# Patient Record
Sex: MALE | Race: BLACK OR AFRICAN AMERICAN | Employment: OTHER | URBAN - METROPOLITAN AREA
[De-identification: names, ages, dates, MRNs, and addresses within clinical notes are randomized per-mention and may not be internally consistent; named-entity substitution may affect disease eponyms.]

---

## 2023-10-02 ENCOUNTER — HOSPITAL ENCOUNTER (OUTPATIENT)
Facility: HOSPITAL | Age: 73
Setting detail: OUTPATIENT SURGERY
End: 2023-10-02
Attending: TRANSPLANT SURGERY | Admitting: TRANSPLANT SURGERY
Payer: COMMERCIAL

## 2023-10-03 ENCOUNTER — CLINICAL SUPPORT (OUTPATIENT)
Dept: PREADMISSION TESTING | Facility: HOSPITAL | Age: 73
End: 2023-10-03
Payer: COMMERCIAL

## 2023-10-03 DIAGNOSIS — N18.6 ESRD (END STAGE RENAL DISEASE) (MULTI): Primary | ICD-10-CM

## 2023-10-04 ENCOUNTER — TELEPHONE (OUTPATIENT)
Dept: PREADMISSION TESTING | Facility: HOSPITAL | Age: 73
End: 2023-10-04

## 2023-10-04 NOTE — H&P (VIEW-ONLY)
Diagnoses/Problems  Assessed    · Diabetes mellitus (250.00) (E11.9)   · CKD (chronic kidney disease) (585.9) (N18.9)   · Preoperative cardiovascular examination (V72.81) (Z01.810)   · Heart block (426.9) (I45.9)    Orders  Health Maintenance    · IO EKG Electrocardiogram- 12 Lead; Status:Complete;   Done: [id number]    Chief Complaint    Patient is here today for a new patient visit for cardiac clearance      History of Present IllnessMr Valentin is a 72 year old  male with T2DM, end stage CKD (not on dialysis), s/p left arm fistual creation, and a Mobitz 1, here for cardiac clearance for a cholecystectomy.     He denies any complaints of chest pain, shortness of breath, palpitations, dizziness, syncope or lower extremity edema.    He denies any H/O CAD, CHF, MI, CVA, TIA    + T2DM (on insulin)  +CKD Last BUN and Cr 73 and 8.75      *Active Problems   Problems    · CKD (chronic kidney disease) (585.9) (N18.9)   · Diabetes mellitus (250.00) (E11.9)   · Encounter for screening colonoscopy (V76.51) (Z12.11)   · Hepatitis B core antibody positive (795.79) (R76.8)   · Pre-transplant evaluation for kidney transplant (V72.83) (Z01.818)    Preoperative cardiovascular examination (V72.81) (Z01.810)       Heart block (426.9) (I45.9)          Surgical History  Problems    · History of Arteriovenous fistula creation procedure   · History of Colonoscopy   · History of Cyst excision   · from right side of abdomen below skin   · History of Prostate surgery    Past Medical History  Problems    · Hepatitis B core antibody positive (795.79) (R76.8)   · History of malignant neoplasm of prostate (V10.46) (Z85.46)    Current Meds    Medication Name Instruction   Carvedilol 3.125 MG Oral Tablet    Desonide 0.05 % External Cream    Doxazosin Mesylate 8 MG Oral Tablet TAKE 1 TABLET Bedtime   Gabapentin 100 MG Oral Capsule TAKE 1 CAPSULE 3 TIMES DAILY.   HumaLOG KwikPen 100 UNIT/ML Subcutaneous Solution Pen-injector sliding  scale as instructed   hydrALAZINE HCl - 100 MG Oral Tablet    Iron TABS TAKE 1 TABLET DAILY AS DIRECTED.   Levemir FlexTouch 100 UNIT/ML SOPN INJECT 60 UNIT Twice daily   Magnesium 400 MG Oral Tablet Take 1 tablet daily   Omeprazole 20 MG Oral Capsule Delayed Release TAKE 1 CAPSULE DAILY EVERY MORNING BEFORE BREAKFAST.   OneTouch Ultra In Vitro Strip    PEG-3350/Electrolytes 236 GM Oral Solution Reconstituted TAKE AS DIRECTED.   Rosuvastatin Calcium 20 MG Oral Tablet TAKE 1 TABLET DAILY.   SPS 15 GM/60ML Oral Suspension 30 ml once daily   Torsemide 20 MG Oral Tablet    Tums 500 MG Oral Tablet Chewable TAKE 2 TABLET 3 times daily with meals   Zinc 50 MG Oral Tablet      Allergies  Medication    · codeine   Recorded By: Rupa Leyva; 1/19/2021 2:35:43 PM   · Ultram TABS   Recorded By: Rupa Leyva; 1/19/2021 2:35:43 PM    Family History  Sister    · Family history of diabetes mellitus (V18.0) (Z83.3)  Brother    · Family history of diabetes mellitus (V18.0) (Z83.3)    Social History  Problems    · Never a smoker   · No illicit drug use   · Occasional alcohol use    Review of Systems    Constitutional: not feeling tired.   Eyes: no eyesight problems.   ENT: no hearing loss and no nosebleeds.   Cardiovascular: no intermittent leg claudication, no chest pain, no tightness or heavy pressure, no shortness of breath, no palpitations, no lower extremity edema, the heart rate was not slow, the heart rate was not fast and as noted in HPI.   Respiratory: no chronic cough, no shortness of breath and no shortness of breath during exertion.   Gastrointestinal: no change in bowel habits and no blood in stools.   Genitourinary: no urinary frequency and no hematuria.   Skin: no skin rashes.   Neurological: no seizures, no frequent falls, no dizziness and no fainting.   Psychiatric: no depression and not suicidal.   All other systems have been reviewed and are negative for complaint.      Vitals  Vital Signs    Recorded:  [id number] 09:47AM   Heart Rate 74   Systolic 140   Diastolic 60   Height 5 ft 11 in   Weight 191 lb    BMI Calculated 26.64 kg/m2   BSA Calculated 2.07   O2 Saturation 97     Physical Exam    Constitutional: alert and in no acute distress.   Eyes: no erythema, swelling or discharge from the eye .   Neck: neck is supple, symmetric, trachea midline, no masses  and no thyromegaly .   Pulmonary: no increased work of breathing or signs of respiratory distress  and lungs clear to auscultation.  Auscultation of the lungs revealed no expiratory wheezing, normal expiratory time and no inspiratory wheezing. no rales or crackles were heard bilaterally. no rhonchi. no friction rub. no wheezing. no diminished breath sounds. no bronchial breath sounds.   Cardiovascular: carotid pulses 2+ bilaterally with no bruit  cardiac murmur radiating up to bilateral carotids, JVP was normal, no thrills ,  the heart rate was normal normal S1, normal S2, no S3, no S4 + Murmur., pedal pulses 2+ bilaterally  and no edema .   Abdomen: abdomen non-tender, no masses  and no hepatomegaly .   Skin: skin warm and dry, normal skin turgor .   Psychiatric judgment and insight is normal  and oriented to person, place and time .      Results/Data  Electrocardiogram 12 Lead 26Jan2023 02:05PM Bre Avila     Test Name Result Flag Reference   Ventricular Rate 52     Atrial Rate 81     QRS Duration 144     Q-T Interval 516     QTC Calculation 479     P Axis 46     Diagnosis      Sinus rhythm with AV disociation and ventricular escpae rhythm   R Axis -18     T Axis -19     QRS Count 9     Q Onset 226     T Offset 484     QTC Fredericia 491     MUSEIMAGE      [email or web address]=PDF&Ext=[email or web address]   Diagnosis Class Abnormal       NM Cardiac Stress/Rest Nuclear Med Order 23Jan2023 12:34PM Mike Zamora     Test Name Result  Flag Reference   NM Cardiac Stress/Rest Nuclear Med Order (Report)     FINAL REPORT  Interpreted by: NETTIE CALLES   01/23/23 13:36  MRN: [id number]  Patient Name: TAMAR ADAMS     STUDY:  CARDIAC STRESS/REST INJECTION; MIBI SPECT REST OR STRESS ONLY;  1/23/2023 12:34 pm     INDICATION:  pre-transplant evaluation for kidney transplant Encounter for other  preprocedural examination Z01.818: Pre-transplant evaluation for  kidney transplant.     COMPARISON:  None.     ACCESSION NUMBER(S):  [id number]; [id number]     ORDERING CLINICIAN:  MIKE ZAMORA     TECHNIQUE:  DIVISION OF NUCLEAR MEDICINE  PHARMACOLOGIC STRESS MYOCARDIAL PERFUSION SCAN, ONE DAY PROTOCOL     The patient received an intravenous dose of 9.9 mCi of Tc-99m  Myoview and resting emission tomographic (SPECT) images of the  myocardium were acquired. No stress test due to patient has 2nd  degree heart block.     FINDINGS:  Rest imaging demonstrates normal profusion throughout the wall of  left ventricle.     IMPRESSION:  1. Rest imaging only, which demonstrates normal profusion throughout  the wall of left ventricle.  2. No stress test due to patient has 2nd degree heart block.     I personally reviewed the images/study and I agree with the findings  as stated. This study was interpreted at [place], [place].    Electronically signed by: MARLI  01/23/23 13:36     NM Mibi Spect Rest Or Stress Only 23Jan2023 12:34PM Mike Zamora   ORDER REVISED TO A MIBI SPECT REST OR STRESS ONLY BY RADIOLOGIST; Original Order Number: [id number]     Test Name Result Flag Reference   NM Mibi Spect Rest Or Stress Only (Report)     FINAL REPORT  Interpreted by: NETTIE CALLES   01/23/23 13:36  MRN: [id number]  Patient Name: TAMAR ADAMS     STUDY:  CARDIAC STRESS/REST INJECTION; MIBI SPECT REST OR STRESS ONLY;  1/23/2023 12:34 pm     INDICATION:  pre-transplant evaluation for kidney transplant Encounter for other  preprocedural  examination Z01.818: Pre-transplant evaluation for  kidney transplant.     COMPARISON:  None.     ACCESSION NUMBER(S):  [id number]; [id number]     ORDERING CLINICIAN:  MIKE GUTIERRES     TECHNIQUE:  DIVISION OF NUCLEAR MEDICINE  PHARMACOLOGIC STRESS MYOCARDIAL PERFUSION SCAN, ONE DAY PROTOCOL     The patient received an intravenous dose of 9.9 mCi of Tc-99m  Myoview and resting emission tomographic (SPECT) images of the  myocardium were acquired. No stress test due to patient has 2nd  degree heart block.     FINDINGS:  Rest imaging demonstrates normal profusion throughout the wall of  left ventricle.     IMPRESSION:  1. Rest imaging only, which demonstrates normal profusion throughout  the wall of left ventricle.  2. No stress test due to patient has 2nd degree heart block.     I personally reviewed the images/study and I agree with the findings  as stated. This study was interpreted at [place], [place].    Electronically signed by: MARLI  01/23/23 13:36     Syngo Nuclear Order 23Jan2023 11:29AM Emigdio Booth     Test Name Result Flag Reference   Nuclear Stress Testing Cardiology Order      Please click on the link to view the study images     Echocardiogram 23Jan2023 09:24AM Mike Gutierres     Test Name Result Flag Reference   Echocardiogram (Report)     [id number]    [facility], [address] [place] [address]  Tel [phone] and Fax [phone]    TRANSTHORACIC ECHOCARDIOGRAM REPORT      Patient Name:   TAMAR ADAMS Reading Physician:  [id number] Derik Shields MD  Study Date:    1/23/2023   Referring Physician: MIKE GUTIERRES  MRN/PID:     [id number]    PCP:  Accession/Order#: [id number]  Department Location: [facility]  YOB: 1950   Fellow:  Gender:      M       Nurse:        Derik Samuels RN, BSN  Admit Date:           Sonographer:     Anayeli [facility]  Admission Status: Outpatient   Additional  Staff:  Height:      180.34 cm   CC Report to:  Weight:      101.15 kg   Study Type:     Echocardiogram  BSA:       2.21 m2  Blood Pressure: 160 /75 mmHg    Diagnosis/ICD: Z01.818-Encounter for other preprocedural examination  Indication:  Pre Kidney Transplant  Procedure/CPT: Echo Complete w Full Doppler-[id number]    Patient History:  Valve Disorders:  Mitral Regurgitation, Tricuspid Regurgitation and Pulmonic  Insufficiency.  Diabetes:     Yes  Pertinent History: HTN and Hyperlipidemia. CKD, Pre kidney tranplant, MAC.    Study Detail: The following Echo studies were performed: 2D, M-Mode, Doppler and  color flow. Technically challenging study due to body habitus,  prominent lung artifact and poor acoustic windows. Definity used  as a contrast agent for endocardial border definition. Total  contrast used for this procedure was 3.0 mL via IV push. Patient's  heart rhythm is Heart Block.      PHYSICIAN INTERPRETATION:  Left Ventricle: The left ventricular systolic function is normal, with an estimated ejection fraction of 60%. There are no regional wall motion abnormalities. The left ventricular cavity size is normal. The left ventricular septal wall thickness is moderately increased. There is moderately increased left ventricular posterior wall thickness. There is mild concentric left ventricular hypertrophy. Spectral Doppler shows a pseudonormal pattern of left ventricular diastolic filling.  Left Atrium: The left atrium is mild to moderately dilated.  Right Ventricle: The right ventricle is mildly enlarged. There is normal right ventricular global systolic function.  Right Atrium: The right atrium is mildly dilated.  Aortic Valve: The aortic valve is trileaflet. There is mild aortic valve cusp calcification. There is evidence of mildly elevated transaortic gradients consistent with sclerosis of the aortic valve.  The aortic valve dimensionless index is 0.63. There is trivial aortic valve regurgitation. The peak  instantaneous gradient of the aortic valve is 14.2 mmHg. The mean gradient of the aortic valve is 8.4 mmHg.  Mitral Valve: The mitral valve is mildly thickened. There is mild mitral annular calcification. There is mild mitral valve regurgitation.  Tricuspid Valve: The tricuspid valve is structurally normal. There is mild tricuspid regurgitation. The Doppler estimated RVSP is moderately elevated at 64.0 mmHg.  Pulmonic Valve: The pulmonic valve is structurally normal. There is physiologic pulmonic valve regurgitation.  Pericardium: There is a trivial to small pericardial effusion.  Aorta: The aortic root is normal. The Asc Ao is 3.40 cm.  Systemic Veins: The inferior vena cava appears mildly dilated. There is IVC inspiratory collapse greater than 50%.  In comparison to the previous echocardiogram(s): Compared with study from 2/9/2022,.      CONCLUSIONS:  1. Left ventricular systolic function is normal with a 60% estimated ejection fraction.  2. Moderately increased left ventricular septal thickness.  3. Spectral Doppler shows a pseudonormal pattern of left ventricular diastolic filling.  4. The left ventricular posterior wall thickness is moderately increased.  5. The left atrium is mild to moderately dilated.  6. Mild mitral valve regurgitation.  7. Mild tricuspid regurgitation visualized.  8. Moderately elevated right ventricular systolic pressure.  9. Aortic valve sclerosis.    QUANTITATIVE DATA SUMMARY:  2D MEASUREMENTS:  Normal Ranges:  LAs:      4.48 cm  (2.7-4.0cm)  RVIDd:     3.09 cm  (0.9-3.6cm)  IVSd:     1.59 cm  (0.6-1.1cm)  LVPWd:     1.46 cm  (0.6-1.1cm)  LVIDd:     4.37 cm  (3.9-5.9cm)  LVIDs:     3.16 cm  LV Mass Index: 122.7 g/m2  LV % FS    27.6 %    LA VOLUME:  Normal Ranges:  LA Vol A4C:    79.9 ml  (22+/-6mL/m2)  LA Vol A2C:    97.9 ml  LA Vol BP:     89.1 ml  LA Vol Index A4C: 36.2 ml/m2  LA Vol Index A2C: 44.3 ml/m2  LA Vol Index BP:  40.3 ml/m2  LA Area A4C:    26.2 cm2  LA Area A2C:    28.8  cm2  LA Major Axis A4C: 7.3 cm  LA Major Axis A2C: 7.2 cm  LA Volume Index:  40.3 ml/m2  LA Vol A4C:    76.2 ml  LA Vol A2C:    91.6 ml    RA VOLUME BY A/L METHOD:  Normal Ranges:  RA Vol A4C:    65.4 ml  (8.3-19.5ml)  RA Vol Index A4C: 29.6 ml/m2  RA Area A4C:    23.2 cm2  RA Major Axis A4C: 7.0 cm    M-MODE MEASUREMENTS:  Normal Ranges:  Ao Root: 3.20 cm (2.0-3.7cm)    AORTA MEASUREMENTS:  Normal Ranges:  Asc Ao, d: 3.40 cm (2.1-3.4cm)    LV SYSTOLIC FUNCTION BY 2D PLANIMETRY (MOD):  Normal Ranges:  EF-A4C View: 56.1 % (>=55%)  EF-A2C View: 64.7 %  EF-Biplane: 59.6 %    LV DIASTOLIC FUNCTION:  Normal Ranges:  MV Peak E:  1.23 m/s  (0.7-1.2 m/s)  MV Peak A:  0.78 m/s  (0.42-0.7 m/s)  E/A Ratio:  1.58    (1.0-2.2)  MV e'     0.06 m/s  (>8.0)  MV lateral e' 0.06 m/s  MV medial e' 0.06 m/s  MV A Dur:   138.41 msec  E/e' Ratio:  20.55    (<8.0)    MITRAL VALVE:  Normal Ranges:  MV DT: 315 msec (150-240msec)    MITRAL INSUFFICIENCY:  Normal Ranges:  MR VTI: 200.23 cm  MR Vmax: 570.36 cm/s    AORTIC VALVE:  Normal Ranges:  AoV Vmax:        1.88 m/s (<=1.7m/s)  AoV Peak P.2 mmHg (<20mmHg)  AoV Mean P.4 mmHg (1.7-11.5mmHg)  LVOT Max Nikhil:      1.07 m/s (<=1.1m/s)  AoV VTI:         46.42 cm (18-25cm)  LVOT VTI:        29.34 cm  LVOT Diameter:      2.07 cm  (1.8-2.4cm)  AoV Area, VTI:      2.12 cm2 (2.5-5.5cm2)  AoV Area,Vmax:      1.91 cm2 (2.5-4.5cm2)  AoV Dimensionless Index: 0.63    RIGHT VENTRICLE:  RV 1  4.8 cm  RV 2  3.1 cm  RV 3  6.0 cm  TAPSE: 22.0 mm  RV s' 0.13 m/s    TRICUSPID VALVE/RVSP:  Normal Ranges:  Peak TR Velocity: 3.74 m/s  Est. RA Pressure: 8 mmHg  RV Syst Pressure: 64.0 mmHg (< 30mmHg)  IVC Diam:     2.40 cm    PULMONIC VALVE:  Normal Ranges:  PV Accel Time: 107 msec (>120ms)  PV Max Nikhil:  0.8 m/s  (0.6-0.9m/s)  PV Max P.9 mmHg  PIEDV:     1.30 m/s  PADP:     14.8 mmHg    AORTA:  Asc Ao Diam 3.35 cm      [id number] Derik Shields MD  Electronically signed on 2023 at 2:15:03  PM        *** Final ***     Impressions    Mr Hanson is a 72 year old  male with T2DM, end stage CKD (not on dialysis), s/p left arm fistual creation, and a Mobitz 1, here for cardiac clearance for a cholecystectomy.     He denies any complaints of chest pain, shortness of breath, palpitations, dizziness, syncope or lower extremity edema.    He denies any H/O CAD, CHF, MI, CVA, TIA    + T2DM (on insulin)  +CKD Last BUN and Cr 73 and 8.75    Last BUN and Cr was 73 and 8.75    CT CALCIUM SCORE IN 2021 WAS 0    NM STRESS TEST Negative for ischemia    ECHO done January 2023 showed a normal LV systolic function with an EF of 60%  Grade II diastolic dysfunction  Left ventricular posterior wall thickness  LA is mild to mod dilated  mild tricuspid regurg  moderately elevated RVSP  aortic sclerosis    His BP today is 140/60. HR is 74 with a +murmur    EKG today shows sinus michaela with PVCs and a Mobitz 1 (previously seen on EKG's)    Patient is cleared for surgery from a CARDIAC standpoint with a Class IV Risk/ 15.0 % 30-day risk of death, MI, or cardiac arrest, however he does have end stage renal failure and is an insulin dependent diabetic which increases his risk of post operative events.          Signatures   Electronically signed by : VENU Platt; Sep 26 2023 10:15AM EST (Author)

## 2023-10-06 ENCOUNTER — LAB (OUTPATIENT)
Dept: LAB | Facility: LAB | Age: 73
End: 2023-10-06
Payer: COMMERCIAL

## 2023-10-06 DIAGNOSIS — N18.6 ESRD (END STAGE RENAL DISEASE) (MULTI): ICD-10-CM

## 2023-10-06 LAB
ABO GROUP (TYPE) IN BLOOD: NORMAL
ANION GAP SERPL CALC-SCNC: 19 MMOL/L (ref 10–20)
ANTIBODY SCREEN: NORMAL
APTT PPP: 33 SECONDS (ref 27–38)
BASOPHILS # BLD AUTO: 0.01 X10*3/UL (ref 0–0.1)
BASOPHILS NFR BLD AUTO: 0.4 %
BUN SERPL-MCNC: 88 MG/DL (ref 6–23)
CALCIUM SERPL-MCNC: 9 MG/DL (ref 8.6–10.6)
CHLORIDE SERPL-SCNC: 97 MMOL/L (ref 98–107)
CO2 SERPL-SCNC: 26 MMOL/L (ref 21–32)
CREAT SERPL-MCNC: 10.69 MG/DL (ref 0.5–1.3)
EOSINOPHIL # BLD AUTO: 0.06 X10*3/UL (ref 0–0.4)
EOSINOPHIL NFR BLD AUTO: 2.2 %
ERYTHROCYTE [DISTWIDTH] IN BLOOD BY AUTOMATED COUNT: 15.8 % (ref 11.5–14.5)
GFR SERPL CREATININE-BSD FRML MDRD: 5 ML/MIN/1.73M*2
GLUCOSE SERPL-MCNC: 147 MG/DL (ref 74–99)
HCT VFR BLD AUTO: 27 % (ref 41–52)
HGB BLD-MCNC: 8.5 G/DL (ref 13.5–17.5)
IMM GRANULOCYTES # BLD AUTO: 0.02 X10*3/UL (ref 0–0.5)
IMM GRANULOCYTES NFR BLD AUTO: 0.7 % (ref 0–0.9)
INR PPP: 1.1 (ref 0.9–1.1)
LYMPHOCYTES # BLD AUTO: 0.47 X10*3/UL (ref 0.8–3)
LYMPHOCYTES NFR BLD AUTO: 17 %
MCH RBC QN AUTO: 29.4 PG (ref 26–34)
MCHC RBC AUTO-ENTMCNC: 31.5 G/DL (ref 32–36)
MCV RBC AUTO: 93 FL (ref 80–100)
MONOCYTES # BLD AUTO: 0.32 X10*3/UL (ref 0.05–0.8)
MONOCYTES NFR BLD AUTO: 11.6 %
NEUTROPHILS # BLD AUTO: 1.88 X10*3/UL (ref 1.6–5.5)
NEUTROPHILS NFR BLD AUTO: 68.1 %
NRBC BLD-RTO: 0 /100 WBCS (ref 0–0)
PLATELET # BLD AUTO: 45 X10*3/UL (ref 150–450)
PMV BLD AUTO: 12.3 FL (ref 7.5–11.5)
POTASSIUM SERPL-SCNC: 4 MMOL/L (ref 3.5–5.3)
PROTHROMBIN TIME: 12.4 SECONDS (ref 9.8–12.8)
RBC # BLD AUTO: 2.89 X10*6/UL (ref 4.5–5.9)
RH FACTOR (ANTIGEN D): NORMAL
SODIUM SERPL-SCNC: 138 MMOL/L (ref 136–145)
WBC # BLD AUTO: 2.8 X10*3/UL (ref 4.4–11.3)

## 2023-10-06 PROCEDURE — 36415 COLL VENOUS BLD VENIPUNCTURE: CPT

## 2023-10-06 PROCEDURE — 85730 THROMBOPLASTIN TIME PARTIAL: CPT

## 2023-10-06 PROCEDURE — 85060 BLOOD SMEAR INTERPRETATION: CPT | Performed by: ANESTHESIOLOGY

## 2023-10-06 PROCEDURE — 86901 BLOOD TYPING SEROLOGIC RH(D): CPT

## 2023-10-06 PROCEDURE — 86850 RBC ANTIBODY SCREEN: CPT

## 2023-10-06 PROCEDURE — 85025 COMPLETE CBC W/AUTO DIFF WBC: CPT

## 2023-10-06 PROCEDURE — 86900 BLOOD TYPING SEROLOGIC ABO: CPT

## 2023-10-06 PROCEDURE — 80048 BASIC METABOLIC PNL TOTAL CA: CPT

## 2023-10-06 PROCEDURE — 85610 PROTHROMBIN TIME: CPT

## 2023-10-09 LAB — PATH REVIEW-CBC DIFFERENTIAL: NORMAL

## 2023-10-12 ENCOUNTER — PREP FOR PROCEDURE (OUTPATIENT)
Dept: TRANSPLANT | Facility: HOSPITAL | Age: 73
End: 2023-10-12

## 2023-10-12 DIAGNOSIS — K80.20 CALCULUS OF GALLBLADDER WITHOUT CHOLECYSTITIS WITHOUT OBSTRUCTION: Primary | ICD-10-CM

## 2023-10-22 ENCOUNTER — ANESTHESIA EVENT (OUTPATIENT)
Dept: OPERATING ROOM | Facility: HOSPITAL | Age: 73
End: 2023-10-22
Payer: COMMERCIAL

## 2023-10-23 ENCOUNTER — ANESTHESIA (OUTPATIENT)
Dept: OPERATING ROOM | Facility: HOSPITAL | Age: 73
End: 2023-10-23
Payer: COMMERCIAL

## 2023-10-23 ENCOUNTER — HOSPITAL ENCOUNTER (OUTPATIENT)
Facility: HOSPITAL | Age: 73
Discharge: HOME | End: 2023-10-24
Attending: TRANSPLANT SURGERY | Admitting: TRANSPLANT SURGERY
Payer: COMMERCIAL

## 2023-10-23 DIAGNOSIS — K80.20 CALCULUS OF GALLBLADDER WITHOUT CHOLECYSTITIS WITHOUT OBSTRUCTION: ICD-10-CM

## 2023-10-23 DIAGNOSIS — K80.50 BILIARY COLIC: Primary | ICD-10-CM

## 2023-10-23 LAB
ALBUMIN SERPL BCP-MCNC: 3.5 G/DL (ref 3.4–5)
ANION GAP SERPL CALC-SCNC: 18 MMOL/L (ref 10–20)
BUN SERPL-MCNC: 86 MG/DL (ref 6–23)
CALCIUM SERPL-MCNC: 8.5 MG/DL (ref 8.6–10.6)
CHLORIDE SERPL-SCNC: 103 MMOL/L (ref 98–107)
CO2 SERPL-SCNC: 24 MMOL/L (ref 21–32)
CREAT SERPL-MCNC: 10.21 MG/DL (ref 0.5–1.3)
ERYTHROCYTE [DISTWIDTH] IN BLOOD BY AUTOMATED COUNT: 15 % (ref 11.5–14.5)
GFR SERPL CREATININE-BSD FRML MDRD: 5 ML/MIN/1.73M*2
GLUCOSE BLD MANUAL STRIP-MCNC: 102 MG/DL (ref 74–99)
GLUCOSE BLD MANUAL STRIP-MCNC: 115 MG/DL (ref 74–99)
GLUCOSE BLD MANUAL STRIP-MCNC: 134 MG/DL (ref 74–99)
GLUCOSE BLD MANUAL STRIP-MCNC: 93 MG/DL (ref 74–99)
GLUCOSE SERPL-MCNC: 105 MG/DL (ref 74–99)
HCT VFR BLD AUTO: 21.4 % (ref 41–52)
HGB BLD-MCNC: 7 G/DL (ref 13.5–17.5)
MAGNESIUM SERPL-MCNC: 1.74 MG/DL (ref 1.6–2.4)
MCH RBC QN AUTO: 29.2 PG (ref 26–34)
MCHC RBC AUTO-ENTMCNC: 32.7 G/DL (ref 32–36)
MCV RBC AUTO: 89 FL (ref 80–100)
NRBC BLD-RTO: 0 /100 WBCS (ref 0–0)
PHOSPHATE SERPL-MCNC: 4.8 MG/DL (ref 2.5–4.9)
PLATELET # BLD AUTO: 67 X10*3/UL (ref 150–450)
PMV BLD AUTO: 11.9 FL (ref 7.5–11.5)
POTASSIUM SERPL-SCNC: 4.1 MMOL/L (ref 3.5–5.3)
RBC # BLD AUTO: 2.4 X10*6/UL (ref 4.5–5.9)
SODIUM SERPL-SCNC: 141 MMOL/L (ref 136–145)
WBC # BLD AUTO: 4.2 X10*3/UL (ref 4.4–11.3)

## 2023-10-23 PROCEDURE — 3600000009 HC OR TIME - EACH INCREMENTAL 1 MINUTE - PROCEDURE LEVEL FOUR: Performed by: TRANSPLANT SURGERY

## 2023-10-23 PROCEDURE — 7100000002 HC RECOVERY ROOM TIME - EACH INCREMENTAL 1 MINUTE: Performed by: TRANSPLANT SURGERY

## 2023-10-23 PROCEDURE — 3600000004 HC OR TIME - INITIAL BASE CHARGE - PROCEDURE LEVEL FOUR: Performed by: TRANSPLANT SURGERY

## 2023-10-23 PROCEDURE — A4217 STERILE WATER/SALINE, 500 ML: HCPCS | Mod: MUE | Performed by: TRANSPLANT SURGERY

## 2023-10-23 PROCEDURE — 2500000004 HC RX 250 GENERAL PHARMACY W/ HCPCS (ALT 636 FOR OP/ED): Performed by: STUDENT IN AN ORGANIZED HEALTH CARE EDUCATION/TRAINING PROGRAM

## 2023-10-23 PROCEDURE — 88304 TISSUE EXAM BY PATHOLOGIST: CPT | Mod: TC,SUR | Performed by: TRANSPLANT SURGERY

## 2023-10-23 PROCEDURE — 3700000001 HC GENERAL ANESTHESIA TIME - INITIAL BASE CHARGE: Performed by: TRANSPLANT SURGERY

## 2023-10-23 PROCEDURE — 2780000003 HC OR 278 NO HCPCS: Performed by: TRANSPLANT SURGERY

## 2023-10-23 PROCEDURE — 3700000002 HC GENERAL ANESTHESIA TIME - EACH INCREMENTAL 1 MINUTE: Performed by: TRANSPLANT SURGERY

## 2023-10-23 PROCEDURE — 36415 COLL VENOUS BLD VENIPUNCTURE: CPT | Performed by: STUDENT IN AN ORGANIZED HEALTH CARE EDUCATION/TRAINING PROGRAM

## 2023-10-23 PROCEDURE — 2500000004 HC RX 250 GENERAL PHARMACY W/ HCPCS (ALT 636 FOR OP/ED): Mod: MUE | Performed by: STUDENT IN AN ORGANIZED HEALTH CARE EDUCATION/TRAINING PROGRAM

## 2023-10-23 PROCEDURE — A47562 PR LAP,CHOLECYSTECTOMY: Performed by: STUDENT IN AN ORGANIZED HEALTH CARE EDUCATION/TRAINING PROGRAM

## 2023-10-23 PROCEDURE — 80069 RENAL FUNCTION PANEL: CPT | Performed by: STUDENT IN AN ORGANIZED HEALTH CARE EDUCATION/TRAINING PROGRAM

## 2023-10-23 PROCEDURE — 88304 TISSUE EXAM BY PATHOLOGIST: CPT | Performed by: STUDENT IN AN ORGANIZED HEALTH CARE EDUCATION/TRAINING PROGRAM

## 2023-10-23 PROCEDURE — 82947 ASSAY GLUCOSE BLOOD QUANT: CPT | Mod: 59

## 2023-10-23 PROCEDURE — 2500000005 HC RX 250 GENERAL PHARMACY W/O HCPCS: Performed by: TRANSPLANT SURGERY

## 2023-10-23 PROCEDURE — 85027 COMPLETE CBC AUTOMATED: CPT | Performed by: STUDENT IN AN ORGANIZED HEALTH CARE EDUCATION/TRAINING PROGRAM

## 2023-10-23 PROCEDURE — 2720000007 HC OR 272 NO HCPCS: Performed by: TRANSPLANT SURGERY

## 2023-10-23 PROCEDURE — 96372 THER/PROPH/DIAG INJ SC/IM: CPT | Performed by: STUDENT IN AN ORGANIZED HEALTH CARE EDUCATION/TRAINING PROGRAM

## 2023-10-23 PROCEDURE — 7100000001 HC RECOVERY ROOM TIME - INITIAL BASE CHARGE: Performed by: TRANSPLANT SURGERY

## 2023-10-23 PROCEDURE — 99100 ANES PT EXTEME AGE<1 YR&>70: CPT | Performed by: STUDENT IN AN ORGANIZED HEALTH CARE EDUCATION/TRAINING PROGRAM

## 2023-10-23 PROCEDURE — 83735 ASSAY OF MAGNESIUM: CPT | Performed by: STUDENT IN AN ORGANIZED HEALTH CARE EDUCATION/TRAINING PROGRAM

## 2023-10-23 PROCEDURE — 47562 LAPAROSCOPIC CHOLECYSTECTOMY: CPT | Performed by: TRANSPLANT SURGERY

## 2023-10-23 PROCEDURE — 2500000004 HC RX 250 GENERAL PHARMACY W/ HCPCS (ALT 636 FOR OP/ED): Performed by: TRANSPLANT SURGERY

## 2023-10-23 PROCEDURE — G0378 HOSPITAL OBSERVATION PER HR: HCPCS

## 2023-10-23 PROCEDURE — 2500000005 HC RX 250 GENERAL PHARMACY W/O HCPCS: Performed by: STUDENT IN AN ORGANIZED HEALTH CARE EDUCATION/TRAINING PROGRAM

## 2023-10-23 RX ORDER — FLASH GLUCOSE SENSOR
KIT MISCELLANEOUS
COMMUNITY
Start: 2023-08-23

## 2023-10-23 RX ORDER — ROSUVASTATIN CALCIUM 20 MG/1
1 TABLET, COATED ORAL DAILY
COMMUNITY
Start: 2020-12-07 | End: 2024-04-24 | Stop reason: HOSPADM

## 2023-10-23 RX ORDER — ACETAMINOPHEN 325 MG/1
2 TABLET ORAL EVERY 6 HOURS PRN
COMMUNITY
Start: 2023-03-07

## 2023-10-23 RX ORDER — SODIUM CHLORIDE, SODIUM LACTATE, POTASSIUM CHLORIDE, CALCIUM CHLORIDE 600; 310; 30; 20 MG/100ML; MG/100ML; MG/100ML; MG/100ML
INJECTION, SOLUTION INTRAVENOUS CONTINUOUS PRN
Status: DISCONTINUED | OUTPATIENT
Start: 2023-10-23 | End: 2023-10-23

## 2023-10-23 RX ORDER — CEFAZOLIN SODIUM 2 G/100ML
INJECTION, SOLUTION INTRAVENOUS AS NEEDED
Status: DISCONTINUED | OUTPATIENT
Start: 2023-10-23 | End: 2023-10-23

## 2023-10-23 RX ORDER — BUPIVACAINE HYDROCHLORIDE 2.5 MG/ML
INJECTION, SOLUTION EPIDURAL; INFILTRATION; INTRACAUDAL AS NEEDED
Status: DISCONTINUED | OUTPATIENT
Start: 2023-10-23 | End: 2023-10-23 | Stop reason: HOSPADM

## 2023-10-23 RX ORDER — FERROUS SULFATE 7.5 MG/0.5
SYRINGE (EA) ORAL
Status: ON HOLD | COMMUNITY
End: 2024-04-18 | Stop reason: ALTCHOICE

## 2023-10-23 RX ORDER — POLYETHYLENE GLYCOL 3350 17 G/17G
17 POWDER, FOR SOLUTION ORAL DAILY
Status: DISCONTINUED | OUTPATIENT
Start: 2023-10-23 | End: 2023-10-24 | Stop reason: HOSPADM

## 2023-10-23 RX ORDER — FLASH GLUCOSE SENSOR
KIT MISCELLANEOUS
COMMUNITY
Start: 2023-05-26 | End: 2024-05-25

## 2023-10-23 RX ORDER — AMOXICILLIN AND CLAVULANATE POTASSIUM 500; 125 MG/1; MG/1
1 TABLET, FILM COATED ORAL
COMMUNITY
Start: 2023-08-31 | End: 2024-04-03 | Stop reason: ALTCHOICE

## 2023-10-23 RX ORDER — SODIUM POLYSTYRENE SULFONATE 15 G/60ML
SUSPENSION ORAL; RECTAL
Status: ON HOLD | COMMUNITY
Start: 2021-02-10 | End: 2024-04-18 | Stop reason: ALTCHOICE

## 2023-10-23 RX ORDER — ACETAMINOPHEN 160 MG/5ML
650 SOLUTION ORAL EVERY 6 HOURS
Status: DISCONTINUED | OUTPATIENT
Start: 2023-10-23 | End: 2023-10-24 | Stop reason: HOSPADM

## 2023-10-23 RX ORDER — CALCIUM CARBONATE 300MG(750)
2 TABLET,CHEWABLE ORAL 2 TIMES DAILY
COMMUNITY
End: 2024-05-08 | Stop reason: SDUPTHER

## 2023-10-23 RX ORDER — HEPARIN SODIUM 5000 [USP'U]/ML
5000 INJECTION, SOLUTION INTRAVENOUS; SUBCUTANEOUS ONCE
Status: DISCONTINUED | OUTPATIENT
Start: 2023-10-23 | End: 2023-10-24 | Stop reason: HOSPADM

## 2023-10-23 RX ORDER — CARBOXYMETHYLCELLULOSE SODIUM 5 MG/ML
SOLUTION/ DROPS OPHTHALMIC
COMMUNITY
Start: 2023-04-10

## 2023-10-23 RX ORDER — PEN NEEDLE, DIABETIC 31 GX5/16"
NEEDLE, DISPOSABLE MISCELLANEOUS
COMMUNITY
Start: 2023-04-27

## 2023-10-23 RX ORDER — BLOOD-GLUCOSE METER,MOBILE DEV
1 EACH MISCELLANEOUS
COMMUNITY
Start: 2022-11-03 | End: 2024-04-24 | Stop reason: HOSPADM

## 2023-10-23 RX ORDER — SODIUM CHLORIDE, SODIUM LACTATE, POTASSIUM CHLORIDE, CALCIUM CHLORIDE 600; 310; 30; 20 MG/100ML; MG/100ML; MG/100ML; MG/100ML
100 INJECTION, SOLUTION INTRAVENOUS CONTINUOUS
Status: DISCONTINUED | OUTPATIENT
Start: 2023-10-23 | End: 2023-10-23 | Stop reason: HOSPADM

## 2023-10-23 RX ORDER — LIDOCAINE 50 MG/G
1 PATCH TOPICAL DAILY PRN
COMMUNITY
Start: 2022-11-03 | End: 2024-04-24 | Stop reason: HOSPADM

## 2023-10-23 RX ORDER — ALBUTEROL SULFATE 0.83 MG/ML
2.5 SOLUTION RESPIRATORY (INHALATION) ONCE AS NEEDED
Status: DISCONTINUED | OUTPATIENT
Start: 2023-10-23 | End: 2023-10-23 | Stop reason: HOSPADM

## 2023-10-23 RX ORDER — NALOXONE HYDROCHLORIDE 0.4 MG/ML
0.2 INJECTION, SOLUTION INTRAMUSCULAR; INTRAVENOUS; SUBCUTANEOUS EVERY 5 MIN PRN
Status: DISCONTINUED | OUTPATIENT
Start: 2023-10-23 | End: 2023-10-24 | Stop reason: HOSPADM

## 2023-10-23 RX ORDER — GUANFACINE 2 MG/1
2 TABLET ORAL NIGHTLY
COMMUNITY
Start: 2023-04-27 | End: 2024-04-24 | Stop reason: HOSPADM

## 2023-10-23 RX ORDER — OXYCODONE HYDROCHLORIDE 5 MG/1
5 TABLET ORAL EVERY 6 HOURS PRN
Status: DISCONTINUED | OUTPATIENT
Start: 2023-10-23 | End: 2023-10-24 | Stop reason: HOSPADM

## 2023-10-23 RX ORDER — INSULIN LISPRO 100 [IU]/ML
INJECTION, SOLUTION INTRAVENOUS; SUBCUTANEOUS
Status: ON HOLD | COMMUNITY
Start: 2020-12-07 | End: 2024-04-18 | Stop reason: SDUPTHER

## 2023-10-23 RX ORDER — HEPARIN SODIUM 5000 [USP'U]/ML
5000 INJECTION, SOLUTION INTRAVENOUS; SUBCUTANEOUS EVERY 8 HOURS
Status: DISCONTINUED | OUTPATIENT
Start: 2023-10-23 | End: 2023-10-24 | Stop reason: HOSPADM

## 2023-10-23 RX ORDER — POLYMYXIN B 500000 [USP'U]/1
INJECTION, POWDER, LYOPHILIZED, FOR SOLUTION INTRAMUSCULAR; INTRATHECAL; INTRAVENOUS; OPHTHALMIC AS NEEDED
Status: DISCONTINUED | OUTPATIENT
Start: 2023-10-23 | End: 2023-10-23 | Stop reason: HOSPADM

## 2023-10-23 RX ORDER — PROPOFOL 10 MG/ML
INJECTION, EMULSION INTRAVENOUS AS NEEDED
Status: DISCONTINUED | OUTPATIENT
Start: 2023-10-23 | End: 2023-10-23

## 2023-10-23 RX ORDER — ONDANSETRON HYDROCHLORIDE 2 MG/ML
4 INJECTION, SOLUTION INTRAVENOUS ONCE AS NEEDED
Status: DISCONTINUED | OUTPATIENT
Start: 2023-10-23 | End: 2023-10-23 | Stop reason: HOSPADM

## 2023-10-23 RX ORDER — ROSUVASTATIN CALCIUM 20 MG/1
20 TABLET, COATED ORAL DAILY
Status: DISCONTINUED | OUTPATIENT
Start: 2023-10-23 | End: 2023-10-24 | Stop reason: HOSPADM

## 2023-10-23 RX ORDER — HYDRALAZINE HYDROCHLORIDE 100 MG/1
100 TABLET, FILM COATED ORAL 3 TIMES DAILY
COMMUNITY
Start: 2023-06-23 | End: 2024-04-24 | Stop reason: HOSPADM

## 2023-10-23 RX ORDER — GUANFACINE 1 MG/1
TABLET ORAL
Status: ON HOLD | COMMUNITY
Start: 2023-02-23 | End: 2024-04-18 | Stop reason: DRUGHIGH

## 2023-10-23 RX ORDER — ACETAMINOPHEN 325 MG/1
650 TABLET ORAL EVERY 6 HOURS
Status: DISCONTINUED | OUTPATIENT
Start: 2023-10-23 | End: 2023-10-24 | Stop reason: HOSPADM

## 2023-10-23 RX ORDER — CALCIUM CARBONATE 200(500)MG
500 TABLET,CHEWABLE ORAL 3 TIMES DAILY
COMMUNITY
Start: 2023-04-27

## 2023-10-23 RX ORDER — SODIUM CHLORIDE 9 MG/ML
INJECTION, SOLUTION INTRAVENOUS CONTINUOUS PRN
Status: DISCONTINUED | OUTPATIENT
Start: 2023-10-23 | End: 2023-10-23

## 2023-10-23 RX ORDER — ROCURONIUM BROMIDE 10 MG/ML
INJECTION, SOLUTION INTRAVENOUS AS NEEDED
Status: DISCONTINUED | OUTPATIENT
Start: 2023-10-23 | End: 2023-10-23

## 2023-10-23 RX ORDER — OXYCODONE HYDROCHLORIDE 5 MG/1
TABLET ORAL
COMMUNITY
Start: 2023-03-07 | End: 2024-04-03 | Stop reason: ALTCHOICE

## 2023-10-23 RX ORDER — LIDOCAINE HYDROCHLORIDE 20 MG/ML
INJECTION, SOLUTION INFILTRATION; PERINEURAL AS NEEDED
Status: DISCONTINUED | OUTPATIENT
Start: 2023-10-23 | End: 2023-10-23

## 2023-10-23 RX ORDER — LABETALOL HYDROCHLORIDE 5 MG/ML
5 INJECTION, SOLUTION INTRAVENOUS ONCE AS NEEDED
Status: DISCONTINUED | OUTPATIENT
Start: 2023-10-23 | End: 2023-10-23 | Stop reason: HOSPADM

## 2023-10-23 RX ORDER — OMEPRAZOLE 20 MG/1
20 CAPSULE, DELAYED RELEASE ORAL
COMMUNITY
Start: 2020-05-22 | End: 2024-08-23

## 2023-10-23 RX ORDER — ACETAMINOPHEN 650 MG/1
650 SUPPOSITORY RECTAL EVERY 6 HOURS
Status: DISCONTINUED | OUTPATIENT
Start: 2023-10-23 | End: 2023-10-24 | Stop reason: HOSPADM

## 2023-10-23 RX ORDER — TACROLIMUS 1 MG/G
OINTMENT TOPICAL 2 TIMES DAILY
COMMUNITY
Start: 2021-11-24 | End: 2024-04-03 | Stop reason: ALTCHOICE

## 2023-10-23 RX ORDER — GLYCOPYRROLATE 0.2 MG/ML
INJECTION INTRAMUSCULAR; INTRAVENOUS AS NEEDED
Status: DISCONTINUED | OUTPATIENT
Start: 2023-10-23 | End: 2023-10-23

## 2023-10-23 RX ORDER — OXYCODONE HYDROCHLORIDE 5 MG/1
5 TABLET ORAL EVERY 4 HOURS PRN
Status: DISCONTINUED | OUTPATIENT
Start: 2023-10-23 | End: 2023-10-23 | Stop reason: HOSPADM

## 2023-10-23 RX ORDER — DEXAMETHASONE SODIUM PHOSPHATE 4 MG/ML
INJECTION, SOLUTION INTRA-ARTICULAR; INTRALESIONAL; INTRAMUSCULAR; INTRAVENOUS; SOFT TISSUE AS NEEDED
Status: DISCONTINUED | OUTPATIENT
Start: 2023-10-23 | End: 2023-10-23

## 2023-10-23 RX ORDER — HYDROMORPHONE HYDROCHLORIDE 1 MG/ML
INJECTION, SOLUTION INTRAMUSCULAR; INTRAVENOUS; SUBCUTANEOUS AS NEEDED
Status: DISCONTINUED | OUTPATIENT
Start: 2023-10-23 | End: 2023-10-23

## 2023-10-23 RX ORDER — AMLODIPINE BESYLATE 5 MG/1
1 TABLET ORAL DAILY
Status: ON HOLD | COMMUNITY
Start: 2021-01-28 | End: 2024-04-18 | Stop reason: ALTCHOICE

## 2023-10-23 RX ORDER — ROSUVASTATIN CALCIUM 20 MG/1
20 TABLET, COATED ORAL
COMMUNITY
Start: 2022-11-03 | End: 2024-04-24 | Stop reason: HOSPADM

## 2023-10-23 RX ORDER — POLYETHYLENE GLYCOL 3350, SODIUM SULFATE ANHYDROUS, SODIUM BICARBONATE, SODIUM CHLORIDE, POTASSIUM CHLORIDE 236; 22.74; 6.74; 5.86; 2.97 G/4L; G/4L; G/4L; G/4L; G/4L
POWDER, FOR SOLUTION ORAL
Status: ON HOLD | COMMUNITY
Start: 2023-02-23 | End: 2024-04-18 | Stop reason: ALTCHOICE

## 2023-10-23 RX ORDER — TORSEMIDE 10 MG/1
1 TABLET ORAL DAILY
Status: ON HOLD | COMMUNITY
Start: 2021-04-07 | End: 2024-04-18 | Stop reason: DRUGHIGH

## 2023-10-23 RX ORDER — ONDANSETRON HYDROCHLORIDE 2 MG/ML
4 INJECTION, SOLUTION INTRAVENOUS EVERY 8 HOURS PRN
Status: DISCONTINUED | OUTPATIENT
Start: 2023-10-23 | End: 2023-10-24 | Stop reason: HOSPADM

## 2023-10-23 RX ORDER — PEN NEEDLE, DIABETIC 30 GX3/16"
NEEDLE, DISPOSABLE MISCELLANEOUS
COMMUNITY
Start: 2023-04-27 | End: 2024-04-26

## 2023-10-23 RX ORDER — GABAPENTIN 100 MG/1
200 CAPSULE ORAL NIGHTLY
COMMUNITY
Start: 2023-07-20 | End: 2024-04-24 | Stop reason: HOSPADM

## 2023-10-23 RX ORDER — ONDANSETRON 4 MG/1
4 TABLET, ORALLY DISINTEGRATING ORAL EVERY 8 HOURS PRN
Status: DISCONTINUED | OUTPATIENT
Start: 2023-10-23 | End: 2023-10-24 | Stop reason: HOSPADM

## 2023-10-23 RX ORDER — DOXAZOSIN 8 MG/1
1 TABLET ORAL NIGHTLY
COMMUNITY
Start: 2021-03-19 | End: 2023-11-30

## 2023-10-23 RX ORDER — GLUC/MSM/COLGN2/HYAL/ANTIARTH3 375-375-20
1 TABLET ORAL DAILY
COMMUNITY

## 2023-10-23 RX ORDER — ZINC SULFATE 50(220)MG
220 CAPSULE ORAL
COMMUNITY
Start: 2023-08-21 | End: 2024-04-24 | Stop reason: HOSPADM

## 2023-10-23 RX ORDER — DESONIDE 0.5 MG/G
CREAM TOPICAL
COMMUNITY
End: 2024-04-24 | Stop reason: HOSPADM

## 2023-10-23 RX ORDER — BLOOD SUGAR DIAGNOSTIC
STRIP MISCELLANEOUS
COMMUNITY
Start: 2020-03-06 | End: 2024-04-24 | Stop reason: HOSPADM

## 2023-10-23 RX ORDER — TORSEMIDE 100 MG/1
TABLET ORAL
COMMUNITY
Start: 2023-07-20 | End: 2024-04-24 | Stop reason: HOSPADM

## 2023-10-23 RX ORDER — CALCITRIOL 0.25 UG/1
0.25 CAPSULE ORAL
COMMUNITY
Start: 2023-04-27

## 2023-10-23 RX ORDER — CARVEDILOL 3.12 MG/1
3.12 TABLET ORAL 2 TIMES DAILY
COMMUNITY
Start: 2023-01-26 | End: 2024-04-24 | Stop reason: HOSPADM

## 2023-10-23 RX ORDER — ACETAMINOPHEN 325 MG/1
650 TABLET ORAL EVERY 4 HOURS PRN
Status: DISCONTINUED | OUTPATIENT
Start: 2023-10-23 | End: 2023-10-23 | Stop reason: HOSPADM

## 2023-10-23 RX ORDER — LIDOCAINE HYDROCHLORIDE 10 MG/ML
0.1 INJECTION, SOLUTION EPIDURAL; INFILTRATION; INTRACAUDAL; PERINEURAL ONCE
Status: DISCONTINUED | OUTPATIENT
Start: 2023-10-23 | End: 2023-10-23 | Stop reason: HOSPADM

## 2023-10-23 RX ORDER — ERGOCALCIFEROL 1.25 MG/1
1 CAPSULE ORAL
COMMUNITY
Start: 2020-03-06 | End: 2024-04-03 | Stop reason: WASHOUT

## 2023-10-23 RX ORDER — GABAPENTIN 100 MG/1
1 CAPSULE ORAL 3 TIMES DAILY
Status: ON HOLD | COMMUNITY
Start: 2020-04-15 | End: 2024-04-18 | Stop reason: DRUGHIGH

## 2023-10-23 RX ORDER — SODIUM CHLORIDE 0.9 G/100ML
IRRIGANT IRRIGATION AS NEEDED
Status: DISCONTINUED | OUTPATIENT
Start: 2023-10-23 | End: 2023-10-23 | Stop reason: HOSPADM

## 2023-10-23 RX ORDER — PANTOPRAZOLE SODIUM 40 MG/1
40 TABLET, DELAYED RELEASE ORAL
Status: DISCONTINUED | OUTPATIENT
Start: 2023-10-24 | End: 2023-10-24 | Stop reason: HOSPADM

## 2023-10-23 RX ORDER — ESMOLOL HYDROCHLORIDE 10 MG/ML
INJECTION INTRAVENOUS AS NEEDED
Status: DISCONTINUED | OUTPATIENT
Start: 2023-10-23 | End: 2023-10-23

## 2023-10-23 RX ORDER — FENTANYL CITRATE 50 UG/ML
INJECTION, SOLUTION INTRAMUSCULAR; INTRAVENOUS AS NEEDED
Status: DISCONTINUED | OUTPATIENT
Start: 2023-10-23 | End: 2023-10-23

## 2023-10-23 RX ORDER — CIPROFLOXACIN 500 MG/1
TABLET ORAL
COMMUNITY
Start: 2023-08-30 | End: 2024-04-03 | Stop reason: ALTCHOICE

## 2023-10-23 RX ORDER — GLIPIZIDE 5 MG/1
TABLET ORAL
COMMUNITY
Start: 1999-11-17 | End: 2024-04-03 | Stop reason: ALTCHOICE

## 2023-10-23 RX ORDER — ONDANSETRON HYDROCHLORIDE 2 MG/ML
INJECTION, SOLUTION INTRAVENOUS AS NEEDED
Status: DISCONTINUED | OUTPATIENT
Start: 2023-10-23 | End: 2023-10-23

## 2023-10-23 RX ADMIN — ACETAMINOPHEN 650 MG: 325 TABLET ORAL at 23:24

## 2023-10-23 RX ADMIN — POLYETHYLENE GLYCOL 3350 17 G: 17 POWDER, FOR SOLUTION ORAL at 16:50

## 2023-10-23 RX ADMIN — PROPOFOL 20 MG: 10 INJECTION, EMULSION INTRAVENOUS at 10:00

## 2023-10-23 RX ADMIN — SODIUM CHLORIDE: 9 INJECTION, SOLUTION INTRAVENOUS at 07:15

## 2023-10-23 RX ADMIN — ROCURONIUM BROMIDE 20 MG: 50 INJECTION, SOLUTION INTRAVENOUS at 08:31

## 2023-10-23 RX ADMIN — GLYCOPYRROLATE 0.2 MG: 0.2 INJECTION, SOLUTION INTRAMUSCULAR; INTRAVENOUS at 08:27

## 2023-10-23 RX ADMIN — GLYCOPYRROLATE 0.2 MG: 0.2 INJECTION, SOLUTION INTRAMUSCULAR; INTRAVENOUS at 08:25

## 2023-10-23 RX ADMIN — CEFOXITIN SODIUM 1 G: 1 POWDER, FOR SOLUTION INTRAVENOUS at 21:06

## 2023-10-23 RX ADMIN — CEFAZOLIN SODIUM 2 G: 2 INJECTION, SOLUTION INTRAVENOUS at 08:10

## 2023-10-23 RX ADMIN — SUGAMMADEX 200 MG: 100 INJECTION, SOLUTION INTRAVENOUS at 10:06

## 2023-10-23 RX ADMIN — HYDROMORPHONE HYDROCHLORIDE 0.2 MG: 2 INJECTION, SOLUTION INTRAMUSCULAR; INTRAVENOUS; SUBCUTANEOUS at 11:04

## 2023-10-23 RX ADMIN — ONDANSETRON 4 MG: 2 INJECTION INTRAMUSCULAR; INTRAVENOUS at 09:50

## 2023-10-23 RX ADMIN — DEXAMETHASONE SODIUM PHOSPHATE 4 MG: 4 INJECTION, SOLUTION INTRA-ARTICULAR; INTRALESIONAL; INTRAMUSCULAR; INTRAVENOUS; SOFT TISSUE at 08:10

## 2023-10-23 RX ADMIN — FENTANYL CITRATE 25 MCG: 50 INJECTION, SOLUTION INTRAMUSCULAR; INTRAVENOUS at 10:05

## 2023-10-23 RX ADMIN — ROCURONIUM BROMIDE 10 MG: 50 INJECTION, SOLUTION INTRAVENOUS at 09:16

## 2023-10-23 RX ADMIN — ESMOLOL HYDROCHLORIDE 30 MG: 100 INJECTION, SOLUTION INTRAVENOUS at 07:55

## 2023-10-23 RX ADMIN — PROPOFOL 100 MG: 10 INJECTION, EMULSION INTRAVENOUS at 07:55

## 2023-10-23 RX ADMIN — LIDOCAINE HYDROCHLORIDE 40 MG: 20 INJECTION, SOLUTION INFILTRATION; PERINEURAL at 07:54

## 2023-10-23 RX ADMIN — SODIUM CHLORIDE, POTASSIUM CHLORIDE, SODIUM LACTATE AND CALCIUM CHLORIDE: 600; 310; 30; 20 INJECTION, SOLUTION INTRAVENOUS at 08:00

## 2023-10-23 RX ADMIN — HYDROMORPHONE HYDROCHLORIDE 0.2 MG: 1 INJECTION, SOLUTION INTRAMUSCULAR; INTRAVENOUS; SUBCUTANEOUS at 10:00

## 2023-10-23 RX ADMIN — EPHEDRINE SULFATE 10 MG: 50 INJECTION, SOLUTION INTRAVENOUS at 09:01

## 2023-10-23 RX ADMIN — SODIUM CHLORIDE, POTASSIUM CHLORIDE, SODIUM LACTATE AND CALCIUM CHLORIDE 100 ML/HR: 600; 310; 30; 20 INJECTION, SOLUTION INTRAVENOUS at 11:05

## 2023-10-23 RX ADMIN — EPHEDRINE SULFATE 10 MG: 50 INJECTION, SOLUTION INTRAVENOUS at 09:54

## 2023-10-23 RX ADMIN — HEPARIN SODIUM 5000 UNITS: 5000 INJECTION, SOLUTION INTRAVENOUS; SUBCUTANEOUS at 16:50

## 2023-10-23 RX ADMIN — HYDROMORPHONE HYDROCHLORIDE 0.2 MG: 1 INJECTION, SOLUTION INTRAMUSCULAR; INTRAVENOUS; SUBCUTANEOUS at 09:48

## 2023-10-23 RX ADMIN — ACETAMINOPHEN 650 MG: 325 TABLET ORAL at 16:49

## 2023-10-23 RX ADMIN — ROCURONIUM BROMIDE 50 MG: 50 INJECTION, SOLUTION INTRAVENOUS at 07:56

## 2023-10-23 RX ADMIN — FENTANYL CITRATE 50 MCG: 50 INJECTION, SOLUTION INTRAMUSCULAR; INTRAVENOUS at 07:54

## 2023-10-23 RX ADMIN — ROSUVASTATIN CALCIUM 20 MG: 20 TABLET, FILM COATED ORAL at 21:07

## 2023-10-23 RX ADMIN — SUGAMMADEX 200 MG: 100 INJECTION, SOLUTION INTRAVENOUS at 09:51

## 2023-10-23 RX ADMIN — PROPOFOL 30 MG: 10 INJECTION, EMULSION INTRAVENOUS at 09:54

## 2023-10-23 SDOH — SOCIAL STABILITY: SOCIAL INSECURITY: ABUSE: ADULT

## 2023-10-23 SDOH — SOCIAL STABILITY: SOCIAL INSECURITY: DO YOU FEEL ANYONE HAS EXPLOITED OR TAKEN ADVANTAGE OF YOU FINANCIALLY OR OF YOUR PERSONAL PROPERTY?: NO

## 2023-10-23 SDOH — SOCIAL STABILITY: SOCIAL INSECURITY: ARE YOU OR HAVE YOU BEEN THREATENED OR ABUSED PHYSICALLY, EMOTIONALLY, OR SEXUALLY BY ANYONE?: NO

## 2023-10-23 SDOH — SOCIAL STABILITY: SOCIAL INSECURITY: DO YOU FEEL UNSAFE GOING BACK TO THE PLACE WHERE YOU ARE LIVING?: NO

## 2023-10-23 SDOH — SOCIAL STABILITY: SOCIAL INSECURITY: ARE THERE ANY APPARENT SIGNS OF INJURIES/BEHAVIORS THAT COULD BE RELATED TO ABUSE/NEGLECT?: NO

## 2023-10-23 SDOH — SOCIAL STABILITY: SOCIAL INSECURITY: HAVE YOU HAD THOUGHTS OF HARMING ANYONE ELSE?: NO

## 2023-10-23 SDOH — HEALTH STABILITY: MENTAL HEALTH: CURRENT SMOKER: 0

## 2023-10-23 SDOH — SOCIAL STABILITY: SOCIAL INSECURITY: HAS ANYONE EVER THREATENED TO HURT YOUR FAMILY OR YOUR PETS?: NO

## 2023-10-23 SDOH — SOCIAL STABILITY: SOCIAL INSECURITY: WERE YOU ABLE TO COMPLETE ALL THE BEHAVIORAL HEALTH SCREENINGS?: YES

## 2023-10-23 SDOH — SOCIAL STABILITY: SOCIAL INSECURITY: DOES ANYONE TRY TO KEEP YOU FROM HAVING/CONTACTING OTHER FRIENDS OR DOING THINGS OUTSIDE YOUR HOME?: NO

## 2023-10-23 ASSESSMENT — PAIN SCALES - GENERAL
PAINLEVEL_OUTOF10: 0 - NO PAIN
PAIN_LEVEL: 2
PAINLEVEL_OUTOF10: 4
PAINLEVEL_OUTOF10: 0 - NO PAIN
PAINLEVEL_OUTOF10: 3
PAINLEVEL_OUTOF10: 0 - NO PAIN
PAINLEVEL_OUTOF10: 3
PAINLEVEL_OUTOF10: 0 - NO PAIN
PAINLEVEL_OUTOF10: 3
PAINLEVEL_OUTOF10: 2

## 2023-10-23 ASSESSMENT — COGNITIVE AND FUNCTIONAL STATUS - GENERAL
DRESSING REGULAR UPPER BODY CLOTHING: A LITTLE
DAILY ACTIVITIY SCORE: 24
TOILETING: A LITTLE
DRESSING REGULAR LOWER BODY CLOTHING: A LITTLE
MOBILITY SCORE: 22
PATIENT BASELINE BEDBOUND: NO
PERSONAL GROOMING: A LITTLE
DAILY ACTIVITIY SCORE: 19
MOBILITY SCORE: 24
MOVING TO AND FROM BED TO CHAIR: A LITTLE
DAILY ACTIVITIY SCORE: 24
TURNING FROM BACK TO SIDE WHILE IN FLAT BAD: A LITTLE
HELP NEEDED FOR BATHING: A LITTLE

## 2023-10-23 ASSESSMENT — ACTIVITIES OF DAILY LIVING (ADL)
HEARING - LEFT EAR: FUNCTIONAL
TOILETING: INDEPENDENT
WALKS IN HOME: INDEPENDENT
JUDGMENT_ADEQUATE_SAFELY_COMPLETE_DAILY_ACTIVITIES: YES
LACK_OF_TRANSPORTATION: PATIENT DECLINED
ADEQUATE_TO_COMPLETE_ADL: YES
HEARING - RIGHT EAR: FUNCTIONAL
GROOMING: INDEPENDENT
FEEDING YOURSELF: INDEPENDENT
DRESSING YOURSELF: INDEPENDENT
PATIENT'S MEMORY ADEQUATE TO SAFELY COMPLETE DAILY ACTIVITIES?: YES
BATHING: INDEPENDENT

## 2023-10-23 ASSESSMENT — LIFESTYLE VARIABLES
SKIP TO QUESTIONS 9-10: 1
HOW OFTEN DO YOU HAVE A DRINK CONTAINING ALCOHOL: NEVER
AUDIT-C TOTAL SCORE: 0
AUDIT-C TOTAL SCORE: 0
HOW MANY STANDARD DRINKS CONTAINING ALCOHOL DO YOU HAVE ON A TYPICAL DAY: PATIENT DOES NOT DRINK
HOW OFTEN DO YOU HAVE 6 OR MORE DRINKS ON ONE OCCASION: NEVER

## 2023-10-23 ASSESSMENT — COLUMBIA-SUICIDE SEVERITY RATING SCALE - C-SSRS
2. HAVE YOU ACTUALLY HAD ANY THOUGHTS OF KILLING YOURSELF?: NO
1. IN THE PAST MONTH, HAVE YOU WISHED YOU WERE DEAD OR WISHED YOU COULD GO TO SLEEP AND NOT WAKE UP?: NO
6. HAVE YOU EVER DONE ANYTHING, STARTED TO DO ANYTHING, OR PREPARED TO DO ANYTHING TO END YOUR LIFE?: NO

## 2023-10-23 ASSESSMENT — PAIN - FUNCTIONAL ASSESSMENT
PAIN_FUNCTIONAL_ASSESSMENT: 0-10

## 2023-10-23 ASSESSMENT — PATIENT HEALTH QUESTIONNAIRE - PHQ9
2. FEELING DOWN, DEPRESSED OR HOPELESS: NOT AT ALL
1. LITTLE INTEREST OR PLEASURE IN DOING THINGS: NOT AT ALL
SUM OF ALL RESPONSES TO PHQ9 QUESTIONS 1 & 2: 0

## 2023-10-23 NOTE — ANESTHESIA PREPROCEDURE EVALUATION
Patient: Temo Hanson    Procedure Information       Date/Time: 10/23/23 0715    Procedure: Lap Cholecystectomy (Abdomen)    Location: [facility] OR 15 / [facility] OR    Surgeons: Mike Zamora MD            Relevant Problems   GI/Hepatic   (+) Calculus of gallbladder without cholecystitis without obstruction       Clinical information reviewed:   Tobacco  Allergies  Meds  Problems  Med Hx  Surg Hx   Fam Hx  Soc   Hx        NPO Detail:  NPO/Void Status  Carbonhydrate Drink Given Prior to Surgery? : N  Date of Last Liquid: 10/23/23  Time of Last Liquid: 0400  Date of Last Solid: 10/22/23  Time of Last Solid: 2000  Last Intake Type: Clear fluids         Physical Exam    Airway  Mallampati: III  TM distance: >3 FB  Neck ROM: full     Cardiovascular   Rhythm: regular     Dental    Pulmonary   Breath sounds clear to auscultation     Abdominal   (+) obese  Abdomen: soft  Bowel sounds: normal       Anesthesia Plan    ASA 3     general     The patient is not a current smoker.    intravenous induction   Postoperative administration of opioids is intended.  Trial extubation is planned.  Anesthetic plan and risks discussed with patient.  Use of blood products discussed with patient who consented to blood products.    Plan discussed with attending.

## 2023-10-23 NOTE — BRIEF OP NOTE
Date: 10/23/2023  OR Location: [facility] OR    Name: Temo Hanson, : 1950, Age: 72 y.o., MRN: [id number], Sex: male    Diagnosis  Pre-op Diagnosis     * Calculus of gallbladder without cholecystitis without obstruction [K80.20] Post-op Diagnosis     * Calculus of gallbladder without cholecystitis without obstruction [K80.20]     Procedures  Lap Cholecystectomy  53145 - OR LAPAROSCOPY SURG CHOLECYSTECTOMY      Surgeons      * Mike Zamora - Primary    Resident/Fellow/Other Assistant:  Surgeon(s) and Role:     * Paul Ballesteros MD - Resident - Assisting    Procedure Summary  Anesthesia: General  ASA: III  Anesthesia Staff: Anesthesiologist: Josseline Marquez MD  Anesthesia Resident: Ursula Watts MD  Anesthesia Break User: KAMERON Gore  Estimated Blood Loss: 15mL  Intra-op Medications:   Medication Name Total Dose   sodium chloride 0.9 % irrigation solution 3,000 mL   bupivacaine PF (Marcaine) 0.25 % (2.5 mg/mL) injection 30 mL   polymyxin B injection 1,500,000 Units              Anesthesia Record               Intraprocedure I/O Totals          Intake    Esmolol Drip 3.00 mL    The total shown is the total volume documented since Anesthesia Start was filed.    lactated Ringer's 400.00 mL    sodium chloride 0.9% 500.00 mL    ceFAZolin (Ancef) IVPB 2 g/100 mL D5 (premix) 20.00 mL    Total Intake 923 mL       Output    Urine 125 mL    Est. Blood Loss 20 mL    NG/OG Tube Output 100 mL    Total Output 245 mL       Net    Net Volume 678 mL          Specimen:   ID Type Source Tests Collected by Time   1 : GALLBLADDER Tissue GALLBLADDER CHOLECYSTECTOMY SURGICAL PATHOLOGY EXAM Mike Zamora MD 10/23/2023 0840        Staff:   Circulator: Mani Hayden RN  Scrub Person: Kb Lazaro          Findings: Gallbladder with thick rind    Complications:  None; patient tolerated the procedure well.     Disposition: PACU - hemodynamically stable.  Condition: stable  Specimens Collected:   ID Type Source Tests  Collected by Time   1 : GALLBLADDER Tissue GALLBLADDER CHOLECYSTECTOMY SURGICAL PATHOLOGY EXAM Mike Zamora MD 10/23/2023 0840     Attending Attestation: I was present and scrubbed for the entire procedure.    Paul Ballesteros MD  General Surgery Resident    Mike Zamora  Phone Number: [phone]

## 2023-10-23 NOTE — ANESTHESIA PROCEDURE NOTES
Peripheral IV  Date/Time: 10/23/2023 8:00 AM  Inserted by: Ursula Watts MD    Placement  Needle size: 20 G  Laterality: right  Location: hand  Local anesthetic: none  Site prep: chlorhexidine and alcohol  Technique: anatomical landmarks  Attempts: 2

## 2023-10-23 NOTE — ANESTHESIA PROCEDURE NOTES
Peripheral IV  Date/Time: 10/23/2023 7:10 AM  Inserted by: Ursula Watts MD    Placement  Needle size: 22 G  Laterality: right  Location: wrist  Local anesthetic: none  Site prep: chlorhexidine  Technique: anatomical landmarks  Attempts: 1

## 2023-10-23 NOTE — ANESTHESIA PROCEDURE NOTES
Airway  Date/Time: 10/23/2023 7:58 AM  Urgency: elective    Airway not difficult    Staffing  Performed: attending and resident   Authorized by: Josseline Marquez MD    Performed by: Ursula Watts MD  Patient location during procedure: OR    Indications and Patient Condition  Indications for airway management: anesthesia  Spontaneous Ventilation: absent  Sedation level: deep  Preoxygenated: yes  Mask difficulty assessment: 2 - vent by mask + OA or adjuvant +/- NMBA  Planned trial extubation    Final Airway Details  Final airway type: endotracheal airway      Successful airway: ETT  Cuffed: yes   Facilitating devices/methods: Bougie and intubating stylet  Endotracheal tube insertion site: oral  Blade: Blossom  Blade size: #4  ETT size (mm): 7.5  Placement verified by: chest auscultation, capnometry and palpation of cuff   Cuff volume (mL): 7  Measured from: lips  ETT to lips (cm): 23  Number of attempts at approach: 2  Ventilation between attempts: BVM  Number of other approaches attempted: 0

## 2023-10-23 NOTE — OP NOTE
Lap Cholecystectomy Operative Note     Date: 10/23/2023  OR Location: [facility] OR    Name: Temo Hanson, : 1950, Age: 72 y.o., MRN: [id number], Sex: male    Diagnosis  Pre-op Diagnosis     * Calculus of gallbladder without cholecystitis without obstruction [K80.20] Post-op Diagnosis     * Calculus of gallbladder without cholecystitis without obstruction [K80.20]     Procedures  Lap Cholecystectomy  [address]756 - [place] LAPAROSCOPY SURG CHOLECYSTECTOMY      Surgeons      * Mike Zamora - Primary    Resident/Fellow/Other Assistant:  Surgeon(s) and Role:     * Paul Ballesteros MD - Resident - Assisting    Procedure Summary  Anesthesia: General  ASA: III  Anesthesia Staff: Anesthesiologist: Josseline Marquez MD  Anesthesia Resident: Ursula Watts MD  Anesthesia Break User: KAMERON Gore  Estimated Blood Loss: 30mL  Intra-op Medications:   Medication Name Total Dose   sodium chloride 0.9 % irrigation solution 3,000 mL   bupivacaine PF (Marcaine) 0.25 % (2.5 mg/mL) injection 30 mL   polymyxin B injection 1,500,000 Units              Anesthesia Record               Intraprocedure I/O Totals          Intake    Esmolol Drip 3.00 mL    The total shown is the total volume documented since Anesthesia Start was filed.    lactated Ringer's 400.00 mL    sodium chloride 0.9% 500.00 mL    ceFAZolin (Ancef) IVPB 2 g/100 mL D5 (premix) 20.00 mL    Total Intake 923 mL       Output    Urine 125 mL    Est. Blood Loss 20 mL    NG/OG Tube Output 100 mL    Total Output 245 mL       Net    Net Volume 678 mL          Specimen:   ID Type Source Tests Collected by Time   1 : GALLBLADDER Tissue GALLBLADDER CHOLECYSTECTOMY SURGICAL PATHOLOGY EXAM Mike Zamora MD 10/23/2023 0840        Staff:   Circulator: Mani Hayden RN  Scrub Person: Kb Lazaro         Drains and/or Catheters:   [REMOVED] Urethral Catheter Non-latex 16 Fr. (Removed)       Tourniquet Times:         Implants:     Findings:   Chronically inflamed gallbladder with  sludge and stones    Indications: Temo Hanson is an 72 y.o. male who is having surgery for symptomatic gallstones diseases. He has CKD nearing dialysis. His electrolytes were stable and deemed reasonable candidate to proceed with cholecystectomy.    The patient was seen in the preoperative area. The risks, benefits, complications, treatment options, non-operative alternatives, expected recovery and outcomes were discussed with the patient. The possibilities of reaction to medication, pulmonary aspiration, injury to surrounding structures, bleeding, recurrent infection, the need for additional procedures, failure to diagnose a condition, and creating a complication requiring transfusion or operation were discussed with the patient. The patient concurred with the proposed plan, giving informed consent.  The site of surgery was properly noted/marked if necessary per policy. The patient has been actively warmed in preoperative area. Preoperative antibiotics have been ordered and given within 1 hours of incision. Venous thrombosis prophylaxis have been ordered including bilateral sequential compression devices    Procedure Details:     The patient was brought to the operating room and placed in the supine position. After adequate general endotracheal anesthesia, the abdomen was prepped and draped in a sterile fashion. The patient was given antibiotics intravenously.   A paraumbilical incision was made and subcutaneous tissue dissected to fascia. The fascia was grasped using Kochers and the peritoneal cavity sharply entered. Chad 10mm trocar was inserted. The abdomen was insufflated to a pressure of 15 mm. The laparoscope was placed, and the abdomen was examined. There was no injury from the initial entry. Three 5mm accessory ports were placed under direct visualization; one was placed in the subxiphoid region, one in the midclavicular line, and one in the midaxillary line. The patient was then placed in the reverse  Trendelenburg position and rolled to the left.  The assistant then grasped the fundus of the gallbladder pushing above the dome of the liver. The gallbladder appeared inflamed. Omental adhesions were taken down using LigaSure. The infundibulum of the gallbladder was grasped and retracted laterally. The cystic duct was identified along with the cystic artery. The anatomy was well visualized. Using a combination of hook cautery and dissector, the structures were delineated. Two clips were placed proximally on the cystic artery and one distally and the artery was divided. Similarly, the cystic duct was divided between clips. The gallbladder was slowly and carefully taken off the liver bed using hook cautery. I should mention here that the surgical plane was difficult to delineate due to the inflammation. At one point, the gallbladder was entered with leaking of sludge and stones which were removed with suction . Once it was free, the liver bed was checked. There was no bleeding or bile leak noted.   The gallbladder was then placed in the endo-catch pouch and brought out the umbilicus under direct visualization. The area was checked one final time. The right upper quadrant was irrigated and suctioned dry. Each of the ports was removed under direct visualization. The abdomen was desufflated. The fascia of the umbilicus was closed with #0 Vicryl suture. The skin incisions were closed with #4-0 Vicryl subcuticular sutures. It should be noted that each of the incisions and subcutaneous tissues were locally anesthetized with 0.25% Marcaine. Dressings were placed. All counts were correct and I was present in the entire case.          Complications:  None; patient tolerated the procedure well.    Disposition: PACU - hemodynamically stable.  Condition: stable       Attending Attestation: I was present and scrubbed for the entire procedure.    Mike Zamora  Phone Number: [phone]

## 2023-10-23 NOTE — ANESTHESIA POSTPROCEDURE EVALUATION
Patient: Temo Hanson    Procedure Summary       Date: 10/23/23 Room / Location: [facility] OR 15 / [facility]    Anesthesia Start: 0735 Anesthesia Stop: 1025    Procedure: Lap Cholecystectomy (Abdomen) Diagnosis:       Calculus of gallbladder without cholecystitis without obstruction      (Calculus of gallbladder without cholecystitis without obstruction [K80.20])    Surgeons: Mike Zamora MD Responsible Provider: Josseline Marquez MD    Anesthesia Type: general ASA Status: 3            Anesthesia Type: general    Vitals Value Taken Time   /66 10/23/23 1025   Temp 36.3 10/23/23 1025   Pulse 68 10/23/23 1025   Resp 14 10/23/23 1025   SpO2 100 10/23/23 1025       Anesthesia Post Evaluation    Patient location during evaluation: PACU  Patient participation: complete - patient participated  Level of consciousness: awake and alert  Pain score: 2  Pain management: adequate  Airway patency: patent  Cardiovascular status: acceptable and hemodynamically stable  Respiratory status: acceptable and room air  Hydration status: acceptable        There were no known notable events for this encounter.

## 2023-10-23 NOTE — INTERVAL H&P NOTE
[facility]  [address]  [place] [address]  Phone: [phone]  Fax: [phone]  August 29, 2017     AUTHORIZATION TO RELEASE PROTECTED HEALTH INFORMATION    Patient Name:  Leisa Leong  YOB: 1983    Jeanne MRN:[id number]             This will authorize [facility]  to request information from :     Obstetrics,Gynecology & Infertility F [phone]   P [phone]    The following information is to be released for health maintenance purposes with my primary care clinic:                 Pap Smear Report(most recent only)       When:     I understand that I may revoke this authorization by written request at any time to the address listed at the top of this form.  I understand that the revocation will not apply to information that has already been released in response to this authorization.  This form expires one year after I sign it or sooner (specifiy here: _______________________)      ___________________________________          _____________  Signature of Patient/Authorized Person                     Date        ____________________________________________  (Reason if patient is unable to sign)             H&P reviewed. The patient was examined and there are no changes to the H&P.

## 2023-10-24 VITALS
BODY MASS INDEX: 24.19 KG/M2 | WEIGHT: 182.54 LBS | DIASTOLIC BLOOD PRESSURE: 66 MMHG | SYSTOLIC BLOOD PRESSURE: 162 MMHG | RESPIRATION RATE: 18 BRPM | TEMPERATURE: 98.1 F | HEART RATE: 76 BPM | OXYGEN SATURATION: 100 % | HEIGHT: 73 IN

## 2023-10-24 PROBLEM — K80.20 CALCULUS OF GALLBLADDER WITHOUT CHOLECYSTITIS WITHOUT OBSTRUCTION: Status: RESOLVED | Noted: 2023-10-12 | Resolved: 2023-10-24

## 2023-10-24 PROBLEM — Z90.49 S/P LAPAROSCOPIC CHOLECYSTECTOMY: Status: ACTIVE | Noted: 2023-10-24

## 2023-10-24 PROBLEM — K80.50 BILIARY COLIC: Status: RESOLVED | Noted: 2023-10-23 | Resolved: 2023-10-24

## 2023-10-24 LAB
ALBUMIN SERPL BCP-MCNC: 3.5 G/DL (ref 3.4–5)
ANION GAP SERPL CALC-SCNC: 18 MMOL/L (ref 10–20)
BUN SERPL-MCNC: 88 MG/DL (ref 6–23)
CALCIUM SERPL-MCNC: 8.7 MG/DL (ref 8.6–10.6)
CHLORIDE SERPL-SCNC: 100 MMOL/L (ref 98–107)
CO2 SERPL-SCNC: 23 MMOL/L (ref 21–32)
CREAT SERPL-MCNC: 10.76 MG/DL (ref 0.5–1.3)
ERYTHROCYTE [DISTWIDTH] IN BLOOD BY AUTOMATED COUNT: 15.5 % (ref 11.5–14.5)
GFR SERPL CREATININE-BSD FRML MDRD: 5 ML/MIN/1.73M*2
GLUCOSE BLD MANUAL STRIP-MCNC: 123 MG/DL (ref 74–99)
GLUCOSE BLD MANUAL STRIP-MCNC: 123 MG/DL (ref 74–99)
GLUCOSE BLD MANUAL STRIP-MCNC: 182 MG/DL (ref 74–99)
GLUCOSE SERPL-MCNC: 176 MG/DL (ref 74–99)
HCT VFR BLD AUTO: 23.9 % (ref 41–52)
HGB BLD-MCNC: 7.3 G/DL (ref 13.5–17.5)
MCH RBC QN AUTO: 29.8 PG (ref 26–34)
MCHC RBC AUTO-ENTMCNC: 30.5 G/DL (ref 32–36)
MCV RBC AUTO: 98 FL (ref 80–100)
NRBC BLD-RTO: 0 /100 WBCS (ref 0–0)
PHOSPHATE SERPL-MCNC: 5 MG/DL (ref 2.5–4.9)
PLATELET # BLD AUTO: 70 X10*3/UL (ref 150–450)
PMV BLD AUTO: 11.8 FL (ref 7.5–11.5)
POTASSIUM SERPL-SCNC: 4.3 MMOL/L (ref 3.5–5.3)
RBC # BLD AUTO: 2.45 X10*6/UL (ref 4.5–5.9)
SODIUM SERPL-SCNC: 137 MMOL/L (ref 136–145)
WBC # BLD AUTO: 5.5 X10*3/UL (ref 4.4–11.3)

## 2023-10-24 PROCEDURE — 2500000002 HC RX 250 W HCPCS SELF ADMINISTERED DRUGS (ALT 637 FOR MEDICARE OP, ALT 636 FOR OP/ED): Performed by: STUDENT IN AN ORGANIZED HEALTH CARE EDUCATION/TRAINING PROGRAM

## 2023-10-24 PROCEDURE — 85027 COMPLETE CBC AUTOMATED: CPT | Performed by: TRANSPLANT SURGERY

## 2023-10-24 PROCEDURE — 96372 THER/PROPH/DIAG INJ SC/IM: CPT | Performed by: STUDENT IN AN ORGANIZED HEALTH CARE EDUCATION/TRAINING PROGRAM

## 2023-10-24 PROCEDURE — 2500000004 HC RX 250 GENERAL PHARMACY W/ HCPCS (ALT 636 FOR OP/ED): Performed by: STUDENT IN AN ORGANIZED HEALTH CARE EDUCATION/TRAINING PROGRAM

## 2023-10-24 PROCEDURE — 99024 POSTOP FOLLOW-UP VISIT: CPT | Performed by: STUDENT IN AN ORGANIZED HEALTH CARE EDUCATION/TRAINING PROGRAM

## 2023-10-24 PROCEDURE — 7100000011 HC EXTENDED STAY RECOVERY HOURLY - NURSING UNIT

## 2023-10-24 PROCEDURE — 36415 COLL VENOUS BLD VENIPUNCTURE: CPT | Performed by: TRANSPLANT SURGERY

## 2023-10-24 PROCEDURE — 80069 RENAL FUNCTION PANEL: CPT | Performed by: TRANSPLANT SURGERY

## 2023-10-24 PROCEDURE — G0378 HOSPITAL OBSERVATION PER HR: HCPCS

## 2023-10-24 PROCEDURE — 82947 ASSAY GLUCOSE BLOOD QUANT: CPT | Mod: 59

## 2023-10-24 RX ORDER — DEXTROSE 50 % IN WATER (D50W) INTRAVENOUS SYRINGE
25
Status: DISCONTINUED | OUTPATIENT
Start: 2023-10-24 | End: 2023-10-24 | Stop reason: HOSPADM

## 2023-10-24 RX ORDER — DEXTROSE MONOHYDRATE 100 MG/ML
0.3 INJECTION, SOLUTION INTRAVENOUS ONCE AS NEEDED
Status: DISCONTINUED | OUTPATIENT
Start: 2023-10-24 | End: 2023-10-24 | Stop reason: HOSPADM

## 2023-10-24 RX ORDER — INSULIN LISPRO 100 [IU]/ML
0-5 INJECTION, SOLUTION INTRAVENOUS; SUBCUTANEOUS
Status: DISCONTINUED | OUTPATIENT
Start: 2023-10-24 | End: 2023-10-24 | Stop reason: HOSPADM

## 2023-10-24 RX ORDER — OXYCODONE HYDROCHLORIDE 5 MG/1
5 TABLET ORAL EVERY 6 HOURS PRN
Qty: 10 TABLET | Refills: 0 | Status: SHIPPED | OUTPATIENT
Start: 2023-10-24 | End: 2024-04-17 | Stop reason: WASHOUT

## 2023-10-24 RX ADMIN — ACETAMINOPHEN 650 MG: 325 TABLET ORAL at 09:49

## 2023-10-24 RX ADMIN — POLYETHYLENE GLYCOL 3350 17 G: 17 POWDER, FOR SOLUTION ORAL at 09:05

## 2023-10-24 RX ADMIN — ACETAMINOPHEN 650 MG: 325 TABLET ORAL at 06:05

## 2023-10-24 RX ADMIN — CEFOXITIN SODIUM 1 G: 1 POWDER, FOR SOLUTION INTRAVENOUS at 06:04

## 2023-10-24 RX ADMIN — ROSUVASTATIN CALCIUM 20 MG: 20 TABLET, FILM COATED ORAL at 09:07

## 2023-10-24 RX ADMIN — ACETAMINOPHEN 650 MG: 325 TABLET ORAL at 17:12

## 2023-10-24 RX ADMIN — PANTOPRAZOLE SODIUM 40 MG: 40 TABLET, DELAYED RELEASE ORAL at 06:05

## 2023-10-24 RX ADMIN — INSULIN LISPRO 1 UNITS: 100 INJECTION, SOLUTION INTRAVENOUS; SUBCUTANEOUS at 12:17

## 2023-10-24 RX ADMIN — HEPARIN SODIUM 5000 UNITS: 5000 INJECTION, SOLUTION INTRAVENOUS; SUBCUTANEOUS at 00:30

## 2023-10-24 RX ADMIN — CEFOXITIN SODIUM 1 G: 1 POWDER, FOR SOLUTION INTRAVENOUS at 17:12

## 2023-10-24 RX ADMIN — HEPARIN SODIUM 5000 UNITS: 5000 INJECTION, SOLUTION INTRAVENOUS; SUBCUTANEOUS at 09:05

## 2023-10-24 ASSESSMENT — COGNITIVE AND FUNCTIONAL STATUS - GENERAL
DAILY ACTIVITIY SCORE: 24
DAILY ACTIVITIY SCORE: 24
MOBILITY SCORE: 24
MOBILITY SCORE: 24
DAILY ACTIVITIY SCORE: 24
DAILY ACTIVITIY SCORE: 24
MOBILITY SCORE: 24
MOBILITY SCORE: 24

## 2023-10-24 NOTE — CARE PLAN
The patient's goals for the shift include pain management    The clinical goals for the shift include pain control

## 2023-10-24 NOTE — HOSPITAL COURSE
72 year old  male with T2DM, end stage CKD (not on dialysis), s/p left arm fistual creation, and a Mobitz 1 and history of biliary colic underwent a laparoscopic cholecystectomy on 10/23. He was admitted overnight for observation. Patient tolerated the procedure well. On post-operative day 1 he was tolerating a diet, his pain was controlled, and he was able to ambulate without assistance. He was discharged with pain medications and follow up in 2 weeks with Dr. Zamora.

## 2023-10-24 NOTE — DISCHARGE SUMMARY
Discharge Diagnosis  Calculus of gallbladder without cholecystitis without obstruction    Issues Requiring Follow-Up  Post-operative evaluation following cholecystectomy.     Test Results Pending At Discharge  Pending Labs       Order Current Status    Surgical Pathology Exam In process            Hospital Course  72 year old  male with T2DM, end stage CKD (not on dialysis), s/p left arm fistual creation, and a Mobitz 1 and history of biliary colic underwent a laparoscopic cholecystectomy on 10/23. He was admitted overnight for observation. Patient tolerated the procedure well. On post-operative day 1 he was tolerating a diet, his pain was controlled, and he was able to ambulate without assistance. He was discharged with pain medications and follow up in 2 weeks with Dr. Zamora.     Pertinent Physical Exam At Time of Discharge  Physical Exam  Constitutional:       General: He is not in acute distress.     Appearance: Normal appearance. He is normal weight. He is not toxic-appearing.   HENT:      Head: Normocephalic and atraumatic.      Mouth/Throat:      Mouth: Mucous membranes are moist.      Pharynx: Oropharynx is clear.   Eyes:      Extraocular Movements: Extraocular movements intact.      Pupils: Pupils are equal, round, and reactive to light.   Cardiovascular:      Rate and Rhythm: Normal rate and regular rhythm.   Pulmonary:      Effort: Pulmonary effort is normal. No respiratory distress.   Abdominal:      General: Abdomen is flat. There is no distension.      Palpations: Abdomen is soft.      Tenderness: There is no guarding or rebound.   Musculoskeletal:      Right lower leg: No edema.      Left lower leg: No edema.   Skin:     General: Skin is warm and dry.   Neurological:      General: No focal deficit present.      Mental Status: He is alert and oriented to person, place, and time.   Psychiatric:         Mood and Affect: Mood normal.         Home Medications     Medication List      CHANGE  how you take these medications     * oxyCODONE 5 mg immediate release tablet; Commonly known as:   Roxicodone; What changed: Another medication with the same name was added.   Make sure you understand how and when to take each.   * oxyCODONE 5 mg immediate release tablet; Commonly known as:   Roxicodone; Take 1 tablet (5 mg) by mouth every 6 hours if needed for   severe pain (7 - 10) or moderate pain (4 - 6).; What changed: You were   already taking a medication with the same name, and this prescription was   added. Make sure you understand how and when to take each.  * This list has 2 medication(s) that are the same as other medications   prescribed for you. Read the directions carefully, and ask your doctor or   other care provider to review them with you.     CONTINUE taking these medications     acetaminophen 325 mg tablet; Commonly known as: Tylenol   amLODIPine 5 mg tablet; Commonly known as: Norvasc   amoxicillin-pot clavulanate 500-125 mg tablet; Commonly known as:   Augmentin   calcitriol 0.25 mcg capsule; Commonly known as: Rocaltrol   calcium carbonate 200 mg calcium chewable tablet; Commonly known as:   Tums   carboxymethylcellulose 0.5 % ophthalmic solution; Commonly known as:   Refresh Plus   carvedilol 3.125 mg tablet; Commonly known as: Coreg   ciprofloxacin 500 mg tablet; Commonly known as: Cipro   desonide 0.05 % cream; Commonly known as: DesOwen   doxazosin 8 mg tablet; Commonly known as: Cardura   ergocalciferol 1.25 MG (13792 UT) capsule; Commonly known as: Vitamin   D-2   ferrous gluconate 236 mg (27 mg iron) tablet   ferrous sulfate 7.5 mg iron/0.5 mL syringe   * FreeStyle Mick 2 Sensor kit; Generic drug: FreeStyle Mick sensor   system   * FreeStyle Mick 2 Sensor kit; Generic drug: FreeStyle Mick sensor   system   * gabapentin 100 mg capsule; Commonly known as: Neurontin   * gabapentin 100 mg capsule; Commonly known as: Neurontin   GlucotroL 5 mg tablet; Generic drug: glipiZIDE   *  "guanFACINE 1 mg tablet; Commonly known as: Tenex   * guanFACINE 2 mg tablet; Commonly known as: Tenex   hydrALAZINE 100 mg tablet; Commonly known as: Apresoline   insulin detemir 100 unit/mL (3 mL) pen; Commonly known as: Levemir   Flextouch   * INSULIN LISPRO SUBQ   * HumaLOG KwikPen Insulin 100 unit/mL injection; Generic drug: insulin   lispro   lidocaine 5 % patch; Commonly known as: Lidoderm   magnesium oxide 400 mg tablet; Commonly known as: Mag-Ox   omeprazole 20 mg DR capsule; Commonly known as: PriLOSEC   * OneTouch Ultra Test strip; Generic drug: blood sugar diagnostic   * Ritchie Blood Glucose Test Strip strip; Generic drug: blood sugar   diagnostic   * pen needle, diabetic 32 gauge x 5/32\" needle   * BD Ultra-Fine Joan Pen Needle 32 gauge x 5/32\" needle; Generic drug:   pen needle, diabetic   polyethylene glycol-electrolytes 420 gram solution; Commonly known as:   Nulytely   * rosuvastatin 20 mg tablet; Commonly known as: Crestor   * rosuvastatin 20 mg tablet; Commonly known as: Crestor   * sodium zirconium cyclosilicate 5 gram packet; Commonly known as:   Lokelma   * sodium zirconium cyclosilicate 10 gram packet; Commonly known as:   Lokelma   SPS (with sorbitol) 15 gram/60 mL suspension; Generic drug: sodium   polystyrene   tacrolimus 0.1 % ointment; Commonly known as: Protopic   * torsemide 10 mg tablet; Commonly known as: Demadex   * torsemide 100 mg tablet; Commonly known as: Demadex   zinc sulfate 220 (50 Zn) MG capsule; Commonly known as: Zincate  * This list has 18 medication(s) that are the same as other medications   prescribed for you. Read the directions carefully, and ask your doctor or   other care provider to review them with you.       Outpatient Follow-Up  Future Appointments   Date Time Provider Department Center   11/1/2023  1:00 PM [id number]  [id number]   11/3/2023  7:30 AM Jim Moran MD [id number]       Paul Ballesteros MD  "

## 2023-10-31 LAB
LABORATORY COMMENT REPORT: NORMAL
PATH REPORT.FINAL DX SPEC: NORMAL
PATH REPORT.GROSS SPEC: NORMAL
PATH REPORT.RELEVANT HX SPEC: NORMAL
PATH REPORT.TOTAL CANCER: NORMAL

## 2023-11-01 ENCOUNTER — OFFICE VISIT (OUTPATIENT)
Dept: SURGERY | Facility: CLINIC | Age: 73
End: 2023-11-01
Payer: COMMERCIAL

## 2023-11-01 VITALS
WEIGHT: 184 LBS | HEART RATE: 58 BPM | HEIGHT: 73 IN | DIASTOLIC BLOOD PRESSURE: 60 MMHG | BODY MASS INDEX: 24.39 KG/M2 | SYSTOLIC BLOOD PRESSURE: 148 MMHG

## 2023-11-01 DIAGNOSIS — N18.4 STAGE 4 CHRONIC KIDNEY DISEASE (MULTI): Primary | ICD-10-CM

## 2023-11-01 PROBLEM — L84 CALLUS OF FOOT: Status: ACTIVE | Noted: 2019-04-30

## 2023-11-01 PROBLEM — D50.8 IRON DEFICIENCY ANEMIA SECONDARY TO INADEQUATE DIETARY IRON INTAKE: Status: ACTIVE | Noted: 2021-10-28

## 2023-11-01 PROBLEM — M79.671 FOOT PAIN, RIGHT: Status: ACTIVE | Noted: 2019-04-30

## 2023-11-01 PROBLEM — R05.8 PRODUCTIVE COUGH: Status: ACTIVE | Noted: 2019-05-27

## 2023-11-01 PROBLEM — E11.9 DIABETES MELLITUS (MULTI): Status: ACTIVE | Noted: 2023-11-01

## 2023-11-01 PROBLEM — N39.0 ACUTE LOWER UTI: Status: ACTIVE | Noted: 2022-05-18

## 2023-11-01 PROBLEM — R19.7 DIARRHEA: Status: ACTIVE | Noted: 2019-05-30

## 2023-11-01 PROBLEM — J18.9 PNEUMONIA OF RIGHT LOWER LOBE DUE TO INFECTIOUS ORGANISM: Status: ACTIVE | Noted: 2019-05-26

## 2023-11-01 PROBLEM — B35.1 ONYCHOMYCOSIS: Status: ACTIVE | Noted: 2019-04-30

## 2023-11-01 PROBLEM — L60.2 LONG TOENAIL: Status: ACTIVE | Noted: 2019-04-30

## 2023-11-01 PROBLEM — R76.8 HEPATITIS B CORE ANTIBODY POSITIVE: Status: ACTIVE | Noted: 2023-11-01

## 2023-11-01 PROBLEM — I42.2 OTHER HYPERTROPHIC CARDIOMYOPATHY (MULTI): Status: ACTIVE | Noted: 2019-06-04

## 2023-11-01 PROBLEM — N18.5 STAGE 5 CHRONIC KIDNEY DISEASE (MULTI): Status: ACTIVE | Noted: 2021-08-09

## 2023-11-01 PROBLEM — E87.5 HYPERKALEMIA: Status: ACTIVE | Noted: 2019-05-26

## 2023-11-01 PROBLEM — D35.01 PHEOCHROMOCYTOMA OF RIGHT ADRENAL GLAND: Status: ACTIVE | Noted: 2018-06-27

## 2023-11-01 PROBLEM — I44.30 AV BLOCK: Status: ACTIVE | Noted: 2023-11-01

## 2023-11-01 PROCEDURE — 1159F MED LIST DOCD IN RCRD: CPT | Performed by: TRANSPLANT SURGERY

## 2023-11-01 PROCEDURE — 3078F DIAST BP <80 MM HG: CPT | Performed by: TRANSPLANT SURGERY

## 2023-11-01 PROCEDURE — 3066F NEPHROPATHY DOC TX: CPT | Performed by: TRANSPLANT SURGERY

## 2023-11-01 PROCEDURE — 1036F TOBACCO NON-USER: CPT | Performed by: TRANSPLANT SURGERY

## 2023-11-01 PROCEDURE — 99024 POSTOP FOLLOW-UP VISIT: CPT | Performed by: TRANSPLANT SURGERY

## 2023-11-01 PROCEDURE — 1126F AMNT PAIN NOTED NONE PRSNT: CPT | Performed by: TRANSPLANT SURGERY

## 2023-11-01 PROCEDURE — 3044F HG A1C LEVEL LT 7.0%: CPT | Performed by: TRANSPLANT SURGERY

## 2023-11-01 PROCEDURE — 3077F SYST BP >= 140 MM HG: CPT | Performed by: TRANSPLANT SURGERY

## 2023-11-01 NOTE — PROGRESS NOTES
Subjective   Patient ID: Temo Hanson is a 72 y.o. male who is s/p laparoscopic cholecystectomy 10/23/23.   Had overnight stay in hospital, kidney function stable    No complaints    Objective   Physical Exam    Incision sites well healed    Assessment/Plan   CKD nearing dialysis, function stable  No uremic symptoms  Doing well after lap rudi  Follow up transplant coordinator  Hopefully transplant soon

## 2023-11-03 ENCOUNTER — APPOINTMENT (OUTPATIENT)
Dept: GASTROENTEROLOGY | Facility: HOSPITAL | Age: 73
End: 2023-11-03
Payer: COMMERCIAL

## 2023-11-13 ENCOUNTER — TELEPHONE (OUTPATIENT)
Dept: TRANSPLANT | Facility: HOSPITAL | Age: 73
End: 2023-11-13

## 2023-11-13 NOTE — TELEPHONE ENCOUNTER
Patient and patient daughter called to follow up on status. Patient informed he is status 7. He stated he is doing well after his cholecystectomy. Will have primary coordinator reach out to see what is needed for activation. Patient verbalized understanding.

## 2024-02-16 ENCOUNTER — TELEPHONE (OUTPATIENT)
Dept: GASTROENTEROLOGY | Facility: HOSPITAL | Age: 74
End: 2024-02-16

## 2024-02-16 DIAGNOSIS — Z12.11 COLON CANCER SCREENING: Primary | ICD-10-CM

## 2024-02-19 RX ORDER — POLYETHYLENE GLYCOL-3350 AND ELECTROLYTES WITH FLAVOR PACK 240; 5.84; 2.98; 6.72; 22.72 G/278.26G; G/278.26G; G/278.26G; G/278.26G; G/278.26G
4000 POWDER, FOR SOLUTION ORAL ONCE
Qty: 4000 ML | Refills: 0 | Status: SHIPPED | OUTPATIENT
Start: 2024-02-19 | End: 2024-02-19

## 2024-02-23 ENCOUNTER — ANESTHESIA (OUTPATIENT)
Dept: GASTROENTEROLOGY | Facility: HOSPITAL | Age: 74
End: 2024-02-23
Payer: COMMERCIAL

## 2024-02-23 ENCOUNTER — ANESTHESIA EVENT (OUTPATIENT)
Dept: GASTROENTEROLOGY | Facility: HOSPITAL | Age: 74
End: 2024-02-23
Payer: COMMERCIAL

## 2024-02-23 ENCOUNTER — HOSPITAL ENCOUNTER (OUTPATIENT)
Dept: GASTROENTEROLOGY | Facility: HOSPITAL | Age: 74
Setting detail: OUTPATIENT SURGERY
Discharge: HOME | End: 2024-02-23
Payer: COMMERCIAL

## 2024-02-23 VITALS
TEMPERATURE: 97.5 F | HEIGHT: 73 IN | OXYGEN SATURATION: 92 % | BODY MASS INDEX: 25.31 KG/M2 | WEIGHT: 191 LBS | RESPIRATION RATE: 18 BRPM | DIASTOLIC BLOOD PRESSURE: 65 MMHG | HEART RATE: 59 BPM | SYSTOLIC BLOOD PRESSURE: 169 MMHG

## 2024-02-23 DIAGNOSIS — Z12.11 ENCOUNTER FOR SCREENING FOR MALIGNANT NEOPLASM OF COLON: ICD-10-CM

## 2024-02-23 DIAGNOSIS — Z01.818 ENCOUNTER FOR OTHER PREPROCEDURAL EXAMINATION: ICD-10-CM

## 2024-02-23 LAB
GLUCOSE BLD MANUAL STRIP-MCNC: 197 MG/DL (ref 74–99)
GLUCOSE BLD MANUAL STRIP-MCNC: 78 MG/DL (ref 74–99)

## 2024-02-23 PROCEDURE — 43239 EGD BIOPSY SINGLE/MULTIPLE: CPT | Performed by: INTERNAL MEDICINE

## 2024-02-23 PROCEDURE — A45378 PR COLONOSCOPY,DIAGNOSTIC

## 2024-02-23 PROCEDURE — 7100000010 HC PHASE TWO TIME - EACH INCREMENTAL 1 MINUTE: Performed by: INTERNAL MEDICINE

## 2024-02-23 PROCEDURE — 82947 ASSAY GLUCOSE BLOOD QUANT: CPT

## 2024-02-23 PROCEDURE — 3700000001 HC GENERAL ANESTHESIA TIME - INITIAL BASE CHARGE: Performed by: INTERNAL MEDICINE

## 2024-02-23 PROCEDURE — 2500000004 HC RX 250 GENERAL PHARMACY W/ HCPCS (ALT 636 FOR OP/ED)

## 2024-02-23 PROCEDURE — 99100 ANES PT EXTEME AGE<1 YR&>70: CPT | Performed by: PAIN MEDICINE

## 2024-02-23 PROCEDURE — G0121 COLON CA SCRN NOT HI RSK IND: HCPCS | Performed by: INTERNAL MEDICINE

## 2024-02-23 PROCEDURE — 7100000009 HC PHASE TWO TIME - INITIAL BASE CHARGE: Performed by: INTERNAL MEDICINE

## 2024-02-23 PROCEDURE — 88305 TISSUE EXAM BY PATHOLOGIST: CPT | Performed by: PATHOLOGY

## 2024-02-23 PROCEDURE — 3700000002 HC GENERAL ANESTHESIA TIME - EACH INCREMENTAL 1 MINUTE: Performed by: INTERNAL MEDICINE

## 2024-02-23 PROCEDURE — A45378 PR COLONOSCOPY,DIAGNOSTIC: Performed by: PAIN MEDICINE

## 2024-02-23 PROCEDURE — 0753T DGTZ GLS MCRSCP SLD LEVEL IV: CPT | Mod: TC,SUR,WESLAB | Performed by: INTERNAL MEDICINE

## 2024-02-23 PROCEDURE — 45378 DIAGNOSTIC COLONOSCOPY: CPT | Performed by: INTERNAL MEDICINE

## 2024-02-23 PROCEDURE — 2500000005 HC RX 250 GENERAL PHARMACY W/O HCPCS

## 2024-02-23 RX ORDER — SODIUM CHLORIDE, SODIUM LACTATE, POTASSIUM CHLORIDE, CALCIUM CHLORIDE 600; 310; 30; 20 MG/100ML; MG/100ML; MG/100ML; MG/100ML
INJECTION, SOLUTION INTRAVENOUS CONTINUOUS PRN
Status: DISCONTINUED | OUTPATIENT
Start: 2024-02-23 | End: 2024-02-23

## 2024-02-23 RX ORDER — ONDANSETRON HYDROCHLORIDE 2 MG/ML
4 INJECTION, SOLUTION INTRAVENOUS ONCE AS NEEDED
Status: DISCONTINUED | OUTPATIENT
Start: 2024-02-23 | End: 2024-02-24 | Stop reason: HOSPADM

## 2024-02-23 RX ORDER — PROPOFOL 10 MG/ML
INJECTION, EMULSION INTRAVENOUS AS NEEDED
Status: DISCONTINUED | OUTPATIENT
Start: 2024-02-23 | End: 2024-02-23

## 2024-02-23 RX ORDER — PROPOFOL 10 MG/ML
INJECTION, EMULSION INTRAVENOUS CONTINUOUS PRN
Status: DISCONTINUED | OUTPATIENT
Start: 2024-02-23 | End: 2024-02-23

## 2024-02-23 RX ORDER — ACETAMINOPHEN 325 MG/1
650 TABLET ORAL EVERY 8 HOURS PRN
Status: DISCONTINUED | OUTPATIENT
Start: 2024-02-23 | End: 2024-02-24 | Stop reason: HOSPADM

## 2024-02-23 RX ORDER — LABETALOL HYDROCHLORIDE 5 MG/ML
5 INJECTION, SOLUTION INTRAVENOUS EVERY 10 MIN PRN
Status: DISCONTINUED | OUTPATIENT
Start: 2024-02-23 | End: 2024-02-24 | Stop reason: HOSPADM

## 2024-02-23 RX ORDER — SODIUM CHLORIDE, SODIUM LACTATE, POTASSIUM CHLORIDE, CALCIUM CHLORIDE 600; 310; 30; 20 MG/100ML; MG/100ML; MG/100ML; MG/100ML
100 INJECTION, SOLUTION INTRAVENOUS CONTINUOUS
Status: DISCONTINUED | OUTPATIENT
Start: 2024-02-23 | End: 2024-02-24 | Stop reason: HOSPADM

## 2024-02-23 RX ORDER — LIDOCAINE HYDROCHLORIDE 20 MG/ML
INJECTION, SOLUTION INFILTRATION; PERINEURAL AS NEEDED
Status: DISCONTINUED | OUTPATIENT
Start: 2024-02-23 | End: 2024-02-23

## 2024-02-23 RX ADMIN — PROPOFOL 100 MCG/KG/MIN: 10 INJECTION, EMULSION INTRAVENOUS at 08:48

## 2024-02-23 RX ADMIN — SODIUM CHLORIDE, POTASSIUM CHLORIDE, SODIUM LACTATE AND CALCIUM CHLORIDE: 600; 310; 30; 20 INJECTION, SOLUTION INTRAVENOUS at 08:43

## 2024-02-23 RX ADMIN — LIDOCAINE HYDROCHLORIDE 50 MG: 20 INJECTION, SOLUTION INFILTRATION; PERINEURAL at 08:48

## 2024-02-23 RX ADMIN — PROPOFOL 40 MG: 10 INJECTION, EMULSION INTRAVENOUS at 08:48

## 2024-02-23 ASSESSMENT — COLUMBIA-SUICIDE SEVERITY RATING SCALE - C-SSRS
1. IN THE PAST MONTH, HAVE YOU WISHED YOU WERE DEAD OR WISHED YOU COULD GO TO SLEEP AND NOT WAKE UP?: NO
6. HAVE YOU EVER DONE ANYTHING, STARTED TO DO ANYTHING, OR PREPARED TO DO ANYTHING TO END YOUR LIFE?: NO
2. HAVE YOU ACTUALLY HAD ANY THOUGHTS OF KILLING YOURSELF?: NO

## 2024-02-23 ASSESSMENT — PAIN - FUNCTIONAL ASSESSMENT
PAIN_FUNCTIONAL_ASSESSMENT: 0-10

## 2024-02-23 ASSESSMENT — PAIN SCALES - GENERAL
PAINLEVEL_OUTOF10: 0 - NO PAIN
PAIN_LEVEL: 0
PAINLEVEL_OUTOF10: 0 - NO PAIN

## 2024-02-23 NOTE — ANESTHESIA PREPROCEDURE EVALUATION
Patient: Temo Hanson    Procedure Information       Date/Time: 02/23/24 0830    Scheduled providers: Juana Correa MD; Kaity Cooper RN; Valentino Che MD    Procedures:       EGD      COLONOSCOPY    Location: [facility]            Relevant Problems   Anesthesia (within normal limits)      Cardiovascular   (+) AV block   (+) Essential hypertension   (+) Pure hypercholesterolemia   (+) SVT (supraventricular tachycardia)      Endocrine   (+) Type 2 diabetes mellitus with diabetic neuropathy (CMS/HCC)      GI   (+) GIB (gastrointestinal bleeding)   (+) Stricture esophagus      /Renal   (+) Acute lower UTI   (+) Stage 5 chronic kidney disease (CMS/HCC)      Pulmonary   (+) Pneumonia of right lower lobe due to infectious organism      GI/Hepatic (within normal limits)      Hematology   (+) Anemia   (+) Iron deficiency anemia secondary to inadequate dietary iron intake      Infectious Disease   (+) Acute lower UTI   (+) Onychomycosis   (+) Pneumonia of right lower lobe due to infectious organism       Clinical information reviewed:   Tobacco  Allergies  Meds   Med Hx  Surg Hx   Fam Hx  Soc Hx        NPO Detail:  NPO/Void Status  Date of Last Liquid: 02/22/24  Time of Last Liquid: 2300  Date of Last Solid: 02/22/24  Time of Last Solid: 0900         Physical Exam    Airway  Mallampati: IV  TM distance: >3 FB     Cardiovascular   Rhythm: regular  Rate: normal  Comments: Slight AV fistula sounds   Dental        Pulmonary   Breath sounds clear to auscultation     Abdominal            Anesthesia Plan    History of general anesthesia?: yes  History of complications of general anesthesia?: no    ASA 3     Anesthetic plan and risks discussed with patient.    Plan discussed with CRNA.

## 2024-02-23 NOTE — H&P
"History Of Present Illness  Temo Hanson is a 73 y.o. male presenting with pre transplant eval.     Past Medical History  Past Medical History:   Diagnosis Date    DM (diabetes mellitus) (CMS/HCC)     HTN (hypertension)     Other specified abnormal immunological findings in serum 10/11/2021    Hepatitis B core antibody positive    Personal history of malignant neoplasm of prostate     History of malignant neoplasm of prostate     Surgical History  Past Surgical History:   Procedure Laterality Date    CHOLECYSTECTOMY  10/23/2023    Lap Cholecystectomy    OTHER SURGICAL HISTORY  09/29/2021    Cyst excision    OTHER SURGICAL HISTORY  09/29/2021    Arteriovenous fistula creation procedure    OTHER SURGICAL HISTORY  09/29/2021    Prostate surgery    OTHER SURGICAL HISTORY  09/29/2021    Colonoscopy     Social History  He reports that he has never smoked. He has never used smokeless tobacco. He reports that he does not drink alcohol and does not use drugs.    Family History  Family History   Problem Relation Name Age of Onset    Diabetes Sister      Diabetes Brother          Allergies  Allergies   Allergen Reactions    Codeine Itching    Terazosin Unknown     Did not feel well on this medication    Tramadol Itching     Review of Systems     Physical Exam     Last Recorded Vitals  Blood pressure (!) 197/61, pulse 60, temperature 36.2 °C (97.2 °F), temperature source Temporal, resp. rate 17, height 1.854 m (6' 1\"), weight 86.6 kg (191 lb), SpO2 96 %.    Assessment/Plan   Egd and colonoscopy     Juana Correa MD  "

## 2024-02-23 NOTE — ANESTHESIA POSTPROCEDURE EVALUATION
Patient: Temo Hanson    Procedure Summary       Date: 02/23/24 Room / Location: [facility]    Anesthesia Start: 0843 Anesthesia Stop: 0920    Procedures:       EGD      COLONOSCOPY Diagnosis:       Encounter for other preprocedural examination      Encounter for screening for malignant neoplasm of colon    Scheduled Providers: Juana Correa MD; Kaity Cooper RN; Valentino Che MD Responsible Provider: Valentino Che MD    Anesthesia Type: MAC ASA Status: 3            Anesthesia Type: MAC    Vitals Value Taken Time   /23 02/23/24 0920   Temp  02/23/24 0920   Pulse 54 02/23/24 0920   Resp 12 02/23/24 0920   SpO2 96 02/23/24 0920       Anesthesia Post Evaluation    Patient location during evaluation: PACU  Patient participation: complete - patient participated  Level of consciousness: awake  Pain score: 0  Pain management: adequate  Airway patency: patent  Cardiovascular status: acceptable  Respiratory status: acceptable  Hydration status: acceptable  Postoperative Nausea and Vomiting: none        No notable events documented.

## 2024-02-24 ASSESSMENT — PAIN SCALES - GENERAL: PAINLEVEL_OUTOF10: 0 - NO PAIN

## 2024-02-28 LAB
LABORATORY COMMENT REPORT: NORMAL
PATH REPORT.FINAL DX SPEC: NORMAL
PATH REPORT.GROSS SPEC: NORMAL
PATH REPORT.TOTAL CANCER: NORMAL

## 2024-02-29 ENCOUNTER — TELEPHONE (OUTPATIENT)
Dept: TRANSPLANT | Facility: HOSPITAL | Age: 74
End: 2024-02-29

## 2024-02-29 DIAGNOSIS — E11.29 TYPE 2 DIABETES MELLITUS WITH OTHER DIABETIC KIDNEY COMPLICATION (MULTI): ICD-10-CM

## 2024-02-29 DIAGNOSIS — Z01.818 PRE-TRANSPLANT EVALUATION FOR KIDNEY TRANSPLANT: ICD-10-CM

## 2024-02-29 DIAGNOSIS — E11.8 TYPE 2 DIABETES MELLITUS WITH UNSPECIFIED COMPLICATIONS (MULTI): ICD-10-CM

## 2024-02-29 DIAGNOSIS — Z12.5 ENCOUNTER FOR SCREENING FOR MALIGNANT NEOPLASM OF PROSTATE: ICD-10-CM

## 2024-02-29 DIAGNOSIS — Z13.6 ENCOUNTER FOR SCREENING FOR CARDIOVASCULAR DISORDERS: ICD-10-CM

## 2024-02-29 DIAGNOSIS — Z51.81 ENCOUNTER FOR THERAPEUTIC DRUG LEVEL MONITORING: ICD-10-CM

## 2024-02-29 NOTE — TELEPHONE ENCOUNTER
Recommend pelvic floor dysfunction can be the cause of your pain.  Physical therapy or surgery might help for this.  Contact my office if you are interested in this.  At [facility], we strive to deliver an exceptional experience to you, every time we see you.  If you receive a survey in the mail, please send us back your thoughts. We really do value your feedback.    Based on your medical history, these are the current health maintenance/preventive care services that you are due for (some may have been done at this visit.)  Health Maintenance Due   Topic Date Due     HIV SCREEN (SYSTEM ASSIGNED)  07/12/1979     MAMMO SCREEN Q2 YR (SYSTEM ASSIGNED)  08/19/2016     DEPRESSION ACTION PLAN Q1 YR  01/25/2018   Call [phone] to schedule your mammogram.    Suggested websites for health information:  Www.[employer]ola : Up to date and easily searchable information on multiple topics.  Www.Music[employer].gov : medication info, interactive tutorials, watch real surgeries online  Www.familydoctor.org : good info from the Academy of Family Physicians  Www.cdc.gov : public health info, travel advisories, epidemics (H1N1)  Www.aap.org : children's health info, normal development, vaccinations  Www.health.state.[place].us : [place] dept of health, public health issues in [place], N1N1    Your care team:                            Family Medicine Internal Medicine   MD Skinny Evans MD Shantel Branch-Fleming, MD Katya Georgiev PA-C Megan Hill, GREGORY Lo MD Pediatrics   MARSHA Combs, MD Cathy Nicholas APRN CNP   MD Simran Bowie MD Deborah Mielke, MD Kim Thein, APRN CNP      Clinic hours: Monday - Thursday 7 am-7 pm; Fridays 7 am-5 pm.   Urgent care: Monday - Friday 11 am-9 pm; Saturday and Sunday 9 am-5 pm.  Pharmacy : Monday -Thursday 8 am-8 pm; Friday 8 am-6 pm; Saturday and Sunday 9 am-5 pm.     Clinic: [phone]   Spoke to daughter today.    She asked about his inactive waitlist status.  I reviewed the chart and put in orders for updates and tasked it out to SHADI Kang.    Daughter's phone [phone].        Pharmacy: [phone]

## 2024-03-01 ENCOUNTER — TELEPHONE (OUTPATIENT)
Dept: TRANSPLANT | Facility: HOSPITAL | Age: 74
End: 2024-03-01

## 2024-03-08 NOTE — NURSING NOTE
The following communication was sent to PALMIRA Izquierdo on 3/8/24:    Hello. The attached patient is ordered an MRI Regadenoson stress test for pre-kidney transplant work up. The patient has a history of a type II AV Block, which is contraindicated for this type of medication. It looks like a stress nuc was ordered last year with the same medication and they were not able to complete the stress part of the exam. We can bring patient in for a regular MRI, but if you need a stress test for clearance, something else will need to be ordered.

## 2024-03-12 ENCOUNTER — TELEPHONE (OUTPATIENT)
Dept: TRANSPLANT | Facility: HOSPITAL | Age: 74
End: 2024-03-12

## 2024-03-12 ENCOUNTER — DOCUMENTATION (OUTPATIENT)
Dept: TRANSPLANT | Facility: HOSPITAL | Age: 74
End: 2024-03-12

## 2024-03-25 ENCOUNTER — SOCIAL WORK (OUTPATIENT)
Dept: TRANSPLANT | Facility: HOSPITAL | Age: 74
End: 2024-03-25
Payer: COMMERCIAL

## 2024-03-25 ENCOUNTER — APPOINTMENT (OUTPATIENT)
Dept: TRANSPLANT | Facility: HOSPITAL | Age: 74
End: 2024-03-25
Payer: COMMERCIAL

## 2024-03-25 ASSESSMENT — ANXIETY QUESTIONNAIRES
1. FEELING NERVOUS, ANXIOUS, OR ON EDGE: NOT AT ALL
2. NOT BEING ABLE TO STOP OR CONTROL WORRYING: NOT AT ALL
5. BEING SO RESTLESS THAT IT IS HARD TO SIT STILL: NOT AT ALL
3. WORRYING TOO MUCH ABOUT DIFFERENT THINGS: NOT AT ALL
4. TROUBLE RELAXING: NOT AT ALL
6. BECOMING EASILY ANNOYED OR IRRITABLE: NOT AT ALL
7. FEELING AFRAID AS IF SOMETHING AWFUL MIGHT HAPPEN: NOT AT ALL
GAD7 TOTAL SCORE: 0

## 2024-03-25 ASSESSMENT — PATIENT HEALTH QUESTIONNAIRE - PHQ9
1. LITTLE INTEREST OR PLEASURE IN DOING THINGS: SEVERAL DAYS
9. THOUGHTS THAT YOU WOULD BE BETTER OFF DEAD, OR OF HURTING YOURSELF: NOT AT ALL
8. MOVING OR SPEAKING SO SLOWLY THAT OTHER PEOPLE COULD HAVE NOTICED. OR THE OPPOSITE, BEING SO FIGETY OR RESTLESS THAT YOU HAVE BEEN MOVING AROUND A LOT MORE THAN USUAL: NOT AT ALL
3. TROUBLE FALLING OR STAYING ASLEEP OR SLEEPING TOO MUCH: NOT AT ALL
2. FEELING DOWN, DEPRESSED OR HOPELESS: NOT AT ALL
10. IF YOU CHECKED OFF ANY PROBLEMS, HOW DIFFICULT HAVE THESE PROBLEMS MADE IT FOR YOU TO DO YOUR WORK, TAKE CARE OF THINGS AT HOME, OR GET ALONG WITH OTHER PEOPLE: NOT DIFFICULT AT ALL
4. FEELING TIRED OR HAVING LITTLE ENERGY: SEVERAL DAYS
SUM OF ALL RESPONSES TO PHQ QUESTIONS 1-9: 2
5. POOR APPETITE OR OVEREATING: NOT AT ALL
7. TROUBLE CONCENTRATING ON THINGS, SUCH AS READING THE NEWSPAPER OR WATCHING TELEVISION: NOT AT ALL
6. FEELING BAD ABOUT YOURSELF - OR THAT YOU ARE A FAILURE OR HAVE LET YOURSELF OR YOUR FAMILY DOWN: NOT AT ALL
SUM OF ALL RESPONSES TO PHQ9 QUESTIONS 1 & 2: 1

## 2024-03-25 NOTE — PROGRESS NOTES
"SW met with Pt and his daughter, Lupe, for psychosocial update. Pt was pleasant and engaged. Pt has [employer] for insurance. Pt denied any recent hospitalizations or emergency department visits. Pt reported good compliance with medications and appointments. Pt is pre-dialysis at this time. Pt's primary support remains his wife, Nae, and his secondary supports remain his daughters, Lupe and Pao. Pt described his mood as \"okay.\" Pt denied any MH concerns and declined MH resources at this time. Pt scored a 2 on the PHQ-9, indicating minimal clinical depression. Pt scored a 0 on the ONEAL-7, indicating no clinical anxiety. Pt denied any recent tobacco, alcohol, or illicit drug use. Pt and his daughter expressed frustration about rescheduled appointments by transplant team. SW reached out to Pt's coordinator to discuss this with Pt and plan for upcoming appointments. Pt was thankful. SW discussed the inpatient transplant stay. SW discussed the potential need for dialysis after transplant. SW also discussed the frequency of post op appointments - once a week surgeon/nephrologist visits and twice a week labs. Pt expressed understanding. Pt is low psychosocial risk.     Plan: SW will follow up with Pt annually.    "

## 2024-03-29 ENCOUNTER — HOSPITAL ENCOUNTER (OUTPATIENT)
Dept: CARDIOLOGY | Facility: CLINIC | Age: 74
Discharge: HOME | End: 2024-03-29
Payer: COMMERCIAL

## 2024-03-29 DIAGNOSIS — Z01.818 PRE-TRANSPLANT EVALUATION FOR KIDNEY TRANSPLANT: ICD-10-CM

## 2024-03-29 DIAGNOSIS — I42.2 OTHER HYPERTROPHIC CARDIOMYOPATHY (MULTI): Primary | ICD-10-CM

## 2024-03-29 LAB
AORTIC VALVE PEAK VELOCITY: 1.86 M/S
AV PEAK GRADIENT: 13.8 MMHG
AVA (PEAK VEL): 1.84 CM2
EJECTION FRACTION APICAL 4 CHAMBER: 63.9
EJECTION FRACTION: 60 %
LEFT ATRIUM VOLUME AREA LENGTH INDEX BSA: 36 ML/M2
LEFT VENTRICLE INTERNAL DIMENSION DIASTOLE: 4.84 CM (ref 3.5–6)
LEFT VENTRICULAR OUTFLOW TRACT DIAMETER: 1.97 CM
MITRAL VALVE E/A RATIO: 2.39
MITRAL VALVE E/E' RATIO: 30.39
RIGHT VENTRICLE FREE WALL PEAK S': 9 CM/S
RIGHT VENTRICLE PEAK SYSTOLIC PRESSURE: 86.9 MMHG
TRICUSPID ANNULAR PLANE SYSTOLIC EXCURSION: 2 CM

## 2024-03-29 PROCEDURE — 93306 TTE W/DOPPLER COMPLETE: CPT

## 2024-03-29 PROCEDURE — 93306 TTE W/DOPPLER COMPLETE: CPT | Performed by: INTERNAL MEDICINE

## 2024-04-03 ENCOUNTER — DOCUMENTATION (OUTPATIENT)
Dept: TRANSPLANT | Facility: HOSPITAL | Age: 74
End: 2024-04-03

## 2024-04-03 ENCOUNTER — OFFICE VISIT (OUTPATIENT)
Dept: TRANSPLANT | Facility: HOSPITAL | Age: 74
End: 2024-04-03
Payer: COMMERCIAL

## 2024-04-03 ENCOUNTER — HOSPITAL ENCOUNTER (OUTPATIENT)
Dept: RADIOLOGY | Facility: HOSPITAL | Age: 74
Discharge: HOME | End: 2024-04-03
Payer: COMMERCIAL

## 2024-04-03 VITALS
TEMPERATURE: 97.6 F | BODY MASS INDEX: 25.45 KG/M2 | HEART RATE: 59 BPM | DIASTOLIC BLOOD PRESSURE: 53 MMHG | HEIGHT: 73 IN | WEIGHT: 192 LBS | SYSTOLIC BLOOD PRESSURE: 136 MMHG | OXYGEN SATURATION: 95 %

## 2024-04-03 DIAGNOSIS — Z01.818 PRE-TRANSPLANT EVALUATION FOR KIDNEY TRANSPLANT: ICD-10-CM

## 2024-04-03 DIAGNOSIS — N18.6 ESRD (END STAGE RENAL DISEASE) (MULTI): Primary | ICD-10-CM

## 2024-04-03 PROCEDURE — 1159F MED LIST DOCD IN RCRD: CPT | Performed by: INTERNAL MEDICINE

## 2024-04-03 PROCEDURE — 99215 OFFICE O/P EST HI 40 MIN: CPT | Performed by: INTERNAL MEDICINE

## 2024-04-03 PROCEDURE — 3078F DIAST BP <80 MM HG: CPT | Performed by: INTERNAL MEDICINE

## 2024-04-03 PROCEDURE — 3075F SYST BP GE 130 - 139MM HG: CPT | Performed by: INTERNAL MEDICINE

## 2024-04-03 NOTE — PROGRESS NOTES
Annual Clinic Note:  Patient attended appts on 4/3/24 with Dr. Pope.  Medications and allergies reviewed with patient.  Patient presented to appointment with his daughter.  Patient ambulated with a cane, uses a WC for distance.  Patient currently status 7, in process for reactivation.   History:  Patient has a history of DM.  Patient will need to complete a chest xray and blood work.  He has a cardiology appointment on 4/17/2024.     Listing Consent updated. Updated in [facility]    LISTING EDUCATION    Patient educated regarding the following prior to placement on the transplant waiting list:   The patient?s medical condition, prognosis, and treatment plan.   The expectations and patient responsibilities while on the waiting list, including:   Keeping the transplant center informed of any changes in contact information or insurance coverage   Notifying the transplant center of any changes in medical status   Required testing and/or re-evaluation appointments while awaiting transplant   An overview of the surgical procedure, including potential risks and alternatives.   Information regarding what to expect during the inpatient admission and recovery period.   A discussion regarding organ offers and types of potential donors, including potential risks that may be associated with specific types of donors that could affect the success of the transplant or the health of the patient.  The right to refuse transplantation.     Patient was given the opportunity to have questions answered. Patient was provided a copy of the informed consent for transplant listing.    Education provided by:  Transplant Coordinator: Rossi Morejon RN   Transplant Physician: Dr. Pope    Signed listing informed consent received? YES/NO  Patient agrees to be listed for the following:  KDPI > 85% [X]  Donors After Circulatory Death (DCD) [X]  Donors with a Positive Core Antibody for Hepatitis B [X]  Donors with Hepatitis C Virus to recipients with  hepatitis C []  Donors with Hepatitis C Virus to Negative hepatitis C recipients [X]    Patient will be discussed at an upcoming selection committee to determine eligibility to be placed on the [facility] waiting list.

## 2024-04-17 ENCOUNTER — APPOINTMENT (OUTPATIENT)
Dept: RADIOLOGY | Facility: HOSPITAL | Age: 74
DRG: 314 | End: 2024-04-17
Payer: COMMERCIAL

## 2024-04-17 ENCOUNTER — HOSPITAL ENCOUNTER (INPATIENT)
Facility: HOSPITAL | Age: 74
LOS: 7 days | Discharge: HOME | DRG: 314 | End: 2024-04-24
Attending: EMERGENCY MEDICINE | Admitting: INTERNAL MEDICINE
Payer: COMMERCIAL

## 2024-04-17 ENCOUNTER — CONSULT (OUTPATIENT)
Dept: CARDIOLOGY | Facility: CLINIC | Age: 74
DRG: 314 | End: 2024-04-17
Payer: COMMERCIAL

## 2024-04-17 ENCOUNTER — CLINICAL SUPPORT (OUTPATIENT)
Dept: EMERGENCY MEDICINE | Facility: HOSPITAL | Age: 74
DRG: 314 | End: 2024-04-17
Payer: COMMERCIAL

## 2024-04-17 VITALS
SYSTOLIC BLOOD PRESSURE: 150 MMHG | BODY MASS INDEX: 25.71 KG/M2 | HEIGHT: 73 IN | OXYGEN SATURATION: 90 % | DIASTOLIC BLOOD PRESSURE: 74 MMHG | HEART RATE: 66 BPM | WEIGHT: 194 LBS

## 2024-04-17 DIAGNOSIS — I44.2 COMPLETE HEART BLOCK (MULTI): ICD-10-CM

## 2024-04-17 DIAGNOSIS — N18.5 CKD (CHRONIC KIDNEY DISEASE) STAGE 5, GFR LESS THAN 15 ML/MIN (MULTI): ICD-10-CM

## 2024-04-17 DIAGNOSIS — E11.40 TYPE 2 DIABETES MELLITUS WITH DIABETIC NEUROPATHY, UNSPECIFIED WHETHER LONG TERM INSULIN USE (MULTI): ICD-10-CM

## 2024-04-17 DIAGNOSIS — Z01.810 PREOP CARDIOVASCULAR EXAM: Primary | ICD-10-CM

## 2024-04-17 DIAGNOSIS — D50.9 MICROCYTIC ANEMIA: ICD-10-CM

## 2024-04-17 DIAGNOSIS — I10 ESSENTIAL HYPERTENSION: ICD-10-CM

## 2024-04-17 DIAGNOSIS — Z01.818 PRE-TRANSPLANT EVALUATION FOR KIDNEY TRANSPLANT: ICD-10-CM

## 2024-04-17 DIAGNOSIS — I31.39: ICD-10-CM

## 2024-04-17 DIAGNOSIS — I31.39 PERICARDIAL EFFUSION (HHS-HCC): ICD-10-CM

## 2024-04-17 DIAGNOSIS — I31.31 MALIGNANT PERICARDIAL EFFUSION IN DISEASES CLASSIFIED ELSEWHERE (MULTI): ICD-10-CM

## 2024-04-17 DIAGNOSIS — N19 UREMIA: ICD-10-CM

## 2024-04-17 DIAGNOSIS — Z01.810 PRE-OPERATIVE CARDIOVASCULAR EXAMINATION: ICD-10-CM

## 2024-04-17 DIAGNOSIS — I44.2 THIRD DEGREE HEART BLOCK (MULTI): Primary | ICD-10-CM

## 2024-04-17 DIAGNOSIS — I31.9 DISEASE OF PERICARDIUM, UNSPECIFIED (HHS-HCC): ICD-10-CM

## 2024-04-17 DIAGNOSIS — N18.6 ESRD (END STAGE RENAL DISEASE) (MULTI): ICD-10-CM

## 2024-04-17 DIAGNOSIS — I31.4: ICD-10-CM

## 2024-04-17 LAB
ALBUMIN SERPL BCP-MCNC: 3.4 G/DL (ref 3.4–5)
ALP SERPL-CCNC: 157 U/L (ref 33–136)
ALT SERPL W P-5'-P-CCNC: 13 U/L (ref 10–52)
ANION GAP BLDV CALCULATED.4IONS-SCNC: 21 MMOL/L (ref 10–25)
ANION GAP SERPL CALC-SCNC: 24 MMOL/L
AST SERPL W P-5'-P-CCNC: 17 U/L (ref 9–39)
ATRIAL RATE: 94 BPM
BASE EXCESS BLDV CALC-SCNC: -1.9 MMOL/L (ref -2–3)
BASOPHILS # BLD AUTO: 0.02 X10*3/UL (ref 0–0.1)
BASOPHILS NFR BLD AUTO: 0.3 %
BILIRUB SERPL-MCNC: 0.5 MG/DL (ref 0–1.2)
BNP SERPL-MCNC: 310 PG/ML (ref 0–99)
BODY TEMPERATURE: 37 DEGREES CELSIUS
BUN SERPL-MCNC: 124 MG/DL (ref 6–23)
CA-I BLDV-SCNC: 1.15 MMOL/L (ref 1.1–1.33)
CALCIUM SERPL-MCNC: 9.3 MG/DL (ref 8.6–10.6)
CARDIAC TROPONIN I PNL SERPL HS: 21 NG/L (ref 0–53)
CARDIAC TROPONIN I PNL SERPL HS: 22 NG/L (ref 0–53)
CHLORIDE BLDV-SCNC: 91 MMOL/L (ref 98–107)
CHLORIDE SERPL-SCNC: 89 MMOL/L (ref 98–107)
CO2 SERPL-SCNC: 23 MMOL/L (ref 21–32)
CREAT SERPL-MCNC: 14.04 MG/DL (ref 0.5–1.3)
EGFRCR SERPLBLD CKD-EPI 2021: 3 ML/MIN/1.73M*2
EOSINOPHIL # BLD AUTO: 0.02 X10*3/UL (ref 0–0.4)
EOSINOPHIL NFR BLD AUTO: 0.3 %
ERYTHROCYTE [DISTWIDTH] IN BLOOD BY AUTOMATED COUNT: 16 % (ref 11.5–14.5)
GLUCOSE BLDV-MCNC: 114 MG/DL (ref 74–99)
GLUCOSE SERPL-MCNC: 99 MG/DL (ref 74–99)
HCO3 BLDV-SCNC: 23.7 MMOL/L (ref 22–26)
HCT VFR BLD AUTO: 25.9 % (ref 41–52)
HCT VFR BLD EST: 27 % (ref 41–52)
HGB BLD-MCNC: 8.8 G/DL (ref 13.5–17.5)
HGB BLDV-MCNC: 9.1 G/DL (ref 13.5–17.5)
IMM GRANULOCYTES # BLD AUTO: 0.03 X10*3/UL (ref 0–0.5)
IMM GRANULOCYTES NFR BLD AUTO: 0.5 % (ref 0–0.9)
INHALED O2 CONCENTRATION: 21 %
INR PPP: 1.3 (ref 0.9–1.1)
LACTATE BLDV-SCNC: 1.2 MMOL/L (ref 0.4–2)
LYMPHOCYTES # BLD AUTO: 0.54 X10*3/UL (ref 0.8–3)
LYMPHOCYTES NFR BLD AUTO: 9.3 %
MCH RBC QN AUTO: 29.4 PG (ref 26–34)
MCHC RBC AUTO-ENTMCNC: 34 G/DL (ref 32–36)
MCV RBC AUTO: 87 FL (ref 80–100)
MONOCYTES # BLD AUTO: 0.79 X10*3/UL (ref 0.05–0.8)
MONOCYTES NFR BLD AUTO: 13.6 %
NEUTROPHILS # BLD AUTO: 4.39 X10*3/UL (ref 1.6–5.5)
NEUTROPHILS NFR BLD AUTO: 76 %
NRBC BLD-RTO: 0 /100 WBCS (ref 0–0)
OXYHGB MFR BLDV: 49.6 % (ref 45–75)
P AXIS: 43 DEGREES
PCO2 BLDV: 43 MM HG (ref 41–51)
PH BLDV: 7.35 PH (ref 7.33–7.43)
PLATELET # BLD AUTO: 141 X10*3/UL (ref 150–450)
PO2 BLDV: 35 MM HG (ref 35–45)
POTASSIUM BLDV-SCNC: 5.3 MMOL/L (ref 3.5–5.3)
POTASSIUM SERPL-SCNC: 4.8 MMOL/L (ref 3.5–5.3)
PROT SERPL-MCNC: 7.6 G/DL (ref 6.4–8.2)
PROTHROMBIN TIME: 14.9 SECONDS (ref 9.8–12.8)
Q ONSET: 189 MS
QRS COUNT: 11 BEATS
QRS DURATION: 142 MS
QT INTERVAL: 470 MS
QTC CALCULATION(BAZETT): 484 MS
QTC FREDERICIA: 480 MS
R AXIS: -17 DEGREES
RBC # BLD AUTO: 2.99 X10*6/UL (ref 4.5–5.9)
SAO2 % BLDV: 50 % (ref 45–75)
SODIUM BLDV-SCNC: 130 MMOL/L (ref 136–145)
SODIUM SERPL-SCNC: 131 MMOL/L (ref 136–145)
T AXIS: 4 DEGREES
T OFFSET: 424 MS
VENTRICULAR RATE: 64 BPM
WBC # BLD AUTO: 5.8 X10*3/UL (ref 4.4–11.3)

## 2024-04-17 PROCEDURE — 85610 PROTHROMBIN TIME: CPT | Performed by: EMERGENCY MEDICINE

## 2024-04-17 PROCEDURE — 99285 EMERGENCY DEPT VISIT HI MDM: CPT | Mod: 25

## 2024-04-17 PROCEDURE — 99214 OFFICE O/P EST MOD 30 MIN: CPT | Mod: 25 | Performed by: INTERNAL MEDICINE

## 2024-04-17 PROCEDURE — 99291 CRITICAL CARE FIRST HOUR: CPT | Mod: 25 | Performed by: EMERGENCY MEDICINE

## 2024-04-17 PROCEDURE — 36000 PLACE NEEDLE IN VEIN: CPT | Performed by: EMERGENCY MEDICINE

## 2024-04-17 PROCEDURE — 2020000001 HC ICU ROOM DAILY

## 2024-04-17 PROCEDURE — 93005 ELECTROCARDIOGRAM TRACING: CPT

## 2024-04-17 PROCEDURE — 71045 X-RAY EXAM CHEST 1 VIEW: CPT

## 2024-04-17 PROCEDURE — 99214 OFFICE O/P EST MOD 30 MIN: CPT | Performed by: INTERNAL MEDICINE

## 2024-04-17 PROCEDURE — 84132 ASSAY OF SERUM POTASSIUM: CPT | Performed by: EMERGENCY MEDICINE

## 2024-04-17 PROCEDURE — 93308 TTE F-UP OR LMTD: CPT

## 2024-04-17 PROCEDURE — 36415 COLL VENOUS BLD VENIPUNCTURE: CPT | Performed by: EMERGENCY MEDICINE

## 2024-04-17 PROCEDURE — 84132 ASSAY OF SERUM POTASSIUM: CPT

## 2024-04-17 PROCEDURE — 85025 COMPLETE CBC W/AUTO DIFF WBC: CPT | Performed by: EMERGENCY MEDICINE

## 2024-04-17 PROCEDURE — 84484 ASSAY OF TROPONIN QUANT: CPT | Performed by: EMERGENCY MEDICINE

## 2024-04-17 PROCEDURE — 36415 COLL VENOUS BLD VENIPUNCTURE: CPT

## 2024-04-17 PROCEDURE — 71045 X-RAY EXAM CHEST 1 VIEW: CPT | Mod: FOREIGN READ | Performed by: RADIOLOGY

## 2024-04-17 PROCEDURE — 96365 THER/PROPH/DIAG IV INF INIT: CPT | Mod: 59

## 2024-04-17 PROCEDURE — 2500000004 HC RX 250 GENERAL PHARMACY W/ HCPCS (ALT 636 FOR OP/ED): Mod: JZ,JG

## 2024-04-17 PROCEDURE — 83880 ASSAY OF NATRIURETIC PEPTIDE: CPT

## 2024-04-17 PROCEDURE — 99285 EMERGENCY DEPT VISIT HI MDM: CPT | Performed by: EMERGENCY MEDICINE

## 2024-04-17 PROCEDURE — 93005 ELECTROCARDIOGRAM TRACING: CPT | Performed by: INTERNAL MEDICINE

## 2024-04-17 PROCEDURE — 84075 ASSAY ALKALINE PHOSPHATASE: CPT | Performed by: EMERGENCY MEDICINE

## 2024-04-17 PROCEDURE — 93010 ELECTROCARDIOGRAM REPORT: CPT | Performed by: EMERGENCY MEDICINE

## 2024-04-17 PROCEDURE — 36410 VNPNXR 3YR/> PHY/QHP DX/THER: CPT

## 2024-04-17 PROCEDURE — 93308 TTE F-UP OR LMTD: CPT | Performed by: EMERGENCY MEDICINE

## 2024-04-17 RX ORDER — CALCIUM GLUCONATE 20 MG/ML
2 INJECTION, SOLUTION INTRAVENOUS ONCE
Status: COMPLETED | OUTPATIENT
Start: 2024-04-17 | End: 2024-04-17

## 2024-04-17 RX ADMIN — CALCIUM GLUCONATE 2 G: 20 INJECTION, SOLUTION INTRAVENOUS at 18:51

## 2024-04-17 ASSESSMENT — COLUMBIA-SUICIDE SEVERITY RATING SCALE - C-SSRS
6. HAVE YOU EVER DONE ANYTHING, STARTED TO DO ANYTHING, OR PREPARED TO DO ANYTHING TO END YOUR LIFE?: NO
2. HAVE YOU ACTUALLY HAD ANY THOUGHTS OF KILLING YOURSELF?: NO
1. IN THE PAST MONTH, HAVE YOU WISHED YOU WERE DEAD OR WISHED YOU COULD GO TO SLEEP AND NOT WAKE UP?: NO

## 2024-04-17 ASSESSMENT — ENCOUNTER SYMPTOMS
DEPRESSION: 0
LOSS OF SENSATION IN FEET: 0
OCCASIONAL FEELINGS OF UNSTEADINESS: 0

## 2024-04-17 ASSESSMENT — PAIN SCALES - GENERAL
PAINLEVEL_OUTOF10: 0 - NO PAIN
PAINLEVEL: 0-NO PAIN

## 2024-04-17 NOTE — ED PROVIDER NOTES
CC: Abnormal ECG     History provided by: Patient  Limitations to History: None    HPI:  Temo Hanson is a 73 y.o. male with history of stage V CKD not on dialysis, diabetes, hypertension, prostate cancer presenting for concern of complete heart block as sent in by his cardiologist.  Patient states he was seen his cardiologist for preop clearance for kidney transplant when they were concerned about complete heart block and sent him to the ED for admission and pacer evaluation.  Patient states last year he was also recommended pacer but was lost to follow-up.  Patient additionally states he has previously been recommended dialysis but is not on dialysis.  Patient endorses feeling fatigued and weak for the last few days, no other sick symptoms such as fevers chills nausea vomiting diarrhea.    External Records Reviewed: Echo completed 3/29/2024 showing LV EF of 60 to 65%, elevated LA and LV diastolic pressure, RV volume overload, severely elevated RV systolic pressure, trivial to small pericardial effusion.  ???????????????????????????????????????????????????????????????  Triage Vitals:  T 36.5 °C (97.7 °F)  HR 73  /67  RR 16  O2 98 % None (Room air)    Vital signs reviewed in nursing triage note, EMR flow sheets, and at patient's bedside.   General: Awake, alert, lethargic appearing  Eyes: Gaze conjugate.  No scleral icterus or injection  HENT: Normo-cephalic, atraumatic. No stridor. No rhinorrhea or epistaxis.  CV: Regular rhythm.  Murmurs appreciated. Radial pulses 2+ bilaterally  Respiratory: Breathing non-labored, speaking in full sentences.  Slightly diminished lung sounds throughout  GI: Soft, somewhat distended, non-tender. No rebound or guarding.  MSK/Extremities: No gross bony deformities. Moving all extremities.  Symmetric 1+ pitting edema. LUE fistula  Skin: Warm. Appropriate color  Neuro: Alert. Oriented. Face symmetric. Speech is fluent.  Gross strength and sensation intact in b/l UE and  LEs  Psych: Appropriate mood and affect   ???????????????????????????????????????????????????????????????  ED Course/Treatment/Medical Decision Making  MDM:  Temo Hanson is a 73 y.o. male presenting for concern of heart block.  He is hemodynamically stable on arrival, heart rate of 73, blood pressure 154/67 afebrile and saturating well on room air.  He is tired appearing however cooperative with exam.  There is a noticeable murmur, abdominal distention.  Point-of-care ultrasound completed by providers at bedside showed a significantly larger pericardial effusion when compared to the echo previously however patient does not currently have signs concerning of tamponade.  Discussed his care with the CICU fellow who is in agreement with admission to the  following labs.    Differential diagnoses considered include but are not limited to: Heart block, pericardial effusion, pericardial tamponade    Independent Interpretation of Studies:  I independently interpreted: See ED Course Below  -EKG, CXR    ED Course:  ED Course as of 04/19/24 1005   Wed Apr 17, 2024 1819 Patient placed on pads.  Lab work 1 week ago showed hyperkalemia to 5.7, patient given a dose of calcium gluconate for cardiac stabilization empirically while waiting for labs. [AW]   1844 BLOOD GAS VENOUS FULL PANEL(!)  Overall reassuring VBG without hyperkalemia [AW]   1914 EKG shows dissociation of atrial ventricular rhythm, ventricular rate of 64 bpm, there is a right bundle branch block noted.  No ST elevations or depressions concerning for ischemia.  When compared to prior in September 2023 there are no significant changes. [AW]   1914 XR chest 1 view  Chest x-ray shows cardiomegaly with a large right-sided effusion, no pneumothorax.  No clear evidence of a focal consolidation however right lung is difficult to evaluate due to large effusion [AW]   2038 CBC with Differential(!)  Baseline normocytic anemia at 8.8.  No significant leukocytosis,  platelets 141 [AW]   2055 Troponin I, High Sensitivity: 21 [AW]   2120 BNP(!): 310 [AW]      ED Course User Index  [AW] Melissa Salguero DO         Diagnoses as of 04/19/24 1005   Third degree heart block (Multi)   Pericardial effusion (HHS-HCC)   Microcytic anemia   CKD (chronic kidney disease) stage 5, GFR less than 15 ml/min (Multi)   Uremia       The patient was monitored for any change in vital signs or symptoms throughout the ED course.  Patient ultimately admitted to the cardiac ICU for further management of his heart block and effusion.  Hemodynamically stable for admission    Social Determinants Limiting Care:  None identified    Impression:  Complete heart block  Pericardial effusion    Disposition:  Admit-CICU    Assessment and plan discussed with Dr. Abelardo Salguero DO   Emergency Medicine, PGY-1     Disclaimer: This note was dictated by speech recognition. Minor errors in transcription may be present.     Procedures ? SmartDialedINs last updated 4/19/2024 10:05 AM           There was difficulty obtaining IV access on this patient, and multiple attempts by nursing staff and/or medics have failed. Patient required IV access by ED provider under the assistance of ultrasound.      Site was prepped and sterilized before IV insertion.     Ultrasound images were not saved in the patient's chart demonstrating cannulization.     IV Size:20  IV Side: Right  IV Site: Antecubital Fossa    DO Melissa Jones DO  Resident  04/17/24 2203       Melissa Salguero DO  Resident  04/19/24 1005

## 2024-04-17 NOTE — PATIENT INSTRUCTIONS
Your ECG shows that your heart's electrical signals on the top and bottom of your heart are not communicating (complete heart block).    You need to go to the emergency room for admission to the hospital and evaluation for a pacemaker. This is a dangerous condition we need to treat before we can consider you for kidney transplant listing.    Follow up with Dr. Miguel for ongoing heart care after hospitalization.

## 2024-04-17 NOTE — ED TRIAGE NOTES
Says he was at outpt cardiology appt, c/o fatigue and SOB, EKG reportedly show complete heart block

## 2024-04-17 NOTE — ED PROCEDURE NOTE
Procedure    Performed by: Ritchie Cummins MD  Authorized by: Melissa Salguero DO  Cardiac Indications: Abnormal EKG.              Respiratory Indications: shortness of breath  Procedure: Cardiac Ultrasound    Findings:   Views: parasternal long, parasternal short, apical four and subxiphoid  Effusion: The pericardial space was visualized and was positive for a PERICARDIAL EFFUSION.  Activity: Ventricular contractions were visualized.  LV: LV systolic function was HYPERDYNAMIC.   RV: RV size was DILATED.    Impression:  Cardiac: The focused cardiac ultrasound exam had ABNORMAL findings as specified.    Comments: Patient has marked right atrial dilation as well as right ventricular wall motion abnormality.  It also appears to be a mild pericardial effusion surrounding the entire heart.  EKG showed possible third-degree block.               Ritchie Cummins MD  Resident  04/17/24 8014

## 2024-04-17 NOTE — PROGRESS NOTES
"Subjective   Temo Hanson is a 73 y.o. male who presents to the [facility] for preoperative evaluation for renal transplant surgery.     His ECG today shows 3rd degree heart block with A-V dissociation. Atrial rate is 100 bpm, ventricular rate is 68 bpm. Blood pressure today 150/74 mm Hg. He has had severe fatigue for the last few weeks. On review of 9/23/2023 ECG, AV dissociation also appears to have been present. Prior 2023 SPECT nuclear not done for type 2 AV block.    Today Mr. Hanson has no active cardiac symptoms of chest pain, PND, orthopnea, CHARI, palpitations, syncope, or claudication. Exertional dyspnea with < 50 feet walking.    Past Medical History:  1. ESRD on HD  2. Hypertension  3. Dyslipidemia  4. Type 2 diabetes mellitus  5. Severe tricuspid regurgitation    Social History:  Never a smoker    Family History:  No premature CAD in 1st degree relatives ( 55 years of age for male relatives,  65 years of age for female relatives)    Review of Systems    A 14 point review of systems was asked. All questions were negative except for pertinent positives listed in the HPI.     Current Outpatient Medications on File Prior to Visit   Medication Sig Dispense Refill    acetaminophen (Tylenol) 325 mg tablet Take 2 tablets (650 mg) by mouth every 6 hours if needed.      amLODIPine (Norvasc) 5 mg tablet Take 1 tablet (5 mg) by mouth once daily.      BD Ultra-Fine Joan Pen Needle 32 gauge x 5/32\" needle       blood sugar diagnostic (Ritchie Blood Glucose Test Strip) strip 1 strip.      calcitriol (Rocaltrol) 0.25 mcg capsule Take 1 capsule (0.25 mcg) by mouth once daily.      calcium carbonate (Tums) 200 mg calcium chewable tablet Chew 1 tablet (500 mg).      carboxymethylcellulose (Refresh Plus) 0.5 % ophthalmic solution Instill 1 drop into both eyes 2-4x/day as needed for dryness.      desonide (DesOwen) 0.05 % cream Desonide 0.05 % External Cream   Refills: 0       Active      ferrous " "gluconate 236 mg (27 mg iron) tablet Take 1 tablet (27 mg) by mouth once daily.      ferrous sulfate 7.5 mg iron/0.5 mL syringe Take by mouth.      FreeStyle Mick 2 Sensor kit       FreeStyle Mcik sensor system (FreeStyle Mick 2 Sensor) kit 1 DEVICE EVERY 14 DAYS.      gabapentin (Neurontin) 100 mg capsule Take 1 capsule (100 mg) by mouth 3 times a day.      guanFACINE (Tenex) 1 mg tablet       guanFACINE (Tenex) 2 mg tablet Take 0.5 tablets (1 mg) by mouth.      hydrALAZINE (Apresoline) 100 mg tablet Take 1 tablet (100 mg) by mouth 3 times a day.      insulin detemir (Levemir Flextouch) 100 unit/mL (3 mL) pen Inject 30 Units under the skin twice a day. 18 units this am      insulin lispro (HumaLOG KwikPen Insulin) 100 unit/mL injection Inject under the skin.      INSULIN LISPRO SUBQ BS:150-200: 4 units 201-250: 6 units 251-300: 8 units 301-350: 12 units 351-400: 14 units Blood sugar greater than 400: 16u      lidocaine (Lidoderm) 5 % patch Place 1 patch on the skin.      magnesium oxide (Mag-Ox) 400 mg tablet Take 1 tablet (400 mg) by mouth once daily.      omeprazole (PriLOSEC) 20 mg DR capsule Take 1 capsule (20 mg) by mouth once daily.      OneTouch Ultra Test strip OneTouch Ultra In Vitro Strip   Quantity: 300  Refills: 0        Start : 6-Mar-2020   Active      pen needle, diabetic 32 gauge x 5/32\" needle 1 EACH 6 TIMES DAILY.      polyethylene glycol 236-22.74-6.74 -5.86 gram solution TAKE AS DIRECTED.      rosuvastatin (Crestor) 20 mg tablet Take 1 tablet (20 mg) by mouth once daily.      sodium polystyrene (SPS, with sorbitol,) 15 gram/60 mL suspension Take by mouth.      sodium zirconium cyclosilicate (Lokelma) 10 gram packet Take 10 g by mouth once daily.      sodium zirconium cyclosilicate (Lokelma) 5 gram packet Take 10 g by mouth once daily.      torsemide (Demadex) 10 mg tablet Take 1 tablet (10 mg) by mouth once daily.      torsemide (Demadex) 100 mg tablet Take 1 tablet (100 mg) by mouth once " "daily.      zinc sulfate (Zincate) 220 (50 Zn) MG capsule Take 1 capsule (220 mg) by mouth once daily.      carvedilol (Coreg) 3.125 mg tablet Take 1 tablet (3.125 mg) by mouth twice a day.      doxazosin (Cardura) 8 mg tablet Take 1 tablet (8 mg) by mouth once daily at bedtime.      gabapentin (Neurontin) 100 mg capsule Take 2 capsules (200 mg) by mouth.      oxyCODONE (Roxicodone) 5 mg immediate release tablet Take 1 tablet (5 mg) by mouth every 6 hours if needed for severe pain (7 - 10) or moderate pain (4 - 6). (Patient not taking: Reported on 4/17/2024) 10 tablet 0    rosuvastatin (Crestor) 20 mg tablet Take 1 tablet (20 mg) by mouth once daily.       Current Facility-Administered Medications on File Prior to Visit   Medication Dose Route Frequency Provider Last Rate Last Admin    perflutren lipid microspheres (Definity) injection 1-10 mL of dilution  1-10 mL of dilution intravenous Once in imaging Jesus Valencia MD          Objective   Physical Exam  BP Readings from Last 3 Encounters:   04/17/24 150/74   04/03/24 136/53   02/23/24 169/65      Wt Readings from Last 3 Encounters:   04/17/24 88 kg (194 lb)   04/03/24 87.1 kg (192 lb)   02/23/24 86.6 kg (191 lb)      BMI: Estimated body mass index is 25.6 kg/m² as calculated from the following:    Height as of this encounter: 1.854 m (6' 1\").    Weight as of this encounter: 88 kg (194 lb).  BSA: Estimated body surface area is 2.13 meters squared as calculated from the following:    Height as of this encounter: 1.854 m (6' 1\").    Weight as of this encounter: 88 kg (194 lb).    General: no acute distress  HEENT: EOMI, no scleral icterus.  Lungs: Clear to auscultation bilaterally without wheezing, rales, or rhonchi.  Cardiovascular: Regular rhythm and rate. Normal S1 and S2. No murmurs, rubs, or gallops are appreciated. JVP normal.  Abdomen: Soft, nontender, nondistended. Bowel sounds present.  Extremities: Warm and well perfused with equal 2+ pulses bilaterally.  " No edema present.  Neurologic: Alert and oriented x3.    I have personally reviewed the following images and laboratory findings:  Last echocardiogram:  2024 echo: LV EF 60-65%, no LVH (LVMI 70 gm/m2), pseudonormal diastology (E/e' ), mild LAE (ROXANNA ml/m2), moderate RV enlargement, mildly reduced RV systolic function, moderate THERESA ( cm2), no AI, mild-moderate MAC, mild MR, severe tricuspid regurgitation, RVSP 82 mm Hg (RAP 15 mm Hg), reversed hepatic vein flow due to TR.    Last cath / stress test:  2023 SPECT nuclear stress test: No myocardial ischemia or scar pattern. LV EF     2/3/2021 CT calcium score: Zero    Most recent EC2024 ECG: Sinus rhythm, 68 bpm, RBBB. Personally reviewed in office.     Assessment/Plan   1. Complete heart block:  Directed patient to go to the ER at [facility] for pacemaker evaluation and inpatient hospitalization.    2. Preoperative work up:  He has elevated RVSP by echocardiogram. He will need RHC at some point to evaluate hemodynamics to assess but will defer this work up until after addressing 3rd degree heart block identified today.         SIGNATURE: Aly Miguel MD PATIENT NAME: Temo Hanson   DATE/TIME: 2024 2:53 PM MRN: [id number]

## 2024-04-18 ENCOUNTER — APPOINTMENT (OUTPATIENT)
Dept: CARDIOLOGY | Facility: HOSPITAL | Age: 74
DRG: 314 | End: 2024-04-18
Payer: COMMERCIAL

## 2024-04-18 PROBLEM — I31.39 PERICARDIAL EFFUSION (HHS-HCC): Status: ACTIVE | Noted: 2024-04-17

## 2024-04-18 LAB
ABO GROUP (TYPE) IN BLOOD: NORMAL
ALBUMIN SERPL BCP-MCNC: 3.4 G/DL (ref 3.4–5)
ALP SERPL-CCNC: 153 U/L (ref 33–136)
ALT SERPL W P-5'-P-CCNC: 13 U/L (ref 10–52)
ANION GAP BLDA CALCULATED.4IONS-SCNC: 18 MMO/L (ref 10–25)
ANION GAP SERPL CALC-SCNC: 25 MMOL/L (ref 10–20)
ANTIBODY SCREEN: NORMAL
APTT PPP: 31 SECONDS (ref 27–38)
AST SERPL W P-5'-P-CCNC: 14 U/L (ref 9–39)
BASE EXCESS BLDA CALC-SCNC: -3.2 MMOL/L (ref -2–3)
BASOPHILS # BLD AUTO: 0.02 X10*3/UL (ref 0–0.1)
BASOPHILS NFR BLD AUTO: 0.3 %
BASOPHILS NFR FLD MANUAL: 0 %
BILIRUB SERPL-MCNC: 0.6 MG/DL (ref 0–1.2)
BLASTS NFR FLD MANUAL: 0 %
BODY TEMPERATURE: 37 DEGREES CELSIUS
BUN SERPL-MCNC: 125 MG/DL (ref 6–23)
CA-I BLDA-SCNC: 1.24 MMOL/L (ref 1.1–1.33)
CALCIUM SERPL-MCNC: 9.4 MG/DL (ref 8.6–10.6)
CHLORIDE BLDA-SCNC: 94 MMOL/L (ref 98–107)
CHLORIDE SERPL-SCNC: 90 MMOL/L (ref 98–107)
CLARITY FLD: ABNORMAL
CO2 SERPL-SCNC: 22 MMOL/L (ref 21–32)
COLOR FLD: ABNORMAL
CREAT SERPL-MCNC: 14.19 MG/DL (ref 0.5–1.3)
EGFRCR SERPLBLD CKD-EPI 2021: 3 ML/MIN/1.73M*2
EOSINOPHIL # BLD AUTO: 0.01 X10*3/UL (ref 0–0.4)
EOSINOPHIL NFR BLD AUTO: 0.2 %
EOSINOPHIL NFR FLD MANUAL: 1 %
ERYTHROCYTE [DISTWIDTH] IN BLOOD BY AUTOMATED COUNT: 16.4 % (ref 11.5–14.5)
GLUCOSE BLD MANUAL STRIP-MCNC: 113 MG/DL (ref 74–99)
GLUCOSE BLD MANUAL STRIP-MCNC: 118 MG/DL (ref 74–99)
GLUCOSE BLD MANUAL STRIP-MCNC: 78 MG/DL (ref 74–99)
GLUCOSE BLD MANUAL STRIP-MCNC: 85 MG/DL (ref 74–99)
GLUCOSE BLD MANUAL STRIP-MCNC: 96 MG/DL (ref 74–99)
GLUCOSE BLDA-MCNC: 137 MG/DL (ref 74–99)
GLUCOSE SERPL-MCNC: 121 MG/DL (ref 74–99)
HCO3 BLDA-SCNC: 22 MMOL/L (ref 22–26)
HCT VFR BLD AUTO: 27.5 % (ref 41–52)
HCT VFR BLD EST: 28 % (ref 41–52)
HGB BLD-MCNC: 8.6 G/DL (ref 13.5–17.5)
HGB BLDA-MCNC: 9.3 G/DL (ref 13.5–17.5)
IMM GRANULOCYTES # BLD AUTO: 0.02 X10*3/UL (ref 0–0.5)
IMM GRANULOCYTES NFR BLD AUTO: 0.3 % (ref 0–0.9)
IMMATURE GRANULOCYTES IN FLUID: 0 %
INHALED O2 CONCENTRATION: 21 %
INR PPP: 1.3 (ref 0.9–1.1)
LACTATE BLDA-SCNC: 0.9 MMOL/L (ref 0.4–2)
LYMPHOCYTES # BLD AUTO: 0.48 X10*3/UL (ref 0.8–3)
LYMPHOCYTES NFR BLD AUTO: 7.9 %
LYMPHOCYTES NFR FLD MANUAL: 4 %
MAGNESIUM SERPL-MCNC: 2.3 MG/DL (ref 1.6–2.4)
MCH RBC QN AUTO: 29.3 PG (ref 26–34)
MCHC RBC AUTO-ENTMCNC: 31.3 G/DL (ref 32–36)
MCV RBC AUTO: 94 FL (ref 80–100)
MONOCYTES # BLD AUTO: 0.7 X10*3/UL (ref 0.05–0.8)
MONOCYTES NFR BLD AUTO: 11.6 %
MONOS+MACROS NFR FLD MANUAL: 33 %
NEUTROPHILS # BLD AUTO: 4.81 X10*3/UL (ref 1.6–5.5)
NEUTROPHILS NFR BLD AUTO: 79.7 %
NEUTROPHILS NFR FLD MANUAL: 62 %
NRBC BLD-RTO: 0 /100 WBCS (ref 0–0)
OTHER CELLS NFR FLD MANUAL: 0 %
OXYHGB MFR BLDA: 91.6 % (ref 94–98)
PCO2 BLDA: 39 MM HG (ref 38–42)
PH BLDA: 7.36 PH (ref 7.38–7.42)
PLASMA CELLS NFR FLD MANUAL: 0 %
PLATELET # BLD AUTO: 128 X10*3/UL (ref 150–450)
PO2 BLDA: 72 MM HG (ref 85–95)
POTASSIUM BLDA-SCNC: 5.3 MMOL/L (ref 3.5–5.3)
POTASSIUM SERPL-SCNC: 5.1 MMOL/L (ref 3.5–5.3)
PROT SERPL-MCNC: 7.4 G/DL (ref 6.4–8.2)
PROTHROMBIN TIME: 15 SECONDS (ref 9.8–12.8)
RBC # BLD AUTO: 2.94 X10*6/UL (ref 4.5–5.9)
RBC # FLD AUTO: ABNORMAL /UL
RH FACTOR (ANTIGEN D): NORMAL
SAO2 % BLDA: 94 % (ref 94–100)
SODIUM BLDA-SCNC: 129 MMOL/L (ref 136–145)
SODIUM SERPL-SCNC: 132 MMOL/L (ref 136–145)
TOTAL CELLS COUNTED FLD: 100
WBC # BLD AUTO: 6 X10*3/UL (ref 4.4–11.3)
WBC # FLD AUTO: 9169 /UL

## 2024-04-18 PROCEDURE — 93308 TTE F-UP OR LMTD: CPT | Performed by: STUDENT IN AN ORGANIZED HEALTH CARE EDUCATION/TRAINING PROGRAM

## 2024-04-18 PROCEDURE — 84132 ASSAY OF SERUM POTASSIUM: CPT | Performed by: STUDENT IN AN ORGANIZED HEALTH CARE EDUCATION/TRAINING PROGRAM

## 2024-04-18 PROCEDURE — 87205 SMEAR GRAM STAIN: CPT

## 2024-04-18 PROCEDURE — 2500000004 HC RX 250 GENERAL PHARMACY W/ HCPCS (ALT 636 FOR OP/ED): Performed by: STUDENT IN AN ORGANIZED HEALTH CARE EDUCATION/TRAINING PROGRAM

## 2024-04-18 PROCEDURE — 93308 TTE F-UP OR LMTD: CPT | Mod: MUE

## 2024-04-18 PROCEDURE — 99153 MOD SED SAME PHYS/QHP EA: CPT | Performed by: INTERNAL MEDICINE

## 2024-04-18 PROCEDURE — 33016 PERICARDIOCENTESIS W/IMAGING: CPT | Performed by: INTERNAL MEDICINE

## 2024-04-18 PROCEDURE — 93325 DOPPLER ECHO COLOR FLOW MAPG: CPT

## 2024-04-18 PROCEDURE — 86850 RBC ANTIBODY SCREEN: CPT | Performed by: STUDENT IN AN ORGANIZED HEALTH CARE EDUCATION/TRAINING PROGRAM

## 2024-04-18 PROCEDURE — 83735 ASSAY OF MAGNESIUM: CPT | Performed by: STUDENT IN AN ORGANIZED HEALTH CARE EDUCATION/TRAINING PROGRAM

## 2024-04-18 PROCEDURE — 2500000004 HC RX 250 GENERAL PHARMACY W/ HCPCS (ALT 636 FOR OP/ED): Performed by: INTERNAL MEDICINE

## 2024-04-18 PROCEDURE — 89051 BODY FLUID CELL COUNT: CPT

## 2024-04-18 PROCEDURE — 99222 1ST HOSP IP/OBS MODERATE 55: CPT | Performed by: STUDENT IN AN ORGANIZED HEALTH CARE EDUCATION/TRAINING PROGRAM

## 2024-04-18 PROCEDURE — 84133 ASSAY OF URINE POTASSIUM: CPT

## 2024-04-18 PROCEDURE — 85610 PROTHROMBIN TIME: CPT | Performed by: STUDENT IN AN ORGANIZED HEALTH CARE EDUCATION/TRAINING PROGRAM

## 2024-04-18 PROCEDURE — 2020000001 HC ICU ROOM DAILY

## 2024-04-18 PROCEDURE — 2500000006 HC RX 250 W HCPCS SELF ADMINISTERED DRUGS (ALT 637 FOR ALL PAYERS): Performed by: STUDENT IN AN ORGANIZED HEALTH CARE EDUCATION/TRAINING PROGRAM

## 2024-04-18 PROCEDURE — 36415 COLL VENOUS BLD VENIPUNCTURE: CPT | Performed by: STUDENT IN AN ORGANIZED HEALTH CARE EDUCATION/TRAINING PROGRAM

## 2024-04-18 PROCEDURE — 82947 ASSAY GLUCOSE BLOOD QUANT: CPT

## 2024-04-18 PROCEDURE — 99291 CRITICAL CARE FIRST HOUR: CPT | Performed by: STUDENT IN AN ORGANIZED HEALTH CARE EDUCATION/TRAINING PROGRAM

## 2024-04-18 PROCEDURE — 93308 TTE F-UP OR LMTD: CPT | Performed by: SPECIALIST

## 2024-04-18 PROCEDURE — 87015 SPECIMEN INFECT AGNT CONCNTJ: CPT

## 2024-04-18 PROCEDURE — 84075 ASSAY ALKALINE PHOSPHATASE: CPT | Performed by: STUDENT IN AN ORGANIZED HEALTH CARE EDUCATION/TRAINING PROGRAM

## 2024-04-18 PROCEDURE — 99152 MOD SED SAME PHYS/QHP 5/>YRS: CPT | Performed by: INTERNAL MEDICINE

## 2024-04-18 PROCEDURE — 85025 COMPLETE CBC W/AUTO DIFF WBC: CPT | Performed by: STUDENT IN AN ORGANIZED HEALTH CARE EDUCATION/TRAINING PROGRAM

## 2024-04-18 PROCEDURE — 87116 MYCOBACTERIA CULTURE: CPT

## 2024-04-18 PROCEDURE — 2500000005 HC RX 250 GENERAL PHARMACY W/O HCPCS: Performed by: INTERNAL MEDICINE

## 2024-04-18 PROCEDURE — C1894 INTRO/SHEATH, NON-LASER: HCPCS | Performed by: INTERNAL MEDICINE

## 2024-04-18 PROCEDURE — 93321 DOPPLER ECHO F-UP/LMTD STD: CPT | Performed by: SPECIALIST

## 2024-04-18 PROCEDURE — 2500000006 HC RX 250 W HCPCS SELF ADMINISTERED DRUGS (ALT 637 FOR ALL PAYERS): Mod: MUE | Performed by: STUDENT IN AN ORGANIZED HEALTH CARE EDUCATION/TRAINING PROGRAM

## 2024-04-18 PROCEDURE — C1769 GUIDE WIRE: HCPCS | Performed by: INTERNAL MEDICINE

## 2024-04-18 PROCEDURE — 86901 BLOOD TYPING SEROLOGIC RH(D): CPT | Performed by: STUDENT IN AN ORGANIZED HEALTH CARE EDUCATION/TRAINING PROGRAM

## 2024-04-18 PROCEDURE — 2500000001 HC RX 250 WO HCPCS SELF ADMINISTERED DRUGS (ALT 637 FOR MEDICARE OP): Performed by: STUDENT IN AN ORGANIZED HEALTH CARE EDUCATION/TRAINING PROGRAM

## 2024-04-18 PROCEDURE — 93321 DOPPLER ECHO F-UP/LMTD STD: CPT

## 2024-04-18 PROCEDURE — 2500000001 HC RX 250 WO HCPCS SELF ADMINISTERED DRUGS (ALT 637 FOR MEDICARE OP)

## 2024-04-18 PROCEDURE — 93325 DOPPLER ECHO COLOR FLOW MAPG: CPT | Performed by: SPECIALIST

## 2024-04-18 PROCEDURE — 87075 CULTR BACTERIA EXCEPT BLOOD: CPT

## 2024-04-18 PROCEDURE — 0W9D3ZZ DRAINAGE OF PERICARDIAL CAVITY, PERCUTANEOUS APPROACH: ICD-10-PCS | Performed by: INTERNAL MEDICINE

## 2024-04-18 PROCEDURE — 2720000007 HC OR 272 NO HCPCS: Performed by: INTERNAL MEDICINE

## 2024-04-18 PROCEDURE — 85730 THROMBOPLASTIN TIME PARTIAL: CPT | Performed by: STUDENT IN AN ORGANIZED HEALTH CARE EDUCATION/TRAINING PROGRAM

## 2024-04-18 RX ORDER — LANOLIN ALCOHOL/MO/W.PET/CERES
400 CREAM (GRAM) TOPICAL DAILY
Status: DISCONTINUED | OUTPATIENT
Start: 2024-04-18 | End: 2024-04-24 | Stop reason: HOSPADM

## 2024-04-18 RX ORDER — LIDOCAINE HYDROCHLORIDE 10 MG/ML
INJECTION, SOLUTION EPIDURAL; INFILTRATION; INTRACAUDAL; PERINEURAL AS NEEDED
Status: DISCONTINUED | OUTPATIENT
Start: 2024-04-18 | End: 2024-04-18 | Stop reason: HOSPADM

## 2024-04-18 RX ORDER — ACETAMINOPHEN 325 MG/1
650 TABLET ORAL EVERY 6 HOURS PRN
Status: DISCONTINUED | OUTPATIENT
Start: 2024-04-18 | End: 2024-04-19

## 2024-04-18 RX ORDER — AMLODIPINE BESYLATE 5 MG/1
5 TABLET ORAL DAILY
Status: DISCONTINUED | OUTPATIENT
Start: 2024-04-18 | End: 2024-04-19

## 2024-04-18 RX ORDER — HEPARIN SODIUM 5000 [USP'U]/ML
5000 INJECTION, SOLUTION INTRAVENOUS; SUBCUTANEOUS EVERY 8 HOURS SCHEDULED
Status: DISCONTINUED | OUTPATIENT
Start: 2024-04-18 | End: 2024-04-24 | Stop reason: HOSPADM

## 2024-04-18 RX ORDER — FENTANYL CITRATE 50 UG/ML
INJECTION, SOLUTION INTRAMUSCULAR; INTRAVENOUS AS NEEDED
Status: DISCONTINUED | OUTPATIENT
Start: 2024-04-18 | End: 2024-04-18 | Stop reason: HOSPADM

## 2024-04-18 RX ORDER — DEXTROSE 50 % IN WATER (D50W) INTRAVENOUS SYRINGE
25
Status: DISCONTINUED | OUTPATIENT
Start: 2024-04-18 | End: 2024-04-24 | Stop reason: HOSPADM

## 2024-04-18 RX ORDER — AMLODIPINE BESYLATE 5 MG/1
5 TABLET ORAL DAILY
Status: DISCONTINUED | OUTPATIENT
Start: 2024-04-18 | End: 2024-04-18

## 2024-04-18 RX ORDER — GUANFACINE 1 MG/1
1 TABLET ORAL NIGHTLY
Status: DISCONTINUED | OUTPATIENT
Start: 2024-04-18 | End: 2024-04-18

## 2024-04-18 RX ORDER — GABAPENTIN 100 MG/1
100 CAPSULE ORAL 3 TIMES DAILY
Status: DISCONTINUED | OUTPATIENT
Start: 2024-04-18 | End: 2024-04-18

## 2024-04-18 RX ORDER — ROSUVASTATIN CALCIUM 40 MG/1
20 TABLET, COATED ORAL NIGHTLY
Status: DISCONTINUED | OUTPATIENT
Start: 2024-04-18 | End: 2024-04-24 | Stop reason: HOSPADM

## 2024-04-18 RX ORDER — PANTOPRAZOLE SODIUM 40 MG/1
40 TABLET, DELAYED RELEASE ORAL
Status: DISCONTINUED | OUTPATIENT
Start: 2024-04-18 | End: 2024-04-24 | Stop reason: HOSPADM

## 2024-04-18 RX ORDER — DEXTROSE 50 % IN WATER (D50W) INTRAVENOUS SYRINGE
12.5
Status: DISCONTINUED | OUTPATIENT
Start: 2024-04-18 | End: 2024-04-24 | Stop reason: HOSPADM

## 2024-04-18 RX ORDER — MIDAZOLAM HYDROCHLORIDE 1 MG/ML
INJECTION, SOLUTION INTRAMUSCULAR; INTRAVENOUS AS NEEDED
Status: DISCONTINUED | OUTPATIENT
Start: 2024-04-18 | End: 2024-04-18 | Stop reason: HOSPADM

## 2024-04-18 RX ORDER — INSULIN LISPRO 100 [IU]/ML
0-5 INJECTION, SOLUTION INTRAVENOUS; SUBCUTANEOUS
Status: DISCONTINUED | OUTPATIENT
Start: 2024-04-18 | End: 2024-04-24 | Stop reason: HOSPADM

## 2024-04-18 RX ORDER — GABAPENTIN 300 MG/1
300 CAPSULE ORAL DAILY
Status: DISCONTINUED | OUTPATIENT
Start: 2024-04-18 | End: 2024-04-24 | Stop reason: HOSPADM

## 2024-04-18 RX ADMIN — HEPARIN SODIUM 5000 UNITS: 5000 INJECTION INTRAVENOUS; SUBCUTANEOUS at 06:43

## 2024-04-18 RX ADMIN — AMLODIPINE BESYLATE 5 MG: 5 TABLET ORAL at 11:54

## 2024-04-18 RX ADMIN — PANTOPRAZOLE SODIUM 40 MG: 40 TABLET, DELAYED RELEASE ORAL at 06:43

## 2024-04-18 RX ADMIN — ROSUVASTATIN CALCIUM 20 MG: 20 TABLET, FILM COATED ORAL at 20:00

## 2024-04-18 RX ADMIN — HEPARIN SODIUM 5000 UNITS: 5000 INJECTION INTRAVENOUS; SUBCUTANEOUS at 15:10

## 2024-04-18 RX ADMIN — HEPARIN SODIUM 5000 UNITS: 5000 INJECTION INTRAVENOUS; SUBCUTANEOUS at 21:54

## 2024-04-18 RX ADMIN — Medication 400 MG: at 08:36

## 2024-04-18 RX ADMIN — GABAPENTIN 300 MG: 300 CAPSULE ORAL at 11:54

## 2024-04-18 SDOH — SOCIAL STABILITY: SOCIAL INSECURITY: ARE YOU OR HAVE YOU BEEN THREATENED OR ABUSED PHYSICALLY, EMOTIONALLY, OR SEXUALLY BY ANYONE?: NO

## 2024-04-18 SDOH — SOCIAL STABILITY: SOCIAL INSECURITY: ARE THERE ANY APPARENT SIGNS OF INJURIES/BEHAVIORS THAT COULD BE RELATED TO ABUSE/NEGLECT?: NO

## 2024-04-18 SDOH — SOCIAL STABILITY: SOCIAL INSECURITY: DO YOU FEEL ANYONE HAS EXPLOITED OR TAKEN ADVANTAGE OF YOU FINANCIALLY OR OF YOUR PERSONAL PROPERTY?: NO

## 2024-04-18 SDOH — SOCIAL STABILITY: SOCIAL INSECURITY: WERE YOU ABLE TO COMPLETE ALL THE BEHAVIORAL HEALTH SCREENINGS?: YES

## 2024-04-18 SDOH — SOCIAL STABILITY: SOCIAL INSECURITY: DOES ANYONE TRY TO KEEP YOU FROM HAVING/CONTACTING OTHER FRIENDS OR DOING THINGS OUTSIDE YOUR HOME?: NO

## 2024-04-18 SDOH — SOCIAL STABILITY: SOCIAL INSECURITY: HAS ANYONE EVER THREATENED TO HURT YOUR FAMILY OR YOUR PETS?: NO

## 2024-04-18 SDOH — SOCIAL STABILITY: SOCIAL INSECURITY: HAVE YOU HAD ANY THOUGHTS OF HARMING ANYONE ELSE?: NO

## 2024-04-18 SDOH — SOCIAL STABILITY: SOCIAL INSECURITY: HAVE YOU HAD THOUGHTS OF HARMING ANYONE ELSE?: NO

## 2024-04-18 SDOH — SOCIAL STABILITY: SOCIAL INSECURITY: ABUSE: ADULT

## 2024-04-18 SDOH — SOCIAL STABILITY: SOCIAL INSECURITY: DO YOU FEEL UNSAFE GOING BACK TO THE PLACE WHERE YOU ARE LIVING?: NO

## 2024-04-18 ASSESSMENT — ACTIVITIES OF DAILY LIVING (ADL)
TOILETING: INDEPENDENT
JUDGMENT_ADEQUATE_SAFELY_COMPLETE_DAILY_ACTIVITIES: YES
ADEQUATE_TO_COMPLETE_ADL: YES
HEARING - LEFT EAR: FUNCTIONAL
BATHING: INDEPENDENT
DRESSING YOURSELF: NEEDS ASSISTANCE
FEEDING YOURSELF: INDEPENDENT
HEARING - RIGHT EAR: FUNCTIONAL
ASSISTIVE_DEVICE: EYEGLASSES
WALKS IN HOME: INDEPENDENT
LACK_OF_TRANSPORTATION: PATIENT DECLINED
PATIENT'S MEMORY ADEQUATE TO SAFELY COMPLETE DAILY ACTIVITIES?: YES
GROOMING: NEEDS ASSISTANCE

## 2024-04-18 ASSESSMENT — COGNITIVE AND FUNCTIONAL STATUS - GENERAL
TURNING FROM BACK TO SIDE WHILE IN FLAT BAD: A LITTLE
DAILY ACTIVITIY SCORE: 24
PATIENT BASELINE BEDBOUND: NO
STANDING UP FROM CHAIR USING ARMS: A LOT
WALKING IN HOSPITAL ROOM: A LOT
CLIMB 3 TO 5 STEPS WITH RAILING: A LOT
MOVING TO AND FROM BED TO CHAIR: A LITTLE
MOBILITY SCORE: 15
MOVING FROM LYING ON BACK TO SITTING ON SIDE OF FLAT BED WITH BEDRAILS: A LITTLE

## 2024-04-18 ASSESSMENT — LIFESTYLE VARIABLES
SKIP TO QUESTIONS 9-10: 1
HOW MANY STANDARD DRINKS CONTAINING ALCOHOL DO YOU HAVE ON A TYPICAL DAY: PATIENT DOES NOT DRINK
AUDIT-C TOTAL SCORE: 0
AUDIT-C TOTAL SCORE: 0
HOW OFTEN DO YOU HAVE A DRINK CONTAINING ALCOHOL: NEVER
HOW OFTEN DO YOU HAVE 6 OR MORE DRINKS ON ONE OCCASION: NEVER

## 2024-04-18 ASSESSMENT — PAIN - FUNCTIONAL ASSESSMENT
PAIN_FUNCTIONAL_ASSESSMENT: 0-10

## 2024-04-18 ASSESSMENT — PATIENT HEALTH QUESTIONNAIRE - PHQ9
SUM OF ALL RESPONSES TO PHQ9 QUESTIONS 1 & 2: 0
2. FEELING DOWN, DEPRESSED OR HOPELESS: NOT AT ALL
1. LITTLE INTEREST OR PLEASURE IN DOING THINGS: NOT AT ALL

## 2024-04-18 ASSESSMENT — PAIN SCALES - GENERAL
PAINLEVEL_OUTOF10: 0 - NO PAIN

## 2024-04-18 NOTE — INTERVAL H&P NOTE
H&P reviewed. The patient was examined and there are no changes to the H&P. Reports shortness of breath at rest.

## 2024-04-18 NOTE — PROGRESS NOTES
"1/1 CICU    OVERVIEW  4/18 @ Cardiologist office found to have 3rd degree heart block and sent to ED.     SIGNIFICANT EVENTS  4/18 TTE - showed larger pericardial effusion than previous. C: Nephro for CKD stage 5. C: EP, \"Pt has an indication for PPM, however, pt is in tamponade and planned for pericardiocentesis today. Will follow along, will likely place DC PPM once more stable and close to discharge.\"    PAYOR   [employer] [facility]     PT/OT   ( ) pending for when medically clear     CKD  Stage 5, not yet on dialysis  has LUE AVG that has not been used yet but has been cleared for use.   Nephro: Dr. Pemberton @ [facility] (last seen 4/11/2024)    COMPLETED  (X) 04/18 - new to author - EMR reviewd     Tawanna Piedra (LSW, MSW)       "

## 2024-04-18 NOTE — PROGRESS NOTES
Internal Medicine Daily Progress Note  Temo Hanson is a 74y/o male on day 1 hospital admission presenting with third degree heart block (Multi)    Subjective   Endorses  some SOB and generalized weakness, otherwise he denies any chest pain, fever, chills, n/v.     Objective     Vitals:  Visit Vitals  /73   Pulse 67   Temp 36 °C (96.8 °F)   Resp (!) 30       Intake/Output Summary (Last 24 hours) at 4/18/2024 0958  Last data filed at 4/18/2024 0645  Gross per 24 hour   Intake --   Output 250 ml   Net -250 ml       Physical exam:  Constitutional: Well-developed male in no acute distress.  HEENT: Normocephalic, atraumatic. PERRL. EOMI. No cervical lymphadenopathy.  Respiratory: Increased work of breathing  Cardiovascular: murmurs heard, gallops, or rubs. No JVD. Radial pulses 2+.  Abdominal: Soft, mildly distended, nontender to palpation. Bowel sounds present. No hepatosplenomegaly or masses. No CVA tenderness.  Neuro: CN II-XII intact. UE and LE strength 5/5 bilaterally and sensation intact. Normal FTN testing.  MSK: No LE edema bilaterally.  Skin: Warm, dry. No rashes or wounds.  Psych: Appropriate mood and affect.    Medications:  [Held by provider] amLODIPine, 5 mg, oral, Daily  gabapentin, 300 mg, oral, Daily  guanFACINE, 1 mg, oral, Nightly  heparin (porcine), 5,000 Units, subcutaneous, q8h KASSY  insulin detemir, 15 Units, subcutaneous, q12h  insulin lispro, 0-5 Units, subcutaneous, TID with meals  magnesium oxide, 400 mg, oral, Daily  pantoprazole, 40 mg, oral, Daily before breakfast  rosuvastatin, 20 mg, oral, Nightly         PRN medications: acetaminophen, dextrose, dextrose, glucagon, glucagon    Labs:  Results for orders placed or performed during the hospital encounter of 04/17/24 (from the past 24 hour(s))   ECG 12 lead   Result Value Ref Range    Ventricular Rate 70 BPM    Atrial Rate 93 BPM    QRS Duration 150 ms    QT Interval 482 ms    QTC Calculation(Bazett) 520 ms    R Axis 0 degrees    T  Axis 35 degrees    QRS Count 11 beats    Q Onset 185 ms    T Offset 426 ms    QTC Fredericia 507 ms   CBC with Differential   Result Value Ref Range    WBC 5.8 4.4 - 11.3 x10*3/uL    nRBC 0.0 0.0 - 0.0 /100 WBCs    RBC 2.99 (L) 4.50 - 5.90 x10*6/uL    Hemoglobin 8.8 (L) 13.5 - 17.5 g/dL    Hematocrit 25.9 (L) 41.0 - 52.0 %    MCV 87 80 - 100 fL    MCH 29.4 26.0 - 34.0 pg    MCHC 34.0 32.0 - 36.0 g/dL    RDW 16.0 (H) 11.5 - 14.5 %    Platelets 141 (L) 150 - 450 x10*3/uL    Neutrophils % 76.0 40.0 - 80.0 %    Immature Granulocytes %, Automated 0.5 0.0 - 0.9 %    Lymphocytes % 9.3 13.0 - 44.0 %    Monocytes % 13.6 2.0 - 10.0 %    Eosinophils % 0.3 0.0 - 6.0 %    Basophils % 0.3 0.0 - 2.0 %    Neutrophils Absolute 4.39 1.60 - 5.50 x10*3/uL    Immature Granulocytes Absolute, Automated 0.03 0.00 - 0.50 x10*3/uL    Lymphocytes Absolute 0.54 (L) 0.80 - 3.00 x10*3/uL    Monocytes Absolute 0.79 0.05 - 0.80 x10*3/uL    Eosinophils Absolute 0.02 0.00 - 0.40 x10*3/uL    Basophils Absolute 0.02 0.00 - 0.10 x10*3/uL   Comprehensive Metabolic Panel   Result Value Ref Range    Glucose 99 74 - 99 mg/dL    Sodium 131 (L) 136 - 145 mmol/L    Potassium 4.8 3.5 - 5.3 mmol/L    Chloride 89 (L) 98 - 107 mmol/L    Bicarbonate 23 21 - 32 mmol/L    Anion Gap 24 mmol/L    Urea Nitrogen 124 (HH) 6 - 23 mg/dL    Creatinine 14.04 (H) 0.50 - 1.30 mg/dL    eGFR 3 (L) >60 mL/min/1.73m*2    Calcium 9.3 8.6 - 10.6 mg/dL    Albumin 3.4 3.4 - 5.0 g/dL    Alkaline Phosphatase 157 (H) 33 - 136 U/L    Total Protein 7.6 6.4 - 8.2 g/dL    AST 17 9 - 39 U/L    Bilirubin, Total 0.5 0.0 - 1.2 mg/dL    ALT 13 10 - 52 U/L   Protime-INR   Result Value Ref Range    Protime 14.9 (H) 9.8 - 12.8 seconds    INR 1.3 (H) 0.9 - 1.1   Troponin I, High Sensitivity, Initial   Result Value Ref Range    Troponin I, High Sensitivity 21 0 - 53 ng/L   B-type Natriuretic Peptide   Result Value Ref Range     (H) 0 - 99 pg/mL   BLOOD GAS VENOUS FULL PANEL   Result Value Ref  Range    POCT pH, Venous 7.35 7.33 - 7.43 pH    POCT pCO2, Venous 43 41 - 51 mm Hg    POCT pO2, Venous 35 35 - 45 mm Hg    POCT SO2, Venous 50 45 - 75 %    POCT Oxy Hemoglobin, Venous 49.6 45.0 - 75.0 %    POCT Hematocrit Calculated, Venous 27.0 (L) 41.0 - 52.0 %    POCT Sodium, Venous 130 (L) 136 - 145 mmol/L    POCT Potassium, Venous 5.3 3.5 - 5.3 mmol/L    POCT Chloride, Venous 91 (L) 98 - 107 mmol/L    POCT Ionized Calicum, Venous 1.15 1.10 - 1.33 mmol/L    POCT Glucose, Venous 114 (H) 74 - 99 mg/dL    POCT Lactate, Venous 1.2 0.4 - 2.0 mmol/L    POCT Base Excess, Venous -1.9 -2.0 - 3.0 mmol/L    POCT HCO3 Calculated, Venous 23.7 22.0 - 26.0 mmol/L    POCT Hemoglobin, Venous 9.1 (L) 13.5 - 17.5 g/dL    POCT Anion Gap, Venous 21.0 10.0 - 25.0 mmol/L    Patient Temperature 37.0 degrees Celsius    FiO2 21 %   Troponin, High Sensitivity, 1 Hour   Result Value Ref Range    Troponin I, High Sensitivity 22 0 - 53 ng/L   Blood Gas Arterial Full Panel   Result Value Ref Range    POCT pH, Arterial 7.36 (L) 7.38 - 7.42 pH    POCT pCO2, Arterial 39 38 - 42 mm Hg    POCT pO2, Arterial 72 (L) 85 - 95 mm Hg    POCT SO2, Arterial 94 94 - 100 %    POCT Oxy Hemoglobin, Arterial 91.6 (L) 94.0 - 98.0 %    POCT Hematocrit Calculated, Arterial 28.0 (L) 41.0 - 52.0 %    POCT Sodium, Arterial 129 (L) 136 - 145 mmol/L    POCT Potassium, Arterial 5.3 3.5 - 5.3 mmol/L    POCT Chloride, Arterial 94 (L) 98 - 107 mmol/L    POCT Ionized Calcium, Arterial 1.24 1.10 - 1.33 mmol/L    POCT Glucose, Arterial 137 (H) 74 - 99 mg/dL    POCT Lactate, Arterial 0.9 0.4 - 2.0 mmol/L    POCT Base Excess, Arterial -3.2 (L) -2.0 - 3.0 mmol/L    POCT HCO3 Calculated, Arterial 22.0 22.0 - 26.0 mmol/L    POCT Hemoglobin, Arterial 9.3 (L) 13.5 - 17.5 g/dL    POCT Anion Gap, Arterial 18 10 - 25 mmo/L    Patient Temperature 37.0 degrees Celsius    FiO2 21 %   POCT GLUCOSE   Result Value Ref Range    POCT Glucose 113 (H) 74 - 99 mg/dL   Type and screen   Result  Value Ref Range    ABO TYPE O     Rh TYPE POS     ANTIBODY SCREEN NEG    CBC and Auto Differential   Result Value Ref Range    WBC 6.0 4.4 - 11.3 x10*3/uL    nRBC 0.0 0.0 - 0.0 /100 WBCs    RBC 2.94 (L) 4.50 - 5.90 x10*6/uL    Hemoglobin 8.6 (L) 13.5 - 17.5 g/dL    Hematocrit 27.5 (L) 41.0 - 52.0 %    MCV 94 80 - 100 fL    MCH 29.3 26.0 - 34.0 pg    MCHC 31.3 (L) 32.0 - 36.0 g/dL    RDW 16.4 (H) 11.5 - 14.5 %    Platelets 128 (L) 150 - 450 x10*3/uL    Neutrophils % 79.7 40.0 - 80.0 %    Immature Granulocytes %, Automated 0.3 0.0 - 0.9 %    Lymphocytes % 7.9 13.0 - 44.0 %    Monocytes % 11.6 2.0 - 10.0 %    Eosinophils % 0.2 0.0 - 6.0 %    Basophils % 0.3 0.0 - 2.0 %    Neutrophils Absolute 4.81 1.60 - 5.50 x10*3/uL    Immature Granulocytes Absolute, Automated 0.02 0.00 - 0.50 x10*3/uL    Lymphocytes Absolute 0.48 (L) 0.80 - 3.00 x10*3/uL    Monocytes Absolute 0.70 0.05 - 0.80 x10*3/uL    Eosinophils Absolute 0.01 0.00 - 0.40 x10*3/uL    Basophils Absolute 0.02 0.00 - 0.10 x10*3/uL   Comprehensive metabolic panel   Result Value Ref Range    Glucose 121 (H) 74 - 99 mg/dL    Sodium 132 (L) 136 - 145 mmol/L    Potassium 5.1 3.5 - 5.3 mmol/L    Chloride 90 (L) 98 - 107 mmol/L    Bicarbonate 22 21 - 32 mmol/L    Anion Gap 25 (H) 10 - 20 mmol/L    Urea Nitrogen 125 (HH) 6 - 23 mg/dL    Creatinine 14.19 (H) 0.50 - 1.30 mg/dL    eGFR 3 (L) >60 mL/min/1.73m*2    Calcium 9.4 8.6 - 10.6 mg/dL    Albumin 3.4 3.4 - 5.0 g/dL    Alkaline Phosphatase 153 (H) 33 - 136 U/L    Total Protein 7.4 6.4 - 8.2 g/dL    AST 14 9 - 39 U/L    Bilirubin, Total 0.6 0.0 - 1.2 mg/dL    ALT 13 10 - 52 U/L   Coagulation Screen   Result Value Ref Range    Protime 15.0 (H) 9.8 - 12.8 seconds    INR 1.3 (H) 0.9 - 1.1    aPTT 31 27 - 38 seconds   Magnesium   Result Value Ref Range    Magnesium 2.30 1.60 - 2.40 mg/dL   POCT GLUCOSE   Result Value Ref Range    POCT Glucose 118 (H) 74 - 99 mg/dL       Imaging:     TTE 4/18    1. Limited echocardiogram.   2.  Left ventricular systolic function is normal with a 60-65% estimated ejection fraction.   3. Moderately enlarged right ventricle.   4. There is mildly reduced right ventricular systolic function.   5. There is a moderate pericardial effusion.   6. Mild diastolic collapse of the right atrium and early right ventricular diastolic collapse. Echocardiographic findings are consistent with cardiac tamponade.   7. Compared with the prior study dated 3/29/2024, pericardial effusion size has increased. Signs of increased pericardial pressure are now present. Primary team aware of findings.    TTE 2/24-  CONCLUSIONS:   1. Left ventricular systolic function is normal with a 60-65% estimated ejection fraction.   2. Spectral Doppler shows a pseudonormal pattern of left ventricular diastolic filling.   3. There are elevated left atrial and left ventricular end diastolic pressures.   4. Moderately enlarged right ventricle.   5. Right ventricular volume overload.   6. There is mildly reduced right ventricular systolic function.   7. The right atrium is moderately dilated.   8. Severe tricuspid regurgitation visualized.   9. Mild mitral valve regurgitation.  10. Severely elevated right ventricular systolic pressure.  11. There is reversed systolic hepatic vein flow.  12. There is a trivial to small pericardial effusion.  13. There is no evidence of cardiac tamponade.  14. Compared with study from 1/23/2023, major difference is in the reporting of the TR. It was reported as mild but in the currente echo it is severe with dilated RV and RA and elevated pulmonary pressures. Dilated RV and elevated pulmonary pressures are known.     ECG 9/23-  Normal sinus rhythm with high grade AV block  Right bundle branch block  Abnormal ECG  When compared with ECG of 26-JAN-2023 14:05,  Current undetermined rhythm precludes rhythm comparison, needs review  Inverted T waves have replaced nonspecific T wave abnormality in Anterior leads  Confirmed by  Fredi Calvo ([id number]) on 9/26/2023 9:21:02 PM     Nuclear Stress test 1/23-     IMPRESSION:  1. Rest imaging only, which demonstrates normal profusion throughout  the wall of left ventricle.  2. No stress test due to patient has 2nd degree heart block.    Assessment and Plan:  Temo Hanson is a 73 y.o. male 73 y.o. male with pmhx of ESRD s/p fistula not on dialysis, HTN, DLD, T2DM, severe TR, and CHB transferred to the CICU for CHB and concern for mod to large pericardial effusion.  Concern for tamponade physiology with no acute events overnight.     Updates 04/18/24:  - Pericardial fluid tap today  - EP consult for possible pace maker  - consult transplant nephro. Patient been worked up for transplant  - Discontinue Guafenesin (?av shaniqua blocking agent)  - Restart home amlodipine      Neuro  -no active issues     Cards     #Pericardial Effusion  ::small effusion noted on TTE in February now appears increased in size on bedside POCUS assessment  ::vitals stable  -gentle boluses as needed  -will consider tap in the am  -holding other antihypertensives but restart home amlodipine      #High Grade AV block  ::noted on ECG in September but patient lost to follow up regarding ppm  -EP consult for ppm eval  -tele while admitted  -avoid av shaniqua blocking agents  -holding coreg     Pulm     #Bilateral Pleural Effusions R >L  ::satting well but notable respiratory distress  -holding on diuresis given mod to large pericardial effusion  -consider diuresis vs. Thora once plan made for pericardial effusion and chb     Renal     #ESRD  ::cleared for use of fistula but has not started dialysis  ::patient follows with transplant nephro   -consult transplant nephro   -discuss volume removal once pericardial effusion addressed  -cont. Iron tablets  -holding torsemide  -cont. Lokelma as needed  -daily RFPs     Endo     #T2DM  ::home levemir 30u BID, lispro sliding scale  -cont SSI,   -half dose levemir 15u while admitted and increase  back to home dose as tolerated     F: as needed  E: replete prn   N: renal diet (npo pending poss proc. For now)  A: PIVs  DVT PPX: subcutaneous heparin  NOK: Daughter Erica [place]: [phone]     CODE STATUS: FULL CODE (confirmed on admission)

## 2024-04-18 NOTE — H&P
History Of Present Illness  Temo Hanson is a 73 y.o. male with pmhx of ESRD s/p fistula not on dialysis, HTN, DLD, T2DM, severe TR, and CHB transferred to the CICU for CHB and concern for mod to large pericardial effusion.    Patient presented to his cardiology appointment for preoperative eval for renal transplant surgery.  In the office his ECG was concerning for 3rd degree heart block with A-V dissociation at which point he was told to present to the ED for further eval.  On interview patient reports significant shortness of breath and fatigue for the past few weeks.  He noted to be having conversational dyspnea that improves with sitting up.  He also endorses occasional lightheadedness and dizziness.  Denies any episodes of syncope or presyncope.  He notes some leg swelling.  He denies chest pain, palpitations, or PND.      Of note patient was found to have CHB in September of 2023 at which point he was being worked up for a pacemaker.  He states that he thought someone was going to call him to arrange for this and he never followed up about it.      ED Course   VS - T 36.5 °C (97.7 °F)  HR 73  /67  RR 16  O2 98 % None (Room air)     Cardiac Hx    TTE 2/24-  CONCLUSIONS:   1. Left ventricular systolic function is normal with a 60-65% estimated ejection fraction.   2. Spectral Doppler shows a pseudonormal pattern of left ventricular diastolic filling.   3. There are elevated left atrial and left ventricular end diastolic pressures.   4. Moderately enlarged right ventricle.   5. Right ventricular volume overload.   6. There is mildly reduced right ventricular systolic function.   7. The right atrium is moderately dilated.   8. Severe tricuspid regurgitation visualized.   9. Mild mitral valve regurgitation.  10. Severely elevated right ventricular systolic pressure.  11. There is reversed systolic hepatic vein flow.  12. There is a trivial to small pericardial effusion.  13. There is no evidence of  cardiac tamponade.  14. Compared with study from 1/23/2023, major difference is in the reporting of the TR. It was reported as mild but in the currente echo it is severe with dilated RV and RA and elevated pulmonary pressures. Dilated RV and elevated pulmonary pressures are known.    ECG 9/23-  Normal sinus rhythm with high grade AV block  Right bundle branch block  Abnormal ECG  When compared with ECG of 26-JAN-2023 14:05,  Current undetermined rhythm precludes rhythm comparison, needs review  Inverted T waves have replaced nonspecific T wave abnormality in Anterior leads  Confirmed by Fredi Calvo ([id number]) on 9/26/2023 9:21:02 PM    Nuclear Stress test 1/23-     IMPRESSION:  1. Rest imaging only, which demonstrates normal profusion throughout  the wall of left ventricle.  2. No stress test due to patient has 2nd degree heart block.       Past Medical History  Past Medical History:   Diagnosis Date    DM (diabetes mellitus) (Multi)     HTN (hypertension)     Other specified abnormal immunological findings in serum 10/11/2021    Hepatitis B core antibody positive    Personal history of malignant neoplasm of prostate     History of malignant neoplasm of prostate       Surgical History  Past Surgical History:   Procedure Laterality Date    CHOLECYSTECTOMY  10/23/2023    Lap Cholecystectomy    OTHER SURGICAL HISTORY  09/29/2021    Cyst excision    OTHER SURGICAL HISTORY  09/29/2021    Arteriovenous fistula creation procedure    OTHER SURGICAL HISTORY  09/29/2021    Prostate surgery    OTHER SURGICAL HISTORY  09/29/2021    Colonoscopy        Social History  He reports that he has never smoked. He has never used smokeless tobacco. He reports that he does not drink alcohol and does not use drugs.    Family History  Family History   Problem Relation Name Age of Onset    Diabetes Sister      Diabetes Brother          Allergies  Terazosin, Codeine, and Tramadol    Review of Systems - per HPI     Physical Exam  Constitutional:   "     Comments: Resting in bed with conversational dyspnea and increased work of breathing   HENT:      Head: Normocephalic.      Mouth/Throat:      Mouth: Mucous membranes are moist.   Eyes:      Extraocular Movements: Extraocular movements intact.   Cardiovascular:      Rate and Rhythm: Normal rate and regular rhythm.      Heart sounds: Murmur heard.   Pulmonary:      Comments: Increased work of breathing mild resp. Distress, improves with sitting up.  Some accessory muscle use  Abdominal:      General: There is distension.      Palpations: Abdomen is soft.      Tenderness: There is no abdominal tenderness.   Musculoskeletal:         General: Normal range of motion.      Cervical back: Normal range of motion.      Right lower leg: Edema present.      Left lower leg: Edema present.      Comments: LUE fistula   Skin:     General: Skin is warm.   Neurological:      General: No focal deficit present.      Mental Status: He is alert.          Last Recorded Vitals  Blood pressure (!) 186/100, pulse 71, temperature 36.1 °C (97 °F), resp. rate (!) 31, height 1.93 m (6' 4\"), weight 79.2 kg (174 lb 9.7 oz), SpO2 96%.    Relevant Results  Results for orders placed or performed during the hospital encounter of 04/17/24 (from the past 24 hour(s))   CBC with Differential   Result Value Ref Range    WBC 5.8 4.4 - 11.3 x10*3/uL    nRBC 0.0 0.0 - 0.0 /100 WBCs    RBC 2.99 (L) 4.50 - 5.90 x10*6/uL    Hemoglobin 8.8 (L) 13.5 - 17.5 g/dL    Hematocrit 25.9 (L) 41.0 - 52.0 %    MCV 87 80 - 100 fL    MCH 29.4 26.0 - 34.0 pg    MCHC 34.0 32.0 - 36.0 g/dL    RDW 16.0 (H) 11.5 - 14.5 %    Platelets 141 (L) 150 - 450 x10*3/uL    Neutrophils % 76.0 40.0 - 80.0 %    Immature Granulocytes %, Automated 0.5 0.0 - 0.9 %    Lymphocytes % 9.3 13.0 - 44.0 %    Monocytes % 13.6 2.0 - 10.0 %    Eosinophils % 0.3 0.0 - 6.0 %    Basophils % 0.3 0.0 - 2.0 %    Neutrophils Absolute 4.39 1.60 - 5.50 x10*3/uL    Immature Granulocytes Absolute, Automated " 0.03 0.00 - 0.50 x10*3/uL    Lymphocytes Absolute 0.54 (L) 0.80 - 3.00 x10*3/uL    Monocytes Absolute 0.79 0.05 - 0.80 x10*3/uL    Eosinophils Absolute 0.02 0.00 - 0.40 x10*3/uL    Basophils Absolute 0.02 0.00 - 0.10 x10*3/uL   Comprehensive Metabolic Panel   Result Value Ref Range    Glucose 99 74 - 99 mg/dL    Sodium 131 (L) 136 - 145 mmol/L    Potassium 4.8 3.5 - 5.3 mmol/L    Chloride 89 (L) 98 - 107 mmol/L    Bicarbonate 23 21 - 32 mmol/L    Anion Gap 24 mmol/L    Urea Nitrogen 124 (HH) 6 - 23 mg/dL    Creatinine 14.04 (H) 0.50 - 1.30 mg/dL    eGFR 3 (L) >60 mL/min/1.73m*2    Calcium 9.3 8.6 - 10.6 mg/dL    Albumin 3.4 3.4 - 5.0 g/dL    Alkaline Phosphatase 157 (H) 33 - 136 U/L    Total Protein 7.6 6.4 - 8.2 g/dL    AST 17 9 - 39 U/L    Bilirubin, Total 0.5 0.0 - 1.2 mg/dL    ALT 13 10 - 52 U/L   Protime-INR   Result Value Ref Range    Protime 14.9 (H) 9.8 - 12.8 seconds    INR 1.3 (H) 0.9 - 1.1   Troponin I, High Sensitivity, Initial   Result Value Ref Range    Troponin I, High Sensitivity 21 0 - 53 ng/L   B-type Natriuretic Peptide   Result Value Ref Range     (H) 0 - 99 pg/mL   BLOOD GAS VENOUS FULL PANEL   Result Value Ref Range    POCT pH, Venous 7.35 7.33 - 7.43 pH    POCT pCO2, Venous 43 41 - 51 mm Hg    POCT pO2, Venous 35 35 - 45 mm Hg    POCT SO2, Venous 50 45 - 75 %    POCT Oxy Hemoglobin, Venous 49.6 45.0 - 75.0 %    POCT Hematocrit Calculated, Venous 27.0 (L) 41.0 - 52.0 %    POCT Sodium, Venous 130 (L) 136 - 145 mmol/L    POCT Potassium, Venous 5.3 3.5 - 5.3 mmol/L    POCT Chloride, Venous 91 (L) 98 - 107 mmol/L    POCT Ionized Calicum, Venous 1.15 1.10 - 1.33 mmol/L    POCT Glucose, Venous 114 (H) 74 - 99 mg/dL    POCT Lactate, Venous 1.2 0.4 - 2.0 mmol/L    POCT Base Excess, Venous -1.9 -2.0 - 3.0 mmol/L    POCT HCO3 Calculated, Venous 23.7 22.0 - 26.0 mmol/L    POCT Hemoglobin, Venous 9.1 (L) 13.5 - 17.5 g/dL    POCT Anion Gap, Venous 21.0 10.0 - 25.0 mmol/L    Patient Temperature 37.0  degrees Celsius    FiO2 21 %   Troponin, High Sensitivity, 1 Hour   Result Value Ref Range    Troponin I, High Sensitivity 22 0 - 53 ng/L   Blood Gas Arterial Full Panel   Result Value Ref Range    POCT pH, Arterial 7.36 (L) 7.38 - 7.42 pH    POCT pCO2, Arterial 39 38 - 42 mm Hg    POCT pO2, Arterial 72 (L) 85 - 95 mm Hg    POCT SO2, Arterial 94 94 - 100 %    POCT Oxy Hemoglobin, Arterial 91.6 (L) 94.0 - 98.0 %    POCT Hematocrit Calculated, Arterial 28.0 (L) 41.0 - 52.0 %    POCT Sodium, Arterial 129 (L) 136 - 145 mmol/L    POCT Potassium, Arterial 5.3 3.5 - 5.3 mmol/L    POCT Chloride, Arterial 94 (L) 98 - 107 mmol/L    POCT Ionized Calcium, Arterial 1.24 1.10 - 1.33 mmol/L    POCT Glucose, Arterial 137 (H) 74 - 99 mg/dL    POCT Lactate, Arterial 0.9 0.4 - 2.0 mmol/L    POCT Base Excess, Arterial -3.2 (L) -2.0 - 3.0 mmol/L    POCT HCO3 Calculated, Arterial 22.0 22.0 - 26.0 mmol/L    POCT Hemoglobin, Arterial 9.3 (L) 13.5 - 17.5 g/dL    POCT Anion Gap, Arterial 18 10 - 25 mmo/L    Patient Temperature 37.0 degrees Celsius    FiO2 21 %   POCT GLUCOSE   Result Value Ref Range    POCT Glucose 113 (H) 74 - 99 mg/dL           Assessment/Plan   Principal Problem:    Third degree heart block (Multi)      73 y.o. male with pmhx of ESRD s/p fistula not on dialysis, HTN, DLD, T2DM, severe TR, and CHB transferred to the CICU for CHB and concern for mod to large pericardial effusion.  Some concern for tamponade physiology but vitally stable.    Neuro  -no active issues    Cards    #Pericardial Effusion  ::small effusion noted on TTE in February now appears increased in size on bedside POCUS assessment  ::vitals stable  -gentle boluses as needed  -will consider tap in the am  -holding antihypertensives for now     #High Grade AV block  ::noted on ECG in September but patient lost to follow up regarding ppm  -EP consult for ppm eval  -tele while admitted  -avoid av shaniqua blocking agents  -holding coreg    Pulm    #Bilateral  Pleural Effusions R >L  ::satting well but notable respiratory distress  -holding on diuresis given mod to large pericardial effusion  -consider diuresis vs. Thora once plan made for pericardial effusion and chb    Renal    #ESRD  ::cleared for use of fistula but has not started dialysis  ::patient follows with transplant nephro   -consult transplant nephro in am  -discuss volume removal once pericardial effusion addressed  -cont. Iron tablets  -holding torsemide  -cont. Lokelma as needed  -daily RFPs    Endo    #T2DM  ::home levemir 30u BID, lispro sliding scale  -cont SSI,   -half dose levemir 15u while admitted and increase back to home dose as tolerated    F: as needed  E: replete prn   N: renal diet (npo pending poss proc. For now)  A: PIVs  DVT PPX: subcutaneous heparin  NOK: Daughter Erica [place]: [phone]    CODE STATUS: FULL CODE (confirmed on admission)              Rossi Martinez MD

## 2024-04-18 NOTE — PROGRESS NOTES
Physical Therapy                 Therapy Communication Note    Patient Name: Temo Hanson  MRN: [id number]  Today's Date: 4/18/2024     Discipline: Physical Therapy    Missed Visit Reason: Missed Visit Reason: Patient placed on medical hold (Per ID rounds, Pt a hold pending PPM placement workup. Will attempt PT evaluation as medicall appropriate.)    Missed Time: Attempt    Comment:

## 2024-04-18 NOTE — CONSULTS
Reason For Consult  CKD stage 5 not yet on dialysis with worsening renal function    History Of Present Illness  Temo Hanson is a 73 y.o. male with CKD stage 5 with LUE AVG not yet on dialysis, severe triscuspid regurgitation, DM type 2, HTN, and complete heart block presented on 4/18/2024 for shortness of breath. He initially was at his Cardiologist's office and found to have 3rd degree heart block and referred to ED. TTE in CICU showed larger pericardial effusion than previous. Nephrology consulted as pt has worsening renal function with CKD5 but not yet on dialysis.    Pt seen resting in bed. He is supposed to get pericardial drain today for pericardial effusion. He is in no acute distress. Some SOB but he feels better than when he first arrived. Pt follows with Nephrologist Dr. Pemberton at [facility]; last seen in office on 4/11/2024. Pt has LUE AVG that has not been used yet but has been cleared for use. Per Dr. Pemberton, pt was strongly suggested to begin dialysis. He has been active on the Kidney Transplant List for the past 3 years.    Labs show Cr 14 (baseline 11), GFR 3 (5), , K 5.1, HCO3 22. UO 250mL so far today. On room air. BP high 170s/50s.       Past Medical History  He has a past medical history of DM (diabetes mellitus) (Multi), HTN (hypertension), Other specified abnormal immunological findings in serum (10/11/2021), and Personal history of malignant neoplasm of prostate.    Surgical History  He has a past surgical history that includes Other surgical history (09/29/2021); Other surgical history (09/29/2021); Other surgical history (09/29/2021); Other surgical history (09/29/2021); and Cholecystectomy (10/23/2023).     Social History  He reports that he has never smoked. He has never used smokeless tobacco. He reports that he does not drink alcohol and does not use drugs.    Family History  Family History   Problem Relation Name Age of Onset    Diabetes Sister      Diabetes Brother         "  Allergies  Terazosin, Codeine, and Tramadol    Review of Systems  + shortness of breath; rest as per HPI     Physical Exam  General appearance: AAOx3. No distress  Eyes: non-icteric  Skin: no apparent rash  Heart: rhythm regular. Pericardial friction rub  Lungs: bibasilar crackles, on room air  Abdomen: soft, nt/nd  Extremities: no edema bilat  : no Bravo  Neuro: No FND  ACCESS: RUE AVG - palpable thrill, audible bruit            I&O 24HR    Intake/Output Summary (Last 24 hours) at 4/18/2024 1642  Last data filed at 4/18/2024 1630  Gross per 24 hour   Intake --   Output 255 ml   Net -255 ml       Vitals 24HR  Heart Rate:  [59-93]   Temp:  [36 °C (96.8 °F)-36.3 °C (97.3 °F)]   Resp:  [9-31]   BP: (120-186)/()   Height:  [185.4 cm (6' 1\")-193 cm (6' 4\")]   Weight:  [79.2 kg (174 lb 9.7 oz)-88 kg (194 lb 0.1 oz)]   SpO2:  [91 %-97 %]       Relevant Results    Current Facility-Administered Medications:     acetaminophen (Tylenol) tablet 650 mg, 650 mg, oral, q6h PRN, Rossi Martinez MD    amLODIPine (Norvasc) tablet 5 mg, 5 mg, oral, Daily, Bita Gaytan MD, 5 mg at 04/18/24 1154    dextrose 50 % injection 12.5 g, 12.5 g, intravenous, q15 min PRN, Rossi Martienz MD    dextrose 50 % injection 25 g, 25 g, intravenous, q15 min PRN, Rossi Martinez MD    fentaNYL PF (Sublimaze) injection, , , PRN, Jose Brunson MD, 25 mcg at 04/18/24 1603    gabapentin (Neurontin) capsule 300 mg, 300 mg, oral, Daily, Rossi Martinez MD, 300 mg at 04/18/24 1154    glucagon (Glucagen) injection 1 mg, 1 mg, intramuscular, q15 min PRN, Rossi Martinez MD    glucagon (Glucagen) injection 1 mg, 1 mg, intramuscular, q15 min PRN, Rossi Martinez MD    heparin (porcine) injection 5,000 Units, 5,000 Units, subcutaneous, q8h KASSY, Rossi Martinez MD, 5,000 Units at 04/18/24 1510    insulin detemir (Levemir) injection 15 Units, 15 Units, subcutaneous, q12h, Rossi Martinez MD    insulin lispro (HumaLOG) injection 0-5 " Units, 0-5 Units, subcutaneous, TID with meals, Rossi Martinez MD    lidocaine PF (Xylocaine) 10 mg/mL (1 %) injection, , , PRN, Jose Brunson MD, 10 mL at 04/18/24 1549    magnesium oxide (Mag-Ox) tablet 400 mg, 400 mg, oral, Daily, Rossi Martinez MD, 400 mg at 04/18/24 0836    midazolam (Versed) injection, , , PRN, Jose Brunson MD, 0.5 mg at 04/18/24 1611    pantoprazole (ProtoNix) EC tablet 40 mg, 40 mg, oral, Daily before breakfast, Rossi Martinez MD, 40 mg at 04/18/24 0643    rosuvastatin (Crestor) tablet 20 mg, 20 mg, oral, Nightly, Rossi Martinez MD    Results for orders placed or performed during the hospital encounter of 04/17/24 (from the past 24 hour(s))   CBC with Differential   Result Value Ref Range    WBC 5.8 4.4 - 11.3 x10*3/uL    nRBC 0.0 0.0 - 0.0 /100 WBCs    RBC 2.99 (L) 4.50 - 5.90 x10*6/uL    Hemoglobin 8.8 (L) 13.5 - 17.5 g/dL    Hematocrit 25.9 (L) 41.0 - 52.0 %    MCV 87 80 - 100 fL    MCH 29.4 26.0 - 34.0 pg    MCHC 34.0 32.0 - 36.0 g/dL    RDW 16.0 (H) 11.5 - 14.5 %    Platelets 141 (L) 150 - 450 x10*3/uL    Neutrophils % 76.0 40.0 - 80.0 %    Immature Granulocytes %, Automated 0.5 0.0 - 0.9 %    Lymphocytes % 9.3 13.0 - 44.0 %    Monocytes % 13.6 2.0 - 10.0 %    Eosinophils % 0.3 0.0 - 6.0 %    Basophils % 0.3 0.0 - 2.0 %    Neutrophils Absolute 4.39 1.60 - 5.50 x10*3/uL    Immature Granulocytes Absolute, Automated 0.03 0.00 - 0.50 x10*3/uL    Lymphocytes Absolute 0.54 (L) 0.80 - 3.00 x10*3/uL    Monocytes Absolute 0.79 0.05 - 0.80 x10*3/uL    Eosinophils Absolute 0.02 0.00 - 0.40 x10*3/uL    Basophils Absolute 0.02 0.00 - 0.10 x10*3/uL   Comprehensive Metabolic Panel   Result Value Ref Range    Glucose 99 74 - 99 mg/dL    Sodium 131 (L) 136 - 145 mmol/L    Potassium 4.8 3.5 - 5.3 mmol/L    Chloride 89 (L) 98 - 107 mmol/L    Bicarbonate 23 21 - 32 mmol/L    Anion Gap 24 mmol/L    Urea Nitrogen 124 (HH) 6 - 23 mg/dL    Creatinine 14.04 (H) 0.50 - 1.30 mg/dL    eGFR  3 (L) >60 mL/min/1.73m*2    Calcium 9.3 8.6 - 10.6 mg/dL    Albumin 3.4 3.4 - 5.0 g/dL    Alkaline Phosphatase 157 (H) 33 - 136 U/L    Total Protein 7.6 6.4 - 8.2 g/dL    AST 17 9 - 39 U/L    Bilirubin, Total 0.5 0.0 - 1.2 mg/dL    ALT 13 10 - 52 U/L   Protime-INR   Result Value Ref Range    Protime 14.9 (H) 9.8 - 12.8 seconds    INR 1.3 (H) 0.9 - 1.1   Troponin I, High Sensitivity, Initial   Result Value Ref Range    Troponin I, High Sensitivity 21 0 - 53 ng/L   B-type Natriuretic Peptide   Result Value Ref Range     (H) 0 - 99 pg/mL   BLOOD GAS VENOUS FULL PANEL   Result Value Ref Range    POCT pH, Venous 7.35 7.33 - 7.43 pH    POCT pCO2, Venous 43 41 - 51 mm Hg    POCT pO2, Venous 35 35 - 45 mm Hg    POCT SO2, Venous 50 45 - 75 %    POCT Oxy Hemoglobin, Venous 49.6 45.0 - 75.0 %    POCT Hematocrit Calculated, Venous 27.0 (L) 41.0 - 52.0 %    POCT Sodium, Venous 130 (L) 136 - 145 mmol/L    POCT Potassium, Venous 5.3 3.5 - 5.3 mmol/L    POCT Chloride, Venous 91 (L) 98 - 107 mmol/L    POCT Ionized Calicum, Venous 1.15 1.10 - 1.33 mmol/L    POCT Glucose, Venous 114 (H) 74 - 99 mg/dL    POCT Lactate, Venous 1.2 0.4 - 2.0 mmol/L    POCT Base Excess, Venous -1.9 -2.0 - 3.0 mmol/L    POCT HCO3 Calculated, Venous 23.7 22.0 - 26.0 mmol/L    POCT Hemoglobin, Venous 9.1 (L) 13.5 - 17.5 g/dL    POCT Anion Gap, Venous 21.0 10.0 - 25.0 mmol/L    Patient Temperature 37.0 degrees Celsius    FiO2 21 %   Troponin, High Sensitivity, 1 Hour   Result Value Ref Range    Troponin I, High Sensitivity 22 0 - 53 ng/L   Blood Gas Arterial Full Panel   Result Value Ref Range    POCT pH, Arterial 7.36 (L) 7.38 - 7.42 pH    POCT pCO2, Arterial 39 38 - 42 mm Hg    POCT pO2, Arterial 72 (L) 85 - 95 mm Hg    POCT SO2, Arterial 94 94 - 100 %    POCT Oxy Hemoglobin, Arterial 91.6 (L) 94.0 - 98.0 %    POCT Hematocrit Calculated, Arterial 28.0 (L) 41.0 - 52.0 %    POCT Sodium, Arterial 129 (L) 136 - 145 mmol/L    POCT Potassium, Arterial 5.3  3.5 - 5.3 mmol/L    POCT Chloride, Arterial 94 (L) 98 - 107 mmol/L    POCT Ionized Calcium, Arterial 1.24 1.10 - 1.33 mmol/L    POCT Glucose, Arterial 137 (H) 74 - 99 mg/dL    POCT Lactate, Arterial 0.9 0.4 - 2.0 mmol/L    POCT Base Excess, Arterial -3.2 (L) -2.0 - 3.0 mmol/L    POCT HCO3 Calculated, Arterial 22.0 22.0 - 26.0 mmol/L    POCT Hemoglobin, Arterial 9.3 (L) 13.5 - 17.5 g/dL    POCT Anion Gap, Arterial 18 10 - 25 mmo/L    Patient Temperature 37.0 degrees Celsius    FiO2 21 %   POCT GLUCOSE   Result Value Ref Range    POCT Glucose 113 (H) 74 - 99 mg/dL   Type and screen   Result Value Ref Range    ABO TYPE O     Rh TYPE POS     ANTIBODY SCREEN NEG    CBC and Auto Differential   Result Value Ref Range    WBC 6.0 4.4 - 11.3 x10*3/uL    nRBC 0.0 0.0 - 0.0 /100 WBCs    RBC 2.94 (L) 4.50 - 5.90 x10*6/uL    Hemoglobin 8.6 (L) 13.5 - 17.5 g/dL    Hematocrit 27.5 (L) 41.0 - 52.0 %    MCV 94 80 - 100 fL    MCH 29.3 26.0 - 34.0 pg    MCHC 31.3 (L) 32.0 - 36.0 g/dL    RDW 16.4 (H) 11.5 - 14.5 %    Platelets 128 (L) 150 - 450 x10*3/uL    Neutrophils % 79.7 40.0 - 80.0 %    Immature Granulocytes %, Automated 0.3 0.0 - 0.9 %    Lymphocytes % 7.9 13.0 - 44.0 %    Monocytes % 11.6 2.0 - 10.0 %    Eosinophils % 0.2 0.0 - 6.0 %    Basophils % 0.3 0.0 - 2.0 %    Neutrophils Absolute 4.81 1.60 - 5.50 x10*3/uL    Immature Granulocytes Absolute, Automated 0.02 0.00 - 0.50 x10*3/uL    Lymphocytes Absolute 0.48 (L) 0.80 - 3.00 x10*3/uL    Monocytes Absolute 0.70 0.05 - 0.80 x10*3/uL    Eosinophils Absolute 0.01 0.00 - 0.40 x10*3/uL    Basophils Absolute 0.02 0.00 - 0.10 x10*3/uL   Comprehensive metabolic panel   Result Value Ref Range    Glucose 121 (H) 74 - 99 mg/dL    Sodium 132 (L) 136 - 145 mmol/L    Potassium 5.1 3.5 - 5.3 mmol/L    Chloride 90 (L) 98 - 107 mmol/L    Bicarbonate 22 21 - 32 mmol/L    Anion Gap 25 (H) 10 - 20 mmol/L    Urea Nitrogen 125 (HH) 6 - 23 mg/dL    Creatinine 14.19 (H) 0.50 - 1.30 mg/dL    eGFR 3 (L)  >60 mL/min/1.73m*2    Calcium 9.4 8.6 - 10.6 mg/dL    Albumin 3.4 3.4 - 5.0 g/dL    Alkaline Phosphatase 153 (H) 33 - 136 U/L    Total Protein 7.4 6.4 - 8.2 g/dL    AST 14 9 - 39 U/L    Bilirubin, Total 0.6 0.0 - 1.2 mg/dL    ALT 13 10 - 52 U/L   Coagulation Screen   Result Value Ref Range    Protime 15.0 (H) 9.8 - 12.8 seconds    INR 1.3 (H) 0.9 - 1.1    aPTT 31 27 - 38 seconds   Magnesium   Result Value Ref Range    Magnesium 2.30 1.60 - 2.40 mg/dL   POCT GLUCOSE   Result Value Ref Range    POCT Glucose 118 (H) 74 - 99 mg/dL   POCT GLUCOSE   Result Value Ref Range    POCT Glucose 96 74 - 99 mg/dL   Transthoracic Echo (TTE) Limited   Result Value Ref Range    BSA 2.06 m2       Assessment  Temo Hanson is a 73 y.o. male with CKD stage 5 with LUE AVG not yet on dialysis, severe triscuspid regurgitation, DM type 2, HTN, and complete heart block presented on 4/18/2024 for shortness of breath. He initially was at his Cardiologist's office and found to have 3rd degree heart block and referred to ED. TTE in CICU showed larger pericardial effusion than previous. Nephrology consulted as pt has worsening renal function with CKD5 but not yet on dialysis.    CKD stage 5 not on dialysis  Volume Overload in setting of pericardial effusion, severe TR  - Labs show Cr 14 (baseline 11), GFR 3 (5), , K 5.1, HCO3 22  - UO 250mL so far today  - On room air. BP high 170s/50s.  - follows with Nephrologist Dr. Pemberton at [facility]; last seen in office on 4/11/2024. Pt has LUE AVG that has not been used yet but has been cleared for use. Per Dr. Pemberton, pt was strongly suggested to begin dialysis. He has been active on the Kidney Transplant List for the past 3 years.    Pericardial Effusion  - TTE showed EF 60-65%; larger pericardial effusion than prior  - Cardiology planning to place pericardial drain today    Plan  - no acute indication for dialysis initiation today; expect some symptomatic relief from placement of pericardial drain  today  - suggest medical diuresis to aid in volume removal  - can begin lokelma if K continues to climb higher  - I d/w with patient that there is possibility that we may need to start dialysis during this admission; I did not go into great detail as pt was about to go for pericardial drain placement; will re-evaluate and discuss further tomorrow    To be d/w Dr. Erin Navarro MD  Nephrology Fellow PGY 5  Contact via secure chat  Nephrology Pager # [phone] ([phone])

## 2024-04-18 NOTE — DISCHARGE INSTRUCTIONS
Mr. Hanson,     You were initially admitted after you went to a cardiology appointment on 4/17 for a pre-operative evaluation for renal transplant surgery. You were found to have a heart block, along with a fluid collection around your heart. We placed a drain to help drain this fluid collection. Regarding the heart block, we spoke to our electrophysiologists and they have determined that you will need a pacemaker. We will schedule you up with follow up with  EP and send you home with an event monitor. They will schedule you for the pacemaker. We additionally consulted our renal doctors while you were in the hospital and the decision was made to start dialysis. We will set you up with a dialysis chair and you will need to follow up with your kidney doctors as an outpatient.     We made several changes to your medications during your hospitalization as well:    START:  - Start taking nephrocaps 1mg capsule  - Start taking isordil 10mg three times a day and vasartan 160mg daily for blood pressure    STOP:  - We will stop your torsemide 100mg in the AM and 50mg in the evening  - Please do not take your Coreg (Carvedilol).    Instructions:  - Please follow up with cardiology, electrophysiology, nephrology, and your PCP.     Thank you for letting us take care of you.           CARDIAC CATHETERIZATION DISCHARGE INSTRUCTIONS (procedure done on 4/18/24)     FOR SUDDEN AND SEVERE CHEST PAIN, SHORTNESS OF BREATH, EXCESSIVE BLEEDING, SIGNS OF STROKE, OR CHANGES IN MENTAL STATUS YOU SHOULD CALL 911 IMMEDIATELY.     If your provider has prescribed aspirin and/or clopidogrel (Plavix), or prasugrel (Effient), or ticagrelor (Brilinta), DO NOT STOP THESE MEDICATIONS for any reason without talking to your cardiologist first. If any of these were prescribed, you must take them every day without missing a single dose. If you are getting low on these medications, contact your provider immediately for a refill.     FOR NEXT 24 HOURS  -  Upon discharge, you should return home and rest for the remainder of the day and evening. You do not have to stay on bed rest but should not be very active.  It is recommended a responsible adult be with you for the first 24 hours after the procedure.    - No driving for 24 hours after procedure. Please arrange for someone to drive you home from the hospital today.     - Do not drive, operate machinery, or use power tools for 24 hours after your procedure.     - Do not make any legal decisions for 24 hours after your procedure.     - Do not drink alcoholic beverages for 24 hours after your procedure.    WOUND CARE   *FOR FEMORAL (LEG) ACCESS*  ·      Avoid heavy lifting (over 10 pounds) for 7 days, squatting or excessive bending for 2 days, and strenuous exercise for 7 days.  ·      No submerged bathing, swimming, or hot tubs for the next 7 days, or until fully healed.  ·      Avoid sexual activity for 3-4 days until any groin discomfort has ceased.     *FOR RADIAL (WRIST) ACCESS*  ·      No lifting more than 5 pounds or excessive use of the wrist for 24 hours - for example, treat your wrist as if it is sprained.  ·      Do not engage in vigorous activities (tennis, golf, bowling, weights) for at least 48 hours after the procedure.  ·      Do not submerge the wrist for 7 days after the procedure.  ·      You should expect mild tingling in your hand and tenderness at the puncture site for up to 3 days.    - The transparent dressing should be removed from the site 24 hours after the procedure.  Wash the site gently with soap and water. Rinse well and pat dry. Keep the area clean and dry. You may apply a Band-Aid to the site. Avoid lotions, ointments, or powders until fully healed.     - You may shower the day after your procedure.      - It is normal to notice a small bruise around the puncture site and/or a small grape sized or smaller lump. Any large bruising or large lump warrants a call to the office.     - If  bleeding should occur, lay down and apply pressure to the affected area for 10 minutes.  If the bleeding stops notify your physician.  If there is a large amount of bleeding or spurting of blood CALL 911 immediately.  DO NOT drive yourself to the hospital.    - You may experience some tenderness, bruising or minimal inflammation.  If you have any concerns, you may contact the Cath Lab or if any of these symptoms become excessive, contact your cardiologist or go to the emergency room.     OTHER INSTRUCTIONS  - You may take acetaminophen (Tylenol) as directed for discomfort.  If pain is not relieved with acetaminophen (Tylenol), contact your doctor.    - If you notice or experience any of the following, you should notify your doctor or seek medical attention  Chest pain or discomfort  Change in mental status or weakness in extremities.  Dizziness, light headedness, or feeling faint.  Change in the site where the procedure was performed, such as bleeding or an increased area of bruising or swelling.  Tingling, numbness, pain, or coolness in the leg/arm beyond the site where the procedure was performed.  Signs of infection (i.e. shaking chills, temperature > 100 degrees Fahrenheit, warmth, redness) in the leg/arm area where the procedure was performed.  Changes in urination   Bloody or black stools  Vomiting blood  Severe nose bleeds  Any excessive bleeding    - If you DO NOT have an appointment with your cardiologist within 2-4 weeks following your procedure, please contact their office.

## 2024-04-18 NOTE — CONSULTS
Inpatient consult to Electrophysiology  Consult performed by: Zulema Montenegro MD  Consult ordered by: Kevin Reed MD        History Of Present Illness:    Temo Hanson is a 73 y.o. male with PMH of DM, ESRD s/p fistula creation (not on dialysis) who p/w CHB and moderate effusion with c/f tamponade. EP was consulted for PPM placement.     Pt reports being told he had a heart block for more than a year. He currently reports some SOB but denies any dizziness, or syncope. He was seen by Dr. Miguel in clinic on on 4/17 and EKG showed CHB and pt was sent to the ED.     Social hx   Smoking: Denies  Alcohol: Denies  Illicit drugs: Denies    Family hx:   Brother has heart block but not pacemaker yet. Pt states that his brother is being evaluated for PPM.      Last Recorded Vitals:  Vitals:    04/18/24 1300 04/18/24 1400 04/18/24 1500 04/18/24 1531   BP: 176/75 174/69 (!) 183/92    BP Location:       Patient Position:       Pulse: 69 75 93    Resp: 18 22 (!) 28    Temp:       TempSrc:       SpO2: 94% 91% 92% 94%   Weight:       Height:           Last Labs:  CBC - 4/18/2024:  1:46 AM  6.0 8.6 128    27.5      CMP - 4/18/2024:  1:46 AM  9.4 7.4 14 --- 0.6   5.0 3.4 13 153      PTT - 4/18/2024:  1:46 AM  1.3   15.0 31     Troponin I, High Sensitivity   Date/Time Value Ref Range Status   04/17/2024 08:51 PM 22 0 - 53 ng/L Final   04/17/2024 06:24 PM 21 0 - 53 ng/L Final     BNP   Date/Time Value Ref Range Status   04/17/2024 06:24  (H) 0 - 99 pg/mL Final     Hemoglobin A1C   Date/Time Value Ref Range Status   02/21/2023 01:51 PM 7.3 (H) 4.0 - 5.6 % Final   01/26/2023 03:54 PM 6.5 (A) % Final     Comment:          Diagnosis of Diabetes-Adults   Non-Diabetic: < or = 5.6%   Increased risk for developing diabetes: 5.7-6.4%   Diagnostic of diabetes: > or = 6.5%  .       Monitoring of Diabetes                Age (y)     Therapeutic Goal (%)   Adults:          >18           <7.0   Pediatrics:    13-18           <7.5                    7-12           <8.0                   0- 6            7.5-8.5   American Diabetes Association. Diabetes Care 33(S1), Jan 2010.     12/01/2022 11:11 AM 6.4 (H) 4.0 - 5.6 % Final      Last I/O:  I/O last 3 completed shifts:  In: - (0 mL/kg)   Out: 250 (3.2 mL/kg) [Urine:250 (0.1 mL/kg/hr)]  Weight: 79.2 kg     Past Cardiology Tests (Last 3 Years):  EKG:  ECG 12 lead 04/17/2024 (Preliminary)      ECG 12 lead (Clinic Performed) 04/17/2024    Echo:  Transthoracic Echo (TTE) Limited 04/18/2024  CONCLUSIONS:   1. Limited echocardiogram.   2. Left ventricular systolic function is normal with a 60-65% estimated ejection fraction.   3. Moderately enlarged right ventricle.   4. There is mildly reduced right ventricular systolic function.   5. There is a moderate pericardial effusion.   6. Mild diastolic collapse of the right atrium and early right ventricular diastolic collapse. Echocardiographic findings are consistent with cardiac tamponade.   7. Compared with the prior study dated 3/29/2024, pericardial effusion size has increased. Signs of increased pericardial pressure are now present. Primary team aware of findings.      Transthoracic Echo (TTE) Complete 03/29/2024    Ejection Fractions:  EF   Date/Time Value Ref Range Status   03/29/2024 03:06 PM 60 %      Cath:  Cardiac Catheterization Procedure 04/18/2024    Stress Test:  Nuclear Stress Test 01/23/2023      Nuclear Stress Test 03/30/2022        Past Medical History:  He has a past medical history of DM (diabetes mellitus) (Multi), HTN (hypertension), Other specified abnormal immunological findings in serum (10/11/2021), and Personal history of malignant neoplasm of prostate.    Past Surgical History:  He has a past surgical history that includes Other surgical history (09/29/2021); Other surgical history (09/29/2021); Other surgical history (09/29/2021); Other surgical history (09/29/2021); and Cholecystectomy (10/23/2023).      Social History:  He reports that he  "has never smoked. He has never used smokeless tobacco. He reports that he does not drink alcohol and does not use drugs.    Family History:  Family History   Problem Relation Name Age of Onset    Diabetes Sister      Diabetes Brother          Allergies:  Terazosin, Codeine, and Tramadol    Inpatient Medications:  Scheduled medications   Medication Dose Route Frequency    amLODIPine  5 mg oral Daily    gabapentin  300 mg oral Daily    heparin (porcine)  5,000 Units subcutaneous q8h KASSY    insulin detemir  15 Units subcutaneous q12h    insulin lispro  0-5 Units subcutaneous TID with meals    magnesium oxide  400 mg oral Daily    pantoprazole  40 mg oral Daily before breakfast    rosuvastatin  20 mg oral Nightly     PRN medications   Medication    acetaminophen    dextrose    dextrose    glucagon    glucagon     Continuous Medications   Medication Dose Last Rate     Outpatient Medications:  Current Outpatient Medications   Medication Instructions    acetaminophen (Tylenol) 325 mg tablet 2 tablets, oral, Every 6 hours PRN    BD Ultra-Fine Joan Pen Needle 32 gauge x 5/32\" needle     blood sugar diagnostic (Ritchie Blood Glucose Test Strip) strip 1 strip    calcitriol (ROCALTROL) 0.25 mcg, oral, Daily RT    calcium carbonate (TUMS) 500 mg, oral, 3 times daily, With meals    carboxymethylcellulose (Refresh Plus) 0.5 % ophthalmic solution Instill 1 drop into both eyes 2-4x/day as needed for dryness.    carvedilol (COREG) 3.125 mg, oral, 2 times daily    desonide (DesOwen) 0.05 % cream Desonide 0.05 % External Cream   Refills: 0       Active    doxazosin (Cardura) 8 mg tablet 1 tablet, oral, Nightly    ferrous gluconate 236 mg (27 mg iron) tablet 1 tablet, oral, Daily    FreeStyle Mick 2 Sensor kit     FreeStyle Mick sensor system (FreeStyle Mick 2 Sensor) kit 1 DEVICE EVERY 14 DAYS.    gabapentin (NEURONTIN) 200 mg, oral, Nightly    guanFACINE (TENEX) 2 mg, oral, Nightly    hydrALAZINE (APRESOLINE) 100 mg, oral, 3 times " "daily    insulin detemir (LEVEMIR FLEXTOUCH) 30 Units, subcutaneous, 2 times daily    INSULIN LISPRO SUBQ BS:150-200: 4 units 201-250: 6 units 251-300: 8 units 301-350: 12 units 351-400: 14 units Blood sugar greater than 400: 16u    lidocaine (Lidoderm) 5 % patch 1 patch, transdermal, Daily PRN    magnesium oxide (Mag-Ox) 400 mg tablet 2 tablets, oral, 2 times daily    omeprazole (PRILOSEC) 20 mg, oral, Daily RT    OneTouch Ultra Test strip OneTouch Ultra In Vitro Strip   Quantity: 300  Refills: 0        Start : 6-Mar-2020   Active    pen needle, diabetic 32 gauge x 5/32\" needle 1 EACH 6 TIMES DAILY.    rosuvastatin (Crestor) 20 mg tablet 1 tablet, oral, Daily    rosuvastatin (CRESTOR) 20 mg, oral, Daily RT    sodium zirconium cyclosilicate (LOKELMA) 15 g, oral, Daily RT    torsemide (DEMADEX) 100 mg, oral, Daily RT    zinc sulfate (ZINCATE) 220 mg, oral, Daily RT       Physical Exam:  Gen: awake and alert, AAOx3  HEENT: normocephalic.  CV: RRR, CHB on bedside monitor, ventricular rate in 60s. There is elevated JVD.   Pulm: clear to auscultation bilaterally. No coarse lung sounds  Abd: soft, non-distended. Normal active bowel sounds  Ext: warm and well-perfused. +1 bilateral CHARI  Psych: appropriate affect  Neuro: grossly intact sensation and motor functions.          Assessment/Plan   Temo Hanson is a 73 y.o. male with PMH of DM, ESRD s/p fistula creation (not on dialysis) who p/w CHB and moderate effusion with c/f tamponade. EP was consulted for PPM placement.     #AVB, third degree (CHB): appears to be present on previous EKG (at least since Jan 2023) and pt reports outpatient plans for PPM. He was sent to the ED from clinic. Currently, in a regular escape rhythm with ventricular rates in 60s (similar to previous EKGs prior to admission). EF 60-65% on 4/18/24.   #Pericardial effusion: with c/f tamponade. Plan for pericardiocentesis today.     Recommendations:   - Pt has an indication for PPM, however, pt is in " tamponade and planned for pericardiocentesis today.   - Will follow along, will likely place DC PPM once more stable and close to discharge.   - Recommend to keep in ICU for closer monitoring.   - Keep K>4 and Mg >2  - Check TSH.     Thank you for this consultation.   EP consult pager: [phone] (weekday 7AM-6PM, weekend 7AM-2PM, other times: [phone])       Peripheral IV 04/17/24 20 G Right Antecubital (Active)   Site Assessment Clean;Dry;Intact 04/18/24 0840   Dressing Status Clean;Dry;Occlusive 04/18/24 0840   Number of days: 1       Peripheral IV 04/18/24 20 G Right;Ventral Forearm (Active)   Site Assessment Clean;Dry;Intact 04/18/24 0840   Dressing Status Clean;Dry;Occlusive 04/18/24 0840   Number of days: 0       Code Status:  Full Code    I spent 30 minutes in the professional and overall care of this patient.        Zulema Montenegro MD

## 2024-04-18 NOTE — PROGRESS NOTES
Pharmacy Medication History Review    Temo Hanson is a 73 y.o. male admitted for Third degree heart block (Multi). Pharmacy reviewed the patient's awran-bq-qymeoxudd medications and allergies for accuracy.    The list below reflects the updated PTA list. Comments regarding how patient may be taking medications differently can be found in the Admit Orders Activity  Prior to Admission Medications   Prescriptions Informant   INSULIN LISPRO SUBQ Self   Sig: BS:150-200: 4 units 201-250: 6 units 251-300: 8 units 301-350: 12 units 351-400: 14 units Blood sugar greater than 400: 16u   acetaminophen (Tylenol) 325 mg tablet Self   Sig: Take 2 tablets (650 mg) by mouth every 6 hours if needed.   calcitriol (Rocaltrol) 0.25 mcg capsule Self   Sig: Take 1 capsule (0.25 mcg) by mouth once daily.   calcium carbonate (Tums) 200 mg calcium chewable tablet Self   Sig: Chew 1 tablet (500 mg) 3 times a day. With meals   carboxymethylcellulose (Refresh Plus) 0.5 % ophthalmic solution Self   Sig: Instill 1 drop into both eyes 2-4x/day as needed for dryness.   carvedilol (Coreg) 3.125 mg tablet    Sig: Take 1 tablet (3.125 mg) by mouth twice a day.   desonide (DesOwen) 0.05 % cream Self   Sig: Desonide 0.05 % External Cream   Refills: 0       Active   doxazosin (Cardura) 8 mg tablet    Sig: Take 1 tablet (8 mg) by mouth once daily at bedtime.   ferrous gluconate 236 mg (27 mg iron) tablet Self   Sig: Take 1 tablet (27 mg) by mouth once daily.   gabapentin (Neurontin) 100 mg capsule    Sig: Take 2 capsules (200 mg) by mouth once daily at bedtime.   guanFACINE (Tenex) 2 mg tablet Self   Sig: Take 1 tablet (2 mg) by mouth once daily at bedtime.   hydrALAZINE (Apresoline) 100 mg tablet Self   Sig: Take 1 tablet (100 mg) by mouth 3 times a day.   insulin detemir (Levemir Flextouch) 100 unit/mL (3 mL) pen Self   Sig: Inject 30 Units under the skin twice a day.   lidocaine (Lidoderm) 5 % patch Self   Sig: Place 1 patch on the skin once daily  as needed (knee pain).   magnesium oxide (Mag-Ox) 400 mg tablet Self   Sig: Take 2 tablets (800 mg) by mouth 2 times a day.   omeprazole (PriLOSEC) 20 mg DR capsule Self   Sig: Take 1 capsule (20 mg) by mouth once daily.   rosuvastatin (Crestor) 20 mg tablet Self   Sig: Take 1 tablet (20 mg) by mouth once daily.   sodium zirconium cyclosilicate (Lokelma) 5 gram packet Self   Sig: Take 15 g by mouth once daily.   torsemide (Demadex) 100 mg tablet Self   Sig: Take 1 tablet (100 mg) in the morning and half a tablet (50 mg) in the evening   zinc sulfate (Zincate) 220 (50 Zn) MG capsule Self   Sig: Take 1 capsule (220 mg) by mouth once daily.      Facility-Administered Medications Last Administration Doses Remaining   perflutren lipid microspheres (Definity) injection 1-10 mL of dilution None recorded 1           The list below reflects the updated allergy list. Please review each documented allergy for additional clarification and justification.  Allergies  Reviewed by Lainey Ibarra PharmD on 4/18/2024        Severity Reactions Comments    Terazosin Not Specified Unknown Did not feel well on this medication    Codeine Low Itching itching    Tramadol Low Itching             Patient accepts M2B at discharge. Pharmacy has been updated to [facility] pharmacy.    Sources used: Pharmacy dispense history, OARRs, patient interview (great historian-knew medication name, frequency and some doses), and [facility] note    Below are additional concerns with the patient's PTA list.  -pt states that he gets iron injections when needed and also gets monthly injections of aranesp (300 mcg)  -pt does not take a meal time insuline and will sometimes long acting (levemir) using 10-15 units each time    Lainey Ibarra PharmD, [facility]  Transitions of Care Pharmacist  [facility]  Please reach out via Secure Chat for questions, or if no response call Enefgy or MoneyExpert

## 2024-04-19 ENCOUNTER — APPOINTMENT (OUTPATIENT)
Dept: DIALYSIS | Facility: HOSPITAL | Age: 74
End: 2024-04-19
Payer: COMMERCIAL

## 2024-04-19 LAB
ALBUMIN SERPL BCP-MCNC: 2.7 G/DL (ref 3.4–5)
ALP SERPL-CCNC: 130 U/L (ref 33–136)
ALT SERPL W P-5'-P-CCNC: 10 U/L (ref 10–52)
ANION GAP SERPL CALC-SCNC: 24 MMOL/L (ref 10–20)
AST SERPL W P-5'-P-CCNC: 11 U/L (ref 9–39)
ATRIAL RATE: 93 BPM
BASOPHILS # BLD AUTO: 0.02 X10*3/UL (ref 0–0.1)
BASOPHILS NFR BLD AUTO: 0.3 %
BILIRUB SERPL-MCNC: 0.4 MG/DL (ref 0–1.2)
BUN SERPL-MCNC: 134 MG/DL (ref 6–23)
CALCIUM SERPL-MCNC: 8.4 MG/DL (ref 8.6–10.6)
CHLORIDE SERPL-SCNC: 94 MMOL/L (ref 98–107)
CO2 SERPL-SCNC: 21 MMOL/L (ref 21–32)
CREAT SERPL-MCNC: 14.88 MG/DL (ref 0.5–1.3)
EGFRCR SERPLBLD CKD-EPI 2021: 3 ML/MIN/1.73M*2
EOSINOPHIL # BLD AUTO: 0.02 X10*3/UL (ref 0–0.4)
EOSINOPHIL NFR BLD AUTO: 0.3 %
ERYTHROCYTE [DISTWIDTH] IN BLOOD BY AUTOMATED COUNT: 15.6 % (ref 11.5–14.5)
GLUCOSE BLD MANUAL STRIP-MCNC: 123 MG/DL (ref 74–99)
GLUCOSE BLD MANUAL STRIP-MCNC: 124 MG/DL (ref 74–99)
GLUCOSE BLD MANUAL STRIP-MCNC: 145 MG/DL (ref 74–99)
GLUCOSE BLD MANUAL STRIP-MCNC: 93 MG/DL (ref 74–99)
GLUCOSE BLD MANUAL STRIP-MCNC: 96 MG/DL (ref 74–99)
GLUCOSE SERPL-MCNC: 98 MG/DL (ref 74–99)
HCT VFR BLD AUTO: 21.4 % (ref 41–52)
HGB BLD-MCNC: 7.5 G/DL (ref 13.5–17.5)
IMM GRANULOCYTES # BLD AUTO: 0.02 X10*3/UL (ref 0–0.5)
IMM GRANULOCYTES NFR BLD AUTO: 0.3 % (ref 0–0.9)
LYMPHOCYTES # BLD AUTO: 0.4 X10*3/UL (ref 0.8–3)
LYMPHOCYTES NFR BLD AUTO: 6.4 %
MAGNESIUM SERPL-MCNC: 2.18 MG/DL (ref 1.6–2.4)
MCH RBC QN AUTO: 29.3 PG (ref 26–34)
MCHC RBC AUTO-ENTMCNC: 35 G/DL (ref 32–36)
MCV RBC AUTO: 84 FL (ref 80–100)
MONOCYTES # BLD AUTO: 0.77 X10*3/UL (ref 0.05–0.8)
MONOCYTES NFR BLD AUTO: 12.3 %
NEUTROPHILS # BLD AUTO: 5.01 X10*3/UL (ref 1.6–5.5)
NEUTROPHILS NFR BLD AUTO: 80.4 %
NRBC BLD-RTO: 0 /100 WBCS (ref 0–0)
PLATELET # BLD AUTO: 153 X10*3/UL (ref 150–450)
POTASSIUM FLD-SCNC: 5.1 MMOL/L
POTASSIUM SERPL-SCNC: 5.2 MMOL/L (ref 3.5–5.3)
PROT SERPL-MCNC: 6 G/DL (ref 6.4–8.2)
Q ONSET: 185 MS
QRS COUNT: 11 BEATS
QRS DURATION: 150 MS
QT INTERVAL: 482 MS
QTC CALCULATION(BAZETT): 520 MS
QTC FREDERICIA: 507 MS
R AXIS: 0 DEGREES
RBC # BLD AUTO: 2.56 X10*6/UL (ref 4.5–5.9)
SODIUM SERPL-SCNC: 134 MMOL/L (ref 136–145)
T AXIS: 35 DEGREES
T OFFSET: 426 MS
TSH SERPL-ACNC: 1.24 MIU/L (ref 0.44–3.98)
VENTRICULAR RATE: 70 BPM
WBC # BLD AUTO: 6.2 X10*3/UL (ref 4.4–11.3)

## 2024-04-19 PROCEDURE — 5A1D70Z PERFORMANCE OF URINARY FILTRATION, INTERMITTENT, LESS THAN 6 HOURS PER DAY: ICD-10-PCS | Performed by: INTERNAL MEDICINE

## 2024-04-19 PROCEDURE — 97535 SELF CARE MNGMENT TRAINING: CPT | Mod: GO

## 2024-04-19 PROCEDURE — 97165 OT EVAL LOW COMPLEX 30 MIN: CPT | Mod: GO

## 2024-04-19 PROCEDURE — 1200000002 HC GENERAL ROOM WITH TELEMETRY DAILY

## 2024-04-19 PROCEDURE — 85025 COMPLETE CBC W/AUTO DIFF WBC: CPT

## 2024-04-19 PROCEDURE — 80053 COMPREHEN METABOLIC PANEL: CPT

## 2024-04-19 PROCEDURE — 2500000006 HC RX 250 W HCPCS SELF ADMINISTERED DRUGS (ALT 637 FOR ALL PAYERS): Performed by: STUDENT IN AN ORGANIZED HEALTH CARE EDUCATION/TRAINING PROGRAM

## 2024-04-19 PROCEDURE — 2500000001 HC RX 250 WO HCPCS SELF ADMINISTERED DRUGS (ALT 637 FOR MEDICARE OP): Performed by: STUDENT IN AN ORGANIZED HEALTH CARE EDUCATION/TRAINING PROGRAM

## 2024-04-19 PROCEDURE — 2500000006 HC RX 250 W HCPCS SELF ADMINISTERED DRUGS (ALT 637 FOR ALL PAYERS): Mod: MUE | Performed by: STUDENT IN AN ORGANIZED HEALTH CARE EDUCATION/TRAINING PROGRAM

## 2024-04-19 PROCEDURE — 83735 ASSAY OF MAGNESIUM: CPT

## 2024-04-19 PROCEDURE — 82947 ASSAY GLUCOSE BLOOD QUANT: CPT | Mod: 91

## 2024-04-19 PROCEDURE — 8010000001 HC DIALYSIS - HEMODIALYSIS PER DAY

## 2024-04-19 PROCEDURE — 90935 HEMODIALYSIS ONE EVALUATION: CPT | Performed by: INTERNAL MEDICINE

## 2024-04-19 PROCEDURE — 97162 PT EVAL MOD COMPLEX 30 MIN: CPT | Mod: GP

## 2024-04-19 PROCEDURE — 2500000004 HC RX 250 GENERAL PHARMACY W/ HCPCS (ALT 636 FOR OP/ED): Performed by: STUDENT IN AN ORGANIZED HEALTH CARE EDUCATION/TRAINING PROGRAM

## 2024-04-19 PROCEDURE — 2500000001 HC RX 250 WO HCPCS SELF ADMINISTERED DRUGS (ALT 637 FOR MEDICARE OP)

## 2024-04-19 PROCEDURE — 97530 THERAPEUTIC ACTIVITIES: CPT | Mod: GP

## 2024-04-19 PROCEDURE — 84443 ASSAY THYROID STIM HORMONE: CPT

## 2024-04-19 PROCEDURE — 99222 1ST HOSP IP/OBS MODERATE 55: CPT | Performed by: INTERNAL MEDICINE

## 2024-04-19 PROCEDURE — 99233 SBSQ HOSP IP/OBS HIGH 50: CPT

## 2024-04-19 PROCEDURE — 36415 COLL VENOUS BLD VENIPUNCTURE: CPT

## 2024-04-19 PROCEDURE — 2500000002 HC RX 250 W HCPCS SELF ADMINISTERED DRUGS (ALT 637 FOR MEDICARE OP, ALT 636 FOR OP/ED): Performed by: STUDENT IN AN ORGANIZED HEALTH CARE EDUCATION/TRAINING PROGRAM

## 2024-04-19 RX ORDER — AMLODIPINE BESYLATE 5 MG/1
5 TABLET ORAL DAILY
Status: COMPLETED | OUTPATIENT
Start: 2024-04-19 | End: 2024-04-19

## 2024-04-19 RX ORDER — ACETAMINOPHEN 325 MG/1
975 TABLET ORAL EVERY 6 HOURS PRN
Status: DISCONTINUED | OUTPATIENT
Start: 2024-04-19 | End: 2024-04-24 | Stop reason: HOSPADM

## 2024-04-19 RX ORDER — HYDRALAZINE HYDROCHLORIDE 50 MG/1
50 TABLET, FILM COATED ORAL 3 TIMES DAILY
Status: DISCONTINUED | OUTPATIENT
Start: 2024-04-19 | End: 2024-04-20

## 2024-04-19 RX ORDER — AMLODIPINE BESYLATE 10 MG/1
10 TABLET ORAL DAILY
Status: DISCONTINUED | OUTPATIENT
Start: 2024-04-20 | End: 2024-04-24 | Stop reason: HOSPADM

## 2024-04-19 RX ORDER — OXYCODONE HYDROCHLORIDE 5 MG/1
2.5 TABLET ORAL ONCE
Status: COMPLETED | OUTPATIENT
Start: 2024-04-19 | End: 2024-04-19

## 2024-04-19 RX ORDER — OXYCODONE HYDROCHLORIDE 5 MG/1
5 TABLET ORAL ONCE
Status: COMPLETED | OUTPATIENT
Start: 2024-04-19 | End: 2024-04-19

## 2024-04-19 RX ADMIN — ROSUVASTATIN CALCIUM 20 MG: 20 TABLET, FILM COATED ORAL at 20:55

## 2024-04-19 RX ADMIN — HEPARIN SODIUM 5000 UNITS: 5000 INJECTION INTRAVENOUS; SUBCUTANEOUS at 20:46

## 2024-04-19 RX ADMIN — HYDRALAZINE HYDROCHLORIDE 50 MG: 50 TABLET ORAL at 14:11

## 2024-04-19 RX ADMIN — PANTOPRAZOLE SODIUM 40 MG: 40 TABLET, DELAYED RELEASE ORAL at 08:28

## 2024-04-19 RX ADMIN — HEPARIN SODIUM 5000 UNITS: 5000 INJECTION INTRAVENOUS; SUBCUTANEOUS at 08:28

## 2024-04-19 RX ADMIN — ACETAMINOPHEN 975 MG: 325 TABLET ORAL at 08:41

## 2024-04-19 RX ADMIN — INSULIN DETEMIR 5 UNITS: 100 INJECTION, SOLUTION SUBCUTANEOUS at 08:41

## 2024-04-19 RX ADMIN — AMLODIPINE BESYLATE 5 MG: 5 TABLET ORAL at 08:28

## 2024-04-19 RX ADMIN — GABAPENTIN 300 MG: 300 CAPSULE ORAL at 13:22

## 2024-04-19 RX ADMIN — AMLODIPINE BESYLATE 5 MG: 5 TABLET ORAL at 10:15

## 2024-04-19 RX ADMIN — HYDRALAZINE HYDROCHLORIDE 50 MG: 50 TABLET ORAL at 10:21

## 2024-04-19 RX ADMIN — OXYCODONE HYDROCHLORIDE 2.5 MG: 5 TABLET ORAL at 06:16

## 2024-04-19 RX ADMIN — HEPARIN SODIUM 5000 UNITS: 5000 INJECTION INTRAVENOUS; SUBCUTANEOUS at 13:22

## 2024-04-19 RX ADMIN — OXYCODONE HYDROCHLORIDE 5 MG: 5 TABLET ORAL at 22:35

## 2024-04-19 RX ADMIN — Medication 400 MG: at 08:28

## 2024-04-19 RX ADMIN — HYDRALAZINE HYDROCHLORIDE 50 MG: 50 TABLET ORAL at 20:55

## 2024-04-19 RX ADMIN — ACETAMINOPHEN 975 MG: 325 TABLET ORAL at 20:55

## 2024-04-19 ASSESSMENT — COGNITIVE AND FUNCTIONAL STATUS - GENERAL
DAILY ACTIVITIY SCORE: 21
WALKING IN HOSPITAL ROOM: A LITTLE
STANDING UP FROM CHAIR USING ARMS: A LITTLE
MOBILITY SCORE: 18
MOVING FROM LYING ON BACK TO SITTING ON SIDE OF FLAT BED WITH BEDRAILS: A LITTLE
TURNING FROM BACK TO SIDE WHILE IN FLAT BAD: A LITTLE
DRESSING REGULAR LOWER BODY CLOTHING: A LITTLE
CLIMB 3 TO 5 STEPS WITH RAILING: A LITTLE
DAILY ACTIVITIY SCORE: 21
STANDING UP FROM CHAIR USING ARMS: A LITTLE
MOVING TO AND FROM BED TO CHAIR: A LITTLE
MOVING TO AND FROM BED TO CHAIR: A LITTLE
DRESSING REGULAR LOWER BODY CLOTHING: A LITTLE
HELP NEEDED FOR BATHING: A LITTLE
CLIMB 3 TO 5 STEPS WITH RAILING: A LITTLE
HELP NEEDED FOR BATHING: A LITTLE
TOILETING: A LITTLE
TOILETING: A LITTLE
TURNING FROM BACK TO SIDE WHILE IN FLAT BAD: A LITTLE
MOVING FROM LYING ON BACK TO SITTING ON SIDE OF FLAT BED WITH BEDRAILS: A LITTLE
MOBILITY SCORE: 18
WALKING IN HOSPITAL ROOM: A LITTLE

## 2024-04-19 ASSESSMENT — PAIN SCALES - GENERAL
PAINLEVEL_OUTOF10: 9
PAINLEVEL_OUTOF10: 0 - NO PAIN
PAINLEVEL_OUTOF10: 0 - NO PAIN
PAINLEVEL_OUTOF10: 7
PAINLEVEL_OUTOF10: 0 - NO PAIN
PAINLEVEL_OUTOF10: 8
PAINLEVEL_OUTOF10: 0 - NO PAIN
PAINLEVEL_OUTOF10: 0 - NO PAIN
PAINLEVEL_OUTOF10: 6
PAINLEVEL_OUTOF10: 6

## 2024-04-19 ASSESSMENT — PAIN - FUNCTIONAL ASSESSMENT
PAIN_FUNCTIONAL_ASSESSMENT: NO/DENIES PAIN
PAIN_FUNCTIONAL_ASSESSMENT: NO/DENIES PAIN
PAIN_FUNCTIONAL_ASSESSMENT: 0-10

## 2024-04-19 ASSESSMENT — ACTIVITIES OF DAILY LIVING (ADL)
BATHING_ASSISTANCE: STAND BY
HOME_MANAGEMENT_TIME_ENTRY: 20
ADL_ASSISTANCE: INDEPENDENT
ADL_ASSISTANCE: INDEPENDENT

## 2024-04-19 ASSESSMENT — PAIN DESCRIPTION - LOCATION
LOCATION: ABDOMEN
LOCATION: ABDOMEN
LOCATION: CHEST

## 2024-04-19 NOTE — CARE PLAN
Problem: Skin  Goal: Decreased wound size/increased tissue granulation at next dressing change  Outcome: Progressing  Flowsheets (Taken 4/19/2024 1143)  Decreased wound size/increased tissue granulation at next dressing change:   Promote sleep for wound healing   Utilize specialty bed per algorithm   Protective dressings over bony prominences  Goal: Participates in plan/prevention/treatment measures  Outcome: Progressing  Flowsheets (Taken 4/19/2024 1143)  Participates in plan/prevention/treatment measures:   Increase activity/out of bed for meals   Elevate heels   Discuss with provider PT/OT consult  Goal: Prevent/manage excess moisture  Outcome: Progressing  Flowsheets (Taken 4/19/2024 1143)  Prevent/manage excess moisture:   Cleanse incontinence/protect with barrier cream   Moisturize dry skin   Monitor for/manage infection if present   Follow provider orders for dressing changes  Goal: Prevent/minimize sheer/friction injuries  Outcome: Progressing  Flowsheets (Taken 4/19/2024 1143)  Prevent/minimize sheer/friction injuries:   Complete micro-shifts as needed if patient unable. Adjust patient position to relieve pressure points, not a full turn   HOB 30 degrees or less   Increase activity/out of bed for meals   Turn/reposition every 2 hours/use positioning/transfer devices   Use pull sheet  Goal: Promote/optimize nutrition  Outcome: Progressing  Flowsheets (Taken 4/19/2024 1143)  Promote/optimize nutrition:   Assist with feeding   Offer water/supplements/favorite foods   Monitor/record intake including meals   Consume > 50% meals/supplements  Goal: Promote skin healing  Outcome: Progressing  Flowsheets (Taken 4/19/2024 1143)  Promote skin healing:   Assess skin/pad under line(s)/device(s)   Protective dressings over bony prominences   Rotate device position/do not position patient on device   Turn/reposition every 2 hours/use positioning/transfer devices     Problem: Pain - Adult  Goal: Verbalizes/displays  adequate comfort level or baseline comfort level  Outcome: Progressing  Flowsheets (Taken 4/19/2024 1143)  Verbalizes/displays adequate comfort level or baseline comfort level:   Encourage patient to monitor pain and request assistance   Assess pain using appropriate pain scale   Implement non-pharmacological measures as appropriate and evaluate response   Administer analgesics based on type and severity of pain and evaluate response     Problem: Safety - Adult  Goal: Free from fall injury  Outcome: Progressing  Flowsheets (Taken 4/19/2024 1143)  Free from fall injury:   Instruct family/caregiver on patient safety   Based on caregiver fall risk screen, instruct family/caregiver to ask for assistance with transferring infant if caregiver noted to have fall risk factors     Problem: Chronic Conditions and Co-morbidities  Goal: Patient's chronic conditions and co-morbidity symptoms are monitored and maintained or improved  Outcome: Progressing  Flowsheets (Taken 4/19/2024 1143)  Care Plan - Patient's Chronic Conditions and Co-Morbidity Symptoms are Monitored and Maintained or Improved:   Update acute care plan with appropriate goals if chronic or comorbid symptoms are exacerbated and prevent overall improvement and discharge   Collaborate with multidisciplinary team to address chronic and comorbid conditions and prevent exacerbation or deterioration   Monitor and assess patient's chronic conditions and comorbid symptoms for stability, deterioration, or improvement    The patient's goals for the shift include  remain free of pain.     The clinical goals for the shift include Patient will remain safe and free of injury during this shift.

## 2024-04-19 NOTE — CARE PLAN
S/p Pericardial drainage 4/18 with 0.7L fluid removed. Still has pericardial drain. Pericardial fluid cytology pending.  Pt to be transferred from CICU to floor.    Had discussion with patient about possible need to start dialysis. He endorses poor appetite for some time, weight loss, and nausea. His Nephrologist Dr. Pemberton has been trying to encourage pt to start dialysis. Pt agreeable to start dialysis today; obtained consent (in Epic media). Will begin slow start HD protocol with HD on 4/19, 4/20, 4/22. He already has LUE AVG that has been cleared for use. D/w pt that he will now be ESRD on HD schedule to continue after discharge. Can continue outpt kidney transplant evaluation process.    D/w Dr. Erin Navarro MD  Nephrology Fellow PGY 5  Contact via secure chat  Nephrology Pager # [phone] ([phone])

## 2024-04-19 NOTE — PROGRESS NOTES
Occupational Therapy    Evaluation and Treatment    Patient Name: Temo Hanson  MRN: [id number]  Today's Date: 4/19/2024  Room: 18/18  Time Calculation  Start Time: 1118  Stop Time: 1153  Time Calculation (min): 35 min    Assessment  IP OT Assessment  OT Assessment: Overall decreased strength and endurance limiting full Indep with occupational performance  Prognosis: Good  Barriers to Discharge: None  Evaluation/Treatment Tolerance: Patient tolerated treatment well  Medical Staff Made Aware: Yes  End of Session Communication: Bedside nurse  End of Session Patient Position: Bed, 3 rail up, Alarm off, not on at start of session  Plan:  Treatment Interventions: ADL retraining, Functional transfer training, Endurance training, Neuromuscular reeducation  OT Frequency: 2 times per week  OT Discharge Recommendations: Low intensity level of continued care  Equipment Recommended upon Discharge:  (None)  OT Recommended Transfer Status: Minimal assist  OT - OK to Discharge: Yes    Subjective   Current Problem:  1. Third degree heart block (Multi)  Transthoracic Echo (TTE) Limited    Transthoracic Echo (TTE) Limited      2. Pericardial effusion (HHS-HCC)  Transthoracic Echo (TTE) Limited    Transthoracic Echo (TTE) Limited    Case Request Cath Lab: Pericardiocentesis    Case Request Cath Lab: Pericardiocentesis    Cardiac Catheterization Procedure    Cardiac Catheterization Procedure      3. Microcytic anemia        4. CKD (chronic kidney disease) stage 5, GFR less than 15 ml/min (Multi)        5. Uremia        6. Pericardial effusion with cardiac tamponade (HHS-HCC)  Transthoracic Echo (TTE) Limited    Transthoracic Echo (TTE) Limited      7. Pre-transplant evaluation for kidney transplant  MR cardiac w and wo IV contrast w regadenoson stress for MORPH FUNCT and valve DZ    MR cardiac w and wo IV contrast w regadenoson stress for MORPH FUNCT and valve DZ      8. 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 Now s/p pericardiocentesis.  Past Medical History Relevant to Rehab: DM, ESRD s/p fistula creation (not on dialysis)  Co-Treatment: PT  Co-Treatment Reason: Pt maximize pt safety with intent to mobilize  Prior to Session Communication: Bedside nurse  Patient Position Received: Bed, 3 rail up, Alarm off, not on at start of session  General Comment: Pt is pleasant and coopertive. May lack some insight to condition and needs cues for rest breaks. (Pericardial drain present)   Precautions:  Medical Precautions: Cardiac precautions, Fall precautions  Vital Signs:  Heart Rate: 74 (80)  Resp: 21 (26)  SpO2: 90 % (92)  BP: (!) 159/36 (184/52)  MAP (mmHg): 72 (97)  BP Location: Right leg (Forearm)  BP Method: Automatic  Pain:  Pain Assessment  Pain Score:  (Admits to discomfort from drain site. RN aware.)  Lines/Tubes/Drains:  Hemodialysis Arteriovenous Graft Left Upper arm (Active)   Number of days:        Closed/Suction Drain Medial Pericardial (Active)   Number of days: 0         Objective   Cognition:  Overall Cognitive Status: Within Functional Limits  Orientation Level: Oriented X4  Following Commands: Follows one step commands with increased time  Insight: Mild (Needs cues and reminders for pacing with task completion and to rest before becoming too tired.)     Confusion Assessment Method (CAM)  Acute Onset and Fluctuating Course (1A): No  [place] Agitation Sedation Scale  [place] Agitation Sedation Scale (RASS): Alert and calm  Home Living:  Type of Home: House  Lives With: Spouse  Home Adaptive Equipment: Cane  Home Layout: Bed/bath upstairs  Bathroom Shower/Tub: Tub/shower unit  Bathroom Equipment: Grab bars in shower, Built-in shower seat   Prior Function:  Level of [place]: Independent with ADLs and functional transfers, Independent with homemaking with  ambulation  ADL Assistance: Independent  Homemaking Assistance: Independent  Ambulatory Assistance:  (Occasional SC use)  Vocational: Retired  Leisure: Gardening  IADL History:     ADL:  Grooming Assistance: Stand by  Grooming Deficit: Setup, Supervision/safety  Bathing Assistance: Stand by  UE Dressing Assistance: Stand by  LE Dressing Assistance: Minimal  Toileting Assistance with Device: Minimal  Activity Tolerance:  Endurance: Decreased tolerance for upright activites  Early Mobility/Exercise Safety Screen: Proceed with mobilization - No exclusion criteria met  Balance:  Static Sitting Balance  Static Sitting-Level of Assistance: Close supervision  Static Standing Balance  Static Standing-Level of Assistance: Contact guard  Bed Mobility/Transfers: Bed Mobility/Transfers: Bed Mobility  Bed Mobility: Yes  Bed Mobility 1  Bed Mobility 1: Supine to sitting  Level of Assistance 1: Close supervision  Functional Mobility  Functional Mobility Performed: Yes  Functional Mobility 1  Device 1: No device  Assistance 1: Minimum assistance, Hand held assistance  Comments 1: Pt ambulated through room and hallway approx 25'. Mild unsteadiness at times.   and Transfers  Transfer: Yes  Transfer 1  Transfer From 1: Sit to  Transfer to 1: Stand, Bed  Technique 1: Sit to stand, Stand to sit  Transfer Level of Assistance 1: Minimum assistance  Transfers 2  Transfer From 2: Sit to  Transfer to 2: Stand, Toilet  Technique 2: Sit to stand, Stand to sit  Transfer Level of Assistance 2: Minimum assistance  IADL's:      Vision:     and Vision - Complex Assessment  Ocular Range of Motion: Within Functional Limits  Sensation:  Light Touch: No apparent deficits  Strength:  Strength Comments: BUE strength WFL  Perception:  Inattention/Neglect: Appears intact  Coordination:      Hand Function:  Hand Function  Gross Grasp: Functional  Coordination: Functional        Outcome Measures: [facility] Daily Activity  Putting on and taking off regular lower  body clothing: A little  Bathing (including washing, rinsing, drying): A little  Putting on and taking off regular upper body clothing: None  Toileting, which includes using toilet, bedpan or urinal: A little  Taking care of personal grooming such as brushing teeth: None  Eating Meals: None  Daily Activity - Total Score: 21    Confusion Assessment Method-ICU (CAM-ICU)  Feature 3: Altered Level of Consciousness: Negative   ICU Mobility Screen  Early Mobility/Exercise Safety Screen: Proceed with mobilization - No exclusion criteria met,   [place] Agitation Sedation Scale  [place] Agitation Sedation Scale (RASS): Alert and calm      Education Documentation  Body Mechanics, taught by Shira Ferris OT at 4/19/2024  3:24 PM.  Learner: Patient  Readiness: Acceptance  Method: Explanation  Response: Verbalizes Understanding    ADL Training, taught by Shira Ferris OT at 4/19/2024  3:24 PM.  Learner: Patient  Readiness: Acceptance  Method: Explanation  Response: Verbalizes Understanding    Education Comments  No comments found.        Goals:   Encounter Problems       Encounter Problems (Active)       ADLs       Patient will complete lower body dressing with MOD I for donning and doffing all LE clothes in order to increase Indep with task participation.        Start:  04/19/24    Expected End:  05/03/24            Patient will complete toileting, including clothing management and hygiene, with MOD I in order to maximize functional Indep with task completion.        Start:  04/19/24    Expected End:  05/03/24               COGNITION/SAFETY       Pt will identify and incorporate 3 EC/WS strategies into daily routines for increased safety and Indep.        Start:  04/19/24    Expected End:  05/02/24               EXERCISE/STRENGTHENING       Pt will increase endurance to tolerate 15-20min of activity with no more than 1 rest break in order to increase ability to engage in ADL completion.        Start:  04/19/24     Expected End:  05/03/24               MOBILITY       Pt will demo increased functional mobility to tolerate tasks necessary to complete ADL routine with MOD I.       Start:  04/19/24    Expected End:  05/03/24                   Treatment Completed on Evaluation  Cognitive Skill Development:       Activities of Daily Living:    Grooming  Grooming Comments: CGA and set-up for oral hygiene while standing at sink           LE Dressing  LE Dressing: Yes  LE Dressing Comments: MIN A to don socks and pants  Toileting  Toileting Comments: MIN A for clothing management and april care after toileting           04/19/24 at 3:26 PM   Shira Ferris, OT   Rehab Office: [phone]

## 2024-04-19 NOTE — POST-PROCEDURE NOTE
Physician Transition of Care Summary  Invasive Cardiovascular Lab    Procedure Date: 4/19/2024  Attending:    * Jose Brunson - Primary  Resident/Fellow/Other Assistant: Surgeons and Role:     * Сергей Montilla MD - Fellow     * Kamini Montilla MD - Fellow    Indications:   Pre-op Diagnosis     * Pericardial effusion (HHS-HCC) [I31.39]    Post-procedure diagnosis:   Post-op Diagnosis     * Pericardial effusion (HHS-HCC) [I31.39]    Procedure(s):   Pericardiocentesis  38109 - MN PERICARDIOCENTESIS W/IMG GUIDANCE WHEN PERFORMED        Procedure Findings:   Pericardiocentesis with removal of 700 cs of dark red fluid through sub xiphoid aproach  Drain left in situ      Complications:   None    Stents/Implants:   Implants       No implant documentation for this case.            Anticoagulation/Antiplatelet Plan:   NA    Estimated Blood Loss:   5 mL    Anesthesia: Moderate Sedation Anesthesia Staff: No anesthesia staff entered.    Any Specimen(s) Removed:   Order Name Source Comment Collection Info Order Time   STERILE FLUID CULTURE/SMEAR Pericardial   4/18/2024  4:48 PM     Release result to MyChart   Immediate        ALBUMIN, FLUID Pericardial Cavity   4/18/2024  4:48 PM     Release result to MyChart   Immediate        LACTATE DEHYDROGENASE, FLUID Pericardial Cavity   4/18/2024  4:48 PM     Release result to MyChart   Immediate        PROTEIN, TOTAL FLUID Pericardial Cavity   4/18/2024  4:48 PM     Release result to MyChart   Immediate        BILIRUBIN, TOTAL FLUID Pericardial Cavity   4/18/2024  4:48 PM     Release result to MyChart   Immediate        CYTOLOGY CONSULTATION (NON-GYNECOLOGIC) PERICARDIAL FLUID   4/18/2024  4:48 PM     How many specimens will you need? (If more than 10 start a new case)   1          Source of Specimen A:   PERICARDIAL FLUID          Fixative Specimen A:   Other          What fixative is being used?:   PEROCARDIAL FLUID          Clinical History/Special Request:   PERICARDIAL EFFUSION           Release result to [facility] Advisor   Immediate            Disposition:   ICU      Electronically signed by: Сергей Montilla MD, 4/19/2024 11:15 AM

## 2024-04-19 NOTE — PROGRESS NOTES
"Temo Hanson is a 73 y.o. male on day 2 of admission presenting with Third degree heart block (Multi).    Subjective   Sitting up in chair, no acute distress. States that he is currently doing well. Has some discomfort where the drain was placed, but otherwise denies any CP. SOB has significantly improved from initial presentation. Denies any abdominal pain, N/V/D.     Objective   Physical Exam  Constitutional: Well-developed male in no acute distress, sitting up in chair  HEENT: Normocephalic, atraumatic. PERRL. EOMI  Respiratory: CTAB, mildly decreased air movement.  Cardiovascular: RRR, systolic murmur present. Pericardial draining serosanguinous fluid  Abdominal: Soft, mildly distended, nontender to palpation. No hepatosplenomegaly or masses.  Neuro: Moving all extremities, AOx3  MSK: No LE edema bilaterally. RUE AVF present, no thrill or bruit  Skin: Warm, dry. No rashes or wounds.  Psych: Appropriate mood and affect.    Last Recorded Vitals  Blood pressure (!) 159/36, pulse 76, temperature 36.1 °C (97 °F), temperature source Temporal, resp. rate 22, height 1.93 m (6' 4\"), weight 81 kg (178 lb 9.2 oz), SpO2 93%.  Intake/Output last 3 Shifts:  I/O last 3 completed shifts:  In: - (0 mL/kg)   Out: 605 (7.5 mL/kg) [Urine:400 (0.1 mL/kg/hr); Drains:200; Blood:5]  Weight: 81 kg     Relevant Results  Scheduled medications  [START ON 4/20/2024] amLODIPine, 10 mg, oral, Daily  amLODIPine, 5 mg, oral, Daily  gabapentin, 300 mg, oral, Daily  heparin (porcine), 5,000 Units, subcutaneous, q8h KASSY  hydrALAZINE, 50 mg, oral, TID  insulin detemir, 5 Units, subcutaneous, q12h  insulin lispro, 0-5 Units, subcutaneous, TID with meals  magnesium oxide, 400 mg, oral, Daily  pantoprazole, 40 mg, oral, Daily before breakfast  rosuvastatin, 20 mg, oral, Nightly      Continuous medications     PRN medications  PRN medications: acetaminophen, dextrose, dextrose, glucagon, glucagon    Results for orders placed or performed during the " hospital encounter of 04/17/24 (from the past 24 hour(s))   Sterile Fluid Culture/Smear    Specimen: Pericardial; Fluid   Result Value Ref Range    Sterile Fluid Culture/Smear No growth to date     Gram Stain (3+) Moderate Polymorphonuclear leukocytes     Gram Stain No organisms seen    AFB Culture/Smear    Specimen: Pericardial; Fluid   Result Value Ref Range    AFB Culture       Culture in progress and will be examined weekly. A result will be issued either when positive or after 8 weeks incubation.    AFB Stain No acid fast bacilli seen    Body Fluid Cell Count   Result Value Ref Range    Color, Fluid Red (A) Colorless, Straw, Yellow    Clarity, Fluid Turbid (A) Clear    WBC, Fluid 9,169 See Comment /uL    RBC, Fluid 3,980,000 0  /uL /uL   Body Fluid Differential   Result Value Ref Range    Neutrophils %, Manual, Fluid 62 <25 % %    Lymphocytes %, Manual, Fluid 4 <75 % %    Mono/Macrophages %, Manual, Fluid 33 <70 % %    Eosinophils %, Manual, Fluid 1 0 % %    Basophils %, Manual, Fluid 0 0 % %    Immature Granulocytes %, Manual, Fluid 0 0 % %    Blasts %, Manual, Fluid 0 0 % %    Unclassified Cells %, Manual, Fluid 0 0 % %    Plasma Cells %, Manual, Fluid 0 0 % %    Total Cells Counted, Fluid 100    Potassium, Fluid   Result Value Ref Range    Potassium, Fluid 5.1 Not established mmol/L   POCT GLUCOSE   Result Value Ref Range    POCT Glucose 85 74 - 99 mg/dL   POCT GLUCOSE   Result Value Ref Range    POCT Glucose 78 74 - 99 mg/dL   CBC and Auto Differential   Result Value Ref Range    WBC 6.2 4.4 - 11.3 x10*3/uL    nRBC 0.0 0.0 - 0.0 /100 WBCs    RBC 2.56 (L) 4.50 - 5.90 x10*6/uL    Hemoglobin 7.5 (L) 13.5 - 17.5 g/dL    Hematocrit 21.4 (L) 41.0 - 52.0 %    MCV 84 80 - 100 fL    MCH 29.3 26.0 - 34.0 pg    MCHC 35.0 32.0 - 36.0 g/dL    RDW 15.6 (H) 11.5 - 14.5 %    Platelets 153 150 - 450 x10*3/uL    Neutrophils % 80.4 40.0 - 80.0 %    Immature Granulocytes %, Automated 0.3 0.0 - 0.9 %    Lymphocytes % 6.4 13.0 -  44.0 %    Monocytes % 12.3 2.0 - 10.0 %    Eosinophils % 0.3 0.0 - 6.0 %    Basophils % 0.3 0.0 - 2.0 %    Neutrophils Absolute 5.01 1.60 - 5.50 x10*3/uL    Immature Granulocytes Absolute, Automated 0.02 0.00 - 0.50 x10*3/uL    Lymphocytes Absolute 0.40 (L) 0.80 - 3.00 x10*3/uL    Monocytes Absolute 0.77 0.05 - 0.80 x10*3/uL    Eosinophils Absolute 0.02 0.00 - 0.40 x10*3/uL    Basophils Absolute 0.02 0.00 - 0.10 x10*3/uL   Comprehensive Metabolic Panel   Result Value Ref Range    Glucose 98 74 - 99 mg/dL    Sodium 134 (L) 136 - 145 mmol/L    Potassium 5.2 3.5 - 5.3 mmol/L    Chloride 94 (L) 98 - 107 mmol/L    Bicarbonate 21 21 - 32 mmol/L    Anion Gap 24 (H) 10 - 20 mmol/L    Urea Nitrogen 134 (HH) 6 - 23 mg/dL    Creatinine 14.88 (H) 0.50 - 1.30 mg/dL    eGFR 3 (L) >60 mL/min/1.73m*2    Calcium 8.4 (L) 8.6 - 10.6 mg/dL    Albumin 2.7 (L) 3.4 - 5.0 g/dL    Alkaline Phosphatase 130 33 - 136 U/L    Total Protein 6.0 (L) 6.4 - 8.2 g/dL    AST 11 9 - 39 U/L    Bilirubin, Total 0.4 0.0 - 1.2 mg/dL    ALT 10 10 - 52 U/L   Magnesium   Result Value Ref Range    Magnesium 2.18 1.60 - 2.40 mg/dL   POCT GLUCOSE   Result Value Ref Range    POCT Glucose 96 74 - 99 mg/dL   POCT GLUCOSE   Result Value Ref Range    POCT Glucose 93 74 - 99 mg/dL   TSH   Result Value Ref Range    Thyroid Stimulating Hormone 1.24 0.44 - 3.98 mIU/L   POCT GLUCOSE   Result Value Ref Range    POCT Glucose 124 (H) 74 - 99 mg/dL     Imaging:     TTE 4/18  1. Limited echocardiogram.   2. Left ventricular systolic function is normal with a 60-65% estimated ejection fraction.   3. Moderately enlarged right ventricle.   4. There is mildly reduced right ventricular systolic function.   5. There is a moderate pericardial effusion.   6. Mild diastolic collapse of the right atrium and early right ventricular diastolic collapse. Echocardiographic findings are consistent with cardiac tamponade.   7. Compared with the prior study dated 3/29/2024, pericardial effusion  size has increased. Signs of increased pericardial pressure are now present. Primary team aware of findings.     TTE 2/24-  CONCLUSIONS:   1. Left ventricular systolic function is normal with a 60-65% estimated ejection fraction.   2. Spectral Doppler shows a pseudonormal pattern of left ventricular diastolic filling.   3. There are elevated left atrial and left ventricular end diastolic pressures.   4. Moderately enlarged right ventricle.   5. Right ventricular volume overload.   6. There is mildly reduced right ventricular systolic function.   7. The right atrium is moderately dilated.   8. Severe tricuspid regurgitation visualized.   9. Mild mitral valve regurgitation.  10. Severely elevated right ventricular systolic pressure.  11. There is reversed systolic hepatic vein flow.  12. There is a trivial to small pericardial effusion.  13. There is no evidence of cardiac tamponade.  14. Compared with study from 1/23/2023, major difference is in the reporting of the TR. It was reported as mild but in the current echo it is severe with dilated RV and RA and elevated pulmonary pressures. Dilated RV and elevated pulmonary pressures are known.     ECG 9/23-  Normal sinus rhythm with high grade AV block  Right bundle branch block  Abnormal ECG  When compared with ECG of 26-JAN-2023 14:05,  Current undetermined rhythm precludes rhythm comparison, needs review  Inverted T waves have replaced nonspecific T wave abnormality in Anterior leads  Confirmed by Fredi Calvo ([id number]) on 9/26/2023 9:21:02 PM     Nuclear Stress test 1/23-     IMPRESSION:  1. Rest imaging only, which demonstrates normal profusion throughout  the wall of left ventricle.  2. No stress test due to patient has 2nd degree heart block.    Assessment/Plan   Principal Problem:    Third degree heart block (Multi)  Active Problems:    Pericardial effusion ([facility])  Temo Hanson is a 73 y.o. male with pmhx of CKD stage 5 s/p fistula placement, not on dialysis,  HTN, DLD, T2DM, severe TR, and CHB transferred to the CICU for CHB and concern for mod to large pericardial effusion, s/p pericardial drain 4/18 due to concern for tamponade physiology. Transferred to floors on 4/19.     Updates 04/19/24:  - Follow up on Pericardial fluid studies  - Follow up on EP recs regarding placement of PPM  - Follow up on nephro recs. Patient been worked up for transplant and has not started dialysis   - Restart additional home BP medications     #Pericardial Effusion  ::small effusion noted on TTE in February now appears increased in size on bedside POCUS assessment  ::vitals stable  -gentle boluses as needed  -follow up on pericardial fluid studies  -restart other home antihypertensives today.      #High Grade AV block  ::noted on ECG in September but patient lost to follow up regarding ppm  -EP consult for ppm eval  -tele while admitted  -avoid av shaniqua blocking agents  -holding coreg     #Bilateral Pleural Effusions R >L  ::satting well but notable respiratory distress  -holding on diuresis given mod to large pericardial effusion  -consider diuresis vs. Thora once plan made for pericardial effusion and CHB     #CKD Stage 5 not on dialysis   ::cleared for use of fistula but has not started dialysis  ::patient follows with transplant nephro   -consulted transplant nephro, appreciate recs  -discuss volume removal once pericardial effusion addressed  -cont. Iron tablets  -holding torsemide  -Cont. Lokelma as needed  -daily RFPs     #T2DM  ::home levemir 30u BID, lispro sliding scale  -cont SSI,   -half dose levemir 15u while admitted and increase back to home dose as tolerated     F: as needed  E: replete prn   N: renal diet (npo pending poss proc. For now)  A: PIVs  DVT PPX: subcutaneous heparin  NOK: Daughter Erica [place]: [phone]     CODE STATUS: FULL CODE (confirmed on admission)    Zainab Sanders MD

## 2024-04-19 NOTE — PROGRESS NOTES
Internal Medicine Daily Progress Note  Temo Hanson is a 72y/o male on day 1 hospital admission presenting with third degree heart block (Multi)    Subjective   Pericardial drain done 4/18. Endorses feeling better this morning. He denies any chest pain, fever, chills, n/v.     Objective     Vitals:  Visit Vitals  /51   Pulse 75   Temp 36.4 °C (97.5 °F) (Temporal)   Resp 23       Intake/Output Summary (Last 24 hours) at 4/19/2024 0811  Last data filed at 4/19/2024 0600  Gross per 24 hour   Intake --   Output 355 ml   Net -355 ml       Physical exam:  Constitutional: Well-developed male in no acute distress.  HEENT: Normocephalic, atraumatic. PERRL. EOMI. No cervical lymphadenopathy.  Respiratory: Increased work of breathing  Cardiovascular: murmurs heard, gallops, or rubs. No JVD. Radial pulses 2+. Distant heart sounds  Abdominal: Soft, mildly distended, nontender to palpation. Bowel sounds present. No hepatosplenomegaly or masses. No CVA tenderness.  Neuro: CN II-XII intact. UE and LE strength 5/5 bilaterally and sensation intact.  MSK: No LE edema bilaterally.  Skin: Warm, dry. No rashes or wounds.  Psych: Appropriate mood and affect.    Medications:  amLODIPine, 5 mg, oral, Daily  gabapentin, 300 mg, oral, Daily  heparin (porcine), 5,000 Units, subcutaneous, q8h KASSY  insulin detemir, 5 Units, subcutaneous, q12h  insulin lispro, 0-5 Units, subcutaneous, TID with meals  magnesium oxide, 400 mg, oral, Daily  pantoprazole, 40 mg, oral, Daily before breakfast  rosuvastatin, 20 mg, oral, Nightly         PRN medications: acetaminophen, dextrose, dextrose, glucagon, glucagon    Labs:  Results for orders placed or performed during the hospital encounter of 04/17/24 (from the past 24 hour(s))   POCT GLUCOSE   Result Value Ref Range    POCT Glucose 118 (H) 74 - 99 mg/dL   POCT GLUCOSE   Result Value Ref Range    POCT Glucose 96 74 - 99 mg/dL   Sterile Fluid Culture/Smear    Specimen: Pericardial; Fluid   Result  Value Ref Range    Sterile Fluid Culture/Smear No growth to date     Gram Stain (3+) Moderate Polymorphonuclear leukocytes     Gram Stain No organisms seen    Body Fluid Cell Count   Result Value Ref Range    Color, Fluid Red (A) Colorless, Straw, Yellow    Clarity, Fluid Turbid (A) Clear    WBC, Fluid 9,169 See Comment /uL    RBC, Fluid 3,980,000 0  /uL /uL   Body Fluid Differential   Result Value Ref Range    Neutrophils %, Manual, Fluid 62 <25 % %    Lymphocytes %, Manual, Fluid 4 <75 % %    Mono/Macrophages %, Manual, Fluid 33 <70 % %    Eosinophils %, Manual, Fluid 1 0 % %    Basophils %, Manual, Fluid 0 0 % %    Immature Granulocytes %, Manual, Fluid 0 0 % %    Blasts %, Manual, Fluid 0 0 % %    Unclassified Cells %, Manual, Fluid 0 0 % %    Plasma Cells %, Manual, Fluid 0 0 % %    Total Cells Counted, Fluid 100    POCT GLUCOSE   Result Value Ref Range    POCT Glucose 85 74 - 99 mg/dL   POCT GLUCOSE   Result Value Ref Range    POCT Glucose 78 74 - 99 mg/dL   CBC and Auto Differential   Result Value Ref Range    WBC 6.2 4.4 - 11.3 x10*3/uL    nRBC 0.0 0.0 - 0.0 /100 WBCs    RBC 2.56 (L) 4.50 - 5.90 x10*6/uL    Hemoglobin 7.5 (L) 13.5 - 17.5 g/dL    Hematocrit 21.4 (L) 41.0 - 52.0 %    MCV 84 80 - 100 fL    MCH 29.3 26.0 - 34.0 pg    MCHC 35.0 32.0 - 36.0 g/dL    RDW 15.6 (H) 11.5 - 14.5 %    Platelets 153 150 - 450 x10*3/uL    Neutrophils % 80.4 40.0 - 80.0 %    Immature Granulocytes %, Automated 0.3 0.0 - 0.9 %    Lymphocytes % 6.4 13.0 - 44.0 %    Monocytes % 12.3 2.0 - 10.0 %    Eosinophils % 0.3 0.0 - 6.0 %    Basophils % 0.3 0.0 - 2.0 %    Neutrophils Absolute 5.01 1.60 - 5.50 x10*3/uL    Immature Granulocytes Absolute, Automated 0.02 0.00 - 0.50 x10*3/uL    Lymphocytes Absolute 0.40 (L) 0.80 - 3.00 x10*3/uL    Monocytes Absolute 0.77 0.05 - 0.80 x10*3/uL    Eosinophils Absolute 0.02 0.00 - 0.40 x10*3/uL    Basophils Absolute 0.02 0.00 - 0.10 x10*3/uL   Comprehensive Metabolic Panel   Result Value Ref  Range    Glucose 98 74 - 99 mg/dL    Sodium 134 (L) 136 - 145 mmol/L    Potassium 5.2 3.5 - 5.3 mmol/L    Chloride 94 (L) 98 - 107 mmol/L    Bicarbonate 21 21 - 32 mmol/L    Anion Gap 24 (H) 10 - 20 mmol/L    Urea Nitrogen 134 (HH) 6 - 23 mg/dL    Creatinine 14.88 (H) 0.50 - 1.30 mg/dL    eGFR 3 (L) >60 mL/min/1.73m*2    Calcium 8.4 (L) 8.6 - 10.6 mg/dL    Albumin 2.7 (L) 3.4 - 5.0 g/dL    Alkaline Phosphatase 130 33 - 136 U/L    Total Protein 6.0 (L) 6.4 - 8.2 g/dL    AST 11 9 - 39 U/L    Bilirubin, Total 0.4 0.0 - 1.2 mg/dL    ALT 10 10 - 52 U/L   Magnesium   Result Value Ref Range    Magnesium 2.18 1.60 - 2.40 mg/dL   POCT GLUCOSE   Result Value Ref Range    POCT Glucose 96 74 - 99 mg/dL   POCT GLUCOSE   Result Value Ref Range    POCT Glucose 93 74 - 99 mg/dL       Imaging:     TTE 4/18    1. Limited echocardiogram.   2. Left ventricular systolic function is normal with a 60-65% estimated ejection fraction.   3. Moderately enlarged right ventricle.   4. There is mildly reduced right ventricular systolic function.   5. There is a moderate pericardial effusion.   6. Mild diastolic collapse of the right atrium and early right ventricular diastolic collapse. Echocardiographic findings are consistent with cardiac tamponade.   7. Compared with the prior study dated 3/29/2024, pericardial effusion size has increased. Signs of increased pericardial pressure are now present. Primary team aware of findings.    TTE 2/24-  CONCLUSIONS:   1. Left ventricular systolic function is normal with a 60-65% estimated ejection fraction.   2. Spectral Doppler shows a pseudonormal pattern of left ventricular diastolic filling.   3. There are elevated left atrial and left ventricular end diastolic pressures.   4. Moderately enlarged right ventricle.   5. Right ventricular volume overload.   6. There is mildly reduced right ventricular systolic function.   7. The right atrium is moderately dilated.   8. Severe tricuspid regurgitation  visualized.   9. Mild mitral valve regurgitation.  10. Severely elevated right ventricular systolic pressure.  11. There is reversed systolic hepatic vein flow.  12. There is a trivial to small pericardial effusion.  13. There is no evidence of cardiac tamponade.  14. Compared with study from 1/23/2023, major difference is in the reporting of the TR. It was reported as mild but in the currente echo it is severe with dilated RV and RA and elevated pulmonary pressures. Dilated RV and elevated pulmonary pressures are known.     ECG 9/23-  Normal sinus rhythm with high grade AV block  Right bundle branch block  Abnormal ECG  When compared with ECG of 26-JAN-2023 14:05,  Current undetermined rhythm precludes rhythm comparison, needs review  Inverted T waves have replaced nonspecific T wave abnormality in Anterior leads  Confirmed by Fredi Calvo ([id number]) on 9/26/2023 9:21:02 PM     Nuclear Stress test 1/23-     IMPRESSION:  1. Rest imaging only, which demonstrates normal profusion throughout  the wall of left ventricle.  2. No stress test due to patient has 2nd degree heart block.    Assessment and Plan:  Temo Hanson is a 73 y.o. male 73 y.o. male with pmhx of ESRD s/p fistula not on dialysis, HTN, DLD, T2DM, severe TR, and CHB transferred to the CICU for CHB and concern for mod to large pericardial effusion, s/p pericardial drain 4/18 due to concern for tamponade physiology with no acute events overnight.     Updates 04/19/24:  - follow up on Pericardial fluid studies  - Follow up on EP rec  - Possible transfer to the floor   - Follow up on nephro recs. Patient been worked up for transplant  - Restart additional home BP medications     Neuro  -no active issues     Cards     #Pericardial Effusion  ::small effusion noted on TTE in February now appears increased in size on bedside POCUS assessment  ::vitals stable  -gentle boluses as needed  -follow up on pericardial fluid studies  -restart other home antihypertensives  today.      #High Grade AV block  ::noted on ECG in September but patient lost to follow up regarding ppm  -EP consult for ppm eval  -tele while admitted  -avoid av shaniqua blocking agents  -holding coreg     Pulm     #Bilateral Pleural Effusions R >L  ::satting well but notable respiratory distress  -holding on diuresis given mod to large pericardial effusion  -consider diuresis vs. Thora once plan made for pericardial effusion and chb     Renal     #ESRD  ::cleared for use of fistula but has not started dialysis  ::patient follows with transplant nephro   -consulted transplant nephro   -discuss volume removal once pericardial effusion addressed  -cont. Iron tablets  -holding torsemide  -cont. Lokelma as needed  -daily RFPs     Endo     #T2DM  ::home levemir 30u BID, lispro sliding scale  -cont SSI,   -half dose levemir 15u while admitted and increase back to home dose as tolerated     F: as needed  E: replete prn   N: renal diet (npo pending poss proc. For now)  A: PIVs  DVT PPX: subcutaneous heparin  NOK: Daughter Erica Carolina: [phone]     CODE STATUS: FULL CODE (confirmed on admission)

## 2024-04-19 NOTE — PROGRESS NOTES
Physical Therapy    Physical Therapy Evaluation & Treatment    Patient Name: Temo Hanson  MRN: [id number]  Today's Date: 4/19/2024   Time Calculation  Start Time: 1117  Stop Time: 1154  Time Calculation (min): 37 min    Assessment/Plan   PT Assessment  PT Assessment Results: Decreased strength, Decreased endurance, Impaired balance, Decreased mobility  Rehab Prognosis: Excellent  End of Session Communication: Bedside nurse  End of Session Patient Position: Up in chair, Alarm off, not on at start of session   IP OR SWING BED PT PLAN  Inpatient or Swing Bed: Inpatient  PT Plan  Treatment/Interventions: Bed mobility, Transfer training, Gait training, Stair training, Balance training, Strengthening, Endurance training, Therapeutic exercise, Therapeutic activity  PT Plan: Skilled PT  PT Frequency: 3 times per week  PT Discharge Recommendations: Low intensity level of continued care  PT Recommended Transfer Status: Assist x1  PT - OK to Discharge: Yes    Subjective     General Visit Information:  General  Reason for Referral: CHB; c/f moderate to large pericardial effusion; cardiac tamponade s/p pericardiocentesis; awaiting potential PPM placement  Past Medical History Relevant to Rehab: ESRD s/p fistula not on HD, HTN, DLD, DM2, severe TR  Co-Treatment: OT  Co-Treatment Reason: to maximize mobility safely  Prior to Session Communication: Bedside nurse  Patient Position Received: Bed, 3 rail up, Alarm off, not on at start of session  General Comment: Pt pleasant and cooperative, soft spoken. Telemetry, Flat drain, external catheter.    Home Living:  Home Living  Type of Home: House  Lives With:  (wife, who is able to help as needed)  Home Adaptive Equipment: Cane  Home Layout: Stairs to alternate level with rails, Bed/bath upstairs  Alternate Level Stairs-Rails:  (one handrail)  Alternate Level Stairs-Number of Steps: 10  Home Access: Stairs to enter with rails  Entrance Stairs-Rails:  (one handrail)  Entrance  Stairs-Number of Steps: 3  Bathroom Shower/Tub: Tub/shower unit  Bathroom Equipment: Grab bars in shower    Prior Level of Function:  Prior Function Per Pt/Caregiver Report  Level of [place]: Independent with ADLs and functional transfers  ADL Assistance: Independent  Homemaking Assistance: Independent  Ambulatory Assistance:  (Pt reports intermittent use of cane as needed for balance; Denies falls. Community ambulator normally.)  Vocational: Retired  Leisure: (+) drives, likes to garden    Precautions:  Precautions  Medical Precautions: Cardiac precautions, Fall precautions    Vital Signs:  Vital Signs  Heart Rate:  (PRE: 70; DURING: peak around 100; POST: 73)  Resp:  (PRE: 20, POST: 22)  SpO2:  (PRE: 95%; SpO2 dropping to 86% with amb, recovered >90%  with seated rest and cues for pursed lip breathing; POST: 91%)  BP:  (PRE: 159/36; DURIN/68; POST: 184/52)    Objective     Pain:  Pain Assessment  Pain Assessment: 0-10  Pain Score:  (Pt denied pain at rest; Pain around pericardial drain with deep breathing/coughing/moving- unrated. RN aware.)    Cognition:  Cognition  Overall Cognitive Status: Within Functional Limits  Arousal/Alertness: Appropriate responses to stimuli  Orientation Level: Oriented X4  Following Commands: Follows one step commands without difficulty    General Assessments:   Activity Tolerance  Early Mobility/Exercise Safety Screen: Proceed with mobilization - No exclusion criteria met    Sensation  Light Touch: No apparent deficits    Strength  Strength Comments: B LE strength >/= 4/5 throughout  Strength  Strength Comments: B LE strength >/= 4/5 throughout    Static Sitting Balance  Static Sitting-Balance Support: No upper extremity supported, Feet supported  Static Sitting-Level of Assistance: Close supervision  Dynamic Sitting Balance  Dynamic Sitting-Balance Support: No upper extremity supported, Feet supported  Dynamic Sitting-Comments: Supervision    Static Standing  Balance  Static Standing-Balance Support: No upper extremity supported  Static Standing-Level of Assistance: Close supervision  Dynamic Standing Balance  Dynamic Standing-Balance Support: No upper extremity supported  Dynamic Standing-Comments: Supervision    Functional Assessments:  Bed Mobility  Bed Mobility: Yes  Bed Mobility 1  Bed Mobility 1: Supine to sitting  Level of Assistance 1: Minimum assistance (x1 person; cues for sequencing)  Bed Mobility Comments 1: HOB elevated    Transfers  Transfer: Yes  Transfer 1  Transfer From 1: Sit to  Transfer to 1: Stand  Technique 1: Sit to stand  Transfer Level of Assistance 1: Minimum assistance (x1 person; cues for sequencing)  Trials/Comments 1: increased time/effort to rise, especially from low surface height  Transfers 2  Transfer From 2: Stand to  Transfer to 2: Sit  Technique 2: Stand to sit  Transfer Level of Assistance 2: Minimum assistance (x1 person; cues for sequencing; decreased eccentric control)    Ambulation/Gait Training  Ambulation/Gait Training Performed: Yes  Ambulation/Gait Training 1  Surface 1: Level tile  Device 1: No device  Assistance 1: Contact guard  Quality of Gait 1:  (slight pathway deviation but no acute LOB)  Comments/Distance (ft) 1: 175 ft; seated rest break with cues for pursed lip breathing to recover oxygen levels    Extremity/Trunk Assessments:  RLE   RLE : Within Functional Limits  LLE   LLE : Within Functional Limits    Treatments:  Therapeutic Activity  Therapeutic Activity Performed: Yes  Therapeutic Activity 1: Pt completed sit<->stand without AD. Performed x 2 sit<->stand trials from regulard surface height with UE assist with no AD with CGA. Completed x 2 trials from low surface height with no AD and UE assist with minAx1 with increased effort and time to rise.  Therapeutic Activity 2: Pt ambulated with no AD with CGA: 5 ft x 1; standing break; 5 ft x 1; seated break; 10 ft x 1. Slight pathway deviation and occasional trunk  sway.    Outcome Measures:  [facility] Basic Mobility  Turning from your back to your side while in a flat bed without using bedrails: A little  Moving from lying on your back to sitting on the side of a flat bed without using bedrails: A little  Moving to and from bed to chair (including a wheelchair): A little  Standing up from a chair using your arms (e.g. wheelchair or bedside chair): A little  To walk in hospital room: A little  Climbing 3-5 steps with railing: A little  Basic Mobility - Total Score: 18    Confusion Assessment Method-ICU (CAM-ICU)  Feature 1: Acute Onset or Fluctuating Course: Negative  Overall CAM-ICU: Negative    FSS-ICU  Ambulation: Walks >/ or equal to 150 feet with minimal assistance x1  Rolling: Supervision or set-up only  Sitting: Supervision or set-up only  Transfer Sit-to-Stand: Minimal assistance (performs 75% or more of task)  Transfer Supine-to-Sit: Minimal assistance (performs 75% or more of task)  Total Score: 22    ICU Mobility Screen  Early Mobility/Exercise Safety Screen: Proceed with mobilization - No exclusion criteria met  E = Exercise and Early Mobility  Early Mobility/Exercise Safety Screen: Proceed with mobilization - No exclusion criteria met  Current Activity: Ambulating in [place]    Encounter Problems       Encounter Problems (Active)       Balance       Pt will score >24 on [place] for low risk of falls (Progressing)       Start:  04/19/24    Expected End:  05/03/24               Mobility       Patient will ambulate >500 ft with LRAD and supervision with no pathway deviation (Progressing)       Start:  04/19/24    Expected End:  05/03/24            Patient will ascend and descend 10 stairs with handrail and supervision (Progressing)       Start:  04/19/24    Expected End:  05/03/24               PT Transfers       Patient will perform bed mobility indep (Progressing)       Start:  04/19/24    Expected End:  05/03/24            Patient will transfer sit to and from stand  indep (Progressing)       Start:  04/19/24    Expected End:  05/03/24               Pain - Adult            Education Documentation  Mobility Training, taught by Juany Quintanilla PT at 4/19/2024  5:07 PM.  Learner: Patient  Readiness: Acceptance  Method: Explanation  Response: Verbalizes Understanding    Education Comments  No comments found.    Signed by Juany Quintanilla DPT

## 2024-04-19 NOTE — NURSING NOTE
Report from Sending RN:    Report From: PALMIRA Guardado  Recent Surgery of Procedure: Yes, 4/18 pericardiocentesis  Baseline Level of Consciousness (LOC): A/O X 4  Oxygen Use: No  Type: N/A  Diabetic: No  Last BP Med Given Day of Dialysis: yes, see EMR  Last Pain Med Given: none  Lab Tests to be Obtained with Dialysis: No  Blood Transfusion to be Given During Dialysis: No  Available IV Access: Yes  Medications to be Administered During Dialysis: No  Continuous IV Infusion Running: No  Restraints on Currently or in the Last 24 Hours: No  Hand-Off Communication: full code, no isolation precautions, travel by cart  Dialysis Catheter Dressing: pt has a PAT graft  Last Dressing Change: pt has a PAT graft

## 2024-04-20 ENCOUNTER — APPOINTMENT (OUTPATIENT)
Dept: DIALYSIS | Facility: HOSPITAL | Age: 74
End: 2024-04-20
Payer: COMMERCIAL

## 2024-04-20 ENCOUNTER — APPOINTMENT (OUTPATIENT)
Dept: CARDIOLOGY | Facility: HOSPITAL | Age: 74
DRG: 314 | End: 2024-04-20
Payer: COMMERCIAL

## 2024-04-20 LAB
ALBUMIN SERPL BCP-MCNC: 3 G/DL (ref 3.4–5)
ALP SERPL-CCNC: 147 U/L (ref 33–136)
ALT SERPL W P-5'-P-CCNC: 7 U/L (ref 10–52)
ANION GAP SERPL CALC-SCNC: 20 MMOL/L (ref 10–20)
AST SERPL W P-5'-P-CCNC: 10 U/L (ref 9–39)
BASOPHILS # BLD AUTO: 0.01 X10*3/UL (ref 0–0.1)
BASOPHILS NFR BLD AUTO: 0.2 %
BILIRUB SERPL-MCNC: 0.5 MG/DL (ref 0–1.2)
BUN SERPL-MCNC: 105 MG/DL (ref 6–23)
CALCIUM SERPL-MCNC: 8.9 MG/DL (ref 8.6–10.6)
CHLORIDE SERPL-SCNC: 95 MMOL/L (ref 98–107)
CO2 SERPL-SCNC: 23 MMOL/L (ref 21–32)
CREAT SERPL-MCNC: 13.02 MG/DL (ref 0.5–1.3)
EGFRCR SERPLBLD CKD-EPI 2021: 4 ML/MIN/1.73M*2
EOSINOPHIL # BLD AUTO: 0.08 X10*3/UL (ref 0–0.4)
EOSINOPHIL NFR BLD AUTO: 1.5 %
ERYTHROCYTE [DISTWIDTH] IN BLOOD BY AUTOMATED COUNT: 16.3 % (ref 11.5–14.5)
EST. AVERAGE GLUCOSE BLD GHB EST-MCNC: 117 MG/DL
GLUCOSE BLD MANUAL STRIP-MCNC: 140 MG/DL (ref 74–99)
GLUCOSE BLD MANUAL STRIP-MCNC: 140 MG/DL (ref 74–99)
GLUCOSE BLD MANUAL STRIP-MCNC: 141 MG/DL (ref 74–99)
GLUCOSE SERPL-MCNC: 131 MG/DL (ref 74–99)
HBA1C MFR BLD: 5.7 %
HCT VFR BLD AUTO: 27.1 % (ref 41–52)
HGB BLD-MCNC: 8.6 G/DL (ref 13.5–17.5)
IMM GRANULOCYTES # BLD AUTO: 0.04 X10*3/UL (ref 0–0.5)
IMM GRANULOCYTES NFR BLD AUTO: 0.7 % (ref 0–0.9)
LYMPHOCYTES # BLD AUTO: 0.45 X10*3/UL (ref 0.8–3)
LYMPHOCYTES NFR BLD AUTO: 8.4 %
MAGNESIUM SERPL-MCNC: 2.31 MG/DL (ref 1.6–2.4)
MCH RBC QN AUTO: 29.2 PG (ref 26–34)
MCHC RBC AUTO-ENTMCNC: 31.7 G/DL (ref 32–36)
MCV RBC AUTO: 92 FL (ref 80–100)
MONOCYTES # BLD AUTO: 0.8 X10*3/UL (ref 0.05–0.8)
MONOCYTES NFR BLD AUTO: 15 %
NEUTROPHILS # BLD AUTO: 3.96 X10*3/UL (ref 1.6–5.5)
NEUTROPHILS NFR BLD AUTO: 74.2 %
NRBC BLD-RTO: 0.7 /100 WBCS (ref 0–0)
PHOSPHATE SERPL-MCNC: 4.9 MG/DL (ref 2.5–4.9)
PLATELET # BLD AUTO: 136 X10*3/UL (ref 150–450)
POTASSIUM SERPL-SCNC: 4.6 MMOL/L (ref 3.5–5.3)
PROT SERPL-MCNC: 7.1 G/DL (ref 6.4–8.2)
RBC # BLD AUTO: 2.95 X10*6/UL (ref 4.5–5.9)
SODIUM SERPL-SCNC: 133 MMOL/L (ref 136–145)
WBC # BLD AUTO: 5.3 X10*3/UL (ref 4.4–11.3)

## 2024-04-20 PROCEDURE — 2500000001 HC RX 250 WO HCPCS SELF ADMINISTERED DRUGS (ALT 637 FOR MEDICARE OP): Performed by: STUDENT IN AN ORGANIZED HEALTH CARE EDUCATION/TRAINING PROGRAM

## 2024-04-20 PROCEDURE — 93005 ELECTROCARDIOGRAM TRACING: CPT

## 2024-04-20 PROCEDURE — 36415 COLL VENOUS BLD VENIPUNCTURE: CPT | Performed by: STUDENT IN AN ORGANIZED HEALTH CARE EDUCATION/TRAINING PROGRAM

## 2024-04-20 PROCEDURE — 8010000001 HC DIALYSIS - HEMODIALYSIS PER DAY

## 2024-04-20 PROCEDURE — 85025 COMPLETE CBC W/AUTO DIFF WBC: CPT | Performed by: STUDENT IN AN ORGANIZED HEALTH CARE EDUCATION/TRAINING PROGRAM

## 2024-04-20 PROCEDURE — 83036 HEMOGLOBIN GLYCOSYLATED A1C: CPT | Performed by: STUDENT IN AN ORGANIZED HEALTH CARE EDUCATION/TRAINING PROGRAM

## 2024-04-20 PROCEDURE — 2500000004 HC RX 250 GENERAL PHARMACY W/ HCPCS (ALT 636 FOR OP/ED): Performed by: STUDENT IN AN ORGANIZED HEALTH CARE EDUCATION/TRAINING PROGRAM

## 2024-04-20 PROCEDURE — 82947 ASSAY GLUCOSE BLOOD QUANT: CPT

## 2024-04-20 PROCEDURE — 1200000002 HC GENERAL ROOM WITH TELEMETRY DAILY

## 2024-04-20 PROCEDURE — 83735 ASSAY OF MAGNESIUM: CPT | Performed by: STUDENT IN AN ORGANIZED HEALTH CARE EDUCATION/TRAINING PROGRAM

## 2024-04-20 PROCEDURE — 90935 HEMODIALYSIS ONE EVALUATION: CPT | Performed by: INTERNAL MEDICINE

## 2024-04-20 PROCEDURE — 2500000006 HC RX 250 W HCPCS SELF ADMINISTERED DRUGS (ALT 637 FOR ALL PAYERS): Mod: MUE | Performed by: STUDENT IN AN ORGANIZED HEALTH CARE EDUCATION/TRAINING PROGRAM

## 2024-04-20 PROCEDURE — 2500000006 HC RX 250 W HCPCS SELF ADMINISTERED DRUGS (ALT 637 FOR ALL PAYERS): Performed by: STUDENT IN AN ORGANIZED HEALTH CARE EDUCATION/TRAINING PROGRAM

## 2024-04-20 PROCEDURE — 99233 SBSQ HOSP IP/OBS HIGH 50: CPT | Performed by: INTERNAL MEDICINE

## 2024-04-20 PROCEDURE — 80053 COMPREHEN METABOLIC PANEL: CPT | Performed by: STUDENT IN AN ORGANIZED HEALTH CARE EDUCATION/TRAINING PROGRAM

## 2024-04-20 PROCEDURE — 84100 ASSAY OF PHOSPHORUS: CPT

## 2024-04-20 RX ORDER — HYDRALAZINE HYDROCHLORIDE 100 MG/1
100 TABLET, FILM COATED ORAL 3 TIMES DAILY
Status: DISCONTINUED | OUTPATIENT
Start: 2024-04-20 | End: 2024-04-24 | Stop reason: HOSPADM

## 2024-04-20 RX ADMIN — Medication 400 MG: at 10:22

## 2024-04-20 RX ADMIN — GABAPENTIN 300 MG: 300 CAPSULE ORAL at 10:22

## 2024-04-20 RX ADMIN — PANTOPRAZOLE SODIUM 40 MG: 40 TABLET, DELAYED RELEASE ORAL at 06:11

## 2024-04-20 RX ADMIN — HEPARIN SODIUM 5000 UNITS: 5000 INJECTION INTRAVENOUS; SUBCUTANEOUS at 16:27

## 2024-04-20 RX ADMIN — HYDRALAZINE HYDROCHLORIDE 100 MG: 100 TABLET, FILM COATED ORAL at 20:55

## 2024-04-20 RX ADMIN — HEPARIN SODIUM 5000 UNITS: 5000 INJECTION INTRAVENOUS; SUBCUTANEOUS at 06:11

## 2024-04-20 RX ADMIN — ROSUVASTATIN CALCIUM 20 MG: 20 TABLET, FILM COATED ORAL at 20:55

## 2024-04-20 RX ADMIN — HEPARIN SODIUM 5000 UNITS: 5000 INJECTION INTRAVENOUS; SUBCUTANEOUS at 20:55

## 2024-04-20 RX ADMIN — HYDRALAZINE HYDROCHLORIDE 100 MG: 100 TABLET, FILM COATED ORAL at 16:27

## 2024-04-20 RX ADMIN — INSULIN DETEMIR 5 UNITS: 100 INJECTION, SOLUTION SUBCUTANEOUS at 10:22

## 2024-04-20 RX ADMIN — HYDRALAZINE HYDROCHLORIDE 100 MG: 100 TABLET, FILM COATED ORAL at 10:22

## 2024-04-20 RX ADMIN — AMLODIPINE BESYLATE 10 MG: 10 TABLET ORAL at 10:22

## 2024-04-20 ASSESSMENT — COGNITIVE AND FUNCTIONAL STATUS - GENERAL
MOBILITY SCORE: 20
MOBILITY SCORE: 20
STANDING UP FROM CHAIR USING ARMS: A LITTLE
WALKING IN HOSPITAL ROOM: A LITTLE
HELP NEEDED FOR BATHING: A LITTLE
TOILETING: A LITTLE
HELP NEEDED FOR BATHING: A LITTLE
TOILETING: A LITTLE
DRESSING REGULAR LOWER BODY CLOTHING: A LITTLE
CLIMB 3 TO 5 STEPS WITH RAILING: A LITTLE
DRESSING REGULAR UPPER BODY CLOTHING: A LITTLE
STANDING UP FROM CHAIR USING ARMS: A LITTLE
DAILY ACTIVITIY SCORE: 20
DRESSING REGULAR LOWER BODY CLOTHING: A LITTLE
MOVING TO AND FROM BED TO CHAIR: A LITTLE
MOVING TO AND FROM BED TO CHAIR: A LITTLE
DAILY ACTIVITIY SCORE: 20
WALKING IN HOSPITAL ROOM: A LITTLE
DRESSING REGULAR UPPER BODY CLOTHING: A LITTLE
CLIMB 3 TO 5 STEPS WITH RAILING: A LITTLE

## 2024-04-20 ASSESSMENT — PAIN - FUNCTIONAL ASSESSMENT
PAIN_FUNCTIONAL_ASSESSMENT: NO/DENIES PAIN
PAIN_FUNCTIONAL_ASSESSMENT: 0-10

## 2024-04-20 ASSESSMENT — PAIN SCALES - GENERAL
PAINLEVEL_OUTOF10: 2
PAINLEVEL_OUTOF10: 0 - NO PAIN
PAINLEVEL_OUTOF10: 0 - NO PAIN

## 2024-04-20 ASSESSMENT — PAIN SCALES - WONG BAKER: WONGBAKER_NUMERICALRESPONSE: NO HURT

## 2024-04-20 ASSESSMENT — PAIN DESCRIPTION - DESCRIPTORS: DESCRIPTORS: ACHING

## 2024-04-20 NOTE — CARE PLAN
The patient's goals for the shift include  being able to have decreased pain with eating and going to dialysis.     The clinical goals for the shift include Eat smaller meals to prevent overfilling and epigastric pain.    Over the shift, the patient made progress in reaching these goals. Pt was able to order smaller meals and diet order was also changed by rounding team to full liquid. Pt did not c/o any epigastric pain during shift and was also able to complete dialysis (no fluid off).

## 2024-04-20 NOTE — NURSING NOTE
Report to Receiving RN:    Report To: PALMIRA Manzanares  Time Report Called: 19:33  Hand-Off Communication: No acute change from RN to RN report.  Patient tolerated treatment well with no fluid removed.  Last /65, HR 77.  No complaint of pain or discomfort.   Complications During Treatment: No  Ultrafiltration Treatment: No  Medications Administered During Dialysis: No  Blood Products Administered During Dialysis: No  Labs Sent During Dialysis: No  Heparin Drip Rate Changes: No  Dialysis Catheter Dressing: N/A  Last Dressing Change: N/A    Electronic Signatures:   (Signed 4/19/24)   Authored: Mir Du RN       Last Updated: 8:38 PM by MIR DU

## 2024-04-20 NOTE — NURSING NOTE
.Report from Sending RN:    Report From: PALMIRA Guardado  Recent Surgery of Procedure: Yes, pericardial drain 4/17/23  Baseline Level of Consciousness (LOC): A&O X3  Oxygen Use: No  Type: room air  Diabetic: Yes  Last BP Med Given Day of Dialysis: amlodipine  Last Pain Med Given: none  Lab Tests to be Obtained with Dialysis: No  Blood Transfusion to be Given During Dialysis: No  Available IV Access: Yes  Medications to be Administered During Dialysis: No  Continuous IV Infusion Running: No  Restraints on Currently or in the Last 24 Hours: No  Hand-Off Communication: Pt had no acute event overnight, vital signs stable. Not on precaution, morning medication given. Pt will be on continuous telemetry in HD unit.  Dialysis Catheter Dressing: AVG  Last Dressing Change: None

## 2024-04-20 NOTE — NURSING NOTE
.Report to Receiving RN:    Report To: JASS Guardado  Time Report Called: [id number]  Hand-Off Communication: Pt tolerated HD well with no issue. No fluid removal /66 HR 74  Complications During Treatment: Yes, Leg cramping  Ultrafiltration Treatment: No  Medications Administered During Dialysis: No  Blood Products Administered During Dialysis: No  Labs Sent During Dialysis: No  Heparin Drip Rate Changes: No  Dialysis Catheter Dressing: AVF  Last Dressing Change: None

## 2024-04-20 NOTE — PROGRESS NOTES
Temo Hanson is a 73 y.o. male on day 3 of admission presenting with Third degree heart block (Multi).      Subjective   Seen on HD. Tolerated first HD well yesterday, denies headache/nausea. C/o LE cramping on HD       Objective   Vitals 24HR  Heart Rate:  [66-85]   Temp:  [35.5 °C (95.9 °F)-36.8 °C (98.2 °F)]   Resp:  [18-20]   BP: (134-160)/(61-94)   Weight:  [81.7 kg (180 lb 1.9 oz)]   SpO2:  [91 %-96 %]     Intake/Output last 3 Shifts:    Intake/Output Summary (Last 24 hours) at 4/20/2024 1448  Last data filed at 4/20/2024 1439  Gross per 24 hour   Intake 3080 ml   Output 1663 ml   Net 1417 ml       Physical Exam  No distress  HEENT:  moist, no pallor  Trace edema aliza LE  Breath sounds aliza equal, clear  S1 S2 regular, normal, no rub or murmur  Abdomen soft  AAO x3, non focal    Results from last 72 hours   Lab Units 04/20/24  0801 04/19/24  0441 04/18/24  0146   SODIUM mmol/L 133* 134* 132*   POTASSIUM mmol/L 4.6 5.2 5.1   CHLORIDE mmol/L 95* 94* 90*   CO2 mmol/L 23 21 22   BUN mg/dL 105* 134* 125*   PHOSPHORUS mg/dL 4.9  --   --    HEMOGLOBIN g/dL 8.6* 7.5* 8.6*      Scheduled medications  amLODIPine, 10 mg, oral, Daily  gabapentin, 300 mg, oral, Daily  heparin (porcine), 5,000 Units, subcutaneous, q8h KASSY  hydrALAZINE, 100 mg, oral, TID  insulin detemir, 5 Units, subcutaneous, q12h  insulin lispro, 0-5 Units, subcutaneous, TID with meals  magnesium oxide, 400 mg, oral, Daily  pantoprazole, 40 mg, oral, Daily before breakfast  rosuvastatin, 20 mg, oral, Nightly        Assessment/Plan   73  Year old with h/o CKD 5 admitted with third degree HB but noted to have pericardial effusion and initiated on HD    Nephrology following for ESRD    #ESRD: Tolerating 2nd session of HD per submitted orders, 2 K, 2.5 Ca, UF goal 0. Will received 3rd session on Mon. Please consult JEANNIE for outpatient dialysis chair, he follows with Dr Pemberton at [facility]    # Anemia: HgB   Hemoglobin   Date Value Ref Range Status   04/20/2024 8.6  (L) 13.5 - 17.5 g/dL Final   Check iron studies, if iron replete, start JONE    # MBD: Ca   Calcium   Date Value Ref Range Status   04/20/2024 8.9 8.6 - 10.6 mg/dL Final     Phosphorus   Date Value Ref Range Status   04/20/2024 4.9 2.5 - 4.9 mg/dL Final     Comment:     The performance characteristics of phosphorus testing in heparinized plasma have been validated by the individual  laboratory site where testing is performed. Testing on heparinized plasma is not approved by the FDA; however, such approval is not necessary.   no need for phosphate binders for now. Start  daily renal MVI.      #Hypertension: Bp control acceptable, continue current anti-hypertensive medications    #Volume status: Clinically euvolemic    Will continue to follow

## 2024-04-20 NOTE — PROGRESS NOTES
"Temo Hanson is a 73 y.o. male on day 3 of admission presenting with Third degree heart block (Multi).    Subjective   No acute events overnight. Patient reports feeling ok. Reports chest pain when swallowing solid food. Wants to trial full liquid diet today.     Reports first session of HD went ok yesterday, mild leg cramping. But graft in LUE still with palpable thrill.     Denies any new chest pain, shortness of breath, abdominal pain, N/V/D.     Objective   Vitals:    04/20/24 0755   BP: (!) 160/94   Pulse: 82   Resp: 20   Temp: 35.8 °C (96.5 °F)   SpO2: 91%       Physical Exam  Constitutional: Well-developed male in no acute distress, sitting up in chair  HEENT: Normocephalic, atraumatic. PERRL. EOMI  Respiratory: CTAB, mildly decreased air movement.  Cardiovascular: RRR, systolic murmur present. Pericardial draining serosanguinous fluid  Abdominal: Soft, mildly distended, nontender to palpation. No hepatosplenomegaly or masses.  Neuro: Moving all extremities, AOx3  MSK: No LE edema bilaterally. LUE AVF present, no thrill or bruit  Skin: Warm, dry. No rashes or wounds.  Psych: Appropriate mood and affect.    Last Recorded Vitals  Blood pressure (!) 160/94, pulse 82, temperature 35.8 °C (96.5 °F), temperature source Temporal, resp. rate 20, height 1.93 m (6' 4\"), weight 81.7 kg (180 lb 1.9 oz), SpO2 91%.  Intake/Output last 3 Shifts:  I/O last 3 completed shifts:  In: 2400 (29.4 mL/kg) [P.O.:600; I.V.:1000 (12.2 mL/kg); Other:800]  Out: 1225 (15 mL/kg) [Urine:300 (0.1 mL/kg/hr); Drains:125; Other:800]  Weight: 81.7 kg     Relevant Results  Scheduled medications  amLODIPine, 10 mg, oral, Daily  gabapentin, 300 mg, oral, Daily  heparin (porcine), 5,000 Units, subcutaneous, q8h KASSY  hydrALAZINE, 100 mg, oral, TID  insulin detemir, 5 Units, subcutaneous, q12h  insulin lispro, 0-5 Units, subcutaneous, TID with meals  magnesium oxide, 400 mg, oral, Daily  pantoprazole, 40 mg, oral, Daily before " breakfast  rosuvastatin, 20 mg, oral, Nightly      Continuous medications     PRN medications  PRN medications: acetaminophen, dextrose, dextrose, glucagon, glucagon    Results for orders placed or performed during the hospital encounter of 04/17/24 (from the past 24 hour(s))   POCT GLUCOSE   Result Value Ref Range    POCT Glucose 145 (H) 74 - 99 mg/dL   POCT GLUCOSE   Result Value Ref Range    POCT Glucose 123 (H) 74 - 99 mg/dL   POCT GLUCOSE   Result Value Ref Range    POCT Glucose 140 (H) 74 - 99 mg/dL   CBC and Auto Differential   Result Value Ref Range    WBC 5.3 4.4 - 11.3 x10*3/uL    nRBC 0.7 (H) 0.0 - 0.0 /100 WBCs    RBC 2.95 (L) 4.50 - 5.90 x10*6/uL    Hemoglobin 8.6 (L) 13.5 - 17.5 g/dL    Hematocrit 27.1 (L) 41.0 - 52.0 %    MCV 92 80 - 100 fL    MCH 29.2 26.0 - 34.0 pg    MCHC 31.7 (L) 32.0 - 36.0 g/dL    RDW 16.3 (H) 11.5 - 14.5 %    Platelets 136 (L) 150 - 450 x10*3/uL    Neutrophils % 74.2 40.0 - 80.0 %    Immature Granulocytes %, Automated 0.7 0.0 - 0.9 %    Lymphocytes % 8.4 13.0 - 44.0 %    Monocytes % 15.0 2.0 - 10.0 %    Eosinophils % 1.5 0.0 - 6.0 %    Basophils % 0.2 0.0 - 2.0 %    Neutrophils Absolute 3.96 1.60 - 5.50 x10*3/uL    Immature Granulocytes Absolute, Automated 0.04 0.00 - 0.50 x10*3/uL    Lymphocytes Absolute 0.45 (L) 0.80 - 3.00 x10*3/uL    Monocytes Absolute 0.80 0.05 - 0.80 x10*3/uL    Eosinophils Absolute 0.08 0.00 - 0.40 x10*3/uL    Basophils Absolute 0.01 0.00 - 0.10 x10*3/uL   Comprehensive Metabolic Panel   Result Value Ref Range    Glucose 131 (H) 74 - 99 mg/dL    Sodium 133 (L) 136 - 145 mmol/L    Potassium 4.6 3.5 - 5.3 mmol/L    Chloride 95 (L) 98 - 107 mmol/L    Bicarbonate 23 21 - 32 mmol/L    Anion Gap 20 10 - 20 mmol/L    Urea Nitrogen 105 (HH) 6 - 23 mg/dL    Creatinine 13.02 (H) 0.50 - 1.30 mg/dL    eGFR 4 (L) >60 mL/min/1.73m*2    Calcium 8.9 8.6 - 10.6 mg/dL    Albumin 3.0 (L) 3.4 - 5.0 g/dL    Alkaline Phosphatase 147 (H) 33 - 136 U/L    Total Protein 7.1 6.4 -  8.2 g/dL    AST 10 9 - 39 U/L    Bilirubin, Total 0.5 0.0 - 1.2 mg/dL    ALT 7 (L) 10 - 52 U/L   Magnesium   Result Value Ref Range    Magnesium 2.31 1.60 - 2.40 mg/dL   Phosphorus   Result Value Ref Range    Phosphorus 4.9 2.5 - 4.9 mg/dL   Hemoglobin A1c   Result Value Ref Range    Hemoglobin A1C 5.7 (H) see below %    Estimated Average Glucose 117 Not Established mg/dL     Imaging:     TTE 4/18  1. Limited echocardiogram.   2. Left ventricular systolic function is normal with a 60-65% estimated ejection fraction.   3. Moderately enlarged right ventricle.   4. There is mildly reduced right ventricular systolic function.   5. There is a moderate pericardial effusion.   6. Mild diastolic collapse of the right atrium and early right ventricular diastolic collapse. Echocardiographic findings are consistent with cardiac tamponade.   7. Compared with the prior study dated 3/29/2024, pericardial effusion size has increased. Signs of increased pericardial pressure are now present. Primary team aware of findings.     TTE 2/24-  CONCLUSIONS:   1. Left ventricular systolic function is normal with a 60-65% estimated ejection fraction.   2. Spectral Doppler shows a pseudonormal pattern of left ventricular diastolic filling.   3. There are elevated left atrial and left ventricular end diastolic pressures.   4. Moderately enlarged right ventricle.   5. Right ventricular volume overload.   6. There is mildly reduced right ventricular systolic function.   7. The right atrium is moderately dilated.   8. Severe tricuspid regurgitation visualized.   9. Mild mitral valve regurgitation.  10. Severely elevated right ventricular systolic pressure.  11. There is reversed systolic hepatic vein flow.  12. There is a trivial to small pericardial effusion.  13. There is no evidence of cardiac tamponade.  14. Compared with study from 1/23/2023, major difference is in the reporting of the TR. It was reported as mild but in the currente echo it  is severe with dilated RV and RA and elevated pulmonary pressures. Dilated RV and elevated pulmonary pressures are known.     ECG 9/23-  Normal sinus rhythm with high grade AV block  Right bundle branch block  Abnormal ECG  When compared with ECG of 26-JAN-2023 14:05,  Current undetermined rhythm precludes rhythm comparison, needs review  Inverted T waves have replaced nonspecific T wave abnormality in Anterior leads  Confirmed by Fredi Calvo ([id number]) on 9/26/2023 9:21:02 PM     Nuclear Stress test 1/23-     IMPRESSION:  1. Rest imaging only, which demonstrates normal profusion throughout  the wall of left ventricle.  2. No stress test due to patient has 2nd degree heart block.    Assessment/Plan   Principal Problem:    Third degree heart block (Multi)  Active Problems:    Pericardial effusion (HHS-HCC)  Temo Hanson is a 73 y.o. male with pmhx of CKD stage 5 s/p fistula placement, not on dialysis, HTN, DLD, T2DM, severe TR, and CHB transferred to the CICU for CHB and concern for mod to large pericardial effusion, s/p pericardial drain 4/18 due to concern for tamponade physiology. Transferred to floors on 4/19.     Updates 04/20/24:  - drain with 50cc output past 24 hours; will likely remove drain tomorrow pending continued downtrending drain output   -will obtain limited TTE tomorrow  - Follow up on Pericardial fluid studies: 68% neutrophils, culture NGTD 3+ PMNs  - Follow up on EP recs regarding placement of PPM near discharge   - appreciate nephrology recs - 2nd session of HD today  - Full liquid diet today   - increased hydralazine to home dose 100mg TID today     #Pericardial Effusion  ::small effusion noted on TTE in February now appears increased in size on bedside POCUS assessment  ::vitals stable  -gentle boluses as needed  -follow up on pericardial fluid studies       #High Grade AV block  ::noted on ECG in September but patient lost to follow up regarding ppm  -EP consult for ppm eval  -tele while  admitted  -avoid av shaniqua blocking agents  -holding coreg     #Bilateral Pleural Effusions R >L  ::satting well but notable respiratory distress  -holding on diuresis given mod to large pericardial effusion  -consider diuresis vs. Thora once plan made for pericardial effusion and CHB     #CKD Stage 5 started HD on 4/19  ::cleared for use of fistula   ::patient follows with transplant nephro   -consulted transplant nephro, appreciate recs  -discuss volume removal once pericardial effusion addressed  -cont. Iron tablets  -holding torsemide  -Cont. Lokelma as needed  -daily RFPs     #T2DM  ::home levemir 30u BID, lispro sliding scale  -cont SSI,   -half dose levemir 15u while admitted and increase back to home dose as tolerated     F: as needed  E: replete prn   N: renal diet  A: PIVs  DVT PPX: subcutaneous heparin  NOK: Daughter Erica Carolina: [phone]     CODE STATUS: FULL CODE (confirmed on admission)    Kenyon DOMINGO Do, MD

## 2024-04-21 LAB
ALBUMIN SERPL BCP-MCNC: 3.3 G/DL (ref 3.4–5)
ALP SERPL-CCNC: 159 U/L (ref 33–136)
ALT SERPL W P-5'-P-CCNC: 5 U/L (ref 10–52)
ANION GAP SERPL CALC-SCNC: 19 MMOL/L (ref 10–20)
AST SERPL W P-5'-P-CCNC: 11 U/L (ref 9–39)
BACTERIA FLD CULT: NORMAL
BASOPHILS # BLD AUTO: 0.02 X10*3/UL (ref 0–0.1)
BASOPHILS NFR BLD AUTO: 0.4 %
BILIRUB SERPL-MCNC: 0.5 MG/DL (ref 0–1.2)
BUN SERPL-MCNC: 72 MG/DL (ref 6–23)
CALCIUM SERPL-MCNC: 9.2 MG/DL (ref 8.6–10.6)
CHLORIDE SERPL-SCNC: 96 MMOL/L (ref 98–107)
CO2 SERPL-SCNC: 25 MMOL/L (ref 21–32)
CREAT SERPL-MCNC: 9.27 MG/DL (ref 0.5–1.3)
EGFRCR SERPLBLD CKD-EPI 2021: 5 ML/MIN/1.73M*2
EOSINOPHIL # BLD AUTO: 0.09 X10*3/UL (ref 0–0.4)
EOSINOPHIL NFR BLD AUTO: 2 %
ERYTHROCYTE [DISTWIDTH] IN BLOOD BY AUTOMATED COUNT: 16.8 % (ref 11.5–14.5)
GLUCOSE BLD MANUAL STRIP-MCNC: 135 MG/DL (ref 74–99)
GLUCOSE BLD MANUAL STRIP-MCNC: 137 MG/DL (ref 74–99)
GLUCOSE BLD MANUAL STRIP-MCNC: 143 MG/DL (ref 74–99)
GLUCOSE BLD MANUAL STRIP-MCNC: 194 MG/DL (ref 74–99)
GLUCOSE SERPL-MCNC: 124 MG/DL (ref 74–99)
GRAM STN SPEC: NORMAL
GRAM STN SPEC: NORMAL
HCT VFR BLD AUTO: 29.7 % (ref 41–52)
HGB BLD-MCNC: 8.7 G/DL (ref 13.5–17.5)
IMM GRANULOCYTES # BLD AUTO: 0.03 X10*3/UL (ref 0–0.5)
IMM GRANULOCYTES NFR BLD AUTO: 0.7 % (ref 0–0.9)
LYMPHOCYTES # BLD AUTO: 0.47 X10*3/UL (ref 0.8–3)
LYMPHOCYTES NFR BLD AUTO: 10.2 %
MAGNESIUM SERPL-MCNC: 2.29 MG/DL (ref 1.6–2.4)
MCH RBC QN AUTO: 29.6 PG (ref 26–34)
MCHC RBC AUTO-ENTMCNC: 29.3 G/DL (ref 32–36)
MCV RBC AUTO: 101 FL (ref 80–100)
MONOCYTES # BLD AUTO: 0.6 X10*3/UL (ref 0.05–0.8)
MONOCYTES NFR BLD AUTO: 13 %
NEUTROPHILS # BLD AUTO: 3.4 X10*3/UL (ref 1.6–5.5)
NEUTROPHILS NFR BLD AUTO: 73.7 %
NRBC BLD-RTO: 0 /100 WBCS (ref 0–0)
PHOSPHATE SERPL-MCNC: 3.1 MG/DL (ref 2.5–4.9)
PLATELET # BLD AUTO: 154 X10*3/UL (ref 150–450)
POTASSIUM SERPL-SCNC: 4.2 MMOL/L (ref 3.5–5.3)
PROT SERPL-MCNC: 7.8 G/DL (ref 6.4–8.2)
RBC # BLD AUTO: 2.94 X10*6/UL (ref 4.5–5.9)
SODIUM SERPL-SCNC: 136 MMOL/L (ref 136–145)
WBC # BLD AUTO: 4.6 X10*3/UL (ref 4.4–11.3)

## 2024-04-21 PROCEDURE — 2500000004 HC RX 250 GENERAL PHARMACY W/ HCPCS (ALT 636 FOR OP/ED): Performed by: STUDENT IN AN ORGANIZED HEALTH CARE EDUCATION/TRAINING PROGRAM

## 2024-04-21 PROCEDURE — 2500000006 HC RX 250 W HCPCS SELF ADMINISTERED DRUGS (ALT 637 FOR ALL PAYERS): Performed by: STUDENT IN AN ORGANIZED HEALTH CARE EDUCATION/TRAINING PROGRAM

## 2024-04-21 PROCEDURE — 2500000002 HC RX 250 W HCPCS SELF ADMINISTERED DRUGS (ALT 637 FOR MEDICARE OP, ALT 636 FOR OP/ED): Performed by: STUDENT IN AN ORGANIZED HEALTH CARE EDUCATION/TRAINING PROGRAM

## 2024-04-21 PROCEDURE — 84100 ASSAY OF PHOSPHORUS: CPT

## 2024-04-21 PROCEDURE — 2500000001 HC RX 250 WO HCPCS SELF ADMINISTERED DRUGS (ALT 637 FOR MEDICARE OP): Performed by: STUDENT IN AN ORGANIZED HEALTH CARE EDUCATION/TRAINING PROGRAM

## 2024-04-21 PROCEDURE — 99232 SBSQ HOSP IP/OBS MODERATE 35: CPT | Performed by: INTERNAL MEDICINE

## 2024-04-21 PROCEDURE — 82947 ASSAY GLUCOSE BLOOD QUANT: CPT

## 2024-04-21 PROCEDURE — 83735 ASSAY OF MAGNESIUM: CPT | Performed by: STUDENT IN AN ORGANIZED HEALTH CARE EDUCATION/TRAINING PROGRAM

## 2024-04-21 PROCEDURE — 2500000006 HC RX 250 W HCPCS SELF ADMINISTERED DRUGS (ALT 637 FOR ALL PAYERS): Mod: MUE | Performed by: STUDENT IN AN ORGANIZED HEALTH CARE EDUCATION/TRAINING PROGRAM

## 2024-04-21 PROCEDURE — 80069 RENAL FUNCTION PANEL: CPT | Mod: CCI | Performed by: INTERNAL MEDICINE

## 2024-04-21 PROCEDURE — 83540 ASSAY OF IRON: CPT

## 2024-04-21 PROCEDURE — 36415 COLL VENOUS BLD VENIPUNCTURE: CPT | Performed by: STUDENT IN AN ORGANIZED HEALTH CARE EDUCATION/TRAINING PROGRAM

## 2024-04-21 PROCEDURE — 85025 COMPLETE CBC W/AUTO DIFF WBC: CPT | Performed by: STUDENT IN AN ORGANIZED HEALTH CARE EDUCATION/TRAINING PROGRAM

## 2024-04-21 PROCEDURE — 80053 COMPREHEN METABOLIC PANEL: CPT | Performed by: STUDENT IN AN ORGANIZED HEALTH CARE EDUCATION/TRAINING PROGRAM

## 2024-04-21 PROCEDURE — 1200000002 HC GENERAL ROOM WITH TELEMETRY DAILY

## 2024-04-21 PROCEDURE — 82728 ASSAY OF FERRITIN: CPT

## 2024-04-21 RX ORDER — ISOSORBIDE DINITRATE 10 MG/1
10 TABLET ORAL
Status: DISCONTINUED | OUTPATIENT
Start: 2024-04-21 | End: 2024-04-24 | Stop reason: HOSPADM

## 2024-04-21 RX ADMIN — INSULIN LISPRO 1 UNITS: 100 INJECTION, SOLUTION INTRAVENOUS; SUBCUTANEOUS at 12:45

## 2024-04-21 RX ADMIN — ROSUVASTATIN CALCIUM 20 MG: 20 TABLET, FILM COATED ORAL at 20:28

## 2024-04-21 RX ADMIN — ISOSORBIDE DINITRATE 10 MG: 10 TABLET ORAL at 18:49

## 2024-04-21 RX ADMIN — ISOSORBIDE DINITRATE 10 MG: 10 TABLET ORAL at 14:32

## 2024-04-21 RX ADMIN — HEPARIN SODIUM 5000 UNITS: 5000 INJECTION INTRAVENOUS; SUBCUTANEOUS at 14:32

## 2024-04-21 RX ADMIN — HEPARIN SODIUM 5000 UNITS: 5000 INJECTION INTRAVENOUS; SUBCUTANEOUS at 06:13

## 2024-04-21 RX ADMIN — PANTOPRAZOLE SODIUM 40 MG: 40 TABLET, DELAYED RELEASE ORAL at 06:13

## 2024-04-21 RX ADMIN — GABAPENTIN 300 MG: 300 CAPSULE ORAL at 09:13

## 2024-04-21 RX ADMIN — Medication 400 MG: at 09:13

## 2024-04-21 RX ADMIN — INSULIN DETEMIR 5 UNITS: 100 INJECTION, SOLUTION SUBCUTANEOUS at 09:13

## 2024-04-21 RX ADMIN — AMLODIPINE BESYLATE 10 MG: 10 TABLET ORAL at 09:13

## 2024-04-21 RX ADMIN — HYDRALAZINE HYDROCHLORIDE 100 MG: 100 TABLET, FILM COATED ORAL at 14:32

## 2024-04-21 RX ADMIN — HYDRALAZINE HYDROCHLORIDE 100 MG: 100 TABLET, FILM COATED ORAL at 20:28

## 2024-04-21 RX ADMIN — HYDRALAZINE HYDROCHLORIDE 100 MG: 100 TABLET, FILM COATED ORAL at 09:13

## 2024-04-21 RX ADMIN — HEPARIN SODIUM 5000 UNITS: 5000 INJECTION INTRAVENOUS; SUBCUTANEOUS at 20:28

## 2024-04-21 ASSESSMENT — COGNITIVE AND FUNCTIONAL STATUS - GENERAL
DRESSING REGULAR LOWER BODY CLOTHING: A LITTLE
HELP NEEDED FOR BATHING: A LITTLE
WALKING IN HOSPITAL ROOM: A LITTLE
CLIMB 3 TO 5 STEPS WITH RAILING: A LITTLE
MOVING TO AND FROM BED TO CHAIR: A LITTLE
CLIMB 3 TO 5 STEPS WITH RAILING: A LITTLE
MOBILITY SCORE: 20
DRESSING REGULAR LOWER BODY CLOTHING: A LITTLE
DRESSING REGULAR UPPER BODY CLOTHING: A LITTLE
STANDING UP FROM CHAIR USING ARMS: A LITTLE
DAILY ACTIVITIY SCORE: 21
DAILY ACTIVITIY SCORE: 21
MOVING TO AND FROM BED TO CHAIR: A LITTLE
DRESSING REGULAR UPPER BODY CLOTHING: A LITTLE
STANDING UP FROM CHAIR USING ARMS: A LITTLE
MOBILITY SCORE: 20
HELP NEEDED FOR BATHING: A LITTLE
WALKING IN HOSPITAL ROOM: A LITTLE

## 2024-04-21 ASSESSMENT — PAIN - FUNCTIONAL ASSESSMENT
PAIN_FUNCTIONAL_ASSESSMENT: 0-10
PAIN_FUNCTIONAL_ASSESSMENT: 0-10

## 2024-04-21 ASSESSMENT — PAIN SCALES - GENERAL
PAINLEVEL_OUTOF10: 0 - NO PAIN

## 2024-04-21 ASSESSMENT — PAIN SCALES - WONG BAKER: WONGBAKER_NUMERICALRESPONSE: NO HURT

## 2024-04-21 NOTE — PROGRESS NOTES
"Temo Hanson is a 73 y.o. male on day 4 of admission presenting with Third degree heart block (Multi).    Subjective   NAEO. States that he had some leg cramping associated with HD yesterday. Otherwise, tolerated it well. Has not had any drainage from her pericardial drain in 24 hours. No dysphagia or epigastric pain with the full liquid diet.     Denies any new chest pain, shortness of breath, abdominal pain, N/V/D.     Objective   Vitals:    04/21/24 0727   BP: 165/69   Pulse: 77   Resp: 18   Temp: 37 °C (98.6 °F)   SpO2: 94%       Physical Exam  Constitutional: Well-developed male in no acute distress, sitting up in chair  HEENT: Normocephalic, atraumatic. PERRL. EOMI  Respiratory: CTAB, mildly decreased air movement.  Cardiovascular: RRR, systolic murmur present. Pericardial draining serosanguinous fluid.  Abdominal: Soft, mildly distended, nontender to palpation.   Neuro: Moving all extremities, AOx3  MSK: No LE edema bilaterally. LUE AVF with palpable thrill.   Skin: Warm, dry. No rashes or wounds.  Psych: Appropriate mood and affect.    Last Recorded Vitals  Blood pressure 165/69, pulse 77, temperature 37 °C (98.6 °F), temperature source Temporal, resp. rate 18, height 1.93 m (6' 4\"), weight 81.3 kg (179 lb 3.7 oz), SpO2 94%.  Intake/Output last 3 Shifts:  I/O last 3 completed shifts:  In: 2840 (34.9 mL/kg) [P.O.:840; I.V.:800 (9.8 mL/kg); Other:1200]  Out: 1663 (20.5 mL/kg) [Urine:425 (0.1 mL/kg/hr); Drains:50; Other:1188]  Weight: 81.3 kg     Relevant Results  Scheduled medications  amLODIPine, 10 mg, oral, Daily  gabapentin, 300 mg, oral, Daily  heparin (porcine), 5,000 Units, subcutaneous, q8h KASSY  hydrALAZINE, 100 mg, oral, TID  insulin detemir, 5 Units, subcutaneous, q12h  insulin lispro, 0-5 Units, subcutaneous, TID with meals  magnesium oxide, 400 mg, oral, Daily  pantoprazole, 40 mg, oral, Daily before breakfast  rosuvastatin, 20 mg, oral, Nightly      Continuous medications     PRN " medications  PRN medications: acetaminophen, dextrose, dextrose, glucagon, glucagon    Results for orders placed or performed during the hospital encounter of 04/17/24 (from the past 24 hour(s))   Hemoglobin A1c   Result Value Ref Range    Hemoglobin A1C 5.7 (H) see below %    Estimated Average Glucose 117 Not Established mg/dL   POCT GLUCOSE   Result Value Ref Range    POCT Glucose 141 (H) 74 - 99 mg/dL   POCT GLUCOSE   Result Value Ref Range    POCT Glucose 140 (H) 74 - 99 mg/dL   POCT GLUCOSE   Result Value Ref Range    POCT Glucose 135 (H) 74 - 99 mg/dL   CBC and Auto Differential   Result Value Ref Range    WBC 4.6 4.4 - 11.3 x10*3/uL    nRBC 0.0 0.0 - 0.0 /100 WBCs    RBC 2.94 (L) 4.50 - 5.90 x10*6/uL    Hemoglobin 8.7 (L) 13.5 - 17.5 g/dL    Hematocrit 29.7 (L) 41.0 - 52.0 %     (H) 80 - 100 fL    MCH 29.6 26.0 - 34.0 pg    MCHC 29.3 (L) 32.0 - 36.0 g/dL    RDW 16.8 (H) 11.5 - 14.5 %    Platelets 154 150 - 450 x10*3/uL    Neutrophils % 73.7 40.0 - 80.0 %    Immature Granulocytes %, Automated 0.7 0.0 - 0.9 %    Lymphocytes % 10.2 13.0 - 44.0 %    Monocytes % 13.0 2.0 - 10.0 %    Eosinophils % 2.0 0.0 - 6.0 %    Basophils % 0.4 0.0 - 2.0 %    Neutrophils Absolute 3.40 1.60 - 5.50 x10*3/uL    Immature Granulocytes Absolute, Automated 0.03 0.00 - 0.50 x10*3/uL    Lymphocytes Absolute 0.47 (L) 0.80 - 3.00 x10*3/uL    Monocytes Absolute 0.60 0.05 - 0.80 x10*3/uL    Eosinophils Absolute 0.09 0.00 - 0.40 x10*3/uL    Basophils Absolute 0.02 0.00 - 0.10 x10*3/uL     Imaging:  TTE 4/18  1. Limited echocardiogram.   2. Left ventricular systolic function is normal with a 60-65% estimated ejection fraction.   3. Moderately enlarged right ventricle.   4. There is mildly reduced right ventricular systolic function.   5. There is a moderate pericardial effusion.   6. Mild diastolic collapse of the right atrium and early right ventricular diastolic collapse. Echocardiographic findings are consistent with cardiac  tamponade.   7. Compared with the prior study dated 3/29/2024, pericardial effusion size has increased. Signs of increased pericardial pressure are now present. Primary team aware of findings.     Principal Problem:    Third degree heart block (Multi)  Active Problems:    Pericardial effusion (HHS-HCC)  Temo Hanson is a 73 y.o. male with pmhx of CKD stage 5 s/p fistula placement, not on dialysis, HTN, DLD, T2DM, severe TR, and CHB transferred to the CICU for CHB and concern for mod to large pericardial effusion, s/p pericardial drain 4/18 due to concern for tamponade physiology. Transferred to floors on 4/19.     Updates 04/21/24:  - No output in pericardial drain over the past 24 hours, ordered TTE prior to pulling drain by interventional cardiology (will reach out post-TTE)  - Follow up on EP recs regarding placement of PPM - likely outpatient  - Start on isordil 10mg TID     #Pericardial Effusion s/p pericardial drain placed on 4/18  ::small effusion noted on TTE in February now appears increased in size on bedside POCUS assessment  - gentle boluses as needed  - Pericardial fluid studies: 68% neutrophils, culture NGTD 3+ PMNs  - No drainage over the past 24 hrs, likely can remove today     #High Grade AV block  ::noted on ECG in September but patient lost to follow up regarding ppm  -EP consulted for ppm eval - will likely place prior to DC  -tele while admitted  -avoid av shaniqua blocking agents - hold home coreg 3.125mg  -PPM likely outpatient      #CKD Stage 5 started HD on 4/19  ::LUE fistula cleared for use, follows with transplant nephro outpatient  -consulted transplant nephro, appreciate recs  -Initiated dialysis on 4/19 - plan for HD on 4/19, 4/20, 4/22  -cont. Iron tablets  -holding torsemide  -Cont. Lokelma as needed  -daily RFPs    #HTN   :: Home medications: coreg 3.125mg BID, doxazosin 8mg daily, hydralazine 100mg TID, torsemide 100 qAM, 50mg qPM,   - Currently on hydralazine 100mg TID, amlodipine  10mg   - Will start on Isordil 10mg TID today    #Bilateral Pleural Effusions R >L  ::satting well, breathing comfortably on RA  - Will continue to monitor      #T2DM  ::home levemir 30u BID, lispro sliding scale  -cont SSI,   -half dose levemir 15u while admitted and increase back to home dose as tolerated     F: as needed  E: replete prn   N: renal diet  A: PIVs  DVT PPX: subcutaneous heparin  NOK: Daughter Erica Carolina: [phone]     CODE STATUS: FULL CODE (confirmed on admission)    Zainab Sanders MD

## 2024-04-21 NOTE — CARE PLAN
The patient's goals for the shift include      The clinical goals for the shift include pt will be free frm pain throughout shift

## 2024-04-21 NOTE — CARE PLAN
The patient's goals for the shift include  eat without being in pain.     The clinical goals for the shift include pt will sit up eob or get to chair for all meals.    Over the shift, the patient made progress in reaching these goals. Pt was able to eat small meals to avoid epigastric pain and sat edge of bed or in chair for all meals.

## 2024-04-22 ENCOUNTER — APPOINTMENT (OUTPATIENT)
Dept: DIALYSIS | Facility: HOSPITAL | Age: 74
End: 2024-04-22
Payer: COMMERCIAL

## 2024-04-22 LAB
ALBUMIN SERPL BCP-MCNC: 3.2 G/DL (ref 3.4–5)
ALBUMIN SERPL BCP-MCNC: 3.3 G/DL (ref 3.4–5)
ALP SERPL-CCNC: 158 U/L (ref 33–136)
ALT SERPL W P-5'-P-CCNC: 11 U/L (ref 10–52)
ANION GAP SERPL CALC-SCNC: 18 MMOL/L (ref 10–20)
ANION GAP SERPL CALC-SCNC: 19 MMOL/L (ref 10–20)
AST SERPL W P-5'-P-CCNC: 14 U/L (ref 9–39)
BASOPHILS # BLD AUTO: 0.03 X10*3/UL (ref 0–0.1)
BASOPHILS NFR BLD AUTO: 0.5 %
BILIRUB SERPL-MCNC: 0.5 MG/DL (ref 0–1.2)
BUN SERPL-MCNC: 72 MG/DL (ref 6–23)
BUN SERPL-MCNC: 73 MG/DL (ref 6–23)
CALCIUM SERPL-MCNC: 9.1 MG/DL (ref 8.6–10.6)
CALCIUM SERPL-MCNC: 9.2 MG/DL (ref 8.6–10.6)
CHLORIDE SERPL-SCNC: 95 MMOL/L (ref 98–107)
CHLORIDE SERPL-SCNC: 96 MMOL/L (ref 98–107)
CO2 SERPL-SCNC: 25 MMOL/L (ref 21–32)
CO2 SERPL-SCNC: 27 MMOL/L (ref 21–32)
CREAT SERPL-MCNC: 9.27 MG/DL (ref 0.5–1.3)
CREAT SERPL-MCNC: 9.79 MG/DL (ref 0.5–1.3)
EGFRCR SERPLBLD CKD-EPI 2021: 5 ML/MIN/1.73M*2
EGFRCR SERPLBLD CKD-EPI 2021: 5 ML/MIN/1.73M*2
EOSINOPHIL # BLD AUTO: 0.16 X10*3/UL (ref 0–0.4)
EOSINOPHIL NFR BLD AUTO: 2.7 %
ERYTHROCYTE [DISTWIDTH] IN BLOOD BY AUTOMATED COUNT: 16.3 % (ref 11.5–14.5)
FERRITIN SERPL-MCNC: 1911 NG/ML (ref 20–300)
GLUCOSE BLD MANUAL STRIP-MCNC: 119 MG/DL (ref 74–99)
GLUCOSE BLD MANUAL STRIP-MCNC: 131 MG/DL (ref 74–99)
GLUCOSE BLD MANUAL STRIP-MCNC: 161 MG/DL (ref 74–99)
GLUCOSE BLD MANUAL STRIP-MCNC: 168 MG/DL (ref 74–99)
GLUCOSE SERPL-MCNC: 118 MG/DL (ref 74–99)
GLUCOSE SERPL-MCNC: 124 MG/DL (ref 74–99)
HBV CORE AB SER QL: REACTIVE
HBV SURFACE AB SER-ACNC: >1000 MIU/ML
HBV SURFACE AG SERPL QL IA: NONREACTIVE
HCT VFR BLD AUTO: 26.4 % (ref 41–52)
HGB BLD-MCNC: 9 G/DL (ref 13.5–17.5)
IMM GRANULOCYTES # BLD AUTO: 0.02 X10*3/UL (ref 0–0.5)
IMM GRANULOCYTES NFR BLD AUTO: 0.3 % (ref 0–0.9)
IRON SATN MFR SERPL: 30 % (ref 25–45)
IRON SERPL-MCNC: 43 UG/DL (ref 35–150)
LYMPHOCYTES # BLD AUTO: 0.86 X10*3/UL (ref 0.8–3)
LYMPHOCYTES NFR BLD AUTO: 14.6 %
MAGNESIUM SERPL-MCNC: 2.16 MG/DL (ref 1.6–2.4)
MCH RBC QN AUTO: 31.4 PG (ref 26–34)
MCHC RBC AUTO-ENTMCNC: 34.1 G/DL (ref 32–36)
MCV RBC AUTO: 92 FL (ref 80–100)
MONOCYTES # BLD AUTO: 0.63 X10*3/UL (ref 0.05–0.8)
MONOCYTES NFR BLD AUTO: 10.7 %
NEUTROPHILS # BLD AUTO: 4.21 X10*3/UL (ref 1.6–5.5)
NEUTROPHILS NFR BLD AUTO: 71.2 %
NRBC BLD-RTO: 0 /100 WBCS (ref 0–0)
PHOSPHATE SERPL-MCNC: 2.6 MG/DL (ref 2.5–4.9)
PHOSPHATE SERPL-MCNC: 3.1 MG/DL (ref 2.5–4.9)
PLATELET # BLD AUTO: 191 X10*3/UL (ref 150–450)
POTASSIUM SERPL-SCNC: 4.2 MMOL/L (ref 3.5–5.3)
POTASSIUM SERPL-SCNC: 4.4 MMOL/L (ref 3.5–5.3)
PROT SERPL-MCNC: 7.6 G/DL (ref 6.4–8.2)
RBC # BLD AUTO: 2.87 X10*6/UL (ref 4.5–5.9)
SODIUM SERPL-SCNC: 136 MMOL/L (ref 136–145)
SODIUM SERPL-SCNC: 136 MMOL/L (ref 136–145)
TIBC SERPL-MCNC: 141 UG/DL (ref 240–445)
UIBC SERPL-MCNC: 98 UG/DL (ref 110–370)
WBC # BLD AUTO: 5.9 X10*3/UL (ref 4.4–11.3)

## 2024-04-22 PROCEDURE — 99232 SBSQ HOSP IP/OBS MODERATE 35: CPT | Performed by: STUDENT IN AN ORGANIZED HEALTH CARE EDUCATION/TRAINING PROGRAM

## 2024-04-22 PROCEDURE — 1200000002 HC GENERAL ROOM WITH TELEMETRY DAILY

## 2024-04-22 PROCEDURE — 36415 COLL VENOUS BLD VENIPUNCTURE: CPT | Performed by: INTERNAL MEDICINE

## 2024-04-22 PROCEDURE — 36415 COLL VENOUS BLD VENIPUNCTURE: CPT | Performed by: STUDENT IN AN ORGANIZED HEALTH CARE EDUCATION/TRAINING PROGRAM

## 2024-04-22 PROCEDURE — 85025 COMPLETE CBC W/AUTO DIFF WBC: CPT | Performed by: STUDENT IN AN ORGANIZED HEALTH CARE EDUCATION/TRAINING PROGRAM

## 2024-04-22 PROCEDURE — 80053 COMPREHEN METABOLIC PANEL: CPT | Performed by: STUDENT IN AN ORGANIZED HEALTH CARE EDUCATION/TRAINING PROGRAM

## 2024-04-22 PROCEDURE — 87340 HEPATITIS B SURFACE AG IA: CPT | Performed by: INTERNAL MEDICINE

## 2024-04-22 PROCEDURE — 83735 ASSAY OF MAGNESIUM: CPT | Performed by: STUDENT IN AN ORGANIZED HEALTH CARE EDUCATION/TRAINING PROGRAM

## 2024-04-22 PROCEDURE — 2500000001 HC RX 250 WO HCPCS SELF ADMINISTERED DRUGS (ALT 637 FOR MEDICARE OP)

## 2024-04-22 PROCEDURE — 86704 HEP B CORE ANTIBODY TOTAL: CPT | Performed by: INTERNAL MEDICINE

## 2024-04-22 PROCEDURE — 2500000002 HC RX 250 W HCPCS SELF ADMINISTERED DRUGS (ALT 637 FOR MEDICARE OP, ALT 636 FOR OP/ED): Performed by: STUDENT IN AN ORGANIZED HEALTH CARE EDUCATION/TRAINING PROGRAM

## 2024-04-22 PROCEDURE — 2500000004 HC RX 250 GENERAL PHARMACY W/ HCPCS (ALT 636 FOR OP/ED): Performed by: STUDENT IN AN ORGANIZED HEALTH CARE EDUCATION/TRAINING PROGRAM

## 2024-04-22 PROCEDURE — 84100 ASSAY OF PHOSPHORUS: CPT

## 2024-04-22 PROCEDURE — 2500000006 HC RX 250 W HCPCS SELF ADMINISTERED DRUGS (ALT 637 FOR ALL PAYERS): Mod: MUE | Performed by: STUDENT IN AN ORGANIZED HEALTH CARE EDUCATION/TRAINING PROGRAM

## 2024-04-22 PROCEDURE — 2500000006 HC RX 250 W HCPCS SELF ADMINISTERED DRUGS (ALT 637 FOR ALL PAYERS)

## 2024-04-22 PROCEDURE — RXMED WILLOW AMBULATORY MEDICATION CHARGE

## 2024-04-22 PROCEDURE — 2500000006 HC RX 250 W HCPCS SELF ADMINISTERED DRUGS (ALT 637 FOR ALL PAYERS): Performed by: STUDENT IN AN ORGANIZED HEALTH CARE EDUCATION/TRAINING PROGRAM

## 2024-04-22 PROCEDURE — 86706 HEP B SURFACE ANTIBODY: CPT | Performed by: INTERNAL MEDICINE

## 2024-04-22 PROCEDURE — 90935 HEMODIALYSIS ONE EVALUATION: CPT | Performed by: INTERNAL MEDICINE

## 2024-04-22 PROCEDURE — 2500000001 HC RX 250 WO HCPCS SELF ADMINISTERED DRUGS (ALT 637 FOR MEDICARE OP): Performed by: STUDENT IN AN ORGANIZED HEALTH CARE EDUCATION/TRAINING PROGRAM

## 2024-04-22 PROCEDURE — 8010000001 HC DIALYSIS - HEMODIALYSIS PER DAY

## 2024-04-22 PROCEDURE — 82947 ASSAY GLUCOSE BLOOD QUANT: CPT

## 2024-04-22 PROCEDURE — 2500000006 HC RX 250 W HCPCS SELF ADMINISTERED DRUGS (ALT 637 FOR ALL PAYERS): Mod: MUE

## 2024-04-22 RX ORDER — ISOSORBIDE DINITRATE 10 MG/1
10 TABLET ORAL
Qty: 90 TABLET | Refills: 0 | Status: SHIPPED | OUTPATIENT
Start: 2024-04-22 | End: 2024-05-24

## 2024-04-22 RX ORDER — AMLODIPINE BESYLATE 10 MG/1
10 TABLET ORAL DAILY
Qty: 30 TABLET | Refills: 0 | Status: SHIPPED | OUTPATIENT
Start: 2024-04-23 | End: 2024-05-08 | Stop reason: SDUPTHER

## 2024-04-22 RX ORDER — GABAPENTIN 300 MG/1
300 CAPSULE ORAL DAILY
Qty: 30 CAPSULE | Refills: 0 | Status: SHIPPED | OUTPATIENT
Start: 2024-04-23 | End: 2024-05-24

## 2024-04-22 RX ORDER — HYDRALAZINE HYDROCHLORIDE 100 MG/1
100 TABLET, FILM COATED ORAL 3 TIMES DAILY
Qty: 90 TABLET | Refills: 0 | Status: SHIPPED | OUTPATIENT
Start: 2024-04-22 | End: 2024-05-22

## 2024-04-22 RX ORDER — VALSARTAN 160 MG/1
160 TABLET ORAL DAILY
Status: DISCONTINUED | OUTPATIENT
Start: 2024-04-22 | End: 2024-04-24 | Stop reason: HOSPADM

## 2024-04-22 RX ADMIN — INSULIN LISPRO 1 UNITS: 100 INJECTION, SOLUTION INTRAVENOUS; SUBCUTANEOUS at 11:19

## 2024-04-22 RX ADMIN — HEPARIN SODIUM 5000 UNITS: 5000 INJECTION INTRAVENOUS; SUBCUTANEOUS at 21:02

## 2024-04-22 RX ADMIN — ISOSORBIDE DINITRATE 10 MG: 10 TABLET ORAL at 08:10

## 2024-04-22 RX ADMIN — ROSUVASTATIN CALCIUM 20 MG: 20 TABLET, FILM COATED ORAL at 21:03

## 2024-04-22 RX ADMIN — HYDRALAZINE HYDROCHLORIDE 100 MG: 100 TABLET, FILM COATED ORAL at 08:10

## 2024-04-22 RX ADMIN — VALSARTAN 160 MG: 160 TABLET, FILM COATED ORAL at 17:40

## 2024-04-22 RX ADMIN — INSULIN DETEMIR 5 UNITS: 100 INJECTION, SOLUTION SUBCUTANEOUS at 08:56

## 2024-04-22 RX ADMIN — GABAPENTIN 300 MG: 300 CAPSULE ORAL at 08:10

## 2024-04-22 RX ADMIN — HYDRALAZINE HYDROCHLORIDE 100 MG: 100 TABLET, FILM COATED ORAL at 17:40

## 2024-04-22 RX ADMIN — HEPARIN SODIUM 5000 UNITS: 5000 INJECTION INTRAVENOUS; SUBCUTANEOUS at 17:36

## 2024-04-22 RX ADMIN — HYDRALAZINE HYDROCHLORIDE 100 MG: 100 TABLET, FILM COATED ORAL at 21:03

## 2024-04-22 RX ADMIN — AMLODIPINE BESYLATE 10 MG: 10 TABLET ORAL at 08:10

## 2024-04-22 RX ADMIN — PANTOPRAZOLE SODIUM 40 MG: 40 TABLET, DELAYED RELEASE ORAL at 06:18

## 2024-04-22 RX ADMIN — INSULIN DETEMIR 5 UNITS: 100 INJECTION, SOLUTION SUBCUTANEOUS at 21:02

## 2024-04-22 RX ADMIN — HEPARIN SODIUM 5000 UNITS: 5000 INJECTION INTRAVENOUS; SUBCUTANEOUS at 06:18

## 2024-04-22 RX ADMIN — Medication 400 MG: at 08:10

## 2024-04-22 RX ADMIN — RENO CAPS 1 CAPSULE: 100; 1.5; 1.7; 20; 10; 1; 150; 5; 6 CAPSULE ORAL at 17:36

## 2024-04-22 ASSESSMENT — PAIN SCALES - GENERAL
PAINLEVEL_OUTOF10: 0 - NO PAIN

## 2024-04-22 ASSESSMENT — PAIN - FUNCTIONAL ASSESSMENT
PAIN_FUNCTIONAL_ASSESSMENT: 0-10
PAIN_FUNCTIONAL_ASSESSMENT: NO/DENIES PAIN

## 2024-04-22 ASSESSMENT — COGNITIVE AND FUNCTIONAL STATUS - GENERAL
WALKING IN HOSPITAL ROOM: A LITTLE
CLIMB 3 TO 5 STEPS WITH RAILING: A LITTLE
MOVING TO AND FROM BED TO CHAIR: A LITTLE
MOBILITY SCORE: 20
STANDING UP FROM CHAIR USING ARMS: A LITTLE

## 2024-04-22 NOTE — CONSULTS
"Nutrition Initial Assessment:   Nutrition Assessment    Reason for Assessment: Admission nursing screening (MST score of 3)    Patient is a 73 y.o. male presenting with Third degree heart block PMH of ESRD s/p fistula (started on 4/19 on HD), HTN, DLD, T2DM, severe TR, and CHB transferred to the CICU for CHB and concern for mod to large pericardial effusion.       Nutrition History:  Energy Intake: Good > 75 %  Food and Nutrient History: Pt reports a variable appetite and to be tolerating diet well. Denied any N/V/D/C at this time, did not report any pain with eating. At home Pt uses Glucerna shakes once a day and agreeable to receive that during hospital stay. Pt reports diet order to be \"3 different diets\" and Pt does not want to be on a liquid diet anymore, requesting regular diet. Pt does report some weight loss over the last couple of months. No other nutrition related questions or concerns at this time  Food Allergies/Intolerances:  None  GI Symptoms: None  Oral Problems: None       Anthropometrics:  Height: 193 cm (6' 3.98\")   Weight: 81.2 kg (179 lb 0.2 oz)   BMI (Calculated): 21.8  IBW/kg (Dietitian Calculated): 91.8 kg  Percent of IBW: 88 %       Weight History:   Wt Readings from Last 20 Encounters:   04/22/24 81.2 kg (179 lb 0.2 oz)   04/17/24 88 kg (194 lb)   04/03/24 87.1 kg (192 lb)   02/23/24 86.6 kg (191 lb)   11/01/23 83.5 kg (184 lb)   10/24/23 82.8 kg (182 lb 8.7 oz)   09/26/23 86.6 kg (191 lb)   09/06/23 85.3 kg (188 lb)   04/06/23 101 kg (222 lb 9 oz)   02/23/23 95.7 kg (211 lb)   01/26/23 92.8 kg (204 lb 8 oz)   02/09/22 101 kg (223 lb 4.8 oz)   09/29/21 105 kg (231 lb 14.4 oz)   02/24/21 106 kg (234 lb)   01/19/21 102 kg (225 lb 3.2 oz)      Weight Change %:  Weight History / % Weight Change: Noting a 13# weight loss x 1 month 6.8% significant  Significant Weight Loss: Yes  Interpretation of Weight Loss: >5% in 1 month    Nutrition Focused Physical Exam Findings:    Subcutaneous Fat Loss: "   Orbital Fat Pads: Mild-Moderate (slight dark circles and slight hollowing)  Buccal Fat Pads: Mild-Moderate (flat cheeks, minimal bounce)  Triceps: Well nourished (ample fat tissue)  Muscle Wasting:  Temporalis: Mild-Moderate (slight depression)  Pectoralis (Clavicular Region): Mild-Moderate (some protrusion of clavicle)  Deltoid/Trapezius: Well nourished (rounded appearance at arm, shoulder, neck)  Interosseous: Mild-Moderate (slightly depressed area between thumb and forefinger)  Edema:  Edema: none  Physical Findings:  Skin: Negative    Nutrition Significant Labs:  BMP Trend:   Results from last 7 days   Lab Units 04/22/24  0655 04/21/24  0739 04/20/24  0801 04/19/24  0441   GLUCOSE mg/dL 118* 124* 131* 98   CALCIUM mg/dL 9.1 9.2 8.9 8.4*   SODIUM mmol/L 136 136 133* 134*   POTASSIUM mmol/L 4.4 4.2 4.6 5.2   CO2 mmol/L 27 25 23 21   CHLORIDE mmol/L 95* 96* 95* 94*   BUN mg/dL 73* 72* 105* 134*   CREATININE mg/dL 9.79* 9.27* 13.02* 14.88*    , A1C:  Lab Results   Component Value Date    HGBA1C 5.7 (H) 04/20/2024   , BG POCT trend:   Results from last 7 days   Lab Units 04/22/24  0726 04/21/24  2215 04/21/24  1740 04/21/24  1151 04/21/24  0610   POCT GLUCOSE mg/dL 131* 143* 137* 194* 135*    , Renal Lab Trend:   Results from last 7 days   Lab Units 04/22/24  0655 04/21/24  0739 04/20/24  0801 04/19/24  0441 04/19/24  0441   POTASSIUM mmol/L 4.4 4.2 4.6  --  5.2   PHOSPHORUS mg/dL 2.6 3.1 4.9   < >  --    SODIUM mmol/L 136 136 133*  --  134*   MAGNESIUM mg/dL 2.16 2.29 2.31  --  2.18   EGFR mL/min/1.73m*2 5* 5* 4*  --  3*   BUN mg/dL 73* 72* 105*  --  134*   CREATININE mg/dL 9.79* 9.27* 13.02*  --  14.88*    < > = values in this interval not displayed.        Nutrition Specific Medications:  Reviewed    I/O:   Last BM Date: 04/19/24;      Dietary Orders (From admission, onward)       Start     Ordered    04/20/24 1217  Adult diet 2-3 grams sodium, Full Liquid  Diet effective now        Question Answer Comment    Diet type 2-3 grams sodium    Diet type Full Liquid        04/20/24 1216                     Estimated Needs:   Total Energy Estimated Needs (kCal): 2400 kCal (6487-2181)  Method for Estimating Needs: 30 kcals/kg of ABW  Total Protein Estimated Needs (g): 100 g (100+)  Method for Estimating Needs: 1.2-1.5 gm/kg of ABW  Total Fluid Estimated Needs (mL): 2400 mL  Method for Estimating Needs: 1mL/kcal or per team        Nutrition Diagnosis   Malnutrition Diagnosis  Patient has Malnutrition Diagnosis: Yes  Diagnosis Status: New  Malnutrition Diagnosis: Moderate malnutrition related to chronic disease or condition  As Evidenced by: Mild to moderate muscle mass and adipose tissue loss, 6.8% significant weight loss x 1 month    Nutrition Diagnosis  Patient has Nutrition Diagnosis: Yes  Diagnosis Status (1): New  Nutrition Diagnosis 1: Increased nutrient needs  Related to (1): increased metabolic demands  As Evidenced by (1): ESRD on HD       Nutrition Interventions/Recommendations         Nutrition Prescription:  Individualized Nutrition Prescription Provided for : 2400 kcal, 100+ gm PRO        Nutrition Interventions:   Interventions: Meals and snacks, Medical food supplement   RDN will  adjust diet order from adult 2-3 gm Sodium, full liquid diet to Adult diet 2-3 gm Sodium to better meet nutrition needs   Medical Food Supplement: Commercial beverage   Recommend Glucerna shake to aid in Po intake- provides 220 kcal and 10 gm PRO per serving     Continue to document meal intake percent     Nutrition Education:   - discussed importance of adequate Protein intake        Nutrition Monitoring and Evaluation   Food/Nutrient Related History Monitoring  Monitoring and Evaluation Plan: Energy intake, Amount of food  Energy Intake: Estimated energy intake  Criteria: >75% of energy needs met via PO+ supplements  Amount of Food: Estimated amout of food  Criteria: >50% of meals and supplements    Body Composition/Growth/Weight  History  Monitoring and Evaluation Plan: Weight  Criteria: no sig weight changes    Biochemical Data, Medical Tests and Procedures  Monitoring and Evaluation Plan: Glucose/endocrine profile  Glucose/Endocrine Profile: Glucose, casual, Glucose, fasting  Criteria: <180            Time Spent/Follow-up Reminder:   Time Spent (min): 45 minutes  Last Date of Nutrition Visit: 04/22/24  Nutrition Follow-Up Needed?: Dietitian to reassess per policy

## 2024-04-22 NOTE — PROGRESS NOTES
Renal Staff HD Note    Patient seen, examined on dialysis.  He gets dialysis through left upper arm graft.  Tolerating treatment without event.  His blood pressure is high.    -Valsartan 160 mg once a day  -Continue treatment per submitted orders

## 2024-04-22 NOTE — NURSING NOTE
Report from Sending RN:    Report From: Callie ( PALMIRA)  Recent Surgery of Procedure: Yes, april cardio drain 04/202/24  Baseline Level of Consciousness (LOC): a/o x 4  Oxygen Use: No  Type: none  Diabetic: Yes  Last BP Med Given Day of Dialysis: yes  Last Pain Med Given: yes  Lab Tests to be Obtained with Dialysis: No  Blood Transfusion to be Given During Dialysis: No  Available IV Access: Yes  Medications to be Administered During Dialysis: No  Continuous IV Infusion Running: No  Restraints on Currently or in the Last 24 Hours: No  Hand-Off Communication: No acute overnight or morning events; vss; Pt did take morning medications; pt will not need labs; pt  may go off unit without telemetry; Pt is a full code; Rima Melissa RN.  Dialysis Catheter Dressing: Fistula  Last Dressing Change: Fistula

## 2024-04-22 NOTE — NURSING NOTE
.Report to Receiving RN:    Report To: PALMIRA Ledesma  Time Report Called: [id number]  Hand-Off Communication: Pt tolerated HD well with no issue. No fluid removal /69 HR 77  Complications During Treatment: No  Ultrafiltration Treatment: No  Medications Administered During Dialysis: No  Blood Products Administered During Dialysis: No  Labs Sent During Dialysis: No  Heparin Drip Rate Changes: No  Dialysis Catheter Dressing: AVF  Last Dressing Change: N/A

## 2024-04-22 NOTE — CARE PLAN
The patient's goals for the shift include      The clinical goals for the shift include pt will be safe throghout shift

## 2024-04-22 NOTE — PROGRESS NOTES
"Temo Hanson is a 73 y.o. male on day 5 of admission presenting with Third degree heart block (Multi).    Subjective   NAEO. No new chest pain, shortness of breath, abdominal pain, N/V/D. No drainage from pericardial drain.     Objective   Vitals:    04/22/24 0715   BP: 173/73   Pulse: 69   Resp: 17   Temp: 36 °C (96.8 °F)   SpO2: 92%       Physical Exam  Constitutional: Well-developed male in no acute distress, sitting up in chair  HEENT: Normocephalic, atraumatic. PERRL. EOMI  Respiratory: CTAB, mildly decreased air movement.  Cardiovascular: RRR, systolic murmur present. Pericardial draining serosanguinous fluid.  Abdominal: Soft, mildly distended, nontender to palpation.   Neuro: Moving all extremities, AOx3  MSK: No LE edema bilaterally. LUE AVF with palpable thrill.   Skin: Warm, dry. No rashes or wounds.  Psych: Appropriate mood and affect.    Last Recorded Vitals  Blood pressure 173/73, pulse 69, temperature 36 °C (96.8 °F), temperature source Temporal, resp. rate 17, height 1.93 m (6' 4\"), weight 81.2 kg (179 lb 0.2 oz), SpO2 92%.  Intake/Output last 3 Shifts:  I/O last 3 completed shifts:  In: 240 (3 mL/kg) [P.O.:240]  Out: 0 (0 mL/kg)   Weight: 81.2 kg     Relevant Results  Scheduled medications  amLODIPine, 10 mg, oral, Daily  gabapentin, 300 mg, oral, Daily  heparin (porcine), 5,000 Units, subcutaneous, q8h KASSY  hydrALAZINE, 100 mg, oral, TID  insulin detemir, 5 Units, subcutaneous, q12h  insulin lispro, 0-5 Units, subcutaneous, TID with meals  isosorbide dinitrate, 10 mg, oral, TID  magnesium oxide, 400 mg, oral, Daily  pantoprazole, 40 mg, oral, Daily before breakfast  rosuvastatin, 20 mg, oral, Nightly      Continuous medications     PRN medications  PRN medications: acetaminophen, dextrose, dextrose, glucagon, glucagon    Results for orders placed or performed during the hospital encounter of 04/17/24 (from the past 24 hour(s))   POCT GLUCOSE   Result Value Ref Range    POCT Glucose 194 (H) 74 - " 99 mg/dL   POCT GLUCOSE   Result Value Ref Range    POCT Glucose 137 (H) 74 - 99 mg/dL   POCT GLUCOSE   Result Value Ref Range    POCT Glucose 143 (H) 74 - 99 mg/dL   CBC and Auto Differential   Result Value Ref Range    WBC 5.9 4.4 - 11.3 x10*3/uL    nRBC 0.0 0.0 - 0.0 /100 WBCs    RBC 2.87 (L) 4.50 - 5.90 x10*6/uL    Hemoglobin 9.0 (L) 13.5 - 17.5 g/dL    Hematocrit 26.4 (L) 41.0 - 52.0 %    MCV 92 80 - 100 fL    MCH 31.4 26.0 - 34.0 pg    MCHC 34.1 32.0 - 36.0 g/dL    RDW 16.3 (H) 11.5 - 14.5 %    Platelets 191 150 - 450 x10*3/uL    Neutrophils % 71.2 40.0 - 80.0 %    Immature Granulocytes %, Automated 0.3 0.0 - 0.9 %    Lymphocytes % 14.6 13.0 - 44.0 %    Monocytes % 10.7 2.0 - 10.0 %    Eosinophils % 2.7 0.0 - 6.0 %    Basophils % 0.5 0.0 - 2.0 %    Neutrophils Absolute 4.21 1.60 - 5.50 x10*3/uL    Immature Granulocytes Absolute, Automated 0.02 0.00 - 0.50 x10*3/uL    Lymphocytes Absolute 0.86 0.80 - 3.00 x10*3/uL    Monocytes Absolute 0.63 0.05 - 0.80 x10*3/uL    Eosinophils Absolute 0.16 0.00 - 0.40 x10*3/uL    Basophils Absolute 0.03 0.00 - 0.10 x10*3/uL   Comprehensive Metabolic Panel   Result Value Ref Range    Glucose 118 (H) 74 - 99 mg/dL    Sodium 136 136 - 145 mmol/L    Potassium 4.4 3.5 - 5.3 mmol/L    Chloride 95 (L) 98 - 107 mmol/L    Bicarbonate 27 21 - 32 mmol/L    Anion Gap 18 10 - 20 mmol/L    Urea Nitrogen 73 (H) 6 - 23 mg/dL    Creatinine 9.79 (H) 0.50 - 1.30 mg/dL    eGFR 5 (L) >60 mL/min/1.73m*2    Calcium 9.1 8.6 - 10.6 mg/dL    Albumin 3.2 (L) 3.4 - 5.0 g/dL    Alkaline Phosphatase 158 (H) 33 - 136 U/L    Total Protein 7.6 6.4 - 8.2 g/dL    AST 14 9 - 39 U/L    Bilirubin, Total 0.5 0.0 - 1.2 mg/dL    ALT 11 10 - 52 U/L   Magnesium   Result Value Ref Range    Magnesium 2.16 1.60 - 2.40 mg/dL   Phosphorus   Result Value Ref Range    Phosphorus 2.6 2.5 - 4.9 mg/dL   POCT GLUCOSE   Result Value Ref Range    POCT Glucose 131 (H) 74 - 99 mg/dL     Imaging:  TTE 4/18  1. Limited echocardiogram.    2. Left ventricular systolic function is normal with a 60-65% estimated ejection fraction.   3. Moderately enlarged right ventricle.   4. There is mildly reduced right ventricular systolic function.   5. There is a moderate pericardial effusion.   6. Mild diastolic collapse of the right atrium and early right ventricular diastolic collapse. Echocardiographic findings are consistent with cardiac tamponade.   7. Compared with the prior study dated 3/29/2024, pericardial effusion size has increased. Signs of increased pericardial pressure are now present. Primary team aware of findings.     Principal Problem:    Third degree heart block (Multi)  Active Problems:    Pericardial effusion (HHS-HCC)  Temo Hanson is a 73 y.o. male with pmhx of CKD stage 5 s/p fistula placement, not on dialysis, HTN, DLD, T2DM, severe TR, and CHB transferred to the CICU for CHB and concern for mod to large pericardial effusion, s/p pericardial drain 4/18 due to concern for tamponade physiology. Transferred to floors on 4/19.     Updates 04/22/24:  - No output in pericardial drain over the past 24 hours, ordered TTE prior to pulling drain by interventional cardiology (will reach out post-TTE)  - Follow up EP recs regarding PPM placement prior to dc vs as an outpatient.   - Follow up nephrology recs regarding final HD schedule, likely MWF     #Pericardial Effusion s/p pericardial drain placed on 4/18  ::small effusion noted on TTE in February now appears increased in size on bedside POCUS assessment  - gentle boluses as needed  - Pericardial fluid studies: 68% neutrophils, culture NGTD 3+ PMNs  - No drainage over the past 24 hrs, likely can remove today pending echo     #High Grade AV block  ::noted on ECG in September but patient lost to follow up regarding ppm  -EP consulted for ppm eval - will likely place prior to DC  -tele while admitted  -avoid av shaniqua blocking agents - hold home coreg 3.125mg  -PPM likely outpatient      #CKD Stage  5 started HD on 4/19  ::LUE fistula cleared for use, follows with transplant nephro outpatient  -consulted transplant nephro, appreciate recs  -Initiated dialysis on 4/19 - plan for HD on 4/19, 4/20, 4/22  -cont. Iron tablets  -holding torsemide  -Cont. Lokelma as needed  -daily RFPs    #HTN   :: Home medications: coreg 3.125mg BID, doxazosin 8mg daily, hydralazine 100mg TID, torsemide 100 qAM, 50mg qPM,   - Currently on hydralazine 100mg TID, amlodipine 10mg   - C/w Isordil 10mg TID today    #Bilateral Pleural Effusions R >L  ::satting well, breathing comfortably on RA  - Will continue to monitor      #T2DM  ::home levemir 30u BID, lispro sliding scale  -cont SSI,   -half dose levemir 15u while admitted and increase back to home dose as tolerated     F: as needed  E: replete prn   N: renal diet  A: PIVs  DVT PPX: subcutaneous heparin  NOK: Daughter Erica Carolina: [phone]     CODE STATUS: FULL CODE (confirmed on admission)    Zainab Sanders MD      Cardiology Staff Addendum:    I saw and evaluated the patient on 4/22/2024.  I personally obtained the key and critical portions of the history and physical exam or was physically present for key and critical portions performed by the resident. I reviewed the resident’s documentation and discussed the patient with the resident.  I agree with the resident’s medical decision making as documented/amended in the note.    Jaret Baeza MD

## 2024-04-22 NOTE — PROGRESS NOTES
Referral sent to [facility] for outpatient HD chair as he follows with Dr. Pemberton out of [facility]. HepB panel ordered at this time to complete referral. [place] [facility] [place]    KRISHNA LakeW

## 2024-04-22 NOTE — HOSPITAL COURSE
Temo Hanson is a 73 y.o. male with pmhx of CKD stage 5 s/p fistula placement, not on dialysis, HTN, DLD, T2DM, severe TR, and CHB transferred to the CICU for CHB and concern for mod to large pericardial effusion, s/p pericardial drain 4/18 due to concern for tamponade physiology.     Patient had earlier presented for his cardiology appointment 4/17, for preoperative eval for renal transplant surgery and was found to have 3rd degree heart block with A-V dissociation at which point he was told to present to the ED for further eval. This was preceeded with SOB on exertion and occasional lightheadedness. Of note, he was first found to have CHB in September of 2023 at which point he was being worked up for a pacemaker but was lost to follow up. In the CICU, ECHO was done, concerning for moderate-large pericardial effusion and a pericardial drain was done 4/18 with fluid studies pending. Stopped home coreg. He was additionally initiated on HD during this hospital stay, requiring MWF sessions. Fluid studies showed 3+ PMNs, no growth. Drain was removed on 4/24. Was started on on valsartan 160mg daily and isordil 10mg TID. Continued on his home hydralazine 100mg TID. Will need EP follow up with pacemaker placement. Sent home with sebastian.

## 2024-04-22 NOTE — SIGNIFICANT EVENT
No further output recorded from pericardial drain. Pending limited TTE, will plan to remove pericardial drain at bedside tomorrow.

## 2024-04-23 ENCOUNTER — HOME HEALTH ADMISSION (OUTPATIENT)
Dept: HOME HEALTH SERVICES | Facility: HOME HEALTH | Age: 74
End: 2024-04-23
Payer: COMMERCIAL

## 2024-04-23 ENCOUNTER — APPOINTMENT (OUTPATIENT)
Dept: CARDIOLOGY | Facility: HOSPITAL | Age: 74
DRG: 314 | End: 2024-04-23
Payer: COMMERCIAL

## 2024-04-23 LAB
ALBUMIN SERPL BCP-MCNC: 3.1 G/DL (ref 3.4–5)
ALP SERPL-CCNC: 163 U/L (ref 33–136)
ALT SERPL W P-5'-P-CCNC: 9 U/L (ref 10–52)
ANION GAP SERPL CALC-SCNC: 16 MMOL/L (ref 10–20)
AST SERPL W P-5'-P-CCNC: 16 U/L (ref 9–39)
BASOPHILS # BLD AUTO: 0.02 X10*3/UL (ref 0–0.1)
BASOPHILS NFR BLD AUTO: 0.4 %
BILIRUB SERPL-MCNC: 0.5 MG/DL (ref 0–1.2)
BUN SERPL-MCNC: 37 MG/DL (ref 6–23)
CALCIUM SERPL-MCNC: 9.1 MG/DL (ref 8.6–10.6)
CHLORIDE SERPL-SCNC: 97 MMOL/L (ref 98–107)
CO2 SERPL-SCNC: 30 MMOL/L (ref 21–32)
CREAT SERPL-MCNC: 5.89 MG/DL (ref 0.5–1.3)
EGFRCR SERPLBLD CKD-EPI 2021: 9 ML/MIN/1.73M*2
EOSINOPHIL # BLD AUTO: 0.12 X10*3/UL (ref 0–0.4)
EOSINOPHIL NFR BLD AUTO: 2.3 %
ERYTHROCYTE [DISTWIDTH] IN BLOOD BY AUTOMATED COUNT: 16.4 % (ref 11.5–14.5)
GLUCOSE BLD MANUAL STRIP-MCNC: 124 MG/DL (ref 74–99)
GLUCOSE BLD MANUAL STRIP-MCNC: 142 MG/DL (ref 74–99)
GLUCOSE BLD MANUAL STRIP-MCNC: 74 MG/DL (ref 74–99)
GLUCOSE BLD MANUAL STRIP-MCNC: 89 MG/DL (ref 74–99)
GLUCOSE SERPL-MCNC: 72 MG/DL (ref 74–99)
HCT VFR BLD AUTO: 27.6 % (ref 41–52)
HGB BLD-MCNC: 8.6 G/DL (ref 13.5–17.5)
IMM GRANULOCYTES # BLD AUTO: 0.03 X10*3/UL (ref 0–0.5)
IMM GRANULOCYTES NFR BLD AUTO: 0.6 % (ref 0–0.9)
LYMPHOCYTES # BLD AUTO: 0.63 X10*3/UL (ref 0.8–3)
LYMPHOCYTES NFR BLD AUTO: 12.2 %
MAGNESIUM SERPL-MCNC: 2.06 MG/DL (ref 1.6–2.4)
MCH RBC QN AUTO: 29.8 PG (ref 26–34)
MCHC RBC AUTO-ENTMCNC: 31.2 G/DL (ref 32–36)
MCV RBC AUTO: 96 FL (ref 80–100)
MONOCYTES # BLD AUTO: 0.72 X10*3/UL (ref 0.05–0.8)
MONOCYTES NFR BLD AUTO: 14 %
NEUTROPHILS # BLD AUTO: 3.63 X10*3/UL (ref 1.6–5.5)
NEUTROPHILS NFR BLD AUTO: 70.5 %
NRBC BLD-RTO: 0 /100 WBCS (ref 0–0)
PHOSPHATE SERPL-MCNC: 1.8 MG/DL (ref 2.5–4.9)
PLATELET # BLD AUTO: 163 X10*3/UL (ref 150–450)
POTASSIUM SERPL-SCNC: 4.6 MMOL/L (ref 3.5–5.3)
PROT SERPL-MCNC: 7.5 G/DL (ref 6.4–8.2)
RBC # BLD AUTO: 2.89 X10*6/UL (ref 4.5–5.9)
SODIUM SERPL-SCNC: 138 MMOL/L (ref 136–145)
WBC # BLD AUTO: 5.2 X10*3/UL (ref 4.4–11.3)

## 2024-04-23 PROCEDURE — 99232 SBSQ HOSP IP/OBS MODERATE 35: CPT | Performed by: STUDENT IN AN ORGANIZED HEALTH CARE EDUCATION/TRAINING PROGRAM

## 2024-04-23 PROCEDURE — 80053 COMPREHEN METABOLIC PANEL: CPT | Performed by: STUDENT IN AN ORGANIZED HEALTH CARE EDUCATION/TRAINING PROGRAM

## 2024-04-23 PROCEDURE — 2500000001 HC RX 250 WO HCPCS SELF ADMINISTERED DRUGS (ALT 637 FOR MEDICARE OP): Performed by: STUDENT IN AN ORGANIZED HEALTH CARE EDUCATION/TRAINING PROGRAM

## 2024-04-23 PROCEDURE — 82947 ASSAY GLUCOSE BLOOD QUANT: CPT

## 2024-04-23 PROCEDURE — 85025 COMPLETE CBC W/AUTO DIFF WBC: CPT | Performed by: STUDENT IN AN ORGANIZED HEALTH CARE EDUCATION/TRAINING PROGRAM

## 2024-04-23 PROCEDURE — 36415 COLL VENOUS BLD VENIPUNCTURE: CPT | Performed by: STUDENT IN AN ORGANIZED HEALTH CARE EDUCATION/TRAINING PROGRAM

## 2024-04-23 PROCEDURE — 2500000006 HC RX 250 W HCPCS SELF ADMINISTERED DRUGS (ALT 637 FOR ALL PAYERS)

## 2024-04-23 PROCEDURE — 2500000006 HC RX 250 W HCPCS SELF ADMINISTERED DRUGS (ALT 637 FOR ALL PAYERS): Mod: MUE | Performed by: STUDENT IN AN ORGANIZED HEALTH CARE EDUCATION/TRAINING PROGRAM

## 2024-04-23 PROCEDURE — 93321 DOPPLER ECHO F-UP/LMTD STD: CPT | Performed by: INTERNAL MEDICINE

## 2024-04-23 PROCEDURE — 99231 SBSQ HOSP IP/OBS SF/LOW 25: CPT | Performed by: NURSE PRACTITIONER

## 2024-04-23 PROCEDURE — 1200000002 HC GENERAL ROOM WITH TELEMETRY DAILY

## 2024-04-23 PROCEDURE — 93308 TTE F-UP OR LMTD: CPT

## 2024-04-23 PROCEDURE — 2500000004 HC RX 250 GENERAL PHARMACY W/ HCPCS (ALT 636 FOR OP/ED)

## 2024-04-23 PROCEDURE — 97116 GAIT TRAINING THERAPY: CPT | Mod: GP,CQ

## 2024-04-23 PROCEDURE — 84100 ASSAY OF PHOSPHORUS: CPT

## 2024-04-23 PROCEDURE — 93321 DOPPLER ECHO F-UP/LMTD STD: CPT

## 2024-04-23 PROCEDURE — 2500000006 HC RX 250 W HCPCS SELF ADMINISTERED DRUGS (ALT 637 FOR ALL PAYERS): Performed by: STUDENT IN AN ORGANIZED HEALTH CARE EDUCATION/TRAINING PROGRAM

## 2024-04-23 PROCEDURE — 93308 TTE F-UP OR LMTD: CPT | Performed by: INTERNAL MEDICINE

## 2024-04-23 PROCEDURE — 2500000004 HC RX 250 GENERAL PHARMACY W/ HCPCS (ALT 636 FOR OP/ED): Performed by: STUDENT IN AN ORGANIZED HEALTH CARE EDUCATION/TRAINING PROGRAM

## 2024-04-23 PROCEDURE — 83735 ASSAY OF MAGNESIUM: CPT | Performed by: STUDENT IN AN ORGANIZED HEALTH CARE EDUCATION/TRAINING PROGRAM

## 2024-04-23 PROCEDURE — 2500000006 HC RX 250 W HCPCS SELF ADMINISTERED DRUGS (ALT 637 FOR ALL PAYERS): Mod: MUE

## 2024-04-23 PROCEDURE — 2500000001 HC RX 250 WO HCPCS SELF ADMINISTERED DRUGS (ALT 637 FOR MEDICARE OP)

## 2024-04-23 RX ORDER — LORAZEPAM 2 MG/ML
0.5 INJECTION INTRAMUSCULAR EVERY 5 MIN PRN
Status: DISCONTINUED | OUTPATIENT
Start: 2024-04-23 | End: 2024-04-23

## 2024-04-23 RX ORDER — AMOXICILLIN 250 MG
1 CAPSULE ORAL ONCE
Status: COMPLETED | OUTPATIENT
Start: 2024-04-23 | End: 2024-04-23

## 2024-04-23 RX ORDER — METOPROLOL TARTRATE 50 MG/1
100 TABLET ORAL ONCE AS NEEDED
Status: DISCONTINUED | OUTPATIENT
Start: 2024-04-23 | End: 2024-04-23

## 2024-04-23 RX ORDER — METOPROLOL TARTRATE 1 MG/ML
5 INJECTION, SOLUTION INTRAVENOUS ONCE AS NEEDED
Status: DISCONTINUED | OUTPATIENT
Start: 2024-04-23 | End: 2024-04-23

## 2024-04-23 RX ORDER — METOPROLOL TARTRATE 1 MG/ML
5 INJECTION, SOLUTION INTRAVENOUS ONCE
Status: DISCONTINUED | OUTPATIENT
Start: 2024-04-23 | End: 2024-04-24 | Stop reason: HOSPADM

## 2024-04-23 RX ORDER — METOPROLOL TARTRATE 50 MG/1
100 TABLET ORAL ONCE
Status: DISCONTINUED | OUTPATIENT
Start: 2024-04-23 | End: 2024-04-24 | Stop reason: HOSPADM

## 2024-04-23 RX ORDER — NITROGLYCERIN 0.4 MG/1
0.8 TABLET SUBLINGUAL ONCE
Status: DISCONTINUED | OUTPATIENT
Start: 2024-04-23 | End: 2024-04-24 | Stop reason: HOSPADM

## 2024-04-23 RX ORDER — POLYETHYLENE GLYCOL 3350 17 G/17G
17 POWDER, FOR SOLUTION ORAL DAILY
Status: DISCONTINUED | OUTPATIENT
Start: 2024-04-23 | End: 2024-04-24 | Stop reason: HOSPADM

## 2024-04-23 RX ADMIN — RENO CAPS 1 CAPSULE: 100; 1.5; 1.7; 20; 10; 1; 150; 5; 6 CAPSULE ORAL at 08:34

## 2024-04-23 RX ADMIN — HYDRALAZINE HYDROCHLORIDE 100 MG: 100 TABLET, FILM COATED ORAL at 20:54

## 2024-04-23 RX ADMIN — AMLODIPINE BESYLATE 10 MG: 10 TABLET ORAL at 08:34

## 2024-04-23 RX ADMIN — SENNOSIDES AND DOCUSATE SODIUM 1 TABLET: 50; 8.6 TABLET ORAL at 11:58

## 2024-04-23 RX ADMIN — HYDRALAZINE HYDROCHLORIDE 100 MG: 100 TABLET, FILM COATED ORAL at 14:07

## 2024-04-23 RX ADMIN — Medication 400 MG: at 08:34

## 2024-04-23 RX ADMIN — PANTOPRAZOLE SODIUM 40 MG: 40 TABLET, DELAYED RELEASE ORAL at 06:25

## 2024-04-23 RX ADMIN — INSULIN DETEMIR 5 UNITS: 100 INJECTION, SOLUTION SUBCUTANEOUS at 08:33

## 2024-04-23 RX ADMIN — INSULIN DETEMIR 5 UNITS: 100 INJECTION, SOLUTION SUBCUTANEOUS at 20:54

## 2024-04-23 RX ADMIN — ISOSORBIDE DINITRATE 10 MG: 10 TABLET ORAL at 08:34

## 2024-04-23 RX ADMIN — HYDRALAZINE HYDROCHLORIDE 100 MG: 100 TABLET, FILM COATED ORAL at 08:34

## 2024-04-23 RX ADMIN — POLYETHYLENE GLYCOL 3350 17 G: 17 POWDER, FOR SOLUTION ORAL at 15:15

## 2024-04-23 RX ADMIN — ISOSORBIDE DINITRATE 10 MG: 10 TABLET ORAL at 21:00

## 2024-04-23 RX ADMIN — VALSARTAN 160 MG: 160 TABLET, FILM COATED ORAL at 08:34

## 2024-04-23 RX ADMIN — HEPARIN SODIUM 5000 UNITS: 5000 INJECTION INTRAVENOUS; SUBCUTANEOUS at 21:02

## 2024-04-23 RX ADMIN — HEPARIN SODIUM 5000 UNITS: 5000 INJECTION INTRAVENOUS; SUBCUTANEOUS at 06:25

## 2024-04-23 RX ADMIN — GABAPENTIN 300 MG: 300 CAPSULE ORAL at 08:34

## 2024-04-23 RX ADMIN — ISOSORBIDE DINITRATE 10 MG: 10 TABLET ORAL at 14:07

## 2024-04-23 RX ADMIN — ROSUVASTATIN CALCIUM 20 MG: 20 TABLET, FILM COATED ORAL at 20:54

## 2024-04-23 ASSESSMENT — COGNITIVE AND FUNCTIONAL STATUS - GENERAL
MOBILITY SCORE: 17
STANDING UP FROM CHAIR USING ARMS: A LITTLE
MOVING TO AND FROM BED TO CHAIR: A LITTLE
STANDING UP FROM CHAIR USING ARMS: A LITTLE
DAILY ACTIVITIY SCORE: 21
MOVING FROM LYING ON BACK TO SITTING ON SIDE OF FLAT BED WITH BEDRAILS: A LITTLE
CLIMB 3 TO 5 STEPS WITH RAILING: A LOT
WALKING IN HOSPITAL ROOM: A LITTLE
WALKING IN HOSPITAL ROOM: A LITTLE
STANDING UP FROM CHAIR USING ARMS: A LITTLE
DAILY ACTIVITIY SCORE: 21
STANDING UP FROM CHAIR USING ARMS: A LITTLE
MOBILITY SCORE: 20
MOBILITY SCORE: 20
MOVING TO AND FROM BED TO CHAIR: A LITTLE
DRESSING REGULAR UPPER BODY CLOTHING: A LITTLE
DRESSING REGULAR LOWER BODY CLOTHING: A LITTLE
MOVING FROM LYING ON BACK TO SITTING ON SIDE OF FLAT BED WITH BEDRAILS: A LITTLE
CLIMB 3 TO 5 STEPS WITH RAILING: A LOT
WALKING IN HOSPITAL ROOM: A LITTLE
DRESSING REGULAR LOWER BODY CLOTHING: A LITTLE
TURNING FROM BACK TO SIDE WHILE IN FLAT BAD: A LITTLE
CLIMB 3 TO 5 STEPS WITH RAILING: A LITTLE
CLIMB 3 TO 5 STEPS WITH RAILING: A LITTLE
HELP NEEDED FOR BATHING: A LITTLE
TURNING FROM BACK TO SIDE WHILE IN FLAT BAD: A LITTLE
DAILY ACTIVITIY SCORE: 21
MOVING TO AND FROM BED TO CHAIR: A LITTLE
MOVING TO AND FROM BED TO CHAIR: A LITTLE
HELP NEEDED FOR BATHING: A LITTLE
DRESSING REGULAR LOWER BODY CLOTHING: A LITTLE
DRESSING REGULAR UPPER BODY CLOTHING: A LITTLE
WALKING IN HOSPITAL ROOM: A LITTLE
DRESSING REGULAR UPPER BODY CLOTHING: A LITTLE
HELP NEEDED FOR BATHING: A LITTLE
MOBILITY SCORE: 17

## 2024-04-23 ASSESSMENT — PAIN SCALES - GENERAL
PAINLEVEL_OUTOF10: 0 - NO PAIN

## 2024-04-23 ASSESSMENT — PAIN - FUNCTIONAL ASSESSMENT
PAIN_FUNCTIONAL_ASSESSMENT: 0-10

## 2024-04-23 ASSESSMENT — PAIN SCALES - WONG BAKER: WONGBAKER_NUMERICALRESPONSE: NO HURT

## 2024-04-23 NOTE — PROGRESS NOTES
Patient had requested to speak with a  regarding some social concerns. LSW met with patient and learned that he wanted information pertaining to his dialysis; wanting to know if he had a chair at [facility] and the times of his sessions. LSW explained to the patient that chair time has been secured. LSW provided him the location and patient is familiar with the facility. Patient reported no additional needs.

## 2024-04-23 NOTE — CARE PLAN
Problem: Skin  Goal: Prevent/minimize sheer/friction injuries  Outcome: Progressing  Flowsheets (Taken 4/23/2024 1705)  Prevent/minimize sheer/friction injuries: Increase activity/out of bed for meals     Problem: Pain - Adult  Goal: Verbalizes/displays adequate comfort level or baseline comfort level  Outcome: Progressing     Problem: Safety - Adult  Goal: Free from fall injury  Outcome: Progressing     Patient remained stable. Had pericardial drain pulled. Patient requiring 2L NC this shift. Plan for HD tomorrow.

## 2024-04-23 NOTE — PROGRESS NOTES
Physical Therapy    Physical Therapy Treatment    Patient Name: Temo Hanson  MRN: [id number]  Today's Date: 4/23/2024  Time Calculation  Start Time: 1629  Stop Time: 1646  Time Calculation (min): 17 min       Assessment/Plan   PT Assessment  End of Session Communication: Bedside nurse  Assessment Comment: Patient demonstrated ability to increase ambulation distance using no AD. Patient needed HHA during first trial due to slight unsteadiness during gait. Patient was not up to doing stairs today. Low intensity therapy remains appropriate upon d/c.  End of Session Patient Position: Alarm off, caregiver present (seated EOB)     PT Plan  Treatment/Interventions: Bed mobility, Transfer training, Gait training, Stair training, Balance training, Strengthening, Endurance training, Therapeutic exercise, Therapeutic activity  PT Plan: Skilled PT  PT Frequency: 3 times per week  PT Discharge Recommendations: Low intensity level of continued care  PT Recommended Transfer Status: Assist x1  PT - OK to Discharge: Yes      General Visit Information:   PT  Visit  PT Received On: 04/23/24  General  Patient Position Received: Bed, 2 rail up, Alarm off, not on at start of session  General Comment: Patient pleasant and agreeable to therapy.    Subjective   Precautions:     Vital Signs:       Objective   Pain:  Pain Assessment  Pain Assessment: 0-10  Pain Score: 0 - No pain  Cognition:  Cognition  Overall Cognitive Status: Within Functional Limits  Orientation Level: Oriented X4       Treatments:  Therapeutic Exercise  Therapeutic Exercise Performed: Yes  Therapeutic Exercise Activity 1: Instructed patient in seated therapeutic exercises to warmup for gait. APx20, LAQs x10.         Bed Mobility  Bed Mobility: Yes  Bed Mobility 1  Bed Mobility 1: Supine to sitting  Level of Assistance 1: Distant supervision  Bed Mobility Comments 1: HOB elevated    Ambulation/Gait Training  Ambulation/Gait Training Performed: Yes  Ambulation/Gait  Training 1  Surface 1: Level tile  Device 1: No device  Assistance 1: Contact guard  Comments/Distance (ft) 1: 5' to the rest room  Ambulation/Gait Training 2  Surface 2: Level tile  Device 2: No device  Assistance 2: Close supervision (HHA)  Comments/Distance (ft) 2: 2x125' with a seated rest break in between trials. HHA during first trial.  Transfers  Transfer: Yes  Transfer 1  Transfer From 1: Sit to, Stand to  Transfer to 1: Sit, Stand  Technique 1: Sit to stand, Stand to sit  Transfer Level of Assistance 1: Close supervision  Trials/Comments 1: x3    Outcome Measures:  [facility] Basic Mobility  Turning from your back to your side while in a flat bed without using bedrails: A little  Moving from lying on your back to sitting on the side of a flat bed without using bedrails: A little  Moving to and from bed to chair (including a wheelchair): A little  Standing up from a chair using your arms (e.g. wheelchair or bedside chair): A little  To walk in hospital room: A little  Climbing 3-5 steps with railing: A lot  Basic Mobility - Total Score: 17    Education Documentation  Mobility Training, taught by DALTON Raymond at 4/23/2024  5:05 PM.  Learner: Patient  Readiness: Acceptance  Method: Explanation  Response: Verbalizes Understanding    Education Comments  No comments found.        OP EDUCATION:       Encounter Problems       Encounter Problems (Active)       Balance       Pt will score >24 on Tinetti for low risk of falls (Progressing)       Start:  04/19/24    Expected End:  05/03/24               Mobility       Patient will ambulate >500 ft with LRAD and supervision with no pathway deviation (Progressing)       Start:  04/19/24    Expected End:  05/03/24            Patient will ascend and descend 10 stairs with handrail and supervision (Progressing)       Start:  04/19/24    Expected End:  05/03/24               PT Transfers       Patient will perform bed mobility indep (Progressing)       Start:  04/19/24     Expected End:  05/03/24            Patient will transfer sit to and from stand indep (Progressing)       Start:  04/19/24    Expected End:  05/03/24               Pain - Adult

## 2024-04-23 NOTE — PROGRESS NOTES
Subjective Data:  No acute events overnight, patient feeling well.     Overnight Events:    None      Objective Data:  Last Recorded Vitals:  Vitals:    04/23/24 0500 04/23/24 0714 04/23/24 0818 04/23/24 1122   BP:  178/77  174/70   BP Location:       Patient Position:       Pulse:  70  78   Resp:  18  17   Temp:  36.7 °C (98 °F)  37.7 °C (99.9 °F)   TempSrc:       SpO2:  (!) 88% 93% 94%   Weight: 83.3 kg (183 lb 10.3 oz)      Height:           Last Labs:  CBC - 4/23/2024:  7:43 AM  5.2 8.6 163    27.6      CMP - 4/23/2024:  7:43 AM  9.1 7.5 16 --- 0.5   1.8 3.1 9 163      PTT - 4/18/2024:  1:46 AM  1.3   15.0 31     TROPHS   Date/Time Value Ref Range Status   04/17/2024 08:51 PM 22 0 - 53 ng/L Final   04/17/2024 06:24 PM 21 0 - 53 ng/L Final     BNP   Date/Time Value Ref Range Status   04/17/2024 06:24  0 - 99 pg/mL Final     HGBA1C   Date/Time Value Ref Range Status   04/20/2024 08:42 AM 5.7 see below % Final   02/21/2023 01:51 PM 7.3 4.0 - 5.6 % Final   01/26/2023 03:54 PM 6.5 % Final     Comment:          Diagnosis of Diabetes-Adults   Non-Diabetic: < or = 5.6%   Increased risk for developing diabetes: 5.7-6.4%   Diagnostic of diabetes: > or = 6.5%  .       Monitoring of Diabetes                Age (y)     Therapeutic Goal (%)   Adults:          >18           <7.0   Pediatrics:    13-18           <7.5                   7-12           <8.0                   0- 6            7.5-8.5   American Diabetes Association. Diabetes Care 33(S1), Jan 2010.     12/01/2022 11:11 AM 6.4 4.0 - 5.6 % Final      Last I/O:  I/O last 3 completed shifts:  In: 1000 (12 mL/kg) [I.V.:600 (7.2 mL/kg); Other:400]  Out: 450 (5.4 mL/kg) [Urine:50 (0 mL/kg/hr); Other:400]  Weight: 83.3 kg     Past Cardiology Tests (Last 3 Years):  EKG:  ECG 12 Lead 04/20/2024 (Preliminary)      ECG 12 lead 04/17/2024      ECG 12 lead (Clinic Performed) 04/17/2024    Echo:  Transthoracic Echo (TTE) Limited 04/18/2024      Transthoracic Echo (TTE)  Limited 04/18/2024      Transthoracic Echo (TTE) Complete 03/29/2024    Ejection Fractions:  EF   Date/Time Value Ref Range Status   03/29/2024 03:06 PM 60 %      Cath:  Cardiac Catheterization Procedure 04/18/2024    Stress Test:  Nuclear Stress Test 01/23/2023      Nuclear Stress Test 03/30/2022    Cardiac Imaging:  No results found for this or any previous visit from the past 1095 days.      Inpatient Medications:  Scheduled medications   Medication Dose Route Frequency    amLODIPine  10 mg oral Daily    B complex-vitamin C-folic acid  1 capsule oral Daily    epoetin nohemi or biosimilar  8,000 Units intravenous Every Mon/Wed/Fri    gabapentin  300 mg oral Daily    heparin (porcine)  5,000 Units subcutaneous q8h KASSY    hydrALAZINE  100 mg oral TID    insulin detemir  5 Units subcutaneous q12h    insulin lispro  0-5 Units subcutaneous TID with meals    isosorbide dinitrate  10 mg oral TID    magnesium oxide  400 mg oral Daily    metoprolol  5 mg intravenous Once    metoprolol tartrate  100 mg oral Once    nitroglycerin  0.8 mg sublingual Once    pantoprazole  40 mg oral Daily before breakfast    rosuvastatin  20 mg oral Nightly    valsartan  160 mg oral Daily     PRN medications   Medication    acetaminophen    dextrose    dextrose    glucagon    glucagon     Continuous Medications   Medication Dose Last Rate       Physical Exam:  General: NAD, lying in bed  Skin: warm and dry   Head/ neck: no JVD seen at 90 degrees  Cardiac: RRR, S1, S2   Pulm: CTAB, room air, O2   GI: soft, distended but non-tender  Extremities: + LE edema, LUE AVF present  Neuro: no focal neuro deficits   Psych: appropriate mood and behavior        Assessment/Plan     Temo Hanson is a 73 y.o. male with pmhx of CKD stage 5 s/p fistula placement, not on dialysis, HTN, DLD, T2DM, severe TR, and CHB transferred to the CICU for CHB and concern for mod to large pericardial effusion, s/p pericardial drain 4/18 due to concern for tamponade physiology.  Transferred to floors on 4/19.     Pericardial Effusion  - Initial TTE on 4/18 showed large pericardial effusion  - s/p successful ultrasound- and fluoroscopy-guided pericardiacentesis of 700 ml serous fluid removal on 4/18  - pericardial fluid sent for analysis  - Pericardial drain was sutured in place and attached to gravity  - 4/23 limited TTE reviewed by interventional fellow, ok for drain removal  - 10 cc drained in last 24 hours  - drain removed at bedside this afternoon; small amount of fluid drained from site after drain removed, pressure held and dressing applied  - patient tolerated drain removal     Interventional Cardiology will sign off.    Angela Mackey CNP  Interventional Cardiology     General Cardiology Consult Pager: [phone] (weekday 7AM-6PM and weekend 7AM-2PM)and other: [phone]  EP Consult Pager: [phone] (weekday 7AM-6PM and weekend 7AM-2PM) and other: [phone]  EP Device Nurse Pager: [phone] (weekday 7AM-4PM)  Advanced Heart Failure Consult Pager: [phone] anytime  CICU Fellow Pager: [phone] anytime  Endovascular /Limb Salvage Team Pager: [phone] for day coverage (8am-5pm)  Interventional Cardiology Fellow Pager: [phone] (7AM-5PM) Night coverage: [facility] Cross Cover [id number]  Structural Heart Team Pager: [phone] anytime  Night coverage: [facility] [id number]        Code Status:  Full Code    I spent 30 minutes in the professional and overall care of this patient.        KARO Sanchez-CNP

## 2024-04-23 NOTE — ED PROCEDURE NOTE
Procedure  Critical Care    Performed by: Brody Zhou MD  Authorized by: Brody Zhou MD    Critical care provider statement:     Critical care time (minutes):  35    Critical care time was exclusive of:  Separately billable procedures and treating other patients    Critical care was necessary to treat or prevent imminent or life-threatening deterioration of the following conditions:  Cardiac failure    Critical care was time spent personally by me on the following activities:  Ordering and review of laboratory studies, ordering and review of radiographic studies, discussions with consultants, re-evaluation of patient's condition, examination of patient and evaluation of patient's response to treatment    Care discussed with: admitting provider                 Brody Zhou MD  04/23/24 0760

## 2024-04-23 NOTE — PROGRESS NOTES
"Temo Hanson is a 73 y.o. male on day 6 of admission presenting with Third degree heart block (Multi).    Subjective   NAEO. This morning, desatted to 88% and was placed on 2L NC. Otherwise, no subjective SOB, denies any CP, abdominal pain. Has not had a BM in 2 days    Objective   Vitals:    04/23/24 1122   BP: 174/70   Pulse: 78   Resp: 17   Temp: 37.7 °C (99.9 °F)   SpO2: 94%       Physical Exam  Constitutional: Well-developed male in no acute distress, sitting up in chair  HEENT: Normocephalic, atraumatic. PERRL. EOMI  Respiratory: CTAB, mildly decreased air movement.  Cardiovascular: RRR, systolic murmur present. Pericardial draining serosanguinous fluid.  Abdominal: Soft, mildly distended, nontender to palpation.   Neuro: Moving all extremities, AOx3  MSK: 1-2+ BLE edema. LUE AVF with palpable thrill.   Skin: Warm, dry. No rashes or wounds.  Psych: Appropriate mood and affect.    Last Recorded Vitals  Blood pressure 174/70, pulse 78, temperature 37.7 °C (99.9 °F), resp. rate 17, height 1.93 m (6' 3.98\"), weight 83.3 kg (183 lb 10.3 oz), SpO2 94%.  Intake/Output last 3 Shifts:  I/O last 3 completed shifts:  In: 1000 (12 mL/kg) [I.V.:600 (7.2 mL/kg); Other:400]  Out: 450 (5.4 mL/kg) [Urine:50 (0 mL/kg/hr); Other:400]  Weight: 83.3 kg     Relevant Results  Scheduled medications  amLODIPine, 10 mg, oral, Daily  B complex-vitamin C-folic acid, 1 capsule, oral, Daily  epoetin nohemi or biosimilar, 8,000 Units, intravenous, Every Mon/Wed/Fri  gabapentin, 300 mg, oral, Daily  heparin (porcine), 5,000 Units, subcutaneous, q8h KASSY  hydrALAZINE, 100 mg, oral, TID  insulin detemir, 5 Units, subcutaneous, q12h  insulin lispro, 0-5 Units, subcutaneous, TID with meals  isosorbide dinitrate, 10 mg, oral, TID  magnesium oxide, 400 mg, oral, Daily  metoprolol, 5 mg, intravenous, Once  metoprolol tartrate, 100 mg, oral, Once  nitroglycerin, 0.8 mg, sublingual, Once  pantoprazole, 40 mg, oral, Daily before " breakfast  rosuvastatin, 20 mg, oral, Nightly  valsartan, 160 mg, oral, Daily      Continuous medications     PRN medications  PRN medications: acetaminophen, dextrose, dextrose, glucagon, glucagon    Results for orders placed or performed during the hospital encounter of 04/17/24 (from the past 24 hour(s))   POCT GLUCOSE   Result Value Ref Range    POCT Glucose 119 (H) 74 - 99 mg/dL   POCT GLUCOSE   Result Value Ref Range    POCT Glucose 161 (H) 74 - 99 mg/dL   POCT GLUCOSE   Result Value Ref Range    POCT Glucose 74 74 - 99 mg/dL   CBC and Auto Differential   Result Value Ref Range    WBC 5.2 4.4 - 11.3 x10*3/uL    nRBC 0.0 0.0 - 0.0 /100 WBCs    RBC 2.89 (L) 4.50 - 5.90 x10*6/uL    Hemoglobin 8.6 (L) 13.5 - 17.5 g/dL    Hematocrit 27.6 (L) 41.0 - 52.0 %    MCV 96 80 - 100 fL    MCH 29.8 26.0 - 34.0 pg    MCHC 31.2 (L) 32.0 - 36.0 g/dL    RDW 16.4 (H) 11.5 - 14.5 %    Platelets 163 150 - 450 x10*3/uL    Neutrophils % 70.5 40.0 - 80.0 %    Immature Granulocytes %, Automated 0.6 0.0 - 0.9 %    Lymphocytes % 12.2 13.0 - 44.0 %    Monocytes % 14.0 2.0 - 10.0 %    Eosinophils % 2.3 0.0 - 6.0 %    Basophils % 0.4 0.0 - 2.0 %    Neutrophils Absolute 3.63 1.60 - 5.50 x10*3/uL    Immature Granulocytes Absolute, Automated 0.03 0.00 - 0.50 x10*3/uL    Lymphocytes Absolute 0.63 (L) 0.80 - 3.00 x10*3/uL    Monocytes Absolute 0.72 0.05 - 0.80 x10*3/uL    Eosinophils Absolute 0.12 0.00 - 0.40 x10*3/uL    Basophils Absolute 0.02 0.00 - 0.10 x10*3/uL   Comprehensive Metabolic Panel   Result Value Ref Range    Glucose 72 (L) 74 - 99 mg/dL    Sodium 138 136 - 145 mmol/L    Potassium 4.6 3.5 - 5.3 mmol/L    Chloride 97 (L) 98 - 107 mmol/L    Bicarbonate 30 21 - 32 mmol/L    Anion Gap 16 10 - 20 mmol/L    Urea Nitrogen 37 (H) 6 - 23 mg/dL    Creatinine 5.89 (H) 0.50 - 1.30 mg/dL    eGFR 9 (L) >60 mL/min/1.73m*2    Calcium 9.1 8.6 - 10.6 mg/dL    Albumin 3.1 (L) 3.4 - 5.0 g/dL    Alkaline Phosphatase 163 (H) 33 - 136 U/L    Total  Protein 7.5 6.4 - 8.2 g/dL    AST 16 9 - 39 U/L    Bilirubin, Total 0.5 0.0 - 1.2 mg/dL    ALT 9 (L) 10 - 52 U/L   Magnesium   Result Value Ref Range    Magnesium 2.06 1.60 - 2.40 mg/dL   Phosphorus   Result Value Ref Range    Phosphorus 1.8 (L) 2.5 - 4.9 mg/dL   Transthoracic Echo (TTE) Limited   Result Value Ref Range    BSA 2.11 m2   POCT GLUCOSE   Result Value Ref Range    POCT Glucose 89 74 - 99 mg/dL     Imaging:  TTE 4/18  1. Limited echocardiogram.   2. Left ventricular systolic function is normal with a 60-65% estimated ejection fraction.   3. Moderately enlarged right ventricle.   4. There is mildly reduced right ventricular systolic function.   5. There is a moderate pericardial effusion.   6. Mild diastolic collapse of the right atrium and early right ventricular diastolic collapse. Echocardiographic findings are consistent with cardiac tamponade.   7. Compared with the prior study dated 3/29/2024, pericardial effusion size has increased. Signs of increased pericardial pressure are now present. Primary team aware of findings.     Principal Problem:    Third degree heart block (Multi)  Active Problems:    Pericardial effusion (HHS-HCC)  Temo Hanson is a 73 y.o. male with pmhx of CKD stage 5 s/p fistula placement, not on dialysis, HTN, DLD, T2DM, severe TR, and CHB transferred to the CICU for CHB and concern for mod to large pericardial effusion, s/p pericardial drain 4/18 due to concern for tamponade physiology. Transferred to floors on 4/19.     Updates 04/23/24:  - s/p echo today - will pull drain today  - Currently on 2L NC, will need HD tomorrow to remove fluid  - BP: started on valsartan 160mg yesterday per nephrology  - Will need a holter monitor tomorrow on discharge; plan is for outpatient PPM per EP  - Coronary CTA planned as outpatient after PPM placement for kidney transplant work-up    #Pericardial Effusion s/p pericardial drain placed on 4/18  ::small effusion noted on TTE in February now  appears increased in size on bedside POCUS assessment  - gentle boluses as needed  - Pericardial fluid studies: 68% neutrophils, culture NGTD 3+ PMNs  - No drainage over the past 24 hrs, likely can remove today pending echo     #High Grade AV block  ::noted on ECG in September but patient lost to follow up regarding ppm  -EP consulted for ppm eval - will likely place prior to DC  -tele while admitted  -avoid av shaniqua blocking agents - hold home coreg 3.125mg  -PPM to be placed as an outpatient, will set up EP appointment     #CKD Stage 5 started HD on 4/19  ::LUE fistula cleared for use, follows with transplant nephro outpatient  -consulted transplant nephro, appreciate recs  -Initiated dialysis on 4/19 - plan for HD on 4/19, 4/20, 4/22  -cont. Iron tablets  -holding torsemide  -Cont. Lokelma as needed  -daily RFPs    #HTN   :: Home medications: coreg 3.125mg BID, doxazosin 8mg daily, hydralazine 100mg TID, torsemide 100 qAM, 50mg qPM,   - Currently on hydralazine 100mg TID, amlodipine 10mg   - C/w Isordil 10mg TID today, valsartan 160mg daily    #Bilateral Pleural Effusions R >L  ::satting well, breathing comfortably on RA  - Will continue to monitor      #T2DM  ::home levemir 30u BID, lispro sliding scale  -cont SSI,   -half dose levemir 15u while admitted and increase back to home dose as tolerated     F: as needed  E: replete prn   N: renal diet  A: PIVs  DVT PPX: subcutaneous heparin  NOK: Daughter Erica Carolina: [phone]     CODE STATUS: FULL CODE (confirmed on admission)    Zainab Sanders MD      Cardiology Staff Addendum:    I saw and evaluated the patient on 4/23/2024.  I personally obtained the key and critical portions of the history and physical exam or was physically present for key and critical portions performed by the resident. I reviewed the resident’s documentation and discussed the patient with the resident.      Per EP, plan is for outpatient permanent pacemaker placement.  After PPM plan  is for coronary CTA for pre-renal transplant evaluation.  Anticipate discharge tomorrow after dialysis if patient remains hemodynamically stable.      I agree with the resident’s medical decision making as documented/amended in the note.    Jaret Baeza MD

## 2024-04-23 NOTE — CARE PLAN
The patient's goals for the shift include      The clinical goals for the shift include pt will be safe throughout shift

## 2024-04-24 ENCOUNTER — ANCILLARY PROCEDURE (OUTPATIENT)
Dept: CARDIOLOGY | Facility: CLINIC | Age: 74
DRG: 314 | End: 2024-04-24
Payer: COMMERCIAL

## 2024-04-24 ENCOUNTER — HOSPITAL ENCOUNTER (INPATIENT)
Dept: CARDIOLOGY | Facility: HOSPITAL | Age: 74
Discharge: HOME | DRG: 314 | End: 2024-04-24
Payer: COMMERCIAL

## 2024-04-24 ENCOUNTER — APPOINTMENT (OUTPATIENT)
Dept: DIALYSIS | Facility: HOSPITAL | Age: 74
End: 2024-04-24
Payer: COMMERCIAL

## 2024-04-24 ENCOUNTER — PHARMACY VISIT (OUTPATIENT)
Dept: PHARMACY | Facility: CLINIC | Age: 74
End: 2024-04-24
Payer: MEDICARE

## 2024-04-24 VITALS
HEIGHT: 76 IN | OXYGEN SATURATION: 100 % | TEMPERATURE: 97.2 F | WEIGHT: 195.55 LBS | HEART RATE: 71 BPM | DIASTOLIC BLOOD PRESSURE: 76 MMHG | BODY MASS INDEX: 23.81 KG/M2 | RESPIRATION RATE: 16 BRPM | SYSTOLIC BLOOD PRESSURE: 177 MMHG

## 2024-04-24 DIAGNOSIS — I44.2 THIRD DEGREE HEART BLOCK (MULTI): ICD-10-CM

## 2024-04-24 PROBLEM — N18.6 ESRD (END STAGE RENAL DISEASE) (MULTI): Status: ACTIVE | Noted: 2024-04-24

## 2024-04-24 PROBLEM — Z99.2 ESRD (END STAGE RENAL DISEASE) ON DIALYSIS (MULTI): Status: ACTIVE | Noted: 2024-04-24

## 2024-04-24 LAB
ALBUMIN SERPL BCP-MCNC: 2.8 G/DL (ref 3.4–5)
ANION GAP SERPL CALC-SCNC: 14 MMOL/L (ref 10–20)
BASOPHILS # BLD AUTO: 0.01 X10*3/UL (ref 0–0.1)
BASOPHILS NFR BLD AUTO: 0.2 %
BUN SERPL-MCNC: 41 MG/DL (ref 6–23)
CALCIUM SERPL-MCNC: 8.4 MG/DL (ref 8.6–10.6)
CHLORIDE SERPL-SCNC: 96 MMOL/L (ref 98–107)
CO2 SERPL-SCNC: 30 MMOL/L (ref 21–32)
CREAT SERPL-MCNC: 7.2 MG/DL (ref 0.5–1.3)
EGFRCR SERPLBLD CKD-EPI 2021: 7 ML/MIN/1.73M*2
EOSINOPHIL # BLD AUTO: 0.06 X10*3/UL (ref 0–0.4)
EOSINOPHIL NFR BLD AUTO: 1.2 %
ERYTHROCYTE [DISTWIDTH] IN BLOOD BY AUTOMATED COUNT: 16.3 % (ref 11.5–14.5)
GLUCOSE BLD MANUAL STRIP-MCNC: 106 MG/DL (ref 74–99)
GLUCOSE BLD MANUAL STRIP-MCNC: 107 MG/DL (ref 74–99)
GLUCOSE BLD MANUAL STRIP-MCNC: 128 MG/DL (ref 74–99)
GLUCOSE BLD MANUAL STRIP-MCNC: 57 MG/DL (ref 74–99)
GLUCOSE BLD MANUAL STRIP-MCNC: 62 MG/DL (ref 74–99)
GLUCOSE SERPL-MCNC: 91 MG/DL (ref 74–99)
HCT VFR BLD AUTO: 24.6 % (ref 41–52)
HGB BLD-MCNC: 7.5 G/DL (ref 13.5–17.5)
IMM GRANULOCYTES # BLD AUTO: 0.02 X10*3/UL (ref 0–0.5)
IMM GRANULOCYTES NFR BLD AUTO: 0.4 % (ref 0–0.9)
LYMPHOCYTES # BLD AUTO: 0.58 X10*3/UL (ref 0.8–3)
LYMPHOCYTES NFR BLD AUTO: 11.4 %
MAGNESIUM SERPL-MCNC: 2 MG/DL (ref 1.6–2.4)
MCH RBC QN AUTO: 29.3 PG (ref 26–34)
MCHC RBC AUTO-ENTMCNC: 30.5 G/DL (ref 32–36)
MCV RBC AUTO: 96 FL (ref 80–100)
MONOCYTES # BLD AUTO: 0.7 X10*3/UL (ref 0.05–0.8)
MONOCYTES NFR BLD AUTO: 13.8 %
NEUTROPHILS # BLD AUTO: 3.71 X10*3/UL (ref 1.6–5.5)
NEUTROPHILS NFR BLD AUTO: 73 %
NRBC BLD-RTO: 0 /100 WBCS (ref 0–0)
PHOSPHATE SERPL-MCNC: 2 MG/DL (ref 2.5–4.9)
PLATELET # BLD AUTO: 143 X10*3/UL (ref 150–450)
POTASSIUM SERPL-SCNC: 5.2 MMOL/L (ref 3.5–5.3)
RBC # BLD AUTO: 2.56 X10*6/UL (ref 4.5–5.9)
SODIUM SERPL-SCNC: 135 MMOL/L (ref 136–145)
WBC # BLD AUTO: 5.1 X10*3/UL (ref 4.4–11.3)

## 2024-04-24 PROCEDURE — 85025 COMPLETE CBC W/AUTO DIFF WBC: CPT

## 2024-04-24 PROCEDURE — 97530 THERAPEUTIC ACTIVITIES: CPT | Mod: GP,CQ

## 2024-04-24 PROCEDURE — 93272 ECG/REVIEW INTERPRET ONLY: CPT | Performed by: INTERNAL MEDICINE

## 2024-04-24 PROCEDURE — 82947 ASSAY GLUCOSE BLOOD QUANT: CPT | Mod: 91

## 2024-04-24 PROCEDURE — 83735 ASSAY OF MAGNESIUM: CPT

## 2024-04-24 PROCEDURE — 97116 GAIT TRAINING THERAPY: CPT | Mod: GP,CQ

## 2024-04-24 PROCEDURE — 80069 RENAL FUNCTION PANEL: CPT

## 2024-04-24 PROCEDURE — 99232 SBSQ HOSP IP/OBS MODERATE 35: CPT | Performed by: STUDENT IN AN ORGANIZED HEALTH CARE EDUCATION/TRAINING PROGRAM

## 2024-04-24 PROCEDURE — 2500000001 HC RX 250 WO HCPCS SELF ADMINISTERED DRUGS (ALT 637 FOR MEDICARE OP): Performed by: STUDENT IN AN ORGANIZED HEALTH CARE EDUCATION/TRAINING PROGRAM

## 2024-04-24 PROCEDURE — 93005 ELECTROCARDIOGRAM TRACING: CPT

## 2024-04-24 PROCEDURE — 2500000006 HC RX 250 W HCPCS SELF ADMINISTERED DRUGS (ALT 637 FOR ALL PAYERS): Mod: MUE

## 2024-04-24 PROCEDURE — 2500000006 HC RX 250 W HCPCS SELF ADMINISTERED DRUGS (ALT 637 FOR ALL PAYERS)

## 2024-04-24 PROCEDURE — RXMED WILLOW AMBULATORY MEDICATION CHARGE

## 2024-04-24 PROCEDURE — 2500000001 HC RX 250 WO HCPCS SELF ADMINISTERED DRUGS (ALT 637 FOR MEDICARE OP)

## 2024-04-24 PROCEDURE — 93270 REMOTE 30 DAY ECG REV/REPORT: CPT

## 2024-04-24 PROCEDURE — 8010000001 HC DIALYSIS - HEMODIALYSIS PER DAY

## 2024-04-24 PROCEDURE — 2500000004 HC RX 250 GENERAL PHARMACY W/ HCPCS (ALT 636 FOR OP/ED): Performed by: STUDENT IN AN ORGANIZED HEALTH CARE EDUCATION/TRAINING PROGRAM

## 2024-04-24 PROCEDURE — 36415 COLL VENOUS BLD VENIPUNCTURE: CPT

## 2024-04-24 RX ORDER — VALSARTAN 160 MG/1
160 TABLET ORAL DAILY
Qty: 30 TABLET | Refills: 2 | Status: SHIPPED | OUTPATIENT
Start: 2024-04-24 | End: 2024-05-08 | Stop reason: SDUPTHER

## 2024-04-24 RX ORDER — ROSUVASTATIN CALCIUM 20 MG/1
20 TABLET, COATED ORAL NIGHTLY
Qty: 30 TABLET | Refills: 2 | Status: SHIPPED | OUTPATIENT
Start: 2024-04-24 | End: 2024-05-08 | Stop reason: SDUPTHER

## 2024-04-24 RX ADMIN — PANTOPRAZOLE SODIUM 40 MG: 40 TABLET, DELAYED RELEASE ORAL at 06:10

## 2024-04-24 RX ADMIN — ISOSORBIDE DINITRATE 10 MG: 10 TABLET ORAL at 08:36

## 2024-04-24 RX ADMIN — GABAPENTIN 300 MG: 300 CAPSULE ORAL at 08:36

## 2024-04-24 RX ADMIN — VALSARTAN 160 MG: 160 TABLET, FILM COATED ORAL at 08:36

## 2024-04-24 RX ADMIN — ISOSORBIDE DINITRATE 10 MG: 10 TABLET ORAL at 18:52

## 2024-04-24 RX ADMIN — HEPARIN SODIUM 5000 UNITS: 5000 INJECTION INTRAVENOUS; SUBCUTANEOUS at 06:10

## 2024-04-24 RX ADMIN — RENO CAPS 1 CAPSULE: 100; 1.5; 1.7; 20; 10; 1; 150; 5; 6 CAPSULE ORAL at 08:36

## 2024-04-24 RX ADMIN — HYDRALAZINE HYDROCHLORIDE 100 MG: 100 TABLET, FILM COATED ORAL at 08:36

## 2024-04-24 RX ADMIN — INSULIN DETEMIR 5 UNITS: 100 INJECTION, SOLUTION SUBCUTANEOUS at 08:36

## 2024-04-24 RX ADMIN — AMLODIPINE BESYLATE 10 MG: 10 TABLET ORAL at 08:36

## 2024-04-24 RX ADMIN — Medication 400 MG: at 08:36

## 2024-04-24 ASSESSMENT — COGNITIVE AND FUNCTIONAL STATUS - GENERAL
WALKING IN HOSPITAL ROOM: A LITTLE
CLIMB 3 TO 5 STEPS WITH RAILING: A LOT
MOVING FROM LYING ON BACK TO SITTING ON SIDE OF FLAT BED WITH BEDRAILS: A LITTLE
WALKING IN HOSPITAL ROOM: A LITTLE
MOBILITY SCORE: 17
STANDING UP FROM CHAIR USING ARMS: A LITTLE
MOVING TO AND FROM BED TO CHAIR: A LITTLE
MOVING TO AND FROM BED TO CHAIR: A LITTLE
TURNING FROM BACK TO SIDE WHILE IN FLAT BAD: A LITTLE
STANDING UP FROM CHAIR USING ARMS: A LITTLE
DAILY ACTIVITIY SCORE: 24

## 2024-04-24 ASSESSMENT — PAIN - FUNCTIONAL ASSESSMENT
PAIN_FUNCTIONAL_ASSESSMENT: 0-10
PAIN_FUNCTIONAL_ASSESSMENT: NO/DENIES PAIN

## 2024-04-24 ASSESSMENT — PAIN SCALES - GENERAL
PAINLEVEL_OUTOF10: 0 - NO PAIN

## 2024-04-24 ASSESSMENT — ACTIVITIES OF DAILY LIVING (ADL): LACK_OF_TRANSPORTATION: NO

## 2024-04-24 NOTE — PROGRESS NOTES
"Temo Hanson is a 73 y.o. male on day 7 of admission presenting with Third degree heart block (Multi).    Subjective   NAEO. Currently on 2L NC, denies any SOB, abdominal pain, CP. Going for dialysis today.      Objective   Vitals:    04/24/24 0716   BP: 161/64   Pulse: 66   Resp: 16   Temp: 35.6 °C (96.1 °F)   SpO2: 93%       Physical Exam  Constitutional: Well-developed male in no acute distress, sitting up in chair  HEENT: Normocephalic, atraumatic. PERRL. EOMI  Respiratory: CTAB, mildly decreased air movement.  Cardiovascular: RRR, systolic murmur present. Pericardial draining serosanguinous fluid.  Abdominal: Soft, mildly distended, nontender to palpation.   Neuro: Moving all extremities, AOx3  MSK: 1-2+ BLE edema. LUE AVF with palpable thrill.   Skin: Warm, dry. No rashes or wounds.  Psych: Appropriate mood and affect.    Last Recorded Vitals  Blood pressure 161/64, pulse 66, temperature 35.6 °C (96.1 °F), temperature source Temporal, resp. rate 16, height 1.93 m (6' 3.98\"), weight 88.7 kg (195 lb 8.8 oz), SpO2 93%.  Intake/Output last 3 Shifts:  I/O last 3 completed shifts:  In: 720 (8.1 mL/kg) [P.O.:720]  Out: 110 (1.2 mL/kg) [Urine:100 (0 mL/kg/hr); Drains:10]  Weight: 88.7 kg     Relevant Results  Scheduled medications  amLODIPine, 10 mg, oral, Daily  B complex-vitamin C-folic acid, 1 capsule, oral, Daily  epoetin nohemi or biosimilar, 8,000 Units, intravenous, Every Mon/Wed/Fri  gabapentin, 300 mg, oral, Daily  heparin (porcine), 5,000 Units, subcutaneous, q8h KASSY  hydrALAZINE, 100 mg, oral, TID  insulin detemir, 5 Units, subcutaneous, q12h  insulin lispro, 0-5 Units, subcutaneous, TID with meals  isosorbide dinitrate, 10 mg, oral, TID  magnesium oxide, 400 mg, oral, Daily  metoprolol, 5 mg, intravenous, Once  metoprolol tartrate, 100 mg, oral, Once  nitroglycerin, 0.8 mg, sublingual, Once  pantoprazole, 40 mg, oral, Daily before breakfast  polyethylene glycol, 17 g, oral, Daily  rosuvastatin, 20 mg, " oral, Nightly  valsartan, 160 mg, oral, Daily      Continuous medications     PRN medications  PRN medications: acetaminophen, dextrose, dextrose, glucagon, glucagon    Results for orders placed or performed during the hospital encounter of 04/17/24 (from the past 24 hour(s))   POCT GLUCOSE   Result Value Ref Range    POCT Glucose 89 74 - 99 mg/dL   POCT GLUCOSE   Result Value Ref Range    POCT Glucose 124 (H) 74 - 99 mg/dL   POCT GLUCOSE   Result Value Ref Range    POCT Glucose 142 (H) 74 - 99 mg/dL   POCT GLUCOSE   Result Value Ref Range    POCT Glucose 106 (H) 74 - 99 mg/dL     Imaging:  TTE 4/18  1. Limited echocardiogram.   2. Left ventricular systolic function is normal with a 60-65% estimated ejection fraction.   3. Moderately enlarged right ventricle.   4. There is mildly reduced right ventricular systolic function.   5. There is a moderate pericardial effusion.   6. Mild diastolic collapse of the right atrium and early right ventricular diastolic collapse. Echocardiographic findings are consistent with cardiac tamponade.   7. Compared with the prior study dated 3/29/2024, pericardial effusion size has increased. Signs of increased pericardial pressure are now present. Primary team aware of findings.     Principal Problem:    Third degree heart block (Multi)  Active Problems:    Pericardial effusion (HHS-HCC)  Temo Hanson is a 73 y.o. male with pmhx of CKD stage 5 s/p fistula placement, not on dialysis, HTN, DLD, T2DM, severe TR, and CHB transferred to the CICU for CHB and concern for mod to large pericardial effusion, s/p pericardial drain 4/18 due to concern for tamponade physiology. Transferred to floors on 4/19.     Updates 04/24/24:  - Currently on 2L NC, will attempt to wean after HD today  - Will need a holter monitor on discharge; plan is for outpatient PPM per EP  - Coronary CTA planned as outpatient after PPM placement for kidney transplant work-up  - Plan for discharge today    #Pericardial  Effusion s/p pericardial drain placed on 4/18  ::small effusion noted on TTE in February now appears increased in size on bedside POCUS assessment  - gentle boluses as needed  - Pericardial fluid studies: 68% neutrophils, culture NGTD 3+ PMNs  - Echo - mild pericardial effusion. Pulled drain 4/23    #High Grade AV block  ::noted on ECG in September but patient lost to follow up regarding ppm  -EP consulted for ppm eval - will likely place prior to DC  -tele while admitted  -avoid av shaniqua blocking agents - hold home coreg 3.125mg  -PPM to be placed as an outpatient, will set up EP appointment     #CKD Stage 5 started HD on 4/19  ::LUE fistula cleared for use, follows with transplant nephro outpatient  -consulted transplant nephro, appreciate recs  -Initiated dialysis on 4/19 - plan for HD on 4/19, 4/20, 4/22  -cont. Iron tablets  -holding torsemide  -Cont. Lokelma as needed  -daily RFPs    #HTN   :: Home medications: coreg 3.125mg BID, doxazosin 8mg daily, hydralazine 100mg TID, torsemide 100 qAM, 50mg qPM,   - Currently on hydralazine 100mg TID, amlodipine 10mg   - C/w Isordil 10mg TID today, valsartan 160mg daily    #Bilateral Pleural Effusions R >L  ::satting well, breathing comfortably on RA  - Will continue to monitor      #T2DM  ::home levemir 30u BID, lispro sliding scale  -cont SSI,   -half dose levemir 15u while admitted and increase back to home dose as tolerated     F: as needed  E: replete prn   N: renal diet  A: PIVs  DVT PPX: subcutaneous heparin  NOK: Daughter Erica Carolina: [phone]     CODE STATUS: FULL CODE (confirmed on admission)    Zainab Sanders MD      Cardiology Staff Addendum:    I saw and evaluated the patient on 4/24/2024.  I personally obtained the key and critical portions of the history and physical exam or was physically present for key and critical portions performed by the resident. I reviewed the resident’s documentation and discussed the patient with the resident.  I agree  with the resident’s medical decision making as documented/amended in the note.    Jaret Baeza MD

## 2024-04-24 NOTE — CARE PLAN
Problem: Safety - Adult  Goal: Free from fall injury  Outcome: Met   The patient's goals for the shift include dialysis, discharge.     The clinical goals for the shift include monitor heart rhythm, remain in NSR, no SOB.    Over the shift, the patient did not make progress toward the following goals. Barriers to progression include none. Recommendations to address these barriers include none.

## 2024-04-24 NOTE — NURSING NOTE
Report to Receiving RN:    Report To: Liliana CHAVIS   Time Report Called: [id number]  Hand-Off Communication: HD completed and tolerated well, no issues during treatment. Removed 2.5L. Post /60 HR 74.   Complications During Treatment: No  Ultrafiltration Treatment: Yes  Medications Administered During Dialysis: No  Blood Products Administered During Dialysis: No  Labs Sent During Dialysis: No  Heparin Drip Rate Changes: N/A  Dialysis Catheter Dressing: N/A  Last Dressing Change: N/A

## 2024-04-24 NOTE — PROGRESS NOTES
Physical Therapy    Physical Therapy Treatment    Patient Name: Temo Hanson  MRN: [id number]  Today's Date: 4/24/2024  Time Calculation  Start Time: 1158  Stop Time: 1226  Time Calculation (min): 28 min       Assessment/Plan   PT Assessment  End of Session Communication: Bedside nurse  Assessment Comment: Patient demonstrated ability to increase ambulation distance using a single point cane. Patient was able to ambulate 300' during the 6MWT with SpO2 levels remaining stable. RN was made aware of how the patient performed. Low intensity therapy remains appropriate upon d/c.  End of Session Patient Position: Alarm off, not on at start of session (seated EOB.)     PT Plan  Treatment/Interventions: Bed mobility, Transfer training, Gait training, Stair training, Balance training, Strengthening, Endurance training, Therapeutic exercise, Therapeutic activity  PT Plan: Skilled PT  PT Frequency: 3 times per week  PT Discharge Recommendations: Low intensity level of continued care  PT Recommended Transfer Status: Assist x1  PT - OK to Discharge: Yes      General Visit Information:   PT  Visit  PT Received On: 04/24/24  General  Prior to Session Communication: Bedside nurse  Patient Position Received: Bed, 2 rail up, Alarm off, not on at start of session  General Comment: Patient pleasant and agreeable to therapy. Bedside nurse asked for therapy to complete a 6MWT and to monitor oxygen levels.    Subjective   Precautions:     Vital Signs:  Vital Signs  SpO2: 90 % (90 on no oxygen, stayed stable during 6MWT. Dropped once sitting EOB, RN was notified)    Objective   Pain:  Pain Assessment  Pain Assessment: 0-10  Pain Score: 0 - No pain  Cognition:  Cognition  Overall Cognitive Status: Within Functional Limits  Orientation Level: Oriented X4  Postural Control:  Static Sitting Balance  Static Sitting-Balance Support: No upper extremity supported, Feet supported  Static Sitting-Level of Assistance: Close supervision  Static  Sitting-Comment/Number of Minutes: Demonstrated quality sitting balance during seated rest breaks with no UE support    Treatments:  Therapeutic Exercise  Therapeutic Exercise Performed: Yes  Therapeutic Exercise Activity 1: Instructed patient in seated therapeutic exercises to warmup for gait. APx20, LAQs x10.    Therapeutic Activity  Therapeutic Activity Performed: Yes  Therapeutic Activity 1: Increased seated rest break time post 6MWT, skilled monitoring of vital signs    Bed Mobility  Bed Mobility: Yes  Bed Mobility 1  Bed Mobility 1: Supine to sitting  Level of Assistance 1: Distant supervision  Bed Mobility Comments 1: HOB elevated    Ambulation/Gait Training  Ambulation/Gait Training Performed: Yes  Ambulation/Gait Training 1  Surface 1: Level tile  Device 1: Single point cane  Assistance 1: Close supervision  Quality of Gait 1:  (decreased krystle)  Comments/Distance (ft) 1: 1x100' to starting point for 6MWT. Seated rest break needed before beginning test.  Ambulation/Gait Training 2  Surface 2: Level tile  Device 2: Single point cane  Assistance 2: Close supervision  Quality of Gait 2:  (decreased krystle)  Comments/Distance (ft) 2: Patient began 6MWT at 90% SpO2, ambulated 300' total during the 6 minutes on no oxygen, requiring multiple seated rest breaks. SpO2 level remained stable during the full 6 minutes.  Ambulation/Gait Training 3  Surface 3: Level tile  Device 3: Cane single point cane  Assistance 3: Close supervision  Quality of Gait 3:  (decreased krystle)  Comments/Distance (ft) 3: 1x150' back to his room using a single point cane.  Transfers  Transfer: Yes  Transfer 1  Transfer From 1: Sit to, Stand to  Transfer to 1: Sit, Stand  Technique 1: Sit to stand, Stand to sit  Transfer Device 1: Cane  Transfer Level of Assistance 1: Close supervision  Trials/Comments 1: x5    Outcome Measures:  [facility] Basic Mobility  Turning from your back to your side while in a flat bed without using bedrails: A  little  Moving from lying on your back to sitting on the side of a flat bed without using bedrails: A little  Moving to and from bed to chair (including a wheelchair): A little  Standing up from a chair using your arms (e.g. wheelchair or bedside chair): A little  To walk in hospital room: A little  Climbing 3-5 steps with railing: A lot  Basic Mobility - Total Score: 17    Education Documentation  Mobility Training, taught by DALTON Raymond at 4/24/2024 12:49 PM.  Learner: Patient  Readiness: Acceptance  Method: Explanation  Response: Verbalizes Understanding    Education Comments  No comments found.        OP EDUCATION:       Encounter Problems       Encounter Problems (Active)       Balance       Pt will score >24 on Tinetti for low risk of falls (Progressing)       Start:  04/19/24    Expected End:  05/03/24               Mobility       Patient will ambulate >500 ft with LRAD and supervision with no pathway deviation (Progressing)       Start:  04/19/24    Expected End:  05/03/24            Patient will ascend and descend 10 stairs with handrail and supervision (Progressing)       Start:  04/19/24    Expected End:  05/03/24               PT Transfers       Patient will perform bed mobility indep (Progressing)       Start:  04/19/24    Expected End:  05/03/24            Patient will transfer sit to and from stand indep (Progressing)       Start:  04/19/24    Expected End:  05/03/24               Pain - Adult

## 2024-04-24 NOTE — NURSING NOTE
Report from Sending RN:    Report From: Lori ( [place])  Recent Surgery of Procedure: No  Baseline Level of Consciousness (LOC): a/o x 4  Oxygen Use: Yes, 2 liters  Type: nc  Diabetic: Yes, 128  Last BP Med Given Day of Dialysis: yes  Last Pain Med Given: none  Lab Tests to be Obtained with Dialysis: No  Blood Transfusion to be Given During Dialysis: No  Available IV Access: Yes  Medications to be Administered During Dialysis: No  Continuous IV Infusion Running: No  Restraints on Currently or in the Last 24 Hours: No  Hand-Off Communication: No acute overnight or morning events; vss; Pt did take morning medications; Pt will not need labs; Pt may travel without telemetry; pt is a full code; Rima Melissa RN.  Dialysis Catheter Dressing: fistula  Last Dressing Change: fistula

## 2024-04-24 NOTE — PROGRESS NOTES
Patient scheduled to discharge today   with resumption of care with [facility] soc  24-48 post discharge

## 2024-04-24 NOTE — NURSING NOTE
Pt discharged in stable condition via wheelchair with transport and daughter  Pt has all belongings.  PIVs discontinued.  Tele box returned.  Discharge instructions, follow up appointments, medications reviewed.  Meds to beds previously delivered. Pt nor daughter have further questions.

## 2024-04-24 NOTE — PROGRESS NOTES
04/24/24 1241   Discharge Planning   Living Arrangements Spouse/significant other   Support Systems Spouse/significant other   Assistance Needed none   Type of Residence Private residence   Number of Stairs to Enter Residence 0   Number of Stairs Within Residence 0   Do you have animals or pets at home? No   Who is requesting discharge planning? Provider   Home or Post Acute Services None   Patient expects to be discharged to: Home with 02   Does the patient need discharge transport arranged? No   Financial Resource Strain   How hard is it for you to pay for the very basics like food, housing, medical care, and heating? Not hard   Housing Stability   In the last 12 months, was there a time when you were not able to pay the mortgage or rent on time? N   In the last 12 months, how many places have you lived? 1   In the last 12 months, was there a time when you did not have a steady place to sleep or slept in a shelter (including now)? N   Transportation Needs   In the past 12 months, has lack of transportation kept you from medical appointments or from getting medications? no   In the past 12 months, has lack of transportation kept you from meetings, work, or from getting things needed for daily living? No   Patient Choice   Patient / Family choosing to utilize agency / facility established prior to hospitalization No

## 2024-04-25 DIAGNOSIS — I44.2 COMPLETE HEART BLOCK (MULTI): Primary | ICD-10-CM

## 2024-04-25 NOTE — DISCHARGE SUMMARY
Discharge Diagnosis  Third degree heart block (Multi)    Issues Requiring Follow-Up  [ ] Will need EP follow up for PPM placement   [ ] Follow up sebastian   [ ] Elevated alk phos, no abdominal pain. CT from 2/24 showed gallbladder sludge/worsening cholelithiasis     Test Results Pending At Discharge  Pending Labs       Order Current Status    AFB Culture/Smear Preliminary result            Hospital Course  Temo Hanson is a 73 y.o. male with pmhx of CKD stage 5 s/p fistula placement, not on dialysis, HTN, DLD, T2DM, severe TR, and CHB transferred to the CICU for CHB and concern for mod to large pericardial effusion, s/p pericardial drain 4/18 due to concern for tamponade physiology.     Patient had earlier presented for his cardiology appointment 4/17, for preoperative eval for renal transplant surgery and was found to have 3rd degree heart block with A-V dissociation at which point he was told to present to the ED for further eval. This was preceeded with SOB on exertion and occasional lightheadedness. Of note, he was first found to have CHB in September of 2023 at which point he was being worked up for a pacemaker but was lost to follow up. In the CICU, ECHO was done, concerning for moderate-large pericardial effusion and a pericardial drain was done 4/18 with fluid studies pending. Stopped home coreg. He was additionally initiated on HD during this hospital stay, requiring MWF sessions. Fluid studies showed 3+ PMNs, no growth. Drain was removed on 4/24. Was started on on valsartan 160mg daily and isordil 10mg TID. Continued on his home hydralazine 100mg TID. Will need EP follow up with pacemaker placement. Sent home with sebastian.       Pertinent Physical Exam At Time of Discharge  Physical Exam  Constitutional: Well-developed male in no acute distress, sitting up in chair  HEENT: Normocephalic, atraumatic. PERRL. EOMI  Respiratory: CTAB, mildly decreased air movement, on 2L NC -> weaned to RA after  dialysis  Cardiovascular: RRR, systolic murmur present. Pericardial draining serosanguinous fluid.  Abdominal: Soft, mildly distended, nontender to palpation.   Neuro: Moving all extremities, AOx3  Skin: Warm, dry. No rashes or wounds.  Psych: Appropriate mood and affect.    Home Medications     Medication List      START taking these medications     isosorbide dinitrate 10 mg tablet; Commonly known as: Isordil; Take 1   tablet (10 mg) by mouth 3 times a day.   Renal Caps 1 mg capsule; Generic drug: B complex-vitamin C-folic acid;   Take 1 capsule by mouth once daily.   valsartan 160 mg tablet; Commonly known as: Diovan; Take 1 tablet (160   mg) by mouth once daily.     CHANGE how you take these medications     amLODIPine 10 mg tablet; Commonly known as: Norvasc; Take 1 tablet (10   mg) by mouth once daily.; What changed: medication strength, how much to   take   gabapentin 300 mg capsule; Commonly known as: Neurontin; Take 1 capsule   (300 mg) by mouth once daily.; What changed: medication strength, how much   to take, when to take this, Another medication with the same name was   removed. Continue taking this medication, and follow the directions you   see here.   hydrALAZINE 100 mg tablet; Commonly known as: Apresoline; Take 1 tablet   (100 mg) by mouth 3 times a day.; What changed: when to take this   rosuvastatin 20 mg tablet; Commonly known as: Crestor; Take 1 tablet (20   mg) by mouth once daily at bedtime.; What changed: when to take this,   Another medication with the same name was removed. Continue taking this   medication, and follow the directions you see here.     CONTINUE taking these medications     acetaminophen 325 mg tablet; Commonly known as: Tylenol   calcitriol 0.25 mcg capsule; Commonly known as: Rocaltrol   calcium carbonate 200 mg calcium chewable tablet; Commonly known as:   Tums   carboxymethylcellulose 0.5 % ophthalmic solution; Commonly known as:   Refresh Plus   doxazosin 8 mg tablet;  "Commonly known as: Cardura   ferrous gluconate 236 mg (27 mg iron) tablet   * FreeStyle Mick 2 Sensor kit; Generic drug: flash glucose sensor kit   * FreeStyle Mick 2 Sensor kit; Generic drug: flash glucose sensor kit   magnesium oxide 400 mg tablet; Commonly known as: Mag-Ox   omeprazole 20 mg DR capsule; Commonly known as: PriLOSEC   * pen needle, diabetic 32 gauge x 5/32\" needle   * BD Ultra-Fine Joan Pen Needle 32 gauge x 5/32\" needle; Generic drug:   pen needle, diabetic  * This list has 4 medication(s) that are the same as other medications   prescribed for you. Read the directions carefully, and ask your doctor or   other care provider to review them with you.     STOP taking these medications     carvedilol 3.125 mg tablet; Commonly known as: Coreg   Ritchie Blood Glucose Test Strip strip; Generic drug: blood sugar   diagnostic   desonide 0.05 % cream; Commonly known as: DesOwen   guanFACINE 2 mg tablet; Commonly known as: Tenex   insulin detemir 100 unit/mL (3 mL) pen; Commonly known as: Levemir   Flextouch   INSULIN LISPRO SUBQ   lidocaine 5 % patch; Commonly known as: Lidoderm   OneTouch Ultra Test strip; Generic drug: blood sugar diagnostic   sodium zirconium cyclosilicate 5 gram packet; Commonly known as: Lokelma   torsemide 100 mg tablet; Commonly known as: Demadex   zinc sulfate 220 (50 Zn) MG capsule; Commonly known as: Zincate       Outpatient Follow-Up  No future appointments.    Zainab Sanders MD      Cardiology Staff Addendum:  I personally obtained the key and critical portions of the history and physical exam or was physically present for key and critical portions performed by the resident. I reviewed the resident’s documentation and discussed the patient with the resident.  I agree with the resident’s medical decision making as documented/amended in the note.    Jaret Baeza MD    "

## 2024-05-01 ENCOUNTER — HOME CARE VISIT (OUTPATIENT)
Dept: HOME HEALTH SERVICES | Facility: HOME HEALTH | Age: 74
End: 2024-05-01
Payer: COMMERCIAL

## 2024-05-01 VITALS
HEART RATE: 73 BPM | SYSTOLIC BLOOD PRESSURE: 140 MMHG | TEMPERATURE: 99.2 F | OXYGEN SATURATION: 98 % | DIASTOLIC BLOOD PRESSURE: 52 MMHG | RESPIRATION RATE: 12 BRPM

## 2024-05-01 LAB
ATRIAL RATE: 107 BPM
Q ONSET: 222 MS
QRS COUNT: 13 BEATS
QRS DURATION: 160 MS
QT INTERVAL: 430 MS
QTC CALCULATION(BAZETT): 499 MS
QTC FREDERICIA: 475 MS
R AXIS: -12 DEGREES
T AXIS: 8 DEGREES
T OFFSET: 437 MS
VENTRICULAR RATE: 81 BPM

## 2024-05-01 PROCEDURE — G0299 HHS/HOSPICE OF RN EA 15 MIN: HCPCS

## 2024-05-01 PROCEDURE — 0023 HH SOC

## 2024-05-01 ASSESSMENT — ENCOUNTER SYMPTOMS
DENIES PAIN: 1
APPETITE LEVEL: FAIR

## 2024-05-01 ASSESSMENT — ACTIVITIES OF DAILY LIVING (ADL)
OASIS_M1830: 03
ENTERING_EXITING_HOME: NEEDS ASSISTANCE

## 2024-05-02 ENCOUNTER — HOME CARE VISIT (OUTPATIENT)
Dept: HOME HEALTH SERVICES | Facility: HOME HEALTH | Age: 74
End: 2024-05-02
Payer: COMMERCIAL

## 2024-05-02 VITALS — HEART RATE: 60 BPM | OXYGEN SATURATION: 91 %

## 2024-05-02 DIAGNOSIS — R53.81 PHYSICAL DECONDITIONING: Primary | ICD-10-CM

## 2024-05-02 DIAGNOSIS — R06.09 EXERTIONAL DYSPNEA: ICD-10-CM

## 2024-05-02 PROCEDURE — G0151 HHCP-SERV OF PT,EA 15 MIN: HCPCS

## 2024-05-02 ASSESSMENT — ENCOUNTER SYMPTOMS
PERSON REPORTING PAIN: PATIENT
DENIES PAIN: 1

## 2024-05-04 ENCOUNTER — APPOINTMENT (OUTPATIENT)
Dept: HOME HEALTH SERVICES | Facility: HOME HEALTH | Age: 74
End: 2024-05-04
Payer: COMMERCIAL

## 2024-05-07 PROCEDURE — G0180 MD CERTIFICATION HHA PATIENT: HCPCS | Performed by: STUDENT IN AN ORGANIZED HEALTH CARE EDUCATION/TRAINING PROGRAM

## 2024-05-08 ENCOUNTER — OFFICE VISIT (OUTPATIENT)
Dept: CARDIOLOGY | Facility: CLINIC | Age: 74
End: 2024-05-08
Payer: COMMERCIAL

## 2024-05-08 VITALS
SYSTOLIC BLOOD PRESSURE: 128 MMHG | HEART RATE: 86 BPM | BODY MASS INDEX: 21.66 KG/M2 | OXYGEN SATURATION: 89 % | DIASTOLIC BLOOD PRESSURE: 50 MMHG | WEIGHT: 163.4 LBS | HEIGHT: 73 IN

## 2024-05-08 DIAGNOSIS — I10 ESSENTIAL HYPERTENSION: ICD-10-CM

## 2024-05-08 DIAGNOSIS — I44.2 COMPLETE HEART BLOCK (MULTI): ICD-10-CM

## 2024-05-08 DIAGNOSIS — I47.10 SVT (SUPRAVENTRICULAR TACHYCARDIA) (CMS-HCC): Chronic | ICD-10-CM

## 2024-05-08 PROBLEM — I31.31: Status: ACTIVE | Noted: 2024-04-17

## 2024-05-08 PROBLEM — I31.4 CARDIAC TAMPONADE (HHS-HCC): Status: ACTIVE | Noted: 2024-04-17

## 2024-05-08 PROBLEM — N19 UREMIA: Status: ACTIVE | Noted: 2024-04-17

## 2024-05-08 LAB
ATRIAL RATE: 98 BPM
P AXIS: 44 DEGREES
Q ONSET: 227 MS
QRS COUNT: 11 BEATS
QRS DURATION: 154 MS
QT INTERVAL: 460 MS
QTC CALCULATION(BAZETT): 489 MS
QTC FREDERICIA: 479 MS
R AXIS: -21 DEGREES
T AXIS: 10 DEGREES
T OFFSET: 457 MS
VENTRICULAR RATE: 68 BPM

## 2024-05-08 PROCEDURE — 99214 OFFICE O/P EST MOD 30 MIN: CPT | Performed by: INTERNAL MEDICINE

## 2024-05-08 PROCEDURE — 4010F ACE/ARB THERAPY RXD/TAKEN: CPT | Performed by: INTERNAL MEDICINE

## 2024-05-08 PROCEDURE — 3044F HG A1C LEVEL LT 7.0%: CPT | Performed by: INTERNAL MEDICINE

## 2024-05-08 PROCEDURE — 3078F DIAST BP <80 MM HG: CPT | Performed by: INTERNAL MEDICINE

## 2024-05-08 PROCEDURE — 3074F SYST BP LT 130 MM HG: CPT | Performed by: INTERNAL MEDICINE

## 2024-05-08 PROCEDURE — 1159F MED LIST DOCD IN RCRD: CPT | Performed by: INTERNAL MEDICINE

## 2024-05-08 PROCEDURE — 1111F DSCHRG MED/CURRENT MED MERGE: CPT | Performed by: INTERNAL MEDICINE

## 2024-05-08 PROCEDURE — 93000 ELECTROCARDIOGRAM COMPLETE: CPT | Performed by: INTERNAL MEDICINE

## 2024-05-08 RX ORDER — ROSUVASTATIN CALCIUM 20 MG/1
20 TABLET, COATED ORAL NIGHTLY
Qty: 30 TABLET | Refills: 1 | Status: SHIPPED | OUTPATIENT
Start: 2024-05-08 | End: 2024-07-07

## 2024-05-08 RX ORDER — VALSARTAN 160 MG/1
160 TABLET ORAL DAILY
Qty: 30 TABLET | Refills: 1 | Status: SHIPPED | OUTPATIENT
Start: 2024-05-08 | End: 2024-07-07

## 2024-05-08 RX ORDER — LANOLIN ALCOHOL/MO/W.PET/CERES
2 CREAM (GRAM) TOPICAL 2 TIMES DAILY
Qty: 120 TABLET | Refills: 1 | Status: SHIPPED | OUTPATIENT
Start: 2024-05-08 | End: 2024-07-07

## 2024-05-08 RX ORDER — AMLODIPINE BESYLATE 10 MG/1
10 TABLET ORAL DAILY
Qty: 30 TABLET | Refills: 1 | Status: SHIPPED | OUTPATIENT
Start: 2024-05-08 | End: 2024-07-07

## 2024-05-08 NOTE — LETTER
May 8, 2024     Aly Miguel MD  [id number]  [facility] [id number]    Patient: Temo Hanson   YOB: 1950   Date of Visit: 5/8/2024       Dear Dr. Aly Miguel MD:    Thank you for referring Temo Hanson to me for evaluation. Below are my notes for this consultation.  If you have questions, please do not hesitate to call me. I look forward to following your patient along with you.       Sincerely,     Jose Brunson MD      CC: No Recipients  ______________________________________________________________________________________     Cardiology New Patient History and Physical    Reason for referral: pericardial effusion    HPI: Temo Hanson is a 73 y.o.  male who presents today for evaulation.     Patient is a 73-year-old male with a history of CKD now on dialysis, hypertension, dyslipidemia, diabetes, complete heart block who initially presented for renal transplant evaluation.  He was found to be in third-degree heart block and admitted to [facility].  He was found to have moderate to large pericardial effusion with possible tamponade physiology.  He went pericardiocentesis.  Patient presents today for evaluation.  He denies any chest discomfort, shortness of breath, palpitations, syncope, orthopnea, PND, lower extremity edema.  He admits to occasional lightheadedness and occasional fatigue after dialysis.    Patient was previously seen by Dr. Miguel for cardiac clearance for renal transplant evaluation.    4/23/2024 echo: Ejection fraction 60 to 65%, moderately enlarged right ventricle, mildly reduced RV systolic function, trace pericardial effusion.  Twelve-lead ECG demonstrates sinus tachycardia with second-degree AV block, right bundle branch block.    Past Medical History:   He has a past medical history of DM (diabetes mellitus) (Multi), HTN (hypertension), Other specified abnormal immunological findings in serum (10/11/2021), and Personal history of  "malignant neoplasm of prostate.    Surgical History:   He has a past surgical history that includes Other surgical history (09/29/2021); Other surgical history (09/29/2021); Other surgical history (09/29/2021); Other surgical history (09/29/2021); Cholecystectomy (10/23/2023); and Cardiac catheterization (N/A, 4/18/2024).    Family History:   Family History   Problem Relation Name Age of Onset   • Diabetes Sister     • Diabetes Brother           Allergies:  Terazosin, Codeine, and Tramadol     Social History:   No cigarettes    Prior Cardiovascular Testing (personally reviewed):     Review of Systems:  Review of Systems   All other systems reviewed and are negative.      Objective    Outpatient Medications:    Current Outpatient Medications:   •  acetaminophen (Tylenol) 325 mg tablet, Take 2 tablets (650 mg) by mouth every 6 hours if needed., Disp: , Rfl:   •  amLODIPine (Norvasc) 10 mg tablet, Take 1 tablet (10 mg) by mouth once daily., Disp: 30 tablet, Rfl: 0  •  B complex-vitamin C-folic acid (Nephrocaps) 1 mg capsule, Take 1 capsule by mouth once daily., Disp: 30 capsule, Rfl: 0  •  BD Ultra-Fine Joan Pen Needle 32 gauge x 5/32\" needle, , Disp: , Rfl:   •  calcitriol (Rocaltrol) 0.25 mcg capsule, Take 1 capsule (0.25 mcg) by mouth once daily., Disp: , Rfl:   •  calcium carbonate (Tums) 200 mg calcium chewable tablet, Chew 1 tablet (500 mg) 3 times a day. With meals, Disp: , Rfl:   •  carboxymethylcellulose (Refresh Plus) 0.5 % ophthalmic solution, Instill 1 drop into both eyes 2-4x/day as needed for dryness., Disp: , Rfl:   •  ferrous gluconate 236 mg (27 mg iron) tablet, Take 1 tablet (27 mg) by mouth once daily., Disp: , Rfl:   •  FreeStyle Mick 2 Sensor kit, , Disp: , Rfl:   •  FreeStyle Mick sensor system (FreeStyle Mick 2 Sensor) kit, 1 DEVICE EVERY 14 DAYS., Disp: , Rfl:   •  gabapentin (Neurontin) 300 mg capsule, Take 1 capsule (300 mg) by mouth once daily., Disp: 30 capsule, Rfl: 0  •  hydrALAZINE " "(Apresoline) 100 mg tablet, Take 1 tablet (100 mg) by mouth 3 times a day., Disp: 90 tablet, Rfl: 0  •  isosorbide dinitrate (Isordil) 10 mg tablet, Take 1 tablet (10 mg) by mouth 3 times a day., Disp: 90 tablet, Rfl: 0  •  magnesium oxide (Mag-Ox) 400 mg tablet, Take 2 tablets (800 mg) by mouth 2 times a day., Disp: , Rfl:   •  omeprazole (PriLOSEC) 20 mg DR capsule, Take 1 capsule (20 mg) by mouth once daily., Disp: , Rfl:   •  rosuvastatin (Crestor) 20 mg tablet, Take 1 tablet (20 mg) by mouth once daily at bedtime., Disp: 30 tablet, Rfl: 2  •  valsartan (Diovan) 160 mg tablet, Take 1 tablet (160 mg) by mouth once daily., Disp: 30 tablet, Rfl: 2  •  doxazosin (Cardura) 8 mg tablet, Take 1 tablet (8 mg) by mouth once daily at bedtime., Disp: , Rfl:      Last Recorded Vitals  /50 (BP Location: Right arm, Patient Position: Sitting, BP Cuff Size: Adult)   Pulse 86   Ht 1.854 m (6' 1\")   Wt 74.1 kg (163 lb 6.4 oz)   SpO2 (!) 89%   BMI 21.56 kg/m²     Physical Exam:  Physical Exam  Vitals reviewed.   Constitutional:       Appearance: Normal appearance.   HENT:      Head: Normocephalic and atraumatic.      Mouth/Throat:      Mouth: Mucous membranes are moist.      Pharynx: Oropharynx is clear.   Eyes:      Extraocular Movements: Extraocular movements intact.      Conjunctiva/sclera: Conjunctivae normal.   Cardiovascular:      Rate and Rhythm: Normal rate and regular rhythm.      Pulses: Normal pulses.      Heart sounds: Normal heart sounds.   Pulmonary:      Effort: Pulmonary effort is normal.      Breath sounds: Normal breath sounds.   Abdominal:      General: Bowel sounds are normal.      Palpations: Abdomen is soft.   Musculoskeletal:      Cervical back: Neck supple.   Skin:     General: Skin is warm and dry.   Neurological:      General: No focal deficit present.      Mental Status: He is alert.   Psychiatric:         Mood and Affect: Mood normal.         Behavior: Behavior normal.         Lab " "Review:    Lab Results   Component Value Date    GLUCOSE 91 04/24/2024    CALCIUM 8.4 (L) 04/24/2024     (L) 04/24/2024    K 5.2 04/24/2024    CO2 30 04/24/2024    CL 96 (L) 04/24/2024    BUN 41 (H) 04/24/2024    CREATININE 7.20 (H) 04/24/2024       Lab Results   Component Value Date    WBC 5.1 04/24/2024    HGB 7.5 (L) 04/24/2024    HCT 24.6 (L) 04/24/2024    MCV 96 04/24/2024     (L) 04/24/2024       No results found for: \"CHOL\"  No results found for: \"HDL\"  No results found for: \"LDLCALC\"  No results found for: \"TRIG\"  No components found for: \"CHOLHDL\"    Lab Results   Component Value Date     (H) 04/17/2024       Lab Results   Component Value Date    TSH 1.24 04/19/2024       Assessment:   Uncertain why the patient is here to see me today.  He seems to be stable from a cardiovascular standpoint.  He completed his Holter monitor.    I did discuss with him that he will need to follow-up with Dr. Miguel for further cardiac evaluation for renal transplantation.    Will go ahead and refill his medications until he can get to see Dr. Miguel.    Jose Brunson MD    "

## 2024-05-08 NOTE — PROGRESS NOTES
Cardiology New Patient History and Physical    Reason for referral: pericardial effusion    HPI: Temo Hanson is a 73 y.o.  male who presents today for evaulation.     Patient is a 73-year-old male with a history of CKD now on dialysis, hypertension, dyslipidemia, diabetes, complete heart block who initially presented for renal transplant evaluation.  He was found to be in third-degree heart block and admitted to [facility].  He was found to have moderate to large pericardial effusion with possible tamponade physiology.  He went pericardiocentesis.  Patient presents today for evaluation.  He denies any chest discomfort, shortness of breath, palpitations, syncope, orthopnea, PND, lower extremity edema.  He admits to occasional lightheadedness and occasional fatigue after dialysis.    Patient was previously seen by Dr. Miguel for cardiac clearance for renal transplant evaluation.    4/23/2024 echo: Ejection fraction 60 to 65%, moderately enlarged right ventricle, mildly reduced RV systolic function, trace pericardial effusion.  Twelve-lead ECG demonstrates sinus tachycardia with second-degree AV block, right bundle branch block.    Past Medical History:   He has a past medical history of DM (diabetes mellitus) (Multi), HTN (hypertension), Other specified abnormal immunological findings in serum (10/11/2021), and Personal history of malignant neoplasm of prostate.    Surgical History:   He has a past surgical history that includes Other surgical history (09/29/2021); Other surgical history (09/29/2021); Other surgical history (09/29/2021); Other surgical history (09/29/2021); Cholecystectomy (10/23/2023); and Cardiac catheterization (N/A, 4/18/2024).    Family History:   Family History   Problem Relation Name Age of Onset    Diabetes Sister      Diabetes Brother           Allergies:  Terazosin, Codeine, and Tramadol     Social History:   No cigarettes    Prior Cardiovascular Testing (personally  "reviewed):     Review of Systems:  Review of Systems   All other systems reviewed and are negative.      Objective     Outpatient Medications:    Current Outpatient Medications:     acetaminophen (Tylenol) 325 mg tablet, Take 2 tablets (650 mg) by mouth every 6 hours if needed., Disp: , Rfl:     amLODIPine (Norvasc) 10 mg tablet, Take 1 tablet (10 mg) by mouth once daily., Disp: 30 tablet, Rfl: 0    B complex-vitamin C-folic acid (Nephrocaps) 1 mg capsule, Take 1 capsule by mouth once daily., Disp: 30 capsule, Rfl: 0    BD Ultra-Fine Joan Pen Needle 32 gauge x 5/32\" needle, , Disp: , Rfl:     calcitriol (Rocaltrol) 0.25 mcg capsule, Take 1 capsule (0.25 mcg) by mouth once daily., Disp: , Rfl:     calcium carbonate (Tums) 200 mg calcium chewable tablet, Chew 1 tablet (500 mg) 3 times a day. With meals, Disp: , Rfl:     carboxymethylcellulose (Refresh Plus) 0.5 % ophthalmic solution, Instill 1 drop into both eyes 2-4x/day as needed for dryness., Disp: , Rfl:     ferrous gluconate 236 mg (27 mg iron) tablet, Take 1 tablet (27 mg) by mouth once daily., Disp: , Rfl:     FreeStyle Mick 2 Sensor kit, , Disp: , Rfl:     FreeStyle Mick sensor system (FreeStyle Mick 2 Sensor) kit, 1 DEVICE EVERY 14 DAYS., Disp: , Rfl:     gabapentin (Neurontin) 300 mg capsule, Take 1 capsule (300 mg) by mouth once daily., Disp: 30 capsule, Rfl: 0    hydrALAZINE (Apresoline) 100 mg tablet, Take 1 tablet (100 mg) by mouth 3 times a day., Disp: 90 tablet, Rfl: 0    isosorbide dinitrate (Isordil) 10 mg tablet, Take 1 tablet (10 mg) by mouth 3 times a day., Disp: 90 tablet, Rfl: 0    magnesium oxide (Mag-Ox) 400 mg tablet, Take 2 tablets (800 mg) by mouth 2 times a day., Disp: , Rfl:     omeprazole (PriLOSEC) 20 mg DR capsule, Take 1 capsule (20 mg) by mouth once daily., Disp: , Rfl:     rosuvastatin (Crestor) 20 mg tablet, Take 1 tablet (20 mg) by mouth once daily at bedtime., Disp: 30 tablet, Rfl: 2    valsartan (Diovan) 160 mg tablet, Take 1 " "tablet (160 mg) by mouth once daily., Disp: 30 tablet, Rfl: 2    doxazosin (Cardura) 8 mg tablet, Take 1 tablet (8 mg) by mouth once daily at bedtime., Disp: , Rfl:      Last Recorded Vitals  /50 (BP Location: Right arm, Patient Position: Sitting, BP Cuff Size: Adult)   Pulse 86   Ht 1.854 m (6' 1\")   Wt 74.1 kg (163 lb 6.4 oz)   SpO2 (!) 89%   BMI 21.56 kg/m²     Physical Exam:  Physical Exam  Vitals reviewed.   Constitutional:       Appearance: Normal appearance.   HENT:      Head: Normocephalic and atraumatic.      Mouth/Throat:      Mouth: Mucous membranes are moist.      Pharynx: Oropharynx is clear.   Eyes:      Extraocular Movements: Extraocular movements intact.      Conjunctiva/sclera: Conjunctivae normal.   Cardiovascular:      Rate and Rhythm: Normal rate and regular rhythm.      Pulses: Normal pulses.      Heart sounds: Normal heart sounds.   Pulmonary:      Effort: Pulmonary effort is normal.      Breath sounds: Normal breath sounds.   Abdominal:      General: Bowel sounds are normal.      Palpations: Abdomen is soft.   Musculoskeletal:      Cervical back: Neck supple.   Skin:     General: Skin is warm and dry.   Neurological:      General: No focal deficit present.      Mental Status: He is alert.   Psychiatric:         Mood and Affect: Mood normal.         Behavior: Behavior normal.         Lab Review:    Lab Results   Component Value Date    GLUCOSE 91 04/24/2024    CALCIUM 8.4 (L) 04/24/2024     (L) 04/24/2024    K 5.2 04/24/2024    CO2 30 04/24/2024    CL 96 (L) 04/24/2024    BUN 41 (H) 04/24/2024    CREATININE 7.20 (H) 04/24/2024       Lab Results   Component Value Date    WBC 5.1 04/24/2024    HGB 7.5 (L) 04/24/2024    HCT 24.6 (L) 04/24/2024    MCV 96 04/24/2024     (L) 04/24/2024       No results found for: \"CHOL\"  No results found for: \"HDL\"  No results found for: \"LDLCALC\"  No results found for: \"TRIG\"  No components found for: \"CHOLHDL\"    Lab Results   Component " Value Date     (H) 04/17/2024       Lab Results   Component Value Date    TSH 1.24 04/19/2024       Assessment:   Uncertain why the patient is here to see me today.  He seems to be stable from a cardiovascular standpoint.  He completed his Holter monitor.    I did discuss with him that he will need to follow-up with Dr. Miguel for further cardiac evaluation for renal transplantation.    Will go ahead and refill his medications until he can get to see Dr. Miguel.    Jose Brunson MD

## 2024-05-09 ENCOUNTER — HOME CARE VISIT (OUTPATIENT)
Dept: HOME HEALTH SERVICES | Facility: HOME HEALTH | Age: 74
End: 2024-05-09
Payer: COMMERCIAL

## 2024-05-10 ENCOUNTER — HOME CARE VISIT (OUTPATIENT)
Dept: HOME HEALTH SERVICES | Facility: HOME HEALTH | Age: 74
End: 2024-05-10
Payer: COMMERCIAL

## 2024-05-10 VITALS
DIASTOLIC BLOOD PRESSURE: 54 MMHG | HEART RATE: 77 BPM | SYSTOLIC BLOOD PRESSURE: 156 MMHG | OXYGEN SATURATION: 95 % | RESPIRATION RATE: 18 BRPM | TEMPERATURE: 97.3 F

## 2024-05-10 PROCEDURE — G0299 HHS/HOSPICE OF RN EA 15 MIN: HCPCS

## 2024-05-10 SDOH — ECONOMIC STABILITY: GENERAL

## 2024-05-10 ASSESSMENT — ENCOUNTER SYMPTOMS
MUSCLE WEAKNESS: 1
APPETITE LEVEL: FAIR
DENIES PAIN: 1
CHANGE IN APPETITE: DECREASED
BOWEL PATTERN NORMAL: 1
LAST BOWEL MOVEMENT: 66970

## 2024-05-10 ASSESSMENT — ACTIVITIES OF DAILY LIVING (ADL): MONEY MANAGEMENT (EXPENSES/BILLS): INDEPENDENT

## 2024-05-13 LAB — BODY SURFACE AREA: 2.18 M2

## 2024-05-14 ENCOUNTER — TELEPHONE (OUTPATIENT)
Dept: CARDIOLOGY | Facility: CLINIC | Age: 74
End: 2024-05-14
Payer: COMMERCIAL

## 2024-05-14 ENCOUNTER — HOME CARE VISIT (OUTPATIENT)
Dept: HOME HEALTH SERVICES | Facility: HOME HEALTH | Age: 74
End: 2024-05-14
Payer: COMMERCIAL

## 2024-05-14 VITALS
OXYGEN SATURATION: 99 % | HEART RATE: 72 BPM | SYSTOLIC BLOOD PRESSURE: 106 MMHG | RESPIRATION RATE: 12 BRPM | DIASTOLIC BLOOD PRESSURE: 50 MMHG | TEMPERATURE: 99.1 F

## 2024-05-14 PROCEDURE — G0299 HHS/HOSPICE OF RN EA 15 MIN: HCPCS

## 2024-05-14 ASSESSMENT — ENCOUNTER SYMPTOMS
DENIES PAIN: 1
APPETITE LEVEL: GOOD

## 2024-05-14 NOTE — TELEPHONE ENCOUNTER
Patient daughter called regarding medication that wasn't sent to [facility]   [phone]       Gabapentin 300 mg    Patient is out of medication said not received yet .

## 2024-05-16 ENCOUNTER — HOME CARE VISIT (OUTPATIENT)
Dept: HOME HEALTH SERVICES | Facility: HOME HEALTH | Age: 74
End: 2024-05-16
Payer: COMMERCIAL

## 2024-05-23 ENCOUNTER — HOME CARE VISIT (OUTPATIENT)
Dept: HOME HEALTH SERVICES | Facility: HOME HEALTH | Age: 74
End: 2024-05-23
Payer: COMMERCIAL

## 2024-05-24 ENCOUNTER — HOME CARE VISIT (OUTPATIENT)
Dept: HOME HEALTH SERVICES | Facility: HOME HEALTH | Age: 74
End: 2024-05-24
Payer: COMMERCIAL

## 2024-05-30 ENCOUNTER — HOME CARE VISIT (OUTPATIENT)
Dept: HOME HEALTH SERVICES | Facility: HOME HEALTH | Age: 74
End: 2024-05-30
Payer: COMMERCIAL

## 2024-06-06 LAB
ACID FAST STN SPEC: NORMAL
MYCOBACTERIUM SPEC CULT: NORMAL

## 2024-06-12 ENCOUNTER — OFFICE VISIT (OUTPATIENT)
Dept: CARDIOLOGY | Facility: CLINIC | Age: 74
End: 2024-06-12
Payer: COMMERCIAL

## 2024-06-12 ENCOUNTER — HOME CARE VISIT (OUTPATIENT)
Dept: HOME HEALTH SERVICES | Facility: HOME HEALTH | Age: 74
End: 2024-06-12
Payer: COMMERCIAL

## 2024-06-12 VITALS
BODY MASS INDEX: 21.6 KG/M2 | HEIGHT: 73 IN | HEART RATE: 72 BPM | SYSTOLIC BLOOD PRESSURE: 155 MMHG | DIASTOLIC BLOOD PRESSURE: 67 MMHG | OXYGEN SATURATION: 92 % | WEIGHT: 163 LBS

## 2024-06-12 DIAGNOSIS — I10 ESSENTIAL HYPERTENSION: Primary | ICD-10-CM

## 2024-06-12 DIAGNOSIS — I44.1 MOBITZ TYPE 2 SECOND DEGREE AV BLOCK: ICD-10-CM

## 2024-06-12 DIAGNOSIS — I31.39 PERICARDIAL EFFUSION (HHS-HCC): ICD-10-CM

## 2024-06-12 LAB
ACID FAST STN SPEC: NORMAL
MYCOBACTERIUM SPEC CULT: NORMAL

## 2024-06-12 PROCEDURE — 99215 OFFICE O/P EST HI 40 MIN: CPT | Mod: 25 | Performed by: INTERNAL MEDICINE

## 2024-06-12 PROCEDURE — 1159F MED LIST DOCD IN RCRD: CPT | Performed by: INTERNAL MEDICINE

## 2024-06-12 PROCEDURE — 4010F ACE/ARB THERAPY RXD/TAKEN: CPT | Performed by: INTERNAL MEDICINE

## 2024-06-12 PROCEDURE — 93005 ELECTROCARDIOGRAM TRACING: CPT | Performed by: INTERNAL MEDICINE

## 2024-06-12 PROCEDURE — 3044F HG A1C LEVEL LT 7.0%: CPT | Performed by: INTERNAL MEDICINE

## 2024-06-12 PROCEDURE — 3078F DIAST BP <80 MM HG: CPT | Performed by: INTERNAL MEDICINE

## 2024-06-12 PROCEDURE — 93010 ELECTROCARDIOGRAM REPORT: CPT | Performed by: INTERNAL MEDICINE

## 2024-06-12 PROCEDURE — 99215 OFFICE O/P EST HI 40 MIN: CPT | Performed by: INTERNAL MEDICINE

## 2024-06-12 PROCEDURE — 3077F SYST BP >= 140 MM HG: CPT | Performed by: INTERNAL MEDICINE

## 2024-06-12 PROCEDURE — 1036F TOBACCO NON-USER: CPT | Performed by: INTERNAL MEDICINE

## 2024-06-12 ASSESSMENT — ACTIVITIES OF DAILY LIVING (ADL)
OASIS_M1830: 02
HOME_HEALTH_OASIS: 00

## 2024-06-12 NOTE — PROGRESS NOTES
"Subjective   Temo Hanson is a 73 y.o. male who presents to the [facility] for preoperative evaluation for renal transplant surgery.     His ECG today shows 2rd degree type 2 heart block which was also seen on 4/24 - 5/7/2024 HR monitor. PPM consideration was deferred during April 2024 hospitalization for cardiac tamponade requiring pericardiocentesis.      He has had severe fatigue and dyspnea on exertion that did not resolve with pericardiocentesis. Today Mr. Hanson has no active cardiac symptoms of chest pain, PND, orthopnea, CHARI, palpitations, syncope, or claudication. Exertional dyspnea with < 50 feet walking.    On review of 9/23/2023 ECG, AV dissociation also appears to have been present. Prior 2023 SPECT nuclear not done for type 2 AV block.    Past Medical History:  1. ESRD on HD  2. Hypertension  3. Dyslipidemia  4. Type 2 diabetes mellitus  5. Severe tricuspid regurgitation    Social History:  Never a smoker    Family History:  No premature CAD in 1st degree relatives ( 55 years of age for male relatives,  65 years of age for female relatives)    Review of Systems    A 14 point review of systems was asked. All questions were negative except for pertinent positives listed in the HPI.     Current Outpatient Medications on File Prior to Visit   Medication Sig Dispense Refill    acetaminophen (Tylenol) 325 mg tablet Take 2 tablets (650 mg) by mouth every 6 hours if needed.      amLODIPine (Norvasc) 10 mg tablet Take 1 tablet (10 mg) by mouth once daily. 30 tablet 1    BD Ultra-Fine Joan Pen Needle 32 gauge x 5/32\" needle       calcitriol (Rocaltrol) 0.25 mcg capsule Take 1 capsule (0.25 mcg) by mouth once daily.      calcium carbonate (Tums) 200 mg calcium chewable tablet Chew 1 tablet (500 mg) 3 times a day. With meals      carboxymethylcellulose (Refresh Plus) 0.5 % ophthalmic solution Instill 1 drop into both eyes 2-4x/day as needed for dryness.      ferrous gluconate 236 mg (27 " "mg iron) tablet Take 1 tablet (27 mg) by mouth once daily.      FreeStyle Mick 2 Sensor kit       magnesium oxide (Mag-Ox) 400 mg (241.3 mg magnesium) tablet Take 2 tablets (800 mg) by mouth 2 times a day. 120 tablet 1    omeprazole (PriLOSEC) 20 mg DR capsule Take 1 capsule (20 mg) by mouth once daily.      rosuvastatin (Crestor) 20 mg tablet Take 1 tablet (20 mg) by mouth once daily at bedtime. 30 tablet 1    valsartan (Diovan) 160 mg tablet Take 1 tablet (160 mg) by mouth once daily. 30 tablet 1    doxazosin (Cardura) 8 mg tablet Take 1 tablet (8 mg) by mouth once daily at bedtime.      gabapentin (Neurontin) 300 mg capsule Take 1 capsule (300 mg) by mouth once daily. 30 capsule 0    hydrALAZINE (Apresoline) 100 mg tablet Take 1 tablet (100 mg) by mouth 3 times a day. 90 tablet 0    isosorbide dinitrate (Isordil) 10 mg tablet Take 1 tablet (10 mg) by mouth 3 times a day. 90 tablet 0     No current facility-administered medications on file prior to visit.      Objective   Physical Exam  BP Readings from Last 3 Encounters:   06/12/24 155/67   05/14/24 106/50   05/10/24 156/54      Wt Readings from Last 3 Encounters:   06/12/24 73.9 kg (163 lb)   05/08/24 74.1 kg (163 lb 6.4 oz)   04/24/24 88.7 kg (195 lb 8.8 oz)      BMI: Estimated body mass index is 21.51 kg/m² as calculated from the following:    Height as of this encounter: 1.854 m (6' 1\").    Weight as of this encounter: 73.9 kg (163 lb).  BSA: Estimated body surface area is 1.95 meters squared as calculated from the following:    Height as of this encounter: 1.854 m (6' 1\").    Weight as of this encounter: 73.9 kg (163 lb).    General: no acute distress  HEENT: EOMI, no scleral icterus.  Lungs: Clear to auscultation bilaterally without wheezing, rales, or rhonchi.  Cardiovascular: Regular rhythm and rate. Normal S1 and S2. No murmurs, rubs, or gallops are appreciated. JVP normal.  Abdomen: Soft, nontender, nondistended. Bowel sounds present.  Extremities: " Warm and well perfused with equal 2+ pulses bilaterally.  No edema present.  Neurologic: Alert and oriented x3.    I have personally reviewed the following images and laboratory findings:  Last echocardiogram:  2024 echo (limited); LV EF 60-65%, normal chamber sizes, trivial pericardial effusion (decreased from large effusion on 2024)    2024 echo: LV EF 60-65%, no LVH (LVMI 70 gm/m2), pseudonormal diastology (E/e' ), mild LAE (ROXANNA ml/m2), moderate RV enlargement, mildly reduced RV systolic function, moderate THERESA ( cm2), no AI, mild-moderate MAC, mild MR, severe tricuspid regurgitation, RVSP 82 mm Hg (RAP 15 mm Hg), reversed hepatic vein flow due to TR.    Last cath / stress test:  2023 SPECT nuclear stress test: No myocardial ischemia or scar pattern. LV EF     2/3/2021 CT calcium score: Zero    Most recent EC ECG: Sinus rhythm with AVB type 2, 76 bpm, RBBB. Personally reviewed in office.    Holter  - 2024 HR monitor with type 2 AVB through out monitor period.    2024 ECG: Sinus rhythm, 2nd degree AVB, 68 bpm, RBBB. Personally reviewed in office.     Assessment/Plan   1. High grade AVB:   Pacemaker deferred during April admission for clinical tamponade from large pericardial effusion requiring pericardiocentesis.   2nd AVB type 2 on HR monitor with RBBB. Will need PPM for preoperative stabilization for renal transplant. Will refer to EP clinic for reassessment.     2. Pericardial effusion:  2024 pericardiocentesis. Trace effusion on 2024 limited echo. Repeat limited echo now.    2. Preoperative work up:  He has elevated RVSP by echocardiogram. He will need RHC at some point to evaluate hemodynamics to assess but will defer this work up until after addressing 3rd degree heart block identified today.    Follow up after EP assessment.         SIGNATURE: Aly Miguel MD PATIENT NAME: Temo Hanson   DATE/TIME: 2024 9:47 AM MRN: [id number]

## 2024-06-12 NOTE — PATIENT INSTRUCTIONS
You were seen at the [facility] for a check up of your heart.     Your ECG shows that your heart's electrical signals on the top and bottom of your heart are not communicating on some beats, type AV block. I think you need a pacemaker to be stable for kidney transplant listing.     I am referring you to Dr. Sheldon De La Torre, one of our electrophysiologists (pacemaker doctor).    I am repeating an echocardiogram now to see if you have more fluid around your heart after we drained fluid in April 2024.    Follow up with Dr. Miguel for ongoing heart care after pacemaker doctor evaluation.

## 2024-06-14 LAB
ATRIAL RATE: 110 BPM
P AXIS: 42 DEGREES
Q ONSET: 190 MS
QRS COUNT: 12 BEATS
QRS DURATION: 146 MS
QT INTERVAL: 418 MS
QTC CALCULATION(BAZETT): 470 MS
QTC FREDERICIA: 452 MS
R AXIS: -15 DEGREES
T AXIS: 117 DEGREES
T OFFSET: 399 MS
VENTRICULAR RATE: 76 BPM

## 2024-06-18 ENCOUNTER — APPOINTMENT (OUTPATIENT)
Dept: CARDIOLOGY | Facility: CLINIC | Age: 74
End: 2024-06-18
Payer: COMMERCIAL

## 2024-06-18 VITALS
DIASTOLIC BLOOD PRESSURE: 70 MMHG | BODY MASS INDEX: 22.93 KG/M2 | HEIGHT: 73 IN | OXYGEN SATURATION: 92 % | HEART RATE: 78 BPM | SYSTOLIC BLOOD PRESSURE: 128 MMHG | WEIGHT: 173 LBS

## 2024-06-18 DIAGNOSIS — I44.1 MOBITZ TYPE 2 SECOND DEGREE AV BLOCK: ICD-10-CM

## 2024-06-18 PROCEDURE — 3044F HG A1C LEVEL LT 7.0%: CPT | Performed by: STUDENT IN AN ORGANIZED HEALTH CARE EDUCATION/TRAINING PROGRAM

## 2024-06-18 PROCEDURE — 1159F MED LIST DOCD IN RCRD: CPT | Performed by: STUDENT IN AN ORGANIZED HEALTH CARE EDUCATION/TRAINING PROGRAM

## 2024-06-18 PROCEDURE — 4010F ACE/ARB THERAPY RXD/TAKEN: CPT | Performed by: STUDENT IN AN ORGANIZED HEALTH CARE EDUCATION/TRAINING PROGRAM

## 2024-06-18 PROCEDURE — 3078F DIAST BP <80 MM HG: CPT | Performed by: STUDENT IN AN ORGANIZED HEALTH CARE EDUCATION/TRAINING PROGRAM

## 2024-06-18 PROCEDURE — 99215 OFFICE O/P EST HI 40 MIN: CPT | Performed by: STUDENT IN AN ORGANIZED HEALTH CARE EDUCATION/TRAINING PROGRAM

## 2024-06-18 PROCEDURE — 1036F TOBACCO NON-USER: CPT | Performed by: STUDENT IN AN ORGANIZED HEALTH CARE EDUCATION/TRAINING PROGRAM

## 2024-06-18 PROCEDURE — 3074F SYST BP LT 130 MM HG: CPT | Performed by: STUDENT IN AN ORGANIZED HEALTH CARE EDUCATION/TRAINING PROGRAM

## 2024-06-18 PROCEDURE — 93000 ELECTROCARDIOGRAM COMPLETE: CPT | Performed by: STUDENT IN AN ORGANIZED HEALTH CARE EDUCATION/TRAINING PROGRAM

## 2024-06-18 NOTE — PROGRESS NOTES
"    Cardiac Electrophysiology Office Visit     Referred by Dr. Miguel, Aly TOURE MD for   Chief Complaint   Patient presents with    New Patient Visit     Pacemaker eval     HPI:  Temo Hanson is a 73 y.o. year old male patient with h/o ESRD s/p Left Fistula on HD (M, W, F), HTN, HLD, DM, h/o Moderate pericardial effusion s/p Tap, CHB presenting today to establish care    Objective  Current Outpatient Medications   Medication Instructions    acetaminophen (Tylenol) 325 mg tablet 2 tablets, oral, Every 6 hours PRN    amLODIPine (NORVASC) 10 mg, oral, Daily    BD Ultra-Fine Joan Pen Needle 32 gauge x 5/32\" needle     calcitriol (ROCALTROL) 0.25 mcg, oral, Daily RT    calcium carbonate (TUMS) 500 mg, oral, 3 times daily, With meals    carboxymethylcellulose (Refresh Plus) 0.5 % ophthalmic solution Instill 1 drop into both eyes 2-4x/day as needed for dryness.    doxazosin (Cardura) 8 mg tablet 1 tablet, oral, Nightly    ferrous gluconate 236 mg (27 mg iron) tablet 1 tablet, oral, Daily    FreeStyle Mick 2 Sensor kit     gabapentin (NEURONTIN) 300 mg, oral, Daily    hydrALAZINE (APRESOLINE) 100 mg, oral, 3 times daily    isosorbide dinitrate (ISORDIL) 10 mg, oral, 3 times daily (0900,1400,1900)    magnesium oxide (MAG-OX) 800 mg, oral, 2 times daily    omeprazole (PRILOSEC) 20 mg, oral, Daily RT    rosuvastatin (CRESTOR) 20 mg, oral, Nightly    valsartan (DIOVAN) 160 mg, oral, Daily         Visit Vitals  /70 (BP Location: Left arm, Patient Position: Sitting, BP Cuff Size: Adult)   Pulse 78   Ht 1.854 m (6' 1\")   Wt 78.5 kg (173 lb)   SpO2 92%   BMI 22.82 kg/m²   Smoking Status Never   BSA 2.01 m²      Physical Exam  Vitals reviewed.   Constitutional:       Appearance: Normal appearance.   HENT:      Head: Normocephalic.   Cardiovascular:      Rate and Rhythm: Normal rate and regular rhythm.   Pulmonary:      Effort: Pulmonary effort is normal. No respiratory distress.      Breath sounds: No wheezing.   Skin:    "  General: Skin is warm and dry.      Capillary Refill: Capillary refill takes less than 2 seconds.   Neurological:      Mental Status: He is alert.   Psychiatric:         Mood and Affect: Mood normal.           My Interpretation of Reviewed Study(s):  Echo (April 2024): Normal left ventricular systolic function with an EF of 60 to 65% no regional wall motion abnormalities.  Normal biatrial size.  Large right ventricle.  Trivial pericardial effusion.  14-day monitor (May 2024): Diagnostic time 24 hours.  Sinus rhythm with a average heart rate of 72 bpm (range  episodes of 2-1 AV block and complete heart block noted even during waking hours.    Assessment/Plan   #CHB with RBBB Escape  Patient continues to have complete heart block and has some symptoms of fatigue.  I do suspect his fatigue is likely multifactorial given his new dialysis, anemia of chronic disease and heart block.  We discussed the utility of pacemaker.  In terms of risk patient did have a recent pericardial effusion likely related to end-stage renal disease (this was prehemodialysis) and therefore the repeat echo pending at the end of this week will help ascertain if there is still significant effusion or not.  The other major concern about implanted device is that patient is high risk for infection at this time is currently undergoing hemodialysis and therefore transvenous leads would be avoided there is an eventual plan for potential renal transplant and therefore patient will be on long-term immunosuppression which would also increase his risk of device infection.  As such a leadless pacemaker would be ideal.  If echo shows no significant pericardial effusion and LV function is still intact then we will likely plan for a AV Micra.  I discussed the risks benefits of both types of devices with the patient and his daughter who who both stated understanding and are agreeable.  Noreen ([phone]) - preferred contact #  Follow up Echo on  6/21/24  Plan for Leadless PPM if LVEF normal and No sign effusion noted, other wise may need to plan for right sided TV PPM (?CRT if LVEF <50%)    # Pre-procedure Planning  Procedure Type: Leadless pacemaker - Medtronic  Planned Date:  TBD  Anesthesia Needed: Moderate Sedation  Anticoagulation: None  Antiplatelet agents: None  SGLT2 Inhibitors: No  Other medication changes needed for procedure: Hold Morning of procedure  Contrast allergy? No  Imaging needed?  Pending echo from 6/21/24  Blood work to be ordered CBC and BMP within 30 days of procedure: Ordered within 30 days of procedure (encourage within 1-2 weeks)    Return to Clinic: Patient should return to the EP Clinic in 2 months    Sheldon De La Torre MD [facility]  Cardiac Electrophysiology  [email or web address]    **Disclaimer: This note was dictated by speech recognition, and every effort has been made to prevent any error in transcription, however minor errors may be present**

## 2024-06-19 ENCOUNTER — APPOINTMENT (OUTPATIENT)
Dept: CARDIOLOGY | Facility: CLINIC | Age: 74
End: 2024-06-19
Payer: COMMERCIAL

## 2024-06-21 ENCOUNTER — HOSPITAL ENCOUNTER (OUTPATIENT)
Dept: CARDIOLOGY | Facility: CLINIC | Age: 74
Discharge: HOME | End: 2024-06-21
Payer: COMMERCIAL

## 2024-06-21 DIAGNOSIS — I31.39 PERICARDIAL EFFUSION (HHS-HCC): ICD-10-CM

## 2024-06-21 LAB
EJECTION FRACTION APICAL 4 CHAMBER: 67.4
EJECTION FRACTION: 68 %
LEFT ATRIUM VOLUME AREA LENGTH INDEX BSA: 46 ML/M2
LEFT VENTRICLE INTERNAL DIMENSION DIASTOLE: 5.31 CM (ref 3.5–6)
RIGHT VENTRICLE FREE WALL PEAK S': 14 CM/S
RIGHT VENTRICLE PEAK SYSTOLIC PRESSURE: 69.8 MMHG
TRICUSPID ANNULAR PLANE SYSTOLIC EXCURSION: 2.1 CM

## 2024-06-21 PROCEDURE — 93325 DOPPLER ECHO COLOR FLOW MAPG: CPT | Performed by: INTERNAL MEDICINE

## 2024-06-21 PROCEDURE — 93321 DOPPLER ECHO F-UP/LMTD STD: CPT | Performed by: INTERNAL MEDICINE

## 2024-06-21 PROCEDURE — 93308 TTE F-UP OR LMTD: CPT | Performed by: INTERNAL MEDICINE

## 2024-06-21 PROCEDURE — 93325 DOPPLER ECHO COLOR FLOW MAPG: CPT

## 2024-06-27 ENCOUNTER — TELEPHONE (OUTPATIENT)
Dept: CARDIOLOGY | Facility: CLINIC | Age: 74
End: 2024-06-27
Payer: COMMERCIAL

## 2024-06-27 NOTE — TELEPHONE ENCOUNTER
Patient's daughter is calling regarding Echo results and if the plan is to go ahead a proceed with procedure?  She said depending Echo results, Dr. De La Torre would advise next steps.

## 2024-07-01 DIAGNOSIS — I44.2 COMPLETE HEART BLOCK (MULTI): Primary | ICD-10-CM

## 2024-07-01 NOTE — TELEPHONE ENCOUNTER
Cathi Isidro talked with Dr. De La Torre and is reaching out to patient and daughter today to schedule Leadless Pacemaker.

## 2024-07-01 NOTE — PROGRESS NOTES
Patients Echo shows normal LV function, will plan for Leadless PPM    Procedure Type: Leadless pacemaker - Medtronic   Planned Date:  7/17/24   Anesthesia Needed: Moderate Sedation   Anticoagulation: None   Antiplatelet agents: None   SGLT2 Inhibitors: No   Other medication changes needed for procedure: Hold Morning of procedure   Contrast allergy? No   Imaging needed?  No   Blood work to be ordered CBC and BMP within 30 days of procedure: Ordered within 30 days of procedure (encourage within 1-2 weeks)

## 2024-07-12 ENCOUNTER — APPOINTMENT (OUTPATIENT)
Dept: CARDIOLOGY | Facility: CLINIC | Age: 74
End: 2024-07-12
Payer: COMMERCIAL

## 2024-07-16 ENCOUNTER — LAB (OUTPATIENT)
Dept: LAB | Facility: LAB | Age: 74
End: 2024-07-16
Payer: COMMERCIAL

## 2024-07-16 DIAGNOSIS — I44.2 COMPLETE HEART BLOCK (MULTI): ICD-10-CM

## 2024-07-16 LAB
ANION GAP SERPL CALC-SCNC: 13 MMOL/L (ref 10–20)
BUN SERPL-MCNC: 14 MG/DL (ref 6–23)
CALCIUM SERPL-MCNC: 9.2 MG/DL (ref 8.6–10.6)
CHLORIDE SERPL-SCNC: 91 MMOL/L (ref 98–107)
CO2 SERPL-SCNC: 34 MMOL/L (ref 21–32)
CREAT SERPL-MCNC: 3.21 MG/DL (ref 0.5–1.3)
EGFRCR SERPLBLD CKD-EPI 2021: 20 ML/MIN/1.73M*2
ERYTHROCYTE [DISTWIDTH] IN BLOOD BY AUTOMATED COUNT: 17.7 % (ref 11.5–14.5)
GLUCOSE SERPL-MCNC: 123 MG/DL (ref 74–99)
HCT VFR BLD AUTO: 35.9 % (ref 41–52)
HGB BLD-MCNC: 10.8 G/DL (ref 13.5–17.5)
MCH RBC QN AUTO: 25.4 PG (ref 26–34)
MCHC RBC AUTO-ENTMCNC: 30.1 G/DL (ref 32–36)
MCV RBC AUTO: 84 FL (ref 80–100)
NRBC BLD-RTO: 0 /100 WBCS (ref 0–0)
PLATELET # BLD AUTO: 161 X10*3/UL (ref 150–450)
POTASSIUM SERPL-SCNC: 4.6 MMOL/L (ref 3.5–5.3)
RBC # BLD AUTO: 4.26 X10*6/UL (ref 4.5–5.9)
SODIUM SERPL-SCNC: 133 MMOL/L (ref 136–145)
WBC # BLD AUTO: 3.6 X10*3/UL (ref 4.4–11.3)

## 2024-07-16 PROCEDURE — 80048 BASIC METABOLIC PNL TOTAL CA: CPT

## 2024-07-16 PROCEDURE — 85027 COMPLETE CBC AUTOMATED: CPT

## 2024-07-16 PROCEDURE — 36415 COLL VENOUS BLD VENIPUNCTURE: CPT

## 2024-07-17 ENCOUNTER — APPOINTMENT (OUTPATIENT)
Dept: CARDIOLOGY | Facility: HOSPITAL | Age: 74
End: 2024-07-17
Payer: COMMERCIAL

## 2024-07-17 ENCOUNTER — HOSPITAL ENCOUNTER (OUTPATIENT)
Facility: HOSPITAL | Age: 74
Discharge: HOME | End: 2024-07-18
Attending: STUDENT IN AN ORGANIZED HEALTH CARE EDUCATION/TRAINING PROGRAM | Admitting: STUDENT IN AN ORGANIZED HEALTH CARE EDUCATION/TRAINING PROGRAM
Payer: COMMERCIAL

## 2024-07-17 DIAGNOSIS — I44.2 COMPLETE HEART BLOCK (MULTI): Primary | ICD-10-CM

## 2024-07-17 LAB
ANION GAP SERPL CALC-SCNC: 11 MMOL/L (ref 10–20)
APTT PPP: 38 SECONDS (ref 27–38)
BUN SERPL-MCNC: 21 MG/DL (ref 6–23)
CALCIUM SERPL-MCNC: 9.5 MG/DL (ref 8.6–10.3)
CHLORIDE SERPL-SCNC: 95 MMOL/L (ref 98–107)
CO2 SERPL-SCNC: 31 MMOL/L (ref 21–32)
CREAT SERPL-MCNC: 4.56 MG/DL (ref 0.5–1.3)
EGFRCR SERPLBLD CKD-EPI 2021: 13 ML/MIN/1.73M*2
ERYTHROCYTE [DISTWIDTH] IN BLOOD BY AUTOMATED COUNT: 17.9 % (ref 11.5–14.5)
ERYTHROCYTE [DISTWIDTH] IN BLOOD BY AUTOMATED COUNT: 18.1 % (ref 11.5–14.5)
GLUCOSE BLD MANUAL STRIP-MCNC: 103 MG/DL (ref 74–99)
GLUCOSE BLD MANUAL STRIP-MCNC: 106 MG/DL (ref 74–99)
GLUCOSE BLD MANUAL STRIP-MCNC: 110 MG/DL (ref 74–99)
GLUCOSE BLD MANUAL STRIP-MCNC: 114 MG/DL (ref 74–99)
GLUCOSE BLD MANUAL STRIP-MCNC: 170 MG/DL (ref 74–99)
GLUCOSE SERPL-MCNC: 111 MG/DL (ref 74–99)
HCT VFR BLD AUTO: 36.6 % (ref 41–52)
HCT VFR BLD AUTO: 40.1 % (ref 41–52)
HGB BLD-MCNC: 11.2 G/DL (ref 13.5–17.5)
HGB BLD-MCNC: 12 G/DL (ref 13.5–17.5)
HOLD SPECIMEN: NORMAL
INR PPP: 1.1 (ref 0.9–1.1)
MCH RBC QN AUTO: 26.2 PG (ref 26–34)
MCH RBC QN AUTO: 26.3 PG (ref 26–34)
MCHC RBC AUTO-ENTMCNC: 29.9 G/DL (ref 32–36)
MCHC RBC AUTO-ENTMCNC: 30.6 G/DL (ref 32–36)
MCV RBC AUTO: 86 FL (ref 80–100)
MCV RBC AUTO: 88 FL (ref 80–100)
NRBC BLD-RTO: 0 /100 WBCS (ref 0–0)
NRBC BLD-RTO: 0 /100 WBCS (ref 0–0)
PLATELET # BLD AUTO: 130 X10*3/UL (ref 150–450)
PLATELET # BLD AUTO: 139 X10*3/UL (ref 150–450)
POTASSIUM SERPL-SCNC: 4.7 MMOL/L (ref 3.5–5.3)
PROTHROMBIN TIME: 12.2 SECONDS (ref 9.8–12.8)
RBC # BLD AUTO: 4.28 X10*6/UL (ref 4.5–5.9)
RBC # BLD AUTO: 4.56 X10*6/UL (ref 4.5–5.9)
SODIUM SERPL-SCNC: 132 MMOL/L (ref 136–145)
WBC # BLD AUTO: 3.8 X10*3/UL (ref 4.4–11.3)
WBC # BLD AUTO: 4.3 X10*3/UL (ref 4.4–11.3)

## 2024-07-17 PROCEDURE — 2500000001 HC RX 250 WO HCPCS SELF ADMINISTERED DRUGS (ALT 637 FOR MEDICARE OP): Performed by: NURSE PRACTITIONER

## 2024-07-17 PROCEDURE — 2780000003 HC OR 278 NO HCPCS: Performed by: STUDENT IN AN ORGANIZED HEALTH CARE EDUCATION/TRAINING PROGRAM

## 2024-07-17 PROCEDURE — C1786 PMKR, SINGLE, RATE-RESP: HCPCS | Performed by: STUDENT IN AN ORGANIZED HEALTH CARE EDUCATION/TRAINING PROGRAM

## 2024-07-17 PROCEDURE — 99152 MOD SED SAME PHYS/QHP 5/>YRS: CPT | Performed by: STUDENT IN AN ORGANIZED HEALTH CARE EDUCATION/TRAINING PROGRAM

## 2024-07-17 PROCEDURE — 33274 TCAT INSJ/RPL PERM LDLS PM: CPT | Performed by: STUDENT IN AN ORGANIZED HEALTH CARE EDUCATION/TRAINING PROGRAM

## 2024-07-17 PROCEDURE — 85610 PROTHROMBIN TIME: CPT | Performed by: NURSE PRACTITIONER

## 2024-07-17 PROCEDURE — 82947 ASSAY GLUCOSE BLOOD QUANT: CPT

## 2024-07-17 PROCEDURE — 2500000004 HC RX 250 GENERAL PHARMACY W/ HCPCS (ALT 636 FOR OP/ED): Mod: JZ | Performed by: NURSE PRACTITIONER

## 2024-07-17 PROCEDURE — 2500000005 HC RX 250 GENERAL PHARMACY W/O HCPCS: Performed by: STUDENT IN AN ORGANIZED HEALTH CARE EDUCATION/TRAINING PROGRAM

## 2024-07-17 PROCEDURE — C1894 INTRO/SHEATH, NON-LASER: HCPCS | Performed by: STUDENT IN AN ORGANIZED HEALTH CARE EDUCATION/TRAINING PROGRAM

## 2024-07-17 PROCEDURE — C1730 CATH, EP, 19 OR FEW ELECT: HCPCS | Performed by: STUDENT IN AN ORGANIZED HEALTH CARE EDUCATION/TRAINING PROGRAM

## 2024-07-17 PROCEDURE — 7100000011 HC EXTENDED STAY RECOVERY HOURLY - NURSING UNIT

## 2024-07-17 PROCEDURE — 93005 ELECTROCARDIOGRAM TRACING: CPT

## 2024-07-17 PROCEDURE — C1760 CLOSURE DEV, VASC: HCPCS | Performed by: STUDENT IN AN ORGANIZED HEALTH CARE EDUCATION/TRAINING PROGRAM

## 2024-07-17 PROCEDURE — 2500000004 HC RX 250 GENERAL PHARMACY W/ HCPCS (ALT 636 FOR OP/ED): Performed by: STUDENT IN AN ORGANIZED HEALTH CARE EDUCATION/TRAINING PROGRAM

## 2024-07-17 PROCEDURE — 80048 BASIC METABOLIC PNL TOTAL CA: CPT | Performed by: NURSE PRACTITIONER

## 2024-07-17 PROCEDURE — G0269 OCCLUSIVE DEVICE IN VEIN ART: HCPCS | Mod: TC | Performed by: STUDENT IN AN ORGANIZED HEALTH CARE EDUCATION/TRAINING PROGRAM

## 2024-07-17 PROCEDURE — C1769 GUIDE WIRE: HCPCS | Performed by: STUDENT IN AN ORGANIZED HEALTH CARE EDUCATION/TRAINING PROGRAM

## 2024-07-17 PROCEDURE — 2720000007 HC OR 272 NO HCPCS: Performed by: STUDENT IN AN ORGANIZED HEALTH CARE EDUCATION/TRAINING PROGRAM

## 2024-07-17 PROCEDURE — 7100000009 HC PHASE TWO TIME - INITIAL BASE CHARGE: Performed by: STUDENT IN AN ORGANIZED HEALTH CARE EDUCATION/TRAINING PROGRAM

## 2024-07-17 PROCEDURE — 99153 MOD SED SAME PHYS/QHP EA: CPT | Performed by: STUDENT IN AN ORGANIZED HEALTH CARE EDUCATION/TRAINING PROGRAM

## 2024-07-17 PROCEDURE — 36415 COLL VENOUS BLD VENIPUNCTURE: CPT | Performed by: NURSE PRACTITIONER

## 2024-07-17 PROCEDURE — 76937 US GUIDE VASCULAR ACCESS: CPT | Performed by: STUDENT IN AN ORGANIZED HEALTH CARE EDUCATION/TRAINING PROGRAM

## 2024-07-17 PROCEDURE — 33210 INSERT ELECTRD/PM CATH SNGL: CPT | Performed by: STUDENT IN AN ORGANIZED HEALTH CARE EDUCATION/TRAINING PROGRAM

## 2024-07-17 PROCEDURE — 2750000001 HC OR 275 NO HCPCS: Performed by: STUDENT IN AN ORGANIZED HEALTH CARE EDUCATION/TRAINING PROGRAM

## 2024-07-17 PROCEDURE — 93010 ELECTROCARDIOGRAM REPORT: CPT | Performed by: STUDENT IN AN ORGANIZED HEALTH CARE EDUCATION/TRAINING PROGRAM

## 2024-07-17 PROCEDURE — 7100000010 HC PHASE TWO TIME - EACH INCREMENTAL 1 MINUTE: Performed by: STUDENT IN AN ORGANIZED HEALTH CARE EDUCATION/TRAINING PROGRAM

## 2024-07-17 PROCEDURE — 85027 COMPLETE CBC AUTOMATED: CPT | Performed by: NURSE PRACTITIONER

## 2024-07-17 DEVICE — TPS MC2AVR1 MICRA AV2 US
Type: IMPLANTABLE DEVICE | Site: HEART | Status: FUNCTIONAL
Brand: MICRA™ AV2

## 2024-07-17 RX ORDER — LIDOCAINE HYDROCHLORIDE 20 MG/ML
INJECTION, SOLUTION INFILTRATION; PERINEURAL AS NEEDED
Status: DISCONTINUED | OUTPATIENT
Start: 2024-07-17 | End: 2024-07-17 | Stop reason: HOSPADM

## 2024-07-17 RX ORDER — CALCIUM CARBONATE 300MG(750)
400 TABLET,CHEWABLE ORAL DAILY
COMMUNITY

## 2024-07-17 RX ORDER — PANTOPRAZOLE SODIUM 40 MG/1
40 TABLET, DELAYED RELEASE ORAL
Status: DISCONTINUED | OUTPATIENT
Start: 2024-07-18 | End: 2024-07-18 | Stop reason: HOSPADM

## 2024-07-17 RX ORDER — ISOSORBIDE DINITRATE 10 MG/1
10 TABLET ORAL
Status: DISCONTINUED | OUTPATIENT
Start: 2024-07-18 | End: 2024-07-18 | Stop reason: HOSPADM

## 2024-07-17 RX ORDER — GABAPENTIN 100 MG/1
200 CAPSULE ORAL NIGHTLY
Status: DISCONTINUED | OUTPATIENT
Start: 2024-07-17 | End: 2024-07-18 | Stop reason: HOSPADM

## 2024-07-17 RX ORDER — FENTANYL CITRATE 50 UG/ML
INJECTION, SOLUTION INTRAMUSCULAR; INTRAVENOUS AS NEEDED
Status: DISCONTINUED | OUTPATIENT
Start: 2024-07-17 | End: 2024-07-17 | Stop reason: HOSPADM

## 2024-07-17 RX ORDER — MIDAZOLAM HYDROCHLORIDE 1 MG/ML
INJECTION, SOLUTION INTRAMUSCULAR; INTRAVENOUS AS NEEDED
Status: DISCONTINUED | OUTPATIENT
Start: 2024-07-17 | End: 2024-07-17 | Stop reason: HOSPADM

## 2024-07-17 RX ORDER — SODIUM CHLORIDE 9 MG/ML
50 INJECTION, SOLUTION INTRAVENOUS CONTINUOUS
Status: DISCONTINUED | OUTPATIENT
Start: 2024-07-17 | End: 2024-07-18 | Stop reason: HOSPADM

## 2024-07-17 RX ORDER — HYDRALAZINE HYDROCHLORIDE 20 MG/ML
10 INJECTION INTRAMUSCULAR; INTRAVENOUS ONCE
Status: DISCONTINUED | OUTPATIENT
Start: 2024-07-17 | End: 2024-07-17

## 2024-07-17 RX ORDER — LANOLIN ALCOHOL/MO/W.PET/CERES
400 CREAM (GRAM) TOPICAL DAILY
Status: DISCONTINUED | OUTPATIENT
Start: 2024-07-18 | End: 2024-07-18 | Stop reason: HOSPADM

## 2024-07-17 RX ORDER — HYDRALAZINE HYDROCHLORIDE 50 MG/1
100 TABLET, FILM COATED ORAL 3 TIMES DAILY
Status: DISCONTINUED | OUTPATIENT
Start: 2024-07-17 | End: 2024-07-18 | Stop reason: HOSPADM

## 2024-07-17 RX ORDER — ACETAMINOPHEN 325 MG/1
650 TABLET ORAL EVERY 6 HOURS PRN
Status: DISCONTINUED | OUTPATIENT
Start: 2024-07-17 | End: 2024-07-18 | Stop reason: HOSPADM

## 2024-07-17 RX ORDER — LIDOCAINE HYDROCHLORIDE AND EPINEPHRINE 10; 10 MG/ML; UG/ML
10 INJECTION, SOLUTION INFILTRATION; PERINEURAL ONCE
Status: COMPLETED | OUTPATIENT
Start: 2024-07-17 | End: 2024-07-17

## 2024-07-17 RX ORDER — ROSUVASTATIN CALCIUM 10 MG/1
20 TABLET, COATED ORAL DAILY
Status: DISCONTINUED | OUTPATIENT
Start: 2024-07-18 | End: 2024-07-18 | Stop reason: HOSPADM

## 2024-07-17 RX ORDER — CEFAZOLIN SODIUM 1 G/50ML
1 SOLUTION INTRAVENOUS ONCE
Status: COMPLETED | OUTPATIENT
Start: 2024-07-17 | End: 2024-07-17

## 2024-07-17 RX ORDER — HEPARIN SODIUM 1000 [USP'U]/ML
INJECTION, SOLUTION INTRAVENOUS; SUBCUTANEOUS AS NEEDED
Status: DISCONTINUED | OUTPATIENT
Start: 2024-07-17 | End: 2024-07-17 | Stop reason: HOSPADM

## 2024-07-17 RX ORDER — B-COMPLEX WITH VITAMIN C
1 TABLET ORAL
Status: DISCONTINUED | OUTPATIENT
Start: 2024-07-18 | End: 2024-07-18 | Stop reason: HOSPADM

## 2024-07-17 RX ORDER — AMLODIPINE BESYLATE 10 MG/1
10 TABLET ORAL DAILY
Status: DISCONTINUED | OUTPATIENT
Start: 2024-07-18 | End: 2024-07-18 | Stop reason: HOSPADM

## 2024-07-17 RX ORDER — VALSARTAN 80 MG/1
160 TABLET ORAL DAILY
Status: DISCONTINUED | OUTPATIENT
Start: 2024-07-18 | End: 2024-07-18 | Stop reason: HOSPADM

## 2024-07-17 SDOH — SOCIAL STABILITY: SOCIAL INSECURITY: ABUSE: ADULT

## 2024-07-17 SDOH — SOCIAL STABILITY: SOCIAL INSECURITY: HAVE YOU HAD THOUGHTS OF HARMING ANYONE ELSE?: NO

## 2024-07-17 SDOH — HEALTH STABILITY: MENTAL HEALTH: HOW OFTEN DO YOU HAVE A DRINK CONTAINING ALCOHOL?: NEVER

## 2024-07-17 SDOH — SOCIAL STABILITY: SOCIAL INSECURITY: DO YOU FEEL UNSAFE GOING BACK TO THE PLACE WHERE YOU ARE LIVING?: NO

## 2024-07-17 SDOH — ECONOMIC STABILITY: HOUSING INSECURITY: AT ANY TIME IN THE PAST 12 MONTHS, WERE YOU HOMELESS OR LIVING IN A SHELTER (INCLUDING NOW)?: NO

## 2024-07-17 SDOH — ECONOMIC STABILITY: TRANSPORTATION INSECURITY
IN THE PAST 12 MONTHS, HAS LACK OF TRANSPORTATION KEPT YOU FROM MEETINGS, WORK, OR FROM GETTING THINGS NEEDED FOR DAILY LIVING?: NO

## 2024-07-17 SDOH — SOCIAL STABILITY: SOCIAL INSECURITY: ARE YOU OR HAVE YOU BEEN THREATENED OR ABUSED PHYSICALLY, EMOTIONALLY, OR SEXUALLY BY ANYONE?: NO

## 2024-07-17 SDOH — SOCIAL STABILITY: SOCIAL INSECURITY: DOES ANYONE TRY TO KEEP YOU FROM HAVING/CONTACTING OTHER FRIENDS OR DOING THINGS OUTSIDE YOUR HOME?: NO

## 2024-07-17 SDOH — ECONOMIC STABILITY: INCOME INSECURITY: IN THE LAST 12 MONTHS, WAS THERE A TIME WHEN YOU WERE NOT ABLE TO PAY THE MORTGAGE OR RENT ON TIME?: NO

## 2024-07-17 SDOH — SOCIAL STABILITY: SOCIAL INSECURITY: HAS ANYONE EVER THREATENED TO HURT YOUR FAMILY OR YOUR PETS?: NO

## 2024-07-17 SDOH — ECONOMIC STABILITY: INCOME INSECURITY: HOW HARD IS IT FOR YOU TO PAY FOR THE VERY BASICS LIKE FOOD, HOUSING, MEDICAL CARE, AND HEATING?: NOT HARD AT ALL

## 2024-07-17 SDOH — HEALTH STABILITY: MENTAL HEALTH: HOW OFTEN DO YOU HAVE 6 OR MORE DRINKS ON ONE OCCASION?: NEVER

## 2024-07-17 SDOH — HEALTH STABILITY: MENTAL HEALTH: HOW MANY STANDARD DRINKS CONTAINING ALCOHOL DO YOU HAVE ON A TYPICAL DAY?: PATIENT DOES NOT DRINK

## 2024-07-17 SDOH — SOCIAL STABILITY: SOCIAL INSECURITY: DO YOU FEEL ANYONE HAS EXPLOITED OR TAKEN ADVANTAGE OF YOU FINANCIALLY OR OF YOUR PERSONAL PROPERTY?: NO

## 2024-07-17 SDOH — SOCIAL STABILITY: SOCIAL INSECURITY: ARE THERE ANY APPARENT SIGNS OF INJURIES/BEHAVIORS THAT COULD BE RELATED TO ABUSE/NEGLECT?: NO

## 2024-07-17 SDOH — ECONOMIC STABILITY: TRANSPORTATION INSECURITY
IN THE PAST 12 MONTHS, HAS THE LACK OF TRANSPORTATION KEPT YOU FROM MEDICAL APPOINTMENTS OR FROM GETTING MEDICATIONS?: NO

## 2024-07-17 SDOH — ECONOMIC STABILITY: HOUSING INSECURITY: IN THE PAST 12 MONTHS, HOW MANY TIMES HAVE YOU MOVED WHERE YOU WERE LIVING?: 1

## 2024-07-17 SDOH — SOCIAL STABILITY: SOCIAL INSECURITY: HAVE YOU HAD ANY THOUGHTS OF HARMING ANYONE ELSE?: NO

## 2024-07-17 ASSESSMENT — COGNITIVE AND FUNCTIONAL STATUS - GENERAL
TURNING FROM BACK TO SIDE WHILE IN FLAT BAD: A LITTLE
STANDING UP FROM CHAIR USING ARMS: A LITTLE
TURNING FROM BACK TO SIDE WHILE IN FLAT BAD: A LITTLE
CLIMB 3 TO 5 STEPS WITH RAILING: TOTAL
STANDING UP FROM CHAIR USING ARMS: A LITTLE
PATIENT BASELINE BEDBOUND: NO
MOBILITY SCORE: 18
STANDING UP FROM CHAIR USING ARMS: A LITTLE
MOBILITY SCORE: 20
DAILY ACTIVITIY SCORE: 22
HELP NEEDED FOR BATHING: A LITTLE
HELP NEEDED FOR BATHING: A LITTLE
DAILY ACTIVITIY SCORE: 22
TOILETING: A LITTLE
WALKING IN HOSPITAL ROOM: A LITTLE
CLIMB 3 TO 5 STEPS WITH RAILING: A LOT
DAILY ACTIVITIY SCORE: 23
HELP NEEDED FOR BATHING: A LITTLE
CLIMB 3 TO 5 STEPS WITH RAILING: A LOT
MOBILITY SCORE: 20
TOILETING: A LITTLE
TURNING FROM BACK TO SIDE WHILE IN FLAT BAD: A LITTLE

## 2024-07-17 ASSESSMENT — ACTIVITIES OF DAILY LIVING (ADL)
TOILETING: INDEPENDENT
PATIENT'S MEMORY ADEQUATE TO SAFELY COMPLETE DAILY ACTIVITIES?: YES
JUDGMENT_ADEQUATE_SAFELY_COMPLETE_DAILY_ACTIVITIES: YES
ASSISTIVE_DEVICE: EYEGLASSES
TOILETING: INDEPENDENT
BATHING: INDEPENDENT
BATHING: INDEPENDENT
DRESSING YOURSELF: INDEPENDENT
ADEQUATE_TO_COMPLETE_ADL: YES
HEARING - RIGHT EAR: FUNCTIONAL
ASSISTIVE_DEVICE: EYEGLASSES
JUDGMENT_ADEQUATE_SAFELY_COMPLETE_DAILY_ACTIVITIES: YES
FEEDING YOURSELF: INDEPENDENT
ADEQUATE_TO_COMPLETE_ADL: YES
DRESSING YOURSELF: INDEPENDENT
HEARING - RIGHT EAR: FUNCTIONAL
WALKS IN HOME: INDEPENDENT
GROOMING: INDEPENDENT
GROOMING: INDEPENDENT
HEARING - LEFT EAR: FUNCTIONAL
FEEDING YOURSELF: INDEPENDENT
WALKS IN HOME: INDEPENDENT
HEARING - LEFT EAR: FUNCTIONAL
PATIENT'S MEMORY ADEQUATE TO SAFELY COMPLETE DAILY ACTIVITIES?: YES

## 2024-07-17 ASSESSMENT — PATIENT HEALTH QUESTIONNAIRE - PHQ9
1. LITTLE INTEREST OR PLEASURE IN DOING THINGS: NOT AT ALL
SUM OF ALL RESPONSES TO PHQ9 QUESTIONS 1 & 2: 0
2. FEELING DOWN, DEPRESSED OR HOPELESS: NOT AT ALL

## 2024-07-17 ASSESSMENT — COLUMBIA-SUICIDE SEVERITY RATING SCALE - C-SSRS
1. IN THE PAST MONTH, HAVE YOU WISHED YOU WERE DEAD OR WISHED YOU COULD GO TO SLEEP AND NOT WAKE UP?: NO
2. HAVE YOU ACTUALLY HAD ANY THOUGHTS OF KILLING YOURSELF?: NO
6. HAVE YOU EVER DONE ANYTHING, STARTED TO DO ANYTHING, OR PREPARED TO DO ANYTHING TO END YOUR LIFE?: NO

## 2024-07-17 ASSESSMENT — PAIN SCALES - GENERAL
PAINLEVEL_OUTOF10: 0 - NO PAIN
PAINLEVEL_OUTOF10: 8
PAINLEVEL_OUTOF10: 0 - NO PAIN
PAINLEVEL_OUTOF10: 8
PAINLEVEL_OUTOF10: 0 - NO PAIN
PAINLEVEL_OUTOF10: 6

## 2024-07-17 ASSESSMENT — PAIN DESCRIPTION - DESCRIPTORS
DESCRIPTORS: POUNDING
DESCRIPTORS: POUNDING

## 2024-07-17 ASSESSMENT — LIFESTYLE VARIABLES
AUDIT-C TOTAL SCORE: 0
SKIP TO QUESTIONS 9-10: 1

## 2024-07-17 ASSESSMENT — PAIN - FUNCTIONAL ASSESSMENT
PAIN_FUNCTIONAL_ASSESSMENT: 0-10

## 2024-07-17 NOTE — Clinical Note
Dilators advanced and removed, 10 Citizen [place], 12 Citizen [place], 16 Citizen [place], 20 Citizen [place].

## 2024-07-17 NOTE — POST-PROCEDURE NOTE
Physician Transition of Care Summary  Cardiac Electrophysiology Lab/OR    Procedure Date: 7/17/2024  Attending:    Leigh De La Torre - Primary    Resident/Fellow/Other Assistant: Surgeons and Role:  * No surgeons found with a matching role *    Indications:   Pre-op Diagnosis      * Complete heart block (Multi) [I44.2]    Post-procedure diagnosis:   Post-op Diagnosis     * ESRD (end stage renal disease) (Multi) [N18.6]     * Complete heart block (Multi) [I44.2]    Procedure(s):   Leadless PPM Implant ([id number])  60174 - IN TCAT INSJ/RPL PERM LEADLESS PACEMAKER RV W/IMG      Summary:  Successful leadless pacemaker implant Medtronic (MICRA AV)  RFV closure: Perclose x2 + Figure-of-8 suture and LFV: Manual pressure     Final Implant Settings:    Recommendations:  Patient to be admitted under extended observation overnight. Tentatively patient will be discharged tomorrow following bed rest and subsequent ambulation, provided the recovery parameters are appropriate.   Bedrest for 3hours post-sheath removal  A PA-lateral chest X-ray should be performed and telemetry monitoring continued for 24 hours.   A 12 lead ECG should be performed prior to discharge from the hospital.   The patient should continue with the home medications.   Will consult Nephrology and try get pt HD today to keep him on his schedule     Patient Instructions:  No driving, alcohol or making legal decisions for 24 hours.  Remove band-aid/tegaderm in 1 day.  No heavy lifting, strenuous exercise for 1 week  Please call our office if you notice any groin discharge or swelling or fever.   For cardiology electrophysiology emergencies (such as severe heart racing, bleeding, severe groin pain/swelling, high fever, wound discharge, stroke symptoms, or recent severe chest pain) call our office ([place]: [phone])     Follow up:   The patient should be alert for bleeding, swelling, or signs of infection. The patient should call the electrophysiologist immediately  "if symptoms recur, or for any problems.   Follow up in device clinic in 4 weeks for routine device analysis and reprogramming if necessary. Remote monitoring will be instituted if possible.      See complete procedural log and parameters.    For full procedure note/study review please go to \"Chart review\" --> \"Cardiology\" tab --> Electrophysiology procedure for 7/17/2024    Complications:   None    Stents/Implants:   Implants       Other Cardiac Implant    Pacemaker System, Micra [id number], With Introducer - [id number] - [id number] - Implanted        Inventory item: PACEMAKER SYSTEM, MICRA [id number], WITH INTRODUCER Model/Cat number: [id number]    Serial number: [id number] : [employer]    Lot number: [id number] Device identifier: [id number]      As of 7/17/2024       Status: Implanted                              Anticoagulation/Antiplatelet Plan:   Please hold all heparin/Lovenox/AC until cleared by EP service/provider    Estimated Blood Loss:   * No values recorded between 5/31/2024  2:17 PM and 5/31/2024  7:50 PM *    Anesthesia: Moderate Sedation Anesthesia Staff: No anesthesia staff entered.    Any Specimen(s) Removed:   Order Name Source Comment Collection Info Order Time   COAGULATION SCREEN Blood, Venous   7/17/2024  7:13 AM     Release result to [facility]   Immediate        BASIC METABOLIC PANEL Blood, Venous   7/17/2024  7:13 AM     Release result to [facility]   Immediate            Disposition:   Admit for extended obs overnight, will contact nephrology to see if pt can get HD today (regular schedule is M,W,F)    Sheldon De La Torre MD [facility]  Cardiac Electrophysiology    **Disclaimer: This note was dictated by speech recognition, and every effort has been made to prevent any error in transcription, however minor errors may be present**    "

## 2024-07-17 NOTE — CARE PLAN
The patient's goals for the shift include      The clinical goals for the shift include monitor hr, and surgical sites    Patient needs to lay flat throughput the evening, R groin continued to bleed since on the floor after procedure.

## 2024-07-17 NOTE — DISCHARGE INSTRUCTIONS
DISCHARGE INSTRUCTIONS     FOR SUDDEN AND SEVERE CHEST PAIN, SHORTNESS OF BREATH, EXCESSIVE BLEEDING, SIGNS OF STROKE, OR CHANGES IN MENTAL STATUS YOU SHOULD CALL 911 IMMEDIATELY.     FOR NEXT 24 HOURS  - Upon discharge, you should return home and rest for the remainder of the day and evening. You do not have to stay on bed rest but should not be very active.  It is recommended a responsible adult be with you for the first 24 hours after the procedure.    - No driving for 24 hours after procedure. Please arrange for someone to drive you home from the hospital today.     - Do not drive, operate machinery, or use power tools for 24 hours after your procedure.     - Do not make any legal decisions for 24 hours after your procedure.     - Do not drink alcoholic beverages for 24 hours after your procedure.    WOUND CARE   *FOR FEMORAL (LEG) ACCESS*  ·      Avoid heavy lifting (over 10 pounds) for 7 days, squatting or excessive bending for 7 days, and strenuous exercise for 7 days.  ·      No submerged bathing, swimming, or hot tubs for the next 7 days, or until fully healed.  ·      Avoid sexual activity for 3-4 days until any groin discomfort has ceased.    - The transparent dressing should be removed from the site 24 hours after the procedure.  Wash the site gently with soap and water. Rinse well and pat dry. Keep the area clean and dry. You may apply a Band-Aid to the site. Avoid lotions, ointments, or powders until fully healed.     - You may shower the day after your procedure.      - It is normal to notice a small bruise around the puncture site and/or a small grape sized or smaller lump. Any large bruising or large lump warrants a call to the office.     - If bleeding should occur, lay down and apply pressure to the affected area for 10 minutes.  If the bleeding stops notify your physician.  If there is a large amount of bleeding or spurting of blood CALL 911 immediately.  DO NOT drive yourself to the  hospitals.    - You may experience some tenderness, bruising or minimal inflammation.  If you have any concerns, you may contact the Cath Lab or if any of these symptoms become excessive, contact your cardiologist or go to the emergency room.     OTHER INSTRUCTIONS  - You may take acetaminophen (Tylenol) as directed for discomfort.  If pain is not relieved with acetaminophen (Tylenol), contact your doctor.    - If you notice or experience any of the following, you should notify your doctor or seek medical attention  Chest pain or discomfort  Change in mental status or weakness in extremities.  Dizziness, light headedness, or feeling faint.  Change in the site where the procedure was performed, such as bleeding or an increased area of bruising or swelling.  Tingling, numbness, pain, or coolness in the leg/arm beyond the site where the procedure was performed.  Signs of infection (i.e. shaking chills, temperature > 100 degrees Fahrenheit, warmth, redness) in the leg/arm area where the procedure was performed.  Changes in urination   Any excessive bleeding    - Please follow up with cardiology and device clinic as scheduled, [phone]

## 2024-07-17 NOTE — NURSING NOTE
Report called to PALMIRA Carmichael on 9W. Patient to be transported upstairs by staff. Vital signs stable, groin sites remain stable.

## 2024-07-17 NOTE — H&P
"           History and Physical   Pre Surgical Review (< 30 days)      History & Physical Reviewed    I have reviewed the History and Physical dated:  6/18/24   History and Physical reviewed and relevant findings noted. Patient examined to review pertinent physical  findings.: No significant changes   Home Medications Reviewed: see medication list below    Allergies Reviewed: no changes noted      Home Medications  Current Outpatient Medications   Medication Instructions    acetaminophen (Tylenol) 325 mg tablet 2 tablets, oral, Every 6 hours PRN    amLODIPine (NORVASC) 10 mg, oral, Daily    B complex-vitamin C-folic acid (Nephro-Vinod Rx) 1- mg-mg-mcg tablet 1 tablet, oral, Daily with breakfast    BD Ultra-Fine Joan Pen Needle 32 gauge x 5/32\" needle     calcitriol (ROCALTROL) 0.25 mcg, oral, Daily RT    calcium carbonate (TUMS) 500 mg, oral, 3 times daily, With meals    carboxymethylcellulose (Refresh Plus) 0.5 % ophthalmic solution Instill 1 drop into both eyes 2-4x/day as needed for dryness.    doxazosin (Cardura) 8 mg tablet 1 tablet, oral, Nightly    ferrous gluconate 236 mg (27 mg iron) tablet 1 tablet, oral, Daily    FreeStyle Mick 2 Sensor kit     gabapentin (NEURONTIN) 300 mg, oral, Daily    hydrALAZINE (APRESOLINE) 100 mg, oral, 3 times daily    isosorbide dinitrate (ISORDIL) 10 mg, oral, 3 times daily (0900,1400,1900)    magnesium oxide (MAG-OX) 400 mg, Daily    omeprazole (PRILOSEC) 20 mg, oral, Daily RT    rosuvastatin (CRESTOR) 20 mg, oral, Nightly    valsartan (DIOVAN) 160 mg, oral, Daily        Allergies  Allergies   Allergen Reactions    Terazosin Unknown     Did not feel well on this medication    Codeine Itching     itching    Tramadol Itching       Physical Exam  Physical Exam  Constitutional:       General: He is not in acute distress.  Cardiovascular:      Rate and Rhythm: Rhythm irregular.      Comments: + pulses all extremities.  L arm AV fistula.  Pulmonary:      Effort: Pulmonary " effort is normal. No respiratory distress.      Comments: Clear bilaterally.  Abdominal:      General: Bowel sounds are normal.   Neurological:      General: No focal deficit present.      Mental Status: He is alert and oriented to person, place, and time.   Psychiatric:         Mood and Affect: Mood normal.         Behavior: Behavior normal.          Airway/Sedation Assessment     Assessment by anesthesia N/A     ·  Mouth Opening OK yes      Neck Flexibility OK yes      Loose Teeth No   ·  Oropharyngeal Classification Grade III   ·  ASA PS Classification ASA III - Patient with severe systemic disease       Sedation Plan Moderate     Risks, benefits, and alternatives discussed with patient.     ERAS (Enhanced Recovery After Surgery):  ·  ERAS Patient: No      Consent:   COVID-19 Consent:  ·  COVID-19 Risk Consent Surgeon has reviewed key risks related to the risk of vicenta COVID-19 and if they contract COVID-19 what the risks are.     Lucille Cavazos, KARO-CNP

## 2024-07-17 NOTE — Clinical Note
The PACEMAKER SYSTEM, MICRA [id number], WITH INTRODUCER - [id number] device was inserted. Injury current obtained.

## 2024-07-17 NOTE — CONSULTS
NEPHROLOGY CONSULTATION NOTE    DATE OF CONSULTATION:  07/17/24     Referring Physician: Sheldon De La Torre MD    REASON FOR CONSULT: ESRD on HD [facility]    HISTORY OF PRESENT ILLNESS:   Temo Hanson is a 73 y.o. male presenting with elective placement of pacemaker which she had this morning.  A nephrology consultation was requested for management of dialysis.  Patient was initiated on dialysis in April 2024 when he was admitted to [facility] with pericardial effusion which was thought to be uremic pericarditis related.  He was initiated on dialysis then.  He has had longstanding history of diabetic nephropathy and had a left upper extremity AV graft placed which is currently being used for dialysis.    He currently [place] [facility] [address] under the care of Dr. Palmer from [facility].  He dialyzes for 4 hours.  He clearly does not know his target weight but states that it is in the 150 pound range.  He used to be a 190 pound when he initiated on dialysis and in the last 3 to 4 months he has lost a lot of fluid weight.  His appetite is good.  Patient states that his outpatient dialysis facility did tell him to come Thursday and Saturday this week and then subsequently on Monday he can be back on his regular schedule.  He is scheduled to have dialysis tomorrow at his outpatient facility at 4 PM.  His laboratory workup from yesterday shows a potassium of 4.6 hemoglobin was at 10.8.  He has no leg swelling.  He still makes good amount of urine.    He was diagnosed with diabetes back when he was 46 years of age.  He states that he has had some laser surgery for the eye.  He also has hypertension.  He was anemic but his hemoglobin has improved.     PAST MEDICAL HISTORY:   1.  ESRD on HD  2.  Diabetes mellitus type 2  3.  Hypertension  4.  Prostate cancer  5.  Complete heart block  6.  Pericardial effusion s/p pericardial drain      SURGICAL HISTORY:    1.  Cholecystectomy  2.  Pericardial drain  3.  Left upper  "extremity AV graft  4.  Prostate surgery  5.  Cardiac catheterization  6.  Cyst excision  7.  Colonoscopy     SOCIAL HISTORY:   He reports that he has never smoked. He has never used smokeless tobacco. He reports that he does not drink alcohol and does not use drugs.  He lives at home with his wife.  He has 9 grownup children all live in [place].  He worked as a  for a company.  He is from [facility].    FAMILY HISTORY:  Family History   Problem Relation Name Age of Onset    Diabetes Sister      Diabetes Brother         ALLERGIES:  Terazosin, Codeine, and Tramadol    MEDICATIONS:     No current facility-administered medications on file prior to encounter.     Current Outpatient Medications on File Prior to Encounter   Medication Sig Dispense Refill    acetaminophen (Tylenol) 325 mg tablet Take 2 tablets (650 mg) by mouth every 6 hours if needed.      amLODIPine (Norvasc) 10 mg tablet Take 1 tablet (10 mg) by mouth once daily. 30 tablet 1    B complex-vitamin C-folic acid (Nephro-Vinod Rx) 1- mg-mg-mcg tablet Take 1 tablet by mouth once daily with breakfast.      BD Ultra-Fine Joan Pen Needle 32 gauge x 5/32\" needle       carboxymethylcellulose (Refresh Plus) 0.5 % ophthalmic solution Instill 1 drop into both eyes 2-4x/day as needed for dryness.      FreeStyle Mick 2 Sensor kit       gabapentin (Neurontin) 300 mg capsule Take 1 capsule (300 mg) by mouth once daily. (Patient taking differently: Take 200 mg by mouth once daily at bedtime.) 30 capsule 0    hydrALAZINE (Apresoline) 100 mg tablet Take 1 tablet (100 mg) by mouth 3 times a day. 90 tablet 0    isosorbide dinitrate (Isordil) 10 mg tablet Take 1 tablet (10 mg) by mouth 3 times a day. 90 tablet 0    magnesium oxide (Mag-Ox) 400 mg tablet 1 tablet (400 mg) once daily.      omeprazole (PriLOSEC) 20 mg DR capsule Take 1 capsule (20 mg) by mouth once daily.      rosuvastatin (Crestor) 20 mg tablet Take 1 tablet (20 mg) by mouth once daily " at bedtime. 30 tablet 1    valsartan (Diovan) 160 mg tablet Take 1 tablet (160 mg) by mouth once daily. 30 tablet 1    calcitriol (Rocaltrol) 0.25 mcg capsule Take 1 capsule (0.25 mcg) by mouth once daily.      calcium carbonate (Tums) 200 mg calcium chewable tablet Chew 1 tablet (500 mg) 3 times a day. With meals      doxazosin (Cardura) 8 mg tablet Take 1 tablet (8 mg) by mouth once daily at bedtime.      ferrous gluconate 236 mg (27 mg iron) tablet Take 1 tablet (27 mg) by mouth once daily.          Scheduled medications  [START ON 7/18/2024] amLODIPine, 10 mg, oral, Daily  [START ON 7/18/2024] B complex-vitamin C-folic acid, 1 tablet, oral, Daily with breakfast  gabapentin, 200 mg, oral, Nightly  hydrALAZINE, 100 mg, oral, TID  [START ON 7/18/2024] isosorbide dinitrate, 10 mg, oral, TID  [START ON 7/18/2024] magnesium oxide, 400 mg, oral, Daily  [START ON 7/18/2024] pantoprazole, 40 mg, oral, Daily before breakfast  perflutren lipid microspheres, 0.5-10 mL of dilution, intravenous, Once in imaging  perflutren protein A microsphere, 0.5 mL, intravenous, Once in imaging  [START ON 7/18/2024] rosuvastatin, 20 mg, oral, Daily  sulfur hexafluoride microsphr, 2 mL, intravenous, Once in imaging  [START ON 7/18/2024] valsartan, 160 mg, oral, Daily      Continuous medications  sodium chloride 0.9%, 50 mL/hr, Last Rate: Stopped (07/17/24 1102)      PRN medications  PRN medications: acetaminophen, carboxymethylcellulose     REVIEW OF SYSTEMS:    No headache, no blurry vision, no fever or chills,  no chest pain, no shortness of breath, no nausea, no vomiting, no diarrhea, no constipation , no focal weakness, no burning urination, no leg swelling.  Complains of groin pain because of the cardiac procedures  Rest of the 10 point ROS is negative     PHYSICAL EXAM:    Visit Vitals  /61   Pulse 61   Temp 36.1 °C (97 °F) (Temporal)   Resp 18        GENERAL: Awake and Alert, in no distress, Cooperative  EYES: EOMI,  no Pallor  noted  HEENT: oral mucosa moist  NECK: supple  CHEST: no tachypnea, no crackles, no wheeze  CVS: S1, S2 heard, Regular Rate and Rhythm, no murmurs, no rubs  ABDOMEN: soft, non tender, bowel sounds present, no distention  MSK: No joint effusion, no tenderness  NEURO: No focal deficit, moving all extremities, no tremor  EXT: no leg edema, left arm AV graft  PSYCH: normal affect         I&O 24HR    Intake/Output Summary (Last 24 hours) at 7/17/2024 1158  Last data filed at 7/17/2024 0933  Gross per 24 hour   Intake --   Output 20 ml   Net -20 ml       RESULTS:         Lab Results   Component Value Date    WBC 3.6 (L) 07/16/2024    HGB 10.8 (L) 07/16/2024    HCT 35.9 (L) 07/16/2024    MCV 84 07/16/2024     07/16/2024      Lab Results   Component Value Date    GLUCOSE 123 (H) 07/16/2024    CALCIUM 9.2 07/16/2024     (L) 07/16/2024    K 4.6 07/16/2024    CO2 34 (H) 07/16/2024    CL 91 (L) 07/16/2024    BUN 14 07/16/2024    CREATININE 3.21 (H) 07/16/2024         === 04/17/24 ===    XR CHEST 1 VIEW    - Impression -  Again increased pleural effusions greater on the right with fluid  along the minor fissure.  Right basilar prominent atelectasis.  Recommend CT for further evaluation.  Signed by John Payne,      ASSESSMENT/ PLAN:     This is a 73 y.o. year old male who presents with elective place maker implant for complete heart block    1.  ESRD on HD MWF: Patient is scheduled to have his outpatient dialysis tomorrow at [address] at 4 PM because of the cardiac pacemaker implantation procedure planned for today.  We will check his electrolytes tomorrow morning and if there is a need we can dialyze him here if not he can be discharged and go to his outpatient dialysis unit.  He has a left upper arm AV graft.  He still has residual renal function.  Volume status is okay.  He dialyzes for 4 hours.    2.  Anemia in ESRD: Hemoglobin currently greater than 10.    3.  Hypertension: He is on amlodipine,  Diovan, isosorbide, hydralazine and doxazosin.    Thank you very much for allowing me to participate in the care of this patient.     This note was created using dragon dictation and may contain unintended errors    BA HIRSCH MD    07/17/24

## 2024-07-18 ENCOUNTER — APPOINTMENT (OUTPATIENT)
Dept: RADIOLOGY | Facility: HOSPITAL | Age: 74
End: 2024-07-18
Payer: COMMERCIAL

## 2024-07-18 ENCOUNTER — APPOINTMENT (OUTPATIENT)
Dept: CARDIOLOGY | Facility: HOSPITAL | Age: 74
End: 2024-07-18
Payer: COMMERCIAL

## 2024-07-18 VITALS
WEIGHT: 146.7 LBS | HEART RATE: 72 BPM | HEIGHT: 73 IN | BODY MASS INDEX: 19.44 KG/M2 | SYSTOLIC BLOOD PRESSURE: 164 MMHG | RESPIRATION RATE: 16 BRPM | DIASTOLIC BLOOD PRESSURE: 75 MMHG | TEMPERATURE: 98.2 F | OXYGEN SATURATION: 97 %

## 2024-07-18 LAB
ANION GAP SERPL CALC-SCNC: 14 MMOL/L (ref 10–20)
BUN SERPL-MCNC: 31 MG/DL (ref 6–23)
CALCIUM SERPL-MCNC: 9.1 MG/DL (ref 8.6–10.3)
CHLORIDE SERPL-SCNC: 94 MMOL/L (ref 98–107)
CO2 SERPL-SCNC: 28 MMOL/L (ref 21–32)
CREAT SERPL-MCNC: 4.95 MG/DL (ref 0.5–1.3)
EGFRCR SERPLBLD CKD-EPI 2021: 12 ML/MIN/1.73M*2
ERYTHROCYTE [DISTWIDTH] IN BLOOD BY AUTOMATED COUNT: 17.7 % (ref 11.5–14.5)
GLUCOSE BLD MANUAL STRIP-MCNC: 98 MG/DL (ref 74–99)
GLUCOSE SERPL-MCNC: 104 MG/DL (ref 74–99)
HCT VFR BLD AUTO: 34.1 % (ref 41–52)
HGB BLD-MCNC: 10.4 G/DL (ref 13.5–17.5)
HOLD SPECIMEN: NORMAL
HOLD SPECIMEN: NORMAL
MCH RBC QN AUTO: 25.9 PG (ref 26–34)
MCHC RBC AUTO-ENTMCNC: 30.5 G/DL (ref 32–36)
MCV RBC AUTO: 85 FL (ref 80–100)
NRBC BLD-RTO: 0 /100 WBCS (ref 0–0)
PLATELET # BLD AUTO: 144 X10*3/UL (ref 150–450)
POTASSIUM SERPL-SCNC: 5.8 MMOL/L (ref 3.5–5.3)
RBC # BLD AUTO: 4.02 X10*6/UL (ref 4.5–5.9)
SODIUM SERPL-SCNC: 130 MMOL/L (ref 136–145)
WBC # BLD AUTO: 4.1 X10*3/UL (ref 4.4–11.3)

## 2024-07-18 PROCEDURE — 36415 COLL VENOUS BLD VENIPUNCTURE: CPT | Performed by: NURSE PRACTITIONER

## 2024-07-18 PROCEDURE — 93005 ELECTROCARDIOGRAM TRACING: CPT

## 2024-07-18 PROCEDURE — 71046 X-RAY EXAM CHEST 2 VIEWS: CPT

## 2024-07-18 PROCEDURE — 2500000004 HC RX 250 GENERAL PHARMACY W/ HCPCS (ALT 636 FOR OP/ED): Performed by: NURSE PRACTITIONER

## 2024-07-18 PROCEDURE — 7100000011 HC EXTENDED STAY RECOVERY HOURLY - NURSING UNIT

## 2024-07-18 PROCEDURE — 80048 BASIC METABOLIC PNL TOTAL CA: CPT | Performed by: INTERNAL MEDICINE

## 2024-07-18 PROCEDURE — 82947 ASSAY GLUCOSE BLOOD QUANT: CPT | Mod: 59

## 2024-07-18 PROCEDURE — 99238 HOSP IP/OBS DSCHRG MGMT 30/<: CPT | Performed by: NURSE PRACTITIONER

## 2024-07-18 PROCEDURE — 71046 X-RAY EXAM CHEST 2 VIEWS: CPT | Performed by: RADIOLOGY

## 2024-07-18 PROCEDURE — 85027 COMPLETE CBC AUTOMATED: CPT | Performed by: NURSE PRACTITIONER

## 2024-07-18 PROCEDURE — 93010 ELECTROCARDIOGRAM REPORT: CPT | Performed by: INTERNAL MEDICINE

## 2024-07-18 PROCEDURE — 2500000001 HC RX 250 WO HCPCS SELF ADMINISTERED DRUGS (ALT 637 FOR MEDICARE OP): Performed by: NURSE PRACTITIONER

## 2024-07-18 PROCEDURE — 2500000002 HC RX 250 W HCPCS SELF ADMINISTERED DRUGS (ALT 637 FOR MEDICARE OP, ALT 636 FOR OP/ED): Performed by: NURSE PRACTITIONER

## 2024-07-18 ASSESSMENT — COGNITIVE AND FUNCTIONAL STATUS - GENERAL
MOBILITY SCORE: 24
DAILY ACTIVITIY SCORE: 24

## 2024-07-18 ASSESSMENT — PAIN SCALES - WONG BAKER: WONGBAKER_NUMERICALRESPONSE: NO HURT

## 2024-07-18 ASSESSMENT — PAIN SCALES - GENERAL
PAINLEVEL_OUTOF10: 0 - NO PAIN
PAINLEVEL_OUTOF10: 3

## 2024-07-18 NOTE — PROGRESS NOTES
NEPHROLOGY PROGRESS NOTE    REASON FOR CONSULT: ESRD on HD MWF     SUBJECTIVE:  Patient feels well.  He had some groin pain.  He was on bedrest till 6 AM.  He now can ambulate.  He is likely to be discharged by 10:00 this morning.  He has called his outpatient dialysis unit and has arranged for dialysis around 1:00 today.      OBJECTIVE:    Visit Vitals  /77 (BP Location: Right arm, Patient Position: Lying)   Pulse 55   Temp 37.4 °C (99.3 °F) (Temporal)   Resp 16          Intake/Output Summary (Last 24 hours) at 7/18/2024 0811  Last data filed at 7/18/2024 0635  Gross per 24 hour   Intake 667.95 ml   Output 420 ml   Net 247.95 ml        General: Awake and Alert, In no distress, Cooperative  HEENT: Oral mucosa moist, EOMI  NECK: Supple  CHEST: No crackles, no wheeze, no tachypnea  CVS: S1,S2 heard, no rubs, RRR  ABD: Soft, Non Tender, BS present  EXT: no edema, left arm AV graft    MEDICATION:    Scheduled medications  amLODIPine, 10 mg, oral, Daily  B complex-vitamin C, 1 tablet, oral, Daily with breakfast  gabapentin, 200 mg, oral, Nightly  hydrALAZINE, 100 mg, oral, TID  isosorbide dinitrate, 10 mg, oral, TID  magnesium oxide, 400 mg, oral, Daily  pantoprazole, 40 mg, oral, Daily before breakfast  perflutren lipid microspheres, 0.5-10 mL of dilution, intravenous, Once in imaging  perflutren protein A microsphere, 0.5 mL, intravenous, Once in imaging  rosuvastatin, 20 mg, oral, Daily  sulfur hexafluoride microsphr, 2 mL, intravenous, Once in imaging  valsartan, 160 mg, oral, Daily      Continuous medications  sodium chloride 0.9%, 50 mL/hr, Last Rate: Stopped (07/17/24 1102)      PRN medications  PRN medications: acetaminophen, lubricating eye drops     RESULTS:    Lab Results   Component Value Date    WBC 4.1 (L) 07/18/2024    HGB 10.4 (L) 07/18/2024    HCT 34.1 (L) 07/18/2024    MCV 85 07/18/2024     (L) 07/18/2024        Lab Results   Component Value Date    CREATININE 4.56 (H) 07/17/2024    BUN 21  07/17/2024     (L) 07/17/2024    K 4.7 07/17/2024    CL 95 (L) 07/17/2024    CO2 31 07/17/2024        Radiology Imaging reviewed      ASSESSMENT/PLAN:     1.  ESRD on HD MWF: Currently off schedule.  Has arranged for outpatient dialysis today afternoon at [facility] [address]  He has a left arm AV graft.  Volume status is okay.  Not requiring oxygen.  Electrolytes from yesterday are stable.  He can be discharged and have outpatient dialysis.    2.  Anemia in ESRD: Status post pacemaker procedure.  Hemoglobin greater than 10.    3.  Hypertension: Blood pressure has been running high.  He is due for dialysis today.  He is on amlodipine Diovan isosorbide hydralazine and doxazosin.  All of which can be continued.        Thank You very much for allowing me to participate in the care of this Patient    This document was created using dragon dictation and may contain unintended error    Soren Larios MD   07/18/24

## 2024-07-18 NOTE — DISCHARGE SUMMARY
Discharge Diagnosis  Complete heart block (Multi)    Issues Requiring Follow-Up  Patient to follow with device clinic.    Test Results Pending At Discharge  Pending Labs       No current pending labs.          Hospital Course   This is a 73 year old male with a PMH significant for ESRD (HD M,W,F), HTN, DLD, DM and CHB.  The patient completed a 14-day event monitor May 2024 that revealed sinus rhythm with a average heart rate of 72 bpm with episodes of 2-1 AV block and complete heart block evening during waking hours.  The patient reports increased fatigue.  He presented to [facility] [place] yesterday, 7/17/24 for leadless pacemaker insertion with Dr De La Torre.    PMH:  ESRD, HTN, DLD, DM, CHB, pericardial effusion, pheochromocytoma of right adrenal gland, GIB, anemia, prostate C,    PSH:  cholecystectomy, AV fistula, prostate surgery  Family history:  Sister-DM.  Brother-DM.  Social history:  Denies smoking. Alcohol use and illicit drug use    7/18/24:  s/p leadless pacemaker insertion with Dr. De La Torre 7/17/24 (see procedure note for details).  Upon exam this AM patient is sitting in bed in no apparent distress.  VSS.  Bilateral groin sites covered with DRSG D+I, no bleeding noted, no hematoma, no ecchymosis, no erythema.  Device interrogation completed this AM okay per Dr. De La Torre.  CXR completed this AM shows no acute findings per radiology impression.  Patient will be discharged home today with outpatient dialysis scheduled this afternoon.    CHB s/p leadless pacemaker insertion  - discharge home today  - patient to follow up with device clinic  - outpatient dialysis scheduled this afternoon  - continue home medications     Above patient and plan discussed with Dr. De La Torre.    Pertinent Physical Exam At Time of Discharge  Physical Exam  Constitutional:       General: He is not in acute distress.  Cardiovascular:      Rate and Rhythm: Normal rate and regular rhythm.      Comments: + pulses all extremities.  Left arm AV fistula.  Pulmonary:  "     Effort: Pulmonary effort is normal. No respiratory distress.      Comments: Clear bilaterally.  Abdominal:      General: Bowel sounds are normal.   Skin:     Comments: Bilateral groin sites covered with DRSG D+I, no hematoma, no ecchymosis, no erythema.   Neurological:      General: No focal deficit present.      Mental Status: He is alert and oriented to person, place, and time.   Psychiatric:         Mood and Affect: Mood normal.         Behavior: Behavior normal.       Home Medications     Medication List      CHANGE how you take these medications     gabapentin 300 mg capsule; Commonly known as: Neurontin; Take 1 capsule   (300 mg) by mouth once daily.; What changed: how much to take, when to   take this     CONTINUE taking these medications     acetaminophen 325 mg tablet; Commonly known as: Tylenol   amLODIPine 10 mg tablet; Commonly known as: Norvasc; Take 1 tablet (10   mg) by mouth once daily.   B complex-vitamin C-folic acid 1- mg-mg-mcg tablet; Commonly known   as: Nephro-Vinod Rx   BD Ultra-Fine Joan Pen Needle 32 gauge x 5/32\" needle; Generic drug: pen   needle, diabetic   carboxymethylcellulose 0.5 % ophthalmic solution; Commonly known as:   Refresh Plus   doxazosin 8 mg tablet; Commonly known as: Cardura   FreeStyle Mick 2 Sensor kit; Generic drug: flash glucose sensor kit   hydrALAZINE 100 mg tablet; Commonly known as: Apresoline; Take 1 tablet   (100 mg) by mouth 3 times a day.   isosorbide dinitrate 10 mg tablet; Commonly known as: Isordil; Take 1   tablet (10 mg) by mouth 3 times a day.   magnesium oxide 400 mg tablet; Commonly known as: Mag-Ox   omeprazole 20 mg DR capsule; Commonly known as: PriLOSEC   rosuvastatin 20 mg tablet; Commonly known as: Crestor; Take 1 tablet (20   mg) by mouth once daily at bedtime.   valsartan 160 mg tablet; Commonly known as: Diovan; Take 1 tablet (160   mg) by mouth once daily.     STOP taking these medications     calcitriol 0.25 mcg capsule; Commonly " known as: Rocaltrol   calcium carbonate 200 mg calcium chewable tablet; Commonly known as:   Tums   ferrous gluconate 236 mg (27 mg iron) tablet       Outpatient Follow-Up  Future Appointments   Date Time Provider Department Center   8/14/2024  9:20 AM Aly Miguel MD [id number] [place]       KARO Ledesma-CNP

## 2024-07-18 NOTE — CARE PLAN
The patient's goals for the shift include      The clinical goals for the shift include no further bleeding    Over the shift, the patient did not make progress toward the following goals. Barriers to progression include ***. Recommendations to address these barriers include ***.

## 2024-07-19 LAB
ATRIAL RATE: 101 BPM
ATRIAL RATE: 56 BPM
ATRIAL RATE: 90 BPM
P AXIS: 53 DEGREES
P AXIS: 67 DEGREES
PR INTERVAL: 176 MS
Q ONSET: 191 MS
Q ONSET: 198 MS
Q ONSET: 216 MS
QRS COUNT: 10 BEATS
QRS DURATION: 134 MS
QRS DURATION: 138 MS
QRS DURATION: 148 MS
QT INTERVAL: 456 MS
QT INTERVAL: 458 MS
QT INTERVAL: 468 MS
QTC CALCULATION(BAZETT): 451 MS
QTC CALCULATION(BAZETT): 456 MS
QTC CALCULATION(BAZETT): 461 MS
QTC FREDERICIA: 456 MS
QTC FREDERICIA: 457 MS
QTC FREDERICIA: 460 MS
R AXIS: -36 DEGREES
R AXIS: -40 DEGREES
R AXIS: -40 DEGREES
T AXIS: 0 DEGREES
T AXIS: 19 DEGREES
T AXIS: 5 DEGREES
T OFFSET: 419 MS
T OFFSET: 427 MS
T OFFSET: 450 MS
VENTRICULAR RATE: 56 BPM
VENTRICULAR RATE: 60 BPM
VENTRICULAR RATE: 61 BPM

## 2024-07-22 ENCOUNTER — TELEPHONE (OUTPATIENT)
Dept: CARDIOLOGY | Facility: CLINIC | Age: 74
End: 2024-07-22
Payer: COMMERCIAL

## 2024-07-22 ENCOUNTER — DOCUMENTATION (OUTPATIENT)
Dept: TRANSPLANT | Facility: HOSPITAL | Age: 74
End: 2024-07-22
Payer: COMMERCIAL

## 2024-07-22 NOTE — PROGRESS NOTES
Patient had a pacemaker placed on 7/17/24.  Patient has a follow up  appointment with Dr. Miguel on 8/14/24

## 2024-07-22 NOTE — TELEPHONE ENCOUNTER
Called and left a VM for pt. I told him that I forwarded the message to our device clinic RN, she returns on Thursday.

## 2024-08-12 ENCOUNTER — HOSPITAL ENCOUNTER (OUTPATIENT)
Dept: CARDIOLOGY | Facility: CLINIC | Age: 74
Discharge: HOME | End: 2024-08-12
Payer: COMMERCIAL

## 2024-08-12 DIAGNOSIS — I44.1 MOBITZ TYPE 2 SECOND DEGREE AV BLOCK: ICD-10-CM

## 2024-08-12 DIAGNOSIS — Z95.0 CARDIAC PACEMAKER IN SITU: ICD-10-CM

## 2024-08-14 ENCOUNTER — OFFICE VISIT (OUTPATIENT)
Dept: CARDIOLOGY | Facility: CLINIC | Age: 74
End: 2024-08-14
Payer: COMMERCIAL

## 2024-08-14 VITALS
HEART RATE: 61 BPM | WEIGHT: 146 LBS | BODY MASS INDEX: 19.35 KG/M2 | HEIGHT: 73 IN | OXYGEN SATURATION: 97 % | SYSTOLIC BLOOD PRESSURE: 148 MMHG | DIASTOLIC BLOOD PRESSURE: 69 MMHG

## 2024-08-14 DIAGNOSIS — I10 ESSENTIAL HYPERTENSION: ICD-10-CM

## 2024-08-14 DIAGNOSIS — Z01.810 PREOP CARDIOVASCULAR EXAM: ICD-10-CM

## 2024-08-14 DIAGNOSIS — I44.2 COMPLETE HEART BLOCK (MULTI): ICD-10-CM

## 2024-08-14 DIAGNOSIS — Z95.0 CARDIAC PACEMAKER IN SITU: Primary | ICD-10-CM

## 2024-08-14 PROBLEM — I31.31: Status: RESOLVED | Noted: 2024-04-17 | Resolved: 2024-08-14

## 2024-08-14 PROBLEM — I42.2 HYPERTROPHIC CARDIOMYOPATHY (MULTI): Status: RESOLVED | Noted: 2019-06-04 | Resolved: 2024-08-14

## 2024-08-14 PROCEDURE — 3078F DIAST BP <80 MM HG: CPT | Performed by: INTERNAL MEDICINE

## 2024-08-14 PROCEDURE — 3008F BODY MASS INDEX DOCD: CPT | Performed by: INTERNAL MEDICINE

## 2024-08-14 PROCEDURE — 1126F AMNT PAIN NOTED NONE PRSNT: CPT | Performed by: INTERNAL MEDICINE

## 2024-08-14 PROCEDURE — 3044F HG A1C LEVEL LT 7.0%: CPT | Performed by: INTERNAL MEDICINE

## 2024-08-14 PROCEDURE — 1159F MED LIST DOCD IN RCRD: CPT | Performed by: INTERNAL MEDICINE

## 2024-08-14 PROCEDURE — 1036F TOBACCO NON-USER: CPT | Performed by: INTERNAL MEDICINE

## 2024-08-14 PROCEDURE — 99214 OFFICE O/P EST MOD 30 MIN: CPT | Performed by: INTERNAL MEDICINE

## 2024-08-14 PROCEDURE — 1160F RVW MEDS BY RX/DR IN RCRD: CPT | Performed by: INTERNAL MEDICINE

## 2024-08-14 PROCEDURE — 3077F SYST BP >= 140 MM HG: CPT | Performed by: INTERNAL MEDICINE

## 2024-08-14 ASSESSMENT — PAIN SCALES - GENERAL: PAINLEVEL: 0-NO PAIN

## 2024-08-14 NOTE — PATIENT INSTRUCTIONS
You were seen at the [facility] [place] for a check up of your heart.     Your pacemaker is working appropriately on recent device check after July 17th placement.     Your repeat June 2024 echocardiogram showed no return of your pericardial effusion (fluid around your heart) that needed to be drained in April 2024.     You are now a moderate risk patient for kidney transplant surgery but currently medically optimized with your new pacemaker in place. I recommend no further heart testing at this time.    Follow up with Dr. Miguel in 6 months.

## 2024-08-14 NOTE — PROGRESS NOTES
"Subjective   Temo Hanson is a 73 y.o. male who presents to the [facility] for follow up evaluation after PPM. Last seen in June 2024.     Had PPM placed 7/17/2024 for high grade AV block. Exertional dyspnea now markedly improved. He can walk 2 block and climb 1-2 flights of stairs now compared to prior to PPM baseline walking < 50 feet.    Prior to July PPM placement he had severe fatigue and dyspnea on exertion that did not resolve with April 2024 pericardiocentesis. Today Mr. Hanson has no active cardiac symptoms of chest pain, PND, orthopnea, CHARI, palpitations, syncope, or claudication.     Past Medical History:  1. ESRD on HD  2. Hypertension  3. Dyslipidemia  4. Type 2 diabetes mellitus  5. Severe tricuspid regurgitation    Social History:  Never a smoker    Family History:  No premature CAD in 1st degree relatives ( 55 years of age for male relatives,  65 years of age for female relatives)    Review of Systems    A 14 point review of systems was asked. All questions were negative except for pertinent positives listed in the HPI.     Current Outpatient Medications on File Prior to Visit   Medication Sig Dispense Refill    acetaminophen (Tylenol) 325 mg tablet Take 2 tablets (650 mg) by mouth every 6 hours if needed.      B complex-vitamin C-folic acid (Nephro-Vinod Rx) 1- mg-mg-mcg tablet Take 1 tablet by mouth once daily with breakfast.      BD Ultra-Fine Joan Pen Needle 32 gauge x 5/32\" needle       carboxymethylcellulose (Refresh Plus) 0.5 % ophthalmic solution Instill 1 drop into both eyes 2-4x/day as needed for dryness.      FreeStyle Mick 2 Sensor kit       magnesium oxide (Mag-Ox) 400 mg tablet 1 tablet (400 mg) once daily.      omeprazole (PriLOSEC) 20 mg DR capsule Take 1 capsule (20 mg) by mouth once daily.      amLODIPine (Norvasc) 10 mg tablet Take 1 tablet (10 mg) by mouth once daily. 30 tablet 1    doxazosin (Cardura) 8 mg tablet Take 1 tablet (8 mg) by mouth " "once daily at bedtime.      gabapentin (Neurontin) 300 mg capsule Take 1 capsule (300 mg) by mouth once daily. (Patient taking differently: Take 200 mg by mouth once daily at bedtime.) 30 capsule 0    hydrALAZINE (Apresoline) 100 mg tablet Take 1 tablet (100 mg) by mouth 3 times a day. 90 tablet 0    isosorbide dinitrate (Isordil) 10 mg tablet Take 1 tablet (10 mg) by mouth 3 times a day. 90 tablet 0    rosuvastatin (Crestor) 20 mg tablet Take 1 tablet (20 mg) by mouth once daily at bedtime. 30 tablet 1    valsartan (Diovan) 160 mg tablet Take 1 tablet (160 mg) by mouth once daily. 30 tablet 1     No current facility-administered medications on file prior to visit.      Objective   Physical Exam  BP Readings from Last 3 Encounters:   08/14/24 148/69   07/18/24 164/75   06/18/24 128/70      Wt Readings from Last 3 Encounters:   08/14/24 66.2 kg (146 lb)   07/18/24 66.5 kg (146 lb 11.2 oz)   06/18/24 78.5 kg (173 lb)      BMI: Estimated body mass index is 19.26 kg/m² as calculated from the following:    Height as of this encounter: 1.854 m (6' 1\").    Weight as of this encounter: 66.2 kg (146 lb).  BSA: Estimated body surface area is 1.85 meters squared as calculated from the following:    Height as of this encounter: 1.854 m (6' 1\").    Weight as of this encounter: 66.2 kg (146 lb).    General: no acute distress  HEENT: EOMI, no scleral icterus.  Lungs: Clear to auscultation bilaterally without wheezing, rales, or rhonchi.  Cardiovascular: Regular rhythm and rate. Normal S1 and S2. No murmurs, rubs, or gallops are appreciated. JVP normal.  Abdomen: Soft, nontender, nondistended. Bowel sounds present.  Extremities: Warm and well perfused with equal 2+ pulses bilaterally.  No edema present.  Neurologic: Alert and oriented x3.    I have personally reviewed the following images and laboratory findings:  Last echocardiogram:  6/21/2024 echo (limited): LV EF 65-70%, no LVH (LVMI 83 gm/m2), moderate LAE (ROXANNA 46 ml/m2), " normal RV/RA, mild MAC, RVSP 70 mm Hg, trace pericardial effusion.     2024 echo (limited); LV EF 60-65%, normal chamber sizes, trivial pericardial effusion (decreased from large effusion on 2024)    2024 echo: LV EF 60-65%, no LVH (LVMI 70 gm/m2), pseudonormal diastology (E/e' ), mild LAE (ROXANNA ml/m2), moderate RV enlargement, mildly reduced RV systolic function, moderate THERESA ( cm2), no AI, mild-moderate MAC, mild MR, severe tricuspid regurgitation, RVSP 82 mm Hg (RAP 15 mm Hg), reversed hepatic vein flow due to TR.    Last cath / stress test:  2023 SPECT nuclear stress test: No myocardial ischemia or scar pattern. LV EF     2/3/2021 CT calcium score: Zero    Most recent EC2024: V-paced, 56 bpm. Personally reviewed in office.    2024 ECG: Sinus rhythm with AVB type 2, 76 bpm, RBBB. Personally reviewed in office.    Holter  - 2024 HR monitor with type 2 AVB through out monitor period.    2024 ECG: Sinus rhythm, 2nd degree AVB, 68 bpm, RBBB. Personally reviewed in office.    On review of 2023 ECG, AV dissociation also appears to have been present. Prior  SPECT nuclear not done for type 2 AV block.     Assessment/Plan   1. High grade AVB:   Pacemaker placed 2024 by Dr. De La Torre. Working appropriately on post procedure device check this week.      2. Pericardial effusion:  2024 pericardiocentesis. Trace effusion on 2024 and 2024 limited echocardiograms. Not an active issue.    3. Hypertension:  Blood pressure at goal on current medications. Volume status at goal on current dialysis settings.    4. Preoperative work up:  Stable cardiac status s/p PPM 2024. CT calcium score zero in . He is a moderate risk patient for renal transplant surgery. With normal pacemaker function, he is now optimized for renal transplant listing from a heart standpoint.    Follow up with Dr. Miguel in 6 months          SIGNATURE: Aly Miguel MD PATIENT NAME:  Temo Hanson   DATE/TIME: August 14, 2024 10:02 AM MRN: [id number]

## 2024-09-17 ENCOUNTER — HOSPITAL ENCOUNTER (OUTPATIENT)
Dept: CARDIOLOGY | Facility: CLINIC | Age: 74
Discharge: HOME | End: 2024-09-17
Payer: COMMERCIAL

## 2024-09-17 DIAGNOSIS — I44.2 ATRIOVENTRICULAR BLOCK, COMPLETE (MULTI): ICD-10-CM

## 2024-09-17 DIAGNOSIS — Z95.0 PRESENCE OF CARDIAC PACEMAKER: ICD-10-CM

## 2024-09-17 PROCEDURE — 93279 PRGRMG DEV EVAL PM/LDLS PM: CPT

## 2024-09-24 ENCOUNTER — TELEPHONE (OUTPATIENT)
Dept: TRANSPLANT | Facility: HOSPITAL | Age: 74
End: 2024-09-24
Payer: COMMERCIAL

## 2024-10-01 ENCOUNTER — DOCUMENTATION (OUTPATIENT)
Dept: TRANSPLANT | Facility: HOSPITAL | Age: 74
End: 2024-10-01
Payer: COMMERCIAL

## 2024-10-01 NOTE — PROGRESS NOTES
Colonoscopy  Order# [id number]  Reading physician: Juana Correa MD Ordering provider: Jim Moran MD Study date: 2/23/24     Result Information    Status: Final result (Exam End: 2/23/2024 09:14) Provider Status: Open     Result Text    Result Text   Impression  Diverticulosis in the sigmoid colon        Findings  Few small diverticula in the sigmoid colon        Recommendation    Follow up with PCP     Repeat screening colonoscopy in 10 years

## 2024-10-01 NOTE — PROGRESS NOTES
I spoke to the patient's daughter and gave her an update on her father's status.  I explained that I planned to review his chart today to see if he can be re-activated.  I also left a VM for Mr. Hanson letting him know that I plan to discuss his case at committee on Thursday.

## 2024-10-02 ENCOUNTER — APPOINTMENT (OUTPATIENT)
Dept: CARDIOLOGY | Facility: CLINIC | Age: 74
End: 2024-10-02
Payer: COMMERCIAL

## 2024-10-03 ENCOUNTER — DOCUMENTATION (OUTPATIENT)
Dept: TRANSPLANT | Facility: HOSPITAL | Age: 74
End: 2024-10-03
Payer: COMMERCIAL

## 2024-10-03 DIAGNOSIS — Z01.818 PRE-TRANSPLANT EVALUATION FOR KIDNEY TRANSPLANT: Primary | ICD-10-CM

## 2024-10-03 NOTE — PROGRESS NOTES
I spoke to the patient today after reviewing case with Dr. Zamora .  I informed the patient that we would like for him to have an updated ECHO and a visit with Dr. Zamora.  Patient is agreeable to the plan. Patient will be schedule on a Tues.

## 2024-10-28 ENCOUNTER — DOCUMENTATION (OUTPATIENT)
Dept: TRANSPLANT | Facility: HOSPITAL | Age: 74
End: 2024-10-28
Payer: COMMERCIAL

## 2024-10-29 ENCOUNTER — DOCUMENTATION (OUTPATIENT)
Dept: TRANSPLANT | Facility: HOSPITAL | Age: 74
End: 2024-10-29

## 2024-10-29 ENCOUNTER — LAB (OUTPATIENT)
Dept: LAB | Facility: LAB | Age: 74
End: 2024-10-29
Payer: COMMERCIAL

## 2024-10-29 ENCOUNTER — HOSPITAL ENCOUNTER (OUTPATIENT)
Dept: CARDIOLOGY | Facility: HOSPITAL | Age: 74
Discharge: HOME | End: 2024-10-29
Payer: COMMERCIAL

## 2024-10-29 ENCOUNTER — APPOINTMENT (OUTPATIENT)
Dept: CARDIOLOGY | Facility: HOSPITAL | Age: 74
End: 2024-10-29
Payer: COMMERCIAL

## 2024-10-29 ENCOUNTER — OFFICE VISIT (OUTPATIENT)
Dept: TRANSPLANT | Facility: HOSPITAL | Age: 74
End: 2024-10-29
Payer: COMMERCIAL

## 2024-10-29 VITALS
WEIGHT: 163.8 LBS | OXYGEN SATURATION: 97 % | SYSTOLIC BLOOD PRESSURE: 154 MMHG | TEMPERATURE: 97.9 F | DIASTOLIC BLOOD PRESSURE: 67 MMHG | BODY MASS INDEX: 21.61 KG/M2 | HEART RATE: 61 BPM

## 2024-10-29 DIAGNOSIS — N18.6 ESRD (END STAGE RENAL DISEASE) (MULTI): Primary | ICD-10-CM

## 2024-10-29 DIAGNOSIS — Z01.818 PRE-TRANSPLANT EVALUATION FOR KIDNEY TRANSPLANT: ICD-10-CM

## 2024-10-29 DIAGNOSIS — E11.8 TYPE 2 DIABETES MELLITUS WITH UNSPECIFIED COMPLICATIONS: ICD-10-CM

## 2024-10-29 DIAGNOSIS — Z13.6 ENCOUNTER FOR SCREENING FOR CARDIOVASCULAR DISORDERS: ICD-10-CM

## 2024-10-29 DIAGNOSIS — Z12.5 ENCOUNTER FOR SCREENING FOR MALIGNANT NEOPLASM OF PROSTATE: ICD-10-CM

## 2024-10-29 DIAGNOSIS — Z51.81 ENCOUNTER FOR THERAPEUTIC DRUG LEVEL MONITORING: ICD-10-CM

## 2024-10-29 DIAGNOSIS — E11.29 TYPE 2 DIABETES MELLITUS WITH OTHER DIABETIC KIDNEY COMPLICATION: ICD-10-CM

## 2024-10-29 LAB
ALBUMIN SERPL BCP-MCNC: 4.4 G/DL (ref 3.4–5)
ALP SERPL-CCNC: 193 U/L (ref 33–136)
ALT SERPL W P-5'-P-CCNC: 23 U/L (ref 10–52)
AMYLASE SERPL-CCNC: 148 U/L (ref 29–103)
AORTIC VALVE PEAK VELOCITY: 1.74 M/S
APPEARANCE UR: CLEAR
AST SERPL W P-5'-P-CCNC: 23 U/L (ref 9–39)
AV PEAK GRADIENT: 12.1 MMHG
AVA (PEAK VEL): 2.11 CM2
BILIRUB DIRECT SERPL-MCNC: 0.1 MG/DL (ref 0–0.3)
BILIRUB SERPL-MCNC: 0.5 MG/DL (ref 0–1.2)
BILIRUB UR STRIP.AUTO-MCNC: NEGATIVE MG/DL
BUN SERPL-MCNC: 70 MG/DL (ref 6–23)
C PEPTIDE SERPL-MCNC: 4.8 NG/ML (ref 0.7–3.9)
CHOLEST SERPL-MCNC: 183 MG/DL (ref 0–199)
CHOLESTEROL/HDL RATIO: 3.4
CMV IGG AVIDITY SERPL IA-RTO: REACTIVE %
COLOR UR: ABNORMAL
CREAT SERPL-MCNC: 7.4 MG/DL (ref 0.5–1.3)
EBV EA IGG SER QL: NEGATIVE
EBV NA AB SER QL: POSITIVE
EBV VCA IGG SER IA-ACNC: POSITIVE
EBV VCA IGM SER IA-ACNC: NEGATIVE
EGFRCR SERPLBLD CKD-EPI 2021: 7 ML/MIN/1.73M*2
EJECTION FRACTION APICAL 4 CHAMBER: 67.3
EJECTION FRACTION: 64 %
ERYTHROCYTE [DISTWIDTH] IN BLOOD BY AUTOMATED COUNT: 18.2 % (ref 11.5–14.5)
EST. AVERAGE GLUCOSE BLD GHB EST-MCNC: 128 MG/DL
GLUCOSE UR STRIP.AUTO-MCNC: NORMAL MG/DL
HBA1C MFR BLD: 6.1 %
HBV CORE AB SER QL: REACTIVE
HBV SURFACE AB SER-ACNC: >1000 MIU/ML
HBV SURFACE AG SERPL QL IA: NONREACTIVE
HCT VFR BLD AUTO: 32.2 % (ref 41–52)
HCV AB SER QL: NONREACTIVE
HDLC SERPL-MCNC: 53.1 MG/DL
HGB BLD-MCNC: 10.4 G/DL (ref 13.5–17.5)
HIV 1+2 AB+HIV1 P24 AG SERPL QL IA: NONREACTIVE
HOLD SPECIMEN: NORMAL
INR PPP: 0.9 (ref 0.9–1.1)
KETONES UR STRIP.AUTO-MCNC: NEGATIVE MG/DL
LEFT ATRIUM VOLUME AREA LENGTH INDEX BSA: 40.8 ML/M2
LEFT VENTRICLE INTERNAL DIMENSION DIASTOLE: 4.18 CM (ref 3.5–6)
LEFT VENTRICULAR OUTFLOW TRACT DIAMETER: 2.02 CM
LEUKOCYTE ESTERASE UR QL STRIP.AUTO: NEGATIVE
MCH RBC QN AUTO: 29.8 PG (ref 26–34)
MCHC RBC AUTO-ENTMCNC: 32.3 G/DL (ref 32–36)
MCV RBC AUTO: 92 FL (ref 80–100)
MITRAL VALVE E/A RATIO: 0.58
NITRITE UR QL STRIP.AUTO: NEGATIVE
NON-HDL CHOLESTEROL: 130 MG/DL (ref 0–149)
NRBC BLD-RTO: 0 /100 WBCS (ref 0–0)
PH UR STRIP.AUTO: 8 [PH]
PHOSPHATE SERPL-MCNC: 4.4 MG/DL (ref 2.5–4.9)
PLATELET # BLD AUTO: 128 X10*3/UL (ref 150–450)
PROT SERPL-MCNC: 8.4 G/DL (ref 6.4–8.2)
PROT UR STRIP.AUTO-MCNC: ABNORMAL MG/DL
PROTHROMBIN TIME: 10.5 SECONDS (ref 9.8–12.8)
RBC # BLD AUTO: 3.49 X10*6/UL (ref 4.5–5.9)
RBC # UR STRIP.AUTO: NEGATIVE /UL
RBC #/AREA URNS AUTO: NORMAL /HPF
RIGHT VENTRICLE FREE WALL PEAK S': 11 CM/S
RIGHT VENTRICLE PEAK SYSTOLIC PRESSURE: 41.7 MMHG
SP GR UR STRIP.AUTO: 1.01
TREPONEMA PALLIDUM IGG+IGM AB [PRESENCE] IN SERUM OR PLASMA BY IMMUNOASSAY: NONREACTIVE
TRICUSPID ANNULAR PLANE SYSTOLIC EXCURSION: 2.1 CM
UROBILINOGEN UR STRIP.AUTO-MCNC: NORMAL MG/DL
VARICELLA ZOSTER IGG INDEX: 7.4 IA
VZV IGG SER QL IA: POSITIVE
WBC # BLD AUTO: 4 X10*3/UL (ref 4.4–11.3)
WBC #/AREA URNS AUTO: NORMAL /HPF

## 2024-10-29 PROCEDURE — 99214 OFFICE O/P EST MOD 30 MIN: CPT | Performed by: TRANSPLANT SURGERY

## 2024-10-29 PROCEDURE — 86644 CMV ANTIBODY: CPT

## 2024-10-29 PROCEDURE — 81001 URINALYSIS AUTO W/SCOPE: CPT

## 2024-10-29 PROCEDURE — 82565 ASSAY OF CREATININE: CPT

## 2024-10-29 PROCEDURE — 86704 HEP B CORE ANTIBODY TOTAL: CPT

## 2024-10-29 PROCEDURE — 86832 HLA CLASS I HIGH DEFIN QUAL: CPT

## 2024-10-29 PROCEDURE — 82150 ASSAY OF AMYLASE: CPT

## 2024-10-29 PROCEDURE — 86481 TB AG RESPONSE T-CELL SUSP: CPT

## 2024-10-29 PROCEDURE — 1126F AMNT PAIN NOTED NONE PRSNT: CPT | Performed by: TRANSPLANT SURGERY

## 2024-10-29 PROCEDURE — 87340 HEPATITIS B SURFACE AG IA: CPT

## 2024-10-29 PROCEDURE — 86663 EPSTEIN-BARR ANTIBODY: CPT

## 2024-10-29 PROCEDURE — 3044F HG A1C LEVEL LT 7.0%: CPT | Performed by: TRANSPLANT SURGERY

## 2024-10-29 PROCEDURE — 86825 HLA X-MATH NON-CYTOTOXIC: CPT | Mod: OUT | Performed by: SURGERY

## 2024-10-29 PROCEDURE — 86706 HEP B SURFACE ANTIBODY: CPT

## 2024-10-29 PROCEDURE — 86833 HLA CLASS II HIGH DEFIN QUAL: CPT

## 2024-10-29 PROCEDURE — 80076 HEPATIC FUNCTION PANEL: CPT

## 2024-10-29 PROCEDURE — 86665 EPSTEIN-BARR CAPSID VCA: CPT

## 2024-10-29 PROCEDURE — 87389 HIV-1 AG W/HIV-1&-2 AB AG IA: CPT

## 2024-10-29 PROCEDURE — 3078F DIAST BP <80 MM HG: CPT | Performed by: TRANSPLANT SURGERY

## 2024-10-29 PROCEDURE — 86780 TREPONEMA PALLIDUM: CPT

## 2024-10-29 PROCEDURE — 83036 HEMOGLOBIN GLYCOSYLATED A1C: CPT

## 2024-10-29 PROCEDURE — 36415 COLL VENOUS BLD VENIPUNCTURE: CPT

## 2024-10-29 PROCEDURE — 85610 PROTHROMBIN TIME: CPT

## 2024-10-29 PROCEDURE — 86664 EPSTEIN-BARR NUCLEAR ANTIGEN: CPT

## 2024-10-29 PROCEDURE — 86803 HEPATITIS C AB TEST: CPT

## 2024-10-29 PROCEDURE — 93306 TTE W/DOPPLER COMPLETE: CPT | Performed by: INTERNAL MEDICINE

## 2024-10-29 PROCEDURE — 84154 ASSAY OF PSA FREE: CPT

## 2024-10-29 PROCEDURE — 82465 ASSAY BLD/SERUM CHOLESTEROL: CPT

## 2024-10-29 PROCEDURE — 84681 ASSAY OF C-PEPTIDE: CPT

## 2024-10-29 PROCEDURE — 99214 OFFICE O/P EST MOD 30 MIN: CPT | Mod: 25 | Performed by: TRANSPLANT SURGERY

## 2024-10-29 PROCEDURE — 86787 VARICELLA-ZOSTER ANTIBODY: CPT

## 2024-10-29 PROCEDURE — 84520 ASSAY OF UREA NITROGEN: CPT

## 2024-10-29 PROCEDURE — 3077F SYST BP >= 140 MM HG: CPT | Performed by: TRANSPLANT SURGERY

## 2024-10-29 PROCEDURE — 85027 COMPLETE CBC AUTOMATED: CPT

## 2024-10-29 PROCEDURE — 80307 DRUG TEST PRSMV CHEM ANLYZR: CPT

## 2024-10-29 PROCEDURE — 80323 ALKALOIDS NOS: CPT

## 2024-10-29 PROCEDURE — 93306 TTE W/DOPPLER COMPLETE: CPT

## 2024-10-29 PROCEDURE — 83718 ASSAY OF LIPOPROTEIN: CPT

## 2024-10-29 PROCEDURE — G0103 PSA SCREENING: HCPCS

## 2024-10-29 PROCEDURE — 84100 ASSAY OF PHOSPHORUS: CPT

## 2024-10-29 ASSESSMENT — ENCOUNTER SYMPTOMS
ADENOPATHY: 0
FREQUENCY: 0
CHILLS: 0
SHORTNESS OF BREATH: 0
ABDOMINAL DISTENTION: 0
ARTHRALGIAS: 0
DYSURIA: 0
DIZZINESS: 0
COLOR CHANGE: 0
EYES NEGATIVE: 1
CONSTIPATION: 0
DIARRHEA: 0
LIGHT-HEADEDNESS: 0
ABDOMINAL PAIN: 0
HALLUCINATIONS: 0
HEMATURIA: 0
COUGH: 0
WEAKNESS: 0
CONFUSION: 0
FEVER: 0
AGITATION: 0

## 2024-10-29 ASSESSMENT — PAIN SCALES - GENERAL: PAINLEVEL_OUTOF10: 0-NO PAIN

## 2024-10-30 ENCOUNTER — LAB REQUISITION (OUTPATIENT)
Dept: LAB | Facility: CLINIC | Age: 74
End: 2024-10-30
Payer: COMMERCIAL

## 2024-10-30 ENCOUNTER — TELEPHONE (OUTPATIENT)
Dept: TRANSPLANT | Facility: HOSPITAL | Age: 74
End: 2024-10-30
Payer: COMMERCIAL

## 2024-10-30 DIAGNOSIS — N18.6 END STAGE RENAL DISEASE (MULTI): ICD-10-CM

## 2024-10-31 LAB
AMPHETAMINES SERPL QL SCN: NEGATIVE NG/ML
ANNOTATION COMMENT IMP: NORMAL
BARBITURATES SERPL QL SCN: NEGATIVE NG/ML
BENZODIAZ SERPL QL SCN: NEGATIVE NG/ML
BUPRENORPHINE SERPL-MCNC: NEGATIVE NG/ML
CANNABINOIDS SERPL QL SCN: NEGATIVE NG/ML
COCAINE SERPL QL SCN: NEGATIVE NG/ML
FLOW AUTOCROSSMATCH: NORMAL
FLOW AUTOCROSSMATCH: NORMAL
HLA RESULTS: NORMAL
HLA RESULTS: NORMAL
HLA-A+B+C AB NFR SER: NORMAL %
HLA-DP+DQ+DR AB NFR SER: NORMAL %
METHADONE SERPL QL SCN: NEGATIVE NG/ML
METHAMPHET SERPL QL: NEGATIVE NG/ML
NIL(NEG) CONTROL SPOT COUNT: NORMAL
OPIATES SERPL QL SCN: NEGATIVE NG/ML
OXYCODONE SERPL QL: NEGATIVE NG/ML
PANEL A SPOT COUNT: 0
PANEL B SPOT COUNT: 0
PCP SERPL QL SCN: NEGATIVE NG/ML
POS CONTROL SPOT COUNT: NORMAL
T-SPOT. TB INTERPRETATION: NEGATIVE

## 2024-11-01 ENCOUNTER — COMMITTEE REVIEW (OUTPATIENT)
Dept: TRANSPLANT | Facility: HOSPITAL | Age: 74
End: 2024-11-01
Payer: COMMERCIAL

## 2024-11-01 LAB
PSA FREE MFR SERPL: NORMAL %
PSA FREE SERPL-MCNC: <0.1 NG/ML
PSA SERPL IA-MCNC: <0.1 NG/ML (ref 0–4)

## 2024-11-02 LAB
COTININE SERPL-MCNC: <5 NG/ML
NICOTINE SERPL-MCNC: <5 NG/ML

## 2024-11-04 ENCOUNTER — DOCUMENTATION (OUTPATIENT)
Dept: TRANSPLANT | Facility: HOSPITAL | Age: 74
End: 2024-11-04
Payer: COMMERCIAL

## 2024-11-04 NOTE — PROGRESS NOTES
Pt activated to status 1 in [facility] and Epic.    Notified patient's backup contact, his daughter, to let her know.  She will tell him.  I was unable to reach him on my first attempt.

## 2024-11-04 NOTE — PROGRESS NOTES
Per fax from [facility]; approved kidney transplant listing 11/01/2024 to 11/01/2025. AUTH #[id number].

## 2024-11-05 PROCEDURE — 86833 HLA CLASS II HIGH DEFIN QUAL: CPT | Mod: OUT | Performed by: SURGERY

## 2024-11-06 LAB
HLA RESULTS: NORMAL
HLA-A+B+C AB NFR SER: NORMAL %
HLA-DP+DQ+DR AB NFR SER: NORMAL %

## 2024-11-11 ENCOUNTER — TELEPHONE (OUTPATIENT)
Dept: CARDIOLOGY | Facility: HOSPITAL | Age: 74
End: 2024-11-11
Payer: COMMERCIAL

## 2024-11-27 ENCOUNTER — LAB REQUISITION (OUTPATIENT)
Dept: LAB | Facility: CLINIC | Age: 74
End: 2024-11-27
Payer: COMMERCIAL

## 2024-11-27 DIAGNOSIS — N18.6 END STAGE RENAL DISEASE (MULTI): ICD-10-CM

## 2024-12-02 PROCEDURE — 86832 HLA CLASS I HIGH DEFIN QUAL: CPT | Mod: OUT | Performed by: SURGERY

## 2024-12-04 ENCOUNTER — IMMUNIZATION (OUTPATIENT)
Dept: TRANSPLANT | Facility: HOSPITAL | Age: 74
End: 2024-12-04
Payer: COMMERCIAL

## 2024-12-04 NOTE — PROGRESS NOTES
Lab Results   Component Value Date    HEPBSAB >1,000.0 (H) 10/29/2024       Immunization History   Administered Date(s) Administered Comments    Hepatitis B vaccine, adult *Check Product/Dose* 02/23/2015 05/23/2017 06/05/2018 11/03/2022

## 2024-12-11 ENCOUNTER — LAB REQUISITION (OUTPATIENT)
Dept: LAB | Facility: CLINIC | Age: 74
End: 2024-12-11
Payer: COMMERCIAL

## 2024-12-11 DIAGNOSIS — N18.6 END STAGE RENAL DISEASE (MULTI): ICD-10-CM

## 2024-12-13 LAB
HLA RESULTS: NORMAL
HLA-A+B+C AB NFR SER: NORMAL %
HLA-DP+DQ+DR AB NFR SER: NORMAL %

## 2024-12-17 PROCEDURE — 80505 PATH CLIN CONSLTJ HIGH 41-60: CPT | Performed by: PATHOLOGY

## 2024-12-19 PROCEDURE — 81379 HLA I TYPING COMPLETE HR: CPT | Mod: OUT | Performed by: SURGERY

## 2024-12-19 PROCEDURE — 81382 HLA II TYPING 1 LOC HR: CPT | Mod: OUT | Performed by: SURGERY

## 2024-12-20 ENCOUNTER — APPOINTMENT (OUTPATIENT)
Dept: RADIOLOGY | Facility: HOSPITAL | Age: 74
DRG: 652 | End: 2024-12-20
Payer: COMMERCIAL

## 2024-12-20 ENCOUNTER — ANESTHESIA EVENT (OUTPATIENT)
Dept: OPERATING ROOM | Facility: HOSPITAL | Age: 74
End: 2024-12-20
Payer: COMMERCIAL

## 2024-12-20 ENCOUNTER — ANESTHESIA (OUTPATIENT)
Dept: OPERATING ROOM | Facility: HOSPITAL | Age: 74
End: 2024-12-20
Payer: COMMERCIAL

## 2024-12-20 ENCOUNTER — PREP FOR PROCEDURE (OUTPATIENT)
Dept: TRANSPLANT | Facility: HOSPITAL | Age: 74
End: 2024-12-20
Payer: COMMERCIAL

## 2024-12-20 ENCOUNTER — HOSPITAL ENCOUNTER (OUTPATIENT)
Facility: HOSPITAL | Age: 74
Setting detail: SURGERY ADMIT
End: 2024-12-20
Attending: TRANSPLANT SURGERY | Admitting: TRANSPLANT SURGERY
Payer: COMMERCIAL

## 2024-12-20 ENCOUNTER — HOSPITAL ENCOUNTER (INPATIENT)
Facility: HOSPITAL | Age: 74
End: 2024-12-20
Attending: TRANSPLANT SURGERY | Admitting: TRANSPLANT SURGERY
Payer: COMMERCIAL

## 2024-12-20 DIAGNOSIS — Z13.6 ENCOUNTER FOR SCREENING FOR CARDIOVASCULAR DISORDERS: ICD-10-CM

## 2024-12-20 DIAGNOSIS — E11.40 TYPE 2 DIABETES MELLITUS WITH DIABETIC NEUROPATHY, UNSPECIFIED WHETHER LONG TERM INSULIN USE (MULTI): ICD-10-CM

## 2024-12-20 DIAGNOSIS — N18.6 ESRD (END STAGE RENAL DISEASE) (MULTI): Primary | ICD-10-CM

## 2024-12-20 DIAGNOSIS — E08.00 DIABETES MELLITUS DUE TO UNDERLYING CONDITION WITH HYPEROSMOLARITY WITHOUT COMA, UNSPECIFIED WHETHER LONG TERM INSULIN USE: ICD-10-CM

## 2024-12-20 DIAGNOSIS — Z79.4 TYPE 2 DIABETES MELLITUS WITH HYPEROSMOLARITY WITHOUT COMA, WITH LONG-TERM CURRENT USE OF INSULIN (MULTI): ICD-10-CM

## 2024-12-20 DIAGNOSIS — R94.31 ABNORMAL ELECTROCARDIOGRAM (ECG) (EKG): ICD-10-CM

## 2024-12-20 DIAGNOSIS — E11.10 TYPE 2 DIABETES MELLITUS WITH KETOACIDOSIS WITHOUT COMA, WITH LONG-TERM CURRENT USE OF INSULIN: ICD-10-CM

## 2024-12-20 DIAGNOSIS — Z01.818 PRE-TRANSPLANT EVALUATION FOR KIDNEY TRANSPLANT: ICD-10-CM

## 2024-12-20 DIAGNOSIS — N18.2 TYPE 2 DIABETES MELLITUS WITH STAGE 2 CHRONIC KIDNEY DISEASE, WITH LONG-TERM CURRENT USE OF INSULIN (MULTI): ICD-10-CM

## 2024-12-20 DIAGNOSIS — E11.22 TYPE 2 DIABETES MELLITUS WITH STAGE 2 CHRONIC KIDNEY DISEASE, WITH LONG-TERM CURRENT USE OF INSULIN (MULTI): ICD-10-CM

## 2024-12-20 DIAGNOSIS — Z79.4 TYPE 2 DIABETES MELLITUS WITH KETOACIDOSIS WITHOUT COMA, WITH LONG-TERM CURRENT USE OF INSULIN: ICD-10-CM

## 2024-12-20 DIAGNOSIS — E11.00 TYPE 2 DIABETES MELLITUS WITH HYPEROSMOLARITY WITHOUT COMA, WITH LONG-TERM CURRENT USE OF INSULIN (MULTI): ICD-10-CM

## 2024-12-20 DIAGNOSIS — Z94.0 KIDNEY REPLACED BY TRANSPLANT (HHS-HCC): ICD-10-CM

## 2024-12-20 DIAGNOSIS — Z79.4 TYPE 2 DIABETES MELLITUS WITH STAGE 2 CHRONIC KIDNEY DISEASE, WITH LONG-TERM CURRENT USE OF INSULIN (MULTI): ICD-10-CM

## 2024-12-20 DIAGNOSIS — Z95.0 CARDIAC PACEMAKER IN SITU: ICD-10-CM

## 2024-12-20 LAB
ABO GROUP (TYPE) IN BLOOD: NORMAL
ALBUMIN SERPL BCP-MCNC: 4.4 G/DL (ref 3.4–5)
ALP SERPL-CCNC: 189 U/L (ref 33–136)
ALT SERPL W P-5'-P-CCNC: 18 U/L (ref 10–52)
ANION GAP BLDV CALCULATED.4IONS-SCNC: 8 MMOL/L (ref 10–25)
ANION GAP SERPL CALC-SCNC: 16 MMOL/L (ref 10–20)
ANTIBODY SCREEN: NORMAL
APTT PPP: 30 SECONDS (ref 27–38)
AST SERPL W P-5'-P-CCNC: 21 U/L (ref 9–39)
BASE EXCESS BLDV CALC-SCNC: 10.7 MMOL/L (ref -2–3)
BILIRUB DIRECT SERPL-MCNC: 0.1 MG/DL (ref 0–0.3)
BILIRUB SERPL-MCNC: 0.5 MG/DL (ref 0–1.2)
BODY TEMPERATURE: 37 DEGREES CELSIUS
BUN SERPL-MCNC: 15 MG/DL (ref 6–23)
CA-I BLDV-SCNC: 1.19 MMOL/L (ref 1.1–1.33)
CALCIUM SERPL-MCNC: 9.8 MG/DL (ref 8.6–10.6)
CHLORIDE BLDV-SCNC: 97 MMOL/L (ref 98–107)
CHLORIDE SERPL-SCNC: 94 MMOL/L (ref 98–107)
CMV IGG AVIDITY SERPL IA-RTO: REACTIVE %
CO2 SERPL-SCNC: 33 MMOL/L (ref 21–32)
CREAT SERPL-MCNC: 3.13 MG/DL (ref 0.5–1.3)
EBV NA AB SER QL: POSITIVE
EGFRCR SERPLBLD CKD-EPI 2021: 20 ML/MIN/1.73M*2
ERYTHROCYTE [DISTWIDTH] IN BLOOD BY AUTOMATED COUNT: 14.7 % (ref 11.5–14.5)
GLUCOSE BLDV-MCNC: 192 MG/DL (ref 74–99)
GLUCOSE SERPL-MCNC: 187 MG/DL (ref 74–99)
HBV CORE AB SER QL: REACTIVE
HBV SURFACE AB SER-ACNC: >1000 MIU/ML
HBV SURFACE AG SERPL QL IA: NONREACTIVE
HCO3 BLDV-SCNC: 37 MMOL/L (ref 22–26)
HCT VFR BLD AUTO: 35.4 % (ref 41–52)
HCT VFR BLD EST: 33 % (ref 41–52)
HCV AB SER QL: NONREACTIVE
HGB BLD-MCNC: 11.2 G/DL (ref 13.5–17.5)
HGB BLDV-MCNC: 11.1 G/DL (ref 13.5–17.5)
HIV 1+2 AB+HIV1 P24 AG SERPL QL IA: NONREACTIVE
HOLD SPECIMEN: NORMAL
INHALED O2 CONCENTRATION: 21 %
INR PPP: 1 (ref 0.9–1.1)
LACTATE BLDV-SCNC: 1.5 MMOL/L (ref 0.4–2)
MCH RBC QN AUTO: 31.5 PG (ref 26–34)
MCHC RBC AUTO-ENTMCNC: 31.6 G/DL (ref 32–36)
MCV RBC AUTO: 99 FL (ref 80–100)
NRBC BLD-RTO: 0 /100 WBCS (ref 0–0)
OXYHGB MFR BLDV: 56.8 % (ref 45–75)
PCO2 BLDV: 57 MM HG (ref 41–51)
PH BLDV: 7.42 PH (ref 7.33–7.43)
PHOSPHATE SERPL-MCNC: 2.6 MG/DL (ref 2.5–4.9)
PLATELET # BLD AUTO: 130 X10*3/UL (ref 150–450)
PO2 BLDV: 35 MM HG (ref 35–45)
POTASSIUM BLDV-SCNC: 4.8 MMOL/L (ref 3.5–5.3)
POTASSIUM SERPL-SCNC: 4.6 MMOL/L (ref 3.5–5.3)
PROT SERPL-MCNC: 8.8 G/DL (ref 6.4–8.2)
PROTHROMBIN TIME: 11.3 SECONDS (ref 9.8–12.8)
RBC # BLD AUTO: 3.56 X10*6/UL (ref 4.5–5.9)
RH FACTOR (ANTIGEN D): NORMAL
SAO2 % BLDV: 58 % (ref 45–75)
SODIUM BLDV-SCNC: 137 MMOL/L (ref 136–145)
SODIUM SERPL-SCNC: 138 MMOL/L (ref 136–145)
WBC # BLD AUTO: 3.6 X10*3/UL (ref 4.4–11.3)

## 2024-12-20 PROCEDURE — 87389 HIV-1 AG W/HIV-1&-2 AB AG IA: CPT

## 2024-12-20 PROCEDURE — 86803 HEPATITIS C AB TEST: CPT

## 2024-12-20 PROCEDURE — 80053 COMPREHEN METABOLIC PANEL: CPT

## 2024-12-20 PROCEDURE — 87522 HEPATITIS C REVRS TRNSCRPJ: CPT

## 2024-12-20 PROCEDURE — 82248 BILIRUBIN DIRECT: CPT

## 2024-12-20 PROCEDURE — 86644 CMV ANTIBODY: CPT

## 2024-12-20 PROCEDURE — 1100000001 HC PRIVATE ROOM DAILY

## 2024-12-20 PROCEDURE — 87340 HEPATITIS B SURFACE AG IA: CPT

## 2024-12-20 PROCEDURE — 86826 HLA X-MATCH NONCYTOTOXC ADDL: CPT | Mod: OUT | Performed by: TRANSPLANT SURGERY

## 2024-12-20 PROCEDURE — 36415 COLL VENOUS BLD VENIPUNCTURE: CPT

## 2024-12-20 PROCEDURE — 99222 1ST HOSP IP/OBS MODERATE 55: CPT | Performed by: TRANSPLANT SURGERY

## 2024-12-20 PROCEDURE — 2500000001 HC RX 250 WO HCPCS SELF ADMINISTERED DRUGS (ALT 637 FOR MEDICARE OP)

## 2024-12-20 PROCEDURE — 86825 HLA X-MATH NON-CYTOTOXIC: CPT | Mod: OUT | Performed by: TRANSPLANT SURGERY

## 2024-12-20 PROCEDURE — 86706 HEP B SURFACE ANTIBODY: CPT

## 2024-12-20 PROCEDURE — 86901 BLOOD TYPING SEROLOGIC RH(D): CPT

## 2024-12-20 PROCEDURE — 71045 X-RAY EXAM CHEST 1 VIEW: CPT

## 2024-12-20 PROCEDURE — 84132 ASSAY OF SERUM POTASSIUM: CPT

## 2024-12-20 PROCEDURE — 86704 HEP B CORE ANTIBODY TOTAL: CPT

## 2024-12-20 PROCEDURE — 85610 PROTHROMBIN TIME: CPT

## 2024-12-20 PROCEDURE — 80505 PATH CLIN CONSLTJ HIGH 41-60: CPT | Performed by: PATHOLOGY

## 2024-12-20 PROCEDURE — 85027 COMPLETE CBC AUTOMATED: CPT

## 2024-12-20 PROCEDURE — 71045 X-RAY EXAM CHEST 1 VIEW: CPT | Performed by: RADIOLOGY

## 2024-12-20 PROCEDURE — 86664 EPSTEIN-BARR NUCLEAR ANTIGEN: CPT

## 2024-12-20 PROCEDURE — 84100 ASSAY OF PHOSPHORUS: CPT

## 2024-12-20 PROCEDURE — 82435 ASSAY OF BLOOD CHLORIDE: CPT

## 2024-12-20 RX ORDER — CEFAZOLIN SODIUM 2 G/100ML
2 INJECTION, SOLUTION INTRAVENOUS ONCE
Status: CANCELLED | OUTPATIENT
Start: 2024-12-20 | End: 2024-12-20

## 2024-12-20 RX ORDER — GABAPENTIN 300 MG/1
300 CAPSULE ORAL ONCE
Status: CANCELLED | OUTPATIENT
Start: 2024-12-20 | End: 2024-12-20

## 2024-12-20 RX ORDER — ACETAMINOPHEN 325 MG/1
975 TABLET ORAL ONCE
Status: COMPLETED | OUTPATIENT
Start: 2024-12-20 | End: 2024-12-20

## 2024-12-20 RX ORDER — GABAPENTIN 300 MG/1
300 CAPSULE ORAL ONCE
Status: COMPLETED | OUTPATIENT
Start: 2024-12-20 | End: 2024-12-20

## 2024-12-20 RX ORDER — ACETAMINOPHEN 325 MG/1
975 TABLET ORAL ONCE
Status: CANCELLED | OUTPATIENT
Start: 2024-12-20 | End: 2024-12-20

## 2024-12-20 RX ADMIN — GABAPENTIN 300 MG: 300 CAPSULE ORAL at 21:59

## 2024-12-20 RX ADMIN — ACETAMINOPHEN 975 MG: 325 TABLET ORAL at 21:59

## 2024-12-20 SDOH — ECONOMIC STABILITY: FOOD INSECURITY: WITHIN THE PAST 12 MONTHS, YOU WORRIED THAT YOUR FOOD WOULD RUN OUT BEFORE YOU GOT THE MONEY TO BUY MORE.: NEVER TRUE

## 2024-12-20 SDOH — SOCIAL STABILITY: SOCIAL INSECURITY: ARE THERE ANY APPARENT SIGNS OF INJURIES/BEHAVIORS THAT COULD BE RELATED TO ABUSE/NEGLECT?: NO

## 2024-12-20 SDOH — SOCIAL STABILITY: SOCIAL INSECURITY: DOES ANYONE TRY TO KEEP YOU FROM HAVING/CONTACTING OTHER FRIENDS OR DOING THINGS OUTSIDE YOUR HOME?: NO

## 2024-12-20 SDOH — SOCIAL STABILITY: SOCIAL INSECURITY: DO YOU FEEL ANYONE HAS EXPLOITED OR TAKEN ADVANTAGE OF YOU FINANCIALLY OR OF YOUR PERSONAL PROPERTY?: NO

## 2024-12-20 SDOH — ECONOMIC STABILITY: HOUSING INSECURITY: IN THE PAST 12 MONTHS, HOW MANY TIMES HAVE YOU MOVED WHERE YOU WERE LIVING?: 0

## 2024-12-20 SDOH — SOCIAL STABILITY: SOCIAL INSECURITY: HAVE YOU HAD ANY THOUGHTS OF HARMING ANYONE ELSE?: NO

## 2024-12-20 SDOH — ECONOMIC STABILITY: INCOME INSECURITY: IN THE PAST 12 MONTHS HAS THE ELECTRIC, GAS, OIL, OR WATER COMPANY THREATENED TO SHUT OFF SERVICES IN YOUR HOME?: NO

## 2024-12-20 SDOH — SOCIAL STABILITY: SOCIAL INSECURITY: WERE YOU ABLE TO COMPLETE ALL THE BEHAVIORAL HEALTH SCREENINGS?: YES

## 2024-12-20 SDOH — ECONOMIC STABILITY: FOOD INSECURITY: HOW HARD IS IT FOR YOU TO PAY FOR THE VERY BASICS LIKE FOOD, HOUSING, MEDICAL CARE, AND HEATING?: NOT VERY HARD

## 2024-12-20 SDOH — SOCIAL STABILITY: SOCIAL INSECURITY
WITHIN THE LAST YEAR, HAVE YOU BEEN RAPED OR FORCED TO HAVE ANY KIND OF SEXUAL ACTIVITY BY YOUR PARTNER OR EX-PARTNER?: NO

## 2024-12-20 SDOH — ECONOMIC STABILITY: HOUSING INSECURITY: AT ANY TIME IN THE PAST 12 MONTHS, WERE YOU HOMELESS OR LIVING IN A SHELTER (INCLUDING NOW)?: NO

## 2024-12-20 SDOH — SOCIAL STABILITY: SOCIAL INSECURITY: WITHIN THE LAST YEAR, HAVE YOU BEEN HUMILIATED OR EMOTIONALLY ABUSED IN OTHER WAYS BY YOUR PARTNER OR EX-PARTNER?: NO

## 2024-12-20 SDOH — SOCIAL STABILITY: SOCIAL INSECURITY: ABUSE: ADULT

## 2024-12-20 SDOH — ECONOMIC STABILITY: HOUSING INSECURITY: IN THE LAST 12 MONTHS, WAS THERE A TIME WHEN YOU WERE NOT ABLE TO PAY THE MORTGAGE OR RENT ON TIME?: NO

## 2024-12-20 SDOH — SOCIAL STABILITY: SOCIAL INSECURITY: HAS ANYONE EVER THREATENED TO HURT YOUR FAMILY OR YOUR PETS?: NO

## 2024-12-20 SDOH — ECONOMIC STABILITY: FOOD INSECURITY: WITHIN THE PAST 12 MONTHS, THE FOOD YOU BOUGHT JUST DIDN'T LAST AND YOU DIDN'T HAVE MONEY TO GET MORE.: NEVER TRUE

## 2024-12-20 SDOH — SOCIAL STABILITY: SOCIAL INSECURITY: WITHIN THE LAST YEAR, HAVE YOU BEEN AFRAID OF YOUR PARTNER OR EX-PARTNER?: NO

## 2024-12-20 SDOH — SOCIAL STABILITY: SOCIAL INSECURITY: ARE YOU OR HAVE YOU BEEN THREATENED OR ABUSED PHYSICALLY, EMOTIONALLY, OR SEXUALLY BY ANYONE?: NO

## 2024-12-20 SDOH — SOCIAL STABILITY: SOCIAL INSECURITY: HAVE YOU HAD THOUGHTS OF HARMING ANYONE ELSE?: NO

## 2024-12-20 SDOH — SOCIAL STABILITY: SOCIAL INSECURITY
WITHIN THE LAST YEAR, HAVE YOU BEEN KICKED, HIT, SLAPPED, OR OTHERWISE PHYSICALLY HURT BY YOUR PARTNER OR EX-PARTNER?: NO

## 2024-12-20 SDOH — ECONOMIC STABILITY: TRANSPORTATION INSECURITY: IN THE PAST 12 MONTHS, HAS LACK OF TRANSPORTATION KEPT YOU FROM MEDICAL APPOINTMENTS OR FROM GETTING MEDICATIONS?: NO

## 2024-12-20 SDOH — SOCIAL STABILITY: SOCIAL INSECURITY: DO YOU FEEL UNSAFE GOING BACK TO THE PLACE WHERE YOU ARE LIVING?: NO

## 2024-12-20 ASSESSMENT — COGNITIVE AND FUNCTIONAL STATUS - GENERAL
PATIENT BASELINE BEDBOUND: NO
DAILY ACTIVITIY SCORE: 24
MOBILITY SCORE: 24
DAILY ACTIVITIY SCORE: 24
MOBILITY SCORE: 24

## 2024-12-20 ASSESSMENT — ACTIVITIES OF DAILY LIVING (ADL)
HEARING - LEFT EAR: FUNCTIONAL
ADEQUATE_TO_COMPLETE_ADL: YES
WALKS IN HOME: INDEPENDENT
BATHING: INDEPENDENT
JUDGMENT_ADEQUATE_SAFELY_COMPLETE_DAILY_ACTIVITIES: YES
PATIENT'S MEMORY ADEQUATE TO SAFELY COMPLETE DAILY ACTIVITIES?: YES
LACK_OF_TRANSPORTATION: NO
HEARING - RIGHT EAR: FUNCTIONAL
TOILETING: INDEPENDENT
GROOMING: INDEPENDENT
FEEDING YOURSELF: INDEPENDENT
DRESSING YOURSELF: INDEPENDENT
LACK_OF_TRANSPORTATION: NO
ASSISTIVE_DEVICE: WALKER;CANE

## 2024-12-20 ASSESSMENT — LIFESTYLE VARIABLES
HOW OFTEN DO YOU HAVE 6 OR MORE DRINKS ON ONE OCCASION: NEVER
AUDIT-C TOTAL SCORE: 0
SKIP TO QUESTIONS 9-10: 1
HOW MANY STANDARD DRINKS CONTAINING ALCOHOL DO YOU HAVE ON A TYPICAL DAY: PATIENT DOES NOT DRINK
AUDIT-C TOTAL SCORE: 0
HOW OFTEN DO YOU HAVE A DRINK CONTAINING ALCOHOL: NEVER

## 2024-12-20 ASSESSMENT — PATIENT HEALTH QUESTIONNAIRE - PHQ9
SUM OF ALL RESPONSES TO PHQ9 QUESTIONS 1 & 2: 0
1. LITTLE INTEREST OR PLEASURE IN DOING THINGS: NOT AT ALL
2. FEELING DOWN, DEPRESSED OR HOPELESS: NOT AT ALL

## 2024-12-20 ASSESSMENT — PAIN SCALES - GENERAL: PAINLEVEL_OUTOF10: 0 - NO PAIN

## 2024-12-21 ENCOUNTER — APPOINTMENT (OUTPATIENT)
Dept: RADIOLOGY | Facility: HOSPITAL | Age: 74
DRG: 652 | End: 2024-12-21
Payer: COMMERCIAL

## 2024-12-21 LAB
ALBUMIN SERPL BCP-MCNC: 3.7 G/DL (ref 3.4–5)
ANION GAP BLDV CALCULATED.4IONS-SCNC: 11 MMOL/L (ref 10–25)
ANION GAP SERPL CALC-SCNC: 15 MMOL/L (ref 10–20)
BASE EXCESS BLDV CALC-SCNC: 2.3 MMOL/L (ref -2–3)
BASOPHILS # BLD AUTO: 0 X10*3/UL (ref 0–0.1)
BASOPHILS NFR BLD AUTO: 0 %
BODY TEMPERATURE: 37 DEGREES CELSIUS
BUN SERPL-MCNC: 17 MG/DL (ref 6–23)
CA-I BLDV-SCNC: 1.16 MMOL/L (ref 1.1–1.33)
CALCIUM SERPL-MCNC: 8.4 MG/DL (ref 8.6–10.6)
CHLORIDE BLDV-SCNC: 98 MMOL/L (ref 98–107)
CHLORIDE SERPL-SCNC: 97 MMOL/L (ref 98–107)
CO2 SERPL-SCNC: 28 MMOL/L (ref 21–32)
CREAT SERPL-MCNC: 3.33 MG/DL (ref 0.5–1.3)
EGFRCR SERPLBLD CKD-EPI 2021: 19 ML/MIN/1.73M*2
EOSINOPHIL # BLD AUTO: 0.01 X10*3/UL (ref 0–0.4)
EOSINOPHIL NFR BLD AUTO: 0.3 %
ERYTHROCYTE [DISTWIDTH] IN BLOOD BY AUTOMATED COUNT: 14.7 % (ref 11.5–14.5)
ERYTHROCYTE [DISTWIDTH] IN BLOOD BY AUTOMATED COUNT: 14.8 % (ref 11.5–14.5)
GLUCOSE BLD MANUAL STRIP-MCNC: 174 MG/DL (ref 74–99)
GLUCOSE BLD MANUAL STRIP-MCNC: 191 MG/DL (ref 74–99)
GLUCOSE BLD MANUAL STRIP-MCNC: 211 MG/DL (ref 74–99)
GLUCOSE BLD MANUAL STRIP-MCNC: 227 MG/DL (ref 74–99)
GLUCOSE BLDV-MCNC: 226 MG/DL (ref 74–99)
GLUCOSE SERPL-MCNC: 198 MG/DL (ref 74–99)
HCO3 BLDV-SCNC: 29.5 MMOL/L (ref 22–26)
HCT VFR BLD AUTO: 24.3 % (ref 41–52)
HCT VFR BLD AUTO: 28 % (ref 41–52)
HCT VFR BLD EST: 28 % (ref 41–52)
HGB BLD-MCNC: 7.6 G/DL (ref 13.5–17.5)
HGB BLD-MCNC: 8.7 G/DL (ref 13.5–17.5)
HGB BLDV-MCNC: 9.3 G/DL (ref 13.5–17.5)
IMM GRANULOCYTES # BLD AUTO: 0.01 X10*3/UL (ref 0–0.5)
IMM GRANULOCYTES NFR BLD AUTO: 0.3 % (ref 0–0.9)
INHALED O2 CONCENTRATION: 29 %
LACTATE BLDV-SCNC: 1.4 MMOL/L (ref 0.4–2)
LYMPHOCYTES # BLD AUTO: 0.06 X10*3/UL (ref 0.8–3)
LYMPHOCYTES NFR BLD AUTO: 1.5 %
MCH RBC QN AUTO: 31.1 PG (ref 26–34)
MCH RBC QN AUTO: 32.1 PG (ref 26–34)
MCHC RBC AUTO-ENTMCNC: 31.1 G/DL (ref 32–36)
MCHC RBC AUTO-ENTMCNC: 31.3 G/DL (ref 32–36)
MCV RBC AUTO: 100 FL (ref 80–100)
MCV RBC AUTO: 103 FL (ref 80–100)
MONOCYTES # BLD AUTO: 0.04 X10*3/UL (ref 0.05–0.8)
MONOCYTES NFR BLD AUTO: 1 %
NEUTROPHILS # BLD AUTO: 3.77 X10*3/UL (ref 1.6–5.5)
NEUTROPHILS NFR BLD AUTO: 96.9 %
NRBC BLD-RTO: 0 /100 WBCS (ref 0–0)
NRBC BLD-RTO: 0 /100 WBCS (ref 0–0)
OXYHGB MFR BLDV: 66 % (ref 45–75)
PCO2 BLDV: 60 MM HG (ref 41–51)
PH BLDV: 7.3 PH (ref 7.33–7.43)
PHOSPHATE SERPL-MCNC: 2.3 MG/DL (ref 2.5–4.9)
PLATELET # BLD AUTO: 120 X10*3/UL (ref 150–450)
PLATELET # BLD AUTO: 76 X10*3/UL (ref 150–450)
PO2 BLDV: 42 MM HG (ref 35–45)
POTASSIUM BLDV-SCNC: 5.2 MMOL/L (ref 3.5–5.3)
POTASSIUM SERPL-SCNC: 4.2 MMOL/L (ref 3.5–5.3)
RBC # BLD AUTO: 2.37 X10*6/UL (ref 4.5–5.9)
RBC # BLD AUTO: 2.8 X10*6/UL (ref 4.5–5.9)
SAO2 % BLDV: 67 % (ref 45–75)
SODIUM BLDV-SCNC: 133 MMOL/L (ref 136–145)
SODIUM SERPL-SCNC: 136 MMOL/L (ref 136–145)
WBC # BLD AUTO: 10 X10*3/UL (ref 4.4–11.3)
WBC # BLD AUTO: 3.9 X10*3/UL (ref 4.4–11.3)

## 2024-12-21 PROCEDURE — 2500000004 HC RX 250 GENERAL PHARMACY W/ HCPCS (ALT 636 FOR OP/ED)

## 2024-12-21 PROCEDURE — C2617 STENT, NON-COR, TEM W/O DEL: HCPCS | Performed by: TRANSPLANT SURGERY

## 2024-12-21 PROCEDURE — 51702 INSERT TEMP BLADDER CATH: CPT

## 2024-12-21 PROCEDURE — 7100000001 HC RECOVERY ROOM TIME - INITIAL BASE CHARGE: Performed by: TRANSPLANT SURGERY

## 2024-12-21 PROCEDURE — 7100000002 HC RECOVERY ROOM TIME - EACH INCREMENTAL 1 MINUTE: Performed by: TRANSPLANT SURGERY

## 2024-12-21 PROCEDURE — 50605 INSERT URETERAL SUPPORT: CPT | Performed by: TRANSPLANT SURGERY

## 2024-12-21 PROCEDURE — 82947 ASSAY GLUCOSE BLOOD QUANT: CPT

## 2024-12-21 PROCEDURE — 85025 COMPLETE CBC W/AUTO DIFF WBC: CPT | Performed by: STUDENT IN AN ORGANIZED HEALTH CARE EDUCATION/TRAINING PROGRAM

## 2024-12-21 PROCEDURE — 3700000001 HC GENERAL ANESTHESIA TIME - INITIAL BASE CHARGE: Performed by: TRANSPLANT SURGERY

## 2024-12-21 PROCEDURE — 0T768ZZ DILATION OF RIGHT URETER, VIA NATURAL OR ARTIFICIAL OPENING ENDOSCOPIC: ICD-10-PCS | Performed by: TRANSPLANT SURGERY

## 2024-12-21 PROCEDURE — 99221 1ST HOSP IP/OBS SF/LOW 40: CPT | Performed by: HOSPITALIST

## 2024-12-21 PROCEDURE — 36415 COLL VENOUS BLD VENIPUNCTURE: CPT | Performed by: STUDENT IN AN ORGANIZED HEALTH CARE EDUCATION/TRAINING PROGRAM

## 2024-12-21 PROCEDURE — 1200000002 HC GENERAL ROOM WITH TELEMETRY DAILY

## 2024-12-21 PROCEDURE — 2500000004 HC RX 250 GENERAL PHARMACY W/ HCPCS (ALT 636 FOR OP/ED): Performed by: STUDENT IN AN ORGANIZED HEALTH CARE EDUCATION/TRAINING PROGRAM

## 2024-12-21 PROCEDURE — 84132 ASSAY OF SERUM POTASSIUM: CPT | Performed by: STUDENT IN AN ORGANIZED HEALTH CARE EDUCATION/TRAINING PROGRAM

## 2024-12-21 PROCEDURE — 84100 ASSAY OF PHOSPHORUS: CPT | Performed by: STUDENT IN AN ORGANIZED HEALTH CARE EDUCATION/TRAINING PROGRAM

## 2024-12-21 PROCEDURE — 2500000004 HC RX 250 GENERAL PHARMACY W/ HCPCS (ALT 636 FOR OP/ED): Performed by: TRANSPLANT SURGERY

## 2024-12-21 PROCEDURE — 94667 MNPJ CHEST WALL 1ST: CPT

## 2024-12-21 PROCEDURE — 2500000005 HC RX 250 GENERAL PHARMACY W/O HCPCS: Performed by: TRANSPLANT SURGERY

## 2024-12-21 PROCEDURE — 2720000007 HC OR 272 NO HCPCS: Performed by: TRANSPLANT SURGERY

## 2024-12-21 PROCEDURE — 82435 ASSAY OF BLOOD CHLORIDE: CPT | Performed by: STUDENT IN AN ORGANIZED HEALTH CARE EDUCATION/TRAINING PROGRAM

## 2024-12-21 PROCEDURE — 8120000002 HC CADAVER DONOR - KIDNEY: Performed by: TRANSPLANT SURGERY

## 2024-12-21 PROCEDURE — 0TY00Z0 TRANSPLANTATION OF RIGHT KIDNEY, ALLOGENEIC, OPEN APPROACH: ICD-10-PCS | Performed by: TRANSPLANT SURGERY

## 2024-12-21 PROCEDURE — 76776 US EXAM K TRANSPL W/DOPPLER: CPT | Performed by: RADIOLOGY

## 2024-12-21 PROCEDURE — 50360 RNL ALTRNSPLJ W/O RCP NFRCT: CPT | Performed by: TRANSPLANT SURGERY

## 2024-12-21 PROCEDURE — 76776 US EXAM K TRANSPL W/DOPPLER: CPT

## 2024-12-21 PROCEDURE — 3600000004 HC OR TIME - INITIAL BASE CHARGE - PROCEDURE LEVEL FOUR: Performed by: TRANSPLANT SURGERY

## 2024-12-21 PROCEDURE — 3600000009 HC OR TIME - EACH INCREMENTAL 1 MINUTE - PROCEDURE LEVEL FOUR: Performed by: TRANSPLANT SURGERY

## 2024-12-21 PROCEDURE — 2780000003 HC OR 278 NO HCPCS: Performed by: TRANSPLANT SURGERY

## 2024-12-21 PROCEDURE — P9045 ALBUMIN (HUMAN), 5%, 250 ML: HCPCS | Mod: JZ | Performed by: STUDENT IN AN ORGANIZED HEALTH CARE EDUCATION/TRAINING PROGRAM

## 2024-12-21 PROCEDURE — 2500000005 HC RX 250 GENERAL PHARMACY W/O HCPCS: Performed by: STUDENT IN AN ORGANIZED HEALTH CARE EDUCATION/TRAINING PROGRAM

## 2024-12-21 PROCEDURE — C1757 CATH, THROMBECTOMY/EMBOLECT: HCPCS | Performed by: TRANSPLANT SURGERY

## 2024-12-21 PROCEDURE — 2500000004 HC RX 250 GENERAL PHARMACY W/ HCPCS (ALT 636 FOR OP/ED): Mod: JZ | Performed by: STUDENT IN AN ORGANIZED HEALTH CARE EDUCATION/TRAINING PROGRAM

## 2024-12-21 PROCEDURE — 2500000001 HC RX 250 WO HCPCS SELF ADMINISTERED DRUGS (ALT 637 FOR MEDICARE OP): Performed by: STUDENT IN AN ORGANIZED HEALTH CARE EDUCATION/TRAINING PROGRAM

## 2024-12-21 PROCEDURE — 3700000002 HC GENERAL ANESTHESIA TIME - EACH INCREMENTAL 1 MINUTE: Performed by: TRANSPLANT SURGERY

## 2024-12-21 PROCEDURE — 2500000002 HC RX 250 W HCPCS SELF ADMINISTERED DRUGS (ALT 637 FOR MEDICARE OP, ALT 636 FOR OP/ED)

## 2024-12-21 PROCEDURE — 85027 COMPLETE CBC AUTOMATED: CPT

## 2024-12-21 DEVICE — URETERAL STENT
Type: IMPLANTABLE DEVICE | Site: URETER | Status: FUNCTIONAL
Brand: POLARIS™ ULTRA

## 2024-12-21 RX ORDER — SODIUM CHLORIDE 9 MG/ML
999 INJECTION, SOLUTION INTRAVENOUS CONTINUOUS
Status: DISCONTINUED | OUTPATIENT
Start: 2024-12-21 | End: 2024-12-22

## 2024-12-21 RX ORDER — ACETAMINOPHEN 325 MG/1
650 TABLET ORAL EVERY 6 HOURS SCHEDULED
Status: DISPENSED | OUTPATIENT
Start: 2024-12-21 | End: 2024-12-23

## 2024-12-21 RX ORDER — CEFAZOLIN SODIUM 2 G/100ML
2 INJECTION, SOLUTION INTRAVENOUS EVERY 8 HOURS
Status: DISCONTINUED | OUTPATIENT
Start: 2024-12-21 | End: 2024-12-22

## 2024-12-21 RX ORDER — INSULIN LISPRO 100 [IU]/ML
0-10 INJECTION, SOLUTION INTRAVENOUS; SUBCUTANEOUS
Status: DISCONTINUED | OUTPATIENT
Start: 2024-12-21 | End: 2024-12-25

## 2024-12-21 RX ORDER — ACETAMINOPHEN 325 MG/1
650 TABLET ORAL EVERY 4 HOURS PRN
Status: DISCONTINUED | OUTPATIENT
Start: 2024-12-21 | End: 2024-12-21 | Stop reason: HOSPADM

## 2024-12-21 RX ORDER — MYCOPHENOLATE MOFETIL 250 MG/1
1000 CAPSULE ORAL EVERY 12 HOURS
Status: DISCONTINUED | OUTPATIENT
Start: 2024-12-21 | End: 2024-12-21

## 2024-12-21 RX ORDER — SODIUM CHLORIDE, SODIUM LACTATE, POTASSIUM CHLORIDE, CALCIUM CHLORIDE 600; 310; 30; 20 MG/100ML; MG/100ML; MG/100ML; MG/100ML
INJECTION, SOLUTION INTRAVENOUS CONTINUOUS PRN
Status: DISCONTINUED | OUTPATIENT
Start: 2024-12-21 | End: 2024-12-21

## 2024-12-21 RX ORDER — HYDROMORPHONE HYDROCHLORIDE 0.2 MG/ML
0.2 INJECTION INTRAMUSCULAR; INTRAVENOUS; SUBCUTANEOUS EVERY 5 MIN PRN
Status: DISCONTINUED | OUTPATIENT
Start: 2024-12-21 | End: 2024-12-21 | Stop reason: HOSPADM

## 2024-12-21 RX ORDER — SODIUM CHLORIDE, SODIUM LACTATE, POTASSIUM CHLORIDE, CALCIUM CHLORIDE 600; 310; 30; 20 MG/100ML; MG/100ML; MG/100ML; MG/100ML
100 INJECTION, SOLUTION INTRAVENOUS CONTINUOUS
Status: DISCONTINUED | OUTPATIENT
Start: 2024-12-21 | End: 2024-12-21 | Stop reason: HOSPADM

## 2024-12-21 RX ORDER — TACROLIMUS 1 MG/1
2 CAPSULE ORAL
Status: DISCONTINUED | OUTPATIENT
Start: 2024-12-21 | End: 2024-12-26

## 2024-12-21 RX ORDER — CEFAZOLIN 1 G/1
INJECTION, POWDER, FOR SOLUTION INTRAVENOUS AS NEEDED
Status: DISCONTINUED | OUTPATIENT
Start: 2024-12-21 | End: 2024-12-21

## 2024-12-21 RX ORDER — PREDNISONE 20 MG/1
20 TABLET ORAL DAILY
Status: DISCONTINUED | OUTPATIENT
Start: 2024-12-25 | End: 2024-12-26 | Stop reason: HOSPADM

## 2024-12-21 RX ORDER — HYDROMORPHONE HYDROCHLORIDE 0.2 MG/ML
0.2 INJECTION INTRAMUSCULAR; INTRAVENOUS; SUBCUTANEOUS
Status: DISCONTINUED | OUTPATIENT
Start: 2024-12-21 | End: 2024-12-24

## 2024-12-21 RX ORDER — ALBUTEROL SULFATE 0.83 MG/ML
2.5 SOLUTION RESPIRATORY (INHALATION) ONCE AS NEEDED
Status: DISCONTINUED | OUTPATIENT
Start: 2024-12-21 | End: 2024-12-21 | Stop reason: HOSPADM

## 2024-12-21 RX ORDER — PROPOFOL 10 MG/ML
INJECTION, EMULSION INTRAVENOUS AS NEEDED
Status: DISCONTINUED | OUTPATIENT
Start: 2024-12-21 | End: 2024-12-21

## 2024-12-21 RX ORDER — OXYCODONE HYDROCHLORIDE 5 MG/1
5 TABLET ORAL EVERY 4 HOURS PRN
Status: DISCONTINUED | OUTPATIENT
Start: 2024-12-21 | End: 2024-12-21 | Stop reason: HOSPADM

## 2024-12-21 RX ORDER — CLOTRIMAZOLE 10 MG/1
10 LOZENGE ORAL
Status: DISCONTINUED | OUTPATIENT
Start: 2024-12-21 | End: 2024-12-26 | Stop reason: HOSPADM

## 2024-12-21 RX ORDER — HYDROMORPHONE HYDROCHLORIDE 1 MG/ML
INJECTION, SOLUTION INTRAMUSCULAR; INTRAVENOUS; SUBCUTANEOUS AS NEEDED
Status: DISCONTINUED | OUTPATIENT
Start: 2024-12-21 | End: 2024-12-21

## 2024-12-21 RX ORDER — ALBUMIN HUMAN 50 G/1000ML
SOLUTION INTRAVENOUS AS NEEDED
Status: DISCONTINUED | OUTPATIENT
Start: 2024-12-21 | End: 2024-12-21

## 2024-12-21 RX ORDER — TACROLIMUS 1 MG/1
2 CAPSULE ORAL EVERY 12 HOURS
Status: DISCONTINUED | OUTPATIENT
Start: 2024-12-21 | End: 2024-12-21

## 2024-12-21 RX ORDER — LIDOCAINE HYDROCHLORIDE 10 MG/ML
0.1 INJECTION, SOLUTION INFILTRATION; PERINEURAL ONCE
Status: DISCONTINUED | OUTPATIENT
Start: 2024-12-21 | End: 2024-12-21 | Stop reason: HOSPADM

## 2024-12-21 RX ORDER — PHENYLEPHRINE HCL IN 0.9% NACL 0.4MG/10ML
SYRINGE (ML) INTRAVENOUS AS NEEDED
Status: DISCONTINUED | OUTPATIENT
Start: 2024-12-21 | End: 2024-12-21

## 2024-12-21 RX ORDER — OXYCODONE HYDROCHLORIDE 5 MG/1
5 TABLET ORAL EVERY 4 HOURS PRN
Status: DISCONTINUED | OUTPATIENT
Start: 2024-12-21 | End: 2024-12-26 | Stop reason: HOSPADM

## 2024-12-21 RX ORDER — MANNITOL 20 G/100ML
INJECTION, SOLUTION INTRAVENOUS AS NEEDED
Status: DISCONTINUED | OUTPATIENT
Start: 2024-12-21 | End: 2024-12-21

## 2024-12-21 RX ORDER — NALOXONE HYDROCHLORIDE 0.4 MG/ML
0.2 INJECTION, SOLUTION INTRAMUSCULAR; INTRAVENOUS; SUBCUTANEOUS EVERY 5 MIN PRN
Status: DISCONTINUED | OUTPATIENT
Start: 2024-12-21 | End: 2024-12-26 | Stop reason: HOSPADM

## 2024-12-21 RX ORDER — OXYCODONE HYDROCHLORIDE 5 MG/1
10 TABLET ORAL EVERY 4 HOURS PRN
Status: DISCONTINUED | OUTPATIENT
Start: 2024-12-21 | End: 2024-12-26 | Stop reason: HOSPADM

## 2024-12-21 RX ORDER — ROSUVASTATIN CALCIUM 10 MG/1
10 TABLET, COATED ORAL NIGHTLY
Status: DISCONTINUED | OUTPATIENT
Start: 2024-12-21 | End: 2024-12-26 | Stop reason: HOSPADM

## 2024-12-21 RX ORDER — FUROSEMIDE 10 MG/ML
INJECTION INTRAMUSCULAR; INTRAVENOUS AS NEEDED
Status: DISCONTINUED | OUTPATIENT
Start: 2024-12-21 | End: 2024-12-21

## 2024-12-21 RX ORDER — ACETAMINOPHEN 325 MG/1
650 TABLET ORAL EVERY 6 HOURS PRN
Status: DISCONTINUED | OUTPATIENT
Start: 2024-12-23 | End: 2024-12-26 | Stop reason: HOSPADM

## 2024-12-21 RX ORDER — MYCOPHENOLATE MOFETIL 250 MG/1
1000 CAPSULE ORAL
Status: DISCONTINUED | OUTPATIENT
Start: 2024-12-21 | End: 2024-12-26 | Stop reason: HOSPADM

## 2024-12-21 RX ORDER — SODIUM CHLORIDE 0.9 G/100ML
IRRIGANT IRRIGATION AS NEEDED
Status: DISCONTINUED | OUTPATIENT
Start: 2024-12-21 | End: 2024-12-21 | Stop reason: HOSPADM

## 2024-12-21 RX ORDER — ONDANSETRON HYDROCHLORIDE 2 MG/ML
INJECTION, SOLUTION INTRAVENOUS AS NEEDED
Status: DISCONTINUED | OUTPATIENT
Start: 2024-12-21 | End: 2024-12-21

## 2024-12-21 RX ORDER — ONDANSETRON HYDROCHLORIDE 2 MG/ML
4 INJECTION, SOLUTION INTRAVENOUS EVERY 8 HOURS PRN
Status: DISCONTINUED | OUTPATIENT
Start: 2024-12-21 | End: 2024-12-26 | Stop reason: HOSPADM

## 2024-12-21 RX ORDER — GABAPENTIN 100 MG/1
200 CAPSULE ORAL NIGHTLY
Status: DISCONTINUED | OUTPATIENT
Start: 2024-12-21 | End: 2024-12-26

## 2024-12-21 RX ORDER — POLYMYXIN B 500000 [USP'U]/1
INJECTION, POWDER, LYOPHILIZED, FOR SOLUTION INTRAMUSCULAR; INTRATHECAL; INTRAVENOUS; OPHTHALMIC AS NEEDED
Status: DISCONTINUED | OUTPATIENT
Start: 2024-12-21 | End: 2024-12-21 | Stop reason: HOSPADM

## 2024-12-21 RX ORDER — DIPHENHYDRAMINE HYDROCHLORIDE 50 MG/ML
INJECTION INTRAMUSCULAR; INTRAVENOUS AS NEEDED
Status: DISCONTINUED | OUTPATIENT
Start: 2024-12-21 | End: 2024-12-21

## 2024-12-21 RX ORDER — LIDOCAINE HYDROCHLORIDE 20 MG/ML
INJECTION, SOLUTION INFILTRATION; PERINEURAL AS NEEDED
Status: DISCONTINUED | OUTPATIENT
Start: 2024-12-21 | End: 2024-12-21

## 2024-12-21 RX ORDER — ONDANSETRON HYDROCHLORIDE 2 MG/ML
4 INJECTION, SOLUTION INTRAVENOUS ONCE AS NEEDED
Status: DISCONTINUED | OUTPATIENT
Start: 2024-12-21 | End: 2024-12-21 | Stop reason: HOSPADM

## 2024-12-21 RX ORDER — FUROSEMIDE 10 MG/ML
80 INJECTION INTRAMUSCULAR; INTRAVENOUS EVERY 8 HOURS SCHEDULED
Status: DISCONTINUED | OUTPATIENT
Start: 2024-12-21 | End: 2024-12-26

## 2024-12-21 RX ORDER — PANTOPRAZOLE SODIUM 40 MG/1
40 TABLET, DELAYED RELEASE ORAL
Status: DISCONTINUED | OUTPATIENT
Start: 2024-12-21 | End: 2024-12-26 | Stop reason: HOSPADM

## 2024-12-21 RX ORDER — METHYLENE BLUE 10 MG/ML
INJECTION INTRAVENOUS AS NEEDED
Status: DISCONTINUED | OUTPATIENT
Start: 2024-12-21 | End: 2024-12-21 | Stop reason: HOSPADM

## 2024-12-21 RX ORDER — FENTANYL CITRATE 50 UG/ML
INJECTION, SOLUTION INTRAMUSCULAR; INTRAVENOUS AS NEEDED
Status: DISCONTINUED | OUTPATIENT
Start: 2024-12-21 | End: 2024-12-21

## 2024-12-21 RX ORDER — ROCURONIUM BROMIDE 10 MG/ML
INJECTION, SOLUTION INTRAVENOUS AS NEEDED
Status: DISCONTINUED | OUTPATIENT
Start: 2024-12-21 | End: 2024-12-21

## 2024-12-21 RX ORDER — ROSUVASTATIN CALCIUM 20 MG/1
20 TABLET, COATED ORAL NIGHTLY
Status: DISCONTINUED | OUTPATIENT
Start: 2024-12-21 | End: 2024-12-21

## 2024-12-21 RX ORDER — SODIUM CHLORIDE 450 MG/100ML
60 INJECTION, SOLUTION INTRAVENOUS CONTINUOUS
Status: DISCONTINUED | OUTPATIENT
Start: 2024-12-21 | End: 2024-12-22

## 2024-12-21 RX ORDER — LABETALOL HYDROCHLORIDE 5 MG/ML
5 INJECTION, SOLUTION INTRAVENOUS ONCE AS NEEDED
Status: DISCONTINUED | OUTPATIENT
Start: 2024-12-21 | End: 2024-12-21 | Stop reason: HOSPADM

## 2024-12-21 RX ADMIN — HYDROMORPHONE HYDROCHLORIDE 0.5 MG: 1 INJECTION, SOLUTION INTRAMUSCULAR; INTRAVENOUS; SUBCUTANEOUS at 04:15

## 2024-12-21 RX ADMIN — TACROLIMUS 2 MG: 1 CAPSULE ORAL at 18:02

## 2024-12-21 RX ADMIN — CEFAZOLIN SODIUM 2 G: 2 INJECTION, SOLUTION INTRAVENOUS at 20:54

## 2024-12-21 RX ADMIN — MYCOPHENOLATE MOFETIL 1000 MG: 250 CAPSULE ORAL at 18:02

## 2024-12-21 RX ADMIN — Medication 3 L/MIN: at 04:15

## 2024-12-21 RX ADMIN — PANTOPRAZOLE SODIUM 40 MG: 40 TABLET, DELAYED RELEASE ORAL at 16:06

## 2024-12-21 RX ADMIN — OXYCODONE HYDROCHLORIDE 10 MG: 5 TABLET ORAL at 14:04

## 2024-12-21 RX ADMIN — ACETAMINOPHEN 650 MG: 325 TABLET ORAL at 18:02

## 2024-12-21 RX ADMIN — SODIUM CHLORIDE 19 ML/HR: 0.9 INJECTION, SOLUTION INTRAVENOUS at 05:10

## 2024-12-21 RX ADMIN — HYDROMORPHONE HYDROCHLORIDE 0.5 MG: 1 INJECTION, SOLUTION INTRAMUSCULAR; INTRAVENOUS; SUBCUTANEOUS at 04:37

## 2024-12-21 RX ADMIN — SODIUM ZIRCONIUM CYCLOSILICATE 10 G: 10 POWDER, FOR SUSPENSION ORAL at 17:09

## 2024-12-21 RX ADMIN — SODIUM ZIRCONIUM CYCLOSILICATE 10 G: 10 POWDER, FOR SUSPENSION ORAL at 10:42

## 2024-12-21 RX ADMIN — INSULIN LISPRO 2 UNITS: 100 INJECTION, SOLUTION INTRAVENOUS; SUBCUTANEOUS at 17:09

## 2024-12-21 RX ADMIN — Medication 2 L/MIN: at 06:50

## 2024-12-21 RX ADMIN — ROSUVASTATIN CALCIUM 10 MG: 10 TABLET, FILM COATED ORAL at 20:45

## 2024-12-21 RX ADMIN — FUROSEMIDE 80 MG: 10 INJECTION, SOLUTION INTRAVENOUS at 18:02

## 2024-12-21 RX ADMIN — SODIUM CHLORIDE 19 ML/HR: 0.9 INJECTION, SOLUTION INTRAVENOUS at 06:10

## 2024-12-21 RX ADMIN — CLOTRIMAZOLE 10 MG: 10 LOZENGE ORAL at 10:42

## 2024-12-21 RX ADMIN — SODIUM CHLORIDE 60 ML/HR: 4.5 INJECTION, SOLUTION INTRAVENOUS at 04:10

## 2024-12-21 RX ADMIN — HYDROMORPHONE HYDROCHLORIDE 0.5 MG: 1 INJECTION, SOLUTION INTRAMUSCULAR; INTRAVENOUS; SUBCUTANEOUS at 05:46

## 2024-12-21 RX ADMIN — GABAPENTIN 200 MG: 100 CAPSULE ORAL at 20:45

## 2024-12-21 RX ADMIN — CEFAZOLIN SODIUM 2 G: 2 INJECTION, SOLUTION INTRAVENOUS at 12:17

## 2024-12-21 RX ADMIN — SODIUM CHLORIDE 60 ML/HR: 4.5 INJECTION, SOLUTION INTRAVENOUS at 19:00

## 2024-12-21 RX ADMIN — CLOTRIMAZOLE 10 MG: 10 LOZENGE ORAL at 13:02

## 2024-12-21 RX ADMIN — FUROSEMIDE 80 MG: 10 INJECTION, SOLUTION INTRAVENOUS at 10:02

## 2024-12-21 RX ADMIN — MYCOPHENOLATE MOFETIL 1000 MG: 250 CAPSULE ORAL at 10:41

## 2024-12-21 RX ADMIN — ACETAMINOPHEN 650 MG: 325 TABLET ORAL at 12:04

## 2024-12-21 RX ADMIN — OXYCODONE HYDROCHLORIDE 10 MG: 5 TABLET ORAL at 20:45

## 2024-12-21 RX ADMIN — TACROLIMUS 2 MG: 1 CAPSULE ORAL at 10:42

## 2024-12-21 RX ADMIN — SODIUM CHLORIDE 999 ML/HR: 0.9 INJECTION, SOLUTION INTRAVENOUS at 04:10

## 2024-12-21 RX ADMIN — CLOTRIMAZOLE 10 MG: 10 LOZENGE ORAL at 18:02

## 2024-12-21 RX ADMIN — OXYCODONE HYDROCHLORIDE 5 MG: 5 TABLET ORAL at 06:41

## 2024-12-21 ASSESSMENT — PAIN SCALES - GENERAL
PAINLEVEL_OUTOF10: 8
PAINLEVEL_OUTOF10: 7
PAINLEVEL_OUTOF10: 8
PAINLEVEL_OUTOF10: 3
PAINLEVEL_OUTOF10: 3
PAINLEVEL_OUTOF10: 10 - WORST POSSIBLE PAIN
PAINLEVEL_OUTOF10: 8
PAINLEVEL_OUTOF10: 3
PAINLEVEL_OUTOF10: 10 - WORST POSSIBLE PAIN
PAINLEVEL_OUTOF10: 4
PAINLEVEL_OUTOF10: 4
PAIN_LEVEL: 0
PAINLEVEL_OUTOF10: 8
PAINLEVEL_OUTOF10: 6

## 2024-12-21 ASSESSMENT — PAIN - FUNCTIONAL ASSESSMENT
PAIN_FUNCTIONAL_ASSESSMENT: 0-10
PAIN_FUNCTIONAL_ASSESSMENT: UNABLE TO SELF-REPORT
PAIN_FUNCTIONAL_ASSESSMENT: 0-10
PAIN_FUNCTIONAL_ASSESSMENT: UNABLE TO SELF-REPORT
PAIN_FUNCTIONAL_ASSESSMENT: 0-10

## 2024-12-21 ASSESSMENT — COGNITIVE AND FUNCTIONAL STATUS - GENERAL
STANDING UP FROM CHAIR USING ARMS: A LITTLE
CLIMB 3 TO 5 STEPS WITH RAILING: A LITTLE
MOBILITY SCORE: 21
WALKING IN HOSPITAL ROOM: A LITTLE
DAILY ACTIVITIY SCORE: 24

## 2024-12-21 ASSESSMENT — PAIN DESCRIPTION - LOCATION: LOCATION: ABDOMEN

## 2024-12-21 NOTE — ANESTHESIA PROCEDURE NOTES
Airway  Date/Time: 12/21/2024 12:34 AM  Urgency: elective    Airway not difficult    Staffing  Performed: resident   Authorized by: Mp Silverio MD    Performed by: Che Alva MD  Patient location during procedure: OR    Indications and Patient Condition  Indications for airway management: anesthesia  Spontaneous Ventilation: absent  Sedation level: deep  Preoxygenated: yes  Patient position: sniffing  MILS not maintained throughout  Mask difficulty assessment: 1 - vent by mask  Planned trial extubation    Final Airway Details  Final airway type: endotracheal airway      Successful airway: ETT  Cuffed: yes   Successful intubation technique: video laryngoscopy  Facilitating devices/methods: intubating stylet  Endotracheal tube insertion site: oral  Blade size: #4  ETT size (mm): 7.5  Cormack-Lehane Classification: grade I - full view of glottis  Placement verified by: chest auscultation and capnometry   Inital cuff pressure (cm H2O): 10  Measured from: lips  ETT to lips (cm): 23  Number of attempts at approach: 1  Number of other approaches attempted: 1    Other Attempts  Unsuccessful attempted airways: endotracheal tube  Unsuccessful attempted endotracheal techniques: direct laryngoscopy (Grade 3, anterior airway)

## 2024-12-21 NOTE — ANESTHESIA POSTPROCEDURE EVALUATION
Patient: Temo Hanson    Procedure Summary       Date: 12/21/24 Room / Location: [facility] OR 12 / [facility] OR    Anesthesia Start: 0018 Anesthesia Stop: 0410    Procedure: Transplant Kidney Diagnosis:       ESRD (end stage renal disease) (Multi)      (ESRD (end stage renal disease) (Multi) [N18.6])    Surgeons: Mike Zamora MD Responsible Provider: Mp Silverio MD    Anesthesia Type: general ASA Status: 3            Anesthesia Type: general    Vitals Value Taken Time   /65 12/21/24 0410   Temp 36 12/21/24 0410   Pulse 75 12/21/24 0410   Resp 16 12/21/24 0410   SpO2 100 12/21/24 0410       Anesthesia Post Evaluation    Patient location during evaluation: PACU  Patient participation: complete - patient participated  Level of consciousness: awake and alert  Pain score: 0  Pain management: adequate  Airway patency: patent  Cardiovascular status: stable  Respiratory status: spontaneous ventilation and face mask  Hydration status: acceptable  Postoperative Nausea and Vomiting: none        No notable events documented.

## 2024-12-21 NOTE — ANESTHESIA PREPROCEDURE EVALUATION
Patient: Temo Hanson    Procedure Information       Date/Time: 12/20/24 2100    Procedure: Transplant Kidney - Add on planning tonight at 9:30 pm    Location: [facility] OR  / [facility] OR    Surgeons: Mike Zamora MD          74M w/ PMH of ESRD d/t diabetes on iHD via LUE AVF, HTN, HLD, 2nd deg heart block with Micra pacemaker, pericardial effusion s/p drainage (resolved), GERD undergoing procedure listed above    Relevant Problems   Cardiac   (+) Cardiac pacemaker in situ   (+) Complete heart block   (+) Essential hypertension   (+) Pure hypercholesterolemia   (+) SVT (supraventricular tachycardia) (CMS-HCC)   (+) Third degree heart block      Pulmonary   (+) Pneumonia of right lower lobe due to infectious organism      GI   (+) Dysphagia   (+) GIB (gastrointestinal bleeding)   (+) Stricture esophagus      /Renal   (+) Acute lower UTI   (+) ESRD (end stage renal disease) (Multi)   (+) 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 (Multi)   (+) Malignant neoplasm of prostate (Multi)      Endocrine   (+) Type 2 diabetes mellitus with diabetic neuropathy (Multi)      Hematology   (+) Iron deficiency anemia secondary to inadequate dietary iron intake   (+) Microcytic anemia      ID   (+) Acute lower UTI   (+) Onychomycosis   (+) Pneumonia of right lower lobe due to infectious organism       Clinical information reviewed:   Tobacco  Allergies    Med Hx  Surg Hx   Fam Hx  Soc Hx        NPO Detail:  No data recorded     Physical Exam    Airway  Mallampati: III  TM distance: >3 FB  Neck ROM: full     Cardiovascular - normal exam     Dental    Pulmonary - normal exam     Abdominal - normal exam       Other findings: LUE fistula          Anesthesia Plan    History of general anesthesia?: yes  History of complications of general anesthesia?: no    ASA 3     general     intravenous induction   Postoperative administration of opioids is intended.  Trial extubation is planned.  Anesthetic plan and risks discussed  with patient.  Use of blood products discussed with patient who consented to blood products.    Plan discussed with attending and resident.

## 2024-12-21 NOTE — PROGRESS NOTES
"Pharmacy Medication History Review    Temo Hanson is a 74 y.o. male admitted for ESRD (end stage renal disease) (Multi). Pharmacy reviewed the patient's [id number] medications and allergies for accuracy.    Medications ADDED:  Basaglar kwk 100/ml pen  Medications CHANGED:  None  Medications REMOVED:   Doxazosin 8mg     The list below reflects the updated PTA list.   Prior to Admission Medications   Prescriptions Last Dose Informant Patient Reported? Taking?   BD Ultra-Fine Joan Pen Needle 32 gauge x \" needle  Self Yes No   FreeStyle Mick 2 Sensor kit  Self Yes No   acetaminophen (Tylenol) 325 mg tablet  Self Yes No   Sig: Take 2 tablets (650 mg) by mouth every 6 hours if needed.   amLODIPine (Norvasc) 10 mg tablet   No No   Sig: Take 1 tablet (10 mg) by mouth 2 times daily.   carboxymethylcellulose (Refresh Plus) 0.5 % ophthalmic solution  Self Yes No   Sig: Instill 1 drop into both eyes 2-4x/day as needed for dryness.   doxazosin (Cardura) 8 mg tablet Not Taking  Yes No   Sig: Take 1 tablet (8 mg) by mouth once daily at bedtime.   Patient not taking: Reported on 2024   gabapentin (Neurontin) 300 mg capsule   No No   Sig: Take 1 capsule (300 mg) by mouth once daily.   Sig: Take 300mg by mouth once daily at bedtime.   hydrALAZINE (Apresoline) 100 mg tablet   No No   Sig: Take 1 tablet (100 mg) by mouth 3 times a day.   insulin glargine,hum.rec.anlog (BASAGLAR KWIKPEN U-100 INSULIN SUBQ)  Self Yes No   Sig: Inject 10 Units under the skin once daily as needed. Take as directed per insulin instructions.  Patient had verified dose    isosorbide dinitrate (Isordil) 10 mg tablet   No No   Sig: Take 1 tablet (10 mg) by mouth 3 times a day.   magnesium oxide (Mag-Ox) 400 mg tablet  Self Yes No   Si tablet (400 mg) once daily.   omeprazole (PriLOSEC) 20 mg DR capsule  Self Yes No   Sig: Take 1 capsule (20 mg) by mouth once daily.   rosuvastatin (Crestor) 20 mg tablet   No No   Sig: Take 1 tablet (20 " "mg) by mouth once daily at bedtime.   valsartan (Diovan) 160 mg tablet   No No   Sig: Take 1 tablet (160 mg) by mouth once daily.      Facility-Administered Medications: None        The list below reflects the updated allergy list. Please review each documented allergy for additional clarification and justification.  Allergies  Reviewed by Zarina Powell on 12/21/2024        Severity Reactions Comments    Terazosin Not Specified Unknown Did not feel well on this medication    Codeine Low Itching itching    Tramadol Low Itching             Patient accepts M2B at discharge.     Sources:   -Patient interview, good historian   -Outpatient pharmacy dispense history  -OARRS     Additional Comments:  None      ZARINA POWELL  Pharmacy Technician  12/21/24     Secure Chat preferred   If no response call [phone] or [employer] Platform \"Med Rec\"    "

## 2024-12-21 NOTE — PERIOPERATIVE NURSING NOTE
UNABLE TO SCAN PATIENT WRIST BAND FOR KIDNEY INTO [employer] @ 0023. DR. GUTIERRES AWARE THAT A NOTE WOULD BE ENTERED INTO THE COMPUTER. PER DR. GUTIERRES REQUEST, BLUE WRIST BAND CAN REMAIN OFF OF THE PATIENT. WRIST BAND WILL BE PLACED WITH OTHER PAPERWORK FOR THE KIDNEY TRANSPLANT.

## 2024-12-21 NOTE — CONSULTS
Transplant Nephrology Consult     Date of admission: 12/20/2024     Temo Hanson is a 74 y.o.  with PMH   Past Medical History:   Diagnosis Date    Chronic kidney disease     DM (diabetes mellitus) (Multi)     Fistula     HTN (hypertension)     Other specified abnormal immunological findings in serum 10/11/2021    Hepatitis B core antibody positive    Personal history of malignant neoplasm of prostate     History of malignant neoplasm of prostate        History of present Illness:Temo Hanson is a 74 y.o. with a past medical history of ESRD on hemodialysis Monday Wednesday Friday last dialysis on 9 12/20/2024 was currently admitted status post DDKT on 12/21/2024.-Other history: Hypertension, hyperlipidemia, complete heart block status post leadless pacemaker insertion on 7/17/2024, pericardial effusion, pheochromocytoma of the right adrenal gland, history of prostate cancer, GI bleed      Past Medical History :  Past Medical History:   Diagnosis Date    Chronic kidney disease     DM (diabetes mellitus) (Multi)     Fistula     HTN (hypertension)     Other specified abnormal immunological findings in serum 10/11/2021    Hepatitis B core antibody positive    Personal history of malignant neoplasm of prostate     History of malignant neoplasm of prostate        Surgical History:  Past Surgical History:   Procedure Laterality Date    CARDIAC CATHETERIZATION N/A 4/18/2024    Procedure: Pericardiocentesis;  Surgeon: Jose Brunson MD;  Location: [place] [address] Cardiac Cath Lab;  Service: Cardiovascular;  Laterality: N/A;    CARDIAC ELECTROPHYSIOLOGY PROCEDURE N/A 7/17/2024    Procedure: Leadless PPM Implant ([id number]);  Surgeon: Sheldon De La Torre MD;  Location: [facility] Cardiac Cath Lab;  Service: Electrophysiology;  Laterality: N/A;  8:00, [employer]    CHOLECYSTECTOMY  10/23/2023    Lap Cholecystectomy    OTHER SURGICAL HISTORY  09/29/2021    Cyst excision    OTHER SURGICAL HISTORY  09/29/2021    Arteriovenous fistula  creation procedure    OTHER SURGICAL HISTORY  09/29/2021    Prostate surgery    OTHER SURGICAL HISTORY  09/29/2021    Colonoscopy        Family HX:  Family History   Problem Relation Name Age of Onset    Diabetes Sister      Diabetes Brother          Social Connections: Feeling Socially Integrated (6/12/2024)    OASIS : Social Isolation     Frequency of experiencing loneliness or isolation: Never            PROBLEM LIST:  Assessment & Plan  ESRD (end stage renal disease) (Multi)           ALLERGIES:  Allergies   Allergen Reactions    Terazosin Unknown     Did not feel well on this medication    Codeine Itching     itching    Tramadol Itching            CURRENT MEDICATIONS:  Scheduled medications  acetaminophen, 650 mg, oral, q6h KASSY  ceFAZolin, 2 g, intravenous, q8h  clotrimazole, 10 mg, oral, TID after meals  furosemide, 80 mg, intravenous, q8h KASSY  gabapentin, 200 mg, oral, Nightly  insulin lispro, 0-10 Units, subcutaneous, TID AC  [START ON 12/23/2024] methylPREDNISolone sodium succinate (PF), 125 mg, intravenous, Once  [START ON 12/24/2024] methylPREDNISolone sodium succinate (PF), 60 mg, intravenous, Once  [START ON 12/22/2024] methylPREDNISolone sodium succinate (PF), 250 mg, intravenous, Once  mycophenolate, 1,000 mg, oral, q12h KASSY  pantoprazole, 40 mg, oral, BID AC  [START ON 12/25/2024] predniSONE, 20 mg, oral, Daily  rosuvastatin, 10 mg, oral, Nightly  sodium zirconium cyclosilicate, 10 g, oral, q8h  tacrolimus, 2 mg, oral, q12h KASSY      Continuous medications  sodium chloride, 60 mL/hr, Last Rate: 60 mL/hr (12/21/24 0910)  sodium chloride 0.9%, 999 mL/hr, Last Rate: 35 mL/hr (12/21/24 1759)      PRN medications  PRN medications: [START ON 12/23/2024] acetaminophen, HYDROmorphone, naloxone, ondansetron, oxyCODONE, oxyCODONE, oxygen       OBJECTIVE:    VITALS: Visit Vitals  /62 (BP Location: Right arm, Patient Position: Lying)   Pulse 92   Temp 36.8 °C (98.2 °F) (Temporal)   Resp 18   Ht 1.854 m  "(6' 1\")   Wt 72.7 kg (160 lb 4.4 oz)   SpO2 96%   BMI 21.15 kg/m²   Smoking Status Never   BSA 1.93 m²        General: No distress   Mucosa moist   AI, AC, AF     HEENT: PEERLA  CVS: S1 S2 no murmurs  RESP:  Lungs clear to auscultation   ABDO: Soft, surgical site clean with a drain in place  Neuro: A + O x 3  Skin: No rash   Extremities: No edema       LABS:  Results from last 72 hours   Lab Units 12/21/24  1511 12/21/24  0417   WBC AUTO x10*3/uL 10.0 3.9*   HEMOGLOBIN g/dL 8.7* 7.6*   MCV fL 100 103*   PLATELETS AUTO x10*3/uL 120* 76*   BUN mg/dL  --  17   CREATININE mg/dL  --  3.33*   CALCIUM mg/dL  --  8.4*            Intake/Output Summary (Last 24 hours) at 12/21/2024 1800  Last data filed at 12/21/2024 1759  Gross per 24 hour   Intake 1409.4 ml   Output 1360 ml   Net 49.4 ml          ASSESSMENT AND PLAN:    Temo Hanson is a 74 y.o. with a past medical history of ESRD on hemodialysis Monday Wednesday Friday last dialysis on 9 12/20/2024 was currently admitted status post DDKT on 12/21/2024.  -Other history: Hypertension, hyperlipidemia, complete heart block status post leadless pacemaker insertion on 7/17/2024, pericardial effusion, pheochromocytoma of the right adrenal gland, history of prostate cancer, GI bleed    Kidney info: KDPI is 93%, PRA 54%, cold ischemia time 34 hours, EBV donor positive, CMV donor negative, toxo donor positive    Allograft function:  -Seems to be stable with decent urine output.  Ultrasound unremarkable.  -Will monitor kidney function no indication for RRT  -Planning Bravo removal Thursday    Immunosuppression:  -Thymo induction 3 mg/kg status post first dose today.  -Will be maintained on Tac MMF and Pred.    Anemia/leukopenia: Mild drop in the hemoglobin noticed monitor closely.  Will consider Epogen    Bone mineral disease: Calcium and phosphorus on the lower side please check vitamin D and PTH with the next labs      Thank you for consulting :  Chris Rodriguez MD    Notes " created by Kathi -Please excuse the Typos .

## 2024-12-21 NOTE — PROGRESS NOTES
"Temo Hanson is a 74 y.o. male on day 1 of admission presenting with ESRD (end stage renal disease) (Multi).    Subjective   POD 0       Objective   Vitals:    12/21/24 0900   BP: 133/59   Pulse: 81   Resp: 14   Temp: 36.5 °C (97.7 °F)   SpO2: 100%       Physical Exam  Constitutional:       Appearance: Normal appearance.   Eyes:      Pupils: Pupils are equal, round, and reactive to light.   Cardiovascular:      Rate and Rhythm: Normal rate.   Pulmonary:      Effort: Pulmonary effort is normal. No respiratory distress.   Abdominal:      General: There is no distension.      Palpations: Abdomen is soft.      Comments: Incision clean, dry, intact   Musculoskeletal:         General: Normal range of motion.      Right lower leg: No edema.      Left lower leg: No edema.   Skin:     General: Skin is warm and dry.   Neurological:      General: No focal deficit present.      Mental Status: He is alert and oriented to person, place, and time.   Psychiatric:         Mood and Affect: Mood normal.         Behavior: Behavior normal.         Last Recorded Vitals  Blood pressure 133/59, pulse 81, temperature 36.5 °C (97.7 °F), resp. rate 14, height 1.854 m (6' 1\"), weight 72.7 kg (160 lb 4.4 oz), SpO2 100%.  Intake/Output last 3 Shifts:  I/O last 3 completed shifts:  In: 660.4 (9.1 mL/kg) [I.V.:139 (1.9 mL/kg); IV Piggyback:521.4]  Out: 453 (6.2 mL/kg) [Urine:288 (0.1 mL/kg/hr); Drains:165]  Weight: 72.7 kg     Relevant Results  Lab Results   Component Value Date    WBC 3.9 (L) 12/21/2024    HGB 7.6 (L) 12/21/2024    HCT 24.3 (L) 12/21/2024     (H) 12/21/2024    PLT 76 (L) 12/21/2024     Lab Results   Component Value Date    GLUCOSE 198 (H) 12/21/2024    CALCIUM 8.4 (L) 12/21/2024     12/21/2024    K 4.2 12/21/2024    CO2 28 12/21/2024    CL 97 (L) 12/21/2024    BUN 17 12/21/2024    CREATININE 3.33 (H) 12/21/2024     furosemide, 80 mg, intravenous, q8h KASSY  gabapentin, 200 mg, oral, Nightly  rosuvastatin, 20 mg, " oral, Nightly  sodium zirconium cyclosilicate, 10 g, oral, q8h          Assessment/Plan   Assessment & Plan  ESRD (end stage renal disease) (Multi)    DDKT 12/21  KDPI 93    Kidney allograft function   Hold off on HD, labs stable    Immunosuppression   Thymo, planning 3mg/kg, 2nd dose tomorrow   Tac 2/2  MMF 1000/1000   Pred taper    DM    Consult endocrine  HTN   Medical management       I spent 35 minutes in the professional and overall care of this patient.      Mike Zamora MD

## 2024-12-21 NOTE — BRIEF OP NOTE
Date: 2024  OR Location: [facility] OR    Name: Temo Hanson, : 1950, Age: 74 y.o., MRN: [id number], Sex: male    Diagnosis  Pre-op Diagnosis      * ESRD (end stage renal disease) (Multi) [N18.6] Post-op Diagnosis     * ESRD (end stage renal disease) (Multi) [N18.6]     Procedures  Transplant Kidney  27101 - ID RENAL [id number] GRF W/O RCP NEPHRECTOMY      Surgeons      * Mike Zamora - Primary    Resident/Fellow/Other Assistant:  Surgeons and Role:     * Destiney Christensen MD - Resident - Assisting    Staff:   Circulator: Liane Adrianub Person: Estrellita    Anesthesia Staff: Anesthesiologist: Mp Silverio MD  Anesthesia Resident: Che Alva MD    Procedure Summary  Anesthesia: General  ASA: III  Estimated Blood Loss: 200mL  Intra-op Medications:   Administrations occurring from 24 2100 to 24 0200:   Medication Name Total Dose   sodium chloride 0.9 % irrigation solution 3,000 mL   methylene blue (Provayblue) 3 mL, polymyxin B 500,000 Units in sodium chloride 0.9 % 1,000 mL irrigation Cannot be calculated   acetaminophen (Tylenol) tablet 975 mg 975 mg   anti-thymocyte globulin rabbit (Thymoglobulin) 100 mg, hydrocortisone sodium succinate (PF) (Solu-CORTEF) 20 mg, heparin 1,000 Units in sodium chloride 0.9% 500 mL  mg   gabapentin (Neurontin) capsule 300 mg 300 mg   methylPREDNISolone sodium succinate (SOLU-Medrol) 500 mg in dextrose 5% 100 mL  mg   albumin human bottle 5% 250 mL   ceFAZolin (Ancef) vial 1 g 2 g   diphenhydrAMINE 50 mg/mL 50 mg   fentaNYL PF 0.05 mg/mL 50 mcg   LR infusion 212.5 mL   LR infusion 141.67 mL   lidocaine (Xylocaine) injection 2 % 100 mg   phenylephrine 40 mcg/mL syringe 10 mL 120 mcg   50 mL propofol 10mg/mL 180 mg   rocuronium (ZeMuron) 50 mg/5 mL injection 70 mg              Anesthesia Record               Intraprocedure I/O Totals          Intake    Propofol Drip 0.00 mL    The total shown is the total volume documented since Anesthesia Start was  filed.    anti-thymocyte globulin rabbit (Thymoglobulin) 100 mg, hydrocortisone sodium succinate (PF) (Solu-CORTEF) 20 mg, heparin 1,000 Units in sodium chloride 0.9% 500 mL .40 mL    Total Intake 521.4 mL       Output    Urine 230 mL    Total Output 230 mL       Net    Net Volume 291.4 mL          Specimen: No specimens collected       Findings: Well-perfused kidney producing urine at end of procedure. Donor clamp time 12/19 2248, out of ice 12/21 0149, reperfusion time 0222    Complications:  None; patient tolerated the procedure well.     Disposition: PACU - hemodynamically stable.  Condition: stable  Specimens Collected: No specimens collected

## 2024-12-21 NOTE — OP NOTE
Transplant Kidney Operative Note     Date: 2024  OR Location: [facility] OR    Name: Temo Hanson, : 1950, Age: 74 y.o., MRN: [id number], Sex: male    Diagnosis  Pre-op Diagnosis      * ESRD (end stage renal disease) (Multi) [N18.6] Post-op Diagnosis     * ESRD (end stage renal disease) (Multi) [N18.6]     Procedures  Transplant Kidney  38326 - OR RENAL ALTRNSPLJ IMPLTJ GRF W/O RCP NEPHRECTOMY    Bench preparation of kidney allograft  Kidney transplant from  donor (left kidney into right pelvis)  Insertion of ureteral stent    Surgeons      * Mike Zamora - Primary    Resident/Fellow/Other Assistant:  Surgeons and Role:     * Destiney Christensen MD - Resident - Assisting    Staff:   Circulator: Liane Barraza Person: Estrellita    Anesthesia Staff: Anesthesiologist: Mp Silverio MD  Anesthesia Resident: Che Alva MD    Procedure Summary  Anesthesia: General  ASA: III  Estimated Blood Loss: 200 mL  Intra-op Medications:   Administrations occurring from 24 2100 to 24 0200:   Medication Name Total Dose   sodium chloride 0.9 % irrigation solution 3,000 mL   methylene blue (Provayblue) 3 mL, polymyxin B 500,000 Units in sodium chloride 0.9 % 1,000 mL irrigation Cannot be calculated   acetaminophen (Tylenol) tablet 975 mg 975 mg   anti-thymocyte globulin rabbit (Thymoglobulin) 100 mg, hydrocortisone sodium succinate (PF) (Solu-CORTEF) 20 mg, heparin 1,000 Units in sodium chloride 0.9% 500 mL  mg   gabapentin (Neurontin) capsule 300 mg 300 mg   methylPREDNISolone sodium succinate (SOLU-Medrol) 500 mg in dextrose 5% 100 mL  mg   albumin human bottle 5% 250 mL   ceFAZolin (Ancef) vial 1 g 2 g   diphenhydrAMINE 50 mg/mL 50 mg   fentaNYL PF 0.05 mg/mL 50 mcg   LR infusion 212.5 mL   LR infusion 141.67 mL   lidocaine (Xylocaine) injection 2 % 100 mg   phenylephrine 40 mcg/mL syringe 10 mL 120 mcg   50 mL propofol 10mg/mL 180 mg   rocuronium (ZeMuron) 50 mg/5 mL injection 70 mg               Anesthesia Record               Intraprocedure I/O Totals          Intake    Propofol Drip 0.00 mL    The total shown is the total volume documented since Anesthesia Start was filed.    anti-thymocyte globulin rabbit (Thymoglobulin) 100 mg, hydrocortisone sodium succinate (PF) (Solu-CORTEF) 20 mg, heparin 1,000 Units in sodium chloride 0.9% 500 mL .40 mL    Total Intake 521.4 mL       Output    Urine 230 mL    Total Output 230 mL       Net    Net Volume 291.4 mL          Specimen: No specimens collected              Drains and/or Catheters:   Closed/Suction Drain 1 Lateral;Right Other (Comment) Bulb 10 Fr. (Active)   Dressing Status Clean;Dry 12/21/24 0900   Drainage Appearance Serosanguineous 12/21/24 0900   Status To bulb suction 12/21/24 0900   Sutures Removed Intact Yes 12/21/24 0512   Output (mL) 65 mL 12/21/24 0910       Urethral Catheter Non-latex 16 Fr. (Active)   Site Assessment Clean;Skin intact 12/21/24 0900   Collection Container Standard drainage bag 12/21/24 0900   Securement Method Securing device (Describe) 12/21/24 0900   Output (mL) 50 mL 12/21/24 1100   $ Urethral Catheter Charge Indwelling cath 12/21/24 0410       Tourniquet Times:         Implants:  Implants       Type Name Action Serial No.      Stent STENT, POLARIS ULTRA 5FR X 12CM, W/O WIRE - [id number] Implanted               Findings:   Atherosclerotic diseases of the donor artery, trimmed back to healthy main renal   Peritoneum opened   Final pump number pressure 25/17, flow 124, resistance 0.16    Indications: Temo Hanson is an 74 y.o. male who is having surgery for Kidney transplant    The patient was seen in the preoperative area. The risks, benefits, complications, treatment options, non-operative alternatives, expected recovery and outcomes were discussed with the patient. The possibilities of reaction to medication, pulmonary aspiration, injury to surrounding structures, bleeding, recurrent infection, the need for  additional procedures, failure to diagnose a condition, and creating a complication requiring transfusion or operation were discussed with the patient. The patient concurred with the proposed plan, giving informed consent.  The site of surgery was properly noted/marked if necessary per policy. The patient has been actively warmed in preoperative area. Preoperative antibiotics have been ordered and given within 1 hours of incision. Venous thrombosis prophylaxis have been ordered including bilateral sequential compression devices    Procedure Details:     The organ was removed from the Storage using sterile technique and brought up into a basin with ice. The organ was the LEFT kidney. UNOS donor ID: [id number]. The ureter had been tagged and protected. The renal vein was identified, and all branches and tributaries tied off. The artery had been procured with a Carrel patch. The artery was skeletonized. The periarterial tissue was carefully dissected and tied. There was significant atherosclerotic diseases at the orifice extending into the artery. I trimmed off 1cm at healthy end renal artery. Following this, the remainder of the perinephric fat were tied-off and removed. Once this was done, the organ was replaced in a basin full of slushed ice in preparation for implantation.     The patient was brought to the operating room, placed in a supine position, a huddle was performed. Sequential compression devices were placed. At this point, ABO verification was performed confirming correct organ and compatibility with patient awake. After this, general endotracheal anesthesia was induced.  The patient was given IV antibiotics and a 3 way Rbavo catheter. Appropriate lines were placed by Anesthesia service.  The abdomen was shaved, prepped, and draped in the usual sterile fashion. Methylprednisolone and Thymoglobulin_ were administered. A Iyer incision was made in the __RLQ and carried down to the subcutaneous tissue and the  abdominal wall fascia. The Retroperitoneal space was entered. The peritoneum was peeled medially. The epigastric vessels were double ligated and divided using silk tie and surgical clips. The external iliac artery and vein were exposed. The bookwalter retractor was placed and the lymphatics overlying the vessels were serially ligated and divided.     We applied atraumatic vascular clamps on the iliac vein and the donor kidney was brought to the operative field. We made an appropriate size venotomy and the donor  renal vein was anastomosed to the recipient RIGHT External Iliac vein in an end-to-side fashion with running 5-0 prolene suture. An arteriotomy was made and the donor renal artery was anastomosed to the recipient RIGHT External iliac artery in an end to side fashion with running 6-0 prolene suture. The patient was simultaneously loaded with IV mannitol, Lasix and volume. The clamps were removed and kidney allograft reperfused. Anastomosis time was 33_ mins. The total Cold ischemic time was 27 hours and 34 minutes. The kidney had an excellent reperfusion and was Pink and had Firm turgor. Hemostasis was meticulously achieved.     The bladder was identified by clamping the long and filled with irrigation fluids. The donor ureter was shortened to the appropriate length and spatulated. Ureteroneocystostomy was created using 5-0 PDS in running fashion over double J ureteral stent. Lich-Gregoir was performed to prevent reflux using 4-0 PDS. The kidney was making urine. Normal bladder.     Hemostasis was checked, the anastomoses inspected, and the kidney placed in the iliac fossa. After placement, the vessel lay was inspected and found to be acceptable. The kidney position was intra-peritoneal (the thin peritoneal tissue had opened). The field was irrigated thoroughly. The retractor was removed and the abdominal wall fascia re-approximated with running #1 Prolene suture in 2 layers. Subcutaneous tissues were  irrigated, and approximated with 3-0 Vicryl stitches.  The skin was closed with staples. All counts were correct. I was presented in the entire case.      Complications:  None; patient tolerated the procedure well.    Disposition: PACU - hemodynamically stable.  Condition: stable       Attending Attestation: I was present and scrubbed for the entire procedure.    Mike Zamora  Phone Number: [phone]

## 2024-12-21 NOTE — CARE PLAN
Problem: Pain - Adult  Goal: Verbalizes/displays adequate comfort level or baseline comfort level  Outcome: Progressing     Problem: Safety - Adult  Goal: Free from fall injury  Outcome: Progressing     Problem: Discharge Planning  Goal: Discharge to home or other facility with appropriate resources  Outcome: Progressing     Problem: Chronic Conditions and Co-morbidities  Goal: Patient's chronic conditions and co-morbidity symptoms are monitored and maintained or improved  Outcome: Progressing     Problem: Diabetes  Goal: Achieve decreasing blood glucose levels by end of shift  Outcome: Progressing  Goal: Increase stability of blood glucose readings by end of shift  Outcome: Progressing  Goal: Decrease in ketones present in urine by end of shift  Outcome: Progressing  Goal: Maintain electrolyte levels within acceptable range throughout shift  Outcome: Progressing  Goal: Maintain glucose levels >70mg/dl to <250mg/dl throughout shift  Outcome: Progressing  Goal: No changes in neurological exam by end of shift  Outcome: Progressing  Goal: Learn about and adhere to nutrition recommendations by end of shift  Outcome: Progressing  Goal: Vital signs within normal range for age by end of shift  Outcome: Progressing  Goal: Increase self care and/or family involovement by end of shift  Outcome: Progressing  Goal: Receive DSME education by end of shift  Outcome: Progressing   The patient's goals for the shift include get kidney    The clinical goals for the shift include transplant surgery

## 2024-12-21 NOTE — H&P
Transplant Surgery History and Physical    Subjective   Chief Complaint/Reason for Admission: kidney transplant    HPI:  Temo Hanson is a 74 y.o. male with PMH HTN, DMII, ESRD (HD MWF) 2/2 diabetic nephropathy presenting for possible kidney transplant. Access via right upper AV graft. Started HD 5/2024, last HD today where 1.5 L was removed. He is 72.7 kg after dialysis unsure what his dry weight is. He still makes urine about 2 oz 2-3 times a day.     Other medical history includes hypertrophic cardiomyopathy, history of SVT, prostate cancer, pheochromocytoma of R adrenal. Past surgeries include adrenalectomy, nissen fundoplication, prostatectomy.     He denies any sick contacts. He denies headache, fever, cough, congestion, SOB, CP, N/V, abdominal pain, diarrhea, constipation.     He does not take any anticoagulant or antiplatelet medications. He takes multiple antiHTN medications including this am where he had amlodipine 10, valsartan 160, hydralazine 100    PMH:  Past Medical History:   Diagnosis Date    Chronic kidney disease     DM (diabetes mellitus) (Multi)     Fistula     HTN (hypertension)     Other specified abnormal immunological findings in serum 10/11/2021    Hepatitis B core antibody positive    Personal history of malignant neoplasm of prostate     History of malignant neoplasm of prostate     PSH:  Past Surgical History:   Procedure Laterality Date    CARDIAC CATHETERIZATION N/A 4/18/2024    Procedure: Pericardiocentesis;  Surgeon: Jose Brunson MD;  Location: [place] [address] Cardiac Cath Lab;  Service: Cardiovascular;  Laterality: N/A;    CARDIAC ELECTROPHYSIOLOGY PROCEDURE N/A 7/17/2024    Procedure: Leadless PPM Implant (20000);  Surgeon: Sheldon De La Torre MD;  Location: [facility];  Service: Electrophysiology;  Laterality: N/A;  8:00, Medtronic    CHOLECYSTECTOMY  10/23/2023    Lap Cholecystectomy    OTHER SURGICAL HISTORY  09/29/2021    Cyst excision    OTHER SURGICAL HISTORY   09/29/2021    Arteriovenous fistula creation procedure    OTHER SURGICAL HISTORY  09/29/2021    Prostate surgery    OTHER SURGICAL HISTORY  09/29/2021    Colonoscopy     Soc Hx:  Social History     Socioeconomic History    Marital status:      Spouse name: Not on file    Number of children: Not on file    Years of education: Not on file    Highest education level: Not on file   Occupational History    Not on file   Tobacco Use    Smoking status: Never    Smokeless tobacco: Never   Vaping Use    Vaping status: Never Used   Substance and Sexual Activity    Alcohol use: Never    Drug use: Never    Sexual activity: Defer   Other Topics Concern    Not on file   Social History Narrative    Not on file     Social Drivers of Health     Financial Resource Strain: Low Risk  (7/17/2024)    Overall Financial Resource Strain (CARDIA)     Difficulty of Paying Living Expenses: Not hard at all   Food Insecurity: Not on file   Transportation Needs: No Transportation Needs (7/17/2024)    PRAPARE - Transportation     Lack of Transportation (Medical): No     Lack of Transportation (Non-Medical): No   Physical Activity: Not on file   Stress: Not on file   Social Connections: Feeling Socially Integrated (6/12/2024)    OASIS : Social Isolation     Frequency of experiencing loneliness or isolation: Never   Intimate Partner Violence: Not on file   Housing Stability: Low Risk  (7/17/2024)    Housing Stability Vital Sign     Unable to Pay for Housing in the Last Year: No     Number of Times Moved in the Last Year: 1     Homeless in the Last Year: No     Fam Hx:  Family History   Problem Relation Name Age of Onset    Diabetes Sister      Diabetes Brother        Allergies:  Allergies   Allergen Reactions    Terazosin Unknown     Did not feel well on this medication    Codeine Itching     itching    Tramadol Itching     Current Medications:  No current facility-administered medications on file prior to encounter.     Current  "Outpatient Medications on File Prior to Encounter   Medication Sig Dispense Refill    acetaminophen (Tylenol) 325 mg tablet Take 2 tablets (650 mg) by mouth every 6 hours if needed.      amLODIPine (Norvasc) 10 mg tablet Take 1 tablet (10 mg) by mouth once daily. 30 tablet 1    B complex-vitamin C-folic acid (Nephro-Vinod Rx) 1- mg-mg-mcg tablet Take 1 tablet by mouth once daily with breakfast.      BD Ultra-Fine Joan Pen Needle 32 gauge x 5/32\" needle       carboxymethylcellulose (Refresh Plus) 0.5 % ophthalmic solution Instill 1 drop into both eyes 2-4x/day as needed for dryness.      doxazosin (Cardura) 8 mg tablet Take 1 tablet (8 mg) by mouth once daily at bedtime.      FreeStyle Mick 2 Sensor kit       gabapentin (Neurontin) 300 mg capsule Take 1 capsule (300 mg) by mouth once daily. (Patient taking differently: Take 200 mg by mouth once daily at bedtime.) 30 capsule 0    hydrALAZINE (Apresoline) 100 mg tablet Take 1 tablet (100 mg) by mouth 3 times a day. 90 tablet 0    isosorbide dinitrate (Isordil) 10 mg tablet Take 1 tablet (10 mg) by mouth 3 times a day. 90 tablet 0    magnesium oxide (Mag-Ox) 400 mg tablet 1 tablet (400 mg) once daily.      omeprazole (PriLOSEC) 20 mg DR capsule Take 1 capsule (20 mg) by mouth once daily.      rosuvastatin (Crestor) 20 mg tablet Take 1 tablet (20 mg) by mouth once daily at bedtime. 30 tablet 1    valsartan (Diovan) 160 mg tablet Take 1 tablet (160 mg) by mouth once daily. 30 tablet 1         Objective   Vitals:  Visit Vitals  /69 (BP Location: Right arm, Patient Position: Lying)   Pulse 63   Temp 36.3 °C (97.3 °F) (Temporal)   Resp 18       Physical Exam:  GEN: No acute distress. Alert, awake and conversive.  HEENT: Sclera anicteric. Moist mucous membranes.  RESP: Breathing non-labored, equal chest rise. On RA.  CV: Regular rate, normotensive  GI: Abdomen soft, nondistended, nontender.   : Deferred.  MSK: No gross deformities. Moves all extremities " spontaneously.  NEURO: Alert and oriented x3. No focal deficits.  PSYCH: Appropriate mood and affect.  SKIN: No rashes or lesions.    Labs within past 24h:  No results found for this or any previous visit (from the past 24 hours).    Imaging within past 24h:  No results found.    No pertinent imaging to review.     ASSESSMENT  Temo Hanson is a 74 y.o. male with PMH ESRD (HD MWF) 2/2 diabetic nephropathy presenting for possible kidney transplant.    PLAN:  - Plan for OR at 9:30 pm for transplant   - NPO   - Preop labs ordered, communicated with nursing, being drawn in room at this time   - CXR, EKG  - Holding all home medications at this time  - Plan for thymo induction    Patient's exam, labs, and findings discussed with Dr. Zamora, who agrees with plan as above.    Xi Johnson MD  PGY-1 General Surgery  Transplant Surgery [id number]    [phone]

## 2024-12-21 NOTE — SIGNIFICANT EVENT
Device Interrogation pre op kidney transplant    Medtronic Micra PPM   Initial parameter setting: VDD  bpm  Switched to VOO at 80 bpm    Post op.  Parameters switched back to original setting VDD  bpm.       Aric Trinh MD  Cardiology, PGY-5

## 2024-12-22 LAB
ALBUMIN SERPL BCP-MCNC: 3.3 G/DL (ref 3.4–5)
ANION GAP SERPL CALC-SCNC: 16 MMOL/L (ref 10–20)
BASOPHILS # BLD AUTO: 0.01 X10*3/UL (ref 0–0.1)
BASOPHILS # BLD AUTO: 0.01 X10*3/UL (ref 0–0.1)
BASOPHILS NFR BLD AUTO: 0.1 %
BASOPHILS NFR BLD AUTO: 0.2 %
BUN SERPL-MCNC: 40 MG/DL (ref 6–23)
CALCIUM SERPL-MCNC: 8.5 MG/DL (ref 8.6–10.6)
CHLORIDE SERPL-SCNC: 94 MMOL/L (ref 98–107)
CO2 SERPL-SCNC: 28 MMOL/L (ref 21–32)
CREAT SERPL-MCNC: 4.47 MG/DL (ref 0.5–1.3)
EGFRCR SERPLBLD CKD-EPI 2021: 13 ML/MIN/1.73M*2
EOSINOPHIL # BLD AUTO: 0.01 X10*3/UL (ref 0–0.4)
EOSINOPHIL # BLD AUTO: 0.04 X10*3/UL (ref 0–0.4)
EOSINOPHIL NFR BLD AUTO: 0.1 %
EOSINOPHIL NFR BLD AUTO: 0.6 %
ERYTHROCYTE [DISTWIDTH] IN BLOOD BY AUTOMATED COUNT: 14.6 % (ref 11.5–14.5)
ERYTHROCYTE [DISTWIDTH] IN BLOOD BY AUTOMATED COUNT: 14.8 % (ref 11.5–14.5)
GLUCOSE BLD MANUAL STRIP-MCNC: 169 MG/DL (ref 74–99)
GLUCOSE BLD MANUAL STRIP-MCNC: 171 MG/DL (ref 74–99)
GLUCOSE BLD MANUAL STRIP-MCNC: 220 MG/DL (ref 74–99)
GLUCOSE BLD MANUAL STRIP-MCNC: 233 MG/DL (ref 74–99)
GLUCOSE BLD MANUAL STRIP-MCNC: 294 MG/DL (ref 74–99)
GLUCOSE BLD MANUAL STRIP-MCNC: 304 MG/DL (ref 74–99)
GLUCOSE SERPL-MCNC: 205 MG/DL (ref 74–99)
HCT VFR BLD AUTO: 24.9 % (ref 41–52)
HCT VFR BLD AUTO: 26 % (ref 41–52)
HGB BLD-MCNC: 8 G/DL (ref 13.5–17.5)
HGB BLD-MCNC: 8.4 G/DL (ref 13.5–17.5)
IMM GRANULOCYTES # BLD AUTO: 0.02 X10*3/UL (ref 0–0.5)
IMM GRANULOCYTES # BLD AUTO: 0.02 X10*3/UL (ref 0–0.5)
IMM GRANULOCYTES NFR BLD AUTO: 0.3 % (ref 0–0.9)
IMM GRANULOCYTES NFR BLD AUTO: 0.3 % (ref 0–0.9)
LYMPHOCYTES # BLD AUTO: 0.07 X10*3/UL (ref 0.8–3)
LYMPHOCYTES # BLD AUTO: 0.1 X10*3/UL (ref 0.8–3)
LYMPHOCYTES NFR BLD AUTO: 1 %
LYMPHOCYTES NFR BLD AUTO: 1.6 %
MAGNESIUM SERPL-MCNC: 1.84 MG/DL (ref 1.6–2.4)
MCH RBC QN AUTO: 31.6 PG (ref 26–34)
MCH RBC QN AUTO: 32.1 PG (ref 26–34)
MCHC RBC AUTO-ENTMCNC: 32.1 G/DL (ref 32–36)
MCHC RBC AUTO-ENTMCNC: 32.3 G/DL (ref 32–36)
MCV RBC AUTO: 100 FL (ref 80–100)
MCV RBC AUTO: 98 FL (ref 80–100)
MONOCYTES # BLD AUTO: 0.48 X10*3/UL (ref 0.05–0.8)
MONOCYTES # BLD AUTO: 0.49 X10*3/UL (ref 0.05–0.8)
MONOCYTES NFR BLD AUTO: 6.7 %
MONOCYTES NFR BLD AUTO: 7.7 %
NEUTROPHILS # BLD AUTO: 5.58 X10*3/UL (ref 1.6–5.5)
NEUTROPHILS # BLD AUTO: 6.7 X10*3/UL (ref 1.6–5.5)
NEUTROPHILS NFR BLD AUTO: 89.6 %
NEUTROPHILS NFR BLD AUTO: 91.8 %
NRBC BLD-RTO: 0 /100 WBCS (ref 0–0)
NRBC BLD-RTO: 0 /100 WBCS (ref 0–0)
PHOSPHATE SERPL-MCNC: 4 MG/DL (ref 2.5–4.9)
PLATELET # BLD AUTO: 109 X10*3/UL (ref 150–450)
PLATELET # BLD AUTO: 116 X10*3/UL (ref 150–450)
POTASSIUM SERPL-SCNC: 4.6 MMOL/L (ref 3.5–5.3)
RBC # BLD AUTO: 2.49 X10*6/UL (ref 4.5–5.9)
RBC # BLD AUTO: 2.66 X10*6/UL (ref 4.5–5.9)
SODIUM SERPL-SCNC: 133 MMOL/L (ref 136–145)
TACROLIMUS BLD-MCNC: 4.3 NG/ML
WBC # BLD AUTO: 6.2 X10*3/UL (ref 4.4–11.3)
WBC # BLD AUTO: 7.3 X10*3/UL (ref 4.4–11.3)

## 2024-12-22 PROCEDURE — 2500000004 HC RX 250 GENERAL PHARMACY W/ HCPCS (ALT 636 FOR OP/ED)

## 2024-12-22 PROCEDURE — 36415 COLL VENOUS BLD VENIPUNCTURE: CPT | Performed by: STUDENT IN AN ORGANIZED HEALTH CARE EDUCATION/TRAINING PROGRAM

## 2024-12-22 PROCEDURE — 94668 MNPJ CHEST WALL SBSQ: CPT

## 2024-12-22 PROCEDURE — 2500000002 HC RX 250 W HCPCS SELF ADMINISTERED DRUGS (ALT 637 FOR MEDICARE OP, ALT 636 FOR OP/ED)

## 2024-12-22 PROCEDURE — 80197 ASSAY OF TACROLIMUS: CPT

## 2024-12-22 PROCEDURE — 99222 1ST HOSP IP/OBS MODERATE 55: CPT | Performed by: INTERNAL MEDICINE

## 2024-12-22 PROCEDURE — 83735 ASSAY OF MAGNESIUM: CPT | Performed by: STUDENT IN AN ORGANIZED HEALTH CARE EDUCATION/TRAINING PROGRAM

## 2024-12-22 PROCEDURE — 2500000001 HC RX 250 WO HCPCS SELF ADMINISTERED DRUGS (ALT 637 FOR MEDICARE OP)

## 2024-12-22 PROCEDURE — 99233 SBSQ HOSP IP/OBS HIGH 50: CPT | Performed by: HOSPITALIST

## 2024-12-22 PROCEDURE — 99232 SBSQ HOSP IP/OBS MODERATE 35: CPT | Performed by: TRANSPLANT SURGERY

## 2024-12-22 PROCEDURE — 80069 RENAL FUNCTION PANEL: CPT | Performed by: STUDENT IN AN ORGANIZED HEALTH CARE EDUCATION/TRAINING PROGRAM

## 2024-12-22 PROCEDURE — 82947 ASSAY GLUCOSE BLOOD QUANT: CPT

## 2024-12-22 PROCEDURE — 85025 COMPLETE CBC W/AUTO DIFF WBC: CPT

## 2024-12-22 PROCEDURE — 2500000001 HC RX 250 WO HCPCS SELF ADMINISTERED DRUGS (ALT 637 FOR MEDICARE OP): Performed by: STUDENT IN AN ORGANIZED HEALTH CARE EDUCATION/TRAINING PROGRAM

## 2024-12-22 PROCEDURE — 2500000004 HC RX 250 GENERAL PHARMACY W/ HCPCS (ALT 636 FOR OP/ED): Performed by: STUDENT IN AN ORGANIZED HEALTH CARE EDUCATION/TRAINING PROGRAM

## 2024-12-22 PROCEDURE — 85025 COMPLETE CBC W/AUTO DIFF WBC: CPT | Performed by: STUDENT IN AN ORGANIZED HEALTH CARE EDUCATION/TRAINING PROGRAM

## 2024-12-22 PROCEDURE — 1200000002 HC GENERAL ROOM WITH TELEMETRY DAILY

## 2024-12-22 PROCEDURE — 36415 COLL VENOUS BLD VENIPUNCTURE: CPT

## 2024-12-22 RX ORDER — POLYETHYLENE GLYCOL 3350 17 G/17G
17 POWDER, FOR SOLUTION ORAL EVERY 12 HOURS SCHEDULED
Status: DISCONTINUED | OUTPATIENT
Start: 2024-12-22 | End: 2024-12-26 | Stop reason: HOSPADM

## 2024-12-22 RX ORDER — DIPHENHYDRAMINE HCL 25 MG
25 CAPSULE ORAL EVERY 6 HOURS PRN
Status: DISCONTINUED | OUTPATIENT
Start: 2024-12-22 | End: 2024-12-26 | Stop reason: HOSPADM

## 2024-12-22 RX ORDER — VALGANCICLOVIR 450 MG/1
450 TABLET, FILM COATED ORAL
Qty: 15 TABLET | Refills: 0 | Status: SHIPPED | OUTPATIENT
Start: 2024-12-24 | End: 2025-01-25

## 2024-12-22 RX ORDER — AMLODIPINE BESYLATE 10 MG/1
10 TABLET ORAL DAILY
Status: DISCONTINUED | OUTPATIENT
Start: 2024-12-22 | End: 2024-12-26 | Stop reason: HOSPADM

## 2024-12-22 RX ORDER — DEXTROSE 50 % IN WATER (D50W) INTRAVENOUS SYRINGE
25
Status: DISCONTINUED | OUTPATIENT
Start: 2024-12-22 | End: 2024-12-26 | Stop reason: HOSPADM

## 2024-12-22 RX ORDER — MAGNESIUM SULFATE HEPTAHYDRATE 40 MG/ML
2 INJECTION, SOLUTION INTRAVENOUS ONCE
Status: COMPLETED | OUTPATIENT
Start: 2024-12-22 | End: 2024-12-22

## 2024-12-22 RX ORDER — METHOCARBAMOL 500 MG/1
500 TABLET, FILM COATED ORAL EVERY 8 HOURS SCHEDULED
Status: DISCONTINUED | OUTPATIENT
Start: 2024-12-22 | End: 2024-12-25

## 2024-12-22 RX ORDER — AMOXICILLIN 250 MG
1 CAPSULE ORAL NIGHTLY
Status: DISCONTINUED | OUTPATIENT
Start: 2024-12-22 | End: 2024-12-26 | Stop reason: HOSPADM

## 2024-12-22 RX ORDER — ACETAMINOPHEN 325 MG/1
650 TABLET ORAL ONCE
Status: COMPLETED | OUTPATIENT
Start: 2024-12-22 | End: 2024-12-22

## 2024-12-22 RX ORDER — MYCOPHENOLATE MOFETIL 250 MG/1
1000 CAPSULE ORAL
Qty: 240 CAPSULE | Refills: 0 | Status: SHIPPED | OUTPATIENT
Start: 2024-12-22 | End: 2025-01-25

## 2024-12-22 RX ORDER — PANTOPRAZOLE SODIUM 40 MG/1
40 TABLET, DELAYED RELEASE ORAL
Qty: 30 TABLET | Refills: 0 | Status: SHIPPED | OUTPATIENT
Start: 2024-12-22 | End: 2025-01-25

## 2024-12-22 RX ORDER — VALGANCICLOVIR 450 MG/1
450 TABLET, FILM COATED ORAL
Status: DISCONTINUED | OUTPATIENT
Start: 2024-12-22 | End: 2024-12-26 | Stop reason: HOSPADM

## 2024-12-22 RX ORDER — TACROLIMUS 1 MG/1
2 CAPSULE ORAL
Qty: 120 CAPSULE | Refills: 0 | Status: SHIPPED | OUTPATIENT
Start: 2024-12-22 | End: 2025-01-25

## 2024-12-22 RX ORDER — PREDNISONE 5 MG/1
20 TABLET ORAL DAILY
Qty: 120 TABLET | Refills: 0 | Status: SHIPPED | OUTPATIENT
Start: 2024-12-25 | End: 2025-01-25

## 2024-12-22 RX ORDER — DEXTROSE 50 % IN WATER (D50W) INTRAVENOUS SYRINGE
12.5
Status: DISCONTINUED | OUTPATIENT
Start: 2024-12-22 | End: 2024-12-26 | Stop reason: HOSPADM

## 2024-12-22 RX ORDER — DOCUSATE SODIUM 100 MG/1
100 CAPSULE, LIQUID FILLED ORAL 2 TIMES DAILY PRN
Qty: 20 CAPSULE | Refills: 0 | Status: SHIPPED | OUTPATIENT
Start: 2024-12-22 | End: 2025-01-05

## 2024-12-22 RX ORDER — TACROLIMUS 0.5 MG/1
0.5 CAPSULE ORAL 2 TIMES DAILY
Qty: 60 CAPSULE | Refills: 0 | Status: SHIPPED | OUTPATIENT
Start: 2024-12-22 | End: 2025-01-25

## 2024-12-22 RX ORDER — POLYETHYLENE GLYCOL 3350 17 G/17G
17 POWDER, FOR SOLUTION ORAL DAILY PRN
Qty: 5 PACKET | Refills: 0 | Status: SHIPPED | OUTPATIENT
Start: 2024-12-22 | End: 2024-12-31

## 2024-12-22 RX ORDER — CLOTRIMAZOLE 10 MG/1
10 LOZENGE ORAL
Qty: 90 TROCHE | Refills: 0 | Status: SHIPPED | OUTPATIENT
Start: 2024-12-22 | End: 2025-01-25

## 2024-12-22 RX ORDER — INSULIN GLARGINE 100 [IU]/ML
5 INJECTION, SOLUTION SUBCUTANEOUS NIGHTLY
Status: DISCONTINUED | OUTPATIENT
Start: 2024-12-22 | End: 2024-12-23

## 2024-12-22 RX ADMIN — OXYCODONE HYDROCHLORIDE 10 MG: 5 TABLET ORAL at 03:29

## 2024-12-22 RX ADMIN — FUROSEMIDE 80 MG: 10 INJECTION, SOLUTION INTRAVENOUS at 03:16

## 2024-12-22 RX ADMIN — MYCOPHENOLATE MOFETIL 1000 MG: 250 CAPSULE ORAL at 17:48

## 2024-12-22 RX ADMIN — TACROLIMUS 2 MG: 1 CAPSULE ORAL at 07:07

## 2024-12-22 RX ADMIN — CLOTRIMAZOLE 10 MG: 10 LOZENGE ORAL at 09:11

## 2024-12-22 RX ADMIN — PANTOPRAZOLE SODIUM 40 MG: 40 TABLET, DELAYED RELEASE ORAL at 16:23

## 2024-12-22 RX ADMIN — SODIUM ZIRCONIUM CYCLOSILICATE 10 G: 10 POWDER, FOR SUSPENSION ORAL at 09:11

## 2024-12-22 RX ADMIN — ACETAMINOPHEN 650 MG: 325 TABLET ORAL at 17:48

## 2024-12-22 RX ADMIN — SODIUM ZIRCONIUM CYCLOSILICATE 10 G: 10 POWDER, FOR SUSPENSION ORAL at 17:50

## 2024-12-22 RX ADMIN — METHOCARBAMOL TABLETS 500 MG: 500 TABLET, COATED ORAL at 21:29

## 2024-12-22 RX ADMIN — CEFAZOLIN SODIUM 2 G: 2 INJECTION, SOLUTION INTRAVENOUS at 07:07

## 2024-12-22 RX ADMIN — VALGANCICLOVIR HYDROCHLORIDE 450 MG: 450 TABLET ORAL at 10:07

## 2024-12-22 RX ADMIN — ROSUVASTATIN CALCIUM 10 MG: 10 TABLET, FILM COATED ORAL at 21:42

## 2024-12-22 RX ADMIN — SENNOSIDES AND DOCUSATE SODIUM 1 TABLET: 50; 8.6 TABLET ORAL at 21:29

## 2024-12-22 RX ADMIN — MYCOPHENOLATE MOFETIL 1000 MG: 250 CAPSULE ORAL at 07:07

## 2024-12-22 RX ADMIN — FUROSEMIDE 80 MG: 10 INJECTION, SOLUTION INTRAVENOUS at 17:50

## 2024-12-22 RX ADMIN — FUROSEMIDE 80 MG: 10 INJECTION, SOLUTION INTRAVENOUS at 10:07

## 2024-12-22 RX ADMIN — INSULIN LISPRO 4 UNITS: 100 INJECTION, SOLUTION INTRAVENOUS; SUBCUTANEOUS at 09:25

## 2024-12-22 RX ADMIN — ONDANSETRON 4 MG: 2 INJECTION INTRAMUSCULAR; INTRAVENOUS at 14:21

## 2024-12-22 RX ADMIN — INSULIN LISPRO 4 UNITS: 100 INJECTION, SOLUTION INTRAVENOUS; SUBCUTANEOUS at 12:28

## 2024-12-22 RX ADMIN — POLYETHYLENE GLYCOL 3350 17 G: 17 POWDER, FOR SOLUTION ORAL at 09:11

## 2024-12-22 RX ADMIN — HYDROMORPHONE HYDROCHLORIDE 0.2 MG: 0.2 INJECTION, SOLUTION INTRAMUSCULAR; INTRAVENOUS; SUBCUTANEOUS at 05:07

## 2024-12-22 RX ADMIN — INSULIN LISPRO 6 UNITS: 100 INJECTION, SOLUTION INTRAVENOUS; SUBCUTANEOUS at 16:23

## 2024-12-22 RX ADMIN — METHOCARBAMOL TABLETS 500 MG: 500 TABLET, COATED ORAL at 09:11

## 2024-12-22 RX ADMIN — OXYCODONE HYDROCHLORIDE 10 MG: 5 TABLET ORAL at 09:14

## 2024-12-22 RX ADMIN — CLOTRIMAZOLE 10 MG: 10 LOZENGE ORAL at 12:28

## 2024-12-22 RX ADMIN — CLOTRIMAZOLE 10 MG: 10 LOZENGE ORAL at 17:50

## 2024-12-22 RX ADMIN — MAGNESIUM SULFATE IN WATER 2 G: 40 INJECTION, SOLUTION INTRAVENOUS at 09:10

## 2024-12-22 RX ADMIN — GABAPENTIN 200 MG: 100 CAPSULE ORAL at 21:29

## 2024-12-22 RX ADMIN — METHOCARBAMOL TABLETS 500 MG: 500 TABLET, COATED ORAL at 14:22

## 2024-12-22 RX ADMIN — HEPARIN SODIUM 100 MG: 1000 INJECTION INTRAVENOUS; SUBCUTANEOUS at 14:46

## 2024-12-22 RX ADMIN — SODIUM ZIRCONIUM CYCLOSILICATE 10 G: 10 POWDER, FOR SUSPENSION ORAL at 03:16

## 2024-12-22 RX ADMIN — TACROLIMUS 2 MG: 1 CAPSULE ORAL at 17:48

## 2024-12-22 RX ADMIN — PANTOPRAZOLE SODIUM 40 MG: 40 TABLET, DELAYED RELEASE ORAL at 07:07

## 2024-12-22 RX ADMIN — AMLODIPINE BESYLATE 10 MG: 10 TABLET ORAL at 10:07

## 2024-12-22 RX ADMIN — ACETAMINOPHEN 650 MG: 325 TABLET ORAL at 14:14

## 2024-12-22 RX ADMIN — DIPHENHYDRAMINE HYDROCHLORIDE 25 MG: 25 CAPSULE ORAL at 14:14

## 2024-12-22 RX ADMIN — DEXTROSE MONOHYDRATE 250 MG: 50 INJECTION, SOLUTION INTRAVENOUS at 09:14

## 2024-12-22 RX ADMIN — INSULIN GLARGINE 5 UNITS: 100 INJECTION, SOLUTION SUBCUTANEOUS at 21:35

## 2024-12-22 ASSESSMENT — COGNITIVE AND FUNCTIONAL STATUS - GENERAL
DAILY ACTIVITIY SCORE: 24
WALKING IN HOSPITAL ROOM: A LITTLE
STANDING UP FROM CHAIR USING ARMS: A LITTLE
WALKING IN HOSPITAL ROOM: A LITTLE
MOBILITY SCORE: 21
STANDING UP FROM CHAIR USING ARMS: A LITTLE
DAILY ACTIVITIY SCORE: 24
CLIMB 3 TO 5 STEPS WITH RAILING: A LITTLE
MOBILITY SCORE: 21
CLIMB 3 TO 5 STEPS WITH RAILING: A LITTLE

## 2024-12-22 ASSESSMENT — PAIN - FUNCTIONAL ASSESSMENT
PAIN_FUNCTIONAL_ASSESSMENT: 0-10
PAIN_FUNCTIONAL_ASSESSMENT: 0-10

## 2024-12-22 ASSESSMENT — PAIN SCALES - GENERAL
PAINLEVEL_OUTOF10: 4
PAINLEVEL_OUTOF10: 7

## 2024-12-22 ASSESSMENT — ACTIVITIES OF DAILY LIVING (ADL): LACK_OF_TRANSPORTATION: NO

## 2024-12-22 ASSESSMENT — PAIN DESCRIPTION - LOCATION: LOCATION: ABDOMEN

## 2024-12-22 NOTE — PROGRESS NOTES
Transitional Care Coordinator Progress Note:        12/22/24 5145   Discharge Planning   Living Arrangements Spouse/significant other;Children   Support Systems Spouse/significant other;Children   Assistance Needed Shower chair/ Home Health Aide   Type of Residence Private residence   Number of Stairs to Enter Residence 6   Number of Stairs Within Residence 12   Do you have animals or pets at home? No   Home or Post Acute Services In home services   Type of Home Care Services Home health aide;Home nursing visits   Expected Discharge Disposition Home H   Does the patient need discharge transport arranged? No   Patient expects to be discharged to: Home   Financial Resource Strain   How hard is it for you to pay for the very basics like food, housing, medical care, and heating? Not hard   Housing Stability   In the last 12 months, was there a time when you were not able to pay the mortgage or rent on time? N   In the past 12 months, how many times have you moved where you were living? 1   At any time in the past 12 months, were you homeless or living in a shelter (including now)? N   Transportation Needs   In the past 12 months, has lack of transportation kept you from medical appointments or from getting medications? no   In the past 12 months, has lack of transportation kept you from meetings, work, or from getting things needed for daily living? No     Assessment Note:  Met with pt and introduced myself as care coordinator and member of the Care Transitions team for discharge planning.   Pt feels safe at home with wife  Daughter and wife provides transport to drs appts.  Pt's address, phone number and contact information was verified.  Pt does not have any questions/concerns at this time however would like Home care services     Previous Home Care: None, however patient would like services   DME: [employer]   Pharmacy: [facility] however can use  [facility] during admission   Falls: None   PCP: Dr. Rafy Noe  Health PCP

## 2024-12-22 NOTE — PROGRESS NOTES
Transplant Nephrology progress note     Date of admission: 12/20/2024     Temo Hanson is a 74 y.o.  with [facility]   Past Medical History:   Diagnosis Date    Chronic kidney disease     DM (diabetes mellitus) (Multi)     Fistula     HTN (hypertension)     Other specified abnormal immunological findings in serum 10/11/2021    Hepatitis B core antibody positive    Personal history of malignant neoplasm of prostate     History of malignant neoplasm of prostate        SUBJECTIVE:          PROBLEM LIST:  Assessment & Plan  ESRD (end stage renal disease) (Multi)           ALLERGIES:  Allergies   Allergen Reactions    Terazosin Unknown     Did not feel well on this medication    Codeine Itching     itching    Tramadol Itching            CURRENT MEDICATIONS:  Scheduled medications  acetaminophen, 650 mg, oral, q6h KASSY  amLODIPine, 10 mg, oral, Daily  antithymocyte globulin (rabbit), 1.5 mg/kg, intravenous, Once  clotrimazole, 10 mg, oral, TID after meals  furosemide, 80 mg, intravenous, q8h KASSY  gabapentin, 200 mg, oral, Nightly  insulin lispro, 0-10 Units, subcutaneous, TID AC  methocarbamol, 500 mg, oral, q8h KASSY  [START ON 12/23/2024] methylPREDNISolone sodium succinate (PF), 125 mg, intravenous, Once  [START ON 12/24/2024] methylPREDNISolone sodium succinate (PF), 60 mg, intravenous, Once  mycophenolate, 1,000 mg, oral, q12h KASSY  pantoprazole, 40 mg, oral, BID AC  polyethylene glycol, 17 g, oral, q12h KASSY  [START ON 12/25/2024] predniSONE, 20 mg, oral, Daily  rosuvastatin, 10 mg, oral, Nightly  sennosides-docusate sodium, 1 tablet, oral, Nightly  sodium zirconium cyclosilicate, 10 g, oral, q8h  [START ON 12/23/2024] sodium zirconium cyclosilicate, 10 g, oral, Daily with lunch  tacrolimus, 2 mg, oral, q12h KASSY  valGANciclovir, 450 mg, oral, q48h      Continuous medications     PRN medications  PRN medications: [START ON 12/23/2024] acetaminophen, diphenhydrAMINE, HYDROmorphone, naloxone, ondansetron, oxyCODONE, oxyCODONE,  "oxygen       OBJECTIVE:    VITALS: Visit Vitals  /63 (BP Location: Right arm)   Pulse 73   Temp 36.9 °C (98.4 °F) (Temporal)   Resp 18   Ht 1.854 m (6' 1\")   Wt 72.7 kg (160 lb 4.4 oz)   SpO2 92%   BMI 21.15 kg/m²   Smoking Status Never   BSA 1.93 m²        General: No distress   Mucosa moist   AI, AC, AF     HEENT: PEERLA  CVS: S1 S2 no murmurs  RESP:  Lungs clear to auscultation   ABDO: Soft, non-tender   Neuro: A + O x 3  Skin: No rash   Extremities: No edema       LABS:  Results from last 72 hours   Lab Units 12/22/24  0610   WBC AUTO x10*3/uL 6.2   HEMOGLOBIN g/dL 8.4*   MCV fL 98   PLATELETS AUTO x10*3/uL 109*   BUN mg/dL 40*   CREATININE mg/dL 4.47*   CALCIUM mg/dL 8.5*   TACROLIMUS ng/mL 4.3            Intake/Output Summary (Last 24 hours) at 12/22/2024 1603  Last data filed at 12/22/2024 1209  Gross per 24 hour   Intake 1382 ml   Output 1377 ml   Net 5 ml          ASSESSMENT AND PLAN:    Temo Hanson is a 74 y.o.  with a past medical history of ESRD on hemodialysis Monday Wednesday Friday last dialysis on 12/20/2024 was currently admitted status post DDKT on 12/21/2024.  -Other history: Hypertension, hyperlipidemia, complete heart block status post leadless pacemaker insertion on 7/17/2024, pericardial effusion, pheochromocytoma of the right adrenal gland, history of prostate cancer, GI bleed     Kidney info: KDPI is 93%, PRA 54%, cold ischemia time 34 hours, EBV donor positive, CMV donor negative, toxo donor positive     Allograft function:  -Seems to be stable with decent urine output, mild uptrend in the creatinine noticed.  Ultrasound unremarkable.  -Will monitor kidney function no indication for RRT.     Immunosuppression:  -Thymo induction 3 mg/kg status -planning second dose today.  -Will be maintained on Tac MMF and Pred.  Tacrolimus levels are 4.2      Anemia/leukopenia: Mild drop in the hemoglobin noticed monitor closely.  Will consider Epogen.     Bone mineral disease: Calcium and " phosphorus on the lower side please check vitamin D and PTH with the next labs.    Hemodynamics: Blood pressures mildly elevated this morning adjusted her amlodipine.         Thank you for consulting .  Chris Moreno MD       Notes created by Kathi -Please excuse the Typos .

## 2024-12-22 NOTE — CONSULTS
Inpatient consult to Endocrinology  Consult performed by: Diana Sawass Najjar, MD  Consult ordered by: Chelsey Simpson, APRN-CNP  Reason for consult: hyperglycemia s/p kidney transplant on steroids          History Of Present Illness  Temo Hanson is a 74 y.o. male 74 y.o. with a PMHx diabetic kidney disease and ESRD on HD M/W/F who is currently admitted for kidney transplant , now s/p  DDKT on 12/21/2024.  Endocrine is consulted for hyperglycemia in setting of steroids use.    DM-2 Hx:  -Diagnosed with type 2 DM at age 46  -DM-2 is been managed by his nephrologist Dr. Rios at [facility], he does not follow with endocrinologist  -Last A1c: 6.1 in 10/29/24    -Home regimen: he was on metformin previously, switched to insulin due to uncontrolled DM. Currently on glargine 5-10 units as needed  Pt reports taking glargine based on his BG level. Said he checks BG in the am, then checks after breakfast, if BG>150 he takes glargine 5 units. Checks again after dinner, if BG>150 he takes another 5 units, his total daily requirement is between 5-10 units based on his BG after meals. He does not seem to understand how long acting insulin works.    Accuchecks: pt checks BG 4 times a day, in the am and before meals. He received CGM Mick 3 recently but has not used it yet  BG range: am BG range , before meals 130-140  Hypoglycemic awareness, yes. Denies hypoglycemia at this time  Complications: No known macrovascular complications. Known microvascular complications with Hx of nephropathy, and neuropathy     Current Diet: diabetic  Hospital regimen: Started on LSS#2  Steroids: s/p solumedrol 500 mg on 12/21, currently on 250 mg 12/22, plan for 125 on 12/23, 60 mg 12/24, pred 20 mg afterward starting 12/25    Pt appears well. Denies F/C, HA, CP, palp, SOB, cough, N/V, abd pain, numbness tingling.      Home Management    Results from Most Recent A1C  Hemoglobin A1C   Date/Time Value Ref Range Status   10/29/2024 12:02 PM 6.1 (H)  See comment % Final          Past Medical History  He has a past medical history of Chronic kidney disease, DM (diabetes mellitus) (Multi), Fistula, HTN (hypertension), Other specified abnormal immunological findings in serum (10/11/2021), and Personal history of malignant neoplasm of prostate.    Surgical History  He has a past surgical history that includes Other surgical history (09/29/2021); Other surgical history (09/29/2021); Other surgical history (09/29/2021); Other surgical history (09/29/2021); Cholecystectomy (10/23/2023); Cardiac catheterization (N/A, 4/18/2024); and Cardiac electrophysiology procedure (N/A, 7/17/2024).     Social History  He reports that he has never smoked. He has never used smokeless tobacco. He reports that he does not drink alcohol and does not use drugs.    Family History  Family History   Problem Relation Name Age of Onset    Diabetes Sister      Diabetes Brother          Allergies  Terazosin, Codeine, and Tramadol    Review of Systems  Reviewed 10 points and neg except stated above     Physical Exam  Constitutional: Well developed, awake/alert/oriented x3, no distress, alert and cooperative, obese  Eyes: EOMI, clear sclera  ENMT: mucous membranes moist, no apparent injury, no lesions seen  Head/Neck: Neck supple, no apparent injury  Respiratory/Thorax: Patent airways, CTAB, normal breath sounds with good chest expansion, thorax symmetric  Cardiovascular: Regular, rate and rhythm, 2+ equal pulses of the extremities, normal S 1and S 2  Gastrointestinal: Nondistended, soft, non-tender, no rebound tenderness or guarding  Musculoskeletal: ROM intact, normal strength  Extremities: normal extremities, no edema  Neurological: alert and oriented x3, CN's grossly intact, normal strength  Psychological: Appropriate mood and behavior  Skin: Warm and dry, no lesions, no rashes     ROS, PMH, FH/SH, surgical history and allergies have been reviewed.    Last Recorded Vitals  Blood pressure  "152/67, pulse 73, temperature 36.6 °C (97.9 °F), temperature source Temporal, resp. rate 18, height 1.854 m (6' 1\"), weight 72.7 kg (160 lb 4.4 oz), SpO2 94%.    Relevant Results  Results from last 7 days   Lab Units 12/22/24  0923 12/22/24  0610 12/22/24  0542 12/21/24  2351 12/21/24  1933 12/21/24  1658 12/21/24  0532 12/21/24  0417 12/20/24  1913   POCT GLUCOSE mg/dL 220*  --  169* 171* 174* 191*   < >  --   --    GLUCOSE mg/dL  --  205*  --   --   --   --   --  198* 187*    < > = values in this interval not displayed.           Assessment/Plan   Assessment & Plan  ESRD (end stage renal disease) (Multi)      Temo Hanson is a 74 y.o. male 74 y.o. with a PMHx diabetic kidney disease and ESRD on HD M/W/F who is currently admitted for kidney transplant , now s/p  DDKT on 12/21/2024.  Endocrine is consulted for hyperglycemia in setting of steroids use.    DM-2 Hx:  -Diagnosed with type 2 DM at age 46  -DM-2 is been managed by his nephrologist Dr. Rios at [facility], he does not follow with endocrinologist  -Last A1c: 6.1 in 10/29/24    -Home regimen: he was on metformin previously, switched to insulin due to uncontrolled DM. Currently on glargine 5-10 units as needed  Pt reports taking glargine based on his BG level. Said he checks BG in the am, then checks after breakfast, if BG>150 he takes glargine 5 units. Checks again after dinner, if BG>150 he takes another 5 units, his total daily requirement is between 5-10 units based on his BG after meals. He does not seem to understand how long acting insulin works.    Accuchecks: pt checks BG 4 times a day, in the am and before meals. He received CGM Mick 3 recently but has not used it yet  BG range: am BG range , before meals 130-140  Hypoglycemic awareness, yes. Denies hypoglycemia at this time  Complications: No known macrovascular complications. Known microvascular complications with Hx of nephropathy, and neuropathy     Current Diet: diabetic  Hospital regimen: " Started on LSS#2  Steroids: s/p solumedrol 500 mg on 12/21, currently on 250 mg 12/22, plan for 125 on 12/23, 60 mg 12/24, pred 20 mg afterward starting 12/25.    Patient received total of 10 units insulin for the past 24 hrs. currently on solumedrol 250 mg 12/22, plan for 125 mg tomorrow    #DM-2 with Hyperglycemia 2/2 steroids use and stress state  # S/p DDKT on 12/21/24  [ ] Start glargine 5 units at bedtime starting tonight  -C/w LSS#2 (2U of Lispro for every 50 above 150) TID w/ meals   -Accuchecks TID and at bedtime, inpatient GB target 140-180  -Hypoglycemia protocol  -Diabetic Diet- low carb 60 CHO  -Will continue to follow and titrate insulin accordingly  -Pt requires further Diabetes and glucose monitoring education. Please consult DM educator Ms. Anna Collins while in patient     Thank you for this consult  Plan conveyed to primary team  Pt seen, examined, and d/w Dr. Field Diana Sawass Najjar, MD  Endocrine fellow

## 2024-12-22 NOTE — OP NOTE
Transplant Kidney Operative Note     Date: 2024  OR Location: [facility] OR    Name: Temo Hanson, : 1950, Age: 74 y.o., MRN: [id number], Sex: male    Diagnosis  Pre-op Diagnosis      * ESRD (end stage renal disease) (Multi) [N18.6] Post-op Diagnosis     * ESRD (end stage renal disease) (Multi) [N18.6]     Procedures  Transplant Kidney  49070 - OR RENAL ALTRNSPLJ IMPLTJ GRF W/O RCP NEPHRECTOMY    Bench preparation of kidney allograft  Kidney transplant from  donor (left kidney into right pelvis)  Insertion of ureteral stent    Surgeons      * Mike Zamora - Primary    Resident/Fellow/Other Assistant:  Surgeons and Role:     * Destiney Christensen MD - Resident - Assisting    Staff:   Circulator: Liane Barraza Person: Estrellita    Anesthesia Staff: Anesthesiologist: Mp Silverio MD  Anesthesia Resident: Che Alva MD    Procedure Summary  Anesthesia: General  ASA: III  Estimated Blood Loss: 200 mL  Intra-op Medications:   Administrations occurring from 24 2100 to 24 0200:   Medication Name Total Dose   sodium chloride 0.9 % irrigation solution 3,000 mL   methylene blue (Provayblue) 3 mL, polymyxin B 500,000 Units in sodium chloride 0.9 % 1,000 mL irrigation Cannot be calculated   acetaminophen (Tylenol) tablet 975 mg 975 mg   anti-thymocyte globulin rabbit (Thymoglobulin) 100 mg, hydrocortisone sodium succinate (PF) (Solu-CORTEF) 20 mg, heparin 1,000 Units in sodium chloride 0.9% 500 mL  mg   gabapentin (Neurontin) capsule 300 mg 300 mg   methylPREDNISolone sodium succinate (SOLU-Medrol) 500 mg in dextrose 5% 100 mL  mg   albumin human bottle 5% 250 mL   ceFAZolin (Ancef) vial 1 g 2 g   diphenhydrAMINE 50 mg/mL 50 mg   fentaNYL PF 0.05 mg/mL 50 mcg   LR infusion 212.5 mL   LR infusion 141.67 mL   lidocaine (Xylocaine) injection 2 % 100 mg   phenylephrine 40 mcg/mL syringe 10 mL 120 mcg   50 mL propofol 10mg/mL 180 mg   rocuronium (ZeMuron) 50 mg/5 mL injection 70 mg               Anesthesia Record               Intraprocedure I/O Totals          Intake    Propofol Drip 0.00 mL    The total shown is the total volume documented since Anesthesia Start was filed.    anti-thymocyte globulin rabbit (Thymoglobulin) 100 mg, hydrocortisone sodium succinate (PF) (Solu-CORTEF) 20 mg, heparin 1,000 Units in sodium chloride 0.9% 500 mL .40 mL    Total Intake 521.4 mL       Output    Urine 230 mL    Total Output 230 mL       Net    Net Volume 291.4 mL          Specimen: No specimens collected              Drains and/or Catheters:   Closed/Suction Drain 1 Lateral;Right Other (Comment) Bulb 10 Fr. (Active)   Site Description Healing 12/21/24 2100   Dressing Status Dry;Clean 12/21/24 2100   Drainage Appearance Serosanguineous 12/21/24 0900   Status To bulb suction 12/21/24 0900   Sutures Removed Intact Yes 12/21/24 0512   Output (mL) 100 mL 12/22/24 0300       Urethral Catheter Non-latex 16 Fr. (Active)   Site Assessment Clean;Skin intact 12/22/24 0108   Collection Container Standard drainage bag 12/21/24 0900   Securement Method Securing device (Describe) 12/21/24 0900   Output (mL) 75 mL 12/22/24 0500   $ Urethral Catheter Charge Indwelling cath 12/21/24 0410       [REMOVED] Closed/Suction Drain Medial Pericardial (Removed)   Site Description Unable to view 04/23/24 0900   Dressing Status Dry 04/23/24 0900   Drainage Appearance Serosanguineous 04/23/24 0900   Status Clamped 04/23/24 0900   Output (mL) 10 mL 04/23/24 0800       [REMOVED] Urethral Catheter Non-latex 16 Fr. (Removed)       Tourniquet Times:         Implants:  Implants       Type Name Action Serial No.      Stent STENT, POLARIS ULTRA 5FR X 12CM, W/O WIRE - [id number] Implanted               Findings:   Atherosclerotic diseases of the donor artery, trimmed back to healthy main renal   Peritoneum opened   Final pump number pressure 25/17, flow 124, resistance 0.16    Indications: Temo Hanson is an 74 y.o. male who is having surgery  for Kidney transplant    The patient was seen in the preoperative area. The risks, benefits, complications, treatment options, non-operative alternatives, expected recovery and outcomes were discussed with the patient. The possibilities of reaction to medication, pulmonary aspiration, injury to surrounding structures, bleeding, recurrent infection, the need for additional procedures, failure to diagnose a condition, and creating a complication requiring transfusion or operation were discussed with the patient. The patient concurred with the proposed plan, giving informed consent.  The site of surgery was properly noted/marked if necessary per policy. The patient has been actively warmed in preoperative area. Preoperative antibiotics have been ordered and given within 1 hours of incision. Venous thrombosis prophylaxis have been ordered including bilateral sequential compression devices    Procedure Details:     The organ was removed from the Storage using sterile technique and brought up into a basin with ice. The organ was the LEFT kidney. UNOS donor ID: [id number]. The ureter had been tagged and protected. The renal vein was identified, and all branches and tributaries tied off. The artery had been procured with a Carrel patch. The artery was skeletonized. The periarterial tissue was carefully dissected and tied. There was significant atherosclerotic diseases at the orifice extending into the artery. I trimmed off 1cm at healthy end renal artery. Following this, the remainder of the perinephric fat were tied-off and removed. Once this was done, the organ was replaced in a basin full of slushed ice in preparation for implantation.     The patient was brought to the operating room, placed in a supine position, a huddle was performed. Sequential compression devices were placed. At this point, ABO verification was performed confirming correct organ and compatibility with patient awake. After this, general endotracheal  anesthesia was induced.  The patient was given IV antibiotics and a 3 way Long catheter. Appropriate lines were placed by Anesthesia service.  The abdomen was shaved, prepped, and draped in the usual sterile fashion. Methylprednisolone and Thymoglobulin_ were administered. A Iyer incision was made in the __RLQ and carried down to the subcutaneous tissue and the abdominal wall fascia. The Retroperitoneal space was entered. The peritoneum was peeled medially. The epigastric vessels were double ligated and divided using silk tie and surgical clips. The external iliac artery and vein were exposed. The bookwalter retractor was placed and the lymphatics overlying the vessels were serially ligated and divided.     We applied atraumatic vascular clamps on the iliac vein and the donor kidney was brought to the operative field. We made an appropriate size venotomy and the donor  renal vein was anastomosed to the recipient RIGHT External Iliac vein in an end-to-side fashion with running 5-0 prolene suture. An arteriotomy was made and the donor renal artery was anastomosed to the recipient RIGHT External iliac artery in an end to side fashion with running 6-0 prolene suture. The patient was simultaneously loaded with IV mannitol, Lasix and volume. The clamps were removed and kidney allograft reperfused. Anastomosis time was 33_ mins. The total Cold ischemic time was 27 hours and 34 minutes. The kidney had an excellent reperfusion and was Pink and had Firm turgor. Hemostasis was meticulously achieved.     The bladder was identified by clamping the long and filled with irrigation fluids. The donor ureter was shortened to the appropriate length and spatulated. Ureteroneocystostomy was created using 5-0 PDS in running fashion over double J ureteral stent. Lich-Gregoir was performed to prevent reflux using 4-0 PDS. The kidney was making urine. Normal bladder.     Hemostasis was checked, the anastomoses inspected, and the kidney  placed in the iliac fossa. After placement, the vessel lay was inspected and found to be acceptable. The kidney position was intra-peritoneal (the thin peritoneal tissue had opened). The field was irrigated thoroughly. The retractor was removed and the abdominal wall fascia re-approximated with running #1 Prolene suture in 2 layers. Subcutaneous tissues were irrigated, and approximated with 3-0 Vicryl stitches.  The skin was closed with staples. All counts were correct. I was presented in the entire case.      Complications:  None; patient tolerated the procedure well.    Disposition: PACU - hemodynamically stable.  Condition: stable       Attending Attestation: I was present and scrubbed for the entire procedure.    Mikejefferson Zamora  Phone Number: [phone]

## 2024-12-22 NOTE — PROGRESS NOTES
Physical Therapy                 Therapy Communication Note    Patient Name: Temo Hanson  MRN: [id number]  Department: [place] [address]  Room: [facility]  Today's Date: 12/22/2024     Discipline: Physical Therapy    PT Missed Visit: Yes     Missed Visit Reason: Missed Visit Reason: Parent/caregiver refused (Pt attempted at 8:15AM and 1:17PM, both times pt refusing 2/2 having too much pain)    Missed Time: Attempt

## 2024-12-22 NOTE — HOSPITAL COURSE
Pt is a 75 y/o male with a hx of ESRD secondary to diabetic nephropathy whom received a  donor kidney transplant on 24 by Dr. Zamora with a KDPI of 93% and PRA of 54%. Donor was Hepc -/-and has not met risk factors. EBV + /+. Left donor kidney transplanted to patient right pelvis. Admission weight is 72.7 kg (discharge weight is 76.4 kg ).  Pt received a total of 3 mg/kg total of thymoglobulin induction therapy in conjunction with 500mg IV solumedrol.  Pt was initiated on Tacrolimus & Cellcept immunosuppression in conjunction with IV solumedrol taper.  Pt was started on valcyte (CMV D - / R + ) and bactrim for CMV & PCP prophylaxis per protocol. He was started on clotrimazole as antifungal coverage per protocol. Operative course was uncomplicated.  Hospital course was uncomplicated. Bravo catheter was removed on POD #3 and the patient is voiding spontaneously. Pt did not require dialysis and electrolytes remain stable. Dialysis access via right UE AVF and was patent on last use. BP & glucose under good control.     Pt is tolerating a diabetic diet, maintaining adequate hydration with oral intake, ambulating independently and having BMs. Immunosuppression was managed by the transplant team. Patient is in stable condition for discharge home with renal telehealth & home PT/OT today. RX delivered to bedside prior to discharge. The patient will f/u in the transplant institute and have labs drawn in the walk-in clinic.

## 2024-12-22 NOTE — PROGRESS NOTES
"Temo Hanson is a 74 y.o. male on day 2 of admission presenting with ESRD (end stage renal disease) (Multi).    Subjective   POD 1       Objective   Vitals:    12/22/24 0753   BP: 152/67   Pulse: 73   Resp: 18   Temp: 36.6 °C (97.9 °F)   SpO2: 94%       Physical Exam  Constitutional:       Appearance: Normal appearance.   Eyes:      Pupils: Pupils are equal, round, and reactive to light.   Cardiovascular:      Rate and Rhythm: Normal rate.   Pulmonary:      Effort: Pulmonary effort is normal. No respiratory distress.   Abdominal:      General: There is no distension.      Palpations: Abdomen is soft.      Comments: Incision clean, dry, intact   Musculoskeletal:         General: Normal range of motion.      Right lower leg: No edema.      Left lower leg: No edema.   Skin:     General: Skin is warm and dry.   Neurological:      General: No focal deficit present.      Mental Status: He is alert and oriented to person, place, and time.   Psychiatric:         Mood and Affect: Mood normal.         Behavior: Behavior normal.         Last Recorded Vitals  Blood pressure 152/67, pulse 73, temperature 36.6 °C (97.9 °F), temperature source Temporal, resp. rate 18, height 1.854 m (6' 1\"), weight 72.7 kg (160 lb 4.4 oz), SpO2 94%.  Intake/Output last 3 Shifts:  I/O last 3 completed shifts:  In: 1216.4 (16.7 mL/kg) [I.V.:695 (9.6 mL/kg); IV Piggyback:521.4]  Out: 2262 (31.1 mL/kg) [Urine:1677 (0.6 mL/kg/hr); Drains:585]  Weight: 72.7 kg     Relevant Results  Lab Results   Component Value Date    WBC 6.2 12/22/2024    HGB 8.4 (L) 12/22/2024    HCT 26.0 (L) 12/22/2024    MCV 98 12/22/2024     (L) 12/22/2024     Lab Results   Component Value Date    GLUCOSE 205 (H) 12/22/2024    CALCIUM 8.5 (L) 12/22/2024     (L) 12/22/2024    K 4.6 12/22/2024    CO2 28 12/22/2024    CL 94 (L) 12/22/2024    BUN 40 (H) 12/22/2024    CREATININE 4.47 (H) 12/22/2024     acetaminophen, 650 mg, oral, q6h [facility]  acetaminophen, 650 mg, oral, " Once  amLODIPine, 10 mg, oral, Daily  antithymocyte globulin (rabbit), 1.5 mg/kg, intravenous, Once  clotrimazole, 10 mg, oral, TID after meals  furosemide, 80 mg, intravenous, q8h KASSY  gabapentin, 200 mg, oral, Nightly  insulin lispro, 0-10 Units, subcutaneous, TID AC  methocarbamol, 500 mg, oral, q8h KASSY  [START ON 12/23/2024] methylPREDNISolone sodium succinate (PF), 125 mg, intravenous, Once  [START ON 12/24/2024] methylPREDNISolone sodium succinate (PF), 60 mg, intravenous, Once  mycophenolate, 1,000 mg, oral, q12h KASSY  pantoprazole, 40 mg, oral, BID AC  polyethylene glycol, 17 g, oral, q12h KASSY  [START ON 12/25/2024] predniSONE, 20 mg, oral, Daily  rosuvastatin, 10 mg, oral, Nightly  sennosides-docusate sodium, 1 tablet, oral, Nightly  sodium zirconium cyclosilicate, 10 g, oral, q8h  [START ON 12/23/2024] sodium zirconium cyclosilicate, 10 g, oral, Daily with lunch  tacrolimus, 2 mg, oral, q12h KASSY  valGANciclovir, 450 mg, oral, q48h          Assessment/Plan   Assessment & Plan  ESRD (end stage renal disease) (Multi)    DDKT 12/21  KDPI 93    Kidney allograft function   Hold off on HD, labs stable   UOP 1400   BLAKE and kidney intra-peritoneal    Immunosuppression   Thymo, planning 3mg/kg, 2nd dose today   Tac 2/2, continue, slow increase, goal 8  MMF 1000/1000   Pred taper    DM    Consult endocrine  HTN   Medical management       I spent 35 minutes in the professional and overall care of this patient.      Miek Zamora MD

## 2024-12-23 ENCOUNTER — DOCUMENTATION (OUTPATIENT)
Dept: TRANSPLANT | Facility: HOSPITAL | Age: 74
End: 2024-12-23
Payer: COMMERCIAL

## 2024-12-23 ENCOUNTER — APPOINTMENT (OUTPATIENT)
Dept: CARDIOLOGY | Facility: HOSPITAL | Age: 74
End: 2024-12-23
Payer: COMMERCIAL

## 2024-12-23 VITALS
DIASTOLIC BLOOD PRESSURE: 65 MMHG | SYSTOLIC BLOOD PRESSURE: 162 MMHG | WEIGHT: 160.27 LBS | RESPIRATION RATE: 18 BRPM | OXYGEN SATURATION: 94 % | HEIGHT: 73 IN | TEMPERATURE: 97.5 F | HEART RATE: 78 BPM | BODY MASS INDEX: 21.24 KG/M2

## 2024-12-23 DIAGNOSIS — Z94.0 KIDNEY REPLACED BY TRANSPLANT (HHS-HCC): ICD-10-CM

## 2024-12-23 DIAGNOSIS — N18.6 ESRD (END STAGE RENAL DISEASE) (MULTI): ICD-10-CM

## 2024-12-23 PROBLEM — Z79.4 TYPE 2 DIABETES MELLITUS, WITH LONG-TERM CURRENT USE OF INSULIN: Status: ACTIVE | Noted: 2024-12-23

## 2024-12-23 PROBLEM — E11.9 TYPE 2 DIABETES MELLITUS, WITH LONG-TERM CURRENT USE OF INSULIN: Status: ACTIVE | Noted: 2024-12-23

## 2024-12-23 LAB
ALBUMIN SERPL BCP-MCNC: 3.4 G/DL (ref 3.4–5)
ANION GAP SERPL CALC-SCNC: 19 MMOL/L
BASOPHILS # BLD AUTO: 0 X10*3/UL (ref 0–0.1)
BASOPHILS NFR BLD AUTO: 0 %
BUN SERPL-MCNC: 58 MG/DL (ref 6–23)
CALCIUM SERPL-MCNC: 8.7 MG/DL (ref 8.6–10.6)
CHLORIDE SERPL-SCNC: 90 MMOL/L (ref 98–107)
CO2 SERPL-SCNC: 25 MMOL/L (ref 21–32)
CREAT SERPL-MCNC: 5.16 MG/DL (ref 0.5–1.3)
EGFRCR SERPLBLD CKD-EPI 2021: 11 ML/MIN/1.73M*2
EOSINOPHIL # BLD AUTO: 0 X10*3/UL (ref 0–0.4)
EOSINOPHIL NFR BLD AUTO: 0 %
ERYTHROCYTE [DISTWIDTH] IN BLOOD BY AUTOMATED COUNT: 14.6 % (ref 11.5–14.5)
GLUCOSE BLD MANUAL STRIP-MCNC: 179 MG/DL (ref 74–99)
GLUCOSE BLD MANUAL STRIP-MCNC: 215 MG/DL (ref 74–99)
GLUCOSE BLD MANUAL STRIP-MCNC: 293 MG/DL (ref 74–99)
GLUCOSE BLD MANUAL STRIP-MCNC: 357 MG/DL (ref 74–99)
GLUCOSE SERPL-MCNC: 353 MG/DL (ref 74–99)
HCT VFR BLD AUTO: 24.3 % (ref 41–52)
HCV RNA SERPL NAA+PROBE-ACNC: NOT DETECTED K[IU]/ML
HCV RNA SERPL NAA+PROBE-LOG IU: NORMAL {LOG_IU}/ML
HGB BLD-MCNC: 8.2 G/DL (ref 13.5–17.5)
IMM GRANULOCYTES # BLD AUTO: 0.03 X10*3/UL (ref 0–0.5)
IMM GRANULOCYTES NFR BLD AUTO: 0.4 % (ref 0–0.9)
LYMPHOCYTES # BLD AUTO: 0.09 X10*3/UL (ref 0.8–3)
LYMPHOCYTES NFR BLD AUTO: 1.2 %
MAGNESIUM SERPL-MCNC: 2.15 MG/DL (ref 1.6–2.4)
MCH RBC QN AUTO: 32.2 PG (ref 26–34)
MCHC RBC AUTO-ENTMCNC: 33.7 G/DL (ref 32–36)
MCV RBC AUTO: 95 FL (ref 80–100)
MONOCYTES # BLD AUTO: 0.52 X10*3/UL (ref 0.05–0.8)
MONOCYTES NFR BLD AUTO: 7.2 %
NEUTROPHILS # BLD AUTO: 6.62 X10*3/UL (ref 1.6–5.5)
NEUTROPHILS NFR BLD AUTO: 91.2 %
NRBC BLD-RTO: 0 /100 WBCS (ref 0–0)
PHOSPHATE SERPL-MCNC: 5.1 MG/DL (ref 2.5–4.9)
PLATELET # BLD AUTO: 135 X10*3/UL (ref 150–450)
POTASSIUM SERPL-SCNC: 4.7 MMOL/L (ref 3.5–5.3)
RBC # BLD AUTO: 2.55 X10*6/UL (ref 4.5–5.9)
SODIUM SERPL-SCNC: 129 MMOL/L (ref 136–145)
TACROLIMUS BLD-MCNC: 8.1 NG/ML
WBC # BLD AUTO: 7.3 X10*3/UL (ref 4.4–11.3)

## 2024-12-23 PROCEDURE — 2500000004 HC RX 250 GENERAL PHARMACY W/ HCPCS (ALT 636 FOR OP/ED)

## 2024-12-23 PROCEDURE — 97165 OT EVAL LOW COMPLEX 30 MIN: CPT | Mod: GO

## 2024-12-23 PROCEDURE — 97530 THERAPEUTIC ACTIVITIES: CPT | Mod: GO

## 2024-12-23 PROCEDURE — RXMED WILLOW AMBULATORY MEDICATION CHARGE

## 2024-12-23 PROCEDURE — 99232 SBSQ HOSP IP/OBS MODERATE 35: CPT | Performed by: STUDENT IN AN ORGANIZED HEALTH CARE EDUCATION/TRAINING PROGRAM

## 2024-12-23 PROCEDURE — 82947 ASSAY GLUCOSE BLOOD QUANT: CPT

## 2024-12-23 PROCEDURE — 2500000004 HC RX 250 GENERAL PHARMACY W/ HCPCS (ALT 636 FOR OP/ED): Performed by: STUDENT IN AN ORGANIZED HEALTH CARE EDUCATION/TRAINING PROGRAM

## 2024-12-23 PROCEDURE — 80197 ASSAY OF TACROLIMUS: CPT

## 2024-12-23 PROCEDURE — 83735 ASSAY OF MAGNESIUM: CPT | Performed by: STUDENT IN AN ORGANIZED HEALTH CARE EDUCATION/TRAINING PROGRAM

## 2024-12-23 PROCEDURE — 2500000001 HC RX 250 WO HCPCS SELF ADMINISTERED DRUGS (ALT 637 FOR MEDICARE OP): Performed by: STUDENT IN AN ORGANIZED HEALTH CARE EDUCATION/TRAINING PROGRAM

## 2024-12-23 PROCEDURE — 99233 SBSQ HOSP IP/OBS HIGH 50: CPT | Performed by: STUDENT IN AN ORGANIZED HEALTH CARE EDUCATION/TRAINING PROGRAM

## 2024-12-23 PROCEDURE — 2500000001 HC RX 250 WO HCPCS SELF ADMINISTERED DRUGS (ALT 637 FOR MEDICARE OP)

## 2024-12-23 PROCEDURE — 94668 MNPJ CHEST WALL SBSQ: CPT

## 2024-12-23 PROCEDURE — 2500000002 HC RX 250 W HCPCS SELF ADMINISTERED DRUGS (ALT 637 FOR MEDICARE OP, ALT 636 FOR OP/ED)

## 2024-12-23 PROCEDURE — 2500000002 HC RX 250 W HCPCS SELF ADMINISTERED DRUGS (ALT 637 FOR MEDICARE OP, ALT 636 FOR OP/ED): Performed by: STUDENT IN AN ORGANIZED HEALTH CARE EDUCATION/TRAINING PROGRAM

## 2024-12-23 PROCEDURE — 1200000002 HC GENERAL ROOM WITH TELEMETRY DAILY

## 2024-12-23 PROCEDURE — 97161 PT EVAL LOW COMPLEX 20 MIN: CPT | Mod: GP

## 2024-12-23 PROCEDURE — 99232 SBSQ HOSP IP/OBS MODERATE 35: CPT | Performed by: INTERNAL MEDICINE

## 2024-12-23 PROCEDURE — 93280 PM DEVICE PROGR EVAL DUAL: CPT | Performed by: INTERNAL MEDICINE

## 2024-12-23 PROCEDURE — 85025 COMPLETE CBC W/AUTO DIFF WBC: CPT | Performed by: STUDENT IN AN ORGANIZED HEALTH CARE EDUCATION/TRAINING PROGRAM

## 2024-12-23 PROCEDURE — 80069 RENAL FUNCTION PANEL: CPT | Performed by: STUDENT IN AN ORGANIZED HEALTH CARE EDUCATION/TRAINING PROGRAM

## 2024-12-23 PROCEDURE — 93280 PM DEVICE PROGR EVAL DUAL: CPT

## 2024-12-23 PROCEDURE — 36415 COLL VENOUS BLD VENIPUNCTURE: CPT | Performed by: STUDENT IN AN ORGANIZED HEALTH CARE EDUCATION/TRAINING PROGRAM

## 2024-12-23 RX ORDER — INSULIN GLARGINE 100 [IU]/ML
5 INJECTION, SOLUTION SUBCUTANEOUS ONCE
Status: COMPLETED | OUTPATIENT
Start: 2024-12-23 | End: 2024-12-23

## 2024-12-23 RX ORDER — HEPARIN SODIUM 5000 [USP'U]/ML
5000 INJECTION, SOLUTION INTRAVENOUS; SUBCUTANEOUS EVERY 8 HOURS
Status: DISCONTINUED | OUTPATIENT
Start: 2024-12-23 | End: 2024-12-26 | Stop reason: HOSPADM

## 2024-12-23 RX ORDER — INSULIN GLARGINE 100 [IU]/ML
10 INJECTION, SOLUTION SUBCUTANEOUS NIGHTLY
Status: DISCONTINUED | OUTPATIENT
Start: 2024-12-24 | End: 2024-12-25

## 2024-12-23 RX ORDER — INSULIN LISPRO 100 [IU]/ML
2 INJECTION, SOLUTION INTRAVENOUS; SUBCUTANEOUS
Status: DISCONTINUED | OUTPATIENT
Start: 2024-12-23 | End: 2024-12-24

## 2024-12-23 RX ADMIN — TACROLIMUS 2 MG: 1 CAPSULE ORAL at 06:42

## 2024-12-23 RX ADMIN — CLOTRIMAZOLE 10 MG: 10 LOZENGE ORAL at 12:01

## 2024-12-23 RX ADMIN — AMLODIPINE BESYLATE 10 MG: 10 TABLET ORAL at 08:55

## 2024-12-23 RX ADMIN — MYCOPHENOLATE MOFETIL 1000 MG: 250 CAPSULE ORAL at 18:13

## 2024-12-23 RX ADMIN — FUROSEMIDE 80 MG: 10 INJECTION, SOLUTION INTRAVENOUS at 02:02

## 2024-12-23 RX ADMIN — INSULIN LISPRO 2 UNITS: 100 INJECTION, SOLUTION INTRAVENOUS; SUBCUTANEOUS at 21:48

## 2024-12-23 RX ADMIN — HEPARIN SODIUM 5000 UNITS: 5000 INJECTION, SOLUTION INTRAVENOUS; SUBCUTANEOUS at 11:17

## 2024-12-23 RX ADMIN — FUROSEMIDE 80 MG: 10 INJECTION, SOLUTION INTRAVENOUS at 18:16

## 2024-12-23 RX ADMIN — POLYETHYLENE GLYCOL 3350 17 G: 17 POWDER, FOR SOLUTION ORAL at 08:55

## 2024-12-23 RX ADMIN — INSULIN LISPRO 10 UNITS: 100 INJECTION, SOLUTION INTRAVENOUS; SUBCUTANEOUS at 11:56

## 2024-12-23 RX ADMIN — ACETAMINOPHEN 650 MG: 325 TABLET ORAL at 02:01

## 2024-12-23 RX ADMIN — MYCOPHENOLATE MOFETIL 1000 MG: 250 CAPSULE ORAL at 06:42

## 2024-12-23 RX ADMIN — FUROSEMIDE 80 MG: 10 INJECTION, SOLUTION INTRAVENOUS at 09:43

## 2024-12-23 RX ADMIN — PANTOPRAZOLE SODIUM 40 MG: 40 TABLET, DELAYED RELEASE ORAL at 16:57

## 2024-12-23 RX ADMIN — INSULIN GLARGINE 5 UNITS: 100 INJECTION, SOLUTION SUBCUTANEOUS at 21:49

## 2024-12-23 RX ADMIN — SODIUM ZIRCONIUM CYCLOSILICATE 10 G: 10 POWDER, FOR SUSPENSION ORAL at 02:02

## 2024-12-23 RX ADMIN — ROSUVASTATIN CALCIUM 10 MG: 10 TABLET, FILM COATED ORAL at 20:49

## 2024-12-23 RX ADMIN — SENNOSIDES AND DOCUSATE SODIUM 1 TABLET: 50; 8.6 TABLET ORAL at 20:49

## 2024-12-23 RX ADMIN — METHOCARBAMOL TABLETS 500 MG: 500 TABLET, COATED ORAL at 06:42

## 2024-12-23 RX ADMIN — GABAPENTIN 200 MG: 100 CAPSULE ORAL at 20:49

## 2024-12-23 RX ADMIN — TACROLIMUS 2 MG: 1 CAPSULE ORAL at 18:13

## 2024-12-23 RX ADMIN — CLOTRIMAZOLE 10 MG: 10 LOZENGE ORAL at 08:55

## 2024-12-23 RX ADMIN — ACETAMINOPHEN 650 MG: 325 TABLET ORAL at 06:52

## 2024-12-23 RX ADMIN — METHYLPREDNISOLONE SODIUM SUCCINATE 125 MG: 125 INJECTION, POWDER, FOR SOLUTION INTRAMUSCULAR; INTRAVENOUS at 08:55

## 2024-12-23 RX ADMIN — PANTOPRAZOLE SODIUM 40 MG: 40 TABLET, DELAYED RELEASE ORAL at 06:42

## 2024-12-23 RX ADMIN — METHOCARBAMOL TABLETS 500 MG: 500 TABLET, COATED ORAL at 14:07

## 2024-12-23 RX ADMIN — INSULIN LISPRO 2 UNITS: 100 INJECTION, SOLUTION INTRAVENOUS; SUBCUTANEOUS at 16:54

## 2024-12-23 RX ADMIN — INSULIN GLARGINE 5 UNITS: 100 INJECTION, SOLUTION SUBCUTANEOUS at 20:48

## 2024-12-23 RX ADMIN — INSULIN LISPRO 10 UNITS: 100 INJECTION, SOLUTION INTRAVENOUS; SUBCUTANEOUS at 08:50

## 2024-12-23 RX ADMIN — HEPARIN SODIUM 5000 UNITS: 5000 INJECTION, SOLUTION INTRAVENOUS; SUBCUTANEOUS at 18:13

## 2024-12-23 RX ADMIN — CLOTRIMAZOLE 10 MG: 10 LOZENGE ORAL at 18:16

## 2024-12-23 RX ADMIN — METHOCARBAMOL TABLETS 500 MG: 500 TABLET, COATED ORAL at 21:50

## 2024-12-23 RX ADMIN — SODIUM ZIRCONIUM CYCLOSILICATE 10 G: 10 POWDER, FOR SUSPENSION ORAL at 11:56

## 2024-12-23 ASSESSMENT — COGNITIVE AND FUNCTIONAL STATUS - GENERAL
STANDING UP FROM CHAIR USING ARMS: A LITTLE
HELP NEEDED FOR BATHING: A LITTLE
DRESSING REGULAR LOWER BODY CLOTHING: A LITTLE
STANDING UP FROM CHAIR USING ARMS: A LITTLE
WALKING IN HOSPITAL ROOM: A LITTLE
TOILETING: A LITTLE
CLIMB 3 TO 5 STEPS WITH RAILING: A LITTLE
WALKING IN HOSPITAL ROOM: A LITTLE
STANDING UP FROM CHAIR USING ARMS: A LITTLE
MOBILITY SCORE: 21
DAILY ACTIVITIY SCORE: 24
DAILY ACTIVITIY SCORE: 24
WALKING IN HOSPITAL ROOM: A LITTLE
CLIMB 3 TO 5 STEPS WITH RAILING: A LITTLE
TURNING FROM BACK TO SIDE WHILE IN FLAT BAD: A LITTLE
PERSONAL GROOMING: A LITTLE
DRESSING REGULAR UPPER BODY CLOTHING: A LITTLE
MOVING TO AND FROM BED TO CHAIR: A LITTLE
MOVING FROM LYING ON BACK TO SITTING ON SIDE OF FLAT BED WITH BEDRAILS: A LITTLE
MOBILITY SCORE: 21
MOBILITY SCORE: 18
DAILY ACTIVITIY SCORE: 19
CLIMB 3 TO 5 STEPS WITH RAILING: A LITTLE

## 2024-12-23 ASSESSMENT — PAIN SCALES - GENERAL
PAINLEVEL_OUTOF10: 4
PAINLEVEL_OUTOF10: 0 - NO PAIN
PAINLEVEL_OUTOF10: 6

## 2024-12-23 ASSESSMENT — PAIN - FUNCTIONAL ASSESSMENT
PAIN_FUNCTIONAL_ASSESSMENT: 0-10
PAIN_FUNCTIONAL_ASSESSMENT: 0-10

## 2024-12-23 ASSESSMENT — ACTIVITIES OF DAILY LIVING (ADL): ADL_ASSISTANCE: INDEPENDENT

## 2024-12-23 NOTE — PROGRESS NOTES
"Temo Hanson is a 74 y.o. male on day 3 of admission presenting with ESRD (end stage renal disease) (Multi).    Subjective   Pain poorly controlled    Objective   Vitals:    12/23/24 1100   BP: 119/67   Pulse: 73   Resp: 18   Temp: 36.6 °C (97.9 °F)   SpO2: 97%       Physical Exam  Constitutional:       Appearance: Normal appearance.   Eyes:      Pupils: Pupils are equal, round, and reactive to light.   Cardiovascular:      Rate and Rhythm: Normal rate.   Pulmonary:      Effort: Pulmonary effort is normal. No respiratory distress.   Abdominal:      General: There is no distension.      Palpations: Abdomen is soft.      Comments: Incision clean, dry, intact   Musculoskeletal:         General: Normal range of motion.      Right lower leg: No edema.      Left lower leg: No edema.   Skin:     General: Skin is warm and dry.   Neurological:      General: No focal deficit present.      Mental Status: He is alert and oriented to person, place, and time.   Psychiatric:         Mood and Affect: Mood normal.         Behavior: Behavior normal.         Last Recorded Vitals  Blood pressure 119/67, pulse 73, temperature 36.6 °C (97.9 °F), temperature source Temporal, resp. rate 18, height 1.854 m (6' 1\"), weight 72.7 kg (160 lb 4.4 oz), SpO2 97%.  Intake/Output last 3 Shifts:  I/O last 3 completed shifts:  In: 1740 (23.9 mL/kg) [P.O.:1740]  Out: 2305 (31.7 mL/kg) [Urine:1725 (0.7 mL/kg/hr); Drains:580]  Weight: 72.7 kg     Relevant Results  Lab Results   Component Value Date    WBC 7.3 12/23/2024    HGB 8.2 (L) 12/23/2024    HCT 24.3 (L) 12/23/2024    MCV 95 12/23/2024     (L) 12/23/2024     Lab Results   Component Value Date    GLUCOSE 353 (H) 12/23/2024    CALCIUM 8.7 12/23/2024     (L) 12/23/2024    K 4.7 12/23/2024    CO2 25 12/23/2024    CL 90 (L) 12/23/2024    BUN 58 (H) 12/23/2024    CREATININE 5.16 (H) 12/23/2024     acetaminophen, 650 mg, oral, q6h KASSY  amLODIPine, 10 mg, oral, Daily  clotrimazole, 10 mg, " oral, TID after meals  furosemide, 80 mg, intravenous, q8h KASSY  gabapentin, 200 mg, oral, Nightly  heparin (porcine), 5,000 Units, subcutaneous, q8h  insulin glargine, 5 Units, subcutaneous, Nightly  insulin lispro, 0-10 Units, subcutaneous, TID AC  methocarbamol, 500 mg, oral, q8h KASSY  [START ON 12/24/2024] methylPREDNISolone sodium succinate (PF), 60 mg, intravenous, Once  mycophenolate, 1,000 mg, oral, q12h KASSY  pantoprazole, 40 mg, oral, BID AC  polyethylene glycol, 17 g, oral, q12h KASSY  [START ON 12/25/2024] predniSONE, 20 mg, oral, Daily  rosuvastatin, 10 mg, oral, Nightly  sennosides-docusate sodium, 1 tablet, oral, Nightly  sodium zirconium cyclosilicate, 10 g, oral, Daily with lunch  tacrolimus, 2 mg, oral, q12h KASSY  valGANciclovir, 450 mg, oral, q48h          Assessment/Plan   Assessment & Plan  ESRD (end stage renal disease) (Multi)    DDKT 12/21  KDPI 93    Kidney allograft function   primary   BLAKE and kidney intra-peritoneal   Optimize multi-modal pain control    Pacemaker in place:   Cardiology consult to interrogate post-op,labile HR overnight   ECHO to get a baseline    Immunosuppression   Thymo, goal 3 mg/kg   Tac goal 7-9  MMF 1000/1000   Pred taper    DM    appreciate endocrine    HTN   Medical management       I spent 35 minutes in the professional and overall care of this patient, including immunosuppression      Dora Rodriguez MD

## 2024-12-23 NOTE — PROGRESS NOTES
Pharmacist Post-Transplant Note    The Clinical Transplant Pharmacist is aware that Temo Hanson has been admitted and has undergone kidney transplantation. A transplant pharmacist will be rounding with the multidisciplinary transplant team and making recommendations on his medication regimen.     The patient's medications have been reviewed in conjunction with the multidisciplinary transplant team. The pharmacist is in agreement with the plan of care for medications at this time.    Patient and donor factors were screened to determine the appropriate post-transplant protocol and current immunosuppression plan includes:    Induction Regimen:  Antithymocyte globulin    Maintenance Regimen:  Tacrolimus, Mycophenolate mofetil, and Methylprednisolone/Prednisone Taper    Maintenance immunosuppression and anti-infective prophylaxis will begin according to protocol. Home medications will begin when the patient is clinically stable. Of note, CMV D-/R+ so patient will require 3 months of CMV prophylaxis with valganciclovir per protocol.    Marin Painting, PharmD, BCPS, BCTXP   Solid Organ Transplant Clinical Pharmacy Specialist

## 2024-12-23 NOTE — PROGRESS NOTES
Temo Hanson is a 74 y.o. male on day 3 of admission presenting with ESRD (end stage renal disease) (Multi).      Transitional Care Coordination Progress Note:  Patient discussed during interdisciplinary rounds.     Plan per Medical/Surgical team:    Home Care choice for home going needs, [facility].  Pt Needs: TBD.     Payer: [employer]    Status: INP    Discharge disposition: TBD    Potential Barriers: None    ADOD: 12/27    This TCC will continue to follow for home going needs and safe DC plan.        MIKE MORENO

## 2024-12-23 NOTE — PROGRESS NOTES
"Temo Hanson is a 74 y.o. male on day 3 of admission presenting with ESRD (end stage renal disease) (Multi).    Subjective   Patient was seen and examined by the bedside this morning. His BG numbers were elevated globally overnight. No midnight snacking reported. No concerns otherwise.     I have reviewed histories, allergies and medications have been reviewed and there are no changes       Objective   Review of Systems  Negative unless noted above    Physical Exam  General: elderly AA male patient in NAD  HEENT: AT/NC  Neck: trachea in midline, no thyromegaly or nodules  Resp: CTA B/L  CVS: normal s1 and s2  Abdomen: soft and non tender to palpation, BS+  Skin: warm, dry and intact, no visible skin tags  Neuro: AAO x3, DTR 2+  Psych: cooperative      Last Recorded Vitals  Blood pressure 158/70, pulse 92, temperature 36.7 °C (98.1 °F), temperature source Temporal, resp. rate 18, height 1.854 m (6' 1\"), weight 72.7 kg (160 lb 4.4 oz), SpO2 98%.  Intake/Output last 3 Shifts:  I/O last 3 completed shifts:  In: 1740 (23.9 mL/kg) [P.O.:1740]  Out: 2305 (31.7 mL/kg) [Urine:1725 (0.7 mL/kg/hr); Drains:580]  Weight: 72.7 kg     Relevant Results  Results from last 7 days   Lab Units 12/23/24  1114 12/23/24  0833 12/23/24  0549 12/22/24  2128 12/22/24  1614 12/22/24  1208 12/22/24  0923 12/22/24  0610 12/21/24  0532 12/21/24  0417 12/20/24  1913   POCT GLUCOSE mg/dL 293* 357*  --  304* 294* 233*   < >  --    < >  --   --    GLUCOSE mg/dL  --   --  353*  --   --   --   --  205*  --  198* 187*    < > = values in this interval not displayed.     No current facility-administered medications on file prior to encounter.     Current Outpatient Medications on File Prior to Encounter   Medication Sig Dispense Refill    acetaminophen (Tylenol) 325 mg tablet Take 2 tablets (650 mg) by mouth every 6 hours if needed.      amLODIPine (Norvasc) 10 mg tablet Take 1 tablet (10 mg) by mouth once daily. 30 tablet 1    BD Ultra-Fine Joan Pen " "Needle 32 gauge x 5/32\" needle       carboxymethylcellulose (Refresh Plus) 0.5 % ophthalmic solution Instill 1 drop into both eyes 2-4x/day as needed for dryness.      doxazosin (Cardura) 8 mg tablet Take 1 tablet (8 mg) by mouth once daily at bedtime. (Patient not taking: Reported on 12/21/2024)      FreeStyle Mick 2 Sensor kit       gabapentin (Neurontin) 300 mg capsule Take 1 capsule (300 mg) by mouth once daily. 30 capsule 0    hydrALAZINE (Apresoline) 100 mg tablet Take 1 tablet (100 mg) by mouth 3 times a day. 90 tablet 0    insulin glargine,hum.rec.anlog (BASAGLAR KWIKPEN U-100 INSULIN SUBQ) Inject 10 Units under the skin once daily as needed. Take as directed per insulin instructions.      isosorbide dinitrate (Isordil) 10 mg tablet Take 1 tablet (10 mg) by mouth 3 times a day. 90 tablet 0    magnesium oxide (Mag-Ox) 400 mg tablet 1 tablet (400 mg) once daily.      omeprazole (PriLOSEC) 20 mg DR capsule Take 1 capsule (20 mg) by mouth once daily.      rosuvastatin (Crestor) 20 mg tablet Take 1 tablet (20 mg) by mouth once daily at bedtime. 30 tablet 1    valsartan (Diovan) 160 mg tablet Take 1 tablet (160 mg) by mouth once daily. 30 tablet 1    [DISCONTINUED] B complex-vitamin C-folic acid (Nephro-Vinod Rx) 1- mg-mg-mcg tablet Take 1 tablet by mouth once daily with breakfast.       Labs:    Lab Results   Component Value Date    CREATININE 5.16 (H) 12/23/2024    BUN 58 (H) 12/23/2024     (L) 12/23/2024    K 4.7 12/23/2024    CL 90 (L) 12/23/2024    CO2 25 12/23/2024              Assessment/Plan   Assessment & Plan  ESRD (end stage renal disease) (Multi)    Type 2 diabetes mellitus, with long-term current use of insulin    The patient is a 74 year old male who is admitted to the hospital for kidney transplant for his ESRD. The kidney transplant was done on 12/21/2024 and the patient appears to be doing well. Endocrinology has been following the patient for management of hyperglycemia in the setting " of glucocorticoid use and T2DM. His T2DM was well controlled previously.    Steroid dose:   Solumedrol 125 mg on 12/23/2024  Solumedrol 60 mg on 12/24/2024  Prednisone 20 mg PO from 12/25/2024    - G: increase to 10 units every 24 hours  - L: Start the patient on meal time insulin with 2 units with each meal  - SS: 2:50 >150 with meals  - Accuchecks ACHS  - Hypoglycemia protocol in place  - BG goal 140-180  - CCD diet - 60 g  - Patient would need to be established at the transplant clinic upon discharge for insulin titration and management.    The patient's plan was discussed with Dr. Martinez.    Miles Glass MD  PGY-4 Endocrinology Fellow

## 2024-12-23 NOTE — PROGRESS NOTES
Physical Therapy    Physical Therapy Evaluation    Patient Name: Temo Hanson  MRN: [id number]  Today's Date: 12/23/2024   Room: [address] [place], [place] [address]A  Time Calculation  Start Time: 1117  Stop Time: 1132  Time Calculation (min): 15 min    Assessment/Plan   PT Assessment  PT Assessment Results: Decreased endurance, Impaired balance, Decreased mobility, Pain  Rehab Prognosis: Good  Barriers to Discharge Home: No anticipated barriers  Evaluation/Treatment Tolerance: Patient tolerated treatment well  Medical Staff Made Aware: Yes  Strengths: Attitude of self  Barriers to Participation: Comorbidities  End of Session Communication: Bedside nurse  Assessment Comment: 74 year old male s/p DDKT on 12/21/24. Pt presents with decreased endurance and balance impacting functional mobility. Pt would benefit from continued PT while in hospital to improve functional mobility and return to PLOF.  End of Session Patient Position: Bed, 3 rail up, Alarm off, not on at start of session  IP OR SWING BED PT PLAN  Inpatient or Swing Bed: Inpatient  PT Plan  Treatment/Interventions: Bed mobility, Gait training, Transfer training, Stair training, Balance training, Neuromuscular re-education, Strengthening, Endurance training, Therapeutic exercise, Therapeutic activity, Home exercise program, Positioning, Postural re-education  PT Plan: Ongoing PT  PT Frequency: 3 times per week  PT Discharge Recommendations: Low intensity level of continued care  Equipment Recommended upon Discharge: Wheeled walker  PT Recommended Transfer Status: Assist x1, Assistive device  PT - OK to Discharge: Yes (PT eval complete and DC rec made)    Subjective   General Visit Information:  Reason for Referral: 74 year old male s/p DDKT on 12/21/24.  Past Medical History Relevant to Rehab: Hypertension, hyperlipidemia, complete heart block status post leadless pacemaker insertion on 7/17/2024, pericardial effusion, pheochromocytoma of the right adrenal gland, history of prostate  cancer, GI bleed  Prior to Session Communication: Bedside nurse  Patient Position Received: Up in chair, Alarm off, not on at start of session  Family/Caregiver Present: Yes  Caregiver Feedback: Pt's SO present during session  General Comment: Pt sitting in chair upon entry to room. Noted ethan chanel JP x1.   Home Living:  Home Living  Type of Home: House  Lives With: Spouse  Home Adaptive Equipment: Cane  Home Layout: Two level, Bed/bath upstairs (bathroom also available in basement)  Alternate Level Stairs-Number of Steps: 10  Home Access: Stairs to enter with rails  Entrance Stairs-Number of Steps: 3  Bathroom Equipment: Built-in shower seat, Grab bars in shower  Prior Level of Function:  Prior Function Per Pt/Caregiver Report  Level of Stacy: Independent with ADLs and functional transfers, Independent with homemaking with ambulation  Receives Help From: Family  ADL Assistance: Independent  Homemaking Assistance: Independent  Ambulatory Assistance:  (Elvira with cane)  Precautions:  Precautions  Medical Precautions: Fall precautions  Post-Surgical Precautions: Abdominal surgery precautions    Objective   Lines/Tubes/Drains:  Hemodialysis Arteriovenous Graft Left Upper arm (Active)   Number of days:        Urethral Catheter Non-latex 16 Fr. (Active)   Number of days: 2       Closed/Suction Drain 1 Lateral;Right Other (Comment) Bulb 10 Fr. (Active)   Number of days: 2     Pain:  Pain Assessment  Pain Assessment: 0-10  0-10 (Numeric) Pain Score: 4  Pain Location: Abdomen  Cognition:  Cognition  Overall Cognitive Status: Within Functional Limits  Orientation Level: Oriented X4    Extremity/Trunk Assessments:  Strength:    RLE   RLE : Within Functional Limits  LLE   LLE : Within Functional Limits    General Assessments:  Strength  Strength Comments: BUE 4+/5      Activity Tolerance  Endurance: Decreased tolerance for upright activites  Sensation  Light Touch: No apparent deficits     Static Sitting  Balance  Static Sitting-Level of Assistance: Distant supervision  Dynamic Sitting Balance  Dynamic Sitting-Level of Assistance: Distant supervision  Static Standing Balance  Static Standing-Level of Assistance: Contact guard  Dynamic Standing Balance  Dynamic Standing-Level of Assistance: Contact guard    Functional Assessments:  Bed Mobility  Bed Mobility: Yes  Bed Mobility 1  Bed Mobility 1: Sitting to supine  Level of Assistance 1: Minimum assistance, Minimal verbal cues, Minimal tactile cues  Bed Mobility Comments 1: to advance BLE into bed  Transfers  Transfer: Yes  Transfer 1  Technique 1: Sit to stand, Stand to sit  Transfer Device 1: Walker  Transfer Level of Assistance 1: Contact guard, Minimal verbal cues, Minimal tactile cues  Ambulation/Gait Training  Ambulation/Gait Training Performed: Yes  Ambulation/Gait Training 1  Surface 1: Level tile  Device 1: Rolling walker  Assistance 1: Contact guard  Quality of Gait 1:  (decreased krystle, flexed knees and hips, verbal cuing to maintain upright trunk positioning)  Comments/Distance (ft) 1: 75ft    Outcome Measures:  [facility] Basic Mobility  Turning from your back to your side while in a flat bed without using bedrails: A little  Moving from lying on your back to sitting on the side of a flat bed without using bedrails: A little  Moving to and from bed to chair (including a wheelchair): A little  Standing up from a chair using your arms (e.g. wheelchair or bedside chair): A little  To walk in hospital room: A little  Climbing 3-5 steps with railing: A little  Basic Mobility - Total Score: 18    Encounter Problems       Encounter Problems (Active)       Mobility       STG - Patient will ascend and descend a flight of stairs with LRAD and supervision.        Start:  12/23/24    Expected End:  01/06/25            Pt will perform bed mobility with supervision assist.         Start:  12/23/24    Expected End:  01/06/25            Pt will ambulate >250ft with LRAD and  supervision Assist with no LOB and VSS.         Start:  12/23/24    Expected End:  01/06/25               PT Transfers       Pt will perform all functional transfers with LRAD and supervision assist.         Start:  12/23/24    Expected End:  01/06/25               Pain - Adult            Education Documentation  Precautions, taught by Chana Marrero, PT at 12/23/2024 12:43 PM.  Learner: Patient  Readiness: Acceptance  Method: Explanation  Response: Verbalizes Understanding, Needs Reinforcement  Comment: abdominal precautions    Mobility Training, taught by Chana Marrero PT at 12/23/2024 12:43 PM.  Learner: Patient  Readiness: Acceptance  Method: Explanation  Response: Verbalizes Understanding, Needs Reinforcement  Comment: abdominal precautions    Precautions, taught by Chana Marrero PT at 12/23/2024 12:43 PM.  Learner: Patient  Readiness: Acceptance  Method: Explanation  Response: Verbalizes Understanding, Needs Reinforcement  Comment: abdominal precautions    Body Mechanics, taught by Chana Marrero, PT at 12/23/2024 12:43 PM.  Learner: Patient  Readiness: Acceptance  Method: Explanation  Response: Verbalizes Understanding, Needs Reinforcement  Comment: abdominal precautions    ADL Training, taught by Chana Marrero PT at 12/23/2024 12:43 PM.  Learner: Patient  Readiness: Acceptance  Method: Explanation  Response: Verbalizes Understanding, Needs Reinforcement  Comment: abdominal precautions    Education Comments  No comments found.      12/23/24 at 12:45 PM   Chana Marrreo PT   Rehab Office: [phone]

## 2024-12-23 NOTE — CONSULTS
Nutrition Diet Education        Education Documentation  Nutrition Care Manual, taught by Shannon Carvalho RDN, LD at 12/23/2024 12:51 PM.  Learner: Patient  Readiness: Acceptance  Method: Explanation  Response: Verbalizes Understanding  Comment: Discussed food safety following transplant. Advised the patient on proper meat preparation, re-heat deli meats and hot dogs, no raw fish, no unpasteurized dairy products, wash all fruits and vegetables, no grapefruit/pomegranate/starfruit/raw sprouts, handwashing, cross-contamination, etc. Pt denies any potential struggles following this diet. Advised pt on the importance of cooking all meats, fish and eggs to the proper temperatures. Provided patient with the food safety handout packet.          Follow Up:     Time Spent (min): 30 minutes  Last Date of Nutrition Visit: 12/23/24  Nutrition Follow-Up Needed?: Dietitian to reassess per policy  Follow up Comment: education provided

## 2024-12-23 NOTE — CARE PLAN
The patient's goals for the shift include get kidney    The clinical goals for the shift include pt will remain hds      Problem: Pain - Adult  Goal: Verbalizes/displays adequate comfort level or baseline comfort level  Outcome: Progressing     Problem: Safety - Adult  Goal: Free from fall injury  Outcome: Progressing     Problem: Discharge Planning  Goal: Discharge to home or other facility with appropriate resources  Outcome: Progressing     Problem: Chronic Conditions and Co-morbidities  Goal: Patient's chronic conditions and co-morbidity symptoms are monitored and maintained or improved  Outcome: Progressing     Problem: Diabetes  Goal: Achieve decreasing blood glucose levels by end of shift  Outcome: Progressing  Goal: Increase stability of blood glucose readings by end of shift  Outcome: Progressing  Goal: Decrease in ketones present in urine by end of shift  Outcome: Progressing  Goal: Maintain electrolyte levels within acceptable range throughout shift  Outcome: Progressing  Goal: Maintain glucose levels >70mg/dl to <250mg/dl throughout shift  Outcome: Progressing  Goal: No changes in neurological exam by end of shift  Outcome: Progressing  Goal: Learn about and adhere to nutrition recommendations by end of shift  Outcome: Progressing  Goal: Vital signs within normal range for age by end of shift  Outcome: Progressing  Goal: Increase self care and/or family involovement by end of shift  Outcome: Progressing  Goal: Receive DSME education by end of shift  Outcome: Progressing

## 2024-12-23 NOTE — PROGRESS NOTES
Transplant event completed.  Verified serologies and cross clamp time with [facility].  Verified recipient is listed in the transplant phase in Epic.

## 2024-12-23 NOTE — PROGRESS NOTES
Occupational Therapy    Evaluation/Treatment    Patient Name: Temo Hanson  MRN: [id number]  Department: [facility]  Room: [facility]9087A  Today's Date: 12/23/24  Time Calculation  Start Time: 0930  Stop Time: 0953  Time Calculation (min): 23 min       Assessment:  OT Assessment: difficulty I/ADLs, safety, I/ADLs  Prognosis: Good  Barriers to Discharge Home: No anticipated barriers  Evaluation/Treatment Tolerance: Patient limited by fatigue, Patient limited by pain  Medical Staff Made Aware: Yes  End of Session Communication: Bedside nurse  End of Session Patient Position: Up in chair, Alarm off, not on at start of session  OT Assessment Results: Decreased ADL status, Decreased endurance, Decreased functional mobility, Decreased gross motor control, Decreased IADLs, Decreased upper extremity strength, Decreased safe judgment during ADL  Prognosis: Good  Barriers to Discharge: None  Evaluation/Treatment Tolerance: Patient limited by fatigue, Patient limited by pain  Medical Staff Made Aware: Yes  Strengths: Attitude of self, Support of Caregivers  Barriers to Participation: Comorbidities  Plan:  Treatment Interventions: ADL retraining, Functional transfer training, UE strengthening/ROM, Endurance training, Patient/family training, Equipment evaluation/education, Compensatory technique education  OT Frequency: 2 times per week  OT Discharge Recommendations: Low intensity level of continued care  Equipment Recommended upon Discharge: Wheeled walker (long handled sponge, reacher)  OT Recommended Transfer Status: Assist of 1  OT - OK to Discharge: Yes  Treatment Interventions: ADL retraining, Functional transfer training, UE strengthening/ROM, Endurance training, Patient/family training, Equipment evaluation/education, Compensatory technique education    Subjective   Current Problem:  1. ESRD (end stage renal disease) (Multi)  Transthoracic Echo (TTE) Limited    Transthoracic Echo (TTE) Limited      2. 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 Abnormal electrocardiogram (ECG) (EKG)  Transthoracic Echo (TTE) Limited    Transthoracic Echo (TTE) Limited        General:   OT Received On: 12/23/24  General  Reason for Referral: DDKT 12/21  Past Medical History Relevant to Rehab: Chronic kidney disease      DM (diabetes mellitus) (Multi)     Fistula     HTN (hypertension)     Other specified abnormal immunological findings in serum 10/11/2021    Hepatitis B core antibody positive   Personal history of malignant neoplasm of prostate      History of malignant neoplasm of prostate  Family/Caregiver Present: No  Prior to Session Communication: Bedside nurse  Patient Position Received: Bed, 3 rail up, Alarm off, not on at start of session  General Comment: extended time for mob   Precautions:  Medical Precautions: Fall precautions  Post-Surgical Precautions: Abdominal surgery precautions      Vital Signs Comment: seated /60  95%, chair /59 , 95%     Pain:  Pain Assessment  Pain Assessment: 0-10  0-10 (Numeric) Pain Score: 6  Pain Type: Surgical pain  Pain Location: Abdomen    Objective   Cognition:  Overall Cognitive Status: Within Functional Limits  Orientation Level: Oriented X4  Insight: Mild           Home Living:  Type of Home: House  Lives With: Spouse  Home Adaptive Equipment: Cane  Home Layout: Bed/bath upstairs  Alternate Level Stairs-Number of Steps: 10  Home Access:  (3 real)  Bathroom Shower/Tub: Tub/shower unit  Bathroom Toilet: Standard  Bathroom Equipment: Grab bars in shower, Built-in shower seat  Prior Function:  Level of  Wise:  (A PRN LBD and bathing family I other ADLs, A IADLs)  Receives Help From: Family  Ambulatory Assistance:  (cane)  Vocational: Retired  Leisure: Gardening  Hand Dominance: Right  IADL History:  IADL Comments: A IADLs, +drive, - pets  ADL:  Eating Assistance: Independent (anticipated)  Grooming Assistance:  (min A anticiapted standing WhW)  Bathing Deficit:  (min A anticipated AE)  UE Dressing Assistance: Stand by  LE Dressing Assistance: Minimal (anticipated)  Toileting Assistance with Device: Minimal (anticipated WhW)  Functional Deficit: Setup, Steadying, Verbal cueing, Supervision/safety, Increased time to complete    Activity Tolerance:  Endurance:  (fair)       Bed Mobility/Transfers: Bed Mobility  Bed Mobility:  (sup to sit min A, log roll extended time required)    Transfers  Transfer:  (sit to stand min A WhW stand to sit min/mod A WhW)      Functional Mobility:  Functional Mobility  Functional Mobility Performed:  (pt performed fxnl mob bed to chair min A WhW, mod VC's stand to sit transfer safety)  Sitting Balance:  Dynamic Sitting Balance  Dynamic Sitting-Level of Assistance:  (seated EOB ~10 min CGA)  Standing Balance:  Dynamic Standing Balance  Dynamic Standing-Level of Assistance: Minimum assistance (WhW)        Vision:Vision - Basic Assessment  Current Vision: No visual deficits  Sensation:  Light Touch: No apparent deficits  Strength:  Strength Comments: BUE 4+/5       Coordination:  Movements are Fluid and Coordinated: Yes   Hand Function:  Hand Function  Gross Grasp: Functional  Extremities: RUE   RUE : Within Functional Limits and LUE   LUE: Within Functional Limits      Outcome Measures: [facility] Daily Activity  Putting on and taking off regular lower body clothing: A little  Bathing (including washing, rinsing, drying): A little  Putting on and taking off regular upper body clothing: A little  Toileting, which includes using toilet, bedpan or urinal: A little  Taking care of personal  grooming such as brushing teeth: A little  Eating Meals: None  Daily Activity - Total Score: 19         and OT Adult Other Outcome Measures  4AT: -    Education Documentation  Precautions, taught by Ly Dodson OT at 12/23/2024 11:34 AM.  Learner: Patient  Readiness: Acceptance  Method: Explanation, Demonstration  Response: Verbalizes Understanding, Needs Reinforcement  Comment: abd prec, ADLs    Body Mechanics, taught by Ly Dodson OT at 12/23/2024 11:34 AM.  Learner: Patient  Readiness: Acceptance  Method: Explanation, Demonstration  Response: Verbalizes Understanding, Needs Reinforcement  Comment: abd prec, ADLs    ADL Training, taught by Ly Dodson OT at 12/23/2024 11:34 AM.  Learner: Patient  Readiness: Acceptance  Method: Explanation, Demonstration  Response: Verbalizes Understanding, Needs Reinforcement  Comment: abd prec, ADLs    Education Comments  No comments found.        Goals:  Encounter Problems       Encounter Problems (Active)       ADLs       Patient will perform UB and LB bathing  with modified independent level of assistance and ae. (Progressing)       Start:  12/23/24    Expected End:  01/13/25            Patient with complete upper body dressing with independent level of assistance  (Progressing)       Start:  12/23/24    Expected End:  01/13/25            Patient with complete lower body dressing with modified independent level of assistance donning and doffing all LE clothes  with PRN adaptive equipment  (Progressing)       Start:  12/23/24    Expected End:  01/13/25            Patient will complete daily grooming tasks  with independent level of assistance  (Progressing)       Start:  12/23/24    Expected End:  01/13/25            Patient will complete toileting including hygiene clothing management/hygiene with modified independent level of assistance and lrd. (Progressing)       Start:  12/23/24    Expected End:  01/13/25               EXERCISE/STRENGTHENING        Patient with increase BUE to wfl strength. (Progressing)       Start:  12/23/24    Expected End:  01/13/25               MOBILITY       Patient will perform Functional mobility mod  Household distances/Community Distances with modified independent level of assistance and least restrictive device in order to improve safety and functional mobility. (Progressing)       Start:  12/23/24    Expected End:  01/13/25               TRANSFERS       Patient will perform bed mobility independent level of assistance  (Progressing)       Start:  12/23/24    Expected End:  01/13/25            Patient will complete functional transfer  least restrictive device with modified independent level of assistance. (Progressing)       Start:  12/23/24    Expected End:  01/13/25

## 2024-12-23 NOTE — PROGRESS NOTES
Transplant Nephrology progress note     Date of admission: 12/20/2024     Temo Hanson is a 74 y.o.  with [facility]   Past Medical History:   Diagnosis Date    Chronic kidney disease     DM (diabetes mellitus) (Multi)     Fistula     HTN (hypertension)     Other specified abnormal immunological findings in serum 10/11/2021    Hepatitis B core antibody positive    Personal history of malignant neoplasm of prostate     History of malignant neoplasm of prostate        SUBJECTIVE:          PROBLEM LIST:  Assessment & Plan  ESRD (end stage renal disease) (Multi)    Type 2 diabetes mellitus, with long-term current use of insulin           ALLERGIES:  Allergies   Allergen Reactions    Terazosin Unknown     Did not feel well on this medication    Codeine Itching     itching    Tramadol Itching            CURRENT MEDICATIONS:  Scheduled medications  amLODIPine, 10 mg, oral, Daily  clotrimazole, 10 mg, oral, TID after meals  furosemide, 80 mg, intravenous, q8h KASSY  gabapentin, 200 mg, oral, Nightly  heparin (porcine), 5,000 Units, subcutaneous, q8h  insulin glargine, 5 Units, subcutaneous, Nightly  insulin lispro, 0-10 Units, subcutaneous, TID AC  methocarbamol, 500 mg, oral, q8h KASSY  [START ON 12/24/2024] methylPREDNISolone sodium succinate (PF), 60 mg, intravenous, Once  mycophenolate, 1,000 mg, oral, q12h KASSY  pantoprazole, 40 mg, oral, BID AC  polyethylene glycol, 17 g, oral, q12h KASSY  [START ON 12/25/2024] predniSONE, 20 mg, oral, Daily  rosuvastatin, 10 mg, oral, Nightly  sennosides-docusate sodium, 1 tablet, oral, Nightly  sodium zirconium cyclosilicate, 10 g, oral, Daily with lunch  tacrolimus, 2 mg, oral, q12h KASSY  valGANciclovir, 450 mg, oral, q48h      Continuous medications     PRN medications  PRN medications: acetaminophen, dextrose, dextrose, diphenhydrAMINE, glucagon, glucagon, HYDROmorphone, naloxone, ondansetron, oxyCODONE, oxyCODONE, oxygen       OBJECTIVE:    VITALS: Visit Vitals  /70 (BP Location: Right  "arm)   Pulse 92   Temp 36.7 °C (98.1 °F) (Temporal)   Resp 18   Ht 1.854 m (6' 1\")   Wt 72.7 kg (160 lb 4.4 oz)   SpO2 98%   BMI 21.15 kg/m²   Smoking Status Never   BSA 1.93 m²        General: No distress   Mucosa moist   AI, AC, AF     HEENT: PEERLA  CVS: S1 S2 no murmurs  RESP:  Lungs clear to auscultation   ABDO: Soft, non-tender   Neuro: A + O x 3  Skin: No rash   Extremities: No edema       LABS:  Results from last 72 hours   Lab Units 12/23/24  0549   WBC AUTO x10*3/uL 7.3   HEMOGLOBIN g/dL 8.2*   MCV fL 95   PLATELETS AUTO x10*3/uL 135*   BUN mg/dL 58*   CREATININE mg/dL 5.16*   CALCIUM mg/dL 8.7   TACROLIMUS ng/mL 8.1            Intake/Output Summary (Last 24 hours) at 12/23/2024 1727  Last data filed at 12/23/2024 0417  Gross per 24 hour   Intake --   Output 725 ml   Net -725 ml          ASSESSMENT AND PLAN:    Temo Hanson is a 74 y.o.  with a past medical history of ESRD on hemodialysis Monday Wednesday Friday last dialysis on 12/20/2024 was currently admitted status post DDKT on 12/21/2024.  -Other history: Hypertension, hyperlipidemia, complete heart block status post leadless pacemaker insertion on 7/17/2024, pericardial effusion, pheochromocytoma of the right adrenal gland, history of prostate cancer, GI bleed     Kidney info: KDPI is 93%, PRA 54%, cold ischemia time 34 hours,   EBV R+, CMV R+     Allograft function:  -Seems to be stable with decent urine output, SGF Ultrasound unremarkable.  -Will monitor kidney function no indication for RRT.  - Lasix 80 mg IV q8h  - Lokelma 10 g/day     Immunosuppression:  -Thymo induction 3 mg/kg - completed  TAC 2 mg BID  MMG 1000 mg BID  Steroid taper     Prophylaxis   - Valcyte x 3 mo  - Clotrimazole 10 mg TID x 3 mo  PJP prophyalxis - ?      Heme - acceptable     Bone mineral disease:  Acceptable for degree of kidney function    Blood pressure   - amlodipine 10 mg/day    Type 2 DM - basal bolus insulin  Crestor 10 mg/day    Boonphiphop Boonpheng, MD       "

## 2024-12-23 NOTE — CARE PLAN
Problem: Pain - Adult  Goal: Verbalizes/displays adequate comfort level or baseline comfort level  Outcome: Progressing     Problem: Safety - Adult  Goal: Free from fall injury  Outcome: Progressing     Problem: Discharge Planning  Goal: Discharge to home or other facility with appropriate resources  Outcome: Progressing     Problem: Chronic Conditions and Co-morbidities  Goal: Patient's chronic conditions and co-morbidity symptoms are monitored and maintained or improved  Outcome: Progressing     Problem: Diabetes  Goal: Achieve decreasing blood glucose levels by end of shift  Outcome: Progressing  Goal: Increase stability of blood glucose readings by end of shift  Outcome: Progressing  Goal: Decrease in ketones present in urine by end of shift  Outcome: Progressing  Goal: Maintain electrolyte levels within acceptable range throughout shift  Outcome: Progressing  Goal: Maintain glucose levels >70mg/dl to <250mg/dl throughout shift  Outcome: Progressing  Goal: No changes in neurological exam by end of shift  Outcome: Progressing  Goal: Learn about and adhere to nutrition recommendations by end of shift  Outcome: Progressing  Goal: Vital signs within normal range for age by end of shift  Outcome: Progressing  Goal: Increase self care and/or family involovement by end of shift  Outcome: Progressing  Goal: Receive DSME education by end of shift  Outcome: Progressing   The patient's goals for the shift include get kidney    The clinical goals for the shift include pt will remain hds

## 2024-12-24 ENCOUNTER — DOCUMENTATION (OUTPATIENT)
Dept: TRANSPLANT | Facility: HOSPITAL | Age: 74
End: 2024-12-24
Payer: COMMERCIAL

## 2024-12-24 ENCOUNTER — APPOINTMENT (OUTPATIENT)
Dept: CARDIOLOGY | Facility: HOSPITAL | Age: 74
End: 2024-12-24
Payer: COMMERCIAL

## 2024-12-24 ENCOUNTER — LAB REQUISITION (OUTPATIENT)
Dept: LAB | Facility: CLINIC | Age: 74
End: 2024-12-24
Payer: COMMERCIAL

## 2024-12-24 DIAGNOSIS — N18.6 END STAGE RENAL DISEASE (MULTI): ICD-10-CM

## 2024-12-24 LAB
ALBUMIN SERPL BCP-MCNC: 3.1 G/DL (ref 3.4–5)
ANION GAP SERPL CALC-SCNC: 16 MMOL/L (ref 10–20)
BASOPHILS # BLD AUTO: 0 X10*3/UL (ref 0–0.1)
BASOPHILS NFR BLD AUTO: 0 %
BUN SERPL-MCNC: 68 MG/DL (ref 6–23)
CALCIUM SERPL-MCNC: 8.6 MG/DL (ref 8.6–10.6)
CHLORIDE SERPL-SCNC: 90 MMOL/L (ref 98–107)
CO2 SERPL-SCNC: 28 MMOL/L (ref 21–32)
CREAT SERPL-MCNC: 5.23 MG/DL (ref 0.5–1.3)
DIAGNOSIS-VIRTUAL CROSSMATCH: NORMAL
DIAGNOSIS-VIRTUAL CROSSMATCH: NORMAL
EGFRCR SERPLBLD CKD-EPI 2021: 11 ML/MIN/1.73M*2
EJECTION FRACTION APICAL 4 CHAMBER: 43.7
EJECTION FRACTION: 63 %
EOSINOPHIL # BLD AUTO: 0 X10*3/UL (ref 0–0.4)
EOSINOPHIL NFR BLD AUTO: 0 %
ERYTHROCYTE [DISTWIDTH] IN BLOOD BY AUTOMATED COUNT: 14.4 % (ref 11.5–14.5)
GLUCOSE BLD MANUAL STRIP-MCNC: 190 MG/DL (ref 74–99)
GLUCOSE BLD MANUAL STRIP-MCNC: 230 MG/DL (ref 74–99)
GLUCOSE BLD MANUAL STRIP-MCNC: 265 MG/DL (ref 74–99)
GLUCOSE BLD MANUAL STRIP-MCNC: 303 MG/DL (ref 74–99)
GLUCOSE SERPL-MCNC: 223 MG/DL (ref 74–99)
HCT VFR BLD AUTO: 21.8 % (ref 41–52)
HGB BLD-MCNC: 7.4 G/DL (ref 13.5–17.5)
IMM GRANULOCYTES # BLD AUTO: 0.01 X10*3/UL (ref 0–0.5)
IMM GRANULOCYTES NFR BLD AUTO: 0.2 % (ref 0–0.9)
LYMPHOCYTES # BLD AUTO: 0.15 X10*3/UL (ref 0.8–3)
LYMPHOCYTES NFR BLD AUTO: 2.8 %
MAGNESIUM SERPL-MCNC: 2.04 MG/DL (ref 1.6–2.4)
MCH RBC QN AUTO: 31.9 PG (ref 26–34)
MCHC RBC AUTO-ENTMCNC: 33.9 G/DL (ref 32–36)
MCV RBC AUTO: 94 FL (ref 80–100)
MONOCYTES # BLD AUTO: 0.57 X10*3/UL (ref 0.05–0.8)
MONOCYTES NFR BLD AUTO: 10.5 %
NEUTROPHILS # BLD AUTO: 4.72 X10*3/UL (ref 1.6–5.5)
NEUTROPHILS NFR BLD AUTO: 86.5 %
NRBC BLD-RTO: 0 /100 WBCS (ref 0–0)
PATH REVIEW-VIRTUAL CROSSMATCH: NORMAL
PATH REVIEW-VIRTUAL CROSSMATCH: NORMAL
PATHOLOGIST ID-VIRTUAL CROSSMATCH: NORMAL
PATHOLOGIST ID-VIRTUAL CROSSMATCH: NORMAL
PHOSPHATE SERPL-MCNC: 4.5 MG/DL (ref 2.5–4.9)
PLATELET # BLD AUTO: 135 X10*3/UL (ref 150–450)
POTASSIUM SERPL-SCNC: 3.5 MMOL/L (ref 3.5–5.3)
RBC # BLD AUTO: 2.32 X10*6/UL (ref 4.5–5.9)
RIGHT VENTRICLE FREE WALL PEAK S': 18 CM/S
RIGHT VENTRICLE PEAK SYSTOLIC PRESSURE: 32.4 MMHG
SODIUM SERPL-SCNC: 130 MMOL/L (ref 136–145)
TACROLIMUS BLD-MCNC: 7.2 NG/ML
TRICUSPID ANNULAR PLANE SYSTOLIC EXCURSION: 2.1 CM
WBC # BLD AUTO: 5.5 X10*3/UL (ref 4.4–11.3)

## 2024-12-24 PROCEDURE — 93308 TTE F-UP OR LMTD: CPT

## 2024-12-24 PROCEDURE — 36415 COLL VENOUS BLD VENIPUNCTURE: CPT | Performed by: STUDENT IN AN ORGANIZED HEALTH CARE EDUCATION/TRAINING PROGRAM

## 2024-12-24 PROCEDURE — 2500000002 HC RX 250 W HCPCS SELF ADMINISTERED DRUGS (ALT 637 FOR MEDICARE OP, ALT 636 FOR OP/ED)

## 2024-12-24 PROCEDURE — 1200000002 HC GENERAL ROOM WITH TELEMETRY DAILY

## 2024-12-24 PROCEDURE — 2500000001 HC RX 250 WO HCPCS SELF ADMINISTERED DRUGS (ALT 637 FOR MEDICARE OP): Performed by: STUDENT IN AN ORGANIZED HEALTH CARE EDUCATION/TRAINING PROGRAM

## 2024-12-24 PROCEDURE — 94668 MNPJ CHEST WALL SBSQ: CPT

## 2024-12-24 PROCEDURE — 99233 SBSQ HOSP IP/OBS HIGH 50: CPT | Performed by: STUDENT IN AN ORGANIZED HEALTH CARE EDUCATION/TRAINING PROGRAM

## 2024-12-24 PROCEDURE — 80197 ASSAY OF TACROLIMUS: CPT

## 2024-12-24 PROCEDURE — 85025 COMPLETE CBC W/AUTO DIFF WBC: CPT | Performed by: STUDENT IN AN ORGANIZED HEALTH CARE EDUCATION/TRAINING PROGRAM

## 2024-12-24 PROCEDURE — 2500000002 HC RX 250 W HCPCS SELF ADMINISTERED DRUGS (ALT 637 FOR MEDICARE OP, ALT 636 FOR OP/ED): Performed by: STUDENT IN AN ORGANIZED HEALTH CARE EDUCATION/TRAINING PROGRAM

## 2024-12-24 PROCEDURE — 82947 ASSAY GLUCOSE BLOOD QUANT: CPT

## 2024-12-24 PROCEDURE — 93308 TTE F-UP OR LMTD: CPT | Performed by: INTERNAL MEDICINE

## 2024-12-24 PROCEDURE — 80069 RENAL FUNCTION PANEL: CPT | Performed by: STUDENT IN AN ORGANIZED HEALTH CARE EDUCATION/TRAINING PROGRAM

## 2024-12-24 PROCEDURE — 2500000001 HC RX 250 WO HCPCS SELF ADMINISTERED DRUGS (ALT 637 FOR MEDICARE OP)

## 2024-12-24 PROCEDURE — 99232 SBSQ HOSP IP/OBS MODERATE 35: CPT | Performed by: INTERNAL MEDICINE

## 2024-12-24 PROCEDURE — 2500000004 HC RX 250 GENERAL PHARMACY W/ HCPCS (ALT 636 FOR OP/ED)

## 2024-12-24 PROCEDURE — 99222 1ST HOSP IP/OBS MODERATE 55: CPT | Performed by: STUDENT IN AN ORGANIZED HEALTH CARE EDUCATION/TRAINING PROGRAM

## 2024-12-24 PROCEDURE — 83735 ASSAY OF MAGNESIUM: CPT | Performed by: STUDENT IN AN ORGANIZED HEALTH CARE EDUCATION/TRAINING PROGRAM

## 2024-12-24 PROCEDURE — 99232 SBSQ HOSP IP/OBS MODERATE 35: CPT | Performed by: STUDENT IN AN ORGANIZED HEALTH CARE EDUCATION/TRAINING PROGRAM

## 2024-12-24 PROCEDURE — 2500000004 HC RX 250 GENERAL PHARMACY W/ HCPCS (ALT 636 FOR OP/ED): Performed by: STUDENT IN AN ORGANIZED HEALTH CARE EDUCATION/TRAINING PROGRAM

## 2024-12-24 RX ORDER — INSULIN LISPRO 100 [IU]/ML
4 INJECTION, SOLUTION INTRAVENOUS; SUBCUTANEOUS
Status: DISCONTINUED | OUTPATIENT
Start: 2024-12-24 | End: 2024-12-26 | Stop reason: HOSPADM

## 2024-12-24 RX ADMIN — METHOCARBAMOL TABLETS 500 MG: 500 TABLET, COATED ORAL at 21:57

## 2024-12-24 RX ADMIN — HEPARIN SODIUM 5000 UNITS: 5000 INJECTION, SOLUTION INTRAVENOUS; SUBCUTANEOUS at 18:36

## 2024-12-24 RX ADMIN — SENNOSIDES AND DOCUSATE SODIUM 1 TABLET: 50; 8.6 TABLET ORAL at 21:57

## 2024-12-24 RX ADMIN — ROSUVASTATIN CALCIUM 10 MG: 10 TABLET, FILM COATED ORAL at 21:57

## 2024-12-24 RX ADMIN — METHOCARBAMOL TABLETS 500 MG: 500 TABLET, COATED ORAL at 07:06

## 2024-12-24 RX ADMIN — OXYCODONE HYDROCHLORIDE 10 MG: 5 TABLET ORAL at 11:21

## 2024-12-24 RX ADMIN — CLOTRIMAZOLE 10 MG: 10 LOZENGE ORAL at 16:04

## 2024-12-24 RX ADMIN — MYCOPHENOLATE MOFETIL 1000 MG: 250 CAPSULE ORAL at 07:05

## 2024-12-24 RX ADMIN — PANTOPRAZOLE SODIUM 40 MG: 40 TABLET, DELAYED RELEASE ORAL at 16:06

## 2024-12-24 RX ADMIN — INSULIN LISPRO 2 UNITS: 100 INJECTION, SOLUTION INTRAVENOUS; SUBCUTANEOUS at 10:15

## 2024-12-24 RX ADMIN — FUROSEMIDE 80 MG: 10 INJECTION, SOLUTION INTRAVENOUS at 10:11

## 2024-12-24 RX ADMIN — FUROSEMIDE 80 MG: 10 INJECTION, SOLUTION INTRAVENOUS at 18:36

## 2024-12-24 RX ADMIN — CLOTRIMAZOLE 10 MG: 10 LOZENGE ORAL at 18:36

## 2024-12-24 RX ADMIN — HEPARIN SODIUM 5000 UNITS: 5000 INJECTION, SOLUTION INTRAVENOUS; SUBCUTANEOUS at 02:45

## 2024-12-24 RX ADMIN — MYCOPHENOLATE MOFETIL 1000 MG: 250 CAPSULE ORAL at 18:36

## 2024-12-24 RX ADMIN — GABAPENTIN 200 MG: 100 CAPSULE ORAL at 21:57

## 2024-12-24 RX ADMIN — SODIUM ZIRCONIUM CYCLOSILICATE 10 G: 10 POWDER, FOR SUSPENSION ORAL at 11:16

## 2024-12-24 RX ADMIN — HEPARIN SODIUM 5000 UNITS: 5000 INJECTION, SOLUTION INTRAVENOUS; SUBCUTANEOUS at 10:11

## 2024-12-24 RX ADMIN — AMLODIPINE BESYLATE 10 MG: 10 TABLET ORAL at 10:08

## 2024-12-24 RX ADMIN — FUROSEMIDE 80 MG: 10 INJECTION, SOLUTION INTRAVENOUS at 02:45

## 2024-12-24 RX ADMIN — INSULIN GLARGINE 10 UNITS: 100 INJECTION, SOLUTION SUBCUTANEOUS at 21:57

## 2024-12-24 RX ADMIN — TACROLIMUS 2 MG: 1 CAPSULE ORAL at 07:06

## 2024-12-24 RX ADMIN — PANTOPRAZOLE SODIUM 40 MG: 40 TABLET, DELAYED RELEASE ORAL at 07:06

## 2024-12-24 RX ADMIN — METHYLPREDNISOLONE SODIUM SUCCINATE 60 MG: 125 INJECTION, POWDER, FOR SOLUTION INTRAMUSCULAR; INTRAVENOUS at 10:08

## 2024-12-24 RX ADMIN — POLYETHYLENE GLYCOL 3350 17 G: 17 POWDER, FOR SOLUTION ORAL at 10:22

## 2024-12-24 RX ADMIN — INSULIN LISPRO 6 UNITS: 100 INJECTION, SOLUTION INTRAVENOUS; SUBCUTANEOUS at 16:06

## 2024-12-24 RX ADMIN — INSULIN LISPRO 4 UNITS: 100 INJECTION, SOLUTION INTRAVENOUS; SUBCUTANEOUS at 16:07

## 2024-12-24 RX ADMIN — TACROLIMUS 2 MG: 1 CAPSULE ORAL at 18:36

## 2024-12-24 RX ADMIN — POLYETHYLENE GLYCOL 3350 17 G: 17 POWDER, FOR SOLUTION ORAL at 21:59

## 2024-12-24 RX ADMIN — CLOTRIMAZOLE 10 MG: 10 LOZENGE ORAL at 10:08

## 2024-12-24 RX ADMIN — METHOCARBAMOL TABLETS 500 MG: 500 TABLET, COATED ORAL at 16:04

## 2024-12-24 RX ADMIN — INSULIN LISPRO 4 UNITS: 100 INJECTION, SOLUTION INTRAVENOUS; SUBCUTANEOUS at 10:15

## 2024-12-24 RX ADMIN — VALGANCICLOVIR HYDROCHLORIDE 450 MG: 450 TABLET ORAL at 10:08

## 2024-12-24 ASSESSMENT — COGNITIVE AND FUNCTIONAL STATUS - GENERAL
CLIMB 3 TO 5 STEPS WITH RAILING: A LITTLE
DAILY ACTIVITIY SCORE: 24
WALKING IN HOSPITAL ROOM: A LITTLE
MOBILITY SCORE: 21
STANDING UP FROM CHAIR USING ARMS: A LITTLE

## 2024-12-24 ASSESSMENT — PAIN SCALES - WONG BAKER
WONGBAKER_NUMERICALRESPONSE: NO HURT
WONGBAKER_NUMERICALRESPONSE: HURTS LITTLE MORE

## 2024-12-24 ASSESSMENT — PAIN SCALES - PAIN ASSESSMENT IN ADVANCED DEMENTIA (PAINAD)
TOTALSCORE: 4
NEGVOCALIZATION: OCCASIONAL MOAN/GROAN, LOW SPEECH, NEGATIVE/DISAPPROVING QUALITY
BREATHING: NORMAL
CONSOLABILITY: DISTRACTED OR REASSURED BY VOICE/TOUCH
TOTALSCORE: MEDICATION (SEE MAR)
BODYLANGUAGE: TENSE, DISTRESSED PACING, FIDGETING
FACIALEXPRESSION: SAD, FRIGHTENED, FROWN

## 2024-12-24 ASSESSMENT — PAIN - FUNCTIONAL ASSESSMENT: PAIN_FUNCTIONAL_ASSESSMENT: 0-10

## 2024-12-24 ASSESSMENT — PAIN SCALES - GENERAL
PAINLEVEL_OUTOF10: 2
PAINLEVEL_OUTOF10: 7

## 2024-12-24 NOTE — PROGRESS NOTES
"Transplant Nephrology progress note     Date of admission: 12/20/2024     Temo Hanson is a 74 y.o.  with ESRD on hemodialysis Monday Wednesday Friday was currently admitted status post DDKT on 12/21/2024.      SUBJECTIVE:  No overnight event. Bravo catheter got removed. Voiding okay.     PROBLEM LIST:  Assessment & Plan  ESRD (end stage renal disease) (Multi)    Type 2 diabetes mellitus, with long-term current use of insulin           ALLERGIES:  Allergies   Allergen Reactions    Terazosin Unknown     Did not feel well on this medication    Codeine Itching     itching    Tramadol Itching            CURRENT MEDICATIONS:  Scheduled medications  amLODIPine, 10 mg, oral, Daily  clotrimazole, 10 mg, oral, TID after meals  furosemide, 80 mg, intravenous, q8h KASSY  gabapentin, 200 mg, oral, Nightly  heparin (porcine), 5,000 Units, subcutaneous, q8h  insulin glargine, 10 Units, subcutaneous, Nightly  insulin lispro, 0-10 Units, subcutaneous, TID AC  insulin lispro, 2 Units, subcutaneous, TID AC  methocarbamol, 500 mg, oral, q8h KASSY  mycophenolate, 1,000 mg, oral, q12h KASSY  pantoprazole, 40 mg, oral, BID AC  polyethylene glycol, 17 g, oral, q12h KASSY  [START ON 12/25/2024] predniSONE, 20 mg, oral, Daily  rosuvastatin, 10 mg, oral, Nightly  sennosides-docusate sodium, 1 tablet, oral, Nightly  sodium zirconium cyclosilicate, 10 g, oral, Daily with lunch  tacrolimus, 2 mg, oral, q12h KASSY  valGANciclovir, 450 mg, oral, q48h      Continuous medications     PRN medications  PRN medications: acetaminophen, dextrose, dextrose, diphenhydrAMINE, glucagon, glucagon, naloxone, ondansetron, oxyCODONE, oxyCODONE, oxygen       OBJECTIVE:    VITALS: Visit Vitals  /64 (BP Location: Right arm, Patient Position: Lying)   Pulse 95   Temp 36.5 °C (97.7 °F) (Temporal)   Resp 18   Ht 1.854 m (6' 1\")   Wt 76.8 kg (169 lb 5 oz)   SpO2 97%   BMI 22.34 kg/m²   Smoking Status Never   BSA 1.99 m²        General: No distress   Mucosa moist   AI, " AC, AF     HEENT: PEERLA  CVS: S1 S2 no murmurs  RESP:  Lungs clear to auscultation   ABDO: Soft, non-tender. Incision C/D/I  Neuro: A + O x 3  Skin: No rash   Extremities: No edema       LABS:  Results from last 72 hours   Lab Units 12/24/24  0536 12/23/24  0549   WBC AUTO x10*3/uL 5.5 7.3   HEMOGLOBIN g/dL 7.4* 8.2*   MCV fL 94 95   PLATELETS AUTO x10*3/uL 135* 135*   BUN mg/dL 68* 58*   CREATININE mg/dL 5.23* 5.16*   CALCIUM mg/dL 8.6 8.7   TACROLIMUS ng/mL  --  8.1            Intake/Output Summary (Last 24 hours) at 12/24/2024 1050  Last data filed at 12/24/2024 0955  Gross per 24 hour   Intake 120 ml   Output 2835 ml   Net -2715 ml          ASSESSMENT AND PLAN:    Temo Hanson is a 74 y.o.  with a past medical history of ESRD on hemodialysis Monday Wednesday Friday was currently admitted status post DDKT on 12/21/2024.  -Other history: Hypertension, hyperlipidemia, complete heart block status post leadless pacemaker insertion on 7/17/2024, pericardial effusion, pheochromocytoma of the right adrenal gland, history of prostate cancer, GI bleed     Kidney info: KDPI is 93%, PRA 54%, cold ischemia time 34 hours,   EBV R+, CMV R+     Allograft function:  -SGF - good UOP  -No urgent indication for RRT at this time  - Lasix 80 mg IV q8h  - Stop [facility]     Immunosuppression:  -Thymo induction 3 mg/kg - completed  TAC 2 mg BID  MMG 1000 mg BID  Steroid taper     Prophylaxis   - Valcyte x 3 mo  - Clotrimazole 10 mg TID x 3 mo  - PJP prophyalxis - TMP-SMX SS daily x 6 mo  - Recipient's HBV immune with positive core antibody - consult hepatology for consideration of antiviral prophylaxis    Heme - acceptable     Bone mineral disease:  Acceptable for degree of kidney function    Blood pressure   - amlodipine 10 mg/day    Type 2 DM - basal bolus insulin  Crestor 10 mg/day    Evelyn Trimble MD

## 2024-12-24 NOTE — CONSULTS
"  [facility]   [facility]  INITIAL CONSULT NOTE       Reason For Consult  HBcAb positivity of transplanted kidney    SUBJECTIVE     History Of Present Illness  Temo Hanson is a 74 y.o. male with a past medical history of DMT2 c/b ESRD and neuropathy, achalasia s/p Heller myotomy in 1998 s/p multiple EGDs, including esophageal dilation in 2015, HTN, DLD, and high grade AV block s/p PPM in 7/2024, admitted on 12/20/2024 for DDKT. Now s/p DDKT on 12/21/2024.     Hepatology is consulted for HBcAb positivity of transplanted kidney.    Patient is doing well post-transplant. He says he's never been told he has liver disease.      Review of Systems  12-point ROS has been reviewed and it is negative, except if mentioned otherwise above.    Past Medical History:    Past Medical History:   Diagnosis Date    Chronic kidney disease     DM (diabetes mellitus) (Multi)     Fistula     HTN (hypertension)     Other specified abnormal immunological findings in serum 10/11/2021    Hepatitis B core antibody positive    Personal history of malignant neoplasm of prostate     History of malignant neoplasm of prostate       Home Medications  Medications Prior to Admission   Medication Sig Dispense Refill Last Dose/Taking    acetaminophen (Tylenol) 325 mg tablet Take 2 tablets (650 mg) by mouth every 6 hours if needed.       amLODIPine (Norvasc) 10 mg tablet Take 1 tablet (10 mg) by mouth once daily. 30 tablet 1     BD Ultra-Fine Joan Pen Needle 32 gauge x 5/32\" needle        carboxymethylcellulose (Refresh Plus) 0.5 % ophthalmic solution Instill 1 drop into both eyes 2-4x/day as needed for dryness.       doxazosin (Cardura) 8 mg tablet Take 1 tablet (8 mg) by mouth once daily at bedtime. (Patient not taking: Reported on 12/21/2024)   Not Taking    FreeStyle Mick 2 Sensor kit        gabapentin (Neurontin) 300 mg capsule Take 1 capsule (300 mg) by mouth once daily. 30 capsule 0     " hydrALAZINE (Apresoline) 100 mg tablet Take 1 tablet (100 mg) by mouth 3 times a day. 90 tablet 0     insulin glargine,hum.rec.anlog (BASAGLAR KWIKPEN U-100 INSULIN SUBQ) Inject 10 Units under the skin once daily as needed. Take as directed per insulin instructions.       isosorbide dinitrate (Isordil) 10 mg tablet Take 1 tablet (10 mg) by mouth 3 times a day. 90 tablet 0     magnesium oxide (Mag-Ox) 400 mg tablet 1 tablet (400 mg) once daily.       omeprazole (PriLOSEC) 20 mg DR capsule Take 1 capsule (20 mg) by mouth once daily.       rosuvastatin (Crestor) 20 mg tablet Take 1 tablet (20 mg) by mouth once daily at bedtime. 30 tablet 1     valsartan (Diovan) 160 mg tablet Take 1 tablet (160 mg) by mouth once daily. 30 tablet 1        Surgical History:    Past Surgical History:   Procedure Laterality Date    CARDIAC CATHETERIZATION N/A 4/18/2024    Procedure: Pericardiocentesis;  Surgeon: Jose Brunson MD;  Location: [place] [address] Cardiac Cath Lab;  Service: Cardiovascular;  Laterality: N/A;    CARDIAC ELECTROPHYSIOLOGY PROCEDURE N/A 7/17/2024    Procedure: Leadless PPM Implant ([id number]);  Surgeon: Sheldon De La Torre MD;  Location: [facility] Cardiac Cath Lab;  Service: Electrophysiology;  Laterality: N/A;  8:00, [employer]    CHOLECYSTECTOMY  10/23/2023    Lap Cholecystectomy    OTHER SURGICAL HISTORY  09/29/2021    Cyst excision    OTHER SURGICAL HISTORY  09/29/2021    Arteriovenous fistula creation procedure    OTHER SURGICAL HISTORY  09/29/2021    Prostate surgery    OTHER SURGICAL HISTORY  09/29/2021    Colonoscopy       Allergies:    Allergies   Allergen Reactions    Terazosin Unknown     Did not feel well on this medication    Codeine Itching     itching    Tramadol Itching       Social History:    Social History     Socioeconomic History    Marital status:      Spouse name: Not on file    Number of children: Not on file    Years of education: Not on file    Highest education level: Not on file   Occupational  History    Not on file   Tobacco Use    Smoking status: Never    Smokeless tobacco: Never   Vaping Use    Vaping status: Never Used   Substance and Sexual Activity    Alcohol use: Never    Drug use: Never    Sexual activity: Defer   Other Topics Concern    Not on file   Social History Narrative    Not on file     Social Drivers of Health     Financial Resource Strain: Low Risk  (12/22/2024)    Overall Financial Resource Strain (CARDIA)     Difficulty of Paying Living Expenses: Not hard at all   Food Insecurity: No Food Insecurity (12/20/2024)    Hunger Vital Sign     Worried About Running Out of Food in the Last Year: Never true     Ran Out of Food in the Last Year: Never true   Transportation Needs: No Transportation Needs (12/22/2024)    PRAPARE - Transportation     Lack of Transportation (Medical): No     Lack of Transportation (Non-Medical): No   Physical Activity: Not on file   Stress: Not on file   Social Connections: Feeling Socially Integrated (6/12/2024)    OASIS : Social Isolation     Frequency of experiencing loneliness or isolation: Never   Intimate Partner Violence: Not At Risk (12/20/2024)    Humiliation, Afraid, Rape, and Kick questionnaire     Fear of Current or Ex-Partner: No     Emotionally Abused: No     Physically Abused: No     Sexually Abused: No   Housing Stability: Low Risk  (12/22/2024)    Housing Stability Vital Sign     Unable to Pay for Housing in the Last Year: No     Number of Times Moved in the Last Year: 1     Homeless in the Last Year: No       Family History:    Family History   Problem Relation Name Age of Onset    Diabetes Sister      Diabetes Brother         EXAM     Vitals:    Vitals:    12/24/24 0630 12/24/24 0824 12/24/24 1025 12/24/24 1213   BP:  142/64  147/74   BP Location:  Right arm  Right arm   Patient Position:  Lying  Lying   Pulse:  95  97   Resp:  18  18   Temp:  36.5 °C (97.7 °F)  36.8 °C (98.2 °F)   TempSrc:  Temporal  Temporal   SpO2:  97% 97% 98%   Weight:  76.8 kg (169 lb 5 oz)      Height:         Failed to redirect to the Timeline version of the [employer] SmartLink.    Intake/Output Summary (Last 24 hours) at 12/24/2024 1301  Last data filed at 12/24/2024 1213  Gross per 24 hour   Intake 120 ml   Output 2910 ml   Net -2790 ml     Physical Exam   GENERAL: In no acute distress.  NEUROLOGIC: A and O x 3. Cranial nerves 2 through 12 grossly intact. Intact motor and sensory systems, with normal reflexes and coordination.    HEENT: Pupils are equal, round, and reactive to light and accommodation. Extraocular motion intact.    CARDIAC: S1 + S2, RRR, no M/R/G.    LUNGS: CTA BL. No wheezes or coarse breath sounds. Symmetric respirations. No increased work of breathing.   ABDOMEN: Soft, non-tender to palpation. No rebound or guarding.  SKIN: Clean, warm, dry, and intact with normal skin turgor.  PSYCH: Appropriate mood and behavior.      OBJECTIVE                                                                              Medications       Current Facility-Administered Medications:     acetaminophen (Tylenol) tablet 650 mg, 650 mg, oral, q6h PRN, Destiney Christensen MD    amLODIPine (Norvasc) tablet 10 mg, 10 mg, oral, Daily, Chelsey L Tatiana, APRN-CNP, 10 mg at 12/24/24 1008    clotrimazole (Mycelex) poli 10 mg, 10 mg, oral, TID after meals, Destiney Christensen MD, 10 mg at 12/24/24 1008    dextrose 50 % injection 12.5 g, 12.5 g, intravenous, q15 min PRN, Chelsey L Captiva, APRN-CNP    dextrose 50 % injection 25 g, 25 g, intravenous, q15 min PRN, Chelsey L Captiva, APRN-CNP    diphenhydrAMINE (BENADryl) capsule 25 mg, 25 mg, oral, q6h PRN, Chelsey L Tatiana, APRN-CNP, 25 mg at 12/22/24 1414    furosemide (Lasix) injection 80 mg, 80 mg, intravenous, q8h KASSY, Destiney Christensen MD, 80 mg at 12/24/24 1011    gabapentin (Neurontin) capsule 200 mg, 200 mg, oral, Nightly, Destiney Christensen MD, 200 mg at 12/23/24 2049    glucagon (Glucagen) injection 1 mg, 1 mg, intramuscular, q15 min PRN, Chelsey Simpson,  APRN-CNP    glucagon (Glucagen) injection 1 mg, 1 mg, intramuscular, q15 min PRN, Chelsey L Tatiana, APRN-CNP    heparin (porcine) injection 5,000 Units, 5,000 Units, subcutaneous, q8h, Destiney Christensen MD, 5,000 Units at 12/24/24 1011    insulin glargine (Lantus) injection 10 Units, 10 Units, subcutaneous, Nightly, Trav Turner MD    insulin lispro injection 0-10 Units, 0-10 Units, subcutaneous, TID AC, Chelsey L Tatiana, APRN-CNP, 4 Units at 12/24/24 1015    insulin lispro injection 2 Units, 2 Units, subcutaneous, TID AC, Trav Turner MD, 2 Units at 12/24/24 1015    methocarbamol (Robaxin) tablet 500 mg, 500 mg, oral, q8h KASSY, Chelsey L Tatiana, APRN-CNP, 500 mg at 12/24/24 0706    mycophenolate (Cellcept) capsule 1,000 mg, 1,000 mg, oral, q12h KASSY, Chelsey L Tatiana, APRN-CNP, 1,000 mg at 12/24/24 0705    naloxone (Narcan) injection 0.2 mg, 0.2 mg, intravenous, q5 min PRN, Destiney Christensen MD    ondansetron (Zofran) injection 4 mg, 4 mg, intravenous, q8h PRN, Destiney Christensen MD, 4 mg at 12/22/24 1421    oxyCODONE (Roxicodone) immediate release tablet 10 mg, 10 mg, oral, q4h PRN, Destiney Christensen MD, 10 mg at 12/24/24 1121    oxyCODONE (Roxicodone) immediate release tablet 5 mg, 5 mg, oral, q4h PRN, Destiney Christensen MD    oxygen (O2) therapy, , inhalation, Continuous PRN - O2/gases, Destiney Christensen MD    pantoprazole (ProtoNix) EC tablet 40 mg, 40 mg, oral, BID AC, Destiney Christensen MD, 40 mg at 12/24/24 0706    polyethylene glycol (Glycolax, Miralax) packet 17 g, 17 g, oral, q12h KASSY, Chelsey L Tatiana, APRN-CNP, 17 g at 12/24/24 1022    [START ON 12/25/2024] predniSONE (Deltasone) tablet 20 mg, 20 mg, oral, Daily, Destiney Christensen MD    rosuvastatin (Crestor) tablet 10 mg, 10 mg, oral, Nightly, Chelsey L Tatiana, APRN-CNP, 10 mg at 12/23/24 2049    sennosides-docusate sodium (Queta-Colace) 8.6-50 mg per tablet 1 tablet, 1 tablet, oral, Nightly, Chelsey L Tatiana, APRN-CNP, 1 tablet at 12/23/24 2049    tacrolimus (Prograf) capsule  2 mg, 2 mg, oral, q12h KASSY, Chelsey TOURE Tatiana, APRN-CNP, 2 mg at 12/24/24 0706    valGANciclovir (Valcyte) tablet 450 mg, 450 mg, oral, q48h, Chelsey L [address], APRN-CNP, 450 mg at 12/24/24 1008                                                                            Labs     Results for orders placed or performed during the hospital encounter of 12/20/24 (from the past 24 hours)   POCT GLUCOSE   Result Value Ref Range    POCT Glucose 179 (H) 74 - 99 mg/dL   POCT GLUCOSE   Result Value Ref Range    POCT Glucose 215 (H) 74 - 99 mg/dL   Magnesium   Result Value Ref Range    Magnesium 2.04 1.60 - 2.40 mg/dL   Renal Function Panel   Result Value Ref Range    Glucose 223 (H) 74 - 99 mg/dL    Sodium 130 (L) 136 - 145 mmol/L    Potassium 3.5 3.5 - 5.3 mmol/L    Chloride 90 (L) 98 - 107 mmol/L    Bicarbonate 28 21 - 32 mmol/L    Anion Gap 16 10 - 20 mmol/L    Urea Nitrogen 68 (H) 6 - 23 mg/dL    Creatinine 5.23 (H) 0.50 - 1.30 mg/dL    eGFR 11 (L) >60 mL/min/1.73m*2    Calcium 8.6 8.6 - 10.6 mg/dL    Phosphorus 4.5 2.5 - 4.9 mg/dL    Albumin 3.1 (L) 3.4 - 5.0 g/dL   CBC and Auto Differential   Result Value Ref Range    WBC 5.5 4.4 - 11.3 x10*3/uL    nRBC 0.0 0.0 - 0.0 /100 WBCs    RBC 2.32 (L) 4.50 - 5.90 x10*6/uL    Hemoglobin 7.4 (L) 13.5 - 17.5 g/dL    Hematocrit 21.8 (L) 41.0 - 52.0 %    MCV 94 80 - 100 fL    MCH 31.9 26.0 - 34.0 pg    MCHC 33.9 32.0 - 36.0 g/dL    RDW 14.4 11.5 - 14.5 %    Platelets 135 (L) 150 - 450 x10*3/uL    Neutrophils % 86.5 40.0 - 80.0 %    Immature Granulocytes %, Automated 0.2 0.0 - 0.9 %    Lymphocytes % 2.8 13.0 - 44.0 %    Monocytes % 10.5 2.0 - 10.0 %    Eosinophils % 0.0 0.0 - 6.0 %    Basophils % 0.0 0.0 - 2.0 %    Neutrophils Absolute 4.72 1.60 - 5.50 x10*3/uL    Immature Granulocytes Absolute, Automated 0.01 0.00 - 0.50 x10*3/uL    Lymphocytes Absolute 0.15 (L) 0.80 - 3.00 x10*3/uL    Monocytes Absolute 0.57 0.05 - 0.80 x10*3/uL    Eosinophils Absolute 0.00 0.00 - 0.40 x10*3/uL    Basophils  Absolute 0.00 0.00 - 0.10 x10*3/uL   Tacrolimus   Result Value Ref Range    Tacrolimus  7.2 <=15.0 ng/mL   POCT GLUCOSE   Result Value Ref Range    POCT Glucose 230 (H) 74 - 99 mg/dL   Transthoracic Echo (TTE) Limited   Result Value Ref Range    Tricuspid annular plane systolic excursion 2.1 cm    LV EF 63 %    RV free wall pk S' 18.00 cm/s    RVSP 32.4 mmHg    LV A4C EF 43.7    POCT GLUCOSE   Result Value Ref Range    POCT Glucose 190 (H) 74 - 99 mg/dL                                                                              Imaging           3/2023 RUQ US:  LIVER:   The echogenicity of the liver is within normal limits.   There is no hepatic mass.   The sagittal dimension of the right lobe of the liver is 16 cm,   nonenlarged.     Impression   Distended gallbladder with thickening and lamination of the  gallbladder wall and layering sludge; no sonographic Buitrago's sign.                                                                          GI Procedures     2/23/2024 EGD:  Impression  Cobblestone and scalloped mucosa in the middle third of the esophagus and lower third of the esophagus; performed cold forceps biopsy  Small type I hiatal hernia  The stomach and duodenum appeared normal.     Findings  Patchy cobblestone and pitted mucosa in the middle third of the esophagus and lower third of the esophagus; performed cold forceps biopsy. R/o neoplasia versus inflammation from reflux;  Small sliding hiatal hernia (type I hiatal hernia) - GE junction 43 cm from the incisors, diaphragmatic impression 46 cm from the incisors  The stomach and duodenum appeared normal.    PATH  A. ESOPHAGUS, DISTAL, BIOPSY:   --Squamous mucosa with acute and chronic inflammation associated with reactive epithelial changes.  --No evidence of dyplasia or malignancy.     ASSESSMENT / PLAN                  ASSESSMENT/PLAN:    Temo Hanson is a 74 y.o. male with a past medical history of DMT2 c/b ESRD and neuropathy, achalasia s/p  Heller myotomy in 1998 s/p multiple EGDs, including esophageal dilation in 2015, HTN, DLD, and high grade AV block s/p PPM in 7/2024, admitted on 12/20/2024 for DDKT. Now s/p DDKT on 12/21/2024.     Hepatology is consulted for HBcAb positivity of transplanted kidney.    Recommendations:  - please monitor hepatic function panel and HBV DNA every 3 months   - we will defer prophylactic treatment of HBV at this time given HBsAb positivity of the recipient     Patient was seen and discussed with Dr. Borrero.    Thank you for the consultation. Hepatology will sign off.  During weekday hours of 7am-5pm, please do not hesitate to contact me on [employer] Chat or page [phone], if there are any further questions.   After hours, on weekends, and on holidays, please page the on-call GI fellow at [phone].   Thank you.     Tanya Henao MD  PGY-5 Gastroenterology and Hepatology Fellow  [facility]

## 2024-12-24 NOTE — PROGRESS NOTES
"Mr. Hanson received a kidney transplant on 12/21/2024. SW reviewed the pre transplant Social Work evaluation as well as interdisciplinary notes of this admission. Attended transplant interdisciplinary rounds. SW reviewed, participated and is in agreement with MDT Plan of Care. I met with patient who was awake, alert, oriented and able to discuss their condition.    Functional/Medical Status: SW met with Pt and family at the bedside. Pt was laying in bed during the visit. Pt reported he has been eating and drinking okay. Pt shared he has been ambulating.   Adaption to the Stress of Transplant: Pt denied any concerns related to transplant at this time.   Mental Health/Substance use: Pt reported his mood as \"okay.\" Pt denied any current MH concerns. Pt denied any recent tobacco, alcohol, or illicit drug use.   Post Discharge Supports/Recovery Plan: Pt reported his primary support remains his wife, Nae, and his secondary supports remain his daughters, Lupe and Pao. Pt shared one of his daughters will be picking him up, and his wife will be staying with him once home.   Benefits: Devoted    Impressions: SW met with Pt and family at the bedside. Pt was laying in bed during the visit. Pt reported he has been eating and drinking okay. Pt shared he has been ambulating. Pt denied any concerns related to transplant at this time. Pt reported his mood as \"okay.\" Pt denied any current MH concerns. Pt denied any recent tobacco, alcohol, or illicit drug use. Pt reported his primary support remains his wife, Nae, and his secondary supports remain his daughters, Lupe and Pao. Pt shared one of his daughters will be picking him up, and his wife will be staying with him once home. Patient and family/support system are in agreement and report understanding of their treatment plan. They were provided an opportunity to ask questions, though denied any issues or concerns at this time.        PLAN:  We reviewed the " importance of having lab work done twice a week or as directed; scheduled post-transplant appointments; reporting alarming symptoms; restrictions of activities and importance of adherence to medical regimen. We also discussed the precautions to take due to being immunosuppressed. Patient indicated understanding of this information along with the discharge plan and instructions. Patient was given the opportunity to discuss any issues. Patient was given social work contact information.

## 2024-12-24 NOTE — PROGRESS NOTES
"Temo Hanson is a 74 y.o. male on day 4 of admission presenting with ESRD (end stage renal disease) (Multi).    Subjective   Patient was seen and examined by the bedside this morning. His BG numbers were elevated globally overnight despite an increase in glargine. He does have postprandial hyperglycemia which is expected with glucocorticoids.     I have reviewed histories, allergies and medications have been reviewed and there are no changes       Objective   Review of Systems  Negative unless noted above    Physical Exam  General: elderly AA male patient in NAD  HEENT: AT/NC  Neck: trachea in midline, no thyromegaly or nodules  Resp: CTA B/L  CVS: normal s1 and s2  Abdomen: soft and non tender to palpation, BS+  Skin: warm, dry and intact, no visible skin tags  Neuro: AAO x3, DTR 2+  Psych: cooperative      Last Recorded Vitals  Blood pressure 147/74, pulse 97, temperature 36.8 °C (98.2 °F), temperature source Temporal, resp. rate 18, height 1.854 m (6' 1\"), weight 76.8 kg (169 lb 5 oz), SpO2 98%.  Intake/Output last 3 Shifts:  I/O last 3 completed shifts:  In: 120 (1.6 mL/kg) [P.O.:120]  Out: 3335 (43.4 mL/kg) [Urine:2940 (1.1 mL/kg/hr); Drains:395]  Weight: 76.8 kg     Relevant Results  Results from last 7 days   Lab Units 12/24/24  1210 12/24/24  0822 12/24/24  0536 12/23/24  2023 12/23/24  1644 12/23/24  1114 12/23/24  0833 12/23/24  0549 12/22/24  0923 12/22/24  0610 12/21/24  0532 12/21/24  0417 12/20/24  1913   POCT GLUCOSE mg/dL 190* 230*  --  215* 179* 293*   < >  --    < >  --    < >  --   --    GLUCOSE mg/dL  --   --  223*  --   --   --   --  353*  --  205*  --  198* 187*    < > = values in this interval not displayed.     No current facility-administered medications on file prior to encounter.     Current Outpatient Medications on File Prior to Encounter   Medication Sig Dispense Refill    acetaminophen (Tylenol) 325 mg tablet Take 2 tablets (650 mg) by mouth every 6 hours if needed.      amLODIPine " "(Norvasc) 10 mg tablet Take 1 tablet (10 mg) by mouth once daily. 30 tablet 1    BD Ultra-Fine Joan Pen Needle 32 gauge x 5/32\" needle       carboxymethylcellulose (Refresh Plus) 0.5 % ophthalmic solution Instill 1 drop into both eyes 2-4x/day as needed for dryness.      doxazosin (Cardura) 8 mg tablet Take 1 tablet (8 mg) by mouth once daily at bedtime. (Patient not taking: Reported on 12/21/2024)      FreeStyle Mick 2 Sensor kit       gabapentin (Neurontin) 300 mg capsule Take 1 capsule (300 mg) by mouth once daily. 30 capsule 0    hydrALAZINE (Apresoline) 100 mg tablet Take 1 tablet (100 mg) by mouth 3 times a day. 90 tablet 0    insulin glargine,hum.rec.anlog (BASAGLAR KWIKPEN U-100 INSULIN SUBQ) Inject 10 Units under the skin once daily as needed. Take as directed per insulin instructions.      isosorbide dinitrate (Isordil) 10 mg tablet Take 1 tablet (10 mg) by mouth 3 times a day. 90 tablet 0    magnesium oxide (Mag-Ox) 400 mg tablet 1 tablet (400 mg) once daily.      omeprazole (PriLOSEC) 20 mg DR capsule Take 1 capsule (20 mg) by mouth once daily.      rosuvastatin (Crestor) 20 mg tablet Take 1 tablet (20 mg) by mouth once daily at bedtime. 30 tablet 1    valsartan (Diovan) 160 mg tablet Take 1 tablet (160 mg) by mouth once daily. 30 tablet 1    [DISCONTINUED] B complex-vitamin C-folic acid (Nephro-Vinod Rx) 1- mg-mg-mcg tablet Take 1 tablet by mouth once daily with breakfast.       Labs:    Lab Results   Component Value Date    CREATININE 5.23 (H) 12/24/2024    BUN 68 (H) 12/24/2024     (L) 12/24/2024    K 3.5 12/24/2024    CL 90 (L) 12/24/2024    CO2 28 12/24/2024              Assessment/Plan   Assessment & Plan  ESRD (end stage renal disease) (Multi)    Type 2 diabetes mellitus, with long-term current use of insulin    The patient is a 74 year old male who is admitted to the hospital for kidney transplant for his ESRD. The kidney transplant was done on 12/21/2024 and the patient appears to be " doing well. Endocrinology has been following the patient for management of hyperglycemia in the setting of glucocorticoid use and T2DM. His T2DM was well controlled previously.    Steroid dose:   Solumedrol 125 mg on 12/23/2024  Solumedrol 60 mg on 12/24/2024  Prednisone 20 mg PO from 12/25/2024    - G: Keep at 10 units every 24 hours  - L: increase to 4 U with each meal  - SS: 2:50 >150 with meals  - Accuchecks ACHS  - Hypoglycemia protocol in place  - BG goal 140-180  - CCD diet - 60 g  - Patient would need to be established at the transplant clinic upon discharge for insulin titration and management. Message sent to the transplant team.    The patient's plan was discussed with Dr. Martinez.    Miles Glass MD  PGY-4 Endocrinology Fellow

## 2024-12-24 NOTE — PROGRESS NOTES
Transplant event completed.  Verified serologies and cross clamp time with [facility].  Verified recipient is listed in the transplant phase in Epic.  Recipient followup form completed.

## 2024-12-24 NOTE — CARE PLAN
Problem: Pain - Adult  Goal: Verbalizes/displays adequate comfort level or baseline comfort level  Outcome: Progressing   The patient's goals for the shift include get kidney    The clinical goals for the shift include atient  will reman HDS throughout the shift    Patient has received kidney transplant.

## 2024-12-24 NOTE — CARE PLAN
The patient's goals for the shift include get kidney    The clinical goals for the shift include pt will remain hds      Problem: Pain - Adult  Goal: Verbalizes/displays adequate comfort level or baseline comfort level  Outcome: Progressing     Problem: Safety - Adult  Goal: Free from fall injury  Outcome: Progressing     Problem: Discharge Planning  Goal: Discharge to home or other facility with appropriate resources  Outcome: Progressing     Problem: Chronic Conditions and Co-morbidities  Goal: Patient's chronic conditions and co-morbidity symptoms are monitored and maintained or improved  Outcome: Progressing     Problem: Diabetes  Goal: Achieve decreasing blood glucose levels by end of shift  Outcome: Progressing     Problem: Diabetes  Goal: Increase stability of blood glucose readings by end of shift  Outcome: Progressing     Problem: Diabetes  Goal: Vital signs within normal range for age by end of shift  Outcome: Progressing

## 2024-12-25 LAB
ALBUMIN SERPL BCP-MCNC: 3.2 G/DL (ref 3.4–5)
ANION GAP SERPL CALC-SCNC: 15 MMOL/L (ref 10–20)
BASOPHILS # BLD AUTO: 0 X10*3/UL (ref 0–0.1)
BASOPHILS NFR BLD AUTO: 0 %
BUN SERPL-MCNC: 76 MG/DL (ref 6–23)
CALCIUM SERPL-MCNC: 8.5 MG/DL (ref 8.6–10.6)
CHLORIDE SERPL-SCNC: 91 MMOL/L (ref 98–107)
CO2 SERPL-SCNC: 28 MMOL/L (ref 21–32)
CREAT SERPL-MCNC: 4.69 MG/DL (ref 0.5–1.3)
EGFRCR SERPLBLD CKD-EPI 2021: 12 ML/MIN/1.73M*2
EOSINOPHIL # BLD AUTO: 0 X10*3/UL (ref 0–0.4)
EOSINOPHIL NFR BLD AUTO: 0 %
ERYTHROCYTE [DISTWIDTH] IN BLOOD BY AUTOMATED COUNT: 15.2 % (ref 11.5–14.5)
GLUCOSE BLD MANUAL STRIP-MCNC: 130 MG/DL (ref 74–99)
GLUCOSE BLD MANUAL STRIP-MCNC: 148 MG/DL (ref 74–99)
GLUCOSE BLD MANUAL STRIP-MCNC: 199 MG/DL (ref 74–99)
GLUCOSE BLD MANUAL STRIP-MCNC: 206 MG/DL (ref 74–99)
GLUCOSE SERPL-MCNC: 160 MG/DL (ref 74–99)
HCT VFR BLD AUTO: 22.6 % (ref 41–52)
HGB BLD-MCNC: 7.6 G/DL (ref 13.5–17.5)
IMM GRANULOCYTES # BLD AUTO: 0.02 X10*3/UL (ref 0–0.5)
IMM GRANULOCYTES NFR BLD AUTO: 0.6 % (ref 0–0.9)
LYMPHOCYTES # BLD AUTO: 0.13 X10*3/UL (ref 0.8–3)
LYMPHOCYTES NFR BLD AUTO: 3.7 %
MAGNESIUM SERPL-MCNC: 1.99 MG/DL (ref 1.6–2.4)
MCH RBC QN AUTO: 31.8 PG (ref 26–34)
MCHC RBC AUTO-ENTMCNC: 33.6 G/DL (ref 32–36)
MCV RBC AUTO: 95 FL (ref 80–100)
MONOCYTES # BLD AUTO: 0.51 X10*3/UL (ref 0.05–0.8)
MONOCYTES NFR BLD AUTO: 14.4 %
NEUTROPHILS # BLD AUTO: 2.89 X10*3/UL (ref 1.6–5.5)
NEUTROPHILS NFR BLD AUTO: 81.3 %
NRBC BLD-RTO: 0 /100 WBCS (ref 0–0)
PHOSPHATE SERPL-MCNC: 4.1 MG/DL (ref 2.5–4.9)
PLATELET # BLD AUTO: 138 X10*3/UL (ref 150–450)
POTASSIUM SERPL-SCNC: 3.3 MMOL/L (ref 3.5–5.3)
RBC # BLD AUTO: 2.39 X10*6/UL (ref 4.5–5.9)
SODIUM SERPL-SCNC: 131 MMOL/L (ref 136–145)
TACROLIMUS BLD-MCNC: 11.7 NG/ML
WBC # BLD AUTO: 3.6 X10*3/UL (ref 4.4–11.3)

## 2024-12-25 PROCEDURE — 2500000002 HC RX 250 W HCPCS SELF ADMINISTERED DRUGS (ALT 637 FOR MEDICARE OP, ALT 636 FOR OP/ED)

## 2024-12-25 PROCEDURE — 2500000001 HC RX 250 WO HCPCS SELF ADMINISTERED DRUGS (ALT 637 FOR MEDICARE OP)

## 2024-12-25 PROCEDURE — 99233 SBSQ HOSP IP/OBS HIGH 50: CPT | Performed by: STUDENT IN AN ORGANIZED HEALTH CARE EDUCATION/TRAINING PROGRAM

## 2024-12-25 PROCEDURE — RXMED WILLOW AMBULATORY MEDICATION CHARGE

## 2024-12-25 PROCEDURE — 36415 COLL VENOUS BLD VENIPUNCTURE: CPT | Performed by: STUDENT IN AN ORGANIZED HEALTH CARE EDUCATION/TRAINING PROGRAM

## 2024-12-25 PROCEDURE — 99232 SBSQ HOSP IP/OBS MODERATE 35: CPT | Performed by: STUDENT IN AN ORGANIZED HEALTH CARE EDUCATION/TRAINING PROGRAM

## 2024-12-25 PROCEDURE — 2500000004 HC RX 250 GENERAL PHARMACY W/ HCPCS (ALT 636 FOR OP/ED)

## 2024-12-25 PROCEDURE — 85025 COMPLETE CBC W/AUTO DIFF WBC: CPT | Performed by: STUDENT IN AN ORGANIZED HEALTH CARE EDUCATION/TRAINING PROGRAM

## 2024-12-25 PROCEDURE — 94668 MNPJ CHEST WALL SBSQ: CPT

## 2024-12-25 PROCEDURE — 83735 ASSAY OF MAGNESIUM: CPT | Performed by: STUDENT IN AN ORGANIZED HEALTH CARE EDUCATION/TRAINING PROGRAM

## 2024-12-25 PROCEDURE — 99232 SBSQ HOSP IP/OBS MODERATE 35: CPT | Performed by: INTERNAL MEDICINE

## 2024-12-25 PROCEDURE — 80197 ASSAY OF TACROLIMUS: CPT

## 2024-12-25 PROCEDURE — 1200000002 HC GENERAL ROOM WITH TELEMETRY DAILY

## 2024-12-25 PROCEDURE — 2500000001 HC RX 250 WO HCPCS SELF ADMINISTERED DRUGS (ALT 637 FOR MEDICARE OP): Performed by: STUDENT IN AN ORGANIZED HEALTH CARE EDUCATION/TRAINING PROGRAM

## 2024-12-25 PROCEDURE — 2500000004 HC RX 250 GENERAL PHARMACY W/ HCPCS (ALT 636 FOR OP/ED): Performed by: STUDENT IN AN ORGANIZED HEALTH CARE EDUCATION/TRAINING PROGRAM

## 2024-12-25 PROCEDURE — 82947 ASSAY GLUCOSE BLOOD QUANT: CPT

## 2024-12-25 PROCEDURE — 80069 RENAL FUNCTION PANEL: CPT | Performed by: STUDENT IN AN ORGANIZED HEALTH CARE EDUCATION/TRAINING PROGRAM

## 2024-12-25 RX ORDER — ROSUVASTATIN CALCIUM 10 MG/1
10 TABLET, COATED ORAL NIGHTLY
Qty: 30 TABLET | Refills: 0 | Status: SHIPPED | OUTPATIENT
Start: 2024-12-25 | End: 2025-01-25

## 2024-12-25 RX ORDER — FUROSEMIDE 40 MG/1
80 TABLET ORAL
Qty: 120 TABLET | Refills: 0 | Status: SHIPPED | OUTPATIENT
Start: 2024-12-25 | End: 2024-12-26

## 2024-12-25 RX ORDER — METHOCARBAMOL 500 MG/1
500 TABLET, FILM COATED ORAL EVERY 8 HOURS PRN
Status: DISCONTINUED | OUTPATIENT
Start: 2024-12-25 | End: 2024-12-26 | Stop reason: HOSPADM

## 2024-12-25 RX ORDER — SULFAMETHOXAZOLE AND TRIMETHOPRIM 400; 80 MG/1; MG/1
1 TABLET ORAL DAILY
Qty: 30 TABLET | Refills: 0 | Status: SHIPPED | OUTPATIENT
Start: 2024-12-26 | End: 2025-01-25

## 2024-12-25 RX ORDER — SULFAMETHOXAZOLE AND TRIMETHOPRIM 400; 80 MG/1; MG/1
1 TABLET ORAL DAILY
Status: DISCONTINUED | OUTPATIENT
Start: 2024-12-25 | End: 2024-12-26 | Stop reason: HOSPADM

## 2024-12-25 RX ORDER — POTASSIUM CHLORIDE 1.5 G/1.58G
20 POWDER, FOR SOLUTION ORAL ONCE
Status: COMPLETED | OUTPATIENT
Start: 2024-12-25 | End: 2024-12-25

## 2024-12-25 RX ORDER — INSULIN GLARGINE 100 [IU]/ML
8 INJECTION, SOLUTION SUBCUTANEOUS NIGHTLY
Status: DISCONTINUED | OUTPATIENT
Start: 2024-12-25 | End: 2024-12-26 | Stop reason: HOSPADM

## 2024-12-25 RX ORDER — INSULIN LISPRO 100 [IU]/ML
0-5 INJECTION, SOLUTION INTRAVENOUS; SUBCUTANEOUS
Status: DISCONTINUED | OUTPATIENT
Start: 2024-12-25 | End: 2024-12-26 | Stop reason: HOSPADM

## 2024-12-25 RX ADMIN — OXYCODONE HYDROCHLORIDE 5 MG: 5 TABLET ORAL at 09:55

## 2024-12-25 RX ADMIN — INSULIN GLARGINE 8 UNITS: 100 INJECTION, SOLUTION SUBCUTANEOUS at 22:27

## 2024-12-25 RX ADMIN — MYCOPHENOLATE MOFETIL 1000 MG: 250 CAPSULE ORAL at 06:23

## 2024-12-25 RX ADMIN — HEPARIN SODIUM 5000 UNITS: 5000 INJECTION, SOLUTION INTRAVENOUS; SUBCUTANEOUS at 03:08

## 2024-12-25 RX ADMIN — SENNOSIDES AND DOCUSATE SODIUM 1 TABLET: 50; 8.6 TABLET ORAL at 22:26

## 2024-12-25 RX ADMIN — FUROSEMIDE 80 MG: 10 INJECTION, SOLUTION INTRAVENOUS at 09:53

## 2024-12-25 RX ADMIN — METHOCARBAMOL TABLETS 500 MG: 500 TABLET, COATED ORAL at 22:27

## 2024-12-25 RX ADMIN — PREDNISONE 20 MG: 20 TABLET ORAL at 09:48

## 2024-12-25 RX ADMIN — SULFAMETHOXAZOLE AND TRIMETHOPRIM 1 TABLET: 400; 80 TABLET ORAL at 09:52

## 2024-12-25 RX ADMIN — INSULIN LISPRO 4 UNITS: 100 INJECTION, SOLUTION INTRAVENOUS; SUBCUTANEOUS at 17:07

## 2024-12-25 RX ADMIN — INSULIN LISPRO 1 UNITS: 100 INJECTION, SOLUTION INTRAVENOUS; SUBCUTANEOUS at 17:07

## 2024-12-25 RX ADMIN — POLYETHYLENE GLYCOL 3350 17 G: 17 POWDER, FOR SOLUTION ORAL at 09:48

## 2024-12-25 RX ADMIN — PANTOPRAZOLE SODIUM 40 MG: 40 TABLET, DELAYED RELEASE ORAL at 06:26

## 2024-12-25 RX ADMIN — AMLODIPINE BESYLATE 10 MG: 10 TABLET ORAL at 09:48

## 2024-12-25 RX ADMIN — HEPARIN SODIUM 5000 UNITS: 5000 INJECTION, SOLUTION INTRAVENOUS; SUBCUTANEOUS at 09:53

## 2024-12-25 RX ADMIN — POLYETHYLENE GLYCOL 3350 17 G: 17 POWDER, FOR SOLUTION ORAL at 22:26

## 2024-12-25 RX ADMIN — FUROSEMIDE 80 MG: 10 INJECTION, SOLUTION INTRAVENOUS at 18:21

## 2024-12-25 RX ADMIN — INSULIN LISPRO 4 UNITS: 100 INJECTION, SOLUTION INTRAVENOUS; SUBCUTANEOUS at 10:10

## 2024-12-25 RX ADMIN — CLOTRIMAZOLE 10 MG: 10 LOZENGE ORAL at 17:09

## 2024-12-25 RX ADMIN — ROSUVASTATIN CALCIUM 10 MG: 10 TABLET, FILM COATED ORAL at 22:26

## 2024-12-25 RX ADMIN — POTASSIUM CHLORIDE 20 MEQ: 1.5 POWDER, FOR SOLUTION ORAL at 11:20

## 2024-12-25 RX ADMIN — OXYCODONE HYDROCHLORIDE 5 MG: 5 TABLET ORAL at 18:28

## 2024-12-25 RX ADMIN — HEPARIN SODIUM 5000 UNITS: 5000 INJECTION, SOLUTION INTRAVENOUS; SUBCUTANEOUS at 18:21

## 2024-12-25 RX ADMIN — TACROLIMUS 2 MG: 1 CAPSULE ORAL at 06:23

## 2024-12-25 RX ADMIN — PANTOPRAZOLE SODIUM 40 MG: 40 TABLET, DELAYED RELEASE ORAL at 17:08

## 2024-12-25 RX ADMIN — CLOTRIMAZOLE 10 MG: 10 LOZENGE ORAL at 09:48

## 2024-12-25 RX ADMIN — CLOTRIMAZOLE 10 MG: 10 LOZENGE ORAL at 14:51

## 2024-12-25 RX ADMIN — METHOCARBAMOL TABLETS 500 MG: 500 TABLET, COATED ORAL at 06:23

## 2024-12-25 RX ADMIN — FUROSEMIDE 80 MG: 10 INJECTION, SOLUTION INTRAVENOUS at 03:08

## 2024-12-25 RX ADMIN — GABAPENTIN 200 MG: 100 CAPSULE ORAL at 22:25

## 2024-12-25 RX ADMIN — TACROLIMUS 2 MG: 1 CAPSULE ORAL at 18:21

## 2024-12-25 RX ADMIN — INSULIN LISPRO 4 UNITS: 100 INJECTION, SOLUTION INTRAVENOUS; SUBCUTANEOUS at 14:50

## 2024-12-25 RX ADMIN — MYCOPHENOLATE MOFETIL 1000 MG: 250 CAPSULE ORAL at 18:21

## 2024-12-25 ASSESSMENT — COGNITIVE AND FUNCTIONAL STATUS - GENERAL
CLIMB 3 TO 5 STEPS WITH RAILING: A LITTLE
STANDING UP FROM CHAIR USING ARMS: A LITTLE
DAILY ACTIVITIY SCORE: 24
MOBILITY SCORE: 21
WALKING IN HOSPITAL ROOM: A LITTLE

## 2024-12-25 ASSESSMENT — PAIN SCALES - PAIN ASSESSMENT IN ADVANCED DEMENTIA (PAINAD)
FACIALEXPRESSION: SMILING OR INEXPRESSIVE
BREATHING: NORMAL
BODYLANGUAGE: RELAXED
CONSOLABILITY: NO NEED TO CONSOLE
CONSOLABILITY: NO NEED TO CONSOLE
BODYLANGUAGE: RELAXED
BREATHING: NORMAL
TOTALSCORE: 0
TOTALSCORE: 0
FACIALEXPRESSION: SMILING OR INEXPRESSIVE

## 2024-12-25 ASSESSMENT — PAIN SCALES - GENERAL
PAINLEVEL_OUTOF10: 5 - MODERATE PAIN
PAINLEVEL_OUTOF10: 4
PAINLEVEL_OUTOF10: 4

## 2024-12-25 ASSESSMENT — PAIN DESCRIPTION - ORIENTATION: ORIENTATION: RIGHT

## 2024-12-25 NOTE — PROGRESS NOTES
"Temo Hanson is a 74 y.o. male on day 5 of admission presenting with ESRD (end stage renal disease) (Multi).    Subjective   NAEON. Pt seen and examined at bedside this AM. Doing Ok in NAD.  Switched to pred 20 mg today.     I have reviewed histories, allergies and medications have been reviewed and there are no changes       Objective       Last Recorded Vitals  Blood pressure 135/69, pulse 82, temperature 36.1 °C (97 °F), temperature source Temporal, resp. rate 18, height 1.854 m (6' 1\"), weight 76.8 kg (169 lb 5 oz), SpO2 98%.  Intake/Output last 3 Shifts:  I/O last 3 completed shifts:  In: 360 (4.7 mL/kg) [P.O.:360]  Out: 2710 (35.3 mL/kg) [Urine:2365 (0.9 mL/kg/hr); Drains:345]  Weight: 76.8 kg     Relevant Results  Results from last 7 days   Lab Units 12/25/24  1205 12/25/24  0821 12/25/24  0520 12/24/24 2015 12/24/24  1519 12/24/24  1210 12/24/24  0822 12/24/24  0536 12/23/24  0833 12/23/24  0549 12/22/24  0923 12/22/24  0610 12/21/24  0532 12/21/24  0417   POCT GLUCOSE mg/dL 130* 148*  --  303* 265* 190*   < >  --    < >  --    < >  --    < >  --    GLUCOSE mg/dL  --   --  160*  --   --   --   --  223*  --  353*  --  205*  --  198*    < > = values in this interval not displayed.                  Assessment/Plan   Assessment & Plan  ESRD (end stage renal disease) (Multi)    Type 2 diabetes mellitus, with long-term current use of insulin    The patient is a 74 year old male who is admitted to the hospital for kidney transplant for his ESRD. The kidney transplant was done on 12/21/2024 and the patient appears to be doing well. Endocrinology has been following the patient for management of hyperglycemia in the setting of glucocorticoid use and T2DM. His T2DM was well controlled previously.     Steroid dose:   Solumedrol 125 mg on 12/23/2024  Solumedrol 60 mg on 12/24/2024  Prednisone 20 mg PO from 12/25/2024    Plan:   Pt is on pred 20 currently, requiring less insulin at this time  - G: decrease to 8 units " QHS  - L: continue 4 U TID AC  - SS: decrease to #1 ( 1:50 >150 with meals)  - Accuchecks ACHS  - Hypoglycemia protocol in place  - BG goal 140-180  - CCD diet - 60 g  - Patient would need to be established at the transplant clinic upon discharge for insulin titration and management. Message sent to the transplant team.     The patient's plan was discussed with Dr. Martinez.    Diana Sawass Najjar, MD  Endocrine fellow

## 2024-12-25 NOTE — CARE PLAN
Problem: Pain - Adult  Goal: Verbalizes/displays adequate comfort level or baseline comfort level  Outcome: Progressing   The patient's goals for the shift include patient will remain josey lever of pain that he can tolerate    The clinical goals for the shift include Patient will remain HDS throughout the shift    Patient's pain was controlled throughout the shift

## 2024-12-25 NOTE — PROGRESS NOTES
"Temo Hanson is a 74 y.o. male on day 5 of admission presenting with ESRD (end stage renal disease) (Multi).    Subjective   No acute events    Objective   Vitals:    12/25/24 1139   BP: 135/69   Pulse: 82   Resp: 18   Temp: 36.1 °C (97 °F)   SpO2: 98%       Physical Exam  Constitutional:       Appearance: Normal appearance.   Eyes:      Pupils: Pupils are equal, round, and reactive to light.   Cardiovascular:      Rate and Rhythm: Normal rate.   Pulmonary:      Effort: Pulmonary effort is normal. No respiratory distress.   Abdominal:      General: There is no distension.      Palpations: Abdomen is soft.      Comments: Incision clean, dry, intact   Musculoskeletal:         General: Normal range of motion.      Right lower leg: No edema.      Left lower leg: No edema.   Skin:     General: Skin is warm and dry.   Neurological:      General: No focal deficit present.      Mental Status: He is alert and oriented to person, place, and time.   Psychiatric:         Mood and Affect: Mood normal.         Behavior: Behavior normal.         Last Recorded Vitals  Blood pressure 135/69, pulse 82, temperature 36.1 °C (97 °F), temperature source Temporal, resp. rate 18, height 1.854 m (6' 1\"), weight 76.8 kg (169 lb 5 oz), SpO2 98%.  Intake/Output last 3 Shifts:  I/O last 3 completed shifts:  In: 360 (4.7 mL/kg) [P.O.:360]  Out: 2710 (35.3 mL/kg) [Urine:2365 (0.9 mL/kg/hr); Drains:345]  Weight: 76.8 kg     Relevant Results  Lab Results   Component Value Date    WBC 3.6 (L) 12/25/2024    HGB 7.6 (L) 12/25/2024    HCT 22.6 (L) 12/25/2024    MCV 95 12/25/2024     (L) 12/25/2024     Lab Results   Component Value Date    GLUCOSE 160 (H) 12/25/2024    CALCIUM 8.5 (L) 12/25/2024     (L) 12/25/2024    K 3.3 (L) 12/25/2024    CO2 28 12/25/2024    CL 91 (L) 12/25/2024    BUN 76 (H) 12/25/2024    CREATININE 4.69 (H) 12/25/2024     amLODIPine, 10 mg, oral, Daily  clotrimazole, 10 mg, oral, TID after meals  furosemide, 80 mg, " intravenous, q8h KASSY  gabapentin, 200 mg, oral, Nightly  heparin (porcine), 5,000 Units, subcutaneous, q8h  insulin glargine, 10 Units, subcutaneous, Nightly  insulin lispro, 0-10 Units, subcutaneous, TID AC  insulin lispro, 4 Units, subcutaneous, TID AC  mycophenolate, 1,000 mg, oral, q12h KASSY  pantoprazole, 40 mg, oral, BID AC  polyethylene glycol, 17 g, oral, q12h KASSY  predniSONE, 20 mg, oral, Daily  rosuvastatin, 10 mg, oral, Nightly  sennosides-docusate sodium, 1 tablet, oral, Nightly  sulfamethoxazole-trimethoprim, 1 tablet, oral, Daily  tacrolimus, 2 mg, oral, q12h KASSY  valGANciclovir, 450 mg, oral, q48h          Assessment/Plan   Assessment & Plan  ESRD (end stage renal disease) (Multi)    Type 2 diabetes mellitus, with long-term current use of insulin    DDKT 12/21  KDPI 93    Kidney allograft function   primary   BLAKE and kidney intra-peritoneal    Immunosuppression   Thymo, goal 3 mg/kg   Tac goal 7-9  MMF 1000/1000   Pred taper    HBcAb+ Donor:   Appreciate hepatology recs - obtain baseline HBV DNA, monitor    Pacemaker in place:   Cardiology consult appreciated - Device interrogation good   ECHO good    DM    appreciate endocrine    HTN   Medical management     I spent 35 minutes in the professional and overall care of this patient, including immunosuppression      Dora Rodriguez MD

## 2024-12-25 NOTE — PROGRESS NOTES
"Transplant Nephrology progress note     Date of admission: 12/20/2024     Temo Hanson is a 74 y.o.  with ESRD on hemodialysis Monday Wednesday Friday was currently admitted status post DDKT on 12/21/2024.      SUBJECTIVE:  No overnight event. Doing well overall. Bravo removed yesterday but still retains some urine. Doing double voiding.    PROBLEM LIST:  Assessment & Plan  ESRD (end stage renal disease) (Multi)    Type 2 diabetes mellitus, with long-term current use of insulin         ALLERGIES:  Allergies   Allergen Reactions    Terazosin Unknown     Did not feel well on this medication    Codeine Itching     itching    Tramadol Itching        CURRENT MEDICATIONS:  Scheduled medications  amLODIPine, 10 mg, oral, Daily  clotrimazole, 10 mg, oral, TID after meals  furosemide, 80 mg, intravenous, q8h KASSY  gabapentin, 200 mg, oral, Nightly  heparin (porcine), 5,000 Units, subcutaneous, q8h  insulin glargine, 10 Units, subcutaneous, Nightly  insulin lispro, 0-10 Units, subcutaneous, TID AC  insulin lispro, 4 Units, subcutaneous, TID AC  methocarbamol, 500 mg, oral, q8h KASSY  mycophenolate, 1,000 mg, oral, q12h KASSY  pantoprazole, 40 mg, oral, BID AC  polyethylene glycol, 17 g, oral, q12h KASSY  predniSONE, 20 mg, oral, Daily  rosuvastatin, 10 mg, oral, Nightly  sennosides-docusate sodium, 1 tablet, oral, Nightly  tacrolimus, 2 mg, oral, q12h KASSY  valGANciclovir, 450 mg, oral, q48h      Continuous medications     PRN medications  PRN medications: acetaminophen, dextrose, dextrose, diphenhydrAMINE, glucagon, glucagon, naloxone, ondansetron, oxyCODONE, oxyCODONE, oxygen       OBJECTIVE:    VITALS: Visit Vitals  /52 (BP Location: Right arm, Patient Position: Lying)   Pulse 66   Temp 36.2 °C (97.2 °F) (Temporal)   Resp 18   Ht 1.854 m (6' 1\")   Wt 76.8 kg (169 lb 5 oz)   SpO2 98%   BMI 22.34 kg/m²   Smoking Status Never   BSA 1.99 m²        General: No distress   Mucosa moist   AI, AC, AF     HEENT: PEERLA  CVS: S1 S2 " no murmurs  RESP:  Lungs clear to auscultation   ABDO: Soft, non-tender. Incision C/D/I  Neuro: A + O x 3  Skin: No rash   Extremities: No edema       LABS:  Results from last 72 hours   Lab Units 12/25/24  0520 12/24/24  0536   WBC AUTO x10*3/uL 3.6* 5.5   HEMOGLOBIN g/dL 7.6* 7.4*   MCV fL 95 94   PLATELETS AUTO x10*3/uL 138* 135*   BUN mg/dL 76* 68*   CREATININE mg/dL 4.69* 5.23*   CALCIUM mg/dL 8.5* 8.6   TACROLIMUS ng/mL  --  7.2            Intake/Output Summary (Last 24 hours) at 12/25/2024 0901  Last data filed at 12/25/2024 0818  Gross per 24 hour   Intake 240 ml   Output 1500 ml   Net -1260 ml          ASSESSMENT AND PLAN:    Temo Hanson is a 74 y.o.  with a past medical history of ESRD on hemodialysis Monday Wednesday Friday was currently admitted status post DDKT on 12/21/2024.  -Other history: Hypertension, hyperlipidemia, complete heart block status post leadless pacemaker insertion on 7/17/2024, pericardial effusion, pheochromocytoma of the right adrenal gland, history of prostate cancer, GI bleed     Kidney info: KDPI is 93%, PRA 54%, cold ischemia time 34 hours,   EBV R+, CMV R+     Allograft function:  -SGF - good UOP  -No urgent indication for RRT at this time  - Lasix 80 mg IV q8h  - K replacement  - LUTS: monitor for now. Consider Flomax 0.8 mg at bedtime      Immunosuppression:  -Thymo induction 3 mg/kg - completed  TAC 2 mg BID  MMG 1000 mg BID  Prednisone 20 mg/day     Prophylaxis   - Valcyte x 3 mo  - Clotrimazole 10 mg TID x 3 mo  - PJP prophyalxis - TMP-SMX SS daily x 6 mo  - Recipient's HBV immune with positive core antibody - hepatology consulted 12/24/24 - no need for prophylactic antiviral at this time. Monitor HBV DNA q3mo    Heme - acceptable     Bone mineral disease:  Acceptable for degree of kidney function    Blood pressure   - amlodipine 10 mg/day    Type 2 DM - basal bolus insulin  Crestor 10 mg/day    Dispo: tomorrow    Evelyn Trimble MD

## 2024-12-25 NOTE — PROGRESS NOTES
"Temo Hanson is a 74 y.o. male on day 5 of admission presenting with ESRD (end stage renal disease) (Multi).    Subjective   Pain control better    Objective   Vitals:    12/25/24 1139   BP: 135/69   Pulse: 82   Resp: 18   Temp: 36.1 °C (97 °F)   SpO2: 98%       Physical Exam  Constitutional:       Appearance: Normal appearance.   Eyes:      Pupils: Pupils are equal, round, and reactive to light.   Cardiovascular:      Rate and Rhythm: Normal rate.   Pulmonary:      Effort: Pulmonary effort is normal. No respiratory distress.   Abdominal:      General: There is no distension.      Palpations: Abdomen is soft.      Comments: Incision clean, dry, intact   Musculoskeletal:         General: Normal range of motion.      Right lower leg: No edema.      Left lower leg: No edema.   Skin:     General: Skin is warm and dry.   Neurological:      General: No focal deficit present.      Mental Status: He is alert and oriented to person, place, and time.   Psychiatric:         Mood and Affect: Mood normal.         Behavior: Behavior normal.         Last Recorded Vitals  Blood pressure 135/69, pulse 82, temperature 36.1 °C (97 °F), temperature source Temporal, resp. rate 18, height 1.854 m (6' 1\"), weight 76.8 kg (169 lb 5 oz), SpO2 98%.  Intake/Output last 3 Shifts:  I/O last 3 completed shifts:  In: 360 (4.7 mL/kg) [P.O.:360]  Out: 2710 (35.3 mL/kg) [Urine:2365 (0.9 mL/kg/hr); Drains:345]  Weight: 76.8 kg     Relevant Results  Lab Results   Component Value Date    WBC 3.6 (L) 12/25/2024    HGB 7.6 (L) 12/25/2024    HCT 22.6 (L) 12/25/2024    MCV 95 12/25/2024     (L) 12/25/2024     Lab Results   Component Value Date    GLUCOSE 160 (H) 12/25/2024    CALCIUM 8.5 (L) 12/25/2024     (L) 12/25/2024    K 3.3 (L) 12/25/2024    CO2 28 12/25/2024    CL 91 (L) 12/25/2024    BUN 76 (H) 12/25/2024    CREATININE 4.69 (H) 12/25/2024     amLODIPine, 10 mg, oral, Daily  clotrimazole, 10 mg, oral, TID after meals  furosemide, 80 " mg, intravenous, q8h KASSY  gabapentin, 200 mg, oral, Nightly  heparin (porcine), 5,000 Units, subcutaneous, q8h  insulin glargine, 10 Units, subcutaneous, Nightly  insulin lispro, 0-10 Units, subcutaneous, TID AC  insulin lispro, 4 Units, subcutaneous, TID AC  mycophenolate, 1,000 mg, oral, q12h KASSY  pantoprazole, 40 mg, oral, BID AC  polyethylene glycol, 17 g, oral, q12h KASSY  predniSONE, 20 mg, oral, Daily  rosuvastatin, 10 mg, oral, Nightly  sennosides-docusate sodium, 1 tablet, oral, Nightly  sulfamethoxazole-trimethoprim, 1 tablet, oral, Daily  tacrolimus, 2 mg, oral, q12h KASSY  valGANciclovir, 450 mg, oral, q48h          Assessment/Plan   Assessment & Plan  ESRD (end stage renal disease) (Multi)    Type 2 diabetes mellitus, with long-term current use of insulin    DDKT 12/21  KDPI 93    Kidney allograft function   primary   BLAKE and kidney intra-peritoneal   Optimize multi-modal pain control    Immunosuppression   Thymo, goal 3 mg/kg   Tac goal 7-9  MMF 1000/1000   Pred taper    HBcAb+ Donor:   Consult hepatology ?Ppx required in view of robust HBsAb levels    Pacemaker in place:   Cardiology consult appreciated - Device interrogation good   ECHO to get a baseline    DM    appreciate endocrine    HTN   Medical management     I spent 35 minutes in the professional and overall care of this patient, including immunosuppression      Dora Rodriguez MD

## 2024-12-26 ENCOUNTER — PHARMACY VISIT (OUTPATIENT)
Dept: PHARMACY | Facility: CLINIC | Age: 74
End: 2024-12-26
Payer: MEDICARE

## 2024-12-26 ENCOUNTER — APPOINTMENT (OUTPATIENT)
Dept: CARDIOLOGY | Facility: HOSPITAL | Age: 74
DRG: 652 | End: 2024-12-26
Payer: COMMERCIAL

## 2024-12-26 ENCOUNTER — DOCUMENTATION (OUTPATIENT)
Dept: HOME HEALTH SERVICES | Facility: HOME HEALTH | Age: 74
End: 2024-12-26
Payer: COMMERCIAL

## 2024-12-26 ENCOUNTER — HOME HEALTH ADMISSION (OUTPATIENT)
Dept: HOME HEALTH SERVICES | Facility: HOME HEALTH | Age: 74
End: 2024-12-26
Payer: COMMERCIAL

## 2024-12-26 VITALS
BODY MASS INDEX: 22.32 KG/M2 | TEMPERATURE: 97.9 F | HEART RATE: 96 BPM | HEIGHT: 73 IN | RESPIRATION RATE: 18 BRPM | OXYGEN SATURATION: 97 % | DIASTOLIC BLOOD PRESSURE: 67 MMHG | WEIGHT: 168.43 LBS | SYSTOLIC BLOOD PRESSURE: 141 MMHG

## 2024-12-26 LAB
ALBUMIN SERPL BCP-MCNC: 3.4 G/DL (ref 3.4–5)
ANION GAP SERPL CALC-SCNC: 14 MMOL/L (ref 10–20)
BASOPHILS # BLD AUTO: 0 X10*3/UL (ref 0–0.1)
BASOPHILS NFR BLD AUTO: 0 %
BODY SURFACE AREA: 1.98 M2
BUN SERPL-MCNC: 76 MG/DL (ref 6–23)
CALCIUM SERPL-MCNC: 8.8 MG/DL (ref 8.6–10.6)
CHLORIDE SERPL-SCNC: 89 MMOL/L (ref 98–107)
CO2 SERPL-SCNC: 29 MMOL/L (ref 21–32)
CREAT SERPL-MCNC: 4.34 MG/DL (ref 0.5–1.3)
EGFRCR SERPLBLD CKD-EPI 2021: 14 ML/MIN/1.73M*2
EOSINOPHIL # BLD AUTO: 0.02 X10*3/UL (ref 0–0.4)
EOSINOPHIL NFR BLD AUTO: 0.7 %
ERYTHROCYTE [DISTWIDTH] IN BLOOD BY AUTOMATED COUNT: 15.4 % (ref 11.5–14.5)
GLUCOSE BLD MANUAL STRIP-MCNC: 138 MG/DL (ref 74–99)
GLUCOSE BLD MANUAL STRIP-MCNC: 174 MG/DL (ref 74–99)
GLUCOSE BLD MANUAL STRIP-MCNC: 205 MG/DL (ref 74–99)
GLUCOSE BLD MANUAL STRIP-MCNC: 206 MG/DL (ref 74–99)
GLUCOSE SERPL-MCNC: 182 MG/DL (ref 74–99)
HBV DNA SERPL NAA+PROBE-ACNC: NOT DETECTED [IU]/ML
HBV DNA SERPL NAA+PROBE-LOG IU: NORMAL {LOG_IU}/ML
HCT VFR BLD AUTO: 23.5 % (ref 41–52)
HGB BLD-MCNC: 8.1 G/DL (ref 13.5–17.5)
IMM GRANULOCYTES # BLD AUTO: 0.03 X10*3/UL (ref 0–0.5)
IMM GRANULOCYTES NFR BLD AUTO: 1 % (ref 0–0.9)
LYMPHOCYTES # BLD AUTO: 0.21 X10*3/UL (ref 0.8–3)
LYMPHOCYTES NFR BLD AUTO: 7.1 %
MAGNESIUM SERPL-MCNC: 1.87 MG/DL (ref 1.6–2.4)
MCH RBC QN AUTO: 32 PG (ref 26–34)
MCHC RBC AUTO-ENTMCNC: 34.5 G/DL (ref 32–36)
MCV RBC AUTO: 93 FL (ref 80–100)
MONOCYTES # BLD AUTO: 0.54 X10*3/UL (ref 0.05–0.8)
MONOCYTES NFR BLD AUTO: 18.2 %
NEUTROPHILS # BLD AUTO: 2.16 X10*3/UL (ref 1.6–5.5)
NEUTROPHILS NFR BLD AUTO: 73 %
NRBC BLD-RTO: 0.7 /100 WBCS (ref 0–0)
PHOSPHATE SERPL-MCNC: 3.2 MG/DL (ref 2.5–4.9)
PLATELET # BLD AUTO: 150 X10*3/UL (ref 150–450)
POTASSIUM SERPL-SCNC: 3.2 MMOL/L (ref 3.5–5.3)
RBC # BLD AUTO: 2.53 X10*6/UL (ref 4.5–5.9)
SODIUM SERPL-SCNC: 129 MMOL/L (ref 136–145)
TACROLIMUS BLD-MCNC: 13.7 NG/ML
WBC # BLD AUTO: 3 X10*3/UL (ref 4.4–11.3)

## 2024-12-26 PROCEDURE — 2500000004 HC RX 250 GENERAL PHARMACY W/ HCPCS (ALT 636 FOR OP/ED): Performed by: STUDENT IN AN ORGANIZED HEALTH CARE EDUCATION/TRAINING PROGRAM

## 2024-12-26 PROCEDURE — 99232 SBSQ HOSP IP/OBS MODERATE 35: CPT | Performed by: STUDENT IN AN ORGANIZED HEALTH CARE EDUCATION/TRAINING PROGRAM

## 2024-12-26 PROCEDURE — 99232 SBSQ HOSP IP/OBS MODERATE 35: CPT | Performed by: INTERNAL MEDICINE

## 2024-12-26 PROCEDURE — 87517 HEPATITIS B DNA QUANT: CPT

## 2024-12-26 PROCEDURE — 2500000001 HC RX 250 WO HCPCS SELF ADMINISTERED DRUGS (ALT 637 FOR MEDICARE OP)

## 2024-12-26 PROCEDURE — 36415 COLL VENOUS BLD VENIPUNCTURE: CPT

## 2024-12-26 PROCEDURE — 2500000001 HC RX 250 WO HCPCS SELF ADMINISTERED DRUGS (ALT 637 FOR MEDICARE OP): Performed by: STUDENT IN AN ORGANIZED HEALTH CARE EDUCATION/TRAINING PROGRAM

## 2024-12-26 PROCEDURE — 82947 ASSAY GLUCOSE BLOOD QUANT: CPT

## 2024-12-26 PROCEDURE — 80069 RENAL FUNCTION PANEL: CPT

## 2024-12-26 PROCEDURE — 83735 ASSAY OF MAGNESIUM: CPT

## 2024-12-26 PROCEDURE — 94668 MNPJ CHEST WALL SBSQ: CPT

## 2024-12-26 PROCEDURE — 2500000002 HC RX 250 W HCPCS SELF ADMINISTERED DRUGS (ALT 637 FOR MEDICARE OP, ALT 636 FOR OP/ED)

## 2024-12-26 PROCEDURE — 85025 COMPLETE CBC W/AUTO DIFF WBC: CPT

## 2024-12-26 PROCEDURE — 99233 SBSQ HOSP IP/OBS HIGH 50: CPT | Performed by: STUDENT IN AN ORGANIZED HEALTH CARE EDUCATION/TRAINING PROGRAM

## 2024-12-26 PROCEDURE — 99239 HOSP IP/OBS DSCHRG MGMT >30: CPT

## 2024-12-26 PROCEDURE — 2500000004 HC RX 250 GENERAL PHARMACY W/ HCPCS (ALT 636 FOR OP/ED)

## 2024-12-26 PROCEDURE — RXMED WILLOW AMBULATORY MEDICATION CHARGE

## 2024-12-26 PROCEDURE — 80197 ASSAY OF TACROLIMUS: CPT

## 2024-12-26 RX ORDER — PEN NEEDLE, DIABETIC 30 GX3/16"
1 NEEDLE, DISPOSABLE MISCELLANEOUS 4 TIMES DAILY
Qty: 400 EACH | Refills: 3 | Status: SHIPPED | OUTPATIENT
Start: 2024-12-26 | End: 2025-04-05

## 2024-12-26 RX ORDER — INSULIN ASPART INJECTION 100 [IU]/ML
4 INJECTION, SOLUTION SUBCUTANEOUS
Start: 2024-12-26

## 2024-12-26 RX ORDER — IBUPROFEN 200 MG
CAPSULE ORAL
Qty: 100 EACH | Refills: 0 | Status: SHIPPED | OUTPATIENT
Start: 2024-12-26

## 2024-12-26 RX ORDER — ISOPROPYL ALCOHOL 70 ML/100ML
1 SWAB TOPICAL 4 TIMES DAILY
Qty: 400 EACH | Refills: 0 | Status: SHIPPED | OUTPATIENT
Start: 2024-12-26 | End: 2025-04-05

## 2024-12-26 RX ORDER — POTASSIUM CHLORIDE 1.5 G/1.58G
40 POWDER, FOR SOLUTION ORAL ONCE
Status: COMPLETED | OUTPATIENT
Start: 2024-12-26 | End: 2024-12-26

## 2024-12-26 RX ORDER — OXYCODONE HYDROCHLORIDE 5 MG/1
5 TABLET ORAL EVERY 6 HOURS PRN
Qty: 5 TABLET | Refills: 0 | Status: SHIPPED | OUTPATIENT
Start: 2024-12-26 | End: 2024-12-29

## 2024-12-26 RX ORDER — BLOOD-GLUCOSE CONTROL, NORMAL
1 EACH MISCELLANEOUS 4 TIMES DAILY
Qty: 400 EACH | Refills: 0 | Status: SHIPPED | OUTPATIENT
Start: 2024-12-26

## 2024-12-26 RX ORDER — GABAPENTIN 300 MG/1
300 CAPSULE ORAL NIGHTLY
Status: DISCONTINUED | OUTPATIENT
Start: 2024-12-26 | End: 2024-12-26 | Stop reason: HOSPADM

## 2024-12-26 RX ORDER — BISACODYL 10 MG/1
10 SUPPOSITORY RECTAL DAILY
Status: DISCONTINUED | OUTPATIENT
Start: 2024-12-26 | End: 2024-12-26 | Stop reason: HOSPADM

## 2024-12-26 RX ORDER — INSULIN ASPART INJECTION 100 [IU]/ML
0-5 INJECTION, SOLUTION SUBCUTANEOUS
Qty: 15 ML | Refills: 0 | Status: SHIPPED | OUTPATIENT
Start: 2024-12-26

## 2024-12-26 RX ORDER — DEXTROSE 4 G
TABLET,CHEWABLE ORAL
Qty: 1 EACH | Refills: 0 | Status: SHIPPED | OUTPATIENT
Start: 2024-12-26

## 2024-12-26 RX ORDER — INSULIN GLARGINE 100 [IU]/ML
8 INJECTION, SOLUTION SUBCUTANEOUS NIGHTLY
Start: 2024-12-26 | End: 2025-01-25

## 2024-12-26 RX ORDER — GABAPENTIN 100 MG/1
200 CAPSULE ORAL DAILY
Start: 2024-12-26 | End: 2025-01-25

## 2024-12-26 RX ORDER — FUROSEMIDE 40 MG/1
40 TABLET ORAL
Start: 2024-12-26 | End: 2025-01-25

## 2024-12-26 RX ORDER — FUROSEMIDE 40 MG/1
40 TABLET ORAL
Status: DISCONTINUED | OUTPATIENT
Start: 2024-12-26 | End: 2024-12-26 | Stop reason: HOSPADM

## 2024-12-26 RX ADMIN — INSULIN LISPRO 4 UNITS: 100 INJECTION, SOLUTION INTRAVENOUS; SUBCUTANEOUS at 09:14

## 2024-12-26 RX ADMIN — INSULIN LISPRO 4 UNITS: 100 INJECTION, SOLUTION INTRAVENOUS; SUBCUTANEOUS at 14:48

## 2024-12-26 RX ADMIN — PREDNISONE 20 MG: 20 TABLET ORAL at 09:13

## 2024-12-26 RX ADMIN — HEPARIN SODIUM 5000 UNITS: 5000 INJECTION, SOLUTION INTRAVENOUS; SUBCUTANEOUS at 10:43

## 2024-12-26 RX ADMIN — VALGANCICLOVIR HYDROCHLORIDE 450 MG: 450 TABLET ORAL at 10:43

## 2024-12-26 RX ADMIN — SULFAMETHOXAZOLE AND TRIMETHOPRIM 1 TABLET: 400; 80 TABLET ORAL at 09:13

## 2024-12-26 RX ADMIN — HEPARIN SODIUM 5000 UNITS: 5000 INJECTION, SOLUTION INTRAVENOUS; SUBCUTANEOUS at 02:34

## 2024-12-26 RX ADMIN — CLOTRIMAZOLE 10 MG: 10 LOZENGE ORAL at 18:05

## 2024-12-26 RX ADMIN — TACROLIMUS 2 MG: 1 CAPSULE ORAL at 06:18

## 2024-12-26 RX ADMIN — CLOTRIMAZOLE 10 MG: 10 LOZENGE ORAL at 13:00

## 2024-12-26 RX ADMIN — HEPARIN SODIUM 5000 UNITS: 5000 INJECTION, SOLUTION INTRAVENOUS; SUBCUTANEOUS at 18:06

## 2024-12-26 RX ADMIN — FUROSEMIDE 80 MG: 10 INJECTION, SOLUTION INTRAVENOUS at 02:34

## 2024-12-26 RX ADMIN — INSULIN LISPRO 2 UNITS: 100 INJECTION, SOLUTION INTRAVENOUS; SUBCUTANEOUS at 18:14

## 2024-12-26 RX ADMIN — MYCOPHENOLATE MOFETIL 1000 MG: 250 CAPSULE ORAL at 06:18

## 2024-12-26 RX ADMIN — INSULIN LISPRO 4 UNITS: 100 INJECTION, SOLUTION INTRAVENOUS; SUBCUTANEOUS at 17:21

## 2024-12-26 RX ADMIN — AMLODIPINE BESYLATE 10 MG: 10 TABLET ORAL at 09:13

## 2024-12-26 RX ADMIN — PANTOPRAZOLE SODIUM 40 MG: 40 TABLET, DELAYED RELEASE ORAL at 18:12

## 2024-12-26 RX ADMIN — POTASSIUM CHLORIDE 40 MEQ: 1.5 POWDER, FOR SOLUTION ORAL at 09:12

## 2024-12-26 RX ADMIN — OXYCODONE HYDROCHLORIDE 5 MG: 5 TABLET ORAL at 09:09

## 2024-12-26 RX ADMIN — PANTOPRAZOLE SODIUM 40 MG: 40 TABLET, DELAYED RELEASE ORAL at 06:18

## 2024-12-26 RX ADMIN — POLYETHYLENE GLYCOL 3350 17 G: 17 POWDER, FOR SOLUTION ORAL at 09:13

## 2024-12-26 RX ADMIN — FUROSEMIDE 40 MG: 40 TABLET ORAL at 18:06

## 2024-12-26 RX ADMIN — INSULIN LISPRO 1 UNITS: 100 INJECTION, SOLUTION INTRAVENOUS; SUBCUTANEOUS at 09:15

## 2024-12-26 RX ADMIN — METHOCARBAMOL TABLETS 500 MG: 500 TABLET, COATED ORAL at 09:11

## 2024-12-26 RX ADMIN — CLOTRIMAZOLE 10 MG: 10 LOZENGE ORAL at 09:13

## 2024-12-26 RX ADMIN — MYCOPHENOLATE MOFETIL 1000 MG: 250 CAPSULE ORAL at 18:05

## 2024-12-26 ASSESSMENT — COGNITIVE AND FUNCTIONAL STATUS - GENERAL
WALKING IN HOSPITAL ROOM: A LITTLE
CLIMB 3 TO 5 STEPS WITH RAILING: A LITTLE
DAILY ACTIVITIY SCORE: 24
MOBILITY SCORE: 21
STANDING UP FROM CHAIR USING ARMS: A LITTLE

## 2024-12-26 ASSESSMENT — PAIN SCALES - PAIN ASSESSMENT IN ADVANCED DEMENTIA (PAINAD)
FACIALEXPRESSION: SAD, FRIGHTENED, FROWN
BREATHING: OCCASIONAL LABORED BREATHING, SHORT PERIOD OF HYPERVENTILATION
NEGVOCALIZATION: OCCASIONAL MOAN/GROAN, LOW SPEECH, NEGATIVE/DISAPPROVING QUALITY
CONSOLABILITY: DISTRACTED OR REASSURED BY VOICE/TOUCH
TOTALSCORE: 5
BODYLANGUAGE: TENSE, DISTRESSED PACING, FIDGETING
TOTALSCORE: MEDICATION (SEE MAR)

## 2024-12-26 ASSESSMENT — PAIN SCALES - WONG BAKER: WONGBAKER_NUMERICALRESPONSE: HURTS WHOLE LOT

## 2024-12-26 ASSESSMENT — PAIN DESCRIPTION - ORIENTATION: ORIENTATION: RIGHT

## 2024-12-26 ASSESSMENT — PAIN SCALES - GENERAL: PAINLEVEL_OUTOF10: 6

## 2024-12-26 NOTE — HH CARE COORDINATION
Home Care received a Referral for Nursing, Physical Therapy, and Occupational Therapy. We have processed the referral for a Start of Care on 12.27 or 12.28.24.     If you have any questions or concerns, please feel free to contact us at [phone]. Follow the prompts, enter your five digit zip code, and you will be directed to your care team on [facility].

## 2024-12-26 NOTE — CARE PLAN
The patient's goals for the shift include get kidney    The clinical goals for the shift include Patient will remain HDS throughout the shift      Problem: Pain - Adult  Goal: Verbalizes/displays adequate comfort level or baseline comfort level  Outcome: Progressing     Problem: Safety - Adult  Goal: Free from fall injury  Outcome: Progressing     Problem: Diabetes  Goal: Achieve decreasing blood glucose levels by end of shift  Outcome: Progressing     Problem: Diabetes  Goal: Vital signs within normal range for age by end of shift  Outcome: Progressing

## 2024-12-26 NOTE — CARE PLAN
Problem: Pain - Adult  Goal: Verbalizes/displays adequate comfort level or baseline comfort level  Outcome: Progressing   The patient's goals for the shift included discharge    The clinical goals for the shift include Patient  will remain HDS throughout the shift    Patient will be discharged today

## 2024-12-26 NOTE — PROGRESS NOTES
Pharmacist Transplant Discharge Note    Medications for Temo Hanson were reviewed in conjunction with the multidisciplinary transplant team. The pharmacist is in agreement with the plan of care for medications at this time.     Approximately 5 minutes were spent with this patient providing follow-up education and reviewing his finalized list of discharge medications. An updated outpatient dosing schedule was provided to the patient. All questions were answered to the patient's satisfaction, and the patient confirmed understanding.    The patient had his medications filled at [facility] and delivered prior to discharge. Further educational reinforcement should be provided in the outpatient clinic.    Idania Rivas, PharmD  Solid Organ Transplant Clinical Pharmacy Specialist

## 2024-12-26 NOTE — PROGRESS NOTES
"Temo Hanson is a 74 y.o. male on day 6 of admission presenting with ESRD (end stage renal disease) (Multi).    Subjective   Patient was seen and examined by the bedside this morning. His primary team is looking to discharge him home today.     I have reviewed histories, allergies and medications have been reviewed and there are no changes       Objective   Review of Systems  Negative unless noted above    Physical Exam  General: elderly AA male patient in NAD  HEENT: AT/NC  Neck: trachea in midline, no thyromegaly or nodules  Resp: CTA B/L  CVS: normal s1 and s2  Abdomen: soft and non tender to palpation, BS+  Skin: warm, dry and intact, no visible skin tags  Neuro: AAO x3, DTR 2+  Psych: cooperative      Last Recorded Vitals  Blood pressure 134/60, pulse 85, temperature 36 °C (96.8 °F), temperature source Temporal, resp. rate 18, height 1.854 m (6' 1\"), weight 76.4 kg (168 lb 6.9 oz), SpO2 96%.  Intake/Output last 3 Shifts:  I/O last 3 completed shifts:  In: 720 (9.4 mL/kg) [P.O.:720]  Out: 2435 (31.9 mL/kg) [Urine:2150 (0.8 mL/kg/hr); Drains:285]  Weight: 76.4 kg     Relevant Results  Results from last 7 days   Lab Units 12/26/24  0825 12/26/24  0605 12/25/24  2222 12/25/24  1616 12/25/24  1205 12/25/24  0821 12/25/24  0520 12/24/24  0822 12/24/24  0536 12/23/24  0833 12/23/24  0549 12/22/24  0923 12/22/24  0610   POCT GLUCOSE mg/dL 174*  --  206* 199* 130* 148*  --    < >  --    < >  --    < >  --    GLUCOSE mg/dL  --  182*  --   --   --   --  160*  --  223*  --  353*  --  205*    < > = values in this interval not displayed.     No current facility-administered medications on file prior to encounter.     Current Outpatient Medications on File Prior to Encounter   Medication Sig Dispense Refill    acetaminophen (Tylenol) 325 mg tablet Take 2 tablets (650 mg) by mouth every 6 hours if needed.      amLODIPine (Norvasc) 10 mg tablet Take 1 tablet (10 mg) by mouth once daily. 30 tablet 1    BD Ultra-Fine Joan Pen " "Needle 32 gauge x 5/32\" needle       carboxymethylcellulose (Refresh Plus) 0.5 % ophthalmic solution Instill 1 drop into both eyes 2-4x/day as needed for dryness.      doxazosin (Cardura) 8 mg tablet Take 1 tablet (8 mg) by mouth once daily at bedtime. (Patient not taking: Reported on 12/21/2024)      FreeStyle Mick 2 Sensor kit       gabapentin (Neurontin) 300 mg capsule Take 1 capsule (300 mg) by mouth once daily. 30 capsule 0    hydrALAZINE (Apresoline) 100 mg tablet Take 1 tablet (100 mg) by mouth 3 times a day. 90 tablet 0    insulin glargine,hum.rec.anlog (BASAGLAR KWIKPEN U-100 INSULIN SUBQ) Inject 10 Units under the skin once daily as needed. Take as directed per insulin instructions.      isosorbide dinitrate (Isordil) 10 mg tablet Take 1 tablet (10 mg) by mouth 3 times a day. 90 tablet 0    magnesium oxide (Mag-Ox) 400 mg tablet 1 tablet (400 mg) once daily.      omeprazole (PriLOSEC) 20 mg DR capsule Take 1 capsule (20 mg) by mouth once daily.      rosuvastatin (Crestor) 20 mg tablet Take 1 tablet (20 mg) by mouth once daily at bedtime. 30 tablet 1    valsartan (Diovan) 160 mg tablet Take 1 tablet (160 mg) by mouth once daily. 30 tablet 1    [DISCONTINUED] B complex-vitamin C-folic acid (Nephro-Vinod Rx) 1- mg-mg-mcg tablet Take 1 tablet by mouth once daily with breakfast.       Labs:    Lab Results   Component Value Date    CREATININE 4.34 (H) 12/26/2024    BUN 76 (H) 12/26/2024     (L) 12/26/2024    K 3.2 (L) 12/26/2024    CL 89 (L) 12/26/2024    CO2 29 12/26/2024              Assessment/Plan   Assessment & Plan  ESRD (end stage renal disease) (Multi)    Type 2 diabetes mellitus, with long-term current use of insulin    The patient is a 74 year old male who is admitted to the hospital for kidney transplant for his ESRD. The kidney transplant was done on 12/21/2024 and the patient appears to be doing well. Endocrinology has been following the patient for management of hyperglycemia in the " setting of glucocorticoid use and T2DM. His T2DM was well controlled previously.    Steroid dose while inpatient:   Solumedrol 125 mg on 2024  Solumedrol 60 mg on 2024  Prednisone 20 mg PO from 2024    Discharge recommendations:  - He is going home on Prednisone 20 mg every day which is supposed to be reduced by 5 mg every 2 weeks.  - Insulin recommendations per Prednisone dose  If on Prednisone 20 -  units every 24 hours, L: 4 units with meals  If on Prednisone 15 -  units every 24 hours, L: 3 units with meals  - Accuchecks with meals and at bedtime  - Primary team is to make sure that he has adequate supplies of test strips, Insulin pens, needles, lancets, glucometer. Alcohol swabs etc.  - Post Transplant Appt with Tiffanie aggarwal on 2025  - [place] pharmacy plan ordered for the interim and they can adjust the insulin as the glucocorticoid dosage is decreased.    The patient was discussed with Dr. Alvarez.    Miles Glass MD  PGY-4 Endocrinology Fellow

## 2024-12-26 NOTE — PROGRESS NOTES
Pharmacist Transplant Education Note    The medications for Temo Hanson were reviewed in conjunction with the multidisciplinary transplant team. The pharmacist is in agreement with the plan of care for medications at this time.     Approximately 45 minutes were spent with this patient providing medication education and reviewing new post-transplant medications. The patient's wife and daughter was also present for this education session.    An outpatient dosing schedule was created for all oral medications. This schedule was discussed in detail with the patient, including drug name, use, dose, and the appropriate timing of self-administration (i.e. with or without meals, with or without other medications). Also discussed side effects, drug interactions, and the importance of adherence. Both verbal and written instructions were given to the patient outlining the appropriate use of all current oral medications.     The patient demonstrated an acceptable understanding of his current medication list. All questions were answered to the patient's satisfaction, and the patient and his daughter confirmed understanding.     Follow-up education will take place prior to discharge where a finalized list of discharge medications will be reviewed with the patient. Further educational reinforcement should be provided in the outpatient clinic.    Idania Rivas, PharmD   Solid Organ Transplant Clinical Pharmacy Specialist

## 2024-12-26 NOTE — PROGRESS NOTES
Met with patient at bedside to discuss PT/OT/RTH recommendations for home health care upon discharge. AOC provided and patient agreeable with [facility] providing their services.   Message sent to [facility] liaison advising of new referral.    Referral sent for [facility] for RTH.    LUCÍA Strange, RN  [facility], [facility]  Transitional Care Coordinator, Mon-Fri  Cell: [phone], Office: [phone]  Email: [email or web address]

## 2024-12-26 NOTE — DISCHARGE SUMMARY
Discharge Diagnosis  ESRD (end stage renal disease) (Multi)    Issues Requiring Follow-Up  DDKT 24    DM  Appreciate endo discharge recs  Was previously using insulin prior to transplant  -- If on Prednisone 20 -  units every 24 hours, L: 4 units with meals  -- If on Prednisone 15 -  units every 24 hours, L: 3 units with meals    HBcAb+ Donor  Sent baseline HBV DNA, monitor q 3 months     Disposition  Home with PT/OT, RTH    Test Results Pending At Discharge  Pending Labs       No current pending labs.            Hospital Course  Pt is a 73 y/o male with a hx of ESRD secondary to diabetic nephropathy whom received a  donor kidney transplant on 24 by Dr. Zamora with a KDPI of 93% and PRA of 54%. Donor was Hepc -/-and has not met risk factors. EBV + /+. Left donor kidney transplanted to patient right pelvis. Admission weight is 72.7 kg (discharge weight is 76.4 kg ).  Pt received a total of 3 mg/kg total of thymoglobulin induction therapy in conjunction with 500mg IV solumedrol.  Pt was initiated on Tacrolimus & Cellcept immunosuppression in conjunction with IV solumedrol taper.  Pt was started on valcyte (CMV D - / R + ) and bactrim for CMV & PCP prophylaxis per protocol. He was started on clotrimazole as antifungal coverage per protocol. Operative course was uncomplicated.  Hospital course was uncomplicated. Bravo catheter was removed on POD #3 and the patient is voiding spontaneously. Pt did not require dialysis and electrolytes remain stable. Dialysis access via right UE AVF and was patent on last use. BP & glucose under good control.     Pt is tolerating a diabetic diet, maintaining adequate hydration with oral intake, ambulating independently and having BMs. Immunosuppression was managed by the transplant team. Patient is in stable condition for discharge home with renal telehealth & home PT/OT today. RX delivered to bedside prior to discharge. The patient will f/u in the transplant  institute and have labs drawn in the walk-in clinic.      Pertinent Physical Exam At Time of Discharge  Physical Exam  Vitals reviewed.   HENT:      Head: Normocephalic.   Cardiovascular:      Rate and Rhythm: Normal rate.   Pulmonary:      Effort: Pulmonary effort is normal.   Abdominal:      Palpations: Abdomen is soft.   Musculoskeletal:         General: Normal range of motion.      Cervical back: Normal range of motion.   Skin:     General: Skin is warm and dry.      Comments: RLQ incision c/d/I with staples   Neurological:      Mental Status: He is alert and oriented to person, place, and time.         Home Medications     Medication List      START taking these medications     alcohol swabs pads, medicated; Apply 1 each topically 4 times a day. Use   prior to checking glucose or injecting insulin   Blood Glucose Test strip; Generic drug: blood sugar diagnostic; Check   blood glucose 3 time per day   blood-glucose meter misc; Use to check blood glucose   clotrimazole 10 mg poli; Commonly known as: Mycelex; Take 1 tablet (10   mg) by mouth 3 times a day after meals.   docusate sodium 100 mg capsule; Commonly known as: Colace; Take 1   capsule (100 mg) by mouth 2 times a day as needed for constipation for up   to 10 days.   * Fiasp FlexTouch U-100 Insulin 100 unit/mL (3 mL) injection; Generic   drug: insulin aspart (with niacinamide); Inject 0-5 Units under the skin 3   times daily (morning, midday, late afternoon). Take as directed per   insulin instructions.   * Fiasp FlexTouch U-100 Insulin 100 unit/mL (3 mL) injection; Generic   drug: insulin aspart (with niacinamide); Inject 4 Units under the skin 3   times a day before meals. Take as directed per insulin instructions.   furosemide 40 mg tablet; Commonly known as: Lasix; Take 1 tablet (40 mg)   by mouth 2 times daily (morning and late afternoon).   lancets 30 gauge misc; 1 each 4 times a day. Use to check glucose 4   times daily, before meals and at  "bedtime   mycophenolate 250 mg capsule; Commonly known as: Cellcept; Take 4   capsules (1,000 mg) by mouth every 12 hours.   oxyCODONE 5 mg immediate release tablet; Commonly known as: Roxicodone;   Take 1 tablet (5 mg) by mouth every 6 hours if needed for severe pain (7 -   10) for up to 3 days.   pantoprazole 40 mg EC tablet; Commonly known as: ProtoNix; Take 1 tablet   (40 mg) by mouth once daily in the morning. Take before meals. Do not   crush, chew, or split.   polyethylene glycol 17 gram packet; Commonly known as: Glycolax,   Miralax; Take 17 g by mouth once daily as needed (constipation) for up to   5 days.   predniSONE 5 mg tablet; Commonly known as: Deltasone; Take 4 tablets (20   mg) by mouth once daily. Do not fill before December 25, 2024.   sulfamethoxazole-trimethoprim 400-80 mg tablet; Commonly known as:   Bactrim; Take 1 tablet by mouth once daily.   * tacrolimus 1 mg capsule; Commonly known as: Prograf; Take 2 capsules   (2 mg) by mouth every 12 hours.   * tacrolimus 0.5 mg capsule; Commonly known as: Prograf; Take 1 capsule   (0.5 mg) by mouth 2 times a day.   valGANciclovir 450 mg tablet; Commonly known as: Valcyte; Take 1 tablet   (450 mg) by mouth every other day. Do not crush or chew.  * This list has 4 medication(s) that are the same as other medications   prescribed for you. Read the directions carefully, and ask your doctor or   other care provider to review them with you.     CHANGE how you take these medications     Basaglar KwikPen U-100 Insulin 100 unit/mL (3 mL) pen; Generic drug:   insulin glargine; Inject 8 Units under the skin once daily at bedtime.   Take as directed per insulin instructions.; What changed: medication   strength, how much to take, when to take this, reasons to take this   * BD Ultra-Fine Joan Pen Needle 32 gauge x 5/32\" needle; Generic drug:   pen needle, diabetic; What changed: Another medication with the same name   was added. Make sure you understand how and " "when to take each.   * pen needle, diabetic 32 gauge x 5/32\" needle; 1 each 4 times a day.   Use to inject insulin 4 times daily; What changed: You were already taking   a medication with the same name, and this prescription was added. Make   sure you understand how and when to take each.   gabapentin 100 mg capsule; Commonly known as: Neurontin; Take 2 capsules   (200 mg) by mouth once daily.; What changed: medication strength, how much   to take   rosuvastatin 10 mg tablet; Commonly known as: Crestor; Take 1 tablet (10   mg) by mouth once daily at bedtime.; What changed: medication strength,   how much to take  * This list has 2 medication(s) that are the same as other medications   prescribed for you. Read the directions carefully, and ask your doctor or   other care provider to review them with you.     CONTINUE taking these medications     acetaminophen 325 mg tablet; Commonly known as: Tylenol   amLODIPine 10 mg tablet; Commonly known as: Norvasc; Take 1 tablet (10   mg) by mouth once daily.   carboxymethylcellulose 0.5 % ophthalmic solution; Commonly known as:   Refresh Plus   FreeStyle Mick 2 Sensor kit; Generic drug: flash glucose sensor kit     STOP taking these medications     doxazosin 8 mg tablet; Commonly known as: Cardura   hydrALAZINE 100 mg tablet; Commonly known as: Apresoline   isosorbide dinitrate 10 mg tablet; Commonly known as: Isordil   magnesium oxide 400 mg tablet; Commonly known as: Mag-Ox   omeprazole 20 mg DR capsule; Commonly known as: PriLOSEC   valsartan 160 mg tablet; Commonly known as: Diovan       Outpatient Follow-Up  Future Appointments   Date Time Provider Department [place]   12/31/2024  8:00 AM TXP ABDOMINAL SURGEON [id number]   1/7/2025  8:00 AM TXP ABDOMINAL SURGEON [id number]   1/13/2025  9:20 AM El Goel MD [id number] [place]   1/14/2025  8:00 AM TXP ABDOMINAL SURGEON [id number]   1/16/2025 11:00 AM Sol Coley RDN, YANA [id number] [facility]   1/21/2025  8:30 AM TXP ABDOMINAL SURGEON [id number]   1/30/2025  9:30 AM Tiffanie Crum PA-C [id number]   2/19/2025 10:20 AM Aly Miguel MD [id number] [place]   9/16/2025 11:30 AM [facility] [id number]       Deyanira Bloom, APRN-CNP

## 2024-12-26 NOTE — PROGRESS NOTES
"Temo Hanson is a 74 y.o. male on day 6 of admission presenting with ESRD (end stage renal disease) (Multi).    Subjective   Drain site pain this morning after drain removal  Has not had a BM yet    Objective   Vitals:    12/26/24 0759   BP: 134/60   Pulse: 85   Resp: 18   Temp: 36 °C (96.8 °F)   SpO2: 96%       Physical Exam  Constitutional:       Appearance: Normal appearance.   Eyes:      Pupils: Pupils are equal, round, and reactive to light.   Cardiovascular:      Rate and Rhythm: Normal rate.   Pulmonary:      Effort: Pulmonary effort is normal. No respiratory distress.   Abdominal:      General: There is no distension.      Palpations: Abdomen is soft.      Comments: Incision clean, dry, intact   Musculoskeletal:         General: Normal range of motion.      Right lower leg: No edema.      Left lower leg: No edema.   Skin:     General: Skin is warm and dry.   Neurological:      General: No focal deficit present.      Mental Status: He is alert and oriented to person, place, and time.   Psychiatric:         Mood and Affect: Mood normal.         Behavior: Behavior normal.         Last Recorded Vitals  Blood pressure 134/60, pulse 85, temperature 36 °C (96.8 °F), temperature source Temporal, resp. rate 18, height 1.854 m (6' 1\"), weight 76.4 kg (168 lb 6.9 oz), SpO2 96%.  Intake/Output last 3 Shifts:  I/O last 3 completed shifts:  In: 720 (9.4 mL/kg) [P.O.:720]  Out: 2435 (31.9 mL/kg) [Urine:2150 (0.8 mL/kg/hr); Drains:285]  Weight: 76.4 kg     Relevant Results  Lab Results   Component Value Date    WBC 3.0 (L) 12/26/2024    HGB 8.1 (L) 12/26/2024    HCT 23.5 (L) 12/26/2024    MCV 93 12/26/2024     12/26/2024     Lab Results   Component Value Date    GLUCOSE 182 (H) 12/26/2024    CALCIUM 8.8 12/26/2024     (L) 12/26/2024    K 3.2 (L) 12/26/2024    CO2 29 12/26/2024    CL 89 (L) 12/26/2024    BUN 76 (H) 12/26/2024    CREATININE 4.34 (H) 12/26/2024     amLODIPine, 10 mg, oral, Daily  bisacodyl, 10 " mg, rectal, Daily  clotrimazole, 10 mg, oral, TID after meals  furosemide, 40 mg, oral, BID  gabapentin, 200 mg, oral, Nightly  heparin (porcine), 5,000 Units, subcutaneous, q8h  insulin glargine, 8 Units, subcutaneous, Nightly  insulin lispro, 0-5 Units, subcutaneous, TID  insulin lispro, 4 Units, subcutaneous, TID AC  mycophenolate, 1,000 mg, oral, q12h KASSY  pantoprazole, 40 mg, oral, BID AC  polyethylene glycol, 17 g, oral, q12h KASSY  predniSONE, 20 mg, oral, Daily  rosuvastatin, 10 mg, oral, Nightly  sennosides-docusate sodium, 1 tablet, oral, Nightly  sulfamethoxazole-trimethoprim, 1 tablet, oral, Daily  tacrolimus, 2 mg, oral, q12h KASSY  valGANciclovir, 450 mg, oral, q48h          Assessment/Plan   Assessment & Plan  ESRD (end stage renal disease) (Multi)    Type 2 diabetes mellitus, with long-term current use of insulin    DDKT 12/21  KDPI 93    Kidney allograft function   primary   BLAKE and kidney intra-peritoneal    Immunosuppression   Thymo, goal 3 mg/kg   Tac goal 7-9 - hold dose tonight; decrease to 1.5 bid tomorrow  MMF 1000/1000   Pred taper    HBcAb+ Donor:   Appreciate hepatology recs - obtain baseline HBV DNA, monitor q 3 months    Pacemaker in place:   Cardiology consult appreciated - Device interrogation good   ECHO good    DM    appreciate endocrine    HTN   Medical management    Home later today if drain site pain better, or tomorrow     I spent 35 minutes in the professional and overall care of this patient, including immunosuppression      Dora Rodriguez MD

## 2024-12-26 NOTE — NURSING NOTE
Transplant Coordinator Note    Inpatient Discharge Education Provided to patient today. The following topics were reviewed:   signs and symptoms of rejection   signs and symptoms of infection   transplant medication indications   transplant medication administration   transplant medication side effects   the importance of following post-transplant lab and appointment schedules   Patient verbalized good understanding of all information provided   Coordinator filled pill box, explaining each pill's dosing and amount to patient as patient watched and followed along with medication chart. He asked appropriate questions, but did require repetition of the information before being able to repeat back appropriately   Patient partially completed discharge education [place] but plans to finish at home      Provided to patient all Transplant [place] contact information for both during and after hours    Outpatient Plan  Primary function  No drain  Monitor HBV PCR q3m per hepatology request for recip HBVcAb+  No prophy required  Has pacemaker - device checked prior to OR and again day of discharge  Tac - hold tonight then start 1.5mg bid, MMF 1000/1000, pred 20  Bactrim x1 year for donor toxo+  Labs M/Th 1st TI clinic appt Tues 12/31  Will require outpatient pill box assistance & checks by PharmD   Patient needs to add home amlodipine & gabapentin  Coordinator to arrange lasix to be delivered to home

## 2024-12-26 NOTE — PROGRESS NOTES
"Transplant Nephrology progress note     Date of admission: 12/20/2024     Temo Hanson is a 74 y.o.  with ESRD on hemodialysis Monday Wednesday Friday was currently admitted status post DDKT on 12/21/2024.      SUBJECTIVE:  No overnight event. Pain is mild. No BM yet.    PROBLEM LIST:  Assessment & Plan  ESRD (end stage renal disease) (Multi)    Type 2 diabetes mellitus, with long-term current use of insulin         ALLERGIES:  Allergies   Allergen Reactions    Terazosin Unknown     Did not feel well on this medication    Codeine Itching     itching    Tramadol Itching        CURRENT MEDICATIONS:  Scheduled medications  amLODIPine, 10 mg, oral, Daily  bisacodyl, 10 mg, rectal, Daily  clotrimazole, 10 mg, oral, TID after meals  furosemide, 40 mg, oral, BID  gabapentin, 300 mg, oral, Nightly  heparin (porcine), 5,000 Units, subcutaneous, q8h  insulin glargine, 8 Units, subcutaneous, Nightly  insulin lispro, 0-5 Units, subcutaneous, TID  insulin lispro, 4 Units, subcutaneous, TID AC  mycophenolate, 1,000 mg, oral, q12h KASSY  pantoprazole, 40 mg, oral, BID AC  polyethylene glycol, 17 g, oral, q12h KASSY  predniSONE, 20 mg, oral, Daily  rosuvastatin, 10 mg, oral, Nightly  sennosides-docusate sodium, 1 tablet, oral, Nightly  sulfamethoxazole-trimethoprim, 1 tablet, oral, Daily  tacrolimus, 2 mg, oral, q12h KASSY  valGANciclovir, 450 mg, oral, q48h      Continuous medications     PRN medications  PRN medications: acetaminophen, dextrose, dextrose, diphenhydrAMINE, glucagon, glucagon, methocarbamol, naloxone, ondansetron, oxyCODONE, oxyCODONE, oxygen       OBJECTIVE:    VITALS: Visit Vitals  /65 (BP Location: Right arm, Patient Position: Lying)   Pulse 89   Temp 36.1 °C (97 °F) (Temporal)   Resp 18   Ht 1.854 m (6' 1\")   Wt 76.4 kg (168 lb 6.9 oz)   SpO2 97%   BMI 22.22 kg/m²   Smoking Status Never   BSA 1.98 m²        General: No distress   Mucosa moist   AI, AC, AF     HEENT: PEERLA  CVS: S1 S2 no murmurs  RESP:  Lungs " clear to auscultation   ABDO: Soft, non-tender. Incision C/D/I  Neuro: A + O x 3  Skin: No rash   Extremities: No edema       LABS:  Results from last 72 hours   Lab Units 12/26/24  0605   WBC AUTO x10*3/uL 3.0*   HEMOGLOBIN g/dL 8.1*   MCV fL 93   PLATELETS AUTO x10*3/uL 150   BUN mg/dL 76*   CREATININE mg/dL 4.34*   CALCIUM mg/dL 8.8   TACROLIMUS ng/mL 13.7            Intake/Output Summary (Last 24 hours) at 12/26/2024 1310  Last data filed at 12/26/2024 0346  Gross per 24 hour   Intake 480 ml   Output 1185 ml   Net -705 ml          ASSESSMENT AND PLAN:    Temo Hanson is a 74 y.o.  with a past medical history of ESRD on hemodialysis Monday Wednesday Friday was currently admitted status post DDKT on 12/21/2024.  -Other history: Hypertension, hyperlipidemia, complete heart block status post leadless pacemaker insertion on 7/17/2024, pericardial effusion, pheochromocytoma of the right adrenal gland, history of prostate cancer, GI bleed     Kidney info: KDPI is 93%, PRA 54%, cold ischemia time 34 hours,   EBV R+, CMV R+     Allograft function:  -SGF - good UOP  -No urgent indication for RRT at this time  - Lasix 40 mg PO BID  - K replacement  - LUTS: monitor for now. Consider Flomax 0.8 mg at bedtime      Immunosuppression:  -Thymo induction 3 mg/kg - completed  TAC 2 mg BID  MMG 1000 mg BID  Prednisone 20 mg/day     Prophylaxis   - Valcyte x 3 mo  - Clotrimazole 10 mg TID x 3 mo  - PJP prophyalxis - TMP-SMX SS daily x 6 mo  - Recipient's HBV immune with positive core antibody - hepatology consulted 12/24/24 - no need for prophylactic antiviral at this time. Monitor HBV DNA q3mo    Heme - acceptable     Bone mineral disease:  Acceptable for degree of kidney function    Blood pressure   - amlodipine 10 mg/day    Type 2 DM - basal bolus insulin  Crestor 10 mg/day    Dispo: tomorrow    Evelyn Trimble MD

## 2024-12-27 ENCOUNTER — LAB REQUISITION (OUTPATIENT)
Dept: LAB | Facility: CLINIC | Age: 74
DRG: 652 | End: 2024-12-27
Payer: COMMERCIAL

## 2024-12-27 DIAGNOSIS — N18.6 END STAGE RENAL DISEASE (MULTI): ICD-10-CM

## 2024-12-27 LAB
FLOW ALLOCROSSMATCH PEAK: NORMAL
FLOW ALLOCROSSMATCH: NORMAL
HLA RESULTS: NORMAL
HLA RESULTS: NORMAL

## 2024-12-28 NOTE — PATIENT INSTRUCTIONS
Stop lasix  Go to lab now  We are admitting you from clinic for dehydration, bed assignment will call you once a bed becomes available on [place] [id number]

## 2024-12-29 ENCOUNTER — HOME CARE VISIT (OUTPATIENT)
Dept: HOME HEALTH SERVICES | Facility: HOME HEALTH | Age: 74
End: 2024-12-29
Payer: COMMERCIAL

## 2024-12-29 VITALS
DIASTOLIC BLOOD PRESSURE: 56 MMHG | RESPIRATION RATE: 12 BRPM | OXYGEN SATURATION: 97 % | TEMPERATURE: 98.7 F | HEART RATE: 99 BPM | SYSTOLIC BLOOD PRESSURE: 140 MMHG

## 2024-12-29 PROCEDURE — G0299 HHS/HOSPICE OF RN EA 15 MIN: HCPCS

## 2024-12-29 ASSESSMENT — ENCOUNTER SYMPTOMS
DENIES PAIN: 1
APPETITE LEVEL: POOR

## 2024-12-29 ASSESSMENT — ACTIVITIES OF DAILY LIVING (ADL)
OASIS_M1830: 03
ENTERING_EXITING_HOME: NEEDS ASSISTANCE

## 2024-12-30 ENCOUNTER — HOME CARE VISIT (OUTPATIENT)
Dept: HOME HEALTH SERVICES | Facility: HOME HEALTH | Age: 74
End: 2024-12-30
Payer: COMMERCIAL

## 2024-12-30 VITALS — HEART RATE: 97 BPM | OXYGEN SATURATION: 97 % | DIASTOLIC BLOOD PRESSURE: 58 MMHG | SYSTOLIC BLOOD PRESSURE: 160 MMHG

## 2024-12-30 VITALS — OXYGEN SATURATION: 92 % | SYSTOLIC BLOOD PRESSURE: 152 MMHG | DIASTOLIC BLOOD PRESSURE: 70 MMHG | HEART RATE: 100 BPM

## 2024-12-30 PROCEDURE — G0151 HHCP-SERV OF PT,EA 15 MIN: HCPCS

## 2024-12-30 PROCEDURE — G0152 HHCP-SERV OF OT,EA 15 MIN: HCPCS

## 2024-12-30 ASSESSMENT — ENCOUNTER SYMPTOMS
DENIES PAIN: 1
PERSON REPORTING PAIN: PATIENT
DENIES PAIN: 1
PERSON REPORTING PAIN: PATIENT
OCCASIONAL FEELINGS OF UNSTEADINESS: 0
MUSCLE WEAKNESS: 1

## 2024-12-30 ASSESSMENT — ACTIVITIES OF DAILY LIVING (ADL)
CURRENT_FUNCTION: STAND BY ASSIST
TOILETING: MINIMUM ASSIST
BATHING_CURRENT_FUNCTION: MODERATE ASSIST
TOILETING: 1
DRESSING_UB_CURRENT_FUNCTION: STAND BY ASSIST
AMBULATION ASSISTANCE ON FLAT SURFACES: 1
BATHING ASSESSED: 1
DRESSING_LB_CURRENT_FUNCTION: MINIMUM ASSIST
AMBULATION ASSISTANCE: STAND BY ASSIST

## 2024-12-31 ENCOUNTER — HOSPITAL ENCOUNTER (INPATIENT)
Facility: HOSPITAL | Age: 74
End: 2024-12-31
Attending: TRANSPLANT SURGERY | Admitting: TRANSPLANT SURGERY
Payer: COMMERCIAL

## 2024-12-31 ENCOUNTER — PATIENT OUTREACH (OUTPATIENT)
Dept: CARE COORDINATION | Age: 74
End: 2024-12-31

## 2024-12-31 ENCOUNTER — OFFICE VISIT (OUTPATIENT)
Facility: HOSPITAL | Age: 74
End: 2024-12-31
Payer: COMMERCIAL

## 2024-12-31 ENCOUNTER — APPOINTMENT (OUTPATIENT)
Dept: RADIOLOGY | Facility: HOSPITAL | Age: 74
DRG: 391 | End: 2024-12-31
Payer: COMMERCIAL

## 2024-12-31 ENCOUNTER — LAB (OUTPATIENT)
Dept: LAB | Facility: LAB | Age: 74
End: 2024-12-31
Payer: COMMERCIAL

## 2024-12-31 ENCOUNTER — LAB REQUISITION (OUTPATIENT)
Dept: LAB | Facility: CLINIC | Age: 74
DRG: 652 | End: 2024-12-31
Payer: COMMERCIAL

## 2024-12-31 ENCOUNTER — APPOINTMENT (OUTPATIENT)
Dept: DIALYSIS | Facility: HOSPITAL | Age: 74
End: 2024-12-31
Payer: COMMERCIAL

## 2024-12-31 VITALS
SYSTOLIC BLOOD PRESSURE: 121 MMHG | DIASTOLIC BLOOD PRESSURE: 60 MMHG | WEIGHT: 163.3 LBS | BODY MASS INDEX: 21.54 KG/M2 | OXYGEN SATURATION: 96 % | HEART RATE: 77 BPM | TEMPERATURE: 95.7 F

## 2024-12-31 DIAGNOSIS — R62.7 FAILURE TO THRIVE IN ADULT: Primary | ICD-10-CM

## 2024-12-31 DIAGNOSIS — E11.10 TYPE 2 DIABETES MELLITUS WITH KETOACIDOSIS WITHOUT COMA, WITH LONG-TERM CURRENT USE OF INSULIN: ICD-10-CM

## 2024-12-31 DIAGNOSIS — E11.00 TYPE 2 DIABETES MELLITUS WITH HYPEROSMOLARITY WITHOUT COMA, WITH LONG-TERM CURRENT USE OF INSULIN (MULTI): ICD-10-CM

## 2024-12-31 DIAGNOSIS — Z79.4 TYPE 2 DIABETES MELLITUS WITH KETOACIDOSIS WITHOUT COMA, WITH LONG-TERM CURRENT USE OF INSULIN: ICD-10-CM

## 2024-12-31 DIAGNOSIS — N18.6 ESRD (END STAGE RENAL DISEASE) ON DIALYSIS (MULTI): ICD-10-CM

## 2024-12-31 DIAGNOSIS — K22.2 STRICTURE ESOPHAGUS: ICD-10-CM

## 2024-12-31 DIAGNOSIS — E55.9 VITAMIN D3 DEFICIENCY: ICD-10-CM

## 2024-12-31 DIAGNOSIS — R13.10 DYSPHAGIA, UNSPECIFIED TYPE: ICD-10-CM

## 2024-12-31 DIAGNOSIS — Z79.4 TYPE 2 DIABETES MELLITUS WITH HYPEROSMOLARITY WITHOUT COMA, WITH LONG-TERM CURRENT USE OF INSULIN (MULTI): ICD-10-CM

## 2024-12-31 DIAGNOSIS — E86.0 DEHYDRATION: ICD-10-CM

## 2024-12-31 DIAGNOSIS — Z94.0 KIDNEY REPLACED BY TRANSPLANT (HHS-HCC): ICD-10-CM

## 2024-12-31 DIAGNOSIS — D84.9 IMMUNOSUPPRESSION: Primary | ICD-10-CM

## 2024-12-31 DIAGNOSIS — E83.42 HYPOMAGNESEMIA: ICD-10-CM

## 2024-12-31 DIAGNOSIS — E11.65 TYPE 2 DIABETES MELLITUS WITH HYPERGLYCEMIA, WITH LONG-TERM CURRENT USE OF INSULIN: Chronic | ICD-10-CM

## 2024-12-31 DIAGNOSIS — Z93.1 PEG (PERCUTANEOUS ENDOSCOPIC GASTROSTOMY) STATUS (MULTI): ICD-10-CM

## 2024-12-31 DIAGNOSIS — E11.43 GASTROPARESIS DUE TO DM (MULTI): ICD-10-CM

## 2024-12-31 DIAGNOSIS — E86.0 DEHYDRATION: Primary | ICD-10-CM

## 2024-12-31 DIAGNOSIS — E73.0: ICD-10-CM

## 2024-12-31 DIAGNOSIS — N18.6 END STAGE RENAL DISEASE (MULTI): ICD-10-CM

## 2024-12-31 DIAGNOSIS — E87.5 HYPERKALEMIA: ICD-10-CM

## 2024-12-31 DIAGNOSIS — Z79.4 TYPE 2 DIABETES MELLITUS WITH HYPERGLYCEMIA, WITH LONG-TERM CURRENT USE OF INSULIN: Chronic | ICD-10-CM

## 2024-12-31 DIAGNOSIS — Z78.9 ON TUBE FEEDING DIET: ICD-10-CM

## 2024-12-31 DIAGNOSIS — K31.84 GASTROPARESIS DUE TO DM (MULTI): ICD-10-CM

## 2024-12-31 DIAGNOSIS — Z99.2 ESRD (END STAGE RENAL DISEASE) ON DIALYSIS (MULTI): ICD-10-CM

## 2024-12-31 DIAGNOSIS — E46 PROTEIN-CALORIE MALNUTRITION, UNSPECIFIED SEVERITY (MULTI): ICD-10-CM

## 2024-12-31 DIAGNOSIS — Z95.0 PACEMAKER: ICD-10-CM

## 2024-12-31 DIAGNOSIS — I10 ESSENTIAL HYPERTENSION: ICD-10-CM

## 2024-12-31 DIAGNOSIS — G89.18 OTHER ACUTE POSTPROCEDURAL PAIN: ICD-10-CM

## 2024-12-31 LAB
ALBUMIN SERPL BCP-MCNC: 3.4 G/DL (ref 3.4–5)
ANION GAP SERPL CALC-SCNC: 23 MMOL/L (ref 10–20)
BUN SERPL-MCNC: 144 MG/DL (ref 6–23)
CALCIUM SERPL-MCNC: 8.8 MG/DL (ref 8.6–10.6)
CHLORIDE SERPL-SCNC: 91 MMOL/L (ref 98–107)
CO2 SERPL-SCNC: 21 MMOL/L (ref 21–32)
CREAT SERPL-MCNC: 5.45 MG/DL (ref 0.5–1.3)
EGFRCR SERPLBLD CKD-EPI 2021: 10 ML/MIN/1.73M*2
ERYTHROCYTE [DISTWIDTH] IN BLOOD BY AUTOMATED COUNT: 17.2 % (ref 11.5–14.5)
GLUCOSE BLD MANUAL STRIP-MCNC: 117 MG/DL (ref 74–99)
GLUCOSE SERPL-MCNC: 192 MG/DL (ref 74–99)
HCT VFR BLD AUTO: 28.5 % (ref 41–52)
HGB BLD-MCNC: 9.4 G/DL (ref 13.5–17.5)
HLA CLS I TYP PNL BLD/T DONR HIGH RES: NORMAL
HLA RESULTS: NORMAL
HLA RESULTS: NORMAL
HLA-DP+DQ+DR AB NFR SER: NORMAL %
HLA-DP2 QL: NORMAL
HLA-DQB1 HIGH RES: NORMAL
HLA-DRB1 HIGH RES: NORMAL
MAGNESIUM SERPL-MCNC: 2.23 MG/DL (ref 1.6–2.4)
MCH RBC QN AUTO: 31.4 PG (ref 26–34)
MCHC RBC AUTO-ENTMCNC: 33 G/DL (ref 32–36)
MCV RBC AUTO: 95 FL (ref 80–100)
NRBC BLD-RTO: 0 /100 WBCS (ref 0–0)
PHOSPHATE SERPL-MCNC: 3.5 MG/DL (ref 2.5–4.9)
PLATELET # BLD AUTO: 239 X10*3/UL (ref 150–450)
POTASSIUM SERPL-SCNC: 4.3 MMOL/L (ref 3.5–5.3)
RBC # BLD AUTO: 2.99 X10*6/UL (ref 4.5–5.9)
SODIUM SERPL-SCNC: 131 MMOL/L (ref 136–145)
TACROLIMUS BLD-MCNC: 11.8 NG/ML
WBC # BLD AUTO: 7.5 X10*3/UL (ref 4.4–11.3)

## 2024-12-31 PROCEDURE — 2500000002 HC RX 250 W HCPCS SELF ADMINISTERED DRUGS (ALT 637 FOR MEDICARE OP, ALT 636 FOR OP/ED): Performed by: STUDENT IN AN ORGANIZED HEALTH CARE EDUCATION/TRAINING PROGRAM

## 2024-12-31 PROCEDURE — 4B02XSZ MEASUREMENT OF CARDIAC PACEMAKER, EXTERNAL APPROACH: ICD-10-PCS | Performed by: STUDENT IN AN ORGANIZED HEALTH CARE EDUCATION/TRAINING PROGRAM

## 2024-12-31 PROCEDURE — 2500000005 HC RX 250 GENERAL PHARMACY W/O HCPCS

## 2024-12-31 PROCEDURE — 99214 OFFICE O/P EST MOD 30 MIN: CPT | Mod: 24 | Performed by: TRANSPLANT SURGERY

## 2024-12-31 PROCEDURE — 2500000004 HC RX 250 GENERAL PHARMACY W/ HCPCS (ALT 636 FOR OP/ED)

## 2024-12-31 PROCEDURE — 1126F AMNT PAIN NOTED NONE PRSNT: CPT | Performed by: TRANSPLANT SURGERY

## 2024-12-31 PROCEDURE — 1111F DSCHRG MED/CURRENT MED MERGE: CPT | Performed by: TRANSPLANT SURGERY

## 2024-12-31 PROCEDURE — 3078F DIAST BP <80 MM HG: CPT | Performed by: TRANSPLANT SURGERY

## 2024-12-31 PROCEDURE — 1159F MED LIST DOCD IN RCRD: CPT | Performed by: TRANSPLANT SURGERY

## 2024-12-31 PROCEDURE — 99222 1ST HOSP IP/OBS MODERATE 55: CPT | Performed by: INTERNAL MEDICINE

## 2024-12-31 PROCEDURE — 76776 US EXAM K TRANSPL W/DOPPLER: CPT

## 2024-12-31 PROCEDURE — 99214 OFFICE O/P EST MOD 30 MIN: CPT | Performed by: TRANSPLANT SURGERY

## 2024-12-31 PROCEDURE — 80197 ASSAY OF TACROLIMUS: CPT

## 2024-12-31 PROCEDURE — 80069 RENAL FUNCTION PANEL: CPT

## 2024-12-31 PROCEDURE — 1100000001 HC PRIVATE ROOM DAILY

## 2024-12-31 PROCEDURE — 3074F SYST BP LT 130 MM HG: CPT | Performed by: TRANSPLANT SURGERY

## 2024-12-31 PROCEDURE — 3044F HG A1C LEVEL LT 7.0%: CPT | Performed by: TRANSPLANT SURGERY

## 2024-12-31 PROCEDURE — 82947 ASSAY GLUCOSE BLOOD QUANT: CPT

## 2024-12-31 PROCEDURE — 2500000001 HC RX 250 WO HCPCS SELF ADMINISTERED DRUGS (ALT 637 FOR MEDICARE OP): Performed by: STUDENT IN AN ORGANIZED HEALTH CARE EDUCATION/TRAINING PROGRAM

## 2024-12-31 PROCEDURE — 5A1D70Z PERFORMANCE OF URINARY FILTRATION, INTERMITTENT, LESS THAN 6 HOURS PER DAY: ICD-10-PCS | Performed by: INTERNAL MEDICINE

## 2024-12-31 PROCEDURE — 83735 ASSAY OF MAGNESIUM: CPT

## 2024-12-31 PROCEDURE — 85027 COMPLETE CBC AUTOMATED: CPT

## 2024-12-31 PROCEDURE — 76776 US EXAM K TRANSPL W/DOPPLER: CPT | Performed by: RADIOLOGY

## 2024-12-31 PROCEDURE — 2500000004 HC RX 250 GENERAL PHARMACY W/ HCPCS (ALT 636 FOR OP/ED): Performed by: STUDENT IN AN ORGANIZED HEALTH CARE EDUCATION/TRAINING PROGRAM

## 2024-12-31 RX ORDER — DEXTROSE 50 % IN WATER (D50W) INTRAVENOUS SYRINGE
25
Status: DISCONTINUED | OUTPATIENT
Start: 2024-12-31 | End: 2025-01-16

## 2024-12-31 RX ORDER — SODIUM CHLORIDE 9 MG/ML
75 INJECTION, SOLUTION INTRAVENOUS CONTINUOUS
Status: DISCONTINUED | OUTPATIENT
Start: 2024-12-31 | End: 2024-12-31

## 2024-12-31 RX ORDER — GABAPENTIN 100 MG/1
200 CAPSULE ORAL DAILY
Status: DISCONTINUED | OUTPATIENT
Start: 2025-01-01 | End: 2025-01-23 | Stop reason: HOSPADM

## 2024-12-31 RX ORDER — CARBOXYMETHYLCELLULOSE SODIUM 10 MG/ML
1 GEL OPHTHALMIC AS NEEDED
Status: DISCONTINUED | OUTPATIENT
Start: 2024-12-31 | End: 2025-01-23 | Stop reason: HOSPADM

## 2024-12-31 RX ORDER — DOCUSATE SODIUM 100 MG/1
100 CAPSULE, LIQUID FILLED ORAL 2 TIMES DAILY PRN
Status: DISCONTINUED | OUTPATIENT
Start: 2024-12-31 | End: 2025-01-17

## 2024-12-31 RX ORDER — SULFAMETHOXAZOLE AND TRIMETHOPRIM 400; 80 MG/1; MG/1
1 TABLET ORAL DAILY
Status: DISCONTINUED | OUTPATIENT
Start: 2024-12-31 | End: 2024-12-31

## 2024-12-31 RX ORDER — AMLODIPINE BESYLATE 10 MG/1
10 TABLET ORAL DAILY
Status: DISCONTINUED | OUTPATIENT
Start: 2025-01-01 | End: 2025-01-23 | Stop reason: HOSPADM

## 2024-12-31 RX ORDER — VALGANCICLOVIR 450 MG/1
450 TABLET, FILM COATED ORAL
Qty: 15 TABLET | Refills: 1 | Status: ON HOLD | OUTPATIENT
Start: 2024-12-31 | End: 2025-03-01

## 2024-12-31 RX ORDER — SULFAMETHOXAZOLE AND TRIMETHOPRIM 400; 80 MG/1; MG/1
1 TABLET ORAL DAILY
Status: DISCONTINUED | OUTPATIENT
Start: 2025-01-01 | End: 2025-01-23 | Stop reason: HOSPADM

## 2024-12-31 RX ORDER — MYCOPHENOLATE MOFETIL 250 MG/1
1000 CAPSULE ORAL EVERY 12 HOURS
Qty: 240 CAPSULE | Refills: 11 | Status: ON HOLD | OUTPATIENT
Start: 2024-12-31 | End: 2025-12-31

## 2024-12-31 RX ORDER — MYCOPHENOLATE MOFETIL 250 MG/1
1000 CAPSULE ORAL EVERY 12 HOURS
Status: DISCONTINUED | OUTPATIENT
Start: 2024-12-31 | End: 2024-12-31

## 2024-12-31 RX ORDER — TACROLIMUS 1 MG/1
1 CAPSULE ORAL EVERY 12 HOURS
Qty: 60 CAPSULE | Refills: 11 | Status: ON HOLD | OUTPATIENT
Start: 2024-12-31 | End: 2025-12-31

## 2024-12-31 RX ORDER — CLOTRIMAZOLE 10 MG/1
10 LOZENGE ORAL
Qty: 90 TROCHE | Refills: 1 | Status: ON HOLD | OUTPATIENT
Start: 2024-12-31 | End: 2025-03-01

## 2024-12-31 RX ORDER — SULFAMETHOXAZOLE AND TRIMETHOPRIM 400; 80 MG/1; MG/1
1 TABLET ORAL DAILY
Qty: 30 TABLET | Refills: 11 | Status: ON HOLD | OUTPATIENT
Start: 2024-12-31 | End: 2025-12-31

## 2024-12-31 RX ORDER — POLYETHYLENE GLYCOL 3350 17 G/17G
17 POWDER, FOR SOLUTION ORAL DAILY PRN
Status: DISCONTINUED | OUTPATIENT
Start: 2024-12-31 | End: 2025-01-17

## 2024-12-31 RX ORDER — AMLODIPINE BESYLATE 10 MG/1
10 TABLET ORAL DAILY
Status: DISCONTINUED | OUTPATIENT
Start: 2024-12-31 | End: 2024-12-31

## 2024-12-31 RX ORDER — SODIUM CHLORIDE, SODIUM LACTATE, POTASSIUM CHLORIDE, CALCIUM CHLORIDE 600; 310; 30; 20 MG/100ML; MG/100ML; MG/100ML; MG/100ML
75 INJECTION, SOLUTION INTRAVENOUS CONTINUOUS
Status: ACTIVE | OUTPATIENT
Start: 2024-12-31 | End: 2025-01-01

## 2024-12-31 RX ORDER — SODIUM CHLORIDE, SODIUM LACTATE, POTASSIUM CHLORIDE, CALCIUM CHLORIDE 600; 310; 30; 20 MG/100ML; MG/100ML; MG/100ML; MG/100ML
125 INJECTION, SOLUTION INTRAVENOUS CONTINUOUS
Status: DISCONTINUED | OUTPATIENT
Start: 2024-12-31 | End: 2024-12-31

## 2024-12-31 RX ORDER — TACROLIMUS 0.5 MG/1
0.5 CAPSULE ORAL EVERY 12 HOURS
Qty: 60 CAPSULE | Refills: 11 | Status: ON HOLD | OUTPATIENT
Start: 2024-12-31 | End: 2025-12-31

## 2024-12-31 RX ORDER — GABAPENTIN 100 MG/1
200 CAPSULE ORAL DAILY
Status: DISCONTINUED | OUTPATIENT
Start: 2024-12-31 | End: 2024-12-31

## 2024-12-31 RX ORDER — ROSUVASTATIN CALCIUM 10 MG/1
10 TABLET, COATED ORAL NIGHTLY
Status: DISCONTINUED | OUTPATIENT
Start: 2024-12-31 | End: 2025-01-23 | Stop reason: HOSPADM

## 2024-12-31 RX ORDER — PREDNISONE 20 MG/1
20 TABLET ORAL DAILY
Status: DISCONTINUED | OUTPATIENT
Start: 2024-12-31 | End: 2024-12-31

## 2024-12-31 RX ORDER — INSULIN GLARGINE 100 [IU]/ML
8 INJECTION, SOLUTION SUBCUTANEOUS NIGHTLY
Status: DISCONTINUED | OUTPATIENT
Start: 2024-12-31 | End: 2025-01-10

## 2024-12-31 RX ORDER — VALGANCICLOVIR 450 MG/1
450 TABLET, FILM COATED ORAL
Status: DISCONTINUED | OUTPATIENT
Start: 2024-12-31 | End: 2024-12-31

## 2024-12-31 RX ORDER — INSULIN LISPRO 100 [IU]/ML
0-10 INJECTION, SOLUTION INTRAVENOUS; SUBCUTANEOUS
Status: DISCONTINUED | OUTPATIENT
Start: 2024-12-31 | End: 2025-01-10

## 2024-12-31 RX ORDER — AMLODIPINE BESYLATE 10 MG/1
10 TABLET ORAL DAILY
Qty: 30 TABLET | Refills: 11 | Status: ON HOLD | OUTPATIENT
Start: 2024-12-31 | End: 2025-12-31

## 2024-12-31 RX ORDER — INSULIN LISPRO 100 [IU]/ML
4 INJECTION, SOLUTION INTRAVENOUS; SUBCUTANEOUS
Status: DISCONTINUED | OUTPATIENT
Start: 2024-12-31 | End: 2025-01-10

## 2024-12-31 RX ORDER — LIDOCAINE 560 MG/1
1 PATCH PERCUTANEOUS; TOPICAL; TRANSDERMAL EVERY 24 HOURS
Status: DISCONTINUED | OUTPATIENT
Start: 2024-12-31 | End: 2025-01-10

## 2024-12-31 RX ORDER — DEXTROSE 50 % IN WATER (D50W) INTRAVENOUS SYRINGE
12.5
Status: DISCONTINUED | OUTPATIENT
Start: 2024-12-31 | End: 2025-01-16

## 2024-12-31 RX ORDER — CLOTRIMAZOLE 10 MG/1
10 LOZENGE ORAL
Status: DISCONTINUED | OUTPATIENT
Start: 2024-12-31 | End: 2025-01-03

## 2024-12-31 RX ORDER — PREDNISONE 20 MG/1
20 TABLET ORAL DAILY
Status: DISCONTINUED | OUTPATIENT
Start: 2025-01-01 | End: 2025-01-06

## 2024-12-31 RX ORDER — VALGANCICLOVIR 450 MG/1
450 TABLET, FILM COATED ORAL
Status: DISCONTINUED | OUTPATIENT
Start: 2025-01-01 | End: 2025-01-06

## 2024-12-31 RX ORDER — PREDNISONE 5 MG/1
20 TABLET ORAL DAILY
Qty: 120 TABLET | Refills: 11 | Status: ON HOLD | OUTPATIENT
Start: 2024-12-31 | End: 2025-12-31

## 2024-12-31 RX ORDER — MYCOPHENOLATE MOFETIL 250 MG/1
1000 CAPSULE ORAL EVERY 12 HOURS
Status: DISCONTINUED | OUTPATIENT
Start: 2024-12-31 | End: 2025-01-09

## 2024-12-31 RX ORDER — PANTOPRAZOLE SODIUM 40 MG/1
40 TABLET, DELAYED RELEASE ORAL
Status: DISCONTINUED | OUTPATIENT
Start: 2025-01-01 | End: 2025-01-03

## 2024-12-31 RX ADMIN — TACROLIMUS 1.5 MG: 1 CAPSULE ORAL at 19:09

## 2024-12-31 RX ADMIN — SODIUM CHLORIDE, POTASSIUM CHLORIDE, SODIUM LACTATE AND CALCIUM CHLORIDE 75 ML/HR: 600; 310; 30; 20 INJECTION, SOLUTION INTRAVENOUS at 13:29

## 2024-12-31 RX ADMIN — CLOTRIMAZOLE 10 MG: 10 LOZENGE ORAL at 19:09

## 2024-12-31 RX ADMIN — INSULIN GLARGINE 8 UNITS: 100 INJECTION, SOLUTION SUBCUTANEOUS at 21:01

## 2024-12-31 RX ADMIN — SODIUM CHLORIDE, POTASSIUM CHLORIDE, SODIUM LACTATE AND CALCIUM CHLORIDE 1000 ML: 600; 310; 30; 20 INJECTION, SOLUTION INTRAVENOUS at 19:10

## 2024-12-31 RX ADMIN — ROSUVASTATIN CALCIUM 10 MG: 10 TABLET, FILM COATED ORAL at 21:01

## 2024-12-31 RX ADMIN — LIDOCAINE 4% 1 PATCH: 40 PATCH TOPICAL at 21:01

## 2024-12-31 RX ADMIN — SODIUM CHLORIDE, POTASSIUM CHLORIDE, SODIUM LACTATE AND CALCIUM CHLORIDE 500 ML: 600; 310; 30; 20 INJECTION, SOLUTION INTRAVENOUS at 13:28

## 2024-12-31 RX ADMIN — MYCOPHENOLATE MOFETIL 1000 MG: 250 CAPSULE ORAL at 19:09

## 2024-12-31 SDOH — SOCIAL STABILITY: SOCIAL INSECURITY: DOES ANYONE TRY TO KEEP YOU FROM HAVING/CONTACTING OTHER FRIENDS OR DOING THINGS OUTSIDE YOUR HOME?: NO

## 2024-12-31 SDOH — ECONOMIC STABILITY: INCOME INSECURITY: IN THE PAST 12 MONTHS HAS THE ELECTRIC, GAS, OIL, OR WATER COMPANY THREATENED TO SHUT OFF SERVICES IN YOUR HOME?: NO

## 2024-12-31 SDOH — SOCIAL STABILITY: SOCIAL INSECURITY: ARE THERE ANY APPARENT SIGNS OF INJURIES/BEHAVIORS THAT COULD BE RELATED TO ABUSE/NEGLECT?: NO

## 2024-12-31 SDOH — ECONOMIC STABILITY: FOOD INSECURITY: WITHIN THE PAST 12 MONTHS, YOU WORRIED THAT YOUR FOOD WOULD RUN OUT BEFORE YOU GOT THE MONEY TO BUY MORE.: NEVER TRUE

## 2024-12-31 SDOH — SOCIAL STABILITY: SOCIAL INSECURITY: HAS ANYONE EVER THREATENED TO HURT YOUR FAMILY OR YOUR PETS?: NO

## 2024-12-31 SDOH — SOCIAL STABILITY: SOCIAL INSECURITY: DO YOU FEEL ANYONE HAS EXPLOITED OR TAKEN ADVANTAGE OF YOU FINANCIALLY OR OF YOUR PERSONAL PROPERTY?: NO

## 2024-12-31 SDOH — SOCIAL STABILITY: SOCIAL INSECURITY: ARE YOU OR HAVE YOU BEEN THREATENED OR ABUSED PHYSICALLY, EMOTIONALLY, OR SEXUALLY BY ANYONE?: NO

## 2024-12-31 SDOH — SOCIAL STABILITY: SOCIAL INSECURITY: DO YOU FEEL UNSAFE GOING BACK TO THE PLACE WHERE YOU ARE LIVING?: NO

## 2024-12-31 SDOH — SOCIAL STABILITY: SOCIAL INSECURITY: WERE YOU ABLE TO COMPLETE ALL THE BEHAVIORAL HEALTH SCREENINGS?: YES

## 2024-12-31 SDOH — SOCIAL STABILITY: SOCIAL INSECURITY: WITHIN THE LAST YEAR, HAVE YOU BEEN HUMILIATED OR EMOTIONALLY ABUSED IN OTHER WAYS BY YOUR PARTNER OR EX-PARTNER?: NO

## 2024-12-31 SDOH — SOCIAL STABILITY: SOCIAL INSECURITY: ABUSE: ADULT

## 2024-12-31 SDOH — SOCIAL STABILITY: SOCIAL INSECURITY: WITHIN THE LAST YEAR, HAVE YOU BEEN AFRAID OF YOUR PARTNER OR EX-PARTNER?: NO

## 2024-12-31 SDOH — SOCIAL STABILITY: SOCIAL INSECURITY: HAVE YOU HAD ANY THOUGHTS OF HARMING ANYONE ELSE?: NO

## 2024-12-31 SDOH — ECONOMIC STABILITY: FOOD INSECURITY: WITHIN THE PAST 12 MONTHS, THE FOOD YOU BOUGHT JUST DIDN'T LAST AND YOU DIDN'T HAVE MONEY TO GET MORE.: NEVER TRUE

## 2024-12-31 SDOH — SOCIAL STABILITY: SOCIAL INSECURITY: HAVE YOU HAD THOUGHTS OF HARMING ANYONE ELSE?: NO

## 2024-12-31 ASSESSMENT — ACTIVITIES OF DAILY LIVING (ADL)
LACK_OF_TRANSPORTATION: YES
PATIENT'S MEMORY ADEQUATE TO SAFELY COMPLETE DAILY ACTIVITIES?: YES
HEARING - LEFT EAR: FUNCTIONAL
BATHING: NEEDS ASSISTANCE
HEARING - RIGHT EAR: FUNCTIONAL
DRESSING YOURSELF: NEEDS ASSISTANCE
GROOMING: NEEDS ASSISTANCE
TOILETING: NEEDS ASSISTANCE
WALKS IN HOME: NEEDS ASSISTANCE
LACK_OF_TRANSPORTATION: NO
JUDGMENT_ADEQUATE_SAFELY_COMPLETE_DAILY_ACTIVITIES: YES
ASSISTIVE_DEVICE: EYEGLASSES
ADEQUATE_TO_COMPLETE_ADL: YES
FEEDING YOURSELF: NEEDS ASSISTANCE

## 2024-12-31 ASSESSMENT — ENCOUNTER SYMPTOMS
CHILLS: 0
ABDOMINAL DISTENTION: 0
DIARRHEA: 0
CONSTIPATION: 0
ABDOMINAL PAIN: 0
ARTHRALGIAS: 0
DIZZINESS: 0
EYES NEGATIVE: 1
FREQUENCY: 0
HALLUCINATIONS: 0
SHORTNESS OF BREATH: 0
CONFUSION: 0
HEMATURIA: 0
ADENOPATHY: 0
AGITATION: 0
WEAKNESS: 0
FEVER: 0
COUGH: 0
COLOR CHANGE: 0
LIGHT-HEADEDNESS: 0
DYSURIA: 0

## 2024-12-31 ASSESSMENT — COGNITIVE AND FUNCTIONAL STATUS - GENERAL
PATIENT BASELINE BEDBOUND: NO
MOBILITY SCORE: 19
HELP NEEDED FOR BATHING: A LITTLE
TOILETING: A LITTLE
WALKING IN HOSPITAL ROOM: A LOT
CLIMB 3 TO 5 STEPS WITH RAILING: TOTAL
DRESSING REGULAR LOWER BODY CLOTHING: A LITTLE
CLIMB 3 TO 5 STEPS WITH RAILING: A LOT
DRESSING REGULAR UPPER BODY CLOTHING: A LITTLE
WALKING IN HOSPITAL ROOM: A LITTLE
STANDING UP FROM CHAIR USING ARMS: A LITTLE
STANDING UP FROM CHAIR USING ARMS: A LITTLE
MOVING TO AND FROM BED TO CHAIR: A LITTLE
MOVING TO AND FROM BED TO CHAIR: A LITTLE
MOBILITY SCORE: 15
DAILY ACTIVITIY SCORE: 20
TOILETING: A LITTLE
DAILY ACTIVITIY SCORE: 23
TURNING FROM BACK TO SIDE WHILE IN FLAT BAD: A LITTLE
MOVING FROM LYING ON BACK TO SITTING ON SIDE OF FLAT BED WITH BEDRAILS: A LITTLE

## 2024-12-31 ASSESSMENT — LIFESTYLE VARIABLES
AUDIT-C TOTAL SCORE: 0
SKIP TO QUESTIONS 9-10: 1
HOW OFTEN DO YOU HAVE A DRINK CONTAINING ALCOHOL: NEVER
AUDIT-C TOTAL SCORE: 0
HOW MANY STANDARD DRINKS CONTAINING ALCOHOL DO YOU HAVE ON A TYPICAL DAY: PATIENT DOES NOT DRINK
HOW OFTEN DO YOU HAVE 6 OR MORE DRINKS ON ONE OCCASION: NEVER

## 2024-12-31 ASSESSMENT — PAIN - FUNCTIONAL ASSESSMENT
PAIN_FUNCTIONAL_ASSESSMENT: NO/DENIES PAIN
PAIN_FUNCTIONAL_ASSESSMENT: 0-10

## 2024-12-31 ASSESSMENT — PAIN SCALES - GENERAL
PAINLEVEL_OUTOF10: 0 - NO PAIN
PAINLEVEL_OUTOF10: 0-NO PAIN
PAINLEVEL_OUTOF10: 0 - NO PAIN
PAINLEVEL_OUTOF10: 2

## 2024-12-31 NOTE — PROGRESS NOTES
Neymar Hanson is a 74 y.o. male who underwent DDKT on 2024 (Kidney). Here today for follow-up.      Pt is a 75 y/o male with a hx of ESRD secondary to diabetic nephropathy whom received a  donor kidney transplant on 24 by Dr. Zamora with a KDPI of 93% and PRA of 54%. Donor was Hepc -/-and has not met risk factors. EBV + /+. Left donor kidney transplanted to patient right pelvis. Admission weight is 72.7 kg (discharge weight is 76.4 kg ). Pt received a total of 3 mg/kg total of thymoglobulin induction therapy in conjunction with 500mg IV solumedrol. Pt was initiated on Tacrolimus & Cellcept immunosuppression in conjunction with IV solumedrol taper. Pt was started on valcyte (CMV D - / R + ) and bactrim for CMV & PCP prophylaxis per protocol. He was started on clotrimazole as antifungal coverage per protocol. Operative course was uncomplicated. Hospital course was uncomplicated. Bravo catheter was removed on POD #3 and the patient is voiding spontaneously. Pt did not require dialysis and electrolytes remain stable. Dialysis access via right UE AVF and was patent on last use. BP & glucose under good control.       Review of Systems   Constitutional:  Negative for chills and fever.   HENT: Negative.  Negative for congestion.    Eyes: Negative.    Respiratory:  Negative for cough and shortness of breath.    Cardiovascular:  Negative for chest pain.   Gastrointestinal:  Negative for abdominal distention, abdominal pain, constipation and diarrhea.   Endocrine: Negative for cold intolerance and heat intolerance.   Genitourinary:  Negative for dysuria, frequency, hematuria and urgency.   Musculoskeletal:  Negative for arthralgias.   Skin:  Negative for color change.   Allergic/Immunologic: Negative for environmental allergies.   Neurological:  Negative for dizziness, weakness and light-headedness.   Hematological:  Negative for adenopathy.   Psychiatric/Behavioral:  Negative for agitation,  confusion and hallucinations.         Objective   Vitals:    12/31/24 0842   BP: 121/60   Pulse: 77   Temp: 35.4 °C (95.7 °F)   SpO2: 96%       Physical Exam  Constitutional:       Appearance: Normal appearance.   Eyes:      Pupils: Pupils are equal, round, and reactive to light.   Cardiovascular:      Rate and Rhythm: Normal rate.   Pulmonary:      Effort: Pulmonary effort is normal. No respiratory distress.   Abdominal:      General: There is no distension.      Palpations: Abdomen is soft.      Comments: Incision clean, dry, intact   Musculoskeletal:         General: Normal range of motion.      Right lower leg: No edema.      Left lower leg: No edema.   Skin:     General: Skin is warm and dry.   Neurological:      General: No focal deficit present.      Mental Status: He is alert and oriented to person, place, and time.   Psychiatric:         Mood and Affect: Mood normal.         Behavior: Behavior normal.       Lab Results   Component Value Date    CREATININE 4.34 (H) 12/26/2024    K 3.2 (L) 12/26/2024    GLUCOSE 182 (H) 12/26/2024    HCT 23.5 (L) 12/26/2024    WBC 3.0 (L) 12/26/2024     12/26/2024    CALCIUM 8.8 12/26/2024     Lab Results   Component Value Date    WBC 3.0 (L) 12/26/2024    HGB 8.1 (L) 12/26/2024    HCT 23.5 (L) 12/26/2024    MCV 93 12/26/2024     12/26/2024     Lab Results   Component Value Date    GLUCOSE 182 (H) 12/26/2024    CALCIUM 8.8 12/26/2024     (L) 12/26/2024    K 3.2 (L) 12/26/2024    CO2 29 12/26/2024    CL 89 (L) 12/26/2024    BUN 76 (H) 12/26/2024    CREATININE 4.34 (H) 12/26/2024       Current Outpatient Medications:     acetaminophen (Tylenol) 325 mg tablet, Take 2 tablets (650 mg) by mouth every 6 hours if needed., Disp: , Rfl:     alcohol swabs pads, medicated, Apply 1 each topically 4 times a day. Use prior to checking glucose or injecting insulin, Disp: 400 each, Rfl: 0    amLODIPine (Norvasc) 10 mg tablet, Take 1 tablet (10 mg) by mouth once daily.,  "Disp: 30 tablet, Rfl: 1    BD Ultra-Fine Joan Pen Needle 32 gauge x 5/32\" needle, , Disp: , Rfl:     blood sugar diagnostic (Blood Glucose Test) strip, Check blood glucose 3 time per day, Disp: 100 each, Rfl: 0    blood-glucose meter misc, Use to check blood glucose, Disp: 1 each, Rfl: 0    carboxymethylcellulose (Refresh Plus) 0.5 % ophthalmic solution, Instill 1 drop into both eyes 2-4x/day as needed for dryness., Disp: , Rfl:     clotrimazole (Mycelex) 10 mg flower, Take 1 tablet (10 mg) by mouth 3 times a day after meals., Disp: 90 Flower, Rfl: 0    docusate sodium (Colace) 100 mg capsule, Take 1 capsule (100 mg) by mouth 2 times a day as needed for constipation for up to 10 days., Disp: 20 capsule, Rfl: 0    FreeStyle Mick 2 Sensor kit, , Disp: , Rfl:     furosemide (Lasix) 40 mg tablet, Take 1 tablet (40 mg) by mouth 2 times daily (morning and late afternoon)., Disp: , Rfl:     gabapentin (Neurontin) 100 mg capsule, Take 2 capsules (200 mg) by mouth once daily., Disp: , Rfl:     insulin aspart, with niacinamide, (Fiasp FlexTouch U-100 Insulin) 100 unit/mL (3 mL) injection, Inject 0-5 Units under the skin 3 times daily (morning, midday, late afternoon). Take as directed per insulin instructions., Disp: 15 mL, Rfl: 0    insulin aspart, with niacinamide, (Fiasp FlexTouch U-100 Insulin) 100 unit/mL (3 mL) injection, Inject 4 Units under the skin 3 times a day before meals. Take as directed per insulin instructions., Disp: , Rfl:     insulin glargine (Basaglar KwikPen U-100 Insulin) 100 unit/mL (3 mL) pen, Inject 8 Units under the skin once daily at bedtime. Take as directed per insulin instructions., Disp: , Rfl:     lancets 30 gauge misc, 1 each 4 times a day. Use to check glucose 4 times daily, before meals and at bedtime, Disp: 400 each, Rfl: 0    mycophenolate (Cellcept) 250 mg capsule, Take 4 capsules (1,000 mg) by mouth every 12 hours., Disp: 240 capsule, Rfl: 0    pantoprazole (ProtoNix) 40 mg EC tablet, " "Take 1 tablet (40 mg) by mouth once daily in the morning. Take before meals. Do not crush, chew, or split., Disp: 30 tablet, Rfl: 0    pen needle, diabetic 32 gauge x 5/32\" needle, 1 each 4 times a day. Use to inject insulin 4 times daily, Disp: 400 each, Rfl: 3    polyethylene glycol (Glycolax, Miralax) 17 gram packet, Take 17 g by mouth once daily as needed (constipation) for up to 5 days., Disp: 5 packet, Rfl: 0    predniSONE (Deltasone) 5 mg tablet, Take 4 tablets (20 mg) by mouth once daily. Do not fill before December 25, 2024., Disp: 120 tablet, Rfl: 0    rosuvastatin (Crestor) 10 mg tablet, Take 1 tablet (10 mg) by mouth once daily at bedtime., Disp: 30 tablet, Rfl: 0    sulfamethoxazole-trimethoprim (Bactrim) 400-80 mg tablet, Take 1 tablet by mouth once daily., Disp: 30 tablet, Rfl: 0    tacrolimus (Prograf) 0.5 mg capsule, Take 1 capsule (0.5 mg) by mouth 2 times a day., Disp: 60 capsule, Rfl: 0    tacrolimus (Prograf) 1 mg capsule, Take 2 capsules (2 mg) by mouth every 12 hours., Disp: 120 capsule, Rfl: 0    valGANciclovir (Valcyte) 450 mg tablet, Take 1 tablet (450 mg) by mouth every other day. Do not crush or chew., Disp: 15 tablet, Rfl: 0    Assessment/Plan       DDKT: 12/21  KDPI: 93%  PRA 54%    Kidney allograft function   Slow function, creatinine slowly down trending   Dehydrated too     Immunosuppression   Thymo induction 3mg/kg  Tacrolimus 1.5/1.5   MMF 1000/1000   Pred 20    Weak and not drinking enough  Will plan to admit and hydrate      "

## 2024-12-31 NOTE — NURSING NOTE
Report from Sending RN:    Report From: PALMIRA Bolivar  Recent Surgery of Procedure: No  Baseline Level of Consciousness (LOC): A/O X 4  Oxygen Use: No  Type: N/A  Diabetic: Yes,   Last BP Med Given Day of Dialysis: none  Last Pain Med Given: none  Lab Tests to be Obtained with Dialysis: No  Blood Transfusion to be Given During Dialysis: No  Available IV Access: No  Medications to be Administered During Dialysis: No  Continuous IV Infusion Running: No  Restraints on Currently or in the Last 24 Hours: No  Hand-Off Communication: full code, no isolation, pt can stand safely on a scale for a weight, travel by a cart  Dialysis Catheter Dressing: will assess when the pt arrives to the HD unit  Last Dressing Change: will assess when the pt arrives to the HD unit  .

## 2024-12-31 NOTE — CARE PLAN
Problem: Pain - Adult  Goal: Verbalizes/displays adequate comfort level or baseline comfort level  Outcome: Progressing     Problem: Skin  Goal: Decreased wound size/increased tissue granulation at next dressing change  Outcome: Progressing  Flowsheets (Taken 12/31/2024 7165)  Decreased wound size/increased tissue granulation at next dressing change: Protective dressings over bony prominences   The patient's goals for the shift include get better    The clinical goals for the shift include hemodynamic stability

## 2024-12-31 NOTE — NURSING NOTE
4 Eyes Skin Assessment    Reason for assessment? Weekly skin assessment not performed because of discharge orders completed  FERNANDO Calloway.

## 2024-12-31 NOTE — H&P
Transplant Surgery History and Physical    Subjective   Chief Complaint/Reason for Admission: dehydration    HPI:  Temo Hanson is a 74 y.o. male 74M ESRD on HD (RUE AVF) d/t diabetic nephropathy. 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 Was voiding postop, no dialysis at that time. Clinic admit d/t dehydration, 1 cup water/d. Altered, . Dialysis on admission [facility] 11.8    Patient seen in dialysis room.  Hemodynamically stable.  Reports that after his most recent discharge, he remembers hearing someone say that he not does not need to drink as much.  Subsequently patient has only been drinking at most 1 cup of water per day.  Does not recall when he started feeling confused, but does note that he was encouraged to be admitted after clinic appointment today.  Reports that he still passing stool and urine, but that his urine is incredibly dark.  Denies smell to urine.  Denies any significant medical changes since last time he was admitted.     Denies CP, SOB, palpitations.    A 12-point ROS was performed and was unremarkable except as above.    PMH:  Past Medical History:   Diagnosis Date    Chronic kidney disease     DM (diabetes mellitus) (Multi)     Fistula     HTN (hypertension)     Other specified abnormal immunological findings in serum 10/11/2021    Hepatitis B core antibody positive    Personal history of malignant neoplasm of prostate     History of malignant neoplasm of prostate     PSH:  Past Surgical History:   Procedure Laterality Date    CARDIAC CATHETERIZATION N/A 4/18/2024    Procedure: Pericardiocentesis;  Surgeon: Jose Brunson MD;  Location: [place] [address] Cardiac Cath Lab;  Service: Cardiovascular;  Laterality: N/A;    CARDIAC ELECTROPHYSIOLOGY PROCEDURE N/A 7/17/2024    Procedure: Leadless PPM Implant (73134);  Surgeon: Sheldon De La Torre MD;  Location: [facility] Cardiac Cath Lab;  Service: Electrophysiology;  Laterality: N/A;  8:00, [employer]    CHOLECYSTECTOMY  10/23/2023    Lap  Cholecystectomy    OTHER SURGICAL HISTORY  09/29/2021    Cyst excision    OTHER SURGICAL HISTORY  09/29/2021    Arteriovenous fistula creation procedure    OTHER SURGICAL HISTORY  09/29/2021    Prostate surgery    OTHER SURGICAL HISTORY  09/29/2021    Colonoscopy     Soc Hx:  Social History     Socioeconomic History    Marital status:      Spouse name: Not on file    Number of children: Not on file    Years of education: Not on file    Highest education level: Not on file   Occupational History    Not on file   Tobacco Use    Smoking status: Never    Smokeless tobacco: Never   Vaping Use    Vaping status: Never Used   Substance and Sexual Activity    Alcohol use: Never    Drug use: Never    Sexual activity: Defer   Other Topics Concern    Not on file   Social History Narrative    Not on file     Social Drivers of Health     Financial Resource Strain: Low Risk  (12/31/2024)    Overall Financial Resource Strain (CARDIA)     Difficulty of Paying Living Expenses: Not hard at all   Food Insecurity: No Food Insecurity (12/31/2024)    Hunger Vital Sign     Worried About Running Out of Food in the Last Year: Never true     Ran Out of Food in the Last Year: Never true   Transportation Needs: No Transportation Needs (12/31/2024)    PRAPARE - Transportation     Lack of Transportation (Medical): No     Lack of Transportation (Non-Medical): No   Recent Concern: Transportation Needs - Unmet Transportation Needs (12/31/2024)    PRAPARE - Transportation     Lack of Transportation (Medical): Yes     Lack of Transportation (Non-Medical): Yes   Physical Activity: Not on file   Stress: Not on file   Social Connections: Feeling Socially Integrated (12/29/2024)    OASIS : Social Isolation     Frequency of experiencing loneliness or isolation: Never   Intimate Partner Violence: Not At Risk (12/31/2024)    Humiliation, Afraid, Rape, and Kick questionnaire     Fear of Current or Ex-Partner: No     Emotionally Abused: No      "Physically Abused: No     Sexually Abused: No   Housing Stability: Low Risk  (12/31/2024)    Housing Stability Vital Sign     Unable to Pay for Housing in the Last Year: No     Number of Times Moved in the Last Year: 0     Homeless in the Last Year: No     Fam Hx:  Family History   Problem Relation Name Age of Onset    Diabetes Sister      Diabetes Brother        Allergies:  Allergies   Allergen Reactions    Terazosin Unknown     Did not feel well on this medication    Codeine Itching     itching    Tramadol Itching     Current Medications:  No current facility-administered medications on file prior to encounter.     Current Outpatient Medications on File Prior to Encounter   Medication Sig Dispense Refill    acetaminophen (Tylenol) 325 mg tablet Take 2 tablets (650 mg) by mouth every 6 hours if needed.      alcohol swabs pads, medicated Apply 1 each topically 4 times a day. Use prior to checking glucose or injecting insulin 400 each 0    amLODIPine (Norvasc) 10 mg tablet Take 1 tablet (10 mg) by mouth once daily. 30 tablet 11    BD Ultra-Fine Joan Pen Needle 32 gauge x 5/32\" needle       blood sugar diagnostic (Blood Glucose Test) strip Check blood glucose 3 time per day 100 each 0    blood-glucose meter misc Use to check blood glucose 1 each 0    carboxymethylcellulose (Refresh Plus) 0.5 % ophthalmic solution Instill 1 drop into both eyes 2-4x/day as needed for dryness.      clotrimazole (Mycelex) 10 mg flower Take 1 tablet (10 mg) by mouth 3 times a day after meals. 90 Flower 1    docusate sodium (Colace) 100 mg capsule Take 1 capsule (100 mg) by mouth 2 times a day as needed for constipation for up to 10 days. 20 capsule 0    FreeStyle Mick 2 Sensor kit       gabapentin (Neurontin) 100 mg capsule Take 2 capsules (200 mg) by mouth once daily.      insulin aspart, with niacinamide, (Fiasp FlexTouch U-100 Insulin) 100 unit/mL (3 mL) injection Inject 0-5 Units under the skin 3 times daily (morning, midday, late " "afternoon). Take as directed per insulin instructions. 15 mL 0    insulin aspart, with niacinamide, (Fiasp FlexTouch U-100 Insulin) 100 unit/mL (3 mL) injection Inject 4 Units under the skin 3 times a day before meals. Take as directed per insulin instructions.      insulin glargine (Basaglar KwikPen U-100 Insulin) 100 unit/mL (3 mL) pen Inject 8 Units under the skin once daily at bedtime. Take as directed per insulin instructions.      lancets 30 gauge misc 1 each 4 times a day. Use to check glucose 4 times daily, before meals and at bedtime 400 each 0    mycophenolate (Cellcept) 250 mg capsule Take 4 capsules (1,000 mg) by mouth every 12 hours. 240 capsule 11    oxyCODONE (Roxicodone) 5 mg immediate release tablet Take 1 tablet (5 mg) by mouth every 6 hours if needed for severe pain (7 - 10) for up to 3 days. 5 tablet 0    pantoprazole (ProtoNix) 40 mg EC tablet Take 1 tablet (40 mg) by mouth once daily in the morning. Take before meals. Do not crush, chew, or split. 30 tablet 0    pen needle, diabetic 32 gauge x 5/32\" needle 1 each 4 times a day. Use to inject insulin 4 times daily 400 each 3    polyethylene glycol (Glycolax, Miralax) 17 gram packet Take 17 g by mouth once daily as needed (constipation) for up to 5 days. 5 packet 0    predniSONE (Deltasone) 5 mg tablet Take 4 tablets (20 mg) by mouth once daily. 120 tablet 11    rosuvastatin (Crestor) 10 mg tablet Take 1 tablet (10 mg) by mouth once daily at bedtime. 30 tablet 0    sulfamethoxazole-trimethoprim (Bactrim) 400-80 mg tablet Take 1 tablet by mouth once daily. 30 tablet 11    tacrolimus (Prograf) 0.5 mg capsule Take 1 capsule (0.5 mg) by mouth every 12 hours. 60 capsule 11    tacrolimus (Prograf) 1 mg capsule Take 1 capsule (1 mg) by mouth every 12 hours. 60 capsule 11    valGANciclovir (Valcyte) 450 mg tablet Take 1 tablet (450 mg) by mouth every other day. Do not crush or chew. 15 tablet 1    [DISCONTINUED] amLODIPine (Norvasc) 10 mg tablet Take 1 " tablet (10 mg) by mouth once daily. 30 tablet 1    [DISCONTINUED] clotrimazole (Mycelex) 10 mg flower Take 1 tablet (10 mg) by mouth 3 times a day after meals. 90 Flower 0    [DISCONTINUED] doxazosin (Cardura) 8 mg tablet Take 1 tablet (8 mg) by mouth once daily at bedtime. (Patient not taking: Reported on 12/21/2024)      [DISCONTINUED] furosemide (Lasix) 40 mg tablet Take 2 tablets (80 mg) by mouth 2 times daily (morning and late afternoon). 120 tablet 0    [DISCONTINUED] furosemide (Lasix) 40 mg tablet Take 1 tablet (40 mg) by mouth 2 times daily (morning and late afternoon).      [DISCONTINUED] gabapentin (Neurontin) 300 mg capsule Take 1 capsule (300 mg) by mouth once daily. 30 capsule 0    [DISCONTINUED] hydrALAZINE (Apresoline) 100 mg tablet Take 1 tablet (100 mg) by mouth 3 times a day. 90 tablet 0    [DISCONTINUED] insulin glargine,hum.rec.anlog (BASAGLAR KWIKPEN U-100 INSULIN SUBQ) Inject 10 Units under the skin once daily as needed. Take as directed per insulin instructions.      [DISCONTINUED] isosorbide dinitrate (Isordil) 10 mg tablet Take 1 tablet (10 mg) by mouth 3 times a day. 90 tablet 0    [DISCONTINUED] magnesium oxide (Mag-Ox) 400 mg tablet 1 tablet (400 mg) once daily.      [DISCONTINUED] mycophenolate (Cellcept) 250 mg capsule Take 4 capsules (1,000 mg) by mouth every 12 hours. 240 capsule 0    [DISCONTINUED] omeprazole (PriLOSEC) 20 mg DR capsule Take 1 capsule (20 mg) by mouth once daily.      [DISCONTINUED] predniSONE (Deltasone) 5 mg tablet Take 4 tablets (20 mg) by mouth once daily. Do not fill before December 25, 2024. 120 tablet 0    [DISCONTINUED] rosuvastatin (Crestor) 20 mg tablet Take 1 tablet (20 mg) by mouth once daily at bedtime. 30 tablet 1    [DISCONTINUED] sulfamethoxazole-trimethoprim (Bactrim) 400-80 mg tablet Take 1 tablet by mouth once daily. 30 tablet 0    [DISCONTINUED] tacrolimus (Prograf) 0.5 mg capsule Take 1 capsule (0.5 mg) by mouth 2 times a day. 60 capsule 0     [DISCONTINUED] tacrolimus (Prograf) 1 mg capsule Take 2 capsules (2 mg) by mouth every 12 hours. 120 capsule 0    [DISCONTINUED] valGANciclovir (Valcyte) 450 mg tablet Take 1 tablet (450 mg) by mouth every other day. Do not crush or chew. 15 tablet 0    [DISCONTINUED] valsartan (Diovan) 160 mg tablet Take 1 tablet (160 mg) by mouth once daily. 30 tablet 1         Objective   Vitals:  Visit Vitals  /57 (BP Location: Right arm, Patient Position: Lying)   Pulse 68   Temp 36.3 °C (97.3 °F) (Temporal)   Resp 16       Physical Exam:  GEN: No acute distress. Alert, awake and conversive.  HEENT: Sclera anicteric. Moist mucous membranes.  RESP: Breathing non-labored, equal chest rise. On RA.  CV: Regular rate, normotensive  GI: Abdomen soft, nondistended, nontender.  Postoperative right abdomen with surgical scar, well-healed no issues  : Voiding spontaneously.  During exam patient voided and had about 300 cc of dark isabel urine no sediment  MSK: No gross deformities. Moves all extremities spontaneously.  NEURO: Alert and oriented x3. No focal deficits.  PSYCH: Appropriate mood and affect.  SKIN: No rashes or lesions.    Labs within past 24h:  Results for orders placed or performed during the hospital encounter of 12/31/24 (from the past 24 hours)   POCT GLUCOSE   Result Value Ref Range    POCT Glucose 117 (H) 74 - 99 mg/dL       Imaging within past 24h:  No results found.    I have reviewed the imaging above as it pertains to the patient's surgical concerns and agree with the radiologist's interpretation.     ASSESSMENT  74M ESRD on HD (RUE AVF) d/t diabetic nephropathy. 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 Was voiding postop, no dialysis at that time. Clinic admit d/t dehydration, 1 cup water/d. Altered, . Dialysis on admission [facility] 11.8    PLAN:  - Admit transplant  - admission orders placed by transplant  - Continue LR 75/h  - Additional 1L LR bolus given dehydration  - Strict I/O  - STAT  Transplant Kidney US to evaluate arterial/venous flow    Patient's exam, labs, and findings discussed with Dr. Zimmer , who agrees with plan as above.    Disha Betts MD  PGY-1 General Surgery  Transplant Surgery [phone]

## 2024-12-31 NOTE — PROGRESS NOTES
TCC met with patient at bedside for discharge planning (spouse and daughter present). Spouse drives patient to medical appts. [facility]acy- [facility] DME- [facility]. Patient has dialysis at [facility] on [address]. Current discharge recommendations: TBD. TCC will follow for future discharge planning needs.     12/31/24 1401   Discharge Planning   Living Arrangements Spouse/significant other   Support Systems Spouse/significant other;Children   Assistance Needed TBD   Type of Residence Private residence   Number of Stairs to Enter Residence 6   Number of Stairs Within Residence 12   Do you have animals or pets at home? No   Who is requesting discharge planning? Provider   Home or Post Acute Services None   Type of Home Care Services Home nursing visits   Expected Discharge Disposition Home   Does the patient need discharge transport arranged? No   Financial Resource Strain   How hard is it for you to pay for the very basics like food, housing, medical care, and heating? Not hard   Housing Stability   In the last 12 months, was there a time when you were not able to pay the mortgage or rent on time? N   At any time in the past 12 months, were you homeless or living in a shelter (including now)? N   Transportation Needs   In the past 12 months, has lack of transportation kept you from medical appointments or from getting medications? no   In the past 12 months, has lack of transportation kept you from meetings, work, or from getting things needed for daily living? No   Stroke Family Assessment   Stroke Family Assessment Needed No       LUCÍA Strange, RN  [facility]  Transitional Care Coordinator, Mon-Fri  Cell: [phone], Office: [phone]  Email: [email or web address]

## 2024-12-31 NOTE — PROGRESS NOTES
"Pharmacy Medication History Review    Temo Hanson is a 74 y.o. male admitted for Dehydration. Pharmacy reviewed the patient's nccen-wk-jsarbzhod medications for accuracy.      The list below reflects the updated PTA list.   Prior to Admission Medications   Prescriptions Last Dose Informant   BD Ultra-Fine Joan Pen Needle 32 gauge x 5/32\" needle  Self   FreeStyle Mick 2 Sensor kit  Self   acetaminophen (Tylenol) 325 mg tablet  Self   Sig: Take 2 tablets (650 mg) by mouth every 6 hours if needed.   alcohol swabs pads, medicated  Self   Sig: Apply 1 each topically 4 times a day. Use prior to checking glucose or injecting insulin   amLODIPine (Norvasc) 10 mg tablet  Self   Sig: Take 1 tablet (10 mg) by mouth once daily.   blood sugar diagnostic (Blood Glucose Test) strip  Self   Sig: Check blood glucose 3 time per day   blood-glucose meter misc  Self   Sig: Use to check blood glucose   carboxymethylcellulose (Refresh Plus) 0.5 % ophthalmic solution  Self   Sig: Instill 1 drop into both eyes 2-4x/day as needed for dryness.   clotrimazole (Mycelex) 10 mg poli  Self   Sig: Take 1 tablet (10 mg) by mouth 3 times a day after meals.   docusate sodium (Colace) 100 mg capsule  Self   Sig: Take 1 capsule (100 mg) by mouth 2 times a day as needed for constipation for up to 10 days.   gabapentin (Neurontin) 100 mg capsule  Self   Sig: Take 2 capsules (200 mg) by mouth once daily.   insulin aspart, with niacinamide, (Fiasp FlexTouch U-100 Insulin) 100 unit/mL (3 mL) injection  Self   Sig: Inject 0-5 Units under the skin 3 times daily (morning, midday, late afternoon). Take as directed per insulin instructions.   insulin aspart, with niacinamide, (Fiasp FlexTouch U-100 Insulin) 100 unit/mL (3 mL) injection  Self   Sig: Inject 4 Units under the skin 3 times a day before meals. Take as directed per insulin instructions.   insulin glargine (Basaglar KwikPen U-100 Insulin) 100 unit/mL (3 mL) pen  Self   Sig: Inject 8 Units under " "the skin once daily at bedtime. Take as directed per insulin instructions.   lancets 30 gauge misc  Self   Si each 4 times a day. Use to check glucose 4 times daily, before meals and at bedtime   mycophenolate (Cellcept) 250 mg capsule  Self   Sig: Take 4 capsules (1,000 mg) by mouth every 12 hours.   oxyCODONE (Roxicodone) 5 mg immediate release tablet  Self   Sig: Take 1 tablet (5 mg) by mouth every 6 hours if needed for severe pain (7 - 10) for up to 3 days.   pantoprazole (ProtoNix) 40 mg EC tablet  Self   Sig: Take 1 tablet (40 mg) by mouth once daily in the morning. Take before meals. Do not crush, chew, or split.   pen needle, diabetic 32 gauge x \" needle  Self   Si each 4 times a day. Use to inject insulin 4 times daily   polyethylene glycol (Glycolax, Miralax) 17 gram packet  Self   Sig: Take 17 g by mouth once daily as needed (constipation) for up to 5 days.   predniSONE (Deltasone) 5 mg tablet  Self   Sig: Take 4 tablets (20 mg) by mouth once daily.   rosuvastatin (Crestor) 10 mg tablet  Self   Sig: Take 1 tablet (10 mg) by mouth once daily at bedtime.   sulfamethoxazole-trimethoprim (Bactrim) 400-80 mg tablet  Self   Sig: Take 1 tablet by mouth once daily.   tacrolimus (Prograf) 0.5 mg capsule  Self   Sig: Take 1 capsule (0.5 mg) by mouth every 12 hours.   tacrolimus (Prograf) 1 mg capsule  Self   Sig: Take 1 capsule (1 mg) by mouth every 12 hours.   valGANciclovir (Valcyte) 450 mg tablet  Self   Sig: Take 1 tablet (450 mg) by mouth every other day. Do not crush or chew.      Facility-Administered Medications: None        Patient accepts M2B at discharge.     Sources:   [facility]  Pharmacy dispense history  Patient interview Good historian  Chart Review  Care Everywhere  Transplant office visit note with Dr. Zamora on 24 (lasix 40mg twice a day was discontinued, tacro decreased to 1.5mg every 12 hours)  Discharge summary med list from 24 discharge from [facility]    Additional " "Comments:  Patient reports no changes to discharge medications from 12/26/24  When asked about changes to home meds (stop lasix, decreased tacrolimus dose) from office visit today with Dr. Zamora (12/31/24), patient stated he was not aware of these changes.  I advised to clarify any changes to home meds with team at discharge      Tomer Farr, PharmD  Transitions of Care Pharmacist  12/31/24     Secure Chat preferred   If no response call [phone] or Vocera \"Med Rec\"    "

## 2024-12-31 NOTE — NURSING NOTE
Report to Receiving RN:    Report To: Katt Rowan Report Called: [id number]  Hand-Off Communication: Pt removed 2 L of fluid. BP soft with post /77, P 70  Complications During Treatment: Yes, pt co neck pain and stated at the end of tx he has a neck brace he uses most of the time buut does not have it with him.   Ultrafiltration Treatment: Yes  Medications Administered During Dialysis: No  Blood Products Administered During Dialysis: No  Labs Sent During Dialysis: No  Heparin Drip Rate Changes: N/A  Dialysis Catheter Dressing: AVG  Last Dressing Change: AVG  Last Updated: 6:06 PM by PEDRO CACERES

## 2025-01-01 ENCOUNTER — HOME CARE VISIT (OUTPATIENT)
Dept: HOME HEALTH SERVICES | Facility: HOME HEALTH | Age: 75
End: 2025-01-01
Payer: COMMERCIAL

## 2025-01-01 LAB
ANION GAP SERPL CALC-SCNC: 18 MMOL/L (ref 10–20)
BUN SERPL-MCNC: 62 MG/DL (ref 6–23)
CALCIUM SERPL-MCNC: 8.9 MG/DL (ref 8.6–10.6)
CHLORIDE SERPL-SCNC: 95 MMOL/L (ref 98–107)
CO2 SERPL-SCNC: 28 MMOL/L (ref 21–32)
CREAT SERPL-MCNC: 3.44 MG/DL (ref 0.5–1.3)
EGFRCR SERPLBLD CKD-EPI 2021: 18 ML/MIN/1.73M*2
ERYTHROCYTE [DISTWIDTH] IN BLOOD BY AUTOMATED COUNT: 17.2 % (ref 11.5–14.5)
GLUCOSE BLD MANUAL STRIP-MCNC: 165 MG/DL (ref 74–99)
GLUCOSE BLD MANUAL STRIP-MCNC: 185 MG/DL (ref 74–99)
GLUCOSE BLD MANUAL STRIP-MCNC: 238 MG/DL (ref 74–99)
GLUCOSE BLD MANUAL STRIP-MCNC: 252 MG/DL (ref 74–99)
GLUCOSE BLD MANUAL STRIP-MCNC: 331 MG/DL (ref 74–99)
GLUCOSE SERPL-MCNC: 206 MG/DL (ref 74–99)
HCT VFR BLD AUTO: 26.5 % (ref 41–52)
HGB BLD-MCNC: 8.9 G/DL (ref 13.5–17.5)
MAGNESIUM SERPL-MCNC: 1.98 MG/DL (ref 1.6–2.4)
MCH RBC QN AUTO: 31.8 PG (ref 26–34)
MCHC RBC AUTO-ENTMCNC: 33.6 G/DL (ref 32–36)
MCV RBC AUTO: 95 FL (ref 80–100)
NRBC BLD-RTO: 0 /100 WBCS (ref 0–0)
PHOSPHATE SERPL-MCNC: 3.3 MG/DL (ref 2.5–4.9)
PLATELET # BLD AUTO: 193 X10*3/UL (ref 150–450)
POTASSIUM SERPL-SCNC: 4.2 MMOL/L (ref 3.5–5.3)
RBC # BLD AUTO: 2.8 X10*6/UL (ref 4.5–5.9)
SODIUM SERPL-SCNC: 137 MMOL/L (ref 136–145)
TACROLIMUS BLD-MCNC: 7.9 NG/ML
WBC # BLD AUTO: 4.8 X10*3/UL (ref 4.4–11.3)

## 2025-01-01 PROCEDURE — 85027 COMPLETE CBC AUTOMATED: CPT | Performed by: STUDENT IN AN ORGANIZED HEALTH CARE EDUCATION/TRAINING PROGRAM

## 2025-01-01 PROCEDURE — 2500000004 HC RX 250 GENERAL PHARMACY W/ HCPCS (ALT 636 FOR OP/ED)

## 2025-01-01 PROCEDURE — 80197 ASSAY OF TACROLIMUS: CPT | Performed by: STUDENT IN AN ORGANIZED HEALTH CARE EDUCATION/TRAINING PROGRAM

## 2025-01-01 PROCEDURE — 2500000004 HC RX 250 GENERAL PHARMACY W/ HCPCS (ALT 636 FOR OP/ED): Performed by: STUDENT IN AN ORGANIZED HEALTH CARE EDUCATION/TRAINING PROGRAM

## 2025-01-01 PROCEDURE — 1100000001 HC PRIVATE ROOM DAILY

## 2025-01-01 PROCEDURE — 2500000002 HC RX 250 W HCPCS SELF ADMINISTERED DRUGS (ALT 637 FOR MEDICARE OP, ALT 636 FOR OP/ED): Performed by: STUDENT IN AN ORGANIZED HEALTH CARE EDUCATION/TRAINING PROGRAM

## 2025-01-01 PROCEDURE — 83735 ASSAY OF MAGNESIUM: CPT | Performed by: STUDENT IN AN ORGANIZED HEALTH CARE EDUCATION/TRAINING PROGRAM

## 2025-01-01 PROCEDURE — 2500000001 HC RX 250 WO HCPCS SELF ADMINISTERED DRUGS (ALT 637 FOR MEDICARE OP): Performed by: STUDENT IN AN ORGANIZED HEALTH CARE EDUCATION/TRAINING PROGRAM

## 2025-01-01 PROCEDURE — 99232 SBSQ HOSP IP/OBS MODERATE 35: CPT | Performed by: TRANSPLANT SURGERY

## 2025-01-01 PROCEDURE — 80048 BASIC METABOLIC PNL TOTAL CA: CPT | Performed by: STUDENT IN AN ORGANIZED HEALTH CARE EDUCATION/TRAINING PROGRAM

## 2025-01-01 PROCEDURE — 99233 SBSQ HOSP IP/OBS HIGH 50: CPT | Performed by: INTERNAL MEDICINE

## 2025-01-01 PROCEDURE — 84100 ASSAY OF PHOSPHORUS: CPT | Performed by: STUDENT IN AN ORGANIZED HEALTH CARE EDUCATION/TRAINING PROGRAM

## 2025-01-01 PROCEDURE — 36415 COLL VENOUS BLD VENIPUNCTURE: CPT | Performed by: STUDENT IN AN ORGANIZED HEALTH CARE EDUCATION/TRAINING PROGRAM

## 2025-01-01 PROCEDURE — 2500000001 HC RX 250 WO HCPCS SELF ADMINISTERED DRUGS (ALT 637 FOR MEDICARE OP)

## 2025-01-01 PROCEDURE — 82947 ASSAY GLUCOSE BLOOD QUANT: CPT

## 2025-01-01 RX ORDER — OXYCODONE HYDROCHLORIDE 5 MG/1
5 TABLET ORAL EVERY 6 HOURS PRN
Status: DISCONTINUED | OUTPATIENT
Start: 2025-01-01 | End: 2025-01-09

## 2025-01-01 RX ORDER — OXYCODONE HYDROCHLORIDE 5 MG/1
2.5 TABLET ORAL EVERY 6 HOURS PRN
Status: DISCONTINUED | OUTPATIENT
Start: 2025-01-01 | End: 2025-01-09

## 2025-01-01 RX ADMIN — PREDNISONE 20 MG: 20 TABLET ORAL at 08:57

## 2025-01-01 RX ADMIN — VALGANCICLOVIR 450 MG: 450 TABLET, FILM COATED ORAL at 08:57

## 2025-01-01 RX ADMIN — TACROLIMUS 1.5 MG: 1 CAPSULE ORAL at 06:19

## 2025-01-01 RX ADMIN — OXYCODONE HYDROCHLORIDE 5 MG: 5 TABLET ORAL at 10:27

## 2025-01-01 RX ADMIN — TACROLIMUS 1.5 MG: 1 CAPSULE ORAL at 18:11

## 2025-01-01 RX ADMIN — CLOTRIMAZOLE 10 MG: 10 LOZENGE ORAL at 18:11

## 2025-01-01 RX ADMIN — SODIUM CHLORIDE, POTASSIUM CHLORIDE, SODIUM LACTATE AND CALCIUM CHLORIDE 75 ML/HR: 600; 310; 30; 20 INJECTION, SOLUTION INTRAVENOUS at 03:36

## 2025-01-01 RX ADMIN — ROSUVASTATIN CALCIUM 10 MG: 10 TABLET, FILM COATED ORAL at 21:20

## 2025-01-01 RX ADMIN — INSULIN LISPRO 4 UNITS: 100 INJECTION, SOLUTION INTRAVENOUS; SUBCUTANEOUS at 16:41

## 2025-01-01 RX ADMIN — CLOTRIMAZOLE 10 MG: 10 LOZENGE ORAL at 08:57

## 2025-01-01 RX ADMIN — MYCOPHENOLATE MOFETIL 1000 MG: 250 CAPSULE ORAL at 18:11

## 2025-01-01 RX ADMIN — AMLODIPINE BESYLATE 10 MG: 10 TABLET ORAL at 08:57

## 2025-01-01 RX ADMIN — MYCOPHENOLATE MOFETIL 1000 MG: 250 CAPSULE ORAL at 06:19

## 2025-01-01 RX ADMIN — SULFAMETHOXAZOLE AND TRIMETHOPRIM 1 TABLET: 400; 80 TABLET ORAL at 08:57

## 2025-01-01 RX ADMIN — PANTOPRAZOLE SODIUM 40 MG: 40 TABLET, DELAYED RELEASE ORAL at 06:19

## 2025-01-01 RX ADMIN — GABAPENTIN 200 MG: 100 CAPSULE ORAL at 08:57

## 2025-01-01 RX ADMIN — INSULIN GLARGINE 8 UNITS: 100 INJECTION, SOLUTION SUBCUTANEOUS at 21:25

## 2025-01-01 RX ADMIN — INSULIN LISPRO 4 UNITS: 100 INJECTION, SOLUTION INTRAVENOUS; SUBCUTANEOUS at 09:00

## 2025-01-01 RX ADMIN — INSULIN LISPRO 4 UNITS: 100 INJECTION, SOLUTION INTRAVENOUS; SUBCUTANEOUS at 08:57

## 2025-01-01 RX ADMIN — INSULIN LISPRO 8 UNITS: 100 INJECTION, SOLUTION INTRAVENOUS; SUBCUTANEOUS at 16:40

## 2025-01-01 ASSESSMENT — PAIN SCALES - WONG BAKER
WONGBAKER_NUMERICALRESPONSE: HURTS LITTLE BIT
WONGBAKER_NUMERICALRESPONSE: HURTS LITTLE BIT
WONGBAKER_NUMERICALRESPONSE: HURTS EVEN MORE
WONGBAKER_NUMERICALRESPONSE: HURTS LITTLE BIT

## 2025-01-01 ASSESSMENT — COGNITIVE AND FUNCTIONAL STATUS - GENERAL
CLIMB 3 TO 5 STEPS WITH RAILING: A LOT
STANDING UP FROM CHAIR USING ARMS: A LITTLE
WALKING IN HOSPITAL ROOM: A LITTLE
MOBILITY SCORE: 19
DAILY ACTIVITIY SCORE: 24
MOVING TO AND FROM BED TO CHAIR: A LITTLE

## 2025-01-01 ASSESSMENT — PAIN SCALES - GENERAL
PAINLEVEL_OUTOF10: 6
PAINLEVEL_OUTOF10: 9
PAINLEVEL_OUTOF10: 6
PAINLEVEL_OUTOF10: 0 - NO PAIN
PAINLEVEL_OUTOF10: 6

## 2025-01-01 ASSESSMENT — PAIN SCALES - PAIN ASSESSMENT IN ADVANCED DEMENTIA (PAINAD)
CONSOLABILITY: NO NEED TO CONSOLE
BODYLANGUAGE: RELAXED
FACIALEXPRESSION: SMILING OR INEXPRESSIVE
TOTALSCORE: 0
BREATHING: NORMAL

## 2025-01-01 ASSESSMENT — PAIN INTENSITY VAS: VAS_PAIN_BASICVITALS_IP: 6

## 2025-01-01 ASSESSMENT — PAIN - FUNCTIONAL ASSESSMENT: PAIN_FUNCTIONAL_ASSESSMENT: 0-10

## 2025-01-01 NOTE — PROGRESS NOTES
"Temo Hanson is a 74 y.o. male on day 1 of admission presenting with Dehydration.    S/p HD yest, tolerated well  No acute events overnight.   Feels better this am overall but does c/o pain in buttocks    Scheduled medications  amLODIPine, 10 mg, oral, Daily  clotrimazole, 10 mg, oral, TID after meals  gabapentin, 200 mg, oral, Daily  insulin glargine, 8 Units, subcutaneous, Nightly  insulin lispro, 0-10 Units, subcutaneous, TID AC  insulin lispro, 4 Units, subcutaneous, TID AC  lidocaine, 1 patch, transdermal, q24h  mycophenolate, 1,000 mg, oral, q12h  pantoprazole, 40 mg, oral, Daily before breakfast  predniSONE, 20 mg, oral, Daily  rosuvastatin, 10 mg, oral, Nightly  sulfamethoxazole-trimethoprim, 1 tablet, oral, Daily  tacrolimus, 1.5 mg, oral, q12h  valGANciclovir, 450 mg, oral, q48h      Continuous medications     PRN medications  PRN medications: carboxymethylcellulose, dextrose, dextrose, docusate sodium, glucagon, glucagon, oxyCODONE, oxyCODONE, polyethylene glycol    Last Recorded Vitals  Blood pressure 114/66, pulse 79, temperature 36.5 °C (97.7 °F), temperature source Temporal, resp. rate 16, height 1.854 m (6' 1\"), SpO2 99%.  Intake/Output last 3 Shifts:  I/O last 3 completed shifts:  In: 3701.3 [P.O.:125; I.V.:1801.3; Other:400; IV Piggyback:1375]  Out: 2720 [Urine:320; Other:2400]    A&ox3, no distress, pleasant  MMM, no lesions  Lungs with equal air entry, no added sounds  Rrr, no m/r/g  Abd soft, nt, nd, RLQ incision c/d/I, staples intact  No allograft tenderness  No edema b/l    Results for orders placed or performed during the hospital encounter of 12/31/24 (from the past 24 hours)   POCT GLUCOSE   Result Value Ref Range    POCT Glucose 252 (H) 74 - 99 mg/dL   CBC   Result Value Ref Range    WBC 4.8 4.4 - 11.3 x10*3/uL    nRBC 0.0 0.0 - 0.0 /100 WBCs    RBC 2.80 (L) 4.50 - 5.90 x10*6/uL    Hemoglobin 8.9 (L) 13.5 - 17.5 g/dL    Hematocrit 26.5 (L) 41.0 - 52.0 %    MCV 95 80 - 100 fL    MCH 31.8 " 26.0 - 34.0 pg    MCHC 33.6 32.0 - 36.0 g/dL    RDW 17.2 (H) 11.5 - 14.5 %    Platelets 193 150 - 450 x10*3/uL   Basic metabolic panel   Result Value Ref Range    Glucose 206 (H) 74 - 99 mg/dL    Sodium 137 136 - 145 mmol/L    Potassium 4.2 3.5 - 5.3 mmol/L    Chloride 95 (L) 98 - 107 mmol/L    Bicarbonate 28 21 - 32 mmol/L    Anion Gap 18 10 - 20 mmol/L    Urea Nitrogen 62 (H) 6 - 23 mg/dL    Creatinine 3.44 (H) 0.50 - 1.30 mg/dL    eGFR 18 (L) >60 mL/min/1.73m*2    Calcium 8.9 8.6 - 10.6 mg/dL   Magnesium   Result Value Ref Range    Magnesium 1.98 1.60 - 2.40 mg/dL   Phosphorus   Result Value Ref Range    Phosphorus 3.3 2.5 - 4.9 mg/dL   Tacrolimus level   Result Value Ref Range    Tacrolimus  7.9 <=15.0 ng/mL   POCT GLUCOSE   Result Value Ref Range    POCT Glucose 238 (H) 74 - 99 mg/dL   POCT GLUCOSE   Result Value Ref Range    POCT Glucose 185 (H) 74 - 99 mg/dL   POCT GLUCOSE   Result Value Ref Range    POCT Glucose 331 (H) 74 - 99 mg/dL       Assessment/Plan     74 y.o. male with a hx of ESRD secondary to diabetic nephropathy whom received a  donor kidney transplant on 24 by Dr. Zamora with a KDPI of 93% and PRA of 54%. Donor was Hepc -/-and has not met risk factors. EBV + /+., CMV D-/R+. Left donor kidney transplanted to patient right pelvis. Admission weight is 72.7 kg (discharge weight is 76.4 kg ). Pt received a total of 3 mg/kg total of thymoglobulin induction therapy in conjunction with 500mg IV solumedrol.      Post-txp course notable for slow graft function, now with DGF.     Allograft function: s/p DDKT 24  - DGF. , Cr 5.5. uremic on exam  - last HD  for clearance.   - AM labs reviewed. No current indication for RRT. Will evalaute daily.  - metabolic indices acceptable  - maintain adequate hydration. Avoid potential nephrotoxins  - Hb 8.9, below goal. Would give Aranesp 100mcg x1. Monitor and transfuse as needed. Check iron panel, ferritin with next labs.  - Ca, phos  acceptable.      Immunosuppression:  - cont tac 1.5 mg bid. Goal FK 8-10. Last Fk 7.9. monitor rpt level.  - cont MMF 1g q12, pred 20 mg/d taper protocol  - Ppx: Bactrim, clotrimazole, Valcyte (CMV D-/R+)    Will follow    Beka Nichols MD

## 2025-01-01 NOTE — CARE PLAN
The patient's goals for the shift include get better    The clinical goals for the shift include pt will remain HDS by end of shift      Problem: Safety - Adult  Goal: Free from fall injury  Outcome: Progressing     Problem: Skin  Goal: Participates in plan/prevention/treatment measures  Outcome: Progressing  Flowsheets (Taken 1/1/2025 0909)  Participates in plan/prevention/treatment measures: Elevate heels  Goal: Prevent/manage excess moisture  Outcome: Progressing  Flowsheets (Taken 1/1/2025 0909)  Prevent/manage excess moisture: Moisturize dry skin

## 2025-01-01 NOTE — PROGRESS NOTES
TRANSPLANT SURGERY PROGRESS NOTE        24 events  Admitted with dehydration   Only drinking 1-2 cups of fluid per day.   Allograft function stable. Off dialysis  DDKT: 12/21  KDPI: 93%  PRA 54%    Patient Active Problem List   Diagnosis    S/P laparoscopic cholecystectomy    Abdominal pain    Achalasia    Acute lower UTI    Microcytic anemia    Complete heart block    Callus of foot    Chronic periodontitis, unspecified    Stage 5 chronic kidney disease (Multi)    Closed fracture of metatarsal bone    Crushing injury of foot    Diabetes mellitus (Multi)    Diarrhea    Dysphagia    ED (erectile dysfunction)    Essential hypertension    Foot pain, right    GIB (gastrointestinal bleeding)    Hepatitis B core antibody positive    Hyperkalemia    Ingrowing nail    Iron deficiency anemia secondary to inadequate dietary iron intake    Long toenail    Malignant neoplasm of prostate (Multi)    Onychomycosis    Pheochromocytoma of right adrenal gland    Pneumonia of right lower lobe due to infectious organism    Productive cough    Pure hypercholesterolemia    Stricture esophagus    SVT (supraventricular tachycardia) (CMS-HCC)    Type 2 diabetes mellitus with diabetic neuropathy (Multi)    Preop cardiovascular exam    Third degree heart block    Pericardial effusion (HHS-HCC)    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 (Multi)    Uremia    Cardiac tamponade    Cardiac pacemaker in situ    ESRD (end stage renal disease) (Multi)    Type 2 diabetes mellitus, with long-term current use of insulin    Dehydration       PHYCISCAL EXAMINATION:  Vitals:    01/01/25 1622   BP:    Pulse: 79   Resp:    Temp: 36.5 °C (97.7 °F)   SpO2: 99%        12/30 1900 - 01/01 0659  In: 3701.3 [P.O.:125; I.V.:1801.3]  Out: 2720 [Urine:320]     Weight change:     General Appearance - NAD, Good speech, oriented and alert  Abdomen - soft , not tender, no guarding, no rigidity. No hepatosplenomegaly. Normal bowel sounds. No masses and ascites.    Wound c/d/i   Neuro/Psych - appropriate mood and affect. Motor power V/V all extremities. CN I -XII were grossly intact.  Skin - No visible rash      MEDICATION LIST: REVIEWED  amLODIPine, 10 mg, Daily  clotrimazole, 10 mg, TID after meals  gabapentin, 200 mg, Daily  insulin glargine, 8 Units, Nightly  insulin lispro, 0-10 Units, TID AC  insulin lispro, 4 Units, TID AC  lidocaine, 1 patch, q24h  mycophenolate, 1,000 mg, q12h  pantoprazole, 40 mg, Daily before breakfast  predniSONE, 20 mg, Daily  rosuvastatin, 10 mg, Nightly  sulfamethoxazole-trimethoprim, 1 tablet, Daily  tacrolimus, 1.5 mg, q12h  valGANciclovir, 450 mg, q48h         carboxymethylcellulose, 1 drop, PRN  dextrose, 12.5 g, q15 min PRN  dextrose, 25 g, q15 min PRN  docusate sodium, 100 mg, BID PRN  glucagon, 1 mg, q15 min PRN  glucagon, 1 mg, q15 min PRN  oxyCODONE, 2.5 mg, q6h PRN  oxyCODONE, 5 mg, q6h PRN  polyethylene glycol, 17 g, Daily PRN        ALLERGY:  Allergies   Allergen Reactions    Terazosin Unknown     Did not feel well on this medication    Codeine Itching     itching    Tramadol Itching       LABS:  Results for orders placed or performed during the hospital encounter of 12/31/24 (from the past 24 hours)   POCT GLUCOSE   Result Value Ref Range    POCT Glucose 252 (H) 74 - 99 mg/dL   CBC   Result Value Ref Range    WBC 4.8 4.4 - 11.3 x10*3/uL    nRBC 0.0 0.0 - 0.0 /100 WBCs    RBC 2.80 (L) 4.50 - 5.90 x10*6/uL    Hemoglobin 8.9 (L) 13.5 - 17.5 g/dL    Hematocrit 26.5 (L) 41.0 - 52.0 %    MCV 95 80 - 100 fL    MCH 31.8 26.0 - 34.0 pg    MCHC 33.6 32.0 - 36.0 g/dL    RDW 17.2 (H) 11.5 - 14.5 %    Platelets 193 150 - 450 x10*3/uL   Basic metabolic panel   Result Value Ref Range    Glucose 206 (H) 74 - 99 mg/dL    Sodium 137 136 - 145 mmol/L    Potassium 4.2 3.5 - 5.3 mmol/L    Chloride 95 (L) 98 - 107 mmol/L    Bicarbonate 28 21 - 32 mmol/L    Anion Gap 18 10 - 20 mmol/L    Urea Nitrogen 62 (H) 6 - 23 mg/dL    Creatinine 3.44 (H) 0.50 -  1.30 mg/dL    eGFR 18 (L) >60 mL/min/1.73m*2    Calcium 8.9 8.6 - 10.6 mg/dL   Magnesium   Result Value Ref Range    Magnesium 1.98 1.60 - 2.40 mg/dL   Phosphorus   Result Value Ref Range    Phosphorus 3.3 2.5 - 4.9 mg/dL   Tacrolimus level   Result Value Ref Range    Tacrolimus  7.9 <=15.0 ng/mL   POCT GLUCOSE   Result Value Ref Range    POCT Glucose 238 (H) 74 - 99 mg/dL   POCT GLUCOSE   Result Value Ref Range    POCT Glucose 185 (H) 74 - 99 mg/dL   POCT GLUCOSE   Result Value Ref Range    POCT Glucose 331 (H) 74 - 99 mg/dL      Lab Results   Component Value Date    ALT 18 12/20/2024    AST 21 12/20/2024    ALKPHOS 189 (H) 12/20/2024    BILITOT 0.5 12/20/2024         ASSESSMENT AND PLAN:    Mr. Hanson is a 74 y.o. male who underwent  kidney transplant surgery on 12/21/2024 (Kidney).    1. Immunosuppression reviewed and adjusted       Tacrolimus: current dose 1.5 mg BID.      Today's tacrolimus level is 7.9  No changes      Goal tacrolimus trough level is 8-10    2. IV hydration      Replacement completed: Yes      Maintenance: Continue    3. Electrolyte        Lab Results   Component Value Date    GLUCOSE 206 (H) 01/01/2025    CALCIUM 8.9 01/01/2025     01/01/2025    K 4.2 01/01/2025    CO2 28 01/01/2025    CL 95 (L) 01/01/2025    BUN 62 (H) 01/01/2025    CREATININE 3.44 (H) 01/01/2025        Reviewed renal profile.      Creatinine improving    4. Hypertension         Blood Pressures         12/31/2024  1221 12/31/2024  2152 1/1/2025  0121 1/1/2025  0555 1/1/2025  1123    BP: 119/57 112/53 120/65 148/63 114/66              Goal BP < 140/90 mmHg        Continue current management     5. Wound/drain/long and pain management       Long cathter:  N/A    6.  GI prophylaxis       On PPI     7. DVT Prophylaxis      SCDs      Subcutaneous Heparin: Yes    8. ID prophylaxis      CMV, PJP and fungal prophylaxis per  Transplant [facility] Protocol    9. Discharge planned for: 2-3 days    10. Needs education about  proper intake     Case was presented at Multi Disciplinary team rounds   Attending physician, consulting physician, , pharmacist, residents and fellow were present at the meeting.      John Zimmer MD    Transplant Surgery Attending

## 2025-01-01 NOTE — CONSULTS
Reason For Consult  S/p DDKT    History Of Present Illness  Temo Hanson is a 74 y.o. male with a hx of ESRD secondary to diabetic nephropathy whom received a  donor kidney transplant on 24 by Dr. Zamora with a KDPI of 93% and PRA of 54%. Donor was Hepc -/-and has not met risk factors. EBV + /+. Left donor kidney transplanted to patient right pelvis. Admission weight is 72.7 kg (discharge weight is 76.4 kg ). Pt received a total of 3 mg/kg total of thymoglobulin induction therapy in conjunction with 500mg IV solumedrol. Pt was initiated on Tacrolimus & Cellcept immunosuppression in conjunction with IV solumedrol taper. Pt was started on valcyte (CMV D - / R + ) and bactrim for CMV & PCP prophylaxis per protocol. He was started on clotrimazole as antifungal coverage per protocol. Operative course was uncomplicated. Hospital course was uncomplicated. Bravo catheter was removed on POD #3 and the patient is voiding spontaneously. Pt did not require dialysis and electrolytes remain stable. Dialysis access via right UE AVF and was patent on last use. BP & glucose under good control.     Post-txp course notable for slow graft function. Did not require dialysis yet.     Pt was discharged home on 24. Reports poor oral intake, worsening fatigue since then.   Presented to clinic for follow up this AM. Labs showed  (from 76), Cr 5.5 (from 4.3 ).      Reports drop in UOP. Pt c/o poor appetite, worsening fatigue due to which he has not been able to maintain hydration as recommended. Denies any LUTS.    Current wt 74kg. Reported EDW 72kg.    ROS neg otherwise.     Past Medical History  He has a past medical history of Chronic kidney disease, DM (diabetes mellitus) (Multi), Fistula, HTN (hypertension), Other specified abnormal immunological findings in serum (10/11/2021), and Personal history of malignant neoplasm of prostate.    Surgical History  He has a past surgical history that includes Other  "surgical history (09/29/2021); Other surgical history (09/29/2021); Other surgical history (09/29/2021); Other surgical history (09/29/2021); Cholecystectomy (10/23/2023); Cardiac catheterization (N/A, 4/18/2024); and Cardiac electrophysiology procedure (N/A, 7/17/2024).     Social History  He reports that he has never smoked. He has never used smokeless tobacco. He reports that he does not drink alcohol and does not use drugs.    Family History  Family History   Problem Relation Name Age of Onset    Diabetes Sister      Diabetes Brother          Allergies  Terazosin, Codeine, and Tramadol    Scheduled medications  amLODIPine, 10 mg, oral, Daily  clotrimazole, 10 mg, oral, TID after meals  gabapentin, 200 mg, oral, Daily  insulin glargine, 8 Units, subcutaneous, Nightly  insulin lispro, 0-10 Units, subcutaneous, TID AC  insulin lispro, 4 Units, subcutaneous, TID AC  lidocaine, 1 patch, transdermal, q24h  mycophenolate, 1,000 mg, oral, q12h  pantoprazole, 40 mg, oral, Daily before breakfast  predniSONE, 20 mg, oral, Daily  rosuvastatin, 10 mg, oral, Nightly  sulfamethoxazole-trimethoprim, 1 tablet, oral, Daily  tacrolimus, 1.5 mg, oral, q12h  valGANciclovir, 450 mg, oral, q48h      Continuous medications  lactated Ringer's, 75 mL/hr, Last Rate: 75 mL/hr (12/31/24 2337)      PRN medications  PRN medications: carboxymethylcellulose, dextrose, dextrose, docusate sodium, glucagon, glucagon, polyethylene glycol       Physical Exam     Last Recorded Vitals  Blood pressure 112/53, pulse 74, temperature 36.2 °C (97.2 °F), temperature source Temporal, resp. rate 18, height 1.854 m (6' 1\"), SpO2 94%.    A&ox3, no distress, pleasant  MMM, no lesions  Lungs with equal air entry, no added sounds  Rrr, no m/r/g  Abd soft, nt, nd  No allograft tenderness  No edema b/l    Results for orders placed or performed during the hospital encounter of 12/31/24 (from the past 24 hours)   POCT GLUCOSE   Result Value Ref Range    POCT Glucose " 117 (H) 74 - 99 mg/dL   POCT GLUCOSE   Result Value Ref Range    POCT Glucose 252 (H) 74 - 99 mg/dL     Renal US:  1. Slightly elevated resistive indices and arterial velocities,  improved compared to prior exam. These findings are nonspecific  however may be secondary to postoperative edema, and continued  attention on follow-up recommended.  2. No sonographic evidence of hydronephrosis or perinephric fluid  collection.       Assessment/Plan     74 y.o. male with a hx of ESRD secondary to diabetic nephropathy whom received a  donor kidney transplant on 24 by Dr. Zamora with a KDPI of 93% and PRA of 54%. Donor was Hepc -/-and has not met risk factors. EBV + /+., CMV D-/R+. Left donor kidney transplanted to patient right pelvis. Admission weight is 72.7 kg (discharge weight is 76.4 kg ). Pt received a total of 3 mg/kg total of thymoglobulin induction therapy in conjunction with 500mg IV solumedrol.     Post-txp course notable for slow graft function, now with DGF.    Allograft function: s/p DDKT 24  - DGF. , Cr 5.5. uremic on exam  - plan for HD today x3h for clearance, UF as tolerated.   - metabolic indices acceptable  - maintain adequate hydration. Avoid potential nephrotoxins  - Hb 9.4, below goal. Monitor and transfuse as needed. Consider JONE if Hb <9. Check iron panel, ferritin with next labs.  - Ca, phos acceptable.     Immunosuppression:  - cont tac 1.5 mg bi. Goal FK 8-10  - cont MMF 1g q12, pred 20 mg/d taper protocol  - Ppx: Bactrim, clotrimazole, Valcyte (CMV D-/R+)    I spent 60 minutes in the professional and overall care of this patient.    Beka Nichols MD

## 2025-01-02 LAB
ANION GAP SERPL CALC-SCNC: 16 MMOL/L (ref 10–20)
BUN SERPL-MCNC: 70 MG/DL (ref 6–23)
CALCIUM SERPL-MCNC: 8.5 MG/DL (ref 8.6–10.6)
CHLORIDE SERPL-SCNC: 95 MMOL/L (ref 98–107)
CO2 SERPL-SCNC: 26 MMOL/L (ref 21–32)
CREAT SERPL-MCNC: 3.51 MG/DL (ref 0.5–1.3)
EGFRCR SERPLBLD CKD-EPI 2021: 18 ML/MIN/1.73M*2
ERYTHROCYTE [DISTWIDTH] IN BLOOD BY AUTOMATED COUNT: 17.3 % (ref 11.5–14.5)
GLUCOSE BLD MANUAL STRIP-MCNC: 153 MG/DL (ref 74–99)
GLUCOSE BLD MANUAL STRIP-MCNC: 211 MG/DL (ref 74–99)
GLUCOSE BLD MANUAL STRIP-MCNC: 219 MG/DL (ref 74–99)
GLUCOSE BLD MANUAL STRIP-MCNC: 94 MG/DL (ref 74–99)
GLUCOSE SERPL-MCNC: 145 MG/DL (ref 74–99)
HCT VFR BLD AUTO: 25.6 % (ref 41–52)
HGB BLD-MCNC: 8 G/DL (ref 13.5–17.5)
MAGNESIUM SERPL-MCNC: 1.84 MG/DL (ref 1.6–2.4)
MCH RBC QN AUTO: 30.7 PG (ref 26–34)
MCHC RBC AUTO-ENTMCNC: 31.3 G/DL (ref 32–36)
MCV RBC AUTO: 98 FL (ref 80–100)
NRBC BLD-RTO: 0 /100 WBCS (ref 0–0)
PHOSPHATE SERPL-MCNC: 3 MG/DL (ref 2.5–4.9)
PLATELET # BLD AUTO: 118 X10*3/UL (ref 150–450)
POTASSIUM SERPL-SCNC: 4 MMOL/L (ref 3.5–5.3)
RBC # BLD AUTO: 2.61 X10*6/UL (ref 4.5–5.9)
SODIUM SERPL-SCNC: 133 MMOL/L (ref 136–145)
TACROLIMUS BLD-MCNC: 7.6 NG/ML
WBC # BLD AUTO: 3.7 X10*3/UL (ref 4.4–11.3)

## 2025-01-02 PROCEDURE — 2500000002 HC RX 250 W HCPCS SELF ADMINISTERED DRUGS (ALT 637 FOR MEDICARE OP, ALT 636 FOR OP/ED): Performed by: STUDENT IN AN ORGANIZED HEALTH CARE EDUCATION/TRAINING PROGRAM

## 2025-01-02 PROCEDURE — 2500000001 HC RX 250 WO HCPCS SELF ADMINISTERED DRUGS (ALT 637 FOR MEDICARE OP): Performed by: STUDENT IN AN ORGANIZED HEALTH CARE EDUCATION/TRAINING PROGRAM

## 2025-01-02 PROCEDURE — 99233 SBSQ HOSP IP/OBS HIGH 50: CPT | Performed by: INTERNAL MEDICINE

## 2025-01-02 PROCEDURE — 84100 ASSAY OF PHOSPHORUS: CPT | Performed by: STUDENT IN AN ORGANIZED HEALTH CARE EDUCATION/TRAINING PROGRAM

## 2025-01-02 PROCEDURE — 2500000001 HC RX 250 WO HCPCS SELF ADMINISTERED DRUGS (ALT 637 FOR MEDICARE OP)

## 2025-01-02 PROCEDURE — 82374 ASSAY BLOOD CARBON DIOXIDE: CPT | Performed by: STUDENT IN AN ORGANIZED HEALTH CARE EDUCATION/TRAINING PROGRAM

## 2025-01-02 PROCEDURE — 83735 ASSAY OF MAGNESIUM: CPT | Performed by: STUDENT IN AN ORGANIZED HEALTH CARE EDUCATION/TRAINING PROGRAM

## 2025-01-02 PROCEDURE — 82947 ASSAY GLUCOSE BLOOD QUANT: CPT

## 2025-01-02 PROCEDURE — 85027 COMPLETE CBC AUTOMATED: CPT | Performed by: STUDENT IN AN ORGANIZED HEALTH CARE EDUCATION/TRAINING PROGRAM

## 2025-01-02 PROCEDURE — 36415 COLL VENOUS BLD VENIPUNCTURE: CPT | Performed by: STUDENT IN AN ORGANIZED HEALTH CARE EDUCATION/TRAINING PROGRAM

## 2025-01-02 PROCEDURE — 1100000001 HC PRIVATE ROOM DAILY

## 2025-01-02 PROCEDURE — 2500000004 HC RX 250 GENERAL PHARMACY W/ HCPCS (ALT 636 FOR OP/ED)

## 2025-01-02 PROCEDURE — 80197 ASSAY OF TACROLIMUS: CPT | Performed by: STUDENT IN AN ORGANIZED HEALTH CARE EDUCATION/TRAINING PROGRAM

## 2025-01-02 PROCEDURE — 2500000005 HC RX 250 GENERAL PHARMACY W/O HCPCS

## 2025-01-02 PROCEDURE — 2500000004 HC RX 250 GENERAL PHARMACY W/ HCPCS (ALT 636 FOR OP/ED): Performed by: STUDENT IN AN ORGANIZED HEALTH CARE EDUCATION/TRAINING PROGRAM

## 2025-01-02 RX ORDER — SODIUM CHLORIDE, SODIUM LACTATE, POTASSIUM CHLORIDE, CALCIUM CHLORIDE 600; 310; 30; 20 MG/100ML; MG/100ML; MG/100ML; MG/100ML
50 INJECTION, SOLUTION INTRAVENOUS CONTINUOUS
Status: ACTIVE | OUTPATIENT
Start: 2025-01-02 | End: 2025-01-03

## 2025-01-02 RX ORDER — CALCIUM CARBONATE 200(500)MG
500 TABLET,CHEWABLE ORAL
Status: DISCONTINUED | OUTPATIENT
Start: 2025-01-02 | End: 2025-01-23 | Stop reason: HOSPADM

## 2025-01-02 RX ORDER — TACROLIMUS 1 MG/1
2 CAPSULE ORAL EVERY 12 HOURS
Status: DISCONTINUED | OUTPATIENT
Start: 2025-01-02 | End: 2025-01-06

## 2025-01-02 RX ADMIN — SULFAMETHOXAZOLE AND TRIMETHOPRIM 1 TABLET: 400; 80 TABLET ORAL at 09:09

## 2025-01-02 RX ADMIN — AMLODIPINE BESYLATE 10 MG: 10 TABLET ORAL at 09:10

## 2025-01-02 RX ADMIN — LIDOCAINE 4% 1 PATCH: 40 PATCH TOPICAL at 20:20

## 2025-01-02 RX ADMIN — INSULIN LISPRO 4 UNITS: 100 INJECTION, SOLUTION INTRAVENOUS; SUBCUTANEOUS at 18:39

## 2025-01-02 RX ADMIN — GABAPENTIN 200 MG: 100 CAPSULE ORAL at 09:09

## 2025-01-02 RX ADMIN — SODIUM CHLORIDE, POTASSIUM CHLORIDE, SODIUM LACTATE AND CALCIUM CHLORIDE 50 ML/HR: 600; 310; 30; 20 INJECTION, SOLUTION INTRAVENOUS at 11:23

## 2025-01-02 RX ADMIN — OXYCODONE HYDROCHLORIDE 5 MG: 5 TABLET ORAL at 12:53

## 2025-01-02 RX ADMIN — OXYCODONE HYDROCHLORIDE 5 MG: 5 TABLET ORAL at 06:54

## 2025-01-02 RX ADMIN — PREDNISONE 20 MG: 20 TABLET ORAL at 09:09

## 2025-01-02 RX ADMIN — MYCOPHENOLATE MOFETIL 1000 MG: 250 CAPSULE ORAL at 18:38

## 2025-01-02 RX ADMIN — INSULIN LISPRO 2 UNITS: 100 INJECTION, SOLUTION INTRAVENOUS; SUBCUTANEOUS at 09:10

## 2025-01-02 RX ADMIN — TACROLIMUS 1.5 MG: 1 CAPSULE ORAL at 06:44

## 2025-01-02 RX ADMIN — CALCIUM CARBONATE (ANTACID) CHEW TAB 500 MG 500 MG: 500 CHEW TAB at 18:38

## 2025-01-02 RX ADMIN — INSULIN LISPRO 4 UNITS: 100 INJECTION, SOLUTION INTRAVENOUS; SUBCUTANEOUS at 09:11

## 2025-01-02 RX ADMIN — PANTOPRAZOLE SODIUM 40 MG: 40 TABLET, DELAYED RELEASE ORAL at 06:44

## 2025-01-02 RX ADMIN — TACROLIMUS 2 MG: 1 CAPSULE ORAL at 18:38

## 2025-01-02 RX ADMIN — MYCOPHENOLATE MOFETIL 1000 MG: 250 CAPSULE ORAL at 06:43

## 2025-01-02 RX ADMIN — CLOTRIMAZOLE 10 MG: 10 LOZENGE ORAL at 09:10

## 2025-01-02 RX ADMIN — CLOTRIMAZOLE 10 MG: 10 LOZENGE ORAL at 18:38

## 2025-01-02 RX ADMIN — INSULIN GLARGINE 8 UNITS: 100 INJECTION, SOLUTION SUBCUTANEOUS at 20:21

## 2025-01-02 RX ADMIN — ROSUVASTATIN CALCIUM 10 MG: 10 TABLET, FILM COATED ORAL at 20:21

## 2025-01-02 ASSESSMENT — PAIN SCALES - GENERAL
PAINLEVEL_OUTOF10: 0 - NO PAIN
PAINLEVEL_OUTOF10: 7
PAINLEVEL_OUTOF10: 0 - NO PAIN

## 2025-01-02 ASSESSMENT — PAIN - FUNCTIONAL ASSESSMENT
PAIN_FUNCTIONAL_ASSESSMENT: 0-10
PAIN_FUNCTIONAL_ASSESSMENT: 0-10

## 2025-01-02 NOTE — DOCUMENTATION CLARIFICATION NOTE
"    PATIENT:               TAMAR ADAMS  ACCT #:                  [id number]  MRN:                       [id number]  :                       1950  ADMIT DATE:       2024 11:49 AM  DISCH DATE:  RESPONDING PROVIDER #:        [id number]          PROVIDER RESPONSE TEXT:    Immunosuppressed due to drugs    CDI QUERY TEXT:    Clarification        Instruction:    Based on your assessment of the patient and the clinical information, please provide the requested documentation by clicking on the appropriate radio button and enter any additional information if prompted.    Question: Please further clarify if there is a diagnosis related the clinical information    When answering this query, please exercise your independent professional judgment. The fact that a question is being asked, does not imply that any particular answer is desired or expected.    The patient's clinical indicators include:  Clinical Information: 74 yr old male hx HTN and s/p kidney transplant on 24 who presents with DGF and has had on e session of dialysis.    Clinical Indicators:  Documentation from consult not on 24 \"Immunosuppression:   - cont tac 1.5 mg bi. Goal FK 8-10    - cont MMF 1g q12, pred 20 mg/d taper protocol\"    Treatment:  Continue immunosuppression, adjusting accordingly    Risk Factors: advanced age, recent transplant surgery  Options provided:  -- Immunosuppressed due to drugs  -- Immunosuppressed due to other, Add etiology of immunosuppression  -- Other - I will add my own diagnosis  -- Refer to Clinical Documentation Reviewer    Query created by: Steffany Mcdonald on 2025 8:29 AM      Electronically signed by:  GURPREET GOODE MD 2025 2:09 PM          "

## 2025-01-02 NOTE — CONSULTS
"Nutrition Initial Assessment:   Nutrition Assessment    Reason for Assessment: Admission nursing screening    Patient is a 74 y.o. male presenting d/t dehydration with intake of ~ 1 cup water/d. Pt is s/p DDKT 12/21/2024, uncomplicated postop course.  He was voiding postop and required no dialysis at that time. MD notes DGF. He required HD on 12/31.     PMH:  ESRD on HD (RUE AVF) d/t diabetic nephropathy. DDKT 12/21/2024.     Nutrition History:  Energy Intake: Poor < 50 %  Pt scored a 3 on MST during nursing admission screening after reporting unintentional weight loss of 14-23lbs and poor intake r/t decreased appetite. Met with pt this morning at bedside to discuss nutrition status. He says he has not been able to eat d/t pain. He says he had a prevoius pacemaker placed and his chest hurts when he eats. He also stated that he has been experiencing lower abdominal pain and pain in his groin area. He says his pain is not controlled and that is what is causing his poor intake.     Pt told me he wants stronger pain pill than what they are giving him. He says \"I know they are giving me tylenol but I need something stronger.\" Pt mentioned to me mutliple times during our conversation that he needs higher pain pills. Explained that I am the dietiain and that is not within my scope of practice, but will mention it to the team. He says that he has been eating ~ 1 meal/day. He is upset that he cannot order certain things, but would not tell me what he wants to order. He is agreeable in trialing ONS. Urinal in bathroom contained minimal amount of dark colored urine.  Food Allergies/Intolerances:  None  GI Symptoms: Abdominal pain  Oral Problems: None       Anthropometrics:  Height: 185.4 cm (6' 1\")   Weight: 70.7 kg (155 lb 13.8 oz)   BMI (Calculated): 20.57  IBW/kg (Dietitian Calculated): 83.6 kg  Percent of IBW: 85 %       Weight History:   Wt Readings from Last 10 Encounters:   01/02/25 70.7 kg (155 lb 13.8 oz)   12/31/24 " 74.1 kg (163 lb 4.8 oz)   12/26/24 76.4 kg (168 lb 6.9 oz)   10/29/24 74.3 kg (163 lb 12.8 oz)   08/14/24 66.2 kg (146 lb)   07/18/24 66.5 kg (146 lb 11.2 oz)   06/18/24 78.5 kg (173 lb)   06/12/24 73.9 kg (163 lb)   05/08/24 74.1 kg (163 lb 6.4 oz)   04/24/24 88.7 kg (195 lb 8.8 oz)       Weight Change %:  Weight History / % Weight Change: Pt says he has lost weight, but unsure of how much. He says his usual body weight is ~ 184lbs. Weight history shows signifciant fluctuations over the last 6 months- up and down anywhere from 66.2kg-78.5kg.    Nutrition Focused Physical Exam Findings:  Subcutaneous Fat Loss:   Orbital Fat Pads: Mild-Moderate (slight dark circles and slight hollowing)  Buccal Fat Pads: Mild-Moderate (flat cheeks, minimal bounce)  Triceps: Mild-Moderate (less than ample fat tissue)  Muscle Wasting:  Temporalis: Severe (hollowed scooping depression)  Pectoralis (Clavicular Region): Mild-Moderate (some protrusion of clavicle)  Deltoid/Trapezius: Mild-Moderate (slight protrusion of acromion process)  Quadriceps: Mild-Moderate (mild depression on inner and outer thigh)  Gastrocnemius: Mild-Moderate (not well developed muscle)  Edema:  Edema: none  Physical Findings:  Skin: Positive (pressure injury to sacrum and mid buttock)    Nutrition Significant Labs:  CBC Trend:   Results from last 7 days   Lab Units 01/02/25  0531 01/01/25  0548 12/31/24  0946   WBC AUTO x10*3/uL 3.7* 4.8 7.5   RBC AUTO x10*6/uL 2.61* 2.80* 2.99*   HEMOGLOBIN g/dL 8.0* 8.9* 9.4*   HEMATOCRIT % 25.6* 26.5* 28.5*   MCV fL 98 95 95   PLATELETS AUTO x10*3/uL 118* 193 239    , BMP Trend:   Results from last 7 days   Lab Units 01/02/25  0531 01/01/25  0548 12/31/24  0946   GLUCOSE mg/dL 145* 206* 192*   CALCIUM mg/dL 8.5* 8.9 8.8   SODIUM mmol/L 133* 137 131*   POTASSIUM mmol/L 4.0 4.2 4.3   CO2 mmol/L 26 28 21   CHLORIDE mmol/L 95* 95* 91*   BUN mg/dL 70* 62* 144*   CREATININE mg/dL 3.51* 3.44* 5.45*    , Renal Lab Trend:   Results  "from last 7 days   Lab Units 01/02/25  0531 01/01/25  0548 12/31/24  0946   POTASSIUM mmol/L 4.0 4.2 4.3   PHOSPHORUS mg/dL 3.0 3.3 3.5   SODIUM mmol/L 133* 137 131*   MAGNESIUM mg/dL 1.84 1.98 2.23   EGFR mL/min/1.73m*2 18* 18* 10*   BUN mg/dL 70* 62* 144*   CREATININE mg/dL 3.51* 3.44* 5.45*    , Vit D: No results found for: \"VITD25\" , Vit B12: No results found for: \"HRMWIHYX50\"     Nutrition Specific Medications:  Scheduled medications  amLODIPine, 10 mg, oral, Daily  clotrimazole, 10 mg, oral, TID after meals  gabapentin, 200 mg, oral, Daily  insulin glargine, 8 Units, subcutaneous, Nightly  insulin lispro, 0-10 Units, subcutaneous, TID AC  insulin lispro, 4 Units, subcutaneous, TID AC  lidocaine, 1 patch, transdermal, q24h  mycophenolate, 1,000 mg, oral, q12h  pantoprazole, 40 mg, oral, Daily before breakfast  predniSONE, 20 mg, oral, Daily  rosuvastatin, 10 mg, oral, Nightly  sulfamethoxazole-trimethoprim, 1 tablet, oral, Daily  tacrolimus, 1.5 mg, oral, q12h  valGANciclovir, 450 mg, oral, q48h      Continuous medications  lactated Ringer's, 50 mL/hr, Last Rate: 50 mL/hr (01/02/25 1123)      PRN medications  PRN medications: carboxymethylcellulose, dextrose, dextrose, docusate sodium, glucagon, glucagon, oxyCODONE, oxyCODONE, polyethylene glycol      I/O:   Last BM Date: 01/01/25; Stool Appearance: Soft (01/02/25 0302)    Dietary Orders (From admission, onward)       Start     Ordered    01/02/25 1012  Adult diet Regular  Diet effective now        Question:  Diet type  Answer:  Regular    01/02/25 1011    12/31/24 1246  May Participate in Room Service With Assistance  ( ROOM SERVICE MAY PARTICIPATE WITH ASSISTANCE)  Once        Question:  .  Answer:  Yes    12/31/24 1245                     Estimated Needs:   Total Energy Estimated Needs (kCal): 2120 kCal  Method for Estimating Needs: 30kcal/kg CBW  Total Protein Estimated Needs (g): 85 g  Method for Estimating Needs: 1.2g/kg CBW +  Total Fluid Estimated " Needs (mL):  (per MD/team)  Method for Estimating Needs: per MD/team        Nutrition Diagnosis   Malnutrition Diagnosis  Patient has Malnutrition Diagnosis: Yes  Diagnosis Status: New  Malnutrition Diagnosis: Severe malnutrition related to acute disease or injury  As Evidenced by: < 50% estimated energy needs for > 5 days, moderate fat loss + muscle wasting,            Nutrition Interventions/Recommendations         Nutrition Prescription:  Individualized Nutrition Prescription Provided for : PO Diet + ONS        Nutrition Interventions:   Interventions: Meals and snacks  Regular diet   Ensure Plus TID (350kcal and 13gm protein each)     Collaboration and Referral of Nutrition Care: Collaboration by nutrition professional with other providers    Nutrition Monitoring and Evaluation   Food/Nutrient Related History Monitoring  Monitoring and Evaluation Plan: Energy intake  Energy Intake: Estimated energy intake  Criteria: meet > 75% estimated energy needs    Body Composition/Growth/Weight History  Monitoring and Evaluation Plan: Weight, Weight change  Criteria: no weight loss    Biochemical Data, Medical Tests and Procedures  Monitoring and Evaluation Plan: Electrolyte/renal panel, Glucose/endocrine profile  Criteria: WNL              Time Spent (min): 60 minutes

## 2025-01-02 NOTE — CARE PLAN
The patient's goals for the shift include get better    The clinical goals for the shift include patient will remain HDS with this shift    Problem: Skin  Goal: Decreased wound size/increased tissue granulation at next dressing change  Outcome: Progressing     Problem: Pain - Adult  Goal: Verbalizes/displays adequate comfort level or baseline comfort level  Outcome: Progressing     Problem: Safety - Adult  Goal: Free from fall injury  Outcome: Progressing     Problem: Discharge Planning  Goal: Discharge to home or other facility with appropriate resources  Outcome: Progressing     Problem: Chronic Conditions and Co-morbidities  Goal: Patient's chronic conditions and co-morbidity symptoms are monitored and maintained or improved  Outcome: Progressing     Problem: Skin  Goal: Decreased wound size/increased tissue granulation at next dressing change  Outcome: Progressing  Goal: Participates in plan/prevention/treatment measures  Outcome: Progressing  Goal: Prevent/manage excess moisture  Outcome: Progressing  Goal: Prevent/minimize sheer/friction injuries  Outcome: Progressing  Goal: Promote/optimize nutrition  Outcome: Progressing  Goal: Promote skin healing  Outcome: Progressing  Flowsheets (Taken 1/2/2025 0354)  Promote skin healing:   Assess skin/pad under line(s)/device(s)   Turn/reposition every 2 hours/use positioning/transfer devices     Problem: Fall/Injury  Goal: Not fall by end of shift  Outcome: Progressing  Goal: Be free from injury by end of the shift  Outcome: Progressing  Goal: Verbalize understanding of personal risk factors for fall in the hospital  Outcome: Progressing  Goal: Verbalize understanding of risk factor reduction measures to prevent injury from fall in the home  Outcome: Progressing  Goal: Use assistive devices by end of the shift  Outcome: Progressing  Goal: Pace activities to prevent fatigue by end of the shift  Outcome: Progressing     Problem: Nutrition  Goal: Less than 5 days  NPO/clear liquids  Outcome: Progressing  Goal: Oral intake greater than 50%  Outcome: Progressing  Goal: Oral intake greater 75%  Outcome: Progressing  Goal: Consume prescribed supplement  Outcome: Progressing  Goal: Adequate PO fluid intake  Outcome: Progressing  Goal: Nutrition support goals are met within 48 hrs  Outcome: Progressing  Goal: Nutrition support is meeting 75% of nutrient needs  Outcome: Progressing  Goal: Tube feed tolerance  Outcome: Progressing  Goal: BG  mg/dL  Outcome: Progressing  Goal: Lab values WNL  Outcome: Progressing  Goal: Electrolytes WNL  Outcome: Progressing  Goal: Promote healing  Outcome: Progressing  Goal: Maintain stable weight  Outcome: Progressing  Goal: Reduce weight from edema/fluid  Outcome: Progressing  Goal: Gradual weight gain  Outcome: Progressing  Goal: Improve ostomy output  Outcome: Progressing     Problem: Diabetes  Goal: Achieve decreasing blood glucose levels by end of shift  Outcome: Progressing  Goal: Increase stability of blood glucose readings by end of shift  Outcome: Progressing  Goal: Decrease in ketones present in urine by end of shift  Outcome: Progressing  Goal: Maintain electrolyte levels within acceptable range throughout shift  Outcome: Progressing  Goal: Maintain glucose levels >70mg/dl to <250mg/dl throughout shift  Outcome: Progressing  Goal: No changes in neurological exam by end of shift  Outcome: Progressing  Goal: Learn about and adhere to nutrition recommendations by end of shift  Outcome: Progressing  Goal: Vital signs within normal range for age by end of shift  Outcome: Progressing  Goal: Increase self care and/or family involovement by end of shift  Outcome: Progressing  Goal: Receive DSME education by end of shift  Outcome: Progressing

## 2025-01-02 NOTE — PROGRESS NOTES
TCC aware that patient is not medically cleared for discharge; TCC will continue to follow.    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 They are now admitted for Dehydration..     I have made the team aware of this readmission.        LUCÍA Strange, RN  [facility], [facility]9  Transitional Care Coordinator, Mon-Fri  Cell: [phone], Office: [phone]  Email: [email or web address]

## 2025-01-02 NOTE — PROGRESS NOTES
"Temo Hanson is a 74 y.o. male on day 2 of admission presenting with Dehydration.    No acute events overnight.   Feels better this am overall.    Scheduled medications  amLODIPine, 10 mg, oral, Daily  clotrimazole, 10 mg, oral, TID after meals  gabapentin, 200 mg, oral, Daily  insulin glargine, 8 Units, subcutaneous, Nightly  insulin lispro, 0-10 Units, subcutaneous, TID AC  insulin lispro, 4 Units, subcutaneous, TID AC  lidocaine, 1 patch, transdermal, q24h  mycophenolate, 1,000 mg, oral, q12h  pantoprazole, 40 mg, oral, Daily before breakfast  predniSONE, 20 mg, oral, Daily  rosuvastatin, 10 mg, oral, Nightly  sulfamethoxazole-trimethoprim, 1 tablet, oral, Daily  tacrolimus, 2 mg, oral, q12h  valGANciclovir, 450 mg, oral, q48h      Continuous medications  lactated Ringer's, 50 mL/hr, Last Rate: 50 mL/hr (01/02/25 1123)      PRN medications  PRN medications: carboxymethylcellulose, dextrose, dextrose, docusate sodium, glucagon, glucagon, oxyCODONE, oxyCODONE, polyethylene glycol    Last Recorded Vitals  Blood pressure 133/75, pulse 77, temperature 36.5 °C (97.7 °F), temperature source Temporal, resp. rate 18, height 1.854 m (6' 1\"), weight 70.7 kg (155 lb 13.8 oz), SpO2 96%.  Intake/Output last 3 Shifts:  I/O last 3 completed shifts:  In: 3124.6 (44.2 mL/kg) [P.O.:960; I.V.:1306.3 (18.5 mL/kg); IV Piggyback:858.3]  Out: 250 (3.5 mL/kg) [Urine:250 (0.1 mL/kg/hr)]  Weight: 70.7 kg     A&ox3, no distress, pleasant  MMM, no lesions  Lungs with equal air entry, no added sounds  Rrr, no m/r/g  Abd soft, nt, nd, RLQ incision c/d/I, staples intact  No allograft tenderness  No edema b/l    Results for orders placed or performed during the hospital encounter of 12/31/24 (from the past 24 hours)   POCT GLUCOSE   Result Value Ref Range    POCT Glucose 331 (H) 74 - 99 mg/dL   POCT GLUCOSE   Result Value Ref Range    POCT Glucose 165 (H) 74 - 99 mg/dL   CBC   Result Value Ref Range    WBC 3.7 (L) 4.4 - 11.3 x10*3/uL    nRBC " 0.0 0.0 - 0.0 /100 WBCs    RBC 2.61 (L) 4.50 - 5.90 x10*6/uL    Hemoglobin 8.0 (L) 13.5 - 17.5 g/dL    Hematocrit 25.6 (L) 41.0 - 52.0 %    MCV 98 80 - 100 fL    MCH 30.7 26.0 - 34.0 pg    MCHC 31.3 (L) 32.0 - 36.0 g/dL    RDW 17.3 (H) 11.5 - 14.5 %    Platelets 118 (L) 150 - 450 x10*3/uL   Basic metabolic panel   Result Value Ref Range    Glucose 145 (H) 74 - 99 mg/dL    Sodium 133 (L) 136 - 145 mmol/L    Potassium 4.0 3.5 - 5.3 mmol/L    Chloride 95 (L) 98 - 107 mmol/L    Bicarbonate 26 21 - 32 mmol/L    Anion Gap 16 10 - 20 mmol/L    Urea Nitrogen 70 (H) 6 - 23 mg/dL    Creatinine 3.51 (H) 0.50 - 1.30 mg/dL    eGFR 18 (L) >60 mL/min/1.73m*2    Calcium 8.5 (L) 8.6 - 10.6 mg/dL   Magnesium   Result Value Ref Range    Magnesium 1.84 1.60 - 2.40 mg/dL   Phosphorus   Result Value Ref Range    Phosphorus 3.0 2.5 - 4.9 mg/dL   Tacrolimus level   Result Value Ref Range    Tacrolimus  7.6 <=15.0 ng/mL   POCT GLUCOSE   Result Value Ref Range    POCT Glucose 153 (H) 74 - 99 mg/dL   POCT GLUCOSE   Result Value Ref Range    POCT Glucose 94 74 - 99 mg/dL       Assessment/Plan     74 y.o. male with a hx of ESRD secondary to diabetic nephropathy whom received a  donor kidney transplant on 24 by Dr. Zamora with a KDPI of 93% and PRA of 54%. Donor was Hepc -/-and has not met risk factors. EBV + /+., CMV D-/R+. Left donor kidney transplanted to patient right pelvis. Admission weight is 72.7 kg (discharge weight is 76.4 kg ). Pt received a total of 3 mg/kg total of thymoglobulin induction therapy in conjunction with 500mg IV solumedrol.      Post-txp course notable for slow graft function, now with DGF.     Allograft function: s/p DDKT 24  - DGF. , Cr 5.5. uremic on exam  - last HD  for clearance.   - renal function parameters stable. No current indication for RRT.   - metabolic indices acceptable  - maintain adequate hydration. Avoid potential nephrotoxins  - Hb 8, below goal. Would start Aranesp  100mcg qweek. Monitor and transfuse as needed. Pls check iron panel, ferritin with next labs.  - Ca, phos acceptable.   - encourage PO intake     Immunosuppression:  - cont tac 1.5 mg bid. Goal FK 8-10. Last Fk 7.9. monitor rpt level.  - cont MMF 1g q12, pred 20 mg/d to be tapered per protocol  - Ppx: Bactrim, clotrimazole, Valcyte (CMV D-/R+)    Will follow    Beka Nichols MD

## 2025-01-03 ENCOUNTER — APPOINTMENT (OUTPATIENT)
Dept: RADIOLOGY | Facility: HOSPITAL | Age: 75
End: 2025-01-03
Payer: COMMERCIAL

## 2025-01-03 LAB
ANION GAP SERPL CALC-SCNC: 15 MMOL/L (ref 10–20)
BUN SERPL-MCNC: 72 MG/DL (ref 6–23)
C DIF TOX TCDA+TCDB STL QL NAA+PROBE: NOT DETECTED
CALCIUM SERPL-MCNC: 8.6 MG/DL (ref 8.6–10.6)
CHLORIDE SERPL-SCNC: 95 MMOL/L (ref 98–107)
CO2 SERPL-SCNC: 28 MMOL/L (ref 21–32)
CREAT SERPL-MCNC: 3.05 MG/DL (ref 0.5–1.3)
EGFRCR SERPLBLD CKD-EPI 2021: 21 ML/MIN/1.73M*2
ERYTHROCYTE [DISTWIDTH] IN BLOOD BY AUTOMATED COUNT: 16.9 % (ref 11.5–14.5)
GLUCOSE BLD MANUAL STRIP-MCNC: 140 MG/DL (ref 74–99)
GLUCOSE BLD MANUAL STRIP-MCNC: 215 MG/DL (ref 74–99)
GLUCOSE BLD MANUAL STRIP-MCNC: 228 MG/DL (ref 74–99)
GLUCOSE BLD MANUAL STRIP-MCNC: 233 MG/DL (ref 74–99)
GLUCOSE SERPL-MCNC: 169 MG/DL (ref 74–99)
HCT VFR BLD AUTO: 26.1 % (ref 41–52)
HGB BLD-MCNC: 8.3 G/DL (ref 13.5–17.5)
MAGNESIUM SERPL-MCNC: 1.76 MG/DL (ref 1.6–2.4)
MCH RBC QN AUTO: 31 PG (ref 26–34)
MCHC RBC AUTO-ENTMCNC: 31.8 G/DL (ref 32–36)
MCV RBC AUTO: 97 FL (ref 80–100)
NRBC BLD-RTO: 0 /100 WBCS (ref 0–0)
PHOSPHATE SERPL-MCNC: 3.4 MG/DL (ref 2.5–4.9)
PLATELET # BLD AUTO: 174 X10*3/UL (ref 150–450)
POTASSIUM SERPL-SCNC: 4 MMOL/L (ref 3.5–5.3)
RBC # BLD AUTO: 2.68 X10*6/UL (ref 4.5–5.9)
SODIUM SERPL-SCNC: 134 MMOL/L (ref 136–145)
TACROLIMUS BLD-MCNC: 8.1 NG/ML
WBC # BLD AUTO: 3 X10*3/UL (ref 4.4–11.3)

## 2025-01-03 PROCEDURE — 36415 COLL VENOUS BLD VENIPUNCTURE: CPT

## 2025-01-03 PROCEDURE — 2500000001 HC RX 250 WO HCPCS SELF ADMINISTERED DRUGS (ALT 637 FOR MEDICARE OP): Performed by: TRANSPLANT SURGERY

## 2025-01-03 PROCEDURE — 99233 SBSQ HOSP IP/OBS HIGH 50: CPT | Performed by: TRANSPLANT SURGERY

## 2025-01-03 PROCEDURE — 36415 COLL VENOUS BLD VENIPUNCTURE: CPT | Performed by: STUDENT IN AN ORGANIZED HEALTH CARE EDUCATION/TRAINING PROGRAM

## 2025-01-03 PROCEDURE — 80197 ASSAY OF TACROLIMUS: CPT | Performed by: STUDENT IN AN ORGANIZED HEALTH CARE EDUCATION/TRAINING PROGRAM

## 2025-01-03 PROCEDURE — 83735 ASSAY OF MAGNESIUM: CPT | Performed by: STUDENT IN AN ORGANIZED HEALTH CARE EDUCATION/TRAINING PROGRAM

## 2025-01-03 PROCEDURE — 2500000004 HC RX 250 GENERAL PHARMACY W/ HCPCS (ALT 636 FOR OP/ED)

## 2025-01-03 PROCEDURE — 84100 ASSAY OF PHOSPHORUS: CPT | Performed by: STUDENT IN AN ORGANIZED HEALTH CARE EDUCATION/TRAINING PROGRAM

## 2025-01-03 PROCEDURE — 87493 C DIFF AMPLIFIED PROBE: CPT

## 2025-01-03 PROCEDURE — 2500000001 HC RX 250 WO HCPCS SELF ADMINISTERED DRUGS (ALT 637 FOR MEDICARE OP)

## 2025-01-03 PROCEDURE — 74220 X-RAY XM ESOPHAGUS 1CNTRST: CPT

## 2025-01-03 PROCEDURE — 85027 COMPLETE CBC AUTOMATED: CPT | Performed by: STUDENT IN AN ORGANIZED HEALTH CARE EDUCATION/TRAINING PROGRAM

## 2025-01-03 PROCEDURE — 2500000005 HC RX 250 GENERAL PHARMACY W/O HCPCS

## 2025-01-03 PROCEDURE — 2500000001 HC RX 250 WO HCPCS SELF ADMINISTERED DRUGS (ALT 637 FOR MEDICARE OP): Performed by: STUDENT IN AN ORGANIZED HEALTH CARE EDUCATION/TRAINING PROGRAM

## 2025-01-03 PROCEDURE — 1100000001 HC PRIVATE ROOM DAILY

## 2025-01-03 PROCEDURE — A9698 NON-RAD CONTRAST MATERIALNOC: HCPCS | Performed by: TRANSPLANT SURGERY

## 2025-01-03 PROCEDURE — 82374 ASSAY BLOOD CARBON DIOXIDE: CPT | Performed by: STUDENT IN AN ORGANIZED HEALTH CARE EDUCATION/TRAINING PROGRAM

## 2025-01-03 PROCEDURE — 2500000004 HC RX 250 GENERAL PHARMACY W/ HCPCS (ALT 636 FOR OP/ED): Performed by: STUDENT IN AN ORGANIZED HEALTH CARE EDUCATION/TRAINING PROGRAM

## 2025-01-03 PROCEDURE — 82947 ASSAY GLUCOSE BLOOD QUANT: CPT

## 2025-01-03 PROCEDURE — 99233 SBSQ HOSP IP/OBS HIGH 50: CPT | Performed by: INTERNAL MEDICINE

## 2025-01-03 PROCEDURE — 2500000005 HC RX 250 GENERAL PHARMACY W/O HCPCS: Performed by: TRANSPLANT SURGERY

## 2025-01-03 PROCEDURE — 2500000002 HC RX 250 W HCPCS SELF ADMINISTERED DRUGS (ALT 637 FOR MEDICARE OP, ALT 636 FOR OP/ED): Performed by: STUDENT IN AN ORGANIZED HEALTH CARE EDUCATION/TRAINING PROGRAM

## 2025-01-03 PROCEDURE — 74221 X-RAY XM ESOPHAGUS 2CNTRST: CPT | Performed by: RADIOLOGY

## 2025-01-03 RX ORDER — SODIUM CHLORIDE, SODIUM LACTATE, POTASSIUM CHLORIDE, CALCIUM CHLORIDE 600; 310; 30; 20 MG/100ML; MG/100ML; MG/100ML; MG/100ML
50 INJECTION, SOLUTION INTRAVENOUS CONTINUOUS
Status: DISCONTINUED | OUTPATIENT
Start: 2025-01-03 | End: 2025-01-04

## 2025-01-03 RX ORDER — ONDANSETRON HYDROCHLORIDE 2 MG/ML
4 INJECTION, SOLUTION INTRAVENOUS EVERY 8 HOURS PRN
Status: DISCONTINUED | OUTPATIENT
Start: 2025-01-03 | End: 2025-01-11

## 2025-01-03 RX ORDER — PANTOPRAZOLE SODIUM 40 MG/1
40 TABLET, DELAYED RELEASE ORAL
Status: DISCONTINUED | OUTPATIENT
Start: 2025-01-03 | End: 2025-01-13

## 2025-01-03 RX ORDER — NYSTATIN 100000 [USP'U]/ML
5 SUSPENSION ORAL 4 TIMES DAILY
Status: DISCONTINUED | OUTPATIENT
Start: 2025-01-03 | End: 2025-01-23 | Stop reason: HOSPADM

## 2025-01-03 RX ORDER — ONDANSETRON 4 MG/1
4 TABLET, ORALLY DISINTEGRATING ORAL EVERY 8 HOURS PRN
Status: DISCONTINUED | OUTPATIENT
Start: 2025-01-03 | End: 2025-01-11

## 2025-01-03 RX ADMIN — SULFAMETHOXAZOLE AND TRIMETHOPRIM 1 TABLET: 400; 80 TABLET ORAL at 09:11

## 2025-01-03 RX ADMIN — MYCOPHENOLATE MOFETIL 1000 MG: 250 CAPSULE ORAL at 18:24

## 2025-01-03 RX ADMIN — MYCOPHENOLATE MOFETIL 1000 MG: 250 CAPSULE ORAL at 06:19

## 2025-01-03 RX ADMIN — TACROLIMUS 2 MG: 1 CAPSULE ORAL at 18:24

## 2025-01-03 RX ADMIN — ANTACID/ANTIFLATULENT 1 PACKET: 380; 550; 10; 10 GRANULE, EFFERVESCENT ORAL at 15:02

## 2025-01-03 RX ADMIN — INSULIN LISPRO 4 UNITS: 100 INJECTION, SOLUTION INTRAVENOUS; SUBCUTANEOUS at 17:11

## 2025-01-03 RX ADMIN — SODIUM CHLORIDE, POTASSIUM CHLORIDE, SODIUM LACTATE AND CALCIUM CHLORIDE 50 ML/HR: 600; 310; 30; 20 INJECTION, SOLUTION INTRAVENOUS at 00:27

## 2025-01-03 RX ADMIN — AMLODIPINE BESYLATE 10 MG: 10 TABLET ORAL at 09:11

## 2025-01-03 RX ADMIN — INSULIN GLARGINE 8 UNITS: 100 INJECTION, SOLUTION SUBCUTANEOUS at 20:20

## 2025-01-03 RX ADMIN — CALCIUM CARBONATE (ANTACID) CHEW TAB 500 MG 500 MG: 500 CHEW TAB at 06:19

## 2025-01-03 RX ADMIN — BARIUM SULFATE 40 ML: 960 POWDER, FOR SUSPENSION ORAL at 15:02

## 2025-01-03 RX ADMIN — PANTOPRAZOLE SODIUM 40 MG: 40 TABLET, DELAYED RELEASE ORAL at 17:10

## 2025-01-03 RX ADMIN — PANTOPRAZOLE SODIUM 40 MG: 40 TABLET, DELAYED RELEASE ORAL at 06:19

## 2025-01-03 RX ADMIN — TACROLIMUS 2 MG: 1 CAPSULE ORAL at 06:19

## 2025-01-03 RX ADMIN — VALGANCICLOVIR 450 MG: 450 TABLET, FILM COATED ORAL at 09:10

## 2025-01-03 RX ADMIN — ONDANSETRON 4 MG: 2 INJECTION INTRAMUSCULAR; INTRAVENOUS at 05:23

## 2025-01-03 RX ADMIN — GABAPENTIN 200 MG: 100 CAPSULE ORAL at 09:10

## 2025-01-03 RX ADMIN — NYSTATIN 500000 UNITS: 500000 SUSPENSION ORAL at 17:11

## 2025-01-03 RX ADMIN — SODIUM CHLORIDE, POTASSIUM CHLORIDE, SODIUM LACTATE AND CALCIUM CHLORIDE 50 ML/HR: 600; 310; 30; 20 INJECTION, SOLUTION INTRAVENOUS at 16:03

## 2025-01-03 RX ADMIN — INSULIN LISPRO 4 UNITS: 100 INJECTION, SOLUTION INTRAVENOUS; SUBCUTANEOUS at 12:52

## 2025-01-03 RX ADMIN — ROSUVASTATIN CALCIUM 10 MG: 10 TABLET, FILM COATED ORAL at 20:19

## 2025-01-03 RX ADMIN — PREDNISONE 20 MG: 20 TABLET ORAL at 09:11

## 2025-01-03 RX ADMIN — NYSTATIN 500000 UNITS: 500000 SUSPENSION ORAL at 13:05

## 2025-01-03 RX ADMIN — INSULIN LISPRO 4 UNITS: 100 INJECTION, SOLUTION INTRAVENOUS; SUBCUTANEOUS at 12:53

## 2025-01-03 RX ADMIN — LIDOCAINE 4% 1 PATCH: 40 PATCH TOPICAL at 20:19

## 2025-01-03 RX ADMIN — BARIUM SULFATE 100 ML: 980 POWDER, FOR SUSPENSION ORAL at 15:01

## 2025-01-03 RX ADMIN — CALCIUM CARBONATE (ANTACID) CHEW TAB 500 MG 500 MG: 500 CHEW TAB at 17:10

## 2025-01-03 RX ADMIN — CLOTRIMAZOLE 10 MG: 10 LOZENGE ORAL at 09:11

## 2025-01-03 ASSESSMENT — COGNITIVE AND FUNCTIONAL STATUS - GENERAL
WALKING IN HOSPITAL ROOM: A LITTLE
STANDING UP FROM CHAIR USING ARMS: A LITTLE
MOVING TO AND FROM BED TO CHAIR: A LITTLE
DAILY ACTIVITIY SCORE: 24
MOBILITY SCORE: 19
CLIMB 3 TO 5 STEPS WITH RAILING: A LOT

## 2025-01-03 ASSESSMENT — PAIN SCALES - GENERAL
PAINLEVEL_OUTOF10: 0 - NO PAIN

## 2025-01-03 ASSESSMENT — PAIN - FUNCTIONAL ASSESSMENT
PAIN_FUNCTIONAL_ASSESSMENT: 0-10
PAIN_FUNCTIONAL_ASSESSMENT: 0-10

## 2025-01-03 NOTE — PROGRESS NOTES
Occupational Therapy                 Therapy Communication Note    Patient Name: Temo Hanson  MRN: [id number]  Department: [facility] 9  Room: 90/9081-  Today's Date: 1/3/2025     Discipline: Occupational Therapy    OT Missed Visit: Yes     Missed Visit Reason: Missed Visit Reason:  (hold per RN, pt care and excessive BM)    Missed Time: Attempt    Comment:

## 2025-01-03 NOTE — PROGRESS NOTES
Music Therapy Note    Temo Hanson     Therapy Session  Referral Type: New referral this admission  Visit Type: New visit  Session Start Time: 1423  Session End Time: 1430  Intervention Delivery: In-person  Conflict of Service: None  Family Present for Session: None     Pre-assessment  Unable to Assess Reason: Outcomes not applicable  Mood/Affect: Appropriate, Calm, Cooperative         Treatment/Interventions  Music Therapy Interventions: Assessment  Interruption: No  Patient Fell Asleep at End of Session: No    Post-assessment  Unable to Assess Reason: Did not provide expressive therapy intervention  Mood/Affect: Appropriate, Calm, Cooperative  Continue Visiting: Yes  Total Session Time (min): 7 minutes    Narrative  Assessment Detail: Patient presented awake and alert, sitting upright in bed, displaying calm affect. MT introduced self and role and pt was receptive to music therapy education. Patient shared that they enjoy music. Patient voiced frustration with his pain, and when MT provided education on music therapy for pain management, pt declined a session at this time but stated that he was agreeable to follow-up.  Follow-up: MT to continue to follow.    Education Documentation  No documentation found.

## 2025-01-03 NOTE — NURSING NOTE
"Notified team of pt's frequent loose stool. Asked if they could consider an FMS r/t to liquid stools impacting his skin and him requiring frequent linen changes and Deyanira Bloom NP declined. NP states that \"he is ambulatory. not an indication for FMS. will also increase risk of infection in an immunocompromised patient.\".     1050- Wound Care at beside to eval pt and sacral/buttocks.   "

## 2025-01-03 NOTE — PROGRESS NOTES
"Temo Hanson is a 74 y.o. male on day 3 of admission presenting with Dehydration.    Subjective   Continues to struggle with poor po intake.   Complains of difficulty swallowing.   Taking increased water but not eating.     Objective   /58   Pulse 69   Temp 36.3 °C (97.3 °F)   Resp 16   Ht 1.854 m (6' 1\")   Wt 71.7 kg (158 lb 1.1 oz)   SpO2 98%   BMI 20.85 kg/m²     Physical Exam  Constitutional:       Appearance: He is normal weight.   Cardiovascular:      Rate and Rhythm: Normal rate.      Pulses: Normal pulses.   Pulmonary:      Effort: Pulmonary effort is normal.      Breath sounds: No stridor.   Abdominal:      Palpations: Abdomen is soft. There is no mass.      Tenderness: There is no abdominal tenderness. There is no rebound.   Neurological:      Mental Status: He is alert.         Last Recorded Vitals  Blood pressure 126/58, pulse 69, temperature 36.3 °C (97.3 °F), resp. rate 16, height 1.854 m (6' 1\"), weight 71.7 kg (158 lb 1.1 oz), SpO2 98%.  Intake/Output last 3 Shifts:  I/O last 3 completed shifts:  In: 1081.7 (15.1 mL/kg) [P.O.:240; I.V.:841.7 (11.7 mL/kg)]  Out: 175 (2.4 mL/kg) [Urine:175 (0.1 mL/kg/hr)]  Weight: 71.7 kg     Relevant Results  Esophagram results pending.                     Malnutrition Diagnosis Status: New  Malnutrition Diagnosis: Severe malnutrition related to acute disease or injury  As Evidenced by: < 50% estimated energy needs for > 5 days, moderate fat loss + muscle wasting,  I agree with the dietitian's malnutrition diagnosis.      Assessment/Plan   Assessment & Plan  Dehydration    Protein calorie malnutrition-  Abdominal pain.   Await result of EGD, change to Nystatin to address possible candidal esophagitis  Continue tac, MMF, pred  May require PEG if he can not eat.   Prior history of cobblestoning of distal esophagus, path negative.          I spent 40 minutes in the professional and overall care of this patient.      John Zimmer MD      "

## 2025-01-03 NOTE — CONSULTS
Wound Care Consult     Visit Date: 1/3/2025      Patient Name: Temo Hanson         MRN: [id number]           YOB: 1950     Reason for Consult: buttocks wound       Wound Assessment:  Wound 12/21/24 Incision Pelvis Anterior;Right (Active)   Site Assessment Clean;Dry 01/03/25 0911   Queta-Wound Assessment Clean;Dry;Intact 01/03/25 0911   State of Healing Closed wound edges 01/03/25 0911   Margins Attached edges 12/25/24 1900   Closure Staples 01/03/25 0911   Sutures/Staple Line Approximated 01/03/25 0911   Drainage Description None 01/03/25 0911   Drainage Amount None 01/03/25 0911   Dressing Open to air 01/03/25 0911   Dressing Changed Other (Comment) 01/02/25 2006   Dressing Status Clean;Dry 12/22/24 0800       Wound 12/31/24 Pressure Injury Sacrum (Active)   Site Assessment Pink;Red;Epithelialization 01/03/25 0911   Queta-Wound Assessment Intact 01/03/25 0911   Wound Length (cm) 1.5 cm 12/31/24 1452   Wound Width (cm) 1 cm 12/31/24 1452   Wound Surface Area (cm^2) 1.5 cm^2 12/31/24 1452   Drainage Description None 01/03/25 0911   Drainage Amount None 01/03/25 0911   Dressing Foam 01/03/25 0911   Dressing Changed New 01/03/25 0911   Dressing Status Clean;Dry 01/02/25 0830       Wound 12/31/24 Pressure Injury Buttock Mid (Active)   Wound Image   01/03/25 1229   Site Assessment Pink 01/03/25 1229   Queta-Wound Assessment Non-blanchable erythema;Painful 01/03/25 1229   Non-staged Wound Description Partial thickness 01/03/25 1229   Pressure Injury Stage 2 01/03/25 1229   Shape 2cm x 2cm to each buttock 01/03/25 1229   Wound Length (cm) 2 cm 01/03/25 1229   Wound Width (cm) 2 cm 01/03/25 1229   Wound Surface Area (cm^2) 4 cm^2 01/03/25 1229   Drainage Description Serosanguineous 01/03/25 1229   Drainage Amount Scant 01/03/25 1229   Dressing Moisture barrier 01/03/25 1229   Dressing Changed New 01/03/25 1229   Dressing Status Clean;Dry 01/03/25 0911       Wound Team Summary Assessment:   Patient with Stage  2 Pressure injury to each buttock (measuring 2cm x 2cm each).  Cleanse daily and as needed with Vashe wash, dry with gauze. Apply Triad wound dressing ointment to area daily and as needed.  Discontinue Mepilex border dressing to this area as patient is incontinent with continuous watery stool.  Triad can be applied directly from the tube or by using a gloved finger. Gently spread Triad evenly over the area of application to the thickness of a dime. To remove, Use pH-balanced wound cleanser (Vashe) to soften Triad. Gently wipe to remove without scrubbing. Triad can stay in place for 5-7 days, with higher exudate levels requiring more frequent re-applications. In the perineal area, reapply Triad after each episode of incontinence.         Kimberly Florentino RN  1/3/2025  12:30 PM

## 2025-01-03 NOTE — CARE PLAN
The patient's goals for the shift include get better    The clinical goals for the shift include Patient will remain HDS this shift    Problem: Safety - Adult  Goal: Free from fall injury  Outcome: Progressing     Problem: Skin  Goal: Decreased wound size/increased tissue granulation at next dressing change  Outcome: Progressing     Problem: Pain - Adult  Goal: Verbalizes/displays adequate comfort level or baseline comfort level  Outcome: Progressing     Problem: Safety - Adult  Goal: Free from fall injury  Outcome: Progressing     Problem: Discharge Planning  Goal: Discharge to home or other facility with appropriate resources  Outcome: Progressing     Problem: Chronic Conditions and Co-morbidities  Goal: Patient's chronic conditions and co-morbidity symptoms are monitored and maintained or improved  Outcome: Progressing     Problem: Skin  Goal: Decreased wound size/increased tissue granulation at next dressing change  Outcome: Progressing  Goal: Participates in plan/prevention/treatment measures  Outcome: Progressing  Goal: Prevent/manage excess moisture  Outcome: Progressing  Goal: Prevent/minimize sheer/friction injuries  Outcome: Progressing  Goal: Promote/optimize nutrition  Outcome: Progressing  Goal: Promote skin healing  Outcome: Progressing     Problem: Fall/Injury  Goal: Not fall by end of shift  Outcome: Progressing  Goal: Be free from injury by end of the shift  Outcome: Progressing  Goal: Verbalize understanding of personal risk factors for fall in the hospital  Outcome: Progressing  Goal: Verbalize understanding of risk factor reduction measures to prevent injury from fall in the home  Outcome: Progressing  Goal: Use assistive devices by end of the shift  Outcome: Progressing  Goal: Pace activities to prevent fatigue by end of the shift  Outcome: Progressing     Problem: Nutrition  Goal: Less than 5 days NPO/clear liquids  Outcome: Progressing  Goal: Oral intake greater than 50%  Outcome:  Progressing  Goal: Oral intake greater 75%  Outcome: Progressing  Goal: Consume prescribed supplement  Outcome: Progressing  Goal: Adequate PO fluid intake  Outcome: Progressing  Goal: Nutrition support goals are met within 48 hrs  Outcome: Progressing  Goal: Nutrition support is meeting 75% of nutrient needs  Outcome: Progressing  Goal: Tube feed tolerance  Outcome: Progressing  Goal: BG  mg/dL  Outcome: Progressing  Goal: Lab values WNL  Outcome: Progressing  Goal: Electrolytes WNL  Outcome: Progressing  Goal: Promote healing  Outcome: Progressing  Goal: Maintain stable weight  Outcome: Progressing  Goal: Reduce weight from edema/fluid  Outcome: Progressing  Goal: Gradual weight gain  Outcome: Progressing  Goal: Improve ostomy output  Outcome: Progressing     Problem: Diabetes  Goal: Achieve decreasing blood glucose levels by end of shift  Outcome: Progressing  Goal: Increase stability of blood glucose readings by end of shift  Outcome: Progressing  Goal: Decrease in ketones present in urine by end of shift  Outcome: Progressing  Goal: Maintain electrolyte levels within acceptable range throughout shift  Outcome: Progressing  Goal: Maintain glucose levels >70mg/dl to <250mg/dl throughout shift  Outcome: Progressing  Goal: No changes in neurological exam by end of shift  Outcome: Progressing  Goal: Learn about and adhere to nutrition recommendations by end of shift  Outcome: Progressing  Goal: Vital signs within normal range for age by end of shift  Outcome: Progressing  Goal: Increase self care and/or family involovement by end of shift  Outcome: Progressing  Goal: Receive DSME education by end of shift  Outcome: Progressing

## 2025-01-04 LAB
ALBUMIN SERPL BCP-MCNC: 2.9 G/DL (ref 3.4–5)
ANION GAP SERPL CALC-SCNC: 15 MMOL/L (ref 10–20)
BUN SERPL-MCNC: 61 MG/DL (ref 6–23)
C COLI+JEJ+UPSA DNA STL QL NAA+PROBE: NOT DETECTED
CALCIUM SERPL-MCNC: 8.3 MG/DL (ref 8.6–10.6)
CHLORIDE SERPL-SCNC: 96 MMOL/L (ref 98–107)
CO2 SERPL-SCNC: 27 MMOL/L (ref 21–32)
CREAT SERPL-MCNC: 2.41 MG/DL (ref 0.5–1.3)
EC STX1 GENE STL QL NAA+PROBE: NOT DETECTED
EC STX2 GENE STL QL NAA+PROBE: NOT DETECTED
EGFRCR SERPLBLD CKD-EPI 2021: 27 ML/MIN/1.73M*2
ERYTHROCYTE [DISTWIDTH] IN BLOOD BY AUTOMATED COUNT: 16.3 % (ref 11.5–14.5)
GLUCOSE BLD MANUAL STRIP-MCNC: 209 MG/DL (ref 74–99)
GLUCOSE BLD MANUAL STRIP-MCNC: 210 MG/DL (ref 74–99)
GLUCOSE BLD MANUAL STRIP-MCNC: 252 MG/DL (ref 74–99)
GLUCOSE BLD MANUAL STRIP-MCNC: 97 MG/DL (ref 74–99)
GLUCOSE SERPL-MCNC: 228 MG/DL (ref 74–99)
HCT VFR BLD AUTO: 23.8 % (ref 41–52)
HGB BLD-MCNC: 7.8 G/DL (ref 13.5–17.5)
MAGNESIUM SERPL-MCNC: 1.63 MG/DL (ref 1.6–2.4)
MCH RBC QN AUTO: 31.8 PG (ref 26–34)
MCHC RBC AUTO-ENTMCNC: 32.8 G/DL (ref 32–36)
MCV RBC AUTO: 97 FL (ref 80–100)
NOROVIRUS GI + GII RNA STL NAA+PROBE: NOT DETECTED
NRBC BLD-RTO: 0 /100 WBCS (ref 0–0)
PHOSPHATE SERPL-MCNC: 2.6 MG/DL (ref 2.5–4.9)
PLATELET # BLD AUTO: 174 X10*3/UL (ref 150–450)
POTASSIUM SERPL-SCNC: 3.9 MMOL/L (ref 3.5–5.3)
RBC # BLD AUTO: 2.45 X10*6/UL (ref 4.5–5.9)
RV RNA STL NAA+PROBE: NOT DETECTED
SALMONELLA DNA STL QL NAA+PROBE: NOT DETECTED
SHIGELLA DNA SPEC QL NAA+PROBE: NOT DETECTED
SODIUM SERPL-SCNC: 134 MMOL/L (ref 136–145)
TACROLIMUS BLD-MCNC: 8.5 NG/ML
V CHOLERAE DNA STL QL NAA+PROBE: NOT DETECTED
WBC # BLD AUTO: 4.3 X10*3/UL (ref 4.4–11.3)
Y ENTEROCOL DNA STL QL NAA+PROBE: NOT DETECTED

## 2025-01-04 PROCEDURE — 36415 COLL VENOUS BLD VENIPUNCTURE: CPT | Performed by: STUDENT IN AN ORGANIZED HEALTH CARE EDUCATION/TRAINING PROGRAM

## 2025-01-04 PROCEDURE — 2500000005 HC RX 250 GENERAL PHARMACY W/O HCPCS

## 2025-01-04 PROCEDURE — 85027 COMPLETE CBC AUTOMATED: CPT | Performed by: STUDENT IN AN ORGANIZED HEALTH CARE EDUCATION/TRAINING PROGRAM

## 2025-01-04 PROCEDURE — 82947 ASSAY GLUCOSE BLOOD QUANT: CPT

## 2025-01-04 PROCEDURE — 2500000004 HC RX 250 GENERAL PHARMACY W/ HCPCS (ALT 636 FOR OP/ED)

## 2025-01-04 PROCEDURE — 1100000001 HC PRIVATE ROOM DAILY

## 2025-01-04 PROCEDURE — 97161 PT EVAL LOW COMPLEX 20 MIN: CPT | Mod: GP

## 2025-01-04 PROCEDURE — 2500000001 HC RX 250 WO HCPCS SELF ADMINISTERED DRUGS (ALT 637 FOR MEDICARE OP)

## 2025-01-04 PROCEDURE — 99222 1ST HOSP IP/OBS MODERATE 55: CPT | Performed by: SURGERY

## 2025-01-04 PROCEDURE — 97530 THERAPEUTIC ACTIVITIES: CPT | Mod: GP

## 2025-01-04 PROCEDURE — 99233 SBSQ HOSP IP/OBS HIGH 50: CPT | Performed by: INTERNAL MEDICINE

## 2025-01-04 PROCEDURE — 80197 ASSAY OF TACROLIMUS: CPT | Performed by: STUDENT IN AN ORGANIZED HEALTH CARE EDUCATION/TRAINING PROGRAM

## 2025-01-04 PROCEDURE — 84100 ASSAY OF PHOSPHORUS: CPT | Performed by: STUDENT IN AN ORGANIZED HEALTH CARE EDUCATION/TRAINING PROGRAM

## 2025-01-04 PROCEDURE — 80048 BASIC METABOLIC PNL TOTAL CA: CPT | Performed by: STUDENT IN AN ORGANIZED HEALTH CARE EDUCATION/TRAINING PROGRAM

## 2025-01-04 PROCEDURE — 2500000002 HC RX 250 W HCPCS SELF ADMINISTERED DRUGS (ALT 637 FOR MEDICARE OP, ALT 636 FOR OP/ED): Performed by: STUDENT IN AN ORGANIZED HEALTH CARE EDUCATION/TRAINING PROGRAM

## 2025-01-04 PROCEDURE — 2500000001 HC RX 250 WO HCPCS SELF ADMINISTERED DRUGS (ALT 637 FOR MEDICARE OP): Performed by: STUDENT IN AN ORGANIZED HEALTH CARE EDUCATION/TRAINING PROGRAM

## 2025-01-04 PROCEDURE — 99232 SBSQ HOSP IP/OBS MODERATE 35: CPT | Performed by: TRANSPLANT SURGERY

## 2025-01-04 PROCEDURE — 2500000004 HC RX 250 GENERAL PHARMACY W/ HCPCS (ALT 636 FOR OP/ED): Performed by: STUDENT IN AN ORGANIZED HEALTH CARE EDUCATION/TRAINING PROGRAM

## 2025-01-04 PROCEDURE — 83735 ASSAY OF MAGNESIUM: CPT | Performed by: STUDENT IN AN ORGANIZED HEALTH CARE EDUCATION/TRAINING PROGRAM

## 2025-01-04 PROCEDURE — 87506 IADNA-DNA/RNA PROBE TQ 6-11: CPT

## 2025-01-04 RX ORDER — SODIUM CHLORIDE, SODIUM LACTATE, POTASSIUM CHLORIDE, CALCIUM CHLORIDE 600; 310; 30; 20 MG/100ML; MG/100ML; MG/100ML; MG/100ML
100 INJECTION, SOLUTION INTRAVENOUS CONTINUOUS
Status: ACTIVE | OUTPATIENT
Start: 2025-01-04 | End: 2025-01-05

## 2025-01-04 RX ORDER — MAGNESIUM SULFATE HEPTAHYDRATE 40 MG/ML
4 INJECTION, SOLUTION INTRAVENOUS ONCE
Status: COMPLETED | OUTPATIENT
Start: 2025-01-04 | End: 2025-01-04

## 2025-01-04 RX ADMIN — MAGNESIUM SULFATE 4 G: 4 INJECTION INTRAVENOUS at 08:37

## 2025-01-04 RX ADMIN — MYCOPHENOLATE MOFETIL 1000 MG: 250 CAPSULE ORAL at 06:21

## 2025-01-04 RX ADMIN — CALCIUM CARBONATE (ANTACID) CHEW TAB 500 MG 500 MG: 500 CHEW TAB at 06:21

## 2025-01-04 RX ADMIN — OXYCODONE HYDROCHLORIDE 5 MG: 5 TABLET ORAL at 03:20

## 2025-01-04 RX ADMIN — AMLODIPINE BESYLATE 10 MG: 10 TABLET ORAL at 08:36

## 2025-01-04 RX ADMIN — SODIUM CHLORIDE, POTASSIUM CHLORIDE, SODIUM LACTATE AND CALCIUM CHLORIDE 1000 ML: 600; 310; 30; 20 INJECTION, SOLUTION INTRAVENOUS at 08:59

## 2025-01-04 RX ADMIN — CALCIUM CARBONATE (ANTACID) CHEW TAB 500 MG 500 MG: 500 CHEW TAB at 16:23

## 2025-01-04 RX ADMIN — LIDOCAINE 4% 1 PATCH: 40 PATCH TOPICAL at 20:41

## 2025-01-04 RX ADMIN — INSULIN LISPRO 4 UNITS: 100 INJECTION, SOLUTION INTRAVENOUS; SUBCUTANEOUS at 16:25

## 2025-01-04 RX ADMIN — PANTOPRAZOLE SODIUM 40 MG: 40 TABLET, DELAYED RELEASE ORAL at 06:21

## 2025-01-04 RX ADMIN — MYCOPHENOLATE MOFETIL 1000 MG: 250 CAPSULE ORAL at 18:12

## 2025-01-04 RX ADMIN — SULFAMETHOXAZOLE AND TRIMETHOPRIM 1 TABLET: 400; 80 TABLET ORAL at 08:36

## 2025-01-04 RX ADMIN — GABAPENTIN 200 MG: 100 CAPSULE ORAL at 08:36

## 2025-01-04 RX ADMIN — SODIUM CHLORIDE, POTASSIUM CHLORIDE, SODIUM LACTATE AND CALCIUM CHLORIDE 100 ML/HR: 600; 310; 30; 20 INJECTION, SOLUTION INTRAVENOUS at 08:59

## 2025-01-04 RX ADMIN — INSULIN LISPRO 4 UNITS: 100 INJECTION, SOLUTION INTRAVENOUS; SUBCUTANEOUS at 08:38

## 2025-01-04 RX ADMIN — INSULIN LISPRO 6 UNITS: 100 INJECTION, SOLUTION INTRAVENOUS; SUBCUTANEOUS at 16:23

## 2025-01-04 RX ADMIN — INSULIN LISPRO 4 UNITS: 100 INJECTION, SOLUTION INTRAVENOUS; SUBCUTANEOUS at 08:37

## 2025-01-04 RX ADMIN — TACROLIMUS 2 MG: 1 CAPSULE ORAL at 18:12

## 2025-01-04 RX ADMIN — SODIUM CHLORIDE, POTASSIUM CHLORIDE, SODIUM LACTATE AND CALCIUM CHLORIDE 100 ML/HR: 600; 310; 30; 20 INJECTION, SOLUTION INTRAVENOUS at 20:41

## 2025-01-04 RX ADMIN — INSULIN GLARGINE 8 UNITS: 100 INJECTION, SOLUTION SUBCUTANEOUS at 20:42

## 2025-01-04 RX ADMIN — PANTOPRAZOLE SODIUM 40 MG: 40 TABLET, DELAYED RELEASE ORAL at 16:23

## 2025-01-04 RX ADMIN — PREDNISONE 20 MG: 20 TABLET ORAL at 08:36

## 2025-01-04 RX ADMIN — TACROLIMUS 2 MG: 1 CAPSULE ORAL at 06:21

## 2025-01-04 RX ADMIN — NYSTATIN 500000 UNITS: 500000 SUSPENSION ORAL at 06:48

## 2025-01-04 RX ADMIN — NYSTATIN 500000 UNITS: 500000 SUSPENSION ORAL at 20:41

## 2025-01-04 RX ADMIN — NYSTATIN 500000 UNITS: 500000 SUSPENSION ORAL at 18:12

## 2025-01-04 RX ADMIN — ROSUVASTATIN CALCIUM 10 MG: 10 TABLET, FILM COATED ORAL at 20:41

## 2025-01-04 ASSESSMENT — COGNITIVE AND FUNCTIONAL STATUS - GENERAL
MOVING TO AND FROM BED TO CHAIR: A LITTLE
DRESSING REGULAR LOWER BODY CLOTHING: A LITTLE
TURNING FROM BACK TO SIDE WHILE IN FLAT BAD: A LITTLE
MOVING FROM LYING ON BACK TO SITTING ON SIDE OF FLAT BED WITH BEDRAILS: A LITTLE
MOVING TO AND FROM BED TO CHAIR: A LOT
DAILY ACTIVITIY SCORE: 21
CLIMB 3 TO 5 STEPS WITH RAILING: A LOT
MOBILITY SCORE: 18
WALKING IN HOSPITAL ROOM: A LITTLE
WALKING IN HOSPITAL ROOM: A LITTLE
HELP NEEDED FOR BATHING: A LITTLE
TURNING FROM BACK TO SIDE WHILE IN FLAT BAD: A LITTLE
DRESSING REGULAR UPPER BODY CLOTHING: A LITTLE
CLIMB 3 TO 5 STEPS WITH RAILING: A LITTLE
MOBILITY SCORE: 17
STANDING UP FROM CHAIR USING ARMS: A LITTLE
STANDING UP FROM CHAIR USING ARMS: A LITTLE

## 2025-01-04 ASSESSMENT — PAIN - FUNCTIONAL ASSESSMENT
PAIN_FUNCTIONAL_ASSESSMENT: 0-10

## 2025-01-04 ASSESSMENT — PAIN SCALES - GENERAL
PAINLEVEL_OUTOF10: 3
PAINLEVEL_OUTOF10: 0 - NO PAIN
PAINLEVEL_OUTOF10: 0 - NO PAIN
PAINLEVEL_OUTOF10: 7
PAINLEVEL_OUTOF10: 4

## 2025-01-04 ASSESSMENT — PAIN DESCRIPTION - LOCATION: LOCATION: COCCYX

## 2025-01-04 ASSESSMENT — ACTIVITIES OF DAILY LIVING (ADL): ADL_ASSISTANCE: INDEPENDENT

## 2025-01-04 NOTE — CARE PLAN
The patient's goals for the shift include get better    The clinical goals for the shift include pain control      Problem: Pain - Adult  Goal: Verbalizes/displays adequate comfort level or baseline comfort level  Outcome: Progressing     Problem: Safety - Adult  Goal: Free from fall injury  Outcome: Progressing     Problem: Discharge Planning  Goal: Discharge to home or other facility with appropriate resources  Outcome: Progressing     Problem: Chronic Conditions and Co-morbidities  Goal: Patient's chronic conditions and co-morbidity symptoms are monitored and maintained or improved  Outcome: Progressing     Problem: Skin  Goal: Decreased wound size/increased tissue granulation at next dressing change  Outcome: Progressing  Goal: Participates in plan/prevention/treatment measures  Outcome: Progressing  Goal: Prevent/manage excess moisture  Outcome: Progressing  Goal: Prevent/minimize sheer/friction injuries  Outcome: Progressing  Goal: Promote/optimize nutrition  Outcome: Progressing  Goal: Promote skin healing  Outcome: Progressing     Problem: Fall/Injury  Goal: Not fall by end of shift  Outcome: Progressing  Goal: Be free from injury by end of the shift  Outcome: Progressing  Goal: Verbalize understanding of personal risk factors for fall in the hospital  Outcome: Progressing  Goal: Verbalize understanding of risk factor reduction measures to prevent injury from fall in the home  Outcome: Progressing  Goal: Use assistive devices by end of the shift  Outcome: Progressing  Goal: Pace activities to prevent fatigue by end of the shift  Outcome: Progressing     Problem: Nutrition  Goal: Less than 5 days NPO/clear liquids  Outcome: Progressing  Goal: Oral intake greater than 50%  Outcome: Progressing  Goal: Oral intake greater 75%  Outcome: Progressing  Goal: Consume prescribed supplement  Outcome: Progressing  Goal: Adequate PO fluid intake  Outcome: Progressing  Goal: Nutrition support goals are met within 48  hrs  Outcome: Progressing  Goal: Nutrition support is meeting 75% of nutrient needs  Outcome: Progressing  Goal: Tube feed tolerance  Outcome: Progressing  Goal: BG  mg/dL  Outcome: Progressing  Goal: Lab values WNL  Outcome: Progressing  Goal: Electrolytes WNL  Outcome: Progressing  Goal: Promote healing  Outcome: Progressing  Goal: Maintain stable weight  Outcome: Progressing  Goal: Reduce weight from edema/fluid  Outcome: Progressing  Goal: Gradual weight gain  Outcome: Progressing  Goal: Improve ostomy output  Outcome: Progressing     Problem: Diabetes  Goal: Achieve decreasing blood glucose levels by end of shift  Outcome: Progressing  Goal: Increase stability of blood glucose readings by end of shift  Outcome: Progressing  Goal: Decrease in ketones present in urine by end of shift  Outcome: Progressing  Goal: Maintain electrolyte levels within acceptable range throughout shift  Outcome: Progressing  Goal: Maintain glucose levels >70mg/dl to <250mg/dl throughout shift  Outcome: Progressing  Goal: No changes in neurological exam by end of shift  Outcome: Progressing  Goal: Learn about and adhere to nutrition recommendations by end of shift  Outcome: Progressing  Goal: Vital signs within normal range for age by end of shift  Outcome: Progressing  Goal: Increase self care and/or family involovement by end of shift  Outcome: Progressing  Goal: Receive DSME education by end of shift  Outcome: Progressing     Problem: Safety - Adult  Goal: Free from fall injury  Outcome: Progressing

## 2025-01-04 NOTE — PROGRESS NOTES
"Temo Hanson is a 74 y.o. male on day 4 of admission presenting with Dehydration.    Subjective   Continues to struggle with poor po intake.   Esophogram demonstrates achalasia  Plan for EGD and dilation      Objective   /60 (BP Location: Right arm, Patient Position: Lying)   Pulse 70   Temp 36.6 °C (97.9 °F) (Temporal)   Resp 17   Ht 1.854 m (6' 1\")   Wt 73.9 kg (162 lb 14.7 oz)   SpO2 98%   BMI 21.49 kg/m²     Physical Exam  Constitutional:       Appearance: He is normal weight.   Cardiovascular:      Rate and Rhythm: Normal rate.      Pulses: Normal pulses.   Pulmonary:      Effort: Pulmonary effort is normal.      Breath sounds: No stridor.   Abdominal:      Palpations: Abdomen is soft. There is no mass.      Tenderness: There is no abdominal tenderness. There is no rebound.   Neurological:      Mental Status: He is alert.         Last Recorded Vitals  Blood pressure 129/60, pulse 70, temperature 36.6 °C (97.9 °F), temperature source Temporal, resp. rate 17, height 1.854 m (6' 1\"), weight 73.9 kg (162 lb 14.7 oz), SpO2 98%.  Intake/Output last 3 Shifts:  I/O last 3 completed shifts:  In: 1840.8 (24.9 mL/kg) [P.O.:360; I.V.:1480.8 (20 mL/kg)]  Out: 300 (4.1 mL/kg) [Urine:300 (0.1 mL/kg/hr)]  Weight: 73.9 kg     Relevant Results  Esophagram demonstrates megaesophagus and achalasia.      Malnutrition Diagnosis Status: New  Malnutrition Diagnosis: Severe malnutrition related to acute disease or injury  As Evidenced by: < 50% estimated energy needs for > 5 days, moderate fat loss + muscle wasting,  I agree with the dietitian's malnutrition diagnosis.      Assessment/Plan   Assessment & Plan  Dehydration    Protein calorie malnutrition-  Abdominal pain.   Gen surg consult for possible endoscopic therapy  Continue tac, MMF, pred  May require PEG if he can not eat vs. DHT  Prior history of cobblestoning of distal esophagus, path negative.       I spent 40 minutes in the professional and overall care of " this patient.      John Zimmer MD

## 2025-01-04 NOTE — CONSULTS
Reason For Consult  Megaesophagus     History Of Present Illness  Temo Hanson is a 74 y.o. 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  [place] surgery is consulted for recommendations on management.     Patient said he underwent Heller myotomy years ago for his achalasia and since he has not had any issue with tolerating PO intake.  However, since his kidney transplant, he has had problems with taking PO.      Past Medical History  He has a past medical history of Chronic kidney disease, DM (diabetes mellitus) (Multi), Fistula, HTN (hypertension), Other specified abnormal immunological findings in serum (10/11/2021), and Personal history of malignant neoplasm of prostate.    Surgical History  He has a past surgical history that includes Other surgical history (09/29/2021); Other surgical history (09/29/2021); Other surgical history (09/29/2021); Other surgical history (09/29/2021); Cholecystectomy (10/23/2023); Cardiac catheterization (N/A, 4/18/2024); and Cardiac electrophysiology procedure (N/A, 7/17/2024).     Social History  He reports that he has never smoked. He has never used smokeless tobacco. He reports that he does not drink alcohol and does not use drugs.    Family History  Family History   Problem Relation Name Age of Onset    Diabetes Sister      Diabetes Brother          Allergies  Terazosin, Codeine, and Tramadol    Review of Systems  All systems are reviewed and negative otherwise stated above     Physical Exam  Constitutional: no acute distress  Neuro: A/O x4, no gross deficits   Psych: normal affect  HEENT: No deformities, no scleral icterus   Cardiac: RR  Pulmonary: unlabored respirations   Abdomen: soft, non distended  Skin: warm and dry  overall    Extremities: no swelling noted  MSK: moving all four       Last Recorded Vitals      1/3/2025    11:59 AM 1/3/2025     3:48 PM 1/3/2025     9:00 PM 1/4/2025     1:00 AM 1/4/2025     3:00 AM 1/4/2025     5:00 AM 1/4/2025     7:53 AM   Vitals   Systolic 135 126 131 136 123  129   Diastolic 53 58 45 58 64  60   BP Location Right arm  Right arm Right arm Right arm  Right arm   Heart Rate 68 69 71 69 54  70   Temp 36.2 °C (97.2 °F) 36.3 °C (97.3 °F) 37 °C (98.6 °F) 37.1 °C (98.8 °F) 37.1 °C (98.8 °F)  36.6 °C (97.9 °F)   Resp 17 16 15 15 14  17   Weight (lb)      162.92    BMI      21.49 kg/m2    BSA (m2)      1.95 m2      Results for orders placed or performed during the hospital encounter of 12/31/24 (from the past 24 hours)   POCT GLUCOSE   Result Value Ref Range    POCT Glucose 215 (H) 74 - 99 mg/dL   C. difficile, PCR    Specimen: Stool   Result Value Ref Range    C. difficile, PCR Not Detected Not Detected   POCT GLUCOSE   Result Value Ref Range    POCT Glucose 228 (H) 74 - 99 mg/dL   POCT GLUCOSE   Result Value Ref Range    POCT Glucose 140 (H) 74 - 99 mg/dL   POCT GLUCOSE   Result Value Ref Range    POCT Glucose 233 (H) 74 - 99 mg/dL   CBC   Result Value Ref Range    WBC 4.3 (L) 4.4 - 11.3 x10*3/uL    nRBC 0.0 0.0 - 0.0 /100 WBCs    RBC 2.45 (L) 4.50 - 5.90 x10*6/uL    Hemoglobin 7.8 (L) 13.5 - 17.5 g/dL    Hematocrit 23.8 (L) 41.0 - 52.0 %    MCV 97 80 - 100 fL    MCH 31.8 26.0 - 34.0 pg    MCHC 32.8 32.0 - 36.0 g/dL    RDW 16.3 (H) 11.5 - 14.5 %    Platelets 174 150 - 450 x10*3/uL   Basic metabolic panel   Result Value Ref Range    Glucose 228 (H) 74 - 99 mg/dL    Sodium 134 (L) 136 - 145 mmol/L    Potassium 3.9 3.5 - 5.3 mmol/L    Chloride 96 (L) 98 - 107 mmol/L    Bicarbonate 27 21 - 32 mmol/L    Anion Gap 15 10 - 20 mmol/L    Urea Nitrogen 61 (H) 6 - 23 mg/dL    Creatinine 2.41 (H) 0.50 - 1.30 mg/dL    eGFR 27 (L) >60 mL/min/1.73m*2    Calcium 8.3 (L) 8.6 - 10.6 mg/dL   Magnesium   Result Value  Ref Range    Magnesium 1.63 1.60 - 2.40 mg/dL   Phosphorus   Result Value Ref Range    Phosphorus 2.6 2.5 - 4.9 mg/dL   POCT GLUCOSE   Result Value Ref Range    POCT Glucose 209 (H) 74 - 99 mg/dL         Relevant Results  FL Gi esophagram  1.  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 These   findings are similar in appearance to prior CT abdomen pelvis   02/24/2023.     Assessment/Plan     Temo Hanson is a 74 y.o. 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  [place] surgery is consulted for recommendations on management.    Recommendations:  - Will add patient on for Monday for EGD, dilation, possible Botox pending OR and surgeon availability  - NPO at Sunday MN  - will obtain consent  - plan communicated to transplant team    Pt seen and discussed with Dr. Payen,    Santo Zamora MD  General Surgery, PGY-3  Please page service pager with questions

## 2025-01-04 NOTE — PROGRESS NOTES
Physical Therapy    Physical Therapy Evaluation & Treatment    Patient Name: Temo Hanson  MRN: [id number]  Department: [place] [id number]  Room: [facility]-A  Today's Date: 1/4/2025   Time Calculation  Start Time: 1206  Stop Time: 1230  Time Calculation (min): 24 min    Assessment/Plan   PT Assessment  PT Assessment Results: Decreased strength, Decreased range of motion, Decreased endurance, Impaired balance, Decreased mobility, Decreased coordination, Decreased cognition, Pain, Decreased skin integrity  Rehab Prognosis: Good  Barriers to Discharge Home: Physical needs (Pt's wife stating she is not comfortable taking pt home at this time w/o additional assistance in the home.)  Physical Needs: Stair navigation into home limited by function/safety, Stair navigation to access bed limited by function/safety, Stair navigation to access bath limited by function/safety, Ambulating household distances limited by function/safety  Evaluation/Treatment Tolerance: Patient tolerated treatment well  End of Session Communication: Bedside nurse  Assessment Comment: 74 year old male s/p DDKT on 12/21/24. Pt presents w/ decreased ROM, strength, & endurance. Pt would benefit from cont therapy service during this admission. Pt's wife expressed concerns taking pt home at his current functioning. She states that she would like to have aides in the home to assist w/ ADLs. Anticipating that pt will progress to low level intensity PT at discharge.  End of Session Patient Position: Alarm off, not on at start of session (Sitting EOB)   IP OR SWING BED PT PLAN  Inpatient or Swing Bed: Inpatient  PT Plan  Treatment/Interventions: Bed mobility, Transfer training, Gait training, Stair training, Balance training, Neuromuscular re-education, Strengthening, Endurance training, Range of motion, Therapeutic exercise, Therapeutic activity, Home exercise program  PT Plan: Ongoing PT  PT Frequency: 5 times per week  PT Discharge Recommendations: Low intensity  level of continued care  Equipment Recommended upon Discharge: Wheeled walker  PT Recommended Transfer Status: Contact guard  PT - OK to Discharge: Yes    Subjective     General Visit Information:  General  Reason for Referral: 74 year old male s/p DDKT on 12/21/24.  Past Medical History Relevant to Rehab: Hypertension, hyperlipidemia, complete heart block status post leadless pacemaker insertion on 7/17/2024, pericardial effusion, pheochromocytoma of the right adrenal gland, history of prostate cancer, GI bleed  Family/Caregiver Present: Yes  Prior to Session Communication: Bedside nurse  Patient Position Received:  (Sitting EOB)  General Comment: Pt sitting EOB upon PT entry into room. Agreeable to PT. Pt's wife present for session. Noted  IV & contact + precautions.    Home Living:  Home Living  Type of Home: House  Lives With: Spouse  Home Adaptive Equipment: Cane (Straight cane)  Home Layout: Multi-level (Primary bed/bath located on second level.)  Alternate Level Stairs-Rails: Right  Alternate Level Stairs-Number of Steps: 8  Home Access: Stairs to enter with rails  Entrance Stairs-Rails: Right  Entrance Stairs-Number of Steps: 5  Bathroom Shower/Tub: Tub/shower unit  Bathroom Toilet: Standard  Bathroom Equipment: None  Home Living Comments: Home information obtained from pt's wife- pt not providing consistent answers per wife.    Prior Level of Function:  Prior Function Per Pt/Caregiver Report  Level of [place]: Independent with ADLs and functional transfers, Independent with homemaking with ambulation  Receives Help From: Family  ADL Assistance: Independent  Homemaking Assistance: Independent  Ambulatory Assistance: Independent (Indep w/ use of straight cane.)    Precautions:  Precautions  Medical Precautions: Fall precautions  Post-Surgical Precautions: Abdominal surgery precautions    Vital Signs (Past 2hrs)        Date/Time Vitals Session Patient Position Pulse Resp SpO2 BP MAP (mmHg)    01/04/25  1127 --  --  104  19  100 %  112/51  72                      Objective     Pain:  Pain Assessment  Pain Assessment: 0-10  0-10 (Numeric) Pain Score: 4  Pain Type: Acute pain, Surgical pain  Pain Location: Abdomen  Pain Orientation: Right    Cognition:  Cognition  Overall Cognitive Status: Impaired  Orientation Level: Oriented X4 (Pt AOx4, however, according to wife, pt is providing inconsistent answers regarding home set-up.)    General Assessments:     Activity Tolerance  Endurance: Tolerates 10 - 20 min exercise with multiple rests    Sensation  Light Touch: No apparent deficits (Pt reports some numbness/tingling in area of incision.)       Coordination  Movements are Fluid and Coordinated: Yes    Postural Control  Postural Control: Within Functional Limits    Static Sitting Balance  Static Sitting-Balance Support: No upper extremity supported, Feet supported  Static Sitting-Level of Assistance: Independent  Static Sitting-Comment/Number of Minutes: 10 min  Dynamic Sitting Balance  Dynamic Sitting-Balance Support: No upper extremity supported, Feet supported  Dynamic Sitting-Level of Assistance: Distant supervision    Static Standing Balance  Static Standing-Balance Support: Right upper extremity supported (On IV pole)  Static Standing-Level of Assistance: Contact guard  Dynamic Standing Balance  Dynamic Standing-Balance Support: Right upper extremity supported (On IV pole)  Dynamic Standing-Level of Assistance: Contact guard    Functional Assessments:     Bed Mobility  Bed Mobility: No    Transfers  Transfer: Yes  Transfer 1  Transfer From 1: Sit to  Transfer to 1: Stand  Transfer Device 1:  (None)  Transfer Level of Assistance 1: Contact guard  Transfers 2  Transfer From 2: Stand to  Transfer to 2: Sit  Transfer Device 2:  (None)  Transfer Level of Assistance 2: Contact guard    Ambulation/Gait Training  Ambulation/Gait Training Performed: Yes  Ambulation/Gait Training 1  Surface 1: Level tile  Device 1:  (IV  pole in RUE)  Assistance 1: Contact guard  Quality of Gait 1: Narrow base of support, Diminished heel strike, Decreased step length, Shuffling gait  Comments/Distance (ft) 1: 55'    Stairs  Stairs: No    Extremity/Trunk Assessments:  RUE   RUE : Exceptions to WFL (AROM limited 2/2 pain)  LUE   LUE: Exceptions to WFL (AROM limited 2/2 pain)  RLE   RLE : Within Functional Limits  LLE   LLE : Within Functional Limits    Treatments:     Bed Mobility  Bed Mobility: No    Ambulation/Gait Training  Ambulation/Gait Training Performed: Yes  Ambulation/Gait Training 1  Surface 1: Level tile  Device 1:  (IV pole in RUE)  Assistance 1: Contact guard  Quality of Gait 1: Narrow base of support, Diminished heel strike, Decreased step length, Shuffling gait  Comments/Distance (ft) 1: 55'  Transfers  Transfer: Yes  Transfer 1  Transfer From 1: Sit to  Transfer to 1: Stand  Transfer Device 1:  (None)  Transfer Level of Assistance 1: Contact guard  Transfers 2  Transfer From 2: Stand to  Transfer to 2: Sit  Transfer Device 2:  (None)  Transfer Level of Assistance 2: Contact guard    Stairs  Stairs: No    Outcome Measures:  [facility] Basic Mobility  Turning from your back to your side while in a flat bed without using bedrails: A little  Moving from lying on your back to sitting on the side of a flat bed without using bedrails: A little  Moving to and from bed to chair (including a wheelchair): A little  Standing up from a chair using your arms (e.g. wheelchair or bedside chair): A little  To walk in hospital room: A little  Climbing 3-5 steps with railing: A little  Basic Mobility - Total Score: 18    Encounter Problems       Encounter Problems (Active)       PT Problem       Pt will perform all aspects of bed mobility at [facility] ind[facility] by discharge in order to decrease caregiver burden. (Progressing)       Start:  01/04/25    Expected End:  01/18/25            Pt will perform all basic transfers at [facility] indep w/ LRAD by discharge.  (Progressing)       Start:  01/04/25    Expected End:  01/18/25            Pt will ambulate 150' w/ LRAD at [facility] by discharge. (Progressing)       Start:  01/04/25    Expected End:  01/18/25            Pt will negotiate 5-12 steps w/ 1 rail in order to simulate home entrance/set-up by discharge. (Progressing)       Start:  01/04/25    Expected End:  01/18/25                   Education Documentation  Precautions, taught by Ronal Perez PT at 1/4/2025  1:05 PM.  Learner: Family, Patient  Readiness: Acceptance  Method: Explanation  Response: Demonstrated Understanding, Needs Reinforcement  Comment: Education regarding importance of participation in therapy.    Mobility Training, taught by Ronal Perez PT at 1/4/2025  1:05 PM.  Learner: Family, Patient  Readiness: Acceptance  Method: Explanation  Response: Demonstrated Understanding, Needs Reinforcement  Comment: Education regarding importance of participation in therapy.    Education Comments  No comments found.      Ronal Perez, DEQUAN, DPT

## 2025-01-04 NOTE — PROGRESS NOTES
"Temo Hanson is a 74 y.o. male on day 3 of admission presenting with Dehydration.    Po intake remains poor.   C/o dysphagia.  Scheduled for upper GI today.     Scheduled medications  amLODIPine, 10 mg, oral, Daily  calcium carbonate, 500 mg, oral, BID AC  gabapentin, 200 mg, oral, Daily  insulin glargine, 8 Units, subcutaneous, Nightly  insulin lispro, 0-10 Units, subcutaneous, TID AC  insulin lispro, 4 Units, subcutaneous, TID AC  lidocaine, 1 patch, transdermal, q24h  mycophenolate, 1,000 mg, oral, q12h  nystatin, 5 mL, Swish & Swallow, 4x daily  pantoprazole, 40 mg, oral, BID AC  predniSONE, 20 mg, oral, Daily  rosuvastatin, 10 mg, oral, Nightly  sulfamethoxazole-trimethoprim, 1 tablet, oral, Daily  tacrolimus, 2 mg, oral, q12h  valGANciclovir, 450 mg, oral, q48h      Continuous medications  lactated Ringer's, 50 mL/hr, Last Rate: 50 mL/hr (01/03/25 1603)      PRN medications  PRN medications: carboxymethylcellulose, dextrose, dextrose, docusate sodium, glucagon, glucagon, ondansetron ODT **OR** ondansetron, oxyCODONE, oxyCODONE, polyethylene glycol    Last Recorded Vitals  Blood pressure (!) 131/45, pulse 71, temperature 37 °C (98.6 °F), temperature source Temporal, resp. rate 15, height 1.854 m (6' 1\"), weight 71.7 kg (158 lb 1.1 oz), SpO2 100%.  Intake/Output last 3 Shifts:  I/O last 3 completed shifts:  In: 1441.7 (20.1 mL/kg) [P.O.:600; I.V.:841.7 (11.7 mL/kg)]  Out: 175 (2.4 mL/kg) [Urine:175 (0.1 mL/kg/hr)]  Weight: 71.7 kg     A&ox3, no distress, pleasant  MMM, no lesions  Lungs with equal air entry, no added sounds  Rrr, no m/r/g  Abd soft, nt, nd, RLQ incision c/d/i, staples intact  No allograft tenderness  No edema b/l    Results for orders placed or performed during the hospital encounter of 12/31/24 (from the past 24 hours)   Tacrolimus level   Result Value Ref Range    Tacrolimus  8.1 <=15.0 ng/mL   CBC   Result Value Ref Range    WBC 3.0 (L) 4.4 - 11.3 x10*3/uL    nRBC 0.0 0.0 - 0.0 /100 WBCs    " RBC 2.68 (L) 4.50 - 5.90 x10*6/uL    Hemoglobin 8.3 (L) 13.5 - 17.5 g/dL    Hematocrit 26.1 (L) 41.0 - 52.0 %    MCV 97 80 - 100 fL    MCH 31.0 26.0 - 34.0 pg    MCHC 31.8 (L) 32.0 - 36.0 g/dL    RDW 16.9 (H) 11.5 - 14.5 %    Platelets 174 150 - 450 x10*3/uL   Basic metabolic panel   Result Value Ref Range    Glucose 169 (H) 74 - 99 mg/dL    Sodium 134 (L) 136 - 145 mmol/L    Potassium 4.0 3.5 - 5.3 mmol/L    Chloride 95 (L) 98 - 107 mmol/L    Bicarbonate 28 21 - 32 mmol/L    Anion Gap 15 10 - 20 mmol/L    Urea Nitrogen 72 (H) 6 - 23 mg/dL    Creatinine 3.05 (H) 0.50 - 1.30 mg/dL    eGFR 21 (L) >60 mL/min/1.73m*2    Calcium 8.6 8.6 - 10.6 mg/dL   Magnesium   Result Value Ref Range    Magnesium 1.76 1.60 - 2.40 mg/dL   Phosphorus   Result Value Ref Range    Phosphorus 3.4 2.5 - 4.9 mg/dL   POCT GLUCOSE   Result Value Ref Range    POCT Glucose 215 (H) 74 - 99 mg/dL   C. difficile, PCR    Specimen: Stool   Result Value Ref Range    C. difficile, PCR Not Detected Not Detected   POCT GLUCOSE   Result Value Ref Range    POCT Glucose 228 (H) 74 - 99 mg/dL   POCT GLUCOSE   Result Value Ref Range    POCT Glucose 140 (H) 74 - 99 mg/dL   POCT GLUCOSE   Result Value Ref Range    POCT Glucose 233 (H) 74 - 99 mg/dL     *Note: Due to a large number of results and/or encounters for the requested time period, some results have not been displayed. A complete set of results can be found in Results Review.       Assessment/Plan     74 y.o. male with a hx of ESRD secondary to diabetic nephropathy whom received a  donor kidney transplant on 24 by Dr. Zamora with a KDPI of 93% and PRA of 54%. Donor was Hepc -/-and has not met risk factors. EBV + /+., CMV D-/R+. Left donor kidney transplanted to patient right pelvis. Admission weight is 72.7 kg (discharge weight is 76.4 kg ). Pt received a total of 3 mg/kg total of thymoglobulin induction therapy in conjunction with 500mg IV solumedrol.      Post-txp course notable for slow  graft function, now with DGF.     Allograft function: s/p DDKT 12/21/24  - DGF. , Cr 5.5. uremic on admission.   - s/p HD 12/31 for clearance.   - renal function parameters stable. No current indication for RRT.   - metabolic indices acceptable  - maintain adequate hydration. Avoid potential nephrotoxins  - Hb 8, below goal. start Aranesp 100mcg qweek. Monitor and transfuse as needed. Pls check iron panel, ferritin with next labs.  - Ca, phos acceptable.   - encourage PO intake     Immunosuppression:  - cont tac 1.5 mg bid. Goal FK 8-10. Last Fk 7.9. monitor rpt level.  - cont MMF 1g q12, pred 20 mg/d to be tapered per protocol  - Ppx: Bactrim, clotrimazole, Valcyte (CMV D-/R+)    Failure to thrive:  - poor oral intake, dysphagia. Upper GI ordered. Further eval per txp surgery  - continue hydration    Will follow    Beka Nichols MD

## 2025-01-05 VITALS
WEIGHT: 160.94 LBS | HEIGHT: 73 IN | RESPIRATION RATE: 18 BRPM | BODY MASS INDEX: 21.33 KG/M2 | OXYGEN SATURATION: 95 % | SYSTOLIC BLOOD PRESSURE: 137 MMHG | DIASTOLIC BLOOD PRESSURE: 59 MMHG | HEART RATE: 76 BPM | TEMPERATURE: 97.5 F

## 2025-01-05 PROBLEM — E73.0: Status: ACTIVE | Noted: 2024-12-31

## 2025-01-05 LAB
ALBUMIN SERPL BCP-MCNC: 3.1 G/DL (ref 3.4–5)
ANION GAP SERPL CALC-SCNC: 15 MMOL/L (ref 10–20)
BUN SERPL-MCNC: 33 MG/DL (ref 6–23)
CALCIUM SERPL-MCNC: 8.4 MG/DL (ref 8.6–10.6)
CHLORIDE SERPL-SCNC: 101 MMOL/L (ref 98–107)
CMV DNA SERPL NAA+PROBE-LOG IU: NORMAL {LOG_IU}/ML
CO2 SERPL-SCNC: 26 MMOL/L (ref 21–32)
CREAT SERPL-MCNC: 1.51 MG/DL (ref 0.5–1.3)
EGFRCR SERPLBLD CKD-EPI 2021: 48 ML/MIN/1.73M*2
ERYTHROCYTE [DISTWIDTH] IN BLOOD BY AUTOMATED COUNT: 16 % (ref 11.5–14.5)
GLUCOSE BLD MANUAL STRIP-MCNC: 127 MG/DL (ref 74–99)
GLUCOSE BLD MANUAL STRIP-MCNC: 165 MG/DL (ref 74–99)
GLUCOSE BLD MANUAL STRIP-MCNC: 166 MG/DL (ref 74–99)
GLUCOSE BLD MANUAL STRIP-MCNC: 176 MG/DL (ref 74–99)
GLUCOSE BLD MANUAL STRIP-MCNC: 227 MG/DL (ref 74–99)
GLUCOSE BLD MANUAL STRIP-MCNC: 240 MG/DL (ref 74–99)
GLUCOSE SERPL-MCNC: 100 MG/DL (ref 74–99)
HCT VFR BLD AUTO: 25.5 % (ref 41–52)
HGB BLD-MCNC: 8.4 G/DL (ref 13.5–17.5)
LABORATORY COMMENT REPORT: NOT DETECTED
MAGNESIUM SERPL-MCNC: 2.05 MG/DL (ref 1.6–2.4)
MCH RBC QN AUTO: 31.2 PG (ref 26–34)
MCHC RBC AUTO-ENTMCNC: 32.9 G/DL (ref 32–36)
MCV RBC AUTO: 95 FL (ref 80–100)
NRBC BLD-RTO: 0 /100 WBCS (ref 0–0)
PHOSPHATE SERPL-MCNC: 1.9 MG/DL (ref 2.5–4.9)
PLATELET # BLD AUTO: 182 X10*3/UL (ref 150–450)
POTASSIUM SERPL-SCNC: 4.2 MMOL/L (ref 3.5–5.3)
PREALB SERPL-MCNC: 12.4 MG/DL (ref 18–40)
RBC # BLD AUTO: 2.69 X10*6/UL (ref 4.5–5.9)
SODIUM SERPL-SCNC: 138 MMOL/L (ref 136–145)
WBC # BLD AUTO: 4.1 X10*3/UL (ref 4.4–11.3)

## 2025-01-05 PROCEDURE — 99232 SBSQ HOSP IP/OBS MODERATE 35: CPT | Performed by: TRANSPLANT SURGERY

## 2025-01-05 PROCEDURE — 2500000004 HC RX 250 GENERAL PHARMACY W/ HCPCS (ALT 636 FOR OP/ED): Performed by: STUDENT IN AN ORGANIZED HEALTH CARE EDUCATION/TRAINING PROGRAM

## 2025-01-05 PROCEDURE — 2500000001 HC RX 250 WO HCPCS SELF ADMINISTERED DRUGS (ALT 637 FOR MEDICARE OP)

## 2025-01-05 PROCEDURE — 83735 ASSAY OF MAGNESIUM: CPT | Performed by: STUDENT IN AN ORGANIZED HEALTH CARE EDUCATION/TRAINING PROGRAM

## 2025-01-05 PROCEDURE — 2500000004 HC RX 250 GENERAL PHARMACY W/ HCPCS (ALT 636 FOR OP/ED)

## 2025-01-05 PROCEDURE — 1100000001 HC PRIVATE ROOM DAILY

## 2025-01-05 PROCEDURE — 99233 SBSQ HOSP IP/OBS HIGH 50: CPT | Performed by: INTERNAL MEDICINE

## 2025-01-05 PROCEDURE — 80069 RENAL FUNCTION PANEL: CPT

## 2025-01-05 PROCEDURE — 36415 COLL VENOUS BLD VENIPUNCTURE: CPT | Performed by: STUDENT IN AN ORGANIZED HEALTH CARE EDUCATION/TRAINING PROGRAM

## 2025-01-05 PROCEDURE — 2500000001 HC RX 250 WO HCPCS SELF ADMINISTERED DRUGS (ALT 637 FOR MEDICARE OP): Performed by: STUDENT IN AN ORGANIZED HEALTH CARE EDUCATION/TRAINING PROGRAM

## 2025-01-05 PROCEDURE — 82947 ASSAY GLUCOSE BLOOD QUANT: CPT

## 2025-01-05 PROCEDURE — 82306 VITAMIN D 25 HYDROXY: CPT | Performed by: STUDENT IN AN ORGANIZED HEALTH CARE EDUCATION/TRAINING PROGRAM

## 2025-01-05 PROCEDURE — 85027 COMPLETE CBC AUTOMATED: CPT | Performed by: STUDENT IN AN ORGANIZED HEALTH CARE EDUCATION/TRAINING PROGRAM

## 2025-01-05 PROCEDURE — 2500000002 HC RX 250 W HCPCS SELF ADMINISTERED DRUGS (ALT 637 FOR MEDICARE OP, ALT 636 FOR OP/ED): Performed by: STUDENT IN AN ORGANIZED HEALTH CARE EDUCATION/TRAINING PROGRAM

## 2025-01-05 PROCEDURE — 84134 ASSAY OF PREALBUMIN: CPT

## 2025-01-05 PROCEDURE — 80197 ASSAY OF TACROLIMUS: CPT | Performed by: STUDENT IN AN ORGANIZED HEALTH CARE EDUCATION/TRAINING PROGRAM

## 2025-01-05 RX ADMIN — PANTOPRAZOLE SODIUM 40 MG: 40 TABLET, DELAYED RELEASE ORAL at 16:51

## 2025-01-05 RX ADMIN — OXYCODONE HYDROCHLORIDE 5 MG: 5 TABLET ORAL at 23:59

## 2025-01-05 RX ADMIN — GABAPENTIN 200 MG: 100 CAPSULE ORAL at 09:52

## 2025-01-05 RX ADMIN — VALGANCICLOVIR 450 MG: 450 TABLET, FILM COATED ORAL at 09:52

## 2025-01-05 RX ADMIN — MYCOPHENOLATE MOFETIL 1000 MG: 250 CAPSULE ORAL at 12:27

## 2025-01-05 RX ADMIN — OXYCODONE HYDROCHLORIDE 5 MG: 5 TABLET ORAL at 16:51

## 2025-01-05 RX ADMIN — TACROLIMUS 2 MG: 1 CAPSULE ORAL at 12:27

## 2025-01-05 RX ADMIN — PANTOPRAZOLE SODIUM 40 MG: 40 TABLET, DELAYED RELEASE ORAL at 06:32

## 2025-01-05 RX ADMIN — AMLODIPINE BESYLATE 10 MG: 10 TABLET ORAL at 09:52

## 2025-01-05 RX ADMIN — NYSTATIN 500000 UNITS: 500000 SUSPENSION ORAL at 12:27

## 2025-01-05 RX ADMIN — INSULIN LISPRO 4 UNITS: 100 INJECTION, SOLUTION INTRAVENOUS; SUBCUTANEOUS at 11:22

## 2025-01-05 RX ADMIN — NYSTATIN 500000 UNITS: 500000 SUSPENSION ORAL at 06:32

## 2025-01-05 RX ADMIN — NYSTATIN 500000 UNITS: 500000 SUSPENSION ORAL at 20:14

## 2025-01-05 RX ADMIN — MYCOPHENOLATE MOFETIL 1000 MG: 250 CAPSULE ORAL at 18:38

## 2025-01-05 RX ADMIN — SULFAMETHOXAZOLE AND TRIMETHOPRIM 1 TABLET: 400; 80 TABLET ORAL at 09:52

## 2025-01-05 RX ADMIN — INSULIN LISPRO 2 UNITS: 100 INJECTION, SOLUTION INTRAVENOUS; SUBCUTANEOUS at 11:22

## 2025-01-05 RX ADMIN — ROSUVASTATIN CALCIUM 10 MG: 10 TABLET, FILM COATED ORAL at 20:14

## 2025-01-05 RX ADMIN — INSULIN LISPRO 4 UNITS: 100 INJECTION, SOLUTION INTRAVENOUS; SUBCUTANEOUS at 18:38

## 2025-01-05 RX ADMIN — CALCIUM CARBONATE (ANTACID) CHEW TAB 500 MG 500 MG: 500 CHEW TAB at 16:51

## 2025-01-05 RX ADMIN — SODIUM CHLORIDE, POTASSIUM CHLORIDE, SODIUM LACTATE AND CALCIUM CHLORIDE 100 ML/HR: 600; 310; 30; 20 INJECTION, SOLUTION INTRAVENOUS at 06:35

## 2025-01-05 RX ADMIN — NYSTATIN 500000 UNITS: 500000 SUSPENSION ORAL at 18:38

## 2025-01-05 RX ADMIN — PREDNISONE 20 MG: 20 TABLET ORAL at 09:52

## 2025-01-05 RX ADMIN — TACROLIMUS 2 MG: 1 CAPSULE ORAL at 18:38

## 2025-01-05 RX ADMIN — CALCIUM CARBONATE (ANTACID) CHEW TAB 500 MG 500 MG: 500 CHEW TAB at 06:32

## 2025-01-05 RX ADMIN — INSULIN GLARGINE 8 UNITS: 100 INJECTION, SOLUTION SUBCUTANEOUS at 20:32

## 2025-01-05 ASSESSMENT — PAIN DESCRIPTION - LOCATION: LOCATION: ABDOMEN

## 2025-01-05 ASSESSMENT — COGNITIVE AND FUNCTIONAL STATUS - GENERAL
STANDING UP FROM CHAIR USING ARMS: A LITTLE
MOVING TO AND FROM BED TO CHAIR: A LOT
MOBILITY SCORE: 17
DRESSING REGULAR LOWER BODY CLOTHING: A LITTLE
CLIMB 3 TO 5 STEPS WITH RAILING: A LOT
HELP NEEDED FOR BATHING: A LITTLE
TURNING FROM BACK TO SIDE WHILE IN FLAT BAD: A LITTLE
DRESSING REGULAR UPPER BODY CLOTHING: A LITTLE
DAILY ACTIVITIY SCORE: 21
WALKING IN HOSPITAL ROOM: A LITTLE

## 2025-01-05 ASSESSMENT — PAIN SCALES - GENERAL
PAINLEVEL_OUTOF10: 0 - NO PAIN
PAINLEVEL_OUTOF10: 7
PAINLEVEL_OUTOF10: 0 - NO PAIN
PAINLEVEL_OUTOF10: 7

## 2025-01-05 ASSESSMENT — PAIN - FUNCTIONAL ASSESSMENT
PAIN_FUNCTIONAL_ASSESSMENT: 0-10
PAIN_FUNCTIONAL_ASSESSMENT: 0-10

## 2025-01-05 NOTE — CARE PLAN
The patient's goals for the shift include get better    The clinical goals for the shift include Pain control    Over the shift, the patient did make progress toward the following goals.

## 2025-01-05 NOTE — CARE PLAN
The patient's goals for the shift include get better    The clinical goals for the shift include patient will drink 2 pitchers of water by end of shift      Problem: Pain  Goal: Takes deep breaths with improved pain control throughout the shift  Outcome: Progressing

## 2025-01-05 NOTE — PROGRESS NOTES
"Temo Hanson is a 74 y.o. male on day 4 of admission presenting with Dehydration.    Po intake remains poor.   Upper GI with dilated esophagus, achalasia.   Gen surgery consulted for possible EGD and dilation. GI consulted for possible PEG.     Scheduled medications  amLODIPine, 10 mg, oral, Daily  calcium carbonate, 500 mg, oral, BID AC  gabapentin, 200 mg, oral, Daily  insulin glargine, 8 Units, subcutaneous, Nightly  insulin lispro, 0-10 Units, subcutaneous, TID AC  insulin lispro, 4 Units, subcutaneous, TID AC  lidocaine, 1 patch, transdermal, q24h  mycophenolate, 1,000 mg, oral, q12h  nystatin, 5 mL, Swish & Swallow, 4x daily  pantoprazole, 40 mg, oral, BID AC  predniSONE, 20 mg, oral, Daily  rosuvastatin, 10 mg, oral, Nightly  sulfamethoxazole-trimethoprim, 1 tablet, oral, Daily  tacrolimus, 2 mg, oral, q12h  valGANciclovir, 450 mg, oral, q48h      Continuous medications  lactated Ringer's, 100 mL/hr, Last Rate: 100 mL/hr (01/04/25 2041)      PRN medications  PRN medications: carboxymethylcellulose, dextrose, dextrose, docusate sodium, glucagon, glucagon, ondansetron ODT **OR** ondansetron, oxyCODONE, oxyCODONE, polyethylene glycol    Last Recorded Vitals  Blood pressure 152/57, pulse 79, temperature 36.9 °C (98.4 °F), temperature source Temporal, resp. rate 18, height 1.854 m (6' 1\"), weight 73.9 kg (162 lb 14.7 oz), SpO2 90%.  Intake/Output last 3 Shifts:  I/O last 3 completed shifts:  In: 999.2 (13.5 mL/kg) [P.O.:360; I.V.:639.2 (8.6 mL/kg)]  Out: 400 (5.4 mL/kg) [Urine:400 (0.2 mL/kg/hr)]  Weight: 73.9 kg     A&ox3, no distress, pleasant  MMM, no lesions  Lungs with equal air entry, no added sounds  Rrr, no m/r/g  Abd soft, nt, nd, RLQ incision c/d/i, staples intact  No allograft tenderness  No edema b/l    Results for orders placed or performed during the hospital encounter of 12/31/24 (from the past 24 hours)   Stool Pathogen Panel, PCR    Specimen: Stool   Result Value Ref Range    Campylobacter Group " Not Detected Not Detected    Salmonella species Not Detected Not Detected    Shigella species Not Detected Not Detected    Vibrio Group Not Detected Not Detected    Yersinia Enterocolitica Not Detected Not Detected    Shiga Toxin 1 Not Detected Not Detected    Shiga Toxin 2 Not Detected Not Detected    Norovirus GI/GII Not Detected Not Detected    Rotavirus A Not Detected Not Detected   CBC   Result Value Ref Range    WBC 4.3 (L) 4.4 - 11.3 x10*3/uL    nRBC 0.0 0.0 - 0.0 /100 WBCs    RBC 2.45 (L) 4.50 - 5.90 x10*6/uL    Hemoglobin 7.8 (L) 13.5 - 17.5 g/dL    Hematocrit 23.8 (L) 41.0 - 52.0 %    MCV 97 80 - 100 fL    MCH 31.8 26.0 - 34.0 pg    MCHC 32.8 32.0 - 36.0 g/dL    RDW 16.3 (H) 11.5 - 14.5 %    Platelets 174 150 - 450 x10*3/uL   Basic metabolic panel   Result Value Ref Range    Glucose 228 (H) 74 - 99 mg/dL    Sodium 134 (L) 136 - 145 mmol/L    Potassium 3.9 3.5 - 5.3 mmol/L    Chloride 96 (L) 98 - 107 mmol/L    Bicarbonate 27 21 - 32 mmol/L    Anion Gap 15 10 - 20 mmol/L    Urea Nitrogen 61 (H) 6 - 23 mg/dL    Creatinine 2.41 (H) 0.50 - 1.30 mg/dL    eGFR 27 (L) >60 mL/min/1.73m*2    Calcium 8.3 (L) 8.6 - 10.6 mg/dL   Magnesium   Result Value Ref Range    Magnesium 1.63 1.60 - 2.40 mg/dL   Phosphorus   Result Value Ref Range    Phosphorus 2.6 2.5 - 4.9 mg/dL   Tacrolimus level   Result Value Ref Range    Tacrolimus  8.5 <=15.0 ng/mL   Albumin   Result Value Ref Range    Albumin 2.9 (L) 3.4 - 5.0 g/dL   POCT GLUCOSE   Result Value Ref Range    POCT Glucose 209 (H) 74 - 99 mg/dL   POCT GLUCOSE   Result Value Ref Range    POCT Glucose 97 74 - 99 mg/dL   POCT GLUCOSE   Result Value Ref Range    POCT Glucose 252 (H) 74 - 99 mg/dL   POCT GLUCOSE   Result Value Ref Range    POCT Glucose 210 (H) 74 - 99 mg/dL       Assessment/Plan     74 y.o. male with a hx of ESRD secondary to diabetic nephropathy whom received a  donor kidney transplant on 24 by Dr. Zamora with a KDPI of 93% and PRA of 54%. Donor was  Hepc -/-and has not met risk factors. EBV + /+., CMV D-/R+. Left donor kidney transplanted to patient right pelvis. Admission weight is 72.7 kg (discharge weight is 76.4 kg ). Pt received a total of 3 mg/kg total of thymoglobulin induction therapy in conjunction with 500mg IV solumedrol.      Post-txp course notable for slow graft function, now with DGF.     Allograft function: s/p DDKT 12/21/24  - DGF. , Cr 5.5. uremic on admission. s/p HD 12/31 for clearance.   - renal function parameters improving since admission. Cr down to 2.4.   - metabolic indices acceptable  - maintain adequate hydration. Avoid potential nephrotoxins  - Hb 7.8, below goal. start Aranesp 100mcg qweek. Monitor and transfuse as needed. Pls check iron panel, ferritin with next labs.  - corrected Ca, phos acceptable.      Immunosuppression:  - cont tac 1.5 mg bid. Goal FK 8-10. Last Fk 8.5.  - cont MMF 1g q12, pred 20 mg/d to be tapered per protocol  - Ppx: Bactrim, clotrimazole, Valcyte (CMV D-/R+)    Failure to thrive:  - poor oral intake, dysphagia. Upper GI with achalasia.   - gen surgery plans for EGD and dilatation Monday.   - GI consulted for possible PEG tube insertion  - ensure adequate hydration    Will follow    Beka Nichols MD

## 2025-01-05 NOTE — PROGRESS NOTES
"Temo Hanson is a 74 y.o. male on day 5 of admission presenting with Dehydration.    Po intake remains suboptimal.   Diarrhea improved. C diff neg.   Lab holiday today.  Scheduled for EGD home AM.     Scheduled medications  amLODIPine, 10 mg, oral, Daily  calcium carbonate, 500 mg, oral, BID AC  gabapentin, 200 mg, oral, Daily  insulin glargine, 8 Units, subcutaneous, Nightly  insulin lispro, 0-10 Units, subcutaneous, TID AC  insulin lispro, 4 Units, subcutaneous, TID AC  lidocaine, 1 patch, transdermal, q24h  mycophenolate, 1,000 mg, oral, q12h  nystatin, 5 mL, Swish & Swallow, 4x daily  pantoprazole, 40 mg, oral, BID AC  predniSONE, 20 mg, oral, Daily  rosuvastatin, 10 mg, oral, Nightly  sulfamethoxazole-trimethoprim, 1 tablet, oral, Daily  tacrolimus, 2 mg, oral, q12h  valGANciclovir, 450 mg, oral, q48h      Continuous medications       PRN medications  PRN medications: carboxymethylcellulose, dextrose, dextrose, docusate sodium, glucagon, glucagon, ondansetron ODT **OR** ondansetron, oxyCODONE, oxyCODONE, polyethylene glycol    Last Recorded Vitals  Blood pressure 136/71, pulse 93, temperature 37.2 °C (99 °F), temperature source Temporal, resp. rate 18, height 1.854 m (6' 1\"), weight 73 kg (160 lb 15 oz), SpO2 98%.  Intake/Output last 3 Shifts:  I/O last 3 completed shifts:  In: 2577.9 (35.3 mL/kg) [I.V.:2577.9 (35.3 mL/kg)]  Out: 850 (11.6 mL/kg) [Urine:850 (0.3 mL/kg/hr)]  Weight: 73 kg     A&ox3, no distress, pleasant  MMM, no lesions  Lungs with equal air entry, no added sounds  Rrr, no m/r/g  Abd soft, nt, nd, RLQ incision c/d/i, staples intact  No allograft tenderness  No edema b/l    Results for orders placed or performed during the hospital encounter of 12/31/24 (from the past 24 hours)   POCT GLUCOSE   Result Value Ref Range    POCT Glucose 210 (H) 74 - 99 mg/dL   POCT GLUCOSE   Result Value Ref Range    POCT Glucose 227 (H) 74 - 99 mg/dL   POCT GLUCOSE   Result Value Ref Range    POCT Glucose 166 (H) " 74 - 99 mg/dL   POCT GLUCOSE   Result Value Ref Range    POCT Glucose 176 (H) 74 - 99 mg/dL   POCT GLUCOSE   Result Value Ref Range    POCT Glucose 165 (H) 74 - 99 mg/dL   CBC   Result Value Ref Range    WBC 4.1 (L) 4.4 - 11.3 x10*3/uL    nRBC 0.0 0.0 - 0.0 /100 WBCs    RBC 2.69 (L) 4.50 - 5.90 x10*6/uL    Hemoglobin 8.4 (L) 13.5 - 17.5 g/dL    Hematocrit 25.5 (L) 41.0 - 52.0 %    MCV 95 80 - 100 fL    MCH 31.2 26.0 - 34.0 pg    MCHC 32.9 32.0 - 36.0 g/dL    RDW 16.0 (H) 11.5 - 14.5 %    Platelets 182 150 - 450 x10*3/uL   Magnesium   Result Value Ref Range    Magnesium 2.05 1.60 - 2.40 mg/dL   Renal Function Panel   Result Value Ref Range    Glucose 100 (H) 74 - 99 mg/dL    Sodium 138 136 - 145 mmol/L    Potassium 4.2 3.5 - 5.3 mmol/L    Chloride 101 98 - 107 mmol/L    Bicarbonate 26 21 - 32 mmol/L    Anion Gap 15 10 - 20 mmol/L    Urea Nitrogen 33 (H) 6 - 23 mg/dL    Creatinine 1.51 (H) 0.50 - 1.30 mg/dL    eGFR 48 (L) >60 mL/min/1.73m*2    Calcium 8.4 (L) 8.6 - 10.6 mg/dL    Phosphorus 1.9 (L) 2.5 - 4.9 mg/dL    Albumin 3.1 (L) 3.4 - 5.0 g/dL   Prealbumin   Result Value Ref Range    Prealbumin 12.4 (L) 18.0 - 40.0 mg/dL   POCT GLUCOSE   Result Value Ref Range    POCT Glucose 127 (H) 74 - 99 mg/dL       Assessment/Plan     74 y.o. male with a hx of ESRD secondary to diabetic nephropathy whom received a  donor kidney transplant on 24 by Dr. Zamora with a KDPI of 93% and PRA of 54%. Donor was Hepc -/-and has not met risk factors. EBV + /+., CMV D-/R+. Left donor kidney transplanted to patient right pelvis. Admission weight is 72.7 kg (discharge weight is 76.4 kg ). Pt received a total of 3 mg/kg total of thymoglobulin induction therapy in conjunction with 500mg IV solumedrol.      Post-txp course notable for slow graft function, now with DGF.     Allograft function: s/p DDKT 24  - DGF. , Cr 5.5,uremic on admission. s/p HD  for clearance.   - renal function parameters improving since  admission. Cr down to 2.4 on las labs.    - metabolic indices acceptable  - maintain adequate hydration. Avoid potential nephrotoxins  - Hb 8.4, below goal. start Aranesp 100mcg qweek. Monitor and transfuse as needed. Pls check iron panel, ferritin with next labs.  - corrected Ca, phos acceptable.      Immunosuppression:  - cont tac 1.5 mg bid. Goal FK 8-10. Last Fk 8.5.  - cont MMF 1g q12, pred 20 mg/d to be tapered per protocol  - Ppx: Bactrim, nystatin, Valcyte (CMV D-/R+)    Failure to thrive:  - poor oral intake, dysphagia. Upper GI with achalasia.   - gen surgery plans for EGD and dilatation Monday.   - ensure adequate hydration    Will follow    Beka Nichols MD

## 2025-01-05 NOTE — NURSING NOTE
Nurse and phlebotomist tried to draw labs. Attempted x 5. Unsuccessfully. Notified transplant team. Transplant medication on hold until next draw at 10 am. Safety measures maintained. Call light in reach.

## 2025-01-05 NOTE — CARE PLAN
The patient's goals for the shift include get better    The clinical goals for the shift include Pain control    Over the shift, the patient did make progress toward the following goals.    Problem: Pain  Goal: Takes deep breaths with improved pain control throughout the shift  Reactivated  Goal: Turns in bed with improved pain control throughout the shift  Reactivated  Goal: Walks with improved pain control throughout the shift  Reactivated  Goal: Performs ADL's with improved pain control throughout shift  Reactivated  Goal: Participates in PT with improved pain control throughout the shift  Reactivated  Goal: Free from opioid side effects throughout the shift  Reactivated  Goal: Free from acute confusion related to pain meds throughout the shift  Reactivated

## 2025-01-06 LAB
25(OH)D3 SERPL-MCNC: 34 NG/ML (ref 30–100)
ALBUMIN SERPL BCP-MCNC: 2.5 G/DL (ref 3.4–5)
ANION GAP SERPL CALC-SCNC: 12 MMOL/L (ref 10–20)
BUN SERPL-MCNC: 27 MG/DL (ref 6–23)
CALCIUM SERPL-MCNC: 8 MG/DL (ref 8.6–10.6)
CHLORIDE SERPL-SCNC: 105 MMOL/L (ref 98–107)
CO2 SERPL-SCNC: 24 MMOL/L (ref 21–32)
CREAT SERPL-MCNC: 1.4 MG/DL (ref 0.5–1.3)
EGFRCR SERPLBLD CKD-EPI 2021: 53 ML/MIN/1.73M*2
ERYTHROCYTE [DISTWIDTH] IN BLOOD BY AUTOMATED COUNT: 15.9 % (ref 11.5–14.5)
GLUCOSE BLD MANUAL STRIP-MCNC: 135 MG/DL (ref 74–99)
GLUCOSE BLD MANUAL STRIP-MCNC: 182 MG/DL (ref 74–99)
GLUCOSE BLD MANUAL STRIP-MCNC: 197 MG/DL (ref 74–99)
GLUCOSE BLD MANUAL STRIP-MCNC: 291 MG/DL (ref 74–99)
GLUCOSE SERPL-MCNC: 142 MG/DL (ref 74–99)
HCT VFR BLD AUTO: 27.3 % (ref 41–52)
HGB BLD-MCNC: 8.6 G/DL (ref 13.5–17.5)
MAGNESIUM SERPL-MCNC: 1.86 MG/DL (ref 1.6–2.4)
MCH RBC QN AUTO: 31.3 PG (ref 26–34)
MCHC RBC AUTO-ENTMCNC: 31.5 G/DL (ref 32–36)
MCV RBC AUTO: 99 FL (ref 80–100)
NRBC BLD-RTO: 0 /100 WBCS (ref 0–0)
PHOSPHATE SERPL-MCNC: 2.2 MG/DL (ref 2.5–4.9)
PLATELET # BLD AUTO: 180 X10*3/UL (ref 150–450)
POTASSIUM SERPL-SCNC: 4.5 MMOL/L (ref 3.5–5.3)
RBC # BLD AUTO: 2.75 X10*6/UL (ref 4.5–5.9)
SODIUM SERPL-SCNC: 136 MMOL/L (ref 136–145)
TACROLIMUS BLD-MCNC: 7.5 NG/ML
TACROLIMUS BLD-MCNC: 9.4 NG/ML
WBC # BLD AUTO: 3.8 X10*3/UL (ref 4.4–11.3)

## 2025-01-06 PROCEDURE — 2500000004 HC RX 250 GENERAL PHARMACY W/ HCPCS (ALT 636 FOR OP/ED): Performed by: STUDENT IN AN ORGANIZED HEALTH CARE EDUCATION/TRAINING PROGRAM

## 2025-01-06 PROCEDURE — 36415 COLL VENOUS BLD VENIPUNCTURE: CPT | Performed by: STUDENT IN AN ORGANIZED HEALTH CARE EDUCATION/TRAINING PROGRAM

## 2025-01-06 PROCEDURE — 85027 COMPLETE CBC AUTOMATED: CPT | Performed by: STUDENT IN AN ORGANIZED HEALTH CARE EDUCATION/TRAINING PROGRAM

## 2025-01-06 PROCEDURE — 2500000001 HC RX 250 WO HCPCS SELF ADMINISTERED DRUGS (ALT 637 FOR MEDICARE OP)

## 2025-01-06 PROCEDURE — 82947 ASSAY GLUCOSE BLOOD QUANT: CPT

## 2025-01-06 PROCEDURE — 2500000004 HC RX 250 GENERAL PHARMACY W/ HCPCS (ALT 636 FOR OP/ED)

## 2025-01-06 PROCEDURE — 2500000002 HC RX 250 W HCPCS SELF ADMINISTERED DRUGS (ALT 637 FOR MEDICARE OP, ALT 636 FOR OP/ED): Performed by: STUDENT IN AN ORGANIZED HEALTH CARE EDUCATION/TRAINING PROGRAM

## 2025-01-06 PROCEDURE — 83735 ASSAY OF MAGNESIUM: CPT | Performed by: STUDENT IN AN ORGANIZED HEALTH CARE EDUCATION/TRAINING PROGRAM

## 2025-01-06 PROCEDURE — 2500000001 HC RX 250 WO HCPCS SELF ADMINISTERED DRUGS (ALT 637 FOR MEDICARE OP): Performed by: STUDENT IN AN ORGANIZED HEALTH CARE EDUCATION/TRAINING PROGRAM

## 2025-01-06 PROCEDURE — 1100000001 HC PRIVATE ROOM DAILY

## 2025-01-06 PROCEDURE — 80069 RENAL FUNCTION PANEL: CPT

## 2025-01-06 PROCEDURE — 99232 SBSQ HOSP IP/OBS MODERATE 35: CPT | Performed by: TRANSPLANT SURGERY

## 2025-01-06 PROCEDURE — 2500000002 HC RX 250 W HCPCS SELF ADMINISTERED DRUGS (ALT 637 FOR MEDICARE OP, ALT 636 FOR OP/ED)

## 2025-01-06 PROCEDURE — 80197 ASSAY OF TACROLIMUS: CPT | Performed by: STUDENT IN AN ORGANIZED HEALTH CARE EDUCATION/TRAINING PROGRAM

## 2025-01-06 PROCEDURE — 99233 SBSQ HOSP IP/OBS HIGH 50: CPT | Performed by: STUDENT IN AN ORGANIZED HEALTH CARE EDUCATION/TRAINING PROGRAM

## 2025-01-06 RX ORDER — MAGNESIUM SULFATE HEPTAHYDRATE 40 MG/ML
2 INJECTION, SOLUTION INTRAVENOUS ONCE
Status: COMPLETED | OUTPATIENT
Start: 2025-01-06 | End: 2025-01-06

## 2025-01-06 RX ORDER — HEPARIN SODIUM 5000 [USP'U]/ML
5000 INJECTION, SOLUTION INTRAVENOUS; SUBCUTANEOUS EVERY 8 HOURS
Status: DISCONTINUED | OUTPATIENT
Start: 2025-01-06 | End: 2025-01-23 | Stop reason: HOSPADM

## 2025-01-06 RX ORDER — THIAMINE HYDROCHLORIDE 100 MG/ML
500 INJECTION, SOLUTION INTRAMUSCULAR; INTRAVENOUS DAILY
Status: COMPLETED | OUTPATIENT
Start: 2025-01-06 | End: 2025-01-08

## 2025-01-06 RX ORDER — SODIUM CHLORIDE, SODIUM LACTATE, POTASSIUM CHLORIDE, CALCIUM CHLORIDE 600; 310; 30; 20 MG/100ML; MG/100ML; MG/100ML; MG/100ML
75 INJECTION, SOLUTION INTRAVENOUS CONTINUOUS
Status: ACTIVE | OUTPATIENT
Start: 2025-01-06 | End: 2025-01-07

## 2025-01-06 RX ORDER — LIDOCAINE HYDROCHLORIDE 20 MG/ML
1 JELLY TOPICAL ONCE
Status: DISCONTINUED | OUTPATIENT
Start: 2025-01-06 | End: 2025-01-13

## 2025-01-06 RX ORDER — VALGANCICLOVIR 450 MG/1
450 TABLET, FILM COATED ORAL EVERY 24 HOURS
Status: DISCONTINUED | OUTPATIENT
Start: 2025-01-06 | End: 2025-01-07

## 2025-01-06 RX ADMIN — HEPARIN SODIUM 5000 UNITS: 5000 INJECTION INTRAVENOUS; SUBCUTANEOUS at 17:09

## 2025-01-06 RX ADMIN — PANTOPRAZOLE SODIUM 40 MG: 40 TABLET, DELAYED RELEASE ORAL at 09:35

## 2025-01-06 RX ADMIN — INSULIN LISPRO 4 UNITS: 100 INJECTION, SOLUTION INTRAVENOUS; SUBCUTANEOUS at 17:10

## 2025-01-06 RX ADMIN — INSULIN GLARGINE 8 UNITS: 100 INJECTION, SOLUTION SUBCUTANEOUS at 23:56

## 2025-01-06 RX ADMIN — OXYCODONE HYDROCHLORIDE 2.5 MG: 5 TABLET ORAL at 17:25

## 2025-01-06 RX ADMIN — PANTOPRAZOLE SODIUM 40 MG: 40 TABLET, DELAYED RELEASE ORAL at 17:08

## 2025-01-06 RX ADMIN — ROSUVASTATIN CALCIUM 10 MG: 10 TABLET, FILM COATED ORAL at 23:51

## 2025-01-06 RX ADMIN — TACROLIMUS 2 MG: 1 CAPSULE ORAL at 06:02

## 2025-01-06 RX ADMIN — MYCOPHENOLATE MOFETIL 1000 MG: 250 CAPSULE ORAL at 18:32

## 2025-01-06 RX ADMIN — CALCIUM CARBONATE (ANTACID) CHEW TAB 500 MG 500 MG: 500 CHEW TAB at 17:08

## 2025-01-06 RX ADMIN — PREDNISONE 20 MG: 20 TABLET ORAL at 09:34

## 2025-01-06 RX ADMIN — NYSTATIN 500000 UNITS: 500000 SUSPENSION ORAL at 17:10

## 2025-01-06 RX ADMIN — NYSTATIN 500000 UNITS: 500000 SUSPENSION ORAL at 14:14

## 2025-01-06 RX ADMIN — OXYCODONE HYDROCHLORIDE 5 MG: 5 TABLET ORAL at 23:52

## 2025-01-06 RX ADMIN — MYCOPHENOLATE MOFETIL 1000 MG: 250 CAPSULE ORAL at 06:02

## 2025-01-06 RX ADMIN — AMLODIPINE BESYLATE 10 MG: 10 TABLET ORAL at 09:34

## 2025-01-06 RX ADMIN — MAGNESIUM SULFATE HEPTAHYDRATE 2 G: 40 INJECTION, SOLUTION INTRAVENOUS at 12:16

## 2025-01-06 RX ADMIN — THIAMINE HYDROCHLORIDE 500 MG: 100 INJECTION, SOLUTION INTRAMUSCULAR; INTRAVENOUS at 12:17

## 2025-01-06 RX ADMIN — NYSTATIN 500000 UNITS: 500000 SUSPENSION ORAL at 23:52

## 2025-01-06 RX ADMIN — GABAPENTIN 200 MG: 100 CAPSULE ORAL at 09:34

## 2025-01-06 RX ADMIN — SULFAMETHOXAZOLE AND TRIMETHOPRIM 1 TABLET: 400; 80 TABLET ORAL at 09:34

## 2025-01-06 RX ADMIN — SODIUM CHLORIDE, POTASSIUM CHLORIDE, SODIUM LACTATE AND CALCIUM CHLORIDE 75 ML/HR: 600; 310; 30; 20 INJECTION, SOLUTION INTRAVENOUS at 09:35

## 2025-01-06 RX ADMIN — VALGANCICLOVIR 450 MG: 450 TABLET, FILM COATED ORAL at 17:08

## 2025-01-06 RX ADMIN — TACROLIMUS 2.5 MG: 1 CAPSULE ORAL at 18:32

## 2025-01-06 RX ADMIN — INSULIN LISPRO 6 UNITS: 100 INJECTION, SOLUTION INTRAVENOUS; SUBCUTANEOUS at 17:13

## 2025-01-06 ASSESSMENT — PAIN - FUNCTIONAL ASSESSMENT
PAIN_FUNCTIONAL_ASSESSMENT: 0-10

## 2025-01-06 ASSESSMENT — COGNITIVE AND FUNCTIONAL STATUS - GENERAL
MOBILITY SCORE: 18
CLIMB 3 TO 5 STEPS WITH RAILING: A LOT
MOVING TO AND FROM BED TO CHAIR: A LITTLE
DAILY ACTIVITIY SCORE: 24
CLIMB 3 TO 5 STEPS WITH RAILING: A LOT
STANDING UP FROM CHAIR USING ARMS: A LITTLE
WALKING IN HOSPITAL ROOM: A LOT
MOBILITY SCORE: 18
MOBILITY SCORE: 18
DAILY ACTIVITIY SCORE: 24
MOVING TO AND FROM BED TO CHAIR: A LITTLE
DAILY ACTIVITIY SCORE: 24
CLIMB 3 TO 5 STEPS WITH RAILING: A LOT
STANDING UP FROM CHAIR USING ARMS: A LITTLE
MOVING TO AND FROM BED TO CHAIR: A LITTLE
STANDING UP FROM CHAIR USING ARMS: A LITTLE

## 2025-01-06 ASSESSMENT — PAIN SCALES - GENERAL
PAINLEVEL_OUTOF10: 5 - MODERATE PAIN
PAINLEVEL_OUTOF10: 7
PAINLEVEL_OUTOF10: 5 - MODERATE PAIN

## 2025-01-06 ASSESSMENT — PAIN DESCRIPTION - DESCRIPTORS: DESCRIPTORS: SHARP

## 2025-01-06 NOTE — PROGRESS NOTES
"Physical Therapy                 Therapy Communication Note    Patient Name: Temo Hanson  MRN: [id number]  Department: [place] [address]  Room: [facility]  Today's Date: 1/6/2025     Discipline: Physical Therapy    PT Missed Visit: Yes     Missed Visit Reason: Missed Visit Reason: Patient refused (Pt seated EOB upon arrival and declined participation. Pt reported \"tired\", no c/o pain. RN notiified.)    Missed Time: 12:52 PM      "

## 2025-01-06 NOTE — CONSULTS
"Nutrition Follow Up Assessment:   Nutrition Assessment       Pt continues to eat 0% of meals. Planned for PEG placement today. Pt is refeeding risk d/t prolonged inadequate intake.     Anthropometrics:  Height: 185.4 cm (6' 1\")   Weight: 74.2 kg (163 lb 9.3 oz)   BMI (Calculated): 21.59  IBW/kg (Dietitian Calculated): 83.6 kg  Percent of IBW: 85 %       Weight History:   Date/Time Weight   01/06/25 0500 74.2 kg (163 lb 9.3 oz)   01/05/25 0500 73 kg (160 lb 15 oz)   01/04/25 0500 73.9 kg (162 lb 14.7 oz)   01/03/25 0618 71.7 kg (158 lb 1.1 oz)   01/02/25 0302 70.7 kg (155 lb 13.8 oz)     Weight Change %:  Weight History / % Weight Change: Weight is up 3.5kg since admission. Assume fluid related.    Nutrition Focused Physical Exam Findings:  Edema:  Edema:  (none noted in EMR)  Physical Findings:  Skin: Positive (pressure injury to sacrum and mid buttock)    Nutrition Significant Labs:  CBC Trend:   Results from last 7 days   Lab Units 01/05/25  1505 01/04/25  0550 01/03/25  0604 01/02/25  0531   WBC AUTO x10*3/uL 4.1* 4.3* 3.0* 3.7*   RBC AUTO x10*6/uL 2.69* 2.45* 2.68* 2.61*   HEMOGLOBIN g/dL 8.4* 7.8* 8.3* 8.0*   HEMATOCRIT % 25.5* 23.8* 26.1* 25.6*   MCV fL 95 97 97 98   PLATELETS AUTO x10*3/uL 182 174 174 118*    , BMP Trend:   Results from last 7 days   Lab Units 01/06/25  0542 01/05/25  1505 01/04/25  0550 01/03/25  0604   GLUCOSE mg/dL 142* 100* 228* 169*   CALCIUM mg/dL 8.0* 8.4* 8.3* 8.6   SODIUM mmol/L 136 138 134* 134*   POTASSIUM mmol/L 4.5 4.2 3.9 4.0   CO2 mmol/L 24 26 27 28   CHLORIDE mmol/L 105 101 96* 95*   BUN mg/dL 27* 33* 61* 72*   CREATININE mg/dL 1.40* 1.51* 2.41* 3.05*    , Renal Lab Trend:   Results from last 7 days   Lab Units 01/06/25  0542 01/05/25  1505 01/04/25  0550 01/03/25  0604   POTASSIUM mmol/L 4.5 4.2 3.9 4.0   PHOSPHORUS mg/dL 2.2* 1.9* 2.6 3.4   SODIUM mmol/L 136 138 134* 134*   MAGNESIUM mg/dL 1.86 2.05 1.63 1.76   EGFR mL/min/1.73m*2 53* 48* 27* 21*   BUN mg/dL 27* 33* 61* 72* " "  CREATININE mg/dL 1.40* 1.51* 2.41* 3.05*    , Vit D: No results found for: \"VITD25\" , Vit B12: No results found for: \"SGUOGNKM70\"     Nutrition Specific Medications:  Scheduled medications  amLODIPine, 10 mg, oral, Daily  calcium carbonate, 500 mg, oral, BID AC  gabapentin, 200 mg, oral, Daily  insulin glargine, 8 Units, subcutaneous, Nightly  insulin lispro, 0-10 Units, subcutaneous, TID AC  insulin lispro, 4 Units, subcutaneous, TID AC  lidocaine, 1 patch, transdermal, q24h  mycophenolate, 1,000 mg, oral, q12h  nystatin, 5 mL, Swish & Swallow, 4x daily  pantoprazole, 40 mg, oral, BID AC  predniSONE, 20 mg, oral, Daily  rosuvastatin, 10 mg, oral, Nightly  sulfamethoxazole-trimethoprim, 1 tablet, oral, Daily  tacrolimus, 2 mg, oral, q12h  valGANciclovir, 450 mg, oral, q48h      Continuous medications  lactated Ringer's, 75 mL/hr      PRN medications  PRN medications: carboxymethylcellulose, dextrose, dextrose, docusate sodium, glucagon, glucagon, ondansetron ODT **OR** ondansetron, oxyCODONE, oxyCODONE, polyethylene glycol      I/O:   Last BM Date: 01/05/25; Stool Appearance: Liquid (01/03/25 1700)    Dietary Orders (From admission, onward)       Start     Ordered    01/06/25 0001  NPO Diet; Effective midnight  Diet effective midnight         01/05/25 1026    01/02/25 1248  Oral nutritional supplements  Until discontinued        Question Answer Comment   Deliver with All meals    Select supplement: Magic Cup        01/02/25 1247    01/02/25 1247  Oral nutritional supplements  Until discontinued        Question Answer Comment   Deliver with All meals    Select supplement: Glucerna Shake        01/02/25 1247    12/31/24 1246  May Participate in Room Service With Assistance  ( ROOM SERVICE MAY PARTICIPATE WITH ASSISTANCE)  Once        Question:  .  Answer:  Yes    12/31/24 1245                     Estimated Needs:   Total Energy Estimated Needs (kCal): 2120 kCal  Method for Estimating Needs: 30kcal/kg CBW  Total " Protein Estimated Needs (g): 85 g  Method for Estimating Needs: 1.2g/kg CBW +  Total Fluid Estimated Needs (mL):  (per MD/team)  Method for Estimating Needs: per MD/team        Nutrition Diagnosis   Malnutrition Diagnosis  Patient has Malnutrition Diagnosis: Yes  Diagnosis Status: Ongoing  Malnutrition Diagnosis: Severe malnutrition related to acute disease or injury  As Evidenced by: < 50% estimated energy needs for > 5 days, moderate fat loss + muscle wasting,  Additional Assessment Information: ongoing poor po intake, planned for PEG            Nutrition Interventions/Recommendations         Nutrition Prescription:  Individualized Nutrition Prescription Provided for : enteral nutrition        Nutrition Interventions:    Enteral intake, Vitamin supplement therapy, Refeeding Precautions   Start 500mg thiamine daily x 3 days ---> transition to 100mg daily after     Monitor RFP+Mg Q12-24H and replete per protocol   Ensure lytes are repleted prior to starting nutrition support     Glucerna 1.5 @ 60ml/hr goal   Start @ 10ml/hr and increase by [id number] until goal is met   If significant drop in electrolytes occurs, further advancement not recommended     FWF per MD/team discretion     Once pt tolerating TF @ goal for at least 24 hours can then transition to cycle or bolus feeds     Glucerna 1.5 @ 60 = 2160kcal, 119gm protein, and 1094   Collaboration and Referral of Nutrition Care: Collaboration by nutrition professional with other nutrition professionals    Nutrition Monitoring and Evaluation   Food/Nutrient Related History Monitoring  Monitoring and Evaluation Plan: Enteral and parenteral nutrition intake  Enteral and Parenteral Nutrition Intake: Enteral nutrition intake  Criteria: Tolerate TF @ goal, meet > 75% energy needs, prevent refeeding         Biochemical Data, Medical Tests and Procedures  Monitoring and Evaluation Plan: Electrolyte/renal panel, Glucose/endocrine profile  Electrolyte and Renal Panel:  Magnesium, Phosphorus, Potassium, Creatinine, BUN *monitor for need in change of TF formula  Glucose/Endocrine Profile: Glucose, casual  Criteria: < 180mg/dL              Time Spent (min): 30 minutes

## 2025-01-06 NOTE — PROGRESS NOTES
"Temo Hanson is a 74 y.o. male on day 6 of admission presenting with Dehydration.    Subjective   Need EGD       Objective   Vitals:    01/06/25 0745   BP: 155/69   Pulse: 86   Resp: 17   Temp: 36.5 °C (97.7 °F)   SpO2: 99%       Physical Exam  Constitutional:       Appearance: Normal appearance.   Eyes:      Pupils: Pupils are equal, round, and reactive to light.   Cardiovascular:      Rate and Rhythm: Normal rate.   Pulmonary:      Effort: Pulmonary effort is normal. No respiratory distress.   Abdominal:      General: There is no distension.      Palpations: Abdomen is soft.      Comments: Incision clean, dry, intact   Musculoskeletal:         General: Normal range of motion.      Right lower leg: No edema.      Left lower leg: No edema.   Skin:     General: Skin is warm and dry.   Neurological:      General: No focal deficit present.      Mental Status: He is alert and oriented to person, place, and time.   Psychiatric:         Mood and Affect: Mood normal.         Behavior: Behavior normal.         Last Recorded Vitals  Blood pressure 155/69, pulse 86, temperature 36.5 °C (97.7 °F), temperature source Temporal, resp. rate 17, height 1.854 m (6' 1\"), weight 74.2 kg (163 lb 9.3 oz), SpO2 99%.  Intake/Output last 3 Shifts:  I/O last 3 completed shifts:  In: 1938.8 (26.1 mL/kg) [I.V.:1938.8 (26.1 mL/kg)]  Out: 650 (8.8 mL/kg) [Urine:650 (0.2 mL/kg/hr)]  Weight: 74.2 kg     Relevant Results  Lab Results   Component Value Date    WBC 4.1 (L) 01/05/2025    HGB 8.4 (L) 01/05/2025    HCT 25.5 (L) 01/05/2025    MCV 95 01/05/2025     01/05/2025     Lab Results   Component Value Date    GLUCOSE 142 (H) 01/06/2025    CALCIUM 8.0 (L) 01/06/2025     01/06/2025    K 4.5 01/06/2025    CO2 24 01/06/2025     01/06/2025    BUN 27 (H) 01/06/2025    CREATININE 1.40 (H) 01/06/2025     amLODIPine, 10 mg, oral, Daily  calcium carbonate, 500 mg, oral, BID AC  gabapentin, 200 mg, oral, Daily  insulin glargine, 8 " Units, subcutaneous, Nightly  insulin lispro, 0-10 Units, subcutaneous, TID AC  insulin lispro, 4 Units, subcutaneous, TID AC  lidocaine, 1 patch, transdermal, q24h  magnesium sulfate, 2 g, intravenous, Once  mycophenolate, 1,000 mg, oral, q12h  nystatin, 5 mL, Swish & Swallow, 4x daily  pantoprazole, 40 mg, oral, BID AC  predniSONE, 20 mg, oral, Daily  rosuvastatin, 10 mg, oral, Nightly  sulfamethoxazole-trimethoprim, 1 tablet, oral, Daily  tacrolimus, 2 mg, oral, q12h  thiamine, 500 mg, intravenous, Daily  valGANciclovir, 450 mg, oral, q48h                 Assessment/Plan   Assessment & Plan  Dehydration    Alactasia syndrome    DDKT  12/21   Kidney allograft function   Stable creatinine 1.4    Immunosuppression   Tac 2/2   MMF 1000/1000    Pred 20, decrease to 15    Megaesophagus, 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      Mike Zamora MD

## 2025-01-06 NOTE — PROGRESS NOTES
[facility] aware that patient is not medically cleared for discharge. Referral sent for [facility]; [facility] notified of [facility] upon medical clearance.      LUCÍA Strange, RN  [facility], [facility] 5&9  Transitional Care Coordinator, Mon-Fri  Cell: [phone], Office: [phone]  Email: [email or web address]

## 2025-01-06 NOTE — CARE PLAN
The patient's goals for the shift include get better    The clinical goals for the shift include patient will remain hds       Problem: Pain  Goal: Takes deep breaths with improved pain control throughout the shift  Outcome: Progressing  Goal: Turns in bed with improved pain control throughout the shift  Outcome: Progressing  Goal: Walks with improved pain control throughout the shift  Outcome: Progressing  Goal: Performs ADL's with improved pain control throughout shift  Outcome: Progressing  Goal: Participates in PT with improved pain control throughout the shift  Outcome: Progressing  Goal: Free from opioid side effects throughout the shift  Outcome: Progressing  Goal: Free from acute confusion related to pain meds throughout the shift  Outcome: Progressing

## 2025-01-06 NOTE — SIGNIFICANT EVENT
01/06/25 1426   Patient Interaction   Organ Kidney   Type of Interaction Morning rounds   Interdisciplinary Rounds   Attendance Surgeon;Physician;FILI   Topics Discussed Blood test results;Patient/family concerns;Medications;Imaging/test results

## 2025-01-06 NOTE — PROGRESS NOTES
"Temo Hanson is a 74 y.o. male on day 6 of admission presenting with Dehydration.    Subjective:  No acute event.  No complaints today. NPO for EGD.    Scheduled medications  amLODIPine, 10 mg, oral, Daily  calcium carbonate, 500 mg, oral, BID AC  gabapentin, 200 mg, oral, Daily  insulin glargine, 8 Units, subcutaneous, Nightly  insulin lispro, 0-10 Units, subcutaneous, TID AC  insulin lispro, 4 Units, subcutaneous, TID AC  lidocaine, 1 patch, transdermal, q24h  mycophenolate, 1,000 mg, oral, q12h  nystatin, 5 mL, Swish & Swallow, 4x daily  pantoprazole, 40 mg, oral, BID AC  predniSONE, 20 mg, oral, Daily  rosuvastatin, 10 mg, oral, Nightly  sulfamethoxazole-trimethoprim, 1 tablet, oral, Daily  tacrolimus, 2 mg, oral, q12h  valGANciclovir, 450 mg, oral, q48h      Continuous medications  lactated Ringer's, 75 mL/hr, Last Rate: 75 mL/hr (01/06/25 0935)        PRN medications  PRN medications: carboxymethylcellulose, dextrose, dextrose, docusate sodium, glucagon, glucagon, ondansetron ODT **OR** ondansetron, oxyCODONE, oxyCODONE, polyethylene glycol    Last Recorded Vitals  Blood pressure 155/69, pulse 86, temperature 36.5 °C (97.7 °F), temperature source Temporal, resp. rate 17, height 1.854 m (6' 1\"), weight 74.2 kg (163 lb 9.3 oz), SpO2 99%.  Intake/Output last 3 Shifts:  I/O last 3 completed shifts:  In: 1938.8 (26.1 mL/kg) [I.V.:1938.8 (26.1 mL/kg)]  Out: 650 (8.8 mL/kg) [Urine:650 (0.2 mL/kg/hr)]  Weight: 74.2 kg     A&ox3, no distress, pleasant  MMM, no lesions  Lungs with equal air entry, no added sounds  Rrr, no m/r/g  Abd soft, nt, nd, RLQ incision c/d/i, staples intact  No allograft tenderness  No edema bilaterally    Results for orders placed or performed during the hospital encounter of 12/31/24 (from the past 24 hours)   POCT GLUCOSE   Result Value Ref Range    POCT Glucose 176 (H) 74 - 99 mg/dL   POCT GLUCOSE   Result Value Ref Range    POCT Glucose 165 (H) 74 - 99 mg/dL   CBC   Result Value Ref Range "    WBC 4.1 (L) 4.4 - 11.3 x10*3/uL    nRBC 0.0 0.0 - 0.0 /100 WBCs    RBC 2.69 (L) 4.50 - 5.90 x10*6/uL    Hemoglobin 8.4 (L) 13.5 - 17.5 g/dL    Hematocrit 25.5 (L) 41.0 - 52.0 %    MCV 95 80 - 100 fL    MCH 31.2 26.0 - 34.0 pg    MCHC 32.9 32.0 - 36.0 g/dL    RDW 16.0 (H) 11.5 - 14.5 %    Platelets 182 150 - 450 x10*3/uL   Magnesium   Result Value Ref Range    Magnesium 2.05 1.60 - 2.40 mg/dL   Renal Function Panel   Result Value Ref Range    Glucose 100 (H) 74 - 99 mg/dL    Sodium 138 136 - 145 mmol/L    Potassium 4.2 3.5 - 5.3 mmol/L    Chloride 101 98 - 107 mmol/L    Bicarbonate 26 21 - 32 mmol/L    Anion Gap 15 10 - 20 mmol/L    Urea Nitrogen 33 (H) 6 - 23 mg/dL    Creatinine 1.51 (H) 0.50 - 1.30 mg/dL    eGFR 48 (L) >60 mL/min/1.73m*2    Calcium 8.4 (L) 8.6 - 10.6 mg/dL    Phosphorus 1.9 (L) 2.5 - 4.9 mg/dL    Albumin 3.1 (L) 3.4 - 5.0 g/dL   Prealbumin   Result Value Ref Range    Prealbumin 12.4 (L) 18.0 - 40.0 mg/dL   POCT GLUCOSE   Result Value Ref Range    POCT Glucose 127 (H) 74 - 99 mg/dL   POCT GLUCOSE   Result Value Ref Range    POCT Glucose 240 (H) 74 - 99 mg/dL   POCT GLUCOSE   Result Value Ref Range    POCT Glucose 197 (H) 74 - 99 mg/dL   Magnesium   Result Value Ref Range    Magnesium 1.86 1.60 - 2.40 mg/dL   Renal Function Panel   Result Value Ref Range    Glucose 142 (H) 74 - 99 mg/dL    Sodium 136 136 - 145 mmol/L    Potassium 4.5 3.5 - 5.3 mmol/L    Chloride 105 98 - 107 mmol/L    Bicarbonate 24 21 - 32 mmol/L    Anion Gap 12 10 - 20 mmol/L    Urea Nitrogen 27 (H) 6 - 23 mg/dL    Creatinine 1.40 (H) 0.50 - 1.30 mg/dL    eGFR 53 (L) >60 mL/min/1.73m*2    Calcium 8.0 (L) 8.6 - 10.6 mg/dL    Phosphorus 2.2 (L) 2.5 - 4.9 mg/dL    Albumin 2.5 (L) 3.4 - 5.0 g/dL   POCT GLUCOSE   Result Value Ref Range    POCT Glucose 135 (H) 74 - 99 mg/dL     *Note: Due to a large number of results and/or encounters for the requested time period, some results have not been displayed. A complete set of results can be  found in Results Review.       Assessment/Plan     74 y.o. male with a hx of ESRD secondary to diabetic nephropathy whom received a  donor kidney transplant on 24 by Dr. Zamora with a KDPI of 93% and PRA of 54%. Donor was Hepc -/-and has not met risk factors. EBV + /+., CMV D-/R+. Left donor kidney transplanted to patient right pelvis. Admission weight is 72.7 kg (discharge weight is 76.4 kg ). Pt received a total of 3 mg/kg total of thymoglobulin induction therapy in conjunction with 500mg IV solumedrol.      Post-txp course notable for slow graft function, now with DGF.     Allograft function: s/p DDKT 24  - DGF. , Cr 5.5,uremic on admission. s/p HD  for clearance.   Now Cr 1.4-1.5    Immunosuppression:  TAC increased to 2.5 mg BID  MMF 1000 mg BID  Pred 20 --> 15 mg/day starting tomorrow    Prophylaxis:  PPI  Nystatin 500,000 units QID x 3 mo  Valcyte, renally dosed x 3 mo  TMP-SMX x 6 mo    Esophageal dysphagia  - Upper GI c/f achalasia.   - per surgery/GI - EGD and dilation and PEG  - Vitamins    Blood pressure - ok  - Amlodipine 10 mg daily    CKD-MBD - acceptable   Ca supplement  Check vitamin D  Replete phosphorus    Malnutrition  Vitamins and supplements      Evelyn Trimble MD

## 2025-01-06 NOTE — PROGRESS NOTES
"Temo Hanson is a 74 y.o. male on day 6 of admission presenting with Dehydration.    Subjective   Continues to struggle with poor po intake.   Esophogram demonstrates achalasia  Plan for EGD and dilation on Monday  Started on Ensure.   Allograft function stable.       Objective   /62 (BP Location: Right arm, Patient Position: Sitting)   Pulse 85   Temp 36.7 °C (98.1 °F) (Temporal)   Resp 17   Ht 1.854 m (6' 1\")   Wt 74.2 kg (163 lb 9.3 oz)   SpO2 98%   BMI 21.58 kg/m²     Physical Exam  Constitutional:       Appearance: He is normal weight.   Cardiovascular:      Rate and Rhythm: Normal rate.      Pulses: Normal pulses.   Pulmonary:      Effort: Pulmonary effort is normal.      Breath sounds: No stridor.   Abdominal:      Palpations: Abdomen is soft. There is no mass.      Tenderness: There is no abdominal tenderness. There is no rebound.   Neurological:      Mental Status: He is alert.         Last Recorded Vitals  Blood pressure 157/62, pulse 85, temperature 36.7 °C (98.1 °F), temperature source Temporal, resp. rate 17, height 1.854 m (6' 1\"), weight 74.2 kg (163 lb 9.3 oz), SpO2 98%.  Intake/Output last 3 Shifts:  I/O last 3 completed shifts:  In: 1938.8 (26.6 mL/kg) [I.V.:1938.8 (26.6 mL/kg)]  Out: 550 (7.5 mL/kg) [Urine:550 (0.2 mL/kg/hr)]  Weight: 73 kg     Relevant Results  Esophagram demonstrates megaesophagus and achalasia.      Malnutrition Diagnosis Status: New  Malnutrition Diagnosis: Severe malnutrition related to acute disease or injury  As Evidenced by: < 50% estimated energy needs for > 5 days, moderate fat loss + muscle wasting,  I agree with the dietitian's malnutrition diagnosis.      Assessment/Plan   Assessment & Plan  Dehydration    Alactasia syndrome    Protein calorie malnutrition plan for PEG  Abdominal pain.   Gen surg consult for possible endoscopic therapy  Continue tac, MMF, pred, level 8.5  May require PEG if he can not eat   Prior history of cobblestoning of distal " esophagus, path negative.       I spent 25 minutes in the professional and overall care of this patient.      John Zimmer MD

## 2025-01-07 ENCOUNTER — APPOINTMENT (OUTPATIENT)
Facility: HOSPITAL | Age: 75
End: 2025-01-07
Payer: COMMERCIAL

## 2025-01-07 ENCOUNTER — APPOINTMENT (OUTPATIENT)
Dept: RADIOLOGY | Facility: HOSPITAL | Age: 75
DRG: 391 | End: 2025-01-07
Payer: COMMERCIAL

## 2025-01-07 LAB
ALBUMIN SERPL BCP-MCNC: 2.8 G/DL (ref 3.4–5)
ANION GAP SERPL CALC-SCNC: 12 MMOL/L (ref 10–20)
BUN SERPL-MCNC: 16 MG/DL (ref 6–23)
CALCIUM SERPL-MCNC: 8.6 MG/DL (ref 8.6–10.6)
CHLORIDE SERPL-SCNC: 106 MMOL/L (ref 98–107)
CO2 SERPL-SCNC: 25 MMOL/L (ref 21–32)
CREAT SERPL-MCNC: 1.06 MG/DL (ref 0.5–1.3)
EGFRCR SERPLBLD CKD-EPI 2021: 74 ML/MIN/1.73M*2
ERYTHROCYTE [DISTWIDTH] IN BLOOD BY AUTOMATED COUNT: 15.9 % (ref 11.5–14.5)
GLUCOSE BLD MANUAL STRIP-MCNC: 153 MG/DL (ref 74–99)
GLUCOSE BLD MANUAL STRIP-MCNC: 189 MG/DL (ref 74–99)
GLUCOSE BLD MANUAL STRIP-MCNC: 194 MG/DL (ref 74–99)
GLUCOSE BLD MANUAL STRIP-MCNC: 99 MG/DL (ref 74–99)
GLUCOSE SERPL-MCNC: 117 MG/DL (ref 74–99)
HCT VFR BLD AUTO: 26.7 % (ref 41–52)
HGB BLD-MCNC: 8.3 G/DL (ref 13.5–17.5)
MAGNESIUM SERPL-MCNC: 1.83 MG/DL (ref 1.6–2.4)
MCH RBC QN AUTO: 30.5 PG (ref 26–34)
MCHC RBC AUTO-ENTMCNC: 31.1 G/DL (ref 32–36)
MCV RBC AUTO: 98 FL (ref 80–100)
NRBC BLD-RTO: 0 /100 WBCS (ref 0–0)
PHOSPHATE SERPL-MCNC: 1.7 MG/DL (ref 2.5–4.9)
PLATELET # BLD AUTO: 178 X10*3/UL (ref 150–450)
POTASSIUM SERPL-SCNC: 3.9 MMOL/L (ref 3.5–5.3)
RBC # BLD AUTO: 2.72 X10*6/UL (ref 4.5–5.9)
SODIUM SERPL-SCNC: 139 MMOL/L (ref 136–145)
TACROLIMUS BLD-MCNC: 8.4 NG/ML
WBC # BLD AUTO: 2.8 X10*3/UL (ref 4.4–11.3)

## 2025-01-07 PROCEDURE — 2500000002 HC RX 250 W HCPCS SELF ADMINISTERED DRUGS (ALT 637 FOR MEDICARE OP, ALT 636 FOR OP/ED)

## 2025-01-07 PROCEDURE — 97165 OT EVAL LOW COMPLEX 30 MIN: CPT | Mod: GO

## 2025-01-07 PROCEDURE — 82947 ASSAY GLUCOSE BLOOD QUANT: CPT

## 2025-01-07 PROCEDURE — 83735 ASSAY OF MAGNESIUM: CPT | Performed by: STUDENT IN AN ORGANIZED HEALTH CARE EDUCATION/TRAINING PROGRAM

## 2025-01-07 PROCEDURE — 99232 SBSQ HOSP IP/OBS MODERATE 35: CPT | Performed by: TRANSPLANT SURGERY

## 2025-01-07 PROCEDURE — 36415 COLL VENOUS BLD VENIPUNCTURE: CPT | Performed by: STUDENT IN AN ORGANIZED HEALTH CARE EDUCATION/TRAINING PROGRAM

## 2025-01-07 PROCEDURE — 2780000003 HC OR 278 NO HCPCS

## 2025-01-07 PROCEDURE — 2500000004 HC RX 250 GENERAL PHARMACY W/ HCPCS (ALT 636 FOR OP/ED)

## 2025-01-07 PROCEDURE — 2500000004 HC RX 250 GENERAL PHARMACY W/ HCPCS (ALT 636 FOR OP/ED): Performed by: STUDENT IN AN ORGANIZED HEALTH CARE EDUCATION/TRAINING PROGRAM

## 2025-01-07 PROCEDURE — 99233 SBSQ HOSP IP/OBS HIGH 50: CPT | Performed by: STUDENT IN AN ORGANIZED HEALTH CARE EDUCATION/TRAINING PROGRAM

## 2025-01-07 PROCEDURE — 2500000001 HC RX 250 WO HCPCS SELF ADMINISTERED DRUGS (ALT 637 FOR MEDICARE OP)

## 2025-01-07 PROCEDURE — 2500000001 HC RX 250 WO HCPCS SELF ADMINISTERED DRUGS (ALT 637 FOR MEDICARE OP): Performed by: STUDENT IN AN ORGANIZED HEALTH CARE EDUCATION/TRAINING PROGRAM

## 2025-01-07 PROCEDURE — 80197 ASSAY OF TACROLIMUS: CPT | Performed by: STUDENT IN AN ORGANIZED HEALTH CARE EDUCATION/TRAINING PROGRAM

## 2025-01-07 PROCEDURE — 2500000005 HC RX 250 GENERAL PHARMACY W/O HCPCS

## 2025-01-07 PROCEDURE — 2500000002 HC RX 250 W HCPCS SELF ADMINISTERED DRUGS (ALT 637 FOR MEDICARE OP, ALT 636 FOR OP/ED): Performed by: STUDENT IN AN ORGANIZED HEALTH CARE EDUCATION/TRAINING PROGRAM

## 2025-01-07 PROCEDURE — 1100000001 HC PRIVATE ROOM DAILY

## 2025-01-07 PROCEDURE — C1751 CATH, INF, PER/CENT/MIDLINE: HCPCS

## 2025-01-07 PROCEDURE — 82565 ASSAY OF CREATININE: CPT

## 2025-01-07 PROCEDURE — 97116 GAIT TRAINING THERAPY: CPT | Mod: GP | Performed by: STUDENT IN AN ORGANIZED HEALTH CARE EDUCATION/TRAINING PROGRAM

## 2025-01-07 PROCEDURE — 36410 VNPNXR 3YR/> PHY/QHP DX/THER: CPT

## 2025-01-07 PROCEDURE — 97530 THERAPEUTIC ACTIVITIES: CPT | Mod: GP | Performed by: STUDENT IN AN ORGANIZED HEALTH CARE EDUCATION/TRAINING PROGRAM

## 2025-01-07 PROCEDURE — 85027 COMPLETE CBC AUTOMATED: CPT | Performed by: STUDENT IN AN ORGANIZED HEALTH CARE EDUCATION/TRAINING PROGRAM

## 2025-01-07 RX ORDER — LIDOCAINE HYDROCHLORIDE 10 MG/ML
5 INJECTION, SOLUTION INFILTRATION; PERINEURAL ONCE
Status: DISCONTINUED | OUTPATIENT
Start: 2025-01-07 | End: 2025-01-18

## 2025-01-07 RX ORDER — MAGNESIUM SULFATE HEPTAHYDRATE 40 MG/ML
2 INJECTION, SOLUTION INTRAVENOUS ONCE
Status: COMPLETED | OUTPATIENT
Start: 2025-01-07 | End: 2025-01-07

## 2025-01-07 RX ORDER — VALGANCICLOVIR 450 MG/1
900 TABLET, FILM COATED ORAL EVERY 24 HOURS
Status: DISCONTINUED | OUTPATIENT
Start: 2025-01-07 | End: 2025-01-23 | Stop reason: HOSPADM

## 2025-01-07 RX ADMIN — VALGANCICLOVIR 900 MG: 450 TABLET, FILM COATED ORAL at 15:21

## 2025-01-07 RX ADMIN — INSULIN GLARGINE 8 UNITS: 100 INJECTION, SOLUTION SUBCUTANEOUS at 20:34

## 2025-01-07 RX ADMIN — THIAMINE HYDROCHLORIDE 500 MG: 100 INJECTION, SOLUTION INTRAMUSCULAR; INTRAVENOUS at 14:22

## 2025-01-07 RX ADMIN — OXYCODONE HYDROCHLORIDE 5 MG: 5 TABLET ORAL at 09:14

## 2025-01-07 RX ADMIN — CALCIUM CARBONATE (ANTACID) CHEW TAB 500 MG 500 MG: 500 CHEW TAB at 15:21

## 2025-01-07 RX ADMIN — INSULIN LISPRO 2 UNITS: 100 INJECTION, SOLUTION INTRAVENOUS; SUBCUTANEOUS at 15:18

## 2025-01-07 RX ADMIN — INSULIN LISPRO 4 UNITS: 100 INJECTION, SOLUTION INTRAVENOUS; SUBCUTANEOUS at 10:45

## 2025-01-07 RX ADMIN — MAGNESIUM SULFATE HEPTAHYDRATE 2 G: 40 INJECTION, SOLUTION INTRAVENOUS at 14:23

## 2025-01-07 RX ADMIN — PANTOPRAZOLE SODIUM 40 MG: 40 TABLET, DELAYED RELEASE ORAL at 15:21

## 2025-01-07 RX ADMIN — GABAPENTIN 200 MG: 100 CAPSULE ORAL at 09:05

## 2025-01-07 RX ADMIN — NYSTATIN 500000 UNITS: 500000 SUSPENSION ORAL at 17:15

## 2025-01-07 RX ADMIN — SODIUM PHOSPHATE, MONOBASIC, MONOHYDRATE AND SODIUM PHOSPHATE, DIBASIC, ANHYDROUS 21 MMOL: 142; 276 INJECTION, SOLUTION INTRAVENOUS at 17:10

## 2025-01-07 RX ADMIN — AMLODIPINE BESYLATE 10 MG: 10 TABLET ORAL at 09:05

## 2025-01-07 RX ADMIN — PANTOPRAZOLE SODIUM 40 MG: 40 TABLET, DELAYED RELEASE ORAL at 06:05

## 2025-01-07 RX ADMIN — OXYCODONE HYDROCHLORIDE 5 MG: 5 TABLET ORAL at 17:09

## 2025-01-07 RX ADMIN — HEPARIN SODIUM 5000 UNITS: 5000 INJECTION INTRAVENOUS; SUBCUTANEOUS at 00:09

## 2025-01-07 RX ADMIN — MYCOPHENOLATE MOFETIL 1000 MG: 250 CAPSULE ORAL at 06:00

## 2025-01-07 RX ADMIN — INSULIN LISPRO 4 UNITS: 100 INJECTION, SOLUTION INTRAVENOUS; SUBCUTANEOUS at 15:18

## 2025-01-07 RX ADMIN — MYCOPHENOLATE MOFETIL 1000 MG: 250 CAPSULE ORAL at 18:12

## 2025-01-07 RX ADMIN — SULFAMETHOXAZOLE AND TRIMETHOPRIM 1 TABLET: 400; 80 TABLET ORAL at 09:05

## 2025-01-07 RX ADMIN — PREDNISONE 15 MG: 10 TABLET ORAL at 09:05

## 2025-01-07 RX ADMIN — HEPARIN SODIUM 5000 UNITS: 5000 INJECTION INTRAVENOUS; SUBCUTANEOUS at 17:16

## 2025-01-07 RX ADMIN — NYSTATIN 500000 UNITS: 500000 SUSPENSION ORAL at 20:34

## 2025-01-07 RX ADMIN — ROSUVASTATIN CALCIUM 10 MG: 10 TABLET, FILM COATED ORAL at 20:34

## 2025-01-07 RX ADMIN — CALCIUM CARBONATE (ANTACID) CHEW TAB 500 MG 500 MG: 500 CHEW TAB at 06:01

## 2025-01-07 RX ADMIN — TACROLIMUS 2.5 MG: 1 CAPSULE ORAL at 06:05

## 2025-01-07 RX ADMIN — HEPARIN SODIUM 5000 UNITS: 5000 INJECTION INTRAVENOUS; SUBCUTANEOUS at 09:19

## 2025-01-07 RX ADMIN — ONDANSETRON 4 MG: 4 TABLET, ORALLY DISINTEGRATING ORAL at 18:15

## 2025-01-07 RX ADMIN — NYSTATIN 500000 UNITS: 500000 SUSPENSION ORAL at 14:23

## 2025-01-07 RX ADMIN — TACROLIMUS 2.5 MG: 1 CAPSULE ORAL at 18:12

## 2025-01-07 ASSESSMENT — COGNITIVE AND FUNCTIONAL STATUS - GENERAL
WALKING IN HOSPITAL ROOM: A LOT
DAILY ACTIVITIY SCORE: 24
MOBILITY SCORE: 18
CLIMB 3 TO 5 STEPS WITH RAILING: A LOT
STANDING UP FROM CHAIR USING ARMS: A LITTLE
MOVING TO AND FROM BED TO CHAIR: A LITTLE
STANDING UP FROM CHAIR USING ARMS: A LITTLE
WALKING IN HOSPITAL ROOM: A LOT
CLIMB 3 TO 5 STEPS WITH RAILING: A LITTLE
MOVING TO AND FROM BED TO CHAIR: A LITTLE
DAILY ACTIVITIY SCORE: 24
MOVING TO AND FROM BED TO CHAIR: A LITTLE
MOBILITY SCORE: 18
MOVING TO AND FROM BED TO CHAIR: A LITTLE
CLIMB 3 TO 5 STEPS WITH RAILING: A LOT
STANDING UP FROM CHAIR USING ARMS: A LITTLE
DRESSING REGULAR UPPER BODY CLOTHING: A LITTLE
WALKING IN HOSPITAL ROOM: A LOT
WALKING IN HOSPITAL ROOM: A LOT
STANDING UP FROM CHAIR USING ARMS: A LITTLE
WALKING IN HOSPITAL ROOM: A LITTLE
WALKING IN HOSPITAL ROOM: A LOT
STANDING UP FROM CHAIR USING ARMS: A LITTLE
STANDING UP FROM CHAIR USING ARMS: A LITTLE
TOILETING: A LITTLE
DAILY ACTIVITIY SCORE: 24
MOBILITY SCORE: 18
TURNING FROM BACK TO SIDE WHILE IN FLAT BAD: A LITTLE
DAILY ACTIVITIY SCORE: 19
MOVING TO AND FROM BED TO CHAIR: A LITTLE
CLIMB 3 TO 5 STEPS WITH RAILING: A LOT
PERSONAL GROOMING: A LITTLE
DAILY ACTIVITIY SCORE: 24
DRESSING REGULAR LOWER BODY CLOTHING: A LITTLE
DAILY ACTIVITIY SCORE: 24
MOBILITY SCORE: 18
STANDING UP FROM CHAIR USING ARMS: A LITTLE
CLIMB 3 TO 5 STEPS WITH RAILING: A LOT
WALKING IN HOSPITAL ROOM: A LOT
DAILY ACTIVITIY SCORE: 24
MOBILITY SCORE: 18
CLIMB 3 TO 5 STEPS WITH RAILING: A LOT
MOVING TO AND FROM BED TO CHAIR: A LITTLE
CLIMB 3 TO 5 STEPS WITH RAILING: A LOT
HELP NEEDED FOR BATHING: A LITTLE
MOVING TO AND FROM BED TO CHAIR: A LITTLE
MOBILITY SCORE: 18
MOVING FROM LYING ON BACK TO SITTING ON SIDE OF FLAT BED WITH BEDRAILS: A LITTLE
MOBILITY SCORE: 18

## 2025-01-07 ASSESSMENT — ACTIVITIES OF DAILY LIVING (ADL)
BATHING_ASSISTANCE: MINIMAL
ADL_ASSISTANCE: INDEPENDENT

## 2025-01-07 ASSESSMENT — PAIN - FUNCTIONAL ASSESSMENT
PAIN_FUNCTIONAL_ASSESSMENT: 0-10

## 2025-01-07 ASSESSMENT — PAIN SCALES - GENERAL
PAINLEVEL_OUTOF10: 0 - NO PAIN
PAINLEVEL_OUTOF10: 0 - NO PAIN
PAINLEVEL_OUTOF10: 5 - MODERATE PAIN
PAINLEVEL_OUTOF10: 0 - NO PAIN

## 2025-01-07 NOTE — SIGNIFICANT EVENT
01/07/25 0921   Patient Interaction   Organ Kidney   Type of Interaction Morning rounds   Interdisciplinary Rounds   Attendance Surgeon;Physician;FILI;Coordinator;Nurse;Pharmacist   Topics Discussed Diet;Medications;Home care needs;Blood test results

## 2025-01-07 NOTE — POST-PROCEDURE NOTE
MIDLINE      Placed:1/7/20251/7/[id number].Earliest Known Present:1/7/20251/7/2025.Hand Hygiene Completed:YesYes.Catheter Time Out Checklist Completed:YesYes.Size (Fr):33.Lumen Type:Single lumenSingle lumen.Catheter to Vein Ratio Less Than 45%:YesYes.Total Length (cm):1212.External Length (cm):00.Orientation:RightRight.Location:Basilic veinBasilic vein.Site Prep:Chlorhexidine ; Usual sterile procedure followedChlorhexidine ; Usual sterile procedure followed.Local Anesthetic:InjectableInjectable.Indication:Parenteral medicationsParenteral medications.Insertion Team Members In The Room:Guilherme. The comment is Vivienne López LPN.Initial Extremity Circumference (cm):2626.Placed by:Erika Astudillo RN-Irma Astudillo RN-PHILLIP.Insertion attempts:11.Patient Tolerance:Tolerated wellTolerated well.Comfort Measures:Subcutaneous anestheticSubcutaneous anesthetic.Procedure Location:BedsideBedside. The comment is timeout with Laura CHAVIS.Safety Measures:Patient specific safety measures addressed with RNPatient specific safety measures addressed with RN.Estimated Blood Loss (mL):00.Vessel Fully Compressible Proximally and Distally to Insertion Site:YesYes.Brisk Blood Return Obtained and Line Draws Easily:YesYes.Catheter Tip Location:Peripheral/midlinePeripheral/midline. The comment is right axilla.Line Confirmation:Blood return; Non-pulsatile blood flowBlood return; Non-pulsatile blood flow.Lot #:[id number].:BDBD.Expiration Date:12/31/202512/31/2025.Securement Method:Skin barrier; Stat lock; Transparent dressingSkin barrier; Stat lock; Transparent dressing.Post Procedure Checklist:Handoff with RN; Bed at lowest level

## 2025-01-07 NOTE — NURSING NOTE
8:40:  Request for PIV insertion received, old IV at right AC is sluggish to flush, is removed, patient's right arm is assessed with/without US, no viable veins found, 2 attempts made with the vein by the artery at  failed. RN is informed.

## 2025-01-07 NOTE — PROGRESS NOTES
Music Therapy Note    Temo Hanson     Therapy Session  Referral Type: New referral this admission  Visit Type: Follow-up visit  Session Start Time: 1438  Session End Time: 1444  Intervention Delivery: In-person  Conflict of Service: None  Family Present for Session: None     Pre-assessment  Unable to Assess Reason: Outcomes not applicable  Mood/Affect: Appropriate, Calm, Cooperative, Flat/blunted         Treatment/Interventions  Music Therapy Interventions: Assessment  Interruption: No  Patient Fell Asleep at End of Session: No    Post-assessment  Unable to Assess Reason: Did not provide expressive therapy intervention  Mood/Affect: Appropriate, Calm, Cooperative, Flat/blunted  Continue Visiting: Yes  Total Session Time (min): 6 minutes    Narrative  Assessment Detail: Patient presented awake and alert, sitting in bedside chair, displaying calm affect. Patient demonstrated recognition of this MT from their visit the previous week. Patient voiced no therapeutic needs at this time, stating that he just wants to have the procedure he's been waiting for.  Follow-up: MT to continue to follow as needed.    Education Documentation  No documentation found.

## 2025-01-07 NOTE — PROGRESS NOTES
Physical Therapy    Physical Therapy Treatment    Patient Name: Temo Hanson  MRN: [id number]  Today's Date: 1/7/2025  Room: [address] [place] [address]  Time Calculation  Start Time: 1420  Stop Time: 1445  Time Calculation (min): 25 min       Assessment/Plan   PT Plan  Treatment/Interventions: Bed mobility, Transfer training, Gait training, Stair training, Balance training, Neuromuscular re-education, Strengthening, Endurance training, Range of motion, Therapeutic exercise, Therapeutic activity, Home exercise program  PT Plan: Ongoing PT  PT Frequency: 3 times per week  PT Discharge Recommendations: Low intensity level of continued care  Equipment Recommended upon Discharge: Wheeled walker  PT Recommended Transfer Status: Stand by assist  PT - OK to Discharge: Yes    General Visit Information:   Reason for Referral: 74 year old male s/p DDKT on 12/21/24.  Past Medical History Relevant to Rehab: Hypertension, hyperlipidemia, complete heart block status post leadless pacemaker insertion on 7/17/2024, pericardial effusion, pheochromocytoma of the right adrenal gland, history of prostate cancer, GI bleed  Prior to Session Communication: Bedside nurse  Patient Position Received: Up in chair, Alarm off, not on at start of session   Subjective: Pt alert and agreeable to therapy, reports independent ambulation to/from bathroom. Expresses no concern about discharge recommendations.  Precautions:  Precautions  Medical Precautions: Fall precautions  Post-Surgical Precautions: Abdominal surgery precautions  Vital Signs:  Vital Signs (Past 2hrs)             Objective   Pain:  Pain Assessment  Pain Assessment: 0-10  0-10 (Numeric) Pain Score: 0 - No pain (post 0)  Cognition:  Cognition  Orientation Level: Oriented X4  Following Commands: Follows all commands and directions without difficulty  Lines/Tubes/Drains:  Midline 01/07/25 Single lumen Right Basilic vein (Active)   Number of days: 0       Hemodialysis Arteriovenous Graft Left Upper arm  (Active)   Number of days:         Continuous Medications/Drips:       PT Treatments:     Therapeutic Activity  Therapeutic Activity 1: 2x TUG tria;. 1x Tinetti trial, stair tria        Ambulation/Gait Training 1  Surface 1: Level tile  Device 1: Single point cane  Assistance 1: Close supervision  Quality of Gait 1: Narrow base of support, Diminished heel strike, Inconsistent stride length, Decreased step length (minimal cane support)  Comments/Distance (ft) 1: 20 feet x2, 120 fee  Transfers  Transfer: Yes  Transfer 1  Transfer From 1: Sit to  Transfer to 1: Stand  Transfer Level of Assistance 1: Close supervision  Trials/Comments 1: x4  Transfers 2  Transfer From 2: Stand to  Transfer to 2: Sit  Transfer Level of Assistance 2: Close supervision  Trials/Comments 2: x4  Transfers 3  Transfer From 3: Chair with arms to  Transfer to 3: Chair with arms  Transfer Level of Assistance 3: Close supervision  Trials/Comments 3: x4  Stairs  Stairs: Yes  Stairs  Rails 1: Left  Device 1: Single point cane, Railing  Assistance 1: Close supervision  Comment/Number of Steps 1: 3 ascend/descend, step to pattern          Outcome Measures:  [facility] Basic Mobility  Turning from your back to your side while in a flat bed without using bedrails: A little  Moving from lying on your back to sitting on the side of a flat bed without using bedrails: A little  Moving to and from bed to chair (including a wheelchair): A little  Standing up from a chair using your arms (e.g. wheelchair or bedside chair): A little  To walk in hospital room: A little  Climbing 3-5 steps with railing: A little  Basic Mobility - Total Score: 18                 Tinetti  Sitting Balance: Steady, safe  Arises: Able, uses arms to help  Attempts to Arise: Able to arise, one attempt  Immediate Standing Balance (First 5 Seconds): Steady without walker or other support  Standing Balance: Narrow stance without support  Nudged: Steady without walker or other support  Eyes  Closed: Steady  Turned 360 Degrees: Steadiness: Steady  Turned 360 Degrees: Continuity of Steps: Discontinuous steps  Sitting Down: Uses arms or not a smooth motion  Balance Score: 13  Initiation of Gait: No hesitancy  Step Height: R Swing Foot: Right foot complete clears floor  Step Length: R Swing Foot: Passes left stance foot  Step Height: L Swing Foot: Left foot complete clears floor  Step Length: L Swing Foot: Passes right stance foot  Step Symmetry: Right and left step length not equal (Estimate)  Step Continuity: Steps appear continuous  Path: Straight without walking aid  Trunk: No sway but flexion of knees or back or spreads arms out while walking  Walking Time: Heels almost touching while walking  Gait Score: 10  Total Score: 23  Timed Up and Go Test  How many seconds did it take to complete the 5 tasks?: 22 seconds       Education Documentation  Precautions, taught by Crispin Patino PT at 1/7/2025  3:20 PM.  Learner: Patient  Readiness: Acceptance  Method: Explanation  Response: Demonstrated Understanding  Comment: POC/discharge review    Mobility Training, taught by Crispin Patino PT at 1/7/2025  3:20 PM.  Learner: Patient  Readiness: Acceptance  Method: Explanation  Response: Demonstrated Understanding  Comment: POC/discharge review    Education Comments  No comments found.          OP EDUCATION:       Encounter Problems       Encounter Problems (Active)       PT Problem       Pt will perform all aspects of bed mobility at mod indep by discharge in order to decrease caregiver burden. (Progressing)       Start:  01/04/25    Expected End:  01/18/25            Pt will perform all basic transfers at mod indep w/ LRAD by discharge. (Progressing)       Start:  01/04/25    Expected End:  01/18/25            Pt will ambulate 150' w/ LRAD at [facility] by discharge. (Met)       Start:  01/04/25    Expected End:  01/18/25    Resolved:  01/07/25         Pt will negotiate 5-12 steps w/ 1 rail in order to simulate  home entrance/set-up by discharge. (Progressing)       Start:  01/04/25    Expected End:  01/18/25                   Assessment: Patient is progressing Well with therapy this date.  Pt completed multiple outcome measures and stair trial on this date. Mod falls risk based on tinetti and TUG but no noted LOB. Met ambulation goal and will maintain other goals until discharge. Updated POC for 3x per week due to current functional progression. Patient remains appropriate for LOW intensity therapy when medically appropriate for discharge from acute stay.  Will continue to follow.     01/07/25 at 3:24 PM   Crispin Patino, PT   Rehab Office: [phone]

## 2025-01-07 NOTE — PROGRESS NOTES
"Temo Hanson is a 74 y.o. male on day 7 of admission presenting with Dehydration.    Subjective:  Not eating well due to dysphagia but tries to eat some.  Otherwise he feels well.    Scheduled medications  amLODIPine, 10 mg, oral, Daily  calcium carbonate, 500 mg, oral, BID AC  gabapentin, 200 mg, oral, Daily  heparin (porcine), 5,000 Units, subcutaneous, q8h  insulin glargine, 8 Units, subcutaneous, Nightly  insulin lispro, 0-10 Units, subcutaneous, TID AC  insulin lispro, 4 Units, subcutaneous, TID AC  lidocaine, 1 patch, transdermal, q24h  magnesium sulfate, 2 g, intravenous, Once  mycophenolate, 1,000 mg, oral, q12h  nystatin, 5 mL, Swish & Swallow, 4x daily  pantoprazole, 40 mg, oral, BID AC  predniSONE, 15 mg, oral, Daily  rosuvastatin, 10 mg, oral, Nightly  sodium phosphate, 21 mmol, intravenous, Once  sulfamethoxazole-trimethoprim, 1 tablet, oral, Daily  tacrolimus, 2.5 mg, oral, q12h  thiamine, 500 mg, intravenous, Daily  valGANciclovir, 450 mg, oral, q24h      Continuous medications  lactated Ringer's, 75 mL/hr, Last Rate: 75 mL/hr (01/06/25 1834)        PRN medications  PRN medications: carboxymethylcellulose, dextrose, dextrose, docusate sodium, glucagon, glucagon, ondansetron ODT **OR** ondansetron, oxyCODONE, oxyCODONE, polyethylene glycol    Last Recorded Vitals  Blood pressure 157/74, pulse 96, temperature 36.8 °C (98.2 °F), temperature source Temporal, resp. rate 17, height 1.854 m (6' 1\"), weight 75.2 kg (165 lb 12.6 oz), SpO2 95%.  Intake/Output last 3 Shifts:  I/O last 3 completed shifts:  In: 720 (9.6 mL/kg) [P.O.:720]  Out: 450 (6 mL/kg) [Urine:450 (0.2 mL/kg/hr)]  Weight: 75.2 kg     A&ox3, no distress, pleasant  MMM, no lesions  Lungs with equal air entry, no added sounds  Rrr, no m/r/g  Abd soft, nt, nd, RLQ incision c/d/i, staples intact  No allograft tenderness  No edema bilaterally    Results for orders placed or performed during the hospital encounter of 12/31/24 (from the past 24 " hours)   CBC   Result Value Ref Range    WBC 3.8 (L) 4.4 - 11.3 x10*3/uL    nRBC 0.0 0.0 - 0.0 /100 WBCs    RBC 2.75 (L) 4.50 - 5.90 x10*6/uL    Hemoglobin 8.6 (L) 13.5 - 17.5 g/dL    Hematocrit 27.3 (L) 41.0 - 52.0 %    MCV 99 80 - 100 fL    MCH 31.3 26.0 - 34.0 pg    MCHC 31.5 (L) 32.0 - 36.0 g/dL    RDW 15.9 (H) 11.5 - 14.5 %    Platelets 180 150 - 450 x10*3/uL   POCT GLUCOSE   Result Value Ref Range    POCT Glucose 291 (H) 74 - 99 mg/dL   POCT GLUCOSE   Result Value Ref Range    POCT Glucose 182 (H) 74 - 99 mg/dL   POCT GLUCOSE   Result Value Ref Range    POCT Glucose 153 (H) 74 - 99 mg/dL   CBC   Result Value Ref Range    WBC 2.8 (L) 4.4 - 11.3 x10*3/uL    nRBC 0.0 0.0 - 0.0 /100 WBCs    RBC 2.72 (L) 4.50 - 5.90 x10*6/uL    Hemoglobin 8.3 (L) 13.5 - 17.5 g/dL    Hematocrit 26.7 (L) 41.0 - 52.0 %    MCV 98 80 - 100 fL    MCH 30.5 26.0 - 34.0 pg    MCHC 31.1 (L) 32.0 - 36.0 g/dL    RDW 15.9 (H) 11.5 - 14.5 %    Platelets 178 150 - 450 x10*3/uL   Magnesium   Result Value Ref Range    Magnesium 1.83 1.60 - 2.40 mg/dL   Renal Function Panel   Result Value Ref Range    Glucose 117 (H) 74 - 99 mg/dL    Sodium 139 136 - 145 mmol/L    Potassium 3.9 3.5 - 5.3 mmol/L    Chloride 106 98 - 107 mmol/L    Bicarbonate 25 21 - 32 mmol/L    Anion Gap 12 10 - 20 mmol/L    Urea Nitrogen 16 6 - 23 mg/dL    Creatinine 1.06 0.50 - 1.30 mg/dL    eGFR 74 >60 mL/min/1.73m*2    Calcium 8.6 8.6 - 10.6 mg/dL    Phosphorus 1.7 (L) 2.5 - 4.9 mg/dL    Albumin 2.8 (L) 3.4 - 5.0 g/dL   POCT GLUCOSE   Result Value Ref Range    POCT Glucose 99 74 - 99 mg/dL       Assessment/Plan     74 y.o. male with a hx of ESRD secondary to diabetic nephropathy whom received a  donor kidney transplant on 24 by Dr. Zamora with a KDPI of 93% and PRA of 54%. Donor was Hepc -/-and has not met risk factors. EBV + /+., CMV D-/R+. Left donor kidney transplanted to patient right pelvis. Admission weight is 72.7 kg (discharge weight is 76.4 kg ). Pt received  a total of 3 mg/kg total of thymoglobulin induction therapy in conjunction with 500mg IV solumedrol.      Post-txp course notable for slow graft function, now with DGF.     Allograft function: s/p DDKT 12/21/24  - DGF. , Cr 5.5,uremic on admission. s/p HD 12/31 for clearance.   Now Cr 1.4-1.5    Immunosuppression:  TAC 2.5 mg BID  MMF 1000 mg BID  Pred 15 mg/day    Prophylaxis:  PPI  Nystatin 500,000 units QID x 3 mo  Valcyte, renally dosed x 3 mo  TMP-SMX x 6 mo    Esophageal dysphagia  - Upper GI c/f achalasia.   - per surgery/GI - EGD and dilation and PEG    Blood pressure - ok  - Amlodipine 10 mg daily    CKD-MBD - acceptable   Ca supplement  Check vitamin D  Replete phosphorus    Malnutrition  Vitamins and supplements  Enteral nutrition  Plan PEG tube      Evelyn Trimble MD

## 2025-01-07 NOTE — CARE PLAN
Problem: Pain  Goal: Takes deep breaths with improved pain control throughout the shift  Outcome: Progressing  Goal: Turns in bed with improved pain control throughout the shift  Outcome: Progressing  Goal: Walks with improved pain control throughout the shift  Outcome: Progressing  Goal: Performs ADL's with improved pain control throughout shift  Outcome: Progressing  Goal: Participates in PT with improved pain control throughout the shift  Outcome: Progressing  Goal: Free from opioid side effects throughout the shift  Outcome: Progressing  Goal: Free from acute confusion related to pain meds throughout the shift  Outcome: Progressing   The patient's goals for the shift include get better    The clinical goals for the shift include remain hds

## 2025-01-07 NOTE — PROGRESS NOTES
Occupational Therapy    Evaluation    Patient Name: Temo Hanson  MRN: [id number]  Department: [facility] 9  Room: 90[facility]9081-A  Today's Date: 1/7/2025  Time Calculation  Start Time: 1004  Stop Time: 1016  Time Calculation (min): 12 min        Assessment:  OT Assessment: Pt will benefit from continued skilled OT to increase independence in ADLs, functional mobility, activity tolerance, safety, and strength.  Prognosis: Good  Barriers to Discharge Home: No anticipated barriers  Evaluation/Treatment Tolerance: Patient tolerated treatment well  Medical Staff Made Aware: Yes  End of Session Communication: Bedside nurse  End of Session Patient Position: Up in chair, Alarm off, not on at start of session  OT Assessment Results: Decreased ADL status, Decreased endurance, Decreased functional mobility, Decreased gross motor control, Decreased IADLs, Decreased upper extremity strength, Decreased safe judgment during ADL  Prognosis: Good  Evaluation/Treatment Tolerance: Patient tolerated treatment well  Medical Staff Made Aware: Yes  Strengths: Attitude of self  Barriers to Participation: Comorbidities  Plan:  Treatment Interventions: ADL retraining, Functional transfer training, UE strengthening/ROM, Endurance training, Patient/family training, Cognitive reorientation, Equipment evaluation/education, Compensatory technique education  OT Frequency: 2 times per week  OT Discharge Recommendations: Low intensity level of continued care  Equipment Recommended upon Discharge: Wheeled walker  OT Recommended Transfer Status: Assist of 1  OT - OK to Discharge: Yes (upon medical clearance)  Treatment Interventions: ADL retraining, Functional transfer training, UE strengthening/ROM, Endurance training, Patient/family training, Cognitive reorientation, Equipment evaluation/education, Compensatory technique education    Subjective   Current Problem:  1. Dysphagia, unspecified type  Esophagogastroduodenoscopy (EGD) w PEG Tube Placement      2.  Dehydration  Referral to Healthy at Home Program      3. Alactasia syndrome  CANCELED: Case Request Operating Room: EGD, WITH DILATION    CANCELED: Case Request Operating Room: EGD, WITH DILATION      4. 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 (Multi)  Referral to Healthy at Home Program        General:  General  Reason for Referral: 74 year old male s/p DDKT on 12/21/24.  Past Medical History Relevant to Rehab: Hypertension, hyperlipidemia, complete heart block status post leadless pacemaker insertion on 7/17/2024, pericardial effusion, pheochromocytoma of the right adrenal gland, history of prostate cancer, GI bleed  Family/Caregiver Present: No  Patient Position Received: Bed, 3 rail up, Alarm off, not on at start of session  General Comment: Pt supine in bed upon arrival, agreeable to OT  Precautions:  Medical Precautions: Fall precautions  Post-Surgical Precautions: Abdominal surgery precautions    Pain:  Pain Assessment  Pain Assessment: 0-10  0-10 (Numeric) Pain Score: 0 - No pain    Objective   Cognition:  Orientation Level: Oriented X4  Following Commands: Follows one step commands with increased time     Home Living:  Type of Home: House  Lives With: Spouse  Home Adaptive Equipment: Cane  Home Layout: Two level  Home Access: Stairs to enter with rails  Entrance Stairs-Rails: Right  Entrance Stairs-Number of Steps: 5  Bathroom Shower/Tub: Tub/shower unit  Bathroom Toilet: Standard  Bathroom Equipment: Shower chair without back  Home Living Comments: denies falls  Prior Function:  Level of [place]: Independent with ADLs and functional transfers, Independent with homemaking with ambulation  ADL Assistance: Independent  Homemaking Assistance: Independent  Ambulatory Assistance: Independent (with cane)  IADL History:  Homemaking Responsibilities: No (reports wife has been completing IADLs)  ADL:  Eating Assistance: Independent (anticipated)  Grooming Assistance: Independent (anticipated)  Bathing  Assistance: Minimal (anticipated seated)  UE Dressing Assistance: Stand by (SBA for gown management standing)  LE Dressing Assistance: Minimal (anticipated)  Toileting Assistance with Device: Stand by (anticipated)  Activity Tolerance:  Endurance: Tolerates 10 - 20 min exercise with multiple rests  Bed Mobility/Transfers: Bed Mobility  Bed Mobility: Yes  Bed Mobility 1  Bed Mobility 1: Supine to sitting  Level of Assistance 1: Close supervision  Bed Mobility Comments 1: HOB elevated    Transfers  Transfer: Yes  Transfer 1  Transfer From 1: Sit to, Stand to  Transfer to 1: Stand, Sit  Technique 1: Sit to stand, Stand to sit  Transfer Device 1: Cane  Transfer Level of Assistance 1: Contact guard    Functional Mobility:  Functional Mobility  Functional Mobility Performed: Yes  Functional Mobility 1  Surface 1: Level tile  Device 1: Single point cane  Assistance 1: Contact guard  Comments 1: max household distance in hallway CGA cane  Sitting Balance:  Static Sitting Balance  Static Sitting-Balance Support: Feet supported  Static Sitting-Level of Assistance: Close supervision  Dynamic Sitting Balance  Dynamic Sitting-Balance Support: Feet supported  Dynamic Sitting-Level of Assistance: Close supervision  Standing Balance:  Static Standing Balance  Static Standing-Balance Support: Right upper extremity supported  Static Standing-Level of Assistance: Contact guard  Dynamic Standing Balance  Dynamic Standing-Balance Support: Right upper extremity supported  Dynamic Standing-Level of Assistance: Contact guard   Modalities:  Modalities Used: No  Sensation:  Light Touch: No apparent deficits  Strength:  Strength Comments: BUE appears at least 3/5 grossly, observed through functional observation  Perception:  Inattention/Neglect: Appears intact  Coordination:  Movements are Fluid and Coordinated: Yes   Hand Function:  Gross Grasp: Functional  Coordination: Functional    Outcome Measures:[facility] Daily Activity  Putting on and  taking off regular lower body clothing: A little  Bathing (including washing, rinsing, drying): A little  Putting on and taking off regular upper body clothing: A little  Toileting, which includes using toilet, bedpan or urinal: A little  Taking care of personal grooming such as brushing teeth: A little  Eating Meals: None  Daily Activity - Total Score: 19     and OT Adult Other Outcome Measures  4AT: negative    Education Documentation  Precautions, taught by Junior Loyola OT at 1/7/2025 12:43 PM.  Learner: Patient  Readiness: Acceptance  Method: Explanation  Response: Verbalizes Understanding    Body Mechanics, taught by Junior Loyola OT at 1/7/2025 12:43 PM.  Learner: Patient  Readiness: Acceptance  Method: Explanation  Response: Verbalizes Understanding    ADL Training, taught by Junior Loyola OT at 1/7/2025 12:43 PM.  Learner: Patient  Readiness: Acceptance  Method: Explanation  Response: Verbalizes Understanding    Education Comments  No comments found.      Goals:  Encounter Problems       Encounter Problems (Active)       ADLs       Patient with complete lower body dressing with modified independent level of assistance donning and doffing all LE clothes  with PRN adaptive equipment while supported sitting and standing (Progressing)       Start:  01/07/25    Expected End:  01/28/25            Patient will complete toileting including hygiene clothing management/hygiene with modified independent level of assistance and grab bars. (Progressing)       Start:  01/07/25    Expected End:  01/28/25               BALANCE       Pt will maintain dynamic standing balance during ADL task with modified independent level of assistance in order to demonstrate decreased risk of falling and improved postural control. (Progressing)       Start:  01/07/25    Expected End:  01/28/25               COGNITION/SAFETY       Patient will score WFL on standardized cognitive assessment with verbal cues and within reasonable time  frame (Progressing)       Start:  01/07/25    Expected End:  01/28/25               MOBILITY       Patient will perform Functional mobility max Household distances/Community Distances with modified independent level of assistance and least restrictive device in order to improve safety and functional mobility. (Progressing)       Start:  01/07/25    Expected End:  01/28/25               TRANSFERS       Patient will complete sit to stand transfer with modified independent level of assistance and least restrictive device in order to improve safety and prepare for out of bed mobility. (Progressing)       Start:  01/07/25    Expected End:  01/28/25                  Junior Loyola OTR/L

## 2025-01-07 NOTE — PROGRESS NOTES
"Temo Hanson is a 74 y.o. male on day 7 of admission presenting with Dehydration.    Subjective   Need EGD       Objective   Vitals:    01/07/25 0727   BP: 157/74   Pulse: 96   Resp: 17   Temp: 36.8 °C (98.2 °F)   SpO2: 95%       Physical Exam  Constitutional:       Appearance: Normal appearance.   Eyes:      Pupils: Pupils are equal, round, and reactive to light.   Cardiovascular:      Rate and Rhythm: Normal rate.   Pulmonary:      Effort: Pulmonary effort is normal. No respiratory distress.   Abdominal:      General: There is no distension.      Palpations: Abdomen is soft.      Comments: Incision clean, dry, intact   Musculoskeletal:         General: Normal range of motion.      Right lower leg: No edema.      Left lower leg: No edema.   Skin:     General: Skin is warm and dry.   Neurological:      General: No focal deficit present.      Mental Status: He is alert and oriented to person, place, and time.   Psychiatric:         Mood and Affect: Mood normal.         Behavior: Behavior normal.         Last Recorded Vitals  Blood pressure 157/74, pulse 96, temperature 36.8 °C (98.2 °F), temperature source Temporal, resp. rate 17, height 1.854 m (6' 1\"), weight 75.2 kg (165 lb 12.6 oz), SpO2 95%.  Intake/Output last 3 Shifts:  I/O last 3 completed shifts:  In: 720 (9.6 mL/kg) [P.O.:720]  Out: 450 (6 mL/kg) [Urine:450 (0.2 mL/kg/hr)]  Weight: 75.2 kg     Relevant Results  Lab Results   Component Value Date    WBC 2.8 (L) 01/07/2025    HGB 8.3 (L) 01/07/2025    HCT 26.7 (L) 01/07/2025    MCV 98 01/07/2025     01/07/2025     Lab Results   Component Value Date    GLUCOSE 117 (H) 01/07/2025    CALCIUM 8.6 01/07/2025     01/07/2025    K 3.9 01/07/2025    CO2 25 01/07/2025     01/07/2025    BUN 16 01/07/2025    CREATININE 1.06 01/07/2025     amLODIPine, 10 mg, oral, Daily  calcium carbonate, 500 mg, oral, BID AC  gabapentin, 200 mg, oral, Daily  heparin (porcine), 5,000 Units, subcutaneous, q8h  insulin " glargine, 8 Units, subcutaneous, Nightly  insulin lispro, 0-10 Units, subcutaneous, TID AC  insulin lispro, 4 Units, subcutaneous, TID AC  lidocaine, 1 patch, transdermal, q24h  magnesium sulfate, 2 g, intravenous, Once  mycophenolate, 1,000 mg, oral, q12h  nystatin, 5 mL, Swish & Swallow, 4x daily  pantoprazole, 40 mg, oral, BID AC  predniSONE, 15 mg, oral, Daily  rosuvastatin, 10 mg, oral, Nightly  sodium phosphate, 21 mmol, intravenous, Once  sulfamethoxazole-trimethoprim, 1 tablet, oral, Daily  tacrolimus, 2.5 mg, oral, q12h  thiamine, 500 mg, intravenous, Daily  valGANciclovir, 450 mg, oral, q24h        Assessment/Plan   Assessment & Plan  Dehydration    Alactasia syndrome    DDKT  12/21   Kidney allograft function   Stable creatinine 1.06    Immunosuppression   Tac 2.5/2.5   MMF 1000/1000    Pred 20, decrease to 15    Megaesophagus, 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      Mike Zamora MD

## 2025-01-08 ENCOUNTER — ANESTHESIA (OUTPATIENT)
Dept: GASTROENTEROLOGY | Facility: HOSPITAL | Age: 75
End: 2025-01-08
Payer: COMMERCIAL

## 2025-01-08 ENCOUNTER — APPOINTMENT (OUTPATIENT)
Dept: GASTROENTEROLOGY | Facility: HOSPITAL | Age: 75
DRG: 391 | End: 2025-01-08
Payer: COMMERCIAL

## 2025-01-08 ENCOUNTER — ANESTHESIA EVENT (OUTPATIENT)
Dept: GASTROENTEROLOGY | Facility: HOSPITAL | Age: 75
End: 2025-01-08
Payer: COMMERCIAL

## 2025-01-08 ENCOUNTER — APPOINTMENT (OUTPATIENT)
Dept: PHARMACY | Facility: HOSPITAL | Age: 75
End: 2025-01-08
Payer: COMMERCIAL

## 2025-01-08 LAB
ALBUMIN SERPL BCP-MCNC: 3 G/DL (ref 3.4–5)
ANION GAP SERPL CALC-SCNC: 11 MMOL/L (ref 10–20)
BUN SERPL-MCNC: 14 MG/DL (ref 6–23)
CALCIUM SERPL-MCNC: 8.2 MG/DL (ref 8.6–10.6)
CHLORIDE SERPL-SCNC: 105 MMOL/L (ref 98–107)
CO2 SERPL-SCNC: 26 MMOL/L (ref 21–32)
CREAT SERPL-MCNC: 1.25 MG/DL (ref 0.5–1.3)
EGFRCR SERPLBLD CKD-EPI 2021: 60 ML/MIN/1.73M*2
ERYTHROCYTE [DISTWIDTH] IN BLOOD BY AUTOMATED COUNT: 15.4 % (ref 11.5–14.5)
FERRITIN SERPL-MCNC: 928 NG/ML (ref 20–300)
GLUCOSE BLD MANUAL STRIP-MCNC: 150 MG/DL (ref 74–99)
GLUCOSE BLD MANUAL STRIP-MCNC: 153 MG/DL (ref 74–99)
GLUCOSE BLD MANUAL STRIP-MCNC: 182 MG/DL (ref 74–99)
GLUCOSE BLD MANUAL STRIP-MCNC: 187 MG/DL (ref 74–99)
GLUCOSE SERPL-MCNC: 176 MG/DL (ref 74–99)
HCT VFR BLD AUTO: 25.3 % (ref 41–52)
HGB BLD-MCNC: 8 G/DL (ref 13.5–17.5)
IRON SATN MFR SERPL: 22 % (ref 25–45)
IRON SERPL-MCNC: 31 UG/DL (ref 35–150)
MAGNESIUM SERPL-MCNC: 1.84 MG/DL (ref 1.6–2.4)
MCH RBC QN AUTO: 31 PG (ref 26–34)
MCHC RBC AUTO-ENTMCNC: 31.6 G/DL (ref 32–36)
MCV RBC AUTO: 98 FL (ref 80–100)
NRBC BLD-RTO: 0 /100 WBCS (ref 0–0)
PHOSPHATE SERPL-MCNC: 2.5 MG/DL (ref 2.5–4.9)
PLATELET # BLD AUTO: 188 X10*3/UL (ref 150–450)
POTASSIUM SERPL-SCNC: 3.7 MMOL/L (ref 3.5–5.3)
RBC # BLD AUTO: 2.58 X10*6/UL (ref 4.5–5.9)
SODIUM SERPL-SCNC: 138 MMOL/L (ref 136–145)
TACROLIMUS BLD-MCNC: 6.8 NG/ML
TIBC SERPL-MCNC: 140 UG/DL (ref 240–445)
UIBC SERPL-MCNC: 109 UG/DL (ref 110–370)
WBC # BLD AUTO: 2.5 X10*3/UL (ref 4.4–11.3)

## 2025-01-08 PROCEDURE — 2500000004 HC RX 250 GENERAL PHARMACY W/ HCPCS (ALT 636 FOR OP/ED)

## 2025-01-08 PROCEDURE — 7100000009 HC PHASE TWO TIME - INITIAL BASE CHARGE

## 2025-01-08 PROCEDURE — 2500000002 HC RX 250 W HCPCS SELF ADMINISTERED DRUGS (ALT 637 FOR MEDICARE OP, ALT 636 FOR OP/ED)

## 2025-01-08 PROCEDURE — 99232 SBSQ HOSP IP/OBS MODERATE 35: CPT | Performed by: TRANSPLANT SURGERY

## 2025-01-08 PROCEDURE — 80197 ASSAY OF TACROLIMUS: CPT | Performed by: STUDENT IN AN ORGANIZED HEALTH CARE EDUCATION/TRAINING PROGRAM

## 2025-01-08 PROCEDURE — 2500000001 HC RX 250 WO HCPCS SELF ADMINISTERED DRUGS (ALT 637 FOR MEDICARE OP)

## 2025-01-08 PROCEDURE — 91010 ESOPHAGUS MOTILITY STUDY: CPT | Performed by: SURGERY

## 2025-01-08 PROCEDURE — 2780000003 HC OR 278 NO HCPCS

## 2025-01-08 PROCEDURE — 7100000010 HC PHASE TWO TIME - EACH INCREMENTAL 1 MINUTE

## 2025-01-08 PROCEDURE — 91010 ESOPHAGUS MOTILITY STUDY: CPT

## 2025-01-08 PROCEDURE — 2500000004 HC RX 250 GENERAL PHARMACY W/ HCPCS (ALT 636 FOR OP/ED): Performed by: PHYSICIAN ASSISTANT

## 2025-01-08 PROCEDURE — 0DH63UZ INSERTION OF FEEDING DEVICE INTO STOMACH, PERCUTANEOUS APPROACH: ICD-10-PCS | Performed by: SURGERY

## 2025-01-08 PROCEDURE — 7100000002 HC RECOVERY ROOM TIME - EACH INCREMENTAL 1 MINUTE

## 2025-01-08 PROCEDURE — 2500000005 HC RX 250 GENERAL PHARMACY W/O HCPCS

## 2025-01-08 PROCEDURE — 3700000002 HC GENERAL ANESTHESIA TIME - EACH INCREMENTAL 1 MINUTE

## 2025-01-08 PROCEDURE — 2500000002 HC RX 250 W HCPCS SELF ADMINISTERED DRUGS (ALT 637 FOR MEDICARE OP, ALT 636 FOR OP/ED): Performed by: STUDENT IN AN ORGANIZED HEALTH CARE EDUCATION/TRAINING PROGRAM

## 2025-01-08 PROCEDURE — 82947 ASSAY GLUCOSE BLOOD QUANT: CPT

## 2025-01-08 PROCEDURE — 2500000004 HC RX 250 GENERAL PHARMACY W/ HCPCS (ALT 636 FOR OP/ED): Performed by: ANESTHESIOLOGIST ASSISTANT

## 2025-01-08 PROCEDURE — 85027 COMPLETE CBC AUTOMATED: CPT | Performed by: STUDENT IN AN ORGANIZED HEALTH CARE EDUCATION/TRAINING PROGRAM

## 2025-01-08 PROCEDURE — 80069 RENAL FUNCTION PANEL: CPT

## 2025-01-08 PROCEDURE — 43246 EGD PLACE GASTROSTOMY TUBE: CPT | Performed by: SURGERY

## 2025-01-08 PROCEDURE — A43246 PR EDG PERCUTANEOUS PLACEMENT GASTROSTOMY TUBE: Performed by: ANESTHESIOLOGIST ASSISTANT

## 2025-01-08 PROCEDURE — 82728 ASSAY OF FERRITIN: CPT | Performed by: PHYSICIAN ASSISTANT

## 2025-01-08 PROCEDURE — 7100000001 HC RECOVERY ROOM TIME - INITIAL BASE CHARGE

## 2025-01-08 PROCEDURE — A43246 PR EDG PERCUTANEOUS PLACEMENT GASTROSTOMY TUBE: Performed by: STUDENT IN AN ORGANIZED HEALTH CARE EDUCATION/TRAINING PROGRAM

## 2025-01-08 PROCEDURE — 83540 ASSAY OF IRON: CPT | Performed by: PHYSICIAN ASSISTANT

## 2025-01-08 PROCEDURE — 2500000004 HC RX 250 GENERAL PHARMACY W/ HCPCS (ALT 636 FOR OP/ED): Performed by: STUDENT IN AN ORGANIZED HEALTH CARE EDUCATION/TRAINING PROGRAM

## 2025-01-08 PROCEDURE — 91038 ESOPH IMPED FUNCT TEST > 1HR: CPT | Performed by: SURGERY

## 2025-01-08 PROCEDURE — 2500000001 HC RX 250 WO HCPCS SELF ADMINISTERED DRUGS (ALT 637 FOR MEDICARE OP): Performed by: PHYSICIAN ASSISTANT

## 2025-01-08 PROCEDURE — 36416 COLLJ CAPILLARY BLOOD SPEC: CPT | Performed by: STUDENT IN AN ORGANIZED HEALTH CARE EDUCATION/TRAINING PROGRAM

## 2025-01-08 PROCEDURE — 99233 SBSQ HOSP IP/OBS HIGH 50: CPT | Performed by: STUDENT IN AN ORGANIZED HEALTH CARE EDUCATION/TRAINING PROGRAM

## 2025-01-08 PROCEDURE — 1100000001 HC PRIVATE ROOM DAILY

## 2025-01-08 PROCEDURE — 3E0G76Z INTRODUCTION OF NUTRITIONAL SUBSTANCE INTO UPPER GI, VIA NATURAL OR ARTIFICIAL OPENING: ICD-10-PCS | Performed by: SURGERY

## 2025-01-08 PROCEDURE — 83735 ASSAY OF MAGNESIUM: CPT | Performed by: STUDENT IN AN ORGANIZED HEALTH CARE EDUCATION/TRAINING PROGRAM

## 2025-01-08 PROCEDURE — 99100 ANES PT EXTEME AGE<1 YR&>70: CPT | Performed by: STUDENT IN AN ORGANIZED HEALTH CARE EDUCATION/TRAINING PROGRAM

## 2025-01-08 PROCEDURE — 2500000001 HC RX 250 WO HCPCS SELF ADMINISTERED DRUGS (ALT 637 FOR MEDICARE OP): Performed by: STUDENT IN AN ORGANIZED HEALTH CARE EDUCATION/TRAINING PROGRAM

## 2025-01-08 PROCEDURE — 3700000001 HC GENERAL ANESTHESIA TIME - INITIAL BASE CHARGE

## 2025-01-08 RX ORDER — SODIUM CHLORIDE, SODIUM LACTATE, POTASSIUM CHLORIDE, CALCIUM CHLORIDE 600; 310; 30; 20 MG/100ML; MG/100ML; MG/100ML; MG/100ML
INJECTION, SOLUTION INTRAVENOUS CONTINUOUS PRN
Status: DISCONTINUED | OUTPATIENT
Start: 2025-01-08 | End: 2025-01-08

## 2025-01-08 RX ORDER — TACROLIMUS 1 MG/1
3 CAPSULE ORAL EVERY 12 HOURS
Status: DISCONTINUED | OUTPATIENT
Start: 2025-01-08 | End: 2025-01-10

## 2025-01-08 RX ORDER — ROCURONIUM BROMIDE 10 MG/ML
INJECTION, SOLUTION INTRAVENOUS AS NEEDED
Status: DISCONTINUED | OUTPATIENT
Start: 2025-01-08 | End: 2025-01-08

## 2025-01-08 RX ORDER — ONDANSETRON HYDROCHLORIDE 2 MG/ML
4 INJECTION, SOLUTION INTRAVENOUS ONCE AS NEEDED
Status: DISCONTINUED | OUTPATIENT
Start: 2025-01-08 | End: 2025-01-11

## 2025-01-08 RX ORDER — LABETALOL HYDROCHLORIDE 5 MG/ML
INJECTION, SOLUTION INTRAVENOUS AS NEEDED
Status: DISCONTINUED | OUTPATIENT
Start: 2025-01-08 | End: 2025-01-08

## 2025-01-08 RX ORDER — LIDOCAINE HYDROCHLORIDE 20 MG/ML
INJECTION, SOLUTION INFILTRATION; PERINEURAL AS NEEDED
Status: DISCONTINUED | OUTPATIENT
Start: 2025-01-08 | End: 2025-01-08

## 2025-01-08 RX ORDER — ACETAMINOPHEN 325 MG/1
650 TABLET ORAL EVERY 6 HOURS
Status: DISCONTINUED | OUTPATIENT
Start: 2025-01-08 | End: 2025-01-15

## 2025-01-08 RX ORDER — PHENYLEPHRINE HCL IN 0.9% NACL 0.4MG/10ML
SYRINGE (ML) INTRAVENOUS AS NEEDED
Status: DISCONTINUED | OUTPATIENT
Start: 2025-01-08 | End: 2025-01-08

## 2025-01-08 RX ORDER — METHOCARBAMOL 500 MG/1
500 TABLET, FILM COATED ORAL EVERY 8 HOURS SCHEDULED
Status: DISCONTINUED | OUTPATIENT
Start: 2025-01-08 | End: 2025-01-11

## 2025-01-08 RX ORDER — HYDROMORPHONE HYDROCHLORIDE 0.2 MG/ML
0.2 INJECTION INTRAMUSCULAR; INTRAVENOUS; SUBCUTANEOUS ONCE
Status: COMPLETED | OUTPATIENT
Start: 2025-01-08 | End: 2025-01-08

## 2025-01-08 RX ORDER — PROPOFOL 10 MG/ML
INJECTION, EMULSION INTRAVENOUS AS NEEDED
Status: DISCONTINUED | OUTPATIENT
Start: 2025-01-08 | End: 2025-01-08

## 2025-01-08 RX ORDER — FENTANYL CITRATE 50 UG/ML
INJECTION, SOLUTION INTRAMUSCULAR; INTRAVENOUS AS NEEDED
Status: DISCONTINUED | OUTPATIENT
Start: 2025-01-08 | End: 2025-01-08

## 2025-01-08 RX ORDER — CEFAZOLIN 1 G/1
INJECTION, POWDER, FOR SOLUTION INTRAVENOUS AS NEEDED
Status: DISCONTINUED | OUTPATIENT
Start: 2025-01-08 | End: 2025-01-08

## 2025-01-08 RX ORDER — CEFAZOLIN SODIUM 2 G/100ML
2 INJECTION, SOLUTION INTRAVENOUS ONCE
Status: DISCONTINUED | OUTPATIENT
Start: 2025-01-08 | End: 2025-01-18

## 2025-01-08 RX ADMIN — FENTANYL CITRATE 50 MCG: 50 INJECTION, SOLUTION INTRAMUSCULAR; INTRAVENOUS at 09:31

## 2025-01-08 RX ADMIN — ACETAMINOPHEN 650 MG: 325 TABLET, FILM COATED ORAL at 13:34

## 2025-01-08 RX ADMIN — HEPARIN SODIUM 5000 UNITS: 5000 INJECTION INTRAVENOUS; SUBCUTANEOUS at 16:22

## 2025-01-08 RX ADMIN — TACROLIMUS 3 MG: 1 CAPSULE ORAL at 18:27

## 2025-01-08 RX ADMIN — SULFAMETHOXAZOLE AND TRIMETHOPRIM 1 TABLET: 400; 80 TABLET ORAL at 08:06

## 2025-01-08 RX ADMIN — SUGAMMADEX 400 MG: 100 INJECTION, SOLUTION INTRAVENOUS at 09:51

## 2025-01-08 RX ADMIN — LIDOCAINE HYDROCHLORIDE 100 MG: 20 INJECTION, SOLUTION INFILTRATION; PERINEURAL at 09:31

## 2025-01-08 RX ADMIN — INSULIN GLARGINE 8 UNITS: 100 INJECTION, SOLUTION SUBCUTANEOUS at 20:12

## 2025-01-08 RX ADMIN — LABETALOL HYDROCHLORIDE 5 MG: 5 INJECTION INTRAVENOUS at 09:59

## 2025-01-08 RX ADMIN — CALCIUM CARBONATE (ANTACID) CHEW TAB 500 MG 500 MG: 500 CHEW TAB at 06:46

## 2025-01-08 RX ADMIN — Medication 80 MCG: at 09:39

## 2025-01-08 RX ADMIN — OXYCODONE HYDROCHLORIDE 5 MG: 5 TABLET ORAL at 20:13

## 2025-01-08 RX ADMIN — PROPOFOL 150 MG: 10 INJECTION, EMULSION INTRAVENOUS at 09:31

## 2025-01-08 RX ADMIN — TACROLIMUS 2.5 MG: 1 CAPSULE ORAL at 06:46

## 2025-01-08 RX ADMIN — SODIUM CHLORIDE, POTASSIUM CHLORIDE, SODIUM LACTATE AND CALCIUM CHLORIDE: 600; 310; 30; 20 INJECTION, SOLUTION INTRAVENOUS at 09:25

## 2025-01-08 RX ADMIN — INSULIN LISPRO 2 UNITS: 100 INJECTION, SOLUTION INTRAVENOUS; SUBCUTANEOUS at 12:01

## 2025-01-08 RX ADMIN — HEPARIN SODIUM 5000 UNITS: 5000 INJECTION INTRAVENOUS; SUBCUTANEOUS at 08:06

## 2025-01-08 RX ADMIN — GABAPENTIN 200 MG: 100 CAPSULE ORAL at 16:22

## 2025-01-08 RX ADMIN — ROCURONIUM BROMIDE 60 MG: 10 INJECTION INTRAVENOUS at 09:32

## 2025-01-08 RX ADMIN — ACETAMINOPHEN 650 MG: 325 TABLET, FILM COATED ORAL at 20:12

## 2025-01-08 RX ADMIN — PANTOPRAZOLE SODIUM 40 MG: 40 TABLET, DELAYED RELEASE ORAL at 06:46

## 2025-01-08 RX ADMIN — CALCIUM CARBONATE (ANTACID) CHEW TAB 500 MG 500 MG: 500 CHEW TAB at 16:21

## 2025-01-08 RX ADMIN — OXYCODONE HYDROCHLORIDE 5 MG: 5 TABLET ORAL at 11:13

## 2025-01-08 RX ADMIN — MYCOPHENOLATE MOFETIL 1000 MG: 250 CAPSULE ORAL at 06:46

## 2025-01-08 RX ADMIN — NYSTATIN 500000 UNITS: 500000 SUSPENSION ORAL at 13:02

## 2025-01-08 RX ADMIN — METHOCARBAMOL 500 MG: 500 TABLET ORAL at 16:22

## 2025-01-08 RX ADMIN — ONDANSETRON 4 MG: 2 INJECTION INTRAMUSCULAR; INTRAVENOUS at 00:27

## 2025-01-08 RX ADMIN — CEFAZOLIN 2 G: 1 INJECTION, POWDER, FOR SOLUTION INTRAMUSCULAR; INTRAVENOUS at 09:25

## 2025-01-08 RX ADMIN — HYDROMORPHONE HYDROCHLORIDE 0.2 MG: 0.2 INJECTION, SOLUTION INTRAMUSCULAR; INTRAVENOUS; SUBCUTANEOUS at 13:16

## 2025-01-08 RX ADMIN — VALGANCICLOVIR 900 MG: 450 TABLET, FILM COATED ORAL at 14:35

## 2025-01-08 RX ADMIN — OXYCODONE HYDROCHLORIDE 2.5 MG: 5 TABLET ORAL at 18:27

## 2025-01-08 RX ADMIN — MYCOPHENOLATE MOFETIL 1000 MG: 250 CAPSULE ORAL at 18:26

## 2025-01-08 RX ADMIN — PREDNISONE 15 MG: 10 TABLET ORAL at 08:06

## 2025-01-08 RX ADMIN — ROSUVASTATIN CALCIUM 10 MG: 10 TABLET, FILM COATED ORAL at 20:12

## 2025-01-08 RX ADMIN — THIAMINE HYDROCHLORIDE 500 MG: 100 INJECTION, SOLUTION INTRAMUSCULAR; INTRAVENOUS at 08:07

## 2025-01-08 RX ADMIN — HEPARIN SODIUM 5000 UNITS: 5000 INJECTION INTRAVENOUS; SUBCUTANEOUS at 23:27

## 2025-01-08 RX ADMIN — METHOCARBAMOL 500 MG: 500 TABLET ORAL at 21:48

## 2025-01-08 RX ADMIN — LIDOCAINE 4% 1 PATCH: 40 PATCH TOPICAL at 20:22

## 2025-01-08 RX ADMIN — NYSTATIN 500000 UNITS: 500000 SUSPENSION ORAL at 18:26

## 2025-01-08 RX ADMIN — AMLODIPINE BESYLATE 10 MG: 10 TABLET ORAL at 08:05

## 2025-01-08 RX ADMIN — INSULIN LISPRO 2 UNITS: 100 INJECTION, SOLUTION INTRAVENOUS; SUBCUTANEOUS at 08:07

## 2025-01-08 RX ADMIN — PANTOPRAZOLE SODIUM 40 MG: 40 TABLET, DELAYED RELEASE ORAL at 16:22

## 2025-01-08 ASSESSMENT — PAIN DESCRIPTION - LOCATION
LOCATION: ABDOMEN

## 2025-01-08 ASSESSMENT — COGNITIVE AND FUNCTIONAL STATUS - GENERAL
STANDING UP FROM CHAIR USING ARMS: A LITTLE
CLIMB 3 TO 5 STEPS WITH RAILING: A LOT
MOBILITY SCORE: 18
STANDING UP FROM CHAIR USING ARMS: A LITTLE
WALKING IN HOSPITAL ROOM: A LOT
CLIMB 3 TO 5 STEPS WITH RAILING: A LOT
MOBILITY SCORE: 18
DAILY ACTIVITIY SCORE: 24
MOVING TO AND FROM BED TO CHAIR: A LITTLE
MOBILITY SCORE: 20
CLIMB 3 TO 5 STEPS WITH RAILING: A LOT
WALKING IN HOSPITAL ROOM: A LOT
DAILY ACTIVITIY SCORE: 23
DRESSING REGULAR LOWER BODY CLOTHING: A LITTLE
STANDING UP FROM CHAIR USING ARMS: A LITTLE
WALKING IN HOSPITAL ROOM: A LITTLE
DAILY ACTIVITIY SCORE: 24
MOVING TO AND FROM BED TO CHAIR: A LITTLE

## 2025-01-08 ASSESSMENT — PAIN - FUNCTIONAL ASSESSMENT
PAIN_FUNCTIONAL_ASSESSMENT: 0-10

## 2025-01-08 ASSESSMENT — PAIN SCALES - GENERAL
PAINLEVEL_OUTOF10: 3
PAINLEVEL_OUTOF10: 0 - NO PAIN
PAINLEVEL_OUTOF10: 0 - NO PAIN
PAINLEVEL_OUTOF10: 7
PAINLEVEL_OUTOF10: 5 - MODERATE PAIN
PAINLEVEL_OUTOF10: 0 - NO PAIN
PAINLEVEL_OUTOF10: 9
PAINLEVEL_OUTOF10: 4
PAINLEVEL_OUTOF10: 0 - NO PAIN
PAINLEVEL_OUTOF10: 10 - WORST POSSIBLE PAIN

## 2025-01-08 ASSESSMENT — PAIN DESCRIPTION - ORIENTATION
ORIENTATION: LEFT;LOWER;ANTERIOR
ORIENTATION: LOWER;ANTERIOR;LEFT
ORIENTATION: LEFT;LOWER;ANTERIOR
ORIENTATION: LEFT

## 2025-01-08 ASSESSMENT — COLUMBIA-SUICIDE SEVERITY RATING SCALE - C-SSRS
6. HAVE YOU EVER DONE ANYTHING, STARTED TO DO ANYTHING, OR PREPARED TO DO ANYTHING TO END YOUR LIFE?: NO
1. IN THE PAST MONTH, HAVE YOU WISHED YOU WERE DEAD OR WISHED YOU COULD GO TO SLEEP AND NOT WAKE UP?: NO
2. HAVE YOU ACTUALLY HAD ANY THOUGHTS OF KILLING YOURSELF?: NO

## 2025-01-08 NOTE — CARE PLAN
Problem: Skin  Goal: Decreased wound size/increased tissue granulation at next dressing change  Outcome: Progressing  Flowsheets (Taken 1/8/2025 1721)  Decreased wound size/increased tissue granulation at next dressing change:   Promote sleep for wound healing   Protective dressings over bony prominences  Goal: Participates in plan/prevention/treatment measures  Outcome: Progressing  Flowsheets (Taken 1/8/2025 1721)  Participates in plan/prevention/treatment measures:   Elevate heels   Increase activity/out of bed for meals  Goal: Prevent/manage excess moisture  Outcome: Progressing  Flowsheets (Taken 1/8/2025 1721)  Prevent/manage excess moisture: Moisturize dry skin  Goal: Prevent/minimize sheer/friction injuries  Outcome: Progressing  Flowsheets (Taken 1/8/2025 1721)  Prevent/minimize sheer/friction injuries:   Increase activity/out of bed for meals   Turn/reposition every 2 hours/use positioning/transfer devices  Goal: Promote/optimize nutrition  Outcome: Progressing  Flowsheets (Taken 1/8/2025 1721)  Promote/optimize nutrition: Monitor/record intake including meals  Goal: Promote skin healing  Outcome: Progressing  Flowsheets (Taken 1/8/2025 1721)  Promote skin healing:   Turn/reposition every 2 hours/use positioning/transfer devices   Protective dressings over bony prominences   Assess skin/pad under line(s)/device(s)     Problem: Pain  Goal: Takes deep breaths with improved pain control throughout the shift  1/8/2025 1721 by Katt Moody RN  Outcome: Progressing  1/8/2025 1720 by Katt Moody RN  Outcome: Progressing  Goal: Turns in bed with improved pain control throughout the shift  1/8/2025 1721 by Katt Moody RN  Outcome: Progressing  1/8/2025 1720 by Katt Moody RN  Outcome: Progressing  Goal: Walks with improved pain control throughout the shift  1/8/2025 1721 by Katt Moody RN  Outcome: Progressing  1/8/2025 1720 by Katt Moody RN  Outcome: Progressing  Goal:  Performs ADL's with improved pain control throughout shift  1/8/2025 1721 by Katt Moody RN  Outcome: Progressing  1/8/2025 1720 by Katt Moody RN  Outcome: Progressing  Goal: Participates in PT with improved pain control throughout the shift  1/8/2025 1721 by Katt Moody RN  Outcome: Progressing  1/8/2025 1720 by Katt Moody RN  Outcome: Progressing  Goal: Free from opioid side effects throughout the shift  1/8/2025 1721 by Katt Moody RN  Outcome: Progressing  1/8/2025 1720 by Katt Moody RN  Outcome: Progressing  Goal: Free from acute confusion related to pain meds throughout the shift  1/8/2025 1721 by Katt Moody RN  Outcome: Progressing  1/8/2025 1720 by Katt Moody RN  Outcome: Progressing   The patient's goals for the shift include get better    The clinical goals for the shift include monitor I/O, Q2turn    Over the shift, the patient did make progress toward the following goals.

## 2025-01-08 NOTE — PROGRESS NOTES
Music Therapy Note    Temo Hanson     Therapy Session  Referral Type: New referral this admission  Session Start Time: 1522  Session End Time: 1522  Intervention Delivery: In-person  Conflict of Service: Asleep               Treatment/Interventions       Post-assessment  Total Session Time (min): 0 minutes    Narrative  Assessment Detail: Patient somnolent at time of MT's attempt. MT did not attempt to wake pt.  Follow-up: MT to reattempt at another time as applicable.    Education Documentation  No documentation found.

## 2025-01-08 NOTE — PROGRESS NOTES
"Temo Hanson is a 74 y.o. male on day 8 of admission presenting with Dehydration.    Subjective   S/p EGD and PEG       Objective   Vitals:    01/08/25 1158   BP: 149/69   Pulse: 79   Resp: 18   Temp: 36.6 °C (97.9 °F)   SpO2: 94%       Physical Exam  Constitutional:       Appearance: Normal appearance.   Eyes:      Pupils: Pupils are equal, round, and reactive to light.   Cardiovascular:      Rate and Rhythm: Normal rate.   Pulmonary:      Effort: Pulmonary effort is normal. No respiratory distress.   Abdominal:      General: There is no distension.      Palpations: Abdomen is soft.      Comments: Incision clean, dry, intact   Musculoskeletal:         General: Normal range of motion.      Right lower leg: No edema.      Left lower leg: No edema.   Skin:     General: Skin is warm and dry.   Neurological:      General: No focal deficit present.      Mental Status: He is alert and oriented to person, place, and time.   Psychiatric:         Mood and Affect: Mood normal.         Behavior: Behavior normal.         Last Recorded Vitals  Blood pressure 149/69, pulse 79, temperature 36.6 °C (97.9 °F), temperature source Temporal, resp. rate 18, height 1.854 m (6' 1\"), weight 76 kg (167 lb 8.8 oz), SpO2 94%.  Intake/Output last 3 Shifts:  I/O last 3 completed shifts:  In: 400 (5.3 mL/kg) [P.O.:400]  Out: 600 (7.9 mL/kg) [Urine:600 (0.2 mL/kg/hr)]  Weight: 76 kg     Relevant Results  Lab Results   Component Value Date    WBC 2.5 (L) 01/08/2025    HGB 8.0 (L) 01/08/2025    HCT 25.3 (L) 01/08/2025    MCV 98 01/08/2025     01/08/2025     Lab Results   Component Value Date    GLUCOSE 176 (H) 01/08/2025    CALCIUM 8.2 (L) 01/08/2025     01/08/2025    K 3.7 01/08/2025    CO2 26 01/08/2025     01/08/2025    BUN 14 01/08/2025    CREATININE 1.25 01/08/2025     amLODIPine, 10 mg, oral, Daily  calcium carbonate, 500 mg, oral, BID AC  ceFAZolin, 2 g, intravenous, Once  gabapentin, 200 mg, oral, Daily  heparin " (porcine), 5,000 Units, subcutaneous, q8h  insulin glargine, 8 Units, subcutaneous, Nightly  insulin lispro, 0-10 Units, subcutaneous, TID AC  insulin lispro, 4 Units, subcutaneous, TID AC  lidocaine, 5 mL, infiltration, Once  lidocaine, 1 patch, transdermal, q24h  mycophenolate, 1,000 mg, oral, q12h  nystatin, 5 mL, Swish & Swallow, 4x daily  pantoprazole, 40 mg, oral, BID AC  predniSONE, 15 mg, oral, Daily  rosuvastatin, 10 mg, oral, Nightly  sulfamethoxazole-trimethoprim, 1 tablet, oral, Daily  tacrolimus, 2.5 mg, oral, q12h  valGANciclovir, 900 mg, oral, q24h        Assessment/Plan   Assessment & Plan  Dehydration    Alactasia syndrome    DDKT  12/21   Kidney allograft function   Stable creatinine 1.06    Immunosuppression   Tac 2.5/2.5   MMF 1000/1000    Pred 20, decrease to 15    Megaesophagus, 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      Mike Zamora MD

## 2025-01-08 NOTE — SIGNIFICANT EVENT
01/08/25 1047   Patient Interaction   Organ Kidney   Type of Interaction Morning rounds   Interdisciplinary Rounds   Attendance Physician;FILI;Dietitian;Pharmacist   Topics Discussed Diet;Medications;Blood test results;Imaging/test results     Dispo: Continue inpatient care, plan PEG today, will initiate TF per RD recs this PM

## 2025-01-08 NOTE — CARE PLAN
Problem: Pain  Goal: Takes deep breaths with improved pain control throughout the shift  Outcome: Progressing  Goal: Turns in bed with improved pain control throughout the shift  Outcome: Progressing  Goal: Walks with improved pain control throughout the shift  Outcome: Progressing  Goal: Performs ADL's with improved pain control throughout shift  Outcome: Progressing  Goal: Participates in PT with improved pain control throughout the shift  Outcome: Progressing  Goal: Free from opioid side effects throughout the shift  Outcome: Progressing  Goal: Free from acute confusion related to pain meds throughout the shift  Outcome: Progressing   The patient's goals for the shift include get better    The clinical goals for the shift include remains hds throughout shift

## 2025-01-08 NOTE — ANESTHESIA POSTPROCEDURE EVALUATION
Patient: Temo Hanson    Procedure Summary       Date: 01/08/25 Room / Location: [facility]    Anesthesia Start: 0924 Anesthesia Stop: 1005    Procedure: EGD Diagnosis: Dysphagia, unspecified type    Scheduled Providers: Oswaldo Perez MD Responsible Provider: Orlando Kimble DO    Anesthesia Type: general ASA Status: 3            Anesthesia Type: general    Vitals Value Taken Time   /50 01/08/25 1006   Temp 36 01/08/25 1006   Pulse 74 01/08/25 1006   Resp 19 01/08/25 1006   SpO2 100 01/08/25 1006       Anesthesia Post Evaluation    Patient location during evaluation: PACU  Patient participation: complete - patient cannot participate  Level of consciousness: lethargic  Pain management: adequate  Airway patency: patent  Cardiovascular status: acceptable  Respiratory status: acceptable  Hydration status: acceptable  Postoperative Nausea and Vomiting: none        There were no known notable events for this encounter.

## 2025-01-08 NOTE — ANESTHESIA PREPROCEDURE EVALUATION
Patient: Temo Hanson    Procedure Information       Date/Time: 01/08/25 0900    Scheduled providers: Oswaldo Preez MD    Procedure: EGD    Location: [facility]            Relevant Problems   Anesthesia (within normal limits)      Cardiac   (+) Cardiac pacemaker in situ   (+) Complete heart block   (+) Essential hypertension   (+) Pure hypercholesterolemia   (+) SVT (supraventricular tachycardia) (CMS-HCC)   (+) Third degree heart block      Pulmonary   (+) Pneumonia of right lower lobe due to infectious organism      GI   (+) Dysphagia   (+) GIB (gastrointestinal bleeding)   (+) Stricture esophagus      /Renal   (+) Acute lower UTI   (+) ESRD (end stage renal disease) (Multi)   (+) 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 (Multi)   (+) Malignant neoplasm of prostate (Multi)      Endocrine   (+) Type 2 diabetes mellitus with diabetic neuropathy (Multi)   (+) Type 2 diabetes mellitus, with long-term current use of insulin      Hematology   (+) Iron deficiency anemia secondary to inadequate dietary iron intake   (+) Microcytic anemia      ID   (+) Acute lower UTI   (+) Onychomycosis   (+) Pneumonia of right lower lobe due to infectious organism       Clinical information reviewed:   Tobacco  Allergies  Meds   Med Hx  Surg Hx   Fam Hx  Soc Hx        NPO Detail:  NPO/Void Status  Carbohydrate Drink Given Prior to Surgery? : N  Date of Last Liquid: 01/07/25  Time of Last Liquid: 0715 (sips with meds)  Date of Last Solid: 01/07/25  Time of Last Solid: 2100  Last Intake Type: Food  Time of Last Void: 0829         Physical Exam    Airway  Mallampati: II  TM distance: >3 FB  Neck ROM: full     Cardiovascular - normal exam     Dental - normal exam  Comments: Poor dentition    Pulmonary - normal exam     Abdominal - normal exam             Anesthesia Plan    History of general anesthesia?: yes  History of complications of general anesthesia?: no    ASA 3     general     intravenous induction    Postoperative administration of opioids is intended.  Trial extubation is planned.  Anesthetic plan and risks discussed with patient.    Plan discussed with CAA.

## 2025-01-08 NOTE — DISCHARGE INSTRUCTIONS
Return to normal diet, medications and activities. Today, you may experience slight nasal and throat discomfort along with minimal nosebleed. Follow up with ordering provider in about two weeks for results. Please call [phone] if you need to speak with the manometry nurse about your test.

## 2025-01-08 NOTE — PROGRESS NOTES
"Temo Hanson is a 74 y.o. male on day 8 of admission presenting with Dehydration.    Subjective:  EGD and PEG tube placement today.    Scheduled medications  acetaminophen, 650 mg, oral, q6h  amLODIPine, 10 mg, oral, Daily  calcium carbonate, 500 mg, oral, BID AC  ceFAZolin, 2 g, intravenous, Once  gabapentin, 200 mg, oral, Daily  heparin (porcine), 5,000 Units, subcutaneous, q8h  insulin glargine, 8 Units, subcutaneous, Nightly  insulin lispro, 0-10 Units, subcutaneous, TID AC  insulin lispro, 4 Units, subcutaneous, TID AC  lidocaine, 5 mL, infiltration, Once  lidocaine, 1 patch, transdermal, q24h  methocarbamol, 500 mg, oral, q8h KASSY  mycophenolate, 1,000 mg, oral, q12h  nystatin, 5 mL, Swish & Swallow, 4x daily  pantoprazole, 40 mg, oral, BID AC  predniSONE, 15 mg, oral, Daily  rosuvastatin, 10 mg, oral, Nightly  sulfamethoxazole-trimethoprim, 1 tablet, oral, Daily  tacrolimus, 3 mg, oral, q12h  valGANciclovir, 900 mg, oral, q24h      Continuous medications         PRN medications  PRN medications: carboxymethylcellulose, dextrose, dextrose, docusate sodium, glucagon, glucagon, ondansetron ODT **OR** ondansetron, ondansetron, oxyCODONE, oxyCODONE, polyethylene glycol    Last Recorded Vitals  Blood pressure 149/69, pulse 79, temperature 36.6 °C (97.9 °F), temperature source Temporal, resp. rate 18, height 1.854 m (6' 1\"), weight 76 kg (167 lb 8.8 oz), SpO2 94%.  Intake/Output last 3 Shifts:  I/O last 3 completed shifts:  In: 400 (5.3 mL/kg) [P.O.:400]  Out: 600 (7.9 mL/kg) [Urine:600 (0.2 mL/kg/hr)]  Weight: 76 kg     A&ox3, no distress, pleasant  MMM, no lesions  Lungs with equal air entry, no added sounds  Rrr, no m/r/g  Abd soft, nt, nd, RLQ incision c/d/i, staples intact  No allograft tenderness  No edema bilaterally    Results for orders placed or performed during the hospital encounter of 12/31/24 (from the past 24 hours)   POCT GLUCOSE   Result Value Ref Range    POCT Glucose 189 (H) 74 - 99 mg/dL   CBC "   Result Value Ref Range    WBC 2.5 (L) 4.4 - 11.3 x10*3/uL    nRBC 0.0 0.0 - 0.0 /100 WBCs    RBC 2.58 (L) 4.50 - 5.90 x10*6/uL    Hemoglobin 8.0 (L) 13.5 - 17.5 g/dL    Hematocrit 25.3 (L) 41.0 - 52.0 %    MCV 98 80 - 100 fL    MCH 31.0 26.0 - 34.0 pg    MCHC 31.6 (L) 32.0 - 36.0 g/dL    RDW 15.4 (H) 11.5 - 14.5 %    Platelets 188 150 - 450 x10*3/uL   Magnesium   Result Value Ref Range    Magnesium 1.84 1.60 - 2.40 mg/dL   Tacrolimus level   Result Value Ref Range    Tacrolimus  6.8 <=15.0 ng/mL   Renal Function Panel   Result Value Ref Range    Glucose 176 (H) 74 - 99 mg/dL    Sodium 138 136 - 145 mmol/L    Potassium 3.7 3.5 - 5.3 mmol/L    Chloride 105 98 - 107 mmol/L    Bicarbonate 26 21 - 32 mmol/L    Anion Gap 11 10 - 20 mmol/L    Urea Nitrogen 14 6 - 23 mg/dL    Creatinine 1.25 0.50 - 1.30 mg/dL    eGFR 60 (L) >60 mL/min/1.73m*2    Calcium 8.2 (L) 8.6 - 10.6 mg/dL    Phosphorus 2.5 2.5 - 4.9 mg/dL    Albumin 3.0 (L) 3.4 - 5.0 g/dL   Iron and TIBC   Result Value Ref Range    Iron 31 (L) 35 - 150 ug/dL    UIBC 109 (L) 110 - 370 ug/dL    TIBC 140 (L) 240 - 445 ug/dL    % Saturation 22 (L) 25 - 45 %   Ferritin   Result Value Ref Range    Ferritin 928 (H) 20 - 300 ng/mL   POCT GLUCOSE   Result Value Ref Range    POCT Glucose 182 (H) 74 - 99 mg/dL   POCT GLUCOSE   Result Value Ref Range    POCT Glucose 187 (H) 74 - 99 mg/dL       Assessment/Plan     74 y.o. male with a hx of ESRD secondary to diabetic nephropathy whom received a  donor kidney transplant on 24 by Dr. Zamora with a KDPI of 93% and PRA of 54%. Donor was Hepc -/-and has not met risk factors. EBV + /+., CMV D-/R+. Left donor kidney transplanted to patient right pelvis. Admission weight is 72.7 kg (discharge weight is 76.4 kg ). Pt received a total of 3 mg/kg total of thymoglobulin induction therapy in conjunction with 500mg IV solumedrol.      Post-txp course notable for slow graft function, now with DGF.     Allograft function: s/p DDKT  12/21/24  - DGF. , Cr 5.5,uremic on admission. s/p HD 12/31 for clearance.   Now Cr 1.4-1.5    Immunosuppression:  TAC 3 mg BID  MMF 1000 mg BID  Pred 15 mg/day    Prophylaxis:  PPI  Nystatin 500,000 units QID x 3 mo  Valcyte, renally dosed x 3 mo  TMP-SMX x 6 mo    Esophageal dysphagia  - Upper GI c/f achalasia.   - EGD/PEG tube placement 1/8/25    Blood pressure - ok  - Amlodipine 10 mg daily    CKD-MBD - acceptable   Ca supplement  Check vitamin D  Replete phosphorus    Malnutrition  Vitamins and supplements  Enteral nutrition      Evelyn Trimble MD

## 2025-01-08 NOTE — PROGRESS NOTES
Physical Therapy                 Therapy Communication Note    Patient Name: Temo Hanson  MRN: [id number]  Department:   Room: [address] [place] [address]  Today's Date: 1/8/2025     Discipline: Physical Therapy    PT Missed Visit: Yes     Missed Visit Reason: Missed Visit Reason: Patient in a medical procedure    Missed Time: Attempt    Comment:

## 2025-01-08 NOTE — NURSING NOTE
Esophageal Manometry Test    Referring Provider: Mike Zamora MD  [id number]  [place] [address]    Indication: Dysphagia, unspecified type [R13.10]     Symptoms: Dysphagia, Heartburn, Chest pain    Age: 74 y.o.    After standard calibration of EMOT catheter and educating patient, placed EMOT catheter through nares during EGD to a sufficient depth.  After patient sufficiently recovered, proceeded with EMOT study and completed all studies per protocol.  Extubated EMOT catheter intact.  Discharged patient to home in stable condition without complaint.

## 2025-01-08 NOTE — SIGNIFICANT EVENT
S:    POD 0 from EGD and PEG tube placement. He is overall doing well. Endorses some abdominal pain at his PEG site, but it is responsive to medication. No nausea and vomiting.     O:   Vital signs are stable, normotensive, afebrile, no new or worsening oxygen requirement, not tachycardic  Visit Vitals  /69 (BP Location: Right arm, Patient Position: Lying)   Pulse 79   Temp 36.6 °C (97.9 °F) (Temporal)   Resp 18        Constitutional: no acute distress  Skin: warm and dry overall   Neuro: A/O x4, no gross deficits   HEENT: Atraumatic, no scleral icterus  Cardiac: RRR  Pulmonary: Unlabored respirations   Abdomen: Non distended, moderately tender. PEG site hemostatic, bumper at 5 cm.   GI: Voiding  Surgical Site: Dressing clean dry and intact with minimal amounts of strikethrough, appropriately tender    A/P:  Overall, patient is doing well postoperatively with no acute concerns.  Ghanshyam will sign off at this time. Transplant team encouraged to reach out with any questions or concerns.     Hung Diaz MD  General Surgery PGY-1  [facility] [phone]

## 2025-01-08 NOTE — ANESTHESIA PROCEDURE NOTES
Airway  Date/Time: 1/8/2025 9:33 AM  Urgency: elective    Airway not difficult    Staffing  Performed: KAMERON   Authorized by: Orlando Kimble DO    Performed by: KAMERON Novak  Patient location during procedure: OR    Indications and Patient Condition  Indications for airway management: anesthesia  Spontaneous Ventilation: absent  Sedation level: deep  Preoxygenated: yes  Patient position: sniffing  MILS not maintained throughout  Mask difficulty assessment: 0 - not attempted  Planned trial extubation    Final Airway Details  Final airway type: endotracheal airway      Successful airway: ETT     Successful intubation technique: video laryngoscopy  Facilitating devices/methods: intubating stylet  Endotracheal tube insertion site: oral  Blade size: #4  ETT size (mm): 7.5  Cormack-Lehane Classification: grade I - full view of glottis  Placement verified by: chest auscultation and capnometry   Measured from: lips  ETT to lips (cm): 24  Number of attempts at approach: 1    Additional Comments  RSI, no cricoid

## 2025-01-09 LAB
ALBUMIN SERPL BCP-MCNC: 2.7 G/DL (ref 3.4–5)
ANION GAP SERPL CALC-SCNC: 11 MMOL/L (ref 10–20)
BASOPHILS # BLD MANUAL: 0.05 X10*3/UL (ref 0–0.1)
BASOPHILS NFR BLD MANUAL: 2.7 %
BUN SERPL-MCNC: 12 MG/DL (ref 6–23)
CA-I BLD-SCNC: 1.15 MMOL/L (ref 1.1–1.33)
CALCIUM SERPL-MCNC: 8.1 MG/DL (ref 8.6–10.6)
CHLORIDE SERPL-SCNC: 106 MMOL/L (ref 98–107)
CO2 SERPL-SCNC: 26 MMOL/L (ref 21–32)
CREAT SERPL-MCNC: 1.08 MG/DL (ref 0.5–1.3)
EGFRCR SERPLBLD CKD-EPI 2021: 72 ML/MIN/1.73M*2
EOSINOPHIL # BLD MANUAL: 0.05 X10*3/UL (ref 0–0.4)
EOSINOPHIL NFR BLD MANUAL: 2.6 %
ERYTHROCYTE [DISTWIDTH] IN BLOOD BY AUTOMATED COUNT: 15.7 % (ref 11.5–14.5)
GLUCOSE BLD MANUAL STRIP-MCNC: 129 MG/DL (ref 74–99)
GLUCOSE BLD MANUAL STRIP-MCNC: 184 MG/DL (ref 74–99)
GLUCOSE BLD MANUAL STRIP-MCNC: 328 MG/DL (ref 74–99)
GLUCOSE BLD MANUAL STRIP-MCNC: 98 MG/DL (ref 74–99)
GLUCOSE SERPL-MCNC: 99 MG/DL (ref 74–99)
HCT VFR BLD AUTO: 26.8 % (ref 41–52)
HGB BLD-MCNC: 8.3 G/DL (ref 13.5–17.5)
IMM GRANULOCYTES # BLD AUTO: 0.01 X10*3/UL (ref 0–0.5)
IMM GRANULOCYTES NFR BLD AUTO: 0.5 % (ref 0–0.9)
LYMPHOCYTES # BLD MANUAL: 0.12 X10*3/UL (ref 0.8–3)
LYMPHOCYTES NFR BLD MANUAL: 6.2 %
MAGNESIUM SERPL-MCNC: 1.53 MG/DL (ref 1.6–2.4)
MCH RBC QN AUTO: 30.5 PG (ref 26–34)
MCHC RBC AUTO-ENTMCNC: 31 G/DL (ref 32–36)
MCV RBC AUTO: 99 FL (ref 80–100)
MONOCYTES # BLD MANUAL: 0.12 X10*3/UL (ref 0.05–0.8)
MONOCYTES NFR BLD MANUAL: 6.2 %
NEUTS SEG # BLD MANUAL: 1.65 X10*3/UL (ref 1.6–5)
NEUTS SEG NFR BLD MANUAL: 82.3 %
NRBC BLD-RTO: 0 /100 WBCS (ref 0–0)
PHOSPHATE SERPL-MCNC: 2.1 MG/DL (ref 2.5–4.9)
PLATELET # BLD AUTO: 167 X10*3/UL (ref 150–450)
POTASSIUM SERPL-SCNC: 3.9 MMOL/L (ref 3.5–5.3)
RBC # BLD AUTO: 2.72 X10*6/UL (ref 4.5–5.9)
RBC MORPH BLD: ABNORMAL
SODIUM SERPL-SCNC: 139 MMOL/L (ref 136–145)
TACROLIMUS BLD-MCNC: 6 NG/ML
TOTAL CELLS COUNTED BLD: 113
WBC # BLD AUTO: 2 X10*3/UL (ref 4.4–11.3)

## 2025-01-09 PROCEDURE — 1100000001 HC PRIVATE ROOM DAILY

## 2025-01-09 PROCEDURE — 2500000004 HC RX 250 GENERAL PHARMACY W/ HCPCS (ALT 636 FOR OP/ED)

## 2025-01-09 PROCEDURE — 2500000001 HC RX 250 WO HCPCS SELF ADMINISTERED DRUGS (ALT 637 FOR MEDICARE OP): Performed by: PHYSICIAN ASSISTANT

## 2025-01-09 PROCEDURE — 99233 SBSQ HOSP IP/OBS HIGH 50: CPT | Performed by: STUDENT IN AN ORGANIZED HEALTH CARE EDUCATION/TRAINING PROGRAM

## 2025-01-09 PROCEDURE — 2500000001 HC RX 250 WO HCPCS SELF ADMINISTERED DRUGS (ALT 637 FOR MEDICARE OP): Performed by: STUDENT IN AN ORGANIZED HEALTH CARE EDUCATION/TRAINING PROGRAM

## 2025-01-09 PROCEDURE — 2500000005 HC RX 250 GENERAL PHARMACY W/O HCPCS

## 2025-01-09 PROCEDURE — 2500000005 HC RX 250 GENERAL PHARMACY W/O HCPCS: Performed by: PHYSICIAN ASSISTANT

## 2025-01-09 PROCEDURE — 85007 BL SMEAR W/DIFF WBC COUNT: CPT | Performed by: PHYSICIAN ASSISTANT

## 2025-01-09 PROCEDURE — 2500000004 HC RX 250 GENERAL PHARMACY W/ HCPCS (ALT 636 FOR OP/ED): Performed by: PHYSICIAN ASSISTANT

## 2025-01-09 PROCEDURE — 80197 ASSAY OF TACROLIMUS: CPT | Performed by: STUDENT IN AN ORGANIZED HEALTH CARE EDUCATION/TRAINING PROGRAM

## 2025-01-09 PROCEDURE — 36416 COLLJ CAPILLARY BLOOD SPEC: CPT | Performed by: STUDENT IN AN ORGANIZED HEALTH CARE EDUCATION/TRAINING PROGRAM

## 2025-01-09 PROCEDURE — 2500000001 HC RX 250 WO HCPCS SELF ADMINISTERED DRUGS (ALT 637 FOR MEDICARE OP)

## 2025-01-09 PROCEDURE — 87799 DETECT AGENT NOS DNA QUANT: CPT | Performed by: PHYSICIAN ASSISTANT

## 2025-01-09 PROCEDURE — 82947 ASSAY GLUCOSE BLOOD QUANT: CPT

## 2025-01-09 PROCEDURE — 2500000002 HC RX 250 W HCPCS SELF ADMINISTERED DRUGS (ALT 637 FOR MEDICARE OP, ALT 636 FOR OP/ED)

## 2025-01-09 PROCEDURE — RXMED WILLOW AMBULATORY MEDICATION CHARGE

## 2025-01-09 PROCEDURE — 2500000004 HC RX 250 GENERAL PHARMACY W/ HCPCS (ALT 636 FOR OP/ED): Performed by: STUDENT IN AN ORGANIZED HEALTH CARE EDUCATION/TRAINING PROGRAM

## 2025-01-09 PROCEDURE — 2500000005 HC RX 250 GENERAL PHARMACY W/O HCPCS: Performed by: STUDENT IN AN ORGANIZED HEALTH CARE EDUCATION/TRAINING PROGRAM

## 2025-01-09 PROCEDURE — 82330 ASSAY OF CALCIUM: CPT | Performed by: PHYSICIAN ASSISTANT

## 2025-01-09 PROCEDURE — 2500000002 HC RX 250 W HCPCS SELF ADMINISTERED DRUGS (ALT 637 FOR MEDICARE OP, ALT 636 FOR OP/ED): Performed by: STUDENT IN AN ORGANIZED HEALTH CARE EDUCATION/TRAINING PROGRAM

## 2025-01-09 PROCEDURE — 80069 RENAL FUNCTION PANEL: CPT

## 2025-01-09 PROCEDURE — 85027 COMPLETE CBC AUTOMATED: CPT | Performed by: PHYSICIAN ASSISTANT

## 2025-01-09 PROCEDURE — 99232 SBSQ HOSP IP/OBS MODERATE 35: CPT | Performed by: TRANSPLANT SURGERY

## 2025-01-09 PROCEDURE — 83735 ASSAY OF MAGNESIUM: CPT | Performed by: STUDENT IN AN ORGANIZED HEALTH CARE EDUCATION/TRAINING PROGRAM

## 2025-01-09 RX ORDER — TACROLIMUS 1 MG/1
3 CAPSULE ORAL EVERY 12 HOURS
Start: 2025-01-09 | End: 2025-01-20

## 2025-01-09 RX ORDER — THIAMINE HYDROCHLORIDE 100 MG/ML
500 INJECTION, SOLUTION INTRAMUSCULAR; INTRAVENOUS DAILY
Status: COMPLETED | OUTPATIENT
Start: 2025-01-09 | End: 2025-01-11

## 2025-01-09 RX ORDER — INFANT FORM.IRON LAC-F/DHA/ARA 3.1 G/1
60 POWDER (GRAM) ORAL NIGHTLY
Qty: 2160 ML | Refills: 0 | Status: SHIPPED | OUTPATIENT
Start: 2025-01-09 | End: 2025-01-09

## 2025-01-09 RX ORDER — PREDNISONE 5 MG/1
15 TABLET ORAL DAILY
Start: 2025-01-09 | End: 2025-01-20

## 2025-01-09 RX ORDER — VALGANCICLOVIR 450 MG/1
900 TABLET, FILM COATED ORAL DAILY
Qty: 60 TABLET | Refills: 0 | Status: SHIPPED | OUTPATIENT
Start: 2025-01-09 | End: 2025-01-27 | Stop reason: SDUPTHER

## 2025-01-09 RX ORDER — MYCOPHENOLATE MOFETIL 250 MG/1
500 CAPSULE ORAL EVERY 12 HOURS
Status: DISCONTINUED | OUTPATIENT
Start: 2025-01-09 | End: 2025-01-11

## 2025-01-09 RX ORDER — INFANT FORM.IRON LAC-F/DHA/ARA 3.1 G/1
100 POWDER (GRAM) ORAL NIGHTLY
Qty: 3600 ML | Refills: 0 | Status: SHIPPED | OUTPATIENT
Start: 2025-01-09 | End: 2025-01-16 | Stop reason: HOSPADM

## 2025-01-09 RX ORDER — LIDOCAINE 560 MG/1
1 PATCH PERCUTANEOUS; TOPICAL; TRANSDERMAL DAILY
Status: DISCONTINUED | OUTPATIENT
Start: 2025-01-09 | End: 2025-01-10

## 2025-01-09 RX ORDER — OXYCODONE HYDROCHLORIDE 5 MG/1
10 TABLET ORAL EVERY 6 HOURS PRN
Status: DISCONTINUED | OUTPATIENT
Start: 2025-01-09 | End: 2025-01-11

## 2025-01-09 RX ORDER — LANOLIN ALCOHOL/MO/W.PET/CERES
100 CREAM (GRAM) TOPICAL DAILY
Status: DISCONTINUED | OUTPATIENT
Start: 2025-01-12 | End: 2025-01-23 | Stop reason: HOSPADM

## 2025-01-09 RX ORDER — HYDROMORPHONE HYDROCHLORIDE 0.2 MG/ML
0.2 INJECTION INTRAMUSCULAR; INTRAVENOUS; SUBCUTANEOUS ONCE
Status: COMPLETED | OUTPATIENT
Start: 2025-01-09 | End: 2025-01-09

## 2025-01-09 RX ORDER — LIDOCAINE 560 MG/1
2 PATCH PERCUTANEOUS; TOPICAL; TRANSDERMAL EVERY 24 HOURS
Qty: 28 PATCH | Refills: 0 | Status: SHIPPED | OUTPATIENT
Start: 2025-01-09 | End: 2025-02-03

## 2025-01-09 RX ORDER — NYSTATIN 100000 [USP'U]/ML
5 SUSPENSION ORAL 4 TIMES DAILY
Qty: 200 ML | Refills: 0 | Status: SHIPPED | OUTPATIENT
Start: 2025-01-09 | End: 2025-01-27 | Stop reason: SDUPTHER

## 2025-01-09 RX ORDER — PANTOPRAZOLE SODIUM 40 MG/1
40 TABLET, DELAYED RELEASE ORAL 2 TIMES DAILY
Qty: 60 TABLET | Refills: 0 | Status: SHIPPED | OUTPATIENT
Start: 2025-01-09 | End: 2025-01-17 | Stop reason: HOSPADM

## 2025-01-09 RX ORDER — MAGNESIUM SULFATE HEPTAHYDRATE 40 MG/ML
4 INJECTION, SOLUTION INTRAVENOUS ONCE
Status: COMPLETED | OUTPATIENT
Start: 2025-01-09 | End: 2025-01-09

## 2025-01-09 RX ORDER — ACETAMINOPHEN 325 MG/1
650 TABLET ORAL EVERY 6 HOURS PRN
Start: 2025-01-09 | End: 2025-01-17 | Stop reason: HOSPADM

## 2025-01-09 RX ORDER — OXYCODONE HYDROCHLORIDE 5 MG/1
5 TABLET ORAL EVERY 6 HOURS PRN
Status: DISCONTINUED | OUTPATIENT
Start: 2025-01-09 | End: 2025-01-11

## 2025-01-09 RX ORDER — LIDOCAINE 560 MG/1
1 PATCH PERCUTANEOUS; TOPICAL; TRANSDERMAL DAILY
Status: DISCONTINUED | OUTPATIENT
Start: 2025-01-09 | End: 2025-01-23 | Stop reason: HOSPADM

## 2025-01-09 RX ADMIN — LIDOCAINE 4% 1 PATCH: 40 PATCH TOPICAL at 20:28

## 2025-01-09 RX ADMIN — TACROLIMUS 3 MG: 1 CAPSULE ORAL at 08:07

## 2025-01-09 RX ADMIN — CALCIUM CARBONATE (ANTACID) CHEW TAB 500 MG 500 MG: 500 CHEW TAB at 16:26

## 2025-01-09 RX ADMIN — ACETAMINOPHEN 650 MG: 325 TABLET, FILM COATED ORAL at 13:13

## 2025-01-09 RX ADMIN — NYSTATIN 500000 UNITS: 500000 SUSPENSION ORAL at 14:36

## 2025-01-09 RX ADMIN — PANTOPRAZOLE SODIUM 40 MG: 40 TABLET, DELAYED RELEASE ORAL at 16:26

## 2025-01-09 RX ADMIN — OXYCODONE 5 MG: 5 TABLET ORAL at 20:35

## 2025-01-09 RX ADMIN — POTASSIUM PHOSPHATE, MONOBASIC AND POTASSIUM PHOSPHATE, DIBASIC 30 MMOL: 224; 236 INJECTION, SOLUTION, CONCENTRATE INTRAVENOUS at 15:41

## 2025-01-09 RX ADMIN — ROSUVASTATIN CALCIUM 10 MG: 10 TABLET, FILM COATED ORAL at 20:31

## 2025-01-09 RX ADMIN — MAGNESIUM SULFATE HEPTAHYDRATE 4 G: 40 INJECTION, SOLUTION INTRAVENOUS at 11:11

## 2025-01-09 RX ADMIN — NYSTATIN 500000 UNITS: 500000 SUSPENSION ORAL at 06:45

## 2025-01-09 RX ADMIN — OXYCODONE 10 MG: 5 TABLET ORAL at 13:13

## 2025-01-09 RX ADMIN — INSULIN GLARGINE 8 UNITS: 100 INJECTION, SOLUTION SUBCUTANEOUS at 20:27

## 2025-01-09 RX ADMIN — SULFAMETHOXAZOLE AND TRIMETHOPRIM 1 TABLET: 400; 80 TABLET ORAL at 08:15

## 2025-01-09 RX ADMIN — ACETAMINOPHEN 650 MG: 325 TABLET, FILM COATED ORAL at 01:00

## 2025-01-09 RX ADMIN — HEPARIN SODIUM 5000 UNITS: 5000 INJECTION INTRAVENOUS; SUBCUTANEOUS at 08:14

## 2025-01-09 RX ADMIN — MYCOPHENOLATE MOFETIL 1000 MG: 250 CAPSULE ORAL at 08:07

## 2025-01-09 RX ADMIN — METHOCARBAMOL 500 MG: 500 TABLET ORAL at 14:26

## 2025-01-09 RX ADMIN — MYCOPHENOLATE MOFETIL 500 MG: 250 CAPSULE ORAL at 18:18

## 2025-01-09 RX ADMIN — ACETAMINOPHEN 650 MG: 325 TABLET, FILM COATED ORAL at 06:58

## 2025-01-09 RX ADMIN — VALGANCICLOVIR 900 MG: 450 TABLET, FILM COATED ORAL at 14:26

## 2025-01-09 RX ADMIN — LIDOCAINE 4% 1 PATCH: 40 PATCH TOPICAL at 13:02

## 2025-01-09 RX ADMIN — INSULIN LISPRO 4 UNITS: 100 INJECTION, SOLUTION INTRAVENOUS; SUBCUTANEOUS at 16:30

## 2025-01-09 RX ADMIN — CALCIUM CARBONATE (ANTACID) CHEW TAB 500 MG 500 MG: 500 CHEW TAB at 06:43

## 2025-01-09 RX ADMIN — ACETAMINOPHEN 650 MG: 325 TABLET, FILM COATED ORAL at 20:29

## 2025-01-09 RX ADMIN — HYDROMORPHONE HYDROCHLORIDE 0.2 MG: 0.2 INJECTION, SOLUTION INTRAMUSCULAR; INTRAVENOUS; SUBCUTANEOUS at 00:36

## 2025-01-09 RX ADMIN — TACROLIMUS 3 MG: 1 CAPSULE ORAL at 18:18

## 2025-01-09 RX ADMIN — NYSTATIN 500000 UNITS: 500000 SUSPENSION ORAL at 16:30

## 2025-01-09 RX ADMIN — PANTOPRAZOLE SODIUM 40 MG: 40 TABLET, DELAYED RELEASE ORAL at 06:43

## 2025-01-09 RX ADMIN — THIAMINE HYDROCHLORIDE 500 MG: 100 INJECTION, SOLUTION INTRAMUSCULAR; INTRAVENOUS at 14:26

## 2025-01-09 RX ADMIN — AMLODIPINE BESYLATE 10 MG: 10 TABLET ORAL at 08:14

## 2025-01-09 RX ADMIN — NYSTATIN 500000 UNITS: 500000 SUSPENSION ORAL at 20:29

## 2025-01-09 RX ADMIN — OXYCODONE HYDROCHLORIDE 5 MG: 5 TABLET ORAL at 06:59

## 2025-01-09 RX ADMIN — INSULIN LISPRO 8 UNITS: 100 INJECTION, SOLUTION INTRAVENOUS; SUBCUTANEOUS at 16:30

## 2025-01-09 RX ADMIN — METHOCARBAMOL 500 MG: 500 TABLET ORAL at 06:43

## 2025-01-09 RX ADMIN — HEPARIN SODIUM 5000 UNITS: 5000 INJECTION INTRAVENOUS; SUBCUTANEOUS at 16:25

## 2025-01-09 RX ADMIN — METHOCARBAMOL 500 MG: 500 TABLET ORAL at 21:16

## 2025-01-09 RX ADMIN — LIDOCAINE 4% 1 PATCH: 40 PATCH TOPICAL at 12:00

## 2025-01-09 RX ADMIN — GABAPENTIN 200 MG: 100 CAPSULE ORAL at 08:14

## 2025-01-09 RX ADMIN — PREDNISONE 15 MG: 10 TABLET ORAL at 08:14

## 2025-01-09 ASSESSMENT — PAIN - FUNCTIONAL ASSESSMENT
PAIN_FUNCTIONAL_ASSESSMENT: 0-10

## 2025-01-09 ASSESSMENT — COGNITIVE AND FUNCTIONAL STATUS - GENERAL
DRESSING REGULAR UPPER BODY CLOTHING: A LITTLE
STANDING UP FROM CHAIR USING ARMS: A LITTLE
EATING MEALS: A LITTLE
DAILY ACTIVITIY SCORE: 19
DRESSING REGULAR LOWER BODY CLOTHING: A LITTLE
TOILETING: A LITTLE
STANDING UP FROM CHAIR USING ARMS: A LITTLE
CLIMB 3 TO 5 STEPS WITH RAILING: A LITTLE
STANDING UP FROM CHAIR USING ARMS: A LITTLE
TOILETING: A LITTLE
DRESSING REGULAR LOWER BODY CLOTHING: A LITTLE
EATING MEALS: A LITTLE
DRESSING REGULAR LOWER BODY CLOTHING: A LITTLE
DRESSING REGULAR LOWER BODY CLOTHING: A LITTLE
WALKING IN HOSPITAL ROOM: A LITTLE
EATING MEALS: A LITTLE
TOILETING: A LITTLE
HELP NEEDED FOR BATHING: A LITTLE
TOILETING: A LITTLE
HELP NEEDED FOR BATHING: A LITTLE
STANDING UP FROM CHAIR USING ARMS: A LITTLE
MOBILITY SCORE: 21
EATING MEALS: A LITTLE
CLIMB 3 TO 5 STEPS WITH RAILING: A LITTLE
HELP NEEDED FOR BATHING: A LITTLE
STANDING UP FROM CHAIR USING ARMS: A LITTLE
WALKING IN HOSPITAL ROOM: A LITTLE
DAILY ACTIVITIY SCORE: 19
WALKING IN HOSPITAL ROOM: A LITTLE
DRESSING REGULAR UPPER BODY CLOTHING: A LITTLE
TOILETING: A LITTLE
WALKING IN HOSPITAL ROOM: A LITTLE
EATING MEALS: A LITTLE
TOILETING: A LITTLE
STANDING UP FROM CHAIR USING ARMS: A LITTLE
MOBILITY SCORE: 21
TOILETING: A LITTLE
DRESSING REGULAR LOWER BODY CLOTHING: A LITTLE
DRESSING REGULAR UPPER BODY CLOTHING: A LITTLE
HELP NEEDED FOR BATHING: A LITTLE
WALKING IN HOSPITAL ROOM: A LITTLE
HELP NEEDED FOR BATHING: A LITTLE
WALKING IN HOSPITAL ROOM: A LITTLE
EATING MEALS: A LITTLE
HELP NEEDED FOR BATHING: A LITTLE
EATING MEALS: A LITTLE
DRESSING REGULAR UPPER BODY CLOTHING: A LITTLE
DRESSING REGULAR UPPER BODY CLOTHING: A LITTLE
DAILY ACTIVITIY SCORE: 19
CLIMB 3 TO 5 STEPS WITH RAILING: A LITTLE
CLIMB 3 TO 5 STEPS WITH RAILING: A LITTLE
MOBILITY SCORE: 21
TOILETING: A LITTLE
EATING MEALS: A LITTLE
STANDING UP FROM CHAIR USING ARMS: A LITTLE
HELP NEEDED FOR BATHING: A LITTLE
DAILY ACTIVITIY SCORE: 19
CLIMB 3 TO 5 STEPS WITH RAILING: A LITTLE
EATING MEALS: A LITTLE
DRESSING REGULAR UPPER BODY CLOTHING: A LITTLE
DRESSING REGULAR LOWER BODY CLOTHING: A LITTLE
DRESSING REGULAR UPPER BODY CLOTHING: A LITTLE
CLIMB 3 TO 5 STEPS WITH RAILING: A LITTLE
HELP NEEDED FOR BATHING: A LITTLE
HELP NEEDED FOR BATHING: A LITTLE
EATING MEALS: A LITTLE
TOILETING: A LITTLE
HELP NEEDED FOR BATHING: A LITTLE
EATING MEALS: A LITTLE
DAILY ACTIVITIY SCORE: 19
CLIMB 3 TO 5 STEPS WITH RAILING: A LITTLE
TOILETING: A LITTLE
DRESSING REGULAR UPPER BODY CLOTHING: A LITTLE
MOBILITY SCORE: 21
DAILY ACTIVITIY SCORE: 19
WALKING IN HOSPITAL ROOM: A LITTLE
STANDING UP FROM CHAIR USING ARMS: A LITTLE
DRESSING REGULAR UPPER BODY CLOTHING: A LITTLE
DRESSING REGULAR LOWER BODY CLOTHING: A LITTLE
DRESSING REGULAR UPPER BODY CLOTHING: A LITTLE
CLIMB 3 TO 5 STEPS WITH RAILING: A LITTLE
CLIMB 3 TO 5 STEPS WITH RAILING: A LITTLE
WALKING IN HOSPITAL ROOM: A LITTLE
STANDING UP FROM CHAIR USING ARMS: A LITTLE
DRESSING REGULAR LOWER BODY CLOTHING: A LITTLE
WALKING IN HOSPITAL ROOM: A LITTLE
TOILETING: A LITTLE
HELP NEEDED FOR BATHING: A LITTLE
WALKING IN HOSPITAL ROOM: A LITTLE
EATING MEALS: A LITTLE
STANDING UP FROM CHAIR USING ARMS: A LITTLE
STANDING UP FROM CHAIR USING ARMS: A LITTLE
DRESSING REGULAR LOWER BODY CLOTHING: A LITTLE
MOBILITY SCORE: 21
CLIMB 3 TO 5 STEPS WITH RAILING: A LITTLE
MOBILITY SCORE: 21
DRESSING REGULAR UPPER BODY CLOTHING: A LITTLE
MOBILITY SCORE: 21
CLIMB 3 TO 5 STEPS WITH RAILING: A LITTLE
DRESSING REGULAR UPPER BODY CLOTHING: A LITTLE
STANDING UP FROM CHAIR USING ARMS: A LITTLE
DAILY ACTIVITIY SCORE: 19
DRESSING REGULAR LOWER BODY CLOTHING: A LITTLE
TOILETING: A LITTLE
DAILY ACTIVITIY SCORE: 19
WALKING IN HOSPITAL ROOM: A LITTLE
CLIMB 3 TO 5 STEPS WITH RAILING: A LITTLE
HELP NEEDED FOR BATHING: A LITTLE
MOBILITY SCORE: 21
WALKING IN HOSPITAL ROOM: A LITTLE

## 2025-01-09 ASSESSMENT — PAIN DESCRIPTION - ORIENTATION
ORIENTATION: RIGHT;LEFT;MID;LOWER
ORIENTATION: LEFT
ORIENTATION: LEFT
ORIENTATION: RIGHT;LEFT;LOWER
ORIENTATION: RIGHT;LEFT;LOWER

## 2025-01-09 ASSESSMENT — PAIN SCALES - GENERAL
PAINLEVEL_OUTOF10: 7
PAINLEVEL_OUTOF10: 8
PAINLEVEL_OUTOF10: 10 - WORST POSSIBLE PAIN
PAINLEVEL_OUTOF10: 3
PAINLEVEL_OUTOF10: 5 - MODERATE PAIN
PAINLEVEL_OUTOF10: 0 - NO PAIN
PAINLEVEL_OUTOF10: 7
PAINLEVEL_OUTOF10: 5 - MODERATE PAIN
PAINLEVEL_OUTOF10: 5 - MODERATE PAIN
PAINLEVEL_OUTOF10: 3

## 2025-01-09 ASSESSMENT — PAIN DESCRIPTION - LOCATION
LOCATION: ABDOMEN

## 2025-01-09 NOTE — PROGRESS NOTES
"Temo Hanson is a 74 y.o. male on day 9 of admission presenting with Dehydration.    Subjective   S/p EGD and PEG       Objective   Vitals:    01/09/25 0814   BP: 159/71   Pulse: 103   Resp:    Temp: 37.3 °C (99.1 °F)   SpO2: 95%       Physical Exam  Constitutional:       Appearance: Normal appearance.   Eyes:      Pupils: Pupils are equal, round, and reactive to light.   Cardiovascular:      Rate and Rhythm: Normal rate.   Pulmonary:      Effort: Pulmonary effort is normal. No respiratory distress.   Abdominal:      General: There is no distension.      Palpations: Abdomen is soft.      Comments: Incision clean, dry, intact   Musculoskeletal:         General: Normal range of motion.      Right lower leg: No edema.      Left lower leg: No edema.   Skin:     General: Skin is warm and dry.   Neurological:      General: No focal deficit present.      Mental Status: He is alert and oriented to person, place, and time.   Psychiatric:         Mood and Affect: Mood normal.         Behavior: Behavior normal.         Last Recorded Vitals  Blood pressure 159/71, pulse 103, temperature 37.3 °C (99.1 °F), resp. rate 16, height 1.854 m (6' 1\"), weight 76 kg (167 lb 8.8 oz), SpO2 95%.  Intake/Output last 3 Shifts:  I/O last 3 completed shifts:  In: 240 (3.2 mL/kg) [I.V.:150 (2 mL/kg); NG/GT:90]  Out: 775 (10.2 mL/kg) [Urine:775 (0.3 mL/kg/hr)]  Weight: 76 kg     Relevant Results  Lab Results   Component Value Date    WBC 2.0 (L) 01/09/2025    HGB 8.3 (L) 01/09/2025    HCT 26.8 (L) 01/09/2025    MCV 99 01/09/2025     01/09/2025     Lab Results   Component Value Date    GLUCOSE 99 01/09/2025    CALCIUM 8.1 (L) 01/09/2025     01/09/2025    K 3.9 01/09/2025    CO2 26 01/09/2025     01/09/2025    BUN 12 01/09/2025    CREATININE 1.08 01/09/2025     acetaminophen, 650 mg, oral, q6h  amLODIPine, 10 mg, oral, Daily  calcium carbonate, 500 mg, oral, BID AC  ceFAZolin, 2 g, intravenous, Once  gabapentin, 200 mg, oral, " Daily  heparin (porcine), 5,000 Units, subcutaneous, q8h  insulin glargine, 8 Units, subcutaneous, Nightly  insulin lispro, 0-10 Units, subcutaneous, TID AC  insulin lispro, 4 Units, subcutaneous, TID AC  lidocaine, 5 mL, infiltration, Once  lidocaine, 1 patch, transdermal, q24h  lidocaine, 1 patch, transdermal, Daily  magnesium sulfate, 4 g, intravenous, Once  methocarbamol, 500 mg, oral, q8h KASSY  mycophenolate, 1,000 mg, oral, q12h  nystatin, 5 mL, Swish & Swallow, 4x daily  pantoprazole, 40 mg, oral, BID AC  potassium phosphate, 30 mmol, intravenous, Once  predniSONE, 15 mg, oral, Daily  rosuvastatin, 10 mg, oral, Nightly  sulfamethoxazole-trimethoprim, 1 tablet, oral, Daily  tacrolimus, 3 mg, oral, q12h  valGANciclovir, 900 mg, oral, q24h        Assessment/Plan   Assessment & Plan  Dehydration    Alactasia syndrome    DDKT  12/21   Kidney allograft function   Stable creatinine 1.08    Immunosuppression   Tac 2.5/2.5   MMF 1000/1000, decrease to 500/500 (leukopenia)    Pred 15    Megaesophagus, 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      Mike Zamora MD

## 2025-01-09 NOTE — CARE PLAN
The patient's goals for the shift include get better    The clinical goals for the shift include Pain managemet      Problem: Pain  Goal: Takes deep breaths with improved pain control throughout the shift  Outcome: Progressing  Goal: Turns in bed with improved pain control throughout the shift  Outcome: Progressing  Goal: Walks with improved pain control throughout the shift  Outcome: Progressing  Goal: Performs ADL's with improved pain control throughout shift  Outcome: Progressing  Goal: Participates in PT with improved pain control throughout the shift  Outcome: Progressing  Goal: Free from opioid side effects throughout the shift  Outcome: Progressing  Goal: Free from acute confusion related to pain meds throughout the shift  Outcome: Progressing     Problem: Skin  Goal: Decreased wound size/increased tissue granulation at next dressing change  Outcome: Progressing  Goal: Participates in plan/prevention/treatment measures  Outcome: Progressing  Goal: Prevent/manage excess moisture  Outcome: Progressing  Goal: Prevent/minimize sheer/friction injuries  Outcome: Progressing  Goal: Promote/optimize nutrition  Outcome: Progressing  Goal: Promote skin healing  Outcome: Progressing

## 2025-01-09 NOTE — PROGRESS NOTES
"Temo Hanson is a 74 y.o. male on day 9 of admission presenting with Dehydration.    Subjective:  EGD and PEG tube placement POD1  No acute event    Scheduled medications  acetaminophen, 650 mg, oral, q6h  amLODIPine, 10 mg, oral, Daily  calcium carbonate, 500 mg, oral, BID AC  ceFAZolin, 2 g, intravenous, Once  gabapentin, 200 mg, oral, Daily  heparin (porcine), 5,000 Units, subcutaneous, q8h  insulin glargine, 8 Units, subcutaneous, Nightly  insulin lispro, 0-10 Units, subcutaneous, TID AC  insulin lispro, 4 Units, subcutaneous, TID AC  lidocaine, 5 mL, infiltration, Once  lidocaine, 1 patch, transdermal, q24h  lidocaine, 1 patch, transdermal, Daily  methocarbamol, 500 mg, oral, q8h KASSY  mycophenolate, 1,000 mg, oral, q12h  nystatin, 5 mL, Swish & Swallow, 4x daily  pantoprazole, 40 mg, oral, BID AC  predniSONE, 15 mg, oral, Daily  rosuvastatin, 10 mg, oral, Nightly  sulfamethoxazole-trimethoprim, 1 tablet, oral, Daily  tacrolimus, 3 mg, oral, q12h  valGANciclovir, 900 mg, oral, q24h      Continuous medications         PRN medications  PRN medications: carboxymethylcellulose, dextrose, dextrose, docusate sodium, glucagon, glucagon, ondansetron ODT **OR** ondansetron, ondansetron, oxyCODONE, oxyCODONE, polyethylene glycol    Last Recorded Vitals  Blood pressure 159/71, pulse 103, temperature 37.3 °C (99.1 °F), temperature source Temporal, resp. rate 16, height 1.854 m (6' 1\"), weight 76 kg (167 lb 8.8 oz), SpO2 99%.  Intake/Output last 3 Shifts:  I/O last 3 completed shifts:  In: 240 (3.2 mL/kg) [I.V.:150 (2 mL/kg); NG/GT:90]  Out: 775 (10.2 mL/kg) [Urine:775 (0.3 mL/kg/hr)]  Weight: 76 kg     A&ox3, no distress, pleasant  MMM, no lesions  Lungs with equal air entry, no added sounds  Rrr, no m/r/g  Abd soft, nt, nd, RLQ incision c/d/i, staples intact  No allograft tenderness  No edema bilaterally    Results for orders placed or performed during the hospital encounter of 12/31/24 (from the past 24 hours)   POCT " GLUCOSE   Result Value Ref Range    POCT Glucose 187 (H) 74 - 99 mg/dL   POCT GLUCOSE   Result Value Ref Range    POCT Glucose 150 (H) 74 - 99 mg/dL   POCT GLUCOSE   Result Value Ref Range    POCT Glucose 153 (H) 74 - 99 mg/dL   POCT GLUCOSE   Result Value Ref Range    POCT Glucose 98 74 - 99 mg/dL       Assessment/Plan     74 y.o. male with a hx of ESRD secondary to diabetic nephropathy whom received a  donor kidney transplant on 24 by Dr. Zamora with a KDPI of 93% and PRA of 54%. Donor was Hepc -/-and has not met risk factors. EBV + /+., CMV D-/R+. Left donor kidney transplanted to patient right pelvis. Admission weight is 72.7 kg (discharge weight is 76.4 kg ). Pt received a total of 3 mg/kg total of thymoglobulin induction therapy in conjunction with 500mg IV solumedrol.      Post-txp course notable for slow graft function, now with DGF.     Allograft function: s/p DDKT 24  - DGF. , Cr 5.5,uremic on admission. s/p HD  for clearance.   Now Cr 1.4-1.5    Immunosuppression:  TAC 3 mg BID  MMF decreased to 500 mg BID - due to leukopenia  Pred 15 mg/day    Prophylaxis:  PPI  Nystatin 500,000 units QID x 3 mo  Valcyte, renally dosed x 3 mo  TMP-SMX x 6 mo    Esophageal dysphagia  - Upper GI c/f achalasia.   - EGD/PEG tube placement 25    Blood pressure - ok  - Amlodipine 10 mg daily    Anemia - iron replete  Darbe 200 mcg subcutaneous weekly    CKD-MBD - acceptable   Ca supplement  Replete phosphorus    Malnutrition  Vitamins and supplements  Enteral nutrition - tube feeding being titrated      Evelyn Trimble MD

## 2025-01-09 NOTE — PROGRESS NOTES
Nutrition Note:     Pt had PEG placed and is tolerating TF advancement. Refeeding precautions being taken. TF of Glucerna 1.5 is being increased by [id number] to a goal of 60ml/hr. Spoke to PA. His TF is half of goal right now. He continues on a PO diet. He ate 25% of last meal. Intake remains minimal.      Recommendations:   Continue refeeding precautions   Replete electrolytes per protocol  500mg IV thiamine daily until TF has reached goal of 60ml/hr.     Once tolerating goal of 60ml/hr without complications, can transition to a cycle feed:   Glucerna 1.5 @ 100ml/hr x 12 hours   FWF per MD/team     Cycle feed will provide 1800kcal, 99gm protein, and 912ml free water to meet 85% of his energy needs and > 100% of his protein needs

## 2025-01-09 NOTE — SIGNIFICANT EVENT
01/09/25 1152   Patient Interaction   Organ Kidney   Type of Interaction Morning rounds   Interdisciplinary Rounds   Attendance Surgeon;Physician;FILI;Coordinator;Pharmacist   Topics Discussed Diet;Home care needs;Medications;Blood test results;Discharge preparation;Activity     Transplant Surgery Multidisciplinary Team Note    Temo Hanson is a 74 y.o. male  s/p DDKT 12/21/2024.  His post operative complications: malnutrition and dehydration    24 Hour Events  1. S/p PEG placement, issues with pain control, slowly titrating enteral feeds to goal, monitoring for re-feeding syndrome     Last Recorded Vitals  Visit Vitals  /71   Pulse 103   Temp 37.3 °C (99.1 °F)   Resp 16      Intake/Output last 3 Shifts:    Intake/Output Summary (Last 24 hours) at 1/9/2025 1152  Last data filed at 1/9/2025 0800  Gross per 24 hour   Intake 130 ml   Output 550 ml   Net -420 ml      Vitals:    01/08/25 0500   Weight: 76 kg (167 lb 8.8 oz)        Assessment/Plan   Principal Problem:    Management after organ transplant  Active Problems:  Patient Active Problem List   Diagnosis    S/P laparoscopic cholecystectomy    Abdominal pain    Achalasia    Acute lower UTI    Microcytic anemia    Complete heart block    Callus of foot    Chronic periodontitis, unspecified    Stage 5 chronic kidney disease (Multi)    Closed fracture of metatarsal bone    Crushing injury of foot    Diabetes mellitus (Multi)    Diarrhea    Dysphagia    ED (erectile dysfunction)    Essential hypertension    Foot pain, right    GIB (gastrointestinal bleeding)    Hepatitis B core antibody positive    Hyperkalemia    Ingrowing nail    Iron deficiency anemia secondary to inadequate dietary iron intake    Long toenail    Malignant neoplasm of prostate (Multi)    Onychomycosis    Pheochromocytoma of right adrenal gland    Pneumonia of right lower lobe due to infectious organism    Productive cough    Pure hypercholesterolemia    Stricture esophagus    SVT  (supraventricular tachycardia) (CMS-HCC)    Type 2 diabetes mellitus with diabetic neuropathy (Multi)    Preop cardiovascular exam    Third degree heart block    Pericardial effusion (HHS-HCC)    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 (Multi)    Uremia    Cardiac tamponade    Cardiac pacemaker in situ    ESRD (end stage renal disease) (Multi)    Type 2 diabetes mellitus, with long-term current use of insulin    Dehydration    Alactasia syndrome        Immunosuppression reviewed and adjusted       Induction: Thymoglobulin Full Dose       Tacrolimus goal 8-10 ng/mL. Current dose 3 mg BID         MMF 1000 mg po BID       Solumedrol taper  DVT prophylaxis SCDS and subcutaneous heparin 5000 TID  PT/OT  Diet:  Regular + enteral feeds  Anticipated discharge Friday vs over the weekend     Sherly García PA-C

## 2025-01-10 PROBLEM — E11.65 TYPE 2 DIABETES MELLITUS WITH HYPERGLYCEMIA, WITH LONG-TERM CURRENT USE OF INSULIN: Chronic | Status: ACTIVE | Noted: 2025-01-10

## 2025-01-10 PROBLEM — Z79.4 TYPE 2 DIABETES MELLITUS WITH HYPERGLYCEMIA, WITH LONG-TERM CURRENT USE OF INSULIN: Chronic | Status: ACTIVE | Noted: 2025-01-10

## 2025-01-10 PROBLEM — Z78.9 ON TUBE FEEDING DIET: Status: ACTIVE | Noted: 2025-01-10

## 2025-01-10 LAB
ALBUMIN SERPL BCP-MCNC: 2.7 G/DL (ref 3.4–5)
ALBUMIN SERPL BCP-MCNC: 2.8 G/DL (ref 3.4–5)
ANION GAP SERPL CALC-SCNC: 12 MMOL/L (ref 10–20)
ANION GAP SERPL CALC-SCNC: 9 MMOL/L (ref 10–20)
BASOPHILS # BLD MANUAL: 0.03 X10*3/UL (ref 0–0.1)
BASOPHILS NFR BLD MANUAL: 1.7 %
BUN SERPL-MCNC: 17 MG/DL (ref 6–23)
BUN SERPL-MCNC: 19 MG/DL (ref 6–23)
BURR CELLS BLD QL SMEAR: ABNORMAL
CA-I BLD-SCNC: 1.2 MMOL/L (ref 1.1–1.33)
CALCIUM SERPL-MCNC: 8.3 MG/DL (ref 8.6–10.6)
CALCIUM SERPL-MCNC: 8.3 MG/DL (ref 8.6–10.6)
CHLORIDE SERPL-SCNC: 102 MMOL/L (ref 98–107)
CHLORIDE SERPL-SCNC: 103 MMOL/L (ref 98–107)
CMV DNA SERPL NAA+PROBE-LOG IU: NORMAL {LOG_IU}/ML
CO2 SERPL-SCNC: 26 MMOL/L (ref 21–32)
CO2 SERPL-SCNC: 27 MMOL/L (ref 21–32)
CREAT SERPL-MCNC: 1.14 MG/DL (ref 0.5–1.3)
CREAT SERPL-MCNC: 1.17 MG/DL (ref 0.5–1.3)
EBV DNA SPEC NAA+PROBE-LOG#: NORMAL {LOG_COPIES}/ML
EGFRCR SERPLBLD CKD-EPI 2021: 65 ML/MIN/1.73M*2
EGFRCR SERPLBLD CKD-EPI 2021: 67 ML/MIN/1.73M*2
EOSINOPHIL # BLD MANUAL: 0 X10*3/UL (ref 0–0.4)
EOSINOPHIL NFR BLD MANUAL: 0 %
ERYTHROCYTE [DISTWIDTH] IN BLOOD BY AUTOMATED COUNT: 15.4 % (ref 11.5–14.5)
GLUCOSE BLD MANUAL STRIP-MCNC: 137 MG/DL (ref 74–99)
GLUCOSE BLD MANUAL STRIP-MCNC: 222 MG/DL (ref 74–99)
GLUCOSE BLD MANUAL STRIP-MCNC: 63 MG/DL (ref 74–99)
GLUCOSE BLD MANUAL STRIP-MCNC: 71 MG/DL (ref 74–99)
GLUCOSE BLD MANUAL STRIP-MCNC: 75 MG/DL (ref 74–99)
GLUCOSE BLD MANUAL STRIP-MCNC: 86 MG/DL (ref 74–99)
GLUCOSE SERPL-MCNC: 209 MG/DL (ref 74–99)
GLUCOSE SERPL-MCNC: 77 MG/DL (ref 74–99)
HCT VFR BLD AUTO: 26 % (ref 41–52)
HGB BLD-MCNC: 8.2 G/DL (ref 13.5–17.5)
IMM GRANULOCYTES # BLD AUTO: 0.01 X10*3/UL (ref 0–0.5)
IMM GRANULOCYTES NFR BLD AUTO: 0.6 % (ref 0–0.9)
LABORATORY COMMENT REPORT: NOT DETECTED
LABORATORY COMMENT REPORT: NOT DETECTED
LYMPHOCYTES # BLD MANUAL: 0.3 X10*3/UL (ref 0.8–3)
LYMPHOCYTES NFR BLD MANUAL: 16.5 %
MAGNESIUM SERPL-MCNC: 2.07 MG/DL (ref 1.6–2.4)
MCH RBC QN AUTO: 31.5 PG (ref 26–34)
MCHC RBC AUTO-ENTMCNC: 31.5 G/DL (ref 32–36)
MCV RBC AUTO: 100 FL (ref 80–100)
MONOCYTES # BLD MANUAL: 0.14 X10*3/UL (ref 0.05–0.8)
MONOCYTES NFR BLD MANUAL: 7.8 %
MYELOCYTES # BLD MANUAL: 0.02 X10*3/UL
MYELOCYTES NFR BLD MANUAL: 0.9 %
NEUTROPHILS # BLD MANUAL: 1.32 X10*3/UL (ref 1.6–5.5)
NEUTS BAND # BLD MANUAL: 0.11 X10*3/UL (ref 0–0.5)
NEUTS BAND NFR BLD MANUAL: 6.1 %
NEUTS SEG # BLD MANUAL: 1.21 X10*3/UL (ref 1.6–5)
NEUTS SEG NFR BLD MANUAL: 67 %
NRBC BLD-RTO: 0 /100 WBCS (ref 0–0)
OVALOCYTES BLD QL SMEAR: ABNORMAL
PHOSPHATE SERPL-MCNC: 2.4 MG/DL (ref 2.5–4.9)
PHOSPHATE SERPL-MCNC: 3.2 MG/DL (ref 2.5–4.9)
PLATELET # BLD AUTO: 179 X10*3/UL (ref 150–450)
POLYCHROMASIA BLD QL SMEAR: ABNORMAL
POTASSIUM SERPL-SCNC: 4.4 MMOL/L (ref 3.5–5.3)
POTASSIUM SERPL-SCNC: 4.7 MMOL/L (ref 3.5–5.3)
RBC # BLD AUTO: 2.6 X10*6/UL (ref 4.5–5.9)
RBC MORPH BLD: ABNORMAL
SODIUM SERPL-SCNC: 134 MMOL/L (ref 136–145)
SODIUM SERPL-SCNC: 136 MMOL/L (ref 136–145)
TACROLIMUS BLD-MCNC: 4.4 NG/ML
TOTAL CELLS COUNTED BLD: 115
WBC # BLD AUTO: 1.8 X10*3/UL (ref 4.4–11.3)

## 2025-01-10 PROCEDURE — 80069 RENAL FUNCTION PANEL: CPT

## 2025-01-10 PROCEDURE — 2500000004 HC RX 250 GENERAL PHARMACY W/ HCPCS (ALT 636 FOR OP/ED)

## 2025-01-10 PROCEDURE — 2500000002 HC RX 250 W HCPCS SELF ADMINISTERED DRUGS (ALT 637 FOR MEDICARE OP, ALT 636 FOR OP/ED): Performed by: STUDENT IN AN ORGANIZED HEALTH CARE EDUCATION/TRAINING PROGRAM

## 2025-01-10 PROCEDURE — 2500000002 HC RX 250 W HCPCS SELF ADMINISTERED DRUGS (ALT 637 FOR MEDICARE OP, ALT 636 FOR OP/ED)

## 2025-01-10 PROCEDURE — 80197 ASSAY OF TACROLIMUS: CPT | Performed by: STUDENT IN AN ORGANIZED HEALTH CARE EDUCATION/TRAINING PROGRAM

## 2025-01-10 PROCEDURE — 82947 ASSAY GLUCOSE BLOOD QUANT: CPT

## 2025-01-10 PROCEDURE — 1100000001 HC PRIVATE ROOM DAILY

## 2025-01-10 PROCEDURE — 36416 COLLJ CAPILLARY BLOOD SPEC: CPT | Performed by: STUDENT IN AN ORGANIZED HEALTH CARE EDUCATION/TRAINING PROGRAM

## 2025-01-10 PROCEDURE — 99233 SBSQ HOSP IP/OBS HIGH 50: CPT | Performed by: STUDENT IN AN ORGANIZED HEALTH CARE EDUCATION/TRAINING PROGRAM

## 2025-01-10 PROCEDURE — 2500000001 HC RX 250 WO HCPCS SELF ADMINISTERED DRUGS (ALT 637 FOR MEDICARE OP)

## 2025-01-10 PROCEDURE — 2500000001 HC RX 250 WO HCPCS SELF ADMINISTERED DRUGS (ALT 637 FOR MEDICARE OP): Performed by: PHYSICIAN ASSISTANT

## 2025-01-10 PROCEDURE — 2500000001 HC RX 250 WO HCPCS SELF ADMINISTERED DRUGS (ALT 637 FOR MEDICARE OP): Performed by: STUDENT IN AN ORGANIZED HEALTH CARE EDUCATION/TRAINING PROGRAM

## 2025-01-10 PROCEDURE — 85027 COMPLETE CBC AUTOMATED: CPT | Performed by: PHYSICIAN ASSISTANT

## 2025-01-10 PROCEDURE — 99232 SBSQ HOSP IP/OBS MODERATE 35: CPT | Performed by: TRANSPLANT SURGERY

## 2025-01-10 PROCEDURE — 99223 1ST HOSP IP/OBS HIGH 75: CPT | Performed by: PHYSICIAN ASSISTANT

## 2025-01-10 PROCEDURE — 2500000004 HC RX 250 GENERAL PHARMACY W/ HCPCS (ALT 636 FOR OP/ED): Performed by: PHYSICIAN ASSISTANT

## 2025-01-10 PROCEDURE — 83735 ASSAY OF MAGNESIUM: CPT | Performed by: STUDENT IN AN ORGANIZED HEALTH CARE EDUCATION/TRAINING PROGRAM

## 2025-01-10 PROCEDURE — 82330 ASSAY OF CALCIUM: CPT | Performed by: PHYSICIAN ASSISTANT

## 2025-01-10 PROCEDURE — 2500000005 HC RX 250 GENERAL PHARMACY W/O HCPCS

## 2025-01-10 PROCEDURE — 85007 BL SMEAR W/DIFF WBC COUNT: CPT | Performed by: PHYSICIAN ASSISTANT

## 2025-01-10 RX ORDER — DEXTROSE 50 % IN WATER (D50W) INTRAVENOUS SYRINGE
12.5
Status: DISCONTINUED | OUTPATIENT
Start: 2025-01-10 | End: 2025-01-16

## 2025-01-10 RX ORDER — METOCLOPRAMIDE 10 MG/1
5 TABLET ORAL
Status: DISCONTINUED | OUTPATIENT
Start: 2025-01-10 | End: 2025-01-11

## 2025-01-10 RX ORDER — INSULIN GLARGINE 100 [IU]/ML
7 INJECTION, SOLUTION SUBCUTANEOUS EVERY 12 HOURS
Status: DISCONTINUED | OUTPATIENT
Start: 2025-01-10 | End: 2025-01-11

## 2025-01-10 RX ORDER — TACROLIMUS 1 MG/1
4 CAPSULE ORAL EVERY 12 HOURS
Status: DISCONTINUED | OUTPATIENT
Start: 2025-01-10 | End: 2025-01-11

## 2025-01-10 RX ORDER — DEXTROSE 50 % IN WATER (D50W) INTRAVENOUS SYRINGE
25
Status: DISCONTINUED | OUTPATIENT
Start: 2025-01-10 | End: 2025-01-16

## 2025-01-10 RX ADMIN — AMLODIPINE BESYLATE 10 MG: 10 TABLET ORAL at 09:00

## 2025-01-10 RX ADMIN — INSULIN HUMAN 4 UNITS: 100 INJECTION, SOLUTION PARENTERAL at 11:48

## 2025-01-10 RX ADMIN — TACROLIMUS 4 MG: 1 CAPSULE ORAL at 18:33

## 2025-01-10 RX ADMIN — INSULIN GLARGINE 7 UNITS: 100 INJECTION, SOLUTION SUBCUTANEOUS at 11:49

## 2025-01-10 RX ADMIN — SULFAMETHOXAZOLE AND TRIMETHOPRIM 1 TABLET: 400; 80 TABLET ORAL at 08:59

## 2025-01-10 RX ADMIN — METOCLOPRAMIDE 5 MG: 10 TABLET ORAL at 11:48

## 2025-01-10 RX ADMIN — MYCOPHENOLATE MOFETIL 500 MG: 250 CAPSULE ORAL at 05:53

## 2025-01-10 RX ADMIN — PANTOPRAZOLE SODIUM 40 MG: 40 TABLET, DELAYED RELEASE ORAL at 06:05

## 2025-01-10 RX ADMIN — ACETAMINOPHEN 650 MG: 325 TABLET, FILM COATED ORAL at 21:03

## 2025-01-10 RX ADMIN — ONDANSETRON 4 MG: 2 INJECTION INTRAMUSCULAR; INTRAVENOUS at 05:53

## 2025-01-10 RX ADMIN — SODIUM PHOSPHATE, MONOBASIC, MONOHYDRATE AND SODIUM PHOSPHATE, DIBASIC, ANHYDROUS 15 MMOL: 142; 276 INJECTION, SOLUTION INTRAVENOUS at 09:22

## 2025-01-10 RX ADMIN — ONDANSETRON 4 MG: 2 INJECTION INTRAMUSCULAR; INTRAVENOUS at 21:02

## 2025-01-10 RX ADMIN — PREDNISONE 15 MG: 10 TABLET ORAL at 09:00

## 2025-01-10 RX ADMIN — INSULIN HUMAN 4 UNITS: 100 INJECTION, SOLUTION PARENTERAL at 18:33

## 2025-01-10 RX ADMIN — NYSTATIN 500000 UNITS: 500000 SUSPENSION ORAL at 16:27

## 2025-01-10 RX ADMIN — PANTOPRAZOLE SODIUM 40 MG: 40 TABLET, DELAYED RELEASE ORAL at 16:28

## 2025-01-10 RX ADMIN — ACETAMINOPHEN 650 MG: 325 TABLET, FILM COATED ORAL at 11:50

## 2025-01-10 RX ADMIN — HEPARIN SODIUM 5000 UNITS: 5000 INJECTION INTRAVENOUS; SUBCUTANEOUS at 16:27

## 2025-01-10 RX ADMIN — THIAMINE HYDROCHLORIDE 500 MG: 100 INJECTION, SOLUTION INTRAMUSCULAR; INTRAVENOUS at 09:01

## 2025-01-10 RX ADMIN — METHOCARBAMOL 500 MG: 500 TABLET ORAL at 21:03

## 2025-01-10 RX ADMIN — INSULIN LISPRO 4 UNITS: 100 INJECTION, SOLUTION INTRAVENOUS; SUBCUTANEOUS at 08:59

## 2025-01-10 RX ADMIN — OXYCODONE 5 MG: 5 TABLET ORAL at 05:54

## 2025-01-10 RX ADMIN — NYSTATIN 500000 UNITS: 500000 SUSPENSION ORAL at 21:03

## 2025-01-10 RX ADMIN — NYSTATIN 500000 UNITS: 500000 SUSPENSION ORAL at 06:05

## 2025-01-10 RX ADMIN — ACETAMINOPHEN 650 MG: 325 TABLET, FILM COATED ORAL at 08:59

## 2025-01-10 RX ADMIN — METOCLOPRAMIDE 5 MG: 10 TABLET ORAL at 16:27

## 2025-01-10 RX ADMIN — MYCOPHENOLATE MOFETIL 500 MG: 250 CAPSULE ORAL at 18:33

## 2025-01-10 RX ADMIN — METHOCARBAMOL 500 MG: 500 TABLET ORAL at 13:08

## 2025-01-10 RX ADMIN — NYSTATIN 500000 UNITS: 500000 SUSPENSION ORAL at 12:03

## 2025-01-10 RX ADMIN — TACROLIMUS 3 MG: 1 CAPSULE ORAL at 05:53

## 2025-01-10 RX ADMIN — METOCLOPRAMIDE 5 MG: 10 TABLET ORAL at 09:00

## 2025-01-10 RX ADMIN — OXYCODONE 10 MG: 5 TABLET ORAL at 12:26

## 2025-01-10 RX ADMIN — METHOCARBAMOL 500 MG: 500 TABLET ORAL at 05:53

## 2025-01-10 RX ADMIN — GABAPENTIN 200 MG: 100 CAPSULE ORAL at 09:00

## 2025-01-10 RX ADMIN — ROSUVASTATIN CALCIUM 10 MG: 10 TABLET, FILM COATED ORAL at 21:03

## 2025-01-10 RX ADMIN — CALCIUM CARBONATE (ANTACID) CHEW TAB 500 MG 500 MG: 500 CHEW TAB at 06:05

## 2025-01-10 RX ADMIN — ACETAMINOPHEN 650 MG: 325 TABLET, FILM COATED ORAL at 01:11

## 2025-01-10 RX ADMIN — HEPARIN SODIUM 5000 UNITS: 5000 INJECTION INTRAVENOUS; SUBCUTANEOUS at 00:11

## 2025-01-10 RX ADMIN — HEPARIN SODIUM 5000 UNITS: 5000 INJECTION INTRAVENOUS; SUBCUTANEOUS at 08:59

## 2025-01-10 RX ADMIN — CALCIUM CARBONATE (ANTACID) CHEW TAB 500 MG 500 MG: 500 CHEW TAB at 16:27

## 2025-01-10 ASSESSMENT — PAIN SCALES - GENERAL
PAINLEVEL_OUTOF10: 1
PAINLEVEL_OUTOF10: 4
PAINLEVEL_OUTOF10: 3
PAINLEVEL_OUTOF10: 7
PAINLEVEL_OUTOF10: 9
PAINLEVEL_OUTOF10: 5 - MODERATE PAIN
PAINLEVEL_OUTOF10: 4
PAINLEVEL_OUTOF10: 0 - NO PAIN
PAINLEVEL_OUTOF10: 0 - NO PAIN

## 2025-01-10 ASSESSMENT — PAIN - FUNCTIONAL ASSESSMENT
PAIN_FUNCTIONAL_ASSESSMENT: 0-10

## 2025-01-10 ASSESSMENT — ENCOUNTER SYMPTOMS
FEVER: 0
DIARRHEA: 0
COLOR CHANGE: 0
CONFUSION: 0
POLYDIPSIA: 0
AGITATION: 0
DIZZINESS: 0
FATIGUE: 0
NAUSEA: 1
COUGH: 0
POLYPHAGIA: 0
TREMORS: 0
PALPITATIONS: 0
CONSTIPATION: 0
APPETITE CHANGE: 1
VOMITING: 1
ABDOMINAL PAIN: 1

## 2025-01-10 ASSESSMENT — PAIN DESCRIPTION - ORIENTATION: ORIENTATION: LEFT;RIGHT;LOWER

## 2025-01-10 ASSESSMENT — COGNITIVE AND FUNCTIONAL STATUS - GENERAL
CLIMB 3 TO 5 STEPS WITH RAILING: A LITTLE
DRESSING REGULAR UPPER BODY CLOTHING: A LITTLE
STANDING UP FROM CHAIR USING ARMS: A LITTLE
TOILETING: A LITTLE
WALKING IN HOSPITAL ROOM: A LITTLE
DAILY ACTIVITIY SCORE: 19
DRESSING REGULAR LOWER BODY CLOTHING: A LITTLE
EATING MEALS: A LITTLE
HELP NEEDED FOR BATHING: A LITTLE
MOBILITY SCORE: 21

## 2025-01-10 ASSESSMENT — ACTIVITIES OF DAILY LIVING (ADL): EFFECT OF PAIN ON DAILY ACTIVITIES: ASSESS

## 2025-01-10 ASSESSMENT — PAIN DESCRIPTION - LOCATION
LOCATION: ABDOMEN
LOCATION: ABDOMEN

## 2025-01-10 ASSESSMENT — PAIN SCALES - WONG BAKER
WONGBAKER_NUMERICALRESPONSE: NO HURT
WONGBAKER_NUMERICALRESPONSE: HURTS LITTLE BIT

## 2025-01-10 ASSESSMENT — PAIN DESCRIPTION - DESCRIPTORS: DESCRIPTORS: DISCOMFORT

## 2025-01-10 NOTE — PROGRESS NOTES
Physical Therapy                 Therapy Communication Note    Patient Name: Temo Hanson  MRN: [id number]  Department: [place] [address]  Room: 90/9081-  Today's Date: 1/10/2025     Discipline: Physical Therapy    PT Missed Visit: Yes     Missed Visit Reason: Missed Visit Reason: Patient refused (2nd attempt, pt refused despite encouragement, pt stated he has increased pain after therapy and does not want to do it today.)    Missed Time: Attempt    Comment:

## 2025-01-10 NOTE — PROGRESS NOTES
"Temo Hanson is a 74 y.o. male on day 10 of admission presenting with Dehydration.    Subjective:  EGD and PEG tube placement POD2  Overnight he did not tolerate his tube feeding. Vomitted once s/p Reglan. TF held temporarily.    Scheduled medications  acetaminophen, 650 mg, oral, q6h  amLODIPine, 10 mg, oral, Daily  calcium carbonate, 500 mg, oral, BID AC  ceFAZolin, 2 g, intravenous, Once  gabapentin, 200 mg, oral, Daily  heparin (porcine), 5,000 Units, subcutaneous, q8h  insulin glargine, 8 Units, subcutaneous, Nightly  insulin lispro, 0-10 Units, subcutaneous, TID AC  insulin lispro, 4 Units, subcutaneous, TID AC  lidocaine, 5 mL, infiltration, Once  lidocaine, 1 patch, transdermal, Daily  methocarbamol, 500 mg, oral, q8h KASSY  metoclopramide, 5 mg, oral, TID AC  mycophenolate, 500 mg, oral, q12h  nystatin, 5 mL, Swish & Swallow, 4x daily  pantoprazole, 40 mg, oral, BID AC  predniSONE, 15 mg, oral, Daily  rosuvastatin, 10 mg, oral, Nightly  sodium phosphate, 15 mmol, intravenous, Once  sulfamethoxazole-trimethoprim, 1 tablet, oral, Daily  tacrolimus, 3 mg, oral, q12h  [START ON 1/12/2025] thiamine, 100 mg, oral, Daily  thiamine, 500 mg, intravenous, Daily  valGANciclovir, 900 mg, oral, q24h      Continuous medications         PRN medications  PRN medications: carboxymethylcellulose, dextrose, dextrose, docusate sodium, glucagon, glucagon, ondansetron ODT **OR** ondansetron, ondansetron, oxyCODONE, oxyCODONE, polyethylene glycol    Last Recorded Vitals  Blood pressure 135/73, pulse 97, temperature 36.1 °C (97 °F), temperature source Temporal, resp. rate 17, height 1.854 m (6' 1\"), weight 76 kg (167 lb 8.8 oz), SpO2 97%.  Intake/Output last 3 Shifts:  I/O last 3 completed shifts:  In: 905 (11.9 mL/kg) [P.O.:360; I.V.:100 (1.3 mL/kg); NG/GT:380; IV Piggyback:65]  Out: 1725 (22.7 mL/kg) [Urine:1725 (0.6 mL/kg/hr)]  Weight: 76 kg     A&ox3, no distress, pleasant  MMM, no lesions  Lungs with equal air entry, no added " sounds  Rrr, no m/r/g  Abd soft, nt, nd, RLQ incision c/d/i, staples intact  No allograft tenderness  No edema bilaterally    Results for orders placed or performed during the hospital encounter of 12/31/24 (from the past 24 hours)   POCT GLUCOSE   Result Value Ref Range    POCT Glucose 129 (H) 74 - 99 mg/dL   POCT GLUCOSE   Result Value Ref Range    POCT Glucose 328 (H) 74 - 99 mg/dL   POCT GLUCOSE   Result Value Ref Range    POCT Glucose 184 (H) 74 - 99 mg/dL   Magnesium   Result Value Ref Range    Magnesium 2.07 1.60 - 2.40 mg/dL   Renal Function Panel   Result Value Ref Range    Glucose 209 (H) 74 - 99 mg/dL    Sodium 136 136 - 145 mmol/L    Potassium 4.7 3.5 - 5.3 mmol/L    Chloride 103 98 - 107 mmol/L    Bicarbonate 26 21 - 32 mmol/L    Anion Gap 12 10 - 20 mmol/L    Urea Nitrogen 17 6 - 23 mg/dL    Creatinine 1.14 0.50 - 1.30 mg/dL    eGFR 67 >60 mL/min/1.73m*2    Calcium 8.3 (L) 8.6 - 10.6 mg/dL    Phosphorus 2.4 (L) 2.5 - 4.9 mg/dL    Albumin 2.8 (L) 3.4 - 5.0 g/dL   Calcium, Ionized   Result Value Ref Range    POCT Calcium, Ionized 1.20 1.1 - 1.33 mmol/L   CBC and Auto Differential   Result Value Ref Range    WBC 1.8 (L) 4.4 - 11.3 x10*3/uL    nRBC 0.0 0.0 - 0.0 /100 WBCs    RBC 2.60 (L) 4.50 - 5.90 x10*6/uL    Hemoglobin 8.2 (L) 13.5 - 17.5 g/dL    Hematocrit 26.0 (L) 41.0 - 52.0 %     80 - 100 fL    MCH 31.5 26.0 - 34.0 pg    MCHC 31.5 (L) 32.0 - 36.0 g/dL    RDW 15.4 (H) 11.5 - 14.5 %    Platelets 179 150 - 450 x10*3/uL    Immature Granulocytes %, Automated 0.6 0.0 - 0.9 %    Immature Granulocytes Absolute, Automated 0.01 0.00 - 0.50 x10*3/uL   Manual Differential   Result Value Ref Range    Neutrophils %, Manual 67.0 40.0 - 80.0 %    Bands %, Manual 6.1 0.0 - 5.0 %    Lymphocytes %, Manual 16.5 13.0 - 44.0 %    Monocytes %, Manual 7.8 2.0 - 10.0 %    Eosinophils %, Manual 0.0 0.0 - 6.0 %    Basophils %, Manual 1.7 0.0 - 2.0 %    Myelocytes %, Manual 0.9 0.0 - 0.0 %    Seg Neutrophils Absolute,  Manual 1.21 (L) 1.60 - 5.00 x10*3/uL    Bands Absolute, Manual 0.11 0.00 - 0.50 x10*3/uL    Lymphocytes Absolute, Manual 0.30 (L) 0.80 - 3.00 x10*3/uL    Monocytes Absolute, Manual 0.14 0.05 - 0.80 x10*3/uL    Eosinophils Absolute, Manual 0.00 0.00 - 0.40 x10*3/uL    Basophils Absolute, Manual 0.03 0.00 - 0.10 x10*3/uL    Myelocytes Absolute, Manual 0.02 0.00 - 0.00 x10*3/uL    Total Cells Counted 115     Neutrophils Absolute, Manual 1.32 (L) 1.60 - 5.50 x10*3/uL    RBC Morphology See Below     Polychromasia Mild     Ovalocytes Few     [place] Cells Few    POCT GLUCOSE   Result Value Ref Range    POCT Glucose 222 (H) 74 - 99 mg/dL       Assessment/Plan     74 y.o. male with a hx of ESRD secondary to diabetic nephropathy whom received a  donor kidney transplant on 24 by Dr. Zamora with a KDPI of 93% and PRA of 54%. Donor was Hepc -/-and has not met risk factors. EBV D+ /R+, CMV D-/R+. Left donor kidney transplanted to patient right pelvis. Admission weight is 72.7 kg (discharge weight is 76.4 kg ). Pt received a total of 3 mg/kg total of thymoglobulin induction therapy in conjunction with 500mg IV solumedrol.      Post-txp course notable for slow graft function, now with DGF.     Allograft function: s/p DDKT 24  - DGF. , Cr 5.5,uremic on admission. s/p HD  for clearance.   Now Cr 1.4-1.5    Immunosuppression:  TAC increased to 4 mg BID   mg BID - due to leukopenia  Pred 15 mg/day    Prophylaxis:  PPI  Nystatin 500,000 units QID x 3 mo  TMP-SMX x 6 mo  HOLD Valcyte, renally dosed x 3 mo 1/10-  CMV PCR pending     Esophageal dysphagia  - Upper GI c/f achalasia. S/p Heller Myotomy plus Andrea Fundoplication 20+ yrs ago - per surgery, unable to fix  - EGD/PEG tube placement 25    Blood pressure - ok  - Amlodipine 10 mg daily    Anemia - iron replete  Darbe 200 mcg subcutaneous weekly    CKD-MBD - acceptable   Ca supplement  Replete phosphorus and Mg    Malnutrition  Vitamins and  supplements  Enteral nutrition - tube feeding being titrated  Monitor for refeeding syndrome    Evelyn Trimble MD

## 2025-01-10 NOTE — PROGRESS NOTES
Nutrition Note:   Received messaged from [facility] that pt was having emesis once TF reached 40ml/hr.     Reviewed his labs. Phos remains low (2.4mg/dL). Glucose had significantly increased POCT gluc was 328mg/dL @ 1600 yesterday. Blood Gluc 209mg/dL this morning.     Concern that pt with refeeding syndrome. Discussed plan with [facility].     Recommendations:  Cut TF rate in half- Glucerna 1.5 @ 20ml/hr   Replete low phos-->do not start increasing TF until phos is > 2.5mg/dL   Continue 500mg IV thiamine x 3 days---> transition to 100mg daily afterwards  Start re-advancing after electrolytes are adequately repleted   Glucerna 1.5 @ 50ml/hr goal rate   Increase by [id number] until goal is met   FWF per MD team     TF will provide 1800kcal, 99gm protein, and 912ml free water to meet 85% of his energy needs and > 100% of his protein needs   Assume pt to consume the other 15% via PO.     Time Spent (min): 30 minutes

## 2025-01-10 NOTE — PROGRESS NOTES
"Temo Hanson is a 74 y.o. male on day 10 of admission presenting with Dehydration.    Subjective   S/p EGD and PEG       Objective   Vitals:    01/10/25 0747   BP: 135/73   Pulse: 97   Resp: 17   Temp: 36.1 °C (97 °F)   SpO2: 97%       Physical Exam  Constitutional:       Appearance: Normal appearance.   Eyes:      Pupils: Pupils are equal, round, and reactive to light.   Cardiovascular:      Rate and Rhythm: Normal rate.   Pulmonary:      Effort: Pulmonary effort is normal. No respiratory distress.   Abdominal:      General: There is no distension.      Palpations: Abdomen is soft.      Comments: Incision clean, dry, intact   Musculoskeletal:         General: Normal range of motion.      Right lower leg: No edema.      Left lower leg: No edema.   Skin:     General: Skin is warm and dry.   Neurological:      General: No focal deficit present.      Mental Status: He is alert and oriented to person, place, and time.   Psychiatric:         Mood and Affect: Mood normal.         Behavior: Behavior normal.         Last Recorded Vitals  Blood pressure 135/73, pulse 97, temperature 36.1 °C (97 °F), temperature source Temporal, resp. rate 17, height 1.854 m (6' 1\"), weight 76 kg (167 lb 8.8 oz), SpO2 97%.  Intake/Output last 3 Shifts:  I/O last 3 completed shifts:  In: 905 (11.9 mL/kg) [P.O.:360; I.V.:100 (1.3 mL/kg); NG/GT:380; IV Piggyback:65]  Out: 1725 (22.7 mL/kg) [Urine:1725 (0.6 mL/kg/hr)]  Weight: 76 kg     Relevant Results  Lab Results   Component Value Date    WBC 1.8 (L) 01/10/2025    HGB 8.2 (L) 01/10/2025    HCT 26.0 (L) 01/10/2025     01/10/2025     01/10/2025     Lab Results   Component Value Date    GLUCOSE 209 (H) 01/10/2025    CALCIUM 8.3 (L) 01/10/2025     01/10/2025    K 4.7 01/10/2025    CO2 26 01/10/2025     01/10/2025    BUN 17 01/10/2025    CREATININE 1.14 01/10/2025     acetaminophen, 650 mg, oral, q6h  amLODIPine, 10 mg, oral, Daily  calcium carbonate, 500 mg, oral, BID " AC  ceFAZolin, 2 g, intravenous, Once  gabapentin, 200 mg, oral, Daily  heparin (porcine), 5,000 Units, subcutaneous, q8h  insulin glargine, 8 Units, subcutaneous, Nightly  insulin lispro, 0-10 Units, subcutaneous, TID AC  insulin lispro, 4 Units, subcutaneous, TID AC  lidocaine, 5 mL, infiltration, Once  lidocaine, 1 patch, transdermal, Daily  methocarbamol, 500 mg, oral, q8h KASSY  metoclopramide, 5 mg, oral, TID AC  mycophenolate, 500 mg, oral, q12h  nystatin, 5 mL, Swish & Swallow, 4x daily  pantoprazole, 40 mg, oral, BID AC  predniSONE, 15 mg, oral, Daily  rosuvastatin, 10 mg, oral, Nightly  sodium phosphate, 15 mmol, intravenous, Once  sulfamethoxazole-trimethoprim, 1 tablet, oral, Daily  tacrolimus, 3 mg, oral, q12h  [START ON 1/12/2025] thiamine, 100 mg, oral, Daily  thiamine, 500 mg, intravenous, Daily  valGANciclovir, 900 mg, oral, q24h        Assessment/Plan   Assessment & Plan  Dehydration    Alactasia syndrome    DDKT  12/21   Kidney allograft function   Stable creatinine 1.08    Immunosuppression   Tac 4/4   /500   Pred 15    Megaesophagus, 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      Mike Zamora MD

## 2025-01-10 NOTE — CARE PLAN
The patient's goals for the shift include get better    The clinical goals for the shift include pt will verbal pain level    Over the shift, the patient did make progress toward the following goals.   Problem: Pain  Goal: Takes deep breaths with improved pain control throughout the shift  1/10/2025 0438 by Belle Canas RN  Outcome: Progressing  1/10/2025 0437 by Belle Canas RN  Outcome: Progressing  Goal: Turns in bed with improved pain control throughout the shift  1/10/2025 0438 by Belle Canas RN  Outcome: Progressing  1/10/2025 0437 by Belle Canas RN  Outcome: Progressing  Goal: Walks with improved pain control throughout the shift  1/10/2025 0438 by Belle Canas RN  Outcome: Progressing  1/10/2025 0437 by Belle Canas RN  Outcome: Progressing  Goal: Performs ADL's with improved pain control throughout shift  1/10/2025 0438 by Belle Canas RN  Outcome: Progressing  1/10/2025 0437 by Belle Canas RN  Outcome: Progressing  Goal: Participates in PT with improved pain control throughout the shift  1/10/2025 0438 by Belle Canas RN  Outcome: Progressing  1/10/2025 0437 by Belle Canas RN  Outcome: Progressing  Goal: Free from opioid side effects throughout the shift  1/10/2025 0438 by Belle Canas RN  Outcome: Progressing  1/10/2025 0437 by Belle Canas RN  Outcome: Progressing  Goal: Free from acute confusion related to pain meds throughout the shift  1/10/2025 0438 by Belle Canas RN  Outcome: Progressing  1/10/2025 0437 by Belle Canas RN  Outcome: Progressing     Problem: Skin  Goal: Decreased wound size/increased tissue granulation at next dressing change  1/10/2025 0438 by Belle Canas RN  Outcome: Progressing  Flowsheets (Taken 1/10/2025 0438)  Decreased wound size/increased tissue granulation at next dressing change:   Protective dressings over bony prominences   Promote sleep for wound healing  1/10/2025 0437 by Belle Canas RN  Outcome: Progressing  Goal:  Participates in plan/prevention/treatment measures  1/10/2025 0438 by Belle Canas RN  Outcome: Progressing  Flowsheets (Taken 1/10/2025 0438)  Participates in plan/prevention/treatment measures:   Elevate heels   Increase activity/out of bed for meals  1/10/2025 0437 by Belle Canas RN  Outcome: Progressing  Goal: Prevent/manage excess moisture  1/10/2025 0438 by Belle Canas RN  Outcome: Progressing  Flowsheets (Taken 1/10/2025 0438)  Prevent/manage excess moisture:   Cleanse incontinence/protect with barrier cream   Monitor for/manage infection if present   Follow provider orders for dressing changes   Moisturize dry skin  1/10/2025 0437 by Belle Canas RN  Outcome: Progressing  Goal: Prevent/minimize sheer/friction injuries  1/10/2025 0438 by Belle Canas RN  Outcome: Progressing  1/10/2025 0437 by Belle Canas RN  Outcome: Progressing  Goal: Promote/optimize nutrition  1/10/2025 0438 by Belle Canas RN  Outcome: Progressing  1/10/2025 0437 by Belle Canas RN  Outcome: Progressing  Goal: Promote skin healing  1/10/2025 0438 by Belle Canas RN  Outcome: Progressing  1/10/2025 0437 by Belle Canas RN  Outcome: Progressing

## 2025-01-10 NOTE — SIGNIFICANT EVENT
Transplant Surgery Multidisciplinary Team Note    Temo Hanson is a 74 y.o. male   POD#20 from a Kidney from a  kidney txp. His post operative complications: None and Other complication: malnutrition requiring PEG     24 Hour Events  1. None     Last Recorded Vitals  Visit Vitals  /75 (BP Location: Right arm, Patient Position: Sitting)   Pulse 78   Temp 35.8 °C (96.4 °F) (Temporal)   Resp 17      Intake/Output last 3 Shifts:    Intake/Output Summary (Last 24 hours) at 1/10/2025 1354  Last data filed at 1/10/2025 1339  Gross per 24 hour   Intake 1035 ml   Output 1125 ml   Net -90 ml      Vitals:    01/10/25 0600   Weight: 76 kg (167 lb 8.8 oz)        Assessment/Plan   Principal Problem:    Management after organ transplant  Active Problems:  Patient Active Problem List   Diagnosis    S/P laparoscopic cholecystectomy    Abdominal pain    Achalasia    Acute lower UTI    Microcytic anemia    Complete heart block    Callus of foot    Chronic periodontitis, unspecified    Stage 5 chronic kidney disease (Multi)    Closed fracture of metatarsal bone    Crushing injury of foot    Diabetes mellitus (Multi)    Diarrhea    Dysphagia    ED (erectile dysfunction)    Essential hypertension    Foot pain, right    GIB (gastrointestinal bleeding)    Hepatitis B core antibody positive    Hyperkalemia    Ingrowing nail    Iron deficiency anemia secondary to inadequate dietary iron intake    Long toenail    Malignant neoplasm of prostate (Multi)    Onychomycosis    Pheochromocytoma of right adrenal gland    Pneumonia of right lower lobe due to infectious organism    Productive cough    Pure hypercholesterolemia    Stricture esophagus    SVT (supraventricular tachycardia) (CMS-HCC)    Type 2 diabetes mellitus with diabetic neuropathy (Multi)    Preop cardiovascular exam    Third degree heart block    Pericardial effusion (HHS-HCC)    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 (Multi)    Uremia    Cardiac tamponade    Cardiac  pacemaker in situ    ESRD (end stage renal disease) (Multi)    Type 2 diabetes mellitus, with long-term current use of insulin    Dehydration    Alactasia syndrome        Immunosuppression reviewed and adjusted       Induction: Thymoglobulin Full Dose and None       Tacrolimus goal 8-10 ng/mL. Current dose 3 mg BID         MMF 1000 mg po BID       Solumedrol taper  DVT prophylaxis SCDS and subcutaneous heparin 5000 TID  PT/OT  Diet:  continuous TF with PO diet  Emesis overnight. Restarting at lower rate.   Anticipated discharge next week     Deyanira Bloom, APRN-CNP

## 2025-01-10 NOTE — CONSULTS
Inpatient consult to Endocrinology  Consult performed by: Tiffanie Crum PA-C  Consult ordered by: KARO Ibarra-CNP  Reason for consult: DM2 with hyperglycemia in setting of TF and prednisone 15mg  Assessment/Recommendations: Pt would benefit from weight-based insulin regimen.            Reason For Consult  DM2 with hyperglycemia in setting of TF and prednisone 15mg     History Of Present Illness  Temo Hanson is a 74 y.o. 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  Patient was started on tube feeds, NPO, today after placement of PEG tube. Patient states he felt nausea and vomiting, leading to a pause in his feedings around noon. Feedings restarted around 1500.     Diabetes History  Type of diabetes: 2  Year diagnosed or age: 46 years old  Hospitalizations for DKA or HHS: Once - inappropriately gave too much sliding scale due to low BG finger stick not correcting in under an hour  Complications: Nephropathy  Seen by PCP or Endocrinology: PCP - Dr. Hillman, Endo - Dr. Carey  Frequency of glucose checks: 4-5x daily after meals  Glucose review: persistent hyperglycemia this admission  Frequency of Hypoglycemia: none  Hypoglycemia unawareness:   Severe hypoglycemia requiring assistance from others:  N    Home Medications  Basal: Glargine 8U  Prandial: Apart 4U  Correction:Aspart sliding scale    Results from Most Recent A1C  Hemoglobin A1C   Date/Time Value Ref Range Status   10/29/2024 12:02 PM 6.1 (H) See comment % Final        Diabetes Problem List Entries with Dates  Problem List:  2024-12: Type 2 diabetes mellitus, with long-term current use of   insulin  2023-11: Diabetes mellitus (Multi)  2007-11: Type 2 diabetes mellitus with diabetic neuropathy (Multi)         Past Medical History  He has a past medical history of Chronic kidney  disease, DM (diabetes mellitus) (Multi), Fistula, HTN (hypertension), Other specified abnormal immunological findings in serum (10/11/2021), and Personal history of malignant neoplasm of prostate.    Surgical History  He has a past surgical history that includes Other surgical history (09/29/2021); Other surgical history (09/29/2021); Other surgical history (09/29/2021); Other surgical history (09/29/2021); Cholecystectomy (10/23/2023); Cardiac catheterization (N/A, 4/18/2024); and Cardiac electrophysiology procedure (N/A, 7/17/2024).     Social History  He reports that he has never smoked. He has never used smokeless tobacco. He reports that he does not drink alcohol and does not use drugs.    Family History  Family History   Problem Relation Name Age of Onset    Diabetes Sister      Diabetes Brother       Brother and sister - HTN      Allergies  Terazosin, Codeine, and Tramadol    Review of Systems   Constitutional:  Positive for appetite change. Negative for fatigue and fever.   Respiratory:  Negative for cough.    Cardiovascular:  Negative for chest pain and palpitations.   Gastrointestinal:  Positive for abdominal pain, nausea and vomiting. Negative for constipation and diarrhea.   Endocrine: Negative for cold intolerance, heat intolerance, polydipsia, polyphagia and polyuria.   Skin:  Negative for color change.   Neurological:  Negative for dizziness and tremors.   Psychiatric/Behavioral:  Negative for agitation, behavioral problems and confusion.         Physical Exam  Constitutional:       Appearance: He is normal weight.   HENT:      Head: Normocephalic and atraumatic.   Eyes:      Pupils: Pupils are equal, round, and reactive to light.   Cardiovascular:      Rate and Rhythm: Normal rate and regular rhythm.   Pulmonary:      Effort: Pulmonary effort is normal.      Breath sounds: Normal breath sounds.   Abdominal:      General: There is distension.      Tenderness: There is abdominal tenderness.  "  Neurological:      Mental Status: He is alert.   Psychiatric:         Mood and Affect: Mood normal.         Behavior: Behavior normal.         Thought Content: Thought content normal.         Judgment: Judgment normal.          ROS, PMH, FH/SH, surgical history and allergies have been reviewed.    Last Recorded Vitals  Blood pressure 120/60, pulse 87, temperature 36.1 °C (97 °F), temperature source Temporal, resp. rate 17, height 1.854 m (6' 1\"), weight 76 kg (167 lb 8.8 oz), SpO2 97%.    Relevant Results  Results from last 7 days   Lab Units 01/10/25  1545 01/10/25  1141 01/10/25  0825 01/10/25  0530 01/09/25  2038 01/09/25  1600 01/09/25  0808 01/09/25  0732 01/08/25  0754 01/08/25  0610 01/07/25  0730 01/07/25  0535 01/06/25  0742 01/06/25  0542   POCT GLUCOSE mg/dL 86 137* 222*  --  184* 328*   < >  --    < >  --    < >  --    < >  --    GLUCOSE mg/dL  --   --   --  209*  --   --   --  99  --  176*  --  117*  --  142*    < > = values in this interval not displayed.        Assessment/Plan     Assessment & Plan  Dehydration    Alactasia syndrome    Type 2 diabetes mellitus with hyperglycemia, with long-term current use of insulin    On tube feeding diet      PLAN  Steroids: Prednisone 15 mg  Nutrition: Tube Feeds    - start glargine 7 U q12 hr     If TF is stopped: hold one dose in 24hr period          - continue regular insulin 4 U q6 hr before meals plus scale       - Do not hold unless glucose is < 90    - Stop Lispo     - continue regular insulin  corrective scale #2 q6hr, adjust to q4h if NPO or if persistently hyperglycemic   = 0u  151-200 = 2u  201-250 = 4u  251-300 = 6u  301-350 = 8u  351-400 = 10u    -Accuchecks (not BMP) TIDAC and QHS- kindly ensure QHS Accucheck is drawn; it is often missed   - Goal -180  -Hypoglycemia protocol  -Will continue to follow and titrate insulin accordingly     Discharge planning:   [] patient may expect to discharge home on basal/bolus , final doses TBD by " titration and nutrition status  [x]will provide CGM sample prior to discharge   [x]will enroll pt in  pharmacy platinum plan program  [x]follow up with Endo and PCP     CHITRA Alcaraz PA-C   Endocrinology  Inpatient Diabetes Management Team

## 2025-01-10 NOTE — PROGRESS NOTES
Music Therapy Note    Temo Hanson     Therapy Session  Referral Type: New referral this admission  Session Start Time: 1424  Session End Time: 1424  Intervention Delivery: In-person  Conflict of Service: Asleep               Treatment/Interventions       Post-assessment  Total Session Time (min): 0 minutes    Narrative  Assessment Detail: Patient somnolent at time of MT's attempt. MT did not attempt to wake pt.  Follow-up: MT to reattempt at another time as applicable.    Education Documentation  No documentation found.

## 2025-01-11 LAB
ALBUMIN SERPL BCP-MCNC: 2.7 G/DL (ref 3.4–5)
ANION GAP SERPL CALC-SCNC: 13 MMOL/L (ref 10–20)
BASOPHILS # BLD MANUAL: 0.01 X10*3/UL (ref 0–0.1)
BASOPHILS NFR BLD MANUAL: 0.9 %
BUN SERPL-MCNC: 20 MG/DL (ref 6–23)
CA-I BLD-SCNC: 1.19 MMOL/L (ref 1.1–1.33)
CALCIUM SERPL-MCNC: 8.3 MG/DL (ref 8.6–10.6)
CHLORIDE SERPL-SCNC: 102 MMOL/L (ref 98–107)
CO2 SERPL-SCNC: 25 MMOL/L (ref 21–32)
CREAT SERPL-MCNC: 1.19 MG/DL (ref 0.5–1.3)
EGFRCR SERPLBLD CKD-EPI 2021: 64 ML/MIN/1.73M*2
EOSINOPHIL # BLD MANUAL: 0.07 X10*3/UL (ref 0–0.4)
EOSINOPHIL NFR BLD MANUAL: 4.4 %
ERYTHROCYTE [DISTWIDTH] IN BLOOD BY AUTOMATED COUNT: 15.8 % (ref 11.5–14.5)
GLUCOSE BLD MANUAL STRIP-MCNC: 100 MG/DL (ref 74–99)
GLUCOSE BLD MANUAL STRIP-MCNC: 105 MG/DL (ref 74–99)
GLUCOSE BLD MANUAL STRIP-MCNC: 107 MG/DL (ref 74–99)
GLUCOSE BLD MANUAL STRIP-MCNC: 131 MG/DL (ref 74–99)
GLUCOSE BLD MANUAL STRIP-MCNC: 167 MG/DL (ref 74–99)
GLUCOSE SERPL-MCNC: 101 MG/DL (ref 74–99)
HCT VFR BLD AUTO: 26.8 % (ref 41–52)
HGB BLD-MCNC: 8.3 G/DL (ref 13.5–17.5)
HYPOCHROMIA BLD QL SMEAR: ABNORMAL
IMM GRANULOCYTES # BLD AUTO: 0.01 X10*3/UL (ref 0–0.5)
IMM GRANULOCYTES NFR BLD AUTO: 0.7 % (ref 0–0.9)
LYMPHOCYTES # BLD MANUAL: 0.03 X10*3/UL (ref 0.8–3)
LYMPHOCYTES NFR BLD MANUAL: 1.8 %
MAGNESIUM SERPL-MCNC: 1.7 MG/DL (ref 1.6–2.4)
MCH RBC QN AUTO: 30.7 PG (ref 26–34)
MCHC RBC AUTO-ENTMCNC: 31 G/DL (ref 32–36)
MCV RBC AUTO: 99 FL (ref 80–100)
MONOCYTES # BLD MANUAL: 0.09 X10*3/UL (ref 0.05–0.8)
MONOCYTES NFR BLD MANUAL: 6.1 %
NEUTS SEG # BLD MANUAL: 1.3 X10*3/UL (ref 1.6–5)
NEUTS SEG NFR BLD MANUAL: 86.8 %
NRBC BLD-RTO: 0 /100 WBCS (ref 0–0)
OVALOCYTES BLD QL SMEAR: ABNORMAL
PHOSPHATE SERPL-MCNC: 2.6 MG/DL (ref 2.5–4.9)
PLATELET # BLD AUTO: 178 X10*3/UL (ref 150–450)
POTASSIUM SERPL-SCNC: 4.5 MMOL/L (ref 3.5–5.3)
PREALB SERPL-MCNC: 8.5 MG/DL (ref 18–40)
RBC # BLD AUTO: 2.7 X10*6/UL (ref 4.5–5.9)
RBC MORPH BLD: ABNORMAL
SODIUM SERPL-SCNC: 135 MMOL/L (ref 136–145)
TACROLIMUS BLD-MCNC: 4 NG/ML
TOTAL CELLS COUNTED BLD: 114
WBC # BLD AUTO: 1.5 X10*3/UL (ref 4.4–11.3)

## 2025-01-11 PROCEDURE — 80197 ASSAY OF TACROLIMUS: CPT | Performed by: STUDENT IN AN ORGANIZED HEALTH CARE EDUCATION/TRAINING PROGRAM

## 2025-01-11 PROCEDURE — 2500000001 HC RX 250 WO HCPCS SELF ADMINISTERED DRUGS (ALT 637 FOR MEDICARE OP)

## 2025-01-11 PROCEDURE — 84134 ASSAY OF PREALBUMIN: CPT

## 2025-01-11 PROCEDURE — 99233 SBSQ HOSP IP/OBS HIGH 50: CPT | Performed by: STUDENT IN AN ORGANIZED HEALTH CARE EDUCATION/TRAINING PROGRAM

## 2025-01-11 PROCEDURE — 85027 COMPLETE CBC AUTOMATED: CPT | Performed by: PHYSICIAN ASSISTANT

## 2025-01-11 PROCEDURE — 2500000004 HC RX 250 GENERAL PHARMACY W/ HCPCS (ALT 636 FOR OP/ED)

## 2025-01-11 PROCEDURE — 83735 ASSAY OF MAGNESIUM: CPT | Performed by: STUDENT IN AN ORGANIZED HEALTH CARE EDUCATION/TRAINING PROGRAM

## 2025-01-11 PROCEDURE — 36416 COLLJ CAPILLARY BLOOD SPEC: CPT | Performed by: STUDENT IN AN ORGANIZED HEALTH CARE EDUCATION/TRAINING PROGRAM

## 2025-01-11 PROCEDURE — 82330 ASSAY OF CALCIUM: CPT | Performed by: PHYSICIAN ASSISTANT

## 2025-01-11 PROCEDURE — 2500000002 HC RX 250 W HCPCS SELF ADMINISTERED DRUGS (ALT 637 FOR MEDICARE OP, ALT 636 FOR OP/ED)

## 2025-01-11 PROCEDURE — 2500000001 HC RX 250 WO HCPCS SELF ADMINISTERED DRUGS (ALT 637 FOR MEDICARE OP): Performed by: PHYSICIAN ASSISTANT

## 2025-01-11 PROCEDURE — 2500000002 HC RX 250 W HCPCS SELF ADMINISTERED DRUGS (ALT 637 FOR MEDICARE OP, ALT 636 FOR OP/ED): Performed by: STUDENT IN AN ORGANIZED HEALTH CARE EDUCATION/TRAINING PROGRAM

## 2025-01-11 PROCEDURE — 2500000004 HC RX 250 GENERAL PHARMACY W/ HCPCS (ALT 636 FOR OP/ED): Performed by: PHYSICIAN ASSISTANT

## 2025-01-11 PROCEDURE — 85007 BL SMEAR W/DIFF WBC COUNT: CPT | Performed by: PHYSICIAN ASSISTANT

## 2025-01-11 PROCEDURE — 1100000001 HC PRIVATE ROOM DAILY

## 2025-01-11 PROCEDURE — 2500000004 HC RX 250 GENERAL PHARMACY W/ HCPCS (ALT 636 FOR OP/ED): Performed by: STUDENT IN AN ORGANIZED HEALTH CARE EDUCATION/TRAINING PROGRAM

## 2025-01-11 PROCEDURE — 80069 RENAL FUNCTION PANEL: CPT

## 2025-01-11 PROCEDURE — 2500000001 HC RX 250 WO HCPCS SELF ADMINISTERED DRUGS (ALT 637 FOR MEDICARE OP): Performed by: STUDENT IN AN ORGANIZED HEALTH CARE EDUCATION/TRAINING PROGRAM

## 2025-01-11 PROCEDURE — 99232 SBSQ HOSP IP/OBS MODERATE 35: CPT | Performed by: SURGERY

## 2025-01-11 PROCEDURE — 82947 ASSAY GLUCOSE BLOOD QUANT: CPT

## 2025-01-11 RX ORDER — MAGNESIUM SULFATE HEPTAHYDRATE 40 MG/ML
4 INJECTION, SOLUTION INTRAVENOUS ONCE
Status: COMPLETED | OUTPATIENT
Start: 2025-01-11 | End: 2025-01-11

## 2025-01-11 RX ORDER — ONDANSETRON HYDROCHLORIDE 2 MG/ML
4 INJECTION, SOLUTION INTRAVENOUS EVERY 8 HOURS
Status: DISCONTINUED | OUTPATIENT
Start: 2025-01-11 | End: 2025-01-20

## 2025-01-11 RX ORDER — OXYCODONE HYDROCHLORIDE 5 MG/1
2.5 TABLET ORAL EVERY 6 HOURS PRN
Status: DISCONTINUED | OUTPATIENT
Start: 2025-01-11 | End: 2025-01-13

## 2025-01-11 RX ORDER — MYCOPHENOLIC ACID 360 MG/1
360 TABLET, DELAYED RELEASE ORAL 2 TIMES DAILY
Status: DISCONTINUED | OUTPATIENT
Start: 2025-01-11 | End: 2025-01-14

## 2025-01-11 RX ORDER — OXYCODONE HYDROCHLORIDE 5 MG/1
5 TABLET ORAL EVERY 6 HOURS PRN
Status: DISCONTINUED | OUTPATIENT
Start: 2025-01-11 | End: 2025-01-13

## 2025-01-11 RX ORDER — PREDNISONE 10 MG/1
10 TABLET ORAL DAILY
Status: DISCONTINUED | OUTPATIENT
Start: 2025-01-12 | End: 2025-01-15

## 2025-01-11 RX ORDER — TACROLIMUS 1 MG/1
5 CAPSULE ORAL EVERY 12 HOURS
Status: DISCONTINUED | OUTPATIENT
Start: 2025-01-11 | End: 2025-01-13

## 2025-01-11 RX ORDER — INSULIN GLARGINE 100 [IU]/ML
5 INJECTION, SOLUTION SUBCUTANEOUS EVERY 12 HOURS
Status: DISCONTINUED | OUTPATIENT
Start: 2025-01-11 | End: 2025-01-12

## 2025-01-11 RX ORDER — METOCLOPRAMIDE 10 MG/1
5 TABLET ORAL EVERY 8 HOURS
Status: DISCONTINUED | OUTPATIENT
Start: 2025-01-11 | End: 2025-01-23 | Stop reason: HOSPADM

## 2025-01-11 RX ADMIN — OXYCODONE 5 MG: 5 TABLET ORAL at 18:39

## 2025-01-11 RX ADMIN — CALCIUM CARBONATE (ANTACID) CHEW TAB 500 MG 500 MG: 500 CHEW TAB at 15:23

## 2025-01-11 RX ADMIN — MYCOPHENOLIC ACID 360 MG: 360 TABLET, DELAYED RELEASE ORAL at 18:35

## 2025-01-11 RX ADMIN — METOCLOPRAMIDE 5 MG: 10 TABLET ORAL at 11:27

## 2025-01-11 RX ADMIN — TACROLIMUS 4 MG: 1 CAPSULE ORAL at 06:10

## 2025-01-11 RX ADMIN — METHOCARBAMOL 500 MG: 500 TABLET ORAL at 06:10

## 2025-01-11 RX ADMIN — METHOCARBAMOL 500 MG: 500 TABLET ORAL at 14:22

## 2025-01-11 RX ADMIN — TACROLIMUS 5 MG: 1 CAPSULE ORAL at 18:35

## 2025-01-11 RX ADMIN — HEPARIN SODIUM 5000 UNITS: 5000 INJECTION INTRAVENOUS; SUBCUTANEOUS at 11:34

## 2025-01-11 RX ADMIN — METOCLOPRAMIDE 5 MG: 10 TABLET ORAL at 20:54

## 2025-01-11 RX ADMIN — PANTOPRAZOLE SODIUM 40 MG: 40 TABLET, DELAYED RELEASE ORAL at 15:24

## 2025-01-11 RX ADMIN — THIAMINE HYDROCHLORIDE 500 MG: 100 INJECTION, SOLUTION INTRAMUSCULAR; INTRAVENOUS at 11:46

## 2025-01-11 RX ADMIN — CALCIUM CARBONATE (ANTACID) CHEW TAB 500 MG 500 MG: 500 CHEW TAB at 06:09

## 2025-01-11 RX ADMIN — NYSTATIN 500000 UNITS: 500000 SUSPENSION ORAL at 06:15

## 2025-01-11 RX ADMIN — METOCLOPRAMIDE 5 MG: 10 TABLET ORAL at 06:10

## 2025-01-11 RX ADMIN — HEPARIN SODIUM 5000 UNITS: 5000 INJECTION INTRAVENOUS; SUBCUTANEOUS at 01:21

## 2025-01-11 RX ADMIN — HEPARIN SODIUM 5000 UNITS: 5000 INJECTION INTRAVENOUS; SUBCUTANEOUS at 15:24

## 2025-01-11 RX ADMIN — ACETAMINOPHEN 650 MG: 325 TABLET, FILM COATED ORAL at 02:39

## 2025-01-11 RX ADMIN — INSULIN GLARGINE 5 UNITS: 100 INJECTION, SOLUTION SUBCUTANEOUS at 14:20

## 2025-01-11 RX ADMIN — AMLODIPINE BESYLATE 10 MG: 10 TABLET ORAL at 11:28

## 2025-01-11 RX ADMIN — NYSTATIN 500000 UNITS: 500000 SUSPENSION ORAL at 20:53

## 2025-01-11 RX ADMIN — ROSUVASTATIN CALCIUM 10 MG: 10 TABLET, FILM COATED ORAL at 20:58

## 2025-01-11 RX ADMIN — ACETAMINOPHEN 650 MG: 325 TABLET, FILM COATED ORAL at 15:23

## 2025-01-11 RX ADMIN — ACETAMINOPHEN 650 MG: 325 TABLET, FILM COATED ORAL at 20:53

## 2025-01-11 RX ADMIN — PREDNISONE 15 MG: 10 TABLET ORAL at 11:28

## 2025-01-11 RX ADMIN — NYSTATIN 500000 UNITS: 500000 SUSPENSION ORAL at 18:01

## 2025-01-11 RX ADMIN — PANTOPRAZOLE SODIUM 40 MG: 40 TABLET, DELAYED RELEASE ORAL at 06:09

## 2025-01-11 RX ADMIN — INSULIN HUMAN 2 UNITS: 100 INJECTION, SOLUTION PARENTERAL at 18:35

## 2025-01-11 RX ADMIN — NYSTATIN 500000 UNITS: 500000 SUSPENSION ORAL at 14:22

## 2025-01-11 RX ADMIN — ACETAMINOPHEN 650 MG: 325 TABLET, FILM COATED ORAL at 11:27

## 2025-01-11 RX ADMIN — INSULIN GLARGINE 7 UNITS: 100 INJECTION, SOLUTION SUBCUTANEOUS at 00:14

## 2025-01-11 RX ADMIN — SODIUM CHLORIDE, POTASSIUM CHLORIDE, SODIUM LACTATE AND CALCIUM CHLORIDE 1000 ML: 600; 310; 30; 20 INJECTION, SOLUTION INTRAVENOUS at 14:20

## 2025-01-11 RX ADMIN — INSULIN HUMAN 4 UNITS: 100 INJECTION, SOLUTION PARENTERAL at 06:56

## 2025-01-11 RX ADMIN — MAGNESIUM SULFATE HEPTAHYDRATE 4 G: 40 INJECTION, SOLUTION INTRAVENOUS at 11:46

## 2025-01-11 RX ADMIN — ONDANSETRON 4 MG: 2 INJECTION INTRAMUSCULAR; INTRAVENOUS at 09:36

## 2025-01-11 RX ADMIN — GABAPENTIN 200 MG: 100 CAPSULE ORAL at 11:28

## 2025-01-11 RX ADMIN — ONDANSETRON 4 MG: 2 INJECTION INTRAMUSCULAR; INTRAVENOUS at 20:54

## 2025-01-11 RX ADMIN — MYCOPHENOLATE MOFETIL 500 MG: 250 CAPSULE ORAL at 06:10

## 2025-01-11 RX ADMIN — SULFAMETHOXAZOLE AND TRIMETHOPRIM 1 TABLET: 400; 80 TABLET ORAL at 11:28

## 2025-01-11 ASSESSMENT — COGNITIVE AND FUNCTIONAL STATUS - GENERAL
TOILETING: A LITTLE
CLIMB 3 TO 5 STEPS WITH RAILING: A LITTLE
STANDING UP FROM CHAIR USING ARMS: A LITTLE
MOBILITY SCORE: 21
DRESSING REGULAR LOWER BODY CLOTHING: A LITTLE
WALKING IN HOSPITAL ROOM: A LITTLE
TOILETING: A LITTLE
DRESSING REGULAR UPPER BODY CLOTHING: A LITTLE
STANDING UP FROM CHAIR USING ARMS: A LITTLE
MOBILITY SCORE: 21
DRESSING REGULAR LOWER BODY CLOTHING: A LITTLE
EATING MEALS: A LITTLE
EATING MEALS: A LITTLE
CLIMB 3 TO 5 STEPS WITH RAILING: A LITTLE
HELP NEEDED FOR BATHING: A LITTLE
HELP NEEDED FOR BATHING: A LITTLE
DRESSING REGULAR UPPER BODY CLOTHING: A LITTLE
DAILY ACTIVITIY SCORE: 19
DAILY ACTIVITIY SCORE: 19
WALKING IN HOSPITAL ROOM: A LITTLE

## 2025-01-11 ASSESSMENT — PAIN SCALES - WONG BAKER: WONGBAKER_NUMERICALRESPONSE: HURTS EVEN MORE

## 2025-01-11 ASSESSMENT — PAIN - FUNCTIONAL ASSESSMENT
PAIN_FUNCTIONAL_ASSESSMENT: 0-10
PAIN_FUNCTIONAL_ASSESSMENT: 0-10

## 2025-01-11 ASSESSMENT — PAIN SCALES - GENERAL
PAINLEVEL_OUTOF10: 5 - MODERATE PAIN
PAINLEVEL_OUTOF10: 7
PAINLEVEL_OUTOF10: 4
PAINLEVEL_OUTOF10: 0 - NO PAIN

## 2025-01-11 ASSESSMENT — ACTIVITIES OF DAILY LIVING (ADL): EFFECT OF PAIN ON DAILY ACTIVITIES: ASSESS

## 2025-01-11 ASSESSMENT — PAIN DESCRIPTION - DESCRIPTORS: DESCRIPTORS: DISCOMFORT

## 2025-01-11 ASSESSMENT — PAIN DESCRIPTION - LOCATION: LOCATION: ABDOMEN

## 2025-01-11 ASSESSMENT — PAIN SCALES - PAIN ASSESSMENT IN ADVANCED DEMENTIA (PAINAD): TOTALSCORE: MEDICATION (SEE MAR)

## 2025-01-11 NOTE — PROGRESS NOTES
Temo Hanson is a 74 y.o. male POD#21 from DDKT from a DBD donor. Readmit for PO intolerance due to achalasia s/p Heller/Andrea myotomy with megaesophagus. POD#3 from PEG placement.    - Ongoing nausea and high residuals from PEG TF's  - Some diarrhea overnight as well    Objective   Gen: A+OX3; fatigued  HEENT: PERRL, sclera anicteric, MMM  Cardiac: RRR  Chest: Normal inspiratory effort  Abdomen: S/NT/ND. Incision C/D/I.  Ext: No LE edema    Last Recorded Vitals  Visit Vitals  /62 (BP Location: Right arm, Patient Position: Lying)   Pulse 88   Temp 37.1 °C (98.8 °F) (Temporal)   Resp 18      Intake/Output last 3 Shifts:    Intake/Output Summary (Last 24 hours) at 1/11/2025 0850  Last data filed at 1/11/2025 0728  Gross per 24 hour   Intake 470 ml   Output 200 ml   Net 270 ml      Vitals:    01/10/25 0600   Weight: 76 kg (167 lb 8.8 oz)   Scheduled medications  acetaminophen, 650 mg, oral, q6h  amLODIPine, 10 mg, oral, Daily  calcium carbonate, 500 mg, oral, BID AC  ceFAZolin, 2 g, intravenous, Once  gabapentin, 200 mg, oral, Daily  heparin (porcine), 5,000 Units, subcutaneous, q8h  insulin glargine, 5 Units, subcutaneous, q12h  insulin regular, 0-10 Units, subcutaneous, q6h  insulin regular, 3 Units, subcutaneous, q6h  lidocaine, 5 mL, infiltration, Once  lidocaine, 1 patch, transdermal, Daily  magnesium sulfate, 4 g, intravenous, Once  methocarbamol, 500 mg, oral, q8h KASSY  metoclopramide, 5 mg, oral, TID AC  mycophenolate, 500 mg, oral, q12h  nystatin, 5 mL, Swish & Swallow, 4x daily  pantoprazole, 40 mg, oral, BID AC  predniSONE, 15 mg, oral, Daily  rosuvastatin, 10 mg, oral, Nightly  sulfamethoxazole-trimethoprim, 1 tablet, oral, Daily  tacrolimus, 4 mg, oral, q12h  [START ON 1/12/2025] thiamine, 100 mg, oral, Daily  thiamine, 500 mg, intravenous, Daily  [Held by provider] valGANciclovir, 900 mg, oral, q24h    Assessment/Plan   Principal Problem:    Kidney transplant recipient  Active Problems:  Patient  Active Problem List   Diagnosis    S/P laparoscopic cholecystectomy    Abdominal pain    Achalasia    Acute lower UTI    Microcytic anemia    Complete heart block    Callus of foot    Chronic periodontitis, unspecified    Stage 5 chronic kidney disease (Multi)    Closed fracture of metatarsal bone    Crushing injury of foot    Diabetes mellitus (Multi)    Diarrhea    Dysphagia    ED (erectile dysfunction)    Essential hypertension    Foot pain, right    GIB (gastrointestinal bleeding)    Hepatitis B core antibody positive    Hyperkalemia    Ingrowing nail    Iron deficiency anemia secondary to inadequate dietary iron intake    Long toenail    Malignant neoplasm of prostate (Multi)    Onychomycosis    Pheochromocytoma of right adrenal gland    Pneumonia of right lower lobe due to infectious organism    Productive cough    Pure hypercholesterolemia    Stricture esophagus    SVT (supraventricular tachycardia) (CMS-HCC)    Type 2 diabetes mellitus with diabetic neuropathy (Multi)    Preop cardiovascular exam    Third degree heart block    Pericardial effusion (HHS-HCC)    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 (Multi)    Uremia    Cardiac tamponade    Cardiac pacemaker in situ    ESRD (end stage renal disease) (Multi)    Type 2 diabetes mellitus, with long-term current use of insulin    Dehydration    Alactasia syndrome    Type 2 diabetes mellitus with hyperglycemia, with long-term current use of insulin    On tube feeding diet      - 1L IVF bolus then main IVF  - Hold TF's 4 hrs and then resume at 20 ml/h with no titration  - Continue reglan 5 mg q8h and include scheduled zofran  - PT/OT  - Convert to myfortic 360 mg bid  - Tacrolimus goal 8-10 ng/mL  - Decrease to prednisone 10 mg  - May need gastric emptying assessment and conversion to PEG/J    I spent 35 minutes in the professional and overall care of this patient, including immunosuppression management.    Sherly Kang MD, PHD, MPH, FACS  Chief  Transplant and Hepatobiliary Surgery

## 2025-01-11 NOTE — CARE PLAN
The patient's goals for the shift include get better    The clinical goals for the shift include pt to be safe from falls and injuries by the end of the shift    Over the shift, the patient did not make progress toward the following goals. Barriers to progression include . Recommendations to address these barriers include .  Problem: Skin  Goal: Decreased wound size/increased tissue granulation at next dressing change  Outcome: Progressing  Goal: Participates in plan/prevention/treatment measures  Outcome: Progressing  Goal: Prevent/manage excess moisture  Outcome: Progressing  Goal: Prevent/minimize sheer/friction injuries  Outcome: Progressing  Goal: Promote/optimize nutrition  Outcome: Progressing  Goal: Promote skin healing  Outcome: Progressing

## 2025-01-11 NOTE — NURSING NOTE
Pt complained of Abdominal pain due to gas pt has sluggish or hypoactive BS. Pt also had an emesis since increasing feed 10 ml. MD made aware feed stop and pt assessed. Call light and essentials WNR.

## 2025-01-11 NOTE — PROGRESS NOTES
"Temo Hanson is a 74 y.o. male on day 11 of admission presenting with Dehydration.    Subjective   Patient resting   TF was held this AM, will be restarted at 20ml/h. Insulin reduced accordingly as pt was hypo yesterday.     I have reviewed histories, allergies and medications have been reviewed and there are no changes       Objective   Review of Systems   Reason unable to perform ROS: sleeping.     Physical Exam  Vitals reviewed.   Constitutional:       General: He is not in acute distress.     Appearance: Normal appearance. He is ill-appearing.   HENT:      Head: Normocephalic and atraumatic.      Nose: Nose normal.      Mouth/Throat:      Mouth: Mucous membranes are moist.   Eyes:      Extraocular Movements: Extraocular movements intact.      Conjunctiva/sclera: Conjunctivae normal.      Pupils: Pupils are equal, round, and reactive to light.   Cardiovascular:      Pulses: Normal pulses.   Pulmonary:      Effort: Pulmonary effort is normal. No respiratory distress.   Abdominal:      General: Abdomen is flat. There is no distension.   Musculoskeletal:         General: Normal range of motion.   Skin:     General: Skin is warm and dry.      Findings: No rash.   Neurological:      Mental Status: He is alert and oriented to person, place, and time.   Psychiatric:         Mood and Affect: Mood normal.         Behavior: Behavior normal.         Last Recorded Vitals  Blood pressure 179/62, pulse 88, temperature 37.1 °C (98.8 °F), temperature source Temporal, resp. rate 18, height 1.854 m (6' 1\"), weight 76 kg (167 lb 8.8 oz), SpO2 93%.  Intake/Output last 3 Shifts:  I/O last 3 completed shifts:  In: 620 (8.2 mL/kg) [P.O.:120; NG/GT:250; IV Piggyback:250]  Out: 900 (11.8 mL/kg) [Urine:900 (0.3 mL/kg/hr)]  Weight: 76 kg     Relevant Results  Results from last 7 days   Lab Units 01/11/25  0724 01/11/25  0551 01/11/25  0422 01/10/25  2209 01/10/25  2207 01/10/25  2054 01/10/25  1545 01/10/25  1518 01/10/25  0825 " Father called office requesting an apt for patient for depression. This nurse called patient. He states that he currently lives on campus at [facility] and started seeing the counselor there today but wants to see PCP tomorrow or Friday as he is coming home for the weekend. Patient states that he has thoughts of cutting himself but no plan to do so and no thoughts of killing himself and no plan to do so. Spoke to Dr. Tavares Urena- apt scheduled at 2pm per Dr. Tavares Urena. No further action required. 01/10/25  0530 01/09/25  0808 01/09/25  0732 01/08/25  0754 01/08/25  0610   POCT GLUCOSE mg/dL 100* 105*  --  75 63* 71*   < >  --    < >  --    < >  --    < >  --    GLUCOSE mg/dL  --   --  101*  --   --   --   --  77  --  209*  --  99  --  176*    < > = values in this interval not displayed.     Lab Review  Lab Results   Component Value Date    BILITOT 0.5 12/20/2024    CALCIUM 8.3 (L) 01/11/2025    CO2 25 01/11/2025     01/11/2025    CREATININE 1.19 01/11/2025    GLUCOSE 101 (H) 01/11/2025    ALKPHOS 189 (H) 12/20/2024    K 4.5 01/11/2025    PROT 8.8 (H) 12/20/2024     (L) 01/11/2025    AST 21 12/20/2024    ALT 18 12/20/2024    BUN 20 01/11/2025    ANIONGAP 13 01/11/2025    MG 1.70 01/11/2025    PHOS 2.6 01/11/2025    ALBUMIN 2.7 (L) 01/11/2025    AMYLASE 148 (H) 10/29/2024    GFRMALE 6 (A) 01/26/2023     Lab Results   Component Value Date    CHOL 183 10/29/2024    HDL 53.1 10/29/2024     Lab Results   Component Value Date    HGBA1C 6.1 (H) 10/29/2024    HGBA1C 5.7 (H) 04/20/2024    HGBA1C 7.3 (H) 02/21/2023     The 10-year ASCVD risk score (Maria TAM, et al., 2019) is: 56.7%    Values used to calculate the score:      Age: 74 years      Sex: Male      Is Non- : Yes      Diabetic: Yes      Tobacco smoker: No      Systolic Blood Pressure: 179 mmHg      Is BP treated: Yes      HDL Cholesterol: 53.1 mg/dL      Total Cholesterol: 183 mg/dL    Scheduled medications  acetaminophen, 650 mg, oral, q6h  amLODIPine, 10 mg, oral, Daily  calcium carbonate, 500 mg, oral, BID AC  ceFAZolin, 2 g, intravenous, Once  gabapentin, 200 mg, oral, Daily  heparin (porcine), 5,000 Units, subcutaneous, q8h  insulin glargine, 5 Units, subcutaneous, q12h  insulin regular, 0-10 Units, subcutaneous, q6h  insulin regular, 3 Units, subcutaneous, q6h  lidocaine, 5 mL, infiltration, Once  lidocaine, 1 patch, transdermal, Daily  magnesium sulfate, 4 g, intravenous, Once  methocarbamol, 500 mg, oral, q8h  KASSY  metoclopramide, 5 mg, oral, TID AC  mycophenolate, 500 mg, oral, q12h  nystatin, 5 mL, Swish & Swallow, 4x daily  pantoprazole, 40 mg, oral, BID AC  predniSONE, 15 mg, oral, Daily  rosuvastatin, 10 mg, oral, Nightly  sulfamethoxazole-trimethoprim, 1 tablet, oral, Daily  tacrolimus, 4 mg, oral, q12h  [START ON 1/12/2025] thiamine, 100 mg, oral, Daily  thiamine, 500 mg, intravenous, Daily  [Held by provider] valGANciclovir, 900 mg, oral, q24h      Continuous medications     PRN medications  PRN medications: carboxymethylcellulose, dextrose, dextrose, dextrose, dextrose, docusate sodium, glucagon, glucagon, ondansetron ODT **OR** ondansetron, ondansetron, oxyCODONE, oxyCODONE, polyethylene glycol        Assessment/Plan   Assessment & Plan  Dehydration    Alactasia syndrome    Type 2 diabetes mellitus with hyperglycemia, with long-term current use of insulin    On tube feeding diet    Temo Hanson is a 74 y.o. 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  Patient was started on tube feeds, NPO, today after placement of PEG tube. Patient states he felt nausea and vomiting, leading to a pause in his feedings around noon. Feedings restarted around 1500.      Diabetes History  Type of diabetes: 2  Year diagnosed or age: 46 years old  Hospitalizations for DKA or HHS: Once - inappropriately gave too much sliding scale due to low BG finger stick not correcting in under an hour  Complications: Nephropathy  Seen by PCP or Endocrinology: PCP - Dr. Hillman, Endo - Dr. Carey  Frequency of glucose checks: 4-5x daily after meals  Glucose review: persistent hyperglycemia this admission  Frequency of Hypoglycemia: none  Hypoglycemia unawareness:   Severe hypoglycemia requiring assistance from others:  N     Home Medications  Basal: Glargine 8U  Prandial: Aspart  4U  Correction: Aspart sliding scale       PLAN  Steroids: Prednisone 15 mg  Nutrition: 60g CHO diet + glucerna 1.5 continuous TF, goal of 50 ml/h. This provides 40 g of carbs every 6 hours when at goal.  1/11- TF reduced to 20ml/h so patient is getting 16g of carbs every 6 hours     - reduce glargine 5u q12 hr     If TF is stopped: adjust to glargine 5u q24h          - HOLD regular insulin 3 U q6 hr     - continue regular insulin corrective scale #2 q6hr, continue if TF is stopped    = 0u  151-200 = 2u  201-250 = 4u  251-300 = 6u  301-350 = 8u  351-400 = 10u     -Accuchecks (not BMP) TIDAC and QHS- kindly ensure QHS Accucheck is drawn; it is often missed   - Goal -180  -Hypoglycemia protocol  -Will continue to follow and titrate insulin accordingly      Discharge planning:   [] patient may expect to discharge home on basal/bolus , final doses TBD by titration and nutrition status  [x]will provide CGM sample prior to discharge   [x]will enroll pt in  pharmacy platinum plan program  [x]follow up with Endo and PCP        I spent 50 minutes in the professional and overall care of this patient.      Zohra Kang PA-C

## 2025-01-11 NOTE — PROGRESS NOTES
"Temo Hanson is a 74 y.o. male on day 11 of admission presenting with Dehydration.    Subjective:  EGD and PEG tube placement POD3  Overnight C/o abdominal discomfort and nausea. Tube feeding was held for 3 hours.  Also c/o diarrhea    Scheduled medications  acetaminophen, 650 mg, oral, q6h  amLODIPine, 10 mg, oral, Daily  calcium carbonate, 500 mg, oral, BID AC  ceFAZolin, 2 g, intravenous, Once  gabapentin, 200 mg, oral, Daily  heparin (porcine), 5,000 Units, subcutaneous, q8h  insulin glargine, 7 Units, subcutaneous, q12h  insulin regular, 0-10 Units, subcutaneous, q6h  insulin regular, 4 Units, subcutaneous, q6h  lidocaine, 5 mL, infiltration, Once  lidocaine, 1 patch, transdermal, Daily  methocarbamol, 500 mg, oral, q8h KASSY  metoclopramide, 5 mg, oral, TID AC  mycophenolate, 500 mg, oral, q12h  nystatin, 5 mL, Swish & Swallow, 4x daily  pantoprazole, 40 mg, oral, BID AC  predniSONE, 15 mg, oral, Daily  rosuvastatin, 10 mg, oral, Nightly  sulfamethoxazole-trimethoprim, 1 tablet, oral, Daily  tacrolimus, 4 mg, oral, q12h  [START ON 1/12/2025] thiamine, 100 mg, oral, Daily  thiamine, 500 mg, intravenous, Daily  [Held by provider] valGANciclovir, 900 mg, oral, q24h      Continuous medications         PRN medications  PRN medications: carboxymethylcellulose, dextrose, dextrose, dextrose, dextrose, docusate sodium, glucagon, glucagon, ondansetron ODT **OR** ondansetron, ondansetron, oxyCODONE, oxyCODONE, polyethylene glycol    Last Recorded Vitals  Blood pressure 179/62, pulse 88, temperature 37.1 °C (98.8 °F), temperature source Temporal, resp. rate 18, height 1.854 m (6' 1\"), weight 76 kg (167 lb 8.8 oz), SpO2 93%.  Intake/Output last 3 Shifts:  I/O last 3 completed shifts:  In: 620 (8.2 mL/kg) [P.O.:120; NG/GT:250; IV Piggyback:250]  Out: 900 (11.8 mL/kg) [Urine:900 (0.3 mL/kg/hr)]  Weight: 76 kg     A&ox3, no distress, pleasant  MMM, no lesions  Lungs with equal air entry, no added sounds  Rrr, no m/r/g  Abd " soft, nt, nd, RLQ incision c/d/i, staples intact  No allograft tenderness  No edema bilaterally    Results for orders placed or performed during the hospital encounter of 12/31/24 (from the past 24 hours)   POCT GLUCOSE   Result Value Ref Range    POCT Glucose 222 (H) 74 - 99 mg/dL   POCT GLUCOSE   Result Value Ref Range    POCT Glucose 137 (H) 74 - 99 mg/dL   Renal function panel   Result Value Ref Range    Glucose 77 74 - 99 mg/dL    Sodium 134 (L) 136 - 145 mmol/L    Potassium 4.4 3.5 - 5.3 mmol/L    Chloride 102 98 - 107 mmol/L    Bicarbonate 27 21 - 32 mmol/L    Anion Gap 9 (L) 10 - 20 mmol/L    Urea Nitrogen 19 6 - 23 mg/dL    Creatinine 1.17 0.50 - 1.30 mg/dL    eGFR 65 >60 mL/min/1.73m*2    Calcium 8.3 (L) 8.6 - 10.6 mg/dL    Phosphorus 3.2 2.5 - 4.9 mg/dL    Albumin 2.7 (L) 3.4 - 5.0 g/dL   POCT GLUCOSE   Result Value Ref Range    POCT Glucose 86 74 - 99 mg/dL   POCT GLUCOSE   Result Value Ref Range    POCT Glucose 71 (L) 74 - 99 mg/dL   POCT GLUCOSE   Result Value Ref Range    POCT Glucose 63 (L) 74 - 99 mg/dL   POCT GLUCOSE   Result Value Ref Range    POCT Glucose 75 74 - 99 mg/dL   Magnesium   Result Value Ref Range    Magnesium 1.70 1.60 - 2.40 mg/dL   Renal Function Panel   Result Value Ref Range    Glucose 101 (H) 74 - 99 mg/dL    Sodium 135 (L) 136 - 145 mmol/L    Potassium 4.5 3.5 - 5.3 mmol/L    Chloride 102 98 - 107 mmol/L    Bicarbonate 25 21 - 32 mmol/L    Anion Gap 13 10 - 20 mmol/L    Urea Nitrogen 20 6 - 23 mg/dL    Creatinine 1.19 0.50 - 1.30 mg/dL    eGFR 64 >60 mL/min/1.73m*2    Calcium 8.3 (L) 8.6 - 10.6 mg/dL    Phosphorus 2.6 2.5 - 4.9 mg/dL    Albumin 2.7 (L) 3.4 - 5.0 g/dL   Calcium, Ionized   Result Value Ref Range    POCT Calcium, Ionized 1.19 1.1 - 1.33 mmol/L   CBC and Auto Differential   Result Value Ref Range    WBC 1.5 (L) 4.4 - 11.3 x10*3/uL    nRBC 0.0 0.0 - 0.0 /100 WBCs    RBC 2.70 (L) 4.50 - 5.90 x10*6/uL    Hemoglobin 8.3 (L) 13.5 - 17.5 g/dL    Hematocrit 26.8 (L) 41.0 -  52.0 %    MCV 99 80 - 100 fL    MCH 30.7 26.0 - 34.0 pg    MCHC 31.0 (L) 32.0 - 36.0 g/dL    RDW 15.8 (H) 11.5 - 14.5 %    Platelets 178 150 - 450 x10*3/uL    Immature Granulocytes %, Automated 0.7 0.0 - 0.9 %    Immature Granulocytes Absolute, Automated 0.01 0.00 - 0.50 x10*3/uL   Prealbumin   Result Value Ref Range    Prealbumin 8.5 (L) 18.0 - 40.0 mg/dL   Manual Differential   Result Value Ref Range    Neutrophils %, Manual 86.8 40.0 - 80.0 %    Lymphocytes %, Manual 1.8 13.0 - 44.0 %    Monocytes %, Manual 6.1 2.0 - 10.0 %    Eosinophils %, Manual 4.4 0.0 - 6.0 %    Basophils %, Manual 0.9 0.0 - 2.0 %    Seg Neutrophils Absolute, Manual 1.30 (L) 1.60 - 5.00 x10*3/uL    Lymphocytes Absolute, Manual 0.03 (L) 0.80 - 3.00 x10*3/uL    Monocytes Absolute, Manual 0.09 0.05 - 0.80 x10*3/uL    Eosinophils Absolute, Manual 0.07 0.00 - 0.40 x10*3/uL    Basophils Absolute, Manual 0.01 0.00 - 0.10 x10*3/uL    Total Cells Counted 114     RBC Morphology See Below     Hypochromia Mild     Ovalocytes Few    POCT GLUCOSE   Result Value Ref Range    POCT Glucose 105 (H) 74 - 99 mg/dL   POCT GLUCOSE   Result Value Ref Range    POCT Glucose 100 (H) 74 - 99 mg/dL       Assessment/Plan     74 y.o. male with a hx of ESRD secondary to diabetic nephropathy whom received a  donor kidney transplant on 24 by Dr. Zamora with a KDPI of 93% and PRA of 54%. Donor was Hepc -/-and has not met risk factors. EBV D+ /R+, CMV D-/R+. Left donor kidney transplanted to patient right pelvis. Admission weight is 72.7 kg (discharge weight is 76.4 kg ). Pt received a total of 3 mg/kg total of thymoglobulin induction therapy in conjunction with 500mg IV solumedrol.      Post-txp course notable for slow graft function, now with DGF.     Allograft function: s/p DDKT 24  - DGF. , Cr 5.5,uremic on admission. s/p HD  for clearance.   Now Cr 1.4-1.5  IV fluid:  mL/hr x 1 L    Immunosuppression:  TAC 5 mg BID  ( mg BID  1/9- due to leukopenia)  Switched to Myfortic 360 mg BID 1/11-  Pred 15 mg/day    Prophylaxis:  PPI  Nystatin 500,000 units QID x 3 mo  TMP-SMX x 6 mo  HOLD Valcyte, renally dosed x 3 mo 1/10-  CMV PCR neg 1/9    Esophageal dysphagia  - Upper GI c/f achalasia. S/p Heller Myotomy plus Andrea Fundoplication 20+ yrs ago - per surgery, unable to fix  - EGD/PEG tube placement 1/8/25    Blood pressure - ok  - Amlodipine 10 mg daily    Anemia - iron replete  Darbe 200 mcg subcutaneous weekly    CKD-MBD - acceptable   Ca supplement  Replete phosphorus and Mg    Malnutrition  Feeding intolerance  Vitamins and supplements  Enteral nutrition - tube feeding being titrated  Monitor for refeeding syndrome  Scheduled Reglan and Zofran  Electrolytes ok  Concerning for diabetic gastroparesis  Plan nuclear medicine gastric emptying study next week  If confirmed, will switch to post-pyloric J-tube      Evelyn Trimble MD

## 2025-01-11 NOTE — NURSING NOTE
During rounds and report pt was found in semi fowlers position awake. Pt is very pleasant, alert and oriented x 3, has clear speech and denies pain. Pt has no concerns at this time. Call light and essentials wnr.

## 2025-01-12 VITALS
WEIGHT: 167.55 LBS | HEIGHT: 73 IN | DIASTOLIC BLOOD PRESSURE: 55 MMHG | RESPIRATION RATE: 16 BRPM | SYSTOLIC BLOOD PRESSURE: 144 MMHG | TEMPERATURE: 97.9 F | OXYGEN SATURATION: 99 % | HEART RATE: 80 BPM | BODY MASS INDEX: 22.21 KG/M2

## 2025-01-12 LAB
ABO GROUP (TYPE) IN BLOOD: NORMAL
ALBUMIN SERPL BCP-MCNC: 2.5 G/DL (ref 3.4–5)
ANION GAP SERPL CALC-SCNC: 11 MMOL/L (ref 10–20)
ANTIBODY SCREEN: NORMAL
BASOPHILS # BLD MANUAL: 0.03 X10*3/UL (ref 0–0.1)
BASOPHILS NFR BLD MANUAL: 1.7 %
BLOOD EXPIRATION DATE: NORMAL
BUN SERPL-MCNC: 20 MG/DL (ref 6–23)
CA-I BLD-SCNC: 1.18 MMOL/L (ref 1.1–1.33)
CALCIUM SERPL-MCNC: 8 MG/DL (ref 8.6–10.6)
CHLORIDE SERPL-SCNC: 104 MMOL/L (ref 98–107)
CO2 SERPL-SCNC: 25 MMOL/L (ref 21–32)
CREAT SERPL-MCNC: 1.26 MG/DL (ref 0.5–1.3)
DISPENSE STATUS: NORMAL
EGFRCR SERPLBLD CKD-EPI 2021: 60 ML/MIN/1.73M*2
EOSINOPHIL # BLD MANUAL: 0 X10*3/UL (ref 0–0.4)
EOSINOPHIL NFR BLD MANUAL: 0 %
ERYTHROCYTE [DISTWIDTH] IN BLOOD BY AUTOMATED COUNT: 15.9 % (ref 11.5–14.5)
GLUCOSE BLD MANUAL STRIP-MCNC: 108 MG/DL (ref 74–99)
GLUCOSE BLD MANUAL STRIP-MCNC: 134 MG/DL (ref 74–99)
GLUCOSE BLD MANUAL STRIP-MCNC: 138 MG/DL (ref 74–99)
GLUCOSE BLD MANUAL STRIP-MCNC: 153 MG/DL (ref 74–99)
GLUCOSE BLD MANUAL STRIP-MCNC: 175 MG/DL (ref 74–99)
GLUCOSE BLD MANUAL STRIP-MCNC: 67 MG/DL (ref 74–99)
GLUCOSE BLD MANUAL STRIP-MCNC: 68 MG/DL (ref 74–99)
GLUCOSE BLD MANUAL STRIP-MCNC: 92 MG/DL (ref 74–99)
GLUCOSE SERPL-MCNC: 75 MG/DL (ref 74–99)
HCT VFR BLD AUTO: 22.2 % (ref 41–52)
HGB BLD-MCNC: 7.1 G/DL (ref 13.5–17.5)
IMM GRANULOCYTES # BLD AUTO: 0.01 X10*3/UL (ref 0–0.5)
IMM GRANULOCYTES NFR BLD AUTO: 0.7 % (ref 0–0.9)
LYMPHOCYTES # BLD MANUAL: 0.19 X10*3/UL (ref 0.8–3)
LYMPHOCYTES NFR BLD MANUAL: 12.6 %
MAGNESIUM SERPL-MCNC: 2.24 MG/DL (ref 1.6–2.4)
MCH RBC QN AUTO: 30.6 PG (ref 26–34)
MCHC RBC AUTO-ENTMCNC: 32 G/DL (ref 32–36)
MCV RBC AUTO: 96 FL (ref 80–100)
MONOCYTES # BLD MANUAL: 0.18 X10*3/UL (ref 0.05–0.8)
MONOCYTES NFR BLD MANUAL: 11.8 %
NEUTS SEG # BLD MANUAL: 1.05 X10*3/UL (ref 1.6–5)
NEUTS SEG NFR BLD MANUAL: 69.7 %
NRBC BLD-RTO: 0 /100 WBCS (ref 0–0)
PHOSPHATE SERPL-MCNC: 3.4 MG/DL (ref 2.5–4.9)
PLATELET # BLD AUTO: 160 X10*3/UL (ref 150–450)
POTASSIUM SERPL-SCNC: 4.5 MMOL/L (ref 3.5–5.3)
PRODUCT BLOOD TYPE: 5100
PRODUCT CODE: NORMAL
RBC # BLD AUTO: 2.32 X10*6/UL (ref 4.5–5.9)
RBC MORPH BLD: ABNORMAL
RH FACTOR (ANTIGEN D): NORMAL
SODIUM SERPL-SCNC: 135 MMOL/L (ref 136–145)
TACROLIMUS BLD-MCNC: 8.4 NG/ML
TOTAL CELLS COUNTED BLD: 119
UNIT ABO: NORMAL
UNIT NUMBER: NORMAL
UNIT RH: NORMAL
UNIT VOLUME: 350
VARIANT LYMPHS # BLD MANUAL: 0.06 X10*3/UL (ref 0–0.3)
VARIANT LYMPHS NFR BLD: 4.2 %
WBC # BLD AUTO: 1.5 X10*3/UL (ref 4.4–11.3)
XM INTEP: NORMAL

## 2025-01-12 PROCEDURE — 82330 ASSAY OF CALCIUM: CPT | Performed by: PHYSICIAN ASSISTANT

## 2025-01-12 PROCEDURE — 2500000002 HC RX 250 W HCPCS SELF ADMINISTERED DRUGS (ALT 637 FOR MEDICARE OP, ALT 636 FOR OP/ED): Performed by: STUDENT IN AN ORGANIZED HEALTH CARE EDUCATION/TRAINING PROGRAM

## 2025-01-12 PROCEDURE — 2500000001 HC RX 250 WO HCPCS SELF ADMINISTERED DRUGS (ALT 637 FOR MEDICARE OP)

## 2025-01-12 PROCEDURE — 99233 SBSQ HOSP IP/OBS HIGH 50: CPT | Performed by: STUDENT IN AN ORGANIZED HEALTH CARE EDUCATION/TRAINING PROGRAM

## 2025-01-12 PROCEDURE — 86923 COMPATIBILITY TEST ELECTRIC: CPT

## 2025-01-12 PROCEDURE — 2500000001 HC RX 250 WO HCPCS SELF ADMINISTERED DRUGS (ALT 637 FOR MEDICARE OP): Performed by: PHYSICIAN ASSISTANT

## 2025-01-12 PROCEDURE — 99232 SBSQ HOSP IP/OBS MODERATE 35: CPT | Performed by: SURGERY

## 2025-01-12 PROCEDURE — 2500000004 HC RX 250 GENERAL PHARMACY W/ HCPCS (ALT 636 FOR OP/ED)

## 2025-01-12 PROCEDURE — 85007 BL SMEAR W/DIFF WBC COUNT: CPT | Performed by: PHYSICIAN ASSISTANT

## 2025-01-12 PROCEDURE — 82947 ASSAY GLUCOSE BLOOD QUANT: CPT

## 2025-01-12 PROCEDURE — 2500000005 HC RX 250 GENERAL PHARMACY W/O HCPCS

## 2025-01-12 PROCEDURE — 86901 BLOOD TYPING SEROLOGIC RH(D): CPT

## 2025-01-12 PROCEDURE — 80197 ASSAY OF TACROLIMUS: CPT | Performed by: STUDENT IN AN ORGANIZED HEALTH CARE EDUCATION/TRAINING PROGRAM

## 2025-01-12 PROCEDURE — P9016 RBC LEUKOCYTES REDUCED: HCPCS

## 2025-01-12 PROCEDURE — 85027 COMPLETE CBC AUTOMATED: CPT | Performed by: PHYSICIAN ASSISTANT

## 2025-01-12 PROCEDURE — 83735 ASSAY OF MAGNESIUM: CPT | Performed by: STUDENT IN AN ORGANIZED HEALTH CARE EDUCATION/TRAINING PROGRAM

## 2025-01-12 PROCEDURE — 2500000004 HC RX 250 GENERAL PHARMACY W/ HCPCS (ALT 636 FOR OP/ED): Performed by: SURGERY

## 2025-01-12 PROCEDURE — 2500000002 HC RX 250 W HCPCS SELF ADMINISTERED DRUGS (ALT 637 FOR MEDICARE OP, ALT 636 FOR OP/ED)

## 2025-01-12 PROCEDURE — 2500000001 HC RX 250 WO HCPCS SELF ADMINISTERED DRUGS (ALT 637 FOR MEDICARE OP): Performed by: STUDENT IN AN ORGANIZED HEALTH CARE EDUCATION/TRAINING PROGRAM

## 2025-01-12 PROCEDURE — 36430 TRANSFUSION BLD/BLD COMPNT: CPT

## 2025-01-12 PROCEDURE — 2500000005 HC RX 250 GENERAL PHARMACY W/O HCPCS: Performed by: STUDENT IN AN ORGANIZED HEALTH CARE EDUCATION/TRAINING PROGRAM

## 2025-01-12 PROCEDURE — 99233 SBSQ HOSP IP/OBS HIGH 50: CPT

## 2025-01-12 PROCEDURE — 1100000001 HC PRIVATE ROOM DAILY

## 2025-01-12 PROCEDURE — 80069 RENAL FUNCTION PANEL: CPT

## 2025-01-12 RX ORDER — SODIUM CHLORIDE, SODIUM LACTATE, POTASSIUM CHLORIDE, CALCIUM CHLORIDE 600; 310; 30; 20 MG/100ML; MG/100ML; MG/100ML; MG/100ML
50 INJECTION, SOLUTION INTRAVENOUS CONTINUOUS
Status: ACTIVE | OUTPATIENT
Start: 2025-01-12 | End: 2025-01-13

## 2025-01-12 RX ORDER — INSULIN GLARGINE 100 [IU]/ML
5 INJECTION, SOLUTION SUBCUTANEOUS NIGHTLY
Status: DISCONTINUED | OUTPATIENT
Start: 2025-01-12 | End: 2025-01-14

## 2025-01-12 RX ORDER — INSULIN LISPRO 100 [IU]/ML
0-10 INJECTION, SOLUTION INTRAVENOUS; SUBCUTANEOUS EVERY 4 HOURS
Status: DISCONTINUED | OUTPATIENT
Start: 2025-01-12 | End: 2025-01-13

## 2025-01-12 RX ADMIN — CALCIUM CARBONATE (ANTACID) CHEW TAB 500 MG 500 MG: 500 CHEW TAB at 17:11

## 2025-01-12 RX ADMIN — ASCORBIC ACID, VITAMIN A PALMITATE, CHOLECALCIFEROL, THIAMINE HYDROCHLORIDE, RIBOFLAVIN-5 PHOSPHATE SODIUM, PYRIDOXINE HYDROCHLORIDE, NIACINAMIDE, DEXPANTHENOL, ALPHA-TOCOPHEROL ACETATE, VITAMIN K1, FOLIC ACID, BIOTIN, CYANOCOBALAMIN: 200; 3300; 200; 6; 3.6; 6; 40; 15; 10; 150; 600; 60; 5 INJECTION, SOLUTION INTRAVENOUS at 20:53

## 2025-01-12 RX ADMIN — SODIUM CHLORIDE, POTASSIUM CHLORIDE, SODIUM LACTATE AND CALCIUM CHLORIDE 100 ML/HR: 600; 310; 30; 20 INJECTION, SOLUTION INTRAVENOUS at 12:12

## 2025-01-12 RX ADMIN — AMLODIPINE BESYLATE 10 MG: 10 TABLET ORAL at 10:08

## 2025-01-12 RX ADMIN — NYSTATIN 500000 UNITS: 500000 SUSPENSION ORAL at 17:12

## 2025-01-12 RX ADMIN — ACETAMINOPHEN 650 MG: 325 TABLET, FILM COATED ORAL at 14:08

## 2025-01-12 RX ADMIN — INSULIN LISPRO 2 UNITS: 100 INJECTION, SOLUTION INTRAVENOUS; SUBCUTANEOUS at 20:52

## 2025-01-12 RX ADMIN — SULFAMETHOXAZOLE AND TRIMETHOPRIM 1 TABLET: 400; 80 TABLET ORAL at 10:08

## 2025-01-12 RX ADMIN — METOCLOPRAMIDE 5 MG: 10 TABLET ORAL at 04:42

## 2025-01-12 RX ADMIN — TACROLIMUS 5 MG: 1 CAPSULE ORAL at 06:22

## 2025-01-12 RX ADMIN — HEPARIN SODIUM 5000 UNITS: 5000 INJECTION INTRAVENOUS; SUBCUTANEOUS at 10:08

## 2025-01-12 RX ADMIN — TACROLIMUS 5 MG: 1 CAPSULE ORAL at 18:51

## 2025-01-12 RX ADMIN — CALCIUM CARBONATE (ANTACID) CHEW TAB 500 MG 500 MG: 500 CHEW TAB at 06:22

## 2025-01-12 RX ADMIN — HEPARIN SODIUM 5000 UNITS: 5000 INJECTION INTRAVENOUS; SUBCUTANEOUS at 00:38

## 2025-01-12 RX ADMIN — ACETAMINOPHEN 650 MG: 325 TABLET, FILM COATED ORAL at 04:42

## 2025-01-12 RX ADMIN — NYSTATIN 500000 UNITS: 500000 SUSPENSION ORAL at 21:04

## 2025-01-12 RX ADMIN — ACETAMINOPHEN 650 MG: 325 TABLET, FILM COATED ORAL at 20:48

## 2025-01-12 RX ADMIN — ACETAMINOPHEN 650 MG: 325 TABLET, FILM COATED ORAL at 10:08

## 2025-01-12 RX ADMIN — INSULIN LISPRO 2 UNITS: 100 INJECTION, SOLUTION INTRAVENOUS; SUBCUTANEOUS at 17:12

## 2025-01-12 RX ADMIN — HEPARIN SODIUM 5000 UNITS: 5000 INJECTION INTRAVENOUS; SUBCUTANEOUS at 23:50

## 2025-01-12 RX ADMIN — ONDANSETRON 4 MG: 2 INJECTION INTRAMUSCULAR; INTRAVENOUS at 12:13

## 2025-01-12 RX ADMIN — ROSUVASTATIN CALCIUM 10 MG: 10 TABLET, FILM COATED ORAL at 21:04

## 2025-01-12 RX ADMIN — METOCLOPRAMIDE 5 MG: 10 TABLET ORAL at 20:49

## 2025-01-12 RX ADMIN — OXYCODONE 5 MG: 5 TABLET ORAL at 10:20

## 2025-01-12 RX ADMIN — ONDANSETRON 4 MG: 2 INJECTION INTRAMUSCULAR; INTRAVENOUS at 04:43

## 2025-01-12 RX ADMIN — GABAPENTIN 200 MG: 100 CAPSULE ORAL at 10:08

## 2025-01-12 RX ADMIN — INSULIN GLARGINE 5 UNITS: 100 INJECTION, SOLUTION SUBCUTANEOUS at 20:52

## 2025-01-12 RX ADMIN — ONDANSETRON 4 MG: 2 INJECTION INTRAMUSCULAR; INTRAVENOUS at 20:49

## 2025-01-12 RX ADMIN — THIAMINE HCL TAB 100 MG 100 MG: 100 TAB at 10:08

## 2025-01-12 RX ADMIN — PANTOPRAZOLE SODIUM 40 MG: 40 TABLET, DELAYED RELEASE ORAL at 17:12

## 2025-01-12 RX ADMIN — PREDNISONE 10 MG: 10 TABLET ORAL at 10:07

## 2025-01-12 RX ADMIN — INSULIN GLARGINE 5 UNITS: 100 INJECTION, SOLUTION SUBCUTANEOUS at 00:40

## 2025-01-12 RX ADMIN — MYCOPHENOLIC ACID 360 MG: 360 TABLET, DELAYED RELEASE ORAL at 06:22

## 2025-01-12 RX ADMIN — NYSTATIN 500000 UNITS: 500000 SUSPENSION ORAL at 06:22

## 2025-01-12 RX ADMIN — PANTOPRAZOLE SODIUM 40 MG: 40 TABLET, DELAYED RELEASE ORAL at 06:22

## 2025-01-12 RX ADMIN — DOCUSATE SODIUM 100 MG: 100 CAPSULE, LIQUID FILLED ORAL at 10:07

## 2025-01-12 RX ADMIN — MYCOPHENOLIC ACID 360 MG: 360 TABLET, DELAYED RELEASE ORAL at 20:10

## 2025-01-12 RX ADMIN — HEPARIN SODIUM 5000 UNITS: 5000 INJECTION INTRAVENOUS; SUBCUTANEOUS at 17:13

## 2025-01-12 RX ADMIN — METOCLOPRAMIDE 5 MG: 10 TABLET ORAL at 12:14

## 2025-01-12 RX ADMIN — DEXTROSE MONOHYDRATE 12.5 G: 25 INJECTION, SOLUTION INTRAVENOUS at 12:12

## 2025-01-12 ASSESSMENT — COGNITIVE AND FUNCTIONAL STATUS - GENERAL
HELP NEEDED FOR BATHING: A LITTLE
TURNING FROM BACK TO SIDE WHILE IN FLAT BAD: A LITTLE
STANDING UP FROM CHAIR USING ARMS: A LITTLE
STANDING UP FROM CHAIR USING ARMS: A LITTLE
WALKING IN HOSPITAL ROOM: A LITTLE
STANDING UP FROM CHAIR USING ARMS: A LITTLE
MOVING FROM LYING ON BACK TO SITTING ON SIDE OF FLAT BED WITH BEDRAILS: A LITTLE
TOILETING: A LITTLE
TOILETING: A LITTLE
WALKING IN HOSPITAL ROOM: A LITTLE
MOVING FROM LYING ON BACK TO SITTING ON SIDE OF FLAT BED WITH BEDRAILS: A LITTLE
STANDING UP FROM CHAIR USING ARMS: A LITTLE
MOBILITY SCORE: 16
PERSONAL GROOMING: A LITTLE
MOBILITY SCORE: 22
DRESSING REGULAR UPPER BODY CLOTHING: A LITTLE
DRESSING REGULAR UPPER BODY CLOTHING: A LITTLE
TOILETING: A LITTLE
MOVING TO AND FROM BED TO CHAIR: A LITTLE
CLIMB 3 TO 5 STEPS WITH RAILING: TOTAL
TOILETING: A LITTLE
TURNING FROM BACK TO SIDE WHILE IN FLAT BAD: A LITTLE
CLIMB 3 TO 5 STEPS WITH RAILING: TOTAL
MOVING TO AND FROM BED TO CHAIR: A LITTLE
EATING MEALS: A LITTLE
TOILETING: A LITTLE
DRESSING REGULAR UPPER BODY CLOTHING: A LITTLE
MOVING TO AND FROM BED TO CHAIR: A LITTLE
MOBILITY SCORE: 21
WALKING IN HOSPITAL ROOM: A LITTLE
HELP NEEDED FOR BATHING: A LITTLE
WALKING IN HOSPITAL ROOM: A LITTLE
DAILY ACTIVITIY SCORE: 18
HELP NEEDED FOR BATHING: A LITTLE
DRESSING REGULAR LOWER BODY CLOTHING: A LITTLE
PERSONAL GROOMING: A LITTLE
TURNING FROM BACK TO SIDE WHILE IN FLAT BAD: A LITTLE
TOILETING: A LITTLE
WALKING IN HOSPITAL ROOM: A LITTLE
EATING MEALS: A LITTLE
PERSONAL GROOMING: A LITTLE
CLIMB 3 TO 5 STEPS WITH RAILING: A LITTLE
EATING MEALS: A LITTLE
STANDING UP FROM CHAIR USING ARMS: A LITTLE
DRESSING REGULAR UPPER BODY CLOTHING: A LITTLE
PERSONAL GROOMING: A LITTLE
HELP NEEDED FOR BATHING: A LITTLE
HELP NEEDED FOR BATHING: A LITTLE
DRESSING REGULAR LOWER BODY CLOTHING: A LITTLE
STANDING UP FROM CHAIR USING ARMS: A LITTLE
MOVING FROM LYING ON BACK TO SITTING ON SIDE OF FLAT BED WITH BEDRAILS: A LITTLE
WALKING IN HOSPITAL ROOM: A LITTLE
DRESSING REGULAR UPPER BODY CLOTHING: A LITTLE
PERSONAL GROOMING: A LITTLE
TOILETING: A LITTLE
TURNING FROM BACK TO SIDE WHILE IN FLAT BAD: A LITTLE
MOVING TO AND FROM BED TO CHAIR: A LITTLE
STANDING UP FROM CHAIR USING ARMS: A LITTLE
EATING MEALS: A LITTLE
CLIMB 3 TO 5 STEPS WITH RAILING: A LITTLE
EATING MEALS: A LITTLE
HELP NEEDED FOR BATHING: A LITTLE
DAILY ACTIVITIY SCORE: 19
MOVING FROM LYING ON BACK TO SITTING ON SIDE OF FLAT BED WITH BEDRAILS: A LITTLE
DRESSING REGULAR UPPER BODY CLOTHING: A LITTLE
TOILETING: A LITTLE
DAILY ACTIVITIY SCORE: 20
DRESSING REGULAR LOWER BODY CLOTHING: A LITTLE
MOVING FROM LYING ON BACK TO SITTING ON SIDE OF FLAT BED WITH BEDRAILS: A LITTLE
EATING MEALS: A LITTLE
DRESSING REGULAR LOWER BODY CLOTHING: A LITTLE
MOVING TO AND FROM BED TO CHAIR: A LITTLE
HELP NEEDED FOR BATHING: A LITTLE
CLIMB 3 TO 5 STEPS WITH RAILING: TOTAL
DRESSING REGULAR LOWER BODY CLOTHING: A LITTLE
EATING MEALS: A LITTLE
CLIMB 3 TO 5 STEPS WITH RAILING: TOTAL
HELP NEEDED FOR BATHING: A LITTLE
DRESSING REGULAR UPPER BODY CLOTHING: A LITTLE
WALKING IN HOSPITAL ROOM: A LITTLE
DRESSING REGULAR LOWER BODY CLOTHING: A LITTLE
DRESSING REGULAR LOWER BODY CLOTHING: A LITTLE
DRESSING REGULAR UPPER BODY CLOTHING: A LITTLE
CLIMB 3 TO 5 STEPS WITH RAILING: TOTAL
DRESSING REGULAR LOWER BODY CLOTHING: A LITTLE
CLIMB 3 TO 5 STEPS WITH RAILING: TOTAL
TURNING FROM BACK TO SIDE WHILE IN FLAT BAD: A LITTLE
MOVING TO AND FROM BED TO CHAIR: A LITTLE
PERSONAL GROOMING: A LITTLE
TURNING FROM BACK TO SIDE WHILE IN FLAT BAD: A LITTLE
MOVING FROM LYING ON BACK TO SITTING ON SIDE OF FLAT BED WITH BEDRAILS: A LITTLE
WALKING IN HOSPITAL ROOM: A LITTLE

## 2025-01-12 ASSESSMENT — ENCOUNTER SYMPTOMS
FATIGUE: 1
POLYPHAGIA: 0
NAUSEA: 0
LIGHT-HEADEDNESS: 0
POLYDIPSIA: 0
TROUBLE SWALLOWING: 0
WEAKNESS: 1
PALPITATIONS: 0
CONFUSION: 0
SORE THROAT: 0
NUMBNESS: 0
FREQUENCY: 0
COUGH: 0
DYSURIA: 0
ACTIVITY CHANGE: 0
BRUISES/BLEEDS EASILY: 0
VOMITING: 0
DIZZINESS: 0
EYE REDNESS: 0
EYE PAIN: 0
HEADACHES: 0
CHEST TIGHTNESS: 0
UNEXPECTED WEIGHT CHANGE: 0
ABDOMINAL PAIN: 0
SHORTNESS OF BREATH: 0
APPETITE CHANGE: 1
JOINT SWELLING: 0
DIARRHEA: 0
AGITATION: 0
DIAPHORESIS: 0

## 2025-01-12 ASSESSMENT — PAIN DESCRIPTION - ORIENTATION: ORIENTATION: ANTERIOR

## 2025-01-12 ASSESSMENT — PAIN SCALES - GENERAL
PAINLEVEL_OUTOF10: 4
PAINLEVEL_OUTOF10: 4
PAINLEVEL_OUTOF10: 7

## 2025-01-12 ASSESSMENT — PAIN DESCRIPTION - LOCATION
LOCATION: ABDOMEN
LOCATION: ABDOMEN

## 2025-01-12 NOTE — CARE PLAN
The patient's goals for the shift include get better    The clinical goals for the shift include Patient will verbalize decrease in abdomen discomfort.    Over the shift, the patient did   Problem: Pain  Goal: Takes deep breaths with improved pain control throughout the shift  Outcome: Progressing     Problem: Pain  Goal: Turns in bed with improved pain control throughout the shift  Outcome: Progressing     Problem: Pain  Goal: Free from acute confusion related to pain meds throughout the shift  Outcome: Progressing     Problem: Pain  Goal: Free from opioid side effects throughout the shift  Outcome: Progressing     Problem: Skin  Goal: Participates in plan/prevention/treatment measures  Outcome: Progressing     Problem: Skin  Goal: Prevent/minimize sheer/friction injuries  Outcome: Progressing     Problem: Skin  Goal: Promote skin healing  Outcome: Progressing   make progress toward the following goals.

## 2025-01-12 NOTE — PROGRESS NOTES
"Temo Hanson is a 74 y.o. male on day 12 of admission presenting with Dehydration.    Subjective:  EGD and PEG tube placement POD4  Did not tolerate tube feeding even at 20 mL/hr so it was held overnight. Only able to take liquid by mouth.  He expressed frustration about having to go through all these post-transplant.    Scheduled medications  acetaminophen, 650 mg, oral, q6h  amLODIPine, 10 mg, oral, Daily  calcium carbonate, 500 mg, oral, BID AC  ceFAZolin, 2 g, intravenous, Once  gabapentin, 200 mg, oral, Daily  heparin (porcine), 5,000 Units, subcutaneous, q8h  insulin glargine, 5 Units, subcutaneous, q12h  insulin regular, 0-10 Units, subcutaneous, q6h  [Held by provider] insulin regular, 3 Units, subcutaneous, q6h  lidocaine, 5 mL, infiltration, Once  lidocaine, 1 patch, transdermal, Daily  metoclopramide, 5 mg, oral, q8h  mycophenolate, 360 mg, oral, BID  nystatin, 5 mL, Swish & Swallow, 4x daily  ondansetron, 4 mg, intravenous, q8h  pantoprazole, 40 mg, oral, BID AC  predniSONE, 10 mg, oral, Daily  rosuvastatin, 10 mg, oral, Nightly  sulfamethoxazole-trimethoprim, 1 tablet, oral, Daily  tacrolimus, 5 mg, oral, q12h  thiamine, 100 mg, oral, Daily  [Held by provider] valGANciclovir, 900 mg, oral, q24h      Continuous medications         PRN medications  PRN medications: carboxymethylcellulose, dextrose, dextrose, dextrose, dextrose, docusate sodium, glucagon, glucagon, oxyCODONE, oxyCODONE, polyethylene glycol    Last Recorded Vitals  Blood pressure 145/71, pulse 78, temperature 36.8 °C (98.2 °F), temperature source Temporal, resp. rate 18, height 1.854 m (6' 1\"), weight 76 kg (167 lb 8.8 oz), SpO2 99%.  Intake/Output last 3 Shifts:  I/O last 3 completed shifts:  In: 30 (0.4 mL/kg) [NG/GT:30]  Out: 525 (6.9 mL/kg) [Urine:325 (0.1 mL/kg/hr); Emesis/NG output:200]  Weight: 76 kg     A&ox3, no distress  MMM, no lesions  Lungs with equal air entry, no added sounds  Rrr, no m/r/g  Abd soft, slightly distended, " nd, RLQ incision c/d/i, staples intact  No allograft tenderness  No edema bilaterally  PEG tube in place    Results for orders placed or performed during the hospital encounter of 12/31/24 (from the past 24 hours)   POCT GLUCOSE   Result Value Ref Range    POCT Glucose 107 (H) 74 - 99 mg/dL   POCT GLUCOSE   Result Value Ref Range    POCT Glucose 167 (H) 74 - 99 mg/dL   POCT GLUCOSE   Result Value Ref Range    POCT Glucose 131 (H) 74 - 99 mg/dL   POCT GLUCOSE   Result Value Ref Range    POCT Glucose 108 (H) 74 - 99 mg/dL   POCT GLUCOSE   Result Value Ref Range    POCT Glucose 92 74 - 99 mg/dL   Magnesium   Result Value Ref Range    Magnesium 2.24 1.60 - 2.40 mg/dL   Renal Function Panel   Result Value Ref Range    Glucose 75 74 - 99 mg/dL    Sodium 135 (L) 136 - 145 mmol/L    Potassium 4.5 3.5 - 5.3 mmol/L    Chloride 104 98 - 107 mmol/L    Bicarbonate 25 21 - 32 mmol/L    Anion Gap 11 10 - 20 mmol/L    Urea Nitrogen 20 6 - 23 mg/dL    Creatinine 1.26 0.50 - 1.30 mg/dL    eGFR 60 (L) >60 mL/min/1.73m*2    Calcium 8.0 (L) 8.6 - 10.6 mg/dL    Phosphorus 3.4 2.5 - 4.9 mg/dL    Albumin 2.5 (L) 3.4 - 5.0 g/dL   Calcium, Ionized   Result Value Ref Range    POCT Calcium, Ionized 1.18 1.1 - 1.33 mmol/L   CBC and Auto Differential   Result Value Ref Range    WBC 1.5 (L) 4.4 - 11.3 x10*3/uL    nRBC 0.0 0.0 - 0.0 /100 WBCs    RBC 2.32 (L) 4.50 - 5.90 x10*6/uL    Hemoglobin 7.1 (L) 13.5 - 17.5 g/dL    Hematocrit 22.2 (L) 41.0 - 52.0 %    MCV 96 80 - 100 fL    MCH 30.6 26.0 - 34.0 pg    MCHC 32.0 32.0 - 36.0 g/dL    RDW 15.9 (H) 11.5 - 14.5 %    Platelets 160 150 - 450 x10*3/uL    Immature Granulocytes %, Automated 0.7 0.0 - 0.9 %    Immature Granulocytes Absolute, Automated 0.01 0.00 - 0.50 x10*3/uL   Manual Differential   Result Value Ref Range    Neutrophils %, Manual 69.7 40.0 - 80.0 %    Lymphocytes %, Manual 12.6 13.0 - 44.0 %    Monocytes %, Manual 11.8 2.0 - 10.0 %    Eosinophils %, Manual 0.0 0.0 - 6.0 %    Basophils %,  Manual 1.7 0.0 - 2.0 %    Atypical Lymphocytes %, Manual 4.2 0.0 - 2.0 %    Seg Neutrophils Absolute, Manual 1.05 (L) 1.60 - 5.00 x10*3/uL    Lymphocytes Absolute, Manual 0.19 (L) 0.80 - 3.00 x10*3/uL    Monocytes Absolute, Manual 0.18 0.05 - 0.80 x10*3/uL    Eosinophils Absolute, Manual 0.00 0.00 - 0.40 x10*3/uL    Basophils Absolute, Manual 0.03 0.00 - 0.10 x10*3/uL    Atypical Lymphs Absolute, Manual 0.06 0.00 - 0.30 x10*3/uL    Total Cells Counted 119     RBC Morphology No significant RBC morphology present    POCT GLUCOSE   Result Value Ref Range    POCT Glucose 68 (L) 74 - 99 mg/dL       Assessment/Plan     74 y.o. male with a hx of ESRD secondary to diabetic nephropathy whom received a  donor kidney transplant on 24 by Dr. Zamora with a KDPI of 93% and PRA of 54%. Donor was Hepc -/-and has not met risk factors. EBV D+ /R+, CMV D-/R+. Left donor kidney transplanted to patient right pelvis. Admission weight is 72.7 kg (discharge weight is 76.4 kg ). Pt received a total of 3 mg/kg total of thymoglobulin induction therapy in conjunction with 500mg IV solumedrol.      Post-txp course notable for slow graft function, now with DGF.     Allograft function: s/p DDKT 24  - DGF. , Cr 5.5,uremic on admission. s/p HD  for clearance.   Now Cr 1.4-1.5  IV fluid:  mL/hr x 1 L    Immunosuppression:  TAC 5 mg BID  ( mg BID - due to leukopenia)  Switched to Myfortic 360 mg BID -  Pred 10 mg/day    Prophylaxis:  PPI  Nystatin 500,000 units QID x 3 mo  HOLD TMP-SMX x 6 mo -  HOLD Valcyte, renally dosed x 3 mo 1/10-  CMV PCR neg     Esophageal dysphagia  - Upper GI c/f achalasia. S/p Heller Myotomy plus Andrea Fundoplication 20+ yrs ago - per surgery, unable to fix  - EGD/PEG tube placement 25    Blood pressure - ok  - Amlodipine 10 mg daily    Anemia - iron replete  Darbe 200 mcg subcutaneous weekly  Leukopenia  - Valcyte held since 1/10  - hold TMP-SMX -    CKD-MBD -  acceptable     Malnutrition  Feeding intolerance  Hold tube feeding  Liquid only by mouth  IV PPN or maintenance IV fluid  Monitor for refeeding syndrome  Scheduled Reglan and Zofran  Concerning for diabetic gastroparesis  Plan gastric emptying study next week  If confirmed, will convert from PEG to post-pyloric J-tube      Evelyn Trimble MD

## 2025-01-12 NOTE — PROGRESS NOTES
Temo Hanson is a 74 y.o. male on day 12 of admission presenting with Dehydration.    Subjective   TF held today, will start PPN tonight  Glucose low yesterday, will adjust insulin accordingly   Small PO intake of jello and broth    I have reviewed histories, allergies and medications have been reviewed and there are no changes       Objective   Review of Systems   Constitutional:  Positive for appetite change and fatigue. Negative for activity change, diaphoresis and unexpected weight change.   HENT:  Negative for congestion, sore throat and trouble swallowing.    Eyes:  Negative for pain, redness and visual disturbance.   Respiratory:  Negative for cough, chest tightness and shortness of breath.    Cardiovascular:  Negative for chest pain, palpitations and leg swelling.   Gastrointestinal:  Negative for abdominal pain, diarrhea, nausea and vomiting.   Endocrine: Negative for cold intolerance, heat intolerance, polydipsia, polyphagia and polyuria.   Genitourinary:  Negative for dysuria, frequency and urgency.   Musculoskeletal:  Negative for gait problem and joint swelling.   Skin:  Negative for pallor and rash.   Allergic/Immunologic: Negative for immunocompromised state.   Neurological:  Positive for weakness. Negative for dizziness, light-headedness, numbness and headaches.   Hematological:  Does not bruise/bleed easily.   Psychiatric/Behavioral:  Negative for agitation, behavioral problems and confusion.    All other systems reviewed and are negative.    Physical Exam  Vitals reviewed.   Constitutional:       General: He is not in acute distress.     Appearance: Normal appearance. He is ill-appearing.   HENT:      Head: Normocephalic and atraumatic.      Nose: Nose normal.      Mouth/Throat:      Mouth: Mucous membranes are moist.   Eyes:      Extraocular Movements: Extraocular movements intact.      Conjunctiva/sclera: Conjunctivae normal.      Pupils: Pupils are equal, round, and reactive to light.  "  Cardiovascular:      Pulses: Normal pulses.   Pulmonary:      Effort: Pulmonary effort is normal. No respiratory distress.   Abdominal:      General: Abdomen is flat. There is no distension.   Musculoskeletal:         General: Normal range of motion.   Skin:     General: Skin is warm and dry.      Findings: No rash.   Neurological:      Mental Status: He is alert and oriented to person, place, and time.   Psychiatric:         Mood and Affect: Mood normal.         Behavior: Behavior normal.         Last Recorded Vitals  Blood pressure 136/64, pulse 76, temperature 36.9 °C (98.4 °F), temperature source Temporal, resp. rate 17, height 1.854 m (6' 1\"), weight 76 kg (167 lb 8.8 oz), SpO2 97%.  Intake/Output last 3 Shifts:  I/O last 3 completed shifts:  In: 30 (0.4 mL/kg) [NG/GT:30]  Out: 525 (6.9 mL/kg) [Urine:325 (0.1 mL/kg/hr); Emesis/NG output:200]  Weight: 76 kg     Relevant Results  Results from last 7 days   Lab Units 01/12/25  1229 01/12/25  1209 01/12/25  0755 01/12/25  0551 01/12/25  0353 01/12/25  0033 01/11/25  0551 01/11/25  0422 01/10/25  1545 01/10/25  1518 01/10/25  0825 01/10/25  0530 01/09/25  0808 01/09/25  0732   POCT GLUCOSE mg/dL 138* 67* 68*  --  92 108*   < >  --    < >  --    < >  --    < >  --    GLUCOSE mg/dL  --   --   --  75  --   --   --  101*  --  77  --  209*  --  99    < > = values in this interval not displayed.     Lab Review  Lab Results   Component Value Date    BILITOT 0.5 12/20/2024    CALCIUM 8.0 (L) 01/12/2025    CO2 25 01/12/2025     01/12/2025    CREATININE 1.26 01/12/2025    GLUCOSE 75 01/12/2025    ALKPHOS 189 (H) 12/20/2024    K 4.5 01/12/2025    PROT 8.8 (H) 12/20/2024     (L) 01/12/2025    AST 21 12/20/2024    ALT 18 12/20/2024    BUN 20 01/12/2025    ANIONGAP 11 01/12/2025    MG 2.24 01/12/2025    PHOS 3.4 01/12/2025    ALBUMIN 2.5 (L) 01/12/2025    AMYLASE 148 (H) 10/29/2024    GFRMALE 6 (A) 01/26/2023     Lab Results   Component Value Date    CHOL 183 " 10/29/2024    HDL 53.1 10/29/2024     Lab Results   Component Value Date    HGBA1C 6.1 (H) 10/29/2024    HGBA1C 5.7 (H) 04/20/2024    HGBA1C 7.3 (H) 02/21/2023     The 10-year ASCVD risk score (Maria TAM, et al., 2019) is: 39%    Values used to calculate the score:      Age: 74 years      Sex: Male      Is Non- : Yes      Diabetic: Yes      Tobacco smoker: No      Systolic Blood Pressure: 136 mmHg      Is BP treated: Yes      HDL Cholesterol: 53.1 mg/dL      Total Cholesterol: 183 mg/dL    Scheduled medications  acetaminophen, 650 mg, oral, q6h  amLODIPine, 10 mg, oral, Daily  calcium carbonate, 500 mg, oral, BID AC  ceFAZolin, 2 g, intravenous, Once  darbepoetin nohemi, 100 mcg, subcutaneous, Once  gabapentin, 200 mg, oral, Daily  heparin (porcine), 5,000 Units, subcutaneous, q8h  insulin glargine, 5 Units, subcutaneous, Nightly  insulin lispro, 0-10 Units, subcutaneous, q4h  lidocaine, 5 mL, infiltration, Once  lidocaine, 1 patch, transdermal, Daily  metoclopramide, 5 mg, oral, q8h  mycophenolate, 360 mg, oral, BID  nystatin, 5 mL, Swish & Swallow, 4x daily  ondansetron, 4 mg, intravenous, q8h  pantoprazole, 40 mg, oral, BID AC  predniSONE, 10 mg, oral, Daily  rosuvastatin, 10 mg, oral, Nightly  [Held by provider] sulfamethoxazole-trimethoprim, 1 tablet, oral, Daily  tacrolimus, 5 mg, oral, q12h  thiamine, 100 mg, oral, Daily  [Held by provider] valGANciclovir, 900 mg, oral, q24h      Continuous medications  Adult Clinimix Parenteral Nutrition Continuous, 30 mL/hr  lactated Ringer's, 100 mL/hr, Last Rate: 100 mL/hr (01/12/25 1212)      PRN medications  PRN medications: carboxymethylcellulose, dextrose, dextrose, dextrose, dextrose, docusate sodium, glucagon, glucagon, oxyCODONE, oxyCODONE, polyethylene glycol        Assessment/Plan   Assessment & Plan  Dehydration    Alactasia syndrome    Type 2 diabetes mellitus with hyperglycemia, with long-term current use of insulin    On tube feeding  letha Hanson is a 74 y.o. 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  Patient was started on tube feeds, NPO, today after placement of PEG tube. Patient states he felt nausea and vomiting, leading to a pause in his feedings around noon. Feedings restarted around 1500.      Diabetes History  Type of diabetes: 2  Year diagnosed or age: 46 years old  Hospitalizations for DKA or HHS: Once - inappropriately gave too much sliding scale due to low BG finger stick not correcting in under an hour  Complications: Nephropathy  Seen by PCP or Endocrinology: PCP - Dr. Hillman, Endo - Dr. Carey  Frequency of glucose checks: 4-5x daily after meals  Glucose review: persistent hyperglycemia this admission  Frequency of Hypoglycemia: none  Hypoglycemia unawareness:   Severe hypoglycemia requiring assistance from others:  N     Home Medications  Basal: Glargine 8U  Prandial: Aspart 4U  Correction: Aspart sliding scale       PLAN  Steroids:   Prednisone 15 mg daily  1/12- Predinsone 10mg daily    Nutrition:   1/10- 60g CHO diet + glucerna 1.5 continuous TF, goal of 50 ml/h. This provides 40 g of carbs every 6 hours when at goal.  1/11- TF reduced to 20ml/h so patient is getting 16g of carbs every 6 hours   1/12- TF stopped, PPN to start tonight at 30cc/h. Pt also on CLD    - adjust glargine 5u once daily at 2100     If TF is stopped: reduce to 3u          -discontinue regular insulin     - start lispro insulin corrective scale #2 q4hr   = 0u  151-200 = 2u  201-250 = 4u  251-300 = 6u  301-350 = 8u  351-400 = 10u     -Accuchecks (not BMP) TIDAC and QHS- kindly ensure QHS Accucheck is drawn; it is often missed   - Goal -180  -Hypoglycemia protocol  -Will continue to follow and titrate insulin accordingly      Discharge planning:   [] patient may expect to  discharge home on basal/bolus , final doses TBD by titration and nutrition status  [x]will provide CGM sample prior to discharge   [x]will enroll pt in  pharmacy platinum plan program  [x]follow up with Endo and PCP        I spent 50 minutes in the professional and overall care of this patient.      Zohra Kang PA-C

## 2025-01-12 NOTE — PROGRESS NOTES
Temo Hanson is a 74 y.o. male POD#22 from DDKT from a DBD donor. Readmit for PO intolerance due to achalasia s/p Heller/Andrea myotomy with megaesophagus. POD#4 from PEG placement.     - Ongoing nausea and high residuals from PEG TF's, did not tolerate at all  - Also discomfort with any PO intake yesterday  - Reports situational depression with need for multiple procedures and PO intolerance    Objective   Gen: A+OX3  HEENT: PERRL, MMM  Cardiac: RRR  Chest: Normal inspiratory effort  Abdomen: S/NT/ND. Incision C/D/I.  Ext: No LE edema    Last Recorded Vitals  Visit Vitals  /71 (BP Location: Right arm, Patient Position: Lying)   Pulse 78   Temp 36.8 °C (98.2 °F) (Temporal)   Resp 18      Intake/Output last 3 Shifts:    Intake/Output Summary (Last 24 hours) at 1/12/2025 0847  Last data filed at 1/12/2025 0757  Gross per 24 hour   Intake 150 ml   Output 525 ml   Net -375 ml      Vitals:    01/10/25 0600   Weight: 76 kg (167 lb 8.8 oz)   Scheduled medications  acetaminophen, 650 mg, oral, q6h  amLODIPine, 10 mg, oral, Daily  calcium carbonate, 500 mg, oral, BID AC  ceFAZolin, 2 g, intravenous, Once  gabapentin, 200 mg, oral, Daily  heparin (porcine), 5,000 Units, subcutaneous, q8h  insulin glargine, 5 Units, subcutaneous, Nightly  insulin regular, 0-10 Units, subcutaneous, q6h  [Held by provider] insulin regular, 3 Units, subcutaneous, q6h  lidocaine, 5 mL, infiltration, Once  lidocaine, 1 patch, transdermal, Daily  metoclopramide, 5 mg, oral, q8h  mycophenolate, 360 mg, oral, BID  nystatin, 5 mL, Swish & Swallow, 4x daily  ondansetron, 4 mg, intravenous, q8h  pantoprazole, 40 mg, oral, BID AC  predniSONE, 10 mg, oral, Daily  rosuvastatin, 10 mg, oral, Nightly  sulfamethoxazole-trimethoprim, 1 tablet, oral, Daily  tacrolimus, 5 mg, oral, q12h  thiamine, 100 mg, oral, Daily  [Held by provider] valGANciclovir, 900 mg, oral, q24h    Assessment/Plan   Principal Problem:    Kidney transplant recipient     Achalasia/megaesophagus    Severe protein calorie malnutrition  Active Problems:  Patient Active Problem List   Diagnosis    S/P laparoscopic cholecystectomy    Abdominal pain    Achalasia    Acute lower UTI    Microcytic anemia    Complete heart block    Callus of foot    Chronic periodontitis, unspecified    Stage 5 chronic kidney disease (Multi)    Closed fracture of metatarsal bone    Crushing injury of foot    Diabetes mellitus (Multi)    Diarrhea    Dysphagia    ED (erectile dysfunction)    Essential hypertension    Foot pain, right    GIB (gastrointestinal bleeding)    Hepatitis B core antibody positive    Hyperkalemia    Ingrowing nail    Iron deficiency anemia secondary to inadequate dietary iron intake    Long toenail    Malignant neoplasm of prostate (Multi)    Onychomycosis    Pheochromocytoma of right adrenal gland    Pneumonia of right lower lobe due to infectious organism    Productive cough    Pure hypercholesterolemia    Stricture esophagus    SVT (supraventricular tachycardia) (CMS-HCC)    Type 2 diabetes mellitus with diabetic neuropathy (Multi)    Preop cardiovascular exam    Third degree heart block    Pericardial effusion (HHS-HCC)    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 (Multi)    Uremia    Cardiac tamponade    Cardiac pacemaker in situ    ESRD (end stage renal disease) (Multi)    Type 2 diabetes mellitus, with long-term current use of insulin    Dehydration    Alactasia syndrome    Type 2 diabetes mellitus with hyperglycemia, with long-term current use of insulin    On tube feeding diet      - Transfuse 1U PRBC  - Hold TF's; nutrition consult and PPN tonight  - Continue reglan 5 mg q8h  - PT/OT  - Myfortic 360 mg bid/prednisone 10 mg  - Tacrolimus goal 8-10 ng/mL  - Gastric emptying assessment Monday, likely needs conversion to PEG/J    I spent 35 minutes in the professional and overall care of this patient, including immunosuppression management.    Sherly Kang MD, PHD, MPH,  FACS  Chief Transplant and Hepatobiliary Surgery

## 2025-01-12 NOTE — CARE PLAN
Problem: Pain  Goal: Takes deep breaths with improved pain control throughout the shift  Outcome: Progressing  Goal: Turns in bed with improved pain control throughout the shift  Outcome: Progressing  Goal: Walks with improved pain control throughout the shift  Outcome: Progressing  Goal: Performs ADL's with improved pain control throughout shift  Outcome: Progressing  Goal: Participates in PT with improved pain control throughout the shift  Outcome: Progressing  Goal: Free from opioid side effects throughout the shift  Outcome: Progressing  Goal: Free from acute confusion related to pain meds throughout the shift  Outcome: Progressing     Problem: Skin  Goal: Decreased wound size/increased tissue granulation at next dressing change  Outcome: Progressing  Goal: Participates in plan/prevention/treatment measures  Outcome: Progressing  Goal: Prevent/manage excess moisture  Outcome: Progressing  Goal: Prevent/minimize sheer/friction injuries  Outcome: Progressing  Goal: Promote/optimize nutrition  Outcome: Progressing  Goal: Promote skin healing  Outcome: Progressing   The patient's goals for the shift include get better    The clinical goals for the shift include Patient will be HDS on this shift

## 2025-01-13 ENCOUNTER — APPOINTMENT (OUTPATIENT)
Dept: RADIOLOGY | Facility: HOSPITAL | Age: 75
DRG: 391 | End: 2025-01-13
Payer: COMMERCIAL

## 2025-01-13 ENCOUNTER — APPOINTMENT (OUTPATIENT)
Dept: UROLOGY | Facility: CLINIC | Age: 75
End: 2025-01-13
Payer: COMMERCIAL

## 2025-01-13 DIAGNOSIS — Z45.82 ENCOUNTER FOR ADJUSTMENT OR REMOVAL OF MYRINGOTOMY DEVICE (STENT) (TUBE): ICD-10-CM

## 2025-01-13 LAB
ABO GROUP (TYPE) IN BLOOD: NORMAL
ALBUMIN SERPL BCP-MCNC: 2.5 G/DL (ref 3.4–5)
ANION GAP SERPL CALC-SCNC: 11 MMOL/L (ref 10–20)
ANTIBODY SCREEN: NORMAL
BASOPHILS # BLD AUTO: 0 X10*3/UL (ref 0–0.1)
BASOPHILS NFR BLD AUTO: 0 %
BUN SERPL-MCNC: 13 MG/DL (ref 6–23)
CA-I BLD-SCNC: 1.14 MMOL/L (ref 1.1–1.33)
CALCIUM SERPL-MCNC: 7.8 MG/DL (ref 8.6–10.6)
CHLORIDE SERPL-SCNC: 103 MMOL/L (ref 98–107)
CO2 SERPL-SCNC: 23 MMOL/L (ref 21–32)
CREAT SERPL-MCNC: 0.91 MG/DL (ref 0.5–1.3)
EGFRCR SERPLBLD CKD-EPI 2021: 88 ML/MIN/1.73M*2
EOSINOPHIL # BLD AUTO: 0.04 X10*3/UL (ref 0–0.4)
EOSINOPHIL NFR BLD AUTO: 2.2 %
ERYTHROCYTE [DISTWIDTH] IN BLOOD BY AUTOMATED COUNT: 18 % (ref 11.5–14.5)
GLUCOSE BLD MANUAL STRIP-MCNC: 110 MG/DL (ref 74–99)
GLUCOSE BLD MANUAL STRIP-MCNC: 119 MG/DL (ref 74–99)
GLUCOSE BLD MANUAL STRIP-MCNC: 125 MG/DL (ref 74–99)
GLUCOSE BLD MANUAL STRIP-MCNC: 201 MG/DL (ref 74–99)
GLUCOSE BLD MANUAL STRIP-MCNC: 209 MG/DL (ref 74–99)
GLUCOSE SERPL-MCNC: 106 MG/DL (ref 74–99)
HCT VFR BLD AUTO: 27.2 % (ref 41–52)
HGB BLD-MCNC: 8.5 G/DL (ref 13.5–17.5)
IMM GRANULOCYTES # BLD AUTO: 0.01 X10*3/UL (ref 0–0.5)
IMM GRANULOCYTES NFR BLD AUTO: 0.6 % (ref 0–0.9)
LYMPHOCYTES # BLD AUTO: 0.49 X10*3/UL (ref 0.8–3)
LYMPHOCYTES NFR BLD AUTO: 27.2 %
MAGNESIUM SERPL-MCNC: 1.7 MG/DL (ref 1.6–2.4)
MCH RBC QN AUTO: 29.7 PG (ref 26–34)
MCHC RBC AUTO-ENTMCNC: 31.3 G/DL (ref 32–36)
MCV RBC AUTO: 95 FL (ref 80–100)
MONOCYTES # BLD AUTO: 0.26 X10*3/UL (ref 0.05–0.8)
MONOCYTES NFR BLD AUTO: 14.4 %
NEUTROPHILS # BLD AUTO: 1 X10*3/UL (ref 1.6–5.5)
NEUTROPHILS NFR BLD AUTO: 55.6 %
NRBC BLD-RTO: 0 /100 WBCS (ref 0–0)
PHOSPHATE SERPL-MCNC: 2.6 MG/DL (ref 2.5–4.9)
PLATELET # BLD AUTO: 163 X10*3/UL (ref 150–450)
POTASSIUM SERPL-SCNC: 4.4 MMOL/L (ref 3.5–5.3)
RBC # BLD AUTO: 2.86 X10*6/UL (ref 4.5–5.9)
RH FACTOR (ANTIGEN D): NORMAL
SODIUM SERPL-SCNC: 133 MMOL/L (ref 136–145)
TACROLIMUS BLD-MCNC: 11 NG/ML
WBC # BLD AUTO: 1.8 X10*3/UL (ref 4.4–11.3)

## 2025-01-13 PROCEDURE — 99232 SBSQ HOSP IP/OBS MODERATE 35: CPT | Performed by: PHYSICIAN ASSISTANT

## 2025-01-13 PROCEDURE — 36573 INSJ PICC RS&I 5 YR+: CPT

## 2025-01-13 PROCEDURE — 83735 ASSAY OF MAGNESIUM: CPT | Performed by: STUDENT IN AN ORGANIZED HEALTH CARE EDUCATION/TRAINING PROGRAM

## 2025-01-13 PROCEDURE — 2500000001 HC RX 250 WO HCPCS SELF ADMINISTERED DRUGS (ALT 637 FOR MEDICARE OP): Performed by: STUDENT IN AN ORGANIZED HEALTH CARE EDUCATION/TRAINING PROGRAM

## 2025-01-13 PROCEDURE — 02HV33Z INSERTION OF INFUSION DEVICE INTO SUPERIOR VENA CAVA, PERCUTANEOUS APPROACH: ICD-10-PCS | Performed by: STUDENT IN AN ORGANIZED HEALTH CARE EDUCATION/TRAINING PROGRAM

## 2025-01-13 PROCEDURE — 2500000002 HC RX 250 W HCPCS SELF ADMINISTERED DRUGS (ALT 637 FOR MEDICARE OP, ALT 636 FOR OP/ED): Performed by: PHYSICIAN ASSISTANT

## 2025-01-13 PROCEDURE — 2500000001 HC RX 250 WO HCPCS SELF ADMINISTERED DRUGS (ALT 637 FOR MEDICARE OP): Performed by: PHYSICIAN ASSISTANT

## 2025-01-13 PROCEDURE — 2500000005 HC RX 250 GENERAL PHARMACY W/O HCPCS: Performed by: PHYSICIAN ASSISTANT

## 2025-01-13 PROCEDURE — 80197 ASSAY OF TACROLIMUS: CPT | Performed by: STUDENT IN AN ORGANIZED HEALTH CARE EDUCATION/TRAINING PROGRAM

## 2025-01-13 PROCEDURE — 80069 RENAL FUNCTION PANEL: CPT

## 2025-01-13 PROCEDURE — 87081 CULTURE SCREEN ONLY: CPT | Performed by: PHYSICIAN ASSISTANT

## 2025-01-13 PROCEDURE — 99232 SBSQ HOSP IP/OBS MODERATE 35: CPT | Performed by: STUDENT IN AN ORGANIZED HEALTH CARE EDUCATION/TRAINING PROGRAM

## 2025-01-13 PROCEDURE — 2500000004 HC RX 250 GENERAL PHARMACY W/ HCPCS (ALT 636 FOR OP/ED): Performed by: PHYSICIAN ASSISTANT

## 2025-01-13 PROCEDURE — 1100000001 HC PRIVATE ROOM DAILY

## 2025-01-13 PROCEDURE — 2720000007 HC OR 272 NO HCPCS

## 2025-01-13 PROCEDURE — 85025 COMPLETE CBC W/AUTO DIFF WBC: CPT | Performed by: PHYSICIAN ASSISTANT

## 2025-01-13 PROCEDURE — 3E0436Z INTRODUCTION OF NUTRITIONAL SUBSTANCE INTO CENTRAL VEIN, PERCUTANEOUS APPROACH: ICD-10-PCS | Performed by: STUDENT IN AN ORGANIZED HEALTH CARE EDUCATION/TRAINING PROGRAM

## 2025-01-13 PROCEDURE — 2500000004 HC RX 250 GENERAL PHARMACY W/ HCPCS (ALT 636 FOR OP/ED)

## 2025-01-13 PROCEDURE — 2500000002 HC RX 250 W HCPCS SELF ADMINISTERED DRUGS (ALT 637 FOR MEDICARE OP, ALT 636 FOR OP/ED)

## 2025-01-13 PROCEDURE — C1751 CATH, INF, PER/CENT/MIDLINE: HCPCS

## 2025-01-13 PROCEDURE — 2500000001 HC RX 250 WO HCPCS SELF ADMINISTERED DRUGS (ALT 637 FOR MEDICARE OP)

## 2025-01-13 PROCEDURE — 2500000002 HC RX 250 W HCPCS SELF ADMINISTERED DRUGS (ALT 637 FOR MEDICARE OP, ALT 636 FOR OP/ED): Performed by: STUDENT IN AN ORGANIZED HEALTH CARE EDUCATION/TRAINING PROGRAM

## 2025-01-13 PROCEDURE — 2500000004 HC RX 250 GENERAL PHARMACY W/ HCPCS (ALT 636 FOR OP/ED): Performed by: SURGERY

## 2025-01-13 PROCEDURE — 82947 ASSAY GLUCOSE BLOOD QUANT: CPT

## 2025-01-13 PROCEDURE — 86901 BLOOD TYPING SEROLOGIC RH(D): CPT | Performed by: STUDENT IN AN ORGANIZED HEALTH CARE EDUCATION/TRAINING PROGRAM

## 2025-01-13 PROCEDURE — 99233 SBSQ HOSP IP/OBS HIGH 50: CPT | Performed by: INTERNAL MEDICINE

## 2025-01-13 PROCEDURE — 82330 ASSAY OF CALCIUM: CPT | Performed by: PHYSICIAN ASSISTANT

## 2025-01-13 RX ORDER — HYDROMORPHONE HYDROCHLORIDE 0.2 MG/ML
0.2 INJECTION INTRAMUSCULAR; INTRAVENOUS; SUBCUTANEOUS
Status: DISCONTINUED | OUTPATIENT
Start: 2025-01-13 | End: 2025-01-15

## 2025-01-13 RX ORDER — PANTOPRAZOLE SODIUM 40 MG/10ML
40 INJECTION, POWDER, LYOPHILIZED, FOR SOLUTION INTRAVENOUS 2 TIMES DAILY
Status: DISCONTINUED | OUTPATIENT
Start: 2025-01-13 | End: 2025-01-17

## 2025-01-13 RX ORDER — NALOXONE HYDROCHLORIDE 0.4 MG/ML
0.2 INJECTION, SOLUTION INTRAMUSCULAR; INTRAVENOUS; SUBCUTANEOUS EVERY 5 MIN PRN
Status: DISCONTINUED | OUTPATIENT
Start: 2025-01-13 | End: 2025-01-23 | Stop reason: HOSPADM

## 2025-01-13 RX ORDER — TACROLIMUS 1 MG/1
2 CAPSULE ORAL EVERY 12 HOURS
Status: DISCONTINUED | OUTPATIENT
Start: 2025-01-13 | End: 2025-01-20

## 2025-01-13 RX ORDER — INSULIN LISPRO 100 [IU]/ML
0-10 INJECTION, SOLUTION INTRAVENOUS; SUBCUTANEOUS
Status: DISCONTINUED | OUTPATIENT
Start: 2025-01-13 | End: 2025-01-14

## 2025-01-13 RX ORDER — HYDROMORPHONE HYDROCHLORIDE 0.2 MG/ML
0.2 INJECTION INTRAMUSCULAR; INTRAVENOUS; SUBCUTANEOUS EVERY 4 HOURS PRN
Status: DISCONTINUED | OUTPATIENT
Start: 2025-01-13 | End: 2025-01-13

## 2025-01-13 RX ORDER — MAGNESIUM SULFATE HEPTAHYDRATE 40 MG/ML
2 INJECTION, SOLUTION INTRAVENOUS ONCE
Status: COMPLETED | OUTPATIENT
Start: 2025-01-13 | End: 2025-01-13

## 2025-01-13 RX ORDER — LIDOCAINE HYDROCHLORIDE 10 MG/ML
5 INJECTION, SOLUTION INFILTRATION; PERINEURAL ONCE
Status: DISCONTINUED | OUTPATIENT
Start: 2025-01-13 | End: 2025-01-18

## 2025-01-13 RX ORDER — DIATRIZOATE MEGLUMINE AND DIATRIZOATE SODIUM 660; 100 MG/ML; MG/ML
90 SOLUTION ORAL; RECTAL ONCE
Status: COMPLETED | OUTPATIENT
Start: 2025-01-13 | End: 2025-01-14

## 2025-01-13 RX ORDER — SODIUM CHLORIDE 0.9 % (FLUSH) 0.9 %
10 SYRINGE (ML) INJECTION AS NEEDED
Status: DISCONTINUED | OUTPATIENT
Start: 2025-01-13 | End: 2025-01-23 | Stop reason: HOSPADM

## 2025-01-13 RX ORDER — ERYTHROMYCIN 250 MG/1
250 TABLET, COATED ORAL EVERY 6 HOURS SCHEDULED
Status: DISCONTINUED | OUTPATIENT
Start: 2025-01-13 | End: 2025-01-15

## 2025-01-13 RX ORDER — HYDROMORPHONE HYDROCHLORIDE 0.2 MG/ML
0.2 INJECTION INTRAMUSCULAR; INTRAVENOUS; SUBCUTANEOUS ONCE
Status: COMPLETED | OUTPATIENT
Start: 2025-01-13 | End: 2025-01-13

## 2025-01-13 RX ADMIN — NYSTATIN 500000 UNITS: 500000 SUSPENSION ORAL at 20:59

## 2025-01-13 RX ADMIN — ACETAMINOPHEN 650 MG: 325 TABLET, FILM COATED ORAL at 20:59

## 2025-01-13 RX ADMIN — ONDANSETRON 4 MG: 2 INJECTION INTRAMUSCULAR; INTRAVENOUS at 03:55

## 2025-01-13 RX ADMIN — ROSUVASTATIN CALCIUM 10 MG: 10 TABLET, FILM COATED ORAL at 20:59

## 2025-01-13 RX ADMIN — ONDANSETRON 4 MG: 2 INJECTION INTRAMUSCULAR; INTRAVENOUS at 12:32

## 2025-01-13 RX ADMIN — TACROLIMUS 5 MG: 1 CAPSULE ORAL at 06:16

## 2025-01-13 RX ADMIN — PANTOPRAZOLE SODIUM 40 MG: 40 TABLET, DELAYED RELEASE ORAL at 06:16

## 2025-01-13 RX ADMIN — HEPARIN SODIUM 5000 UNITS: 5000 INJECTION INTRAVENOUS; SUBCUTANEOUS at 08:25

## 2025-01-13 RX ADMIN — ONDANSETRON 4 MG: 2 INJECTION INTRAMUSCULAR; INTRAVENOUS at 20:56

## 2025-01-13 RX ADMIN — ACETAMINOPHEN 650 MG: 325 TABLET, FILM COATED ORAL at 08:26

## 2025-01-13 RX ADMIN — INSULIN GLARGINE 5 UNITS: 100 INJECTION, SOLUTION SUBCUTANEOUS at 21:22

## 2025-01-13 RX ADMIN — HYDROMORPHONE HYDROCHLORIDE 0.2 MG: 0.2 INJECTION, SOLUTION INTRAMUSCULAR; INTRAVENOUS; SUBCUTANEOUS at 21:15

## 2025-01-13 RX ADMIN — TACROLIMUS 2 MG: 1 CAPSULE ORAL at 18:07

## 2025-01-13 RX ADMIN — MYCOPHENOLIC ACID 360 MG: 360 TABLET, DELAYED RELEASE ORAL at 18:07

## 2025-01-13 RX ADMIN — ERYTHROMYCIN 250 MG: 250 TABLET, FILM COATED ORAL at 12:32

## 2025-01-13 RX ADMIN — NYSTATIN 500000 UNITS: 500000 SUSPENSION ORAL at 06:16

## 2025-01-13 RX ADMIN — OXYCODONE 5 MG: 5 TABLET ORAL at 01:30

## 2025-01-13 RX ADMIN — PANTOPRAZOLE SODIUM 40 MG: 40 INJECTION, POWDER, FOR SOLUTION INTRAVENOUS at 12:32

## 2025-01-13 RX ADMIN — CALCIUM CARBONATE (ANTACID) CHEW TAB 500 MG 500 MG: 500 CHEW TAB at 06:18

## 2025-01-13 RX ADMIN — AMLODIPINE BESYLATE 10 MG: 10 TABLET ORAL at 08:25

## 2025-01-13 RX ADMIN — MYCOPHENOLIC ACID 360 MG: 360 TABLET, DELAYED RELEASE ORAL at 06:16

## 2025-01-13 RX ADMIN — ERYTHROMYCIN 250 MG: 250 TABLET, FILM COATED ORAL at 18:07

## 2025-01-13 RX ADMIN — METOCLOPRAMIDE 5 MG: 10 TABLET ORAL at 20:56

## 2025-01-13 RX ADMIN — OXYCODONE 5 MG: 5 TABLET ORAL at 08:33

## 2025-01-13 RX ADMIN — INSULIN LISPRO 4 UNITS: 100 INJECTION, SOLUTION INTRAVENOUS; SUBCUTANEOUS at 16:32

## 2025-01-13 RX ADMIN — METOCLOPRAMIDE 5 MG: 10 TABLET ORAL at 03:55

## 2025-01-13 RX ADMIN — PANTOPRAZOLE SODIUM 40 MG: 40 INJECTION, POWDER, FOR SOLUTION INTRAVENOUS at 20:59

## 2025-01-13 RX ADMIN — PREDNISONE 10 MG: 10 TABLET ORAL at 08:25

## 2025-01-13 RX ADMIN — METOCLOPRAMIDE 5 MG: 10 TABLET ORAL at 12:31

## 2025-01-13 RX ADMIN — INSULIN LISPRO 4 UNITS: 100 INJECTION, SOLUTION INTRAVENOUS; SUBCUTANEOUS at 12:33

## 2025-01-13 RX ADMIN — HYDROMORPHONE HYDROCHLORIDE 0.2 MG: 0.2 INJECTION, SOLUTION INTRAMUSCULAR; INTRAVENOUS; SUBCUTANEOUS at 13:48

## 2025-01-13 RX ADMIN — MAGNESIUM SULFATE HEPTAHYDRATE 2 G: 40 INJECTION, SOLUTION INTRAVENOUS at 09:03

## 2025-01-13 RX ADMIN — ASCORBIC ACID, VITAMIN A PALMITATE, CHOLECALCIFEROL, THIAMINE HYDROCHLORIDE, RIBOFLAVIN-5 PHOSPHATE SODIUM, PYRIDOXINE HYDROCHLORIDE, NIACINAMIDE, DEXPANTHENOL, ALPHA-TOCOPHEROL ACETATE, VITAMIN K1, FOLIC ACID, BIOTIN, CYANOCOBALAMIN: 200; 3300; 200; 6; 3.6; 6; 40; 15; 10; 150; 600; 60; 5 INJECTION, SOLUTION INTRAVENOUS at 20:58

## 2025-01-13 RX ADMIN — THIAMINE HCL TAB 100 MG 100 MG: 100 TAB at 08:25

## 2025-01-13 RX ADMIN — GABAPENTIN 200 MG: 100 CAPSULE ORAL at 08:25

## 2025-01-13 RX ADMIN — ACETAMINOPHEN 650 MG: 325 TABLET, FILM COATED ORAL at 03:54

## 2025-01-13 ASSESSMENT — COGNITIVE AND FUNCTIONAL STATUS - GENERAL
DAILY ACTIVITIY SCORE: 19
EATING MEALS: A LITTLE
TOILETING: A LITTLE
EATING MEALS: A LITTLE
DAILY ACTIVITIY SCORE: 19
MOBILITY SCORE: 21
DRESSING REGULAR LOWER BODY CLOTHING: A LITTLE
MOBILITY SCORE: 21
STANDING UP FROM CHAIR USING ARMS: A LITTLE
HELP NEEDED FOR BATHING: A LITTLE
CLIMB 3 TO 5 STEPS WITH RAILING: A LITTLE
HELP NEEDED FOR BATHING: A LITTLE
DRESSING REGULAR UPPER BODY CLOTHING: A LITTLE
STANDING UP FROM CHAIR USING ARMS: A LITTLE
CLIMB 3 TO 5 STEPS WITH RAILING: A LITTLE
DRESSING REGULAR UPPER BODY CLOTHING: A LITTLE
WALKING IN HOSPITAL ROOM: A LITTLE
WALKING IN HOSPITAL ROOM: A LITTLE
DRESSING REGULAR LOWER BODY CLOTHING: A LITTLE
TOILETING: A LITTLE

## 2025-01-13 ASSESSMENT — PAIN SCALES - GENERAL
PAINLEVEL_OUTOF10: 10 - WORST POSSIBLE PAIN
PAINLEVEL_OUTOF10: 8
PAINLEVEL_OUTOF10: 10 - WORST POSSIBLE PAIN
PAINLEVEL_OUTOF10: 7
PAINLEVEL_OUTOF10: 4
PAINLEVEL_OUTOF10: 2

## 2025-01-13 ASSESSMENT — PAIN DESCRIPTION - ORIENTATION
ORIENTATION: LEFT;UPPER
ORIENTATION: RIGHT;UPPER

## 2025-01-13 ASSESSMENT — PAIN DESCRIPTION - LOCATION
LOCATION: ABDOMEN

## 2025-01-13 ASSESSMENT — ENCOUNTER SYMPTOMS
POLYPHAGIA: 0
DIZZINESS: 0
ABDOMINAL PAIN: 0
SHORTNESS OF BREATH: 0
NUMBNESS: 0
SINUS PRESSURE: 0
DIARRHEA: 0
CONSTIPATION: 0
NAUSEA: 1
VOMITING: 1
FEVER: 0
POLYDIPSIA: 0
APPETITE CHANGE: 1
ARTHRALGIAS: 0
ABDOMINAL DISTENTION: 0
AGITATION: 0

## 2025-01-13 ASSESSMENT — PAIN - FUNCTIONAL ASSESSMENT
PAIN_FUNCTIONAL_ASSESSMENT: 0-10
PAIN_FUNCTIONAL_ASSESSMENT: 0-10

## 2025-01-13 NOTE — PROGRESS NOTES
Temo Hanson is a 74 y.o. male POD#22 from DDKT from a DBD donor. Readmit for PO intolerance due to achalasia s/p Heller/Andrea myotomy with megaesophagus. POD#5 from PEG placement.     - Ongoing nausea and high residuals from PEG TF's, did not tolerate at all over weekend  - Reports situational depression with need for multiple procedures and PO intolerance  - Will do contrast gastric emptying study    Objective   Gen: A+OX3  HEENT: PERRL, MMM  Cardiac: RRR  Chest: Normal inspiratory effort  Abdomen: S/NT/ND. Incision C/D/I.  Ext: No LE edema    Last Recorded Vitals  Visit Vitals  /64 (BP Location: Right arm, Patient Position: Lying)   Pulse 82   Temp 37 °C (98.6 °F) (Temporal)   Resp 18      Intake/Output last 3 Shifts:    Intake/Output Summary (Last 24 hours) at 1/13/2025 1418  Last data filed at 1/13/2025 1322  Gross per 24 hour   Intake 1879.33 ml   Output 326 ml   Net 1553.33 ml      Vitals:    01/10/25 0600   Weight: 76 kg (167 lb 8.8 oz)   Scheduled medications  acetaminophen, 650 mg, oral, q6h  amLODIPine, 10 mg, oral, Daily  calcium carbonate, 500 mg, oral, BID AC  ceFAZolin, 2 g, intravenous, Once  darbepoetin nohemi, 100 mcg, subcutaneous, Once  diatrizoate padmaja-diatrizoat sod, 90 mL, oral, Once  erythromycin base, 250 mg, oral, q6h KASSY  gabapentin, 200 mg, oral, Daily  heparin (porcine), 5,000 Units, subcutaneous, q8h  insulin glargine, 5 Units, subcutaneous, Nightly  insulin lispro, 0-10 Units, subcutaneous, 4 times per day  lidocaine, 5 mL, infiltration, Once  lidocaine, 5 mL, infiltration, Once  lidocaine, 1 patch, transdermal, Daily  metoclopramide, 5 mg, oral, q8h  mycophenolate, 360 mg, oral, BID  nystatin, 5 mL, Swish & Swallow, 4x daily  ondansetron, 4 mg, intravenous, q8h  pantoprazole, 40 mg, intravenous, BID  predniSONE, 10 mg, oral, Daily  rosuvastatin, 10 mg, oral, Nightly  [Held by provider] sulfamethoxazole-trimethoprim, 1 tablet, oral, Daily  tacrolimus, 2 mg, oral, q12h  thiamine,  100 mg, oral, Daily  [Held by provider] valGANciclovir, 900 mg, oral, q24h    Assessment/Plan   Principal Problem:    Kidney transplant recipient    Achalasia/megaesophagus    Severe protein calorie malnutrition  Active Problems:  Patient Active Problem List   Diagnosis    S/P laparoscopic cholecystectomy    Abdominal pain    Achalasia    Acute lower UTI    Microcytic anemia    Complete heart block    Callus of foot    Chronic periodontitis, unspecified    Stage 5 chronic kidney disease (Multi)    Closed fracture of metatarsal bone    Crushing injury of foot    Diabetes mellitus (Multi)    Diarrhea    Dysphagia    ED (erectile dysfunction)    Essential hypertension    Foot pain, right    GIB (gastrointestinal bleeding)    Hepatitis B core antibody positive    Hyperkalemia    Ingrowing nail    Iron deficiency anemia secondary to inadequate dietary iron intake    Long toenail    Malignant neoplasm of prostate (Multi)    Onychomycosis    Pheochromocytoma of right adrenal gland    Pneumonia of right lower lobe due to infectious organism    Productive cough    Pure hypercholesterolemia    Stricture esophagus    SVT (supraventricular tachycardia) (CMS-HCC)    Type 2 diabetes mellitus with diabetic neuropathy (Multi)    Preop cardiovascular exam    Third degree heart block    Pericardial effusion (HHS-HCC)    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 (Multi)    Uremia    Cardiac tamponade    Cardiac pacemaker in situ    ESRD (end stage renal disease) (Multi)    Type 2 diabetes mellitus, with long-term current use of insulin    Dehydration    Alactasia syndrome    Type 2 diabetes mellitus with hyperglycemia, with long-term current use of insulin    On tube feeding diet       - Hold TF's - can consider trickle this PM  - Convert to TPN with plan to cycle  - Continue reglan 5 mg q8h  - Add erythromycin x5 days  - PT/OT  - Myfortic 360 mg bid/prednisone 10 mg  - Tacrolimus goal 8-10 ng/mL  - Gastric emptying assessment  Monday, likely needs conversion to PEG/J in future    I spent 35 minutes in the professional and overall care of this patient, including immunosuppression management.    Cassidy Altman MD

## 2025-01-13 NOTE — PROGRESS NOTES
Music Therapy Note    Temo Hanson     Therapy Session  Referral Type: New referral this admission  Visit Type: Follow-up visit  Session Start Time: 1342  Session End Time: 1345  Intervention Delivery: In-person  Conflict of Service: Declined treatment  Family Present for Session: None               Treatment/Interventions       Post-assessment  Total Session Time (min): 3 minutes    Narrative  Assessment Detail: Patient presented awake and alert, sitting in bedside chair, appearing very uncomfortable and was minimally conversational. Patient did not consent to music intervention at this time.  Follow-up: MT to sign off at this time.    Education Documentation  No documentation found.

## 2025-01-13 NOTE — POST-PROCEDURE NOTE
PICC      Placed:1/13/20251/13/[id number].Earliest Known Present:1/13/20251/13/2025.Hand Hygiene Completed:YesYes.Catheter Time Out Checklist Completed:YesYes.Size (Fr):44.Lumen Type:Double lumen Double lumen.Catheter to Vein Ratio Less Than 45%:Unable to AssesUnable to Asses. The comment is Midline exchanged to PICC.Total Length (cm):4343.External Length (cm):2626.Orientation:RightRight.Location:Basilic veinBasilic vein.Site Prep:Chlorhexidine ; Usual sterile procedure followedChlorhexidine ; Usual sterile procedure followed.Local Anesthetic:InjectableInjectable.Indication:Parenteral nutritionParenteral nutrition.Insertion Team Members In The Room:Guilherme. The comment is Lyly Hirsch LPN.Initial Extremity Circumference (cm):2626.Placed by:Erika Astudillo RN-Irma Astudillo RN-PHILLIP.Insertion attempts:11.Patient Tolerance:Tolerated wellTolerated well.Comfort Measures:Subcutaneous anestheticSubcutaneous anesthetic.Procedure Location:BedsideBedside. The comment is timeout with Geno CHAVIS.Safety Measures:Patient specific safety measures addressed with RNPatient specific safety measures addressed with RN.Estimated Blood Loss (mL):00.Vessel Fully Compressible Proximally and Distally to Insertion Site:YesYes.Brisk Blood Return Obtained and Line Draws Easily:YesYes.Tip Location:Distal superior vena cavaDistal superior vena cava. The comment is SVC.Line Confirmation:Blood return; ECGBlood return; ECG.Lot #:[id number].:BDBD.PICC Line Exp Date:10/31/896993/31/2025.Post Procedure Checklist:Handoff with RN; Bed at lowest level and wheels locked; Obtain all new IV tubing prior to useHandoff with RN; Bed at lowest level and wheels locked; Obtain all new IV tubing prior to use.

## 2025-01-13 NOTE — CONSULTS
"Nutrition Note:   Nutrition Assessment    Reason for Assessment: Parenteral assessment/recommendation (TPN/PPN)    Pt not tolerating TF and/or CLD. Team consulted for PN - PPN currently running @ 30ml/hr per team order.    MD informed RDN of the following:  Plan is for gastric emptying study. Noted that plan will be to trial Erythromycin in addition to Reglan that has been given. Reported that if pt requires j-extension, the earliest this can occur is likely this Wednesday, but noted concern that current PEG may not be compatible which may delay conversion. Further stated he has been her for >/=1 week with minimal to no nutrition; therefore, he will now require TPN rather than PPN. Additionally mentioned he will likely need cycle TPN while at home while advancing TF once j-extension placed.       Nutrition History:  Food and Nutrient History: Met with pt and family this AM - pt provided minimal information (noted not taking much of CLD). Family explained their wishes are for him to receive TPN at home to get stronger and not receive j-extension as he has \"not been able to walk or have the strength to get up - TPN will do that for him\". This conversation was had prior to team and RDN conversing - RDN explained need to discuss with team the plan as he currently does not have central access. It was discussed with family that placing central line does come with risks of infection and if it is deemed that GI tract works below stomach and j-extension can be safely placed, this would be recommended.       Anthropometrics:  Height: 185.4 cm (6' 1\")   Weight: 76 kg (167 lb 8.8 oz)   BMI (Calculated): 22.11  IBW/kg (Dietitian Calculated): 83.6 kg     Weight Change %:  Weight History / % Weight Change: Current weight of 76kg is from 1/10 - up likely from fluid/scale discrepancy    Nutrition Focused Physical Exam Findings:  defer: did not repeat as pt appeared somewhat upset with conversation, multiple family members in room - " focus was on TPN and RDN wanted to make sure to answer all questions/make family feel heard,understand RDN working with team for appropriate nutrition recommendations     Nutrition Significant Labs:  CBC Trend:   Results from last 7 days   Lab Units 01/13/25  0541 01/12/25  0551 01/11/25  0422 01/10/25  0530   WBC AUTO x10*3/uL 1.8* 1.5* 1.5* 1.8*   RBC AUTO x10*6/uL 2.86* 2.32* 2.70* 2.60*   HEMOGLOBIN g/dL 8.5* 7.1* 8.3* 8.2*   HEMATOCRIT % 27.2* 22.2* 26.8* 26.0*   MCV fL 95 96 99 100   PLATELETS AUTO x10*3/uL 163 160 178 179    , BMP Trend:   Results from last 7 days   Lab Units 01/13/25  0541 01/12/25  0551 01/11/25  0422 01/10/25  1518   GLUCOSE mg/dL 106* 75 101* 77   CALCIUM mg/dL 7.8* 8.0* 8.3* 8.3*   SODIUM mmol/L 133* 135* 135* 134*   POTASSIUM mmol/L 4.4 4.5 4.5 4.4   CO2 mmol/L 23 25 25 27   CHLORIDE mmol/L 103 104 102 102   BUN mg/dL 13 20 20 19   CREATININE mg/dL 0.91 1.26 1.19 1.17    , BG POCT trend:   Results from last 7 days   Lab Units 01/13/25  0740 01/13/25  0354 01/12/25  2348 01/12/25  2047 01/12/25  1619   POCT GLUCOSE mg/dL 125* 119* 134* 175* 153*    , Renal Lab Trend:   Results from last 7 days   Lab Units 01/13/25  0541 01/12/25  0551 01/11/25  0422 01/10/25  1518   POTASSIUM mmol/L 4.4 4.5 4.5 4.4   PHOSPHORUS mg/dL 2.6 3.4 2.6 3.2   SODIUM mmol/L 133* 135* 135* 134*   MAGNESIUM mg/dL 1.70 2.24 1.70  --    EGFR mL/min/1.73m*2 88 60* 64 65   BUN mg/dL 13 20 20 19   CREATININE mg/dL 0.91 1.26 1.19 1.17        Nutrition Specific Medications:  Scheduled medications  calcium carbonate, 500 mg, oral, BID AC  ceFAZolin, 2 g, intravenous, Once  insulin glargine, 5 Units, subcutaneous, Nightly  insulin lispro, 0-10 Units, subcutaneous, 4 times per day  metoclopramide, 5 mg, oral, q8h  nystatin, 5 mL, Swish & Swallow, 4x daily  ondansetron, 4 mg, intravenous, q8h  pantoprazole, 40 mg, intravenous, BID  predniSONE, 10 mg, oral, Daily  tacrolimus, 2 mg, oral, q12h  thiamine, 100 mg, oral,  Daily  [Held by provider] valGANciclovir, 900 mg, oral, q24h    Continuous medications  Adult Clinimix Parenteral Nutrition Continuous, 30 mL/hr, Last Rate: 30 mL/hr (01/12/25 2053)  lactated Ringer's, 100 mL/hr, Last Rate: 100 mL/hr (01/12/25 2320)      I/O:   Last BM Date: 01/13/25; Stool Appearance: Soft (01/13/25 0018)    Dietary Orders (From admission, onward)       Start     Ordered    01/12/25 1109  Adult diet Clear Liquid  Diet effective now        Question:  Diet type  Answer:  Clear Liquid    01/12/25 1108    01/11/25 1435  Oral nutritional supplements  Until discontinued        Question Answer Comment   Deliver with All meals    Select supplement: Ensure High Protein        01/11/25 1435    01/07/25 1058  Calorie count  Once         01/07/25 1057    01/02/25 1248  Oral nutritional supplements  Until discontinued        Question Answer Comment   Deliver with All meals    Select supplement: Magic Cup        01/02/25 1247    01/02/25 1247  Oral nutritional supplements  Until discontinued        Question Answer Comment   Deliver with All meals    Select supplement: Glucerna Shake        01/02/25 1247    12/31/24 1246  May Participate in Room Service With Assistance  ( ROOM SERVICE MAY PARTICIPATE WITH ASSISTANCE)  Once        Question:  .  Answer:  Yes    12/31/24 1245                     Estimated Needs:   Total Energy Estimated Needs (kCal): 2120 kCal  Method for Estimating Needs: 30kcal/kg CBW  Total Protein Estimated Needs (g): 85 g  Method for Estimating Needs: 1.2g/kg CBW +  Total Fluid Estimated Needs (mL):  (per MD/team)  Method for Estimating Needs: per MD/team        Nutrition Diagnosis   Malnutrition Diagnosis  Patient has Malnutrition Diagnosis: Yes  Diagnosis Status: Ongoing  Malnutrition Diagnosis: Severe malnutrition related to acute disease or injury  As Evidenced by: < 50% estimated energy needs for > 5 days, moderate fat loss + muscle wasting,          Nutrition  Interventions/Recommendations         Nutrition Prescription:  Individualized Nutrition Prescription Provided for : EN vs PN        Nutrition Interventions:   Recommend waiting to place PICC until we can determine the following:   Results of gastric emptying study   If reveals slow gastric emptying, then recommend to give Erythromycin ~48hrs to work with the initiation of TF:   Run Glucerna 1.5 @ 20ml/hr.   If pt shows tolerance, then increase by 10mls every 12hrs to reach goal of 50ml/hr.   If he does not tolerate Glucerna 1.5 (despite prokinetics), then please switch to Vital 1.5 @ 20ml/hr.   If pt tolerates, then please increase by 10mls every 12hrs to reach goal of 50ml/hr + 1 packet Pro Stat AWC daily.     PPN recommendations while providing TF at a trickle:  Please do not advance PPN 4.25% AA 5% D rate higher than current of 41ml/hr.    Closely monitor phos, potassium, and magnesium while providing TF and PPN - reconsult if lytes trend down.   If he tolerates TF advancement, then please discontinue PPN.    If pt unable to tolerate advancing TF and plan is for j-extension in the next 3-4 days, then TPN is still not recommended:  Increase PPN 4.25% AA 5% D to goal of 83ml/hr and provide 20% Intralipid @ 20.8ml/hr x 12hrs.   Once j-extension placed, start Glucerna 1.5 @ 10ml/hr, increase by 10mls every 12hrs to reach goal of 50ml/hr and discontinue PPN.     If j-extension is not to be placed and GI tract cannot be utilized for a prolonged period of time, then TPN via central line appropriate:   Day 1) Standard TPN 5% AA 15% D @ 41ml/hr x 24hrs.   Day 2) If no major shifts in lytes and BG <180, then increase to 55ml/hr.   Day 3) If no major shifts in lytes and BG <180, then increase to goal of 75ml/hr.   Provide 20% Intralipid @ 20.8ml/hr x 12hrs    Continue 100mg thiamine daily.       Nutrition Monitoring and Evaluation   Food/Nutrient Related History Monitoring  Additional Plans: EN vs PN     Time Spent (min):  90 minutes

## 2025-01-13 NOTE — CARE PLAN
The clinical goals for the shift include Patient pain in control    Problem: Pain  Goal: Takes deep breaths with improved pain control throughout the shift  Outcome: Progressing  Goal: Turns in bed with improved pain control throughout the shift  Outcome: Progressing  Goal: Walks with improved pain control throughout the shift  Outcome: Progressing  Goal: Performs ADL's with improved pain control throughout shift  Outcome: Progressing  Goal: Participates in PT with improved pain control throughout the shift  Outcome: Progressing  Goal: Free from opioid side effects throughout the shift  Outcome: Progressing  Goal: Free from acute confusion related to pain meds throughout the shift  Outcome: Progressing     Problem: Skin  Goal: Decreased wound size/increased tissue granulation at next dressing change  Outcome: Progressing  Goal: Participates in plan/prevention/treatment measures  Outcome: Progressing  Goal: Prevent/manage excess moisture  Outcome: Progressing  Goal: Prevent/minimize sheer/friction injuries  Outcome: Progressing  Goal: Promote/optimize nutrition  Outcome: Progressing  Goal: Promote skin healing  Outcome: Progressing

## 2025-01-13 NOTE — SIGNIFICANT EVENT
01/13/25 1215   Patient Interaction   Organ Kidney   Type of Interaction Morning rounds   Interdisciplinary Rounds   Attendance Surgeon;Physician;FILI;Coordinator;Pharmacist   Topics Discussed Diet;Home care needs;Medications;Patient/family concerns;Blood test results;Imaging/test results     Transplant Surgery Multidisciplinary Team Note    Temo Hanson is a 74 y.o. male   POD#23  from a Kidney from a  kidney txp. His post operative complications: None and Other complication: malnutrition requiring PEG       24 Hour Events  1. No acute events     Last Recorded Vitals  Visit Vitals  /84 (BP Location: Right arm, Patient Position: Lying)   Pulse 94   Temp 37 °C (98.6 °F) (Temporal)   Resp 18      Intake/Output last 3 Shifts:    Intake/Output Summary (Last 24 hours) at 1/13/2025 1217  Last data filed at 1/13/2025 1105  Gross per 24 hour   Intake 1400.83 ml   Output 201 ml   Net 1199.83 ml      Vitals:    01/10/25 0600   Weight: 76 kg (167 lb 8.8 oz)        Assessment/Plan   -Aspiration precautions  -Switch midline to PICC  -Start TPN  -Gastric emptying study with gastrografin and ab xr at 1, 2.5, and 4 hours  -Start erythromycin 250 mg q6h   -Decrease FK to 2/2     Principal Problem:    Management 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 transplant  Active Problems:  Patient Active Problem List   Diagnosis    S/P laparoscopic cholecystectomy    Abdominal pain    Achalasia    Acute lower UTI    Microcytic anemia    Complete heart block    Callus of foot    Chronic periodontitis, unspecified    Stage 5 chronic kidney disease (Multi)    Closed fracture of metatarsal bone    Crushing injury of foot    Diabetes mellitus (Multi)    Diarrhea    Dysphagia    ED (erectile dysfunction)    Essential hypertension    Foot pain, right    GIB (gastrointestinal bleeding)    Hepatitis B core antibody positive    Hyperkalemia    Ingrowing nail    Iron deficiency anemia secondary to inadequate dietary iron intake    Long toenail    Malignant neoplasm of  prostate (Multi)    Onychomycosis    Pheochromocytoma of right adrenal gland    Pneumonia of right lower lobe due to infectious organism    Productive cough    Pure hypercholesterolemia    Stricture esophagus    SVT (supraventricular tachycardia) (CMS-HCC)    Type 2 diabetes mellitus with diabetic neuropathy (Multi)    Preop cardiovascular exam    Third degree heart block    Pericardial effusion (HHS-HCC)    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 (Multi)    Uremia    Cardiac tamponade    Cardiac pacemaker in situ    ESRD (end stage renal disease) (Multi)    Type 2 diabetes mellitus, with long-term current use of insulin    Dehydration    Alactasia syndrome    Type 2 diabetes mellitus with hyperglycemia, with long-term current use of insulin    On tube feeding diet        Immunosuppression reviewed and adjusted       Induction: Thymoglobulin Full Dose and None       Tacrolimus goal 8-10 ng/mL. Current dose 2 mg BID         MMF 1000 mg po BID       Solumedrol taper  DVT prophylaxis SCDS and subcutaneous heparin 5000 TID  PT/OT  Diet:  Clears, PPN  Anticipated discharge next week    Sherly García PA-C

## 2025-01-13 NOTE — PROGRESS NOTES
TCC aware that patient is not medically cleared for discharge. Referral sent for [facility]; [facility] notified of [facility] for SN, PT and OT upon medical discharge.  TCC will continue to follow for any future identifiable discharge needs.      LUCÍA Strange, RN  [facility], [facility]  Transitional Care Coordinator, Mon-Fri  Cell: [phone], Office: [phone]  Email: [email or web address]

## 2025-01-13 NOTE — CARE PLAN
Problem: Pain  Goal: Takes deep breaths with improved pain control throughout the shift  Outcome: Progressing  Goal: Turns in bed with improved pain control throughout the shift  Outcome: Progressing  Goal: Walks with improved pain control throughout the shift  Outcome: Progressing  Goal: Performs ADL's with improved pain control throughout shift  Outcome: Progressing  Goal: Participates in PT with improved pain control throughout the shift  Outcome: Progressing  Goal: Free from opioid side effects throughout the shift  Outcome: Progressing  Goal: Free from acute confusion related to pain meds throughout the shift  Outcome: Progressing     Problem: Skin  Goal: Decreased wound size/increased tissue granulation at next dressing change  Outcome: Progressing  Goal: Participates in plan/prevention/treatment measures  Outcome: Progressing  Goal: Prevent/manage excess moisture  Outcome: Progressing  Goal: Prevent/minimize sheer/friction injuries  Outcome: Progressing  Goal: Promote/optimize nutrition  Outcome: Progressing  Goal: Promote skin healing  Outcome: Progressing   The patient's goals for the shift include get better    The clinical goals for the shift include Patient will tolerate initiation of PPN  as ordered on this shift

## 2025-01-13 NOTE — PROGRESS NOTES
Physical Therapy                 Therapy Communication Note    Patient Name: Temo Hanson  MRN: [id number]  Department: [place] [address]  Room: [facility]  Today's Date: 1/13/2025     Discipline: Physical Therapy    PT Missed Visit: Yes     Missed Visit Reason: Missed Visit Reason: Patient in a medical procedure (RN arriving to exchange pt's PICC.)    Missed Time: Attempt    Comment: @15:11

## 2025-01-13 NOTE — PROGRESS NOTES
Temo Hanson is a 74 y.o. male on day 13 of admission presenting with Dehydration.    Subjective   Patient is alert and oriented x3. In no acute distress.    Patient states he has vomited and feeling very nauseous. He states he is able to tolerate chicken broth and liquids well, but woke up feeling ill.   He has some burning pain in his chest that started this morning, explained to his, this is probably due to his steroids.   Explained plan to patient and he is in agreement.     I have reviewed histories, allergies and medications have been reviewed and there are no changes       Objective   Review of Systems   Constitutional:  Positive for appetite change. Negative for fever.   HENT:  Negative for sinus pressure.    Respiratory:  Negative for shortness of breath.    Cardiovascular:  Positive for chest pain.        Burning chest pain   Gastrointestinal:  Positive for nausea and vomiting. Negative for abdominal distention, abdominal pain, constipation and diarrhea.   Endocrine: Negative for cold intolerance, heat intolerance, polydipsia, polyphagia and polyuria.   Musculoskeletal:  Negative for arthralgias.   Neurological:  Negative for dizziness and numbness.   Psychiatric/Behavioral:  Negative for agitation and behavioral problems.      Physical Exam  Constitutional:       Appearance: Normal appearance. He is normal weight.   HENT:      Head: Normocephalic and atraumatic.      Mouth/Throat:      Mouth: Mucous membranes are moist.   Eyes:      Pupils: Pupils are equal, round, and reactive to light.   Cardiovascular:      Rate and Rhythm: Normal rate and regular rhythm.   Pulmonary:      Effort: Pulmonary effort is normal.      Breath sounds: Normal breath sounds.   Abdominal:      Palpations: Abdomen is soft.   Skin:     General: Skin is warm.   Neurological:      Mental Status: He is alert and oriented to person, place, and time.   Psychiatric:         Mood and Affect: Mood normal.         Behavior: Behavior  "normal.         Thought Content: Thought content normal.         Judgment: Judgment normal.         Last Recorded Vitals  Blood pressure 154/64, pulse 82, temperature 37 °C (98.6 °F), temperature source Temporal, resp. rate 18, height 1.854 m (6' 1\"), weight 76 kg (167 lb 8.8 oz), SpO2 99%.  Intake/Output last 3 Shifts:  I/O last 3 completed shifts:  In: 1910.8 (25.1 mL/kg) [P.O.:560; I.V.:1000 (13.2 mL/kg); Blood:350.8]  Out: 526 (6.9 mL/kg) [Urine:526 (0.2 mL/kg/hr)]  Weight: 76 kg     Relevant Results  Results from last 7 days   Lab Units 01/13/25  1230 01/13/25  0740 01/13/25  0541 01/13/25  0354 01/12/25  2348 01/12/25  2047 01/12/25  0755 01/12/25  0551 01/11/25  0551 01/11/25  0422 01/10/25  1545 01/10/25  1518 01/10/25  0825 01/10/25  0530   POCT GLUCOSE mg/dL 201* 125*  --  119* 134* 175*   < >  --    < >  --    < >  --    < >  --    GLUCOSE mg/dL  --   --  106*  --   --   --   --  75  --  101*  --  77  --  209*    < > = values in this interval not displayed.       Assessment/Plan   Temo Hanson is a 74 y.o. 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  Patient was started on tube feeds, NPO, today after placement of PEG tube. Patient states he felt nausea and vomiting, leading to a pause in his feedings around noon. Feedings restarted around 1500.      Diabetes History  Type of diabetes: 2  Year diagnosed or age: 46 years old  Hospitalizations for DKA or HHS: Once - inappropriately gave too much sliding scale due to low BG finger stick not correcting in under an hour  Complications: Nephropathy  Seen by PCP or Endocrinology: PCP - Dr. Hillman, Endo - Dr. Carey  Frequency of glucose checks: 4-5x daily after meals  Glucose review: persistent hyperglycemia this admission  Frequency of Hypoglycemia: none  Hypoglycemia unawareness:   Severe hypoglycemia " requiring assistance from others:  N     Home Medications  Basal: Glargine 8U  Prandial: Aspart 4U  Correction: Aspart sliding scale  Assessment & Plan  Dehydration    Alactasia syndrome    Type 2 diabetes mellitus with hyperglycemia, with long-term current use of insulin    On tube feeding diet      PLAN  Steroids:   Prednisone 15 mg daily  1/13- Predinsone 10mg daily     Nutrition:   1/10- 60g CHO diet + glucerna 1.5 continuous TF, goal of 50 ml/h. This provides 40 g of carbs every 6 hours when at goal.  1/11- TF reduced to 20ml/h so patient is getting 16g of carbs every 6 hours   1/12- TF stopped, PPN to start tonight at 30cc/h. Pt also on CLD  1/13 - PPN Started. CLD     - Keep glargine 5u once daily at 2100     If TF is stopped: reduce to 3u          -discontinue regular insulin     - Keep lispro insulin corrective scale #2 q4hr   = 0u  151-200 = 2u  201-250 = 4u  251-300 = 6u  301-350 = 8u  351-400 = 10u     -Accuchecks (not BMP) TIDAC and QHS- kindly ensure QHS Accucheck is drawn; it is often missed   - Goal -180  -Hypoglycemia protocol  -Will continue to follow and titrate insulin accordingly      Discharge planning:   [] patient may expect to discharge home on basal/bolus , final doses TBD by titration and nutrition status  [x]will provide CGM sample prior to discharge   [x]will enroll pt in  pharmacy platinum plan program  [x]follow up with Endo and PCP     MARISOL Alcaraz-S2    Tiffanie Crum PA-C   Endocrinology  Inpatient Diabetes Management Team

## 2025-01-14 ENCOUNTER — APPOINTMENT (OUTPATIENT)
Dept: RADIOLOGY | Facility: HOSPITAL | Age: 75
DRG: 391 | End: 2025-01-14
Payer: COMMERCIAL

## 2025-01-14 ENCOUNTER — APPOINTMENT (OUTPATIENT)
Facility: HOSPITAL | Age: 75
End: 2025-01-14
Payer: COMMERCIAL

## 2025-01-14 LAB
ALBUMIN SERPL BCP-MCNC: 2.7 G/DL (ref 3.4–5)
ALBUMIN SERPL BCP-MCNC: 2.8 G/DL (ref 3.4–5)
ALBUMIN SERPL BCP-MCNC: 2.9 G/DL (ref 3.4–5)
ANION GAP SERPL CALC-SCNC: 11 MMOL/L (ref 10–20)
ANION GAP SERPL CALC-SCNC: 13 MMOL/L (ref 10–20)
ANION GAP SERPL CALC-SCNC: 17 MMOL/L (ref 10–20)
BASOPHILS # BLD MANUAL: 0 X10*3/UL (ref 0–0.1)
BASOPHILS NFR BLD MANUAL: 0 %
BUN SERPL-MCNC: 10 MG/DL (ref 6–23)
BUN SERPL-MCNC: 11 MG/DL (ref 6–23)
BUN SERPL-MCNC: 9 MG/DL (ref 6–23)
BURR CELLS BLD QL SMEAR: ABNORMAL
CALCIUM SERPL-MCNC: 7.6 MG/DL (ref 8.6–10.6)
CALCIUM SERPL-MCNC: 8 MG/DL (ref 8.6–10.6)
CALCIUM SERPL-MCNC: 8.2 MG/DL (ref 8.6–10.6)
CHLORIDE SERPL-SCNC: 103 MMOL/L (ref 98–107)
CHLORIDE SERPL-SCNC: 103 MMOL/L (ref 98–107)
CHLORIDE SERPL-SCNC: 104 MMOL/L (ref 98–107)
CO2 SERPL-SCNC: 21 MMOL/L (ref 21–32)
CO2 SERPL-SCNC: 22 MMOL/L (ref 21–32)
CO2 SERPL-SCNC: 23 MMOL/L (ref 21–32)
CREAT SERPL-MCNC: 0.9 MG/DL (ref 0.5–1.3)
CREAT SERPL-MCNC: 0.9 MG/DL (ref 0.5–1.3)
CREAT SERPL-MCNC: 1 MG/DL (ref 0.5–1.3)
EGFRCR SERPLBLD CKD-EPI 2021: 79 ML/MIN/1.73M*2
EGFRCR SERPLBLD CKD-EPI 2021: 90 ML/MIN/1.73M*2
EGFRCR SERPLBLD CKD-EPI 2021: 90 ML/MIN/1.73M*2
EOSINOPHIL # BLD MANUAL: 0.01 X10*3/UL (ref 0–0.4)
EOSINOPHIL NFR BLD MANUAL: 0.8 %
ERYTHROCYTE [DISTWIDTH] IN BLOOD BY AUTOMATED COUNT: 17.1 % (ref 11.5–14.5)
GLUCOSE BLD MANUAL STRIP-MCNC: 121 MG/DL (ref 74–99)
GLUCOSE BLD MANUAL STRIP-MCNC: 167 MG/DL (ref 74–99)
GLUCOSE BLD MANUAL STRIP-MCNC: 171 MG/DL (ref 74–99)
GLUCOSE BLD MANUAL STRIP-MCNC: 197 MG/DL (ref 74–99)
GLUCOSE BLD MANUAL STRIP-MCNC: 205 MG/DL (ref 74–99)
GLUCOSE BLD MANUAL STRIP-MCNC: 365 MG/DL (ref 74–99)
GLUCOSE SERPL-MCNC: 108 MG/DL (ref 74–99)
GLUCOSE SERPL-MCNC: 251 MG/DL (ref 74–99)
GLUCOSE SERPL-MCNC: 359 MG/DL (ref 74–99)
HCT VFR BLD AUTO: 27.6 % (ref 41–52)
HGB BLD-MCNC: 8.9 G/DL (ref 13.5–17.5)
IMM GRANULOCYTES # BLD AUTO: 0.03 X10*3/UL (ref 0–0.5)
IMM GRANULOCYTES NFR BLD AUTO: 1.8 % (ref 0–0.9)
LYMPHOCYTES # BLD MANUAL: 0.3 X10*3/UL (ref 0.8–3)
LYMPHOCYTES NFR BLD MANUAL: 19 %
MAGNESIUM SERPL-MCNC: 1.77 MG/DL (ref 1.6–2.4)
MCH RBC QN AUTO: 29.1 PG (ref 26–34)
MCHC RBC AUTO-ENTMCNC: 32.2 G/DL (ref 32–36)
MCV RBC AUTO: 90 FL (ref 80–100)
MONOCYTES # BLD MANUAL: 0.18 X10*3/UL (ref 0.05–0.8)
MONOCYTES NFR BLD MANUAL: 11.2 %
NEUTROPHILS # BLD MANUAL: 1.1 X10*3/UL (ref 1.6–5.5)
NEUTS BAND # BLD MANUAL: 0.04 X10*3/UL (ref 0–0.5)
NEUTS BAND NFR BLD MANUAL: 2.6 %
NEUTS SEG # BLD MANUAL: 1.06 X10*3/UL (ref 1.6–5)
NEUTS SEG NFR BLD MANUAL: 66.4 %
NRBC BLD-RTO: 0 /100 WBCS (ref 0–0)
OVALOCYTES BLD QL SMEAR: ABNORMAL
PHOSPHATE SERPL-MCNC: 12.6 MG/DL (ref 2.5–4.9)
PHOSPHATE SERPL-MCNC: 2.3 MG/DL (ref 2.5–4.9)
PHOSPHATE SERPL-MCNC: 2.9 MG/DL (ref 2.5–4.9)
PLATELET # BLD AUTO: 161 X10*3/UL (ref 150–450)
POTASSIUM SERPL-SCNC: 4.2 MMOL/L (ref 3.5–5.3)
POTASSIUM SERPL-SCNC: 4.2 MMOL/L (ref 3.5–5.3)
POTASSIUM SERPL-SCNC: 4.3 MMOL/L (ref 3.5–5.3)
RBC # BLD AUTO: 3.06 X10*6/UL (ref 4.5–5.9)
RBC MORPH BLD: ABNORMAL
SODIUM SERPL-SCNC: 133 MMOL/L (ref 136–145)
SODIUM SERPL-SCNC: 135 MMOL/L (ref 136–145)
SODIUM SERPL-SCNC: 137 MMOL/L (ref 136–145)
TACROLIMUS BLD-MCNC: 7.7 NG/ML
TOTAL CELLS COUNTED BLD: 116
WBC # BLD AUTO: 1.6 X10*3/UL (ref 4.4–11.3)

## 2025-01-14 PROCEDURE — 2500000001 HC RX 250 WO HCPCS SELF ADMINISTERED DRUGS (ALT 637 FOR MEDICARE OP)

## 2025-01-14 PROCEDURE — 85027 COMPLETE CBC AUTOMATED: CPT | Performed by: PHYSICIAN ASSISTANT

## 2025-01-14 PROCEDURE — 2500000002 HC RX 250 W HCPCS SELF ADMINISTERED DRUGS (ALT 637 FOR MEDICARE OP, ALT 636 FOR OP/ED): Performed by: STUDENT IN AN ORGANIZED HEALTH CARE EDUCATION/TRAINING PROGRAM

## 2025-01-14 PROCEDURE — 2500000002 HC RX 250 W HCPCS SELF ADMINISTERED DRUGS (ALT 637 FOR MEDICARE OP, ALT 636 FOR OP/ED): Performed by: NURSE PRACTITIONER

## 2025-01-14 PROCEDURE — 97116 GAIT TRAINING THERAPY: CPT | Mod: GP,CQ

## 2025-01-14 PROCEDURE — 99233 SBSQ HOSP IP/OBS HIGH 50: CPT | Performed by: NURSE PRACTITIONER

## 2025-01-14 PROCEDURE — 2500000002 HC RX 250 W HCPCS SELF ADMINISTERED DRUGS (ALT 637 FOR MEDICARE OP, ALT 636 FOR OP/ED): Performed by: PHYSICIAN ASSISTANT

## 2025-01-14 PROCEDURE — 99233 SBSQ HOSP IP/OBS HIGH 50: CPT | Performed by: INTERNAL MEDICINE

## 2025-01-14 PROCEDURE — 2550000001 HC RX 255 CONTRASTS: Performed by: PHYSICIAN ASSISTANT

## 2025-01-14 PROCEDURE — 2500000002 HC RX 250 W HCPCS SELF ADMINISTERED DRUGS (ALT 637 FOR MEDICARE OP, ALT 636 FOR OP/ED)

## 2025-01-14 PROCEDURE — 2500000001 HC RX 250 WO HCPCS SELF ADMINISTERED DRUGS (ALT 637 FOR MEDICARE OP): Performed by: STUDENT IN AN ORGANIZED HEALTH CARE EDUCATION/TRAINING PROGRAM

## 2025-01-14 PROCEDURE — 80197 ASSAY OF TACROLIMUS: CPT | Performed by: STUDENT IN AN ORGANIZED HEALTH CARE EDUCATION/TRAINING PROGRAM

## 2025-01-14 PROCEDURE — 82947 ASSAY GLUCOSE BLOOD QUANT: CPT

## 2025-01-14 PROCEDURE — 2500000004 HC RX 250 GENERAL PHARMACY W/ HCPCS (ALT 636 FOR OP/ED)

## 2025-01-14 PROCEDURE — 2500000001 HC RX 250 WO HCPCS SELF ADMINISTERED DRUGS (ALT 637 FOR MEDICARE OP): Performed by: PHYSICIAN ASSISTANT

## 2025-01-14 PROCEDURE — 80069 RENAL FUNCTION PANEL: CPT

## 2025-01-14 PROCEDURE — 74018 RADEX ABDOMEN 1 VIEW: CPT

## 2025-01-14 PROCEDURE — 2500000004 HC RX 250 GENERAL PHARMACY W/ HCPCS (ALT 636 FOR OP/ED): Performed by: SURGERY

## 2025-01-14 PROCEDURE — 85007 BL SMEAR W/DIFF WBC COUNT: CPT | Performed by: PHYSICIAN ASSISTANT

## 2025-01-14 PROCEDURE — 97110 THERAPEUTIC EXERCISES: CPT | Mod: GP,CQ

## 2025-01-14 PROCEDURE — 74018 RADEX ABDOMEN 1 VIEW: CPT | Performed by: RADIOLOGY

## 2025-01-14 PROCEDURE — 2500000004 HC RX 250 GENERAL PHARMACY W/ HCPCS (ALT 636 FOR OP/ED): Performed by: PHYSICIAN ASSISTANT

## 2025-01-14 PROCEDURE — 1100000001 HC PRIVATE ROOM DAILY

## 2025-01-14 PROCEDURE — 36415 COLL VENOUS BLD VENIPUNCTURE: CPT | Performed by: PHYSICIAN ASSISTANT

## 2025-01-14 PROCEDURE — 83735 ASSAY OF MAGNESIUM: CPT | Performed by: STUDENT IN AN ORGANIZED HEALTH CARE EDUCATION/TRAINING PROGRAM

## 2025-01-14 PROCEDURE — 2500000005 HC RX 250 GENERAL PHARMACY W/O HCPCS

## 2025-01-14 PROCEDURE — 99232 SBSQ HOSP IP/OBS MODERATE 35: CPT | Performed by: STUDENT IN AN ORGANIZED HEALTH CARE EDUCATION/TRAINING PROGRAM

## 2025-01-14 RX ORDER — INSULIN GLARGINE 100 [IU]/ML
7 INJECTION, SOLUTION SUBCUTANEOUS NIGHTLY
Status: DISCONTINUED | OUTPATIENT
Start: 2025-01-14 | End: 2025-01-16

## 2025-01-14 RX ORDER — MAGNESIUM SULFATE HEPTAHYDRATE 40 MG/ML
2 INJECTION, SOLUTION INTRAVENOUS ONCE
Status: COMPLETED | OUTPATIENT
Start: 2025-01-14 | End: 2025-01-14

## 2025-01-14 RX ORDER — MYCOPHENOLIC ACID 180 MG/1
180 TABLET, DELAYED RELEASE ORAL 2 TIMES DAILY
Status: DISCONTINUED | OUTPATIENT
Start: 2025-01-14 | End: 2025-01-18

## 2025-01-14 RX ADMIN — INSULIN HUMAN 4 UNITS: 100 INJECTION, SOLUTION PARENTERAL at 23:19

## 2025-01-14 RX ADMIN — INSULIN LISPRO 2 UNITS: 100 INJECTION, SOLUTION INTRAVENOUS; SUBCUTANEOUS at 08:53

## 2025-01-14 RX ADMIN — CALCIUM CARBONATE (ANTACID) CHEW TAB 500 MG 500 MG: 500 CHEW TAB at 17:04

## 2025-01-14 RX ADMIN — METOCLOPRAMIDE 5 MG: 10 TABLET ORAL at 20:47

## 2025-01-14 RX ADMIN — TACROLIMUS 2 MG: 1 CAPSULE ORAL at 18:15

## 2025-01-14 RX ADMIN — INSULIN HUMAN 10 UNITS: 100 INJECTION, SOLUTION PARENTERAL at 17:01

## 2025-01-14 RX ADMIN — TACROLIMUS 2 MG: 1 CAPSULE ORAL at 06:19

## 2025-01-14 RX ADMIN — NYSTATIN 500000 UNITS: 500000 SUSPENSION ORAL at 17:04

## 2025-01-14 RX ADMIN — HEPARIN SODIUM 5000 UNITS: 5000 INJECTION INTRAVENOUS; SUBCUTANEOUS at 23:19

## 2025-01-14 RX ADMIN — ERYTHROMYCIN 250 MG: 250 TABLET, FILM COATED ORAL at 23:19

## 2025-01-14 RX ADMIN — INSULIN GLARGINE 7 UNITS: 100 INJECTION, SOLUTION SUBCUTANEOUS at 20:52

## 2025-01-14 RX ADMIN — PANTOPRAZOLE SODIUM 40 MG: 40 INJECTION, POWDER, FOR SOLUTION INTRAVENOUS at 20:48

## 2025-01-14 RX ADMIN — SODIUM PHOSPHATE, MONOBASIC, MONOHYDRATE AND SODIUM PHOSPHATE, DIBASIC, ANHYDROUS 15 MMOL: 142; 276 INJECTION, SOLUTION INTRAVENOUS at 11:52

## 2025-01-14 RX ADMIN — ACETAMINOPHEN 650 MG: 325 TABLET, FILM COATED ORAL at 08:53

## 2025-01-14 RX ADMIN — PREDNISONE 10 MG: 10 TABLET ORAL at 08:53

## 2025-01-14 RX ADMIN — HYDROMORPHONE HYDROCHLORIDE 0.2 MG: 0.2 INJECTION, SOLUTION INTRAMUSCULAR; INTRAVENOUS; SUBCUTANEOUS at 14:35

## 2025-01-14 RX ADMIN — HEPARIN SODIUM 5000 UNITS: 5000 INJECTION INTRAVENOUS; SUBCUTANEOUS at 08:53

## 2025-01-14 RX ADMIN — MAGNESIUM SULFATE HEPTAHYDRATE 2 G: 40 INJECTION, SOLUTION INTRAVENOUS at 08:53

## 2025-01-14 RX ADMIN — ERYTHROMYCIN 250 MG: 250 TABLET, FILM COATED ORAL at 18:15

## 2025-01-14 RX ADMIN — ONDANSETRON 4 MG: 2 INJECTION INTRAMUSCULAR; INTRAVENOUS at 11:58

## 2025-01-14 RX ADMIN — GABAPENTIN 200 MG: 100 CAPSULE ORAL at 08:53

## 2025-01-14 RX ADMIN — ONDANSETRON 4 MG: 2 INJECTION INTRAMUSCULAR; INTRAVENOUS at 20:47

## 2025-01-14 RX ADMIN — ACETAMINOPHEN 650 MG: 325 TABLET, FILM COATED ORAL at 20:48

## 2025-01-14 RX ADMIN — AMLODIPINE BESYLATE 10 MG: 10 TABLET ORAL at 08:53

## 2025-01-14 RX ADMIN — NYSTATIN 500000 UNITS: 500000 SUSPENSION ORAL at 06:19

## 2025-01-14 RX ADMIN — ERYTHROMYCIN 250 MG: 250 TABLET, FILM COATED ORAL at 11:58

## 2025-01-14 RX ADMIN — CALCIUM CARBONATE (ANTACID) CHEW TAB 500 MG 500 MG: 500 CHEW TAB at 06:19

## 2025-01-14 RX ADMIN — MYCOPHENOLIC ACID 360 MG: 360 TABLET, DELAYED RELEASE ORAL at 06:19

## 2025-01-14 RX ADMIN — INSULIN LISPRO 2 UNITS: 100 INJECTION, SOLUTION INTRAVENOUS; SUBCUTANEOUS at 00:18

## 2025-01-14 RX ADMIN — HEPARIN SODIUM 5000 UNITS: 5000 INJECTION INTRAVENOUS; SUBCUTANEOUS at 00:18

## 2025-01-14 RX ADMIN — METOCLOPRAMIDE 5 MG: 10 TABLET ORAL at 11:58

## 2025-01-14 RX ADMIN — ONDANSETRON 4 MG: 2 INJECTION INTRAMUSCULAR; INTRAVENOUS at 03:59

## 2025-01-14 RX ADMIN — NYSTATIN 500000 UNITS: 500000 SUSPENSION ORAL at 12:01

## 2025-01-14 RX ADMIN — HYDROMORPHONE HYDROCHLORIDE 0.2 MG: 0.2 INJECTION, SOLUTION INTRAMUSCULAR; INTRAVENOUS; SUBCUTANEOUS at 08:36

## 2025-01-14 RX ADMIN — ERYTHROMYCIN 250 MG: 250 TABLET, FILM COATED ORAL at 06:19

## 2025-01-14 RX ADMIN — THIAMINE HCL TAB 100 MG 100 MG: 100 TAB at 08:53

## 2025-01-14 RX ADMIN — HEPARIN SODIUM 5000 UNITS: 5000 INJECTION INTRAVENOUS; SUBCUTANEOUS at 17:05

## 2025-01-14 RX ADMIN — ACETAMINOPHEN 650 MG: 325 TABLET, FILM COATED ORAL at 03:59

## 2025-01-14 RX ADMIN — ERYTHROMYCIN 250 MG: 250 TABLET, FILM COATED ORAL at 00:19

## 2025-01-14 RX ADMIN — DIATRIZOATE MEGLUMINE AND DIATRIZOATE SODIUM 90 ML: 660; 100 LIQUID ORAL; RECTAL at 12:13

## 2025-01-14 RX ADMIN — PANTOPRAZOLE SODIUM 40 MG: 40 INJECTION, POWDER, FOR SOLUTION INTRAVENOUS at 08:53

## 2025-01-14 RX ADMIN — NYSTATIN 500000 UNITS: 500000 SUSPENSION ORAL at 20:48

## 2025-01-14 RX ADMIN — ROSUVASTATIN CALCIUM 10 MG: 10 TABLET, FILM COATED ORAL at 20:48

## 2025-01-14 RX ADMIN — MYCOPHENOLIC ACID 180 MG: 180 TABLET, DELAYED RELEASE ORAL at 18:15

## 2025-01-14 RX ADMIN — METOCLOPRAMIDE 5 MG: 10 TABLET ORAL at 03:59

## 2025-01-14 ASSESSMENT — COGNITIVE AND FUNCTIONAL STATUS - GENERAL
WALKING IN HOSPITAL ROOM: A LITTLE
WALKING IN HOSPITAL ROOM: A LITTLE
DAILY ACTIVITIY SCORE: 19
TURNING FROM BACK TO SIDE WHILE IN FLAT BAD: A LITTLE
STANDING UP FROM CHAIR USING ARMS: A LITTLE
HELP NEEDED FOR BATHING: A LITTLE
MOVING TO AND FROM BED TO CHAIR: A LITTLE
DRESSING REGULAR LOWER BODY CLOTHING: A LITTLE
MOBILITY SCORE: 21
DAILY ACTIVITIY SCORE: 19
MOBILITY SCORE: 21
MOBILITY SCORE: 18
STANDING UP FROM CHAIR USING ARMS: A LITTLE
STANDING UP FROM CHAIR USING ARMS: A LITTLE
MOBILITY SCORE: 21
CLIMB 3 TO 5 STEPS WITH RAILING: A LITTLE
DAILY ACTIVITIY SCORE: 19
DRESSING REGULAR UPPER BODY CLOTHING: A LITTLE
CLIMB 3 TO 5 STEPS WITH RAILING: A LITTLE
HELP NEEDED FOR BATHING: A LITTLE
TOILETING: A LITTLE
CLIMB 3 TO 5 STEPS WITH RAILING: A LITTLE
EATING MEALS: A LITTLE
HELP NEEDED FOR BATHING: A LITTLE
TOILETING: A LITTLE
HELP NEEDED FOR BATHING: A LITTLE
TOILETING: A LITTLE
CLIMB 3 TO 5 STEPS WITH RAILING: A LITTLE
DRESSING REGULAR UPPER BODY CLOTHING: A LITTLE
STANDING UP FROM CHAIR USING ARMS: A LITTLE
DAILY ACTIVITIY SCORE: 20
WALKING IN HOSPITAL ROOM: A LITTLE
WALKING IN HOSPITAL ROOM: A LITTLE
DRESSING REGULAR UPPER BODY CLOTHING: A LITTLE
CLIMB 3 TO 5 STEPS WITH RAILING: A LITTLE
DRESSING REGULAR UPPER BODY CLOTHING: A LITTLE
STANDING UP FROM CHAIR USING ARMS: A LITTLE
WALKING IN HOSPITAL ROOM: A LITTLE
MOBILITY SCORE: 21
DRESSING REGULAR LOWER BODY CLOTHING: A LITTLE
MOVING FROM LYING ON BACK TO SITTING ON SIDE OF FLAT BED WITH BEDRAILS: A LITTLE
TOILETING: A LITTLE

## 2025-01-14 ASSESSMENT — ACTIVITIES OF DAILY LIVING (ADL): EFFECT OF PAIN ON DAILY ACTIVITIES: DID NOT LIMIT PARTICIPATION

## 2025-01-14 ASSESSMENT — PAIN - FUNCTIONAL ASSESSMENT
PAIN_FUNCTIONAL_ASSESSMENT: 0-10

## 2025-01-14 ASSESSMENT — ENCOUNTER SYMPTOMS
ABDOMINAL PAIN: 0
AGITATION: 0
FEVER: 0
SHORTNESS OF BREATH: 0
NAUSEA: 1
ABDOMINAL DISTENTION: 0
SINUS PRESSURE: 0
NUMBNESS: 0
POLYDIPSIA: 0
ARTHRALGIAS: 0
DIZZINESS: 0
VOMITING: 1
DIARRHEA: 0
POLYPHAGIA: 0
CONSTIPATION: 0
APPETITE CHANGE: 1

## 2025-01-14 ASSESSMENT — PAIN SCALES - GENERAL
PAINLEVEL_OUTOF10: 7
PAINLEVEL_OUTOF10: 3
PAINLEVEL_OUTOF10: 7
PAINLEVEL_OUTOF10: 0 - NO PAIN
PAINLEVEL_OUTOF10: 9
PAINLEVEL_OUTOF10: 4
PAINLEVEL_OUTOF10: 0 - NO PAIN
PAINLEVEL_OUTOF10: 3

## 2025-01-14 ASSESSMENT — PAIN DESCRIPTION - LOCATION
LOCATION: ABDOMEN
LOCATION: ABDOMEN

## 2025-01-14 ASSESSMENT — PAIN DESCRIPTION - ORIENTATION
ORIENTATION: LEFT;UPPER
ORIENTATION: LEFT;UPPER

## 2025-01-14 ASSESSMENT — PAIN DESCRIPTION - DESCRIPTORS: DESCRIPTORS: DISCOMFORT;SPASM

## 2025-01-14 NOTE — CARE PLAN
Problem: Pain  Goal: Takes deep breaths with improved pain control throughout the shift  Outcome: Progressing  Goal: Turns in bed with improved pain control throughout the shift  Outcome: Progressing  Goal: Walks with improved pain control throughout the shift  Outcome: Progressing  Goal: Performs ADL's with improved pain control throughout shift  Outcome: Progressing  Goal: Participates in PT with improved pain control throughout the shift  Outcome: Progressing  Goal: Free from opioid side effects throughout the shift  Outcome: Progressing  Goal: Free from acute confusion related to pain meds throughout the shift  Outcome: Progressing     Problem: Skin  Goal: Decreased wound size/increased tissue granulation at next dressing change  Outcome: Progressing  Goal: Participates in plan/prevention/treatment measures  Outcome: Progressing  Goal: Prevent/manage excess moisture  Outcome: Progressing  Goal: Prevent/minimize sheer/friction injuries  Outcome: Progressing  Goal: Promote/optimize nutrition  Outcome: Progressing  Goal: Promote skin healing  Outcome: Progressing   The patient's goals for the shift include get better    The clinical goals for the shift include Patient will be safe and free from fall on this shift

## 2025-01-14 NOTE — PROGRESS NOTES
Temo Hanson is a 74 y.o. male on day 13 of admission presenting with Dehydration.    Subjective:  EGD and PEG tube placement POD5  Tx surgery will discuss with Dr Oswaldo Perez regarding next step.  Gastric emptying time today  Excellent kidney function  IVF/PPN  Unable to tolerate TF  He has a midline, planning for  PICC ?TPN  Increase Valcyte dosage  On Tac SL  Change PPI to IV for burning reflux  Starting Erythromycin    Scheduled medications  acetaminophen, 650 mg, oral, q6h  amLODIPine, 10 mg, oral, Daily  calcium carbonate, 500 mg, oral, BID AC  ceFAZolin, 2 g, intravenous, Once  darbepoetin nohemi, 100 mcg, subcutaneous, Once  diatrizoate padmaja-diatrizoat sod, 90 mL, oral, Once  erythromycin base, 250 mg, oral, q6h KASSY  gabapentin, 200 mg, oral, Daily  heparin (porcine), 5,000 Units, subcutaneous, q8h  insulin glargine, 5 Units, subcutaneous, Nightly  insulin lispro, 0-10 Units, subcutaneous, 4 times per day  lidocaine, 5 mL, infiltration, Once  lidocaine, 5 mL, infiltration, Once  lidocaine, 1 patch, transdermal, Daily  metoclopramide, 5 mg, oral, q8h  mycophenolate, 360 mg, oral, BID  nystatin, 5 mL, Swish & Swallow, 4x daily  ondansetron, 4 mg, intravenous, q8h  pantoprazole, 40 mg, intravenous, BID  predniSONE, 10 mg, oral, Daily  rosuvastatin, 10 mg, oral, Nightly  [Held by provider] sulfamethoxazole-trimethoprim, 1 tablet, oral, Daily  tacrolimus, 2 mg, oral, q12h  thiamine, 100 mg, oral, Daily  [Held by provider] valGANciclovir, 900 mg, oral, q24h      Continuous medications  Adult Clinimix Parenteral Nutrition Continuous, 41 mL/hr          PRN medications  PRN medications: alteplase, alteplase, carboxymethylcellulose, dextrose, dextrose, dextrose, dextrose, docusate sodium, glucagon, glucagon, HYDROmorphone, naloxone, polyethylene glycol, sodium chloride 0.9%, sodium chloride 0.9%    Last Recorded Vitals  Blood pressure 152/78, pulse 89, temperature 36.8 °C (98.2 °F), temperature source Temporal,  "resp. rate 18, height 1.854 m (6' 1\"), weight 76 kg (167 lb 8.8 oz), SpO2 100%.  Intake/Output last 3 Shifts:  I/O last 3 completed shifts:  In: 2679.3 (35.3 mL/kg) [P.O.:800; I.V.:1050 (13.8 mL/kg); Blood:350.8]  Out: 601 (7.9 mL/kg) [Urine:201 (0.1 mL/kg/hr); Emesis/NG output:400]  Weight: 76 kg     A&ox3, no distress  MMM, no lesions  Lungs with equal air entry, no added sounds  Rrr, no m/r/g  Abd soft, slightly distended, nd, RLQ incision c/d/i, staples intact  No allograft tenderness  No edema bilaterally  PEG tube in place    Results for orders placed or performed during the hospital encounter of 12/31/24 (from the past 24 hours)   POCT GLUCOSE   Result Value Ref Range    POCT Glucose 134 (H) 74 - 99 mg/dL   POCT GLUCOSE   Result Value Ref Range    POCT Glucose 119 (H) 74 - 99 mg/dL   Magnesium   Result Value Ref Range    Magnesium 1.70 1.60 - 2.40 mg/dL   Tacrolimus level   Result Value Ref Range    Tacrolimus  11.0 <=15.0 ng/mL   Renal Function Panel   Result Value Ref Range    Glucose 106 (H) 74 - 99 mg/dL    Sodium 133 (L) 136 - 145 mmol/L    Potassium 4.4 3.5 - 5.3 mmol/L    Chloride 103 98 - 107 mmol/L    Bicarbonate 23 21 - 32 mmol/L    Anion Gap 11 10 - 20 mmol/L    Urea Nitrogen 13 6 - 23 mg/dL    Creatinine 0.91 0.50 - 1.30 mg/dL    eGFR 88 >60 mL/min/1.73m*2    Calcium 7.8 (L) 8.6 - 10.6 mg/dL    Phosphorus 2.6 2.5 - 4.9 mg/dL    Albumin 2.5 (L) 3.4 - 5.0 g/dL   Calcium, Ionized   Result Value Ref Range    POCT Calcium, Ionized 1.14 1.1 - 1.33 mmol/L   CBC and Auto Differential   Result Value Ref Range    WBC 1.8 (L) 4.4 - 11.3 x10*3/uL    nRBC 0.0 0.0 - 0.0 /100 WBCs    RBC 2.86 (L) 4.50 - 5.90 x10*6/uL    Hemoglobin 8.5 (L) 13.5 - 17.5 g/dL    Hematocrit 27.2 (L) 41.0 - 52.0 %    MCV 95 80 - 100 fL    MCH 29.7 26.0 - 34.0 pg    MCHC 31.3 (L) 32.0 - 36.0 g/dL    RDW 18.0 (H) 11.5 - 14.5 %    Platelets 163 150 - 450 x10*3/uL    Neutrophils % 55.6 40.0 - 80.0 %    Immature Granulocytes %, Automated " 0.6 0.0 - 0.9 %    Lymphocytes % 27.2 13.0 - 44.0 %    Monocytes % 14.4 2.0 - 10.0 %    Eosinophils % 2.2 0.0 - 6.0 %    Basophils % 0.0 0.0 - 2.0 %    Neutrophils Absolute 1.00 (L) 1.60 - 5.50 x10*3/uL    Immature Granulocytes Absolute, Automated 0.01 0.00 - 0.50 x10*3/uL    Lymphocytes Absolute 0.49 (L) 0.80 - 3.00 x10*3/uL    Monocytes Absolute 0.26 0.05 - 0.80 x10*3/uL    Eosinophils Absolute 0.04 0.00 - 0.40 x10*3/uL    Basophils Absolute 0.00 0.00 - 0.10 x10*3/uL   Type And Screen   Result Value Ref Range    ABO TYPE O     Rh TYPE POS     ANTIBODY SCREEN NEG    POCT GLUCOSE   Result Value Ref Range    POCT Glucose 125 (H) 74 - 99 mg/dL   POCT GLUCOSE   Result Value Ref Range    POCT Glucose 201 (H) 74 - 99 mg/dL   POCT GLUCOSE   Result Value Ref Range    POCT Glucose 209 (H) 74 - 99 mg/dL   POCT GLUCOSE   Result Value Ref Range    POCT Glucose 110 (H) 74 - 99 mg/dL       Assessment/Plan     74 y.o. male with a hx of ESRD secondary to diabetic nephropathy whom received a  donor kidney transplant on 24 by Dr. Zamora with a KDPI of 93% and PRA of 54%. Donor was Hepc -/-and has not met risk factors. EBV D+ /R+, CMV D-/R+. Left donor kidney transplanted to patient right pelvis. Admission weight is 72.7 kg (discharge weight is 76.4 kg ). Pt received a total of 3 mg/kg total of thymoglobulin induction therapy in conjunction with 500mg IV solumedrol.      Post-txp course notable for slow graft function, now with DGF.     Allograft function: s/p DDKT 24  - DGF. , Cr 5.5,uremic on admission. s/p HD  for clearance.   Now Cr 1.4-1.5  IV fluid:  mL/hr x 1 L    Immunosuppression:  -          FK level = 11;  reduce tacrolimus to 2 mg BID (started erythromycin)  -           mg BID--> Myfortic 360 mg bid  -          Prednisone 10 mg daily    Prophylaxis:  PPI IV  Nystatin 500,000 units QID x 3 mo  HOLD TMP-SMX x 6 mo -  HOLD Valcyte, renally dosed x 3 mo 1/10-  CMV PCR neg  1/9    Esophageal dysphagia  - Upper GI c/f achalasia. S/p Heller Myotomy plus Andrea Fundoplication 20+ yrs ago - per surgery, unable to fix  - EGD/PEG tube placement 1/8/25    Blood pressure - ok  - Amlodipine 10 mg daily    Anemia - iron replete  Darbe 200 mcg subcutaneous weekly  Leukopenia  - Valcyte held since 1/10  - hold TMP-SMX 1/12-    CKD-MBD - acceptable     Malnutrition  Feeding intolerance  Concerning for diabetic gastroparesis  Plan gastric emptying study   If confirmed, will convert from PEG to post-pyloric J-tube      Bashir Angela MD

## 2025-01-14 NOTE — PROGRESS NOTES
Temo Hanson is a 74 y.o. male on day 14 of admission presenting with Dehydration.    Subjective   Patient seen and evaluated at the bedside. Remains on 10 mg of prednisone. Remains on TPN at 41 ml/hr continuously. He is on a CLD, poor intake. TPN rate to increase tonight to 55 ml/hr tonight and 75 ml/hr (goal rate) the next night.     I have reviewed histories, allergies and medications have been reviewed and there are no changes     Objective   Review of Systems   Constitutional:  Positive for appetite change. Negative for fever.   HENT:  Negative for sinus pressure.    Respiratory:  Negative for shortness of breath.    Cardiovascular:  Positive for chest pain.        Burning chest pain   Gastrointestinal:  Positive for nausea and vomiting. Negative for abdominal distention, abdominal pain, constipation and diarrhea.   Endocrine: Negative for cold intolerance, heat intolerance, polydipsia, polyphagia and polyuria.   Musculoskeletal:  Negative for arthralgias.   Neurological:  Negative for dizziness and numbness.   Psychiatric/Behavioral:  Negative for agitation and behavioral problems.      Physical Exam  Constitutional:       Appearance: Normal appearance. He is normal weight.   HENT:      Head: Normocephalic and atraumatic.      Mouth/Throat:      Mouth: Mucous membranes are moist.   Eyes:      Pupils: Pupils are equal, round, and reactive to light.   Cardiovascular:      Rate and Rhythm: Normal rate and regular rhythm.   Pulmonary:      Effort: Pulmonary effort is normal.      Breath sounds: Normal breath sounds.   Abdominal:      Palpations: Abdomen is soft.   Skin:     General: Skin is warm.   Neurological:      Mental Status: He is alert and oriented to person, place, and time.   Psychiatric:         Mood and Affect: Mood normal.         Behavior: Behavior normal.         Thought Content: Thought content normal.         Judgment: Judgment normal.     Last Recorded Vitals  Blood pressure 168/73, pulse 84,  "temperature 36 °C (96.8 °F), temperature source Temporal, resp. rate 18, height 1.854 m (6' 1\"), weight 76 kg (167 lb 8.8 oz), SpO2 99%.  Intake/Output last 3 Shifts:  I/O last 3 completed shifts:  In: 1578.5 (20.8 mL/kg) [P.O.:600; I.V.:500 (6.6 mL/kg)]  Out: 1200 (15.8 mL/kg) [Urine:800 (0.3 mL/kg/hr); Emesis/NG output:400]  Weight: 76 kg     Relevant Results  Results from last 7 days   Lab Units 01/14/25  0738 01/14/25  0544 01/14/25  0537 01/14/25  0018 01/13/25 2023 01/13/25  1628 01/13/25  0740 01/13/25  0541 01/12/25  0755 01/12/25  0551 01/11/25  0551 01/11/25  0422 01/10/25  1545 01/10/25  1518   POCT GLUCOSE mg/dL 167*  --  121* 171* 110* 209*   < >  --    < >  --    < >  --    < >  --    GLUCOSE mg/dL  --  108*  --   --   --   --   --  106*  --  75  --  101*  --  77    < > = values in this interval not displayed.       Assessment/Plan   Temo Hanson is a 74 y.o. 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  Patient was started on tube feeds, NPO, today after placement of PEG tube. Patient states he felt nausea and vomiting, leading to a pause in his feedings around noon. Feedings restarted around 1500.      Diabetes History  Type of diabetes: 2  Year diagnosed or age: 46 years old  Hospitalizations for DKA or HHS: Once - inappropriately gave too much sliding scale due to low BG finger stick not correcting in under an hour  Complications: Nephropathy  Seen by PCP or Endocrinology: PCP - Dr. Hillman, Endo - Dr. Carey  Frequency of glucose checks: 4-5x daily after meals  Glucose review: persistent hyperglycemia this admission  Frequency of Hypoglycemia: none  Hypoglycemia unawareness: no  Severe hypoglycemia requiring assistance from others:  N     Home Medications  Basal: Glargine 8U  Prandial: Aspart 4U  Correction: Aspart sliding scale  Assessment " & Plan  Dehydration    Alactasia syndrome    Type 2 diabetes mellitus with hyperglycemia, with long-term current use of insulin    On tube feeding diet      PLAN  Steroids:   Prednisone 15 mg daily  1/13- Predinsone 10mg daily     Nutrition:   1/10- 60g CHO diet + glucerna 1.5 continuous TF, goal of 50 ml/h. This provides 40 g of carbs every 6 hours when at goal.  1/11- TF reduced to 20ml/h so patient is getting 16g of carbs every 6 hours   1/12- TF stopped, PPN to start tonight at 30cc/h. Pt also on CLD  1/13 - PPN Started. CLD  1/14: TPN to increase to 55 ml/hr (previously at 41 ml/hr) Patient remains on clear liquid diet but with minimal intake  1/15: TPN to increase to goal rate of 75 ml/hr     - Recommend increasing glargine to 7u once daily at 2100     If TPN is stopped: reduce to 4u           - Recommend stopping lispro insulin and changing to Regular insulin Q6hrs corrective scale #2    = 0u  151-200 = 2u  201-250 = 4u  251-300 = 6u  301-350 = 8u  351-400 = 10u     -Accuchecks Q6  - Goal -180  -Hypoglycemia protocol  -Will continue to follow and titrate insulin accordingly      Discharge planning:   [] patient may expect to discharge home on basal/bolus , final doses TBD by titration and nutrition status  [x]will provide CGM sample prior to discharge   [x]will enroll pt in  pharmacy platinum plan program  [x]follow up with Endo and PCP

## 2025-01-14 NOTE — NURSING NOTE
4 Eyes Skin Assessment    Reason for assessment? Weekly skin assessment  Does the patient have a wound? yes  Wound RN consult placed? Yes  Preventative foam dressing applied? Yes - applied to sacrum/coccyx      Four eyes skin check performed with Adán Valentino PTA .

## 2025-01-14 NOTE — CARE PLAN
The clinical goals for the shift include Patient will remain free from falls and/or injuries    Problem: Pain  Goal: Takes deep breaths with improved pain control throughout the shift  Outcome: Progressing  Goal: Turns in bed with improved pain control throughout the shift  Outcome: Progressing  Goal: Walks with improved pain control throughout the shift  Outcome: Progressing  Goal: Performs ADL's with improved pain control throughout shift  Outcome: Progressing  Goal: Participates in PT with improved pain control throughout the shift  Outcome: Progressing  Goal: Free from opioid side effects throughout the shift  Outcome: Progressing  Goal: Free from acute confusion related to pain meds throughout the shift  Outcome: Progressing     Problem: Skin  Goal: Decreased wound size/increased tissue granulation at next dressing change  Outcome: Progressing  Goal: Participates in plan/prevention/treatment measures  Outcome: Progressing  Goal: Prevent/manage excess moisture  Outcome: Progressing  Goal: Prevent/minimize sheer/friction injuries  Outcome: Progressing  Goal: Promote/optimize nutrition  Outcome: Progressing  Goal: Promote skin healing  Outcome: Progressing

## 2025-01-14 NOTE — SIGNIFICANT EVENT
01/13/25 1215   Patient Interaction   Organ Kidney   Type of Interaction Morning rounds   Interdisciplinary Rounds   Attendance Surgeon;Physician;FILI;Coordinator;Pharmacist   Topics Discussed Diet;Home care needs;Medications;Patient/family concerns;Blood test results;Imaging/test results     Transplant Surgery Multidisciplinary Team Note    Temo Hanson is a 74 y.o. male   POD#24  from a Kidney from a  kidney txp. His post operative complications: None and Other complication: malnutrition requiring PEG       24 Hour Events  1. No acute events     Last Recorded Vitals  Visit Vitals  /76 (BP Location: Right arm, Patient Position: Lying)   Pulse 80   Temp 36 °C (96.8 °F) (Temporal)   Resp 18      Intake/Output last 3 Shifts:    Intake/Output Summary (Last 24 hours) at 1/14/2025 1229  Last data filed at 1/14/2025 1200  Gross per 24 hour   Intake 1757.43 ml   Output 1000 ml   Net 757.43 ml      Vitals:    01/10/25 0600   Weight: 76 kg (167 lb 8.8 oz)        Assessment/Plan   -Aspiration precautions  -Midline changed to PICC yesterday  -Increase TPN rate  -Re-attempt Gastric emptying study with gastrografin and ab xr at 1, 2.5, and 4 hours. Pt had emesis yesterday  -Continue erythromycin 250 mg q6h & reglan 5 mg every 8 hours      Principal Problem:    Management after organ transplant  Active Problems:  Patient Active Problem List   Diagnosis    S/P laparoscopic cholecystectomy    Abdominal pain    Achalasia    Acute lower UTI    Microcytic anemia    Complete heart block    Callus of foot    Chronic periodontitis, unspecified    Stage 5 chronic kidney disease (Multi)    Closed fracture of metatarsal bone    Crushing injury of foot    Diabetes mellitus (Multi)    Diarrhea    Dysphagia    ED (erectile dysfunction)    Essential hypertension    Foot pain, right    GIB (gastrointestinal bleeding)    Hepatitis B core antibody positive    Hyperkalemia    Ingrowing nail    Iron deficiency anemia secondary to  inadequate dietary iron intake    Long toenail    Malignant neoplasm of prostate (Multi)    Onychomycosis    Pheochromocytoma of right adrenal gland    Pneumonia of right lower lobe due to infectious organism    Productive cough    Pure hypercholesterolemia    Stricture esophagus    SVT (supraventricular tachycardia) (CMS-HCC)    Type 2 diabetes mellitus with diabetic neuropathy (Multi)    Preop cardiovascular exam    Third degree heart block    Pericardial effusion (HHS-HCC)    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 (Multi)    Uremia    Cardiac tamponade    Cardiac pacemaker in situ    ESRD (end stage renal disease) (Multi)    Type 2 diabetes mellitus, with long-term current use of insulin    Dehydration    Alactasia syndrome    Type 2 diabetes mellitus with hyperglycemia, with long-term current use of insulin    On tube feeding diet        Immunosuppression reviewed and adjusted       Induction: Thymoglobulin Full Dose and None       Tacrolimus goal 8-10 ng/mL. Current dose 2 mg BID         Myfortic 360 BID       Solumedrol taper  DVT prophylaxis SCDS and subcutaneous heparin 5000 TID  PT/OT  Diet:  Clears, PPN  Anticipated discharge next week    Chelsey Simpson, APRN-CNP

## 2025-01-14 NOTE — PROGRESS NOTES
Temo Hanson is a 74 y.o. male POD#22 from DDKT from a DBD donor. Readmit for PO intolerance due to achalasia s/p Heller/Andrea myotomy with megaesophagus. POD#5 from PEG placement.     - feeling a little better with PO today    Objective   Gen: A+OX3  HEENT: PERRL, MMM  Cardiac: RRR  Chest: Normal inspiratory effort  Abdomen: S/NT/ND. Incision C/D/I.  Ext: No LE edema    Last Recorded Vitals  Visit Vitals  /76 (BP Location: Right arm, Patient Position: Lying)   Pulse 80   Temp 36 °C (96.8 °F) (Temporal)   Resp 18      Intake/Output last 3 Shifts:    Intake/Output Summary (Last 24 hours) at 1/14/2025 1414  Last data filed at 1/14/2025 1200  Gross per 24 hour   Intake 1278.93 ml   Output 875 ml   Net 403.93 ml      Vitals:    01/10/25 0600   Weight: 76 kg (167 lb 8.8 oz)   Scheduled medications  acetaminophen, 650 mg, oral, q6h  amLODIPine, 10 mg, oral, Daily  calcium carbonate, 500 mg, oral, BID AC  ceFAZolin, 2 g, intravenous, Once  darbepoetin nohemi, 100 mcg, subcutaneous, Once  erythromycin base, 250 mg, oral, q6h KASSY  gabapentin, 200 mg, oral, Daily  heparin (porcine), 5,000 Units, subcutaneous, q8h  insulin glargine, 7 Units, subcutaneous, Nightly  insulin regular, 0-10 Units, subcutaneous, q6h KASSY  lidocaine, 5 mL, infiltration, Once  lidocaine, 5 mL, infiltration, Once  lidocaine, 1 patch, transdermal, Daily  metoclopramide, 5 mg, oral, q8h  mycophenolate, 180 mg, oral, BID  nystatin, 5 mL, Swish & Swallow, 4x daily  ondansetron, 4 mg, intravenous, q8h  pantoprazole, 40 mg, intravenous, BID  predniSONE, 10 mg, oral, Daily  rosuvastatin, 10 mg, oral, Nightly  sodium phosphate, 15 mmol, intravenous, Once  [Held by provider] sulfamethoxazole-trimethoprim, 1 tablet, oral, Daily  tacrolimus, 2 mg, oral, q12h  thiamine, 100 mg, oral, Daily  [Held by provider] valGANciclovir, 900 mg, oral, q24h    Assessment/Plan   Principal Problem:    Kidney transplant recipient    Achalasia/megaesophagus    Severe protein  calorie malnutrition  Active Problems:  Patient Active Problem List   Diagnosis    S/P laparoscopic cholecystectomy    Abdominal pain    Achalasia    Acute lower UTI    Microcytic anemia    Complete heart block    Callus of foot    Chronic periodontitis, unspecified    Stage 5 chronic kidney disease (Multi)    Closed fracture of metatarsal bone    Crushing injury of foot    Diabetes mellitus (Multi)    Diarrhea    Dysphagia    ED (erectile dysfunction)    Essential hypertension    Foot pain, right    GIB (gastrointestinal bleeding)    Hepatitis B core antibody positive    Hyperkalemia    Ingrowing nail    Iron deficiency anemia secondary to inadequate dietary iron intake    Long toenail    Malignant neoplasm of prostate (Multi)    Onychomycosis    Pheochromocytoma of right adrenal gland    Pneumonia of right lower lobe due to infectious organism    Productive cough    Pure hypercholesterolemia    Stricture esophagus    SVT (supraventricular tachycardia) (CMS-HCC)    Type 2 diabetes mellitus with diabetic neuropathy (Multi)    Preop cardiovascular exam    Third degree heart block    Pericardial effusion (HHS-HCC)    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 (Multi)    Uremia    Cardiac tamponade    Cardiac pacemaker in situ    ESRD (end stage renal disease) (Multi)    Type 2 diabetes mellitus, with long-term current use of insulin    Dehydration    Alactasia syndrome    Type 2 diabetes mellitus with hyperglycemia, with long-term current use of insulin    On tube feeding diet       - Hold TF's - can consider trickle this PM  - Cont TPN with plan to cycle  - Continue reglan 5 mg q8h  - cont erythromycin x5 days  - PT/OT  - Myfortic 360 mg bid/prednisone 10 mg  - Tacrolimus goal 8-10 ng/mL  - Gastric emptying assessment today, likely needs conversion to PEG/J in future    I spent 35 minutes in the professional and overall care of this patient, including immunosuppression management.    Cassidy Altman MD

## 2025-01-14 NOTE — PROGRESS NOTES
Physical Therapy    Physical Therapy Treatment    Patient Name: Temo Hanson  MRN: [id number]  Department: [place] [address]  Room: [facility]90[facility]  Today's Date: 1/14/2025  Time Calculation  Start Time: 1242  Stop Time: 1314  Time Calculation (min): 32 min     Assessment/Plan   PT Assessment  PT Assessment Results: Decreased strength, Decreased range of motion, Decreased endurance, Impaired balance, Decreased mobility, Decreased coordination, Decreased cognition, Pain, Decreased skin integrity  Rehab Prognosis: Good  Barriers to Discharge Home: No anticipated barriers  Physical Needs: Stair navigation into home limited by function/safety, Stair navigation to access bed limited by function/safety, Stair navigation to access bath limited by function/safety, Ambulating household distances limited by function/safety  Evaluation/Treatment Tolerance: Patient tolerated treatment well  Strengths: Attitude of self  Barriers to Participation: Comorbidities  End of Session Communication: Bedside nurse  Assessment Comment: pt continues to progress ambulation distances as well as functional assist levels requring supervision for most gain and functional mobility tasks. pt remains appropriate for LOW intensity PT upon D/C to return to [facility].  End of Session Patient Position: Up in chair, Alarm off, not on at start of session     PT Plan  Treatment/Interventions: Bed mobility, Transfer training, Gait training, Stair training, Balance training, Neuromuscular re-education, Strengthening, Endurance training, Range of motion, Therapeutic exercise, Therapeutic activity, Home exercise program  PT Plan: Ongoing PT  PT Frequency: 3 times per week  PT Discharge Recommendations: Low intensity level of continued care  Equipment Recommended upon Discharge: Wheeled walker  PT Recommended Transfer Status: Stand by assist  PT - OK to Discharge: Yes    General Visit Information:   PT  Visit  PT Received On: 01/14/25  Response to Previous Treatment: Other  (Comment), Patient with no complaints from previous session.  General  Reason for Referral: 74 year old male s/p DDKT on 12/21/24.  Past Medical History Relevant to Rehab: Hypertension, hyperlipidemia, complete heart block status post leadless pacemaker insertion on 7/17/2024, pericardial effusion, pheochromocytoma of the right adrenal gland, history of prostate cancer, GI bleed  Prior to Session Communication: Bedside nurse  Patient Position Received: Up in chair, Alarm off, not on at start of session  General Comment: pt needing encouragement to participate in PT session. pt stating he is frustrated with the amount of time spent in the hospital and the pain limiting him in his abdomen. PTA explained POC with patient and importance of continuing to participate in therapy while in hospital setting for safe D/C home.    Subjective   Precautions:  Precautions  Medical Precautions: Fall precautions  Post-Surgical Precautions: Abdominal surgery precautions    Objective   Pain:  Pain Assessment  Pain Assessment: 0-10  0-10 (Numeric) Pain Score: 7  Pain Type: Surgical pain  Pain Location: Abdomen  Pain Orientation: Left, Mid  Pain Descriptors: Discomfort, Spasm  Pain Frequency: Intermittent (increased pain with mobility)  Pain Onset: Ongoing  Clinical Progression: Gradually improving  Effect of Pain on Daily Activities: did not limit participation  Pain Interventions: Repositioned, Ambulation/increased activity  Response to Interventions: Increase in pain  Cognition:  Cognition  Overall Cognitive Status: Within Functional Limits  Orientation Level: Oriented X4, Appropriate for developmental age     Postural Control:  Static Sitting Balance  Static Sitting-Balance Support: No upper extremity supported, Feet supported  Static Sitting-Level of Assistance: Independent  Static Sitting-Comment/Number of Minutes: 10 minutes  Dynamic Sitting Balance  Dynamic Sitting-Balance Support: No upper extremity supported, Feet  supported  Dynamic Sitting-Level of Assistance: Distant supervision  Dynamic Sitting-Balance: Forward lean, Trunk control activities  Dynamic Sitting-Comments: Emmanuelle castillo  Static Standing Balance  Static Standing-Balance Support: Right upper extremity supported (Sinlge point cane)  Static Standing-Level of Assistance: Contact guard  Dynamic Standing Balance  Dynamic Standing-Balance Support: Right upper extremity supported (Single point cane)  Dynamic Standing-Level of Assistance: Contact guard  Dynamic Standing-Balance: Turning, Forward lean, Reaching across midline  Dynamic Standing-Comments:  (performed self april-care in standing)    Activity Tolerance:  Activity Tolerance  Endurance: Tolerates 30 min exercise with multiple rests  Treatments:  Therapeutic Exercise  Therapeutic Exercise Performed: Yes  Therapeutic Exercise Activity 1: Seated AP, LAQ: AROM x 8 reps each (VC to stay on task)    Therapeutic Activity  Therapeutic Activity Performed: Yes  Therapeutic Activity 1: Toilet transfers and self care in RR. CGA for safety and line management.    Bed Mobility  Bed Mobility: No    Ambulation/Gait Training  Ambulation/Gait Training Performed: Yes  Ambulation/Gait Training 1  Surface 1: Level tile, Carpet  Device 1: Single point cane  Assistance 1: Close supervision  Quality of Gait 1: Narrow base of support, Diminished heel strike, Inconsistent stride length, Decreased step length  Comments/Distance (ft) 1: 20 ft x 3, 150 ft (Seated rest break between bouts)  Transfers  Transfer: Yes  Transfer 1  Transfer From 1: Chair with arms to  Transfer to 1: Stand  Technique 1: Sit to stand  Transfer Device 1: Cane  Transfer Level of Assistance 1: Close supervision  Trials/Comments 1: x5  Transfers 2  Transfer From 2: Stand to  Transfer to 2: Chair with arms  Technique 2: Stand to sit  Transfer Device 2: Cane  Transfer Level of Assistance 2: Close supervision  Trials/Comments 2: x5  Transfers 3  Transfer From 3: Stand to,  Toilet to  Transfer to 3: Toilet, Stand  Technique 3: Stand to sit, Sit to stand  Transfer Device 3:  (Grab bars)  Transfer Level of Assistance 3: Close supervision  Trials/Comments 3: x2    Stairs  Stairs: No (pt declining, needing to get back to room to utilize RR.)    Outcome Measures:  [facility] Basic Mobility  Turning from your back to your side while in a flat bed without using bedrails: A little  Moving from lying on your back to sitting on the side of a flat bed without using bedrails: A little  Moving to and from bed to chair (including a wheelchair): A little  Standing up from a chair using your arms (e.g. wheelchair or bedside chair): A little  To walk in hospital room: A little  Climbing 3-5 steps with railing: A little  Basic Mobility - Total Score: 18    Education Documentation  Precautions, taught by Adán Valentino PTA at 1/14/2025  2:44 PM.  Learner: Patient  Readiness: Acceptance  Method: Explanation, Demonstration  Response: Verbalizes Understanding  Comment: pt POC, gait training    Mobility Training, taught by Adán Valentino PTA at 1/14/2025  2:44 PM.  Learner: Patient  Readiness: Acceptance  Method: Explanation, Demonstration  Response: Verbalizes Understanding  Comment: pt POC, gait training    Education Comments  No comments found.           Encounter Problems       Encounter Problems (Active)       PT Problem       Pt will perform all aspects of bed mobility at mod indep by discharge in order to decrease caregiver burden. (Progressing)       Start:  01/04/25    Expected End:  01/18/25            Pt will perform all basic transfers at mod indep w/ LRAD by discharge. (Progressing)       Start:  01/04/25    Expected End:  01/18/25            Pt will ambulate 150' w/ LRAD at CGA by discharge. (Met)       Start:  01/04/25    Expected End:  01/18/25    Resolved:  01/07/25         Pt will negotiate 5-12 steps w/ 1 rail in order to simulate home entrance/set-up by discharge. (Progressing)       Start:   01/04/25    Expected End:  01/18/25

## 2025-01-14 NOTE — PROGRESS NOTES
Temo Hanson is a 74 y.o. male on day 14 of admission presenting with Dehydration.    Subjective:  EGD and PEG tube placement POD 6  Tx surgery will discuss with Dr Oswaldo Perez regarding next step.  Gastric emptying time today (unable to complete yesterday)    Excellent kidney function  Picc line yesterday  Continue IVF/TPN  Unable to tolerate TF  WBC 1.6 ; add ANC +/- neupogen prn  PT/OT    Scheduled medications    acetaminophen, 650 mg, oral, q6h  amLODIPine, 10 mg, oral, Daily  calcium carbonate, 500 mg, oral, BID AC  ceFAZolin, 2 g, intravenous, Once  darbepoetin nohemi, 100 mcg, subcutaneous, Once  erythromycin base, 250 mg, oral, q6h KASSY  gabapentin, 200 mg, oral, Daily  heparin (porcine), 5,000 Units, subcutaneous, q8h  insulin glargine, 7 Units, subcutaneous, Nightly  insulin regular, 0-10 Units, subcutaneous, q6h  lidocaine, 5 mL, infiltration, Once  lidocaine, 5 mL, infiltration, Once  lidocaine, 1 patch, transdermal, Daily  metoclopramide, 5 mg, oral, q8h  mycophenolate, 180 mg, oral, BID  nystatin, 5 mL, Swish & Swallow, 4x daily  ondansetron, 4 mg, intravenous, q8h  pantoprazole, 40 mg, intravenous, BID  predniSONE, 10 mg, oral, Daily  rosuvastatin, 10 mg, oral, Nightly  sodium phosphate, 15 mmol, intravenous, Once  [Held by provider] sulfamethoxazole-trimethoprim, 1 tablet, oral, Daily  tacrolimus, 2 mg, oral, q12h  thiamine, 100 mg, oral, Daily  [Held by provider] valGANciclovir, 900 mg, oral, q24h      Continuous medications  Adult Clinimix Parenteral Nutrition Continuous, 55 mL/hr, Last Rate: 55 mL/hr (01/14/25 1120)          PRN medications  PRN medications: alteplase, alteplase, carboxymethylcellulose, dextrose, dextrose, dextrose, dextrose, docusate sodium, glucagon, glucagon, HYDROmorphone, naloxone, polyethylene glycol, sodium chloride 0.9%, sodium chloride 0.9%    Last Recorded Vitals  Blood pressure 163/76, pulse 80, temperature 36 °C (96.8 °F), temperature source Temporal, resp. rate 18,  "height 1.854 m (6' 1\"), weight 76 kg (167 lb 8.8 oz), SpO2 93%.  Intake/Output last 3 Shifts:  I/O last 3 completed shifts:  In: 1578.5 (20.8 mL/kg) [P.O.:600; I.V.:500 (6.6 mL/kg)]  Out: 1200 (15.8 mL/kg) [Urine:800 (0.3 mL/kg/hr); Emesis/NG output:400]  Weight: 76 kg     A&ox3, no distress  MMM, no lesions  Lungs with equal air entry, no added sounds  Rrr, no m/r/g  Abd soft, slightly distended, nd, RLQ incision c/d/i, staples intact  No allograft tenderness  No edema bilaterally  PEG tube in place    Results for orders placed or performed during the hospital encounter of 12/31/24 (from the past 24 hours)   POCT GLUCOSE   Result Value Ref Range    POCT Glucose 201 (H) 74 - 99 mg/dL   POCT GLUCOSE   Result Value Ref Range    POCT Glucose 209 (H) 74 - 99 mg/dL   POCT GLUCOSE   Result Value Ref Range    POCT Glucose 110 (H) 74 - 99 mg/dL   POCT GLUCOSE   Result Value Ref Range    POCT Glucose 171 (H) 74 - 99 mg/dL   POCT GLUCOSE   Result Value Ref Range    POCT Glucose 121 (H) 74 - 99 mg/dL   Magnesium   Result Value Ref Range    Magnesium 1.77 1.60 - 2.40 mg/dL   Renal Function Panel   Result Value Ref Range    Glucose 108 (H) 74 - 99 mg/dL    Sodium 133 (L) 136 - 145 mmol/L    Potassium 4.2 3.5 - 5.3 mmol/L    Chloride 103 98 - 107 mmol/L    Bicarbonate 23 21 - 32 mmol/L    Anion Gap 11 10 - 20 mmol/L    Urea Nitrogen 9 6 - 23 mg/dL    Creatinine 0.90 0.50 - 1.30 mg/dL    eGFR 90 >60 mL/min/1.73m*2    Calcium 8.0 (L) 8.6 - 10.6 mg/dL    Phosphorus 2.3 (L) 2.5 - 4.9 mg/dL    Albumin 2.7 (L) 3.4 - 5.0 g/dL   Tacrolimus level   Result Value Ref Range    Tacrolimus  7.7 <=15.0 ng/mL   CBC and Auto Differential   Result Value Ref Range    WBC 1.6 (L) 4.4 - 11.3 x10*3/uL    nRBC 0.0 0.0 - 0.0 /100 WBCs    RBC 3.06 (L) 4.50 - 5.90 x10*6/uL    Hemoglobin 8.9 (L) 13.5 - 17.5 g/dL    Hematocrit 27.6 (L) 41.0 - 52.0 %    MCV 90 80 - 100 fL    MCH 29.1 26.0 - 34.0 pg    MCHC 32.2 32.0 - 36.0 g/dL    RDW 17.1 (H) 11.5 - 14.5 % "    Platelets 161 150 - 450 x10*3/uL    Immature Granulocytes %, Automated 1.8 (H) 0.0 - 0.9 %    Immature Granulocytes Absolute, Automated 0.03 0.00 - 0.50 x10*3/uL   Manual Differential   Result Value Ref Range    Neutrophils %, Manual 66.4 40.0 - 80.0 %    Bands %, Manual 2.6 0.0 - 5.0 %    Lymphocytes %, Manual 19.0 13.0 - 44.0 %    Monocytes %, Manual 11.2 2.0 - 10.0 %    Eosinophils %, Manual 0.8 0.0 - 6.0 %    Basophils %, Manual 0.0 0.0 - 2.0 %    Seg Neutrophils Absolute, Manual 1.06 (L) 1.60 - 5.00 x10*3/uL    Bands Absolute, Manual 0.04 0.00 - 0.50 x10*3/uL    Lymphocytes Absolute, Manual 0.30 (L) 0.80 - 3.00 x10*3/uL    Monocytes Absolute, Manual 0.18 0.05 - 0.80 x10*3/uL    Eosinophils Absolute, Manual 0.01 0.00 - 0.40 x10*3/uL    Basophils Absolute, Manual 0.00 0.00 - 0.10 x10*3/uL    Total Cells Counted 116     Neutrophils Absolute, Manual 1.10 (L) 1.60 - 5.50 x10*3/uL    RBC Morphology See Below     Ovalocytes Few     [place] Cells Few    POCT GLUCOSE   Result Value Ref Range    POCT Glucose 167 (H) 74 - 99 mg/dL   POCT GLUCOSE   Result Value Ref Range    POCT Glucose 197 (H) 74 - 99 mg/dL       Assessment/Plan     74 y.o. male with a hx of ESRD secondary to diabetic nephropathy whom received a  donor kidney transplant on 24 by Dr. Zamora with a KDPI of 93% and PRA of 54%. Donor was Hepc -/-and has not met risk factors. EBV D+ /R+, CMV D-/R+. Left donor kidney transplanted to patient right pelvis. Admission weight is 72.7 kg (discharge weight is 76.4 kg ). Pt received a total of 3 mg/kg total of thymoglobulin induction therapy in conjunction with 500mg IV solumedrol.      Post-txp course notable for slow graft function, now with DGF.     Allograft function: s/p DDKT 24  - DGF. , Cr 5.5,uremic on admission. s/p HD  for clearance.   Now Cr 1.4-1.5  IV fluid:  mL/hr x 1 L    Immunosuppression:  -          FK = 7.7 (11); continue 2 mg BID;  we reduced tacrolimus to 2 mg  BID (started erythromycin) yesterday  -           mg BID--> Myfortic 360 mg bid-->180 mg bid for leukopenia 1/14/25  -          Prednisone 10 mg daily    Prophylaxis:  PPI IV  Nystatin 500,000 units QID x 3 mo  HOLD TMP-SMX x 6 mo 1/12-  HOLD Valcyte, renally dosed x 3 mo 1/10-  CMV PCR neg 1/9    Esophageal dysphagia  - Upper GI c/f achalasia. S/p Heller Myotomy plus Andrea Fundoplication 20+ yrs ago - per surgery, unable to fix  - EGD/PEG tube placement 1/8/25    Blood pressure - ok  - Amlodipine 10 mg daily    Anemia - iron replete  Darbe 200 mcg subcutaneous weekly  Leukopenia  - Valcyte held since 1/10  - hold TMP-SMX 1/12-    CKD-MBD - acceptable     Summary  TPN, adjusted per dietician and primary team  Plan gastric emptying study   If confirmed, will convert from PEG to post-pyloric J-tube  IV PPI  PT/OT  Check ANC , may need neupogen  Decrease myfortic to 180 bid      Bashir Angela MD

## 2025-01-15 LAB
ALBUMIN SERPL BCP-MCNC: 2.8 G/DL (ref 3.4–5)
ANION GAP SERPL CALC-SCNC: 12 MMOL/L (ref 10–20)
BASOPHILS # BLD AUTO: 0.01 X10*3/UL (ref 0–0.1)
BASOPHILS NFR BLD AUTO: 0.5 %
BUN SERPL-MCNC: 10 MG/DL (ref 6–23)
CALCIUM SERPL-MCNC: 8 MG/DL (ref 8.6–10.6)
CHLORIDE SERPL-SCNC: 104 MMOL/L (ref 98–107)
CO2 SERPL-SCNC: 23 MMOL/L (ref 21–32)
CREAT SERPL-MCNC: 0.86 MG/DL (ref 0.5–1.3)
EGFRCR SERPLBLD CKD-EPI 2021: >90 ML/MIN/1.73M*2
EOSINOPHIL # BLD AUTO: 0.05 X10*3/UL (ref 0–0.4)
EOSINOPHIL NFR BLD AUTO: 2.5 %
ERYTHROCYTE [DISTWIDTH] IN BLOOD BY AUTOMATED COUNT: 16.4 % (ref 11.5–14.5)
GLUCOSE BLD MANUAL STRIP-MCNC: 166 MG/DL (ref 74–99)
GLUCOSE BLD MANUAL STRIP-MCNC: 227 MG/DL (ref 74–99)
GLUCOSE BLD MANUAL STRIP-MCNC: 266 MG/DL (ref 74–99)
GLUCOSE BLD MANUAL STRIP-MCNC: 345 MG/DL (ref 74–99)
GLUCOSE BLD MANUAL STRIP-MCNC: 347 MG/DL (ref 74–99)
GLUCOSE BLD MANUAL STRIP-MCNC: 348 MG/DL (ref 74–99)
GLUCOSE BLD MANUAL STRIP-MCNC: 359 MG/DL (ref 74–99)
GLUCOSE SERPL-MCNC: 164 MG/DL (ref 74–99)
HCT VFR BLD AUTO: 28.2 % (ref 41–52)
HGB BLD-MCNC: 9.4 G/DL (ref 13.5–17.5)
IMM GRANULOCYTES # BLD AUTO: 0.03 X10*3/UL (ref 0–0.5)
IMM GRANULOCYTES NFR BLD AUTO: 1.5 % (ref 0–0.9)
LYMPHOCYTES # BLD AUTO: 0.5 X10*3/UL (ref 0.8–3)
LYMPHOCYTES NFR BLD AUTO: 25 %
MAGNESIUM SERPL-MCNC: 1.83 MG/DL (ref 1.6–2.4)
MCH RBC QN AUTO: 29.2 PG (ref 26–34)
MCHC RBC AUTO-ENTMCNC: 33.3 G/DL (ref 32–36)
MCV RBC AUTO: 88 FL (ref 80–100)
MONOCYTES # BLD AUTO: 0.4 X10*3/UL (ref 0.05–0.8)
MONOCYTES NFR BLD AUTO: 20 %
NEUTROPHILS # BLD AUTO: 1.01 X10*3/UL (ref 1.6–5.5)
NEUTROPHILS NFR BLD AUTO: 50.5 %
NRBC BLD-RTO: 0 /100 WBCS (ref 0–0)
PHOSPHATE SERPL-MCNC: 2.6 MG/DL (ref 2.5–4.9)
PLATELET # BLD AUTO: 173 X10*3/UL (ref 150–450)
POTASSIUM SERPL-SCNC: 4 MMOL/L (ref 3.5–5.3)
RBC # BLD AUTO: 3.22 X10*6/UL (ref 4.5–5.9)
SODIUM SERPL-SCNC: 135 MMOL/L (ref 136–145)
STAPHYLOCOCCUS SPEC CULT: NORMAL
TACROLIMUS BLD-MCNC: 8.2 NG/ML
WBC # BLD AUTO: 2 X10*3/UL (ref 4.4–11.3)

## 2025-01-15 PROCEDURE — 82947 ASSAY GLUCOSE BLOOD QUANT: CPT

## 2025-01-15 PROCEDURE — 2500000002 HC RX 250 W HCPCS SELF ADMINISTERED DRUGS (ALT 637 FOR MEDICARE OP, ALT 636 FOR OP/ED): Performed by: PHYSICIAN ASSISTANT

## 2025-01-15 PROCEDURE — 99232 SBSQ HOSP IP/OBS MODERATE 35: CPT | Performed by: STUDENT IN AN ORGANIZED HEALTH CARE EDUCATION/TRAINING PROGRAM

## 2025-01-15 PROCEDURE — 99233 SBSQ HOSP IP/OBS HIGH 50: CPT | Performed by: INTERNAL MEDICINE

## 2025-01-15 PROCEDURE — 85025 COMPLETE CBC W/AUTO DIFF WBC: CPT | Performed by: PHYSICIAN ASSISTANT

## 2025-01-15 PROCEDURE — 2500000004 HC RX 250 GENERAL PHARMACY W/ HCPCS (ALT 636 FOR OP/ED)

## 2025-01-15 PROCEDURE — 80197 ASSAY OF TACROLIMUS: CPT | Performed by: STUDENT IN AN ORGANIZED HEALTH CARE EDUCATION/TRAINING PROGRAM

## 2025-01-15 PROCEDURE — 2500000001 HC RX 250 WO HCPCS SELF ADMINISTERED DRUGS (ALT 637 FOR MEDICARE OP): Performed by: STUDENT IN AN ORGANIZED HEALTH CARE EDUCATION/TRAINING PROGRAM

## 2025-01-15 PROCEDURE — 2500000002 HC RX 250 W HCPCS SELF ADMINISTERED DRUGS (ALT 637 FOR MEDICARE OP, ALT 636 FOR OP/ED): Performed by: NURSE PRACTITIONER

## 2025-01-15 PROCEDURE — 2500000002 HC RX 250 W HCPCS SELF ADMINISTERED DRUGS (ALT 637 FOR MEDICARE OP, ALT 636 FOR OP/ED)

## 2025-01-15 PROCEDURE — 83735 ASSAY OF MAGNESIUM: CPT | Performed by: STUDENT IN AN ORGANIZED HEALTH CARE EDUCATION/TRAINING PROGRAM

## 2025-01-15 PROCEDURE — 80069 RENAL FUNCTION PANEL: CPT

## 2025-01-15 PROCEDURE — 2500000004 HC RX 250 GENERAL PHARMACY W/ HCPCS (ALT 636 FOR OP/ED): Performed by: PHYSICIAN ASSISTANT

## 2025-01-15 PROCEDURE — 2500000004 HC RX 250 GENERAL PHARMACY W/ HCPCS (ALT 636 FOR OP/ED): Performed by: SURGERY

## 2025-01-15 PROCEDURE — 2500000001 HC RX 250 WO HCPCS SELF ADMINISTERED DRUGS (ALT 637 FOR MEDICARE OP)

## 2025-01-15 PROCEDURE — 2500000001 HC RX 250 WO HCPCS SELF ADMINISTERED DRUGS (ALT 637 FOR MEDICARE OP): Performed by: PHYSICIAN ASSISTANT

## 2025-01-15 PROCEDURE — 2500000002 HC RX 250 W HCPCS SELF ADMINISTERED DRUGS (ALT 637 FOR MEDICARE OP, ALT 636 FOR OP/ED): Performed by: STUDENT IN AN ORGANIZED HEALTH CARE EDUCATION/TRAINING PROGRAM

## 2025-01-15 PROCEDURE — 1100000001 HC PRIVATE ROOM DAILY

## 2025-01-15 PROCEDURE — 99233 SBSQ HOSP IP/OBS HIGH 50: CPT | Performed by: NURSE PRACTITIONER

## 2025-01-15 PROCEDURE — 2500000005 HC RX 250 GENERAL PHARMACY W/O HCPCS

## 2025-01-15 RX ORDER — HYDROMORPHONE HYDROCHLORIDE 0.2 MG/ML
0.2 INJECTION INTRAMUSCULAR; INTRAVENOUS; SUBCUTANEOUS EVERY 4 HOURS PRN
Status: DISCONTINUED | OUTPATIENT
Start: 2025-01-15 | End: 2025-01-18

## 2025-01-15 RX ORDER — INSULIN LISPRO 100 [IU]/ML
3 INJECTION, SOLUTION INTRAVENOUS; SUBCUTANEOUS ONCE
Status: COMPLETED | OUTPATIENT
Start: 2025-01-15 | End: 2025-01-15

## 2025-01-15 RX ORDER — ERYTHROMYCIN 250 MG/1
250 TABLET, COATED ORAL EVERY 6 HOURS SCHEDULED
Status: COMPLETED | OUTPATIENT
Start: 2025-01-15 | End: 2025-01-18

## 2025-01-15 RX ORDER — METHOCARBAMOL 500 MG/1
500 TABLET, FILM COATED ORAL EVERY 8 HOURS SCHEDULED
Status: DISCONTINUED | OUTPATIENT
Start: 2025-01-15 | End: 2025-01-23 | Stop reason: HOSPADM

## 2025-01-15 RX ORDER — PREDNISONE 5 MG/1
5 TABLET ORAL DAILY
Status: DISCONTINUED | OUTPATIENT
Start: 2025-01-16 | End: 2025-01-23 | Stop reason: HOSPADM

## 2025-01-15 RX ORDER — MAGNESIUM SULFATE HEPTAHYDRATE 40 MG/ML
2 INJECTION, SOLUTION INTRAVENOUS ONCE
Status: COMPLETED | OUTPATIENT
Start: 2025-01-15 | End: 2025-01-15

## 2025-01-15 RX ORDER — OXYCODONE HCL 5 MG/5 ML
5 SOLUTION, ORAL ORAL EVERY 4 HOURS PRN
Status: DISCONTINUED | OUTPATIENT
Start: 2025-01-15 | End: 2025-01-17

## 2025-01-15 RX ORDER — OXYCODONE HCL 5 MG/5 ML
2.5 SOLUTION, ORAL ORAL EVERY 4 HOURS PRN
Status: DISCONTINUED | OUTPATIENT
Start: 2025-01-15 | End: 2025-01-17

## 2025-01-15 RX ORDER — ACETAMINOPHEN 160 MG/5ML
650 SOLUTION ORAL EVERY 4 HOURS PRN
Status: DISCONTINUED | OUTPATIENT
Start: 2025-01-15 | End: 2025-01-19

## 2025-01-15 RX ADMIN — NYSTATIN 500000 UNITS: 500000 SUSPENSION ORAL at 06:05

## 2025-01-15 RX ADMIN — HEPARIN SODIUM 5000 UNITS: 5000 INJECTION INTRAVENOUS; SUBCUTANEOUS at 08:07

## 2025-01-15 RX ADMIN — INSULIN HUMAN 2 UNITS: 100 INJECTION, SOLUTION PARENTERAL at 06:06

## 2025-01-15 RX ADMIN — METOCLOPRAMIDE 5 MG: 10 TABLET ORAL at 12:22

## 2025-01-15 RX ADMIN — HEPARIN SODIUM 5000 UNITS: 5000 INJECTION INTRAVENOUS; SUBCUTANEOUS at 15:43

## 2025-01-15 RX ADMIN — OXYCODONE HYDROCHLORIDE 5 MG: 5 SOLUTION ORAL at 20:47

## 2025-01-15 RX ADMIN — HEPARIN SODIUM 5000 UNITS: 5000 INJECTION INTRAVENOUS; SUBCUTANEOUS at 23:33

## 2025-01-15 RX ADMIN — ASCORBIC ACID, VITAMIN A PALMITATE, CHOLECALCIFEROL, THIAMINE HYDROCHLORIDE, RIBOFLAVIN-5 PHOSPHATE SODIUM, PYRIDOXINE HYDROCHLORIDE, NIACINAMIDE, DEXPANTHENOL, ALPHA-TOCOPHEROL ACETATE, VITAMIN K1, FOLIC ACID, BIOTIN, CYANOCOBALAMIN: 200; 3300; 200; 6; 3.6; 6; 40; 15; 10; 150; 600; 60; 5 INJECTION, SOLUTION INTRAVENOUS at 15:09

## 2025-01-15 RX ADMIN — METOCLOPRAMIDE 5 MG: 10 TABLET ORAL at 03:25

## 2025-01-15 RX ADMIN — ONDANSETRON 4 MG: 2 INJECTION INTRAMUSCULAR; INTRAVENOUS at 12:43

## 2025-01-15 RX ADMIN — NYSTATIN 500000 UNITS: 500000 SUSPENSION ORAL at 20:36

## 2025-01-15 RX ADMIN — NYSTATIN 500000 UNITS: 500000 SUSPENSION ORAL at 13:05

## 2025-01-15 RX ADMIN — INSULIN HUMAN 10 UNITS: 100 INJECTION, SOLUTION PARENTERAL at 12:39

## 2025-01-15 RX ADMIN — NYSTATIN 500000 UNITS: 500000 SUSPENSION ORAL at 18:28

## 2025-01-15 RX ADMIN — HYDROMORPHONE HYDROCHLORIDE 0.2 MG: 0.2 INJECTION, SOLUTION INTRAMUSCULAR; INTRAVENOUS; SUBCUTANEOUS at 03:58

## 2025-01-15 RX ADMIN — TACROLIMUS 2 MG: 1 CAPSULE ORAL at 18:28

## 2025-01-15 RX ADMIN — ONDANSETRON 4 MG: 2 INJECTION INTRAMUSCULAR; INTRAVENOUS at 20:27

## 2025-01-15 RX ADMIN — ERYTHROMYCIN 250 MG: 250 TABLET, FILM COATED ORAL at 23:31

## 2025-01-15 RX ADMIN — ACETAMINOPHEN 650 MG: 325 TABLET, FILM COATED ORAL at 03:25

## 2025-01-15 RX ADMIN — CALCIUM CARBONATE (ANTACID) CHEW TAB 500 MG 500 MG: 500 CHEW TAB at 06:06

## 2025-01-15 RX ADMIN — ERYTHROMYCIN 250 MG: 250 TABLET, FILM COATED ORAL at 18:28

## 2025-01-15 RX ADMIN — TACROLIMUS 2 MG: 1 CAPSULE ORAL at 06:04

## 2025-01-15 RX ADMIN — HYDROMORPHONE HYDROCHLORIDE 0.2 MG: 0.2 INJECTION, SOLUTION INTRAMUSCULAR; INTRAVENOUS; SUBCUTANEOUS at 15:42

## 2025-01-15 RX ADMIN — INSULIN LISPRO 3 UNITS: 100 INJECTION, SOLUTION INTRAVENOUS; SUBCUTANEOUS at 12:33

## 2025-01-15 RX ADMIN — HYDROMORPHONE HYDROCHLORIDE 0.2 MG: 0.2 INJECTION, SOLUTION INTRAMUSCULAR; INTRAVENOUS; SUBCUTANEOUS at 08:58

## 2025-01-15 RX ADMIN — PANTOPRAZOLE SODIUM 40 MG: 40 INJECTION, POWDER, FOR SOLUTION INTRAVENOUS at 20:28

## 2025-01-15 RX ADMIN — ERYTHROMYCIN 250 MG: 250 TABLET, FILM COATED ORAL at 06:05

## 2025-01-15 RX ADMIN — MYCOPHENOLIC ACID 180 MG: 180 TABLET, DELAYED RELEASE ORAL at 18:28

## 2025-01-15 RX ADMIN — ONDANSETRON 4 MG: 2 INJECTION INTRAMUSCULAR; INTRAVENOUS at 03:26

## 2025-01-15 RX ADMIN — AMLODIPINE BESYLATE 10 MG: 10 TABLET ORAL at 08:07

## 2025-01-15 RX ADMIN — INSULIN HUMAN 8 UNITS: 100 INJECTION, SOLUTION PARENTERAL at 18:19

## 2025-01-15 RX ADMIN — METHOCARBAMOL 500 MG: 500 TABLET ORAL at 22:50

## 2025-01-15 RX ADMIN — ERYTHROMYCIN 250 MG: 250 TABLET, FILM COATED ORAL at 12:22

## 2025-01-15 RX ADMIN — INSULIN GLARGINE 7 UNITS: 100 INJECTION, SOLUTION SUBCUTANEOUS at 20:31

## 2025-01-15 RX ADMIN — CALCIUM CARBONATE (ANTACID) CHEW TAB 500 MG 500 MG: 500 CHEW TAB at 15:43

## 2025-01-15 RX ADMIN — OXYCODONE HYDROCHLORIDE 5 MG: 5 SOLUTION ORAL at 13:05

## 2025-01-15 RX ADMIN — INSULIN HUMAN 5 UNITS: 100 INJECTION, SOLUTION PARENTERAL at 18:17

## 2025-01-15 RX ADMIN — METOCLOPRAMIDE 5 MG: 10 TABLET ORAL at 20:27

## 2025-01-15 RX ADMIN — PREDNISONE 10 MG: 10 TABLET ORAL at 08:07

## 2025-01-15 RX ADMIN — GABAPENTIN 200 MG: 100 CAPSULE ORAL at 08:07

## 2025-01-15 RX ADMIN — ROSUVASTATIN CALCIUM 10 MG: 10 TABLET, FILM COATED ORAL at 20:27

## 2025-01-15 RX ADMIN — METHOCARBAMOL 500 MG: 500 TABLET ORAL at 13:05

## 2025-01-15 RX ADMIN — MYCOPHENOLIC ACID 180 MG: 180 TABLET, DELAYED RELEASE ORAL at 06:04

## 2025-01-15 RX ADMIN — ACETAMINOPHEN 650 MG: 325 TABLET, FILM COATED ORAL at 08:07

## 2025-01-15 RX ADMIN — MAGNESIUM SULFATE HEPTAHYDRATE 2 G: 40 INJECTION, SOLUTION INTRAVENOUS at 10:54

## 2025-01-15 RX ADMIN — THIAMINE HCL TAB 100 MG 100 MG: 100 TAB at 08:07

## 2025-01-15 RX ADMIN — PANTOPRAZOLE SODIUM 40 MG: 40 INJECTION, POWDER, FOR SOLUTION INTRAVENOUS at 08:07

## 2025-01-15 ASSESSMENT — ENCOUNTER SYMPTOMS
VOMITING: 1
SINUS PRESSURE: 0
POLYDIPSIA: 0
NAUSEA: 1
ARTHRALGIAS: 0
FEVER: 0
SHORTNESS OF BREATH: 0
NUMBNESS: 0
DIZZINESS: 0
CONSTIPATION: 0
APPETITE CHANGE: 1
ABDOMINAL DISTENTION: 0
ABDOMINAL PAIN: 0
AGITATION: 0
DIARRHEA: 0
POLYPHAGIA: 0

## 2025-01-15 ASSESSMENT — COGNITIVE AND FUNCTIONAL STATUS - GENERAL
WALKING IN HOSPITAL ROOM: A LITTLE
HELP NEEDED FOR BATHING: A LITTLE
MOBILITY SCORE: 21
STANDING UP FROM CHAIR USING ARMS: A LITTLE
CLIMB 3 TO 5 STEPS WITH RAILING: A LITTLE
DRESSING REGULAR UPPER BODY CLOTHING: A LITTLE
TOILETING: A LITTLE
DRESSING REGULAR LOWER BODY CLOTHING: A LITTLE
CLIMB 3 TO 5 STEPS WITH RAILING: A LITTLE
STANDING UP FROM CHAIR USING ARMS: A LITTLE
TOILETING: A LITTLE
DAILY ACTIVITIY SCORE: 20
MOBILITY SCORE: 21
HELP NEEDED FOR BATHING: A LITTLE
WALKING IN HOSPITAL ROOM: A LITTLE
DRESSING REGULAR LOWER BODY CLOTHING: A LITTLE
DAILY ACTIVITIY SCORE: 20
DRESSING REGULAR UPPER BODY CLOTHING: A LITTLE

## 2025-01-15 ASSESSMENT — PAIN SCALES - GENERAL
PAINLEVEL_OUTOF10: 7
PAINLEVEL_OUTOF10: 6
PAINLEVEL_OUTOF10: 3
PAINLEVEL_OUTOF10: 4
PAINLEVEL_OUTOF10: 7
PAINLEVEL_OUTOF10: 7
PAINLEVEL_OUTOF10: 2
PAINLEVEL_OUTOF10: 7
PAINLEVEL_OUTOF10: 4

## 2025-01-15 ASSESSMENT — PAIN - FUNCTIONAL ASSESSMENT
PAIN_FUNCTIONAL_ASSESSMENT: 0-10

## 2025-01-15 ASSESSMENT — PAIN SCALES - WONG BAKER
WONGBAKER_NUMERICALRESPONSE: HURTS LITTLE BIT

## 2025-01-15 ASSESSMENT — PAIN DESCRIPTION - LOCATION: LOCATION: ABDOMEN

## 2025-01-15 NOTE — CARE PLAN
The patient's goals for the shift include get better    The clinical goals for the shift include pt will remain hds throughout shift.      Problem: Skin  Goal: Decreased wound size/increased tissue granulation at next dressing change  Outcome: Progressing  Goal: Participates in plan/prevention/treatment measures  Outcome: Progressing  Goal: Prevent/manage excess moisture  Outcome: Progressing  Goal: Prevent/minimize sheer/friction injuries  Outcome: Progressing  Goal: Promote/optimize nutrition  Outcome: Progressing  Goal: Promote skin healing  Outcome: Progressing     Problem: Pain  Goal: Takes deep breaths with improved pain control throughout the shift  Outcome: Progressing  Goal: Turns in bed with improved pain control throughout the shift  Outcome: Progressing  Goal: Walks with improved pain control throughout the shift  Outcome: Progressing  Goal: Performs ADL's with improved pain control throughout shift  Outcome: Progressing  Goal: Participates in PT with improved pain control throughout the shift  Outcome: Progressing  Goal: Free from opioid side effects throughout the shift  Outcome: Progressing  Goal: Free from acute confusion related to pain meds throughout the shift  Outcome: Progressing

## 2025-01-15 NOTE — PROGRESS NOTES
Temo Hanson is a 74 y.o. male on day 15 of admission presenting with Dehydration.    Subjective   Patient seen and evaluated at the bedside. Prednisone reduced from 10 mg to 5 mg starting today.  TPN rate continues at 55 ml/hr.  Tube feeds switched to Pivot 1.5 at 20 ml/hr and will increase by 5 ml every 6 hours starting last night, up to 35 ml/hr this morning. Goal rate is 50 ml/hr. Tube feed today will change to Vital 1.5 at 30 ml/hr, increase by 5 ml/hr every 5 hours until a goal rate of 50 ml/hr is reached. Once patient is tolerating TF at goal, TPN will be weaned. TF then to be cycled with the goal of discharge with nocturnal feeds.   Diet advanced to carb consistent yesterday, patient ate 100% of his meal this morning.     I have reviewed histories, allergies and medications have been reviewed and there are no changes.     Objective   Review of Systems   Constitutional:  Positive for appetite change. Negative for fever.   HENT:  Negative for sinus pressure.    Respiratory:  Negative for shortness of breath.    Cardiovascular:  Positive for chest pain.        Burning chest pain   Gastrointestinal:  Positive for nausea and vomiting. Negative for abdominal distention, abdominal pain, constipation and diarrhea.   Endocrine: Negative for cold intolerance, heat intolerance, polydipsia, polyphagia and polyuria.   Musculoskeletal:  Negative for arthralgias.   Neurological:  Negative for dizziness and numbness.   Psychiatric/Behavioral:  Negative for agitation and behavioral problems.      Physical Exam  Constitutional:       Appearance: Normal appearance. He is normal weight.   HENT:      Head: Normocephalic and atraumatic.      Mouth/Throat:      Mouth: Mucous membranes are moist.   Eyes:      Pupils: Pupils are equal, round, and reactive to light.   Cardiovascular:      Rate and Rhythm: Normal rate and regular rhythm.   Pulmonary:      Effort: Pulmonary effort is normal.      Breath sounds: Normal breath sounds.  "  Abdominal:      Palpations: Abdomen is soft.   Skin:     General: Skin is warm.   Neurological:      Mental Status: He is alert and oriented to person, place, and time.   Psychiatric:         Mood and Affect: Mood normal.         Behavior: Behavior normal.         Thought Content: Thought content normal.         Judgment: Judgment normal.     Last Recorded Vitals  Blood pressure 142/82, pulse 88, temperature 36.6 °C (97.9 °F), temperature source Temporal, resp. rate 18, height 1.854 m (6' 1\"), weight 67.1 kg (148 lb 0.6 oz), SpO2 100%.  Intake/Output last 3 Shifts:  I/O last 3 completed shifts:  In: 1836.2 (27.3 mL/kg) [P.O.:520; I.V.:50 (0.7 mL/kg); NG/GT:60; IV Piggyback:250]  Out: 1625 (24.2 mL/kg) [Urine:1625 (0.7 mL/kg/hr)]  Weight: 67.1 kg     Relevant Results  Results from last 7 days   Lab Units 01/15/25  0906 01/15/25  0523 01/15/25  0417 01/15/25  0152 01/14/25  2018 01/14/25  1948 01/14/25  1700 01/14/25  1515 01/14/25  0738 01/14/25  0544 01/13/25  0740 01/13/25  0541   POCT GLUCOSE mg/dL 266*  --  166* 227* 205*  --  365*  --    < >  --    < >  --    GLUCOSE mg/dL  --  164*  --   --   --  251*  --  359*  --  108*  --  106*    < > = values in this interval not displayed.       Assessment/Plan   Temo Hanson is a 74 y.o. 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  Patient was started on tube feeds, NPO, today after placement of PEG tube. Patient states he felt nausea and vomiting, leading to a pause in his feedings around noon. Feedings restarted around 1500.      Diabetes History  Type of diabetes: 2  Year diagnosed or age: 46 years old  Hospitalizations for DKA or HHS: Once - inappropriately gave too much sliding scale due to low BG finger stick not correcting in under an hour  Complications: Nephropathy  Seen by PCP or Endocrinology: PCP " - Dr. Hillman, Endo - Dr. Carey  Frequency of glucose checks: 4-5x daily after meals  Glucose review: persistent hyperglycemia this admission  Frequency of Hypoglycemia: none  Hypoglycemia unawareness: no  Severe hypoglycemia requiring assistance from others:  N     Home Medications  Basal: Glargine 8U  Prandial: Aspart 4U  Correction: Aspart sliding scale  Assessment & Plan  Dehydration    Alactasia syndrome    Type 2 diabetes mellitus with hyperglycemia, with long-term current use of insulin    On tube feeding diet      PLAN  Steroids:   Prednisone 15 mg daily  1/13- Predinsone 10mg daily  1/16 - prednisone reduced to 5 m     Nutrition:   1/10- 60g CHO diet + glucerna 1.5 continuous TF, goal of 50 ml/h. This provides 40 g of carbs every 6 hours when at goal.  1/11- TF reduced to 20ml/h so patient is getting 16g of carbs every 6 hours   1/12- TF stopped, PPN to start tonight at 30cc/h. Pt also on CLD  1/13 - PPN Started. CLD  1/14: TPN to increase to 55 ml/hr (previously at 41 ml/hr) Patient remains on clear liquid diet but with minimal intake  Tube feed: glucerna 1.5 started at 10 ml/hr at 2200 - patient made NPO  1/15: NPO. TPN to remain at 55 ml/hr  Tube feed: glucerna 1.5 started at 10 ml/hr -> switched to Vital 1.5 at 20 ml/hr, increase by 5 ml every 6 hours until goal rate of 50 ml/hr reached (40 carbs over 6 hrs)  Diet advanced in the afternoon  1/16: 60 gram Carb consistent + TF + TPN at 55 ml/hr. TF changed to Vital 1.5 starting at 30 ml/hr and increasing by 5 ml/hr until goal rate of 50ml/hr is reached (55 grams carbs/6 hrs)    Patient hyperglycemic as he is eating in addition to receiving tube feeds and TPN.   Will discontinue regular insulin and glargine and use NPH to cover TF and TPN, lispro to cover food during the day + correction scale     - discontinue glargine 7u once daily at 2100   - start NPH 16 units Q12 hrs starting at 2100 tonight.       Please contact diabetes team if TPN or TF is stopped as  the dose will need to be reduced. Reduce by 50% if one source of nutrition is held, reduce to 3 units Q12 if tube feed AND tpn is held        - Recommend increasing Regular insulin Q6 to 8 units starting at noon for TPN/TF hyperglycemia, last dose at 1800    - Recommend switching to lispro correction scale #2 Q4 hrs starting 2100 (last dose of regular correction scale at 1800)   = 0u  151-200 = 2u  201-250 = 4u  251-300 = 6u  301-350 = 8u  351-400 = 10u    - Start lispro 4 units with meals 1/17/24  Hold dose if NPO or if patient eats less than 50% of the meal     -Accuchecks Q4  - Goal -180  -Hypoglycemia protocol  -Will continue to follow and titrate insulin accordingly      Discharge planning:   [] patient may expect to discharge home on basal/bolus , final doses TBD by titration and nutrition status  [x]will provide CGM sample prior to discharge   [x]will enroll pt in  pharmacy platinum plan program  [x]follow up with Endo and PCP

## 2025-01-15 NOTE — SIGNIFICANT EVENT
01/15/25 1343   Patient Interaction   Organ Kidney   Type of Interaction Morning rounds   Interdisciplinary Rounds   Attendance Surgeon;Physician;FILI;Coordinator;Pharmacist   Topics Discussed Diet;Home care needs;Medications;Blood test results;Activity     Transplant Surgery Multidisciplinary Team Note    Temo Hanson is a 74 y.o. male   POD#25  from a Kidney from a  kidney txp. His post operative complications: None and Other complication: malnutrition requiring PEG     24 Hour Events  1. No acute events     Last Recorded Vitals  Visit Vitals  /73 (BP Location: Right arm, Patient Position: Lying)   Pulse 81   Temp 36 °C (96.8 °F) (Temporal)   Resp 18      Intake/Output last 3 Shifts:    Intake/Output Summary (Last 24 hours) at 1/15/2025 1343  Last data filed at 1/15/2025 1159  Gross per 24 hour   Intake 797.26 ml   Output 925 ml   Net -127.74 ml      Vitals:    01/15/25 0406   Weight: 67.1 kg (148 lb 0.6 oz)        Assessment/Plan   -Switch tube feed formula to Vital 1.5   -Increase TF rate to 20 ml/hr, increment by 5 ml/hr every 6 hours   -Continue TPN at current rate   -Switch to liquid pain meds  -Weekly CMV while Valcyte held     Principal Problem:    Management after organ transplant  Active Problems:  Patient Active Problem List   Diagnosis    S/P laparoscopic cholecystectomy    Abdominal pain    Achalasia    Acute lower UTI    Microcytic anemia    Complete heart block    Callus of foot    Chronic periodontitis, unspecified    Stage 5 chronic kidney disease (Multi)    Closed fracture of metatarsal bone    Crushing injury of foot    Diabetes mellitus (Multi)    Diarrhea    Dysphagia    ED (erectile dysfunction)    Essential hypertension    Foot pain, right    GIB (gastrointestinal bleeding)    Hepatitis B core antibody positive    Hyperkalemia    Ingrowing nail    Iron deficiency anemia secondary to inadequate dietary iron intake    Long toenail    Malignant neoplasm of prostate (Multi)     Onychomycosis    Pheochromocytoma of right adrenal gland    Pneumonia of right lower lobe due to infectious organism    Productive cough    Pure hypercholesterolemia    Stricture esophagus    SVT (supraventricular tachycardia) (CMS-HCC)    Type 2 diabetes mellitus with diabetic neuropathy (Multi)    Preop cardiovascular exam    Third degree heart block    Pericardial effusion (HHS-HCC)    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 (Multi)    Uremia    Cardiac tamponade    Cardiac pacemaker in situ    ESRD (end stage renal disease) (Multi)    Type 2 diabetes mellitus, with long-term current use of insulin    Dehydration    Alactasia syndrome    Type 2 diabetes mellitus with hyperglycemia, with long-term current use of insulin    On tube feeding diet        Immunosuppression reviewed and adjusted       Induction: Thymoglobulin Full Dose       Tacrolimus goal 8-10 ng/mL. Current dose 2 mg BID         MMF 1000 mg po BID       Solumedrol taper  DVT prophylaxis SCDS and subcutaneous heparin 5000 TID  PT/OT  Diet: general, TPN, enteral feeds   Anticipated discharge next week     Sherly García PA-C

## 2025-01-15 NOTE — PROGRESS NOTES
Temo Hanson is a 74 y.o. male POD#22 from DDKT from a DBD donor. Readmit for PO intolerance due to achalasia s/p Heller/Andrea myotomy with megaesophagus. POD#5 from PEG placement.     - feeling a little better with peg tube intake today    Objective   Gen: A+OX3  HEENT: PERRL, MMM  Cardiac: RRR  Chest: Normal inspiratory effort  Abdomen: S/NT/ND. Incision C/D/I.  Ext: No LE edema    Last Recorded Vitals  Visit Vitals  /82 (BP Location: Right arm, Patient Position: Lying)   Pulse 88   Temp 36.6 °C (97.9 °F) (Temporal)   Resp 18      Intake/Output last 3 Shifts:    Intake/Output Summary (Last 24 hours) at 1/15/2025 1137  Last data filed at 1/15/2025 0829  Gross per 24 hour   Intake 797.26 ml   Output 925 ml   Net -127.74 ml      Vitals:    01/15/25 0406   Weight: 67.1 kg (148 lb 0.6 oz)   Scheduled medications  amLODIPine, 10 mg, oral, Daily  calcium carbonate, 500 mg, oral, BID AC  ceFAZolin, 2 g, intravenous, Once  darbepoetin nohemi, 100 mcg, subcutaneous, Once  erythromycin base, 250 mg, oral, q6h KASSY  gabapentin, 200 mg, oral, Daily  heparin (porcine), 5,000 Units, subcutaneous, q8h  insulin glargine, 7 Units, subcutaneous, Nightly  insulin regular, 0-10 Units, subcutaneous, q6h KASSY  insulin regular, 2 Units, subcutaneous, q6h KASSY  lidocaine, 5 mL, infiltration, Once  lidocaine, 5 mL, infiltration, Once  lidocaine, 1 patch, transdermal, Daily  magnesium sulfate, 2 g, intravenous, Once  metoclopramide, 5 mg, oral, q8h  mycophenolate, 180 mg, oral, BID  nystatin, 5 mL, Swish & Swallow, 4x daily  ondansetron, 4 mg, intravenous, q8h  pantoprazole, 40 mg, intravenous, BID  predniSONE, 10 mg, oral, Daily  rosuvastatin, 10 mg, oral, Nightly  [Held by provider] sulfamethoxazole-trimethoprim, 1 tablet, oral, Daily  tacrolimus, 2 mg, oral, q12h  thiamine, 100 mg, oral, Daily  [Held by provider] valGANciclovir, 900 mg, oral, q24h    Assessment/Plan   Principal Problem:    Kidney transplant recipient     Achalasia/megaesophagus    Severe protein calorie malnutrition  Active Problems:  Patient Active Problem List   Diagnosis    S/P laparoscopic cholecystectomy    Abdominal pain    Achalasia    Acute lower UTI    Microcytic anemia    Complete heart block    Callus of foot    Chronic periodontitis, unspecified    Stage 5 chronic kidney disease (Multi)    Closed fracture of metatarsal bone    Crushing injury of foot    Diabetes mellitus (Multi)    Diarrhea    Dysphagia    ED (erectile dysfunction)    Essential hypertension    Foot pain, right    GIB (gastrointestinal bleeding)    Hepatitis B core antibody positive    Hyperkalemia    Ingrowing nail    Iron deficiency anemia secondary to inadequate dietary iron intake    Long toenail    Malignant neoplasm of prostate (Multi)    Onychomycosis    Pheochromocytoma of right adrenal gland    Pneumonia of right lower lobe due to infectious organism    Productive cough    Pure hypercholesterolemia    Stricture esophagus    SVT (supraventricular tachycardia) (CMS-HCC)    Type 2 diabetes mellitus with diabetic neuropathy (Multi)    Preop cardiovascular exam    Third degree heart block    Pericardial effusion (HHS-HCC)    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 (Multi)    Uremia    Cardiac tamponade    Cardiac pacemaker in situ    ESRD (end stage renal disease) (Multi)    Type 2 diabetes mellitus, with long-term current use of insulin    Dehydration    Alactasia syndrome    Type 2 diabetes mellitus with hyperglycemia, with long-term current use of insulin    On tube feeding diet       - Slowly advance TF via PE ml q 6 hr  - Cont TPN with plan to cycle - hold increasing today  - Continue reglan 5 mg q8h  - cont erythromycin x5 days  - PT/OT  - Myfortic 360 mg bid/prednisone to 5 mg  - Tacrolimus goal 8-10 ng/mL  - Gastric emptying study yesterday with early emptying into small bowel. Will trial advancement of PEG feeds and reassess for possible conversion to PEG/J in  future    I spent 35 minutes in the professional and overall care of this patient, including immunosuppression management.    Cassidy Altman MD

## 2025-01-15 NOTE — CARE PLAN
The patient's goals for the shift include get better    The clinical goals for the shift include patient can remain safe and vital signs stable.      Problem: Skin  Goal: Decreased wound size/increased tissue granulation at next dressing change  Outcome: Progressing  Goal: Participates in plan/prevention/treatment measures  Outcome: Progressing  Goal: Prevent/manage excess moisture  Outcome: Progressing  Goal: Prevent/minimize sheer/friction injuries  Outcome: Progressing  Goal: Promote/optimize nutrition  Outcome: Progressing  Goal: Promote skin healing  Outcome: Progressing     Problem: Pain  Goal: Takes deep breaths with improved pain control throughout the shift  Outcome: Progressing  Goal: Turns in bed with improved pain control throughout the shift  Outcome: Progressing  Goal: Walks with improved pain control throughout the shift  Outcome: Progressing  Goal: Performs ADL's with improved pain control throughout shift  Outcome: Progressing  Goal: Participates in PT with improved pain control throughout the shift  Outcome: Progressing  Goal: Free from opioid side effects throughout the shift  Outcome: Progressing  Goal: Free from acute confusion related to pain meds throughout the shift  Outcome: Progressing

## 2025-01-15 NOTE — CONSULTS
Wound Care Consult     Visit Date: 1/15/2025      Patient Name: Temo Hanson         MRN: [id number]           YOB: 1950     Reason for Consult: sacrum        Wound Assessment:  Wound 12/31/24 Pressure Injury Sacrum (Active)   Wound Image   01/15/25 1416   Site Assessment Granulation;Excoriated;Pink 01/15/25 1416   Queta-Wound Assessment Moist ;Pink;Scarred 01/15/25 1416   Non-staged Wound Description Partial thickness 01/15/25 1416   Pressure Injury Stage 2 01/15/25 1416   Wound Length (cm) 2 cm 01/15/25 1416   Wound Width (cm) 2 cm 01/15/25 1416   Wound Surface Area (cm^2) 4 cm^2 01/15/25 1416   State of Healing Closed wound edges 01/14/25 0807   Margins Poorly defined 01/12/25 2100   Drainage Description Serosanguineous 01/15/25 1416   Drainage Amount Scant 01/15/25 1416   Dressing Moisture barrier 01/15/25 1416   Dressing Changed New 01/15/25 1416   Dressing Status Clean;Dry 01/14/25 0807       Wound 12/31/24 Pressure Injury Buttock Mid (Active)   Wound Image   01/14/25 1259   Site Assessment Painful 01/14/25 1259   Queta-Wound Assessment Dry 01/14/25 1259   Non-staged Wound Description Partial thickness 01/03/25 1229   Pressure Injury Stage 2 01/14/25 1259   Shape 2cm x 2cm to each buttock 01/03/25 1229   Wound Length (cm) 4 cm 01/14/25 1259   Wound Width (cm) 4 cm 01/07/25 0917   Wound Surface Area (cm^2) 16 cm^2 01/07/25 0917   Wound Depth (cm) 0.5 cm 01/14/25 1259   Drainage Description None 01/14/25 1259   Drainage Amount None 01/14/25 1259   Dressing Foam 01/14/25 1259   Dressing Changed Changed 01/14/25 1259   Dressing Status Dry;Clean 01/15/25 1000       Wound Team Summary Assessment:   Patient with healing stage 2 Pressure Injury; much improved since last assessment by wound care RN.  Periwound with pink scarring and small area still open.   Cleanse daily or as needed with Vashe, dry with gauze. Apply Triad wound ointment dressing as needed. Cover with mepilex border dressing.  Triad can  be applied directly from the tube or by using a gloved finger. Gently spread Triad evenly over the area of application to the thickness of a dime. To remove, Use pH-balanced wound cleanser to soften Triad. Gently wipe to remove without scrubbing. Triad can stay in place for 5-7 days, with higher exudate levels requiring more frequent re-applications. In the perineal area, reapply Triad after each episode of incontinence.         Kimberly Florentino RN  1/15/2025  2:18 PM

## 2025-01-16 ENCOUNTER — APPOINTMENT (OUTPATIENT)
Facility: HOSPITAL | Age: 75
End: 2025-01-16
Payer: COMMERCIAL

## 2025-01-16 LAB
ALBUMIN SERPL BCP-MCNC: 2.6 G/DL (ref 3.4–5)
ANION GAP SERPL CALC-SCNC: 9 MMOL/L (ref 10–20)
BASOPHILS # BLD AUTO: 0.01 X10*3/UL (ref 0–0.1)
BASOPHILS NFR BLD AUTO: 0.4 %
BUN SERPL-MCNC: 14 MG/DL (ref 6–23)
CALCIUM SERPL-MCNC: 7.9 MG/DL (ref 8.6–10.6)
CHLORIDE SERPL-SCNC: 103 MMOL/L (ref 98–107)
CO2 SERPL-SCNC: 27 MMOL/L (ref 21–32)
CREAT SERPL-MCNC: 0.8 MG/DL (ref 0.5–1.3)
EGFRCR SERPLBLD CKD-EPI 2021: >90 ML/MIN/1.73M*2
EOSINOPHIL # BLD AUTO: 0.08 X10*3/UL (ref 0–0.4)
EOSINOPHIL NFR BLD AUTO: 2.9 %
ERYTHROCYTE [DISTWIDTH] IN BLOOD BY AUTOMATED COUNT: 16.7 % (ref 11.5–14.5)
GLUCOSE BLD MANUAL STRIP-MCNC: 108 MG/DL (ref 74–99)
GLUCOSE BLD MANUAL STRIP-MCNC: 178 MG/DL (ref 74–99)
GLUCOSE BLD MANUAL STRIP-MCNC: 221 MG/DL (ref 74–99)
GLUCOSE BLD MANUAL STRIP-MCNC: 262 MG/DL (ref 74–99)
GLUCOSE BLD MANUAL STRIP-MCNC: 276 MG/DL (ref 74–99)
GLUCOSE BLD MANUAL STRIP-MCNC: 317 MG/DL (ref 74–99)
GLUCOSE SERPL-MCNC: 118 MG/DL (ref 74–99)
HCT VFR BLD AUTO: 27.4 % (ref 41–52)
HGB BLD-MCNC: 8.5 G/DL (ref 13.5–17.5)
IMM GRANULOCYTES # BLD AUTO: 0.08 X10*3/UL (ref 0–0.5)
IMM GRANULOCYTES NFR BLD AUTO: 2.9 % (ref 0–0.9)
LYMPHOCYTES # BLD AUTO: 0.55 X10*3/UL (ref 0.8–3)
LYMPHOCYTES NFR BLD AUTO: 19.9 %
MAGNESIUM SERPL-MCNC: 1.87 MG/DL (ref 1.6–2.4)
MCH RBC QN AUTO: 29.1 PG (ref 26–34)
MCHC RBC AUTO-ENTMCNC: 31 G/DL (ref 32–36)
MCV RBC AUTO: 94 FL (ref 80–100)
MONOCYTES # BLD AUTO: 0.39 X10*3/UL (ref 0.05–0.8)
MONOCYTES NFR BLD AUTO: 14.1 %
NEUTROPHILS # BLD AUTO: 1.65 X10*3/UL (ref 1.6–5.5)
NEUTROPHILS NFR BLD AUTO: 59.8 %
NRBC BLD-RTO: 0 /100 WBCS (ref 0–0)
PHOSPHATE SERPL-MCNC: 2.5 MG/DL (ref 2.5–4.9)
PLATELET # BLD AUTO: 184 X10*3/UL (ref 150–450)
POTASSIUM SERPL-SCNC: 4.4 MMOL/L (ref 3.5–5.3)
RBC # BLD AUTO: 2.92 X10*6/UL (ref 4.5–5.9)
SODIUM SERPL-SCNC: 135 MMOL/L (ref 136–145)
TACROLIMUS BLD-MCNC: 8.6 NG/ML
WBC # BLD AUTO: 2.8 X10*3/UL (ref 4.4–11.3)

## 2025-01-16 PROCEDURE — 83735 ASSAY OF MAGNESIUM: CPT | Performed by: STUDENT IN AN ORGANIZED HEALTH CARE EDUCATION/TRAINING PROGRAM

## 2025-01-16 PROCEDURE — 2500000004 HC RX 250 GENERAL PHARMACY W/ HCPCS (ALT 636 FOR OP/ED): Performed by: PHYSICIAN ASSISTANT

## 2025-01-16 PROCEDURE — 85025 COMPLETE CBC W/AUTO DIFF WBC: CPT | Performed by: PHYSICIAN ASSISTANT

## 2025-01-16 PROCEDURE — 80197 ASSAY OF TACROLIMUS: CPT | Performed by: STUDENT IN AN ORGANIZED HEALTH CARE EDUCATION/TRAINING PROGRAM

## 2025-01-16 PROCEDURE — 2500000004 HC RX 250 GENERAL PHARMACY W/ HCPCS (ALT 636 FOR OP/ED)

## 2025-01-16 PROCEDURE — 82947 ASSAY GLUCOSE BLOOD QUANT: CPT

## 2025-01-16 PROCEDURE — 2500000001 HC RX 250 WO HCPCS SELF ADMINISTERED DRUGS (ALT 637 FOR MEDICARE OP): Performed by: STUDENT IN AN ORGANIZED HEALTH CARE EDUCATION/TRAINING PROGRAM

## 2025-01-16 PROCEDURE — 99232 SBSQ HOSP IP/OBS MODERATE 35: CPT | Performed by: STUDENT IN AN ORGANIZED HEALTH CARE EDUCATION/TRAINING PROGRAM

## 2025-01-16 PROCEDURE — 2500000001 HC RX 250 WO HCPCS SELF ADMINISTERED DRUGS (ALT 637 FOR MEDICARE OP)

## 2025-01-16 PROCEDURE — 2500000001 HC RX 250 WO HCPCS SELF ADMINISTERED DRUGS (ALT 637 FOR MEDICARE OP): Performed by: PHYSICIAN ASSISTANT

## 2025-01-16 PROCEDURE — 2500000002 HC RX 250 W HCPCS SELF ADMINISTERED DRUGS (ALT 637 FOR MEDICARE OP, ALT 636 FOR OP/ED): Performed by: NURSE PRACTITIONER

## 2025-01-16 PROCEDURE — 2500000002 HC RX 250 W HCPCS SELF ADMINISTERED DRUGS (ALT 637 FOR MEDICARE OP, ALT 636 FOR OP/ED): Performed by: STUDENT IN AN ORGANIZED HEALTH CARE EDUCATION/TRAINING PROGRAM

## 2025-01-16 PROCEDURE — 2500000005 HC RX 250 GENERAL PHARMACY W/O HCPCS

## 2025-01-16 PROCEDURE — 80069 RENAL FUNCTION PANEL: CPT

## 2025-01-16 PROCEDURE — 99233 SBSQ HOSP IP/OBS HIGH 50: CPT | Performed by: INTERNAL MEDICINE

## 2025-01-16 PROCEDURE — 2500000002 HC RX 250 W HCPCS SELF ADMINISTERED DRUGS (ALT 637 FOR MEDICARE OP, ALT 636 FOR OP/ED): Performed by: PHYSICIAN ASSISTANT

## 2025-01-16 PROCEDURE — 1100000001 HC PRIVATE ROOM DAILY

## 2025-01-16 RX ORDER — LACTOSE-REDUCED FOOD 0.05 G-1.5
65 LIQUID (ML) ORAL DAILY
Qty: 35100 ML | Refills: 1 | Status: SHIPPED | OUTPATIENT
Start: 2025-01-16 | End: 2025-02-15

## 2025-01-16 RX ORDER — DEXTROSE 50 % IN WATER (D50W) INTRAVENOUS SYRINGE
12.5
Status: DISCONTINUED | OUTPATIENT
Start: 2025-01-16 | End: 2025-01-23 | Stop reason: HOSPADM

## 2025-01-16 RX ORDER — DEXTROSE 50 % IN WATER (D50W) INTRAVENOUS SYRINGE
12.5
Status: DISCONTINUED | OUTPATIENT
Start: 2025-01-16 | End: 2025-01-16

## 2025-01-16 RX ORDER — INSULIN LISPRO 100 [IU]/ML
4 INJECTION, SOLUTION INTRAVENOUS; SUBCUTANEOUS
Status: DISCONTINUED | OUTPATIENT
Start: 2025-01-17 | End: 2025-01-17

## 2025-01-16 RX ORDER — INSULIN LISPRO 100 [IU]/ML
0-10 INJECTION, SOLUTION INTRAVENOUS; SUBCUTANEOUS EVERY 4 HOURS
Status: DISCONTINUED | OUTPATIENT
Start: 2025-01-16 | End: 2025-01-17

## 2025-01-16 RX ORDER — AMINO ACIDS/PROTEIN HYDROLYS 15G-100/30
1 LIQUID (ML) ORAL DAILY
Qty: 900 ML | Refills: 1 | Status: SHIPPED | OUTPATIENT
Start: 2025-01-16 | End: 2025-02-15

## 2025-01-16 RX ORDER — MAGNESIUM SULFATE HEPTAHYDRATE 40 MG/ML
4 INJECTION, SOLUTION INTRAVENOUS ONCE
Status: COMPLETED | OUTPATIENT
Start: 2025-01-16 | End: 2025-01-16

## 2025-01-16 RX ADMIN — OXYCODONE HYDROCHLORIDE 5 MG: 5 SOLUTION ORAL at 16:24

## 2025-01-16 RX ADMIN — ERYTHROMYCIN 250 MG: 250 TABLET, FILM COATED ORAL at 06:04

## 2025-01-16 RX ADMIN — METOCLOPRAMIDE 5 MG: 10 TABLET ORAL at 20:38

## 2025-01-16 RX ADMIN — OXYCODONE HYDROCHLORIDE 5 MG: 5 SOLUTION ORAL at 09:31

## 2025-01-16 RX ADMIN — PREDNISONE 5 MG: 5 TABLET ORAL at 09:00

## 2025-01-16 RX ADMIN — PANTOPRAZOLE SODIUM 40 MG: 40 INJECTION, POWDER, FOR SOLUTION INTRAVENOUS at 20:37

## 2025-01-16 RX ADMIN — OXYCODONE HYDROCHLORIDE 5 MG: 5 SOLUTION ORAL at 03:12

## 2025-01-16 RX ADMIN — INSULIN HUMAN 5 UNITS: 100 INJECTION, SOLUTION PARENTERAL at 00:53

## 2025-01-16 RX ADMIN — MYCOPHENOLIC ACID 180 MG: 180 TABLET, DELAYED RELEASE ORAL at 06:05

## 2025-01-16 RX ADMIN — INSULIN HUMAN 5 UNITS: 100 INJECTION, SOLUTION PARENTERAL at 09:01

## 2025-01-16 RX ADMIN — ONDANSETRON 4 MG: 2 INJECTION INTRAMUSCULAR; INTRAVENOUS at 04:44

## 2025-01-16 RX ADMIN — ONDANSETRON 4 MG: 2 INJECTION INTRAMUSCULAR; INTRAVENOUS at 12:21

## 2025-01-16 RX ADMIN — INSULIN HUMAN 10 UNITS: 100 INJECTION, SOLUTION PARENTERAL at 00:47

## 2025-01-16 RX ADMIN — ERYTHROMYCIN 250 MG: 250 TABLET, FILM COATED ORAL at 18:36

## 2025-01-16 RX ADMIN — TACROLIMUS 2 MG: 1 CAPSULE ORAL at 18:36

## 2025-01-16 RX ADMIN — ROSUVASTATIN CALCIUM 10 MG: 10 TABLET, FILM COATED ORAL at 20:40

## 2025-01-16 RX ADMIN — DIBASIC SODIUM PHOSPHATE, MONOBASIC POTASSIUM PHOSPHATE AND MONOBASIC SODIUM PHOSPHATE 250 MG: 852; 155; 130 TABLET ORAL at 12:25

## 2025-01-16 RX ADMIN — GABAPENTIN 200 MG: 100 CAPSULE ORAL at 09:00

## 2025-01-16 RX ADMIN — HYDROMORPHONE HYDROCHLORIDE 0.2 MG: 0.2 INJECTION, SOLUTION INTRAMUSCULAR; INTRAVENOUS; SUBCUTANEOUS at 12:17

## 2025-01-16 RX ADMIN — INSULIN HUMAN 8 UNITS: 100 INJECTION, SOLUTION PARENTERAL at 12:24

## 2025-01-16 RX ADMIN — METHOCARBAMOL 500 MG: 500 TABLET ORAL at 06:07

## 2025-01-16 RX ADMIN — INSULIN HUMAN 6 UNITS: 100 INJECTION, SOLUTION PARENTERAL at 18:39

## 2025-01-16 RX ADMIN — THIAMINE HCL TAB 100 MG 100 MG: 100 TAB at 09:00

## 2025-01-16 RX ADMIN — CALCIUM CARBONATE (ANTACID) CHEW TAB 500 MG 500 MG: 500 CHEW TAB at 06:05

## 2025-01-16 RX ADMIN — INSULIN HUMAN 8 UNITS: 100 INJECTION, SOLUTION PARENTERAL at 18:38

## 2025-01-16 RX ADMIN — METOCLOPRAMIDE 5 MG: 10 TABLET ORAL at 04:44

## 2025-01-16 RX ADMIN — NYSTATIN 500000 UNITS: 500000 SUSPENSION ORAL at 16:24

## 2025-01-16 RX ADMIN — AMLODIPINE BESYLATE 10 MG: 10 TABLET ORAL at 09:00

## 2025-01-16 RX ADMIN — MAGNESIUM SULFATE IN WATER FOR 4 G: 40 INJECTION INTRAVENOUS at 11:10

## 2025-01-16 RX ADMIN — INSULIN HUMAN 6 UNITS: 100 INJECTION, SOLUTION PARENTERAL at 12:24

## 2025-01-16 RX ADMIN — ONDANSETRON 4 MG: 2 INJECTION INTRAMUSCULAR; INTRAVENOUS at 20:37

## 2025-01-16 RX ADMIN — ASCORBIC ACID, VITAMIN A PALMITATE, CHOLECALCIFEROL, THIAMINE HYDROCHLORIDE, RIBOFLAVIN-5 PHOSPHATE SODIUM, PYRIDOXINE HYDROCHLORIDE, NIACINAMIDE, DEXPANTHENOL, ALPHA-TOCOPHEROL ACETATE, VITAMIN K1, FOLIC ACID, BIOTIN, CYANOCOBALAMIN: 200; 3300; 200; 6; 3.6; 6; 40; 15; 10; 150; 600; 60; 5 INJECTION, SOLUTION INTRAVENOUS at 14:50

## 2025-01-16 RX ADMIN — NYSTATIN 500000 UNITS: 500000 SUSPENSION ORAL at 06:05

## 2025-01-16 RX ADMIN — OXYCODONE HYDROCHLORIDE 5 MG: 5 SOLUTION ORAL at 20:48

## 2025-01-16 RX ADMIN — DIBASIC SODIUM PHOSPHATE, MONOBASIC POTASSIUM PHOSPHATE AND MONOBASIC SODIUM PHOSPHATE 250 MG: 852; 155; 130 TABLET ORAL at 20:38

## 2025-01-16 RX ADMIN — CALCIUM POLYCARBOPHIL 625 MG: 625 TABLET, FILM COATED ORAL at 14:50

## 2025-01-16 RX ADMIN — METHOCARBAMOL 500 MG: 500 TABLET ORAL at 22:25

## 2025-01-16 RX ADMIN — MYCOPHENOLIC ACID 180 MG: 180 TABLET, DELAYED RELEASE ORAL at 18:36

## 2025-01-16 RX ADMIN — CALCIUM CARBONATE (ANTACID) CHEW TAB 500 MG 500 MG: 500 CHEW TAB at 16:24

## 2025-01-16 RX ADMIN — DIBASIC SODIUM PHOSPHATE, MONOBASIC POTASSIUM PHOSPHATE AND MONOBASIC SODIUM PHOSPHATE 250 MG: 852; 155; 130 TABLET ORAL at 14:50

## 2025-01-16 RX ADMIN — NYSTATIN 500000 UNITS: 500000 SUSPENSION ORAL at 12:24

## 2025-01-16 RX ADMIN — INSULIN HUMAN 16 UNITS: 100 INJECTION, SUSPENSION SUBCUTANEOUS at 20:37

## 2025-01-16 RX ADMIN — TACROLIMUS 2 MG: 1 CAPSULE ORAL at 06:05

## 2025-01-16 RX ADMIN — PANTOPRAZOLE SODIUM 40 MG: 40 INJECTION, POWDER, FOR SOLUTION INTRAVENOUS at 09:00

## 2025-01-16 RX ADMIN — HEPARIN SODIUM 5000 UNITS: 5000 INJECTION INTRAVENOUS; SUBCUTANEOUS at 09:00

## 2025-01-16 RX ADMIN — METHOCARBAMOL 500 MG: 500 TABLET ORAL at 14:50

## 2025-01-16 RX ADMIN — INSULIN LISPRO 4 UNITS: 100 INJECTION, SOLUTION INTRAVENOUS; SUBCUTANEOUS at 20:37

## 2025-01-16 RX ADMIN — HEPARIN SODIUM 5000 UNITS: 5000 INJECTION INTRAVENOUS; SUBCUTANEOUS at 16:24

## 2025-01-16 RX ADMIN — METOCLOPRAMIDE 5 MG: 10 TABLET ORAL at 12:20

## 2025-01-16 RX ADMIN — ERYTHROMYCIN 250 MG: 250 TABLET, FILM COATED ORAL at 12:24

## 2025-01-16 RX ADMIN — CALCIUM POLYCARBOPHIL 625 MG: 625 TABLET, FILM COATED ORAL at 20:42

## 2025-01-16 RX ADMIN — NYSTATIN 500000 UNITS: 500000 SUSPENSION ORAL at 20:37

## 2025-01-16 ASSESSMENT — PAIN SCALES - GENERAL
PAINLEVEL_OUTOF10: 7
PAINLEVEL_OUTOF10: 6
PAINLEVEL_OUTOF10: 7

## 2025-01-16 NOTE — PROGRESS NOTES
Temo Hanson is a 74 y.o. male POD#22 from DDKT from a DBD donor. Readmit for PO intolerance due to achalasia s/p Heller/Andrea myotomy with megaesophagus. Now s/p from PEG placement.     Doing well with TF 35/hr  Frustrated at being in hospital    Objective   Gen: A+OX3  HEENT: PERRL, MMM  Cardiac: RRR  Chest: Normal inspiratory effort  Abdomen: S/NT/ND. Incision C/D/I.  Ext: No LE edema    Last Recorded Vitals  Visit Vitals  /69 (BP Location: Right arm, Patient Position: Lying)   Pulse 94   Temp 36.9 °C (98.4 °F) (Temporal)   Resp 18      Intake/Output last 3 Shifts:    Intake/Output Summary (Last 24 hours) at 1/16/2025 1204  Last data filed at 1/16/2025 1117  Gross per 24 hour   Intake 915 ml   Output 353 ml   Net 562 ml      Vitals:    01/16/25 0500   Weight: 67.4 kg (148 lb 9.4 oz)   Scheduled medications  amLODIPine, 10 mg, oral, Daily  calcium carbonate, 500 mg, oral, BID AC  calcium polycarbophiL, 625 mg, oral, BID  ceFAZolin, 2 g, intravenous, Once  darbepoetin nohemi, 100 mcg, subcutaneous, Once  erythromycin base, 250 mg, oral, q6h KASSY  gabapentin, 200 mg, oral, Daily  heparin (porcine), 5,000 Units, subcutaneous, q8h  insulin glargine, 7 Units, subcutaneous, Nightly  insulin regular, 0-10 Units, subcutaneous, q6h KASSY  insulin regular, 8 Units, subcutaneous, q6h KASSY  lidocaine, 5 mL, infiltration, Once  lidocaine, 5 mL, infiltration, Once  lidocaine, 1 patch, transdermal, Daily  magnesium sulfate, 4 g, intravenous, Once  methocarbamol, 500 mg, oral, q8h KASSY  metoclopramide, 5 mg, oral, q8h  mycophenolate, 180 mg, oral, BID  nystatin, 5 mL, Swish & Swallow, 4x daily  ondansetron, 4 mg, intravenous, q8h  pantoprazole, 40 mg, intravenous, BID  predniSONE, 5 mg, oral, Daily  rosuvastatin, 10 mg, oral, Nightly  sod phos di, mono-K phos mono, 250 mg, oral, TID  [START ON 1/17/2025] sodium zirconium cyclosilicate, 5 g, oral, Daily  [Held by provider] sulfamethoxazole-trimethoprim, 1 tablet, oral,  Daily  tacrolimus, 2 mg, oral, q12h  thiamine, 100 mg, oral, Daily  [Held by provider] valGANciclovir, 900 mg, oral, q24h    Assessment/Plan   Principal Problem:    Kidney transplant recipient    Achalasia/megaesophagus    Severe protein calorie malnutrition  Active Problems:  Patient Active Problem List   Diagnosis    S/P laparoscopic cholecystectomy    Abdominal pain    Achalasia    Acute lower UTI    Microcytic anemia    Complete heart block    Callus of foot    Chronic periodontitis, unspecified    Stage 5 chronic kidney disease (Multi)    Closed fracture of metatarsal bone    Crushing injury of foot    Diabetes mellitus (Multi)    Diarrhea    Dysphagia    ED (erectile dysfunction)    Essential hypertension    Foot pain, right    GIB (gastrointestinal bleeding)    Hepatitis B core antibody positive    Hyperkalemia    Ingrowing nail    Iron deficiency anemia secondary to inadequate dietary iron intake    Long toenail    Malignant neoplasm of prostate (Multi)    Onychomycosis    Pheochromocytoma of right adrenal gland    Pneumonia of right lower lobe due to infectious organism    Productive cough    Pure hypercholesterolemia    Stricture esophagus    SVT (supraventricular tachycardia) (CMS-HCC)    Type 2 diabetes mellitus with diabetic neuropathy (Multi)    Preop cardiovascular exam    Third degree heart block    Pericardial effusion (HHS-HCC)    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 (Multi)    Uremia    Cardiac tamponade    Cardiac pacemaker in situ    ESRD (end stage renal disease) (Multi)    Type 2 diabetes mellitus, with long-term current use of insulin    Dehydration    Alactasia syndrome    Type 2 diabetes mellitus with hyperglycemia, with long-term current use of insulin    On tube feeding diet       - Slowly advance TF via PE ml q 6 hr   - once at goal will plan for small amount of cycling  - Cont TPN with plan to cycle - hold increasing today. May consider dc  - Continue reglan 5 mg q8h  - cont  erythromycin x5 days  - PT/OT  - Myfortic 360 mg bid/prednisone to 5 mg  - Tacrolimus goal 8-10 ng/mL  - Gastric emptying study yesterday with early emptying into small bowel. Will trial advancement of PEG feeds and reassess for possible conversion to PEG/J in future    I spent 35 minutes in the professional and overall care of this patient, including immunosuppression management.    Cassidy Altman MD

## 2025-01-16 NOTE — CARE PLAN
The patient's goals for the shift include get better    The clinical goals for the shift include patient will remain safe and vital signs stable throughout shift.    Problem: Skin  Goal: Decreased wound size/increased tissue granulation at next dressing change  Outcome: Progressing  Goal: Participates in plan/prevention/treatment measures  Outcome: Progressing  Goal: Prevent/manage excess moisture  Outcome: Progressing  Goal: Prevent/minimize sheer/friction injuries  Outcome: Progressing  Goal: Promote/optimize nutrition  Outcome: Progressing  Goal: Promote skin healing  Outcome: Progressing    Problem: Pain  Goal: Takes deep breaths with improved pain control throughout the shift  Outcome: Progressing  Goal: Turns in bed with improved pain control throughout the shift  Outcome: Progressing  Goal: Walks with improved pain control throughout the shift  Outcome: Progressing  Goal: Performs ADL's with improved pain control throughout shift  Outcome: Progressing  Goal: Participates in PT with improved pain control throughout the shift  Outcome: Progressing  Goal: Free from opioid side effects throughout the shift  Outcome: Progressing  Goal: Free from acute confusion related to pain meds throughout the shift  Outcome: Progressing

## 2025-01-16 NOTE — SIGNIFICANT EVENT
01/16/25 11:25   Patient Interaction   Organ Kidney   Type of Interaction Morning rounds   Interdisciplinary Rounds   Attendance Surgeon;Physician;FILI;Coordinator;Pharmacist   Topics Discussed Diet;Home care needs;Medications;Blood test results;Activity     Transplant Surgery Multidisciplinary Team Note    Temo Hanson is a 74 y.o. male  POD#26  from a Kidney from a  kidney txp. His post operative complications: None and Other complication: malnutrition requiring PEG     24 Hour Events  1. No acute events     Last Recorded Vitals  Visit Vitals  /69 (BP Location: Right arm, Patient Position: Lying)   Pulse 94   Temp 36.9 °C (98.4 °F) (Temporal)   Resp 18      Intake/Output last 3 Shifts:    Intake/Output Summary (Last 24 hours) at 1/16/2025 1124  Last data filed at 1/16/2025 1117  Gross per 24 hour   Intake 915 ml   Output 353 ml   Net 562 ml      Vitals:    01/16/25 0500   Weight: 67.4 kg (148 lb 9.4 oz)        Assessment/Plan   -Switch tube feed formula to Vital 1.5   -Increase TF rate to 20 ml/hr, increment by 5 ml/hr every 6 hours   -Continue TPN at current rate   -Switch to liquid pain meds  -Weekly CMV while Valcyte held     Principal Problem:    Management after organ transplant  Active Problems:  Patient Active Problem List   Diagnosis    S/P laparoscopic cholecystectomy    Abdominal pain    Achalasia    Acute lower UTI    Microcytic anemia    Complete heart block    Callus of foot    Chronic periodontitis, unspecified    Stage 5 chronic kidney disease (Multi)    Closed fracture of metatarsal bone    Crushing injury of foot    Diabetes mellitus (Multi)    Diarrhea    Dysphagia    ED (erectile dysfunction)    Essential hypertension    Foot pain, right    GIB (gastrointestinal bleeding)    Hepatitis B core antibody positive    Hyperkalemia    Ingrowing nail    Iron deficiency anemia secondary to inadequate dietary iron intake    Long toenail    Malignant neoplasm of prostate (Multi)    Onychomycosis     Pheochromocytoma of right adrenal gland    Pneumonia of right lower lobe due to infectious organism    Productive cough    Pure hypercholesterolemia    Stricture esophagus    SVT (supraventricular tachycardia) (CMS-HCC)    Type 2 diabetes mellitus with diabetic neuropathy (Multi)    Preop cardiovascular exam    Third degree heart block    Pericardial effusion (HHS-HCC)    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 (Multi)    Uremia    Cardiac tamponade    Cardiac pacemaker in situ    ESRD (end stage renal disease) (Multi)    Type 2 diabetes mellitus, with long-term current use of insulin    Dehydration    Alactasia syndrome    Type 2 diabetes mellitus with hyperglycemia, with long-term current use of insulin    On tube feeding diet        Immunosuppression reviewed and adjusted       Induction: Thymoglobulin Full Dose       Tacrolimus goal 8-10 ng/mL. Current dose 2 mg BID         MMF 1000 mg po BID       Solumedrol taper  DVT prophylaxis SCDS and subcutaneous heparin 5000 TID  PT/OT  Diet: general, TPN, enteral feeds   Anticipated discharge next week, possibly Monday    Sherly García PA-C

## 2025-01-16 NOTE — PROGRESS NOTES
Physical Therapy                 Therapy Communication Note    Patient Name: Temo Hanson  MRN: [id number]  Department: [place] [address]  Room: 9081/9081-A  Today's Date: 1/16/2025     Discipline: Physical Therapy    PT Missed Visit: Yes     Missed Visit Reason: Missed Visit Reason: Patient refused (Pt reported pain and refused participation despite encouragement.)    Missed Time:15:15 PM

## 2025-01-16 NOTE — PROGRESS NOTES
[facility] aware that patient is no medically cleared for discharge. Referral sent to [facility] for TF/TPN @ home. Referrals for already submitted for [facility] and [facility]. [facility] will continue to follow patient for discharge planning.    1500- Patient denied by [facility] for TF. OON with insurance.    LUCÍA Strange, RN  [facility], [facility]  Transitional Care Coordinator, Mon-Fri  Cell: [phone], Office: [phone]  Email: [email or web address]

## 2025-01-17 LAB
ALBUMIN SERPL BCP-MCNC: 2.8 G/DL (ref 3.4–5)
ANION GAP SERPL CALC-SCNC: 9 MMOL/L (ref 10–20)
BASOPHILS # BLD AUTO: 0.01 X10*3/UL (ref 0–0.1)
BASOPHILS NFR BLD AUTO: 0.3 %
BUN SERPL-MCNC: 19 MG/DL (ref 6–23)
CALCIUM SERPL-MCNC: 7.9 MG/DL (ref 8.6–10.6)
CHLORIDE SERPL-SCNC: 101 MMOL/L (ref 98–107)
CMV DNA SERPL NAA+PROBE-LOG IU: NORMAL {LOG_IU}/ML
CMV DNA SERPL NAA+PROBE-LOG IU: NORMAL {LOG_IU}/ML
CO2 SERPL-SCNC: 25 MMOL/L (ref 21–32)
CREAT SERPL-MCNC: 0.82 MG/DL (ref 0.5–1.3)
EGFRCR SERPLBLD CKD-EPI 2021: >90 ML/MIN/1.73M*2
EOSINOPHIL # BLD AUTO: 0.11 X10*3/UL (ref 0–0.4)
EOSINOPHIL NFR BLD AUTO: 3.5 %
ERYTHROCYTE [DISTWIDTH] IN BLOOD BY AUTOMATED COUNT: 16.8 % (ref 11.5–14.5)
GLUCOSE BLD MANUAL STRIP-MCNC: 149 MG/DL (ref 74–99)
GLUCOSE BLD MANUAL STRIP-MCNC: 173 MG/DL (ref 74–99)
GLUCOSE BLD MANUAL STRIP-MCNC: 198 MG/DL (ref 74–99)
GLUCOSE BLD MANUAL STRIP-MCNC: 211 MG/DL (ref 74–99)
GLUCOSE BLD MANUAL STRIP-MCNC: 243 MG/DL (ref 74–99)
GLUCOSE BLD MANUAL STRIP-MCNC: 91 MG/DL (ref 74–99)
GLUCOSE SERPL-MCNC: 234 MG/DL (ref 74–99)
HCT VFR BLD AUTO: 27.6 % (ref 41–52)
HGB BLD-MCNC: 8.6 G/DL (ref 13.5–17.5)
IMM GRANULOCYTES # BLD AUTO: 0.07 X10*3/UL (ref 0–0.5)
IMM GRANULOCYTES NFR BLD AUTO: 2.3 % (ref 0–0.9)
LABORATORY COMMENT REPORT: NOT DETECTED
LABORATORY COMMENT REPORT: NOT DETECTED
LYMPHOCYTES # BLD AUTO: 0.52 X10*3/UL (ref 0.8–3)
LYMPHOCYTES NFR BLD AUTO: 16.7 %
MAGNESIUM SERPL-MCNC: 1.92 MG/DL (ref 1.6–2.4)
MCH RBC QN AUTO: 29 PG (ref 26–34)
MCHC RBC AUTO-ENTMCNC: 31.2 G/DL (ref 32–36)
MCV RBC AUTO: 93 FL (ref 80–100)
MONOCYTES # BLD AUTO: 0.35 X10*3/UL (ref 0.05–0.8)
MONOCYTES NFR BLD AUTO: 11.3 %
NEUTROPHILS # BLD AUTO: 2.05 X10*3/UL (ref 1.6–5.5)
NEUTROPHILS NFR BLD AUTO: 65.9 %
NRBC BLD-RTO: 0 /100 WBCS (ref 0–0)
PHOSPHATE SERPL-MCNC: 2.4 MG/DL (ref 2.5–4.9)
PLATELET # BLD AUTO: 189 X10*3/UL (ref 150–450)
POTASSIUM SERPL-SCNC: 4.3 MMOL/L (ref 3.5–5.3)
RBC # BLD AUTO: 2.97 X10*6/UL (ref 4.5–5.9)
SODIUM SERPL-SCNC: 131 MMOL/L (ref 136–145)
TACROLIMUS BLD-MCNC: 7.3 NG/ML
WBC # BLD AUTO: 3.1 X10*3/UL (ref 4.4–11.3)

## 2025-01-17 PROCEDURE — 2500000004 HC RX 250 GENERAL PHARMACY W/ HCPCS (ALT 636 FOR OP/ED): Performed by: PHYSICIAN ASSISTANT

## 2025-01-17 PROCEDURE — 36415 COLL VENOUS BLD VENIPUNCTURE: CPT | Performed by: STUDENT IN AN ORGANIZED HEALTH CARE EDUCATION/TRAINING PROGRAM

## 2025-01-17 PROCEDURE — 2500000001 HC RX 250 WO HCPCS SELF ADMINISTERED DRUGS (ALT 637 FOR MEDICARE OP): Performed by: PHYSICIAN ASSISTANT

## 2025-01-17 PROCEDURE — 2500000005 HC RX 250 GENERAL PHARMACY W/O HCPCS: Performed by: PHYSICIAN ASSISTANT

## 2025-01-17 PROCEDURE — 80069 RENAL FUNCTION PANEL: CPT

## 2025-01-17 PROCEDURE — 2500000004 HC RX 250 GENERAL PHARMACY W/ HCPCS (ALT 636 FOR OP/ED)

## 2025-01-17 PROCEDURE — 2500000001 HC RX 250 WO HCPCS SELF ADMINISTERED DRUGS (ALT 637 FOR MEDICARE OP): Performed by: STUDENT IN AN ORGANIZED HEALTH CARE EDUCATION/TRAINING PROGRAM

## 2025-01-17 PROCEDURE — 82947 ASSAY GLUCOSE BLOOD QUANT: CPT

## 2025-01-17 PROCEDURE — RXMED WILLOW AMBULATORY MEDICATION CHARGE

## 2025-01-17 PROCEDURE — 99233 SBSQ HOSP IP/OBS HIGH 50: CPT | Performed by: INTERNAL MEDICINE

## 2025-01-17 PROCEDURE — 2500000002 HC RX 250 W HCPCS SELF ADMINISTERED DRUGS (ALT 637 FOR MEDICARE OP, ALT 636 FOR OP/ED): Performed by: NURSE PRACTITIONER

## 2025-01-17 PROCEDURE — 99232 SBSQ HOSP IP/OBS MODERATE 35: CPT | Performed by: STUDENT IN AN ORGANIZED HEALTH CARE EDUCATION/TRAINING PROGRAM

## 2025-01-17 PROCEDURE — 2500000001 HC RX 250 WO HCPCS SELF ADMINISTERED DRUGS (ALT 637 FOR MEDICARE OP)

## 2025-01-17 PROCEDURE — 1100000001 HC PRIVATE ROOM DAILY

## 2025-01-17 PROCEDURE — 85025 COMPLETE CBC W/AUTO DIFF WBC: CPT | Performed by: PHYSICIAN ASSISTANT

## 2025-01-17 PROCEDURE — 83735 ASSAY OF MAGNESIUM: CPT | Performed by: STUDENT IN AN ORGANIZED HEALTH CARE EDUCATION/TRAINING PROGRAM

## 2025-01-17 PROCEDURE — 2500000002 HC RX 250 W HCPCS SELF ADMINISTERED DRUGS (ALT 637 FOR MEDICARE OP, ALT 636 FOR OP/ED): Performed by: PHYSICIAN ASSISTANT

## 2025-01-17 PROCEDURE — 2500000002 HC RX 250 W HCPCS SELF ADMINISTERED DRUGS (ALT 637 FOR MEDICARE OP, ALT 636 FOR OP/ED): Performed by: STUDENT IN AN ORGANIZED HEALTH CARE EDUCATION/TRAINING PROGRAM

## 2025-01-17 PROCEDURE — 99233 SBSQ HOSP IP/OBS HIGH 50: CPT

## 2025-01-17 PROCEDURE — 80197 ASSAY OF TACROLIMUS: CPT | Performed by: STUDENT IN AN ORGANIZED HEALTH CARE EDUCATION/TRAINING PROGRAM

## 2025-01-17 PROCEDURE — 2500000005 HC RX 250 GENERAL PHARMACY W/O HCPCS

## 2025-01-17 RX ORDER — PANTOPRAZOLE SODIUM 40 MG/1
40 TABLET, DELAYED RELEASE ORAL
Status: DISCONTINUED | OUTPATIENT
Start: 2025-01-17 | End: 2025-01-23 | Stop reason: HOSPADM

## 2025-01-17 RX ORDER — MYCOPHENOLIC ACID 180 MG/1
720 TABLET, DELAYED RELEASE ORAL 2 TIMES DAILY
Qty: 240 TABLET | Refills: 0 | Status: SHIPPED | OUTPATIENT
Start: 2025-01-17 | End: 2025-01-20

## 2025-01-17 RX ORDER — OXYCODONE HCL 5 MG/5 ML
2.5 SOLUTION, ORAL ORAL EVERY 6 HOURS PRN
Status: DISCONTINUED | OUTPATIENT
Start: 2025-01-17 | End: 2025-01-23 | Stop reason: HOSPADM

## 2025-01-17 RX ORDER — LANOLIN ALCOHOL/MO/W.PET/CERES
100 CREAM (GRAM) TOPICAL DAILY
Qty: 30 TABLET | Refills: 0 | Status: SHIPPED | OUTPATIENT
Start: 2025-01-18 | End: 2025-01-27 | Stop reason: SDUPTHER

## 2025-01-17 RX ORDER — ACETAMINOPHEN 160 MG/5ML
650 LIQUID ORAL EVERY 6 HOURS PRN
Qty: 812 ML | Refills: 0 | Status: SHIPPED | OUTPATIENT
Start: 2025-01-17

## 2025-01-17 RX ORDER — MAGNESIUM SULFATE HEPTAHYDRATE 40 MG/ML
2 INJECTION, SOLUTION INTRAVENOUS ONCE
Status: COMPLETED | OUTPATIENT
Start: 2025-01-17 | End: 2025-01-17

## 2025-01-17 RX ORDER — OXYCODONE HCL 5 MG/5 ML
5 SOLUTION, ORAL ORAL EVERY 6 HOURS PRN
Status: DISCONTINUED | OUTPATIENT
Start: 2025-01-17 | End: 2025-01-23 | Stop reason: HOSPADM

## 2025-01-17 RX ORDER — METOCLOPRAMIDE 5 MG/1
5 TABLET ORAL EVERY 8 HOURS
Qty: 90 TABLET | Refills: 0 | Status: SHIPPED | OUTPATIENT
Start: 2025-01-17 | End: 2025-02-19

## 2025-01-17 RX ORDER — PANTOPRAZOLE SODIUM 40 MG/1
40 TABLET, DELAYED RELEASE ORAL
Qty: 60 TABLET | Refills: 0 | Status: SHIPPED | OUTPATIENT
Start: 2025-01-17 | End: 2025-01-27 | Stop reason: SDUPTHER

## 2025-01-17 RX ORDER — OXYCODONE HCL 5 MG/5 ML
5 SOLUTION, ORAL ORAL EVERY 6 HOURS PRN
Qty: 90 ML | Refills: 0 | Status: SHIPPED | OUTPATIENT
Start: 2025-01-17 | End: 2025-01-25

## 2025-01-17 RX ADMIN — ERYTHROMYCIN 250 MG: 250 TABLET, FILM COATED ORAL at 11:57

## 2025-01-17 RX ADMIN — ACETAMINOPHEN 650 MG: 160 SOLUTION ORAL at 16:40

## 2025-01-17 RX ADMIN — TACROLIMUS 2 MG: 1 CAPSULE ORAL at 18:24

## 2025-01-17 RX ADMIN — HEPARIN SODIUM 5000 UNITS: 5000 INJECTION INTRAVENOUS; SUBCUTANEOUS at 16:40

## 2025-01-17 RX ADMIN — INSULIN HUMAN 2 UNITS: 100 INJECTION, SOLUTION PARENTERAL at 12:03

## 2025-01-17 RX ADMIN — MYCOPHENOLIC ACID 180 MG: 180 TABLET, DELAYED RELEASE ORAL at 06:12

## 2025-01-17 RX ADMIN — METOCLOPRAMIDE 5 MG: 10 TABLET ORAL at 04:16

## 2025-01-17 RX ADMIN — NYSTATIN 500000 UNITS: 500000 SUSPENSION ORAL at 21:09

## 2025-01-17 RX ADMIN — INSULIN HUMAN 6 UNITS: 100 INJECTION, SOLUTION PARENTERAL at 18:25

## 2025-01-17 RX ADMIN — INSULIN LISPRO 4 UNITS: 100 INJECTION, SOLUTION INTRAVENOUS; SUBCUTANEOUS at 08:34

## 2025-01-17 RX ADMIN — ASCORBIC ACID, VITAMIN A PALMITATE, CHOLECALCIFEROL, THIAMINE HYDROCHLORIDE, RIBOFLAVIN-5 PHOSPHATE SODIUM, PYRIDOXINE HYDROCHLORIDE, NIACINAMIDE, DEXPANTHENOL, ALPHA-TOCOPHEROL ACETATE, VITAMIN K1, FOLIC ACID, BIOTIN, CYANOCOBALAMIN: 200; 3300; 200; 6; 3.6; 6; 40; 15; 10; 150; 600; 60; 5 INJECTION, SOLUTION INTRAVENOUS at 13:00

## 2025-01-17 RX ADMIN — HEPARIN SODIUM 5000 UNITS: 5000 INJECTION INTRAVENOUS; SUBCUTANEOUS at 08:27

## 2025-01-17 RX ADMIN — OXYCODONE HYDROCHLORIDE 5 MG: 5 SOLUTION ORAL at 21:00

## 2025-01-17 RX ADMIN — NYSTATIN 500000 UNITS: 500000 SUSPENSION ORAL at 13:00

## 2025-01-17 RX ADMIN — CALCIUM CARBONATE (ANTACID) CHEW TAB 500 MG 500 MG: 500 CHEW TAB at 06:12

## 2025-01-17 RX ADMIN — TACROLIMUS 2 MG: 1 CAPSULE ORAL at 06:12

## 2025-01-17 RX ADMIN — INSULIN LISPRO 4 UNITS: 100 INJECTION, SOLUTION INTRAVENOUS; SUBCUTANEOUS at 04:56

## 2025-01-17 RX ADMIN — SODIUM PHOSPHATE, MONOBASIC, MONOHYDRATE AND SODIUM PHOSPHATE, DIBASIC, ANHYDROUS 21 MMOL: 142; 276 INJECTION, SOLUTION INTRAVENOUS at 11:57

## 2025-01-17 RX ADMIN — METHOCARBAMOL 500 MG: 500 TABLET ORAL at 06:12

## 2025-01-17 RX ADMIN — METHOCARBAMOL 500 MG: 500 TABLET ORAL at 21:01

## 2025-01-17 RX ADMIN — PANTOPRAZOLE SODIUM 40 MG: 40 INJECTION, POWDER, FOR SOLUTION INTRAVENOUS at 08:30

## 2025-01-17 RX ADMIN — SODIUM ZIRCONIUM CYCLOSILICATE 5 G: 10 POWDER, FOR SUSPENSION ORAL at 08:25

## 2025-01-17 RX ADMIN — ROSUVASTATIN CALCIUM 10 MG: 10 TABLET, FILM COATED ORAL at 21:02

## 2025-01-17 RX ADMIN — METHOCARBAMOL 500 MG: 500 TABLET ORAL at 15:01

## 2025-01-17 RX ADMIN — PANTOPRAZOLE SODIUM 40 MG: 40 TABLET, DELAYED RELEASE ORAL at 16:40

## 2025-01-17 RX ADMIN — OXYCODONE HYDROCHLORIDE 5 MG: 5 SOLUTION ORAL at 04:32

## 2025-01-17 RX ADMIN — NYSTATIN 500000 UNITS: 500000 SUSPENSION ORAL at 16:40

## 2025-01-17 RX ADMIN — OXYCODONE HYDROCHLORIDE 5 MG: 5 SOLUTION ORAL at 11:57

## 2025-01-17 RX ADMIN — METOCLOPRAMIDE 5 MG: 10 TABLET ORAL at 21:02

## 2025-01-17 RX ADMIN — INSULIN LISPRO 4 UNITS: 100 INJECTION, SOLUTION INTRAVENOUS; SUBCUTANEOUS at 08:26

## 2025-01-17 RX ADMIN — ONDANSETRON 4 MG: 2 INJECTION INTRAMUSCULAR; INTRAVENOUS at 11:58

## 2025-01-17 RX ADMIN — NYSTATIN 500000 UNITS: 500000 SUSPENSION ORAL at 06:11

## 2025-01-17 RX ADMIN — INSULIN HUMAN 16 UNITS: 100 INJECTION, SUSPENSION SUBCUTANEOUS at 08:25

## 2025-01-17 RX ADMIN — INSULIN HUMAN 6 UNITS: 100 INJECTION, SOLUTION PARENTERAL at 12:03

## 2025-01-17 RX ADMIN — THIAMINE HCL TAB 100 MG 100 MG: 100 TAB at 08:27

## 2025-01-17 RX ADMIN — ERYTHROMYCIN 250 MG: 250 TABLET, FILM COATED ORAL at 18:25

## 2025-01-17 RX ADMIN — GABAPENTIN 200 MG: 100 CAPSULE ORAL at 08:26

## 2025-01-17 RX ADMIN — INSULIN HUMAN 12 UNITS: 100 INJECTION, SUSPENSION SUBCUTANEOUS at 21:03

## 2025-01-17 RX ADMIN — ONDANSETRON 4 MG: 2 INJECTION INTRAMUSCULAR; INTRAVENOUS at 21:01

## 2025-01-17 RX ADMIN — HYDROMORPHONE HYDROCHLORIDE 0.2 MG: 0.2 INJECTION, SOLUTION INTRAMUSCULAR; INTRAVENOUS; SUBCUTANEOUS at 08:24

## 2025-01-17 RX ADMIN — CALCIUM POLYCARBOPHIL 625 MG: 625 TABLET, FILM COATED ORAL at 08:27

## 2025-01-17 RX ADMIN — CALCIUM POLYCARBOPHIL 625 MG: 625 TABLET, FILM COATED ORAL at 21:02

## 2025-01-17 RX ADMIN — ERYTHROMYCIN 250 MG: 250 TABLET, FILM COATED ORAL at 01:31

## 2025-01-17 RX ADMIN — MYCOPHENOLIC ACID 180 MG: 180 TABLET, DELAYED RELEASE ORAL at 18:24

## 2025-01-17 RX ADMIN — ONDANSETRON 4 MG: 2 INJECTION INTRAMUSCULAR; INTRAVENOUS at 04:16

## 2025-01-17 RX ADMIN — CALCIUM CARBONATE (ANTACID) CHEW TAB 500 MG 500 MG: 500 CHEW TAB at 16:40

## 2025-01-17 RX ADMIN — PREDNISONE 5 MG: 5 TABLET ORAL at 08:27

## 2025-01-17 RX ADMIN — METOCLOPRAMIDE 5 MG: 10 TABLET ORAL at 11:58

## 2025-01-17 RX ADMIN — ERYTHROMYCIN 250 MG: 250 TABLET, FILM COATED ORAL at 06:12

## 2025-01-17 RX ADMIN — MAGNESIUM SULFATE HEPTAHYDRATE 2 G: 40 INJECTION, SOLUTION INTRAVENOUS at 08:24

## 2025-01-17 RX ADMIN — HEPARIN SODIUM 5000 UNITS: 5000 INJECTION INTRAVENOUS; SUBCUTANEOUS at 01:31

## 2025-01-17 RX ADMIN — AMLODIPINE BESYLATE 10 MG: 10 TABLET ORAL at 08:25

## 2025-01-17 ASSESSMENT — COGNITIVE AND FUNCTIONAL STATUS - GENERAL
HELP NEEDED FOR BATHING: A LITTLE
PERSONAL GROOMING: A LITTLE
DRESSING REGULAR LOWER BODY CLOTHING: A LITTLE
MOBILITY SCORE: 23
CLIMB 3 TO 5 STEPS WITH RAILING: A LITTLE
TOILETING: A LITTLE
DRESSING REGULAR UPPER BODY CLOTHING: A LITTLE
DAILY ACTIVITIY SCORE: 18
EATING MEALS: A LITTLE

## 2025-01-17 ASSESSMENT — PAIN SCALES - GENERAL
PAINLEVEL_OUTOF10: 7

## 2025-01-17 ASSESSMENT — ENCOUNTER SYMPTOMS
NUMBNESS: 0
FEVER: 0
SINUS PRESSURE: 0
POLYPHAGIA: 0
NAUSEA: 1
ABDOMINAL PAIN: 0
VOMITING: 1
SHORTNESS OF BREATH: 0
ABDOMINAL DISTENTION: 0
AGITATION: 0
APPETITE CHANGE: 1
DIZZINESS: 0
TREMORS: 1
ARTHRALGIAS: 0
CONSTIPATION: 0
DIARRHEA: 0
POLYDIPSIA: 0

## 2025-01-17 ASSESSMENT — PAIN - FUNCTIONAL ASSESSMENT: PAIN_FUNCTIONAL_ASSESSMENT: 0-10

## 2025-01-17 ASSESSMENT — PAIN DESCRIPTION - DESCRIPTORS: DESCRIPTORS: ACHING

## 2025-01-17 NOTE — PROGRESS NOTES
"Temo Hanson is a 74 y.o. male on day 17 of admission presenting with Dehydration.    Subjective:  He is feeling a little better with peg tube intake today     XRAY  IMPRESSION:  1.  Nonobstructive bowel gas pattern.  2. Postoperative changes without gross evidence of extravasation/leak.    Scheduled medications    amLODIPine, 10 mg, oral, Daily  calcium carbonate, 500 mg, oral, BID AC  calcium polycarbophiL, 625 mg, oral, BID  ceFAZolin, 2 g, intravenous, Once  darbepoetin nohemi, 100 mcg, subcutaneous, Once  erythromycin base, 250 mg, oral, q6h KASSY  gabapentin, 200 mg, oral, Daily  heparin (porcine), 5,000 Units, subcutaneous, q8h  insulin NPH (Isophane), 12 Units, subcutaneous, q12h KASSY  insulin regular, 0-10 Units, subcutaneous, q6h  lidocaine, 5 mL, infiltration, Once  lidocaine, 5 mL, infiltration, Once  lidocaine, 1 patch, transdermal, Daily  methocarbamol, 500 mg, oral, q8h KASSY  metoclopramide, 5 mg, oral, q8h  mycophenolate, 180 mg, oral, BID  nystatin, 5 mL, Swish & Swallow, 4x daily  ondansetron, 4 mg, intravenous, q8h  pantoprazole, 40 mg, oral, BID AC  predniSONE, 5 mg, oral, Daily  rosuvastatin, 10 mg, oral, Nightly  sodium phosphate, 21 mmol, intravenous, Once  [Held by provider] sulfamethoxazole-trimethoprim, 1 tablet, oral, Daily  tacrolimus, 2 mg, oral, q12h  thiamine, 100 mg, oral, Daily  [Held by provider] valGANciclovir, 900 mg, oral, q24h      Continuous medications  Adult Clinimix Parenteral Nutrition Continuous, 30 mL/hr, Last Rate: 55 mL/hr (01/17/25 1300)          PRN medications  PRN medications: acetaminophen, alteplase, alteplase, carboxymethylcellulose, dextrose, glucagon, HYDROmorphone, naloxone, oxyCODONE, oxyCODONE, sodium chloride 0.9%, sodium chloride 0.9%    Last Recorded Vitals  Blood pressure 145/62, pulse 87, temperature 36.3 °C (97.3 °F), temperature source Temporal, resp. rate 18, height 1.854 m (6' 1\"), weight 67.4 kg (148 lb 9.4 oz), SpO2 99%.  Intake/Output last 3 " Shifts:  I/O last 3 completed shifts:  In: 540 (8 mL/kg) [P.O.:540]  Out: 1028 (15.3 mL/kg) [Urine:1025 (0.4 mL/kg/hr); Stool:3]  Weight: 67.4 kg     A&ox3, no distress  MMM, no lesions  Lungs with equal air entry, no added sounds  Rrr, no m/r/g  Abd soft, slightly distended, nd, RLQ incision c/d/i, staples intact  No allograft tenderness  No edema bilaterally  PEG tube in place    Results for orders placed or performed during the hospital encounter of 12/31/24 (from the past 24 hours)   POCT GLUCOSE   Result Value Ref Range    POCT Glucose 262 (H) 74 - 99 mg/dL   POCT GLUCOSE   Result Value Ref Range    POCT Glucose 221 (H) 74 - 99 mg/dL   POCT GLUCOSE   Result Value Ref Range    POCT Glucose 91 74 - 99 mg/dL   POCT GLUCOSE   Result Value Ref Range    POCT Glucose 211 (H) 74 - 99 mg/dL   CMV DNA, quantitative, PCR   Result Value Ref Range    Cytomegalovirus DNA, PCR Log IU/ML      CMV DNA Result Not Detected Not Detected   Magnesium   Result Value Ref Range    Magnesium 1.92 1.60 - 2.40 mg/dL   Tacrolimus level   Result Value Ref Range    Tacrolimus  7.3 <=15.0 ng/mL   Renal Function Panel   Result Value Ref Range    Glucose 234 (H) 74 - 99 mg/dL    Sodium 131 (L) 136 - 145 mmol/L    Potassium 4.3 3.5 - 5.3 mmol/L    Chloride 101 98 - 107 mmol/L    Bicarbonate 25 21 - 32 mmol/L    Anion Gap 9 (L) 10 - 20 mmol/L    Urea Nitrogen 19 6 - 23 mg/dL    Creatinine 0.82 0.50 - 1.30 mg/dL    eGFR >90 >60 mL/min/1.73m*2    Calcium 7.9 (L) 8.6 - 10.6 mg/dL    Phosphorus 2.4 (L) 2.5 - 4.9 mg/dL    Albumin 2.8 (L) 3.4 - 5.0 g/dL   CBC and Auto Differential   Result Value Ref Range    WBC 3.1 (L) 4.4 - 11.3 x10*3/uL    nRBC 0.0 0.0 - 0.0 /100 WBCs    RBC 2.97 (L) 4.50 - 5.90 x10*6/uL    Hemoglobin 8.6 (L) 13.5 - 17.5 g/dL    Hematocrit 27.6 (L) 41.0 - 52.0 %    MCV 93 80 - 100 fL    MCH 29.0 26.0 - 34.0 pg    MCHC 31.2 (L) 32.0 - 36.0 g/dL    RDW 16.8 (H) 11.5 - 14.5 %    Platelets 189 150 - 450 x10*3/uL    Neutrophils % 65.9  40.0 - 80.0 %    Immature Granulocytes %, Automated 2.3 (H) 0.0 - 0.9 %    Lymphocytes % 16.7 13.0 - 44.0 %    Monocytes % 11.3 2.0 - 10.0 %    Eosinophils % 3.5 0.0 - 6.0 %    Basophils % 0.3 0.0 - 2.0 %    Neutrophils Absolute 2.05 1.60 - 5.50 x10*3/uL    Immature Granulocytes Absolute, Automated 0.07 0.00 - 0.50 x10*3/uL    Lymphocytes Absolute 0.52 (L) 0.80 - 3.00 x10*3/uL    Monocytes Absolute 0.35 0.05 - 0.80 x10*3/uL    Eosinophils Absolute 0.11 0.00 - 0.40 x10*3/uL    Basophils Absolute 0.01 0.00 - 0.10 x10*3/uL   POCT GLUCOSE   Result Value Ref Range    POCT Glucose 243 (H) 74 - 99 mg/dL   POCT GLUCOSE   Result Value Ref Range    POCT Glucose 173 (H) 74 - 99 mg/dL       Assessment/Plan     74 y.o. male with a hx of ESRD secondary to diabetic nephropathy whom received a  donor kidney transplant on 24 by Dr. Zamora with a KDPI of 93% and PRA of 54%. Donor was Hepc -/-and has not met risk factors. EBV D+ /R+, CMV D-/R+. Left donor kidney transplanted to patient right pelvis. Admission weight is 72.7 kg (discharge weight is 76.4 kg ). Pt received a total of 3 mg/kg total of thymoglobulin induction therapy in conjunction with 500mg IV solumedrol.      Post-txp course notable for slow graft function, now with DGF.     Allograft function: s/p DDKT 24  - DGF. , Cr 5.5,uremic on admission. s/p HD  for clearance.   Now Cr 1.4-1.5  IV fluid:  mL/hr x 1 L    Immunosuppression:  -        FK level = 8.2 ; continue 2 mg BID (erythromycin)  -           mg BID--> Myfortic 360 mg bid-->180 mg bid for leukopenia 25  -          Prednisone 10 mg daily    Prophylaxis:  PPI IV  Nystatin 500,000 units QID x 3 mo  HOLD TMP-SMX x 6 mo 1/12-  HOLD Valcyte, renally dosed x 3 mo 1/10-  CMV PCR neg     Esophageal dysphagia  - Upper GI c/f achalasia. S/p Heller Myotomy plus Andrea Fundoplication 20+ yrs ago - per surgery, unable to fix  - EGD/PEG tube placement 25    Blood pressure -  ok  - Amlodipine 10 mg daily    Anemia - iron replete  Darbe 200 mcg subcutaneous weekly  Leukopenia  - Valcyte held since 1/10  - hold TMP-SMX 1/12-    CKD-MBD - acceptable     Case was presented at multi disciplinary round today      Bashir Angela MD

## 2025-01-17 NOTE — CARE PLAN
The patient's goals for the shift include get better    The clinical goals for the shift include patient pain will be controlled this shift      Problem: Pain  Goal: Takes deep breaths with improved pain control throughout the shift  Outcome: Progressing  Goal: Turns in bed with improved pain control throughout the shift  Outcome: Progressing  Goal: Walks with improved pain control throughout the shift  Outcome: Progressing  Goal: Performs ADL's with improved pain control throughout shift  Outcome: Progressing  Goal: Participates in PT with improved pain control throughout the shift  Outcome: Progressing  Goal: Free from opioid side effects throughout the shift  Outcome: Progressing  Goal: Free from acute confusion related to pain meds throughout the shift  Outcome: Progressing     Problem: Skin  Goal: Decreased wound size/increased tissue granulation at next dressing change  Outcome: Progressing  Goal: Participates in plan/prevention/treatment measures  Outcome: Progressing  Goal: Prevent/manage excess moisture  Outcome: Progressing  Goal: Prevent/minimize sheer/friction injuries  Outcome: Progressing  Goal: Promote/optimize nutrition  Outcome: Progressing  Goal: Promote skin healing  Outcome: Progressing

## 2025-01-17 NOTE — PROGRESS NOTES
Nutrition Calorie Count:     Pt has been undergoing a calorie count. Has had complicated nutrition course on admission r/t abdominal pain, TF intolerance, and need for refeeding precautions. Pt was initially on Glucerna. Within the first few days he has significant changes in his glucose and electrolytes as well as emesis, causing a concern for refeeding. Precautions were taken for re-advancement. He continues to have intolerance with Glucerna 1.5. There was c/f possible gastroparesis. Pt required reglan+ zofran. Team placed central line and initiated TPN.- He reached Clinimix E 5/15 @ 55ml/hr. New tube feed formula was trailed with slow advancement. Pt has been tolerating this well. He is currently on Vital 1.5 @ 50ml/hr - being increased by 5mlQ6H. Plan for goal of 65ml/hr x 18 hours.     Intake has remained minimal and insufficient to meet calorie needs.     I/O:   Last BM Date: 01/14/25; Stool Appearance: Unable to assess (01/16/25 0920)    Dietary Orders (From admission, onward)       Start     Ordered    01/16/25 1105  Calorie count  Once         01/16/25 1104    01/15/25 1605  Enteral feeding WITH diet order Diet type: Consistent Carb; Carb diet selection: CCD 60 gm/meal; Tube feeding formula: Vital 1.5; 20; 30; Water; Every 6 hours  Continuous        Comments: Please increase tube feed rate my 5 mL every 6 hours as tolerated to reach goal of 50 ml/hr   Question Answer Comment   Diet type Consistent Carb    Carb diet selection: CCD 60 gm/meal    Tube feeding formula: Vital 1.5    Feeding route: PEG (percutaneous endoscopic gastric)    Tube feeding continuous rate (mL/hr): 20    Tube feeding flush (mL): 30    Flush type: Water    Flush frequency: Every 6 hours        01/15/25 1604    01/11/25 1435  Oral nutritional supplements  Until discontinued        Question Answer Comment   Deliver with All meals    Select supplement: Ensure High Protein        01/11/25 1435    01/02/25 1248  Oral nutritional supplements   Until discontinued        Question Answer Comment   Deliver with All meals    Select supplement: Magic Cup        01/02/25 1247    01/02/25 1247  Oral nutritional supplements  Until discontinued        Question Answer Comment   Deliver with All meals    Select supplement: Glucerjorge Donke        01/02/25 1247    12/31/24 1246  May Participate in Room Service With Assistance  ( ROOM SERVICE MAY PARTICIPATE WITH ASSISTANCE)  Once        Question:  .  Answer:  Yes    12/31/24 1245                     Estimated Needs:   Total Energy Estimated Needs (kCal): 2120 kCal  Method for Estimating Needs: 30kcal/kg CBW  Total Protein Estimated Needs (g): 85 g  Method for Estimating Needs: 1.2g/kg CBW +  Total Fluid Estimated Needs (mL):  (per MD/team)  Method for Estimating Needs: per MD/team          Nutrition Interventions/Recommendations         Nutrition Prescription:  Individualized Nutrition Prescription Provided for : Cyclic TF        Nutrition Interventions:    Enteral intake  Vital 1.5 @ 65ml/hr x 18 hours + 1pkt Prostat AWC daily   Continue to increase by 5mlQ6H until cycle goal has been reached  FWF per MD/team discretion   If pt begins to have issues with volume and or formula tolerance, would recommend PEG with J extension     Parenteral Nutrition   Decrease TPN rate today to 30ml/hr   Discontinue fully tomorrow     PO diet   Continue carb controlled diet  Discontinue / decrease ONS d/t poor acceptance     Continue to replete lytes PRN       Vital 1.5 @ 65ml/hr x 18 hours + Prostat AWC =1855kcal, 96gm protein    Collaboration and Referral of Nutrition Care: Collaboration by nutrition professional with other providers      Nutrition Monitoring and Evaluation   Food/Nutrient Related History Monitoring  Monitoring and Evaluation Plan: Enteral and parenteral nutrition intake  Enteral and Parenteral Nutrition Intake: Enteral nutrition intake  Criteria: Tolerate TF @ goal    Body Composition/Growth/Weight  History  Monitoring and Evaluation Plan: Weight  Criteria: stable weight    Biochemical Data, Medical Tests and Procedures  Monitoring and Evaluation Plan: Electrolyte/renal panel, Nutritional anemia profile  Criteria: WNL

## 2025-01-17 NOTE — PROGRESS NOTES
Temo Hanson is a 74 y.o. male POD#22 from DDKT from a DBD donor. Readmit for PO intolerance due to achalasia s/p Heller/Andrea myotomy with megaesophagus. Now s/p from PEG placement.     Doing well with TF 55/hr  Frustrated at being in hospital    Objective   Gen: A+OX3  HEENT: PERRL, MMM  Cardiac: RRR  Chest: Normal inspiratory effort  Abdomen: S/NT/ND. Incision C/D/I.  Ext: No LE edema    Last Recorded Vitals  Visit Vitals  /62 (BP Location: Right arm, Patient Position: Lying)   Pulse 87   Temp 36.3 °C (97.3 °F) (Temporal)   Resp 18      Intake/Output last 3 Shifts:    Intake/Output Summary (Last 24 hours) at 1/17/2025 1339  Last data filed at 1/17/2025 1321  Gross per 24 hour   Intake 1210 ml   Output 675 ml   Net 535 ml      Vitals:    01/16/25 0500   Weight: 67.4 kg (148 lb 9.4 oz)   Scheduled medications  amLODIPine, 10 mg, oral, Daily  calcium carbonate, 500 mg, oral, BID AC  calcium polycarbophiL, 625 mg, oral, BID  ceFAZolin, 2 g, intravenous, Once  darbepoetin nohemi, 100 mcg, subcutaneous, Once  erythromycin base, 250 mg, oral, q6h KASSY  gabapentin, 200 mg, oral, Daily  heparin (porcine), 5,000 Units, subcutaneous, q8h  insulin NPH (Isophane), 12 Units, subcutaneous, q12h KASSY  insulin regular, 0-10 Units, subcutaneous, q6h  lidocaine, 5 mL, infiltration, Once  lidocaine, 5 mL, infiltration, Once  lidocaine, 1 patch, transdermal, Daily  methocarbamol, 500 mg, oral, q8h KASSY  metoclopramide, 5 mg, oral, q8h  mycophenolate, 180 mg, oral, BID  nystatin, 5 mL, Swish & Swallow, 4x daily  ondansetron, 4 mg, intravenous, q8h  pantoprazole, 40 mg, oral, BID AC  predniSONE, 5 mg, oral, Daily  rosuvastatin, 10 mg, oral, Nightly  sodium phosphate, 21 mmol, intravenous, Once  [Held by provider] sulfamethoxazole-trimethoprim, 1 tablet, oral, Daily  tacrolimus, 2 mg, oral, q12h  thiamine, 100 mg, oral, Daily  [Held by provider] valGANciclovir, 900 mg, oral, q24h    Assessment/Plan   Principal Problem:    Kidney  transplant recipient    Achalasia/megaesophagus    Severe protein calorie malnutrition  Active Problems:  Patient Active Problem List   Diagnosis    S/P laparoscopic cholecystectomy    Abdominal pain    Achalasia    Acute lower UTI    Microcytic anemia    Complete heart block    Callus of foot    Chronic periodontitis, unspecified    Stage 5 chronic kidney disease (Multi)    Closed fracture of metatarsal bone    Crushing injury of foot    Diabetes mellitus (Multi)    Diarrhea    Dysphagia    ED (erectile dysfunction)    Essential hypertension    Foot pain, right    GIB (gastrointestinal bleeding)    Hepatitis B core antibody positive    Hyperkalemia    Ingrowing nail    Iron deficiency anemia secondary to inadequate dietary iron intake    Long toenail    Malignant neoplasm of prostate (Multi)    Onychomycosis    Pheochromocytoma of right adrenal gland    Pneumonia of right lower lobe due to infectious organism    Productive cough    Pure hypercholesterolemia    Stricture esophagus    SVT (supraventricular tachycardia) (CMS-HCC)    Type 2 diabetes mellitus with diabetic neuropathy (Multi)    Preop cardiovascular exam    Third degree heart block    Pericardial effusion (HHS-HCC)    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 (Multi)    Uremia    Cardiac tamponade    Cardiac pacemaker in situ    ESRD (end stage renal disease) (Multi)    Type 2 diabetes mellitus, with long-term current use of insulin    Dehydration    Alactasia syndrome    Type 2 diabetes mellitus with hyperglycemia, with long-term current use of insulin    On tube feeding diet       - Slowly advance TF via PE ml q 6 hr   - reached goal for 24 hr - will start cycling  - Start weaning off tpn  - Continue reglan 5 mg q8h  - cont erythromycin x5 days - ends this evening  - PT/OT  - Myfortic 360 mg bid/prednisone to 5 mg  - Tacrolimus goal 8-10 ng/mL     Possible home Monday with TF    I spent 35 minutes in the professional and overall care of this  patient, including immunosuppression management.    Cassidy Altman MD

## 2025-01-17 NOTE — PROGRESS NOTES
"Temo Hanson is a 74 y.o. male on day 17 of admission presenting with Dehydration.    Subjective:    WBC improved  Excellent kidney function  GI sypmtoms seems to get better  Can handle TF better    Scheduled medications    amLODIPine, 10 mg, oral, Daily  calcium carbonate, 500 mg, oral, BID AC  calcium polycarbophiL, 625 mg, oral, BID  ceFAZolin, 2 g, intravenous, Once  darbepoetin nohemi, 100 mcg, subcutaneous, Once  erythromycin base, 250 mg, oral, q6h KASSY  gabapentin, 200 mg, oral, Daily  heparin (porcine), 5,000 Units, subcutaneous, q8h  insulin NPH (Isophane), 12 Units, subcutaneous, q12h KASSY  insulin regular, 0-10 Units, subcutaneous, q6h  lidocaine, 5 mL, infiltration, Once  lidocaine, 5 mL, infiltration, Once  lidocaine, 1 patch, transdermal, Daily  methocarbamol, 500 mg, oral, q8h KASSY  metoclopramide, 5 mg, oral, q8h  mycophenolate, 180 mg, oral, BID  nystatin, 5 mL, Swish & Swallow, 4x daily  ondansetron, 4 mg, intravenous, q8h  pantoprazole, 40 mg, oral, BID AC  predniSONE, 5 mg, oral, Daily  rosuvastatin, 10 mg, oral, Nightly  sodium phosphate, 21 mmol, intravenous, Once  [Held by provider] sulfamethoxazole-trimethoprim, 1 tablet, oral, Daily  tacrolimus, 2 mg, oral, q12h  thiamine, 100 mg, oral, Daily  [Held by provider] valGANciclovir, 900 mg, oral, q24h      Continuous medications  Adult Clinimix Parenteral Nutrition Continuous, 30 mL/hr, Last Rate: 55 mL/hr (01/17/25 1300)          PRN medications  PRN medications: acetaminophen, alteplase, alteplase, carboxymethylcellulose, dextrose, glucagon, HYDROmorphone, naloxone, oxyCODONE, oxyCODONE, sodium chloride 0.9%, sodium chloride 0.9%    Last Recorded Vitals  Blood pressure 145/62, pulse 87, temperature 36.3 °C (97.3 °F), temperature source Temporal, resp. rate 18, height 1.854 m (6' 1\"), weight 67.4 kg (148 lb 9.4 oz), SpO2 99%.  Intake/Output last 3 Shifts:  I/O last 3 completed shifts:  In: 540 (8 mL/kg) [P.O.:540]  Out: 1028 (15.3 mL/kg) " [Urine:1025 (0.4 mL/kg/hr); Stool:3]  Weight: 67.4 kg     A&ox3, no distress  MMM, no lesions  Lungs with equal air entry, no added sounds  Rrr, no m/r/g  Abd soft, slightly distended, nd, RLQ incision c/d/i, staples intact  No allograft tenderness  No edema bilaterally  PEG tube in place    Results for orders placed or performed during the hospital encounter of 12/31/24 (from the past 24 hours)   POCT GLUCOSE   Result Value Ref Range    POCT Glucose 262 (H) 74 - 99 mg/dL   POCT GLUCOSE   Result Value Ref Range    POCT Glucose 221 (H) 74 - 99 mg/dL   POCT GLUCOSE   Result Value Ref Range    POCT Glucose 91 74 - 99 mg/dL   POCT GLUCOSE   Result Value Ref Range    POCT Glucose 211 (H) 74 - 99 mg/dL   CMV DNA, quantitative, PCR   Result Value Ref Range    Cytomegalovirus DNA, PCR Log IU/ML      CMV DNA Result Not Detected Not Detected   Magnesium   Result Value Ref Range    Magnesium 1.92 1.60 - 2.40 mg/dL   Tacrolimus level   Result Value Ref Range    Tacrolimus  7.3 <=15.0 ng/mL   Renal Function Panel   Result Value Ref Range    Glucose 234 (H) 74 - 99 mg/dL    Sodium 131 (L) 136 - 145 mmol/L    Potassium 4.3 3.5 - 5.3 mmol/L    Chloride 101 98 - 107 mmol/L    Bicarbonate 25 21 - 32 mmol/L    Anion Gap 9 (L) 10 - 20 mmol/L    Urea Nitrogen 19 6 - 23 mg/dL    Creatinine 0.82 0.50 - 1.30 mg/dL    eGFR >90 >60 mL/min/1.73m*2    Calcium 7.9 (L) 8.6 - 10.6 mg/dL    Phosphorus 2.4 (L) 2.5 - 4.9 mg/dL    Albumin 2.8 (L) 3.4 - 5.0 g/dL   CBC and Auto Differential   Result Value Ref Range    WBC 3.1 (L) 4.4 - 11.3 x10*3/uL    nRBC 0.0 0.0 - 0.0 /100 WBCs    RBC 2.97 (L) 4.50 - 5.90 x10*6/uL    Hemoglobin 8.6 (L) 13.5 - 17.5 g/dL    Hematocrit 27.6 (L) 41.0 - 52.0 %    MCV 93 80 - 100 fL    MCH 29.0 26.0 - 34.0 pg    MCHC 31.2 (L) 32.0 - 36.0 g/dL    RDW 16.8 (H) 11.5 - 14.5 %    Platelets 189 150 - 450 x10*3/uL    Neutrophils % 65.9 40.0 - 80.0 %    Immature Granulocytes %, Automated 2.3 (H) 0.0 - 0.9 %    Lymphocytes % 16.7  13.0 - 44.0 %    Monocytes % 11.3 2.0 - 10.0 %    Eosinophils % 3.5 0.0 - 6.0 %    Basophils % 0.3 0.0 - 2.0 %    Neutrophils Absolute 2.05 1.60 - 5.50 x10*3/uL    Immature Granulocytes Absolute, Automated 0.07 0.00 - 0.50 x10*3/uL    Lymphocytes Absolute 0.52 (L) 0.80 - 3.00 x10*3/uL    Monocytes Absolute 0.35 0.05 - 0.80 x10*3/uL    Eosinophils Absolute 0.11 0.00 - 0.40 x10*3/uL    Basophils Absolute 0.01 0.00 - 0.10 x10*3/uL   POCT GLUCOSE   Result Value Ref Range    POCT Glucose 243 (H) 74 - 99 mg/dL   POCT GLUCOSE   Result Value Ref Range    POCT Glucose 173 (H) 74 - 99 mg/dL       Assessment/Plan     74 y.o. male with a hx of ESRD secondary to diabetic nephropathy whom received a  donor kidney transplant on 24 by Dr. Zamora with a KDPI of 93% and PRA of 54%. Donor was Hepc -/-and has not met risk factors. EBV D+ /R+, CMV D-/R+. Left donor kidney transplanted to patient right pelvis. Admission weight is 72.7 kg (discharge weight is 76.4 kg ). Pt received a total of 3 mg/kg total of thymoglobulin induction therapy in conjunction with 500mg IV solumedrol.      Post-txp course notable for slow graft function, now with DGF.     Allograft function: s/p DDKT 24  - DGF. , Cr 5.5,uremic on admission. s/p HD  for clearance.   Now Cr 1.4-1.5  IV fluid:  mL/hr x 1 L    Immunosuppression:  -        FK level = 8.6; continue 2 mg BID (erythromycin)  -           mg BID--> Myfortic 360 mg bid-->180 mg bid for leukopenia 25  -          Prednisone 10 mg daily    Prophylaxis:  PPI IV  Nystatin 500,000 units QID x 3 mo  HOLD TMP-SMX x 6 mo -  HOLD Valcyte, renally dosed x 3 mo 1/10-  CMV PCR neg     Esophageal dysphagia  - Upper GI c/f achalasia. S/p Heller Myotomy plus Andrea Fundoplication 20+ yrs ago - per surgery, unable to fix  - EGD/PEG tube placement 25    Blood pressure - ok  - Amlodipine 10 mg daily    Anemia - iron replete  Darbe 200 mcg subcutaneous  weekly  Leukopenia  - Valcyte held since 1/10  - hold TMP-SMX 1/12-    CKD-MBD - acceptable       Case was presented at multi disciplinary round today    Bashir Angela MD

## 2025-01-17 NOTE — PROGRESS NOTES
Temo Hanson is a 74 y.o. male on day 17 of admission presenting with Dehydration.    Subjective   Patient seen and evaluated at the bedside. Today, TF increased from 50ml/h to new goal of 65ml/h. Also, TPN cut in half from 50ml/h to 30ml/h. Continues to eat small amount PO. Reviewed insulin and nutrition plan with patient.      I have reviewed histories, allergies and medications have been reviewed and there are no changes.     Objective   Review of Systems   Constitutional:  Positive for appetite change. Negative for fever.   HENT:  Negative for sinus pressure.    Respiratory:  Negative for shortness of breath.    Cardiovascular:  Positive for chest pain.        Burning chest pain   Gastrointestinal:  Positive for nausea and vomiting. Negative for abdominal distention, abdominal pain, constipation and diarrhea.   Endocrine: Negative for cold intolerance, heat intolerance, polydipsia, polyphagia and polyuria.   Musculoskeletal:  Negative for arthralgias.   Neurological:  Positive for tremors. Negative for dizziness and numbness.   Psychiatric/Behavioral:  Negative for agitation and behavioral problems.      Physical Exam  Constitutional:       Appearance: Normal appearance. He is normal weight.   HENT:      Head: Normocephalic and atraumatic.      Mouth/Throat:      Mouth: Mucous membranes are moist.   Eyes:      Pupils: Pupils are equal, round, and reactive to light.   Cardiovascular:      Rate and Rhythm: Normal rate and regular rhythm.   Pulmonary:      Effort: Pulmonary effort is normal.      Breath sounds: Normal breath sounds.   Abdominal:      Palpations: Abdomen is soft.   Skin:     General: Skin is warm.   Neurological:      Mental Status: He is alert and oriented to person, place, and time.   Psychiatric:         Mood and Affect: Mood normal.         Behavior: Behavior normal.         Thought Content: Thought content normal.         Judgment: Judgment normal.     Last Recorded Vitals  Blood pressure  "161/83, pulse 96, temperature 37 °C (98.6 °F), temperature source Temporal, resp. rate 18, height 1.854 m (6' 1\"), weight 67.4 kg (148 lb 9.4 oz), SpO2 99%.  Intake/Output last 3 Shifts:  I/O last 3 completed shifts:  In: 540 (8 mL/kg) [P.O.:540]  Out: 1028 (15.3 mL/kg) [Urine:1025 (0.4 mL/kg/hr); Stool:3]  Weight: 67.4 kg     Relevant Results  Results from last 7 days   Lab Units 01/17/25  0734 01/17/25  0541 01/17/25  0446 01/17/25  0022 01/16/25  1939 01/16/25  1629 01/16/25  0755 01/16/25  0603 01/15/25  0906 01/15/25  0523 01/14/25  2018 01/14/25  1948 01/14/25  1700 01/14/25  1515   POCT GLUCOSE mg/dL 243*  --  211* 91 221* 262*   < >  --    < >  --    < >  --    < >  --    GLUCOSE mg/dL  --  234*  --   --   --   --   --  118*  --  164*  --  251*  --  359*    < > = values in this interval not displayed.       Assessment/Plan   Temo Hanson is a 74 y.o. 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  Patient was started on tube feeds, NPO, today after placement of PEG tube. Patient states he felt nausea and vomiting, leading to a pause in his feedings around noon. Feedings restarted around 1500.      Diabetes History  Type of diabetes: 2  Year diagnosed or age: 46 years old  Hospitalizations for DKA or HHS: Once - inappropriately gave too much sliding scale due to low BG finger stick not correcting in under an hour  Complications: Nephropathy  Seen by PCP or Endocrinology: PCP - Dr. Hillman, Endo - Dr. Carey  Frequency of glucose checks: 4-5x daily after meals  Glucose review: persistent hyperglycemia this admission  Frequency of Hypoglycemia: none  Hypoglycemia unawareness: no  Severe hypoglycemia requiring assistance from others:  N     Home Medications  Basal: Glargine 8U  Prandial: Aspart 4U  Correction: Aspart sliding scale  Assessment & " Plan  Dehydration    Alactasia syndrome    Type 2 diabetes mellitus with hyperglycemia, with long-term current use of insulin    On tube feeding diet      PLAN  Steroids:   Prednisone 15 mg daily  1/13- Predinsone 10mg daily  1/16 - prednisone reduced to 5 mg     Nutrition:   1/10- 60g CHO diet + glucerna 1.5 continuous TF, goal of 50 ml/h. This provides 40 g of carbs every 6 hours when at goal.  1/11- TF reduced to 20ml/h so patient is getting 16g of carbs every 6 hours   1/12- TF stopped, PPN to start tonight at 30cc/h. Pt also on CLD  1/13 - PPN Started. CLD  1/14: TPN to increase to 55 ml/hr (previously at 41 ml/hr) Patient remains on clear liquid diet but with minimal intake  Tube feed: glucerna 1.5 started at 10 ml/hr at 2200 - patient made NPO  1/15: NPO. TPN to remain at 55 ml/hr  Tube feed: glucerna 1.5 started at 10 ml/hr -> switched to Vital 1.5 at 20 ml/hr, increase by 5 ml every 6 hours until goal rate of 50 ml/hr reached (40 carbs over 6 hrs)  Diet advanced in the afternoon  1/16: 60 gram Carb consistent + TF + TPN at 55 ml/hr. TF changed to Vital 1.5 starting at 30 ml/hr and increasing by 5 ml/hr until goal rate of 50ml/hr is reached (55 grams carbs/6 hrs)  1/17: TF increasing by this evening to goal of 65ml/h (72g of carbs / 6 hours). TPN cut in half from 50 ml/h to 30ml/h. Remains on PO diet.   1/18: TF will shut off at 1200 for 6 hours, then will continue to run at 65ml/h restarting at 1800. TPN will be off. Will remain on PO diet.     Patient hyperglycemic as he is eating in addition to receiving tube feeds and TPN.     - discontinue lispro insulin  - reduce NPH insulin to 12u BID  - start regular insulin 6u q6h  - start regular insulin scale #2 q6h    If TF/TPN is stopped: please reduce NPH to 5u BID, hold regular insulin 6u q6h and continue regular insulin scale #2 to q6h    -Accuchecks Q6h  - Goal -180  -Hypoglycemia protocol  -Will continue to follow and titrate insulin accordingly       Discharge planning:   [] patient may expect to discharge home on basal/bolus , final doses TBD by titration and nutrition status  [x]will provide CGM sample prior to discharge, gina 3 provided    [x]will enroll pt in  pharmacy platinum plan program  [x]follow up with Endo and PCP     I spent 50 mins on the care and coordination of the patient    Zohra Kang PA-C

## 2025-01-17 NOTE — PROGRESS NOTES
"Temo Hanson is a 74 y.o. male on day 17 of admission presenting with Dehydration.    Subjective:    Excellent kidney function  WBC improved  Increased TF rate, target 65 today  Decreased TPN rate to 30 mL and aim to stop it  PT/OT  Working on dispo planning    Scheduled medications    amLODIPine, 10 mg, oral, Daily  calcium carbonate, 500 mg, oral, BID AC  calcium polycarbophiL, 625 mg, oral, BID  ceFAZolin, 2 g, intravenous, Once  darbepoetin nohemi, 100 mcg, subcutaneous, Once  erythromycin base, 250 mg, oral, q6h KASSY  gabapentin, 200 mg, oral, Daily  heparin (porcine), 5,000 Units, subcutaneous, q8h  insulin NPH (Isophane), 12 Units, subcutaneous, q12h KASSY  insulin regular, 0-10 Units, subcutaneous, q6h  lidocaine, 5 mL, infiltration, Once  lidocaine, 5 mL, infiltration, Once  lidocaine, 1 patch, transdermal, Daily  methocarbamol, 500 mg, oral, q8h KASSY  metoclopramide, 5 mg, oral, q8h  mycophenolate, 180 mg, oral, BID  nystatin, 5 mL, Swish & Swallow, 4x daily  ondansetron, 4 mg, intravenous, q8h  pantoprazole, 40 mg, oral, BID AC  predniSONE, 5 mg, oral, Daily  rosuvastatin, 10 mg, oral, Nightly  sodium phosphate, 21 mmol, intravenous, Once  [Held by provider] sulfamethoxazole-trimethoprim, 1 tablet, oral, Daily  tacrolimus, 2 mg, oral, q12h  thiamine, 100 mg, oral, Daily  [Held by provider] valGANciclovir, 900 mg, oral, q24h      Continuous medications  Adult Clinimix Parenteral Nutrition Continuous, 30 mL/hr, Last Rate: 55 mL/hr (01/17/25 1300)          PRN medications  PRN medications: acetaminophen, alteplase, alteplase, carboxymethylcellulose, dextrose, glucagon, HYDROmorphone, naloxone, oxyCODONE, oxyCODONE, sodium chloride 0.9%, sodium chloride 0.9%    Last Recorded Vitals  Blood pressure 145/62, pulse 87, temperature 36.3 °C (97.3 °F), temperature source Temporal, resp. rate 18, height 1.854 m (6' 1\"), weight 67.4 kg (148 lb 9.4 oz), SpO2 99%.  Intake/Output last 3 Shifts:  I/O last 3 completed " shifts:  In: 540 (8 mL/kg) [P.O.:540]  Out: 1028 (15.3 mL/kg) [Urine:1025 (0.4 mL/kg/hr); Stool:3]  Weight: 67.4 kg     A&ox3, no distress  MMM, no lesions  Lungs with equal air entry, no added sounds  Rrr, no m/r/g  Abd soft, slightly distended, nd, RLQ incision c/d/i, staples intact  No allograft tenderness  No edema bilaterally  PEG tube in place    Results for orders placed or performed during the hospital encounter of 12/31/24 (from the past 24 hours)   POCT GLUCOSE   Result Value Ref Range    POCT Glucose 262 (H) 74 - 99 mg/dL   POCT GLUCOSE   Result Value Ref Range    POCT Glucose 221 (H) 74 - 99 mg/dL   POCT GLUCOSE   Result Value Ref Range    POCT Glucose 91 74 - 99 mg/dL   POCT GLUCOSE   Result Value Ref Range    POCT Glucose 211 (H) 74 - 99 mg/dL   CMV DNA, quantitative, PCR   Result Value Ref Range    Cytomegalovirus DNA, PCR Log IU/ML      CMV DNA Result Not Detected Not Detected   Magnesium   Result Value Ref Range    Magnesium 1.92 1.60 - 2.40 mg/dL   Tacrolimus level   Result Value Ref Range    Tacrolimus  7.3 <=15.0 ng/mL   Renal Function Panel   Result Value Ref Range    Glucose 234 (H) 74 - 99 mg/dL    Sodium 131 (L) 136 - 145 mmol/L    Potassium 4.3 3.5 - 5.3 mmol/L    Chloride 101 98 - 107 mmol/L    Bicarbonate 25 21 - 32 mmol/L    Anion Gap 9 (L) 10 - 20 mmol/L    Urea Nitrogen 19 6 - 23 mg/dL    Creatinine 0.82 0.50 - 1.30 mg/dL    eGFR >90 >60 mL/min/1.73m*2    Calcium 7.9 (L) 8.6 - 10.6 mg/dL    Phosphorus 2.4 (L) 2.5 - 4.9 mg/dL    Albumin 2.8 (L) 3.4 - 5.0 g/dL   CBC and Auto Differential   Result Value Ref Range    WBC 3.1 (L) 4.4 - 11.3 x10*3/uL    nRBC 0.0 0.0 - 0.0 /100 WBCs    RBC 2.97 (L) 4.50 - 5.90 x10*6/uL    Hemoglobin 8.6 (L) 13.5 - 17.5 g/dL    Hematocrit 27.6 (L) 41.0 - 52.0 %    MCV 93 80 - 100 fL    MCH 29.0 26.0 - 34.0 pg    MCHC 31.2 (L) 32.0 - 36.0 g/dL    RDW 16.8 (H) 11.5 - 14.5 %    Platelets 189 150 - 450 x10*3/uL    Neutrophils % 65.9 40.0 - 80.0 %    Immature  Granulocytes %, Automated 2.3 (H) 0.0 - 0.9 %    Lymphocytes % 16.7 13.0 - 44.0 %    Monocytes % 11.3 2.0 - 10.0 %    Eosinophils % 3.5 0.0 - 6.0 %    Basophils % 0.3 0.0 - 2.0 %    Neutrophils Absolute 2.05 1.60 - 5.50 x10*3/uL    Immature Granulocytes Absolute, Automated 0.07 0.00 - 0.50 x10*3/uL    Lymphocytes Absolute 0.52 (L) 0.80 - 3.00 x10*3/uL    Monocytes Absolute 0.35 0.05 - 0.80 x10*3/uL    Eosinophils Absolute 0.11 0.00 - 0.40 x10*3/uL    Basophils Absolute 0.01 0.00 - 0.10 x10*3/uL   POCT GLUCOSE   Result Value Ref Range    POCT Glucose 243 (H) 74 - 99 mg/dL   POCT GLUCOSE   Result Value Ref Range    POCT Glucose 173 (H) 74 - 99 mg/dL       Assessment/Plan     74 y.o. male with a hx of ESRD secondary to diabetic nephropathy whom received a  donor kidney transplant on 24 by Dr. Zamora with a KDPI of 93% and PRA of 54%. Donor was Hepc -/-and has not met risk factors. EBV D+ /R+, CMV D-/R+. Left donor kidney transplanted to patient right pelvis. Admission weight is 72.7 kg (discharge weight is 76.4 kg ). Pt received a total of 3 mg/kg total of thymoglobulin induction therapy in conjunction with 500mg IV solumedrol.      Post-txp course notable for slow graft function, now with DGF.     Allograft function: s/p DDKT 24  - DGF. , Cr 5.5,uremic on admission. s/p HD  for clearance.   Now Cr 1.4-1.5  IV fluid:  mL/hr x 1 L    Immunosuppression:  -           continue 2 mg BID;  WATCH level tomorrow. We stopped erythromycin  -           mg BID--> Myfortic 360 mg bid-->180 mg bid for leukopenia 25  -          Prednisone 10 mg daily    Prophylaxis:  PPI   Nystatin 500,000 units QID x 3 mo  HOLD TMP-SMX x 6 mo -  HOLD Valcyte, renally dosed x 3 mo 1/10-  CMV PCR neg     Esophageal dysphagia  - Upper GI c/f achalasia. S/p Heller Myotomy plus Andrae Fundoplication 20+ yrs ago - per surgery, unable to fix  - EGD/PEG tube placement 25    Blood pressure - ok  -  Amlodipine 10 mg daily    Anemia - iron replete  Darbe 100 mcg subcutaneous weekly    Leukopenia  - Valcyte held since 1/10  - hold TMP-SMX 1/12-  - May consider pentamidine  - Aranesp on Sunday    CKD-MBD - acceptable     Summary  Advance diet  Decrease TPN  IV PPI to PO  PT/OT    Case was presented at multi disciplinary round today      Bashir Angela MD

## 2025-01-17 NOTE — SIGNIFICANT EVENT
01/17/25 0944   Patient Interaction   Organ Kidney   Type of Interaction Morning rounds   Interdisciplinary Rounds   Attendance Surgeon;Physician;FILI;Coordinator;Pharmacist   Topics Discussed Diet;Home care needs;Medications;Blood test results;Activity     Transplant Surgery Multidisciplinary Team Note    Temo Hanson is a 74 y.o. male  POD#27  from a Kidney from a  kidney txp. His post operative complications: Other complication: malnutrition requiring PEG     24 Hour Events  1. No acute events     Last Recorded Vitals  Visit Vitals  /83 (BP Location: Right arm, Patient Position: Lying)   Pulse 96   Temp 37 °C (98.6 °F) (Temporal)   Resp 18      Intake/Output last 3 Shifts:    Intake/Output Summary (Last 24 hours) at 1/17/2025 0944  Last data filed at 1/17/2025 0737  Gross per 24 hour   Intake 460 ml   Output 678 ml   Net -218 ml      Vitals:    01/16/25 0500   Weight: 67.4 kg (148 lb 9.4 oz)        Assessment/Plan   -Increase TF rate to 55 ml/hr, increment by 5 ml/hr every 6 hours to 18 hour cycle goal of 65 ml/hr   -Cut TPN to 30 ml/hr, plan to stop tomorrow, 1/18   -Switch to PO PPI  -Weekly CMV while Valcyte held     Principal Problem:    Management after organ transplant  Active Problems:  Patient Active Problem List   Diagnosis    S/P laparoscopic cholecystectomy    Abdominal pain    Achalasia    Acute lower UTI    Microcytic anemia    Complete heart block    Callus of foot    Chronic periodontitis, unspecified    Stage 5 chronic kidney disease (Multi)    Closed fracture of metatarsal bone    Crushing injury of foot    Diabetes mellitus (Multi)    Diarrhea    Dysphagia    ED (erectile dysfunction)    Essential hypertension    Foot pain, right    GIB (gastrointestinal bleeding)    Hepatitis B core antibody positive    Hyperkalemia    Ingrowing nail    Iron deficiency anemia secondary to inadequate dietary iron intake    Long toenail    Malignant neoplasm of prostate (Multi)    Onychomycosis     Pheochromocytoma of right adrenal gland    Pneumonia of right lower lobe due to infectious organism    Productive cough    Pure hypercholesterolemia    Stricture esophagus    SVT (supraventricular tachycardia) (CMS-HCC)    Type 2 diabetes mellitus with diabetic neuropathy (Multi)    Preop cardiovascular exam    Third degree heart block    Pericardial effusion (HHS-HCC)    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 (Multi)    Uremia    Cardiac tamponade    Cardiac pacemaker in situ    ESRD (end stage renal disease) (Multi)    Type 2 diabetes mellitus, with long-term current use of insulin    Dehydration    Alactasia syndrome    Type 2 diabetes mellitus with hyperglycemia, with long-term current use of insulin    On tube feeding diet        Immunosuppression reviewed and adjusted       Induction: Thymoglobulin Full Dose       Tacrolimus goal 8-10 ng/mL. Current dose 2 mg BID         MMF 1000 mg po BID       Solumedrol taper  DVT prophylaxis SCDS and subcutaneous heparin 5000 TID  PT/OT  Diet: general, TPN, enteral feeds   Anticipated discharge next week, possibly Monday    Sherly García PA-C

## 2025-01-17 NOTE — HOSPITAL COURSE
Temo Hanson is a 74 y.o. male 74M ESRD on HD (RUE AVF) d/t diabetic nephropathy. 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 Was voiding postop, no dialysis at that time. Clinic admit d/t dehydration, 1 cup water/d. Altered, . Dialysis on admission [place] 11.8     Patient seen in dialysis room.  Hemodynamically stable.  Reports that after his most recent discharge, he remembers hearing someone say that he not does not need to drink as much.  Subsequently patient has only been drinking at most 1 cup of water per day.  Does not recall when he started feeling confused, but does note that he was encouraged to be admitted after clinic appointment today.  Reports that he still passing stool and urine, but that his urine is incredibly dark.  Denies smell to urine.  Denies any significant medical changes since last time he was admitted.     He had prolonged hospital course that included failure to thrive in adult with dehydration and diagnosis of severe protein-calorie malnutrition, placement of PEG tube, and intolerance of tube feeds initially. He was switched to TPN briefly and his tube feed formula was adjusted as per the dietitian recommendations. After his tube feed formula was adjusted to Vital 1.5, he was tolerating them at goal, he was cycled to 18 hours on, 6 hours off, TPN was weaned. Endocrinology followed for management of his blood glucose.  Other issues included initial pain at PEG site that was managed with muscle relaxants and narcotics. Concern for diabetic gastroparesis, he was started on Reglan and he was also treated with erythromycin x 5 days with some improvement. The patient was tolerating some PO though still with some chronic achalasia associated pain. Other issues during his admission included leukopenia for which his Bactrim and Valcyte were initially held, Myfortic dose was decreased to 180 mg BID. On 1/20 he received a dose of Pentamidine and his full dose Valcyte was resumed  with WBC stable at 3.1. Myfortic increased to 360 mg BID. He also received weekly Aranesp for his anemia of chronic disease. On 1/21 there was a misunderstanding that the patient had chest pain (was actually a pain just superior to the PEG that worsens when he eats and has been somewhat chronic). An EKG was obtained that appeared different from his previous EKG. Cardiology was consulted, recommended a pacer device check, which was obtained and unremarkable.  A STAT RFP was obtained due to reported tachycardia on the the EKG, showed a k+ of 5.7 and a blood glucose of 331 mg/dL. He was treated with  Lokelma, which was then ordered for discharge, and insulin to address the blood glucose. The following morning his lab work was improved, k+ of 5.0.     Pt is tolerating a carb controlled diet, maintaining adequate hydration with oral intake and tube feeds at goal, ambulating with assistance, and having BMs. Immunosuppression was managed by the transplant team. Patient is in stable condition for discharge home with renal telehealth today and homecare for enteral feeds, and PT/OT. RX delivered to bedside prior to discharge. The patient will f/u in the transplant institute and have labs drawn in the walk-in clinic.

## 2025-01-18 ENCOUNTER — PATIENT OUTREACH (OUTPATIENT)
Dept: CARE COORDINATION | Age: 75
End: 2025-01-18
Payer: COMMERCIAL

## 2025-01-18 LAB
ALBUMIN SERPL BCP-MCNC: 2.9 G/DL (ref 3.4–5)
ANION GAP SERPL CALC-SCNC: 11 MMOL/L (ref 10–20)
BASOPHILS # BLD AUTO: 0.02 X10*3/UL (ref 0–0.1)
BASOPHILS NFR BLD AUTO: 0.7 %
BUN SERPL-MCNC: 19 MG/DL (ref 6–23)
CALCIUM SERPL-MCNC: 8.3 MG/DL (ref 8.6–10.6)
CHLORIDE SERPL-SCNC: 100 MMOL/L (ref 98–107)
CO2 SERPL-SCNC: 26 MMOL/L (ref 21–32)
CREAT SERPL-MCNC: 0.77 MG/DL (ref 0.5–1.3)
CREAT UR-MCNC: 68.6 MG/DL (ref 20–370)
EGFRCR SERPLBLD CKD-EPI 2021: >90 ML/MIN/1.73M*2
EOSINOPHIL # BLD AUTO: 0.11 X10*3/UL (ref 0–0.4)
EOSINOPHIL NFR BLD AUTO: 3.6 %
ERYTHROCYTE [DISTWIDTH] IN BLOOD BY AUTOMATED COUNT: 16.9 % (ref 11.5–14.5)
GLUCOSE BLD MANUAL STRIP-MCNC: 104 MG/DL (ref 74–99)
GLUCOSE BLD MANUAL STRIP-MCNC: 144 MG/DL (ref 74–99)
GLUCOSE BLD MANUAL STRIP-MCNC: 157 MG/DL (ref 74–99)
GLUCOSE BLD MANUAL STRIP-MCNC: 183 MG/DL (ref 74–99)
GLUCOSE BLD MANUAL STRIP-MCNC: 190 MG/DL (ref 74–99)
GLUCOSE BLD MANUAL STRIP-MCNC: 194 MG/DL (ref 74–99)
GLUCOSE BLD MANUAL STRIP-MCNC: 202 MG/DL (ref 74–99)
GLUCOSE BLD MANUAL STRIP-MCNC: 210 MG/DL (ref 74–99)
GLUCOSE SERPL-MCNC: 121 MG/DL (ref 74–99)
HCT VFR BLD AUTO: 27.6 % (ref 41–52)
HGB BLD-MCNC: 8.6 G/DL (ref 13.5–17.5)
IMM GRANULOCYTES # BLD AUTO: 0.11 X10*3/UL (ref 0–0.5)
IMM GRANULOCYTES NFR BLD AUTO: 3.6 % (ref 0–0.9)
LYMPHOCYTES # BLD AUTO: 0.56 X10*3/UL (ref 0.8–3)
LYMPHOCYTES NFR BLD AUTO: 18.3 %
MAGNESIUM SERPL-MCNC: 1.95 MG/DL (ref 1.6–2.4)
MCH RBC QN AUTO: 29.2 PG (ref 26–34)
MCHC RBC AUTO-ENTMCNC: 31.2 G/DL (ref 32–36)
MCV RBC AUTO: 94 FL (ref 80–100)
MONOCYTES # BLD AUTO: 0.41 X10*3/UL (ref 0.05–0.8)
MONOCYTES NFR BLD AUTO: 13.4 %
NEUTROPHILS # BLD AUTO: 1.85 X10*3/UL (ref 1.6–5.5)
NEUTROPHILS NFR BLD AUTO: 60.4 %
NRBC BLD-RTO: 0 /100 WBCS (ref 0–0)
PHOSPHATE SERPL-MCNC: 2.8 MG/DL (ref 2.5–4.9)
PLATELET # BLD AUTO: 213 X10*3/UL (ref 150–450)
POTASSIUM SERPL-SCNC: 4.1 MMOL/L (ref 3.5–5.3)
PROT UR-ACNC: 29 MG/DL (ref 5–25)
PROT/CREAT UR: 0.42 MG/MG CREAT (ref 0–0.17)
RBC # BLD AUTO: 2.95 X10*6/UL (ref 4.5–5.9)
SODIUM SERPL-SCNC: 133 MMOL/L (ref 136–145)
TACROLIMUS BLD-MCNC: 7.6 NG/ML
WBC # BLD AUTO: 3.1 X10*3/UL (ref 4.4–11.3)

## 2025-01-18 PROCEDURE — 99233 SBSQ HOSP IP/OBS HIGH 50: CPT | Performed by: INTERNAL MEDICINE

## 2025-01-18 PROCEDURE — 2500000004 HC RX 250 GENERAL PHARMACY W/ HCPCS (ALT 636 FOR OP/ED): Performed by: PHYSICIAN ASSISTANT

## 2025-01-18 PROCEDURE — 99233 SBSQ HOSP IP/OBS HIGH 50: CPT | Performed by: PHYSICIAN ASSISTANT

## 2025-01-18 PROCEDURE — 99232 SBSQ HOSP IP/OBS MODERATE 35: CPT | Performed by: STUDENT IN AN ORGANIZED HEALTH CARE EDUCATION/TRAINING PROGRAM

## 2025-01-18 PROCEDURE — 87385 HISTOPLASMA CAPSUL AG IA: CPT | Performed by: PHYSICIAN ASSISTANT

## 2025-01-18 PROCEDURE — 36416 COLLJ CAPILLARY BLOOD SPEC: CPT | Performed by: PHYSICIAN ASSISTANT

## 2025-01-18 PROCEDURE — 82947 ASSAY GLUCOSE BLOOD QUANT: CPT

## 2025-01-18 PROCEDURE — 80197 ASSAY OF TACROLIMUS: CPT | Performed by: STUDENT IN AN ORGANIZED HEALTH CARE EDUCATION/TRAINING PROGRAM

## 2025-01-18 PROCEDURE — 2500000004 HC RX 250 GENERAL PHARMACY W/ HCPCS (ALT 636 FOR OP/ED)

## 2025-01-18 PROCEDURE — 2500000002 HC RX 250 W HCPCS SELF ADMINISTERED DRUGS (ALT 637 FOR MEDICARE OP, ALT 636 FOR OP/ED): Performed by: STUDENT IN AN ORGANIZED HEALTH CARE EDUCATION/TRAINING PROGRAM

## 2025-01-18 PROCEDURE — 84100 ASSAY OF PHOSPHORUS: CPT

## 2025-01-18 PROCEDURE — 2500000001 HC RX 250 WO HCPCS SELF ADMINISTERED DRUGS (ALT 637 FOR MEDICARE OP): Performed by: PHYSICIAN ASSISTANT

## 2025-01-18 PROCEDURE — 82570 ASSAY OF URINE CREATININE: CPT | Performed by: PHYSICIAN ASSISTANT

## 2025-01-18 PROCEDURE — 1100000001 HC PRIVATE ROOM DAILY

## 2025-01-18 PROCEDURE — 87799 DETECT AGENT NOS DNA QUANT: CPT | Performed by: PHYSICIAN ASSISTANT

## 2025-01-18 PROCEDURE — 2500000001 HC RX 250 WO HCPCS SELF ADMINISTERED DRUGS (ALT 637 FOR MEDICARE OP): Performed by: STUDENT IN AN ORGANIZED HEALTH CARE EDUCATION/TRAINING PROGRAM

## 2025-01-18 PROCEDURE — 83735 ASSAY OF MAGNESIUM: CPT | Performed by: STUDENT IN AN ORGANIZED HEALTH CARE EDUCATION/TRAINING PROGRAM

## 2025-01-18 PROCEDURE — 2500000002 HC RX 250 W HCPCS SELF ADMINISTERED DRUGS (ALT 637 FOR MEDICARE OP, ALT 636 FOR OP/ED): Performed by: PHYSICIAN ASSISTANT

## 2025-01-18 PROCEDURE — 87801 DETECT AGNT MULT DNA AMPLI: CPT | Performed by: PHYSICIAN ASSISTANT

## 2025-01-18 PROCEDURE — 2500000001 HC RX 250 WO HCPCS SELF ADMINISTERED DRUGS (ALT 637 FOR MEDICARE OP)

## 2025-01-18 PROCEDURE — 85025 COMPLETE CBC W/AUTO DIFF WBC: CPT | Performed by: PHYSICIAN ASSISTANT

## 2025-01-18 RX ORDER — MYCOPHENOLIC ACID 360 MG/1
360 TABLET, DELAYED RELEASE ORAL 2 TIMES DAILY
Status: DISCONTINUED | OUTPATIENT
Start: 2025-01-18 | End: 2025-01-23 | Stop reason: HOSPADM

## 2025-01-18 RX ADMIN — HYDROMORPHONE HYDROCHLORIDE 0.2 MG: 0.2 INJECTION, SOLUTION INTRAMUSCULAR; INTRAVENOUS; SUBCUTANEOUS at 00:32

## 2025-01-18 RX ADMIN — METOCLOPRAMIDE 5 MG: 10 TABLET ORAL at 04:17

## 2025-01-18 RX ADMIN — MYCOPHENOLIC ACID 180 MG: 180 TABLET, DELAYED RELEASE ORAL at 06:21

## 2025-01-18 RX ADMIN — INSULIN HUMAN 6 UNITS: 100 INJECTION, SOLUTION PARENTERAL at 06:27

## 2025-01-18 RX ADMIN — CALCIUM POLYCARBOPHIL 625 MG: 625 TABLET, FILM COATED ORAL at 08:21

## 2025-01-18 RX ADMIN — INSULIN HUMAN 4 UNITS: 100 INJECTION, SOLUTION PARENTERAL at 00:33

## 2025-01-18 RX ADMIN — HEPARIN SODIUM 5000 UNITS: 5000 INJECTION INTRAVENOUS; SUBCUTANEOUS at 08:21

## 2025-01-18 RX ADMIN — PANTOPRAZOLE SODIUM 40 MG: 40 TABLET, DELAYED RELEASE ORAL at 06:21

## 2025-01-18 RX ADMIN — CALCIUM CARBONATE (ANTACID) CHEW TAB 500 MG 500 MG: 500 CHEW TAB at 06:21

## 2025-01-18 RX ADMIN — ONDANSETRON 4 MG: 2 INJECTION INTRAMUSCULAR; INTRAVENOUS at 04:17

## 2025-01-18 RX ADMIN — INSULIN HUMAN 12 UNITS: 100 INJECTION, SUSPENSION SUBCUTANEOUS at 08:21

## 2025-01-18 RX ADMIN — ERYTHROMYCIN 250 MG: 250 TABLET, FILM COATED ORAL at 00:36

## 2025-01-18 RX ADMIN — NYSTATIN 500000 UNITS: 500000 SUSPENSION ORAL at 06:21

## 2025-01-18 RX ADMIN — ONDANSETRON 4 MG: 2 INJECTION INTRAMUSCULAR; INTRAVENOUS at 12:14

## 2025-01-18 RX ADMIN — ONDANSETRON 4 MG: 2 INJECTION INTRAMUSCULAR; INTRAVENOUS at 21:55

## 2025-01-18 RX ADMIN — NYSTATIN 500000 UNITS: 500000 SUSPENSION ORAL at 12:14

## 2025-01-18 RX ADMIN — NYSTATIN 500000 UNITS: 500000 SUSPENSION ORAL at 18:04

## 2025-01-18 RX ADMIN — ROSUVASTATIN CALCIUM 10 MG: 10 TABLET, FILM COATED ORAL at 21:55

## 2025-01-18 RX ADMIN — THIAMINE HCL TAB 100 MG 100 MG: 100 TAB at 08:21

## 2025-01-18 RX ADMIN — TACROLIMUS 2 MG: 1 CAPSULE ORAL at 06:21

## 2025-01-18 RX ADMIN — CALCIUM CARBONATE (ANTACID) CHEW TAB 500 MG 500 MG: 500 CHEW TAB at 15:18

## 2025-01-18 RX ADMIN — TACROLIMUS 2 MG: 1 CAPSULE ORAL at 18:04

## 2025-01-18 RX ADMIN — OXYCODONE HYDROCHLORIDE 5 MG: 5 SOLUTION ORAL at 12:13

## 2025-01-18 RX ADMIN — PREDNISONE 5 MG: 5 TABLET ORAL at 08:21

## 2025-01-18 RX ADMIN — METHOCARBAMOL 500 MG: 500 TABLET ORAL at 06:21

## 2025-01-18 RX ADMIN — METHOCARBAMOL 500 MG: 500 TABLET ORAL at 15:17

## 2025-01-18 RX ADMIN — METOCLOPRAMIDE 5 MG: 10 TABLET ORAL at 12:14

## 2025-01-18 RX ADMIN — ACETAMINOPHEN 650 MG: 160 SOLUTION ORAL at 17:01

## 2025-01-18 RX ADMIN — INSULIN HUMAN 2 UNITS: 100 INJECTION, SOLUTION PARENTERAL at 12:14

## 2025-01-18 RX ADMIN — GABAPENTIN 200 MG: 100 CAPSULE ORAL at 08:21

## 2025-01-18 RX ADMIN — INSULIN HUMAN 6 UNITS: 100 INJECTION, SOLUTION PARENTERAL at 00:35

## 2025-01-18 RX ADMIN — AMLODIPINE BESYLATE 10 MG: 10 TABLET ORAL at 08:21

## 2025-01-18 RX ADMIN — DARBEPOETIN ALFA 100 MCG: 100 INJECTION, SOLUTION INTRAVENOUS; SUBCUTANEOUS at 12:14

## 2025-01-18 RX ADMIN — CALCIUM POLYCARBOPHIL 625 MG: 625 TABLET, FILM COATED ORAL at 21:55

## 2025-01-18 RX ADMIN — MYCOPHENOLIC ACID 360 MG: 360 TABLET, DELAYED RELEASE ORAL at 18:04

## 2025-01-18 RX ADMIN — HEPARIN SODIUM 5000 UNITS: 5000 INJECTION INTRAVENOUS; SUBCUTANEOUS at 00:33

## 2025-01-18 RX ADMIN — ERYTHROMYCIN 250 MG: 250 TABLET, FILM COATED ORAL at 06:21

## 2025-01-18 RX ADMIN — METOCLOPRAMIDE 5 MG: 10 TABLET ORAL at 21:56

## 2025-01-18 RX ADMIN — PANTOPRAZOLE SODIUM 40 MG: 40 TABLET, DELAYED RELEASE ORAL at 15:17

## 2025-01-18 RX ADMIN — INSULIN HUMAN 4 UNITS: 100 INJECTION, SOLUTION PARENTERAL at 18:07

## 2025-01-18 RX ADMIN — HEPARIN SODIUM 5000 UNITS: 5000 INJECTION INTRAVENOUS; SUBCUTANEOUS at 15:17

## 2025-01-18 ASSESSMENT — PAIN DESCRIPTION - LOCATION: LOCATION: ABDOMEN

## 2025-01-18 ASSESSMENT — ENCOUNTER SYMPTOMS
SINUS PRESSURE: 0
ARTHRALGIAS: 0
POLYDIPSIA: 0
APPETITE CHANGE: 1
ABDOMINAL DISTENTION: 0
CONSTIPATION: 0
DIZZINESS: 0
FEVER: 0
POLYPHAGIA: 0
ABDOMINAL PAIN: 0
DIARRHEA: 0
AGITATION: 0
TREMORS: 1
NUMBNESS: 0
VOMITING: 1
SHORTNESS OF BREATH: 0
NAUSEA: 1

## 2025-01-18 ASSESSMENT — PAIN - FUNCTIONAL ASSESSMENT
PAIN_FUNCTIONAL_ASSESSMENT: 0-10

## 2025-01-18 ASSESSMENT — COGNITIVE AND FUNCTIONAL STATUS - GENERAL
TOILETING: A LITTLE
DRESSING REGULAR LOWER BODY CLOTHING: A LITTLE
CLIMB 3 TO 5 STEPS WITH RAILING: A LITTLE
DRESSING REGULAR UPPER BODY CLOTHING: A LITTLE
MOBILITY SCORE: 23
PERSONAL GROOMING: A LITTLE
DAILY ACTIVITIY SCORE: 18
HELP NEEDED FOR BATHING: A LITTLE
EATING MEALS: A LITTLE

## 2025-01-18 ASSESSMENT — PAIN SCALES - GENERAL
PAINLEVEL_OUTOF10: 8
PAINLEVEL_OUTOF10: 3
PAINLEVEL_OUTOF10: 0 - NO PAIN
PAINLEVEL_OUTOF10: 0 - NO PAIN
PAINLEVEL_OUTOF10: 8

## 2025-01-18 ASSESSMENT — PAIN DESCRIPTION - DESCRIPTORS
DESCRIPTORS: ACHING
DESCRIPTORS: ACHING

## 2025-01-18 ASSESSMENT — PAIN DESCRIPTION - ORIENTATION: ORIENTATION: MID

## 2025-01-18 NOTE — CARE PLAN
Problem: Skin  Goal: Decreased wound size/increased tissue granulation at next dressing change  Outcome: Progressing  Flowsheets (Taken 1/10/2025 0438 by Belle Canas, RN)  Decreased wound size/increased tissue granulation at next dressing change:   Protective dressings over bony prominences   Promote sleep for wound healing  Goal: Participates in plan/prevention/treatment measures  Outcome: Progressing  Flowsheets (Taken 1/10/2025 0438 by Belle Canas, RN)  Participates in plan/prevention/treatment measures:   Elevate heels   Increase activity/out of bed for meals  Goal: Prevent/manage excess moisture  Outcome: Progressing  Flowsheets (Taken 1/10/2025 0438 by Belle Canas, RN)  Prevent/manage excess moisture:   Cleanse incontinence/protect with barrier cream   Monitor for/manage infection if present   Follow provider orders for dressing changes   Moisturize dry skin  Goal: Prevent/minimize sheer/friction injuries  Outcome: Progressing  Flowsheets (Taken 1/8/2025 1721 by Katt Moody RN)  Prevent/minimize sheer/friction injuries:   Increase activity/out of bed for meals   Turn/reposition every 2 hours/use positioning/transfer devices  Goal: Promote/optimize nutrition  Outcome: Progressing  Flowsheets (Taken 1/8/2025 1721 by Katt Moody RN)  Promote/optimize nutrition: Monitor/record intake including meals  Goal: Promote skin healing  Outcome: Progressing  Flowsheets (Taken 1/9/2025 0841 by Amy Alvarez RN)  Promote skin healing:   Assess skin/pad under line(s)/device(s)   Protective dressings over bony prominences   Turn/reposition every 2 hours/use positioning/transfer devices   The patient's goals for the shift include get better    The clinical goals for the shift include patient will have adequate pain control throughout this shift.

## 2025-01-18 NOTE — PROGRESS NOTES
Temo Hanson is a 74 y.o. male POD#22 from DDKT from a DBD donor. Readmit for PO intolerance due to achalasia s/p Heller/Andrea myotomy with megaesophagus. Now s/p from PEG placement.     Doing well with TF 65/hr  Frustrated at being in hospital    Objective   Gen: A+OX3  HEENT: PERRL, MMM  Cardiac: RRR  Chest: Normal inspiratory effort  Abdomen: S/NT/ND. Incision C/D/I.  Ext: No LE edema    Last Recorded Vitals  Visit Vitals  /70 (BP Location: Right arm, Patient Position: Sitting)   Pulse 99   Temp 36.6 °C (97.9 °F) (Temporal)   Resp 18      Intake/Output last 3 Shifts:    Intake/Output Summary (Last 24 hours) at 1/18/2025 1145  Last data filed at 1/18/2025 1111  Gross per 24 hour   Intake 2090 ml   Output --   Net 2090 ml      Vitals:    01/18/25 0611   Weight: 71.1 kg (156 lb 11.2 oz)   Scheduled medications  amLODIPine, 10 mg, oral, Daily  calcium carbonate, 500 mg, oral, BID AC  calcium polycarbophiL, 625 mg, oral, BID  darbepoetin nohemi, 100 mcg, subcutaneous, Once  gabapentin, 200 mg, oral, Daily  heparin (porcine), 5,000 Units, subcutaneous, q8h  insulin NPH (Isophane), 8 Units, subcutaneous, q12h KASSY  insulin regular, 0-10 Units, subcutaneous, q6h  [Held by provider] insulin regular, 4 Units, subcutaneous, q6h KASSY  lidocaine, 1 patch, transdermal, Daily  methocarbamol, 500 mg, oral, q8h KASSY  metoclopramide, 5 mg, oral, q8h  mycophenolate, 360 mg, oral, BID  nystatin, 5 mL, Swish & Swallow, 4x daily  ondansetron, 4 mg, intravenous, q8h  pantoprazole, 40 mg, oral, BID AC  predniSONE, 5 mg, oral, Daily  rosuvastatin, 10 mg, oral, Nightly  [Held by provider] sulfamethoxazole-trimethoprim, 1 tablet, oral, Daily  tacrolimus, 2 mg, oral, q12h  thiamine, 100 mg, oral, Daily  [Held by provider] valGANciclovir, 900 mg, oral, q24h    Assessment/Plan   Principal Problem:    Kidney transplant recipient    Achalasia/megaesophagus    Severe protein calorie malnutrition  Active Problems:  Patient Active Problem  List   Diagnosis    S/P laparoscopic cholecystectomy    Abdominal pain    Achalasia    Acute lower UTI    Microcytic anemia    Complete heart block    Callus of foot    Chronic periodontitis, unspecified    Stage 5 chronic kidney disease (Multi)    Closed fracture of metatarsal bone    Crushing injury of foot    Diabetes mellitus (Multi)    Diarrhea    Dysphagia    ED (erectile dysfunction)    Essential hypertension    Foot pain, right    GIB (gastrointestinal bleeding)    Hepatitis B core antibody positive    Hyperkalemia    Ingrowing nail    Iron deficiency anemia secondary to inadequate dietary iron intake    Long toenail    Malignant neoplasm of prostate (Multi)    Onychomycosis    Pheochromocytoma of right adrenal gland    Pneumonia of right lower lobe due to infectious organism    Productive cough    Pure hypercholesterolemia    Stricture esophagus    SVT (supraventricular tachycardia) (CMS-HCC)    Type 2 diabetes mellitus with diabetic neuropathy (Multi)    Preop cardiovascular exam    Third degree heart block    Pericardial effusion (HHS-HCC)    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 (Multi)    Uremia    Cardiac tamponade    Cardiac pacemaker in situ    ESRD (end stage renal disease) (Multi)    Type 2 diabetes mellitus, with long-term current use of insulin    Dehydration    Alactasia syndrome    Type 2 diabetes mellitus with hyperglycemia, with long-term current use of insulin    On tube feeding diet       - Slowly advance TF via PE ml q 6 hr   - reached goal for 24 hr - to go to 18 on, 6 off today  - Start weaning off tpn - ends today  - Continue reglan 5 mg q8h  - cont erythromycin x5 days - completed   - PT/OT  - Myfortic increased to 360 mg bid/prednisone to 5 mg  - Tacrolimus goal 8-10 ng/mL     Possible home Monday with TF    I spent 35 minutes in the professional and overall care of this patient, including immunosuppression management.    Cassidy Altman MD

## 2025-01-18 NOTE — CARE PLAN
The patient's goals for the shift include get better    The clinical goals for the shift include pt will remain safe and free from falls throughout shift      Problem: Pain  Goal: Takes deep breaths with improved pain control throughout the shift  Outcome: Progressing  Goal: Turns in bed with improved pain control throughout the shift  Outcome: Progressing  Goal: Walks with improved pain control throughout the shift  Outcome: Progressing  Goal: Performs ADL's with improved pain control throughout shift  Outcome: Progressing  Goal: Participates in PT with improved pain control throughout the shift  Outcome: Progressing  Goal: Free from opioid side effects throughout the shift  Outcome: Progressing  Goal: Free from acute confusion related to pain meds throughout the shift  Outcome: Progressing     Problem: Skin  Goal: Decreased wound size/increased tissue granulation at next dressing change  Outcome: Progressing  Flowsheets (Taken 1/18/2025 0300 by Veronika Walsh RN)  Decreased wound size/increased tissue granulation at next dressing change:   Protective dressings over bony prominences   Promote sleep for wound healing  Goal: Participates in plan/prevention/treatment measures  Outcome: Progressing  Flowsheets (Taken 1/18/2025 0300 by Veronika Walsh RN)  Participates in plan/prevention/treatment measures:   Elevate heels   Increase activity/out of bed for meals  Goal: Prevent/manage excess moisture  Outcome: Progressing  Flowsheets (Taken 1/18/2025 0300 by Veronika Walsh, RN)  Prevent/manage excess moisture:   Cleanse incontinence/protect with barrier cream   Follow provider orders for dressing changes   Monitor for/manage infection if present  Goal: Prevent/minimize sheer/friction injuries  Outcome: Progressing  Flowsheets (Taken 1/18/2025 0300 by Veronika Walsh RN)  Prevent/minimize sheer/friction injuries:   HOB 30 degrees or less   Increase activity/out of bed for meals  Goal: Promote/optimize nutrition  Outcome:  Progressing  Flowsheets (Taken 1/18/2025 0300 by Veronika Walsh, RN)  Promote/optimize nutrition: Consume > 50% meals/supplements  Goal: Promote skin healing  Outcome: Progressing  Flowsheets (Taken 1/18/2025 0300 by Veronika Walsh, RN)  Promote skin healing:   Assess skin/pad under line(s)/device(s)   Protective dressings over bony prominences

## 2025-01-18 NOTE — PROGRESS NOTES
Temo Hanson is a 74 y.o. male on day 18 of admission presenting with Dehydration.    Subjective   Patient seen and evaluated at the bedside. Today, TF will be stopped at noon then resumed at 18:00, TPN will be stopped completely at noon. Insulin dose reductions ordered to reflect reduced glucose sources.   Continues to eat small amount PO. Reviewed insulin and nutrition plan with patient.      I have reviewed histories, allergies and medications have been reviewed and there are no changes.     Objective   Review of Systems   Constitutional:  Positive for appetite change. Negative for fever.   HENT:  Negative for sinus pressure.    Respiratory:  Negative for shortness of breath.    Cardiovascular:  Positive for chest pain.        Burning chest pain   Gastrointestinal:  Positive for nausea and vomiting. Negative for abdominal distention, abdominal pain, constipation and diarrhea.   Endocrine: Negative for cold intolerance, heat intolerance, polydipsia, polyphagia and polyuria.   Musculoskeletal:  Negative for arthralgias.   Neurological:  Positive for tremors. Negative for dizziness and numbness.   Psychiatric/Behavioral:  Negative for agitation and behavioral problems.      Physical Exam  Constitutional:       Appearance: Normal appearance. He is normal weight.   HENT:      Head: Normocephalic and atraumatic.      Mouth/Throat:      Mouth: Mucous membranes are moist.   Eyes:      Pupils: Pupils are equal, round, and reactive to light.   Cardiovascular:      Rate and Rhythm: Normal rate and regular rhythm.   Pulmonary:      Effort: Pulmonary effort is normal.      Breath sounds: Normal breath sounds.   Abdominal:      Palpations: Abdomen is soft.   Skin:     General: Skin is warm.   Neurological:      Mental Status: He is alert and oriented to person, place, and time.   Psychiatric:         Mood and Affect: Mood normal.         Behavior: Behavior normal.         Thought Content: Thought content normal.          "Judgment: Judgment normal.     Last Recorded Vitals  Blood pressure 149/70, pulse 99, temperature 36.6 °C (97.9 °F), temperature source Temporal, resp. rate 18, height 1.854 m (6' 1\"), weight 71.1 kg (156 lb 11.2 oz), SpO2 96%.  Intake/Output last 3 Shifts:  I/O last 3 completed shifts:  In: 2210 (31.1 mL/kg) [P.O.:800; NG/GT:1160; IV Piggyback:250]  Out: 675 (9.5 mL/kg) [Urine:675 (0.3 mL/kg/hr)]  Weight: 71.1 kg     Relevant Results  Results from last 7 days   Lab Units 01/18/25  1134 01/18/25  0801 01/18/25  0556 01/18/25  0348 01/17/25  2352 01/17/25  2035 01/17/25  0734 01/17/25  0541 01/16/25  0755 01/16/25  0603 01/15/25  0906 01/15/25  0523 01/14/25  2018 01/14/25  1948   POCT GLUCOSE mg/dL 194* 183*  --  104* 210* 198*   < >  --    < >  --    < >  --    < >  --    GLUCOSE mg/dL  --   --  121*  --   --   --   --  234*  --  118*  --  164*  --  251*    < > = values in this interval not displayed.       Assessment/Plan   Temo Hanson is a 74 y.o. 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  Patient was started on tube feeds, NPO, today after placement of PEG tube. Patient states he felt nausea and vomiting, leading to a pause in his feedings around noon. Feedings restarted around 1500.      Diabetes History  Type of diabetes: 2  Year diagnosed or age: 46 years old  Hospitalizations for DKA or HHS: Once - inappropriately gave too much sliding scale due to low BG finger stick not correcting in under an hour  Complications: Nephropathy  Seen by PCP or Endocrinology: PCP - Dr. Hillman, Endo - Dr. Carey  Frequency of glucose checks: 4-5x daily after meals  Glucose review: persistent hyperglycemia this admission  Frequency of Hypoglycemia: none  Hypoglycemia unawareness: no  Severe hypoglycemia requiring assistance from others:  N     Home Medications  Basal: " Glargine 8U  Prandial: Aspart 4U  Correction: Aspart sliding scale  Assessment & Plan  Dehydration    Alactasia syndrome    Type 2 diabetes mellitus with hyperglycemia, with long-term current use of insulin    On tube feeding diet      PLAN  Steroids:   Prednisone 15 mg daily  1/13- Predinsone 10mg daily  1/16 - prednisone reduced to 5 mg     Nutrition:   1/10- 60g CHO diet + glucerna 1.5 continuous TF, goal of 50 ml/h. This provides 40 g of carbs every 6 hours when at goal.  1/11- TF reduced to 20ml/h so patient is getting 16g of carbs every 6 hours   1/12- TF stopped, PPN to start tonight at 30cc/h. Pt also on CLD  1/13 - PPN Started. CLD  1/14: TPN to increase to 55 ml/hr (previously at 41 ml/hr) Patient remains on clear liquid diet but with minimal intake  Tube feed: glucerna 1.5 started at 10 ml/hr at 2200 - patient made NPO  1/15: NPO. TPN to remain at 55 ml/hr  Tube feed: glucerna 1.5 started at 10 ml/hr -> switched to Vital 1.5 at 20 ml/hr, increase by 5 ml every 6 hours until goal rate of 50 ml/hr reached (40 carbs over 6 hrs)  Diet advanced in the afternoon  1/16: 60 gram Carb consistent + TF + TPN at 55 ml/hr. TF changed to Vital 1.5 starting at 30 ml/hr and increasing by 5 ml/hr until goal rate of 50ml/hr is reached (55 grams carbs/6 hrs)  1/17: TF increasing by this evening to goal of 65ml/h (72g of carbs / 6 hours). TPN cut in half from 50 ml/h to 30ml/h. Remains on PO diet.   1/18: TF will shut off at 1200 for 6 hours, then will continue to run at 65ml/h restarting at 1800 until 1200 on 1/19, then be stopped as well. TPN will be off. Will remain on PO diet.     Patient hyperglycemic as he is eating in addition to receiving tube feeds and TPN.       - reduce NPH insulin to 8u BID      After TF is stopped at noon tomorrow (1/19), reduce further to 5u q12    - reduce regular insulin to 4u q6h, and place order on hold after 06:00 dose today, then unhold for 18:00 dose and after       When TF is stopped  tomorrow: stop standing regular insulin dose    - continue regular insulin scale #2 q6h, please continue use of this order regardless of nutrition status to address hyperglycemia    -Accuchecks Q6h  - Goal -180  -Hypoglycemia protocol  -Will continue to follow and titrate insulin accordingly      Discharge planning:   [] patient may expect to discharge home on basal/bolus , final doses TBD by titration and nutrition status  [x]will provide CGM sample prior to discharge, gina 3 provided    [x]will enroll pt in  pharmacy platinum plan program  [x]follow up with ROD Lainez in PTDM clinic as scheduled on 1/30/25 as bridge back to home endo and PCP     I spent 50 mins on the care and coordination of the patient    Tiffanie Crum PA-C

## 2025-01-19 LAB
ALBUMIN SERPL BCP-MCNC: 2.5 G/DL (ref 3.4–5)
ANION GAP SERPL CALC-SCNC: 11 MMOL/L (ref 10–20)
BASOPHILS # BLD AUTO: 0.03 X10*3/UL (ref 0–0.1)
BASOPHILS NFR BLD AUTO: 0.9 %
BKV DNA SERPL NAA+PROBE-LOG#: NORMAL {LOG_COPIES}/ML
BUN SERPL-MCNC: 13 MG/DL (ref 6–23)
CALCIUM SERPL-MCNC: 8.2 MG/DL (ref 8.6–10.6)
CHLORIDE SERPL-SCNC: 105 MMOL/L (ref 98–107)
CO2 SERPL-SCNC: 24 MMOL/L (ref 21–32)
CREAT SERPL-MCNC: 0.85 MG/DL (ref 0.5–1.3)
EGFRCR SERPLBLD CKD-EPI 2021: >90 ML/MIN/1.73M*2
EOSINOPHIL # BLD AUTO: 0.07 X10*3/UL (ref 0–0.4)
EOSINOPHIL NFR BLD AUTO: 2.1 %
ERYTHROCYTE [DISTWIDTH] IN BLOOD BY AUTOMATED COUNT: 17.3 % (ref 11.5–14.5)
GLUCOSE BLD MANUAL STRIP-MCNC: 156 MG/DL (ref 74–99)
GLUCOSE BLD MANUAL STRIP-MCNC: 177 MG/DL (ref 74–99)
GLUCOSE BLD MANUAL STRIP-MCNC: 202 MG/DL (ref 74–99)
GLUCOSE BLD MANUAL STRIP-MCNC: 209 MG/DL (ref 74–99)
GLUCOSE BLD MANUAL STRIP-MCNC: 223 MG/DL (ref 74–99)
GLUCOSE BLD MANUAL STRIP-MCNC: 226 MG/DL (ref 74–99)
GLUCOSE SERPL-MCNC: 174 MG/DL (ref 74–99)
HCT VFR BLD AUTO: 28.3 % (ref 41–52)
HGB BLD-MCNC: 8.4 G/DL (ref 13.5–17.5)
IMM GRANULOCYTES # BLD AUTO: 0.04 X10*3/UL (ref 0–0.5)
IMM GRANULOCYTES NFR BLD AUTO: 1.2 % (ref 0–0.9)
LABORATORY COMMENT REPORT: NOT DETECTED
LYMPHOCYTES # BLD AUTO: 0.48 X10*3/UL (ref 0.8–3)
LYMPHOCYTES NFR BLD AUTO: 14.6 %
MAGNESIUM SERPL-MCNC: 1.78 MG/DL (ref 1.6–2.4)
MCH RBC QN AUTO: 29.7 PG (ref 26–34)
MCHC RBC AUTO-ENTMCNC: 29.7 G/DL (ref 32–36)
MCV RBC AUTO: 100 FL (ref 80–100)
MONOCYTES # BLD AUTO: 0.35 X10*3/UL (ref 0.05–0.8)
MONOCYTES NFR BLD AUTO: 10.7 %
NEUTROPHILS # BLD AUTO: 2.31 X10*3/UL (ref 1.6–5.5)
NEUTROPHILS NFR BLD AUTO: 70.5 %
NRBC BLD-RTO: 0 /100 WBCS (ref 0–0)
PHOSPHATE SERPL-MCNC: 2.1 MG/DL (ref 2.5–4.9)
PLATELET # BLD AUTO: 208 X10*3/UL (ref 150–450)
POTASSIUM SERPL-SCNC: 4.8 MMOL/L (ref 3.5–5.3)
RBC # BLD AUTO: 2.83 X10*6/UL (ref 4.5–5.9)
SODIUM SERPL-SCNC: 135 MMOL/L (ref 136–145)
TACROLIMUS BLD-MCNC: 6.6 NG/ML
WBC # BLD AUTO: 3.3 X10*3/UL (ref 4.4–11.3)

## 2025-01-19 PROCEDURE — 2500000004 HC RX 250 GENERAL PHARMACY W/ HCPCS (ALT 636 FOR OP/ED): Performed by: PHYSICIAN ASSISTANT

## 2025-01-19 PROCEDURE — 99232 SBSQ HOSP IP/OBS MODERATE 35: CPT | Performed by: STUDENT IN AN ORGANIZED HEALTH CARE EDUCATION/TRAINING PROGRAM

## 2025-01-19 PROCEDURE — 99233 SBSQ HOSP IP/OBS HIGH 50: CPT | Performed by: PHYSICIAN ASSISTANT

## 2025-01-19 PROCEDURE — 80069 RENAL FUNCTION PANEL: CPT

## 2025-01-19 PROCEDURE — 1100000001 HC PRIVATE ROOM DAILY

## 2025-01-19 PROCEDURE — 85025 COMPLETE CBC W/AUTO DIFF WBC: CPT | Performed by: PHYSICIAN ASSISTANT

## 2025-01-19 PROCEDURE — 2500000004 HC RX 250 GENERAL PHARMACY W/ HCPCS (ALT 636 FOR OP/ED)

## 2025-01-19 PROCEDURE — 2500000002 HC RX 250 W HCPCS SELF ADMINISTERED DRUGS (ALT 637 FOR MEDICARE OP, ALT 636 FOR OP/ED): Performed by: STUDENT IN AN ORGANIZED HEALTH CARE EDUCATION/TRAINING PROGRAM

## 2025-01-19 PROCEDURE — 83735 ASSAY OF MAGNESIUM: CPT | Performed by: STUDENT IN AN ORGANIZED HEALTH CARE EDUCATION/TRAINING PROGRAM

## 2025-01-19 PROCEDURE — 2500000002 HC RX 250 W HCPCS SELF ADMINISTERED DRUGS (ALT 637 FOR MEDICARE OP, ALT 636 FOR OP/ED): Performed by: PHYSICIAN ASSISTANT

## 2025-01-19 PROCEDURE — 99233 SBSQ HOSP IP/OBS HIGH 50: CPT | Performed by: INTERNAL MEDICINE

## 2025-01-19 PROCEDURE — 2500000001 HC RX 250 WO HCPCS SELF ADMINISTERED DRUGS (ALT 637 FOR MEDICARE OP)

## 2025-01-19 PROCEDURE — 2500000001 HC RX 250 WO HCPCS SELF ADMINISTERED DRUGS (ALT 637 FOR MEDICARE OP): Performed by: STUDENT IN AN ORGANIZED HEALTH CARE EDUCATION/TRAINING PROGRAM

## 2025-01-19 PROCEDURE — 80197 ASSAY OF TACROLIMUS: CPT | Performed by: STUDENT IN AN ORGANIZED HEALTH CARE EDUCATION/TRAINING PROGRAM

## 2025-01-19 PROCEDURE — 2500000001 HC RX 250 WO HCPCS SELF ADMINISTERED DRUGS (ALT 637 FOR MEDICARE OP): Performed by: PHYSICIAN ASSISTANT

## 2025-01-19 PROCEDURE — 82947 ASSAY GLUCOSE BLOOD QUANT: CPT

## 2025-01-19 RX ORDER — MAGNESIUM SULFATE HEPTAHYDRATE 40 MG/ML
2 INJECTION, SOLUTION INTRAVENOUS ONCE
Status: COMPLETED | OUTPATIENT
Start: 2025-01-19 | End: 2025-01-19

## 2025-01-19 RX ORDER — PENTAMIDINE ISETHIONATE 300 MG/300MG
300 INHALANT RESPIRATORY (INHALATION) ONCE
Status: COMPLETED | OUTPATIENT
Start: 2025-01-20 | End: 2025-01-20

## 2025-01-19 RX ORDER — DEXTROSE 50 % IN WATER (D50W) INTRAVENOUS SYRINGE
25
Status: DISCONTINUED | OUTPATIENT
Start: 2025-01-19 | End: 2025-01-23 | Stop reason: HOSPADM

## 2025-01-19 RX ORDER — ACETAMINOPHEN 160 MG/5ML
650 SOLUTION ORAL EVERY 6 HOURS
Status: DISCONTINUED | OUTPATIENT
Start: 2025-01-19 | End: 2025-01-23 | Stop reason: HOSPADM

## 2025-01-19 RX ADMIN — ONDANSETRON 4 MG: 2 INJECTION INTRAMUSCULAR; INTRAVENOUS at 22:24

## 2025-01-19 RX ADMIN — HEPARIN SODIUM 5000 UNITS: 5000 INJECTION INTRAVENOUS; SUBCUTANEOUS at 08:22

## 2025-01-19 RX ADMIN — METOCLOPRAMIDE 5 MG: 10 TABLET ORAL at 06:43

## 2025-01-19 RX ADMIN — CALCIUM POLYCARBOPHIL 625 MG: 625 TABLET, FILM COATED ORAL at 08:21

## 2025-01-19 RX ADMIN — INSULIN HUMAN 4 UNITS: 100 INJECTION, SOLUTION PARENTERAL at 07:32

## 2025-01-19 RX ADMIN — CALCIUM CARBONATE (ANTACID) CHEW TAB 500 MG 500 MG: 500 CHEW TAB at 06:43

## 2025-01-19 RX ADMIN — AMLODIPINE BESYLATE 10 MG: 10 TABLET ORAL at 08:21

## 2025-01-19 RX ADMIN — INSULIN HUMAN 8 UNITS: 100 INJECTION, SUSPENSION SUBCUTANEOUS at 08:26

## 2025-01-19 RX ADMIN — METHOCARBAMOL 500 MG: 500 TABLET ORAL at 22:25

## 2025-01-19 RX ADMIN — METHOCARBAMOL 500 MG: 500 TABLET ORAL at 00:27

## 2025-01-19 RX ADMIN — NYSTATIN 500000 UNITS: 500000 SUSPENSION ORAL at 12:00

## 2025-01-19 RX ADMIN — HEPARIN SODIUM 5000 UNITS: 5000 INJECTION INTRAVENOUS; SUBCUTANEOUS at 00:27

## 2025-01-19 RX ADMIN — THIAMINE HCL TAB 100 MG 100 MG: 100 TAB at 08:21

## 2025-01-19 RX ADMIN — PREDNISONE 5 MG: 5 TABLET ORAL at 08:21

## 2025-01-19 RX ADMIN — INSULIN HUMAN 4 UNITS: 100 INJECTION, SOLUTION PARENTERAL at 21:21

## 2025-01-19 RX ADMIN — PANTOPRAZOLE SODIUM 40 MG: 40 TABLET, DELAYED RELEASE ORAL at 15:02

## 2025-01-19 RX ADMIN — OXYCODONE HYDROCHLORIDE 5 MG: 5 SOLUTION ORAL at 20:47

## 2025-01-19 RX ADMIN — METHOCARBAMOL 500 MG: 500 TABLET ORAL at 06:43

## 2025-01-19 RX ADMIN — HEPARIN SODIUM 5000 UNITS: 5000 INJECTION INTRAVENOUS; SUBCUTANEOUS at 15:02

## 2025-01-19 RX ADMIN — CALCIUM CARBONATE (ANTACID) CHEW TAB 500 MG 500 MG: 500 CHEW TAB at 15:02

## 2025-01-19 RX ADMIN — TACROLIMUS 2 MG: 1 CAPSULE ORAL at 18:00

## 2025-01-19 RX ADMIN — INSULIN HUMAN 4 UNITS: 100 INJECTION, SOLUTION PARENTERAL at 11:41

## 2025-01-19 RX ADMIN — METHOCARBAMOL 500 MG: 500 TABLET ORAL at 15:02

## 2025-01-19 RX ADMIN — NYSTATIN 500000 UNITS: 500000 SUSPENSION ORAL at 18:00

## 2025-01-19 RX ADMIN — NYSTATIN 500000 UNITS: 500000 SUSPENSION ORAL at 06:43

## 2025-01-19 RX ADMIN — DIBASIC SODIUM PHOSPHATE, MONOBASIC POTASSIUM PHOSPHATE AND MONOBASIC SODIUM PHOSPHATE 250 MG: 852; 155; 130 TABLET ORAL at 12:00

## 2025-01-19 RX ADMIN — OXYCODONE HYDROCHLORIDE 5 MG: 5 SOLUTION ORAL at 11:48

## 2025-01-19 RX ADMIN — NYSTATIN 500000 UNITS: 500000 SUSPENSION ORAL at 20:47

## 2025-01-19 RX ADMIN — INSULIN HUMAN 2 UNITS: 100 INJECTION, SOLUTION PARENTERAL at 15:12

## 2025-01-19 RX ADMIN — MAGNESIUM SULFATE HEPTAHYDRATE 2 G: 40 INJECTION, SOLUTION INTRAVENOUS at 08:22

## 2025-01-19 RX ADMIN — CALCIUM POLYCARBOPHIL 625 MG: 625 TABLET, FILM COATED ORAL at 20:48

## 2025-01-19 RX ADMIN — MYCOPHENOLIC ACID 360 MG: 360 TABLET, DELAYED RELEASE ORAL at 18:00

## 2025-01-19 RX ADMIN — DIBASIC SODIUM PHOSPHATE, MONOBASIC POTASSIUM PHOSPHATE AND MONOBASIC SODIUM PHOSPHATE 250 MG: 852; 155; 130 TABLET ORAL at 09:38

## 2025-01-19 RX ADMIN — ROSUVASTATIN CALCIUM 10 MG: 10 TABLET, FILM COATED ORAL at 20:49

## 2025-01-19 RX ADMIN — METOCLOPRAMIDE 5 MG: 10 TABLET ORAL at 15:02

## 2025-01-19 RX ADMIN — MYCOPHENOLIC ACID 360 MG: 360 TABLET, DELAYED RELEASE ORAL at 06:43

## 2025-01-19 RX ADMIN — ONDANSETRON 4 MG: 2 INJECTION INTRAMUSCULAR; INTRAVENOUS at 06:43

## 2025-01-19 RX ADMIN — ONDANSETRON 4 MG: 2 INJECTION INTRAMUSCULAR; INTRAVENOUS at 15:02

## 2025-01-19 RX ADMIN — PANTOPRAZOLE SODIUM 40 MG: 40 TABLET, DELAYED RELEASE ORAL at 06:43

## 2025-01-19 RX ADMIN — METOCLOPRAMIDE 5 MG: 10 TABLET ORAL at 22:24

## 2025-01-19 RX ADMIN — INSULIN HUMAN 8 UNITS: 100 INJECTION, SUSPENSION SUBCUTANEOUS at 00:27

## 2025-01-19 RX ADMIN — ACETAMINOPHEN 650 MG: 160 SOLUTION ORAL at 15:02

## 2025-01-19 RX ADMIN — ACETAMINOPHEN 650 MG: 160 SOLUTION ORAL at 08:22

## 2025-01-19 RX ADMIN — TACROLIMUS 2 MG: 1 CAPSULE ORAL at 06:43

## 2025-01-19 RX ADMIN — ACETAMINOPHEN 650 MG: 160 SOLUTION ORAL at 20:46

## 2025-01-19 RX ADMIN — GABAPENTIN 200 MG: 100 CAPSULE ORAL at 08:21

## 2025-01-19 RX ADMIN — INSULIN HUMAN 10 UNITS: 100 INJECTION, SUSPENSION SUBCUTANEOUS at 18:00

## 2025-01-19 ASSESSMENT — COGNITIVE AND FUNCTIONAL STATUS - GENERAL
TOILETING: A LITTLE
DAILY ACTIVITIY SCORE: 19
MOBILITY SCORE: 23
DRESSING REGULAR UPPER BODY CLOTHING: A LITTLE
DRESSING REGULAR LOWER BODY CLOTHING: A LITTLE
PERSONAL GROOMING: A LITTLE
DAILY ACTIVITIY SCORE: 19
DRESSING REGULAR UPPER BODY CLOTHING: A LITTLE
HELP NEEDED FOR BATHING: A LITTLE
HELP NEEDED FOR BATHING: A LITTLE
MOBILITY SCORE: 24
DRESSING REGULAR LOWER BODY CLOTHING: A LITTLE
PERSONAL GROOMING: A LITTLE
TOILETING: A LITTLE
CLIMB 3 TO 5 STEPS WITH RAILING: A LITTLE

## 2025-01-19 ASSESSMENT — PAIN SCALES - GENERAL
PAINLEVEL_OUTOF10: 4
PAINLEVEL_OUTOF10: 2
PAINLEVEL_OUTOF10: 7
PAINLEVEL_OUTOF10: 7
PAINLEVEL_OUTOF10: 0 - NO PAIN
PAINLEVEL_OUTOF10: 0 - NO PAIN

## 2025-01-19 ASSESSMENT — PAIN - FUNCTIONAL ASSESSMENT
PAIN_FUNCTIONAL_ASSESSMENT: 0-10

## 2025-01-19 NOTE — PROGRESS NOTES
"Temo Hanson is a 74 y.o. male on day 19 of admission presenting with Dehydration.    Subjective   Patient seen and evaluated at the bedside. Today, TF will be stopped at noon then resumed at 18:00. This will likely be his discharge nutrition regimen.   Insulin dose reductions ordered to reflect reduced glucose sources.    Pt resting soundly at time of encounter.       I have reviewed histories, allergies and medications have been reviewed and there are no changes.     Objective   Review of Systems   Reason unable to perform ROS: pt sleeping heavily.   Cardiovascular:         Burning chest pain     Physical Exam  Constitutional:       General: He is not in acute distress.     Appearance: Normal appearance.   HENT:      Head: Normocephalic and atraumatic.      Mouth/Throat:      Mouth: Mucous membranes are moist.   Cardiovascular:      Rate and Rhythm: Normal rate and regular rhythm.   Pulmonary:      Effort: Pulmonary effort is normal.      Breath sounds: Normal breath sounds.   Abdominal:      Palpations: Abdomen is soft.   Skin:     General: Skin is warm.   Neurological:      Comments: sleeping   Psychiatric:      Comments: sleeping     Last Recorded Vitals  Blood pressure 150/80, pulse 89, temperature 36.4 °C (97.5 °F), temperature source Temporal, resp. rate 18, height 1.854 m (6' 1\"), weight 71.5 kg (157 lb 9.6 oz), SpO2 96%.  Intake/Output last 3 Shifts:  I/O last 3 completed shifts:  In: 2150 (30.2 mL/kg) [P.O.:1020; NG/GT:1130]  Out: 0 (0 mL/kg)   Weight: 71.1 kg     Relevant Results  Results from last 7 days   Lab Units 01/19/25  1126 01/19/25  0751 01/19/25  0732 01/19/25  0632 01/19/25  0504 01/18/25  2339 01/18/25  0801 01/18/25  0556 01/17/25  0734 01/17/25  0541 01/16/25  0755 01/16/25  0603 01/15/25  0906 01/15/25  0523   POCT GLUCOSE mg/dL 209* 223* 226*  --  156* 144*   < >  --    < >  --    < >  --    < >  --    GLUCOSE mg/dL  --   --   --  174*  --   --   --  121*  --  234*  --  118*  --  164* "    < > = values in this interval not displayed.       Assessment/Plan   Temo Hanson is a 74 y.o. 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  Patient was started on tube feeds, NPO, today after placement of PEG tube. Patient states he felt nausea and vomiting, leading to a pause in his feedings around noon. Feedings restarted around 1500.      Diabetes History  Type of diabetes: 2  Year diagnosed or age: 46 years old  Hospitalizations for DKA or HHS: Once - inappropriately gave too much sliding scale due to low BG finger stick not correcting in under an hour  Complications: Nephropathy  Seen by PCP or Endocrinology: PCP - Dr. Hillman, Endo - Dr. Carey  Frequency of glucose checks: 4-5x daily after meals  Glucose review: persistent hyperglycemia this admission  Frequency of Hypoglycemia: none  Hypoglycemia unawareness: no  Severe hypoglycemia requiring assistance from others:  N     Home Medications  Basal: Glargine 8U  Prandial: Aspart 4U  Correction: Aspart sliding scale  Assessment & Plan  Dehydration    Alactasia syndrome    Type 2 diabetes mellitus with hyperglycemia, with long-term current use of insulin    On tube feeding diet      PLAN  Steroids:   Prednisone 15 mg daily  1/13- Predinsone 10mg daily  1/16 - prednisone reduced to 5 mg     Nutrition:   1/10- 60g CHO diet + glucerna 1.5 continuous TF, goal of 50 ml/h. This provides 40 g of carbs every 6 hours when at goal.  1/11- TF reduced to 20ml/h so patient is getting 16g of carbs every 6 hours   1/12- TF stopped, PPN to start tonight at 30cc/h. Pt also on CLD  1/13 - PPN Started. CLD  1/14: TPN to increase to 55 ml/hr (previously at 41 ml/hr) Patient remains on clear liquid diet but with minimal intake  Tube feed: glucerna 1.5 started at 10 ml/hr at 2200 - patient made NPO  1/15: NPO. TPN to remain  at 55 ml/hr  Tube feed: glucerna 1.5 started at 10 ml/hr -> switched to Vital 1.5 at 20 ml/hr, increase by 5 ml every 6 hours until goal rate of 50 ml/hr reached (40 carbs over 6 hrs)  Diet advanced in the afternoon  1/16: 60 gram Carb consistent + TF + TPN at 55 ml/hr. TF changed to Vital 1.5 starting at 30 ml/hr and increasing by 5 ml/hr until goal rate of 50ml/hr is reached (55 grams carbs/6 hrs)  1/17: TF increasing by this evening to goal of 65ml/h (72g of carbs / 6 hours). TPN cut in half from 50 ml/h to 30ml/h. Remains on PO diet.   1/18: TF will shut off at 1200 for 6 hours, then will continue to run at 65ml/h restarting at 1800 until 1200 on 1/19, then be stopped until 18:00. TPN will be off. Will remain on PO diet.   1/19: TF will now be running from 18:00 to 12:00 (18hr), and pt will likely discharge on this nutrition schedule if tolerate        - adjust NPH  to 10u at 18:00 and 5u at 06:00, first 10u dose at 18:00 today (1/19)      If TF is held or glucose trending <100mg/dL: reduce NPH to 5u q12 at 18;00 and 06:00    - adjust regular insulin scale #2 to ACHS timing, please continue use of this order regardless of nutrition status to address hyperglycemia    -Accuchecks ACHS  - Goal -180  -Hypoglycemia protocol  -Will continue to follow and titrate insulin accordingly      Discharge planning:   [] patient may expect to discharge home on basal/bolus , final doses TBD by titration and nutrition status  [x]will provide CGM sample prior to discharge, gina 3 provided    [x]will enroll pt in  pharmacy platinum plan program  [x]follow up with ROD Lainez in PTDM clinic as scheduled on 1/30/25 as bridge back to home endo and PCP     I spent 50 mins on the care and coordination of the patient    Tiffanie Crum PA-C

## 2025-01-19 NOTE — CARE PLAN
Problem: Pain  Goal: Takes deep breaths with improved pain control throughout the shift  Outcome: Progressing  Goal: Turns in bed with improved pain control throughout the shift  Outcome: Progressing  Goal: Walks with improved pain control throughout the shift  Outcome: Progressing  Goal: Performs ADL's with improved pain control throughout shift  Outcome: Progressing  Goal: Participates in PT with improved pain control throughout the shift  Outcome: Progressing  Goal: Free from opioid side effects throughout the shift  Outcome: Progressing  Goal: Free from acute confusion related to pain meds throughout the shift  Outcome: Progressing     Problem: Skin  Goal: Decreased wound size/increased tissue granulation at next dressing change  Outcome: Progressing  Goal: Participates in plan/prevention/treatment measures  Outcome: Progressing  Goal: Prevent/manage excess moisture  Outcome: Progressing  Goal: Prevent/minimize sheer/friction injuries  Outcome: Progressing  Goal: Promote/optimize nutrition  Outcome: Progressing  Goal: Promote skin healing  Outcome: Progressing   The patient's goals for the shift include get better    The clinical goals for the shift include pt will remain safe and free from falls throughout shift

## 2025-01-19 NOTE — PROGRESS NOTES
Temo Hanson is a 74 y.o. male POD#22 from DDKT from a DBD donor. Readmit for PO intolerance due to achalasia s/p Heller/Andrea myotomy with megaesophagus. Now s/p from PEG placement.     Doing well with TF 65/hr     Objective   Gen: A+OX3  HEENT: PERRL, MMM  Cardiac: RRR  Chest: Normal inspiratory effort  Abdomen: S/NT/ND. Incision C/D/I.  Ext: No LE edema    Last Recorded Vitals  Visit Vitals  /77 (BP Location: Right arm, Patient Position: Sitting)   Pulse 91   Temp 37 °C (98.6 °F) (Temporal)   Resp 18      Intake/Output last 3 Shifts:    Intake/Output Summary (Last 24 hours) at 1/19/2025 1007  Last data filed at 1/19/2025 0900  Gross per 24 hour   Intake 1230 ml   Output 0 ml   Net 1230 ml      Vitals:    01/19/25 0702   Weight: 71.5 kg (157 lb 9.6 oz)   Scheduled medications  acetaminophen, 650 mg, g-tube, q6h  amLODIPine, 10 mg, oral, Daily  calcium carbonate, 500 mg, oral, BID AC  calcium polycarbophiL, 625 mg, oral, BID  gabapentin, 200 mg, oral, Daily  heparin (porcine), 5,000 Units, subcutaneous, q8h  insulin NPH (Isophane), 8 Units, subcutaneous, q12h KASSY  insulin regular, 0-10 Units, subcutaneous, q6h  [Held by provider] insulin regular, 4 Units, subcutaneous, q6h KASSY  lidocaine, 1 patch, transdermal, Daily  magnesium sulfate, 2 g, intravenous, Once  methocarbamol, 500 mg, oral, q8h KASSY  metoclopramide, 5 mg, oral, q8h  mycophenolate, 360 mg, oral, BID  nystatin, 5 mL, Swish & Swallow, 4x daily  ondansetron, 4 mg, intravenous, q8h  pantoprazole, 40 mg, oral, BID AC  predniSONE, 5 mg, oral, Daily  rosuvastatin, 10 mg, oral, Nightly  sod phos di, mono-K phos mono, 250 mg, oral, 4x daily  [Held by provider] sulfamethoxazole-trimethoprim, 1 tablet, oral, Daily  tacrolimus, 2 mg, oral, q12h  thiamine, 100 mg, oral, Daily  [Held by provider] valGANciclovir, 900 mg, oral, q24h    Assessment/Plan   Principal Problem:    Kidney transplant recipient    Achalasia/megaesophagus    Severe protein calorie  malnutrition  Active Problems:  Patient Active Problem List   Diagnosis    S/P laparoscopic cholecystectomy    Abdominal pain    Achalasia    Acute lower UTI    Microcytic anemia    Complete heart block    Callus of foot    Chronic periodontitis, unspecified    Stage 5 chronic kidney disease (Multi)    Closed fracture of metatarsal bone    Crushing injury of foot    Diabetes mellitus (Multi)    Diarrhea    Dysphagia    ED (erectile dysfunction)    Essential hypertension    Foot pain, right    GIB (gastrointestinal bleeding)    Hepatitis B core antibody positive    Hyperkalemia    Ingrowing nail    Iron deficiency anemia secondary to inadequate dietary iron intake    Long toenail    Malignant neoplasm of prostate (Multi)    Onychomycosis    Pheochromocytoma of right adrenal gland    Pneumonia of right lower lobe due to infectious organism    Productive cough    Pure hypercholesterolemia    Stricture esophagus    SVT (supraventricular tachycardia) (CMS-HCC)    Type 2 diabetes mellitus with diabetic neuropathy (Multi)    Preop cardiovascular exam    Third degree heart block    Pericardial effusion (HHS-HCC)    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 (Multi)    Uremia    Cardiac tamponade    Cardiac pacemaker in situ    ESRD (end stage renal disease) (Multi)    Type 2 diabetes mellitus, with long-term current use of insulin    Dehydration    Alactasia syndrome    Type 2 diabetes mellitus with hyperglycemia, with long-term current use of insulin    On tube feeding diet       - Slowly advance TF via PE ml q 6 hr   - reached goal for 24 hr - to go to 18 on, 6 off today  - Off tpn  - Continue reglan 5 mg q8h  - erythromycin x5 days - completed   - PT/OT  - Myfortic  360 mg bid/prednisone to 5 mg  - Tacrolimus goal 8-10 ng/mL     Possible home Monday with TF    I spent 35 minutes in the professional and overall care of this patient, including immunosuppression management.    Cassidy Altman MD

## 2025-01-20 ENCOUNTER — PHARMACY VISIT (OUTPATIENT)
Dept: PHARMACY | Facility: CLINIC | Age: 75
End: 2025-01-20
Payer: MEDICARE

## 2025-01-20 LAB
ALBUMIN SERPL BCP-MCNC: 2.9 G/DL (ref 3.4–5)
ANION GAP SERPL CALC-SCNC: 13 MMOL/L (ref 10–20)
BASOPHILS # BLD AUTO: 0.02 X10*3/UL (ref 0–0.1)
BASOPHILS NFR BLD AUTO: 0.6 %
BUN SERPL-MCNC: 9 MG/DL (ref 6–23)
CALCIUM SERPL-MCNC: 8.6 MG/DL (ref 8.6–10.6)
CHLORIDE SERPL-SCNC: 104 MMOL/L (ref 98–107)
CO2 SERPL-SCNC: 23 MMOL/L (ref 21–32)
CREAT SERPL-MCNC: 0.81 MG/DL (ref 0.5–1.3)
EGFRCR SERPLBLD CKD-EPI 2021: >90 ML/MIN/1.73M*2
EOSINOPHIL # BLD AUTO: 0.04 X10*3/UL (ref 0–0.4)
EOSINOPHIL NFR BLD AUTO: 1.3 %
ERYTHROCYTE [DISTWIDTH] IN BLOOD BY AUTOMATED COUNT: 17.2 % (ref 11.5–14.5)
GLUCOSE BLD MANUAL STRIP-MCNC: 168 MG/DL (ref 74–99)
GLUCOSE BLD MANUAL STRIP-MCNC: 180 MG/DL (ref 74–99)
GLUCOSE BLD MANUAL STRIP-MCNC: 241 MG/DL (ref 74–99)
GLUCOSE BLD MANUAL STRIP-MCNC: 246 MG/DL (ref 74–99)
GLUCOSE BLD MANUAL STRIP-MCNC: 258 MG/DL (ref 74–99)
GLUCOSE BLD MANUAL STRIP-MCNC: 261 MG/DL (ref 74–99)
GLUCOSE SERPL-MCNC: 207 MG/DL (ref 74–99)
HCT VFR BLD AUTO: 27.2 % (ref 41–52)
HGB BLD-MCNC: 8.4 G/DL (ref 13.5–17.5)
IMM GRANULOCYTES # BLD AUTO: 0.04 X10*3/UL (ref 0–0.5)
IMM GRANULOCYTES NFR BLD AUTO: 1.3 % (ref 0–0.9)
LYMPHOCYTES # BLD AUTO: 0.44 X10*3/UL (ref 0.8–3)
LYMPHOCYTES NFR BLD AUTO: 14.1 %
MAGNESIUM SERPL-MCNC: 1.75 MG/DL (ref 1.6–2.4)
MCH RBC QN AUTO: 29.2 PG (ref 26–34)
MCHC RBC AUTO-ENTMCNC: 30.9 G/DL (ref 32–36)
MCV RBC AUTO: 94 FL (ref 80–100)
MONOCYTES # BLD AUTO: 0.44 X10*3/UL (ref 0.05–0.8)
MONOCYTES NFR BLD AUTO: 14.1 %
NEUTROPHILS # BLD AUTO: 2.14 X10*3/UL (ref 1.6–5.5)
NEUTROPHILS NFR BLD AUTO: 68.6 %
NRBC BLD-RTO: 0 /100 WBCS (ref 0–0)
PHOSPHATE SERPL-MCNC: 2.9 MG/DL (ref 2.5–4.9)
PLATELET # BLD AUTO: 226 X10*3/UL (ref 150–450)
POTASSIUM SERPL-SCNC: 4.9 MMOL/L (ref 3.5–5.3)
RBC # BLD AUTO: 2.88 X10*6/UL (ref 4.5–5.9)
SODIUM SERPL-SCNC: 135 MMOL/L (ref 136–145)
TACROLIMUS BLD-MCNC: 4.6 NG/ML
WBC # BLD AUTO: 3.1 X10*3/UL (ref 4.4–11.3)

## 2025-01-20 PROCEDURE — 99233 SBSQ HOSP IP/OBS HIGH 50: CPT | Performed by: INTERNAL MEDICINE

## 2025-01-20 PROCEDURE — RXMED WILLOW AMBULATORY MEDICATION CHARGE

## 2025-01-20 PROCEDURE — 85025 COMPLETE CBC W/AUTO DIFF WBC: CPT | Performed by: PHYSICIAN ASSISTANT

## 2025-01-20 PROCEDURE — 2500000001 HC RX 250 WO HCPCS SELF ADMINISTERED DRUGS (ALT 637 FOR MEDICARE OP): Performed by: PHYSICIAN ASSISTANT

## 2025-01-20 PROCEDURE — 82947 ASSAY GLUCOSE BLOOD QUANT: CPT

## 2025-01-20 PROCEDURE — 80197 ASSAY OF TACROLIMUS: CPT | Performed by: STUDENT IN AN ORGANIZED HEALTH CARE EDUCATION/TRAINING PROGRAM

## 2025-01-20 PROCEDURE — 80069 RENAL FUNCTION PANEL: CPT

## 2025-01-20 PROCEDURE — 2500000004 HC RX 250 GENERAL PHARMACY W/ HCPCS (ALT 636 FOR OP/ED): Performed by: INTERNAL MEDICINE

## 2025-01-20 PROCEDURE — 2500000001 HC RX 250 WO HCPCS SELF ADMINISTERED DRUGS (ALT 637 FOR MEDICARE OP)

## 2025-01-20 PROCEDURE — 2500000002 HC RX 250 W HCPCS SELF ADMINISTERED DRUGS (ALT 637 FOR MEDICARE OP, ALT 636 FOR OP/ED): Performed by: PHYSICIAN ASSISTANT

## 2025-01-20 PROCEDURE — 2500000002 HC RX 250 W HCPCS SELF ADMINISTERED DRUGS (ALT 637 FOR MEDICARE OP, ALT 636 FOR OP/ED): Performed by: STUDENT IN AN ORGANIZED HEALTH CARE EDUCATION/TRAINING PROGRAM

## 2025-01-20 PROCEDURE — 99232 SBSQ HOSP IP/OBS MODERATE 35: CPT | Performed by: STUDENT IN AN ORGANIZED HEALTH CARE EDUCATION/TRAINING PROGRAM

## 2025-01-20 PROCEDURE — 2500000004 HC RX 250 GENERAL PHARMACY W/ HCPCS (ALT 636 FOR OP/ED)

## 2025-01-20 PROCEDURE — 94640 AIRWAY INHALATION TREATMENT: CPT

## 2025-01-20 PROCEDURE — 83735 ASSAY OF MAGNESIUM: CPT | Performed by: STUDENT IN AN ORGANIZED HEALTH CARE EDUCATION/TRAINING PROGRAM

## 2025-01-20 PROCEDURE — 1100000001 HC PRIVATE ROOM DAILY

## 2025-01-20 PROCEDURE — 2500000001 HC RX 250 WO HCPCS SELF ADMINISTERED DRUGS (ALT 637 FOR MEDICARE OP): Performed by: STUDENT IN AN ORGANIZED HEALTH CARE EDUCATION/TRAINING PROGRAM

## 2025-01-20 PROCEDURE — 36416 COLLJ CAPILLARY BLOOD SPEC: CPT | Performed by: PHYSICIAN ASSISTANT

## 2025-01-20 PROCEDURE — 2500000004 HC RX 250 GENERAL PHARMACY W/ HCPCS (ALT 636 FOR OP/ED): Performed by: PHYSICIAN ASSISTANT

## 2025-01-20 PROCEDURE — 99233 SBSQ HOSP IP/OBS HIGH 50: CPT

## 2025-01-20 RX ORDER — MAGNESIUM SULFATE HEPTAHYDRATE 40 MG/ML
4 INJECTION, SOLUTION INTRAVENOUS ONCE
Status: COMPLETED | OUTPATIENT
Start: 2025-01-20 | End: 2025-01-20

## 2025-01-20 RX ORDER — ALBUTEROL SULFATE 0.83 MG/ML
2.5 SOLUTION RESPIRATORY (INHALATION) ONCE
Status: COMPLETED | OUTPATIENT
Start: 2025-01-20 | End: 2025-01-20

## 2025-01-20 RX ORDER — ONDANSETRON 4 MG/1
4 TABLET, FILM COATED ORAL EVERY 8 HOURS PRN
Status: DISCONTINUED | OUTPATIENT
Start: 2025-01-20 | End: 2025-01-23 | Stop reason: HOSPADM

## 2025-01-20 RX ORDER — LANOLIN ALCOHOL/MO/W.PET/CERES
400 CREAM (GRAM) TOPICAL DAILY
Qty: 30 TABLET | Refills: 0 | Status: SHIPPED | OUTPATIENT
Start: 2025-01-21 | End: 2025-01-27 | Stop reason: SDUPTHER

## 2025-01-20 RX ORDER — MYCOPHENOLIC ACID 180 MG/1
360 TABLET, DELAYED RELEASE ORAL 2 TIMES DAILY
Start: 2025-01-20 | End: 2025-01-27 | Stop reason: SDUPTHER

## 2025-01-20 RX ORDER — PEN NEEDLE, DIABETIC 30 GX3/16"
100 NEEDLE, DISPOSABLE MISCELLANEOUS
Qty: 100 EACH | Refills: 0 | Status: SHIPPED | OUTPATIENT
Start: 2025-01-20

## 2025-01-20 RX ORDER — TACROLIMUS 1 MG/1
4 CAPSULE ORAL EVERY 12 HOURS
Status: DISCONTINUED | OUTPATIENT
Start: 2025-01-20 | End: 2025-01-23

## 2025-01-20 RX ORDER — TACROLIMUS 1 MG/1
4 CAPSULE ORAL EVERY 12 HOURS
Qty: 240 CAPSULE | Refills: 0 | Status: SHIPPED | OUTPATIENT
Start: 2025-01-20 | End: 2025-01-23

## 2025-01-20 RX ORDER — CHOLECALCIFEROL (VITAMIN D3) 25 MCG
2000 TABLET ORAL DAILY
Status: DISCONTINUED | OUTPATIENT
Start: 2025-01-20 | End: 2025-01-23 | Stop reason: HOSPADM

## 2025-01-20 RX ORDER — TACROLIMUS 1 MG/1
2 CAPSULE ORAL EVERY 12 HOURS
Start: 2025-01-20 | End: 2025-01-20

## 2025-01-20 RX ORDER — PREDNISONE 5 MG/1
5 TABLET ORAL DAILY
Start: 2025-01-20 | End: 2025-01-27 | Stop reason: SDUPTHER

## 2025-01-20 RX ORDER — CHOLECALCIFEROL (VITAMIN D3) 50 MCG
2000 TABLET ORAL DAILY
Qty: 30 TABLET | Refills: 11 | Status: SHIPPED | OUTPATIENT
Start: 2025-01-20 | End: 2026-01-20

## 2025-01-20 RX ORDER — ONDANSETRON 4 MG/1
4 TABLET, FILM COATED ORAL EVERY 8 HOURS PRN
Qty: 20 TABLET | Refills: 0 | Status: SHIPPED | OUTPATIENT
Start: 2025-01-20 | End: 2025-01-27

## 2025-01-20 RX ORDER — LANOLIN ALCOHOL/MO/W.PET/CERES
400 CREAM (GRAM) TOPICAL DAILY
Status: DISCONTINUED | OUTPATIENT
Start: 2025-01-21 | End: 2025-01-23 | Stop reason: HOSPADM

## 2025-01-20 RX ORDER — INSULIN GLARGINE 100 [IU]/ML
6 INJECTION, SOLUTION SUBCUTANEOUS EVERY MORNING
Start: 2025-01-20 | End: 2025-01-21

## 2025-01-20 RX ORDER — INSULIN ASPART INJECTION 100 [IU]/ML
0-10 INJECTION, SOLUTION SUBCUTANEOUS 3 TIMES DAILY PRN
Start: 2025-01-20 | End: 2025-01-23

## 2025-01-20 RX ORDER — INSULIN ASPART INJECTION 100 [IU]/ML
4 INJECTION, SOLUTION SUBCUTANEOUS 3 TIMES DAILY PRN
Start: 2025-01-20 | End: 2025-01-20

## 2025-01-20 RX ORDER — INSULIN HUMAN 100 [IU]/ML
10 INJECTION, SUSPENSION SUBCUTANEOUS
Qty: 15 ML | Refills: 0 | Status: SHIPPED | OUTPATIENT
Start: 2025-01-20 | End: 2025-06-12

## 2025-01-20 RX ORDER — METHOCARBAMOL 500 MG/1
500 TABLET, FILM COATED ORAL EVERY 8 HOURS SCHEDULED
Qty: 21 TABLET | Refills: 0 | Status: SHIPPED | OUTPATIENT
Start: 2025-01-20 | End: 2025-01-27

## 2025-01-20 RX ADMIN — INSULIN HUMAN 5 UNITS: 100 INJECTION, SUSPENSION SUBCUTANEOUS at 06:29

## 2025-01-20 RX ADMIN — VALGANCICLOVIR 900 MG: 450 TABLET, FILM COATED ORAL at 14:08

## 2025-01-20 RX ADMIN — METOCLOPRAMIDE 5 MG: 10 TABLET ORAL at 21:18

## 2025-01-20 RX ADMIN — INSULIN HUMAN 6 UNITS: 100 INJECTION, SOLUTION PARENTERAL at 18:17

## 2025-01-20 RX ADMIN — METHOCARBAMOL 500 MG: 500 TABLET ORAL at 14:08

## 2025-01-20 RX ADMIN — ACETAMINOPHEN 650 MG: 160 SOLUTION ORAL at 14:08

## 2025-01-20 RX ADMIN — GABAPENTIN 200 MG: 100 CAPSULE ORAL at 08:59

## 2025-01-20 RX ADMIN — ALBUTEROL SULFATE 2.5 MG: 2.5 SOLUTION RESPIRATORY (INHALATION) at 10:17

## 2025-01-20 RX ADMIN — OXYCODONE HYDROCHLORIDE 5 MG: 5 SOLUTION ORAL at 21:17

## 2025-01-20 RX ADMIN — AMLODIPINE BESYLATE 10 MG: 10 TABLET ORAL at 08:59

## 2025-01-20 RX ADMIN — METOCLOPRAMIDE 5 MG: 10 TABLET ORAL at 12:26

## 2025-01-20 RX ADMIN — NYSTATIN 500000 UNITS: 500000 SUSPENSION ORAL at 21:17

## 2025-01-20 RX ADMIN — MYCOPHENOLIC ACID 360 MG: 360 TABLET, DELAYED RELEASE ORAL at 18:15

## 2025-01-20 RX ADMIN — HEPARIN SODIUM 5000 UNITS: 5000 INJECTION INTRAVENOUS; SUBCUTANEOUS at 17:02

## 2025-01-20 RX ADMIN — PREDNISONE 5 MG: 5 TABLET ORAL at 08:59

## 2025-01-20 RX ADMIN — TACROLIMUS 2 MG: 1 CAPSULE ORAL at 06:22

## 2025-01-20 RX ADMIN — NYSTATIN 500000 UNITS: 500000 SUSPENSION ORAL at 07:11

## 2025-01-20 RX ADMIN — HEPARIN SODIUM 5000 UNITS: 5000 INJECTION INTRAVENOUS; SUBCUTANEOUS at 09:00

## 2025-01-20 RX ADMIN — METOCLOPRAMIDE 5 MG: 10 TABLET ORAL at 04:32

## 2025-01-20 RX ADMIN — ROSUVASTATIN CALCIUM 10 MG: 10 TABLET, FILM COATED ORAL at 21:18

## 2025-01-20 RX ADMIN — ONDANSETRON 4 MG: 2 INJECTION INTRAMUSCULAR; INTRAVENOUS at 06:22

## 2025-01-20 RX ADMIN — ACETAMINOPHEN 650 MG: 160 SOLUTION ORAL at 08:59

## 2025-01-20 RX ADMIN — INSULIN HUMAN 4 UNITS: 100 INJECTION, SOLUTION PARENTERAL at 21:28

## 2025-01-20 RX ADMIN — PANTOPRAZOLE SODIUM 40 MG: 40 TABLET, DELAYED RELEASE ORAL at 06:22

## 2025-01-20 RX ADMIN — CALCIUM POLYCARBOPHIL 625 MG: 625 TABLET, FILM COATED ORAL at 21:18

## 2025-01-20 RX ADMIN — CALCIUM CARBONATE (ANTACID) CHEW TAB 500 MG 500 MG: 500 CHEW TAB at 06:22

## 2025-01-20 RX ADMIN — ACETAMINOPHEN 650 MG: 160 SOLUTION ORAL at 02:00

## 2025-01-20 RX ADMIN — INSULIN HUMAN 12 UNITS: 100 INJECTION, SUSPENSION SUBCUTANEOUS at 18:17

## 2025-01-20 RX ADMIN — INSULIN HUMAN 4 UNITS: 100 INJECTION, SOLUTION PARENTERAL at 12:25

## 2025-01-20 RX ADMIN — PANTOPRAZOLE SODIUM 40 MG: 40 TABLET, DELAYED RELEASE ORAL at 17:02

## 2025-01-20 RX ADMIN — ACETAMINOPHEN 650 MG: 160 SOLUTION ORAL at 21:18

## 2025-01-20 RX ADMIN — METHOCARBAMOL 500 MG: 500 TABLET ORAL at 06:22

## 2025-01-20 RX ADMIN — MAGNESIUM SULFATE IN WATER FOR 4 G: 40 INJECTION INTRAVENOUS at 12:25

## 2025-01-20 RX ADMIN — CALCIUM POLYCARBOPHIL 625 MG: 625 TABLET, FILM COATED ORAL at 08:59

## 2025-01-20 RX ADMIN — PENTAMIDINE ISETHIONATE 300 MG: 300 INHALANT RESPIRATORY (INHALATION) at 10:21

## 2025-01-20 RX ADMIN — NYSTATIN 500000 UNITS: 500000 SUSPENSION ORAL at 12:25

## 2025-01-20 RX ADMIN — MYCOPHENOLIC ACID 360 MG: 360 TABLET, DELAYED RELEASE ORAL at 06:22

## 2025-01-20 RX ADMIN — METHOCARBAMOL 500 MG: 500 TABLET ORAL at 21:32

## 2025-01-20 RX ADMIN — TACROLIMUS 4 MG: 1 CAPSULE ORAL at 18:15

## 2025-01-20 RX ADMIN — OXYCODONE HYDROCHLORIDE 5 MG: 5 SOLUTION ORAL at 09:01

## 2025-01-20 RX ADMIN — HEPARIN SODIUM 5000 UNITS: 5000 INJECTION INTRAVENOUS; SUBCUTANEOUS at 02:00

## 2025-01-20 RX ADMIN — Medication 2000 UNITS: at 12:25

## 2025-01-20 RX ADMIN — INSULIN HUMAN 4 UNITS: 100 INJECTION, SOLUTION PARENTERAL at 08:11

## 2025-01-20 RX ADMIN — THIAMINE HCL TAB 100 MG 100 MG: 100 TAB at 08:59

## 2025-01-20 RX ADMIN — NYSTATIN 500000 UNITS: 500000 SUSPENSION ORAL at 17:02

## 2025-01-20 RX ADMIN — CALCIUM CARBONATE (ANTACID) CHEW TAB 500 MG 500 MG: 500 CHEW TAB at 17:02

## 2025-01-20 ASSESSMENT — PAIN SCALES - GENERAL
PAINLEVEL_OUTOF10: 7
PAINLEVEL_OUTOF10: 7

## 2025-01-20 NOTE — SIGNIFICANT EVENT
01/20/25 1417   Patient Interaction   Organ Kidney   Type of Interaction Morning rounds   Interdisciplinary Rounds   Attendance Surgeon;Physician;FILI;Pharmacist   Topics Discussed Diet;Home care needs;Medications;Blood test results;Discharge preparation           Transplant Surgery Multidisciplinary Team Note    Temo Hanson is a 74 y.o. male  POD#30  from a Kidney from a  kidney txp. His post operative complications: Other complication: malnutrition requiring PEG, now POD#11 s/p PEG placement     24 Hour Events  1. No acute events     Last Recorded Vitals  Visit Vitals  /64 (BP Location: Right arm, Patient Position: Sitting)   Pulse 91   Temp 35.8 °C (96.4 °F) (Temporal)   Resp 18      Intake/Output last 3 Shifts:    Intake/Output Summary (Last 24 hours) at 1/20/2025 1419  Last data filed at 1/20/2025 1413  Gross per 24 hour   Intake 1210 ml   Output 100 ml   Net 1110 ml      Vitals:    01/20/25 0426   Weight: 69.7 kg (153 lb 10.6 oz)        Assessment/Plan   Principal Problem:    Management after organ transplant  Active Problems:  Patient Active Problem List   Diagnosis    S/P laparoscopic cholecystectomy    Abdominal pain    Achalasia    Acute lower UTI    Microcytic anemia    Complete heart block    Callus of foot    Chronic periodontitis, unspecified    Stage 5 chronic kidney disease (Multi)    Closed fracture of metatarsal bone    Crushing injury of foot    Diabetes mellitus (Multi)    Diarrhea    Dysphagia    ED (erectile dysfunction)    Essential hypertension    Foot pain, right    GIB (gastrointestinal bleeding)    Hepatitis B core antibody positive    Hyperkalemia    Ingrowing nail    Iron deficiency anemia secondary to inadequate dietary iron intake    Long toenail    Malignant neoplasm of prostate (Multi)    Onychomycosis    Pheochromocytoma of right adrenal gland    Pneumonia of right lower lobe due to infectious organism    Productive cough    Pure hypercholesterolemia    Stricture  esophagus    SVT (supraventricular tachycardia) (CMS-HCC)    Type 2 diabetes mellitus with diabetic neuropathy (Multi)    Preop cardiovascular exam    Third degree heart block    Pericardial effusion ([facility]-H[facility])    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 (Multi)    Uremia    Cardiac tamponade    Cardiac pacemaker in situ    ESRD (end stage renal disease) (Multi)    Type 2 diabetes mellitus, with long-term current use of insulin    Dehydration    Alactasia syndrome    Type 2 diabetes mellitus with hyperglycemia, with long-term current use of insulin    On tube feeding diet        Immunosuppression reviewed and adjusted              Induction: Thymoglobulin Full Dose              Tacrolimus goal 8-10 ng/mL. Current dose 4 mg BID                Myfortic 360 mg po BID              Solumedrol taper  DVT prophylaxis SCDS and subcutaneous heparin 5000 TID  PT/OT  Diet: diabeticl, enteral feeds   Anticipated discharge today pending arrangement of tube feed supplier     Sherly García PA-C

## 2025-01-20 NOTE — PROGRESS NOTES
TCC aware that patient is medically cleared for discharge. [facility] sent four referrals to tube feed suppliers and all denied. Additional referral faxed over to [facility] [employer], ## [phone], fax## [phone]. Once TF supplier confirmed, patient will discharge with [facility] for home care services.    3985- [employer] [facility] [employer] confirmed it is 24-48 hours for processing referrals.      LUCÍA Strange, RN  [facility], [facility] 5&9  Transitional Care Coordinator, Mon-Fri  Cell: [phone], Office: [phone]  Email: [email or web address]

## 2025-01-20 NOTE — PROGRESS NOTES
"Temo Hanson is a 74 y.o. male on day 19 of admission presenting with Dehydration.    Subjective:    Excellent kidney function  WBC continue to improve. No indication for neupogen.   Increase Myfortic back to 360 mg bid  Increased TF rate, target 65 today  OFF TPN  PT/OT  Working on dispo planning    Scheduled medications    acetaminophen, 650 mg, g-tube, q6h  amLODIPine, 10 mg, oral, Daily  calcium carbonate, 500 mg, oral, BID AC  calcium polycarbophiL, 625 mg, oral, BID  gabapentin, 200 mg, oral, Daily  heparin (porcine), 5,000 Units, subcutaneous, q8h  insulin NPH (Isophane), 10 Units, subcutaneous, q24h  [START ON 1/20/2025] insulin NPH (Isophane), 5 Units, subcutaneous, q24h KASSY  insulin regular, 0-10 Units, subcutaneous, Before meals & nightly  lidocaine, 1 patch, transdermal, Daily  methocarbamol, 500 mg, oral, q8h KASSY  metoclopramide, 5 mg, oral, q8h  mycophenolate, 360 mg, oral, BID  nystatin, 5 mL, Swish & Swallow, 4x daily  ondansetron, 4 mg, intravenous, q8h  pantoprazole, 40 mg, oral, BID AC  predniSONE, 5 mg, oral, Daily  rosuvastatin, 10 mg, oral, Nightly  [Held by provider] sulfamethoxazole-trimethoprim, 1 tablet, oral, Daily  tacrolimus, 2 mg, oral, q12h  thiamine, 100 mg, oral, Daily  [Held by provider] valGANciclovir, 900 mg, oral, q24h      Continuous medications           PRN medications  PRN medications: alteplase, carboxymethylcellulose, dextrose, dextrose, glucagon, naloxone, oxyCODONE, oxyCODONE, sodium chloride 0.9%, sodium chloride 0.9%    Last Recorded Vitals  Blood pressure 149/84, pulse 76, temperature 36.8 °C (98.2 °F), temperature source Temporal, resp. rate 18, height 1.854 m (6' 1\"), weight 71.5 kg (157 lb 9.6 oz), SpO2 98%.  Intake/Output last 3 Shifts:  I/O last 3 completed shifts:  In: 2520 (35.3 mL/kg) [P.O.:1860; NG/GT:660]  Out: 0 (0 mL/kg)   Weight: 71.5 kg     A&ox3, no distress  MMM, no lesions  Lungs with equal air entry, no added sounds  Rrr, no m/r/g  Abd soft, " slightly distended, nd, RLQ incision c/d/i, staples intact  No allograft tenderness  No edema bilaterally  PEG tube in place    Results for orders placed or performed during the hospital encounter of 12/31/24 (from the past 24 hours)   POCT GLUCOSE   Result Value Ref Range    POCT Glucose 144 (H) 74 - 99 mg/dL   POCT GLUCOSE   Result Value Ref Range    POCT Glucose 156 (H) 74 - 99 mg/dL   Magnesium   Result Value Ref Range    Magnesium 1.78 1.60 - 2.40 mg/dL   Tacrolimus level   Result Value Ref Range    Tacrolimus  6.6 <=15.0 ng/mL   Renal Function Panel   Result Value Ref Range    Glucose 174 (H) 74 - 99 mg/dL    Sodium 135 (L) 136 - 145 mmol/L    Potassium 4.8 3.5 - 5.3 mmol/L    Chloride 105 98 - 107 mmol/L    Bicarbonate 24 21 - 32 mmol/L    Anion Gap 11 10 - 20 mmol/L    Urea Nitrogen 13 6 - 23 mg/dL    Creatinine 0.85 0.50 - 1.30 mg/dL    eGFR >90 >60 mL/min/1.73m*2    Calcium 8.2 (L) 8.6 - 10.6 mg/dL    Phosphorus 2.1 (L) 2.5 - 4.9 mg/dL    Albumin 2.5 (L) 3.4 - 5.0 g/dL   CBC and Auto Differential   Result Value Ref Range    WBC 3.3 (L) 4.4 - 11.3 x10*3/uL    nRBC 0.0 0.0 - 0.0 /100 WBCs    RBC 2.83 (L) 4.50 - 5.90 x10*6/uL    Hemoglobin 8.4 (L) 13.5 - 17.5 g/dL    Hematocrit 28.3 (L) 41.0 - 52.0 %     80 - 100 fL    MCH 29.7 26.0 - 34.0 pg    MCHC 29.7 (L) 32.0 - 36.0 g/dL    RDW 17.3 (H) 11.5 - 14.5 %    Platelets 208 150 - 450 x10*3/uL    Neutrophils % 70.5 40.0 - 80.0 %    Immature Granulocytes %, Automated 1.2 (H) 0.0 - 0.9 %    Lymphocytes % 14.6 13.0 - 44.0 %    Monocytes % 10.7 2.0 - 10.0 %    Eosinophils % 2.1 0.0 - 6.0 %    Basophils % 0.9 0.0 - 2.0 %    Neutrophils Absolute 2.31 1.60 - 5.50 x10*3/uL    Immature Granulocytes Absolute, Automated 0.04 0.00 - 0.50 x10*3/uL    Lymphocytes Absolute 0.48 (L) 0.80 - 3.00 x10*3/uL    Monocytes Absolute 0.35 0.05 - 0.80 x10*3/uL    Eosinophils Absolute 0.07 0.00 - 0.40 x10*3/uL    Basophils Absolute 0.03 0.00 - 0.10 x10*3/uL   POCT GLUCOSE   Result  Value Ref Range    POCT Glucose 226 (H) 74 - 99 mg/dL   POCT GLUCOSE   Result Value Ref Range    POCT Glucose 223 (H) 74 - 99 mg/dL   POCT GLUCOSE   Result Value Ref Range    POCT Glucose 209 (H) 74 - 99 mg/dL   POCT GLUCOSE   Result Value Ref Range    POCT Glucose 177 (H) 74 - 99 mg/dL       Assessment/Plan     74 y.o. male with a hx of ESRD secondary to diabetic nephropathy whom received a  donor kidney transplant on 24 by Dr. Zamora with a KDPI of 93% and PRA of 54%. Donor was Hepc -/-and has not met risk factors. EBV D+ /R+, CMV D-/R+. Left donor kidney transplanted to patient right pelvis. Admission weight is 72.7 kg (discharge weight is 76.4 kg ). Pt received a total of 3 mg/kg total of thymoglobulin induction therapy in conjunction with 500mg IV solumedrol.      Post-txp course notable for slow graft function, now with DGF.     Allograft function: s/p DDKT 24  - DGF. , Cr 5.5,uremic on admission. s/p HD  for clearance.   Now Cr 1.4-1.5  IV fluid:  mL/hr x 1 L    Immunosuppression:  -           continue 2 mg BID;  WATCH level tomorrow. We stopped erythromycin  -           mg BID--> Myfortic 360 mg bid-->180 mg bid for leukopenia 25--> increase back to 360 mg bid 25  -          Prednisone 5 mg daily    Prophylaxis:  PPI   Nystatin 500,000 units QID x 3 mo  HOLD TMP-SMX x 6 mo -. Give pentamidine today.  HOLD Valcyte, renally dosed x 3 mo 1/10-  CMV PCR neg     Esophageal dysphagia  - Upper GI c/f achalasia. S/p Heller Myotomy plus Andrea Fundoplication 20+ yrs ago - per surgery, unable to fix  - EGD/PEG tube placement 25    Blood pressure   - Amlodipine 10 mg daily    Anemia - iron replete  Darbepoientin 100 mcg subcutaneous weekly -last dose 25    Leukopenia  - Valcyte held since 1/10  - hold TMP-SMX -  - Aranesp on     CKD-MBD - acceptable       Case was presented at multi disciplinary round today      Bashir Angela MD

## 2025-01-20 NOTE — PROGRESS NOTES
"Temo Hanson is a 74 y.o. male on day 20 of admission presenting with Dehydration.    Subjective:  No acute issues overnight.   Tolerating tube feeds well.  Discharge home pending tube feeds approval.   C/o discomfort at the the site of PEG tube insertion.    Scheduled medications    acetaminophen, 650 mg, g-tube, q6h  amLODIPine, 10 mg, oral, Daily  calcium carbonate, 500 mg, oral, BID AC  calcium polycarbophiL, 625 mg, oral, BID  cholecalciferol, 2,000 Units, oral, Daily  gabapentin, 200 mg, oral, Daily  heparin (porcine), 5,000 Units, subcutaneous, q8h  insulin NPH (Isophane), 12 Units, subcutaneous, q24h  [START ON 1/21/2025] insulin NPH (Isophane), 6 Units, subcutaneous, q24h KASSY  insulin regular, 0-10 Units, subcutaneous, Before meals & nightly  lidocaine, 1 patch, transdermal, Daily  [START ON 1/21/2025] magnesium oxide, 400 mg, oral, Daily  methocarbamol, 500 mg, oral, q8h KASSY  metoclopramide, 5 mg, oral, q8h  mycophenolate, 360 mg, oral, BID  nystatin, 5 mL, Swish & Swallow, 4x daily  pantoprazole, 40 mg, oral, BID AC  predniSONE, 5 mg, oral, Daily  rosuvastatin, 10 mg, oral, Nightly  [Held by provider] sulfamethoxazole-trimethoprim, 1 tablet, oral, Daily  tacrolimus, 4 mg, oral, q12h  thiamine, 100 mg, oral, Daily  valGANciclovir, 900 mg, oral, q24h      Continuous medications           PRN medications  PRN medications: alteplase, carboxymethylcellulose, dextrose, dextrose, glucagon, naloxone, ondansetron, oxyCODONE, oxyCODONE, sodium chloride 0.9%, sodium chloride 0.9%    Last Recorded Vitals  Blood pressure 156/66, pulse 76, temperature 35.8 °C (96.4 °F), temperature source Temporal, resp. rate 19, height 1.854 m (6' 1\"), weight 69.7 kg (153 lb 10.6 oz), SpO2 99%.  Intake/Output last 3 Shifts:  I/O last 3 completed shifts:  In: 1170 (16.8 mL/kg) [P.O.:840; NG/GT:330]  Out: 0 (0 mL/kg)   Weight: 69.7 kg     A&ox3, no distress  MMM, no lesions  Lungs with equal air entry, no added sounds  Rrr, no " m/r/g  Abd soft, slightly distended, nd, RLQ incision c/d/i, staples intact  No allograft tenderness  No edema bilaterally  PEG tube in place    Results for orders placed or performed during the hospital encounter of 12/31/24 (from the past 24 hours)   POCT GLUCOSE   Result Value Ref Range    POCT Glucose 202 (H) 74 - 99 mg/dL   POCT GLUCOSE   Result Value Ref Range    POCT Glucose 180 (H) 74 - 99 mg/dL   POCT GLUCOSE   Result Value Ref Range    POCT Glucose 168 (H) 74 - 99 mg/dL   Magnesium   Result Value Ref Range    Magnesium 1.75 1.60 - 2.40 mg/dL   Tacrolimus level   Result Value Ref Range    Tacrolimus  4.6 <=15.0 ng/mL   Renal Function Panel   Result Value Ref Range    Glucose 207 (H) 74 - 99 mg/dL    Sodium 135 (L) 136 - 145 mmol/L    Potassium 4.9 3.5 - 5.3 mmol/L    Chloride 104 98 - 107 mmol/L    Bicarbonate 23 21 - 32 mmol/L    Anion Gap 13 10 - 20 mmol/L    Urea Nitrogen 9 6 - 23 mg/dL    Creatinine 0.81 0.50 - 1.30 mg/dL    eGFR >90 >60 mL/min/1.73m*2    Calcium 8.6 8.6 - 10.6 mg/dL    Phosphorus 2.9 2.5 - 4.9 mg/dL    Albumin 2.9 (L) 3.4 - 5.0 g/dL   CBC and Auto Differential   Result Value Ref Range    WBC 3.1 (L) 4.4 - 11.3 x10*3/uL    nRBC 0.0 0.0 - 0.0 /100 WBCs    RBC 2.88 (L) 4.50 - 5.90 x10*6/uL    Hemoglobin 8.4 (L) 13.5 - 17.5 g/dL    Hematocrit 27.2 (L) 41.0 - 52.0 %    MCV 94 80 - 100 fL    MCH 29.2 26.0 - 34.0 pg    MCHC 30.9 (L) 32.0 - 36.0 g/dL    RDW 17.2 (H) 11.5 - 14.5 %    Platelets 226 150 - 450 x10*3/uL    Neutrophils % 68.6 40.0 - 80.0 %    Immature Granulocytes %, Automated 1.3 (H) 0.0 - 0.9 %    Lymphocytes % 14.1 13.0 - 44.0 %    Monocytes % 14.1 2.0 - 10.0 %    Eosinophils % 1.3 0.0 - 6.0 %    Basophils % 0.6 0.0 - 2.0 %    Neutrophils Absolute 2.14 1.60 - 5.50 x10*3/uL    Immature Granulocytes Absolute, Automated 0.04 0.00 - 0.50 x10*3/uL    Lymphocytes Absolute 0.44 (L) 0.80 - 3.00 x10*3/uL    Monocytes Absolute 0.44 0.05 - 0.80 x10*3/uL    Eosinophils Absolute 0.04 0.00 -  0.40 x10*3/uL    Basophils Absolute 0.02 0.00 - 0.10 x10*3/uL   POCT GLUCOSE   Result Value Ref Range    POCT Glucose 241 (H) 74 - 99 mg/dL   POCT GLUCOSE   Result Value Ref Range    POCT Glucose 261 (H) 74 - 99 mg/dL   POCT GLUCOSE   Result Value Ref Range    POCT Glucose 258 (H) 74 - 99 mg/dL       Assessment/Plan     74 y.o. male with a hx of ESRD secondary to diabetic nephropathy whom received a  donor kidney transplant on 24 by Dr. Zamora with a KDPI of 93% and PRA of 54%. Donor was Hepc -/-and has not met risk factors. EBV D+ /R+, CMV D-/R+. Left donor kidney transplanted to patient right pelvis. Admission weight is 72.7 kg (discharge weight is 76.4 kg ). Pt received a total of 3 mg/kg total of thymoglobulin induction therapy in conjunction with 500mg IV solumedrol.      Post-txp course notable for slow graft function, now with DGF.     Allograft function: s/p DDKT 24  - DGF. , Cr 5.5,uremic on admission. s/p HD  for clearance.   Cr stabilized to 1.4-1.5, now down to ~0.9    Immunosuppression:  - incr tac to 4mg bid given pt off erythromycin.   - cont Myfortic 360 mg bid (incr 25). Cont pred 5 mg/d.     Prophylaxis:  PPI   Nystatin 500,000 units QID x 3 mo  HOLD TMP-SMX x 6 mo -. pentamidine today.   Resume Valcyte for CMV ppx (CMD D+/R-).   CMV PCR neg     Esophageal dysphagia  - Upper GI c/f achalasia. S/p Heller Myotomy plus Andrea Fundoplication 20+ yrs ago - per surgery, unable to fix  - EGD/PEG tube placement 25    Blood pressure   - Amlodipine 10 mg daily    Anemia - iron replete  Darbepoientin 100 mcg subcutaneous weekly -last dose 25    Leukopenia  - Valcyte held since 1/10  - hold TMP-SMX -  - Aranesp     CKD-MBD - acceptable       Likely discharge once home tube feeds arrangements in place.       Beka Nichols MD

## 2025-01-20 NOTE — PROGRESS NOTES
"Temo Hanson is a 74 y.o. male on day 20 of admission presenting with Dehydration.    Subjective   Patient seen and evaluated at the bedside. Reviewed homgoing insulin plan with patient, he is agreeable to using NPH with TF. All questions answered.       I have reviewed histories, allergies and medications have been reviewed and there are no changes.     Objective   Review of Systems   Reason unable to perform ROS: pt sleeping heavily.   Cardiovascular:         Burning chest pain     Physical Exam  Constitutional:       General: He is not in acute distress.     Appearance: Normal appearance.   HENT:      Head: Normocephalic and atraumatic.      Mouth/Throat:      Mouth: Mucous membranes are moist.   Cardiovascular:      Rate and Rhythm: Normal rate and regular rhythm.   Pulmonary:      Effort: Pulmonary effort is normal.      Breath sounds: Normal breath sounds.   Abdominal:      Palpations: Abdomen is soft.   Skin:     General: Skin is warm.   Neurological:      Comments: sleeping   Psychiatric:      Comments: sleeping     Last Recorded Vitals  Blood pressure 138/62, pulse 85, temperature 36.1 °C (97 °F), temperature source Temporal, resp. rate 18, height 1.854 m (6' 1\"), weight 69.7 kg (153 lb 10.6 oz), SpO2 98%.  Intake/Output last 3 Shifts:  I/O last 3 completed shifts:  In: 1170 (16.8 mL/kg) [P.O.:840; NG/GT:330]  Out: 0 (0 mL/kg)   Weight: 69.7 kg     Relevant Results  Results from last 7 days   Lab Units 01/20/25  0746 01/20/25  0539 01/20/25  0418 01/20/25  0057 01/19/25  2101 01/19/25  1512 01/19/25  0732 01/19/25  0632 01/18/25  0801 01/18/25  0556 01/17/25  0734 01/17/25  0541 01/16/25  0755 01/16/25  0603   POCT GLUCOSE mg/dL 241*  --  168* 180* 202* 177*   < >  --    < >  --    < >  --    < >  --    GLUCOSE mg/dL  --  207*  --   --   --   --   --  174*  --  121*  --  234*  --  118*    < > = values in this interval not displayed.       Assessment/Plan   Temo Hanson is a 74 y.o. male with a PMHx of " DDKT 12/21/2024, Chronic kidney disease, DM (diabetes mellitus) (Multi), Fistula, HTN (hypertension), malignant neoplasm of prostate, on day 10 of admission presenting with dehydration.  Patient was started on tube feeds, NPO, today after placement of PEG tube. Patient states he felt nausea and vomiting, leading to a pause in his feedings around noon. Feedings restarted around 1500.      Diabetes History  Type of diabetes: 2  Year diagnosed or age: 46 years old  Hospitalizations for DKA or HHS: Once - inappropriately gave too much sliding scale due to low BG finger stick not correcting in under an hour  Complications: Nephropathy  Seen by PCP or Endocrinology: PCP - Dr. Hillman, Endo - Dr. Carey  Frequency of glucose checks: 4-5x daily after meals  Glucose review: persistent hyperglycemia this admission  Frequency of Hypoglycemia: none  Hypoglycemia unawareness: no  Severe hypoglycemia requiring assistance from others:  N     Home Medications  Basal: Glargine 8U  Prandial: Aspart 4U  Correction: Aspart sliding scale  Assessment & Plan  Dehydration    Alactasia syndrome    Type 2 diabetes mellitus with hyperglycemia, with long-term current use of insulin    On tube feeding diet      PLAN  Steroids:   Prednisone 15 mg daily  1/13- Predinsone 10mg daily  1/16 - prednisone reduced to 5 mg     Nutrition:   1/10- 60g CHO diet + glucerna 1.5 continuous TF, goal of 50 ml/h. This provides 40 g of carbs every 6 hours when at goal.  1/11- TF reduced to 20ml/h so patient is getting 16g of carbs every 6 hours   1/12- TF stopped, PPN to start tonight at 30cc/h. Pt also on CLD  1/13 - PPN Started. CLD  1/14: TPN to increase to 55 ml/hr (previously at 41 ml/hr) Patient remains on clear liquid diet but with minimal intake  Tube feed: glucerna 1.5 started at 10 ml/hr at 2200 - patient made NPO  1/15: NPO. TPN to remain at 55 ml/hr  Tube feed: glucerna 1.5 started at 10 ml/hr -> switched to Vital 1.5 at 20 ml/hr, increase by 5 ml every 6  hours until goal rate of 50 ml/hr reached (40 carbs over 6 hrs)  Diet advanced in the afternoon  1/16: 60 gram Carb consistent + TF + TPN at 55 ml/hr. TF changed to Vital 1.5 starting at 30 ml/hr and increasing by 5 ml/hr until goal rate of 50ml/hr is reached (55 grams carbs/6 hrs)  1/17: TF increasing by this evening to goal of 65ml/h (72g of carbs / 6 hours). TPN cut in half from 50 ml/h to 30ml/h. Remains on PO diet.   1/18: TF will shut off at 1200 for 6 hours, then will continue to run at 65ml/h restarting at 1800 until 1200 on 1/19, then be stopped until 18:00. TPN will be off. Will remain on PO diet.   1/19: TF will now be running from 18:00 to 12:00 (18hr), and pt will likely discharge on this nutrition schedule if tolerate        - adjust NPH  to 12u at 18:00 and 6u at 06:00      If TF is held or glucose trending <100mg/dL: reduce NPH to 6u q12 at 18;00 and 06:00    - adjust regular insulin scale #2 to ACHS timing, please continue use of this order regardless of nutrition status to address hyperglycemia    -Accuchecks ACHS  - Goal -180  -Hypoglycemia protocol  -Will continue to follow and titrate insulin accordingly      Discharge planning:   [x] patient may expect to discharge home on glargine 5u qAM, aspart scale #2 with meals, and NPH 10u with TF start. Insulin chart provided below.  [x]will provide CGM sample prior to discharge, gina 3 provided    [x]will enroll pt in  pharmacy platinum plan program  [x]follow up with ROD Lainez in PTDM clinic as scheduled on 1/30/25 as bridge back to home endo and PCP     I spent 50 mins on the care and coordination of the patient        INSULIN INSTRUCTIONS   Breakfast time Lunch time Dinner time  Bedtime   Lantus/Glargine = 5 units  Humulin N/NPH = 10 units at the same time as tube feed is started    Aspart = scale as needed Aspart = scale as needed Aspart = scale as needed      CORRECTIVE SCALE  GLUCOSE READING   ASPART SCALE DOSE    70 - 149 0    150 - 200 2   201 - 250 4   251 - 300 6   301 - 350 8   351 - 400+ 10     If glucose remains over 400, call your diabetes provider, repeat 10 units every 4 hours and drink sugar free liquids (water, Gatorade zero, chicken broth) until you glucose improves or you hear back from medical professional. If your glucose remains over 400 and you develop symptoms such as nausea/vomiting or confusion, go to the emergency room as soon as possible.    I spent 50 mins on the care and coordination of this patient.    Zohra Kang PA-C

## 2025-01-20 NOTE — PROGRESS NOTES
Physical Therapy                 Therapy Communication Note    Patient Name: Temo Hanson  MRN: [id number]  Department: [place] [address]  Room: 90/9081-A  Today's Date: 1/20/2025     Discipline: Physical Therapy    PT Missed Visit: Yes     Missed Visit Reason: Missed Visit Reason: Patient refused (Pt supine in bed upon arrival and refused to participate despite maximum encouragement and education. Denied pain.Will re-attempt as able.)    Missed Time: 15:30 PM

## 2025-01-20 NOTE — CARE PLAN
The patient's goals for the shift include get better    The clinical goals for the shift include pt will remain safe and free from falls throughout shift    Problem: Pain  Goal: Takes deep breaths with improved pain control throughout the shift  Outcome: Progressing  Goal: Turns in bed with improved pain control throughout the shift  Outcome: Progressing  Goal: Walks with improved pain control throughout the shift  Outcome: Progressing  Goal: Performs ADL's with improved pain control throughout shift  Outcome: Progressing  Goal: Participates in PT with improved pain control throughout the shift  Outcome: Progressing  Goal: Free from opioid side effects throughout the shift  Outcome: Progressing  Goal: Free from acute confusion related to pain meds throughout the shift  Outcome: Progressing     Problem: Skin  Goal: Decreased wound size/increased tissue granulation at next dressing change  Outcome: Progressing  Goal: Participates in plan/prevention/treatment measures  Outcome: Progressing  Goal: Prevent/manage excess moisture  Outcome: Progressing  Goal: Prevent/minimize sheer/friction injuries  Outcome: Progressing  Goal: Promote/optimize nutrition  Outcome: Progressing  Goal: Promote skin healing  Outcome: Progressing

## 2025-01-20 NOTE — DISCHARGE INSTR - OTHER ORDERS
Labs every Monday & Thursday before morning medications @ 8 am  Clinic Friday 1/24/25 @ 8 am  We will work to get your stent removal rescheduled  Medications that need filled at home: Prednisone 5 mg daily, Amlodipine 10 mg daily, Gabapentin 200 mg daily, Rosuvastatin 10 mg daily

## 2025-01-20 NOTE — PROGRESS NOTES
Art Therapy Note    Temo Hanson     Therapy Session  Referral Type: New referral this admission  Visit Type: New visit  Session Start Time: 1316  Session End Time: 1317  Intervention Delivery: In-person  Conflict of Service: Declined treatment              Treatment/Interventions       Post-assessment  Total Session Time (min): 1 minutes    Narrative  Assessment Detail: Pt was sitting up on the side of his bed, looked distressed when ATR visited to attempt session. Pt stated he was vomiting and felt ill. ATR offered to get his nurse and pt nodded. ATR alerted pt's nurse to his situation and the nurse went to attend to him. ATR will f/up another time with pt.    Education Documentation  No documentation found.

## 2025-01-20 NOTE — PROGRESS NOTES
"Temo Hanson is a 74 y.o. male on day 19 of admission presenting with Dehydration.    Subjective:    Excellent kidney function  WBC continue to improve. No indication for neupogen.   Increased Myfortic back to 360 mg bid yesterday  Pain around the tube site. Seen by surgery.   Increased TF rate, target 65 today  OFF TPN  PT/OT  Working on dispo planning    Scheduled medications    acetaminophen, 650 mg, g-tube, q6h  amLODIPine, 10 mg, oral, Daily  calcium carbonate, 500 mg, oral, BID AC  calcium polycarbophiL, 625 mg, oral, BID  gabapentin, 200 mg, oral, Daily  heparin (porcine), 5,000 Units, subcutaneous, q8h  insulin NPH (Isophane), 10 Units, subcutaneous, q24h  [START ON 1/20/2025] insulin NPH (Isophane), 5 Units, subcutaneous, q24h KASSY  insulin regular, 0-10 Units, subcutaneous, Before meals & nightly  lidocaine, 1 patch, transdermal, Daily  methocarbamol, 500 mg, oral, q8h KASSY  metoclopramide, 5 mg, oral, q8h  mycophenolate, 360 mg, oral, BID  nystatin, 5 mL, Swish & Swallow, 4x daily  ondansetron, 4 mg, intravenous, q8h  pantoprazole, 40 mg, oral, BID AC  [START ON 1/20/2025] pentamidine, 300 mg, nebulization, Once  predniSONE, 5 mg, oral, Daily  rosuvastatin, 10 mg, oral, Nightly  [Held by provider] sulfamethoxazole-trimethoprim, 1 tablet, oral, Daily  tacrolimus, 2 mg, oral, q12h  thiamine, 100 mg, oral, Daily  [Held by provider] valGANciclovir, 900 mg, oral, q24h      Continuous medications           PRN medications  PRN medications: alteplase, carboxymethylcellulose, dextrose, dextrose, glucagon, naloxone, oxyCODONE, oxyCODONE, sodium chloride 0.9%, sodium chloride 0.9%    Last Recorded Vitals  Blood pressure 149/84, pulse 76, temperature 36.8 °C (98.2 °F), temperature source Temporal, resp. rate 18, height 1.854 m (6' 1\"), weight 71.5 kg (157 lb 9.6 oz), SpO2 98%.  Intake/Output last 3 Shifts:  I/O last 3 completed shifts:  In: 2520 (35.3 mL/kg) [P.O.:1860; NG/GT:660]  Out: 0 (0 mL/kg)   Weight: 71.5 kg "     A&ox3, no distress  MMM, no lesions  Lungs with equal air entry, no added sounds  Rrr, no m/r/g  Abd soft, slightly distended, nd, RLQ incision c/d/i, staples intact  No allograft tenderness  No edema bilaterally  PEG tube in place    Results for orders placed or performed during the hospital encounter of 12/31/24 (from the past 24 hours)   POCT GLUCOSE   Result Value Ref Range    POCT Glucose 144 (H) 74 - 99 mg/dL   POCT GLUCOSE   Result Value Ref Range    POCT Glucose 156 (H) 74 - 99 mg/dL   Magnesium   Result Value Ref Range    Magnesium 1.78 1.60 - 2.40 mg/dL   Tacrolimus level   Result Value Ref Range    Tacrolimus  6.6 <=15.0 ng/mL   Renal Function Panel   Result Value Ref Range    Glucose 174 (H) 74 - 99 mg/dL    Sodium 135 (L) 136 - 145 mmol/L    Potassium 4.8 3.5 - 5.3 mmol/L    Chloride 105 98 - 107 mmol/L    Bicarbonate 24 21 - 32 mmol/L    Anion Gap 11 10 - 20 mmol/L    Urea Nitrogen 13 6 - 23 mg/dL    Creatinine 0.85 0.50 - 1.30 mg/dL    eGFR >90 >60 mL/min/1.73m*2    Calcium 8.2 (L) 8.6 - 10.6 mg/dL    Phosphorus 2.1 (L) 2.5 - 4.9 mg/dL    Albumin 2.5 (L) 3.4 - 5.0 g/dL   CBC and Auto Differential   Result Value Ref Range    WBC 3.3 (L) 4.4 - 11.3 x10*3/uL    nRBC 0.0 0.0 - 0.0 /100 WBCs    RBC 2.83 (L) 4.50 - 5.90 x10*6/uL    Hemoglobin 8.4 (L) 13.5 - 17.5 g/dL    Hematocrit 28.3 (L) 41.0 - 52.0 %     80 - 100 fL    MCH 29.7 26.0 - 34.0 pg    MCHC 29.7 (L) 32.0 - 36.0 g/dL    RDW 17.3 (H) 11.5 - 14.5 %    Platelets 208 150 - 450 x10*3/uL    Neutrophils % 70.5 40.0 - 80.0 %    Immature Granulocytes %, Automated 1.2 (H) 0.0 - 0.9 %    Lymphocytes % 14.6 13.0 - 44.0 %    Monocytes % 10.7 2.0 - 10.0 %    Eosinophils % 2.1 0.0 - 6.0 %    Basophils % 0.9 0.0 - 2.0 %    Neutrophils Absolute 2.31 1.60 - 5.50 x10*3/uL    Immature Granulocytes Absolute, Automated 0.04 0.00 - 0.50 x10*3/uL    Lymphocytes Absolute 0.48 (L) 0.80 - 3.00 x10*3/uL    Monocytes Absolute 0.35 0.05 - 0.80 x10*3/uL     Eosinophils Absolute 0.07 0.00 - 0.40 x10*3/uL    Basophils Absolute 0.03 0.00 - 0.10 x10*3/uL   POCT GLUCOSE   Result Value Ref Range    POCT Glucose 226 (H) 74 - 99 mg/dL   POCT GLUCOSE   Result Value Ref Range    POCT Glucose 223 (H) 74 - 99 mg/dL   POCT GLUCOSE   Result Value Ref Range    POCT Glucose 209 (H) 74 - 99 mg/dL   POCT GLUCOSE   Result Value Ref Range    POCT Glucose 177 (H) 74 - 99 mg/dL       Assessment/Plan     74 y.o. male with a hx of ESRD secondary to diabetic nephropathy whom received a  donor kidney transplant on 24 by Dr. Zamora with a KDPI of 93% and PRA of 54%. Donor was Hepc -/-and has not met risk factors. EBV D+ /R+, CMV D-/R+. Left donor kidney transplanted to patient right pelvis. Admission weight is 72.7 kg (discharge weight is 76.4 kg ). Pt received a total of 3 mg/kg total of thymoglobulin induction therapy in conjunction with 500mg IV solumedrol.      Post-txp course notable for slow graft function, now with DGF.     Allograft function: s/p DDKT 24  - DGF. , Cr 5.5,uremic on admission. s/p HD  for clearance.   Now Cr 1.4-1.5  IV fluid:  mL/hr x 1 L    Immunosuppression:  -           continue 2 mg BID;  WATCH level tomorrow. We stopped erythromycin  -           mg BID--> Myfortic 360 mg bid-->180 mg bid for leukopenia 25--> increase back to 360 mg bid 25  -          Prednisone 5 mg daily    Prophylaxis:  PPI   Nystatin 500,000 units QID x 3 mo  HOLD TMP-SMX x 6 mo -. Give pentamidine tomorrow in am .  HOLD Valcyte, renally dosed x 3 mo 1/10-  CMV PCR neg     Esophageal dysphagia  - Upper GI c/f achalasia. S/p Heller Myotomy plus Andrea Fundoplication 20+ yrs ago - per surgery, unable to fix  - EGD/PEG tube placement 25    Blood pressure   - Amlodipine 10 mg daily    Anemia - iron replete  Darbepoientin 100 mcg subcutaneous weekly -last dose 25    Leukopenia  - Valcyte held since 1/10  - hold TMP-SMX -  -  Aranesp on Sunday    CKD-MBD - acceptable       Case was presented at multi disciplinary round today      Bashir Angela MD

## 2025-01-21 ENCOUNTER — APPOINTMENT (OUTPATIENT)
Facility: HOSPITAL | Age: 75
End: 2025-01-21
Payer: COMMERCIAL

## 2025-01-21 ENCOUNTER — APPOINTMENT (OUTPATIENT)
Dept: UROLOGY | Facility: CLINIC | Age: 75
End: 2025-01-21
Payer: COMMERCIAL

## 2025-01-21 LAB
GLUCOSE BLD MANUAL STRIP-MCNC: 100 MG/DL (ref 74–99)
GLUCOSE BLD MANUAL STRIP-MCNC: 106 MG/DL (ref 74–99)
GLUCOSE BLD MANUAL STRIP-MCNC: 143 MG/DL (ref 74–99)
GLUCOSE BLD MANUAL STRIP-MCNC: 177 MG/DL (ref 74–99)
GLUCOSE BLD MANUAL STRIP-MCNC: 234 MG/DL (ref 74–99)
GLUCOSE BLD MANUAL STRIP-MCNC: 332 MG/DL (ref 74–99)
GLUCOSE BLD MANUAL STRIP-MCNC: 354 MG/DL (ref 74–99)
HIV1 RNA SERPL DONR QL PROBE AMP: NORMAL

## 2025-01-21 PROCEDURE — 2500000004 HC RX 250 GENERAL PHARMACY W/ HCPCS (ALT 636 FOR OP/ED)

## 2025-01-21 PROCEDURE — 97535 SELF CARE MNGMENT TRAINING: CPT | Mod: GO

## 2025-01-21 PROCEDURE — 2500000002 HC RX 250 W HCPCS SELF ADMINISTERED DRUGS (ALT 637 FOR MEDICARE OP, ALT 636 FOR OP/ED): Performed by: PHYSICIAN ASSISTANT

## 2025-01-21 PROCEDURE — 97116 GAIT TRAINING THERAPY: CPT | Mod: GP | Performed by: PHYSICAL THERAPIST

## 2025-01-21 PROCEDURE — 2500000001 HC RX 250 WO HCPCS SELF ADMINISTERED DRUGS (ALT 637 FOR MEDICARE OP)

## 2025-01-21 PROCEDURE — 82947 ASSAY GLUCOSE BLOOD QUANT: CPT

## 2025-01-21 PROCEDURE — 1100000001 HC PRIVATE ROOM DAILY

## 2025-01-21 PROCEDURE — 2500000001 HC RX 250 WO HCPCS SELF ADMINISTERED DRUGS (ALT 637 FOR MEDICARE OP): Performed by: PHYSICIAN ASSISTANT

## 2025-01-21 PROCEDURE — 99232 SBSQ HOSP IP/OBS MODERATE 35: CPT | Performed by: STUDENT IN AN ORGANIZED HEALTH CARE EDUCATION/TRAINING PROGRAM

## 2025-01-21 PROCEDURE — 2500000002 HC RX 250 W HCPCS SELF ADMINISTERED DRUGS (ALT 637 FOR MEDICARE OP, ALT 636 FOR OP/ED): Performed by: STUDENT IN AN ORGANIZED HEALTH CARE EDUCATION/TRAINING PROGRAM

## 2025-01-21 PROCEDURE — 97530 THERAPEUTIC ACTIVITIES: CPT | Mod: GP | Performed by: PHYSICAL THERAPIST

## 2025-01-21 PROCEDURE — 2500000001 HC RX 250 WO HCPCS SELF ADMINISTERED DRUGS (ALT 637 FOR MEDICARE OP): Performed by: STUDENT IN AN ORGANIZED HEALTH CARE EDUCATION/TRAINING PROGRAM

## 2025-01-21 PROCEDURE — 2500000004 HC RX 250 GENERAL PHARMACY W/ HCPCS (ALT 636 FOR OP/ED): Performed by: PHYSICIAN ASSISTANT

## 2025-01-21 PROCEDURE — 99233 SBSQ HOSP IP/OBS HIGH 50: CPT | Performed by: NURSE PRACTITIONER

## 2025-01-21 RX ORDER — LIDOCAINE HYDROCHLORIDE 20 MG/ML
1 JELLY TOPICAL ONCE
Status: DISCONTINUED | OUTPATIENT
Start: 2025-01-21 | End: 2025-01-23 | Stop reason: HOSPADM

## 2025-01-21 RX ORDER — INSULIN GLARGINE 100 [IU]/ML
7 INJECTION, SOLUTION SUBCUTANEOUS EVERY MORNING
Start: 2025-01-21

## 2025-01-21 RX ADMIN — PANTOPRAZOLE SODIUM 40 MG: 40 TABLET, DELAYED RELEASE ORAL at 15:23

## 2025-01-21 RX ADMIN — METHOCARBAMOL 500 MG: 500 TABLET ORAL at 05:45

## 2025-01-21 RX ADMIN — CALCIUM CARBONATE (ANTACID) CHEW TAB 500 MG 500 MG: 500 CHEW TAB at 06:28

## 2025-01-21 RX ADMIN — METOCLOPRAMIDE 5 MG: 10 TABLET ORAL at 05:44

## 2025-01-21 RX ADMIN — NYSTATIN 500000 UNITS: 500000 SUSPENSION ORAL at 09:11

## 2025-01-21 RX ADMIN — MAGNESIUM OXIDE TAB 400 MG (241.3 MG ELEMENTAL MG) 400 MG: 400 (241.3 MG) TAB at 09:11

## 2025-01-21 RX ADMIN — NYSTATIN 500000 UNITS: 500000 SUSPENSION ORAL at 17:00

## 2025-01-21 RX ADMIN — CALCIUM POLYCARBOPHIL 625 MG: 625 TABLET, FILM COATED ORAL at 09:11

## 2025-01-21 RX ADMIN — OXYCODONE HYDROCHLORIDE 5 MG: 5 SOLUTION ORAL at 09:10

## 2025-01-21 RX ADMIN — PANTOPRAZOLE SODIUM 40 MG: 40 TABLET, DELAYED RELEASE ORAL at 06:28

## 2025-01-21 RX ADMIN — ACETAMINOPHEN 650 MG: 160 SOLUTION ORAL at 09:10

## 2025-01-21 RX ADMIN — PREDNISONE 5 MG: 5 TABLET ORAL at 09:11

## 2025-01-21 RX ADMIN — TACROLIMUS 4 MG: 1 CAPSULE ORAL at 18:58

## 2025-01-21 RX ADMIN — METHOCARBAMOL 500 MG: 500 TABLET ORAL at 13:59

## 2025-01-21 RX ADMIN — NYSTATIN 500000 UNITS: 500000 SUSPENSION ORAL at 12:00

## 2025-01-21 RX ADMIN — VALGANCICLOVIR 900 MG: 450 TABLET, FILM COATED ORAL at 14:00

## 2025-01-21 RX ADMIN — INSULIN HUMAN 8 UNITS: 100 INJECTION, SOLUTION PARENTERAL at 12:00

## 2025-01-21 RX ADMIN — TACROLIMUS 4 MG: 1 CAPSULE ORAL at 06:28

## 2025-01-21 RX ADMIN — INSULIN HUMAN 10 UNITS: 100 INJECTION, SOLUTION PARENTERAL at 17:00

## 2025-01-21 RX ADMIN — INSULIN HUMAN 6 UNITS: 100 INJECTION, SUSPENSION SUBCUTANEOUS at 06:01

## 2025-01-21 RX ADMIN — HEPARIN SODIUM 5000 UNITS: 5000 INJECTION INTRAVENOUS; SUBCUTANEOUS at 09:11

## 2025-01-21 RX ADMIN — ACETAMINOPHEN 650 MG: 160 SOLUTION ORAL at 21:47

## 2025-01-21 RX ADMIN — AMLODIPINE BESYLATE 10 MG: 10 TABLET ORAL at 09:11

## 2025-01-21 RX ADMIN — OXYCODONE HYDROCHLORIDE 5 MG: 5 SOLUTION ORAL at 15:22

## 2025-01-21 RX ADMIN — CALCIUM CARBONATE (ANTACID) CHEW TAB 500 MG 500 MG: 500 CHEW TAB at 15:23

## 2025-01-21 RX ADMIN — MYCOPHENOLIC ACID 360 MG: 360 TABLET, DELAYED RELEASE ORAL at 18:58

## 2025-01-21 RX ADMIN — METOCLOPRAMIDE 5 MG: 10 TABLET ORAL at 21:47

## 2025-01-21 RX ADMIN — METOCLOPRAMIDE 5 MG: 10 TABLET ORAL at 12:00

## 2025-01-21 RX ADMIN — THIAMINE HCL TAB 100 MG 100 MG: 100 TAB at 09:11

## 2025-01-21 RX ADMIN — HEPARIN SODIUM 5000 UNITS: 5000 INJECTION INTRAVENOUS; SUBCUTANEOUS at 17:00

## 2025-01-21 RX ADMIN — GABAPENTIN 200 MG: 100 CAPSULE ORAL at 09:11

## 2025-01-21 RX ADMIN — INSULIN HUMAN 2 UNITS: 100 INJECTION, SOLUTION PARENTERAL at 06:01

## 2025-01-21 RX ADMIN — METHOCARBAMOL 500 MG: 500 TABLET ORAL at 21:50

## 2025-01-21 RX ADMIN — ROSUVASTATIN CALCIUM 10 MG: 10 TABLET, FILM COATED ORAL at 21:47

## 2025-01-21 RX ADMIN — INSULIN HUMAN 12 UNITS: 100 INJECTION, SUSPENSION SUBCUTANEOUS at 18:58

## 2025-01-21 RX ADMIN — OXYCODONE HYDROCHLORIDE 5 MG: 5 SOLUTION ORAL at 21:46

## 2025-01-21 RX ADMIN — MYCOPHENOLIC ACID 360 MG: 360 TABLET, DELAYED RELEASE ORAL at 05:44

## 2025-01-21 RX ADMIN — Medication 2000 UNITS: at 09:13

## 2025-01-21 RX ADMIN — ACETAMINOPHEN 650 MG: 160 SOLUTION ORAL at 13:59

## 2025-01-21 RX ADMIN — CALCIUM POLYCARBOPHIL 625 MG: 625 TABLET, FILM COATED ORAL at 21:48

## 2025-01-21 RX ADMIN — NYSTATIN 500000 UNITS: 500000 SUSPENSION ORAL at 21:49

## 2025-01-21 ASSESSMENT — PAIN SCALES - GENERAL
PAINLEVEL_OUTOF10: 7

## 2025-01-21 ASSESSMENT — COGNITIVE AND FUNCTIONAL STATUS - GENERAL
TOILETING: A LITTLE
CLIMB 3 TO 5 STEPS WITH RAILING: A LITTLE
STANDING UP FROM CHAIR USING ARMS: A LITTLE
MOVING TO AND FROM BED TO CHAIR: A LITTLE
DAILY ACTIVITIY SCORE: 19
DRESSING REGULAR UPPER BODY CLOTHING: A LITTLE
DRESSING REGULAR LOWER BODY CLOTHING: A LITTLE
PERSONAL GROOMING: A LITTLE
HELP NEEDED FOR BATHING: A LITTLE
MOBILITY SCORE: 18
TURNING FROM BACK TO SIDE WHILE IN FLAT BAD: A LITTLE
WALKING IN HOSPITAL ROOM: A LITTLE
MOVING FROM LYING ON BACK TO SITTING ON SIDE OF FLAT BED WITH BEDRAILS: A LITTLE

## 2025-01-21 ASSESSMENT — PAIN DESCRIPTION - LOCATION: LOCATION: ABDOMEN

## 2025-01-21 ASSESSMENT — ENCOUNTER SYMPTOMS
DIAPHORESIS: 0
ACTIVITY CHANGE: 1
POLYPHAGIA: 0
FREQUENCY: 0
ABDOMINAL PAIN: 0
NUMBNESS: 0
SORE THROAT: 0
COUGH: 0
SPEECH DIFFICULTY: 0
CHEST TIGHTNESS: 0
DYSURIA: 0
FATIGUE: 1
DIARRHEA: 0
POLYDIPSIA: 0
JOINT SWELLING: 0
NAUSEA: 0
APPETITE CHANGE: 1
EYES NEGATIVE: 1
CONFUSION: 0
AGITATION: 0
UNEXPECTED WEIGHT CHANGE: 0
HEADACHES: 0
SHORTNESS OF BREATH: 0

## 2025-01-21 ASSESSMENT — ACTIVITIES OF DAILY LIVING (ADL): HOME_MANAGEMENT_TIME_ENTRY: 23

## 2025-01-21 ASSESSMENT — PAIN - FUNCTIONAL ASSESSMENT
PAIN_FUNCTIONAL_ASSESSMENT: 0-10

## 2025-01-21 NOTE — SIGNIFICANT EVENT
01/21/25 0835   Wound 12/31/24 Pressure Injury Sacrum   Date First Assessed/Time First Assessed: 12/31/24 1300   Present on Original Admission: Yes  Hand Hygiene Completed: Yes  Primary Wound Type: Pressure Injury  Location: Sacrum   Wound Image Images linked   Wound Length (cm) 2 cm   Wound Width (cm) 2 cm   Wound Surface Area (cm^2) 4 cm^2

## 2025-01-21 NOTE — PROGRESS NOTES
Physical Therapy    Physical Therapy Treatment    Patient Name: Temo Hanson  MRN: [id number]  Department: [place] [address]  Room: [facility]9081  Today's Date: 1/21/2025  Time Calculation  Start Time: 1058  Stop Time: 1121  Time Calculation (min): 23 min         Assessment/Plan   PT Assessment  PT Assessment Results: Decreased strength, Decreased endurance, Impaired balance, Decreased mobility, Pain, Decreased skin integrity  Rehab Prognosis: Good  Strengths: Attitude of self  Barriers to Participation: Comorbidities  End of Session Communication: Bedside nurse  End of Session Patient Position: Up in chair, Alarm off, not on at start of session  PT Plan  Inpatient/Swing Bed or Outpatient: Inpatient  PT Plan  Treatment/Interventions: Bed mobility, Transfer training, Gait training, Stair training, Balance training, Strengthening, Endurance training, Therapeutic exercise, Therapeutic activity, Home exercise program  PT Plan: Ongoing PT  PT Frequency: 3 times per week  PT Discharge Recommendations: Low intensity level of continued care  Equipment Recommended upon Discharge: Wheeled walker  PT Recommended Transfer Status: Assist x1, Assistive device  PT - OK to Discharge: Yes      General Visit Information:   PT  Visit  PT Received On: 01/21/25  General  Reason for Referral: 74 year old male s/p DDKT on 12/21/24, s/p PEG 1/8  Past Medical History Relevant to Rehab: Hypertension, hyperlipidemia, complete heart block status post leadless pacemaker insertion on 7/17/2024, pericardial effusion, pheochromocytoma of the right adrenal gland, history of prostate cancer, GI bleed  PT Missed Visit: Yes  Missed Visit Reason: Patient refused (Pt supine in bed upon arrival and refused to participate despite maximum encouragement and education. Denied pain. Will re-attempt as able.)  Prior to Session Communication: Bedside nurse  Patient Position Received: Up in chair, Alarm off, not on at start of session  Preferred Learning Style: visual,  verbal  General Comment: Pt pleasant and agreeable to participate in PT session    Subjective   Precautions:  Precautions  Medical Precautions: Fall precautions  Post-Surgical Precautions: Abdominal surgery precautions    Vital Signs (Past 2hrs)        Date/Time Vitals Session Patient Position Pulse Resp SpO2 BP MAP (mmHg)    01/21/25 1142 --  --  79  18  99 %  152/72  99                         Objective   Pain:  Pain Assessment  Pain Assessment: 0-10  0-10 (Numeric) Pain Score: 7  Pain Type: Acute pain, Surgical pain  Pain Location: Abdomen  Pain Interventions: Repositioned, Distraction  Cognition:  Cognition  Overall Cognitive Status: Within Functional Limits  Arousal/Alertness: Appropriate responses to stimuli  Orientation Level: Oriented X4          Treatments:       Bed Mobility  Bed Mobility: No (seated in chair pre and post session)    Ambulation/Gait Training  Ambulation/Gait Training Performed: Yes  Ambulation/Gait Training 1  Surface 1: Level tile  Device 1: Single point cane  Assistance 1: Close supervision, Minimal tactile cues  Quality of Gait 1: Narrow base of support, Decreased step length  Comments/Distance (ft) 1: 120 ft, 80 ft, 85 ft with seated rest breaks in between  Transfers  Transfer: Yes  Transfer 1  Transfer From 1: Sit to  Transfer to 1: Sit, Stand  Technique 1: Sit to stand, Stand to sit  Transfer Device 1: Cane  Transfer Level of Assistance 1: Close supervision, Minimal verbal cues  Trials/Comments 1: cues for hand placement    Stairs  Stairs: No (declined)    Outcome Measures:  [facility] Basic Mobility  Turning from your back to your side while in a flat bed without using bedrails: A little  Moving from lying on your back to sitting on the side of a flat bed without using bedrails: A little  Moving to and from bed to chair (including a wheelchair): A little  Standing up from a chair using your arms (e.g. wheelchair or bedside chair): A little  To walk in hospital room: A little  Climbing  3-5 steps with railing: A little  Basic Mobility - Total Score: 18    Education Documentation  Precautions, taught by Alice Tripathi PT at 1/21/2025 11:31 AM.  Learner: Patient  Readiness: Acceptance  Method: Explanation  Response: Verbalizes Understanding, Needs Reinforcement    Body Mechanics, taught by Alice Tripathi PT at 1/21/2025 11:31 AM.  Learner: Patient  Readiness: Acceptance  Method: Explanation  Response: Verbalizes Understanding, Needs Reinforcement    ADL Training, taught by Alice Tripathi PT at 1/21/2025 11:31 AM.  Learner: Patient  Readiness: Acceptance  Method: Explanation  Response: Verbalizes Understanding, Needs Reinforcement    Education Comments  No comments found.        OP EDUCATION:       Encounter Problems       Encounter Problems (Active)       Mobility       Pt will be Elvira ambulation 200 ft with LRAD (Progressing)       Start:  01/21/25    Expected End:  01/25/25               PT Problem       Pt will perform all aspects of bed mobility at mod indep by discharge in order to decrease caregiver burden. (Progressing)       Start:  01/04/25    Expected End:  01/25/25            Pt will perform all basic transfers at mod indep w/ LRAD by discharge. (Progressing)       Start:  01/04/25    Expected End:  01/25/25            Pt will ambulate 150' w/ LRAD at CGA by discharge. (Met)       Start:  01/04/25    Expected End:  01/18/25    Resolved:  01/07/25         Pt will negotiate 5-12 steps w/ 1 rail in order to simulate home entrance/set-up by discharge. (Progressing)       Start:  01/04/25    Expected End:  01/25/25

## 2025-01-21 NOTE — SIGNIFICANT EVENT
Urology service was contacted for bedside stent removal as the patient missed his scheduled office removal due to admission. The patient was seen and discussed stent removal. He declined stent removal. He felt overwhelmed and will contact the office to reschedule that.     Beverly Mcfadden MD  Urology PGY-3  Pager: [phone]

## 2025-01-21 NOTE — PROGRESS NOTES
[facility] spoke with [employer] lianevin Carias, (ph## [phone], fax## [phone]) regarding TF supplies. Liaison stated the patient would have a co-pay of $97.00; liaison tried to reach patient's spouse and daughter for payment. Once that is processed they can send out the supplies to the patient by [employer]. TCC waiting for callback to confirm payment; will continue to follow for discharge planning.    6906- [facility] confirmed that patient's tube feed order was shipped from [employer]. Delivery expected by tomorrow- 01/22/25. [facility] notified of shipment.        LUCÍA Strange, RN  [facility], [facility] 5&9  Transitional Care Coordinator, Mon-Fri  Cell: [phone], Office: [phone]  Email: [email or web address]

## 2025-01-21 NOTE — SIGNIFICANT EVENT
01/20/25 1417   Patient Interaction   Organ Kidney   Type of Interaction Morning rounds   Interdisciplinary Rounds   Attendance Surgeon;Physician;FILI;Pharmacist   Topics Discussed Diet;Home care needs;Medications;Blood test results;Discharge preparation           Transplant Surgery Multidisciplinary Team Note    Temo Hanson is a 74 y.o. male  POD#30  from a Kidney from a  kidney txp. His post operative complications: Other complication: malnutrition requiring PEG, now POD#11 s/p PEG placement     24 Hour Events  1. No acute events     Last Recorded Vitals  Visit Vitals  /72 (BP Location: Right arm, Patient Position: Lying)   Pulse 79   Temp 35.7 °C (96.3 °F) (Temporal)   Resp 18      Intake/Output last 3 Shifts:    Intake/Output Summary (Last 24 hours) at 1/21/2025 1157  Last data filed at 1/21/2025 1142  Gross per 24 hour   Intake 1080 ml   Output 375 ml   Net 705 ml      Vitals:    01/20/25 0426   Weight: 69.7 kg (153 lb 10.6 oz)        Assessment/Plan   Principal Problem:    Malnutrition:  - continue cycle TF        Management after organ transplant  Active Problems:  Patient Active Problem List   Diagnosis    S/P laparoscopic cholecystectomy    Abdominal pain    Achalasia    Acute lower UTI    Microcytic anemia    Complete heart block    Callus of foot    Chronic periodontitis, unspecified    Stage 5 chronic kidney disease (Multi)    Closed fracture of metatarsal bone    Crushing injury of foot    Diabetes mellitus (Multi)    Diarrhea    Dysphagia    ED (erectile dysfunction)    Essential hypertension    Foot pain, right    GIB (gastrointestinal bleeding)    Hepatitis B core antibody positive    Hyperkalemia    Ingrowing nail    Iron deficiency anemia secondary to inadequate dietary iron intake    Long toenail    Malignant neoplasm of prostate (Multi)    Onychomycosis    Pheochromocytoma of right adrenal gland    Pneumonia of right lower lobe due to infectious organism    Productive cough    Pure  hypercholesterolemia    Stricture esophagus    SVT (supraventricular tachycardia) (CMS-HCC)    Type 2 diabetes mellitus with diabetic neuropathy (Multi)    Preop cardiovascular exam    Third degree heart block    Pericardial effusion ([facility]-HCC)    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 (Multi)    Uremia    Cardiac tamponade    Cardiac pacemaker in situ    ESRD (end stage renal disease) (Multi)    Type 2 diabetes mellitus, with long-term current use of insulin    Dehydration    Alactasia syndrome    Type 2 diabetes mellitus with hyperglycemia, with long-term current use of insulin    On tube feeding diet        Immunosuppression reviewed and adjusted              Induction: Thymoglobulin Full Dose              Tacrolimus goal 8-10 ng/mL. Current dose 4 mg BID                Myfortic 360 mg po BID              Solumedrol taper  DVT prophylaxis SCDS and subcutaneous heparin 5000 TID  PT/OT  Diet: diabetic, enteral feeds   Anticipated discharge today pending arrangement of tube feed supplier     Chelsey Simpson, APRN-CNP

## 2025-01-21 NOTE — PROGRESS NOTES
Transplant Team called. Pt not scheduled for Tacrolimus level this AM. Md confirmed no level is neccessary pt is every other day draw.

## 2025-01-21 NOTE — PROGRESS NOTES
"Temo Hanson is a 74 y.o. male on day 21 of admission presenting with Dehydration.    Subjective   Patient seen and evaluated at the bedside. Reviewed homgoing insulin plan with patient and his daughter on the phone. All questions answered.     I have reviewed histories, allergies and medications have been reviewed and there are no changes.     Objective   Review of Systems   Constitutional:  Positive for activity change, appetite change and fatigue. Negative for diaphoresis and unexpected weight change.   HENT: Negative.  Negative for sore throat.    Eyes: Negative.    Respiratory:  Negative for cough, chest tightness and shortness of breath.    Cardiovascular:  Negative for chest pain.   Gastrointestinal:  Negative for abdominal pain, diarrhea and nausea.   Endocrine: Negative for cold intolerance, heat intolerance, polydipsia, polyphagia and polyuria.   Genitourinary:  Negative for dysuria and frequency.   Musculoskeletal:  Negative for gait problem and joint swelling.   Neurological:  Negative for speech difficulty, numbness and headaches.   Psychiatric/Behavioral:  Negative for agitation, behavioral problems and confusion.      Physical Exam  Constitutional:       General: He is not in acute distress.     Appearance: Normal appearance.   HENT:      Head: Normocephalic and atraumatic.      Mouth/Throat:      Mouth: Mucous membranes are moist.   Cardiovascular:      Rate and Rhythm: Normal rate and regular rhythm.   Pulmonary:      Effort: Pulmonary effort is normal.      Breath sounds: Normal breath sounds.   Abdominal:      Palpations: Abdomen is soft.   Skin:     General: Skin is warm.     Last Recorded Vitals  Blood pressure 156/67, pulse 92, temperature 36.6 °C (97.9 °F), temperature source Temporal, resp. rate 18, height 1.854 m (6' 1\"), weight 69.7 kg (153 lb 10.6 oz), SpO2 98%.  Intake/Output last 3 Shifts:  I/O last 3 completed shifts:  In: 1180 (16.9 mL/kg) [P.O.:880; NG/GT:300]  Out: 250 (3.6 mL/kg) " [Urine:250 (0.1 mL/kg/hr)]  Weight: 69.7 kg     Relevant Results  Results from last 7 days   Lab Units 01/21/25  0840 01/21/25  0507 01/20/25  2357 01/20/25  1958 01/20/25  1710 01/20/25  0746 01/20/25  0539 01/19/25  0732 01/19/25  0632 01/18/25  0801 01/18/25  0556 01/17/25  0734 01/17/25  0541 01/16/25  0755 01/16/25  0603   POCT GLUCOSE mg/dL 234* 177* 143* 246* 258*   < >  --    < >  --    < >  --    < >  --    < >  --    GLUCOSE mg/dL  --   --   --   --   --   --  207*  --  174*  --  121*  --  234*  --  118*    < > = values in this interval not displayed.       Assessment/Plan   Temo Hanson is a 74 y.o. 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  Patient was started on tube feeds, NPO, today after placement of PEG tube. Patient states he felt nausea and vomiting, leading to a pause in his feedings around noon. Feedings restarted around 1500.      Diabetes History  Type of diabetes: 2  Year diagnosed or age: 46 years old  Hospitalizations for DKA or HHS: Once - inappropriately gave too much sliding scale due to low BG finger stick not correcting in under an hour  Complications: Nephropathy  Seen by PCP or Endocrinology: PCP - Dr. Hillman, Endo - Dr. Carey  Frequency of glucose checks: 4-5x daily after meals  Glucose review: persistent hyperglycemia this admission  Frequency of Hypoglycemia: none  Hypoglycemia unawareness: no  Severe hypoglycemia requiring assistance from others:  N     Home Medications  Basal: Glargine 8U  Prandial: Aspart 4U  Correction: Aspart sliding scale  Assessment & Plan  Dehydration    Alactasia syndrome    Type 2 diabetes mellitus with hyperglycemia, with long-term current use of insulin    On tube feeding diet      PLAN  Steroids:   Prednisone 15 mg daily  1/13- Predinsone 10mg daily  1/16 - prednisone reduced to 5 mg      Nutrition:   1/10- 60g CHO diet + glucerna 1.5 continuous TF, goal of 50 ml/h. This provides 40 g of carbs every 6 hours when at goal.  1/11- TF reduced to 20ml/h so patient is getting 16g of carbs every 6 hours   1/12- TF stopped, PPN to start tonight at 30cc/h. Pt also on CLD  1/13 - PPN Started. CLD  1/14: TPN to increase to 55 ml/hr (previously at 41 ml/hr) Patient remains on clear liquid diet but with minimal intake  Tube feed: glucerna 1.5 started at 10 ml/hr at 2200 - patient made NPO  1/15: NPO. TPN to remain at 55 ml/hr  Tube feed: glucerna 1.5 started at 10 ml/hr -> switched to Vital 1.5 at 20 ml/hr, increase by 5 ml every 6 hours until goal rate of 50 ml/hr reached (40 carbs over 6 hrs)  Diet advanced in the afternoon  1/16: 60 gram Carb consistent + TF + TPN at 55 ml/hr. TF changed to Vital 1.5 starting at 30 ml/hr and increasing by 5 ml/hr until goal rate of 50ml/hr is reached (55 grams carbs/6 hrs)  1/17: TF increasing by this evening to goal of 65ml/h (72g of carbs / 6 hours). TPN cut in half from 50 ml/h to 30ml/h. Remains on PO diet.   1/18: TF will shut off at 1200 for 6 hours, then will continue to run at 65ml/h restarting at 1800 until 1200 on 1/19, then be stopped until 18:00. TPN will be off. Will remain on PO diet.   1/19: TF will now be running from 18:00 to 12:00 (18hr), and pt will likely discharge on this nutrition schedule if tolerate    - adjust NPH  to 12u at 18:00 and 8u at 06:00      If TF is held or glucose trending <100mg/dL: reduce NPH to 6u q12 at 18;00 and 06:00    - adjust regular insulin scale #2 to ACHS timing, please continue use of this order regardless of nutrition status to address hyperglycemia    -Accuchecks ACHS  - Goal -180  -Hypoglycemia protocol  -Will continue to follow and titrate insulin accordingly      Discharge planning:   [x] patient may expect to discharge home on basaglar 7u remy Ayoub (patient has both insulins at home) scale #2 with meals, and  NPH 10u with TF start. Insulin chart provided below.  [x]will provide CGM sample prior to discharge, gina 3 provided    [x]will enroll pt in  pharmacy platinum plan program  [x]follow up with ROD Lainez in PTDM clinic as scheduled on 1/30/25 as bridge back to home endo and PCP     I spent 50 mins on the care and coordination of the patient        INSULIN INSTRUCTIONS   Breakfast time Lunch time Dinner time  Bedtime   basaglar/Glargine = 7 units  Humulin N/NPH = 10 units at the same time as tube feed is started    ademelog = scale as needed ademelog = scale as needed ademelog = scale as needed      CORRECTIVE SCALE  GLUCOSE READING   ASPART SCALE DOSE    70 - 149 0   150 - 200 2   201 - 250 4   251 - 300 6   301 - 350 8   351 - 400+ 10     If glucose remains over 400, call your diabetes provider, repeat 10 units every 4 hours and drink sugar free liquids (water, Gatorade zero, chicken broth) until you glucose improves or you hear back from medical professional. If your glucose remains over 400 and you develop symptoms such as nausea/vomiting or confusion, go to the emergency room as soon as possible.    I spent 50 mins on the care and coordination of this patient.    Gabby Gilman, APRN-CNP

## 2025-01-21 NOTE — PROGRESS NOTES
Occupational Therapy    OT Treatment    Patient Name: Temo Hanson  MRN: [id number]  Department: [facility] 9  Room: [id number]-A  Today's Date: 1/21/2025  Time Calculation  Start Time: 0920  Stop Time: 0943  Time Calculation (min): 23 min        Assessment:  Prognosis: Good  Barriers to Discharge Home: Physical needs  Physical Needs: Intermittent ADL assistance needed  Evaluation/Treatment Tolerance: Patient tolerated treatment well  Medical Staff Made Aware: Yes  End of Session Communication: Bedside nurse  End of Session Patient Position: Up in chair, Alarm off, not on at start of session  OT Assessment Results: Decreased ADL status, Decreased endurance, Decreased functional mobility, Decreased gross motor control, Decreased IADLs, Decreased upper extremity strength, Decreased safe judgment during ADL  Prognosis: Good  Barriers to Discharge: None  Evaluation/Treatment Tolerance: Patient tolerated treatment well  Medical Staff Made Aware: Yes  Strengths: Attitude of self  Barriers to Participation: Comorbidities  Plan:  Treatment Interventions: ADL retraining, Functional transfer training, Patient/family training, Equipment evaluation/education, Compensatory technique education  OT Frequency: 2 times per week  OT Discharge Recommendations: Low intensity level of continued care  Equipment Recommended upon Discharge:  (none)  OT Recommended Transfer Status: Stand by assist  OT - OK to Discharge: Yes (upon medical clearance)  Treatment Interventions: ADL retraining, Functional transfer training, Patient/family training, Equipment evaluation/education, Compensatory technique education    Subjective   Previous Visit Info:  OT Last Visit  OT Received On: 01/21/25  General:  General  Reason for Referral: 74 year old male s/p DDKT on 12/21/24.  Past Medical History Relevant to Rehab: Hypertension, hyperlipidemia, complete heart block status post leadless pacemaker insertion on 7/17/2024, pericardial effusion, pheochromocytoma  of the right adrenal gland, history of prostate cancer, GI bleed  Family/Caregiver Present: No  Prior to Session Communication: Bedside nurse  Patient Position Received: Bed, 2 rail up, Alarm off, not on at start of session (sitting EOB)  Preferred Learning Style: visual, verbal  General Comment: Pt pleasant and agreeable to therapy  Precautions:  Medical Precautions: Fall precautions  Post-Surgical Precautions: Abdominal surgery precautions      Pain:  Pain Assessment  Pain Assessment: 0-10  0-10 (Numeric) Pain Score: 7  Pain Type: Acute pain, Surgical pain  Pain Location: Abdomen  Pain Orientation: Left  Pain Interventions: Repositioned, Ambulation/increased activity    Objective    Cognition:  Cognition  Overall Cognitive Status: Within Functional Limits  Arousal/Alertness: Appropriate responses to stimuli  Orientation Level: Oriented X4  Following Commands: Follows all commands and directions without difficulty    Activities of Daily Living:    Grooming  Grooming Level of Assistance: Distant supervision  Grooming Where Assessed: Standing sinkside  Grooming Comments: Standing face hygiene at sink  UE Bathing  UE Bathing Level of Assistance: Distant supervision  UE Bathing Where Assessed: Standing sinkside  UE Dressing  UE Dressing Level of Assistance: Minimum assistance  UE Dressing Where Assessed: Edge of bed  UE Dressing Comments: Minimal cueing for mechanics, limited by pain due to PEG tube  LE Dressing  LE Dressing: Yes  Pants Level of Assistance: Minimum assistance  LE Dressing Where Assessed: Edge of bed  LE Dressing Comments: Don pants and undergarments, cueing for mechanics  Toileting  Toileting Level of Assistance: Distant supervision  Where Assessed: Toilet  Toileting Comments: Queta care and clothing management from seated level    Bed Mobility/Transfers: Bed Mobility  Bed Mobility: No (pt OOB at start and end of session)    Transfers  Transfer: Yes  Transfer 1  Transfer From 1: Sit to  Transfer to 1:  Sit, Stand  Technique 1: Sit to stand, Stand to sit  Transfer Device 1:  (none)  Transfer Level of Assistance 1: Close supervision  Trials/Comments 1: Increased time 2/2 pain      Functional Mobility:  Functional Mobility  Functional Mobility Performed: Yes    Therapy/Activity: Therapeutic Activity  Therapeutic Activity Performed: Yes  Therapeutic Activity 1: Facilitated functional distance to/from restroom necessary for ADL completion. Pt declined use of AD at this time. Encouraged pacing for endurance purposes    Outcome Measures:[facility] Daily Activity  Putting on and taking off regular lower body clothing: A little  Bathing (including washing, rinsing, drying): A little  Putting on and taking off regular upper body clothing: A little  Toileting, which includes using toilet, bedpan or urinal: A little  Taking care of personal grooming such as brushing teeth: A little  Eating Meals: None  Daily Activity - Total Score: 19    Education Documentation  Precautions, taught by Nellie Barrios OT at 1/21/2025 10:47 AM.  Learner: Patient  Readiness: Acceptance  Method: Explanation  Response: Verbalizes Understanding, Demonstrated Understanding, Needs Reinforcement    Body Mechanics, taught by Nellie Barrios OT at 1/21/2025 10:47 AM.  Learner: Patient  Readiness: Acceptance  Method: Explanation  Response: Verbalizes Understanding, Demonstrated Understanding, Needs Reinforcement    ADL Training, taught by Nellie Barrios OT at 1/21/2025 10:47 AM.  Learner: Patient  Readiness: Acceptance  Method: Explanation  Response: Verbalizes Understanding, Demonstrated Understanding, Needs Reinforcement    EDUCATION:  Education  Individual(s) Educated: Patient  Education Provided: Diagnosis & Precautions, Fall precautons, Risk and benefits of OT discussed with patient or other, POC discussed and agreed upon  Patient Response to Education: Patient/Caregiver Verbalized Understanding of Information    Goals:  Encounter Problems        Encounter Problems (Active)       ADLs       Patient with complete lower body dressing with modified independent level of assistance donning and doffing all LE clothes  with PRN adaptive equipment while supported sitting and standing (Progressing)       Start:  01/07/25    Expected End:  01/28/25            Patient will complete toileting including hygiene clothing management/hygiene with modified independent level of assistance and grab bars. (Progressing)       Start:  01/07/25    Expected End:  01/28/25               BALANCE       Pt will maintain dynamic standing balance during ADL task with modified independent level of assistance in order to demonstrate decreased risk of falling and improved postural control. (Progressing)       Start:  01/07/25    Expected End:  01/28/25               MOBILITY       Patient will perform Functional mobility max Household distances/Community Distances with modified independent level of assistance and least restrictive device in order to improve safety and functional mobility. (Progressing)       Start:  01/07/25    Expected End:  01/28/25               TRANSFERS       Patient will complete sit to stand transfer with modified independent level of assistance and least restrictive device in order to improve safety and prepare for out of bed mobility. (Progressing)       Start:  01/07/25    Expected End:  01/28/25                  Encounter Problems (Resolved)       COGNITION/SAFETY       Patient will score WFL on standardized cognitive assessment with verbal cues and within reasonable time frame (Met)       Start:  01/07/25    Expected End:  01/28/25    Resolved:  01/21/25

## 2025-01-22 ENCOUNTER — APPOINTMENT (OUTPATIENT)
Dept: CARDIOLOGY | Facility: HOSPITAL | Age: 75
DRG: 391 | End: 2025-01-22
Payer: COMMERCIAL

## 2025-01-22 LAB
ALBUMIN SERPL BCP-MCNC: 3.1 G/DL (ref 3.4–5)
ANION GAP SERPL CALC-SCNC: 16 MMOL/L
BUN SERPL-MCNC: 15 MG/DL (ref 6–23)
CALCIUM SERPL-MCNC: 8.7 MG/DL (ref 8.6–10.6)
CHLORIDE SERPL-SCNC: 97 MMOL/L (ref 98–107)
CO2 SERPL-SCNC: 24 MMOL/L (ref 21–32)
CREAT SERPL-MCNC: 0.81 MG/DL (ref 0.5–1.3)
EGFRCR SERPLBLD CKD-EPI 2021: >90 ML/MIN/1.73M*2
GLUCOSE BLD MANUAL STRIP-MCNC: 113 MG/DL (ref 74–99)
GLUCOSE BLD MANUAL STRIP-MCNC: 190 MG/DL (ref 74–99)
GLUCOSE BLD MANUAL STRIP-MCNC: 194 MG/DL (ref 74–99)
GLUCOSE BLD MANUAL STRIP-MCNC: 211 MG/DL (ref 74–99)
GLUCOSE BLD MANUAL STRIP-MCNC: 291 MG/DL (ref 74–99)
GLUCOSE BLD MANUAL STRIP-MCNC: 300 MG/DL (ref 74–99)
GLUCOSE SERPL-MCNC: 331 MG/DL (ref 74–99)
H CAPSUL AG SPEC QL: NOT DETECTED
MAGNESIUM SERPL-MCNC: 1.61 MG/DL (ref 1.6–2.4)
MAGNESIUM SERPL-MCNC: 2.63 MG/DL (ref 1.6–2.4)
PHOSPHATE SERPL-MCNC: 3.8 MG/DL (ref 2.5–4.9)
POTASSIUM SERPL-SCNC: 5.7 MMOL/L (ref 3.5–5.3)
SCAN RESULT: NORMAL
SODIUM SERPL-SCNC: 131 MMOL/L (ref 136–145)
TACROLIMUS BLD-MCNC: 5.8 NG/ML

## 2025-01-22 PROCEDURE — 83735 ASSAY OF MAGNESIUM: CPT

## 2025-01-22 PROCEDURE — 2500000004 HC RX 250 GENERAL PHARMACY W/ HCPCS (ALT 636 FOR OP/ED)

## 2025-01-22 PROCEDURE — 1100000001 HC PRIVATE ROOM DAILY

## 2025-01-22 PROCEDURE — 93279 PRGRMG DEV EVAL PM/LDLS PM: CPT

## 2025-01-22 PROCEDURE — 2500000002 HC RX 250 W HCPCS SELF ADMINISTERED DRUGS (ALT 637 FOR MEDICARE OP, ALT 636 FOR OP/ED): Performed by: PHYSICIAN ASSISTANT

## 2025-01-22 PROCEDURE — 99231 SBSQ HOSP IP/OBS SF/LOW 25: CPT | Performed by: PHYSICIAN ASSISTANT

## 2025-01-22 PROCEDURE — 2500000001 HC RX 250 WO HCPCS SELF ADMINISTERED DRUGS (ALT 637 FOR MEDICARE OP): Performed by: STUDENT IN AN ORGANIZED HEALTH CARE EDUCATION/TRAINING PROGRAM

## 2025-01-22 PROCEDURE — 2500000004 HC RX 250 GENERAL PHARMACY W/ HCPCS (ALT 636 FOR OP/ED): Performed by: PHYSICIAN ASSISTANT

## 2025-01-22 PROCEDURE — 84100 ASSAY OF PHOSPHORUS: CPT | Performed by: PHYSICIAN ASSISTANT

## 2025-01-22 PROCEDURE — 99232 SBSQ HOSP IP/OBS MODERATE 35: CPT | Performed by: STUDENT IN AN ORGANIZED HEALTH CARE EDUCATION/TRAINING PROGRAM

## 2025-01-22 PROCEDURE — 2500000001 HC RX 250 WO HCPCS SELF ADMINISTERED DRUGS (ALT 637 FOR MEDICARE OP)

## 2025-01-22 PROCEDURE — 93010 ELECTROCARDIOGRAM REPORT: CPT | Performed by: INTERNAL MEDICINE

## 2025-01-22 PROCEDURE — 2500000002 HC RX 250 W HCPCS SELF ADMINISTERED DRUGS (ALT 637 FOR MEDICARE OP, ALT 636 FOR OP/ED): Performed by: STUDENT IN AN ORGANIZED HEALTH CARE EDUCATION/TRAINING PROGRAM

## 2025-01-22 PROCEDURE — 2500000001 HC RX 250 WO HCPCS SELF ADMINISTERED DRUGS (ALT 637 FOR MEDICARE OP): Performed by: PHYSICIAN ASSISTANT

## 2025-01-22 PROCEDURE — 99222 1ST HOSP IP/OBS MODERATE 55: CPT | Performed by: INTERNAL MEDICINE

## 2025-01-22 PROCEDURE — 99233 SBSQ HOSP IP/OBS HIGH 50: CPT | Performed by: INTERNAL MEDICINE

## 2025-01-22 PROCEDURE — 93279 PRGRMG DEV EVAL PM/LDLS PM: CPT | Performed by: STUDENT IN AN ORGANIZED HEALTH CARE EDUCATION/TRAINING PROGRAM

## 2025-01-22 PROCEDURE — 80197 ASSAY OF TACROLIMUS: CPT | Performed by: PHYSICIAN ASSISTANT

## 2025-01-22 PROCEDURE — 93005 ELECTROCARDIOGRAM TRACING: CPT

## 2025-01-22 PROCEDURE — RXMED WILLOW AMBULATORY MEDICATION CHARGE

## 2025-01-22 PROCEDURE — 82947 ASSAY GLUCOSE BLOOD QUANT: CPT

## 2025-01-22 PROCEDURE — 83735 ASSAY OF MAGNESIUM: CPT | Performed by: PHYSICIAN ASSISTANT

## 2025-01-22 RX ORDER — MAGNESIUM SULFATE HEPTAHYDRATE 40 MG/ML
4 INJECTION, SOLUTION INTRAVENOUS ONCE
Status: COMPLETED | OUTPATIENT
Start: 2025-01-22 | End: 2025-01-22

## 2025-01-22 RX ADMIN — NYSTATIN 500000 UNITS: 500000 SUSPENSION ORAL at 06:43

## 2025-01-22 RX ADMIN — THIAMINE HCL TAB 100 MG 100 MG: 100 TAB at 08:23

## 2025-01-22 RX ADMIN — MYCOPHENOLIC ACID 360 MG: 360 TABLET, DELAYED RELEASE ORAL at 18:54

## 2025-01-22 RX ADMIN — INSULIN HUMAN 6 UNITS: 100 INJECTION, SOLUTION PARENTERAL at 16:30

## 2025-01-22 RX ADMIN — ACETAMINOPHEN 650 MG: 160 SOLUTION ORAL at 15:02

## 2025-01-22 RX ADMIN — HEPARIN SODIUM 5000 UNITS: 5000 INJECTION INTRAVENOUS; SUBCUTANEOUS at 02:41

## 2025-01-22 RX ADMIN — TACROLIMUS 4 MG: 1 CAPSULE ORAL at 06:36

## 2025-01-22 RX ADMIN — NYSTATIN 500000 UNITS: 500000 SUSPENSION ORAL at 22:00

## 2025-01-22 RX ADMIN — MAGNESIUM SULFATE IN WATER FOR 4 G: 40 INJECTION INTRAVENOUS at 08:22

## 2025-01-22 RX ADMIN — METHOCARBAMOL 500 MG: 500 TABLET ORAL at 15:02

## 2025-01-22 RX ADMIN — PANTOPRAZOLE SODIUM 40 MG: 40 TABLET, DELAYED RELEASE ORAL at 16:29

## 2025-01-22 RX ADMIN — METOCLOPRAMIDE 5 MG: 10 TABLET ORAL at 05:37

## 2025-01-22 RX ADMIN — Medication 2000 UNITS: at 08:23

## 2025-01-22 RX ADMIN — NYSTATIN 500000 UNITS: 500000 SUSPENSION ORAL at 18:54

## 2025-01-22 RX ADMIN — ROSUVASTATIN CALCIUM 10 MG: 10 TABLET, FILM COATED ORAL at 22:00

## 2025-01-22 RX ADMIN — METHOCARBAMOL 500 MG: 500 TABLET ORAL at 05:37

## 2025-01-22 RX ADMIN — NYSTATIN 500000 UNITS: 500000 SUSPENSION ORAL at 13:08

## 2025-01-22 RX ADMIN — CALCIUM CARBONATE (ANTACID) CHEW TAB 500 MG 500 MG: 500 CHEW TAB at 16:29

## 2025-01-22 RX ADMIN — PANTOPRAZOLE SODIUM 40 MG: 40 TABLET, DELAYED RELEASE ORAL at 06:37

## 2025-01-22 RX ADMIN — PREDNISONE 5 MG: 5 TABLET ORAL at 08:23

## 2025-01-22 RX ADMIN — VALGANCICLOVIR 900 MG: 450 TABLET, FILM COATED ORAL at 15:02

## 2025-01-22 RX ADMIN — MYCOPHENOLIC ACID 360 MG: 360 TABLET, DELAYED RELEASE ORAL at 06:36

## 2025-01-22 RX ADMIN — INSULIN HUMAN 12 UNITS: 100 INJECTION, SUSPENSION SUBCUTANEOUS at 18:59

## 2025-01-22 RX ADMIN — METOCLOPRAMIDE 5 MG: 10 TABLET ORAL at 20:35

## 2025-01-22 RX ADMIN — OXYCODONE HYDROCHLORIDE 5 MG: 5 SOLUTION ORAL at 11:18

## 2025-01-22 RX ADMIN — METHOCARBAMOL 500 MG: 500 TABLET ORAL at 23:39

## 2025-01-22 RX ADMIN — HEPARIN SODIUM 5000 UNITS: 5000 INJECTION INTRAVENOUS; SUBCUTANEOUS at 11:18

## 2025-01-22 RX ADMIN — INSULIN HUMAN 8 UNITS: 100 INJECTION, SOLUTION PARENTERAL at 13:08

## 2025-01-22 RX ADMIN — MAGNESIUM OXIDE TAB 400 MG (241.3 MG ELEMENTAL MG) 400 MG: 400 (241.3 MG) TAB at 08:23

## 2025-01-22 RX ADMIN — CALCIUM CARBONATE (ANTACID) CHEW TAB 500 MG 500 MG: 500 CHEW TAB at 06:36

## 2025-01-22 RX ADMIN — CALCIUM POLYCARBOPHIL 625 MG: 625 TABLET, FILM COATED ORAL at 08:26

## 2025-01-22 RX ADMIN — AMLODIPINE BESYLATE 10 MG: 10 TABLET ORAL at 08:23

## 2025-01-22 RX ADMIN — ACETAMINOPHEN 650 MG: 160 SOLUTION ORAL at 08:23

## 2025-01-22 RX ADMIN — HEPARIN SODIUM 5000 UNITS: 5000 INJECTION INTRAVENOUS; SUBCUTANEOUS at 23:35

## 2025-01-22 RX ADMIN — INSULIN HUMAN 10 UNITS: 100 INJECTION, SOLUTION PARENTERAL at 16:30

## 2025-01-22 RX ADMIN — CALCIUM POLYCARBOPHIL 625 MG: 625 TABLET, FILM COATED ORAL at 22:00

## 2025-01-22 RX ADMIN — GABAPENTIN 200 MG: 100 CAPSULE ORAL at 08:23

## 2025-01-22 RX ADMIN — TACROLIMUS 4 MG: 1 CAPSULE ORAL at 18:54

## 2025-01-22 RX ADMIN — METOCLOPRAMIDE 5 MG: 10 TABLET ORAL at 13:08

## 2025-01-22 RX ADMIN — ACETAMINOPHEN 650 MG: 160 SOLUTION ORAL at 02:42

## 2025-01-22 RX ADMIN — INSULIN HUMAN 2 UNITS: 100 INJECTION, SOLUTION PARENTERAL at 06:39

## 2025-01-22 RX ADMIN — SODIUM ZIRCONIUM CYCLOSILICATE 10 G: 10 POWDER, FOR SUSPENSION ORAL at 23:36

## 2025-01-22 RX ADMIN — ACETAMINOPHEN 650 MG: 160 SOLUTION ORAL at 20:35

## 2025-01-22 RX ADMIN — SODIUM ZIRCONIUM CYCLOSILICATE 10 G: 10 POWDER, FOR SUSPENSION ORAL at 16:29

## 2025-01-22 RX ADMIN — OXYCODONE HYDROCHLORIDE 5 MG: 5 SOLUTION ORAL at 20:35

## 2025-01-22 ASSESSMENT — ENCOUNTER SYMPTOMS
SORE THROAT: 0
SPEECH DIFFICULTY: 0
NAUSEA: 0
HEADACHES: 0
APPETITE CHANGE: 1
EYES NEGATIVE: 1
ABDOMINAL PAIN: 0
CHEST TIGHTNESS: 0
SHORTNESS OF BREATH: 0
COUGH: 0
FATIGUE: 1
NUMBNESS: 0
ACTIVITY CHANGE: 1
AGITATION: 0
FREQUENCY: 0
DIARRHEA: 0
DIAPHORESIS: 0
JOINT SWELLING: 0
DYSURIA: 0
UNEXPECTED WEIGHT CHANGE: 0
CONFUSION: 0
POLYDIPSIA: 0
POLYPHAGIA: 0

## 2025-01-22 ASSESSMENT — COGNITIVE AND FUNCTIONAL STATUS - GENERAL
MOBILITY SCORE: 22
DAILY ACTIVITIY SCORE: 24
WALKING IN HOSPITAL ROOM: A LITTLE
CLIMB 3 TO 5 STEPS WITH RAILING: A LITTLE

## 2025-01-22 ASSESSMENT — PAIN SCALES - GENERAL
PAINLEVEL_OUTOF10: 7
PAINLEVEL_OUTOF10: 6
PAINLEVEL_OUTOF10: 7

## 2025-01-22 ASSESSMENT — PAIN SCALES - PAIN ASSESSMENT IN ADVANCED DEMENTIA (PAINAD): BODYLANGUAGE: TENSE, DISTRESSED PACING, FIDGETING

## 2025-01-22 NOTE — CARE PLAN
Problem: Pain  Goal: Takes deep breaths with improved pain control throughout the shift  Outcome: Progressing  Goal: Turns in bed with improved pain control throughout the shift  Outcome: Progressing  Goal: Walks with improved pain control throughout the shift  Outcome: Progressing  Goal: Performs ADL's with improved pain control throughout shift  Outcome: Progressing  Goal: Participates in PT with improved pain control throughout the shift  Outcome: Progressing  Goal: Free from opioid side effects throughout the shift  Outcome: Progressing  Goal: Free from acute confusion related to pain meds throughout the shift  Outcome: Progressing     Problem: Skin  Goal: Decreased wound size/increased tissue granulation at next dressing change  Outcome: Progressing  Goal: Participates in plan/prevention/treatment measures  Outcome: Progressing  Goal: Prevent/manage excess moisture  Outcome: Progressing  Goal: Prevent/minimize sheer/friction injuries  Outcome: Progressing  Goal: Promote/optimize nutrition  Outcome: Progressing  Goal: Promote skin healing  Outcome: Progressing   The patient's goals for the shift include get better    The clinical goals for the shift include pt will have adequete pain control throughout shift

## 2025-01-22 NOTE — PROGRESS NOTES
Temo Hanson is a 74 y.o. male POD#25 from DDKT from a DBD donor. Readmit for PO intolerance due to achalasia s/p Heller/Andrea myotomy with megaesophagus. Now s/p from PEG placement.     Doing well with TF 65/hr     Objective   Gen: A+OX3  HEENT: PERRL, MMM  Cardiac: RRR  Chest: Normal inspiratory effort  Abdomen: S/NT/ND. Incision C/D/I. PEG in place   Ext: No LE edema    Last Recorded Vitals  Visit Vitals  /68 (BP Location: Right arm, Patient Position: Lying)   Pulse 92   Temp 35.9 °C (96.6 °F) (Temporal)   Resp 18      Intake/Output last 3 Shifts:    Intake/Output Summary (Last 24 hours) at 1/22/2025 1429  Last data filed at 1/22/2025 1413  Gross per 24 hour   Intake 120 ml   Output 150 ml   Net -30 ml      Vitals:    01/22/25 0946   Weight: 69.5 kg (153 lb 3.2 oz)   Scheduled medications  acetaminophen, 650 mg, g-tube, q6h  amLODIPine, 10 mg, oral, Daily  calcium carbonate, 500 mg, oral, BID AC  calcium polycarbophiL, 625 mg, oral, BID  cholecalciferol, 2,000 Units, oral, Daily  gabapentin, 200 mg, oral, Daily  heparin (porcine), 5,000 Units, subcutaneous, q8h  insulin NPH (Isophane), 12 Units, subcutaneous, q24h  insulin NPH (Isophane), 8 Units, subcutaneous, q24h KASSY  insulin regular, 0-10 Units, subcutaneous, Before meals & nightly  lidocaine, 1 patch, transdermal, Daily  magnesium oxide, 400 mg, oral, Daily  methocarbamol, 500 mg, oral, q8h KASSY  metoclopramide, 5 mg, oral, q8h  mycophenolate, 360 mg, oral, BID  nystatin, 5 mL, Swish & Swallow, 4x daily  pantoprazole, 40 mg, oral, BID AC  predniSONE, 5 mg, oral, Daily  rosuvastatin, 10 mg, oral, Nightly  [Held by provider] sulfamethoxazole-trimethoprim, 1 tablet, oral, Daily  tacrolimus, 4 mg, oral, q12h  thiamine, 100 mg, oral, Daily  valGANciclovir, 900 mg, oral, q24h    Assessment/Plan   Principal Problem:    Kidney transplant recipient    Achalasia/megaesophagus    Severe protein calorie malnutrition  Active Problems:  Patient Active Problem  List   Diagnosis    S/P laparoscopic cholecystectomy    Abdominal pain    Achalasia    Acute lower UTI    Microcytic anemia    Complete heart block    Callus of foot    Chronic periodontitis, unspecified    Stage 5 chronic kidney disease (Multi)    Closed fracture of metatarsal bone    Crushing injury of foot    Diabetes mellitus (Multi)    Diarrhea    Dysphagia    ED (erectile dysfunction)    Essential hypertension    Foot pain, right    GIB (gastrointestinal bleeding)    Hepatitis B core antibody positive    Hyperkalemia    Ingrowing nail    Iron deficiency anemia secondary to inadequate dietary iron intake    Long toenail    Malignant neoplasm of prostate (Multi)    Onychomycosis    Pheochromocytoma of right adrenal gland    Pneumonia of right lower lobe due to infectious organism    Productive cough    Pure hypercholesterolemia    Stricture esophagus    SVT (supraventricular tachycardia) (CMS-HCC)    Type 2 diabetes mellitus with diabetic neuropathy (Multi)    Preop cardiovascular exam    Third degree heart block    Pericardial effusion (HHS-HCC)    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 (Multi)    Uremia    Cardiac tamponade    Cardiac pacemaker in situ    ESRD (end stage renal disease) (Multi)    Type 2 diabetes mellitus, with long-term current use of insulin    Dehydration    Alactasia syndrome    Type 2 diabetes mellitus with hyperglycemia, with long-term current use of insulin    On tube feeding diet       - TF @65 goal- 18h on, 6h off   - Continue reglan 5 mg q8h  - PT/OT  - Myfortic  360 mg bid/prednisone to 5 mg  - Tacrolimus goal 8-10 ng/mL   - Urology consult appreciated for stent removal - pt declined stent removal and would like it scheduled for next week    Await discharge home this week with TF pending insurance approval    I spent 35 minutes in the professional and overall care of this patient, including immunosuppression management.    Dora Rodriguez MD

## 2025-01-22 NOTE — SIGNIFICANT EVENT
Patient seen bedside, plan to keep overnight explained. K+ on RFP this PM was 5.7 and his blood glucose was 331 mg/dL. The patient was understandably frustrated and briefly expressed desire to leave AMA. I reviewed the course of events that lead to us deciding he needed to spend another night in the hospital along with the risks of going home with his current  electrolyte abnormalities. The patient was reluctant but agreeable to stay the night. The patient and his son at bedside had no further questions. We will manage his blood glucose and restart Lokelma. Repeat labs in AM with possible discharge home tomorrow.

## 2025-01-22 NOTE — SIGNIFICANT EVENT
01/22/25 1051   Patient Interaction   Organ Kidney   Type of Interaction Morning rounds   Interdisciplinary Rounds   Attendance Surgeon;Physician;FILI;Pharmacist   Topics Discussed Diet;Home care needs;Medications;Blood test results;Discharge preparation;Activity     Transplant Surgery Multidisciplinary Team Note     Temo Hanson is a 74 y.o. male  POD#32  from a Kidney from a  kidney txp. His post operative complications: Other complication: malnutrition requiring PEG, now POD#13 s/p PEG placement     24 Hour Events  1. No acute events     Last Recorded Vitals  Visit Vitals  /69 (BP Location: Left arm, Patient Position: Lying)   Pulse 77   Temp 35.9 °C (96.6 °F) (Temporal)   Resp 18      Intake/Output last 3 Shifts:    Intake/Output Summary (Last 24 hours) at 1/22/2025 1052  Last data filed at 1/22/2025 1043  Gross per 24 hour   Intake 120 ml   Output 0 ml   Net 120 ml      Vitals:    01/22/25 0946   Weight: 69.5 kg (153 lb 3.2 oz)        Assessment/Plan   -Replete Mg2+   -Remove PICC    Principal Problem:    Management after organ transplant  Active Problems:  Patient Active Problem List   Diagnosis    S/P laparoscopic cholecystectomy    Abdominal pain    Achalasia    Acute lower UTI    Microcytic anemia    Complete heart block    Callus of foot    Chronic periodontitis, unspecified    Stage 5 chronic kidney disease (Multi)    Closed fracture of metatarsal bone    Crushing injury of foot    Diabetes mellitus (Multi)    Diarrhea    Dysphagia    ED (erectile dysfunction)    Essential hypertension    Foot pain, right    GIB (gastrointestinal bleeding)    Hepatitis B core antibody positive    Hyperkalemia    Ingrowing nail    Iron deficiency anemia secondary to inadequate dietary iron intake    Long toenail    Malignant neoplasm of prostate (Multi)    Onychomycosis    Pheochromocytoma of right adrenal gland    Pneumonia of right lower lobe due to infectious organism    Productive cough    Pure  hypercholesterolemia    Stricture esophagus    SVT (supraventricular tachycardia) (CMS-HCC)    Type 2 diabetes mellitus with diabetic neuropathy (Multi)    Preop cardiovascular exam    Third degree heart block    Pericardial effusion ([facility]-HCC)    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 (Multi)    Uremia    Cardiac tamponade    Cardiac pacemaker in situ    ESRD (end stage renal disease) (Multi)    Type 2 diabetes mellitus, with long-term current use of insulin    Dehydration    Alactasia syndrome    Type 2 diabetes mellitus with hyperglycemia, with long-term current use of insulin    On tube feeding diet        Immunosuppression reviewed and adjusted       Induction: Thymoglobulin Full Dose       Tacrolimus goal 8-10 ng/mL. Current dose 4 mg BID         MMF 1000 mg po BID       Solumedrol taper  DVT prophylaxis SCDS and subcutaneous heparin 5000 TID  PT/OT  Diet:  Diabetic + enteral feeds   Anticipated discharge today, 1/22, pending delivery of TF supplies     Sherly García PA-C

## 2025-01-22 NOTE — PROGRESS NOTES
Temo Hanson is a 74 y.o. male POD#24 from DDKT from a DBD donor. Readmit for PO intolerance due to achalasia s/p Heller/Andrea myotomy with megaesophagus. Now s/p from PEG placement.     Doing well with TF 65/hr     Objective   Gen: A+OX3  HEENT: PERRL, MMM  Cardiac: RRR  Chest: Normal inspiratory effort  Abdomen: S/NT/ND. Incision C/D/I. PEG in place   Ext: No LE edema    Last Recorded Vitals  Visit Vitals  /81 (BP Location: Right arm, Patient Position: Lying)   Pulse 91   Temp 36.6 °C (97.9 °F) (Temporal)   Resp 18      Intake/Output last 3 Shifts:    Intake/Output Summary (Last 24 hours) at 1/21/2025 2031  Last data filed at 1/21/2025 1227  Gross per 24 hour   Intake 460 ml   Output 675 ml   Net -215 ml      Vitals:    01/20/25 0426   Weight: 69.7 kg (153 lb 10.6 oz)   Scheduled medications  acetaminophen, 650 mg, g-tube, q6h  amLODIPine, 10 mg, oral, Daily  calcium carbonate, 500 mg, oral, BID AC  calcium polycarbophiL, 625 mg, oral, BID  cholecalciferol, 2,000 Units, oral, Daily  gabapentin, 200 mg, oral, Daily  heparin (porcine), 5,000 Units, subcutaneous, q8h  insulin NPH (Isophane), 12 Units, subcutaneous, q24h  [START ON 1/22/2025] insulin NPH (Isophane), 8 Units, subcutaneous, q24h KASSY  insulin regular, 0-10 Units, subcutaneous, Before meals & nightly  lidocaine, 1 patch, transdermal, Daily  magnesium oxide, 400 mg, oral, Daily  methocarbamol, 500 mg, oral, q8h KASSY  metoclopramide, 5 mg, oral, q8h  mycophenolate, 360 mg, oral, BID  nystatin, 5 mL, Swish & Swallow, 4x daily  pantoprazole, 40 mg, oral, BID AC  predniSONE, 5 mg, oral, Daily  rosuvastatin, 10 mg, oral, Nightly  [Held by provider] sulfamethoxazole-trimethoprim, 1 tablet, oral, Daily  tacrolimus, 4 mg, oral, q12h  thiamine, 100 mg, oral, Daily  valGANciclovir, 900 mg, oral, q24h    Assessment/Plan   Principal Problem:    Kidney transplant recipient    Achalasia/megaesophagus    Severe protein calorie malnutrition  Active  Problems:  Patient Active Problem List   Diagnosis    S/P laparoscopic cholecystectomy    Abdominal pain    Achalasia    Acute lower UTI    Microcytic anemia    Complete heart block    Callus of foot    Chronic periodontitis, unspecified    Stage 5 chronic kidney disease (Multi)    Closed fracture of metatarsal bone    Crushing injury of foot    Diabetes mellitus (Multi)    Diarrhea    Dysphagia    ED (erectile dysfunction)    Essential hypertension    Foot pain, right    GIB (gastrointestinal bleeding)    Hepatitis B core antibody positive    Hyperkalemia    Ingrowing nail    Iron deficiency anemia secondary to inadequate dietary iron intake    Long toenail    Malignant neoplasm of prostate (Multi)    Onychomycosis    Pheochromocytoma of right adrenal gland    Pneumonia of right lower lobe due to infectious organism    Productive cough    Pure hypercholesterolemia    Stricture esophagus    SVT (supraventricular tachycardia) (CMS-HCC)    Type 2 diabetes mellitus with diabetic neuropathy (Multi)    Preop cardiovascular exam    Third degree heart block    Pericardial effusion (HHS-HCC)    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 (Multi)    Uremia    Cardiac tamponade    Cardiac pacemaker in situ    ESRD (end stage renal disease) (Multi)    Type 2 diabetes mellitus, with long-term current use of insulin    Dehydration    Alactasia syndrome    Type 2 diabetes mellitus with hyperglycemia, with long-term current use of insulin    On tube feeding diet       - Slowly advance TF via PE ml q 6 hr   - reached goal for 24 hr - to go to 18 on, 6 off   - Off tpn  - Continue reglan 5 mg q8h  - erythromycin x5 days - completed   - PT/OT  - Myfortic  360 mg bid/prednisone to 5 mg  - Tacrolimus goal 8-10 ng/mL   - Urology consult appreciated for stent removal    Await discharge home this week with TF pending insurance approval    I spent 35 minutes in the professional and overall care of this patient, including  immunosuppression management.    Dora Rodriguez MD

## 2025-01-22 NOTE — PROGRESS NOTES
Temo Hanson is a 74 y.o. male POD#23 from DDKT from a DBD donor. Readmit for PO intolerance due to achalasia s/p Heller/Andrea myotomy with megaesophagus. Now s/p from PEG placement.     Doing well with TF 65/hr     Objective   Gen: A+OX3  HEENT: PERRL, MMM  Cardiac: RRR  Chest: Normal inspiratory effort  Abdomen: S/NT/ND. Incision C/D/I. PEG in place   Ext: No LE edema    Last Recorded Vitals  Visit Vitals  /81 (BP Location: Right arm, Patient Position: Lying)   Pulse 91   Temp 36.6 °C (97.9 °F) (Temporal)   Resp 18      Intake/Output last 3 Shifts:    Intake/Output Summary (Last 24 hours) at 1/21/2025 2027  Last data filed at 1/21/2025 1227  Gross per 24 hour   Intake 460 ml   Output 675 ml   Net -215 ml      Vitals:    01/20/25 0426   Weight: 69.7 kg (153 lb 10.6 oz)   Scheduled medications  acetaminophen, 650 mg, g-tube, q6h  amLODIPine, 10 mg, oral, Daily  calcium carbonate, 500 mg, oral, BID AC  calcium polycarbophiL, 625 mg, oral, BID  cholecalciferol, 2,000 Units, oral, Daily  gabapentin, 200 mg, oral, Daily  heparin (porcine), 5,000 Units, subcutaneous, q8h  insulin NPH (Isophane), 12 Units, subcutaneous, q24h  [START ON 1/22/2025] insulin NPH (Isophane), 8 Units, subcutaneous, q24h KASSY  insulin regular, 0-10 Units, subcutaneous, Before meals & nightly  lidocaine, 1 patch, transdermal, Daily  magnesium oxide, 400 mg, oral, Daily  methocarbamol, 500 mg, oral, q8h KASSY  metoclopramide, 5 mg, oral, q8h  mycophenolate, 360 mg, oral, BID  nystatin, 5 mL, Swish & Swallow, 4x daily  pantoprazole, 40 mg, oral, BID AC  predniSONE, 5 mg, oral, Daily  rosuvastatin, 10 mg, oral, Nightly  [Held by provider] sulfamethoxazole-trimethoprim, 1 tablet, oral, Daily  tacrolimus, 4 mg, oral, q12h  thiamine, 100 mg, oral, Daily  valGANciclovir, 900 mg, oral, q24h    Assessment/Plan   Principal Problem:    Kidney transplant recipient    Achalasia/megaesophagus    Severe protein calorie malnutrition  Active  Problems:  Patient Active Problem List   Diagnosis    S/P laparoscopic cholecystectomy    Abdominal pain    Achalasia    Acute lower UTI    Microcytic anemia    Complete heart block    Callus of foot    Chronic periodontitis, unspecified    Stage 5 chronic kidney disease (Multi)    Closed fracture of metatarsal bone    Crushing injury of foot    Diabetes mellitus (Multi)    Diarrhea    Dysphagia    ED (erectile dysfunction)    Essential hypertension    Foot pain, right    GIB (gastrointestinal bleeding)    Hepatitis B core antibody positive    Hyperkalemia    Ingrowing nail    Iron deficiency anemia secondary to inadequate dietary iron intake    Long toenail    Malignant neoplasm of prostate (Multi)    Onychomycosis    Pheochromocytoma of right adrenal gland    Pneumonia of right lower lobe due to infectious organism    Productive cough    Pure hypercholesterolemia    Stricture esophagus    SVT (supraventricular tachycardia) (CMS-HCC)    Type 2 diabetes mellitus with diabetic neuropathy (Multi)    Preop cardiovascular exam    Third degree heart block    Pericardial effusion (HHS-HCC)    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 (Multi)    Uremia    Cardiac tamponade    Cardiac pacemaker in situ    ESRD (end stage renal disease) (Multi)    Type 2 diabetes mellitus, with long-term current use of insulin    Dehydration    Alactasia syndrome    Type 2 diabetes mellitus with hyperglycemia, with long-term current use of insulin    On tube feeding diet       - Slowly advance TF via PE ml q 6 hr   - reached goal for 24 hr - to go to 18 on, 6 off   - Off tpn  - Continue reglan 5 mg q8h  - erythromycin x5 days - completed   - PT/OT  - Myfortic  360 mg bid/prednisone to 5 mg  - Tacrolimus goal 8-10 ng/mL   - Urology consult for stent removal    Await discharge home this week with TF pending insurance approval    I spent 35 minutes in the professional and overall care of this patient, including immunosuppression  management.    Dora Rodriguez MD

## 2025-01-22 NOTE — CONSULTS
Inpatient consult to Cardiology  Consult performed by: Ortiz Jasso MD  Consult ordered by: Sherly García PA-C        History Of Present Illness:    Temo Hanson is a 74 y.o. male w/ hx of HTN, DLD, T2DM, severe TR, and CHB s/p Micra PPM, currently POD#25 from DDKT from a DBD donor. Readmit for PO intolerance due to achalasia s/p Heller/Andrea myotomy with megaesophagus. Now s/p from PEG placement. Cardiology was consulted to review EKG.    The patient reported having abdominal pain around PEG tube site, EKG was ordered and Cardiology was asked to review the rhythm. The patient denies chest pain, SOB, palpitations or lightheadedness.    EKG: Paced rhythm with upper tracking to 110s.     Last Recorded Vitals:  Vitals:    01/22/25 0456 01/22/25 0752 01/22/25 0946 01/22/25 1238   BP: 144/76 145/69  150/68   BP Location: Right arm Left arm  Right arm   Patient Position: Lying Lying  Lying   Pulse: 89 77  92   Resp: 18 18  18   Temp: 36.3 °C (97.3 °F) 35.9 °C (96.6 °F)  35.9 °C (96.6 °F)   TempSrc: Skin Temporal  Temporal   SpO2: 98% 98%  98%   Weight:   69.5 kg (153 lb 3.2 oz)    Height:           Last Labs:  CBC - 1/20/2025:  5:39 AM  3.1 8.4 226    27.2      CMP - 1/22/2025:  1:44 PM  8.7 8.8 21 --- 0.5   3.8 3.1 18 189      PTT - 12/20/2024:  7:13 PM  1.0   11.3 30     Troponin I, High Sensitivity   Date/Time Value Ref Range Status   04/17/2024 08:51 PM 22 0 - 53 ng/L Final   04/17/2024 06:24 PM 21 0 - 53 ng/L Final     BNP   Date/Time Value Ref Range Status   04/17/2024 06:24  (H) 0 - 99 pg/mL Final     Hemoglobin A1C   Date/Time Value Ref Range Status   10/29/2024 12:02 PM 6.1 (H) See comment % Final   04/20/2024 08:42 AM 5.7 (H) see below % Final      Last I/O:  I/O last 3 completed shifts:  In: 660 (9.5 mL/kg) [P.O.:660]  Out: 675 (9.7 mL/kg) [Urine:675 (0.3 mL/kg/hr)]  Weight: 69.7 kg     Past Cardiology Tests (Last 3 Years):  EKG:  ECG 12 lead tomorrow at 8 AM 07/18/2024      ECG 12 lead STAT  07/17/2024      ECG 12 lead STAT 07/17/2024      ECG 12 lead (Clinic Performed) 06/18/2024      ECG 12 lead (Clinic Performed) 06/12/2024      ECG 12 lead (Clinic Performed) 05/08/2024      ECG 12 lead (Clinic Performed) 05/08/2024      ECG 12 Lead 04/20/2024      ECG 12 lead 04/17/2024      ECG 12 lead (Clinic Performed) 04/17/2024    Echo:  Transthoracic Echo (TTE) Limited 12/24/2024      Transthoracic Echo (TTE) Complete 10/29/2024      Transthoracic echo (TTE) limited 06/21/2024      Transthoracic Echo (TTE) Limited 04/23/2024      Transthoracic Echo (TTE) Limited 04/18/2024      Transthoracic Echo (TTE) Limited 04/18/2024      Transthoracic Echo (TTE) Complete 03/29/2024    Ejection Fractions:  EF   Date/Time Value Ref Range Status   12/24/2024 09:32 AM 63 %    10/29/2024 10:23 AM 64 %    06/21/2024 11:43 AM 68 %      Cath:  Cardiac Catheterization Procedure 04/18/2024    Stress Test:  Nuclear Stress Test 01/23/2023      Nuclear Stress Test 03/30/2022    Cardiac Imaging:  No results found for this or any previous visit from the past 1095 days.      Past Medical History:  He has a past medical history of Chronic kidney disease, DM (diabetes mellitus) (Multi), Fistula, HTN (hypertension), Other specified abnormal immunological findings in serum (10/11/2021), and Personal history of malignant neoplasm of prostate.    Past Surgical History:  He has a past surgical history that includes Other surgical history (09/29/2021); Other surgical history (09/29/2021); Other surgical history (09/29/2021); Other surgical history (09/29/2021); Cholecystectomy (10/23/2023); Cardiac catheterization (N/A, 4/18/2024); and Cardiac electrophysiology procedure (N/A, 7/17/2024).      Social History:  He reports that he has never smoked. He has never used smokeless tobacco. He reports that he does not drink alcohol and does not use drugs.    Family History:  Family History   Problem Relation Name Age of Onset    Diabetes Sister       Diabetes Brother          Allergies:  Terazosin, Codeine, and Tramadol    Inpatient Medications:  Scheduled medications   Medication Dose Route Frequency    acetaminophen  650 mg g-tube q6h    amLODIPine  10 mg oral Daily    calcium carbonate  500 mg oral BID AC    calcium polycarbophiL  625 mg oral BID    cholecalciferol  2,000 Units oral Daily    gabapentin  200 mg oral Daily    heparin (porcine)  5,000 Units subcutaneous q8h    insulin NPH (Isophane)  12 Units subcutaneous q24h    insulin NPH (Isophane)  8 Units subcutaneous q24h KASSY    insulin regular  0-10 Units subcutaneous Before meals & nightly    lidocaine  1 patch transdermal Daily    magnesium oxide  400 mg oral Daily    methocarbamol  500 mg oral q8h KASSY    metoclopramide  5 mg oral q8h    mycophenolate  360 mg oral BID    nystatin  5 mL Swish & Swallow 4x daily    pantoprazole  40 mg oral BID AC    predniSONE  5 mg oral Daily    rosuvastatin  10 mg oral Nightly    [Held by provider] sulfamethoxazole-trimethoprim  1 tablet oral Daily    tacrolimus  4 mg oral q12h    thiamine  100 mg oral Daily    valGANciclovir  900 mg oral q24h     PRN medications   Medication    alteplase    carboxymethylcellulose    dextrose    dextrose    glucagon    glucagon    naloxone    ondansetron    oxyCODONE    oxyCODONE    sodium chloride 0.9%    sodium chloride 0.9%     Continuous Medications   Medication Dose Last Rate     Outpatient Medications:  Current Outpatient Medications   Medication Instructions    acetaminophen (Tylenol) 160 mg/5 mL liquid Take 20.3 mL (650 mg) by g-tube route every 6 hours if needed for pain.    alcohol swabs pads, medicated 1 each, topical (top), 4 times daily, Use prior to checking glucose or injecting insulin    amino acids-protein hydrolys (Pro-Stat AWC)  gram-kcal/30 mL liquid 1 packet, peg tube, Daily    amLODIPine (NORVASC) 10 mg, oral, Daily    Basaglar KwikPen U-100 Insulin 7 Units, subcutaneous, Every morning, Take as directed per  "insulin instructions.    BD Ultra-Fine Joan Pen Needle 32 gauge x 5/32\" needle     blood sugar diagnostic (Blood Glucose Test) strip Check blood glucose 3 time per day    blood-glucose meter misc Use to check blood glucose    carboxymethylcellulose (Refresh Plus) 0.5 % ophthalmic solution Instill 1 drop into both eyes 2-4x/day as needed for dryness.    clotrimazole (MYCELEX) 10 mg, oral, 3 times daily after meals    Fiber-Lax 625 mg, oral, 2 times daily    FreeStyle Mick 2 Sensor kit     gabapentin (NEURONTIN) 200 mg, oral, Daily    HumuLIN N NPH Insulin KwikPen 10 Units, subcutaneous, Daily before evening meal, Please take once every evening when tube feed is going to start at 6 PM    insulin aspart, with niacinamide, (Fiasp FlexTouch U-100 Insulin) 100 unit/mL (3 mL) injection 0-5 Units, subcutaneous, 3 times daily (morning, midday, late afternoon), Take as directed per insulin instructions.    insulin aspart, with niacinamide, (Fiasp FlexTouch U-100 Insulin) 100 unit/mL (3 mL) injection 0-10 Units, subcutaneous, 3 times daily PRN, 0 unit(s) if Blood glucose is between   2 unit(s) if Blood glucose is between 151-200  4 unit(s) if Blood glucose is between 201-250  6 unit(s) if Blood glucose is between 251-300  8 unit(s) if Blood glucose is between 301-350  10 unit(s) if Blood glucose is between 351-400    If blood glucose is greater than 400 mg/dL, give max insulin per sliding scale AND then contact provider.    lancets 30 gauge misc 1 each, miscellaneous, 4 times daily, Use to check glucose 4 times daily, before meals and at bedtime    lidocaine 4 % patch Apply 2 patches once daily. Keep on for 12 hours, then remove and discard and keep off for 12 hours.    magnesium oxide (MAG-OX) 400 mg, oral, Daily    methocarbamol (ROBAXIN) 500 mg, oral, Every 8 hours scheduled    metoclopramide (REGLAN) 5 mg, oral, Every 8 hours    mycophenolate (CELLCEPT) 1,000 mg, oral, Every 12 hours    mycophenolate (MYFORTIC) 360 " "mg, oral, 2 times daily    nystatin (MYCOSTATIN) 500,000 Units, Swish & Swallow, 4 times daily    ondansetron (ZOFRAN) 4 mg, oral, Every 8 hours PRN    oxyCODONE (ROXICODONE) 5 mg, g-tube, Every 6 hours PRN    pantoprazole (PROTONIX) 40 mg, oral, 2 times daily before meals, Do not crush, chew, or split.    pen needle, diabetic 32 gauge x 5/32\" needle 1 each, miscellaneous, 4 times daily, Use to inject insulin 4 times daily    pen needle, diabetic 32 gauge x 5/32\" needle Use 1 needle once daily in the evening.  Take before meals.    predniSONE (DELTASONE) 5 mg, oral, Daily    rosuvastatin (CRESTOR) 10 mg, oral, Nightly    sulfamethoxazole-trimethoprim (Bactrim) 400-80 mg tablet 1 tablet, oral, Daily    tacrolimus (PROGRAF) 0.5 mg, oral, Every 12 hours    tacrolimus (PROGRAF) 4 mg, oral, Every 12 hours    thiamine (VITAMIN B-1) 100 mg, oral, Daily    valGANciclovir (VALCYTE) 450 mg, oral, Every 48 hours, Do not crush or chew.    valGANciclovir (VALCYTE) 900 mg, oral, Daily, Do not crush or chew.    Vital 1.5 Chelita 65 mL/hr, peg tube, Daily, Cycled from 6 PM to 12 PM daily    Vitamin D3 2,000 Units, oral, Daily       Physical Exam:  General Appearance:    Alert, cooperative, no distress, appears stated age  Lungs:   respirations unlabored  Heart:    Regular rate and rhythm,       Assessment/Plan   Temo Hanson is a 74 y.o. male w/ hx of HTN, DLD, T2DM, severe TR, and CHB s/p Micra PPM, currently POD#25 from DDKT from a DBD donor. Readmit for PO intolerance due to achalasia s/p Heller/Andrea myotomy with megaesophagus. Now s/p from PEG placement. Cardiology was consulted to review EKG.    Review of the EKG shows a paced rhythm with appropriate upper tracking of the sinus rhythm, there was likely underlying sinus atrial tachycardia possible due to the pain and the PPM was upper tracking.  -Recommend a formal device interrogation.  -No further cardiac work up.    PICC - Adult 01/13/25 Double lumen Right Basilic vein (Active) "   Line Necessity No, provider contacted 01/22/25 1100   Site Assessment Clean;Intact;Dry 01/22/25 1100   Proximal Lumen Status Flushed 01/22/25 1100   Distal Lumen Status Flushed 01/22/25 1100   Dressing Type Transparent 01/22/25 1100   Dressing Status Clean;Dry 01/22/25 1100   Number of days: 9       Peripheral IV 01/13/25 22 G Right;Anterior Forearm (Active)   Site Assessment Clean;Dry;Intact 01/22/25 0900   Dressing Status Dry;Clean 01/22/25 0900   Number of days: 9       Hemodialysis Arteriovenous Graft Left Upper arm (Active)   Site Assessment Clean;Dry;Intact 01/22/25 0900   Dressing Status Clean;Dry 01/22/25 0900   Number of days:        Code Status:  Full Code    I spent 15 minutes in the professional and overall care of this patient.        Ortiz Jasso MD  Cardiology Fellow PGY5    Thank you for involving us in this patient care. Recommendations not final until signed by attending.     General Cardiology Consult Pager: [phone] (weekday 7AM-6PM and weekend 7AM-2PM) and other: [phone]  EP Consult Pager: [phone] (weekday 7AM-6PM and weekend 7AM-2PM) and other: [phone]  CICU Fellow Pager: [phone] anytime  EP Device Nurse Pager: [phone] (weekday 7AM-4PM)  Advanced Heart Failure Consult Pager: [phone] anytime

## 2025-01-22 NOTE — PROGRESS NOTES
"Temo Hanson is a 74 y.o. male on day 22 of admission presenting with Dehydration.    Subjective   Patient not seen and examined due to no insulin plan changes.     I have reviewed histories, allergies and medications have been reviewed and there are no changes.     Objective  (  per 1/21/25 note as pt was not seen today)  Review of Systems   Constitutional:  Positive for activity change, appetite change and fatigue. Negative for diaphoresis and unexpected weight change.   HENT: Negative.  Negative for sore throat.    Eyes: Negative.    Respiratory:  Negative for cough, chest tightness and shortness of breath.    Cardiovascular:  Negative for chest pain.   Gastrointestinal:  Negative for abdominal pain, diarrhea and nausea.   Endocrine: Negative for cold intolerance, heat intolerance, polydipsia, polyphagia and polyuria.   Genitourinary:  Negative for dysuria and frequency.   Musculoskeletal:  Negative for gait problem and joint swelling.   Neurological:  Negative for speech difficulty, numbness and headaches.   Psychiatric/Behavioral:  Negative for agitation, behavioral problems and confusion.      Physical Exam  Constitutional:       General: He is not in acute distress.     Appearance: Normal appearance.   HENT:      Head: Normocephalic and atraumatic.      Mouth/Throat:      Mouth: Mucous membranes are moist.   Cardiovascular:      Rate and Rhythm: Normal rate and regular rhythm.   Pulmonary:      Effort: Pulmonary effort is normal.      Breath sounds: Normal breath sounds.   Abdominal:      Palpations: Abdomen is soft.   Skin:     General: Skin is warm.     Last Recorded Vitals  Blood pressure 150/68, pulse 92, temperature 35.9 °C (96.6 °F), temperature source Temporal, resp. rate 18, height 1.854 m (6' 1\"), weight 69.5 kg (153 lb 3.2 oz), SpO2 98%.  Intake/Output last 3 Shifts:  I/O last 3 completed shifts:  In: 660 (9.5 mL/kg) [P.O.:660]  Out: 675 (9.7 mL/kg) [Urine:675 (0.3 mL/kg/hr)]  Weight: 69.7 kg "     Relevant Results  Results from last 7 days   Lab Units 01/22/25  1344 01/22/25  1232 01/22/25  0750 01/22/25  0636 01/22/25  0536 01/21/25  2145 01/20/25  0746 01/20/25  0539 01/19/25  0732 01/19/25  0632 01/18/25  0801 01/18/25  0556 01/17/25  0734 01/17/25  0541   POCT GLUCOSE mg/dL  --  300* 211* 194* 190* 106*   < >  --    < >  --    < >  --    < >  --    GLUCOSE mg/dL 331*  --   --   --   --   --   --  207*  --  174*  --  121*  --  234*    < > = values in this interval not displayed.       Assessment/Plan   Temo Hanson is a 74 y.o. 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  Patient was started on tube feeds, NPO, today after placement of PEG tube. Patient states he felt nausea and vomiting, leading to a pause in his feedings around noon. Feedings restarted around 1500.      Diabetes History  Type of diabetes: 2  Year diagnosed or age: 46 years old  Hospitalizations for DKA or HHS: Once - inappropriately gave too much sliding scale due to low BG finger stick not correcting in under an hour  Complications: Nephropathy  Seen by PCP or Endocrinology: PCP - Dr. Hillman, Endo - Dr. Carey  Frequency of glucose checks: 4-5x daily after meals  Glucose review: persistent hyperglycemia this admission  Frequency of Hypoglycemia: none  Hypoglycemia unawareness: no  Severe hypoglycemia requiring assistance from others:  N     Home Medications  Basal: Glargine 8U  Prandial: Aspart 4U  Correction: Aspart sliding scale  Assessment & Plan  Dehydration    Alactasia syndrome    Type 2 diabetes mellitus with hyperglycemia, with long-term current use of insulin    On tube feeding diet      PLAN  Steroids:   Prednisone 15 mg daily  1/13- Predinsone 10mg daily  1/16 - prednisone reduced to 5 mg     Nutrition:   1/10- 60g CHO diet + glucerna 1.5 continuous TF, goal of 50  ml/h. This provides 40 g of carbs every 6 hours when at goal.  1/11- TF reduced to 20ml/h so patient is getting 16g of carbs every 6 hours   1/12- TF stopped, PPN to start tonight at 30cc/h. Pt also on CLD  1/13 - PPN Started. CLD  1/14: TPN to increase to 55 ml/hr (previously at 41 ml/hr) Patient remains on clear liquid diet but with minimal intake  Tube feed: glucerna 1.5 started at 10 ml/hr at 2200 - patient made NPO  1/15: NPO. TPN to remain at 55 ml/hr  Tube feed: glucerna 1.5 started at 10 ml/hr -> switched to Vital 1.5 at 20 ml/hr, increase by 5 ml every 6 hours until goal rate of 50 ml/hr reached (40 carbs over 6 hrs)  Diet advanced in the afternoon  1/16: 60 gram Carb consistent + TF + TPN at 55 ml/hr. TF changed to Vital 1.5 starting at 30 ml/hr and increasing by 5 ml/hr until goal rate of 50ml/hr is reached (55 grams carbs/6 hrs)  1/17: TF increasing by this evening to goal of 65ml/h (72g of carbs / 6 hours). TPN cut in half from 50 ml/h to 30ml/h. Remains on PO diet.   1/18: TF will shut off at 1200 for 6 hours, then will continue to run at 65ml/h restarting at 1800 until 1200 on 1/19, then be stopped until 18:00. TPN will be off. Will remain on PO diet.   1/19: TF will now be running from 18:00 to 12:00 (18hr), and pt will likely discharge on this nutrition schedule if tolerate    - continue NPH  as 12u at 18:00 and 8u at 06:00      If TF is held or glucose trending <100mg/dL: reduce NPH to 6u q12 at 18;00 and 06:00    - continue regular insulin scale #2 to ACHS timing, please continue use of this order regardless of nutrition status to address hyperglycemia    -Accuchecks ACHS  - Goal -180  -Hypoglycemia protocol  -Will continue to follow and titrate insulin accordingly      Discharge planning:   [x] patient may expect to discharge home on basaglar 7u qAM, ademelog (patient has both insulins at home) scale #2 with meals, and NPH 10u with TF start. Insulin chart provided below.  [x]will provide  CGM sample prior to discharge, gina 3 provided    [x]will enroll pt in  pharmacy platinum plan program  [x]follow up with ROD Lainez in PTDM clinic as scheduled on 1/30/25 as bridge back to home endo and PCP     I spent 50 mins on the care and coordination of the patient        INSULIN INSTRUCTIONS   Breakfast time Lunch time Dinner time  Bedtime   basaglar/Glargine = 7 units  Humulin N/NPH = 10 units at the same time as tube feed is started    ademelog = scale as needed ademelog = scale as needed ademelog = scale as needed      CORRECTIVE SCALE  GLUCOSE READING   ASPART SCALE DOSE    70 - 149 0   150 - 200 2   201 - 250 4   251 - 300 6   301 - 350 8   351 - 400+ 10     If glucose remains over 400, call your diabetes provider, repeat 10 units every 4 hours and drink sugar free liquids (water, Gatorade zero, chicken broth) until you glucose improves or you hear back from medical professional. If your glucose remains over 400 and you develop symptoms such as nausea/vomiting or confusion, go to the emergency room as soon as possible.    I spent 15 mins on the care and coordination of this patient.    Tiffanie Crum PA-C

## 2025-01-22 NOTE — DISCHARGE SUMMARY
Discharge Diagnosis  Dehydration    Issues Requiring Follow-Up  Home on FK 5/5, Myfortic 360 mg BID, and Prednisone 5 mg   Trend WBC now back on Valcyte 900 mg daily   Bactrim was held due to leukopenia, received a dose of Pentamidine on 1/20   Tube feeds cycled to 18 hours on, 6 hours off   Seen by Endocrinology for his diabetes, has follow-up  Patient was not amenable to Urology pulling his stent bedside on 1/21, will need his cystoscopy rescheduled outpatient   Follow up Allosure, histo antigen   Follow up CMV PCR plasma drawn 1/23     Test Results Pending At Discharge  Pending Labs       Order Current Status    AlloSure Kidney In process    CMV DNA, quantitative, PCR In process            Hospital Course  Temo Hanson is a 74 y.o. male 74M ESRD on HD (RUE AVF) d/t diabetic nephropathy. 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 Was voiding postop, no dialysis at that time. Clinic admit d/t dehydration, 1 cup water/d. Altered, . Dialysis on admission [place] 11.8     Patient seen in dialysis room.  Hemodynamically stable.  Reports that after his most recent discharge, he remembers hearing someone say that he not does not need to drink as much.  Subsequently patient has only been drinking at most 1 cup of water per day.  Does not recall when he started feeling confused, but does note that he was encouraged to be admitted after clinic appointment today.  Reports that he still passing stool and urine, but that his urine is incredibly dark.  Denies smell to urine.  Denies any significant medical changes since last time he was admitted.     He had prolonged hospital course that included failure to thrive in adult with dehydration and diagnosis of severe protein-calorie malnutrition, placement of PEG tube, and intolerance of tube feeds initially. He was switched to TPN briefly and his tube feed formula was adjusted as per the dietitian recommendations. After his tube feed formula was adjusted to Vital  1.5, he was tolerating them at goal, he was cycled to 18 hours on, 6 hours off, TPN was weaned. Endocrinology followed for management of his blood glucose.  Other issues included initial pain at PEG site that was managed with muscle relaxants and narcotics. Concern for diabetic gastroparesis, he was started on Reglan and he was also treated with erythromycin x 5 days with some improvement. The patient was tolerating some PO though still with some chronic achalasia associated pain. Other issues during his admission included leukopenia for which his Bactrim and Valcyte were initially held, Myfortic dose was decreased to 180 mg BID. On 1/20 he received a dose of Pentamidine and his full dose Valcyte was resumed with WBC stable at 3.1. Myfortic increased to 360 mg BID. He also received weekly Aranesp for his anemia of chronic disease. On 1/21 there was a misunderstanding that the patient had chest pain (was actually a pain just superior to the PEG that worsens when he eats and has been somewhat chronic). An EKG was obtained that appeared different from his previous EKG. Cardiology was consulted, recommended a pacer device check, which was obtained and unremarkable.  A STAT RFP was obtained due to reported tachycardia on the the EKG, showed a k+ of 5.7 and a blood glucose of 331 mg/dL. He was treated with  Lokelma, which was then ordered for discharge, and insulin to address the blood glucose. The following morning his lab work was improved, k+ of 5.0.     Pt is tolerating a carb controlled diet, maintaining adequate hydration with oral intake and tube feeds at goal, ambulating with assistance, and having BMs. Immunosuppression was managed by the transplant team. Patient is in stable condition for discharge home with renal telehealth today and homecare for enteral feeds, and PT/OT. RX delivered to bedside prior to discharge. The patient will f/u in the transplant institute and have labs drawn in the walk-in  clinic.      Pertinent Physical Exam At Time of Discharge  Physical Exam  Constitutional:       Appearance: Normal appearance.   Cardiovascular:      Rate and Rhythm: Normal rate.   Pulmonary:      Effort: Pulmonary effort is normal.   Abdominal:      General: Abdomen is flat. Bowel sounds are normal.   Musculoskeletal:         General: Normal range of motion.      Cervical back: Normal range of motion.   Neurological:      General: No focal deficit present.      Mental Status: He is alert.   Psychiatric:         Mood and Affect: Mood normal.         Home Medications     Medication List      START taking these medications     Children's acetaminophen 160 mg/5 mL liquid; Generic drug:   acetaminophen; Take 20.3 mL (650 mg) by g-tube route every 6 hours if   needed for pain.; Replaces: acetaminophen 325 mg tablet; Notes to patient:   As needed for mild pain   Fiber-Lax 625 mg tablet; Generic drug: calcium polycarbophiL; Take 1   tablet (625 mg) by mouth 2 times a day.; Notes to patient: Fiber   HumuLIN N NPH Insulin KwikPen 100 unit/mL (3 mL) injection; Generic   drug: insulin NPH (Isophane); Inject 10 Units under the skin once daily in   the evening.  Take before meals. Please take once every evening when tube   feed is going to start at 6 PM   lidocaine 4 % patch; Apply 2 patches once daily. Keep on for 12 hours,   then remove and discard and keep off for 12 hours.   Lokelma 5 gram packet; Generic drug: sodium zirconium cyclosilicate;   Take 5 g by mouth once daily.   magnesium oxide 400 mg (241.3 mg magnesium) tablet; Commonly known as:   Mag-Ox; Take 1 tablet (400 mg) by mouth once daily. Do not fill before   January 21, 2025.; Notes to patient: Supplement Separate from   mycophenolate by at least 2 hours   methocarbamol 500 mg tablet; Commonly known as: Robaxin; Take 1 tablet   (500 mg) by mouth every 8 hours for 7 days.   metoclopramide 5 mg tablet; Commonly known as: Reglan; Take 1 tablet (5   mg) by mouth  "every 8 hours.   mycophenolate 180 mg EC tablet; Commonly known as: Myfortic; Take 2   tablets (360 mg) by mouth 2 times a day.   nystatin 100,000 unit/mL suspension; Commonly known as: Mycostatin;   Swish and swallow 5 mL (500,000 Units) 4 times a day for 10 days.   ondansetron 4 mg tablet; Commonly known as: Zofran; Take 1 tablet (4 mg)   by mouth every 8 hours if needed for nausea for up to 7 days.; Notes to   patient: As needed for nausea   oxyCODONE 5 mg/5 mL solution; Commonly known as: Roxicodone; 5 mL (5 mg)   by g-tube route every 6 hours if needed for severe pain (7 - 10) for up to   3 days.; Replaces: oxyCODONE 5 mg immediate release tablet   Pro-Stat AWC  gram-kcal/30 mL liquid; Generic drug: amino   acids-protein hydrolys; 1 packet by peg tube route once daily.   thiamine 100 mg tablet; Commonly known as: Vitamin B-1; Take 1 tablet   (100 mg) by mouth once daily.   Vital 1.5 Chelita 0.07 gram- 1.5 kcal/mL liquid; Generic drug:   nut.tx.impaired dige fxn-fiber; 65 mL/hr by peg tube route once daily.   Cycled from 6 PM to 12 PM daily   Vitamin D3 50 mcg (2,000 unit) tablet; Generic drug: cholecalciferol;   Take 1 tablet (2,000 Units) by mouth once daily.; Notes to patient:   Supplement     CHANGE how you take these medications     Basaglar KwikPen U-100 Insulin 100 unit/mL (3 mL) pen; Generic drug:   insulin glargine; Inject 7 Units under the skin once daily in the morning.   Take as directed per insulin instructions.; What changed: how much to   take, when to take this   * BD Ultra-Fine Joan Pen Needle 32 gauge x 5/32\" needle; Generic drug:   pen needle, diabetic; What changed: Another medication with the same name   was added. Make sure you understand how and when to take each.   * BD Joan 2nd Gen Pen Needle 32 gauge x 5/32\" needle; Generic drug: pen   needle, diabetic; 1 each 4 times a day. Use to inject insulin 4 times   daily; What changed: Another medication with the same name was added. Make " "  sure you understand how and when to take each.   * BD Joan 2nd Gen Pen Needle 32 gauge x 5/32\" needle; Generic drug: pen   needle, diabetic; Use 1 needle once daily in the evening.  Take before   meals.; What changed: You were already taking a medication with the same   name, and this prescription was added. Make sure you understand how and   when to take each.   Fiasp FlexTouch U-100 Insulin 100 unit/mL (3 mL) injection; Generic   drug: insulin aspart (with niacinamide); Inject 0-10 Units under the skin   3 times a day before meals. 0 unit(s) if Blood glucose is between  2   unit(s) if Blood glucose is between 151-200 4 unit(s) if Blood glucose is   between 201-250 6 unit(s) if Blood glucose is between 251-300 8 unit(s) if   Blood glucose is between 301-350 10 unit(s) if Blood glucose is between   351-400  If blood glucose is greater than 400 mg/dL, give max insulin per   sliding scale AND then contact provider.; What changed: how much to take,   additional instructions, Another medication with the same name was   removed. Continue taking this medication, and follow the directions you   see here.   pantoprazole 40 mg EC tablet; Commonly known as: ProtoNix; Take 1 tablet   (40 mg) by mouth 2 times a day before meals. Do not crush, chew, or   split.; What changed: when to take this   predniSONE 5 mg tablet; Commonly known as: Deltasone; Take 1 tablet (5   mg) by mouth once daily.; What changed: how much to take   tacrolimus 1 mg capsule; Commonly known as: Prograf; Take 4 capsules (4   mg) by mouth every 12 hours.; What changed: how much to take, Another   medication with the same name was removed. Continue taking this   medication, and follow the directions you see here.; Notes to patient: Do   not take on the morning of your lab tests until after your blood is drawn.      valGANciclovir 450 mg tablet; Commonly known as: Valcyte; Take 2 tablets   (900 mg) by mouth once daily. Do not crush or chew.; What " changed: how   much to take, when to take this  * This list has 3 medication(s) that are the same as other medications   prescribed for you. Read the directions carefully, and ask your doctor or   other care provider to review them with you.     CONTINUE taking these medications     amLODIPine 10 mg tablet; Commonly known as: Norvasc; Take 1 tablet (10   mg) by mouth once daily.; Notes to patient: Lowers blood pressure   Blood Glucose Test strip; Generic drug: blood sugar diagnostic; Check   blood glucose 3 time per day   blood-glucose meter misc; Use to check blood glucose   carboxymethylcellulose 0.5 % ophthalmic solution; Commonly known as:   Refresh Plus   Easy Touch Alcohol Prep Pads pads, medicated; Generic drug: alcohol   swabs; Apply 1 each topically 4 times a day. Use prior to checking glucose   or injecting insulin   FreeStyle Mick 2 Sensor kit; Generic drug: flash glucose sensor kit   gabapentin 100 mg capsule; Commonly known as: Neurontin; Take 2 capsules   (200 mg) by mouth once daily.; Notes to patient: Helps with pain   OneTouch Delica Plus Lancet 30 gauge misc; Generic drug: lancets; 1 each   4 times a day. Use to check glucose 4 times daily, before meals and at   bedtime   rosuvastatin 10 mg tablet; Commonly known as: Crestor; Take 1 tablet (10   mg) by mouth once daily at bedtime.; Notes to patient: Helps lower   cholesterol     STOP taking these medications     acetaminophen 325 mg tablet; Commonly known as: Tylenol; Replaced by:   Children's acetaminophen 160 mg/5 mL liquid   clotrimazole 10 mg poli; Commonly known as: Mycelex   docusate sodium 100 mg capsule; Commonly known as: Colace   mycophenolate 250 mg capsule; Commonly known as: Cellcept   oxyCODONE 5 mg immediate release tablet; Commonly known as: Roxicodone;   Replaced by: oxyCODONE 5 mg/5 mL solution   polyethylene glycol 17 gram packet; Commonly known as: Glycolax, Miralax   sulfamethoxazole-trimethoprim 400-80 mg tablet; Commonly  known as:   Bactrim       Outpatient Follow-Up  Future Appointments   Date Time Provider Department Center   1/27/2025  8:00 AM TXP ABDOMINAL SURGEON [id number]   1/27/2025  9:20 AM El Goel MD [id number] [place]   1/30/2025  9:30 AM Tiffanie Crum PA-C [id number]   1/30/2025 10:00 AM TXP ABDOMINAL SURGEON [id number]   1/30/2025 11:00 AM Sol Coley RDN, LD [id number]   2/19/2025 10:20 AM Aly Miguel MD [id number] [place]   9/16/2025 11:30 AM [facility] [id number]       Sherly García PA-C

## 2025-01-22 NOTE — PROGRESS NOTES
TCC confirmed with patient's wife Nae Hanson ([phone]) that tube feed supplies have arrived at the patient's home residence. Patient, medical team and [facility] notified of delivery. Discharge possibly delayed due to patient's K levels. TCC will continue to follow for discharge planning.      LUCÍA Strange, RN  [facility], [facility]  Transitional Care Coordinator, Mon-Fri  Cell: [phone], Office: [phone]  Email: [email or web address]

## 2025-01-23 ENCOUNTER — PHARMACY VISIT (OUTPATIENT)
Dept: PHARMACY | Facility: CLINIC | Age: 75
End: 2025-01-23
Payer: MEDICARE

## 2025-01-23 ENCOUNTER — TELEPHONE (OUTPATIENT)
Facility: HOSPITAL | Age: 75
End: 2025-01-23
Payer: COMMERCIAL

## 2025-01-23 VITALS
HEART RATE: 93 BPM | WEIGHT: 153.2 LBS | HEIGHT: 73 IN | BODY MASS INDEX: 20.3 KG/M2 | RESPIRATION RATE: 18 BRPM | TEMPERATURE: 97 F | SYSTOLIC BLOOD PRESSURE: 129 MMHG | DIASTOLIC BLOOD PRESSURE: 50 MMHG | OXYGEN SATURATION: 99 %

## 2025-01-23 LAB
ALBUMIN SERPL BCP-MCNC: 3 G/DL (ref 3.4–5)
ALLOSURE SCORE - KIDNEY: 0.27 %
ANION GAP SERPL CALC-SCNC: 14 MMOL/L (ref 10–20)
ATRIAL RATE: 198 BPM
BASOPHILS # BLD AUTO: 0.03 X10*3/UL (ref 0–0.1)
BASOPHILS NFR BLD AUTO: 0.8 %
BUN SERPL-MCNC: 14 MG/DL (ref 6–23)
CALCIUM SERPL-MCNC: 8.4 MG/DL (ref 8.6–10.6)
CAREDX_ORDER_ID: NORMAL
CENTER_ORDER_ID: NORMAL
CHLORIDE SERPL-SCNC: 99 MMOL/L (ref 98–107)
CLIENT SPECIMEN ID - ALLOSURE: NORMAL
CO2 SERPL-SCNC: 25 MMOL/L (ref 21–32)
CREAT SERPL-MCNC: 0.83 MG/DL (ref 0.5–1.3)
DONOR RELATION - ALLOSURE: NORMAL
EGFRCR SERPLBLD CKD-EPI 2021: >90 ML/MIN/1.73M*2
EOSINOPHIL # BLD AUTO: 0.04 X10*3/UL (ref 0–0.4)
EOSINOPHIL NFR BLD AUTO: 1.1 %
ERYTHROCYTE [DISTWIDTH] IN BLOOD BY AUTOMATED COUNT: 18.1 % (ref 11.5–14.5)
GLUCOSE BLD MANUAL STRIP-MCNC: 148 MG/DL (ref 74–99)
GLUCOSE BLD MANUAL STRIP-MCNC: 199 MG/DL (ref 74–99)
GLUCOSE BLD MANUAL STRIP-MCNC: 246 MG/DL (ref 74–99)
GLUCOSE BLD MANUAL STRIP-MCNC: 263 MG/DL (ref 74–99)
GLUCOSE SERPL-MCNC: 232 MG/DL (ref 74–99)
HCT VFR BLD AUTO: 27.3 % (ref 41–52)
HGB BLD-MCNC: 8.8 G/DL (ref 13.5–17.5)
IMM GRANULOCYTES # BLD AUTO: 0.03 X10*3/UL (ref 0–0.5)
IMM GRANULOCYTES NFR BLD AUTO: 0.8 % (ref 0–0.9)
LYMPHOCYTES # BLD AUTO: 0.54 X10*3/UL (ref 0.8–3)
LYMPHOCYTES NFR BLD AUTO: 14.6 %
MAGNESIUM SERPL-MCNC: 2.02 MG/DL (ref 1.6–2.4)
MCH RBC QN AUTO: 28.9 PG (ref 26–34)
MCHC RBC AUTO-ENTMCNC: 32.2 G/DL (ref 32–36)
MCV RBC AUTO: 90 FL (ref 80–100)
MONOCYTES # BLD AUTO: 0.58 X10*3/UL (ref 0.05–0.8)
MONOCYTES NFR BLD AUTO: 15.7 %
NEUTROPHILS # BLD AUTO: 2.48 X10*3/UL (ref 1.6–5.5)
NEUTROPHILS NFR BLD AUTO: 67 %
NOTES - ALLOSURE: NORMAL
NRBC BLD-RTO: 0 /100 WBCS (ref 0–0)
PHOSPHATE SERPL-MCNC: 4.3 MG/DL (ref 2.5–4.9)
PLATELET # BLD AUTO: 245 X10*3/UL (ref 150–450)
POTASSIUM SERPL-SCNC: 5 MMOL/L (ref 3.5–5.3)
Q ONSET: 200 MS
QRS COUNT: 33 BEATS
QRS DURATION: 144 MS
QT INTERVAL: 210 MS
QTC CALCULATION(BAZETT): 381 MS
QTC FREDERICIA: 312 MS
R AXIS: 14 DEGREES
RBC # BLD AUTO: 3.05 X10*6/UL (ref 4.5–5.9)
RELATIVE CHANGE VALUE - KIDNEY: NORMAL
SODIUM SERPL-SCNC: 133 MMOL/L (ref 136–145)
T AXIS: 151 DEGREES
T OFFSET: 305 MS
TACROLIMUS BLD-MCNC: 6.1 NG/ML
TEST COMMENTS - ALLOSURE: NORMAL
TIME POST TX - ALLOSURE: NORMAL
TRANSPLANTED ORGAN - ALLOSURE: NORMAL
TX DATE - ALLOSURE/ALLOMAP: NORMAL
VENTRICULAR RATE: 198 BPM
WBC # BLD AUTO: 3.7 X10*3/UL (ref 4.4–11.3)
WP_ORDER_ID: NORMAL

## 2025-01-23 PROCEDURE — 2500000001 HC RX 250 WO HCPCS SELF ADMINISTERED DRUGS (ALT 637 FOR MEDICARE OP): Performed by: STUDENT IN AN ORGANIZED HEALTH CARE EDUCATION/TRAINING PROGRAM

## 2025-01-23 PROCEDURE — 2500000002 HC RX 250 W HCPCS SELF ADMINISTERED DRUGS (ALT 637 FOR MEDICARE OP, ALT 636 FOR OP/ED): Performed by: PHYSICIAN ASSISTANT

## 2025-01-23 PROCEDURE — 2500000004 HC RX 250 GENERAL PHARMACY W/ HCPCS (ALT 636 FOR OP/ED): Performed by: PHYSICIAN ASSISTANT

## 2025-01-23 PROCEDURE — 2500000001 HC RX 250 WO HCPCS SELF ADMINISTERED DRUGS (ALT 637 FOR MEDICARE OP): Performed by: PHYSICIAN ASSISTANT

## 2025-01-23 PROCEDURE — 83735 ASSAY OF MAGNESIUM: CPT | Performed by: PHYSICIAN ASSISTANT

## 2025-01-23 PROCEDURE — 2500000004 HC RX 250 GENERAL PHARMACY W/ HCPCS (ALT 636 FOR OP/ED)

## 2025-01-23 PROCEDURE — 80069 RENAL FUNCTION PANEL: CPT | Performed by: PHYSICIAN ASSISTANT

## 2025-01-23 PROCEDURE — 80197 ASSAY OF TACROLIMUS: CPT | Performed by: PHYSICIAN ASSISTANT

## 2025-01-23 PROCEDURE — 85025 COMPLETE CBC W/AUTO DIFF WBC: CPT | Performed by: PHYSICIAN ASSISTANT

## 2025-01-23 PROCEDURE — 82947 ASSAY GLUCOSE BLOOD QUANT: CPT

## 2025-01-23 PROCEDURE — 2500000001 HC RX 250 WO HCPCS SELF ADMINISTERED DRUGS (ALT 637 FOR MEDICARE OP)

## 2025-01-23 RX ORDER — INSULIN ASPART INJECTION 100 [IU]/ML
0-10 INJECTION, SOLUTION SUBCUTANEOUS
Start: 2025-01-23

## 2025-01-23 RX ORDER — TACROLIMUS 1 MG/1
5 CAPSULE ORAL EVERY 12 HOURS
Qty: 300 CAPSULE | Refills: 0 | Status: SHIPPED | OUTPATIENT
Start: 2025-01-23 | End: 2025-01-27 | Stop reason: SDUPTHER

## 2025-01-23 RX ORDER — TACROLIMUS 1 MG/1
5 CAPSULE ORAL EVERY 12 HOURS
Status: DISCONTINUED | OUTPATIENT
Start: 2025-01-23 | End: 2025-01-23 | Stop reason: HOSPADM

## 2025-01-23 RX ADMIN — ACETAMINOPHEN 650 MG: 160 SOLUTION ORAL at 02:14

## 2025-01-23 RX ADMIN — CALCIUM POLYCARBOPHIL 625 MG: 625 TABLET, FILM COATED ORAL at 08:34

## 2025-01-23 RX ADMIN — MAGNESIUM OXIDE TAB 400 MG (241.3 MG ELEMENTAL MG) 400 MG: 400 (241.3 MG) TAB at 08:34

## 2025-01-23 RX ADMIN — SODIUM ZIRCONIUM CYCLOSILICATE 10 G: 10 POWDER, FOR SUSPENSION ORAL at 08:34

## 2025-01-23 RX ADMIN — THIAMINE HCL TAB 100 MG 100 MG: 100 TAB at 08:34

## 2025-01-23 RX ADMIN — AMLODIPINE BESYLATE 10 MG: 10 TABLET ORAL at 08:34

## 2025-01-23 RX ADMIN — OXYCODONE HYDROCHLORIDE 5 MG: 5 SOLUTION ORAL at 08:34

## 2025-01-23 RX ADMIN — METOCLOPRAMIDE 5 MG: 10 TABLET ORAL at 04:00

## 2025-01-23 RX ADMIN — NYSTATIN 500000 UNITS: 500000 SUSPENSION ORAL at 06:48

## 2025-01-23 RX ADMIN — PANTOPRAZOLE SODIUM 40 MG: 40 TABLET, DELAYED RELEASE ORAL at 06:45

## 2025-01-23 RX ADMIN — CALCIUM CARBONATE (ANTACID) CHEW TAB 500 MG 500 MG: 500 CHEW TAB at 06:45

## 2025-01-23 RX ADMIN — METHOCARBAMOL 500 MG: 500 TABLET ORAL at 06:45

## 2025-01-23 RX ADMIN — MYCOPHENOLIC ACID 360 MG: 360 TABLET, DELAYED RELEASE ORAL at 06:51

## 2025-01-23 RX ADMIN — TACROLIMUS 4 MG: 1 CAPSULE ORAL at 06:45

## 2025-01-23 RX ADMIN — GABAPENTIN 200 MG: 100 CAPSULE ORAL at 08:34

## 2025-01-23 RX ADMIN — Medication 2000 UNITS: at 08:34

## 2025-01-23 RX ADMIN — HEPARIN SODIUM 5000 UNITS: 5000 INJECTION INTRAVENOUS; SUBCUTANEOUS at 08:43

## 2025-01-23 RX ADMIN — INSULIN HUMAN 6 UNITS: 100 INJECTION, SOLUTION PARENTERAL at 08:43

## 2025-01-23 RX ADMIN — ACETAMINOPHEN 650 MG: 160 SOLUTION ORAL at 08:34

## 2025-01-23 RX ADMIN — PREDNISONE 5 MG: 5 TABLET ORAL at 08:34

## 2025-01-23 ASSESSMENT — PAIN SCALES - GENERAL
PAINLEVEL_OUTOF10: 6
PAINLEVEL_OUTOF10: 6

## 2025-01-23 ASSESSMENT — PAIN SCALES - PAIN ASSESSMENT IN ADVANCED DEMENTIA (PAINAD): BREATHING: NORMAL

## 2025-01-23 ASSESSMENT — PAIN - FUNCTIONAL ASSESSMENT: PAIN_FUNCTIONAL_ASSESSMENT: 0-10

## 2025-01-23 NOTE — SIGNIFICANT EVENT
01/23/25 1200   Patient Interaction   Organ Kidney   Type of Interaction Morning rounds   Interdisciplinary Rounds   Attendance Surgeon;Physician;FILI;Pharmacist   Topics Discussed Diet;Home care needs;Medications;Blood test results;Discharge preparation     Transplant Surgery Multidisciplinary Team Note    Temo Hanson is a 74 y.o. male  POD#33  from a Kidney from a  kidney txp. His post operative complications: Other complication: malnutrition requiring PEG, now POD#14 s/p PEG placement      24 Hour Events  1. No acute events     Last Recorded Vitals  Visit Vitals  /50 (BP Location: Right arm, Patient Position: Sitting)   Pulse 93   Temp 36.1 °C (97 °F) (Temporal)   Resp 18      Intake/Output last 3 Shifts:    Intake/Output Summary (Last 24 hours) at 1/23/2025 1200  Last data filed at 1/23/2025 1116  Gross per 24 hour   Intake 980 ml   Output 1300 ml   Net -320 ml      Vitals:    01/22/25 0946   Weight: 69.5 kg (153 lb 3.2 oz)        Assessment/Plan   Discharge  home     Principal Problem:    Management after organ transplant  Active Problems:  Patient Active Problem List   Diagnosis    S/P laparoscopic cholecystectomy    Abdominal pain    Achalasia    Acute lower UTI    Microcytic anemia    Complete heart block    Callus of foot    Chronic periodontitis, unspecified    Stage 5 chronic kidney disease (Multi)    Closed fracture of metatarsal bone    Crushing injury of foot    Diabetes mellitus (Multi)    Diarrhea    Dysphagia    ED (erectile dysfunction)    Essential hypertension    Foot pain, right    GIB (gastrointestinal bleeding)    Hepatitis B core antibody positive    Hyperkalemia    Ingrowing nail    Iron deficiency anemia secondary to inadequate dietary iron intake    Long toenail    Malignant neoplasm of prostate (Multi)    Onychomycosis    Pheochromocytoma of right adrenal gland    Pneumonia of right lower lobe due to infectious organism    Productive cough    Pure hypercholesterolemia     Stricture esophagus    SVT (supraventricular tachycardia) (CMS-HCC)    Type 2 diabetes mellitus with diabetic neuropathy (Multi)    Preop cardiovascular exam    Third degree heart block    Pericardial effusion ([facility]-HCC)    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 (Multi)    Uremia    Cardiac tamponade    Cardiac pacemaker in situ    ESRD (end stage renal disease) (Multi)    Type 2 diabetes mellitus, with long-term current use of insulin    Dehydration    Alactasia syndrome    Type 2 diabetes mellitus with hyperglycemia, with long-term current use of insulin    On tube feeding diet        Immunosuppression reviewed and adjusted              Induction: Thymoglobulin Full Dose              Tacrolimus goal 8-10 ng/mL. Current dose 5 mg BID                MMF 1000 mg po BID              Solumedrol taper  DVT prophylaxis SCDS and subcutaneous heparin 5000 TID  PT/OT  Diet:  Diabetic + enteral feeds   Anticipated discharge today, 1/23    Sherly García PA-C

## 2025-01-23 NOTE — PROGRESS NOTES
Art Therapy Note    Temo Hanson    Therapy Session  Referral Type: New referral this admission  Visit Type: New visit  Session Start Time: 1426  Session End Time: 1427  Intervention Delivery: In-person  Number of family members present: 2              Treatment/Interventions  Areas of Focus: Anxiety reduction, Stress reduction  Art Therapy Interventions: Assessment, Education/instruction    Post-assessment  Total Session Time (min): 1 minutes    Narrative  Assessment Detail: Pt was sitting up in a chair next to bedside with two visitors/family members or friends when ATR visited. ATR introduced AT services and benefits to pt, but was not interested. ATR will f/up and attempt session another time.    Education Documentation  No documentation found.

## 2025-01-23 NOTE — CARE PLAN
The patient's goals for the shift include get better    The clinical goals for the shift include patient will have adequate pain control throughout this shift    Medicated for c/o pain per orders.  Problem: Pain  Goal: Takes deep breaths with improved pain control throughout the shift  Outcome: Progressing     Problem: Pain  Goal: Turns in bed with improved pain control throughout the shift  Outcome: Progressing     Problem: Pain  Goal: Walks with improved pain control throughout the shift  Outcome: Progressing     Problem: Pain  Goal: Performs ADL's with improved pain control throughout shift  Outcome: Progressing     Problem: Pain  Goal: Free from opioid side effects throughout the shift  Outcome: Progressing     Problem: Pain  Goal: Free from acute confusion related to pain meds throughout the shift  Outcome: Progressing     Problem: Skin  Goal: Decreased wound size/increased tissue granulation at next dressing change  Outcome: Progressing     Problem: Skin  Goal: Prevent/manage excess moisture  Outcome: Progressing     Problem: Skin  Goal: Prevent/minimize sheer/friction injuries  Outcome: Progressing     Problem: Skin  Goal: Promote/optimize nutrition  Outcome: Progressing

## 2025-01-23 NOTE — TELEPHONE ENCOUNTER
Patient discharged this morning, tacrolimus level came back low and need to increase tacrolimus to 5 mg BID.     Call placed to patient, he reports doing well, no questions at this time. He wrote down the tac change but requested I call his daughter as she was going to his house tonight and she manages his medications (she's a nurse). Pt also mentioned he has 2 pill boxes, advised him to use the one he came home with today and not his old one as the one he discharged with is the most up to date filled by me on Monday in preparation for discharge.    Call placed to Erica, [phone], no answer. Left detailed VM instructing her to increase tac to 5 mg BID. Re-iterated to Erica to use pill box that came home with patient today and not old one as we've changed all of his medications. Also, left message informing her what pills still needed placed in pill box (prednisone, amlodipine, gabapentin, crestor). Provided office number and on call number to call if any questions.    Will update on call coordinator in case they page.

## 2025-01-23 NOTE — PROGRESS NOTES
Art Therapy Note    Temo Hanson     Therapy Session  Referral Type: New referral this admission  Visit Type: Follow-up visit  Session Start Time: 1531  Session End Time: 1532  Intervention Delivery: In-person  Conflict of Service: Declined treatment  Number of family members present: 2  Family Present for Session: None              Treatment/Interventions  Areas of Focus: Anxiety reduction, Stress reduction  Art Therapy Interventions: Assessment, Education/instruction    Post-assessment  Total Session Time (min): 1 minutes    Narrative  Assessment Detail: Pt was sitting up in chair next to bed when ATR visited to re-introduce AT services and benefits. Pt looked away and was working w/his phone when ATR began talking, and did not respond. ATR enquired whether pt was interested or not and pt declined services. ATR will close out referral due to pt disinterest.    Education Documentation  No documentation found.

## 2025-01-24 ENCOUNTER — HOME CARE VISIT (OUTPATIENT)
Dept: HOME HEALTH SERVICES | Facility: HOME HEALTH | Age: 75
End: 2025-01-24
Payer: COMMERCIAL

## 2025-01-24 ENCOUNTER — PATIENT OUTREACH (OUTPATIENT)
Dept: CARE COORDINATION | Age: 75
End: 2025-01-24
Payer: COMMERCIAL

## 2025-01-24 ENCOUNTER — APPOINTMENT (OUTPATIENT)
Facility: HOSPITAL | Age: 75
End: 2025-01-24
Payer: COMMERCIAL

## 2025-01-24 VITALS
DIASTOLIC BLOOD PRESSURE: 58 MMHG | SYSTOLIC BLOOD PRESSURE: 126 MMHG | OXYGEN SATURATION: 98 % | RESPIRATION RATE: 12 BRPM | TEMPERATURE: 99.3 F | HEART RATE: 88 BPM

## 2025-01-24 DIAGNOSIS — Z94.0 IMMUNOSUPPRESSIVE MANAGEMENT ENCOUNTER FOLLOWING KIDNEY TRANSPLANT: Primary | ICD-10-CM

## 2025-01-24 DIAGNOSIS — Z79.899 IMMUNOSUPPRESSIVE MANAGEMENT ENCOUNTER FOLLOWING KIDNEY TRANSPLANT: Primary | ICD-10-CM

## 2025-01-24 LAB
CMV DNA SERPL NAA+PROBE-LOG IU: ABNORMAL {LOG_IU}/ML
LABORATORY COMMENT REPORT: ABNORMAL

## 2025-01-24 PROCEDURE — G0299 HHS/HOSPICE OF RN EA 15 MIN: HCPCS

## 2025-01-24 RX ORDER — LIDOCAINE HYDROCHLORIDE 20 MG/ML
1 JELLY TOPICAL ONCE
Status: DISCONTINUED | OUTPATIENT
Start: 2025-01-24 | End: 2025-01-27

## 2025-01-24 ASSESSMENT — ACTIVITIES OF DAILY LIVING (ADL)
OASIS_M1830: 03
ENTERING_EXITING_HOME: NEEDS ASSISTANCE

## 2025-01-24 ASSESSMENT — ENCOUNTER SYMPTOMS
DENIES PAIN: 1
APPETITE LEVEL: POOR

## 2025-01-24 NOTE — PROGRESS NOTES
"Temo Hanson is a 74 y.o. male on day 23 of admission presenting with Dehydration.    Subjective:  No acute issues overnight.   Tolerating tube feeds well.  Likely discharge home today.   Feels better overall.    No current facility-administered medications on file prior to encounter.     Current Outpatient Medications on File Prior to Encounter   Medication Sig Dispense Refill    alcohol swabs pads, medicated Apply 1 each topically 4 times a day. Use prior to checking glucose or injecting insulin 400 each 0    amLODIPine (Norvasc) 10 mg tablet Take 1 tablet (10 mg) by mouth once daily. 30 tablet 11    BD Ultra-Fine Joan Pen Needle 32 gauge x 5/32\" needle       blood sugar diagnostic (Blood Glucose Test) strip Check blood glucose 3 time per day 100 each 0    blood-glucose meter misc Use to check blood glucose 1 each 0    carboxymethylcellulose (Refresh Plus) 0.5 % ophthalmic solution Instill 1 drop into both eyes 2-4x/day as needed for dryness.      FreeStyle Mick 2 Sensor kit       gabapentin (Neurontin) 100 mg capsule Take 2 capsules (200 mg) by mouth once daily.      lancets 30 gauge misc 1 each 4 times a day. Use to check glucose 4 times daily, before meals and at bedtime 400 each 0    pen needle, diabetic 32 gauge x 5/32\" needle 1 each 4 times a day. Use to inject insulin 4 times daily 400 each 3    rosuvastatin (Crestor) 10 mg tablet Take 1 tablet (10 mg) by mouth once daily at bedtime. 30 tablet 0          Last Recorded Vitals  Blood pressure 129/50, pulse 93, temperature 36.1 °C (97 °F), temperature source Temporal, resp. rate 18, height 1.854 m (6' 1\"), weight 69.5 kg (153 lb 3.2 oz), SpO2 99%.  Intake/Output last 3 Shifts:  I/O last 3 completed shifts:  In: 150 (2.2 mL/kg) [NG/GT:150]  Out: 950 (13.7 mL/kg) [Urine:950 (0.4 mL/kg/hr)]  Weight: 69.5 kg     A&ox3, no distress  MMM, no lesions  Lungs with equal air entry, no added sounds  Rrr, no m/r/g  Abd soft, slightly distended, nd, RLQ incision c/d/i, " staples intact  No allograft tenderness  No edema bilaterally  PEG tube in place    Results for orders placed or performed during the hospital encounter of 12/31/24 (from the past 96 hours)   POCT GLUCOSE   Result Value Ref Range    POCT Glucose 258 (H) 74 - 99 mg/dL   POCT GLUCOSE   Result Value Ref Range    POCT Glucose 246 (H) 74 - 99 mg/dL   POCT GLUCOSE   Result Value Ref Range    POCT Glucose 143 (H) 74 - 99 mg/dL   POCT GLUCOSE   Result Value Ref Range    POCT Glucose 177 (H) 74 - 99 mg/dL   POCT GLUCOSE   Result Value Ref Range    POCT Glucose 234 (H) 74 - 99 mg/dL   POCT GLUCOSE   Result Value Ref Range    POCT Glucose 354 (H) 74 - 99 mg/dL   POCT GLUCOSE   Result Value Ref Range    POCT Glucose 332 (H) 74 - 99 mg/dL   POCT GLUCOSE   Result Value Ref Range    POCT Glucose 100 (H) 74 - 99 mg/dL   POCT GLUCOSE   Result Value Ref Range    POCT Glucose 106 (H) 74 - 99 mg/dL   Tacrolimus level   Result Value Ref Range    Tacrolimus  5.8 <=15.0 ng/mL   Magnesium   Result Value Ref Range    Magnesium 1.61 1.60 - 2.40 mg/dL   POCT GLUCOSE   Result Value Ref Range    POCT Glucose 190 (H) 74 - 99 mg/dL   POCT GLUCOSE   Result Value Ref Range    POCT Glucose 194 (H) 74 - 99 mg/dL   POCT GLUCOSE   Result Value Ref Range    POCT Glucose 211 (H) 74 - 99 mg/dL   POCT GLUCOSE   Result Value Ref Range    POCT Glucose 300 (H) 74 - 99 mg/dL   Renal Function Panel   Result Value Ref Range    Glucose 331 (H) 74 - 99 mg/dL    Sodium 131 (L) 136 - 145 mmol/L    Potassium 5.7 (H) 3.5 - 5.3 mmol/L    Chloride 97 (L) 98 - 107 mmol/L    Bicarbonate 24 21 - 32 mmol/L    Anion Gap 16 mmol/L    Urea Nitrogen 15 6 - 23 mg/dL    Creatinine 0.81 0.50 - 1.30 mg/dL    eGFR >90 >60 mL/min/1.73m*2    Calcium 8.7 8.6 - 10.6 mg/dL    Phosphorus 3.8 2.5 - 4.9 mg/dL    Albumin 3.1 (L) 3.4 - 5.0 g/dL   Magnesium   Result Value Ref Range    Magnesium 2.63 (H) 1.60 - 2.40 mg/dL   ECG 12 Lead   Result Value Ref Range    Ventricular Rate 198 BPM     Atrial Rate 198 BPM    QRS Duration 144 ms    QT Interval 210 ms    QTC Calculation(Bazett) 381 ms    R Axis 14 degrees    T Axis 151 degrees    QRS Count 33 beats    Q Onset 200 ms    T Offset 305 ms    QTC Fredericia 312 ms   POCT GLUCOSE   Result Value Ref Range    POCT Glucose 291 (H) 74 - 99 mg/dL   POCT GLUCOSE   Result Value Ref Range    POCT Glucose 113 (H) 74 - 99 mg/dL   POCT GLUCOSE   Result Value Ref Range    POCT Glucose 148 (H) 74 - 99 mg/dL   POCT GLUCOSE   Result Value Ref Range    POCT Glucose 199 (H) 74 - 99 mg/dL   CMV DNA, quantitative, PCR   Result Value Ref Range    Cytomegalovirus DNA, PCR Log IU/ML      CMV DNA Result <35 Detected (A) Not Detected   Tacrolimus level   Result Value Ref Range    Tacrolimus  6.1 <=15.0 ng/mL   Magnesium   Result Value Ref Range    Magnesium 2.02 1.60 - 2.40 mg/dL   Renal Function Panel   Result Value Ref Range    Glucose 232 (H) 74 - 99 mg/dL    Sodium 133 (L) 136 - 145 mmol/L    Potassium 5.0 3.5 - 5.3 mmol/L    Chloride 99 98 - 107 mmol/L    Bicarbonate 25 21 - 32 mmol/L    Anion Gap 14 10 - 20 mmol/L    Urea Nitrogen 14 6 - 23 mg/dL    Creatinine 0.83 0.50 - 1.30 mg/dL    eGFR >90 >60 mL/min/1.73m*2    Calcium 8.4 (L) 8.6 - 10.6 mg/dL    Phosphorus 4.3 2.5 - 4.9 mg/dL    Albumin 3.0 (L) 3.4 - 5.0 g/dL   CBC and Auto Differential   Result Value Ref Range    WBC 3.7 (L) 4.4 - 11.3 x10*3/uL    nRBC 0.0 0.0 - 0.0 /100 WBCs    RBC 3.05 (L) 4.50 - 5.90 x10*6/uL    Hemoglobin 8.8 (L) 13.5 - 17.5 g/dL    Hematocrit 27.3 (L) 41.0 - 52.0 %    MCV 90 80 - 100 fL    MCH 28.9 26.0 - 34.0 pg    MCHC 32.2 32.0 - 36.0 g/dL    RDW 18.1 (H) 11.5 - 14.5 %    Platelets 245 150 - 450 x10*3/uL    Neutrophils % 67.0 40.0 - 80.0 %    Immature Granulocytes %, Automated 0.8 0.0 - 0.9 %    Lymphocytes % 14.6 13.0 - 44.0 %    Monocytes % 15.7 2.0 - 10.0 %    Eosinophils % 1.1 0.0 - 6.0 %    Basophils % 0.8 0.0 - 2.0 %    Neutrophils Absolute 2.48 1.60 - 5.50 x10*3/uL    Immature  Granulocytes Absolute, Automated 0.03 0.00 - 0.50 x10*3/uL    Lymphocytes Absolute 0.54 (L) 0.80 - 3.00 x10*3/uL    Monocytes Absolute 0.58 0.05 - 0.80 x10*3/uL    Eosinophils Absolute 0.04 0.00 - 0.40 x10*3/uL    Basophils Absolute 0.03 0.00 - 0.10 x10*3/uL   POCT GLUCOSE   Result Value Ref Range    POCT Glucose 263 (H) 74 - 99 mg/dL   POCT GLUCOSE   Result Value Ref Range    POCT Glucose 246 (H) 74 - 99 mg/dL         Assessment/Plan     74 y.o. male with a hx of ESRD secondary to diabetic nephropathy whom received a  donor kidney transplant on 24 by Dr. Zamora with a KDPI of 93% and PRA of 54%. Donor was Hepc -/-and has not met risk factors. EBV D+ /R+, CMV D-/R+. Left donor kidney transplanted to patient right pelvis. Admission weight is 72.7 kg (discharge weight is 76.4 kg ). Pt received a total of 3 mg/kg total of thymoglobulin induction therapy in conjunction with 500mg IV solumedrol.      Post-txp course notable for slow graft function, now with DGF.     Allograft function: s/p DDKT 24  - DGF. , Cr 5.5,uremic on admission. s/p HD  for clearance.   Cr stabilized to 1.4-1.5, now down to ~0.9    Immunosuppression:  - incr tac to 5mg bid. Pt off erythromycin. Goal FK 8-10  - cont Myfortic 360 mg bid (incr 25). Cont pred 5 mg/d.     Prophylaxis:  PPI   Nystatin 500,000 units QID x 3 mo  HOLD TMP-SMX x 6 mo -. pentamidine today.   Resume Valcyte for CMV ppx (CMD D+/R-).   CMV PCR neg     Esophageal dysphagia  - sec to achalasia. H/o Heller Myotomy plus Andrea Fundoplication 20+ yrs ago - per surgery, unable to fix  - EGD/PEG tube placement 25 for tube feeds, tolerating well.     Blood pressure   - Amlodipine 10 mg daily    Anemia - iron replete  Darbepoientin 100 mcg subcutaneous weekly -last dose 25    Leukopenia  - Valcyte held since 1/10  - hold TMP-SMX -  - Aranesp     CKD-MBD - acceptable     Likely discharge home today.     Beka Nichols,  MD

## 2025-01-25 ENCOUNTER — PATIENT OUTREACH (OUTPATIENT)
Dept: CARE COORDINATION | Age: 75
End: 2025-01-25
Payer: COMMERCIAL

## 2025-01-25 ENCOUNTER — HOME CARE VISIT (OUTPATIENT)
Dept: HOME HEALTH SERVICES | Facility: HOME HEALTH | Age: 75
End: 2025-01-25
Payer: COMMERCIAL

## 2025-01-25 VITALS
HEART RATE: 85 BPM | TEMPERATURE: 97.9 F | SYSTOLIC BLOOD PRESSURE: 122 MMHG | RESPIRATION RATE: 18 BRPM | DIASTOLIC BLOOD PRESSURE: 66 MMHG | OXYGEN SATURATION: 100 %

## 2025-01-25 PROCEDURE — G0299 HHS/HOSPICE OF RN EA 15 MIN: HCPCS

## 2025-01-25 SDOH — ECONOMIC STABILITY: GENERAL

## 2025-01-25 ASSESSMENT — ENCOUNTER SYMPTOMS
PAIN LOCATION: ABDOMEN
LOWEST PAIN SEVERITY IN PAST 24 HOURS: 0/10
PERSON REPORTING PAIN: PATIENT
PAIN: 1
HIGHEST PAIN SEVERITY IN PAST 24 HOURS: 6/10
APPETITE LEVEL: GOOD
PAIN SEVERITY GOAL: 0/10
CHANGE IN APPETITE: UNCHANGED
PAIN LOCATION - PAIN SEVERITY: 6/10
PAIN LOCATION - PAIN QUALITY: ACHING
PAIN LOCATION - PAIN FREQUENCY: INTERMITTENT

## 2025-01-25 ASSESSMENT — ACTIVITIES OF DAILY LIVING (ADL): MONEY MANAGEMENT (EXPENSES/BILLS): INDEPENDENT

## 2025-01-25 NOTE — PROGRESS NOTES
Called pt spoke to spouse  nurse visited patient yesterday . Spouse stated  that they do not have a monitor  and spouse she is not recording intake and out put  Nurse to visit again this week pt awaiting call for exact day and time

## 2025-01-26 NOTE — PROGRESS NOTES
Patient ID: Temo Hanson is a 74 y.o. male. S/P RENAL TRANSPLANT NOW PRESENTS TO HAVE HIS URETERAL STENT REMOVED.    PRE OP DIAGNOSIS-- INDWELLING URETERAL STENT  POST OP DIAGNOSIS -- INDWELLING URETERAL STENT  ANES-- 1 % LIDOCAINE   PROCEDURE: CYSTO, REMOVAL OF THE URETERAL STENT  SURGEON :  DILLON BUSTAMANTE  OPERATIVE NOTE:  The patient was brought into the operating room and placed on the operating table in a supine position.  After sterilely prepping and draping his genitalia a 1% lidocaine anesthetic was introduced into the urethra.  Under direct vision a flexible cystoscope was passed into the urethra and gently advanced into the bladder.  The ureteral stent was quickly identified and grasped with grasping forceps.  It was extracted from the ureter in its entirety.  At this point the patient got up from the operating table and left the exam room and excellent condition    LURDES A DILLON BUSTAMANTE  1-27-25

## 2025-01-27 ENCOUNTER — APPOINTMENT (OUTPATIENT)
Dept: LAB | Facility: HOSPITAL | Age: 75
End: 2025-01-27
Payer: COMMERCIAL

## 2025-01-27 ENCOUNTER — NUTRITION (OUTPATIENT)
Dept: TRANSPLANT | Facility: HOSPITAL | Age: 75
End: 2025-01-27

## 2025-01-27 ENCOUNTER — OFFICE VISIT (OUTPATIENT)
Facility: HOSPITAL | Age: 75
End: 2025-01-27
Payer: COMMERCIAL

## 2025-01-27 ENCOUNTER — DOCUMENTATION (OUTPATIENT)
Facility: HOSPITAL | Age: 75
End: 2025-01-27

## 2025-01-27 ENCOUNTER — APPOINTMENT (OUTPATIENT)
Dept: UROLOGY | Facility: CLINIC | Age: 75
End: 2025-01-27
Payer: COMMERCIAL

## 2025-01-27 VITALS
OXYGEN SATURATION: 100 % | SYSTOLIC BLOOD PRESSURE: 121 MMHG | DIASTOLIC BLOOD PRESSURE: 70 MMHG | TEMPERATURE: 98.2 F | WEIGHT: 152 LBS | BODY MASS INDEX: 20.05 KG/M2 | HEART RATE: 75 BPM

## 2025-01-27 DIAGNOSIS — R13.19 ESOPHAGEAL DYSPHAGIA: ICD-10-CM

## 2025-01-27 DIAGNOSIS — K22.0 ACHALASIA: ICD-10-CM

## 2025-01-27 DIAGNOSIS — R62.7 FAILURE TO THRIVE IN ADULT: ICD-10-CM

## 2025-01-27 DIAGNOSIS — K59.00 CONSTIPATION, UNSPECIFIED CONSTIPATION TYPE: ICD-10-CM

## 2025-01-27 DIAGNOSIS — E83.42 HYPOMAGNESEMIA: ICD-10-CM

## 2025-01-27 DIAGNOSIS — K22.2 STRICTURE ESOPHAGUS: ICD-10-CM

## 2025-01-27 DIAGNOSIS — Z94.0 KIDNEY REPLACED BY TRANSPLANT (HHS-HCC): ICD-10-CM

## 2025-01-27 DIAGNOSIS — Z79.899 ENCOUNTER FOR LONG-TERM (CURRENT) USE OF HIGH-RISK MEDICATION: Primary | ICD-10-CM

## 2025-01-27 DIAGNOSIS — Z45.82 ENCOUNTER FOR ADJUSTMENT OR REMOVAL OF MYRINGOTOMY DEVICE (STENT) (TUBE): ICD-10-CM

## 2025-01-27 DIAGNOSIS — E87.5 HYPERKALEMIA: ICD-10-CM

## 2025-01-27 LAB — TACROLIMUS BLD-MCNC: 11.3 NG/ML

## 2025-01-27 PROCEDURE — 99214 OFFICE O/P EST MOD 30 MIN: CPT | Performed by: SURGERY

## 2025-01-27 PROCEDURE — 99214 OFFICE O/P EST MOD 30 MIN: CPT | Mod: 24 | Performed by: SURGERY

## 2025-01-27 PROCEDURE — 83735 ASSAY OF MAGNESIUM: CPT | Performed by: SURGERY

## 2025-01-27 PROCEDURE — 3074F SYST BP LT 130 MM HG: CPT | Performed by: SURGERY

## 2025-01-27 PROCEDURE — 3078F DIAST BP <80 MM HG: CPT | Performed by: SURGERY

## 2025-01-27 PROCEDURE — 3061F NEG MICROALBUMINURIA REV: CPT | Performed by: SURGERY

## 2025-01-27 PROCEDURE — 36415 COLL VENOUS BLD VENIPUNCTURE: CPT | Performed by: SURGERY

## 2025-01-27 PROCEDURE — 80069 RENAL FUNCTION PANEL: CPT | Performed by: SURGERY

## 2025-01-27 PROCEDURE — 80197 ASSAY OF TACROLIMUS: CPT | Performed by: SURGERY

## 2025-01-27 PROCEDURE — 1111F DSCHRG MED/CURRENT MED MERGE: CPT | Performed by: SURGERY

## 2025-01-27 PROCEDURE — 1159F MED LIST DOCD IN RCRD: CPT | Performed by: SURGERY

## 2025-01-27 PROCEDURE — RXMED WILLOW AMBULATORY MEDICATION CHARGE

## 2025-01-27 PROCEDURE — 85027 COMPLETE CBC AUTOMATED: CPT | Performed by: SURGERY

## 2025-01-27 PROCEDURE — 1126F AMNT PAIN NOTED NONE PRSNT: CPT | Performed by: SURGERY

## 2025-01-27 RX ORDER — VALGANCICLOVIR 450 MG/1
900 TABLET, FILM COATED ORAL DAILY
Qty: 60 TABLET | Refills: 0 | Status: SHIPPED | OUTPATIENT
Start: 2025-01-27 | End: 2025-02-26

## 2025-01-27 RX ORDER — TACROLIMUS 1 MG/1
4 CAPSULE ORAL EVERY 12 HOURS
Qty: 240 CAPSULE | Refills: 11 | Status: SHIPPED | OUTPATIENT
Start: 2025-01-27 | End: 2026-01-27

## 2025-01-27 RX ORDER — TACROLIMUS 1 MG/1
5 CAPSULE ORAL EVERY 12 HOURS
Qty: 300 CAPSULE | Refills: 11 | Status: SHIPPED | OUTPATIENT
Start: 2025-01-27 | End: 2025-01-27 | Stop reason: SDUPTHER

## 2025-01-27 RX ORDER — PANTOPRAZOLE SODIUM 40 MG/1
40 TABLET, DELAYED RELEASE ORAL
Qty: 180 TABLET | Refills: 0 | Status: SHIPPED | OUTPATIENT
Start: 2025-01-27 | End: 2025-04-27

## 2025-01-27 RX ORDER — MYCOPHENOLIC ACID 180 MG/1
360 TABLET, DELAYED RELEASE ORAL 2 TIMES DAILY
Qty: 120 TABLET | Refills: 11 | Status: SHIPPED | OUTPATIENT
Start: 2025-01-27 | End: 2026-01-27

## 2025-01-27 RX ORDER — ROSUVASTATIN CALCIUM 10 MG/1
10 TABLET, COATED ORAL NIGHTLY
Qty: 90 TABLET | Refills: 0 | Status: SHIPPED | OUTPATIENT
Start: 2025-01-27 | End: 2025-04-27

## 2025-01-27 RX ORDER — LANOLIN ALCOHOL/MO/W.PET/CERES
400 CREAM (GRAM) TOPICAL DAILY
Qty: 30 TABLET | Refills: 2 | Status: SHIPPED | OUTPATIENT
Start: 2025-01-27 | End: 2025-04-27

## 2025-01-27 RX ORDER — PREDNISONE 5 MG/1
5 TABLET ORAL DAILY
Qty: 90 TABLET | Refills: 3 | Status: SHIPPED | OUTPATIENT
Start: 2025-01-27 | End: 2026-01-27

## 2025-01-27 RX ORDER — NYSTATIN 100000 [USP'U]/ML
5 SUSPENSION ORAL 4 TIMES DAILY
Qty: 1200 ML | Refills: 0 | Status: SHIPPED | OUTPATIENT
Start: 2025-01-27 | End: 2025-03-28

## 2025-01-27 RX ORDER — LANOLIN ALCOHOL/MO/W.PET/CERES
100 CREAM (GRAM) TOPICAL DAILY
Qty: 90 TABLET | Refills: 0 | Status: SHIPPED | OUTPATIENT
Start: 2025-01-27 | End: 2025-04-27

## 2025-01-27 RX ORDER — POLYETHYLENE GLYCOL 3350 17 G/17G
17 POWDER, FOR SOLUTION ORAL DAILY
Qty: 10 PACKET | Refills: 0 | Status: SHIPPED | OUTPATIENT
Start: 2025-01-27 | End: 2025-02-09

## 2025-01-27 ASSESSMENT — PAIN SCALES - GENERAL: PAINLEVEL_OUTOF10: 0-NO PAIN

## 2025-01-27 NOTE — PROGRESS NOTES
Reason for Nutrition Visit:  Pt is a 74 y.o. male referred for nutrition evaluation in clinic per request of surgeon.     Transplant Coordinator: Chelsey Young RN    Past Medical Hx:  Patient Active Problem List   Diagnosis    S/P laparoscopic cholecystectomy    Abdominal pain    Achalasia    Acute lower UTI    Microcytic anemia    Complete heart block    Callus of foot    Chronic periodontitis, unspecified    Stage 5 chronic kidney disease (Multi)    Closed fracture of metatarsal bone    Crushing injury of foot    Diabetes mellitus (Multi)    Diarrhea    Dysphagia    ED (erectile dysfunction)    Essential hypertension    Foot pain, right    GIB (gastrointestinal bleeding)    Hepatitis B core antibody positive    Hyperkalemia    Ingrowing nail    Iron deficiency anemia secondary to inadequate dietary iron intake    Long toenail    Malignant neoplasm of prostate (Multi)    Onychomycosis    Pheochromocytoma of right adrenal gland    Pneumonia of right lower lobe due to infectious organism    Productive cough    Pure hypercholesterolemia    Stricture esophagus    SVT (supraventricular tachycardia) (CMS-HCC)    Type 2 diabetes mellitus with diabetic neuropathy (Multi)    Preop cardiovascular exam    Third degree heart block    Pericardial effusion (HHS-HCC)    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 (Multi)    Uremia    Cardiac tamponade    Cardiac pacemaker in situ    ESRD (end stage renal disease) (Multi)    Type 2 diabetes mellitus, with long-term current use of insulin    Dehydration    Alactasia syndrome    Type 2 diabetes mellitus with hyperglycemia, with long-term current use of insulin    On tube feeding diet      Lab Results   Component Value Date    HGBA1C 6.1 (H) 10/29/2024    HGBA1C 5.7 (H) 04/20/2024    HGBA1C 7.3 (H) 02/21/2023     (L) 01/23/2025    K 5.0 01/23/2025    PHOS 4.3 01/23/2025    CL 99 01/23/2025    CO2 25 01/23/2025    BUN 14 01/23/2025    CREATININE 0.83 01/23/2025    CALCIUM 8.4  "(L) 01/23/2025    ALBUMIN 3.0 (L) 01/23/2025    PROT 8.8 (H) 12/20/2024    BILITOT 0.5 12/20/2024    ALKPHOS 189 (H) 12/20/2024    ALT 18 12/20/2024    AST 21 12/20/2024    GLUCOSE 232 (H) 01/23/2025    CHOL 183 10/29/2024    HDL 53.1 10/29/2024    CPEPTIDE 4.8 (H) 10/29/2024     Lab Results   Component Value Date    HGB 8.8 (L) 01/23/2025     Lipid Panel Trend:    Recent Labs     10/29/24  1202   CHOL 183   HDL 53.1   , Iron Panel + Serum Ferritin Trend:   Recent Labs     01/08/25  0610 04/21/24  0739   IRON 31* 43   UIBC 109* 98*   TIBC 140* 141*   IRONSAT 22* 30   FERRITIN 928* 1,911*   , Vitamin B12: No results found for: \"WROWXGLT50\" , and Folate: No results found for: \"FOLATE\"     Food History and Nutrition Assessment     Appetite: Poor  Intake: 25-50% Oral intake  GI Symptoms : constipation - patient states stool softener has not helped. Will try Miralax that was provided by surgeon today.  Swallowing Difficulty: No problems with swallowing  Dentition : own    Sleep duration/quality : 7+ hours and disrupted sleep  Sleep disorders: none    Diet History:  Patient describes poor appetite and intake. Has had nocturnal TF at home for 3 days. Rate has been 65mL/hr of Vital 1.5 daily and has increased length of time from 9 hr to 10 hr (Providing between 875-975 kcal and 39-44g protein). Patient and patient's family states goal is to reach 65mL x 16 hr tonight (Providing 1560 kcal and 70g protein). Currently doing water flushes before and after. Denies nausea/vomiting/abdominal pain with TF. Not currently taking oral nutrition supplement. Patient family states they have Glucerna at home and patient is agreeable to try to increase intake of ONS.    24 Hour Diet Recall:  Meal 1: Egg and toast   AM Snack: Crackers  Meal 2: Beef soup broth (Yakamein?) or chicken foot soup  PM Snack: None  Meal 3: Soup  Late Snack: None  Beverages: 1 L water per day and Ginger ale  Estimate oral intake providing ~650 kcal and 46 g " protein    Estimated Energy Needs:    Calorie Needs (25 - 30 kcal/kg CBW): 2343-1643 kcal  Protein Needs (1.2-1.3 g/kg CBW): 83-90 g    Weight History:  CBW: 68.9 kg (152 lbs)   BMI: 20.1 kg/m2    Wt Readings from Last 10 Encounters:   01/27/25 68.9 kg (152 lb)   01/22/25 69.5 kg (153 lb 3.2 oz)   12/31/24 74.1 kg (163 lb 4.8 oz)   12/26/24 76.4 kg (168 lb 6.9 oz)   10/29/24 74.3 kg (163 lb 12.8 oz)   08/14/24 66.2 kg (146 lb)   07/18/24 66.5 kg (146 lb 11.2 oz)   06/18/24 78.5 kg (173 lb)   06/12/24 73.9 kg (163 lb)   05/08/24 74.1 kg (163 lb 6.4 oz)     Weight change:  Significant weight loss of 9.8% in 1 month.   Patient stated weight: Patient stated losing 15 lbs since txp, consistent with recorded weights in EMR.     Nutrition Focused Physical Exam:    Performed/Deferred: Performed    Muscle Wasting:  Temporal: Moderate  Shoulder: Mild  Clavicle: Mild  Interosseous Muscle: Moderate    Loss of Subcutaneous Fat:  Eyes: Moderate  Perioral: Moderate  Triceps: Moderate    Other Physical Findings:  Edema: none    Malnutrition Present: Moderate Malnutrition    Nutrition Diagnosis    Diagnosis Status: Ongoing   Diagnosis: Malnutrition; moderate chronic disease or condition related related to  recent kidney transplant resulting in poor appetite and decreased intake   as evidence by weight loss of 9.8% in 1 month, estimated intake <75% of estimated energy requirement for > 1 month, mild-moderate loss of muscle mass, and moderate loss of subcutaneous fat.    Nutrition Education, Intervention, and Goals    Continue enteral nutrition as recommended. Please try to meet goal of Vital 1.5 @ 65 mL/hr x 18 hrs.  Please increase intake of oral nutrition supplements 2-3 times per day after meals.   Educated on importance of calorie- and protein-rich meals. Focus on increasing oral intake of protein-rich food sources including meats, eggs, dairy products (yogurt, cheese), and beans.     Educational Handouts: N/A    Nutrition  Goals  Nutrition Goals: Consistent meal/snack pattern  Maintain stable weight  Oral intake greater than 50%  Meat/Protein Foods: Increase    Nutrition Monitoring and Evaluation    Additional Recommendations/Referrals: RDN to review food safety education at future encounter.    Follow up: It was a pleasure speaking with you today, Mr. Hanson! Let's schedule a follow-up in 1 week(s) to check in on your progress. I'll have the 's put you on my schedule for Monday, February 3rd at 9:00am for a in-person appointment.

## 2025-01-27 NOTE — PROGRESS NOTES
"    Temo Hanson is a 74 y.o. male POD#37 from DDKT from a DBD donor.    - Doing TF's unclear what duration daily  - Reporting 1L day fluids, 2 \"meals\" which sound like soup/broth  - Some constipation, just colace not taking miralax    Objective   Gen: A+OX3; NAD  HEENT: PERRL, sclera anicteric, MMM  Cardiac: RRR  Chest: Normal inspiratory effort  Abdomen: S/NT/ND. Incision C/D/I.  Ext: No LE edema    Last Recorded Vitals  Visit Vitals  /69   Pulse 88   Temp 36.8 °C (98.2 °F) (Temporal)      Assessment/Plan   Principal Problem:    Kidney transplant recipient    Active Problems:  Patient Active Problem List   Diagnosis    S/P laparoscopic cholecystectomy    Abdominal pain    Achalasia    Acute lower UTI    Microcytic anemia    Complete heart block    Callus of foot    Chronic periodontitis, unspecified    Stage 5 chronic kidney disease (Multi)    Closed fracture of metatarsal bone    Crushing injury of foot    Diabetes mellitus (Multi)    Diarrhea    Dysphagia    ED (erectile dysfunction)    Essential hypertension    Foot pain, right    GIB (gastrointestinal bleeding)    Hepatitis B core antibody positive    Hyperkalemia    Ingrowing nail    Iron deficiency anemia secondary to inadequate dietary iron intake    Long toenail    Malignant neoplasm of prostate (Multi)    Onychomycosis    Pheochromocytoma of right adrenal gland    Pneumonia of right lower lobe due to infectious organism    Productive cough    Pure hypercholesterolemia    Stricture esophagus    SVT (supraventricular tachycardia) (CMS-HCC)    Type 2 diabetes mellitus with diabetic neuropathy (Multi)    Preop cardiovascular exam    Third degree heart block    Pericardial effusion (HHS-HCC)    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 (Multi)    Uremia    Cardiac tamponade    Cardiac pacemaker in situ    ESRD (end stage renal disease) (Multi)    Type 2 diabetes mellitus, with long-term current use of insulin    Dehydration    Alactasia syndrome    " Type 2 diabetes mellitus with hyperglycemia, with long-term current use of insulin    On tube feeding diet      Kidney allograft function   KDPI 93%/PRA 54%   Immediate graft function   Thymo 3 mg/kg   Ureteral stent reschedule to [facility] daily    Immunosuppression   Tacrolimus 7-8 ng/mL   Myfortic 360 mg bid   Prednisone 5 mg    Nutrition    Tube feeds 18 hrs - Vital 1.5 aimee   Dietician today    Hypertension   Amlodipine 10 mg     Diabetes    Not doing SSI, re-educated today   Basaglar 7U in AM/NPH 10 U in PM   Endocrinology appt Thursday    Misc   Colace/senna   Reglan 5 mg q8h   Labs today (has taken meds already) and Thurs   RTC 1 week    I spent 30 minutes in the professional and overall care of this patient, including immunosuppression management.    Sherly Kang MD, PHD, MPH, FACS  Chief, Transplant and Hepatobiliary Surgery

## 2025-01-27 NOTE — PROGRESS NOTES
JEANNIE verified Pt's identity by confirming Pt's name, , address, and phone number listed on file.  JEANNIE spoke with patient per clinical coordinator request to provide patient with insight on his request to receive a home health aide. JEANNIE informed patient that current SW could not establish home health aide need's for him as that was out of current SW scope of practice. JEANNIE instructed patient to reach out to his insurance provider [facility] [place], to inquire if he has a home health aide benefit in his insurance plan coverage, to ask them which home care agencies they cover that he can contact to request a evaluation for home health care needs, which then would likely get sent to his insurance company, who will then provide the home care company with approval of how many hours they will cover and what services, pt will then have to likely get a order for home care services through his provider sent to the home care agency he chooses for services if they approve him.     Plan: JEANNIE will remain available.

## 2025-01-27 NOTE — PATIENT INSTRUCTIONS
Get labs today and then again Thursday AM before you take your medications  Labs will be every Monday and Thursday  RTC 1 week to see surgeon  We are pushing back your stent removal - we'll call with a new appointment date/time  Endocrinology appointment Thursday 1/30 @ 9:30 am  Try Miralax daily for constipation  Insulin Regimen  Basaglar 7 units every morning  Ademelog sliding scale insulin with meals (see chart below)  NPH 10 units at night when tube feed starts    INSULIN INSTRUCTIONS   Breakfast time Lunch time Dinner time  Bedtime   basaglar/Glargine = 7 units   Humulin N/NPH = 10 units at the same time as tube feed is started     ademelog = scale as needed ademelog = scale as needed ademelog = scale as needed        CORRECTIVE SCALE  GLUCOSE READING   ASPART SCALE DOSE    70 - 149 0   150 - 200 2   201 - 250 4   251 - 300 6   301 - 350 8   351 - 400+ 10      If glucose remains over 400, call your diabetes provider, repeat 10 units every 4 hours and drink sugar free liquids (water, Gatorade zero, chicken broth) until you glucose improves or you hear back from medical professional. If your glucose remains over 400 and you develop symptoms such as nausea/vomiting or confusion, go to the emergency room as soon as possible.

## 2025-01-28 ENCOUNTER — HOME CARE VISIT (OUTPATIENT)
Dept: HOME HEALTH SERVICES | Facility: HOME HEALTH | Age: 75
End: 2025-01-28
Payer: COMMERCIAL

## 2025-01-28 ENCOUNTER — PATIENT MESSAGE (OUTPATIENT)
Facility: HOSPITAL | Age: 75
End: 2025-01-28
Payer: COMMERCIAL

## 2025-01-28 ENCOUNTER — LAB (OUTPATIENT)
Dept: LAB | Facility: HOSPITAL | Age: 75
End: 2025-01-28
Payer: COMMERCIAL

## 2025-01-28 ENCOUNTER — DOCUMENTATION (OUTPATIENT)
Dept: CARE COORDINATION | Age: 75
End: 2025-01-28

## 2025-01-28 VITALS — HEART RATE: 50 BPM | SYSTOLIC BLOOD PRESSURE: 132 MMHG | DIASTOLIC BLOOD PRESSURE: 62 MMHG

## 2025-01-28 DIAGNOSIS — Z94.0 KIDNEY REPLACED BY TRANSPLANT (HHS-HCC): ICD-10-CM

## 2025-01-28 DIAGNOSIS — Z79.899 ENCOUNTER FOR LONG-TERM (CURRENT) USE OF HIGH-RISK MEDICATION: ICD-10-CM

## 2025-01-28 DIAGNOSIS — R13.19 ESOPHAGEAL DYSPHAGIA: ICD-10-CM

## 2025-01-28 DIAGNOSIS — K22.0 ACHALASIA: ICD-10-CM

## 2025-01-28 DIAGNOSIS — E83.42 HYPOMAGNESEMIA: ICD-10-CM

## 2025-01-28 DIAGNOSIS — R62.7 FAILURE TO THRIVE IN ADULT: ICD-10-CM

## 2025-01-28 DIAGNOSIS — K22.2 STRICTURE ESOPHAGUS: ICD-10-CM

## 2025-01-28 DIAGNOSIS — E87.5 HYPERKALEMIA: ICD-10-CM

## 2025-01-28 DIAGNOSIS — K59.00 CONSTIPATION, UNSPECIFIED CONSTIPATION TYPE: ICD-10-CM

## 2025-01-28 LAB
ALBUMIN SERPL BCP-MCNC: 3.4 G/DL (ref 3.4–5)
ANION GAP SERPL CALC-SCNC: 17 MMOL/L
BUN SERPL-MCNC: 15 MG/DL (ref 6–23)
CALCIUM SERPL-MCNC: 8.9 MG/DL (ref 8.6–10.6)
CHLORIDE SERPL-SCNC: 95 MMOL/L (ref 98–107)
CO2 SERPL-SCNC: 21 MMOL/L (ref 21–32)
CREAT SERPL-MCNC: 0.82 MG/DL (ref 0.5–1.3)
EGFRCR SERPLBLD CKD-EPI 2021: >90 ML/MIN/1.73M*2
ERYTHROCYTE [DISTWIDTH] IN BLOOD BY AUTOMATED COUNT: 17.3 % (ref 11.5–14.5)
GLUCOSE SERPL-MCNC: 358 MG/DL (ref 74–99)
HCT VFR BLD AUTO: 34.7 % (ref 41–52)
HGB BLD-MCNC: 10.6 G/DL (ref 13.5–17.5)
HOLD SPECIMEN: NORMAL
MAGNESIUM SERPL-MCNC: 1.77 MG/DL (ref 1.6–2.4)
MCH RBC QN AUTO: 29.4 PG (ref 26–34)
MCHC RBC AUTO-ENTMCNC: 30.5 G/DL (ref 32–36)
MCV RBC AUTO: 96 FL (ref 80–100)
NRBC BLD-RTO: 0 /100 WBCS (ref 0–0)
PHOSPHATE SERPL-MCNC: 3.4 MG/DL (ref 2.5–4.9)
PLATELET # BLD AUTO: 235 X10*3/UL (ref 150–450)
POTASSIUM SERPL-SCNC: 5.6 MMOL/L (ref 3.5–5.3)
RBC # BLD AUTO: 3.61 X10*6/UL (ref 4.5–5.9)
SODIUM SERPL-SCNC: 127 MMOL/L (ref 136–145)
WBC # BLD AUTO: 3.6 X10*3/UL (ref 4.4–11.3)

## 2025-01-28 PROCEDURE — G0151 HHCP-SERV OF PT,EA 15 MIN: HCPCS

## 2025-01-28 ASSESSMENT — ENCOUNTER SYMPTOMS
PERSON REPORTING PAIN: PATIENT
LIMITED RANGE OF MOTION: 1
OCCASIONAL FEELINGS OF UNSTEADINESS: 0
DENIES PAIN: 1

## 2025-01-29 ENCOUNTER — PATIENT OUTREACH (OUTPATIENT)
Dept: CARE COORDINATION | Age: 75
End: 2025-01-29
Payer: COMMERCIAL

## 2025-01-29 ENCOUNTER — HOME CARE VISIT (OUTPATIENT)
Dept: HOME HEALTH SERVICES | Facility: HOME HEALTH | Age: 75
End: 2025-01-29
Payer: COMMERCIAL

## 2025-01-29 VITALS
HEART RATE: 62 BPM | RESPIRATION RATE: 16 BRPM | TEMPERATURE: 98.2 F | OXYGEN SATURATION: 98 % | SYSTOLIC BLOOD PRESSURE: 130 MMHG | DIASTOLIC BLOOD PRESSURE: 68 MMHG

## 2025-01-29 PROCEDURE — G0300 HHS/HOSPICE OF LPN EA 15 MIN: HCPCS

## 2025-01-30 ENCOUNTER — APPOINTMENT (OUTPATIENT)
Facility: HOSPITAL | Age: 75
End: 2025-01-30
Payer: COMMERCIAL

## 2025-01-30 ENCOUNTER — OFFICE VISIT (OUTPATIENT)
Facility: HOSPITAL | Age: 75
End: 2025-01-30
Payer: COMMERCIAL

## 2025-01-30 ENCOUNTER — PHARMACY VISIT (OUTPATIENT)
Dept: PHARMACY | Facility: CLINIC | Age: 75
End: 2025-01-30
Payer: COMMERCIAL

## 2025-01-30 ENCOUNTER — LAB (OUTPATIENT)
Dept: LAB | Facility: HOSPITAL | Age: 75
End: 2025-01-30
Payer: COMMERCIAL

## 2025-01-30 ENCOUNTER — HOME CARE VISIT (OUTPATIENT)
Dept: HOME HEALTH SERVICES | Facility: HOME HEALTH | Age: 75
End: 2025-01-30
Payer: COMMERCIAL

## 2025-01-30 VITALS
DIASTOLIC BLOOD PRESSURE: 69 MMHG | OXYGEN SATURATION: 98 % | TEMPERATURE: 97.9 F | HEART RATE: 90 BPM | SYSTOLIC BLOOD PRESSURE: 143 MMHG

## 2025-01-30 DIAGNOSIS — D72.819 LEUKOPENIA, UNSPECIFIED TYPE: ICD-10-CM

## 2025-01-30 DIAGNOSIS — Z94.0 KIDNEY TRANSPLANT RECIPIENT (HHS-HCC): ICD-10-CM

## 2025-01-30 DIAGNOSIS — Z79.4 TYPE 2 DIABETES MELLITUS WITH HYPERGLYCEMIA, WITH LONG-TERM CURRENT USE OF INSULIN: Primary | ICD-10-CM

## 2025-01-30 DIAGNOSIS — E11.65 TYPE 2 DIABETES MELLITUS WITH HYPERGLYCEMIA, WITH LONG-TERM CURRENT USE OF INSULIN: Primary | ICD-10-CM

## 2025-01-30 DIAGNOSIS — Z78.9 ON TUBE FEEDING DIET: ICD-10-CM

## 2025-01-30 DIAGNOSIS — Z94.0 KIDNEY REPLACED BY TRANSPLANT (HHS-HCC): ICD-10-CM

## 2025-01-30 LAB
ALBUMIN SERPL BCP-MCNC: 3.5 G/DL (ref 3.4–5)
ANION GAP SERPL CALC-SCNC: 14 MMOL/L (ref 10–20)
ATRIAL RATE: 100 BPM
BUN SERPL-MCNC: 21 MG/DL (ref 6–23)
CALCIUM SERPL-MCNC: 9.1 MG/DL (ref 8.6–10.6)
CHLORIDE SERPL-SCNC: 97 MMOL/L (ref 98–107)
CO2 SERPL-SCNC: 25 MMOL/L (ref 21–32)
CREAT SERPL-MCNC: 0.97 MG/DL (ref 0.5–1.3)
EGFRCR SERPLBLD CKD-EPI 2021: 82 ML/MIN/1.73M*2
ERYTHROCYTE [DISTWIDTH] IN BLOOD BY AUTOMATED COUNT: 16.9 % (ref 11.5–14.5)
GLUCOSE SERPL-MCNC: 323 MG/DL (ref 74–99)
HCT VFR BLD AUTO: 36.9 % (ref 41–52)
HGB BLD-MCNC: 11 G/DL (ref 13.5–17.5)
MAGNESIUM SERPL-MCNC: 1.74 MG/DL (ref 1.6–2.4)
MCH RBC QN AUTO: 29.8 PG (ref 26–34)
MCHC RBC AUTO-ENTMCNC: 29.8 G/DL (ref 32–36)
MCV RBC AUTO: 100 FL (ref 80–100)
NRBC BLD-RTO: 0 /100 WBCS (ref 0–0)
PHOSPHATE SERPL-MCNC: 3.9 MG/DL (ref 2.5–4.9)
PLATELET # BLD AUTO: 181 X10*3/UL (ref 150–450)
POTASSIUM SERPL-SCNC: 4.9 MMOL/L (ref 3.5–5.3)
PR INTERVAL: 240 MS
Q ONSET: 200 MS
QRS COUNT: 33 BEATS
QRS DURATION: 144 MS
QT INTERVAL: 210 MS
QTC CALCULATION(BAZETT): 271 MS
QTC FREDERICIA: 249 MS
R AXIS: 14 DEGREES
RBC # BLD AUTO: 3.69 X10*6/UL (ref 4.5–5.9)
SODIUM SERPL-SCNC: 131 MMOL/L (ref 136–145)
T AXIS: 151 DEGREES
T OFFSET: 305 MS
TACROLIMUS BLD-MCNC: 6.8 NG/ML
VENTRICULAR RATE: 100 BPM
WBC # BLD AUTO: 2.6 X10*3/UL (ref 4.4–11.3)

## 2025-01-30 PROCEDURE — 85025 COMPLETE CBC W/AUTO DIFF WBC: CPT

## 2025-01-30 PROCEDURE — 99214 OFFICE O/P EST MOD 30 MIN: CPT | Performed by: PHYSICIAN ASSISTANT

## 2025-01-30 PROCEDURE — 36415 COLL VENOUS BLD VENIPUNCTURE: CPT

## 2025-01-30 PROCEDURE — 80069 RENAL FUNCTION PANEL: CPT

## 2025-01-30 PROCEDURE — 80197 ASSAY OF TACROLIMUS: CPT

## 2025-01-30 PROCEDURE — 3061F NEG MICROALBUMINURIA REV: CPT | Performed by: PHYSICIAN ASSISTANT

## 2025-01-30 PROCEDURE — 1126F AMNT PAIN NOTED NONE PRSNT: CPT | Performed by: PHYSICIAN ASSISTANT

## 2025-01-30 PROCEDURE — 1111F DSCHRG MED/CURRENT MED MERGE: CPT | Performed by: PHYSICIAN ASSISTANT

## 2025-01-30 PROCEDURE — 83735 ASSAY OF MAGNESIUM: CPT

## 2025-01-30 PROCEDURE — 3078F DIAST BP <80 MM HG: CPT | Performed by: PHYSICIAN ASSISTANT

## 2025-01-30 PROCEDURE — 3077F SYST BP >= 140 MM HG: CPT | Performed by: PHYSICIAN ASSISTANT

## 2025-01-30 ASSESSMENT — ENCOUNTER SYMPTOMS
PERSON REPORTING PAIN: PATIENT
PAIN SEVERITY GOAL: 0/10
LOWEST PAIN SEVERITY IN PAST 24 HOURS: 0/10
DENIES PAIN: 1
CHANGE IN APPETITE: UNCHANGED
SUBJECTIVE PAIN PROGRESSION: UNCHANGED
HIGHEST PAIN SEVERITY IN PAST 24 HOURS: 0/10
APPETITE LEVEL: FAIR

## 2025-01-30 ASSESSMENT — PAIN SCALES - GENERAL: PAINLEVEL_OUTOF10: 0-NO PAIN

## 2025-01-30 NOTE — PROGRESS NOTES
Subjective   Temo Hanson is a 74 y.o. male who presents for follow-up of Type 2 diabetes mellitus. The initial diagnosis of diabetes was made at 46 years. The patient does have a known family history of diabetes.    Known complications due to diabetes included chronic kidney disease    Cardiovascular risk factors include advanced age (older than 55 for men, 65 for women), diabetes mellitus, hypertension, male gender, and pheochromocytoma . The patient is not on an ACE inhibitor or angiotensin II receptor blocker.  The patient has not been previously hospitalized due to diabetic ketoacidosis.     Current symptoms/problems include hyperglycemia. His clinical course has worsened as indicated by rise in glucose readings since hospital discharge .     Current diabetes regimen is as follows: Glargine 7 units in the morning, Admelog as per sliding scale at approximate mealtimes while tube feed is running or not, and Humulin and 10 units at 1800 to coincide with start of tube feed    The patient is currently checking the blood glucose 5+ times per day.    Patient is using: both glucometer and continuous glucose monitor -patient's CGM has not been connected to cell phone since hospital discharge.  We discussed plan for patient and his family to place new sensor when he gets home today.  Patient's CGM regina was reviewed and does demonstrate connection to this provider's clinical reviewing website    Hypoglycemia frequency: None  Hypoglycemia awareness: Yes     Exercise: never -limited by recent kidney allograft transplantation and patient's malnutrition status  Meal panning: He is using  18 hour/day tube feeds supplementation to oral meal intake .    Review of Systems   Constitutional:  Positive for fatigue.   All other systems reviewed and are negative.      Objective   /69   Pulse 90   Temp 36.6 °C (97.9 °F) (Temporal)   SpO2 98%   Physical Exam  Constitutional:       Appearance: Normal appearance. He is normal  weight.   HENT:      Head: Normocephalic and atraumatic.      Nose: Nose normal.      Mouth/Throat:      Mouth: Mucous membranes are dry.      Pharynx: Oropharynx is clear.   Eyes:      Extraocular Movements: Extraocular movements intact.      Conjunctiva/sclera: Conjunctivae normal.   Cardiovascular:      Rate and Rhythm: Normal rate and regular rhythm.      Pulses: Normal pulses.      Heart sounds: Normal heart sounds.   Pulmonary:      Effort: Pulmonary effort is normal.      Breath sounds: Normal breath sounds.   Abdominal:      General: Abdomen is flat. Bowel sounds are normal.      Palpations: Abdomen is soft.   Skin:     General: Skin is warm and dry.   Neurological:      General: No focal deficit present.      Mental Status: He is alert and oriented to person, place, and time. Mental status is at baseline.   Psychiatric:         Mood and Affect: Mood normal.         Behavior: Behavior normal.         Thought Content: Thought content normal.         Judgment: Judgment normal.         Lab Review  Glucose (mg/dL)   Date Value   01/30/2025 323 (H)   01/27/2025 358 (H)   01/23/2025 232 (H)     Hemoglobin A1C (%)   Date Value   10/29/2024 6.1 (H)   04/20/2024 5.7 (H)   02/21/2023 7.3 (H)   01/26/2023 6.5 (A)   12/01/2022 6.4 (H)   04/06/2022 6.1 (H)     Bicarbonate (mmol/L)   Date Value   01/30/2025 25   01/27/2025 21   01/23/2025 25     Urea Nitrogen (mg/dL)   Date Value   01/30/2025 21   01/27/2025 15   01/23/2025 14     Creatinine (mg/dL)   Date Value   01/30/2025 0.97   01/27/2025 0.82   01/23/2025 0.83       Health Maintenance:   Foot Exam: Due for update  Eye Exam: Last seen by [facility] ophthalmology in August 2024  Lipid Panel: Due for update next month, ordered today  Urine Albumin: Due for update 6 months after transplant, ordered today    Assessment/Plan   Diagnoses and all orders for this visit:  Type 2 diabetes mellitus with hyperglycemia, with long-term current use of insulin  -     Hemoglobin A1c;  Future  -     Lipid Panel; Future  -     Albumin-Creatinine Ratio, Random Urine; Future  On tube feeding diet  Kidney transplant recipient ([facility])  -     Hemoglobin A1c; Future  -     Lipid Panel; Future  -     Albumin-Creatinine Ratio, Random Urine; Future      Type 2 diabetes mellitus, is not at goal. A1C not yet due for update    RX changes:  see insulin plan below    Education:  interpretation of lab results, blood sugar goals, complications of diabetes mellitus, and self-injection of insulin  Follow up: I recommend diabetes care be  2 weeks  via phone appointment and again in 1 month in person.    INSULIN INSTRUCTIONS    BASAGLAR = 10 units once every morning    HUMULIN N = 15 units once at 7:00p with start of tube feeds    FIASP        BREAKFAST = 3 units plus sliding scale (take 15 min before meal)       LUNCH = 3 units plus sliding scale (take 15 min before meal)       DINNER = 3 units plus sliding scale (take 15 min before meal)    SLIDING SCALE    = 0u  151-200 = 2u  201-250 = 4u  251-300 = 6u  301-350 = 8u  351-400 = 10u  Over 400 = repeat 10u every 4 hours

## 2025-01-30 NOTE — PATIENT INSTRUCTIONS
Type 2 diabetes mellitus with hyperglycemia, with long-term current use of insulin  -     Hemoglobin A1c; Future  -     Lipid Panel; Future  -     Albumin-Creatinine Ratio, Random Urine; Future  On tube feeding diet  Kidney transplant recipient ([facility])  -     Hemoglobin A1c; Future  -     Lipid Panel; Future  -     Albumin-Creatinine Ratio, Random Urine; Future      Type 2 diabetes mellitus, is not at goal. A1C not yet due for update    RX changes: see insulin plan below   Education:  interpretation of lab results, blood sugar goals, complications of diabetes mellitus, and self-injection of insulin  Follow up: I recommend diabetes care be 2 weeks via phone appointment and again in 1 month in person.    INSULIN INSTRUCTIONS    BASAGLAR = 10 units once every morning    HUMULIN N = 15 units once at 7:00p with start of tube feeds    FIASP        BREAKFAST = 3 units plus sliding scale (take 15 min before meal)       LUNCH = 3 units plus sliding scale (take 15 min before meal)       DINNER = 3 units plus sliding scale (take 15 min before meal)    SLIDING SCALE    = 0u  151-200 = 2u  201-250 = 4u  251-300 = 6u  301-350 = 8u  351-400 = 10u  Over 400 = repeat 10u every 4 hours

## 2025-01-31 ENCOUNTER — HOME CARE VISIT (OUTPATIENT)
Dept: HOME HEALTH SERVICES | Facility: HOME HEALTH | Age: 75
End: 2025-01-31
Payer: COMMERCIAL

## 2025-01-31 VITALS — HEART RATE: 104 BPM | OXYGEN SATURATION: 95 % | SYSTOLIC BLOOD PRESSURE: 110 MMHG | DIASTOLIC BLOOD PRESSURE: 50 MMHG

## 2025-01-31 DIAGNOSIS — Z94.0 KIDNEY REPLACED BY TRANSPLANT (HHS-HCC): ICD-10-CM

## 2025-01-31 DIAGNOSIS — D72.819 LEUKOPENIA, UNSPECIFIED TYPE: ICD-10-CM

## 2025-01-31 LAB
BASOPHILS # BLD AUTO: 0.02 X10*3/UL (ref 0–0.1)
BASOPHILS NFR BLD AUTO: 0.7 %
EOSINOPHIL # BLD AUTO: 0.05 X10*3/UL (ref 0–0.4)
EOSINOPHIL NFR BLD AUTO: 1.8 %
ERYTHROCYTE [DISTWIDTH] IN BLOOD BY AUTOMATED COUNT: 16.9 % (ref 11.5–14.5)
HCT VFR BLD AUTO: 36.9 % (ref 41–52)
HGB BLD-MCNC: 11 G/DL (ref 13.5–17.5)
IMM GRANULOCYTES # BLD AUTO: 0.03 X10*3/UL (ref 0–0.5)
IMM GRANULOCYTES NFR BLD AUTO: 1.1 % (ref 0–0.9)
LYMPHOCYTES # BLD AUTO: 0.5 X10*3/UL (ref 0.8–3)
LYMPHOCYTES NFR BLD AUTO: 18.1 %
MCH RBC QN AUTO: 29.8 PG (ref 26–34)
MCHC RBC AUTO-ENTMCNC: 29.8 G/DL (ref 32–36)
MCV RBC AUTO: 100 FL (ref 80–100)
MONOCYTES # BLD AUTO: 0.4 X10*3/UL (ref 0.05–0.8)
MONOCYTES NFR BLD AUTO: 14.5 %
NEUTROPHILS # BLD AUTO: 1.76 X10*3/UL (ref 1.6–5.5)
NEUTROPHILS NFR BLD AUTO: 63.8 %
NRBC BLD-RTO: 0 /100 WBCS (ref 0–0)
PLATELET # BLD AUTO: 181 X10*3/UL (ref 150–450)
RBC # BLD AUTO: 3.69 X10*6/UL (ref 4.5–5.9)
WBC # BLD AUTO: 2.6 X10*3/UL (ref 4.4–11.3)

## 2025-01-31 PROCEDURE — G0152 HHCP-SERV OF OT,EA 15 MIN: HCPCS

## 2025-01-31 ASSESSMENT — ACTIVITIES OF DAILY LIVING (ADL)
DRESSING_LB_CURRENT_FUNCTION: MINIMUM ASSIST
DRESSING_UB_CURRENT_FUNCTION: STAND BY ASSIST
BATHING_CURRENT_FUNCTION: CONTACT GUARD ASSIST
BATHING ASSESSED: 1
TOILETING: SUPERVISION
TOILETING: 1

## 2025-01-31 ASSESSMENT — ENCOUNTER SYMPTOMS
PERSON REPORTING PAIN: PATIENT
DENIES PAIN: 1

## 2025-02-01 ENCOUNTER — PATIENT OUTREACH (OUTPATIENT)
Dept: CARE COORDINATION | Age: 75
End: 2025-02-01
Payer: COMMERCIAL

## 2025-02-02 ENCOUNTER — PATIENT OUTREACH (OUTPATIENT)
Dept: CARE COORDINATION | Age: 75
End: 2025-02-02
Payer: COMMERCIAL

## 2025-02-02 VITALS
HEART RATE: 52 BPM | WEIGHT: 148.4 LBS | BODY MASS INDEX: 19.58 KG/M2 | SYSTOLIC BLOOD PRESSURE: 149 MMHG | DIASTOLIC BLOOD PRESSURE: 64 MMHG

## 2025-02-02 NOTE — PROGRESS NOTES
Called pt he has nothing to measure temp  Vitals  wnl   Staples  removed  last week   Drinking ample amounts fluids   Urinating well   Moving bowels appetite fair   Going to  in am for stent removal

## 2025-02-03 ENCOUNTER — APPOINTMENT (OUTPATIENT)
Dept: UROLOGY | Facility: CLINIC | Age: 75
End: 2025-02-03
Payer: COMMERCIAL

## 2025-02-03 ENCOUNTER — NURSE ONLY (OUTPATIENT)
Facility: HOSPITAL | Age: 75
End: 2025-02-03
Payer: COMMERCIAL

## 2025-02-03 ENCOUNTER — OFFICE VISIT (OUTPATIENT)
Facility: HOSPITAL | Age: 75
End: 2025-02-03
Payer: COMMERCIAL

## 2025-02-03 ENCOUNTER — TELEPHONE (OUTPATIENT)
Facility: HOSPITAL | Age: 75
End: 2025-02-03

## 2025-02-03 ENCOUNTER — PHARMACY VISIT (OUTPATIENT)
Dept: PHARMACY | Facility: CLINIC | Age: 75
End: 2025-02-03
Payer: COMMERCIAL

## 2025-02-03 ENCOUNTER — NUTRITION (OUTPATIENT)
Facility: HOSPITAL | Age: 75
End: 2025-02-03
Payer: COMMERCIAL

## 2025-02-03 VITALS
BODY MASS INDEX: 20.37 KG/M2 | DIASTOLIC BLOOD PRESSURE: 72 MMHG | OXYGEN SATURATION: 97 % | WEIGHT: 154.4 LBS | SYSTOLIC BLOOD PRESSURE: 131 MMHG | TEMPERATURE: 97.4 F | HEART RATE: 63 BPM

## 2025-02-03 VITALS — DIASTOLIC BLOOD PRESSURE: 76 MMHG | SYSTOLIC BLOOD PRESSURE: 136 MMHG | HEART RATE: 68 BPM

## 2025-02-03 DIAGNOSIS — Z13.89 SCREENING FOR BLOOD OR PROTEIN IN URINE: ICD-10-CM

## 2025-02-03 DIAGNOSIS — K31.84 GASTROPARESIS DUE TO DM (MULTI): ICD-10-CM

## 2025-02-03 DIAGNOSIS — E11.43 GASTROPARESIS DUE TO DM (MULTI): ICD-10-CM

## 2025-02-03 DIAGNOSIS — Z94.0 KIDNEY REPLACED BY TRANSPLANT (HHS-HCC): ICD-10-CM

## 2025-02-03 DIAGNOSIS — Z91.89 AT RISK FOR INFECTION TRANSMITTED FROM DONOR: ICD-10-CM

## 2025-02-03 DIAGNOSIS — N52.9 ERECTILE DYSFUNCTION, UNSPECIFIED ERECTILE DYSFUNCTION TYPE: Primary | ICD-10-CM

## 2025-02-03 DIAGNOSIS — K59.00 CONSTIPATION, UNSPECIFIED CONSTIPATION TYPE: ICD-10-CM

## 2025-02-03 DIAGNOSIS — Z78.9 ON TUBE FEEDING DIET: ICD-10-CM

## 2025-02-03 DIAGNOSIS — R62.7 FAILURE TO THRIVE IN ADULT: ICD-10-CM

## 2025-02-03 DIAGNOSIS — Z94.9 TRANSPLANT: ICD-10-CM

## 2025-02-03 LAB
ALBUMIN SERPL BCP-MCNC: 3.4 G/DL (ref 3.4–5)
ANION GAP SERPL CALC-SCNC: 13 MMOL/L (ref 10–20)
BUN SERPL-MCNC: 23 MG/DL (ref 6–23)
CALCIUM SERPL-MCNC: 9.1 MG/DL (ref 8.6–10.6)
CHLORIDE SERPL-SCNC: 99 MMOL/L (ref 98–107)
CO2 SERPL-SCNC: 26 MMOL/L (ref 21–32)
CREAT SERPL-MCNC: 0.79 MG/DL (ref 0.5–1.3)
EGFRCR SERPLBLD CKD-EPI 2021: >90 ML/MIN/1.73M*2
ERYTHROCYTE [DISTWIDTH] IN BLOOD BY AUTOMATED COUNT: 15.9 % (ref 11.5–14.5)
GLUCOSE SERPL-MCNC: 155 MG/DL (ref 74–99)
HCT VFR BLD AUTO: 35.3 % (ref 41–52)
HGB BLD-MCNC: 11 G/DL (ref 13.5–17.5)
HOLD SPECIMEN: NORMAL
MAGNESIUM SERPL-MCNC: 1.57 MG/DL (ref 1.6–2.4)
MCH RBC QN AUTO: 28.9 PG (ref 26–34)
MCHC RBC AUTO-ENTMCNC: 31.2 G/DL (ref 32–36)
MCV RBC AUTO: 93 FL (ref 80–100)
NRBC BLD-RTO: 0 /100 WBCS (ref 0–0)
PHOSPHATE SERPL-MCNC: 3.5 MG/DL (ref 2.5–4.9)
PLATELET # BLD AUTO: 178 X10*3/UL (ref 150–450)
POC APPEARANCE, URINE: CLEAR
POC BILIRUBIN, URINE: NEGATIVE
POC BLOOD, URINE: NEGATIVE
POC COLOR, URINE: YELLOW
POC GLUCOSE, URINE: NEGATIVE MG/DL
POC KETONES, URINE: NEGATIVE MG/DL
POC LEUKOCYTES, URINE: NEGATIVE
POC NITRITE,URINE: NEGATIVE
POC PH, URINE: 7 PH
POC PROTEIN, URINE: ABNORMAL MG/DL
POC SPECIFIC GRAVITY, URINE: 1.01
POC UROBILINOGEN, URINE: 0.2 EU/DL
POTASSIUM SERPL-SCNC: 4.8 MMOL/L (ref 3.5–5.3)
RBC # BLD AUTO: 3.8 X10*6/UL (ref 4.5–5.9)
SODIUM SERPL-SCNC: 133 MMOL/L (ref 136–145)
TACROLIMUS BLD-MCNC: 9.8 NG/ML
WBC # BLD AUTO: 3 X10*3/UL (ref 4.4–11.3)

## 2025-02-03 PROCEDURE — 87517 HEPATITIS B DNA QUANT: CPT | Performed by: SURGERY

## 2025-02-03 PROCEDURE — RXMED WILLOW AMBULATORY MEDICATION CHARGE

## 2025-02-03 PROCEDURE — 52310 CYSTOSCOPY AND TREATMENT: CPT | Performed by: UROLOGY

## 2025-02-03 PROCEDURE — 36415 COLL VENOUS BLD VENIPUNCTURE: CPT | Performed by: SURGERY

## 2025-02-03 PROCEDURE — 83735 ASSAY OF MAGNESIUM: CPT

## 2025-02-03 PROCEDURE — 1159F MED LIST DOCD IN RCRD: CPT | Performed by: SURGERY

## 2025-02-03 PROCEDURE — 85027 COMPLETE CBC AUTOMATED: CPT

## 2025-02-03 PROCEDURE — 81003 URINALYSIS AUTO W/O SCOPE: CPT | Performed by: UROLOGY

## 2025-02-03 PROCEDURE — 99214 OFFICE O/P EST MOD 30 MIN: CPT | Performed by: SURGERY

## 2025-02-03 PROCEDURE — 80069 RENAL FUNCTION PANEL: CPT

## 2025-02-03 PROCEDURE — 80197 ASSAY OF TACROLIMUS: CPT

## 2025-02-03 PROCEDURE — 99214 OFFICE O/P EST MOD 30 MIN: CPT | Mod: 24 | Performed by: SURGERY

## 2025-02-03 PROCEDURE — 3078F DIAST BP <80 MM HG: CPT | Performed by: SURGERY

## 2025-02-03 PROCEDURE — 1126F AMNT PAIN NOTED NONE PRSNT: CPT | Performed by: SURGERY

## 2025-02-03 PROCEDURE — 3061F NEG MICROALBUMINURIA REV: CPT | Performed by: SURGERY

## 2025-02-03 PROCEDURE — 1111F DSCHRG MED/CURRENT MED MERGE: CPT | Performed by: SURGERY

## 2025-02-03 PROCEDURE — 3075F SYST BP GE 130 - 139MM HG: CPT | Performed by: SURGERY

## 2025-02-03 PROCEDURE — 36415 COLL VENOUS BLD VENIPUNCTURE: CPT

## 2025-02-03 RX ORDER — METOCLOPRAMIDE 5 MG/1
5 TABLET ORAL EVERY 8 HOURS
Qty: 90 TABLET | Refills: 2 | Status: ON HOLD | OUTPATIENT
Start: 2025-02-03 | End: 2025-05-04

## 2025-02-03 RX ORDER — LACTULOSE 10 G/15ML
20 SOLUTION ORAL 2 TIMES DAILY
Qty: 420 ML | Refills: 0 | Status: ON HOLD | OUTPATIENT
Start: 2025-02-03

## 2025-02-03 RX ORDER — BLOOD-GLUCOSE SENSOR
EACH MISCELLANEOUS
Status: ON HOLD | COMMUNITY
Start: 2025-01-31

## 2025-02-03 RX ORDER — VALGANCICLOVIR 450 MG/1
900 TABLET, FILM COATED ORAL DAILY
Qty: 60 TABLET | Refills: 2 | Status: ON HOLD | OUTPATIENT
Start: 2025-02-03 | End: 2025-05-04

## 2025-02-03 RX ORDER — LANOLIN ALCOHOL/MO/W.PET/CERES
100 CREAM (GRAM) TOPICAL DAILY
Qty: 30 TABLET | Refills: 2 | Status: ON HOLD | OUTPATIENT
Start: 2025-02-03 | End: 2025-05-04

## 2025-02-03 RX ORDER — BISACODYL 10 MG/1
10 SUPPOSITORY RECTAL DAILY
Qty: 7 SUPPOSITORY | Refills: 0 | Status: ON HOLD | OUTPATIENT
Start: 2025-02-03 | End: 2025-02-10

## 2025-02-03 RX ORDER — LACTULOSE 10 G/15ML
20 SOLUTION ORAL 2 TIMES DAILY
Qty: 420 ML | Refills: 0 | Status: SHIPPED | OUTPATIENT
Start: 2025-02-03 | End: 2025-02-03 | Stop reason: SDUPTHER

## 2025-02-03 RX ORDER — NYSTATIN 100000 [USP'U]/ML
5 SUSPENSION ORAL 4 TIMES DAILY
Qty: 1200 ML | Refills: 2 | Status: ON HOLD | OUTPATIENT
Start: 2025-02-03 | End: 2025-08-02

## 2025-02-03 ASSESSMENT — ENCOUNTER SYMPTOMS: FATIGUE: 1

## 2025-02-03 ASSESSMENT — PAIN SCALES - GENERAL: PAINLEVEL_OUTOF10: 0-NO PAIN

## 2025-02-03 NOTE — TELEPHONE ENCOUNTER
TXERIK NUTRITION EPV at 10:00 AM (60 min)  Monday February 17, 2025  Appointment Provider:Sol Coley RDN, [facility] in [address]

## 2025-02-03 NOTE — PROGRESS NOTES
Temo Hanson is a 74 y.o. male POD#44 from DDKT from a DBD donor.    - Tolerating TF's at 16 hrs  - Constipation main complaint    Objective   Gen: A+OX3; NAD  HEENT: PERRL, sclera anicteric, MMM  Cardiac: RRR  Chest: Normal inspiratory effort  Abdomen: S/NT/ND. Incision well-healed.  Ext: No LE edema    Last Recorded Vitals  Visit Vitals  /72   Pulse 63   Temp 36.3 °C (97.4 °F) (Temporal)      Assessment/Plan   Principal Problem:    Kidney transplant recipient    Active Problems:  Patient Active Problem List   Diagnosis    S/P laparoscopic cholecystectomy    Abdominal pain    Achalasia    Acute lower UTI    Microcytic anemia    Complete heart block    Callus of foot    Chronic periodontitis, unspecified    Stage 5 chronic kidney disease (Multi)    Closed fracture of metatarsal bone    Crushing injury of foot    Diabetes mellitus (Multi)    Diarrhea    Dysphagia    ED (erectile dysfunction)    Essential hypertension    Foot pain, right    GIB (gastrointestinal bleeding)    Hepatitis B core antibody positive    Hyperkalemia    Ingrowing nail    Iron deficiency anemia secondary to inadequate dietary iron intake    Long toenail    Malignant neoplasm of prostate (Multi)    Onychomycosis    Pheochromocytoma of right adrenal gland    Pneumonia of right lower lobe due to infectious organism    Productive cough    Pure hypercholesterolemia    Stricture esophagus    SVT (supraventricular tachycardia) (CMS-HCC)    Type 2 diabetes mellitus with diabetic neuropathy (Multi)    Preop cardiovascular exam    Third degree heart block    Pericardial effusion (HHS-HCC)    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 (Multi)    Uremia    Cardiac tamponade    Cardiac pacemaker in situ    ESRD (end stage renal disease) (Multi)    Type 2 diabetes mellitus, with long-term current use of insulin    Dehydration    Alactasia syndrome    Type 2 diabetes mellitus with hyperglycemia, with long-term current use of insulin    On tube  feeding diet    Kidney transplant recipient ([facility])      Kidney allograft function              KDPI 93%/PRA 54%              Immediate graft function              Thymo 3 mg/kg              Ureteral stent appt today at [facility] daily     Immunosuppression              Tacrolimus 7-8 ng/mL              Myfortic 360 mg bid              Prednisone 5 mg     Nutrition                        Tube feeds 16 hrs - Vital 1.5 aimee              Dietician today    CMV   Repeat CMV viral load today     Hypertension              Amlodipine 10 mg                Diabetes               Endocrinology new recs from last week     Misc              Colace/senna      Add lactulose 30 ml bid              Reglan 5 mg q8h              Labs weekly              RTC 2 weeks    I spent 30 minutes in the professional and overall care of this patient, including immunosuppression management.    Sherly Kang MD, PHD, MPH, FACS  Chief, Transplant and Hepatobiliary Surgery

## 2025-02-03 NOTE — PROGRESS NOTES
Reason for Nutrition Visit:  Pt is a 74 y.o. male referred for a nutrition follow-up.     Transplant Coordinator: Chelsey Young RN    Past Medical Hx:  Patient Active Problem List   Diagnosis    S/P laparoscopic cholecystectomy    Abdominal pain    Achalasia    Acute lower UTI    Microcytic anemia    Complete heart block    Callus of foot    Chronic periodontitis, unspecified    Stage 5 chronic kidney disease (Multi)    Closed fracture of metatarsal bone    Crushing injury of foot    Diabetes mellitus (Multi)    Diarrhea    Dysphagia    ED (erectile dysfunction)    Essential hypertension    Foot pain, right    GIB (gastrointestinal bleeding)    Hepatitis B core antibody positive    Hyperkalemia    Ingrowing nail    Iron deficiency anemia secondary to inadequate dietary iron intake    Long toenail    Malignant neoplasm of prostate (Multi)    Onychomycosis    Pheochromocytoma of right adrenal gland    Pneumonia of right lower lobe due to infectious organism    Productive cough    Pure hypercholesterolemia    Stricture esophagus    SVT (supraventricular tachycardia) (CMS-[facility])    Type 2 diabetes mellitus with diabetic neuropathy (Multi)    Preop cardiovascular exam    Third degree heart block    Pericardial effusion ([facility])    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 (Multi)    Uremia    Cardiac tamponade    Cardiac pacemaker in situ    ESRD (end stage renal disease) (Multi)    Type 2 diabetes mellitus, with long-term current use of insulin    Dehydration    Alactasia syndrome    Type 2 diabetes mellitus with hyperglycemia, with long-term current use of insulin    On tube feeding diet    Kidney transplant recipient ([facility])      Lab Results   Component Value Date    HGBA1C 6.1 (H) 10/29/2024    HGBA1C 5.7 (H) 04/20/2024    HGBA1C 7.3 (H) 02/21/2023     (L) 01/30/2025    K 4.9 01/30/2025    PHOS 3.9 01/30/2025    CL 97 (L) 01/30/2025    CO2 25 01/30/2025    BUN 21 01/30/2025    CREATININE 0.97 01/30/2025     "CALCIUM 9.1 01/30/2025    ALBUMIN 3.5 01/30/2025    PROT 8.8 (H) 12/20/2024    BILITOT 0.5 12/20/2024    ALKPHOS 189 (H) 12/20/2024    ALT 18 12/20/2024    AST 21 12/20/2024    GLUCOSE 323 (H) 01/30/2025    CHOL 183 10/29/2024    HDL 53.1 10/29/2024    CPEPTIDE 4.8 (H) 10/29/2024     Lab Results   Component Value Date    HGB 11.0 (L) 01/30/2025    HGB 11.0 (L) 01/30/2025     Iron Panel + Serum Ferritin Trend:   Recent Labs     01/08/25  0610 04/21/24  0739   IRON 31* 43   UIBC 109* 98*   TIBC 140* 141*   IRONSAT 22* 30   FERRITIN 928* 1,911*   , Vitamin B12: No results found for: \"BRYQQQSK82\" , Folate: No results found for: \"FOLATE\" , and Vitamin D:   Lab Results   Component Value Date    VITD25 34 01/05/2025      Food History and Nutrition Assessment     Appetite: Poor  Intake: 25-50% orally  GI Symptoms : constipation   Swallowing Difficulty: No problems with swallowing  Dentition : own    Sleep duration/quality : 5-6 hours and disrupted sleep  Sleep disorders: none    Diet History:  Last encounter, patient was providing TF of Vital 1.5 @ 65mL but not at goal duration (9-10 hrs). Since last week, patient is now at goal duration, running TF for 18 hrs (7 pm to 12 pm). TF provides 1755 kcal and 67.5g protein. Denies nausea, vomiting, and abdominal cramping. Experiencing constipation and with slight improvements with Miralax last week. Surgeon provided lactulose and dulcolax prescription today. Patient is having ~1.5L fluid per day orally and with water flushes. Appetite and intake is still fair, eating three small meals per day. Glucerna twice per day with meals. ONS provides ~320 kcal and ~22g protein daily.     24 Hour Diet Recall:  Meal 1: Eggs (omelet) and wheat pancakes. Glucerna.  AM Snack: None  Meal 2: Toast and barley soup  PM Snack: None  Meal 3: Pancakes and Glucerna.   Late Snack: None  Beverages: Water, Glucerna, Gingerale occasionally   Estimate oral intake from food is ~720 kcal and 32 g " protein    Food Preparation: Patient and Children  Cooking Skills/Barriers: None reported  Grocery Shopping: Children    Supplements: Vitamin D and Thiamin . Patient states he is taking everything listed from discharge summary. Discharge summary medications list Vitamin D3 and thiamin 100mg.    Weight History:  CBW: 70 kg (154 lbs)   BMI: 20.4 kg/m2    Wt Readings from Last 10 Encounters:   02/03/25 70 kg (154 lb 6.4 oz)   02/02/25 67.3 kg (148 lb 6.4 oz)   01/27/25 68.9 kg (152 lb)   01/22/25 69.5 kg (153 lb 3.2 oz)   12/31/24 74.1 kg (163 lb 4.8 oz)   12/26/24 76.4 kg (168 lb 6.9 oz)   01/29/25 67 kg (147 lb 9.6 oz)   10/29/24 74.3 kg (163 lb 12.8 oz)   08/14/24 66.2 kg (146 lb)   07/18/24 66.5 kg (146 lb 11.2 oz)     Weight change:  1.1 kg (2.4 lbs) weight gain in 1 week.  Weight trend over last month shows significant loss of 5.5%.    Estimated Energy Needs:     Calorie Needs (30 kcal/kg CBW): 2100 kcal  Protein Needs (1.2-1.3 g/kg CBW): 84 - 91 g    Nutrition Focused Physical Exam:    Performed/Deferred: Performed last week (1/27/25) found mild-moderate muscle wasting and moderate subcutaneous fat loss.     Malnutrition Present: Moderate Malnutrition    Nutrition Diagnosis    Diagnosis Status: Ongoing  Diagnosis : Malnutrition; moderate chronic disease or condition related related to  recent kidney transplant resulting in poor appetite and decreased intake  as evidenced by weight loss of 5.5% in 1 month, estimated intake <75% of estimated energy requirement for > 1 month, mild-moderate loss of muscle mass, and moderate loss of subcutaneous fat     Additional Nutrition-Related Diagnoses:  Food and nutrition related knowledge deficit related to lack of or limited prior nutrition related education as evidence by no prior knowledge of need for food safety related recommendations/ kidney transplant 44 days ago.     Nutrition Education, Intervention, and Goals    Continue enteral nutrition as recommended. Goal intake  "to stop TF is >/=75% needs met with oral foods and ONS for >7 days.   Continue intake of oral nutrition supplement 2 times per day after meals.   Educated on importance of calorie- and protein-rich meals. Focus on increasing oral intake of protein-rich food sources including meats, eggs, dairy products (yogurt, cheese), and beans. Intake of protein-rich snacks every 3-4 hours. Provided handout \"High Protein Snack Ideas\"    Food Safety Guide Highlights  Education provided on food safety after transplant. Highlights include:   Cook meat, poultry and seafood to a safe internal temperature. (Temps found on p. 22 of your Food Safety Booklet.) Do not eat any meat, poultry or seafood that is raw or undercooked. (No pink meats, no sushi, no raw oysters.) Using a meat thermometer for accuracy is recommended.  Do not eat deli meats or hot dogs cold. Reheat them in the microwave until they are steaming hot. This typically takes 30 seconds, depending on the microwave.  Eggs need to have a firm yolk. No runny eggs. No raw cookie dough.  Do not consume any milk or milk products including cheese that is not pasteurized. Pasteurized milk is ok. Hard cheese or soft cheese from pasteurized milk is also ok.  Avoid cross-contamination of foods while cooking by having a cutting board for meats and a separate cutting board for produce.  Do not eat unwashed fruits and vegetables. Do not eat raw sprouts. If you want to eat bean sprouts or alfalfa sprouts for example, they will need to be cooked.   It is not safe for you to eat from buffets.  Eat leftovers within 2 days of being cooked.   Please never consume grapefruit, pomegranate or star fruit as they are not compatible with your medications.  Avoid turmeric in all forms due to the interactions with the immunosuppressives.   If you are a tea drinker...  Plain black tea or renae tea are preferred tea types  Matcha or green tea (Limit to 1-2 cups per day)  Most food type flavored tea is " fine (apple, lemon, mint, caramel)  Avoid: Green tea extract, Chamomile, Bergamot flavoring (Ramesh Tao, Nichole), Lemongrass, Valerian, [place], Rooibos, Shanti, Turmeric (Curcumin), Cordyceps (Cordyceps mushroom), Dandelion, Echinacea (Coneflower, Black Narvaez), Ginseng and Feverfew. Some are meant to boost the immune system, so we avoid due to the risk of causing rejection. Some, like turmeric and shanti, have drug interactions with immunosuppression.    Educational Handouts: High Protein Snack Ideas and Food Safety: A Need-to-Know Guide for Those at Risk    Nutrition Goals  Nutrition Goals: Gradual Weight Gain  Nutrition support goals are met  Oral intake greater than 50%  Meat/Protein Foods: Increase  Consume meal or snack every 3-4 hours  Increase fluid intake  Compliance with food safety guidance    Additional Recommendations/Referrals: Will continue to monitor changes in intake and weight.     Follow up: It was a pleasure speaking with you today, Mr. Hanson! Let's schedule a follow-up in 2 week(s) to check in on your progress. I'll have the 's put you on my schedule for February 17th at 10:00am for a in-person appointment.

## 2025-02-03 NOTE — PATIENT INSTRUCTIONS
Medication Changes:  Take Miralax once or twice a day for constipation  Take Lactulose 30 mL twice daily through the PEG tube, flush with 30 mL of water afterwards  Take Dulcolax 10 mg suppository once today after the stent removal    Plan:  Stent removal this afternoon at 1:30pm with urology at:  [address] [place] [address]  [facility] [phone]  [facility] [id number]     Please go back to the lab for additional blood work today then continue on Monday's once a week  Next clinic appt in 2 weeks

## 2025-02-04 ENCOUNTER — PATIENT MESSAGE (OUTPATIENT)
Facility: HOSPITAL | Age: 75
End: 2025-02-04
Payer: COMMERCIAL

## 2025-02-04 ENCOUNTER — PATIENT OUTREACH (OUTPATIENT)
Dept: CARE COORDINATION | Age: 75
End: 2025-02-04
Payer: COMMERCIAL

## 2025-02-04 DIAGNOSIS — Z94.0 KIDNEY REPLACED BY TRANSPLANT (HHS-HCC): ICD-10-CM

## 2025-02-04 LAB
CMV DNA SERPL NAA+PROBE-LOG IU: ABNORMAL {LOG_IU}/ML
LABORATORY COMMENT REPORT: ABNORMAL

## 2025-02-04 RX ORDER — TACROLIMUS 1 MG/1
3 CAPSULE ORAL EVERY 12 HOURS
Qty: 180 CAPSULE | Refills: 11 | Status: ON HOLD | OUTPATIENT
Start: 2025-02-04 | End: 2026-02-04

## 2025-02-05 ENCOUNTER — HOME CARE VISIT (OUTPATIENT)
Dept: HOME HEALTH SERVICES | Facility: HOME HEALTH | Age: 75
End: 2025-02-05
Payer: COMMERCIAL

## 2025-02-05 PROCEDURE — G0151 HHCP-SERV OF PT,EA 15 MIN: HCPCS

## 2025-02-05 ASSESSMENT — ENCOUNTER SYMPTOMS
MUSCLE WEAKNESS: 1
DENIES PAIN: 1
PERSON REPORTING PAIN: PATIENT

## 2025-02-06 LAB
HBV DNA SERPL NAA+PROBE-ACNC: NOT DETECTED [IU]/ML
HBV DNA SERPL NAA+PROBE-LOG IU: NORMAL {LOG_IU}/ML

## 2025-02-07 ENCOUNTER — PATIENT OUTREACH (OUTPATIENT)
Dept: CARE COORDINATION | Age: 75
End: 2025-02-07
Payer: COMMERCIAL

## 2025-02-07 DIAGNOSIS — Z94.0 KIDNEY REPLACED BY TRANSPLANT (HHS-HCC): ICD-10-CM

## 2025-02-07 PROCEDURE — RXMED WILLOW AMBULATORY MEDICATION CHARGE

## 2025-02-08 ENCOUNTER — PATIENT OUTREACH (OUTPATIENT)
Dept: CARE COORDINATION | Age: 75
End: 2025-02-08
Payer: COMMERCIAL

## 2025-02-09 ENCOUNTER — APPOINTMENT (OUTPATIENT)
Dept: RADIOLOGY | Facility: HOSPITAL | Age: 75
DRG: 637 | End: 2025-02-09
Payer: COMMERCIAL

## 2025-02-09 ENCOUNTER — PATIENT OUTREACH (OUTPATIENT)
Dept: CARE COORDINATION | Age: 75
End: 2025-02-09
Payer: COMMERCIAL

## 2025-02-09 ENCOUNTER — APPOINTMENT (OUTPATIENT)
Dept: CARDIOLOGY | Facility: HOSPITAL | Age: 75
DRG: 689 | End: 2025-02-09
Payer: COMMERCIAL

## 2025-02-09 ENCOUNTER — HOSPITAL ENCOUNTER (INPATIENT)
Facility: HOSPITAL | Age: 75
End: 2025-02-09
Attending: TRANSPLANT SURGERY | Admitting: TRANSPLANT SURGERY
Payer: COMMERCIAL

## 2025-02-09 VITALS
DIASTOLIC BLOOD PRESSURE: 50 MMHG | TEMPERATURE: 97.9 F | RESPIRATION RATE: 26 BRPM | SYSTOLIC BLOOD PRESSURE: 133 MMHG | OXYGEN SATURATION: 97 % | WEIGHT: 147.27 LBS | HEART RATE: 66 BPM | BODY MASS INDEX: 19.43 KG/M2

## 2025-02-09 DIAGNOSIS — N39.0 UTI (URINARY TRACT INFECTION), BACTERIAL: ICD-10-CM

## 2025-02-09 DIAGNOSIS — Z94.0 KIDNEY REPLACED BY TRANSPLANT (HHS-HCC): ICD-10-CM

## 2025-02-09 DIAGNOSIS — E11.10 TYPE 2 DIABETES MELLITUS WITH KETOACIDOSIS WITHOUT COMA, WITH LONG-TERM CURRENT USE OF INSULIN: ICD-10-CM

## 2025-02-09 DIAGNOSIS — Z78.9 ON TUBE FEEDING DIET: ICD-10-CM

## 2025-02-09 DIAGNOSIS — G89.18 OTHER ACUTE POSTPROCEDURAL PAIN: ICD-10-CM

## 2025-02-09 DIAGNOSIS — A49.9 UTI (URINARY TRACT INFECTION), BACTERIAL: ICD-10-CM

## 2025-02-09 DIAGNOSIS — E46 PROTEIN-CALORIE MALNUTRITION, UNSPECIFIED SEVERITY (MULTI): ICD-10-CM

## 2025-02-09 DIAGNOSIS — Z16.12 ESBL (EXTENDED SPECTRUM BETA-LACTAMASE) PRODUCING BACTERIA INFECTION: ICD-10-CM

## 2025-02-09 DIAGNOSIS — R53.81 PHYSICAL DECONDITIONING: ICD-10-CM

## 2025-02-09 DIAGNOSIS — Z79.4 TYPE 2 DIABETES MELLITUS WITH HYPEROSMOLARITY WITHOUT COMA, WITH LONG-TERM CURRENT USE OF INSULIN (MULTI): ICD-10-CM

## 2025-02-09 DIAGNOSIS — Z94.0 KIDNEY TRANSPLANT RECIPIENT (HHS-HCC): ICD-10-CM

## 2025-02-09 DIAGNOSIS — E11.00 TYPE 2 DIABETES MELLITUS WITH HYPEROSMOLARITY WITHOUT COMA, WITH LONG-TERM CURRENT USE OF INSULIN (MULTI): ICD-10-CM

## 2025-02-09 DIAGNOSIS — E11.10 DKA, TYPE 2, NOT AT GOAL: Primary | ICD-10-CM

## 2025-02-09 DIAGNOSIS — E11.65 TYPE 2 DIABETES MELLITUS WITH HYPERGLYCEMIA, WITH LONG-TERM CURRENT USE OF INSULIN: Chronic | ICD-10-CM

## 2025-02-09 DIAGNOSIS — I10 HYPERTENSION, UNSPECIFIED TYPE: ICD-10-CM

## 2025-02-09 DIAGNOSIS — K31.84 GASTROPARESIS DUE TO DM (MULTI): ICD-10-CM

## 2025-02-09 DIAGNOSIS — Z79.4 TYPE 2 DIABETES MELLITUS WITH HYPERGLYCEMIA, WITH LONG-TERM CURRENT USE OF INSULIN: Chronic | ICD-10-CM

## 2025-02-09 DIAGNOSIS — A49.9 ESBL (EXTENDED SPECTRUM BETA-LACTAMASE) PRODUCING BACTERIA INFECTION: ICD-10-CM

## 2025-02-09 DIAGNOSIS — Z79.4 TYPE 2 DIABETES MELLITUS WITH KETOACIDOSIS WITHOUT COMA, WITH LONG-TERM CURRENT USE OF INSULIN: ICD-10-CM

## 2025-02-09 DIAGNOSIS — E73.0: ICD-10-CM

## 2025-02-09 DIAGNOSIS — E11.43 GASTROPARESIS DUE TO DM (MULTI): ICD-10-CM

## 2025-02-09 DIAGNOSIS — A08.11 NOROVIRUS: ICD-10-CM

## 2025-02-09 LAB
ALBUMIN SERPL BCP-MCNC: 2.5 G/DL (ref 3.4–5)
ALBUMIN SERPL BCP-MCNC: 2.7 G/DL (ref 3.4–5)
ANION GAP BLDV CALCULATED.4IONS-SCNC: 9 MMOL/L (ref 10–25)
ANION GAP SERPL CALC-SCNC: 13 MMOL/L (ref 10–20)
ANION GAP SERPL CALC-SCNC: 15 MMOL/L (ref 10–20)
APPEARANCE UR: ABNORMAL
APTT PPP: 24 SECONDS (ref 27–38)
BASE EXCESS BLDV CALC-SCNC: -2.4 MMOL/L (ref -2–3)
BILIRUB UR STRIP.AUTO-MCNC: NEGATIVE MG/DL
BODY TEMPERATURE: 37 DEGREES CELSIUS
BUN SERPL-MCNC: 53 MG/DL (ref 6–23)
BUN SERPL-MCNC: 54 MG/DL (ref 6–23)
CA-I BLD-SCNC: 1.16 MMOL/L (ref 1.1–1.33)
CA-I BLDV-SCNC: 1.21 MMOL/L (ref 1.1–1.33)
CALCIUM SERPL-MCNC: 8.1 MG/DL (ref 8.6–10.6)
CALCIUM SERPL-MCNC: 8.2 MG/DL (ref 8.6–10.6)
CHLORIDE BLDV-SCNC: 90 MMOL/L (ref 98–107)
CHLORIDE SERPL-SCNC: 89 MMOL/L (ref 98–107)
CHLORIDE SERPL-SCNC: 92 MMOL/L (ref 98–107)
CO2 SERPL-SCNC: 21 MMOL/L (ref 21–32)
CO2 SERPL-SCNC: 22 MMOL/L (ref 21–32)
COLOR UR: ABNORMAL
CREAT SERPL-MCNC: 1.62 MG/DL (ref 0.5–1.3)
CREAT SERPL-MCNC: 1.65 MG/DL (ref 0.5–1.3)
EGFRCR SERPLBLD CKD-EPI 2021: 43 ML/MIN/1.73M*2
EGFRCR SERPLBLD CKD-EPI 2021: 44 ML/MIN/1.73M*2
ERYTHROCYTE [DISTWIDTH] IN BLOOD BY AUTOMATED COUNT: 16.5 % (ref 11.5–14.5)
GLUCOSE BLD MANUAL STRIP-MCNC: 139 MG/DL (ref 74–99)
GLUCOSE BLD MANUAL STRIP-MCNC: 150 MG/DL (ref 74–99)
GLUCOSE BLD MANUAL STRIP-MCNC: 151 MG/DL (ref 74–99)
GLUCOSE BLD MANUAL STRIP-MCNC: 281 MG/DL (ref 74–99)
GLUCOSE BLDV-MCNC: 223 MG/DL (ref 74–99)
GLUCOSE SERPL-MCNC: 149 MG/DL (ref 74–99)
GLUCOSE SERPL-MCNC: 219 MG/DL (ref 74–99)
GLUCOSE UR STRIP.AUTO-MCNC: ABNORMAL MG/DL
HCO3 BLDV-SCNC: 21.6 MMOL/L (ref 22–26)
HCT VFR BLD AUTO: 27.7 % (ref 41–52)
HCT VFR BLD EST: ABNORMAL %
HGB BLD-MCNC: 9.3 G/DL (ref 13.5–17.5)
HGB BLDV-MCNC: ABNORMAL G/DL
INHALED O2 CONCENTRATION: 98 %
INR PPP: 1.2 (ref 0.9–1.1)
KETONES UR STRIP.AUTO-MCNC: NEGATIVE MG/DL
LACTATE BLDV-SCNC: 1.7 MMOL/L (ref 0.4–2)
LEUKOCYTE ESTERASE UR QL STRIP.AUTO: ABNORMAL
MAGNESIUM SERPL-MCNC: 1.9 MG/DL (ref 1.6–2.4)
MCH RBC QN AUTO: 29 PG (ref 26–34)
MCHC RBC AUTO-ENTMCNC: 33.6 G/DL (ref 32–36)
MCV RBC AUTO: 86 FL (ref 80–100)
MUCOUS THREADS #/AREA URNS AUTO: NORMAL /LPF
NITRITE UR QL STRIP.AUTO: NEGATIVE
NRBC BLD-RTO: 0 /100 WBCS (ref 0–0)
OXYHGB MFR BLDV: ABNORMAL %
PCO2 BLDV: 34 MM HG (ref 41–51)
PH BLDV: 7.41 PH (ref 7.33–7.43)
PH UR STRIP.AUTO: 8.5 [PH]
PHOSPHATE SERPL-MCNC: 2.3 MG/DL (ref 2.5–4.9)
PHOSPHATE SERPL-MCNC: 2.3 MG/DL (ref 2.5–4.9)
PLATELET # BLD AUTO: 124 X10*3/UL (ref 150–450)
PO2 BLDV: 42 MM HG (ref 35–45)
POTASSIUM BLDV-SCNC: 6 MMOL/L (ref 3.5–5.3)
POTASSIUM SERPL-SCNC: 5 MMOL/L (ref 3.5–5.3)
POTASSIUM SERPL-SCNC: 6.1 MMOL/L (ref 3.5–5.3)
PROT UR STRIP.AUTO-MCNC: ABNORMAL MG/DL
PROTHROMBIN TIME: 13.3 SECONDS (ref 9.8–12.8)
RBC # BLD AUTO: 3.21 X10*6/UL (ref 4.5–5.9)
RBC # UR STRIP.AUTO: NEGATIVE MG/DL
RBC #/AREA URNS AUTO: NORMAL /HPF
SAO2 % BLDV: ABNORMAL %
SODIUM BLDV-SCNC: 115 MMOL/L (ref 136–145)
SODIUM SERPL-SCNC: 119 MMOL/L (ref 136–145)
SODIUM SERPL-SCNC: 122 MMOL/L (ref 136–145)
SP GR UR STRIP.AUTO: 1.01
UROBILINOGEN UR STRIP.AUTO-MCNC: NORMAL MG/DL
WBC # BLD AUTO: 10.5 X10*3/UL (ref 4.4–11.3)
WBC #/AREA URNS AUTO: NORMAL /HPF

## 2025-02-09 PROCEDURE — 2500000002 HC RX 250 W HCPCS SELF ADMINISTERED DRUGS (ALT 637 FOR MEDICARE OP, ALT 636 FOR OP/ED)

## 2025-02-09 PROCEDURE — 87799 DETECT AGENT NOS DNA QUANT: CPT

## 2025-02-09 PROCEDURE — 99291 CRITICAL CARE FIRST HOUR: CPT

## 2025-02-09 PROCEDURE — 84100 ASSAY OF PHOSPHORUS: CPT

## 2025-02-09 PROCEDURE — 71045 X-RAY EXAM CHEST 1 VIEW: CPT | Performed by: RADIOLOGY

## 2025-02-09 PROCEDURE — 82435 ASSAY OF BLOOD CHLORIDE: CPT | Performed by: PHYSICIAN ASSISTANT

## 2025-02-09 PROCEDURE — 2500000004 HC RX 250 GENERAL PHARMACY W/ HCPCS (ALT 636 FOR OP/ED)

## 2025-02-09 PROCEDURE — 82330 ASSAY OF CALCIUM: CPT | Performed by: PHYSICIAN ASSISTANT

## 2025-02-09 PROCEDURE — 81001 URINALYSIS AUTO W/SCOPE: CPT

## 2025-02-09 PROCEDURE — 82805 BLOOD GASES W/O2 SATURATION: CPT | Performed by: PHYSICIAN ASSISTANT

## 2025-02-09 PROCEDURE — 93010 ELECTROCARDIOGRAM REPORT: CPT | Performed by: INTERNAL MEDICINE

## 2025-02-09 PROCEDURE — 71045 X-RAY EXAM CHEST 1 VIEW: CPT

## 2025-02-09 PROCEDURE — 85027 COMPLETE CBC AUTOMATED: CPT | Performed by: PHYSICIAN ASSISTANT

## 2025-02-09 PROCEDURE — 82947 ASSAY GLUCOSE BLOOD QUANT: CPT

## 2025-02-09 PROCEDURE — 85610 PROTHROMBIN TIME: CPT | Performed by: PHYSICIAN ASSISTANT

## 2025-02-09 PROCEDURE — 87186 SC STD MICRODIL/AGAR DIL: CPT

## 2025-02-09 PROCEDURE — 2500000004 HC RX 250 GENERAL PHARMACY W/ HCPCS (ALT 636 FOR OP/ED): Performed by: PHYSICIAN ASSISTANT

## 2025-02-09 PROCEDURE — 87086 URINE CULTURE/COLONY COUNT: CPT

## 2025-02-09 PROCEDURE — 93005 ELECTROCARDIOGRAM TRACING: CPT

## 2025-02-09 PROCEDURE — 83735 ASSAY OF MAGNESIUM: CPT | Performed by: PHYSICIAN ASSISTANT

## 2025-02-09 PROCEDURE — 84132 ASSAY OF SERUM POTASSIUM: CPT | Performed by: PHYSICIAN ASSISTANT

## 2025-02-09 PROCEDURE — 37799 UNLISTED PX VASCULAR SURGERY: CPT

## 2025-02-09 PROCEDURE — 36415 COLL VENOUS BLD VENIPUNCTURE: CPT | Performed by: PHYSICIAN ASSISTANT

## 2025-02-09 PROCEDURE — 2020000001 HC ICU ROOM DAILY

## 2025-02-09 PROCEDURE — 36415 COLL VENOUS BLD VENIPUNCTURE: CPT

## 2025-02-09 PROCEDURE — 85730 THROMBOPLASTIN TIME PARTIAL: CPT | Performed by: PHYSICIAN ASSISTANT

## 2025-02-09 RX ORDER — MYCOPHENOLIC ACID 360 MG/1
360 TABLET, DELAYED RELEASE ORAL EVERY 12 HOURS
Status: DISPENSED | OUTPATIENT
Start: 2025-02-09

## 2025-02-09 RX ORDER — HEPARIN SODIUM 5000 [USP'U]/ML
5000 INJECTION, SOLUTION INTRAVENOUS; SUBCUTANEOUS EVERY 8 HOURS SCHEDULED
Status: DISPENSED | OUTPATIENT
Start: 2025-02-09

## 2025-02-09 RX ORDER — DEXTROSE 50 % IN WATER (D50W) INTRAVENOUS SYRINGE
12.5
Status: ACTIVE | OUTPATIENT
Start: 2025-02-09

## 2025-02-09 RX ORDER — SODIUM CHLORIDE 9 MG/ML
100 INJECTION, SOLUTION INTRAVENOUS CONTINUOUS
Status: ACTIVE | OUTPATIENT
Start: 2025-02-09 | End: 2025-02-10

## 2025-02-09 RX ORDER — TACROLIMUS 1 MG/1
3 CAPSULE ORAL
Status: DISPENSED | OUTPATIENT
Start: 2025-02-09

## 2025-02-09 RX ORDER — PREDNISONE 5 MG/1
5 TABLET ORAL DAILY
Status: ACTIVE | OUTPATIENT
Start: 2025-02-10

## 2025-02-09 RX ORDER — DEXTROSE 50 % IN WATER (D50W) INTRAVENOUS SYRINGE
25
Status: ACTIVE | OUTPATIENT
Start: 2025-02-09

## 2025-02-09 RX ADMIN — PIPERACILLIN SODIUM AND TAZOBACTAM SODIUM 2.25 G: 2; .25 INJECTION, SOLUTION INTRAVENOUS at 17:49

## 2025-02-09 RX ADMIN — TACROLIMUS 3 MG: 1 CAPSULE ORAL at 17:49

## 2025-02-09 RX ADMIN — SODIUM CHLORIDE 1000 ML: 9 INJECTION, SOLUTION INTRAVENOUS at 15:03

## 2025-02-09 RX ADMIN — PIPERACILLIN SODIUM AND TAZOBACTAM SODIUM 2.25 G: 2; .25 INJECTION, SOLUTION INTRAVENOUS at 22:08

## 2025-02-09 RX ADMIN — HEPARIN SODIUM 5000 UNITS: 5000 INJECTION, SOLUTION INTRAVENOUS; SUBCUTANEOUS at 13:25

## 2025-02-09 RX ADMIN — MYCOPHENOLIC ACID 360 MG: 360 TABLET, DELAYED RELEASE ORAL at 17:49

## 2025-02-09 RX ADMIN — INSULIN HUMAN 1 UNITS: 100 INJECTION, SOLUTION PARENTERAL at 21:03

## 2025-02-09 RX ADMIN — HEPARIN SODIUM 5000 UNITS: 5000 INJECTION, SOLUTION INTRAVENOUS; SUBCUTANEOUS at 22:08

## 2025-02-09 RX ADMIN — SODIUM CHLORIDE 100 ML/HR: 9 INJECTION, SOLUTION INTRAVENOUS at 15:03

## 2025-02-09 SDOH — SOCIAL STABILITY: SOCIAL INSECURITY: ABUSE: ADULT

## 2025-02-09 SDOH — ECONOMIC STABILITY: HOUSING INSECURITY: DO YOU FEEL UNSAFE GOING BACK TO THE PLACE WHERE YOU LIVE?: NO

## 2025-02-09 SDOH — ECONOMIC STABILITY: FOOD INSECURITY: HOW HARD IS IT FOR YOU TO PAY FOR THE VERY BASICS LIKE FOOD, HOUSING, MEDICAL CARE, AND HEATING?: PATIENT DECLINED

## 2025-02-09 SDOH — SOCIAL STABILITY: SOCIAL INSECURITY: WITHIN THE LAST YEAR, HAVE YOU BEEN AFRAID OF YOUR PARTNER OR EX-PARTNER?: PATIENT DECLINED

## 2025-02-09 SDOH — ECONOMIC STABILITY: HOUSING INSECURITY: IN THE LAST 12 MONTHS, WAS THERE A TIME WHEN YOU WERE NOT ABLE TO PAY THE MORTGAGE OR RENT ON TIME?: PATIENT DECLINED

## 2025-02-09 SDOH — SOCIAL STABILITY: SOCIAL INSECURITY
ASK PARENT OR GUARDIAN: ARE THERE TIMES WHEN YOU, YOUR CHILD(REN), OR ANY MEMBER OF YOUR HOUSEHOLD FEEL UNSAFE, HARMED, OR THREATENED AROUND PERSONS WITH WHOM YOU KNOW OR LIVE?: NO

## 2025-02-09 SDOH — SOCIAL STABILITY: SOCIAL INSECURITY: ARE YOU OR HAVE YOU BEEN THREATENED OR ABUSED PHYSICALLY, EMOTIONALLY, OR SEXUALLY BY ANYONE?: NO

## 2025-02-09 SDOH — ECONOMIC STABILITY: INCOME INSECURITY
IN THE PAST 12 MONTHS HAS THE ELECTRIC, GAS, OIL, OR WATER COMPANY THREATENED TO SHUT OFF SERVICES IN YOUR HOME?: PATIENT DECLINED

## 2025-02-09 SDOH — ECONOMIC STABILITY: TRANSPORTATION INSECURITY
IN THE PAST 12 MONTHS, HAS LACK OF TRANSPORTATION KEPT YOU FROM MEDICAL APPOINTMENTS OR FROM GETTING MEDICATIONS?: PATIENT DECLINED

## 2025-02-09 SDOH — SOCIAL STABILITY: SOCIAL INSECURITY: DO YOU FEEL UNSAFE GOING BACK TO THE PLACE WHERE YOU ARE LIVING?: NO

## 2025-02-09 SDOH — SOCIAL STABILITY: SOCIAL INSECURITY: ARE THERE ANY APPARENT SIGNS OF INJURIES/BEHAVIORS THAT COULD BE RELATED TO ABUSE/NEGLECT?: NO

## 2025-02-09 SDOH — SOCIAL STABILITY: SOCIAL NETWORK: IN A TYPICAL WEEK, HOW MANY TIMES DO YOU TALK ON THE PHONE WITH FAMILY, FRIENDS, OR NEIGHBORS?: PATIENT DECLINED

## 2025-02-09 SDOH — SOCIAL STABILITY: SOCIAL INSECURITY: HAS ANYONE EVER THREATENED TO HURT YOUR FAMILY OR YOUR PETS?: NO

## 2025-02-09 SDOH — SOCIAL STABILITY: SOCIAL INSECURITY: HAVE YOU HAD ANY THOUGHTS OF HARMING ANYONE ELSE?: NO

## 2025-02-09 SDOH — ECONOMIC STABILITY: FOOD INSECURITY
WITHIN THE PAST 12 MONTHS, YOU WORRIED THAT YOUR FOOD WOULD RUN OUT BEFORE YOU GOT THE MONEY TO BUY MORE.: PATIENT DECLINED

## 2025-02-09 SDOH — ECONOMIC STABILITY: HOUSING INSECURITY: IN THE PAST 12 MONTHS, HOW MANY TIMES HAVE YOU MOVED WHERE YOU WERE LIVING?: 0

## 2025-02-09 SDOH — HEALTH STABILITY: MENTAL HEALTH
DO YOU FEEL STRESS - TENSE, RESTLESS, NERVOUS, OR ANXIOUS, OR UNABLE TO SLEEP AT NIGHT BECAUSE YOUR MIND IS TROUBLED ALL THE TIME - THESE DAYS?: PATIENT DECLINED

## 2025-02-09 SDOH — HEALTH STABILITY: PHYSICAL HEALTH
HOW OFTEN DO YOU NEED TO HAVE SOMEONE HELP YOU WHEN YOU READ INSTRUCTIONS, PAMPHLETS, OR OTHER WRITTEN MATERIAL FROM YOUR DOCTOR OR PHARMACY?: PATIENT DECLINES TO RESPOND

## 2025-02-09 SDOH — SOCIAL STABILITY: SOCIAL INSECURITY: HAVE YOU HAD THOUGHTS OF HARMING ANYONE ELSE?: NO

## 2025-02-09 SDOH — ECONOMIC STABILITY: FOOD INSECURITY: WITHIN THE PAST 12 MONTHS, THE FOOD YOU BOUGHT JUST DIDN'T LAST AND YOU DIDN'T HAVE MONEY TO GET MORE.: PATIENT DECLINED

## 2025-02-09 SDOH — SOCIAL STABILITY: SOCIAL INSECURITY
WITHIN THE LAST YEAR, HAVE YOU BEEN KICKED, HIT, SLAPPED, OR OTHERWISE PHYSICALLY HURT BY YOUR PARTNER OR EX-PARTNER?: PATIENT DECLINED

## 2025-02-09 SDOH — SOCIAL STABILITY: SOCIAL INSECURITY
WITHIN THE LAST YEAR, HAVE YOU BEEN HUMILIATED OR EMOTIONALLY ABUSED IN OTHER WAYS BY YOUR PARTNER OR EX-PARTNER?: PATIENT DECLINED

## 2025-02-09 SDOH — ECONOMIC STABILITY: HOUSING INSECURITY: AT ANY TIME IN THE PAST 12 MONTHS, WERE YOU HOMELESS OR LIVING IN A SHELTER (INCLUDING NOW)?: PATIENT DECLINED

## 2025-02-09 SDOH — SOCIAL STABILITY: SOCIAL INSECURITY: DO YOU FEEL ANYONE HAS EXPLOITED OR TAKEN ADVANTAGE OF YOU FINANCIALLY OR OF YOUR PERSONAL PROPERTY?: NO

## 2025-02-09 SDOH — SOCIAL STABILITY: SOCIAL INSECURITY
WITHIN THE LAST YEAR, HAVE YOU BEEN RAPED OR FORCED TO HAVE ANY KIND OF SEXUAL ACTIVITY BY YOUR PARTNER OR EX-PARTNER?: PATIENT DECLINED

## 2025-02-09 SDOH — SOCIAL STABILITY: SOCIAL INSECURITY: WERE YOU ABLE TO COMPLETE ALL THE BEHAVIORAL HEALTH SCREENINGS?: YES

## 2025-02-09 SDOH — SOCIAL STABILITY: SOCIAL INSECURITY: DOES ANYONE TRY TO KEEP YOU FROM HAVING/CONTACTING OTHER FRIENDS OR DOING THINGS OUTSIDE YOUR HOME?: NO

## 2025-02-09 ASSESSMENT — ACTIVITIES OF DAILY LIVING (ADL)
LACK_OF_TRANSPORTATION: PATIENT DECLINED
PATIENT'S MEMORY ADEQUATE TO SAFELY COMPLETE DAILY ACTIVITIES?: UNABLE TO ASSESS
TOILETING: UNABLE TO ASSESS
LACK_OF_TRANSPORTATION: PATIENT DECLINED
WALKS IN HOME: UNABLE TO ASSESS
JUDGMENT_ADEQUATE_SAFELY_COMPLETE_DAILY_ACTIVITIES: UNABLE TO ASSESS
GROOMING: UNABLE TO ASSESS
HEARING - LEFT EAR: UNABLE TO ASSESS
BATHING: UNABLE TO ASSESS
HEARING - RIGHT EAR: UNABLE TO ASSESS
ADEQUATE_TO_COMPLETE_ADL: UNABLE TO ASSESS
FEEDING YOURSELF: UNABLE TO ASSESS
DRESSING YOURSELF: UNABLE TO ASSESS

## 2025-02-09 ASSESSMENT — COGNITIVE AND FUNCTIONAL STATUS - GENERAL
MOVING TO AND FROM BED TO CHAIR: TOTAL
PATIENT BASELINE BEDBOUND: UNABLE TO ASSESS AT THIS TIME
DRESSING REGULAR LOWER BODY CLOTHING: TOTAL
EATING MEALS: TOTAL
CLIMB 3 TO 5 STEPS WITH RAILING: TOTAL
WALKING IN HOSPITAL ROOM: TOTAL
TOILETING: TOTAL
TURNING FROM BACK TO SIDE WHILE IN FLAT BAD: A LITTLE
PERSONAL GROOMING: TOTAL
DRESSING REGULAR UPPER BODY CLOTHING: TOTAL
MOBILITY SCORE: 11
STANDING UP FROM CHAIR USING ARMS: A LOT
HELP NEEDED FOR BATHING: TOTAL
MOVING FROM LYING ON BACK TO SITTING ON SIDE OF FLAT BED WITH BEDRAILS: A LITTLE
DAILY ACTIVITIY SCORE: 6

## 2025-02-09 ASSESSMENT — PATIENT HEALTH QUESTIONNAIRE - PHQ9
SUM OF ALL RESPONSES TO PHQ9 QUESTIONS 1 & 2: 2
2. FEELING DOWN, DEPRESSED OR HOPELESS: SEVERAL DAYS
1. LITTLE INTEREST OR PLEASURE IN DOING THINGS: SEVERAL DAYS

## 2025-02-09 ASSESSMENT — LIFESTYLE VARIABLES
AUDIT-C TOTAL SCORE: -1
HOW OFTEN DO YOU HAVE 6 OR MORE DRINKS ON ONE OCCASION: PATIENT DECLINED
HOW MANY STANDARD DRINKS CONTAINING ALCOHOL DO YOU HAVE ON A TYPICAL DAY: PATIENT DECLINED
HOW OFTEN DO YOU HAVE A DRINK CONTAINING ALCOHOL: PATIENT DECLINED
AUDIT-C TOTAL SCORE: -1
SKIP TO QUESTIONS 9-10: 0

## 2025-02-09 ASSESSMENT — PAIN - FUNCTIONAL ASSESSMENT
PAIN_FUNCTIONAL_ASSESSMENT: 0-10
PAIN_FUNCTIONAL_ASSESSMENT: 0-10

## 2025-02-09 ASSESSMENT — PAIN SCALES - GENERAL
PAINLEVEL_OUTOF10: 0 - NO PAIN
PAINLEVEL_OUTOF10: 0 - NO PAIN

## 2025-02-09 NOTE — H&P
SICU History & Physical    Subjective   HPI:  Temo is a 74 y.o. 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  Patient is a poor historian and is unable to recall precisely why he came into the emergency department.  Upon questioning he does report that he takes insulin at home and that he usually takes it 3 times a day.  He reports having an episode of diarrhea earlier this morning and also feeling some burning with urination.  He points to his lower abdomen and saying this in the suprapubic area, and also reports a chronic abdominal pain more consistent with previously reported achalasia.  Patient denies nausea, vomiting, fevers, chills, trouble breathing, chest pain.  Patient was unable to elaborate further on why his fever was unable to come down yesterday or any other factors likely contributed to his current presentation.     Past Medical History:   Diagnosis Date    Chronic kidney disease     DM (diabetes mellitus) (Multi)     Fistula     HTN (hypertension)     Other specified abnormal immunological findings in serum 10/11/2021    Hepatitis B core antibody positive    Personal history of malignant neoplasm of prostate     History of malignant neoplasm of prostate     Past Surgical History:   Procedure Laterality Date    CARDIAC CATHETERIZATION N/A 4/18/2024    Procedure: Pericardiocentesis;  Surgeon: Jose Brunson MD;  Location: [place] [address] [facility];  Service: Cardiovascular;  Laterality: N/A;    CARDIAC ELECTROPHYSIOLOGY PROCEDURE N/A 7/17/2024    Procedure: Leadless PPM Implant ([id number]);  Surgeon: Sheldon De La Torre MD;  Location: [facility];  Service: Electrophysiology;  Laterality: N/A;  8:00, [employer]     CHOLECYSTECTOMY  10/23/2023    Lap Cholecystectomy    OTHER SURGICAL HISTORY  09/29/2021    Cyst excision    OTHER SURGICAL HISTORY  09/29/2021    Arteriovenous fistula creation procedure    OTHER SURGICAL HISTORY  09/29/2021    Prostate surgery    OTHER SURGICAL HISTORY  09/29/2021    Colonoscopy     No medications prior to admission.     Terazosin, Codeine, and Tramadol  Social History     Tobacco Use    Smoking status: Never    Smokeless tobacco: Never   Vaping Use    Vaping status: Never Used   Substance Use Topics    Alcohol use: Never    Drug use: Never     Family History   Problem Relation Name Age of Onset    Diabetes Sister      Diabetes Brother         Review of Systems:  Review of Systems    Scheduled Medications:        Continuous Medications:        PRN Medications:       Objective   Vitals:  Most Recent:  There were no vitals filed for this visit.    24hr Min/Max:  No data recorded    I/O:  No intake/output data recorded.    Hemodynamic parameters for last 24 hours:         Vent settings:       LDA:  Hemodialysis Arteriovenous Graft Left Upper arm (Active)   No placement date or time found.   Orientation: Left  Access Location: Upper arm   Number of days:        Gastrostomy/Enterostomy PEG-jejunostomy RLQ (Active)   Placement Date/Time: 01/08/25 1100   Type: PEG-jejunostomy  Location: RLQ   Number of days: 31         Physical Exam:   Physical Exam  Constitutional:       Appearance: Normal appearance. He is not toxic-appearing.   HENT:      Head: Normocephalic and atraumatic.   Eyes:      General: No scleral icterus.     Pupils: Pupils are equal, round, and reactive to light.   Cardiovascular:      Rate and Rhythm: Regular rhythm. Tachycardia present.      Pulses: Normal pulses.      Heart sounds: Normal heart sounds.   Pulmonary:      Effort: Pulmonary effort is normal. No respiratory distress.      Breath sounds: Normal breath sounds.   Abdominal:      General: There is no distension.      Tenderness:  There is abdominal tenderness in the suprapubic area.   Musculoskeletal:      Right lower leg: No edema.   Skin:     Capillary Refill: Capillary refill takes less than 2 seconds.      Coloration: Skin is not jaundiced or pale.   Neurological:      General: No focal deficit present.      Mental Status: He is alert and oriented to person, place, and time.      Cranial Nerves: No dysarthria.   Psychiatric:         Attention and Perception: He is inattentive.         Speech: Speech is delayed and tangential.         Behavior: Behavior is not aggressive or combative. Behavior is cooperative.         Thought Content: Thought content normal.         Cognition and Memory: He exhibits impaired recent memory.       Lab/Radiology/Diagnostic Review:  No results found for this or any previous visit (from the past 24 hours).  XR chest 1 view    Result Date: 2/9/2025  EXAMINATION: XR CHEST AP OR PA 1 VIEW 02/09/2025 05:28 AM CLINICAL HISTORY: Chest pain ASSOCIATED DIAGNOSIS: Chest pain ORDERING PROVIDER: GREGORY ALLEN TECHNOLOGISTS NOTE: COMPARISON: XR CHEST AP OR PA 1 VIEW 5/28/2019, 2:37 PM FINDINGS: Lines, tubes, and devices: loop recorder device in the left chest.. Lungs and pleura: Right lower lung zone opacity is present, somewhat similar compared to prior radiograph from 5/28/2019. There is blunting of the right costophrenic angles suspicious for right pleural effusion. No evidence of pneumothorax. Cardiomediastinal silhouette: Normal configuration of the cardiomediastinal silhouette. Musculoskeletal: Noncontributory IMPRESSION: Right lower lung zone opacity with right pleural effusion. Findings may represent acute infectious process, however there is no recent comparison to determine acuity of these findings. Correlate with clinical data. MACRO: None I have personally reviewed the images and agree with the resident's interpretation.    ECG 12 Lead    Result Date: 1/30/2025   Poor data quality, interpretation may be adversely  affected Ventricular pacing Sinus rhythm with 1st degree AV block Abnormal ECG Confirmed by Joaquin Ramesh ([id number]) on 1/30/2025 3:03:17 PM    XR abdomen 1 view    Result Date: 1/15/2025  Interpreted By:  Mario Hoang, STUDY: XR ABDOMEN 1 VIEW; 1/14/2025 5:21 pm   INDICATION: Signs/Symptoms:tube study.   COMPARISON: 01/14/2025.   ACCESSION NUMBER(S): [id number]   ORDERING CLINICIAN: DEEDEE GUTIERRES   FINDINGS: Feeding tube  overlies the stomach. Nonobstructive bowel gas pattern. Limited evaluation of pneumoperitoneum on supine imaging, however no gross evidence of free air is noted. Interval passage of contrast in nonobstructed loops of bowel.   Postsurgical changes without gross evidence of extravasation/leak.   Visualized lungs are clear.   Osseous structures demonstrate no acute bony changes.       1.  Nonobstructive bowel gas pattern. 2. Postoperative changes without gross evidence of extravasation/leak.   Signed by: Mario Hoang 1/15/2025 7:41 AM Dictation workstation:   [id number]    XR abdomen 1 view    Result Date: 1/15/2025  Interpreted By:  Mario Hoang, STUDY: XR ABDOMEN 1 VIEW; 1/14/2025 1:50 pm   INDICATION: Signs/Symptoms:tube study.   COMPARISON: 02/24/2023.   ACCESSION NUMBER(S): [id number]   ORDERING CLINICIAN: DEEDEE GUTIERRES   FINDINGS: Feeding tube  overlies the body of stomach. Right-sided double-J nephrostomy tube in place. Contrast in nonobstructed loops of small and large bowel. Limited evaluation of pneumoperitoneum on supine imaging, however no gross evidence of free air is noted.   Moderate-sized right-sided pleural effusion.   Osseous structures demonstrate no acute bony changes.       1.  Residual contrast in nonobstructed small large bowel loops.   Signed by: Mario Hoang 1/15/2025 7:39 AM Dictation workstation:   [id number]    Bedside PICC Imaging    Result Date: 1/13/2025  These images are not reportable by radiology and will not be interpreted by  Radiologists.    Additional  Labs:  Results for orders placed or performed during the hospital encounter of 02/09/25 (from the past 24 hours)   POCT GLUCOSE   Result Value Ref Range    POCT Glucose 281 (H) 74 - 99 mg/dL                Assessment/Plan     Assessment:  Temo Hanson is a 74 y.o. male PMH HTN, DMII, ESRD (HD MWF) 2/2 diabetic nephropathy, DDKT 12/2024 who presented to OSH for unclear reason and was found to have hyperglycemia without ketosis with glucose >527 and sodium 116, transferred to [facility] for evaluation in the subacute setting of recent kidney transplant.  On arrival, patient is cooperative but is a poor historian and unable to remember the events that brought him to the hospital. He does complain of burning suprapubic pain with urination which seems to be different from his chronic achalasia pain. He denies any other pain    Plan:  NEURO: No history  - ongoing neuro and pain assessments  - PRN hydromorphone for pain control  - PT/OT consult  - Home meds: N/A    CV: History of hypertension. Most recent echo 12/24/2024 EF 63%. Current vitals w/n/l  - continuous EKG/abp monitoring for signs of hyperkalemia induced changes  - Goal map range 65-90  - Home meds: Amlodipine 10 mg daily.    PULM: No history of significance  - Q1h incentive spirometer while awake  - additional pulm toilet prn.      GI: Previous history of achalsia, PEG tube dependent for feeding  - NPO, dietician consult for PEG tube regmine. Resume Tube feeds after recommendations  - PPI for GI prophylaxis  - home meds: Protonix 40mg, Colace/senna, lactulose 30 ml bid    : CKD 3 status post DDKT 12/2024  - Maintenance  NS  - Volume resuscitation as needed  - Maintain U/O >0.5ml/kg/hr  - Check renal function panel daily  - Replete electrolytes to goal K>4, Mg>2, Phos>2.5, ionized Ca>1.10.  - Tacrolimus trough level 0600 before AM dose qday, tacrolimus levels management via transplant surgery    HEME: No acute concerns  - Check CBC and coags daily  - SCDs  for DVT prophylaxis, subcutaneous heparin  - ongoing monitoring for s/s bleeding    ENDO: Type 2 diabetes, uncontrolled, long-term use of insulin now in Hyperglycemia without ketoacidosis. BG at , finger stick on arrival 281  -ISS #2, consider resuming home basal insulin when meals resume  -K replete as needed  -home meds: Basaglar 10u qAM, Humulin N 15u @1900 w/ start of tube feeds, 3u regular insulin + sliding scale for meals    ID: Afebrile. Nml WBC  - temp q4h, wbc daily  - ongoing monitoring for s/s infection  - UA to examine potential UTI complains    Lines:  - PIV x2    Dispo: Admit to ICU. Patient seen and discussed with ICU attending Dr. Call.    Trav Blue MD PGY1  Surgical Critical Care  SICU phone [phone]

## 2025-02-09 NOTE — PROGRESS NOTES
Tried to call pt to discuss status  machine on left message and call back number.  Pt unable to take vitals his device is not working and is schedule for possible  .Team will follow up in am

## 2025-02-10 ENCOUNTER — APPOINTMENT (OUTPATIENT)
Facility: HOSPITAL | Age: 75
End: 2025-02-10
Payer: COMMERCIAL

## 2025-02-10 ENCOUNTER — APPOINTMENT (OUTPATIENT)
Dept: RADIOLOGY | Facility: HOSPITAL | Age: 75
DRG: 689 | End: 2025-02-10
Payer: COMMERCIAL

## 2025-02-10 LAB
ALBUMIN SERPL BCP-MCNC: 1.9 G/DL (ref 3.4–5)
ALBUMIN SERPL BCP-MCNC: 2.2 G/DL (ref 3.4–5)
ALBUMIN SERPL BCP-MCNC: 2.2 G/DL (ref 3.4–5)
ALBUMIN SERPL BCP-MCNC: 2.3 G/DL (ref 3.4–5)
ALBUMIN SERPL BCP-MCNC: 2.3 G/DL (ref 3.4–5)
ANION GAP SERPL CALC-SCNC: 13 MMOL/L (ref 10–20)
ANION GAP SERPL CALC-SCNC: 15 MMOL/L (ref 10–20)
ANION GAP SERPL CALC-SCNC: 17 MMOL/L (ref 10–20)
APTT PPP: 21 SECONDS (ref 27–38)
BUN SERPL-MCNC: 35 MG/DL (ref 6–23)
BUN SERPL-MCNC: 38 MG/DL (ref 6–23)
BUN SERPL-MCNC: 40 MG/DL (ref 6–23)
BUN SERPL-MCNC: 41 MG/DL (ref 6–23)
BUN SERPL-MCNC: 41 MG/DL (ref 6–23)
CA-I BLD-SCNC: 1.11 MMOL/L (ref 1.1–1.33)
CALCIUM SERPL-MCNC: 6.6 MG/DL (ref 8.6–10.6)
CALCIUM SERPL-MCNC: 7.3 MG/DL (ref 8.6–10.6)
CALCIUM SERPL-MCNC: 7.5 MG/DL (ref 8.6–10.6)
CALCIUM SERPL-MCNC: 7.6 MG/DL (ref 8.6–10.6)
CALCIUM SERPL-MCNC: 7.7 MG/DL (ref 8.6–10.6)
CHLORIDE SERPL-SCNC: 104 MMOL/L (ref 98–107)
CHLORIDE SERPL-SCNC: 96 MMOL/L (ref 98–107)
CHLORIDE SERPL-SCNC: 98 MMOL/L (ref 98–107)
CHLORIDE SERPL-SCNC: 99 MMOL/L (ref 98–107)
CHLORIDE SERPL-SCNC: 99 MMOL/L (ref 98–107)
CO2 SERPL-SCNC: 15 MMOL/L (ref 21–32)
CO2 SERPL-SCNC: 19 MMOL/L (ref 21–32)
CO2 SERPL-SCNC: 20 MMOL/L (ref 21–32)
CO2 SERPL-SCNC: 20 MMOL/L (ref 21–32)
CO2 SERPL-SCNC: 22 MMOL/L (ref 21–32)
CREAT SERPL-MCNC: 1.18 MG/DL (ref 0.5–1.3)
CREAT SERPL-MCNC: 1.31 MG/DL (ref 0.5–1.3)
CREAT SERPL-MCNC: 1.37 MG/DL (ref 0.5–1.3)
CREAT SERPL-MCNC: 1.41 MG/DL (ref 0.5–1.3)
CREAT SERPL-MCNC: 1.43 MG/DL (ref 0.5–1.3)
EGFRCR SERPLBLD CKD-EPI 2021: 51 ML/MIN/1.73M*2
EGFRCR SERPLBLD CKD-EPI 2021: 52 ML/MIN/1.73M*2
EGFRCR SERPLBLD CKD-EPI 2021: 54 ML/MIN/1.73M*2
EGFRCR SERPLBLD CKD-EPI 2021: 57 ML/MIN/1.73M*2
EGFRCR SERPLBLD CKD-EPI 2021: 65 ML/MIN/1.73M*2
ERYTHROCYTE [DISTWIDTH] IN BLOOD BY AUTOMATED COUNT: 17.1 % (ref 11.5–14.5)
GLUCOSE BLD MANUAL STRIP-MCNC: 150 MG/DL (ref 74–99)
GLUCOSE BLD MANUAL STRIP-MCNC: 160 MG/DL (ref 74–99)
GLUCOSE BLD MANUAL STRIP-MCNC: 175 MG/DL (ref 74–99)
GLUCOSE BLD MANUAL STRIP-MCNC: 197 MG/DL (ref 74–99)
GLUCOSE SERPL-MCNC: 145 MG/DL (ref 74–99)
GLUCOSE SERPL-MCNC: 153 MG/DL (ref 74–99)
GLUCOSE SERPL-MCNC: 169 MG/DL (ref 74–99)
GLUCOSE SERPL-MCNC: 193 MG/DL (ref 74–99)
GLUCOSE SERPL-MCNC: 213 MG/DL (ref 74–99)
HCT VFR BLD AUTO: 29.5 % (ref 41–52)
HGB BLD-MCNC: 9.5 G/DL (ref 13.5–17.5)
HOLD SPECIMEN: NORMAL
INR PPP: 1.2 (ref 0.9–1.1)
MAGNESIUM SERPL-MCNC: 1.68 MG/DL (ref 1.6–2.4)
MCH RBC QN AUTO: 28.7 PG (ref 26–34)
MCHC RBC AUTO-ENTMCNC: 32.2 G/DL (ref 32–36)
MCV RBC AUTO: 89 FL (ref 80–100)
NRBC BLD-RTO: 0 /100 WBCS (ref 0–0)
PHOSPHATE SERPL-MCNC: 2.8 MG/DL (ref 2.5–4.9)
PHOSPHATE SERPL-MCNC: 3.4 MG/DL (ref 2.5–4.9)
PHOSPHATE SERPL-MCNC: 3.9 MG/DL (ref 2.5–4.9)
PHOSPHATE SERPL-MCNC: 4.3 MG/DL (ref 2.5–4.9)
PHOSPHATE SERPL-MCNC: 4.5 MG/DL (ref 2.5–4.9)
PLATELET # BLD AUTO: 142 X10*3/UL (ref 150–450)
POTASSIUM SERPL-SCNC: 4.7 MMOL/L (ref 3.5–5.3)
POTASSIUM SERPL-SCNC: 4.8 MMOL/L (ref 3.5–5.3)
POTASSIUM SERPL-SCNC: 5.7 MMOL/L (ref 3.5–5.3)
POTASSIUM SERPL-SCNC: 6.1 MMOL/L (ref 3.5–5.3)
POTASSIUM SERPL-SCNC: 7 MMOL/L (ref 3.5–5.3)
PROTHROMBIN TIME: 13.3 SECONDS (ref 9.8–12.8)
RBC # BLD AUTO: 3.31 X10*6/UL (ref 4.5–5.9)
SODIUM SERPL-SCNC: 126 MMOL/L (ref 136–145)
SODIUM SERPL-SCNC: 127 MMOL/L (ref 136–145)
SODIUM SERPL-SCNC: 127 MMOL/L (ref 136–145)
SODIUM SERPL-SCNC: 128 MMOL/L (ref 136–145)
SODIUM SERPL-SCNC: 130 MMOL/L (ref 136–145)
TACROLIMUS BLD-MCNC: 5.7 NG/ML
WBC # BLD AUTO: 10.7 X10*3/UL (ref 4.4–11.3)

## 2025-02-10 PROCEDURE — 71045 X-RAY EXAM CHEST 1 VIEW: CPT | Performed by: RADIOLOGY

## 2025-02-10 PROCEDURE — 36415 COLL VENOUS BLD VENIPUNCTURE: CPT

## 2025-02-10 PROCEDURE — 85027 COMPLETE CBC AUTOMATED: CPT

## 2025-02-10 PROCEDURE — 2500000001 HC RX 250 WO HCPCS SELF ADMINISTERED DRUGS (ALT 637 FOR MEDICARE OP)

## 2025-02-10 PROCEDURE — 80069 RENAL FUNCTION PANEL: CPT

## 2025-02-10 PROCEDURE — 80069 RENAL FUNCTION PANEL: CPT | Performed by: PHYSICIAN ASSISTANT

## 2025-02-10 PROCEDURE — 3E0G76Z INTRODUCTION OF NUTRITIONAL SUBSTANCE INTO UPPER GI, VIA NATURAL OR ARTIFICIAL OPENING: ICD-10-PCS

## 2025-02-10 PROCEDURE — 2500000004 HC RX 250 GENERAL PHARMACY W/ HCPCS (ALT 636 FOR OP/ED): Performed by: PHYSICIAN ASSISTANT

## 2025-02-10 PROCEDURE — 82330 ASSAY OF CALCIUM: CPT

## 2025-02-10 PROCEDURE — 2500000004 HC RX 250 GENERAL PHARMACY W/ HCPCS (ALT 636 FOR OP/ED)

## 2025-02-10 PROCEDURE — 97162 PT EVAL MOD COMPLEX 30 MIN: CPT | Mod: GP | Performed by: PHYSICAL THERAPIST

## 2025-02-10 PROCEDURE — 1100000001 HC PRIVATE ROOM DAILY

## 2025-02-10 PROCEDURE — 82947 ASSAY GLUCOSE BLOOD QUANT: CPT

## 2025-02-10 PROCEDURE — 99222 1ST HOSP IP/OBS MODERATE 55: CPT | Performed by: INTERNAL MEDICINE

## 2025-02-10 PROCEDURE — 99232 SBSQ HOSP IP/OBS MODERATE 35: CPT | Performed by: SURGERY

## 2025-02-10 PROCEDURE — 71045 X-RAY EXAM CHEST 1 VIEW: CPT

## 2025-02-10 PROCEDURE — 80197 ASSAY OF TACROLIMUS: CPT

## 2025-02-10 PROCEDURE — 2500000002 HC RX 250 W HCPCS SELF ADMINISTERED DRUGS (ALT 637 FOR MEDICARE OP, ALT 636 FOR OP/ED)

## 2025-02-10 PROCEDURE — 97165 OT EVAL LOW COMPLEX 30 MIN: CPT | Mod: GO

## 2025-02-10 PROCEDURE — 83735 ASSAY OF MAGNESIUM: CPT

## 2025-02-10 PROCEDURE — 99291 CRITICAL CARE FIRST HOUR: CPT

## 2025-02-10 PROCEDURE — 84100 ASSAY OF PHOSPHORUS: CPT

## 2025-02-10 PROCEDURE — 85730 THROMBOPLASTIN TIME PARTIAL: CPT

## 2025-02-10 RX ORDER — PANTOPRAZOLE SODIUM 40 MG/1
40 TABLET, DELAYED RELEASE ORAL
Status: DISCONTINUED | OUTPATIENT
Start: 2025-02-10 | End: 2025-02-18 | Stop reason: HOSPADM

## 2025-02-10 RX ORDER — BACLOFEN 10 MG/1
10 TABLET ORAL ONCE
Status: COMPLETED | OUTPATIENT
Start: 2025-02-10 | End: 2025-02-10

## 2025-02-10 RX ORDER — NYSTATIN 100000 [USP'U]/ML
5 SUSPENSION ORAL 4 TIMES DAILY
Status: DISCONTINUED | OUTPATIENT
Start: 2025-02-10 | End: 2025-02-18 | Stop reason: HOSPADM

## 2025-02-10 RX ORDER — INSULIN LISPRO 100 [IU]/ML
0-5 INJECTION, SOLUTION INTRAVENOUS; SUBCUTANEOUS EVERY 4 HOURS
Status: DISCONTINUED | OUTPATIENT
Start: 2025-02-10 | End: 2025-02-11

## 2025-02-10 RX ORDER — METOCLOPRAMIDE 10 MG/1
5 TABLET ORAL EVERY 8 HOURS
Status: DISCONTINUED | OUTPATIENT
Start: 2025-02-10 | End: 2025-02-17

## 2025-02-10 RX ORDER — GABAPENTIN 100 MG/1
200 CAPSULE ORAL DAILY
Status: DISCONTINUED | OUTPATIENT
Start: 2025-02-10 | End: 2025-02-18 | Stop reason: HOSPADM

## 2025-02-10 RX ORDER — SODIUM CHLORIDE 9 MG/ML
100 INJECTION, SOLUTION INTRAVENOUS CONTINUOUS
Status: DISCONTINUED | OUTPATIENT
Start: 2025-02-10 | End: 2025-02-11

## 2025-02-10 RX ORDER — VALGANCICLOVIR 450 MG/1
450 TABLET, FILM COATED ORAL DAILY
Status: DISCONTINUED | OUTPATIENT
Start: 2025-02-10 | End: 2025-02-17

## 2025-02-10 RX ADMIN — NYSTATIN 500000 UNITS: 500000 SUSPENSION ORAL at 17:06

## 2025-02-10 RX ADMIN — VALGANCICLOVIR HYDROCHLORIDE 450 MG: 450 TABLET ORAL at 11:31

## 2025-02-10 RX ADMIN — PIPERACILLIN SODIUM AND TAZOBACTAM SODIUM 2.25 G: 2; .25 INJECTION, SOLUTION INTRAVENOUS at 10:47

## 2025-02-10 RX ADMIN — HEPARIN SODIUM 5000 UNITS: 5000 INJECTION, SOLUTION INTRAVENOUS; SUBCUTANEOUS at 13:38

## 2025-02-10 RX ADMIN — PREDNISONE 5 MG: 10 TABLET ORAL at 09:55

## 2025-02-10 RX ADMIN — MYCOPHENOLIC ACID 360 MG: 360 TABLET, DELAYED RELEASE ORAL at 17:58

## 2025-02-10 RX ADMIN — TACROLIMUS 3 MG: 1 CAPSULE ORAL at 17:58

## 2025-02-10 RX ADMIN — METOCLOPRAMIDE 5 MG: 10 TABLET ORAL at 23:08

## 2025-02-10 RX ADMIN — PIPERACILLIN SODIUM AND TAZOBACTAM SODIUM 2.25 G: 2; .25 INJECTION, SOLUTION INTRAVENOUS at 04:35

## 2025-02-10 RX ADMIN — HEPARIN SODIUM 5000 UNITS: 5000 INJECTION, SOLUTION INTRAVENOUS; SUBCUTANEOUS at 06:12

## 2025-02-10 RX ADMIN — BACLOFEN 10 MG: 10 TABLET ORAL at 10:46

## 2025-02-10 RX ADMIN — METOCLOPRAMIDE 5 MG: 10 TABLET ORAL at 15:52

## 2025-02-10 RX ADMIN — INSULIN HUMAN 1 UNITS: 100 INJECTION, SOLUTION PARENTERAL at 04:16

## 2025-02-10 RX ADMIN — HEPARIN SODIUM 5000 UNITS: 5000 INJECTION, SOLUTION INTRAVENOUS; SUBCUTANEOUS at 23:06

## 2025-02-10 RX ADMIN — SODIUM CHLORIDE 100 ML/HR: 9 INJECTION, SOLUTION INTRAVENOUS at 23:07

## 2025-02-10 RX ADMIN — MYCOPHENOLIC ACID 360 MG: 360 TABLET, DELAYED RELEASE ORAL at 06:12

## 2025-02-10 RX ADMIN — INSULIN HUMAN 1 UNITS: 100 INJECTION, SOLUTION PARENTERAL at 15:52

## 2025-02-10 RX ADMIN — TACROLIMUS 3 MG: 1 CAPSULE ORAL at 06:12

## 2025-02-10 RX ADMIN — PIPERACILLIN SODIUM AND TAZOBACTAM SODIUM 2.25 G: 2; .25 INJECTION, SOLUTION INTRAVENOUS at 23:07

## 2025-02-10 RX ADMIN — PIPERACILLIN SODIUM AND TAZOBACTAM SODIUM 2.25 G: 2; .25 INJECTION, SOLUTION INTRAVENOUS at 17:06

## 2025-02-10 RX ADMIN — NYSTATIN 500000 UNITS: 500000 SUSPENSION ORAL at 13:38

## 2025-02-10 RX ADMIN — NYSTATIN 500000 UNITS: 500000 SUSPENSION ORAL at 23:07

## 2025-02-10 RX ADMIN — PANTOPRAZOLE SODIUM 40 MG: 40 TABLET, DELAYED RELEASE ORAL at 15:52

## 2025-02-10 RX ADMIN — SODIUM CHLORIDE 100 ML/HR: 9 INJECTION, SOLUTION INTRAVENOUS at 02:51

## 2025-02-10 RX ADMIN — GABAPENTIN 200 MG: 100 CAPSULE ORAL at 11:31

## 2025-02-10 RX ADMIN — INSULIN HUMAN 1 UNITS: 100 INJECTION, SOLUTION PARENTERAL at 11:48

## 2025-02-10 RX ADMIN — SODIUM CHLORIDE 100 ML/HR: 9 INJECTION, SOLUTION INTRAVENOUS at 14:19

## 2025-02-10 ASSESSMENT — COGNITIVE AND FUNCTIONAL STATUS - GENERAL
HELP NEEDED FOR BATHING: A LITTLE
DRESSING REGULAR LOWER BODY CLOTHING: A LITTLE
STANDING UP FROM CHAIR USING ARMS: A LITTLE
CLIMB 3 TO 5 STEPS WITH RAILING: A LITTLE
DRESSING REGULAR UPPER BODY CLOTHING: A LITTLE
TOILETING: A LOT
TOILETING: A LITTLE
WALKING IN HOSPITAL ROOM: A LITTLE
DRESSING REGULAR LOWER BODY CLOTHING: A LITTLE
DRESSING REGULAR UPPER BODY CLOTHING: A LITTLE
MOVING FROM LYING ON BACK TO SITTING ON SIDE OF FLAT BED WITH BEDRAILS: A LOT
MOBILITY SCORE: 14
MOBILITY SCORE: 21
HELP NEEDED FOR BATHING: A LOT
MOVING TO AND FROM BED TO CHAIR: A LOT
DAILY ACTIVITIY SCORE: 20
DAILY ACTIVITIY SCORE: 18
STANDING UP FROM CHAIR USING ARMS: A LITTLE
CLIMB 3 TO 5 STEPS WITH RAILING: A LOT
WALKING IN HOSPITAL ROOM: A LITTLE
TURNING FROM BACK TO SIDE WHILE IN FLAT BAD: A LOT

## 2025-02-10 ASSESSMENT — PAIN - FUNCTIONAL ASSESSMENT
PAIN_FUNCTIONAL_ASSESSMENT: 0-10

## 2025-02-10 ASSESSMENT — PAIN SCALES - GENERAL
PAINLEVEL_OUTOF10: 0 - NO PAIN

## 2025-02-10 ASSESSMENT — ACTIVITIES OF DAILY LIVING (ADL)
ADL_ASSISTANCE: INDEPENDENT
BATHING_ASSISTANCE: MODERATE
ADL_ASSISTANCE: INDEPENDENT

## 2025-02-10 NOTE — CONSULTS
Inpatient consult to Endocrinology  Consult performed by: Lino Garcia MD  Consult ordered by: John Zimmer MD  Reason for consult: Hx of DDKT on chronic steroid with recent DKA and hyperglycemia    History Of Present Illness  Temo Hanson is a 74 y.o. 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 DKA,  he was started on insulin drip and transferred to  for further management in setting of recent kidney transplant history.  On chart review, labs from outside hospital reviewed and it was noted that patient was not in DKA given venous pH 7.4, BHB 0.14, , creatinine 1.6.  On arrival to [facility], insulin drip was discontinued and patient was started on regular insulin sliding scale #1 with meals with blood glucose ranging from 140-180s.  Endocrinology service is consulted for management of diabetes regime in setting of recent DDKT and chronic steroid use.    Diabetes History  Type of diabetes: 2  Year diagnosed at age: 46 years old  Hospitalizations for DKA or HHS: no recent episodes  Complications: Nephropathy s/p DDKT  Seen by PCP or Endocrinology: last visit with MARISOL Lainez endocrinology on 1/30 /25  Frequency of glucose checks: 4-5x daily after meals  Glucose review: -170s on regular SS #1 q 4  Frequency of Hypoglycemia: none  Severe hypoglycemia requiring assistance from others:  N    Home Medications  Basal: Basaglar 10 U q AM  Prandial: Humulin  15 units once at 7:00 pm with tube feeds, insulin aspart 3 units with meals  Correction:Aspart sliding scale #2 with meals    Diet : Clear liquid diet, Peptide 1.5 tube feeds at 65 cc/h from 7 PM to 12 noon    Results from Most Recent A1C  Hemoglobin A1C   Date/Time Value Ref Range Status   10/29/2024 12:02 PM 6.1 (H) See comment % Final        Diabetes Problem List Entries with Dates  Problem List:  2025-02: DKA,  "type 2, not at goal  2025-01: Type 2 diabetes mellitus with hyperglycemia, with long-term   current use of insulin  2024-12: Type 2 diabetes mellitus, with long-term current use of   insulin  2023-11: Diabetes mellitus (Multi)  2007-11: Type 2 diabetes mellitus with diabetic neuropathy (Multi)    Past Medical History  He has a past medical history of Chronic kidney disease, DM (diabetes mellitus) (Multi), Fistula, HTN (hypertension), Other specified abnormal immunological findings in serum (10/11/2021), and Personal history of malignant neoplasm of prostate.    Surgical History  He has a past surgical history that includes Other surgical history (09/29/2021); Other surgical history (09/29/2021); Other surgical history (09/29/2021); Other surgical history (09/29/2021); Cholecystectomy (10/23/2023); Cardiac catheterization (N/A, 4/18/2024); and Cardiac electrophysiology procedure (N/A, 7/17/2024).     Social History  He reports that he has never smoked. He has never used smokeless tobacco. He reports that he does not drink alcohol and does not use drugs.    Family History  Family History   Problem Relation Name Age of Onset   • Diabetes Sister     • Diabetes Brother          Allergies  Terazosin, Codeine, and Tramadol  Current Outpatient Medications   Medication Instructions   • acetaminophen (Tylenol) 160 mg/5 mL liquid Take 20.3 mL (650 mg) by g-tube route every 6 hours if needed for pain.   • alcohol swabs pads, medicated 1 each, topical (top), 4 times daily, Use prior to checking glucose or injecting insulin   • amino acids-protein hydrolys (Pro-Stat AWC)  gram-kcal/30 mL liquid 1 packet, peg tube, Daily   • amLODIPine (NORVASC) 10 mg, oral, Daily   • Basaglar KwikPen U-100 Insulin 7 Units, subcutaneous, Every morning, Take as directed per insulin instructions.   • BD Ultra-Fine Joan Pen Needle 32 gauge x 5/32\" needle    • bisacodyl (DULCOLAX) 10 mg, rectal, Daily   • blood sugar diagnostic (Blood Glucose " "Test) strip Check blood glucose 3 time per day   • blood-glucose meter misc Use to check blood glucose   • calcium polycarbophiL (FIBERCON) 625 mg, oral, 2 times daily   • carboxymethylcellulose (Refresh Plus) 0.5 % ophthalmic solution Instill 1 drop into both eyes 2-4x/day as needed for dryness.   • FreeStyle Mick 2 Sensor kit    • FreeStyle Mick 3 Sensor device    • gabapentin (NEURONTIN) 200 mg, oral, Daily   • HumuLIN N NPH Insulin KwikPen 10 Units, subcutaneous, Daily before evening meal, Please take once every evening when tube feed is going to start at 6 PM   • insulin aspart, with niacinamide, (Fiasp FlexTouch U-100 Insulin) 100 unit/mL (3 mL) injection 0-10 Units, subcutaneous, 3 times daily before meals, 0 unit(s) if Blood glucose is between   2 unit(s) if Blood glucose is between 151-200  4 unit(s) if Blood glucose is between 201-250  6 unit(s) if Blood glucose is between 251-300  8 unit(s) if Blood glucose is between 301-350  10 unit(s) if Blood glucose is between 351-400    If blood glucose is greater than 400 mg/dL, give max insulin per sliding scale AND then contact provider.   • lactulose 20 g, oral, 2 times daily, Put through PEG, flush with 30 mL of water afterwards   • lancets 30 gauge misc 1 each, miscellaneous, 4 times daily, Use to check glucose 4 times daily, before meals and at bedtime   • magnesium oxide (MAG-OX) 400 mg, oral, Daily   • methocarbamol (ROBAXIN) 500 mg, oral, Every 8 hours scheduled   • metoclopramide (REGLAN) 5 mg, oral, Every 8 hours   • mycophenolate (MYFORTIC) 360 mg, oral, 2 times daily   • nystatin (MYCOSTATIN) 500,000 Units, Swish & Swallow, 4 times daily   • pantoprazole (PROTONIX) 40 mg, oral, 2 times daily before meals, Do not crush, chew, or split.   • pen needle, diabetic 32 gauge x 5/32\" needle 1 each, miscellaneous, 4 times daily, Use to inject insulin 4 times daily   • pen needle, diabetic 32 gauge x 5/32\" needle Use 1 needle once daily in the evening.  " Take before meals.   • predniSONE (DELTASONE) 5 mg, oral, Daily   • rosuvastatin (CRESTOR) 10 mg, oral, Nightly   • sodium zirconium cyclosilicate (LOKELMA) 5 g, oral, Daily   • tacrolimus (PROGRAF) 3 mg, oral, Every 12 hours   • thiamine (VITAMIN B-1) 100 mg, oral, Daily   • valGANciclovir (VALCYTE) 900 mg, oral, Daily, Do not crush or chew.   • Vital 1.5 Chelita 65 mL/hr, peg tube, Daily, Cycled from 6 PM to 12 PM daily   • Vitamin D3 2,000 Units, oral, Daily      Last Recorded Vitals  Blood pressure 147/50, pulse 78, temperature 36.5 °C (97.7 °F), temperature source Temporal, resp. rate 25, weight 66.8 kg (147 lb 4.3 oz), SpO2 100%.  Review of Systems  All systems reviewed with the patient and they were all negative, unless noted above.     Physical Exam  General: patient in NAD, sleeping, easily arousable  HEENT: AT/NC  Neck: trachea in midline, no thyromegaly or nodules  Resp: CTA B/L  CVS: normal s1 and s2  Abdomen: soft and non tender to palpation, BS+  Skin: warm, dry and intact  Neuro: AAO x3, no focal neurological deficit  Psych: cooperative     ROS, PMH, FH/SH, surgical history and allergies have been reviewed.   Relevant Results  Results from last 7 days   Lab Units 02/10/25  1143 02/10/25  0955 02/10/25  0831 02/10/25  0416 02/10/25  0414 02/09/25  2338 02/09/25  2035 02/09/25  1649 02/09/25  1621 02/09/25  1254   POCT GLUCOSE mg/dL 175*  --  150*  --  160* 139* 151*  --    < >  --    GLUCOSE mg/dL  --  153*  --  145*  --   --   --  149*  --  219*    < > = values in this interval not displayed.     Lab Results   Component Value Date    HGBA1C 6.1 (H) 10/29/2024    HGBA1C 5.7 (H) 04/20/2024    HGBA1C 7.3 (H) 02/21/2023     (L) 02/10/2025    K 5.7 (H) 02/10/2025     02/10/2025    CO2 15 (L) 02/10/2025    BUN 35 (H) 02/10/2025    CREATININE 1.18 02/10/2025    CALCIUM 6.6 (L) 02/10/2025    ALBUMIN 1.9 (L) 02/10/2025    PROT 8.8 (H) 12/20/2024    BILITOT 0.5 12/20/2024    ALKPHOS 189 (H) 12/20/2024     ALT 18 12/20/2024    AST 21 12/20/2024    GLUCOSE 169 (H) 02/10/2025    CHOL 183 10/29/2024    HDL 53.1 10/29/2024    CPEPTIDE 4.8 (H) 10/29/2024      Lab Results   Component Value Date    TSH 1.24 04/19/2024      Assessment/Plan   Temo Hanson is a 74 y.o. 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 DKA,  he was started on insulin drip and transferred to  for further management in setting of recent kidney transplant history.  On chart review, labs from outside hospital reviewed and it was noted that patient was not in DKA given venous pH 7.4, BHB 0.14, , creatinine 1.6.  On arrival to [facility], insulin drip was discontinued and patient was started on regular insulin sliding scale #1 with meals with blood glucose ranging from 140-180s.  Endocrinology service is consulted for management of diabetes regime in setting of recent DDKT and chronic steroid use.    Diabetes History  Type of diabetes: 2  Year diagnosed at age: 46 years old  Hospitalizations for DKA or HHS: no recent episodes  Complications: Nephropathy s/p DDKT  Seen by PCP or Endocrinology: last visit with MARISOL Lainez endocrinology on 1/30/25  Frequency of glucose checks: 4-5x daily after meals  Glucose review: -170s on regular SS #1 q 4  Frequency of Hypoglycemia: none  Severe hypoglycemia requiring assistance from others:  N    Home Medications  Basal: Basaglar 10 U q AM  Prandial: Humulin  15 units once at 7:00 pm with tube feeds, insulin aspart 3 units with meals  Correction:Aspart sliding scale #2 with meals    Diet : Clear liquid diet, tube feeds at 60 cc/h from 7 PM to 10 AM  Steroids: Tablet prednisone 5 mg daily  Nutrition: 60g CHO per meal     Recommendations:  -Discontinue regular insulin sliding scale   -Hold off on basal insulin at this time given minimal insulin requirement while  inpatient   -Start insulin NPH 10 units nightly with tube feeds.  Patient planned to receive tube feeds at 60 cc/h from 7 AM to 10 AM  -Start insulin lispro sliding scale #1 every 4 hourly  -Accu-Cheks Q4 hourly  -Goal blood glucose 140-180 mg%  -Hypoglycemia protocol    Discharge planning:   [] patient may expect to discharge home on MDI, final doses TBD by titration  [X patient to follow with Tiffanie Crum, endocrinology PA.  Patient has visit appointment scheduled on 2/18/2025    Recommendations communicated to primary team. Please reach out incase you have any questions or concerns.    The patient was seen and discussed with attending Dr. Uribe.    Lino Garcia MD  Endocrinology fellow

## 2025-02-10 NOTE — CONSULTS
Nutrition Initial Assessment:   Nutrition Assessment    Reason for Assessment: Enteral assessment/recommendation (TF)    Patient is a 74 y.o. male presenting with c/f diabetic ketoacidosis, after he was found to have hyperglycemia in the 500s at an OSH. Pt s/p DDKT on 12/21/24.      Nutrition History:  Food and Nutrient History: Met with pt this morning at bedside. Pt known well to this service. Has hx of poor PO with PEG placement. Has hx of intolerance to Glucerna and higher volume formulas. He says he continues on Vital 1.5 overnight. He says he runs it at 67.7ml/hr. He stops it at 10am but was unable to give me exact time that his TF is started (assume he is running 1000ml over night until it runs out).     Patient said that he eats lots of toast. He was seen by outpatient nutrition last week. He reported intake of 3 meals/day: Breakfast: pancakes, omelet, Glucerna; Lunch: toast and barley soup: Dinner: Pancakes and glucerna. He will also drink water and gingerale (unclear if it is diet or full sugar). He says that he has continued to have weight loss over the last month, but did not state how much. He says that when he startes his TF at night his glucose will be in the 130s but when he wakes up in the morning it will be in the 400s. Pt's PO choices are high in carbohydrates. Discussed possibility of changing formula and/or rate/frequency. Spoke to transplant FILI about options.       Anthropometrics:   Height: 185.4cm  Weight: 66.8 kg (147 lb 4.3 oz)    BMI: 19.43  IBW/kg (Dietitian Calculated): 83.6 kg  Percent of IBW: 80 %       Weight History:   Wt Readings from Last 15 Encounters:   02/09/25 66.8 kg (147 lb 4.3 oz)   02/03/25 70 kg (154 lb 6.4 oz)   02/02/25 67.3 kg (148 lb 6.4 oz)   01/27/25 68.9 kg (152 lb)   01/22/25 69.5 kg (153 lb 3.2 oz)   12/31/24 74.1 kg (163 lb 4.8 oz)   12/26/24 76.4 kg (168 lb 6.9 oz)   01/29/25 67 kg (147 lb 9.6 oz)   10/29/24 74.3 kg (163 lb 12.8 oz)   08/14/24 66.2 kg (146 lb)    07/18/24 66.5 kg (146 lb 11.2 oz)   06/18/24 78.5 kg (173 lb)   06/12/24 73.9 kg (163 lb)   05/08/24 74.1 kg (163 lb 6.4 oz)   04/24/24 88.7 kg (195 lb 8.8 oz)       Weight Change %:  Weight History / % Weight Change: Weight fluctuations make it difficult to quantify weight loss    Nutrition Focused Physical Exam Findings:    Subcutaneous Fat Loss:   Orbital Fat Pads: Mild-Moderate (slight dark circles and slight hollowing)  Buccal Fat Pads: Severe (hollow, sunken and narrow face)  Triceps: Severe (negligible fat tissue)  Muscle Wasting:  Temporalis: Severe (hollowed scooping depression)  Pectoralis (Clavicular Region): Severe (protruding prominent clavicle)  Deltoid/Trapezius: Mild-Moderate (slight protrusion of acromion process)  Interosseous: Mild-Moderate (slightly depressed area between thumb and forefinger)  Quadriceps: Severe (depressions on inner and outer thigh)  Gastrocnemius: Severe (minimal muscle definition)  Edema:  Edema: none  Physical Findings:  Skin: Positive (sacrum + buttock)  Positive Skin Findings: Pressure injury stage 2    Nutrition Significant Labs:  CBC Trend:   Results from last 7 days   Lab Units 02/10/25  0416 02/09/25  1254   WBC AUTO x10*3/uL 10.7 10.5   RBC AUTO x10*6/uL 3.31* 3.21*   HEMOGLOBIN g/dL 9.5* 9.3*   HEMATOCRIT % 29.5* 27.7*   MCV fL 89 86   PLATELETS AUTO x10*3/uL 142* 124*    , BMP Trend:   Results from last 7 days   Lab Units 02/10/25  0955 02/10/25  0416 02/09/25  1649 02/09/25  1254   GLUCOSE mg/dL 153* 145* 149* 219*   CALCIUM mg/dL 7.3* 7.5* 8.1* 8.2*   SODIUM mmol/L 127* 128* 122* 119*   POTASSIUM mmol/L 4.8 4.7 5.0 6.1*   CO2 mmol/L 20* 19* 22 21   CHLORIDE mmol/L 99 99 92* 89*   BUN mg/dL 38* 41* 53* 54*   CREATININE mg/dL 1.31* 1.37* 1.62* 1.65*    , A1C:  Lab Results   Component Value Date    HGBA1C 6.1 (H) 10/29/2024   , BG POCT trend:   Results from last 7 days   Lab Units 02/10/25  1143 02/10/25  0831 02/10/25  0414 02/09/25  2338 02/09/25 2035   POCT  "GLUCOSE mg/dL 175* 150* 160* 139* 151*    , Renal Lab Trend:   Results from last 7 days   Lab Units 02/10/25  0955 02/10/25  0416 02/09/25  1649 02/09/25  1254   POTASSIUM mmol/L 4.8 4.7 5.0 6.1*   PHOSPHORUS mg/dL 3.4 2.8 2.3* 2.3*   SODIUM mmol/L 127* 128* 122* 119*   MAGNESIUM mg/dL  --  1.68  --  1.90   EGFR mL/min/1.73m*2 57* 54* 44* 43*   BUN mg/dL 38* 41* 53* 54*   CREATININE mg/dL 1.31* 1.37* 1.62* 1.65*    , Vit D:   Lab Results   Component Value Date    VITD25 34 01/05/2025    , Vit B12: No results found for: \"[id number]\"     Nutrition Specific Medications:  Scheduled medications  gabapentin, 200 mg, oral, Daily  heparin (porcine), 5,000 Units, subcutaneous, q8h KASSY  insulin regular, 0-5 Units, subcutaneous, q4h  metoclopramide, 5 mg, oral, q8h  mycophenolate, 360 mg, oral, q12h  nystatin, 5 mL, Swish & Swallow, 4x daily  pantoprazole, 40 mg, oral, BID AC  piperacillin-tazobactam, 2.25 g, intravenous, q6h  predniSONE, 5 mg, oral, Daily  tacrolimus, 3 mg, oral, q12h KASSY  valGANciclovir, 450 mg, oral, Daily      Continuous medications  sodium chloride 0.9%, 100 mL/hr, Last Rate: 100 mL/hr (02/10/25 1419)      PRN medications  PRN medications: dextrose, dextrose, glucagon, glucagon      I/O:    ; Stool Appearance: Unable to assess (02/09/25 2000)    Dietary Orders (From admission, onward)       Start     Ordered    02/10/25 1351  Enteral feeding WITH diet order Diet type: Clear Liquid; Tube feeding formula: [employer] Peptide 1.5; Start 1900, Stop 1200; 65  Continuous        Question Answer Comment   Diet type Clear Liquid    Tube feeding formula: [employer] Peptide 1.5    Feeding route: PEG (percutaneous endoscopic gastric)    Tube feeding cyclic (start / stop time): Start 1900, Stop 1200    Tube feeding cyclic rate (mL/hr): 65        02/10/25 1356    02/09/25 1420  May Not Participate in Room Service  ( ROOM SERVICE MAY NOT PARTICIPATE)  Once        Question:  .  Answer:  Yes    02/09/25 1412              "        Estimated Needs:   Total Energy Estimated Needs in 24 hours (kCal):  (5076-5219)  Method for Estimating Needs: 30-35kcal/kg CBW  Total Protein Estimated Needs in 24 Hours (g): 87 g  Method for Estimating 24 Hour Protein Needs: 1.3g/kg CBW +  Total Fluid Estimated Needs in 24 Hours (mL):  (per team)           Nutrition Diagnosis   Malnutrition Diagnosis  Patient has Malnutrition Diagnosis: Yes  Diagnosis Status: New  Malnutrition Diagnosis: Severe malnutrition related to chronic disease or condition  Related to: energy intake < energy expenditure  As Evidenced by: moderate- severe subcutaneous fat + muscle loss  Additional Assessment Information: pt requires PEG tube to supplement PO-  not sticking to his recommended TF regimen            Nutrition Interventions/Recommendations   Nutrition prescription for enteral nutrition    Nutrition Recommendations:  Individualized Nutrition Prescription Provided for:  [employer] Peptide 1.5 @ 50ml/hr x 24 hours or 65ml/hr x 18 hours cycle to provide ~ 1800kcal and 89gm protein    - advancement per MD/team     Recommend trial in [employer] Peptide 1.5 as it is still semi-elemental, but lower in carbohydrates than Vital 1.5     1200ml of Lana Agendia Peptide 1.5 provides ~ 167g carbohydrate daily vs 1200ml Vital 1.5 which provides 224gm carbohydrate daily      Endocrine to manage insulin dosage     Nutrition Interventions/Goals:   Interventions: Enteral intake  Enteral Intake: Management of composition of enteral nutrition  Coordination of Care with Providers: Other (Comment) (NP)      Education Documentation  No documentation found.            Nutrition Monitoring and Evaluation   Food/Nutrient Related History Monitoring  Monitoring and Evaluation Plan: Enteral and parenteral nutrition intake determination  Enteral and Parenteral Nutrition Intake Determination: Enteral nutrition intake - Tolerate TF at goal rate    Anthropometric Measurements  Monitoring and Evaluation Plan:  Body weight  Body Weight: Body weight - Maintain stable weight    Biochemical Data, Medical Tests and Procedures  Monitoring and Evaluation Plan: Electrolyte/renal panel, Glucose/endocrine profile  Electrolyte and Renal Panel: Electrolytes within normal limits  Glucose/Endocrine Profile: Glucose within normal limits ( mg/dL)         Goal Status: New goal(s) identified    Time Spent (min): 60 minutes

## 2025-02-10 NOTE — PROGRESS NOTES
Occupational Therapy    Evaluation    Patient Name: Temo Hanson  MRN: [id number]  Today's Date: 2/10/2025  Room: 10/10  Time Calculation  Start Time: 1329  Stop Time: 1410  Time Calculation (min): 41 min    Assessment  IP OT Assessment  OT Assessment: Pt is a 74 year old male who demonstrates decreased strength, activity tolerance, and balance, which impedes occupational performance.  Prognosis: Good  Barriers to Discharge Home: Physical needs, Caregiver assistance  Caregiver Assistance: Caregiver assistance needed per identified barriers - however, level of patient's required assistance exceeds assistance available at home  Physical Needs: Stair navigation into home limited by function/safety, Stair navigation to access bed limited by function/safety, Stair navigation to access bath limited by function/safety, 24hr mobility assistance needed, Intermittent ADL assistance needed, High falls risk due to function or environment  Evaluation/Treatment Tolerance: Patient tolerated treatment well  Medical Staff Made Aware: Yes  End of Session Communication: Bedside nurse  End of Session Patient Position: Bed, 3 rail up, Alarm off, not on at start of session    Plan:  Inpatient Plan  Treatment Interventions: ADL retraining, Functional transfer training, UE strengthening/ROM, Endurance training, Cognitive reorientation, Patient/family training, Equipment evaluation/education, Neuromuscular reeducation, Fine motor coordination activities, Compensatory technique education  OT Frequency: 3 times per week  OT Discharge Recommendations: Moderate intensity level of continued care  Equipment Recommended upon Discharge: Wheeled walker  OT Recommended Transfer Status: Assist of 2  OT - OK to Discharge: Yes  OT Assessment  OT Assessment Results: Decreased ADL status, Decreased cognition, Decreased safe judgment during ADL, Decreased endurance, Decreased functional mobility, Decreased IADLs  Prognosis: Good  Evaluation/Treatment  Tolerance: Patient tolerated treatment well  Medical Staff Made Aware: Yes    Subjective   Current Problem:  1. DKA, type 2, not at goal          General:  Reason for Referral: Hyperglycemia, transferred to [facility] for evaluation I/s/o recent kidney transplant  Past Medical History Relevant to Rehab: Hypertension, hyperlipidemia, complete heart block status post leadless pacemaker insertion on 7/17/2024, pericardial effusion, pheochromocytoma of the right adrenal gland, history of prostate cancer, GI bleed, kidney transplant 12/2024  Co-Treatment: PT  Co-Treatment Reason: initially attempted to evaluate individually, pt declined and reported desire to only get OOB 1 time this date.  Prior to Session Communication: Bedside nurse  Patient Position Received: Bed, 3 rail up, Alarm off, not on at start of session  Family/Caregiver Present: Yes  Caregiver Feedback: son at bedside  General Comment: Pt supine in bed on arrival, agreeable on 2nd attempt to evaluate. Reports frustration with lack of quality sleep.     Precautions:  Medical Precautions: Fall precautions  Precautions Comment: receny kidney transplant, MAP 65-90    Vital Signs:   02/10/25 1329 02/10/25 1410   Vital Signs   Vitals Session Pre OT Post OT   Heart Rate 66 97   Resp (!) 27 26   SpO2 100 % 99 %   /57 (!) 141/41   MAP (mmHg) 80 68       Pain:  Pain Assessment  Pain Assessment: 0-10  0-10 (Numeric) Pain Score: 0 - No pain    Lines/Tubes/Drains:  Hemodialysis Arteriovenous Graft Left Upper arm (Active)   Number of days:        Gastrostomy/Enterostomy PEG-jejunostomy RLQ (Active)   Number of days: 33       External Urinary Catheter Male (Active)   Number of days: 1     Objective   Cognition:  Overall Cognitive Status: Impaired  Arousal/Alertness: Delayed responses to stimuli  Orientation Level: Oriented X4  Following Commands: Follows one step commands with increased time  Cognition Comments: CAM-ICU (-), speech soft. Reports frustration with lack of  quality of sleep. Does endorse some feelings of confusion since admission.  Memory: Exceptions to WFL  Working Memory: Impaired  Insight: Mild  Processing Speed: Delayed    Home Living:  Type of Home: House  Lives With: Spouse  Home Adaptive Equipment: Cane  Home Layout: Two level, Bed/bath upstairs  Alternate Level Stairs-Rails: Right  Home Access: Stairs to enter with rails  Entrance Stairs-Rails: Right  Entrance Stairs-Number of Steps: 3  Bathroom Shower/Tub: Tub/shower unit  Bathroom Equipment: Shower chair without back, Grab bars in shower     Prior Function:  Level of [place]: Independent with ADLs and functional transfers, Independent with homemaking with ambulation  ADL Assistance: Independent  Homemaking Assistance: Independent  Ambulatory Assistance: Independent (with cane)  Vocational: Retired  Leisure: Gardening  Hand Dominance: Right  Prior Function Comments: hasn't drove since kidney transplant    ADL:  Eating Assistance: Independent  Grooming Assistance: Modified independent (Device)  Grooming Deficit:  (seated)  Bathing Assistance: Moderate  UE Dressing Assistance: Stand by  LE Dressing Assistance: Minimal  Toileting Assistance with Device: Moderate    Activity Tolerance:  Endurance: Tolerates 10 - 20 min exercise with multiple rests  Early Mobility/Exercise Safety Screen: Proceed with mobilization - No exclusion criteria met    Balance:  Dynamic Sitting Balance  Dynamic Sitting-Level of Assistance: Close supervision  Dynamic Standing Balance  Dynamic Standing-Level of Assistance: Minimum assistance (x2 assist)  Dynamic Standing-Comments: side steps toward HOB  Static Sitting Balance  Static Sitting-Level of Assistance: Close supervision  Static Standing Balance  Static Standing-Level of Assistance: Minimum assistance (x2 assist)    Bed Mobility/Transfers: Bed Mobility  Bed Mobility: Yes  Bed Mobility 1  Bed Mobility 1: Supine to sitting  Level of Assistance 1: Maximum assistance, +2  Bed  Mobility Comments 1: draw sheet, [facility] elevated  Bed Mobility 2  Bed Mobility  2: Sitting to supine  Level of Assistance 2: Minimum assistance, +2   and Transfers  Transfer: Yes  Transfer 1  Transfer From 1: Sit to  Transfer to 1: Stand  Transfer Level of Assistance 1: Minimum assistance, +2  Trials/Comments 1: bilat HHA    Vision: Vision - Basic Assessment  Current Vision: No visual deficits    Sensation:  Light Touch: No apparent deficits    Strength:  Strength Comments: BUE >/=3/5     Coordination:  Movements are Fluid and Coordinated: Yes      Extremities:   RUE   RUE : Within Functional Limits,   LUE   LUE: Within Functional Limits,       Outcome Measures: [facility] Daily Activity  Putting on and taking off regular lower body clothing: A little  Bathing (including washing, rinsing, drying): A lot  Putting on and taking off regular upper body clothing: A little  Toileting, which includes using toilet, bedpan or urinal: A lot  Taking care of personal grooming such as brushing teeth: None  Eating Meals: None  Daily Activity - Total Score: 18        ICU Mobility Screen  Early Mobility/Exercise Safety Screen: Proceed with mobilization - No exclusion criteria met  ICU Mobility Scale: Marching on spot (at bedside),          Education Documentation  Precautions, taught by Deanna Weaver OT at 2/10/2025  3:45 PM.  Learner: Patient  Readiness: Acceptance  Method: Explanation  Response: Needs Reinforcement  Comment: OT goals/POC    Body Mechanics, taught by Deanna Weaver OT at 2/10/2025  3:45 PM.  Learner: Patient  Readiness: Acceptance  Method: Explanation  Response: Needs Reinforcement  Comment: OT goals/POC    ADL Training, taught by Deanna Weaver OT at 2/10/2025  3:45 PM.  Learner: Patient  Readiness: Acceptance  Method: Explanation  Response: Needs Reinforcement  Comment: OT goals/POC    Education Comments  No comments found.        Goals:     Encounter Problems       Encounter Problems  (Active)       ADLs       Patient will perform UB and LB bathing with modified independent level of assistance.       Start:  02/10/25    Expected End:  02/24/25            Patient with complete lower body dressing with modified independent level of assistance donning and doffing all LE clothes  with PRN adaptive equipment       Start:  02/10/25    Expected End:  02/24/25            Patient will complete toileting including hygiene clothing management/hygiene with modified independent level of assistance.       Start:  02/10/25    Expected End:  02/24/25               COGNITION/SAFETY       Patient will participate in cognitive activities to demonstrate WFL score on further cognitive assessments, including Medi-Cog, MoCA and remain A&O x3, CAM (-).        Start:  02/10/25    Expected End:  02/24/25               MOBILITY       Patient will perform Functional mobility max Household distances with stand by assist level of assistance and least restrictive device in order to improve safety and functional mobility.       Start:  02/10/25    Expected End:  02/24/25               TRANSFERS       Patient will perform bed mobility with SBA level of assistance in order to improve safety and independence with mobility       Start:  02/10/25    Expected End:  02/24/25            Patient will complete functional transfer to chair with least restrictive device with stand by assist level of assistance.       Start:  02/10/25    Expected End:  02/24/25                 02/10/25 at 3:46 PM   Deanna Weaver, OT   Rehab Office: [phone]

## 2025-02-10 NOTE — CONSULTS
INPATIENT INITIAL TRANSPLANT NEPHROLOGY CONSULT          SERVICE DATE: 2/10/2025   SERVICE TIME:  11:56 AM    REASON FOR CONSULT:  Immunosuppressive medication management and nephrology related issues.    REQUESTING PHYSICIAN: Sherly Kang MD  PRIMARY CARE PHYSICIAN: Sheldon Hinson MD    ADMISSION DIAGNOSIS:   1. DKA, type 2, not at goal        TRANSPLANT DATE: 2024 (Kidney)    BLOOD TYPE: O    HPI:    Mr. Hanson is a 74 y.o. 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 Zamora with a KDPI of 93% and PRA of 54%. Donor was Hepc -/-and has not met risk factors. EBV + /+. Left donor kidney transplanted to patient right pelvis. Admission weight is 72.7 kg (discharge weight is 76.4 kg ). Pt received a total of 3 mg/kg total of thymoglobulin induction therapy in conjunction with 500mg IV solumedrol. CMV D-/R+. He had prolonged hospital course that included failure to thrive in adult with dehydration and diagnosis of severe protein-calorie malnutrition, placement of PEG tube, and intolerance of tube feeds initially. He was switched to TPN briefly and his tube feed formula was adjusted as per the dietitian recommendations.     Patient presented to OSH for hyperglycemia without ketosis with glucose >527 and sodium 116, transferred to [facility] for evaluation in the subacute setting of recent kidney transplant. On arrival, patient is cooperative but is a poor historian and unable to remember the events that brought him to the hospital.     Patient is here for follow up s/p kidney transplant.    REVIEW OF SYSTEM:  Review of system was done system by system (10/14). Apart from HPI, other symptoms were negative.    PAST MEDICAL HISTORY:  Past Medical History:   Diagnosis Date    Chronic kidney disease     DM (diabetes mellitus) (Multi)     Fistula     HTN (hypertension)     Other specified abnormal  immunological findings in serum 10/11/2021    Hepatitis B core antibody positive    Personal history of malignant neoplasm of prostate     History of malignant neoplasm of prostate        PAST SURGICAL HISTORY:  Past Surgical History:   Procedure Laterality Date    CARDIAC CATHETERIZATION N/A 4/18/2024    Procedure: Pericardiocentesis;  Surgeon: Jose Brunson MD;  Location: [place] [address] Cardiac Cath Lab;  Service: Cardiovascular;  Laterality: N/A;    CARDIAC ELECTROPHYSIOLOGY PROCEDURE N/A 7/17/2024    Procedure: Leadless PPM Implant ([id number]);  Surgeon: Sheldon De La Torre MD;  Location: [facility] Cardiac Cath Lab;  Service: Electrophysiology;  Laterality: N/A;  8:00, [employer]    CHOLECYSTECTOMY  10/23/2023    Lap Cholecystectomy    OTHER SURGICAL HISTORY  09/29/2021    Cyst excision    OTHER SURGICAL HISTORY  09/29/2021    Arteriovenous fistula creation procedure    OTHER SURGICAL HISTORY  09/29/2021    Prostate surgery    OTHER SURGICAL HISTORY  09/29/2021    Colonoscopy        SOCIAL HISTORY:  Social History     Socioeconomic History    Marital status:      Spouse name: Not on file    Number of children: Not on file    Years of education: Not on file    Highest education level: Not on file   Occupational History    Not on file   Tobacco Use    Smoking status: Never    Smokeless tobacco: Never   Vaping Use    Vaping status: Never Used   Substance and Sexual Activity    Alcohol use: Never    Drug use: Never    Sexual activity: Defer   Other Topics Concern    Not on file   Social History Narrative    Not on file     Social Drivers of Health     Financial Resource Strain: Patient Declined (2/9/2025)    Overall Financial Resource Strain (CARDIA)     Difficulty of Paying Living Expenses: Patient declined   Food Insecurity: Patient Declined (2/9/2025)    Hunger Vital Sign     Worried About Running Out of Food in the Last Year: Patient declined     Ran Out of Food in the Last Year: Patient declined   Transportation  Needs: Patient Declined (2/9/2025)    PRAPARE - Transportation     Lack of Transportation (Medical): Patient declined     Lack of Transportation (Non-Medical): Patient declined   Recent Concern: Transportation Needs - Unmet Transportation Needs (12/31/2024)    PRAPARE - Transportation     Lack of Transportation (Medical): Yes     Lack of Transportation (Non-Medical): Yes   Physical Activity: Not on file   Stress: Patient Declined (2/9/2025)    [employer] - Occupational Stress Questionnaire     Feeling of Stress : Patient declined   Social Connections: Unknown (2/9/2025)    Social Connection and Isolation Panel [NHANES]     Frequency of Communication with Friends and Family: Patient declined     Frequency of Social Gatherings with Friends and Family: Not on file     Attends Restorationist Services: Not on file     Active Member of Clubs or Organizations: Not on file     Attends Club or Organization Meetings: Not on file     Marital Status: Not on file   Intimate Partner Violence: Patient Declined (2/9/2025)    Humiliation, Afraid, Rape, and Kick questionnaire     Fear of Current or Ex-Partner: Patient declined     Emotionally Abused: Patient declined     Physically Abused: Patient declined     Sexually Abused: Patient declined   Housing Stability: Patient Declined (2/9/2025)    Housing Stability Vital Sign     Unable to Pay for Housing in the Last Year: Patient declined     Number of Times Moved in the Last Year: 0     Homeless in the Last Year: Patient declined       FAMILY HISTORY:  Family History   Problem Relation Name Age of Onset    Diabetes Sister      Diabetes Brother         MEDICATION LIST:  gabapentin, 200 mg, Daily  heparin (porcine), 5,000 Units, q8h KASSY  insulin regular, 0-5 Units, q4h  metoclopramide, 5 mg, q8h  mycophenolate, 360 mg, q12h  nystatin, 5 mL, 4x daily  pantoprazole, 40 mg, BID AC  piperacillin-tazobactam, 2.25 g, q6h  predniSONE, 5 mg, Daily  tacrolimus, 3 mg,  q12h On license of [facility]  valGANciclovir, 450 mg, Daily      sodium chloride 0.9%, Last Rate: 100 mL/hr (02/10/25 0251)      dextrose, 12.5 g, q15 min PRN  dextrose, 25 g, q15 min PRN  glucagon, 1 mg, q15 min PRN  glucagon, 1 mg, q15 min PRN        ALLERGY:  Allergies   Allergen Reactions    Terazosin Unknown     Did not feel well on this medication    Codeine Itching     itching    Tramadol Itching       PHYCISCAL EXAMINATION:  Visit Vitals  /50   Pulse 72   Temp 36.5 °C (97.7 °F) (Temporal)   Resp (!) 35   Wt 66.8 kg (147 lb 4.3 oz)   SpO2 100%   BMI 19.43 kg/m²   Smoking Status Never   BSA 1.85 m²        02/08 1900 - 02/10 0659  In: 2545 [I.V.:1395]  Out: 1000 [Urine:1000]       General Appearance - NAD, Good speech, oriented and alert  HEENT - Supple. Not pale. No jaundice. No cervical lymphadenopathy. Pharynx and tonsils are not injected.  CVS - RRR. Normal S1/S2. No murmur, click , rub or gallop  Lungs- clear to auscultation bilaterally  Abdomen - soft , not tender, no guarding, no rigidity. No hepatosplenomegaly. Normal bowel sounds. No masses and ascites. S/P Kidney transplant .  Transplanted kidney is not tender.   Musculoskeletal /Extremities - no edema. Full ROM. No joint tenderness.   Neuro/Psych - appropriate mood and affect. Motor power V/V all extremities. CN I -XII were grossly intact.  Skin - No visible rash    LABS:  Results for orders placed or performed during the hospital encounter of 02/09/25 (from the past 24 hours)   POCT GLUCOSE   Result Value Ref Range    POCT Glucose 281 (H) 74 - 99 mg/dL   Blood Gas Venous Full Panel   Result Value Ref Range    POCT pH, Venous 7.41 7.33 - 7.43 pH    POCT pCO2, Venous 34 (L) 41 - 51 mm Hg    POCT pO2, Venous 42 35 - 45 mm Hg    POCT SO2, Venous      POCT Oxy Hemoglobin, Venous      POCT Hematocrit Calculated, Venous      POCT Sodium, Venous 115 (LL) 136 - 145 mmol/L    POCT Potassium, Venous 6.0 (H) 3.5 - 5.3 mmol/L    POCT Chloride, Venous 90 (L) 98 - 107 mmol/L     POCT Ionized Calicum, Venous 1.21 1.10 - 1.33 mmol/L    POCT Glucose, Venous 223 (H) 74 - 99 mg/dL    POCT Lactate, Venous 1.7 0.4 - 2.0 mmol/L    POCT Base Excess, Venous -2.4 (L) -2.0 - 3.0 mmol/L    POCT HCO3 Calculated, Venous 21.6 (L) 22.0 - 26.0 mmol/L    POCT Hemoglobin, Venous      POCT Anion Gap, Venous 9.0 (L) 10.0 - 25.0 mmol/L    Patient Temperature 37.0 degrees Celsius    FiO2 98 %   Calcium, Ionized   Result Value Ref Range    POCT Calcium, Ionized 1.16 1.1 - 1.33 mmol/L   CBC   Result Value Ref Range    WBC 10.5 4.4 - 11.3 x10*3/uL    nRBC 0.0 0.0 - 0.0 /100 WBCs    RBC 3.21 (L) 4.50 - 5.90 x10*6/uL    Hemoglobin 9.3 (L) 13.5 - 17.5 g/dL    Hematocrit 27.7 (L) 41.0 - 52.0 %    MCV 86 80 - 100 fL    MCH 29.0 26.0 - 34.0 pg    MCHC 33.6 32.0 - 36.0 g/dL    RDW 16.5 (H) 11.5 - 14.5 %    Platelets 124 (L) 150 - 450 x10*3/uL   Magnesium   Result Value Ref Range    Magnesium 1.90 1.60 - 2.40 mg/dL   Renal Function Panel   Result Value Ref Range    Glucose 219 (H) 74 - 99 mg/dL    Sodium 119 (LL) 136 - 145 mmol/L    Potassium 6.1 (HH) 3.5 - 5.3 mmol/L    Chloride 89 (L) 98 - 107 mmol/L    Bicarbonate 21 21 - 32 mmol/L    Anion Gap 15 10 - 20 mmol/L    Urea Nitrogen 54 (H) 6 - 23 mg/dL    Creatinine 1.65 (H) 0.50 - 1.30 mg/dL    eGFR 43 (L) >60 mL/min/1.73m*2    Calcium 8.2 (L) 8.6 - 10.6 mg/dL    Phosphorus 2.3 (L) 2.5 - 4.9 mg/dL    Albumin 2.7 (L) 3.4 - 5.0 g/dL   Urinalysis with Reflex Culture and Microscopic   Result Value Ref Range    Color, Urine Light-Orange (N) Light-Yellow, Yellow, Dark-Yellow    Appearance, Urine Turbid (N) Clear    Specific Gravity, Urine 1.013 1.005 - 1.035    pH, Urine 8.5 (N) 5.0, 5.5, 6.0, 6.5, 7.0, 7.5, 8.0    Protein, Urine 200 (2+) (A) NEGATIVE, 10 (TRACE), 20 (TRACE) mg/dL    Glucose, Urine 100 (1+) (A) Normal mg/dL    Blood, Urine NEGATIVE NEGATIVE mg/dL    Ketones, Urine NEGATIVE NEGATIVE mg/dL    Bilirubin, Urine NEGATIVE NEGATIVE mg/dL    Urobilinogen, Urine Normal  Normal mg/dL    Nitrite, Urine NEGATIVE NEGATIVE    Leukocyte Esterase, Urine 500 Damian/uL (A) NEGATIVE   Extra Urine Gray Tube   Result Value Ref Range    Extra Tube Hold for add-ons.    Microscopic Only, Urine   Result Value Ref Range    WBC, Urine 1-5 1-5, NONE /HPF    RBC, Urine 1-2 NONE, 1-2, 3-5 /HPF    Mucus, Urine FEW Reference range not established. /LPF   POCT GLUCOSE   Result Value Ref Range    POCT Glucose 150 (H) 74 - 99 mg/dL   Renal function panel   Result Value Ref Range    Glucose 149 (H) 74 - 99 mg/dL    Sodium 122 (L) 136 - 145 mmol/L    Potassium 5.0 3.5 - 5.3 mmol/L    Chloride 92 (L) 98 - 107 mmol/L    Bicarbonate 22 21 - 32 mmol/L    Anion Gap 13 10 - 20 mmol/L    Urea Nitrogen 53 (H) 6 - 23 mg/dL    Creatinine 1.62 (H) 0.50 - 1.30 mg/dL    eGFR 44 (L) >60 mL/min/1.73m*2    Calcium 8.1 (L) 8.6 - 10.6 mg/dL    Phosphorus 2.3 (L) 2.5 - 4.9 mg/dL    Albumin 2.5 (L) 3.4 - 5.0 g/dL   Coagulation Screen   Result Value Ref Range    Protime 13.3 (H) 9.8 - 12.8 seconds    INR 1.2 (H) 0.9 - 1.1    aPTT 24 (L) 27 - 38 seconds   ECG 12 Lead   Result Value Ref Range    Ventricular Rate 69 BPM    Atrial Rate 98 BPM    QRS Duration 146 ms    QT Interval 414 ms    QTC Calculation(Bazett) 443 ms    P Axis 72 degrees    R Axis 9 degrees    T Axis -61 degrees    QRS Count 11 beats    Q Onset 211 ms    T Offset 418 ms    QTC Fredericia 434 ms   POCT GLUCOSE   Result Value Ref Range    POCT Glucose 151 (H) 74 - 99 mg/dL   POCT GLUCOSE   Result Value Ref Range    POCT Glucose 139 (H) 74 - 99 mg/dL   POCT GLUCOSE   Result Value Ref Range    POCT Glucose 160 (H) 74 - 99 mg/dL   CBC   Result Value Ref Range    WBC 10.7 4.4 - 11.3 x10*3/uL    nRBC 0.0 0.0 - 0.0 /100 WBCs    RBC 3.31 (L) 4.50 - 5.90 x10*6/uL    Hemoglobin 9.5 (L) 13.5 - 17.5 g/dL    Hematocrit 29.5 (L) 41.0 - 52.0 %    MCV 89 80 - 100 fL    MCH 28.7 26.0 - 34.0 pg    MCHC 32.2 32.0 - 36.0 g/dL    RDW 17.1 (H) 11.5 - 14.5 %    Platelets 142 (L) 150 - 450  x10*3/uL   Renal function panel   Result Value Ref Range    Glucose 145 (H) 74 - 99 mg/dL    Sodium 128 (L) 136 - 145 mmol/L    Potassium 4.7 3.5 - 5.3 mmol/L    Chloride 99 98 - 107 mmol/L    Bicarbonate 19 (L) 21 - 32 mmol/L    Anion Gap 15 10 - 20 mmol/L    Urea Nitrogen 41 (H) 6 - 23 mg/dL    Creatinine 1.37 (H) 0.50 - 1.30 mg/dL    eGFR 54 (L) >60 mL/min/1.73m*2    Calcium 7.5 (L) 8.6 - 10.6 mg/dL    Phosphorus 2.8 2.5 - 4.9 mg/dL    Albumin 2.3 (L) 3.4 - 5.0 g/dL   Magnesium   Result Value Ref Range    Magnesium 1.68 1.60 - 2.40 mg/dL   CALCIUM, IONIZED   Result Value Ref Range    POCT Calcium, Ionized 1.11 1.1 - 1.33 mmol/L   Coagulation Screen   Result Value Ref Range    Protime 13.3 (H) 9.8 - 12.8 seconds    INR 1.2 (H) 0.9 - 1.1    aPTT 21 (L) 27 - 38 seconds   Tacrolimus level   Result Value Ref Range    Tacrolimus  5.7 <=15.0 ng/mL   POCT GLUCOSE   Result Value Ref Range    POCT Glucose 150 (H) 74 - 99 mg/dL   Renal function panel   Result Value Ref Range    Glucose 153 (H) 74 - 99 mg/dL    Sodium 127 (L) 136 - 145 mmol/L    Potassium 4.8 3.5 - 5.3 mmol/L    Chloride 99 98 - 107 mmol/L    Bicarbonate 20 (L) 21 - 32 mmol/L    Anion Gap 13 10 - 20 mmol/L    Urea Nitrogen 38 (H) 6 - 23 mg/dL    Creatinine 1.31 (H) 0.50 - 1.30 mg/dL    eGFR 57 (L) >60 mL/min/1.73m*2    Calcium 7.3 (L) 8.6 - 10.6 mg/dL    Phosphorus 3.4 2.5 - 4.9 mg/dL    Albumin 2.2 (L) 3.4 - 5.0 g/dL   POCT GLUCOSE   Result Value Ref Range    POCT Glucose 175 (H) 74 - 99 mg/dL        ASSESSMENT AND PLAN:  Mr. Hanson is a 74 y.o. male who underwent a kidney transplant surgery on [unfilled] and was hospitalized for:    Principal Problem:    DKA, type 2, not at goal      1. ALLEY S/P Kidney transplant.   - Renal allograft function:   Lab Results   Component Value Date    CREATININE 1.31 (H) 02/10/2025     Estimated Creatinine Clearance: 46.7 mL/min (A) (by C-G formula based on SCr of 1.31 mg/dL (H)).    Intake/Output Summary (Last 24 hours) at  2/10/2025 1156  Last data filed at 2/10/2025 1000  Gross per 24 hour   Intake 2845 ml   Output 1400 ml   Net 1445 ml     - Cr is trending downwards from 1.6 after DKA treatment and fluid resuscitation.    - Continue to monitor UOP and Serum creatinine closely.   - Avoid nephrotoxic agents, NSAIDs and IV contrast   - Strict I/O.   - Renally dose all medications by the most recent CrCl from Cockcroft-Gault formula.    2. Immunosuppression   - continue current immunosuppression   - Monitor tacrolimus trough level   -FK 5.7, out first level on admission. He's on 3 mg BID, the timing from his dose last night looks accurate, not sure if he missed any doses at the OSH. Continue current dose and reassess tomorrow.  - Myfortic 360 mg bid  -Prednisone 5 mg daily    3. Anemia and WBC   Lab Results   Component Value Date    WBC 10.7 02/10/2025    HGB 9.5 (L) 02/10/2025    HCT 29.5 (L) 02/10/2025    MCV 89 02/10/2025     (L) 02/10/2025     -Continue to monitor Hgb   -No indications for PRBC transfusion     4. Electrolyte /Hyponatremia and Hyperkalemia    Lab Results   Component Value Date    GLUCOSE 153 (H) 02/10/2025    CALCIUM 7.3 (L) 02/10/2025     (L) 02/10/2025    K 4.8 02/10/2025    CO2 20 (L) 02/10/2025    CL 99 02/10/2025    BUN 38 (H) 02/10/2025    CREATININE 1.31 (H) 02/10/2025     - Na upon admission was low = 119, glucose 219. Corrected Na = 121-122. Noted Na level at 4 am increased to 128 over 15 hours.  Recommend checking STAT Sodium level to avoid overly rapid correction of hyponatremia . The serum sodium be raised by less than 6 to 8 mEq/L in any 24-hour period to prevent the Osmotic Demyelination Syndrome.    6. Hypertension   Blood Pressures         2/10/2025  0700 2/10/2025  0800 2/10/2025  0900 2/10/2025  1005 2/10/2025  1100    BP: 150/51 144/50 145/49 148/50 136/50          - BP had been around   -Goal BP < 140/90 mmHg   -continue current management     7. DKA  Defer to SICU and Endo  -Rule out  infection; pan culture    8.  GI prophylaxis   - On PPI     9. DVT Prophylaxis  -Defer to primary team    * Case was discussed with primary team.  For questions, please contact transplant nephrology page x [phone].    Bashir Angela MD    Transplant Nephrologist

## 2025-02-10 NOTE — PROGRESS NOTES
TRANSPLANT SURGERY PROGRESS NOTE    Temo Hanson underwent transplant surgery on 12/21/2024 (Kidney) and was evaluated on multidisciplinary inpatient rounds.  I specifically evaluated the management of immunosuppression to ensure adequate exposure required to decrease the risk of rejection.  Furthermore, I reviewed potential side effects including tremor, hyperglycemia, leukopenia, infection, and other neurologic changes.  I reviewed and adjusted infectious prophylaxis based on the patients clinical condition and  protocols.     24 EVENTS:  Readmit for DKA, now off insulin gtt. Continues with tube feed off and on IVF. Improved mental status.     DIAGNOSIS:  Immunosuppressive management encounter following kidney transplant Z79.899, Z94.0      PHYSICAL EXAMINATION:  Vitals:    02/10/25 0500   BP: (!) 151/43   Pulse: 90   Resp: 13   Temp:    SpO2: 100%        02/08 1900 - 02/10 0659  In: 2545 [I.V.:1395]  Out: 1000 [Urine:1000]     Weight change:     General Appearance - NAD, oriented to person/place  Abdomen - soft , not tender, no guarding, no rigidity.  Normal bowel sounds.   Wound: well-healed  Neuro/Psych - appropriate mood and affect.   Ext - No LE edema    MEDICATION LIST: REVIEWED  heparin (porcine), 5,000 Units, q8h KASSY  insulin regular, 0-5 Units, q4h  mycophenolate, 360 mg, q12h  piperacillin-tazobactam, 2.25 g, q6h  predniSONE, 5 mg, Daily  tacrolimus, 3 mg, q12h KASSY      sodium chloride 0.9%, Last Rate: 100 mL/hr (02/10/25 0251)      dextrose, 12.5 g, q15 min PRN  dextrose, 25 g, q15 min PRN  glucagon, 1 mg, q15 min PRN  glucagon, 1 mg, q15 min PRN        ALLERGY:  Allergies   Allergen Reactions    Terazosin Unknown     Did not feel well on this medication    Codeine Itching     itching    Tramadol Itching       LABS:  Results for orders placed or performed during the hospital encounter of 02/09/25 (from the past 24 hours)   POCT GLUCOSE   Result Value Ref Range    POCT Glucose 281 (H) 74 - 99  mg/dL   Blood Gas Venous Full Panel   Result Value Ref Range    POCT pH, Venous 7.41 7.33 - 7.43 pH    POCT pCO2, Venous 34 (L) 41 - 51 mm Hg    POCT pO2, Venous 42 35 - 45 mm Hg    POCT SO2, Venous      POCT Oxy Hemoglobin, Venous      POCT Hematocrit Calculated, Venous      POCT Sodium, Venous 115 (LL) 136 - 145 mmol/L    POCT Potassium, Venous 6.0 (H) 3.5 - 5.3 mmol/L    POCT Chloride, Venous 90 (L) 98 - 107 mmol/L    POCT Ionized Calicum, Venous 1.21 1.10 - 1.33 mmol/L    POCT Glucose, Venous 223 (H) 74 - 99 mg/dL    POCT Lactate, Venous 1.7 0.4 - 2.0 mmol/L    POCT Base Excess, Venous -2.4 (L) -2.0 - 3.0 mmol/L    POCT HCO3 Calculated, Venous 21.6 (L) 22.0 - 26.0 mmol/L    POCT Hemoglobin, Venous      POCT Anion Gap, Venous 9.0 (L) 10.0 - 25.0 mmol/L    Patient Temperature 37.0 degrees Celsius    FiO2 98 %   Calcium, Ionized   Result Value Ref Range    POCT Calcium, Ionized 1.16 1.1 - 1.33 mmol/L   CBC   Result Value Ref Range    WBC 10.5 4.4 - 11.3 x10*3/uL    nRBC 0.0 0.0 - 0.0 /100 WBCs    RBC 3.21 (L) 4.50 - 5.90 x10*6/uL    Hemoglobin 9.3 (L) 13.5 - 17.5 g/dL    Hematocrit 27.7 (L) 41.0 - 52.0 %    MCV 86 80 - 100 fL    MCH 29.0 26.0 - 34.0 pg    MCHC 33.6 32.0 - 36.0 g/dL    RDW 16.5 (H) 11.5 - 14.5 %    Platelets 124 (L) 150 - 450 x10*3/uL   Magnesium   Result Value Ref Range    Magnesium 1.90 1.60 - 2.40 mg/dL   Renal Function Panel   Result Value Ref Range    Glucose 219 (H) 74 - 99 mg/dL    Sodium 119 (LL) 136 - 145 mmol/L    Potassium 6.1 (HH) 3.5 - 5.3 mmol/L    Chloride 89 (L) 98 - 107 mmol/L    Bicarbonate 21 21 - 32 mmol/L    Anion Gap 15 10 - 20 mmol/L    Urea Nitrogen 54 (H) 6 - 23 mg/dL    Creatinine 1.65 (H) 0.50 - 1.30 mg/dL    eGFR 43 (L) >60 mL/min/1.73m*2    Calcium 8.2 (L) 8.6 - 10.6 mg/dL    Phosphorus 2.3 (L) 2.5 - 4.9 mg/dL    Albumin 2.7 (L) 3.4 - 5.0 g/dL   Urinalysis with Reflex Culture and Microscopic   Result Value Ref Range    Color, Urine Light-Orange (N) Light-Yellow, Yellow,  Dark-Yellow    Appearance, Urine Turbid (N) Clear    Specific Gravity, Urine 1.013 1.005 - 1.035    pH, Urine 8.5 (N) 5.0, 5.5, 6.0, 6.5, 7.0, 7.5, 8.0    Protein, Urine 200 (2+) (A) NEGATIVE, 10 (TRACE), 20 (TRACE) mg/dL    Glucose, Urine 100 (1+) (A) Normal mg/dL    Blood, Urine NEGATIVE NEGATIVE mg/dL    Ketones, Urine NEGATIVE NEGATIVE mg/dL    Bilirubin, Urine NEGATIVE NEGATIVE mg/dL    Urobilinogen, Urine Normal Normal mg/dL    Nitrite, Urine NEGATIVE NEGATIVE    Leukocyte Esterase, Urine 500 Damian/uL (A) NEGATIVE   Extra Urine Gray Tube   Result Value Ref Range    Extra Tube Hold for add-ons.    Microscopic Only, Urine   Result Value Ref Range    WBC, Urine 1-5 1-5, NONE /HPF    RBC, Urine 1-2 NONE, 1-2, 3-5 /HPF    Mucus, Urine FEW Reference range not established. /LPF   POCT GLUCOSE   Result Value Ref Range    POCT Glucose 150 (H) 74 - 99 mg/dL   Renal function panel   Result Value Ref Range    Glucose 149 (H) 74 - 99 mg/dL    Sodium 122 (L) 136 - 145 mmol/L    Potassium 5.0 3.5 - 5.3 mmol/L    Chloride 92 (L) 98 - 107 mmol/L    Bicarbonate 22 21 - 32 mmol/L    Anion Gap 13 10 - 20 mmol/L    Urea Nitrogen 53 (H) 6 - 23 mg/dL    Creatinine 1.62 (H) 0.50 - 1.30 mg/dL    eGFR 44 (L) >60 mL/min/1.73m*2    Calcium 8.1 (L) 8.6 - 10.6 mg/dL    Phosphorus 2.3 (L) 2.5 - 4.9 mg/dL    Albumin 2.5 (L) 3.4 - 5.0 g/dL   Coagulation Screen   Result Value Ref Range    Protime 13.3 (H) 9.8 - 12.8 seconds    INR 1.2 (H) 0.9 - 1.1    aPTT 24 (L) 27 - 38 seconds   POCT GLUCOSE   Result Value Ref Range    POCT Glucose 151 (H) 74 - 99 mg/dL   POCT GLUCOSE   Result Value Ref Range    POCT Glucose 139 (H) 74 - 99 mg/dL   POCT GLUCOSE   Result Value Ref Range    POCT Glucose 160 (H) 74 - 99 mg/dL   CBC   Result Value Ref Range    WBC 10.7 4.4 - 11.3 x10*3/uL    nRBC 0.0 0.0 - 0.0 /100 WBCs    RBC 3.31 (L) 4.50 - 5.90 x10*6/uL    Hemoglobin 9.5 (L) 13.5 - 17.5 g/dL    Hematocrit 29.5 (L) 41.0 - 52.0 %    MCV 89 80 - 100 fL    MCH 28.7  26.0 - 34.0 pg    MCHC 32.2 32.0 - 36.0 g/dL    RDW 17.1 (H) 11.5 - 14.5 %    Platelets 142 (L) 150 - 450 x10*3/uL   Renal function panel   Result Value Ref Range    Glucose 145 (H) 74 - 99 mg/dL    Sodium 128 (L) 136 - 145 mmol/L    Potassium 4.7 3.5 - 5.3 mmol/L    Chloride 99 98 - 107 mmol/L    Bicarbonate 19 (L) 21 - 32 mmol/L    Anion Gap 15 10 - 20 mmol/L    Urea Nitrogen 41 (H) 6 - 23 mg/dL    Creatinine 1.37 (H) 0.50 - 1.30 mg/dL    eGFR 54 (L) >60 mL/min/1.73m*2    Calcium 7.5 (L) 8.6 - 10.6 mg/dL    Phosphorus 2.8 2.5 - 4.9 mg/dL    Albumin 2.3 (L) 3.4 - 5.0 g/dL   Magnesium   Result Value Ref Range    Magnesium 1.68 1.60 - 2.40 mg/dL   CALCIUM, IONIZED   Result Value Ref Range    POCT Calcium, Ionized 1.11 1.1 - 1.33 mmol/L   Coagulation Screen   Result Value Ref Range    Protime 13.3 (H) 9.8 - 12.8 seconds    INR 1.2 (H) 0.9 - 1.1    aPTT 21 (L) 27 - 38 seconds      Lab Results   Component Value Date    ALT 18 12/20/2024    AST 21 12/20/2024    ALKPHOS 189 (H) 12/20/2024    BILITOT 0.5 12/20/2024         ASSESSMENT AND PLAN:    Mr. Hanson is a 74 y.o. male who underwent  transplant surgery on 12/21/2024 (Kidney).    1. Immunosuppression reviewed and adjusted       Tacrolimus: current dose 3 mg BID. Plan continue      Today's tacrolimus level is 5.7, Goal tacrolimus trough level is 6-8      Myfortic: Yes - 360 mg bid      Prednisone: Yes - 5 mg daily    2. IV hydration      Continues on  ml/h    3. Electrolyte     Reviewed renal profile.      Hyperkalemia and hyponatremia improved w/ better glycemic control    4. Hypertension medications reviewed        Blood Pressures         2/10/2025  0100 2/10/2025  0200 2/10/2025  0300 2/10/2025  0400 2/10/2025  0500    BP: 137/49 144/60 154/52 155/47 151/43              Continue current management     5. Nutrition/glycemic control      Await new endocrine recs      Resume tube feeds today    6. Urinary tract infection: 100K GNB      Await final culture;  continue Zosyn if febrile broaden to Meropenem    7. Prophylaxis      GI prophylaxis: Protonix BID      DVT Prophylaxis: SCDs, Subcutaneous Heparin: Yes    8. ID prophylaxis      CMV, PJP and fungal prophylaxis per  Transplant [place] Protocol      Valcyte: Yes    9. Discharge: complex planning may need SNF. OK to transfer to floor.    Case was presented at Multi Disciplinary team rounds   Attending physician, consulting physician, , pharmacist, residents and fellow were present at the meeting.    Sherly Kang MD  Transplant Surgery Attending

## 2025-02-10 NOTE — PROGRESS NOTES
74-year-old male who had a kidney transplant.  He is here for transplant stent removal.    Informed consent was obtained.  He was prepped and draped in standard fashion.  Flexible cystoscope was advanced per urethra.  Anterior and posterior urethra were normal.  Bladder was entered and distended.  The stent was noted protruding from the ureteroneocystostomy.  It was immobilized with a grasper and removed in its entirety.    The patient tolerated the procedure well.

## 2025-02-10 NOTE — PROGRESS NOTES
Temo Hanson is a 74 y.o. male on day 1 of admission presenting with DKA, type 2, not at goal.    Subjective   NAEON       Objective     Physical Exam  Constitutional:       Appearance: Normal appearance.   HENT:      Head: Normocephalic and atraumatic.   Eyes:      Pupils: Pupils are equal, round, and reactive to light.   Cardiovascular:      Rate and Rhythm: Rhythm irregular.      Comments: Complete heart block, with ventricular rate in the 70's  Pulmonary:      Effort: Pulmonary effort is normal.      Breath sounds: Normal breath sounds.   Abdominal:      General: Abdomen is flat.      Palpations: Abdomen is soft.      Comments: PEG tube in place    Musculoskeletal:         General: Normal range of motion.      Cervical back: Normal range of motion.   Skin:     General: Skin is warm and dry.   Neurological:      General: No focal deficit present.      Mental Status: He is alert and oriented to person, place, and time.   Psychiatric:         Mood and Affect: Mood normal.         Last Recorded Vitals  Blood pressure 144/50, pulse 73, temperature 36.5 °C (97.7 °F), temperature source Temporal, resp. rate 24, weight 66.8 kg (147 lb 4.3 oz), SpO2 99%.  Intake/Output last 3 Shifts:  I/O last 3 completed shifts:  In: 2545 (38.1 mL/kg) [I.V.:1395 (20.9 mL/kg); IV Piggyback:1150]  Out: 1000 (15 mL/kg) [Urine:1000 (0.4 mL/kg/hr)]  Weight: 66.8 kg     Relevant Results  Scheduled medications  gabapentin, 200 mg, oral, Daily  heparin (porcine), 5,000 Units, subcutaneous, q8h KASSY  insulin regular, 0-5 Units, subcutaneous, q4h  metoclopramide, 5 mg, oral, q8h  mycophenolate, 360 mg, oral, q12h  nystatin, 5 mL, Swish & Swallow, 4x daily  pantoprazole, 40 mg, oral, BID AC  piperacillin-tazobactam, 2.25 g, intravenous, q6h  predniSONE, 5 mg, oral, Daily  tacrolimus, 3 mg, oral, q12h KASSY  valGANciclovir, 450 mg, oral, Daily      Continuous medications  sodium chloride 0.9%, 100 mL/hr, Last Rate: 100 mL/hr (02/10/25 0251)      PRN  medications  PRN medications: dextrose, dextrose, glucagon, glucagon     Results for orders placed or performed during the hospital encounter of 02/09/25 (from the past 24 hours)   POCT GLUCOSE   Result Value Ref Range    POCT Glucose 281 (H) 74 - 99 mg/dL   Blood Gas Venous Full Panel   Result Value Ref Range    POCT pH, Venous 7.41 7.33 - 7.43 pH    POCT pCO2, Venous 34 (L) 41 - 51 mm Hg    POCT pO2, Venous 42 35 - 45 mm Hg    POCT SO2, Venous      POCT Oxy Hemoglobin, Venous      POCT Hematocrit Calculated, Venous      POCT Sodium, Venous 115 (LL) 136 - 145 mmol/L    POCT Potassium, Venous 6.0 (H) 3.5 - 5.3 mmol/L    POCT Chloride, Venous 90 (L) 98 - 107 mmol/L    POCT Ionized Calicum, Venous 1.21 1.10 - 1.33 mmol/L    POCT Glucose, Venous 223 (H) 74 - 99 mg/dL    POCT Lactate, Venous 1.7 0.4 - 2.0 mmol/L    POCT Base Excess, Venous -2.4 (L) -2.0 - 3.0 mmol/L    POCT HCO3 Calculated, Venous 21.6 (L) 22.0 - 26.0 mmol/L    POCT Hemoglobin, Venous      POCT Anion Gap, Venous 9.0 (L) 10.0 - 25.0 mmol/L    Patient Temperature 37.0 degrees Celsius    FiO2 98 %   Calcium, Ionized   Result Value Ref Range    POCT Calcium, Ionized 1.16 1.1 - 1.33 mmol/L   CBC   Result Value Ref Range    WBC 10.5 4.4 - 11.3 x10*3/uL    nRBC 0.0 0.0 - 0.0 /100 WBCs    RBC 3.21 (L) 4.50 - 5.90 x10*6/uL    Hemoglobin 9.3 (L) 13.5 - 17.5 g/dL    Hematocrit 27.7 (L) 41.0 - 52.0 %    MCV 86 80 - 100 fL    MCH 29.0 26.0 - 34.0 pg    MCHC 33.6 32.0 - 36.0 g/dL    RDW 16.5 (H) 11.5 - 14.5 %    Platelets 124 (L) 150 - 450 x10*3/uL   Magnesium   Result Value Ref Range    Magnesium 1.90 1.60 - 2.40 mg/dL   Renal Function Panel   Result Value Ref Range    Glucose 219 (H) 74 - 99 mg/dL    Sodium 119 (LL) 136 - 145 mmol/L    Potassium 6.1 (HH) 3.5 - 5.3 mmol/L    Chloride 89 (L) 98 - 107 mmol/L    Bicarbonate 21 21 - 32 mmol/L    Anion Gap 15 10 - 20 mmol/L    Urea Nitrogen 54 (H) 6 - 23 mg/dL    Creatinine 1.65 (H) 0.50 - 1.30 mg/dL    eGFR 43 (L) >60  mL/min/1.73m*2    Calcium 8.2 (L) 8.6 - 10.6 mg/dL    Phosphorus 2.3 (L) 2.5 - 4.9 mg/dL    Albumin 2.7 (L) 3.4 - 5.0 g/dL   Urinalysis with Reflex Culture and Microscopic   Result Value Ref Range    Color, Urine Light-Orange (N) Light-Yellow, Yellow, Dark-Yellow    Appearance, Urine Turbid (N) Clear    Specific Gravity, Urine 1.013 1.005 - 1.035    pH, Urine 8.5 (N) 5.0, 5.5, 6.0, 6.5, 7.0, 7.5, 8.0    Protein, Urine 200 (2+) (A) NEGATIVE, 10 (TRACE), 20 (TRACE) mg/dL    Glucose, Urine 100 (1+) (A) Normal mg/dL    Blood, Urine NEGATIVE NEGATIVE mg/dL    Ketones, Urine NEGATIVE NEGATIVE mg/dL    Bilirubin, Urine NEGATIVE NEGATIVE mg/dL    Urobilinogen, Urine Normal Normal mg/dL    Nitrite, Urine NEGATIVE NEGATIVE    Leukocyte Esterase, Urine 500 Damian/uL (A) NEGATIVE   Extra Urine Gray Tube   Result Value Ref Range    Extra Tube Hold for add-ons.    Microscopic Only, Urine   Result Value Ref Range    WBC, Urine 1-5 1-5, NONE /HPF    RBC, Urine 1-2 NONE, 1-2, 3-5 /HPF    Mucus, Urine FEW Reference range not established. /LPF   POCT GLUCOSE   Result Value Ref Range    POCT Glucose 150 (H) 74 - 99 mg/dL   Renal function panel   Result Value Ref Range    Glucose 149 (H) 74 - 99 mg/dL    Sodium 122 (L) 136 - 145 mmol/L    Potassium 5.0 3.5 - 5.3 mmol/L    Chloride 92 (L) 98 - 107 mmol/L    Bicarbonate 22 21 - 32 mmol/L    Anion Gap 13 10 - 20 mmol/L    Urea Nitrogen 53 (H) 6 - 23 mg/dL    Creatinine 1.62 (H) 0.50 - 1.30 mg/dL    eGFR 44 (L) >60 mL/min/1.73m*2    Calcium 8.1 (L) 8.6 - 10.6 mg/dL    Phosphorus 2.3 (L) 2.5 - 4.9 mg/dL    Albumin 2.5 (L) 3.4 - 5.0 g/dL   Coagulation Screen   Result Value Ref Range    Protime 13.3 (H) 9.8 - 12.8 seconds    INR 1.2 (H) 0.9 - 1.1    aPTT 24 (L) 27 - 38 seconds   ECG 12 Lead   Result Value Ref Range    Ventricular Rate 69 BPM    Atrial Rate 98 BPM    QRS Duration 146 ms    QT Interval 414 ms    QTC Calculation(Bazett) 443 ms    P Axis 72 degrees    R Axis 9 degrees    T Axis -61  degrees    QRS Count 11 beats    Q Onset 211 ms    T Offset 418 ms    QTC Fredericia 434 ms   POCT GLUCOSE   Result Value Ref Range    POCT Glucose 151 (H) 74 - 99 mg/dL   POCT GLUCOSE   Result Value Ref Range    POCT Glucose 139 (H) 74 - 99 mg/dL   POCT GLUCOSE   Result Value Ref Range    POCT Glucose 160 (H) 74 - 99 mg/dL   CBC   Result Value Ref Range    WBC 10.7 4.4 - 11.3 x10*3/uL    nRBC 0.0 0.0 - 0.0 /100 WBCs    RBC 3.31 (L) 4.50 - 5.90 x10*6/uL    Hemoglobin 9.5 (L) 13.5 - 17.5 g/dL    Hematocrit 29.5 (L) 41.0 - 52.0 %    MCV 89 80 - 100 fL    MCH 28.7 26.0 - 34.0 pg    MCHC 32.2 32.0 - 36.0 g/dL    RDW 17.1 (H) 11.5 - 14.5 %    Platelets 142 (L) 150 - 450 x10*3/uL   Renal function panel   Result Value Ref Range    Glucose 145 (H) 74 - 99 mg/dL    Sodium 128 (L) 136 - 145 mmol/L    Potassium 4.7 3.5 - 5.3 mmol/L    Chloride 99 98 - 107 mmol/L    Bicarbonate 19 (L) 21 - 32 mmol/L    Anion Gap 15 10 - 20 mmol/L    Urea Nitrogen 41 (H) 6 - 23 mg/dL    Creatinine 1.37 (H) 0.50 - 1.30 mg/dL    eGFR 54 (L) >60 mL/min/1.73m*2    Calcium 7.5 (L) 8.6 - 10.6 mg/dL    Phosphorus 2.8 2.5 - 4.9 mg/dL    Albumin 2.3 (L) 3.4 - 5.0 g/dL   Magnesium   Result Value Ref Range    Magnesium 1.68 1.60 - 2.40 mg/dL   CALCIUM, IONIZED   Result Value Ref Range    POCT Calcium, Ionized 1.11 1.1 - 1.33 mmol/L   Coagulation Screen   Result Value Ref Range    Protime 13.3 (H) 9.8 - 12.8 seconds    INR 1.2 (H) 0.9 - 1.1    aPTT 21 (L) 27 - 38 seconds   Tacrolimus level   Result Value Ref Range    Tacrolimus  5.7 <=15.0 ng/mL   POCT GLUCOSE   Result Value Ref Range    POCT Glucose 150 (H) 74 - 99 mg/dL   Renal function panel   Result Value Ref Range    Glucose 153 (H) 74 - 99 mg/dL    Sodium 127 (L) 136 - 145 mmol/L    Potassium 4.8 3.5 - 5.3 mmol/L    Chloride 99 98 - 107 mmol/L    Bicarbonate 20 (L) 21 - 32 mmol/L    Anion Gap 13 10 - 20 mmol/L    Urea Nitrogen 38 (H) 6 - 23 mg/dL    Creatinine 1.31 (H) 0.50 - 1.30 mg/dL    eGFR 57 (L)  >60 mL/min/1.73m*2    Calcium 7.3 (L) 8.6 - 10.6 mg/dL    Phosphorus 3.4 2.5 - 4.9 mg/dL    Albumin 2.2 (L) 3.4 - 5.0 g/dL     *Note: Due to a large number of results and/or encounters for the requested time period, some results have not been displayed. A complete set of results can be found in Results Review.        ECG 12 Lead    Result Date: 2/10/2025  Sinus rhythm with 2nd degree AV block (Mobitz I) Right bundle branch block T wave abnormality, consider inferior ischemia Abnormal ECG When compared with ECG of 22-JAN-2025 13:11, Sinus rhythm is now with 2nd degree AV block (Mobitz I) Right bundle branch block has replaced Nonspecific intraventricular block    XR chest 1 view    Result Date: 2/10/2025  Interpreted By:  Yoli Holden and Sheng Max STUDY: XR CHEST 1 VIEW;  2/10/2025 6:32 am   INDICATION: Signs/Symptoms:AM ICU xray.     COMPARISON: Chest radiograph dated 2/9/2025   ACCESSION NUMBER(S): [id number]   ORDERING CLINICIAN: LV REAL   FINDINGS: AP radiograph of the chest:   LINES AND DEVICES: Loop recorder projects over the cardiomediastinal silhouette.   CARDIOMEDIASTINAL SILHOUETTE: Stable enlargement of the cardiomediastinal silhouette.   LUNGS: Persistent mid and lower lung opacity with blunting of the costophrenic angle. Increased interstitial markings.   ABDOMEN: No remarkable upper abdominal findings.   BONES: No acute osseous abnormality.       1. Persistent moderate bilateral pleural effusion with associated basilar atelectasis or infiltrate. No significant change. Mild interstitial prominence/edema.     I personally reviewed the images/study and I agree with the findings as stated by Dr. Reagan Romero. This study was interpreted at [facility], [place], [place].   MACRO: None   Signed by: Yoli Cordova 2/10/2025 7:48 AM Dictation workstation:   [id number]    XR chest 1 view    Result Date: 2/9/2025  Interpreted By:  Yoli Holden,  STUDY: XR CHEST 1 VIEW; 2/9/2025 1:03 pm   INDICATION: Signs/Symptoms:r/o pneumonia.   COMPARISON: Radiograph dated 12/20/2024   ACCESSION NUMBER(S): [id number]   ORDERING CLINICIAN: ROSE ISAAC   FINDINGS: Cardiac silhouette size is enlarged, unchanged. Loop recorder projecting over the left lower chest.   Right lower lung opacity with blunting of the costophrenic angle. No kiran edema or pneumothorax.   No acute osseous abnormality.       1. Moderate right basilar pleural effusion with suggestion of pleural thickening and right basilar atelectasis/infiltrate. Cannot exclude superimposed right basilar infectious process given the provided history.       Signed by: Yoli Cordova 2/9/2025 1:04 PM Dictation workstation:   [id number]    XR chest 1 view    Result Date: 2/9/2025  EXAMINATION: XR CHEST AP OR PA 1 VIEW 02/09/2025 05:28 AM CLINICAL HISTORY: Chest pain ASSOCIATED DIAGNOSIS: Chest pain ORDERING PROVIDER: GREGORY ALLEN TECHNOLOGISTS NOTE: COMPARISON: XR CHEST AP OR PA 1 VIEW 5/28/2019, 2:37 PM FINDINGS: Lines, tubes, and devices: loop recorder device in the left chest.. Lungs and pleura: Right lower lung zone opacity is present, somewhat similar compared to prior radiograph from 5/28/2019. There is blunting of the right costophrenic angles suspicious for right pleural effusion. No evidence of pneumothorax. Cardiomediastinal silhouette: Normal configuration of the cardiomediastinal silhouette. Musculoskeletal: Noncontributory IMPRESSION: Right lower lung zone opacity with right pleural effusion. Findings may represent acute infectious process, however there is no recent comparison to determine acuity of these findings. Correlate with clinical data. MACRO: None I have personally reviewed the images and agree with the resident's interpretation.                                  Assessment/Plan   Assessment & Plan  DKA, type 2, not at goal    Assessment:  Temoanne marie Hanson is a 74 y.o. 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  On arrival, patient is cooperative but is a poor historian and unable to remember the events that brought him to the hospital. He does complain of burning suprapubic pain with urination which seems to be different from his chronic achalasia pain. He denies any other pain     Plan:  NEURO: No history  - ongoing neuro and pain assessments  - PRN hydromorphone for pain control  - PT/OT consult  - Continue Gabapentin 200mg daily     CV: History of hypertension, Mild TR, CHB s/p MICRA PPM (VVD , with sinus atrial tachycardia). Most recent echo 12/24/2024 EF 63%. Current vitals w/n/l  - continuous EKG/abp monitoring for signs of hyperkalemia induced changes  - Goal map range 65-90  - Home meds: Amlodipine 10 mg daily.     PULM: No history of significance  - Q1h incentive spirometer while awake  - additional pulm toilet prn.       GI: Previous history of achalsia, PEG tube placed 1/2025 due to failure to thrive. Pt reports Oral intake during the day in addition to tube feeds.   - Clear liquid diet  - Nutrition consulted for tube feeds  - PPI for GI prophylaxis  - home meds: Protonix 40mg, Colace/senna, lactulose 30 ml bid     : CKD 3 status post DDKT 12/2024  - Maintenance  NS  - Volume resuscitation as needed  - Maintain U/O >0.5ml/kg/hr  - Check renal function panel daily  - Na of 119 at presentation, Now 127. Baseline in the Low 130's.   - Replete electrolytes to goal K>4, Mg>2, Phos>2.5, ionized Ca>1.10.  - Tacrolimus trough level 0600 before AM dose qday, tacrolimus levels management via transplant surgery     HEME: No acute concerns  - Check CBC and coags daily  - SCDs for DVT prophylaxis, subcutaneous heparin  - ongoing monitoring for s/s  bleeding     ENDO: Type 2 diabetes, uncontrolled, long-term use of insulin now in Hyperglycemia without ketoacidosis. BG at , finger stick on arrival 281  -ISS #2, consider resuming home basal insulin when meals resume  -K replete as needed  - Endocrine consulted for glucose management, appreciate the recs.   -home meds: Basaglar 10u qAM, Humulin N 15u @1900 w/ start of tube feeds, 3u regular insulin + sliding scale for meals     ID: Afebrile. Nml WBC  - temp q4h, wbc daily  - ongoing monitoring for s/s infection  - UA to examine potential UTI complains     Lines:  - PIV   Dispo: Transfer to [facility] under transplant service. Transplant service is aware.     Patient seen and discussed with ICU attending Dr. Call.     Duy Pittman DO, CA2   Surgical Critical Care  SICU phone [phone]           Duy Pittman DO

## 2025-02-10 NOTE — PROGRESS NOTES
Physical Therapy    Physical Therapy Evaluation    Patient Name: Temo Hanson  MRN: [id number]  Department: [facility]  Room: 10/10-A  Today's Date: 2/10/2025   Time Calculation  Start Time: 1343  Stop Time: 1408  Time Calculation (min): 25 min    Assessment/Plan   PT Assessment  PT Assessment Results: Decreased strength, Decreased endurance, Impaired balance, Decreased mobility, Pain, Decreased skin integrity  Rehab Prognosis: Good  Barriers to Discharge Home: Physical needs  Physical Needs: Stair navigation into home limited by function/safety, Stair navigation to access bed limited by function/safety, Stair navigation to access bath limited by function/safety, Ambulating household distances limited by function/safety  Evaluation/Treatment Tolerance: Patient tolerated treatment well  Medical Staff Made Aware: Yes  Strengths: Attitude of self  Barriers to Participation: Comorbidities  End of Session Communication: Bedside nurse  Assessment Comment: Pt tolerated treatment session well and willing to participate with therapy with encouragement. Pt presents decreased ability to mobilize in bed, transfer, and gait quality. Skilled PT interventions can help improve the deficits noted above.  End of Session Patient Position: Bed, 3 rail up, Alarm on  IP OR SWING BED PT PLAN  Inpatient or Swing Bed: Inpatient  PT Plan  Treatment/Interventions: Bed mobility, Transfer training, Gait training, Stair training, Balance training, Neuromuscular re-education, Strengthening, Endurance training, Range of motion, Therapeutic exercise, Therapeutic activity, Home exercise program  PT Plan: Ongoing PT  PT Frequency: 5 times per week  PT Discharge Recommendations: Moderate intensity level of continued care  Equipment Recommended upon Discharge:  (TBD)  PT Recommended Transfer Status: Assist x2  PT - OK to Discharge: Yes    Subjective   General Visit Information:  General  Reason for Referral: 74 YOM, presented to OSH for unclear reason  and was found to have hyperglycemia without ketosis with glucose >527 and sodium 116,  Past Medical History Relevant to Rehab: PMH HTN, DMII, ESRD (HD MWF) 2/2 diabetic nephropathy, 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  Co-Treatment: OT  Co-Treatment Reason: Maximize mobility safely  Prior to Session Communication: Bedside nurse  Patient Position Received: Bed, 3 rail up, Alarm on  General Comment: Upon arrival, pt soft spoken and willing to work with therapy.  Home Living:  Home Living  Type of Home: House  Lives With: Spouse  Home Adaptive Equipment: Cane  Home Layout: Two level  Home Access: Stairs to enter with rails  Entrance Stairs-Rails: Right  Entrance Stairs-Number of Steps: 3  Bathroom Shower/Tub: Tub/shower unit  Bathroom Toilet: Standard  Bathroom Equipment: Shower chair without back, Grab bars in shower  Home Living Comments: denies falls  Prior Level of Function:  Prior Function Per Pt/Caregiver Report  Level of [place]: Independent with ADLs and functional transfers, Independent with homemaking with ambulation  Receives Help From: Family  ADL Assistance: Independent  Homemaking Assistance: Independent  Ambulatory Assistance: Independent  Vocational: Retired  Leisure: Gardening  Hand Dominance: Right  Prior Function Comments: hasn't drove since kidney transplant  Precautions:  Precautions  Medical Precautions: Fall precautions  Precautions Comment: MAP 65-90     02/10/25 1343 02/10/25 1410   Vital Signs   Vitals Session Pre PT Post PT   Heart Rate 66 97   Resp 25 26   SpO2 100 % 99 %   /57 (!) 141/41   MAP (mmHg) 80 68     Objective   Pain:  Pain Assessment  Pain Assessment: 0-10  0-10 (Numeric) Pain Score: 0 - No pain  Cognition:  Cognition  Overall Cognitive Status: Impaired  Arousal/Alertness: Delayed responses to stimuli  Orientation Level: Oriented X4  Following Commands: Follows one step commands with increased time  Insight:  Mild    General Assessments:  Activity Tolerance  Endurance: Tolerates 30 min exercise with multiple rests  Early Mobility/Exercise Safety Screen: Proceed with mobilization - No exclusion criteria met    Sensation  Light Touch: No apparent deficits    Strength  Strength Comments: BLE grossly 3/5  Strength  Strength Comments: BLE grossly 3/5    Coordination  Movements are Fluid and Coordinated: Yes    Postural Control  Postural Control: Within Functional Limits    Static Sitting Balance  Static Sitting-Balance Support: No upper extremity supported, Feet supported  Static Sitting-Level of Assistance: Close supervision  Static Sitting-Comment/Number of Minutes: ~10 min, PT assessed pt's BLE strength and AROM seated EOB.    Static Standing Balance  Static Standing-Balance Support: Bilateral upper extremity supported  Static Standing-Level of Assistance: Contact guard  Functional Assessments:     Bed Mobility  Bed Mobility: Yes  Bed Mobility 1  Bed Mobility 1: Supine to sitting  Level of Assistance 1: Maximum assistance, +2  Bed Mobility Comments 1: draw sheet, HOB elevated  Bed Mobility 2  Bed Mobility  2: Sitting to supine  Level of Assistance 2: Minimum assistance, +2  Bed Mobility Comments 2: draw sheet, HOB elevated    Transfers  Transfer: Yes  Transfer 1  Transfer From 1: Sit to  Transfer to 1: Sit, Stand  Technique 1: Sit to stand, Stand to sit  Transfer Level of Assistance 1: Arm in arm assistance, Minimum assistance, +2  Trials/Comments 1: cue for hand and foot placement while transferring to maximize safety.    Ambulation/Gait Training  Ambulation/Gait Training Performed: Yes  Ambulation/Gait Training 1  Surface 1: Level tile  Device 1: No device  Assistance 1: Arm in arm assistance, Contact guard  Comments/Distance (ft) 1: 4 ft laterally along bedside with bilat HHA    Stairs  Stairs: No    Extremity/Trunk Assessments:    RLE   RLE : Within Functional Limits  LLE   LLE : Within Functional Limits  Outcome  Measures:  [facility] Basic Mobility  Turning from your back to your side while in a flat bed without using bedrails: A lot  Moving from lying on your back to sitting on the side of a flat bed without using bedrails: A lot  Moving to and from bed to chair (including a wheelchair): A lot  Standing up from a chair using your arms (e.g. wheelchair or bedside chair): A little  To walk in hospital room: A little  Climbing 3-5 steps with railing: A lot  Basic Mobility - Total Score: 14    FSS-ICU  Ambulation: Walks <50 feet with any assistance x1 or walks any distance with assistance x2 people  Rolling: Maximal assistance (performs 25% - 49% of task)  Sitting: Maximal assistance (performs 25% - 49% of task)  Transfer Sit-to-Stand: Minimal assistance (performs 75% or more of task)  Transfer Supine-to-Sit: Maximal assistance (performs 25% - 49% of task)  Total Score: 11      Early Mobility/Exercise Safety Screen: Proceed with mobilization - No exclusion criteria met  ICU Mobility Scale: Marching on spot (at bedside) [6]    Encounter Problems       Encounter Problems (Active)       Balance       STG - Maintains dynamic sitting balance SUP without upper extremity support       Start:  02/10/25    Expected End:  03/03/25            Pt will score >24/28 to decrease risk of falls.       Start:  02/10/25    Expected End:  03/03/25               Mobility       Pt will amb >150' w/ LRAD SUP to maximize pt's LOF.        Start:  02/10/25    Expected End:  03/03/25            Pt will increase BLE strength grossly to 4+/5 to aid in functional transfers and gait quality.        Start:  02/10/25    Expected End:  03/03/25               PT Transfers       Pt will transfer sit<>stand w/ LRAD SUP to improve functional mobility.        Start:  02/10/25    Expected End:  03/03/25                   Education Documentation  Body Mechanics, taught by COOKIE Jimenez at 2/10/2025  4:03 PM.  Learner: Patient  Readiness: Acceptance  Method:  Explanation  Response: Verbalizes Understanding  Comment: Edu pt on PT POC and importance to therapy.    Precautions, taught by COOKIE Jimenez at 2/10/2025  4:03 PM.  Learner: Patient  Readiness: Acceptance  Method: Explanation  Response: Verbalizes Understanding  Comment: Edu pt on PT POC and importance to therapy.    Mobility Training, taught by COOKIE Jimenez at 2/10/2025  4:03 PM.  Learner: Patient  Readiness: Acceptance  Method: Explanation  Response: Verbalizes Understanding  Comment: Edu pt on PT POC and importance to therapy.    Education Comments  No comments found.      NANCY Jimenez  Student physical therapist working under the direct supervision of licensed therapist

## 2025-02-10 NOTE — PROGRESS NOTES
Temo Hanson is a 74 y.o. male on day 1 of admission presenting with DKA, type 2, not at goal.    Subjective   NAEON       Objective     Physical Exam    Last Recorded Vitals  Blood pressure (!) 151/43, pulse 90, temperature 36.8 °C (98.2 °F), temperature source Temporal, resp. rate 13, weight 66.8 kg (147 lb 4.3 oz), SpO2 100%.  Intake/Output last 3 Shifts:  I/O last 3 completed shifts:  In: 2545 (38.1 mL/kg) [I.V.:1395 (20.9 mL/kg); IV Piggyback:1150]  Out: 1000 (15 mL/kg) [Urine:1000 (0.4 mL/kg/hr)]  Weight: 66.8 kg     Relevant Results                              Assessment/Plan   Assessment & Plan  DKA, type 2, not at goal    Assessment:  Temo Hanson is a 74 y.o. 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  On arrival, patient is cooperative but is a poor historian and unable to remember the events that brought him to the hospital. He does complain of burning suprapubic pain with urination which seems to be different from his chronic achalasia pain. He denies any other pain     Plan:  NEURO: No history  - ongoing neuro and pain assessments  - PRN hydromorphone for pain control  - PT/OT consult  - Home meds: N/A     CV: History of hypertension. Most recent echo 12/24/2024 EF 63%. Current vitals w/n/l  - continuous EKG/abp monitoring for signs of hyperkalemia induced changes  - Goal map range 65-90  - Home meds: Amlodipine 10 mg daily.     PULM: No history of significance  - Q1h incentive spirometer while awake  - additional pulm toilet prn.       GI: Previous history of achalsia, PEG tube dependent for feeding  - NPO, dietician consult for PEG tube regmine. 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  - Tacrolimus trough level 0600 before AM dose qday, tacrolimus levels management via transplant surgery     HEME: No acute concerns  - Check CBC and coags daily  - SCDs for DVT prophylaxis, subcutaneous heparin  - ongoing monitoring for s/s bleeding     ENDO: Type 2 diabetes, uncontrolled, long-term use of insulin now in Hyperglycemia without ketoacidosis. BG at , finger stick on arrival 281  -ISS #2, consider resuming home basal insulin when meals resume  -K replete as needed  -home meds: Basaglar 10u qAM, Humulin N 15u @1900 w/ start of tube feeds, 3u regular insulin + sliding scale for meals     ID: Afebrile. Nml WBC  - temp q4h, wbc daily  - ongoing monitoring for s/s infection  - UA to examine potential UTI complains     Lines:  - PIV x2    Skin:  Arrived with chronic sacral wound. Consider consulting wound care.      Dispo: Admit to ICU. Patient seen and discussed with ICU attending Dr. Call.     Duy Pittman DO, CA2   Surgical Critical Care  SICU phone [phone]           Duy Pittman DO

## 2025-02-11 ENCOUNTER — HOME CARE VISIT (OUTPATIENT)
Dept: HOME HEALTH SERVICES | Facility: HOME HEALTH | Age: 75
End: 2025-02-11
Payer: COMMERCIAL

## 2025-02-11 LAB
ALBUMIN SERPL BCP-MCNC: 2.7 G/DL (ref 3.4–5)
ANION GAP SERPL CALC-SCNC: 16 MMOL/L (ref 10–20)
BKV DNA SERPL NAA+PROBE-LOG#: NORMAL {LOG_COPIES}/ML
BUN SERPL-MCNC: 39 MG/DL (ref 6–23)
CA-I BLD-SCNC: 1.2 MMOL/L (ref 1.1–1.33)
CALCIUM SERPL-MCNC: 8.4 MG/DL (ref 8.6–10.6)
CHLORIDE SERPL-SCNC: 100 MMOL/L (ref 98–107)
CMV DNA SERPL NAA+PROBE-LOG IU: NORMAL {LOG_IU}/ML
CO2 SERPL-SCNC: 19 MMOL/L (ref 21–32)
CREAT SERPL-MCNC: 1.27 MG/DL (ref 0.5–1.3)
EBV DNA SPEC NAA+PROBE-LOG#: ABNORMAL {LOG_COPIES}/ML
EGFRCR SERPLBLD CKD-EPI 2021: 59 ML/MIN/1.73M*2
ERYTHROCYTE [DISTWIDTH] IN BLOOD BY AUTOMATED COUNT: 17 % (ref 11.5–14.5)
GLUCOSE BLD MANUAL STRIP-MCNC: 219 MG/DL (ref 74–99)
GLUCOSE BLD MANUAL STRIP-MCNC: 335 MG/DL (ref 74–99)
GLUCOSE BLD MANUAL STRIP-MCNC: 347 MG/DL (ref 74–99)
GLUCOSE BLD MANUAL STRIP-MCNC: 350 MG/DL (ref 74–99)
GLUCOSE BLD MANUAL STRIP-MCNC: 368 MG/DL (ref 74–99)
GLUCOSE BLD MANUAL STRIP-MCNC: 373 MG/DL (ref 74–99)
GLUCOSE BLD MANUAL STRIP-MCNC: 426 MG/DL (ref 74–99)
GLUCOSE SERPL-MCNC: 288 MG/DL (ref 74–99)
HCT VFR BLD AUTO: 29.6 % (ref 41–52)
HGB BLD-MCNC: 9.8 G/DL (ref 13.5–17.5)
LABORATORY COMMENT REPORT: ABNORMAL
LABORATORY COMMENT REPORT: NOT DETECTED
LABORATORY COMMENT REPORT: NOT DETECTED
MAGNESIUM SERPL-MCNC: 1.96 MG/DL (ref 1.6–2.4)
MCH RBC QN AUTO: 29.1 PG (ref 26–34)
MCHC RBC AUTO-ENTMCNC: 33.1 G/DL (ref 32–36)
MCV RBC AUTO: 88 FL (ref 80–100)
NRBC BLD-RTO: 0 /100 WBCS (ref 0–0)
PHOSPHATE SERPL-MCNC: 3.7 MG/DL (ref 2.5–4.9)
PLATELET # BLD AUTO: 174 X10*3/UL (ref 150–450)
POTASSIUM SERPL-SCNC: 5.2 MMOL/L (ref 3.5–5.3)
RBC # BLD AUTO: 3.37 X10*6/UL (ref 4.5–5.9)
SODIUM SERPL-SCNC: 130 MMOL/L (ref 136–145)
TACROLIMUS BLD-MCNC: 10.1 NG/ML
WBC # BLD AUTO: 9.7 X10*3/UL (ref 4.4–11.3)

## 2025-02-11 PROCEDURE — 2500000001 HC RX 250 WO HCPCS SELF ADMINISTERED DRUGS (ALT 637 FOR MEDICARE OP)

## 2025-02-11 PROCEDURE — 2500000002 HC RX 250 W HCPCS SELF ADMINISTERED DRUGS (ALT 637 FOR MEDICARE OP, ALT 636 FOR OP/ED)

## 2025-02-11 PROCEDURE — 99233 SBSQ HOSP IP/OBS HIGH 50: CPT | Performed by: INTERNAL MEDICINE

## 2025-02-11 PROCEDURE — 82947 ASSAY GLUCOSE BLOOD QUANT: CPT

## 2025-02-11 PROCEDURE — 80069 RENAL FUNCTION PANEL: CPT

## 2025-02-11 PROCEDURE — 80197 ASSAY OF TACROLIMUS: CPT

## 2025-02-11 PROCEDURE — 2500000004 HC RX 250 GENERAL PHARMACY W/ HCPCS (ALT 636 FOR OP/ED)

## 2025-02-11 PROCEDURE — 99232 SBSQ HOSP IP/OBS MODERATE 35: CPT | Performed by: SURGERY

## 2025-02-11 PROCEDURE — 99232 SBSQ HOSP IP/OBS MODERATE 35: CPT | Performed by: INTERNAL MEDICINE

## 2025-02-11 PROCEDURE — 83735 ASSAY OF MAGNESIUM: CPT

## 2025-02-11 PROCEDURE — RXMED WILLOW AMBULATORY MEDICATION CHARGE

## 2025-02-11 PROCEDURE — 85027 COMPLETE CBC AUTOMATED: CPT

## 2025-02-11 PROCEDURE — 82330 ASSAY OF CALCIUM: CPT

## 2025-02-11 PROCEDURE — 36415 COLL VENOUS BLD VENIPUNCTURE: CPT

## 2025-02-11 PROCEDURE — 1100000001 HC PRIVATE ROOM DAILY

## 2025-02-11 RX ORDER — TACROLIMUS 1 MG/1
2 CAPSULE ORAL
Status: DISCONTINUED | OUTPATIENT
Start: 2025-02-12 | End: 2025-02-17

## 2025-02-11 RX ORDER — CIPROFLOXACIN 500 MG/1
500 TABLET ORAL EVERY 12 HOURS SCHEDULED
Qty: 28 TABLET | Refills: 0 | Status: SHIPPED | OUTPATIENT
Start: 2025-02-11 | End: 2025-02-17 | Stop reason: HOSPADM

## 2025-02-11 RX ORDER — INSULIN LISPRO 100 [IU]/ML
5 INJECTION, SOLUTION INTRAVENOUS; SUBCUTANEOUS ONCE
Status: COMPLETED | OUTPATIENT
Start: 2025-02-11 | End: 2025-02-11

## 2025-02-11 RX ORDER — INSULIN GLARGINE 100 [IU]/ML
8 INJECTION, SOLUTION SUBCUTANEOUS DAILY
Status: DISCONTINUED | OUTPATIENT
Start: 2025-02-12 | End: 2025-02-12

## 2025-02-11 RX ORDER — INSULIN GLARGINE 100 [IU]/ML
5 INJECTION, SOLUTION SUBCUTANEOUS ONCE
Status: DISCONTINUED | OUTPATIENT
Start: 2025-02-11 | End: 2025-02-11

## 2025-02-11 RX ORDER — INSULIN LISPRO 100 [IU]/ML
0-5 INJECTION, SOLUTION INTRAVENOUS; SUBCUTANEOUS
Status: DISCONTINUED | OUTPATIENT
Start: 2025-02-11 | End: 2025-02-12

## 2025-02-11 RX ORDER — ATOVAQUONE 750 MG/5ML
1500 SUSPENSION ORAL DAILY
Qty: 420 ML | Refills: 0 | Status: SHIPPED | OUTPATIENT
Start: 2025-02-12 | End: 2025-02-24 | Stop reason: SDUPTHER

## 2025-02-11 RX ORDER — INSULIN LISPRO 100 [IU]/ML
15 INJECTION, SOLUTION INTRAVENOUS; SUBCUTANEOUS ONCE
Status: COMPLETED | OUTPATIENT
Start: 2025-02-11 | End: 2025-02-11

## 2025-02-11 RX ORDER — TACROLIMUS 1 MG/1
1 CAPSULE ORAL ONCE
Status: COMPLETED | OUTPATIENT
Start: 2025-02-11 | End: 2025-02-11

## 2025-02-11 RX ORDER — INSULIN GLARGINE 100 [IU]/ML
5 INJECTION, SOLUTION SUBCUTANEOUS ONCE
Status: COMPLETED | OUTPATIENT
Start: 2025-02-11 | End: 2025-02-11

## 2025-02-11 RX ORDER — INSULIN GLARGINE 100 [IU]/ML
5 INJECTION, SOLUTION SUBCUTANEOUS DAILY
Status: DISCONTINUED | OUTPATIENT
Start: 2025-02-12 | End: 2025-02-11

## 2025-02-11 RX ORDER — CIPROFLOXACIN 500 MG/1
500 TABLET ORAL EVERY 12 HOURS SCHEDULED
Status: DISCONTINUED | OUTPATIENT
Start: 2025-02-11 | End: 2025-02-12

## 2025-02-11 RX ORDER — INSULIN LISPRO 100 [IU]/ML
2 INJECTION, SOLUTION INTRAVENOUS; SUBCUTANEOUS
Status: DISCONTINUED | OUTPATIENT
Start: 2025-02-11 | End: 2025-02-12

## 2025-02-11 RX ORDER — ATOVAQUONE 750 MG/5ML
1500 SUSPENSION ORAL DAILY
Status: DISCONTINUED | OUTPATIENT
Start: 2025-02-11 | End: 2025-02-18 | Stop reason: HOSPADM

## 2025-02-11 RX ORDER — SODIUM BICARBONATE 650 MG/1
1300 TABLET ORAL EVERY 12 HOURS
Status: DISCONTINUED | OUTPATIENT
Start: 2025-02-11 | End: 2025-02-17

## 2025-02-11 RX ADMIN — NYSTATIN 500000 UNITS: 500000 SUSPENSION ORAL at 22:32

## 2025-02-11 RX ADMIN — PIPERACILLIN SODIUM AND TAZOBACTAM SODIUM 2.25 G: 2; .25 INJECTION, SOLUTION INTRAVENOUS at 06:39

## 2025-02-11 RX ADMIN — VALGANCICLOVIR HYDROCHLORIDE 450 MG: 450 TABLET ORAL at 10:21

## 2025-02-11 RX ADMIN — PIPERACILLIN SODIUM AND TAZOBACTAM SODIUM 3.38 G: 3; .375 INJECTION, SOLUTION INTRAVENOUS at 13:50

## 2025-02-11 RX ADMIN — SODIUM BICARBONATE 1300 MG: 650 TABLET ORAL at 22:33

## 2025-02-11 RX ADMIN — TACROLIMUS 3 MG: 1 CAPSULE ORAL at 06:39

## 2025-02-11 RX ADMIN — PANTOPRAZOLE SODIUM 40 MG: 40 TABLET, DELAYED RELEASE ORAL at 06:39

## 2025-02-11 RX ADMIN — INSULIN LISPRO 15 UNITS: 100 INJECTION, SOLUTION INTRAVENOUS; SUBCUTANEOUS at 23:44

## 2025-02-11 RX ADMIN — SODIUM ZIRCONIUM CYCLOSILICATE 5 G: 10 POWDER, FOR SUSPENSION ORAL at 10:21

## 2025-02-11 RX ADMIN — PANTOPRAZOLE SODIUM 40 MG: 40 TABLET, DELAYED RELEASE ORAL at 16:29

## 2025-02-11 RX ADMIN — INSULIN LISPRO 4 UNITS: 100 INJECTION, SOLUTION INTRAVENOUS; SUBCUTANEOUS at 10:27

## 2025-02-11 RX ADMIN — TACROLIMUS 1 MG: 1 CAPSULE ORAL at 18:23

## 2025-02-11 RX ADMIN — METOCLOPRAMIDE 5 MG: 10 TABLET ORAL at 22:33

## 2025-02-11 RX ADMIN — SODIUM BICARBONATE 1300 MG: 650 TABLET ORAL at 10:26

## 2025-02-11 RX ADMIN — CIPROFLOXACIN HYDROCHLORIDE 500 MG: 500 TABLET, FILM COATED ORAL at 16:29

## 2025-02-11 RX ADMIN — HEPARIN SODIUM 5000 UNITS: 5000 INJECTION, SOLUTION INTRAVENOUS; SUBCUTANEOUS at 06:39

## 2025-02-11 RX ADMIN — SODIUM CHLORIDE 100 ML/HR: 9 INJECTION, SOLUTION INTRAVENOUS at 13:59

## 2025-02-11 RX ADMIN — INSULIN HUMAN 15 UNITS: 100 INJECTION, SUSPENSION SUBCUTANEOUS at 18:57

## 2025-02-11 RX ADMIN — INSULIN LISPRO 5 UNITS: 100 INJECTION, SOLUTION INTRAVENOUS; SUBCUTANEOUS at 12:25

## 2025-02-11 RX ADMIN — MYCOPHENOLIC ACID 360 MG: 360 TABLET, DELAYED RELEASE ORAL at 06:39

## 2025-02-11 RX ADMIN — PREDNISONE 5 MG: 10 TABLET ORAL at 10:27

## 2025-02-11 RX ADMIN — INSULIN GLARGINE 5 UNITS: 100 INJECTION, SOLUTION SUBCUTANEOUS at 10:48

## 2025-02-11 RX ADMIN — ATOVAQUONE 1500 MG: 750 SUSPENSION ORAL at 13:50

## 2025-02-11 RX ADMIN — GABAPENTIN 200 MG: 100 CAPSULE ORAL at 10:22

## 2025-02-11 RX ADMIN — NYSTATIN 500000 UNITS: 500000 SUSPENSION ORAL at 13:50

## 2025-02-11 RX ADMIN — HEPARIN SODIUM 5000 UNITS: 5000 INJECTION, SOLUTION INTRAVENOUS; SUBCUTANEOUS at 13:50

## 2025-02-11 RX ADMIN — HEPARIN SODIUM 5000 UNITS: 5000 INJECTION, SOLUTION INTRAVENOUS; SUBCUTANEOUS at 22:32

## 2025-02-11 RX ADMIN — METOCLOPRAMIDE 5 MG: 10 TABLET ORAL at 13:51

## 2025-02-11 RX ADMIN — INSULIN LISPRO 5 UNITS: 100 INJECTION, SOLUTION INTRAVENOUS; SUBCUTANEOUS at 10:48

## 2025-02-11 RX ADMIN — INSULIN LISPRO 5 UNITS: 100 INJECTION, SOLUTION INTRAVENOUS; SUBCUTANEOUS at 16:30

## 2025-02-11 RX ADMIN — NYSTATIN 500000 UNITS: 500000 SUSPENSION ORAL at 18:23

## 2025-02-11 RX ADMIN — MYCOPHENOLIC ACID 360 MG: 360 TABLET, DELAYED RELEASE ORAL at 18:23

## 2025-02-11 RX ADMIN — NYSTATIN 500000 UNITS: 500000 SUSPENSION ORAL at 10:20

## 2025-02-11 RX ADMIN — INSULIN LISPRO 2 UNITS: 100 INJECTION, SOLUTION INTRAVENOUS; SUBCUTANEOUS at 16:29

## 2025-02-11 RX ADMIN — METOCLOPRAMIDE 5 MG: 10 TABLET ORAL at 06:39

## 2025-02-11 ASSESSMENT — PAIN SCALES - GENERAL
PAINLEVEL_OUTOF10: 0 - NO PAIN
PAINLEVEL_OUTOF10: 5 - MODERATE PAIN
PAINLEVEL_OUTOF10: 0 - NO PAIN
PAINLEVEL_OUTOF10: 0 - NO PAIN

## 2025-02-11 ASSESSMENT — COGNITIVE AND FUNCTIONAL STATUS - GENERAL
DRESSING REGULAR LOWER BODY CLOTHING: A LOT
EATING MEALS: A LITTLE
MOVING FROM LYING ON BACK TO SITTING ON SIDE OF FLAT BED WITH BEDRAILS: A LITTLE
PERSONAL GROOMING: A LITTLE
STANDING UP FROM CHAIR USING ARMS: A LITTLE
MOVING TO AND FROM BED TO CHAIR: A LITTLE
TURNING FROM BACK TO SIDE WHILE IN FLAT BAD: A LITTLE
CLIMB 3 TO 5 STEPS WITH RAILING: A LITTLE
MOBILITY SCORE: 18
DRESSING REGULAR UPPER BODY CLOTHING: A LOT
WALKING IN HOSPITAL ROOM: A LITTLE
DAILY ACTIVITIY SCORE: 14
TOILETING: A LOT
HELP NEEDED FOR BATHING: A LOT

## 2025-02-11 ASSESSMENT — PAIN DESCRIPTION - DESCRIPTORS: DESCRIPTORS: ACHING

## 2025-02-11 ASSESSMENT — PAIN - FUNCTIONAL ASSESSMENT: PAIN_FUNCTIONAL_ASSESSMENT: 0-10

## 2025-02-11 NOTE — HOSPITAL COURSE
Temo Hanson is a 74 y.o. male 74M ESRD on HD ([facility] AV) d/t diabetic nephropathy. DDKT 12/21/2024, uncomplicated postop course, most recently admitted for dehydration who represents to  after he was found to be in DKA with blood glucose levels in the 500s. Upon arrival to the SICU, blood glucose was in the 280s and patient was HDS. Patient is a poor historian and could not identify the event that caused his blood sugars to elevate.     He was transitioned off the insulin gtt and transferred to [facility]. Endocrine was also consulted for glucose management. Pt was transitioned to StrategyEye TF to help with blood sugars. He was been tolerating the new formula well.  Overnight on 2/11 to 2/12 he had significant hyperglycemia, tube feeds were held, Endocrinology engaged. The following morning he was re-started on enteral feeds, trialing bolus feeds with meals, then 40 ml/hr overnight with the StrategyEye peptide. Endocrine recs were implemented and his blood glucoses were better controlled. Wound care followed the patient as well during this admission for his pressure ulcer.     Urine culture grew Proteus mirabilis and resistant ESBL, which likely occurred after his ureteral stent was removed. Ertapenem IV was started, Cipro stopped and ID was consulted. He continued to have issues with diarrhea and tested positive for Norovirus for which he was given supportive care, started on Imodium with improvement in his symptoms.  He is in stable condition to discharge SNF. Midline placed prior to discharHe will follow up in clinic and have labs drawn per protocol.

## 2025-02-11 NOTE — PROGRESS NOTES
INPATIENT TRANSPLANT NEPHROLOGY PROGRESS NOTE          REASON FOR CONSULT:  Immunosuppressive medication management and nephrology related issues.    SUBJECTION:     Transferred to floor LT 9  Sitting up in the chair  Working with PT/OT  UOP 1.1 L  Adding bicarb 1300 bid for acidosis and K 5.2  Resume lokelma, home dose  UTI - Culture grew Proteus, starting cipro  Atovaquone for toxo prophy  Dispo -pending PT'S rec.    PHYCISCAL EXAMINATION:    Visit Vitals  /63 (BP Location: Right arm, Patient Position: Sitting)   Pulse 78   Temp 35.8 °C (96.4 °F) (Temporal)   Resp 20   Wt 66.8 kg (147 lb 4.3 oz)   SpO2 98%   BMI 19.43 kg/m²   Smoking Status Never   BSA 1.85 m²        02/09 1900 - 02/11 0659  In: 2490 [I.V.:2290]  Out: 2000 [Urine:2000]     Weight change:     General Appearance - NAD, Good speech, oriented and alert  HEENT - Supple. Not pale. No jaundice. No cervical lymphadenopathy. Pharynx and tonsils are not injected.  CVS - RRR. Normal S1/S2. No murmur, click , rub or gallop  Lungs- clear to auscultation bilaterally  Abdomen - soft , not tender, no guarding, no rigidity. No hepatosplenomegaly. Normal bowel sounds. No masses and ascites. S/P Kidney transplant .  Transplanted kidney is not tender.   Musculoskeletal /Extremities - no edema. Full ROM. No joint tenderness.   Neuro/Psych - appropriate mood and affect. Motor power V/V all extremities. CN I -XII were grossly intact.  Skin - No visible rash    MEDICATION LIST: REVIEWED    atovaquone, 1,500 mg, Daily  ciprofloxacin, 500 mg, q12h KASSY  gabapentin, 200 mg, Daily  heparin (porcine), 5,000 Units, q8h KASSY  [START ON 2/12/2025] insulin glargine, 5 Units, Daily  insulin lispro, 0-5 Units, q4h  insulin NPH (Isophane), 10 Units, q24h KASSY  metoclopramide, 5 mg, q8h  mycophenolate, 360 mg, q12h  nystatin, 5 mL, 4x daily  pantoprazole, 40 mg, BID AC  piperacillin-tazobactam, 3.375 g, q6h  predniSONE, 5 mg, Daily  sodium bicarbonate, 1,300 mg,  q12h  sodium zirconium cyclosilicate, 5 g, Daily  tacrolimus, 1 mg, Once  [START ON 2/12/2025] tacrolimus, 2 mg, q12h KASSY  valGANciclovir, 450 mg, Daily      sodium chloride 0.9%, Last Rate: 100 mL/hr (02/11/25 1359)      dextrose, 12.5 g, q15 min PRN  dextrose, 25 g, q15 min PRN  glucagon, 1 mg, q15 min PRN  glucagon, 1 mg, q15 min PRN        ALLERGY:  Allergies   Allergen Reactions    Terazosin Unknown     Did not feel well on this medication    Codeine Itching     itching    Tramadol Itching       LABS:  Results for orders placed or performed during the hospital encounter of 02/09/25 (from the past 24 hours)   Renal Function Panel   Result Value Ref Range    Glucose 193 (H) 74 - 99 mg/dL    Sodium 127 (L) 136 - 145 mmol/L    Potassium 6.1 (HH) 3.5 - 5.3 mmol/L    Chloride 98 98 - 107 mmol/L    Bicarbonate 20 (L) 21 - 32 mmol/L    Anion Gap 15 10 - 20 mmol/L    Urea Nitrogen 41 (H) 6 - 23 mg/dL    Creatinine 1.43 (H) 0.50 - 1.30 mg/dL    eGFR 51 (L) >60 mL/min/1.73m*2    Calcium 7.7 (L) 8.6 - 10.6 mg/dL    Phosphorus 4.3 2.5 - 4.9 mg/dL    Albumin 2.3 (L) 3.4 - 5.0 g/dL   POCT GLUCOSE   Result Value Ref Range    POCT Glucose 197 (H) 74 - 99 mg/dL   Renal Function Panel   Result Value Ref Range    Glucose 213 (H) 74 - 99 mg/dL    Sodium 126 (L) 136 - 145 mmol/L    Potassium 7.0 (HH) 3.5 - 5.3 mmol/L    Chloride 96 (L) 98 - 107 mmol/L    Bicarbonate 22 21 - 32 mmol/L    Anion Gap 15 10 - 20 mmol/L    Urea Nitrogen 40 (H) 6 - 23 mg/dL    Creatinine 1.41 (H) 0.50 - 1.30 mg/dL    eGFR 52 (L) >60 mL/min/1.73m*2    Calcium 7.6 (L) 8.6 - 10.6 mg/dL    Phosphorus 4.5 2.5 - 4.9 mg/dL    Albumin 2.2 (L) 3.4 - 5.0 g/dL   Renal Function Panel   Result Value Ref Range    Glucose 169 (H) 74 - 99 mg/dL    Sodium 130 (L) 136 - 145 mmol/L    Potassium 5.7 (H) 3.5 - 5.3 mmol/L    Chloride 104 98 - 107 mmol/L    Bicarbonate 15 (L) 21 - 32 mmol/L    Anion Gap 17 10 - 20 mmol/L    Urea Nitrogen 35 (H) 6 - 23 mg/dL    Creatinine 1.18  0.50 - 1.30 mg/dL    eGFR 65 >60 mL/min/1.73m*2    Calcium 6.6 (L) 8.6 - 10.6 mg/dL    Phosphorus 3.9 2.5 - 4.9 mg/dL    Albumin 1.9 (L) 3.4 - 5.0 g/dL   Magnesium   Result Value Ref Range    Magnesium 1.96 1.60 - 2.40 mg/dL   Renal function panel   Result Value Ref Range    Glucose 288 (H) 74 - 99 mg/dL    Sodium 130 (L) 136 - 145 mmol/L    Potassium 5.2 3.5 - 5.3 mmol/L    Chloride 100 98 - 107 mmol/L    Bicarbonate 19 (L) 21 - 32 mmol/L    Anion Gap 16 10 - 20 mmol/L    Urea Nitrogen 39 (H) 6 - 23 mg/dL    Creatinine 1.27 0.50 - 1.30 mg/dL    eGFR 59 (L) >60 mL/min/1.73m*2    Calcium 8.4 (L) 8.6 - 10.6 mg/dL    Phosphorus 3.7 2.5 - 4.9 mg/dL    Albumin 2.7 (L) 3.4 - 5.0 g/dL   Calcium, Ionized   Result Value Ref Range    POCT Calcium, Ionized 1.20 1.1 - 1.33 mmol/L   CBC   Result Value Ref Range    WBC 9.7 4.4 - 11.3 x10*3/uL    nRBC 0.0 0.0 - 0.0 /100 WBCs    RBC 3.37 (L) 4.50 - 5.90 x10*6/uL    Hemoglobin 9.8 (L) 13.5 - 17.5 g/dL    Hematocrit 29.6 (L) 41.0 - 52.0 %    MCV 88 80 - 100 fL    MCH 29.1 26.0 - 34.0 pg    MCHC 33.1 32.0 - 36.0 g/dL    RDW 17.0 (H) 11.5 - 14.5 %    Platelets 174 150 - 450 x10*3/uL   Tacrolimus level   Result Value Ref Range    Tacrolimus  10.1 <=15.0 ng/mL   POCT GLUCOSE   Result Value Ref Range    POCT Glucose 219 (H) 74 - 99 mg/dL   POCT GLUCOSE   Result Value Ref Range    POCT Glucose 347 (H) 74 - 99 mg/dL   POCT GLUCOSE   Result Value Ref Range    POCT Glucose 368 (H) 74 - 99 mg/dL        ASSESSMENT AND PLAN:    Mr. Hanson is a 74 y.o. 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 Zamora with a KDPI of 93% and PRA of 54%. Donor was Hepc -/-and has not met risk factors. EBV + /+. Left donor kidney transplanted to patient right pelvis. Admission weight is 72.7 kg (discharge weight is 76.4 kg ). Pt received a total of 3 mg/kg total of thymoglobulin induction therapy in  conjunction with 500mg IV solumedrol. CMV D-/R+. He had prolonged hospital course that included failure to thrive in adult with dehydration and diagnosis of severe protein-calorie malnutrition, placement of PEG tube, and intolerance of tube feeds initially. He was switched to TPN briefly and his tube feed formula was adjusted as per the dietitian recommendations.     Patient presented to OSH for hyperglycemia without ketosis with glucose >527 and sodium 116, transferred to [facility] for evaluation in the subacute setting of recent kidney transplant. On arrival, patient is cooperative but is a poor historian and unable to remember the events that brought him to the hospital.     Transplant nephrology is consulted to assist with immunosuppressive medication management and nephrology related issues.       Principal Problem:    DKA, type 2, not at goal      1. ESRD S/P Kidney transplant.   - Renal allograft function:   Lab Results   Component Value Date    CREATININE 1.27 02/11/2025     Estimated Creatinine Clearance: 48.2 mL/min (by C-G formula based on SCr of 1.27 mg/dL).    Intake/Output Summary (Last 24 hours) at 2/11/2025 1402  Last data filed at 2/11/2025 1350  Gross per 24 hour   Intake 2986.66 ml   Output 1250 ml   Net 1736.66 ml     - Continue to monitor UOP and Serum creatinine closely.   - Avoid nephrotoxic agents, NSAIDs and IV contrast   - Strict I/O.   - Renally dose all medications by the most recent CrCl from Cockcroft-Gault formula.    2. Immunosuppression   -          FK level = 10.1; give tacrolimus 1 mg x1 tonight at 1830 then decrease to 2 mg BID starting tomorrow at 0630  -          Myfortic 360 mg BID  -          Prednisone 5 mg daily    3. Anemia and WBC   Lab Results   Component Value Date    WBC 9.7 02/11/2025    HGB 9.8 (L) 02/11/2025    HCT 29.6 (L) 02/11/2025    MCV 88 02/11/2025     02/11/2025     -Continue to monitor Hgb   -No indications for PRBC transfusion     4. Electrolyte  /Hyponatremia and Hyperkalemia - improved . Appropriate rate of correction    Lab Results   Component Value Date    GLUCOSE 288 (H) 02/11/2025    CALCIUM 8.4 (L) 02/11/2025     (L) 02/11/2025    K 5.2 02/11/2025    CO2 19 (L) 02/11/2025     02/11/2025    BUN 39 (H) 02/11/2025    CREATININE 1.27 02/11/2025     Lab Results   Component Value Date    CALCIUM 8.4 (L) 02/11/2025    CAION 1.20 02/11/2025    PHOS 3.7 02/11/2025    VITD25 34 01/05/2025       - Reviewed renal profile.     6. Hypertension   Blood Pressures         2/11/2025  0000 2/11/2025  0500 2/11/2025  0700 2/11/2025  0902 2/11/2025  1150    BP: 136/54 139/61 143/48 127/54 116/63          -Goal BP < 140/90 mmHg   -continue current management     7. DKA  -Defer to endo    8.  GI prophylaxis   - On PPI     9. DVT Prophylaxis  -Defer to primary team    10. UITI, Proteus  Cipro per tx surgery    * Case was discussed with primary team.  For questions, please contact transplant nephrology page x [phone]    Bashir Angela MD    Transplant Nephrologist

## 2025-02-11 NOTE — PROGRESS NOTES
02/11/25 0904   Discharge Planning   Living Arrangements Spouse/significant other;Children;Family members   Support Systems Spouse/significant other;Children;Family members   Assistance Needed Help with ADLs   Type of Residence Private residence   Home or Post Acute Services Post acute facilities (Rehab/SNF/etc)   Type of Post Acute Facility Services Skilled nursing   Expected Discharge Disposition SNF   Does the patient need discharge transport arranged? Yes   RoundTrip coordination needed? Yes   Has discharge transport been arranged? No   Financial Resource Strain   How hard is it for you to pay for the very basics like food, housing, medical care, and heating? Not very   Housing Stability   In the last 12 months, was there a time when you were not able to pay the mortgage or rent on time? N   Transportation Needs   In the past 12 months, has lack of transportation kept you from medical appointments or from getting medications? no       DC Planning:  Went in and met with the pt, confirmed demographics.     Transitional Care Coordination Progress Note:  Patient discussed during interdisciplinary rounds.     Plan per Medical/Surgical team:    Home Care choice for home going needs, [facility].  PT/OT rec'd Mod for SNF.  SNF list given. Awaiting choices.        Discharge disposition: TBD    Potential Barriers: None    ADOD:  2/14    This TCC will continue to follow for home going needs and safe DC plan.      Makayla Haynes. PALMIRA. TCC.

## 2025-02-11 NOTE — CARE PLAN
The patient's goals for the shift include pt will remain comfortable throughout shift     The clinical goals for the shift include Pt will remain HDS this shift      Problem: Chronic Conditions and Co-morbidities  Goal: Patient's chronic conditions and co-morbidity symptoms are monitored and maintained or improved  Outcome: Progressing     Problem: Nutrition  Goal: Nutrient intake appropriate for maintaining nutritional needs  Outcome: Progressing

## 2025-02-11 NOTE — SIGNIFICANT EVENT
02/11/25 1117   Patient Interaction   Organ Kidney   Type of Interaction Morning rounds   Interdisciplinary Rounds   Attendance Surgeon;Physician;FILI;Coordinator;Pharmacist;Patient and/or family;Dietitian   Topics Discussed Diet;Medications;Blood test results;Discharge preparation     Transplant Surgery Multidisciplinary Team Note    Temo Hanson is a 74 y.o. male   s/p DDKT 12/21/24 readmitted for hyperglycemia.     24 Hour Events  Urine culture growing gram negative bacilli. Will follow up culture today.   Severe malnutrition: switched to [employer] cycled for 17 hours. Tolerating tube feed changes.   Adjusting insulin per endocrine recs today.     Last Recorded Vitals  Visit Vitals  /54   Pulse 70   Temp 36.5 °C (97.7 °F) (Temporal)   Resp 22      Intake/Output last 3 Shifts:    Intake/Output Summary (Last 24 hours) at 2/11/2025 1118  Last data filed at 2/11/2025 1022  Gross per 24 hour   Intake 2696.66 ml   Output 1250 ml   Net 1446.66 ml      Vitals:    02/09/25 1245   Weight: 66.8 kg (147 lb 4.3 oz)        Assessment/Plan   Principal Problem:    Management after organ transplant  Active Problems:  Patient Active Problem List   Diagnosis    S/P laparoscopic cholecystectomy    Abdominal pain    Achalasia    Acute lower UTI    Microcytic anemia    Complete heart block    Callus of foot    Chronic periodontitis, unspecified    Stage 5 chronic kidney disease (Multi)    Closed fracture of metatarsal bone    Crushing injury of foot    Diabetes mellitus (Multi)    Diarrhea    Dysphagia    ED (erectile dysfunction)    Essential hypertension    Foot pain, right    GIB (gastrointestinal bleeding)    Hepatitis B core antibody positive    Hyperkalemia    Ingrowing nail    Iron deficiency anemia secondary to inadequate dietary iron intake    Long toenail    Malignant neoplasm of prostate (Multi)    Onychomycosis    Pheochromocytoma of right adrenal gland    Pneumonia of right lower lobe due to infectious organism     Productive cough    Pure hypercholesterolemia    Stricture esophagus    SVT (supraventricular tachycardia) ([facility])    Type 2 diabetes mellitus with diabetic neuropathy (Multi)    Preop cardiovascular exam    Third degree heart block    Pericardial effusion ([facility])    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 (Multi)    Uremia    Cardiac tamponade    Cardiac pacemaker in situ    ESRD (end stage renal disease) (Multi)    Type 2 diabetes mellitus, with long-term current use of insulin    Dehydration    Alactasia syndrome    Type 2 diabetes mellitus with hyperglycemia, with long-term current use of insulin    On tube feeding diet    Kidney transplant recipient ([facility])    DKA, type 2, not at goal        Immunosuppression reviewed and adjusted          Tacrolimus goal 8-10 ng/mL. Current dose 3 mg BID         MMF 1000 mg po BID       Solumedrol taper  DVT prophylaxis SCDS and subcutaneous heparin 5000 TID  PT/OT recommending SNF. Needs to be reevaluated.  Diet:  TF with diet  Anticipated discharge 1/2 days    Deyanira Bloom, APRN-CNP

## 2025-02-11 NOTE — PROGRESS NOTES
Temo Hanson is a 74 y.o. male on day 2 of admission presenting with uncontrolled hyperglycemia    Subjective   Patient seen and examined at bedside in no acute distress.  Patient received tube feeds from 7 PM to noon.  However, patient did not receive NPH 10 units as advised with tube feeds.  Patient remains hyperglycemic.  Patient reports good appetite, denies any nausea, vomiting, abdominal pain.  I have reviewed histories, allergies and medications have been reviewed and there are no changes    Objective   Last Recorded Vitals  Blood pressure 116/63, pulse 78, temperature 35.8 °C (96.4 °F), temperature source Temporal, resp. rate 20, weight 66.8 kg (147 lb 4.3 oz), SpO2 98%.  Intake/Output last 3 Shifts:  I/O last 3 completed shifts:  In: 2490 (37.3 mL/kg) [I.V.:2290 (34.3 mL/kg); IV Piggyback:200]  Out: 2000 (29.9 mL/kg) [Urine:2000 (0.8 mL/kg/hr)]  Weight: 66.8 kg   Physical Exam  General: patient in NAD, sitting up in chair  HEENT: AT/NC  Neck: trachea in midline, no thyromegaly or nodules  Resp: CTA B/L  CVS: normal s1 and s2  Abdomen: soft and non tender to palpation, BS+  Skin: warm, dry and intact  Neuro: AAO x3, no focal neurological deficit  Psych: cooperative  Relevant Results  Results from last 7 days   Lab Units 02/11/25  1148 02/11/25  0948 02/11/25  0536 02/11/25  0534 02/10/25  1912 02/10/25  1724 02/10/25  1546 02/10/25  1540 02/10/25  1143 02/10/25  0955   POCT GLUCOSE mg/dL 368* 347* 219*  --   --   --  197*  --  175*  --    GLUCOSE mg/dL  --   --   --  288* 169* 213*  --  193*  --  153*     Lab Results   Component Value Date    HGBA1C 6.1 (H) 10/29/2024    HGBA1C 5.7 (H) 04/20/2024    HGBA1C 7.3 (H) 02/21/2023     (L) 02/11/2025    K 5.2 02/11/2025     02/11/2025    CO2 19 (L) 02/11/2025    BUN 39 (H) 02/11/2025    CREATININE 1.27 02/11/2025    CALCIUM 8.4 (L) 02/11/2025    ALBUMIN 2.7 (L) 02/11/2025    PROT 8.8 (H) 12/20/2024    BILITOT 0.5 12/20/2024    ALKPHOS 189 (H)  12/20/2024    ALT 18 12/20/2024    AST 21 12/20/2024    GLUCOSE 288 (H) 02/11/2025    CHOL 183 10/29/2024    HDL 53.1 10/29/2024    CPEPTIDE 4.8 (H) 10/29/2024      Assessment/Plan   Temo Hanson is a 74 y.o. 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 DKA,  he was started on insulin drip and transferred to  for further management in setting of recent kidney transplant history.  On chart review, labs from outside hospital reviewed and it was noted that patient was not in DKA given venous pH 7.4, BHB 0.14, , creatinine 1.6.  On arrival to [facility], insulin drip was discontinued and patient was started on regular insulin sliding scale #1 with meals with blood glucose ranging from 140-180s.  Endocrinology service is consulted for management of hyperglycemia in setting of recent DDKT and chronic steroid use. Overnight, patient did not receive scheduled NPH 10 units with tube feeds resulting in hyperglycemia.     Diabetes History  Type of diabetes: 2  Year diagnosed at age: 46 years old  Hospitalizations for DKA or HHS: no recent episodes  Complications: Nephropathy s/p DDKT  Seen by PCP or Endocrinology: last visit with MARISOL Lainez endocrinology on 1/30/25  Frequency of glucose checks: 4-5x daily after meals  Glucose review: -170s on regular SS #1 q 4  Frequency of Hypoglycemia: none  Severe hypoglycemia requiring assistance from others:  N     Home Medications  Basal: Basaglar 10 U q AM  Prandial: Humulin N 15 units once at 7:00 pm with overnight tube feeds, insulin aspart 3 units with meals  Correction:Aspart sliding scale #2 with meals     Diet : Clear liquid diet, tube feeds at 60 cc/h from 7 PM to 10 AM  Steroids: Tablet prednisone 5 mg daily  Nutrition: 60g CHO per meal     Recommendations:  Patient not given NPH dose with tube feed last night resulting in  hyperglycemia this morning with blood glucose of 347.  Patient was given insulin glargine 5 units once, insulin lispro 5 units with oral meal and sliding scale #1 in a.m. Repeat blood glucose at noon 368    Increase insulin NPH to 15 units nightly with tube feeds.  Patient planned to receive tube feeds at 65 cc/h from 7 AM to noon  Start insulin glargine 8 units daily (starting 2/12/2025)  Start insulin lispro 2 units with meals 3 times daily, Hold if paitent is NPO  Continue insulin lispro sliding scale #1 TID with meals  Accu-Cheks AC/HS  Goal blood glucose 140-180 mg%  Hypoglycemia protocol     Discharge planning:   [] patient may expect to discharge home on MDI, final doses TBD by titration  [X patient to follow with Tiffanie Crum, endocrinology PA.  Patient has visit appointment scheduled on 2/18/2025      Recommendations communicated to primary team. Please reach out incase you have any questions or concerns.    The patient was seen and discussed with attending Dr. Uribe.    Lino Garcia MD  Endocrinology fellow       ATTESTATION    I saw and evaluated the patient. I personally obtained the key and critical portions of the history and physical exam or was physically present for key and critical portions performed by the resident/fellow. I reviewed the resident/fellow's documentation and discussed the patient with the resident/fellow. I agree with the resident/fellow's medical decision making as documented in the note with the exception/addition of the following:    My additions/corrections to the trainee's note above are in italics.    Veronika Uribe MD

## 2025-02-11 NOTE — PROGRESS NOTES
Physical Therapy                 Therapy Communication Note    Patient Name: Temo Hanson  MRN: [id number]  Department: [place] [address]  Room: [facility]  Today's Date: 2/11/2025     Discipline: Physical Therapy    PT Missed Visit: Yes     Missed Visit Reason: Missed Visit Reason: Patient refused (Pt refused to participate including seated EOB and reported 5/10 pain. RN notified.)    Missed Time:15:58 PM

## 2025-02-11 NOTE — CARE PLAN
The clinical goals for the shift include Pt will remain HDS this shift      Problem: Pain - Adult  Goal: Verbalizes/displays adequate comfort level or baseline comfort level  Outcome: Met     Problem: Safety - Adult  Goal: Free from fall injury  Outcome: Met

## 2025-02-11 NOTE — PROGRESS NOTES
TRANSPLANT SURGERY PROGRESS NOTE    Temo Hanson underwent transplant surgery on 12/21/2024 (Kidney) and was evaluated on multidisciplinary inpatient rounds.  I specifically evaluated the management of immunosuppression to ensure adequate exposure required to decrease the risk of rejection.  Furthermore, I reviewed potential side effects including tremor, hyperglycemia, leukopenia, infection, and other neurologic changes.  I reviewed and adjusted infectious prophylaxis based on the patients clinical condition and  protocols.     24 EVENTS: no acute events, transferred to floor; high glucoses again this AM    DIAGNOSIS:  Kidney replaced by transplant ([facility]) Z94.0    PHYSICAL EXAMINATION:  Last Recorded Vitals  Visit Vitals  BP (!) 143/48 (BP Location: Right arm, Patient Position: Lying)   Pulse 68   Temp 36.5 °C (97.7 °F) (Temporal)   Resp 26      Intake/Output last 3 Shifts:    Intake/Output Summary (Last 24 hours) at 2/11/2025 0830  Last data filed at 2/11/2025 0717  Gross per 24 hour   Intake 2896.66 ml   Output 1400 ml   Net 1496.66 ml      Vitals:    02/09/25 1245   Weight: 66.8 kg (147 lb 4.3 oz)      Gen: A+OX3; NAD  HEENT: PERRL, sclera anicteric, MMM  Cardiac: RRR  Chest: Normal inspiratory effort  Abdomen: S/NT/ND. Incision well-healed  Ext: No LE edema    MEDICATION LIST: REVIEWED  Scheduled medications  gabapentin, 200 mg, oral, Daily  heparin (porcine), 5,000 Units, subcutaneous, q8h KASSY  insulin lispro, 0-5 Units, subcutaneous, q4h  insulin NPH (Isophane), 10 Units, subcutaneous, q24h KASSY  metoclopramide, 5 mg, oral, q8h  mycophenolate, 360 mg, oral, q12h  nystatin, 5 mL, Swish & Swallow, 4x daily  pantoprazole, 40 mg, oral, BID AC  piperacillin-tazobactam, 2.25 g, intravenous, q6h  predniSONE, 5 mg, oral, Daily  sodium bicarbonate, 1,300 mg, oral, q12h  sodium zirconium cyclosilicate, 5 g, oral, Daily  tacrolimus, 3 mg, oral, q12h KASSY  valGANciclovir, 450 mg, oral, Daily      Continuous  medications  sodium chloride 0.9%, 100 mL/hr, Last Rate: 100 mL/hr (02/10/25 4750)      PRN medications  PRN medications: dextrose, dextrose, glucagon, glucagon     LABS:  Results for orders placed or performed during the hospital encounter of 02/09/25 (from the past 24 hours)   POCT GLUCOSE   Result Value Ref Range    POCT Glucose 150 (H) 74 - 99 mg/dL   Renal function panel   Result Value Ref Range    Glucose 153 (H) 74 - 99 mg/dL    Sodium 127 (L) 136 - 145 mmol/L    Potassium 4.8 3.5 - 5.3 mmol/L    Chloride 99 98 - 107 mmol/L    Bicarbonate 20 (L) 21 - 32 mmol/L    Anion Gap 13 10 - 20 mmol/L    Urea Nitrogen 38 (H) 6 - 23 mg/dL    Creatinine 1.31 (H) 0.50 - 1.30 mg/dL    eGFR 57 (L) >60 mL/min/1.73m*2    Calcium 7.3 (L) 8.6 - 10.6 mg/dL    Phosphorus 3.4 2.5 - 4.9 mg/dL    Albumin 2.2 (L) 3.4 - 5.0 g/dL   POCT GLUCOSE   Result Value Ref Range    POCT Glucose 175 (H) 74 - 99 mg/dL   Renal Function Panel   Result Value Ref Range    Glucose 193 (H) 74 - 99 mg/dL    Sodium 127 (L) 136 - 145 mmol/L    Potassium 6.1 (HH) 3.5 - 5.3 mmol/L    Chloride 98 98 - 107 mmol/L    Bicarbonate 20 (L) 21 - 32 mmol/L    Anion Gap 15 10 - 20 mmol/L    Urea Nitrogen 41 (H) 6 - 23 mg/dL    Creatinine 1.43 (H) 0.50 - 1.30 mg/dL    eGFR 51 (L) >60 mL/min/1.73m*2    Calcium 7.7 (L) 8.6 - 10.6 mg/dL    Phosphorus 4.3 2.5 - 4.9 mg/dL    Albumin 2.3 (L) 3.4 - 5.0 g/dL   POCT GLUCOSE   Result Value Ref Range    POCT Glucose 197 (H) 74 - 99 mg/dL   Renal Function Panel   Result Value Ref Range    Glucose 213 (H) 74 - 99 mg/dL    Sodium 126 (L) 136 - 145 mmol/L    Potassium 7.0 (HH) 3.5 - 5.3 mmol/L    Chloride 96 (L) 98 - 107 mmol/L    Bicarbonate 22 21 - 32 mmol/L    Anion Gap 15 10 - 20 mmol/L    Urea Nitrogen 40 (H) 6 - 23 mg/dL    Creatinine 1.41 (H) 0.50 - 1.30 mg/dL    eGFR 52 (L) >60 mL/min/1.73m*2    Calcium 7.6 (L) 8.6 - 10.6 mg/dL    Phosphorus 4.5 2.5 - 4.9 mg/dL    Albumin 2.2 (L) 3.4 - 5.0 g/dL   Renal Function Panel   Result  Value Ref Range    Glucose 169 (H) 74 - 99 mg/dL    Sodium 130 (L) 136 - 145 mmol/L    Potassium 5.7 (H) 3.5 - 5.3 mmol/L    Chloride 104 98 - 107 mmol/L    Bicarbonate 15 (L) 21 - 32 mmol/L    Anion Gap 17 10 - 20 mmol/L    Urea Nitrogen 35 (H) 6 - 23 mg/dL    Creatinine 1.18 0.50 - 1.30 mg/dL    eGFR 65 >60 mL/min/1.73m*2    Calcium 6.6 (L) 8.6 - 10.6 mg/dL    Phosphorus 3.9 2.5 - 4.9 mg/dL    Albumin 1.9 (L) 3.4 - 5.0 g/dL   Magnesium   Result Value Ref Range    Magnesium 1.96 1.60 - 2.40 mg/dL   Renal function panel   Result Value Ref Range    Glucose 288 (H) 74 - 99 mg/dL    Sodium 130 (L) 136 - 145 mmol/L    Potassium 5.2 3.5 - 5.3 mmol/L    Chloride 100 98 - 107 mmol/L    Bicarbonate 19 (L) 21 - 32 mmol/L    Anion Gap 16 10 - 20 mmol/L    Urea Nitrogen 39 (H) 6 - 23 mg/dL    Creatinine 1.27 0.50 - 1.30 mg/dL    eGFR 59 (L) >60 mL/min/1.73m*2    Calcium 8.4 (L) 8.6 - 10.6 mg/dL    Phosphorus 3.7 2.5 - 4.9 mg/dL    Albumin 2.7 (L) 3.4 - 5.0 g/dL   Calcium, Ionized   Result Value Ref Range    POCT Calcium, Ionized 1.20 1.1 - 1.33 mmol/L   CBC   Result Value Ref Range    WBC 9.7 4.4 - 11.3 x10*3/uL    nRBC 0.0 0.0 - 0.0 /100 WBCs    RBC 3.37 (L) 4.50 - 5.90 x10*6/uL    Hemoglobin 9.8 (L) 13.5 - 17.5 g/dL    Hematocrit 29.6 (L) 41.0 - 52.0 %    MCV 88 80 - 100 fL    MCH 29.1 26.0 - 34.0 pg    MCHC 33.1 32.0 - 36.0 g/dL    RDW 17.0 (H) 11.5 - 14.5 %    Platelets 174 150 - 450 x10*3/uL   POCT GLUCOSE   Result Value Ref Range    POCT Glucose 219 (H) 74 - 99 mg/dL      Lab Results   Component Value Date    ALT 18 12/20/2024    AST 21 12/20/2024    ALKPHOS 189 (H) 12/20/2024    BILITOT 0.5 12/20/2024       ASSESSMENT AND PLAN:    Mr. Hanson is a 74 y.o. male who underwent  transplant surgery on 12/21/2024 (Kidney).    1. Immunosuppression reviewed and adjusted       Tacrolimus: current dose 3 mg BID. Plan reduce to 2 mg bid in AM and only give 1 mg this PM      Today's tacrolimus level is 10.1, Goal tacrolimus trough  level is 6-8      Myfortic: Yes - 360 mg bid      Prednisone: Yes - 5 mg daily     2. IV hydration      HLIVF     3. Electrolyte     Reviewed renal profile.      Hyperkalemia and hyponatremia improved w/ better glycemic control    4. Hypertension medications reviewed        Blood Pressures         2/10/2025  1900 2/10/2025  2024 2/11/2025  0000 2/11/2025  0500 2/11/2025  0700    BP: 137/59 126/63 136/54 139/61 143/48              Continue current management     5. Nutrition/glycemic control      Await new endocrine recs      Tube feeds on 16 hr cycle     6. Urinary tract infection: 100K GNB      Switch to PO cipro for total 14 days tx     7. Prophylaxis      GI prophylaxis: Protonix BID      DVT Prophylaxis: SCDs, Subcutaneous Heparin: Yes     8. ID prophylaxis      CMV, PJP and fungal prophylaxis per  Transplant [place] Protocol      Valcyte: Yes     9. Discharge: complex planning may need SNF. PT/OT daily.    Case was presented at Multi Disciplinary team rounds   Attending physician, consulting physician, , pharmacist, residents and fellow were present at the meeting.    Sherly Kang MD, PHD, MPH, FACS  Chief, Transplant and Hepatobiliary Surgery

## 2025-02-12 LAB
ALBUMIN SERPL BCP-MCNC: 2.5 G/DL (ref 3.4–5)
ANION GAP BLDV CALCULATED.4IONS-SCNC: 14 MMOL/L (ref 10–25)
ANION GAP SERPL CALC-SCNC: 13 MMOL/L (ref 10–20)
B-OH-BUTYR SERPL-SCNC: 0.09 MMOL/L (ref 0.02–0.27)
BACTERIA UR CULT: ABNORMAL
BACTERIA UR CULT: ABNORMAL
BASE EXCESS BLDV CALC-SCNC: -3.9 MMOL/L (ref -2–3)
BODY TEMPERATURE: 37 DEGREES CELSIUS
BUN SERPL-MCNC: 40 MG/DL (ref 6–23)
CA-I BLDV-SCNC: 1.29 MMOL/L (ref 1.1–1.33)
CALCIUM SERPL-MCNC: 8.2 MG/DL (ref 8.6–10.6)
CHLORIDE BLDV-SCNC: 105 MMOL/L (ref 98–107)
CHLORIDE SERPL-SCNC: 105 MMOL/L (ref 98–107)
CO2 SERPL-SCNC: 19 MMOL/L (ref 21–32)
CREAT SERPL-MCNC: 1.19 MG/DL (ref 0.5–1.3)
EGFRCR SERPLBLD CKD-EPI 2021: 64 ML/MIN/1.73M*2
ERYTHROCYTE [DISTWIDTH] IN BLOOD BY AUTOMATED COUNT: 17.2 % (ref 11.5–14.5)
GLUCOSE BLD MANUAL STRIP-MCNC: 103 MG/DL (ref 74–99)
GLUCOSE BLD MANUAL STRIP-MCNC: 126 MG/DL (ref 74–99)
GLUCOSE BLD MANUAL STRIP-MCNC: 186 MG/DL (ref 74–99)
GLUCOSE BLD MANUAL STRIP-MCNC: 204 MG/DL (ref 74–99)
GLUCOSE BLD MANUAL STRIP-MCNC: 363 MG/DL (ref 74–99)
GLUCOSE BLD MANUAL STRIP-MCNC: 419 MG/DL (ref 74–99)
GLUCOSE BLDV-MCNC: 340 MG/DL (ref 74–99)
GLUCOSE SERPL-MCNC: 264 MG/DL (ref 74–99)
HCO3 BLDV-SCNC: 19.7 MMOL/L (ref 22–26)
HCT VFR BLD AUTO: 27.6 % (ref 41–52)
HCT VFR BLD EST: 23 % (ref 41–52)
HGB BLD-MCNC: 8.8 G/DL (ref 13.5–17.5)
HGB BLDV-MCNC: 7.6 G/DL (ref 13.5–17.5)
INHALED O2 CONCENTRATION: 100 %
LACTATE BLDV-SCNC: 1.8 MMOL/L (ref 0.4–2)
MAGNESIUM SERPL-MCNC: 1.89 MG/DL (ref 1.6–2.4)
MCH RBC QN AUTO: 28.7 PG (ref 26–34)
MCHC RBC AUTO-ENTMCNC: 31.9 G/DL (ref 32–36)
MCV RBC AUTO: 90 FL (ref 80–100)
NRBC BLD-RTO: 0 /100 WBCS (ref 0–0)
OXYHGB MFR BLDV: 96 % (ref 45–75)
PCO2 BLDV: 29 MM HG (ref 41–51)
PH BLDV: 7.44 PH (ref 7.33–7.43)
PHOSPHATE SERPL-MCNC: 3.1 MG/DL (ref 2.5–4.9)
PLATELET # BLD AUTO: 178 X10*3/UL (ref 150–450)
PO2 BLDV: 116 MM HG (ref 35–45)
POTASSIUM BLDV-SCNC: 4.8 MMOL/L (ref 3.5–5.3)
POTASSIUM SERPL-SCNC: 4.6 MMOL/L (ref 3.5–5.3)
RBC # BLD AUTO: 3.07 X10*6/UL (ref 4.5–5.9)
SAO2 % BLDV: 98 % (ref 45–75)
SODIUM BLDV-SCNC: 134 MMOL/L (ref 136–145)
SODIUM SERPL-SCNC: 132 MMOL/L (ref 136–145)
TACROLIMUS BLD-MCNC: 6.9 NG/ML
WBC # BLD AUTO: 7.4 X10*3/UL (ref 4.4–11.3)

## 2025-02-12 PROCEDURE — 1100000001 HC PRIVATE ROOM DAILY

## 2025-02-12 PROCEDURE — 2500000004 HC RX 250 GENERAL PHARMACY W/ HCPCS (ALT 636 FOR OP/ED)

## 2025-02-12 PROCEDURE — 82435 ASSAY OF BLOOD CHLORIDE: CPT

## 2025-02-12 PROCEDURE — 99232 SBSQ HOSP IP/OBS MODERATE 35: CPT | Performed by: SURGERY

## 2025-02-12 PROCEDURE — 2500000004 HC RX 250 GENERAL PHARMACY W/ HCPCS (ALT 636 FOR OP/ED): Performed by: PHYSICIAN ASSISTANT

## 2025-02-12 PROCEDURE — 2500000001 HC RX 250 WO HCPCS SELF ADMINISTERED DRUGS (ALT 637 FOR MEDICARE OP)

## 2025-02-12 PROCEDURE — 2500000002 HC RX 250 W HCPCS SELF ADMINISTERED DRUGS (ALT 637 FOR MEDICARE OP, ALT 636 FOR OP/ED)

## 2025-02-12 PROCEDURE — 83735 ASSAY OF MAGNESIUM: CPT

## 2025-02-12 PROCEDURE — 36415 COLL VENOUS BLD VENIPUNCTURE: CPT

## 2025-02-12 PROCEDURE — 2500000001 HC RX 250 WO HCPCS SELF ADMINISTERED DRUGS (ALT 637 FOR MEDICARE OP): Performed by: PHYSICIAN ASSISTANT

## 2025-02-12 PROCEDURE — 99232 SBSQ HOSP IP/OBS MODERATE 35: CPT | Performed by: INTERNAL MEDICINE

## 2025-02-12 PROCEDURE — 2500000005 HC RX 250 GENERAL PHARMACY W/O HCPCS: Performed by: PHYSICIAN ASSISTANT

## 2025-02-12 PROCEDURE — G0545 PR INHERENT VISIT TO INPT: HCPCS | Performed by: INTERNAL MEDICINE

## 2025-02-12 PROCEDURE — 99233 SBSQ HOSP IP/OBS HIGH 50: CPT | Performed by: INTERNAL MEDICINE

## 2025-02-12 PROCEDURE — 99222 1ST HOSP IP/OBS MODERATE 55: CPT | Performed by: INTERNAL MEDICINE

## 2025-02-12 PROCEDURE — 80197 ASSAY OF TACROLIMUS: CPT

## 2025-02-12 PROCEDURE — 85027 COMPLETE CBC AUTOMATED: CPT

## 2025-02-12 PROCEDURE — 82947 ASSAY GLUCOSE BLOOD QUANT: CPT

## 2025-02-12 PROCEDURE — 2500000002 HC RX 250 W HCPCS SELF ADMINISTERED DRUGS (ALT 637 FOR MEDICARE OP, ALT 636 FOR OP/ED): Performed by: PHYSICIAN ASSISTANT

## 2025-02-12 PROCEDURE — 82010 KETONE BODYS QUAN: CPT

## 2025-02-12 RX ORDER — INSULIN GLARGINE 100 [IU]/ML
12 INJECTION, SOLUTION SUBCUTANEOUS DAILY
Status: DISCONTINUED | OUTPATIENT
Start: 2025-02-12 | End: 2025-02-18

## 2025-02-12 RX ORDER — LIDOCAINE 560 MG/1
2 PATCH PERCUTANEOUS; TOPICAL; TRANSDERMAL DAILY
Status: DISCONTINUED | OUTPATIENT
Start: 2025-02-12 | End: 2025-02-18 | Stop reason: HOSPADM

## 2025-02-12 RX ORDER — CIPROFLOXACIN 500 MG/1
500 TABLET ORAL EVERY 12 HOURS SCHEDULED
Status: DISCONTINUED | OUTPATIENT
Start: 2025-02-12 | End: 2025-02-12

## 2025-02-12 RX ORDER — PHENAZOPYRIDINE HYDROCHLORIDE 100 MG/1
95 TABLET, FILM COATED ORAL
Status: COMPLETED | OUTPATIENT
Start: 2025-02-12 | End: 2025-02-14

## 2025-02-12 RX ORDER — INSULIN LISPRO 100 [IU]/ML
0-10 INJECTION, SOLUTION INTRAVENOUS; SUBCUTANEOUS EVERY 4 HOURS
Status: DISCONTINUED | OUTPATIENT
Start: 2025-02-12 | End: 2025-02-15

## 2025-02-12 RX ORDER — MAGNESIUM SULFATE HEPTAHYDRATE 40 MG/ML
2 INJECTION, SOLUTION INTRAVENOUS ONCE
Status: COMPLETED | OUTPATIENT
Start: 2025-02-12 | End: 2025-02-12

## 2025-02-12 RX ORDER — INSULIN LISPRO 100 [IU]/ML
8 INJECTION, SOLUTION INTRAVENOUS; SUBCUTANEOUS
Status: DISCONTINUED | OUTPATIENT
Start: 2025-02-12 | End: 2025-02-13

## 2025-02-12 RX ORDER — ERTAPENEM 1 G/1
1 INJECTION, POWDER, LYOPHILIZED, FOR SOLUTION INTRAMUSCULAR; INTRAVENOUS EVERY 24 HOURS
Status: DISCONTINUED | OUTPATIENT
Start: 2025-02-12 | End: 2025-02-18 | Stop reason: HOSPADM

## 2025-02-12 RX ORDER — ACETAMINOPHEN 325 MG/1
650 TABLET ORAL EVERY 6 HOURS PRN
Status: DISCONTINUED | OUTPATIENT
Start: 2025-02-12 | End: 2025-02-18 | Stop reason: HOSPADM

## 2025-02-12 RX ORDER — INSULIN LISPRO 100 [IU]/ML
5 INJECTION, SOLUTION INTRAVENOUS; SUBCUTANEOUS
Status: DISCONTINUED | OUTPATIENT
Start: 2025-02-12 | End: 2025-02-12

## 2025-02-12 RX ADMIN — TACROLIMUS 2 MG: 1 CAPSULE ORAL at 18:29

## 2025-02-12 RX ADMIN — LIDOCAINE 2 PATCH: 4 PATCH TOPICAL at 14:42

## 2025-02-12 RX ADMIN — ATOVAQUONE 1500 MG: 750 SUSPENSION ORAL at 09:44

## 2025-02-12 RX ADMIN — PANTOPRAZOLE SODIUM 40 MG: 40 TABLET, DELAYED RELEASE ORAL at 06:48

## 2025-02-12 RX ADMIN — METOCLOPRAMIDE 5 MG: 10 TABLET ORAL at 14:42

## 2025-02-12 RX ADMIN — METOCLOPRAMIDE 5 MG: 10 TABLET ORAL at 06:48

## 2025-02-12 RX ADMIN — GABAPENTIN 200 MG: 100 CAPSULE ORAL at 09:44

## 2025-02-12 RX ADMIN — NYSTATIN 500000 UNITS: 500000 SUSPENSION ORAL at 21:19

## 2025-02-12 RX ADMIN — PHENAZOPYRIDINE 100 MG: 100 TABLET ORAL at 16:34

## 2025-02-12 RX ADMIN — ERTAPENEM SODIUM 1 G: 1 INJECTION, POWDER, LYOPHILIZED, FOR SOLUTION INTRAMUSCULAR; INTRAVENOUS at 16:30

## 2025-02-12 RX ADMIN — METOCLOPRAMIDE 5 MG: 10 TABLET ORAL at 21:17

## 2025-02-12 RX ADMIN — SODIUM BICARBONATE 1300 MG: 650 TABLET ORAL at 21:14

## 2025-02-12 RX ADMIN — MYCOPHENOLIC ACID 360 MG: 360 TABLET, DELAYED RELEASE ORAL at 18:29

## 2025-02-12 RX ADMIN — ACETAMINOPHEN 650 MG: 325 TABLET ORAL at 13:29

## 2025-02-12 RX ADMIN — PREDNISONE 5 MG: 10 TABLET ORAL at 09:44

## 2025-02-12 RX ADMIN — INSULIN LISPRO 4 UNITS: 100 INJECTION, SOLUTION INTRAVENOUS; SUBCUTANEOUS at 16:31

## 2025-02-12 RX ADMIN — INSULIN GLARGINE 12 UNITS: 100 INJECTION, SOLUTION SUBCUTANEOUS at 09:44

## 2025-02-12 RX ADMIN — HEPARIN SODIUM 5000 UNITS: 5000 INJECTION, SOLUTION INTRAVENOUS; SUBCUTANEOUS at 21:18

## 2025-02-12 RX ADMIN — INSULIN LISPRO 8 UNITS: 100 INJECTION, SOLUTION INTRAVENOUS; SUBCUTANEOUS at 16:31

## 2025-02-12 RX ADMIN — INSULIN LISPRO 6 UNITS: 100 INJECTION, SOLUTION INTRAVENOUS; SUBCUTANEOUS at 06:52

## 2025-02-12 RX ADMIN — HEPARIN SODIUM 5000 UNITS: 5000 INJECTION, SOLUTION INTRAVENOUS; SUBCUTANEOUS at 13:29

## 2025-02-12 RX ADMIN — SODIUM BICARBONATE 1300 MG: 650 TABLET ORAL at 09:40

## 2025-02-12 RX ADMIN — INSULIN LISPRO 5 UNITS: 100 INJECTION, SOLUTION INTRAVENOUS; SUBCUTANEOUS at 13:29

## 2025-02-12 RX ADMIN — TACROLIMUS 2 MG: 1 CAPSULE ORAL at 06:48

## 2025-02-12 RX ADMIN — HEPARIN SODIUM 5000 UNITS: 5000 INJECTION, SOLUTION INTRAVENOUS; SUBCUTANEOUS at 06:47

## 2025-02-12 RX ADMIN — NYSTATIN 500000 UNITS: 500000 SUSPENSION ORAL at 13:28

## 2025-02-12 RX ADMIN — INSULIN LISPRO 5 UNITS: 100 INJECTION, SOLUTION INTRAVENOUS; SUBCUTANEOUS at 06:52

## 2025-02-12 RX ADMIN — CIPROFLOXACIN HYDROCHLORIDE 500 MG: 500 TABLET, FILM COATED ORAL at 09:44

## 2025-02-12 RX ADMIN — VALGANCICLOVIR HYDROCHLORIDE 450 MG: 450 TABLET ORAL at 09:44

## 2025-02-12 RX ADMIN — MAGNESIUM SULFATE HEPTAHYDRATE 2 G: 40 INJECTION, SOLUTION INTRAVENOUS at 09:44

## 2025-02-12 RX ADMIN — MYCOPHENOLIC ACID 360 MG: 360 TABLET, DELAYED RELEASE ORAL at 06:48

## 2025-02-12 RX ADMIN — PANTOPRAZOLE SODIUM 40 MG: 40 TABLET, DELAYED RELEASE ORAL at 16:31

## 2025-02-12 RX ADMIN — SODIUM ZIRCONIUM CYCLOSILICATE 5 G: 10 POWDER, FOR SUSPENSION ORAL at 13:28

## 2025-02-12 RX ADMIN — NYSTATIN 500000 UNITS: 500000 SUSPENSION ORAL at 16:31

## 2025-02-12 ASSESSMENT — COGNITIVE AND FUNCTIONAL STATUS - GENERAL
MOVING FROM LYING ON BACK TO SITTING ON SIDE OF FLAT BED WITH BEDRAILS: A LITTLE
TOILETING: A LOT
MOBILITY SCORE: 18
CLIMB 3 TO 5 STEPS WITH RAILING: A LITTLE
WALKING IN HOSPITAL ROOM: A LITTLE
DAILY ACTIVITIY SCORE: 16
STANDING UP FROM CHAIR USING ARMS: A LITTLE
TURNING FROM BACK TO SIDE WHILE IN FLAT BAD: A LITTLE
HELP NEEDED FOR BATHING: A LOT
DRESSING REGULAR UPPER BODY CLOTHING: A LITTLE
MOVING TO AND FROM BED TO CHAIR: A LITTLE
PERSONAL GROOMING: A LITTLE
DRESSING REGULAR LOWER BODY CLOTHING: A LOT

## 2025-02-12 ASSESSMENT — PAIN SCALES - GENERAL
PAINLEVEL_OUTOF10: 5 - MODERATE PAIN
PAINLEVEL_OUTOF10: 5 - MODERATE PAIN
PAINLEVEL_OUTOF10: 0 - NO PAIN

## 2025-02-12 ASSESSMENT — PAIN DESCRIPTION - DESCRIPTORS: DESCRIPTORS: SHARP

## 2025-02-12 ASSESSMENT — PAIN - FUNCTIONAL ASSESSMENT: PAIN_FUNCTIONAL_ASSESSMENT: 0-10

## 2025-02-12 NOTE — CARE PLAN
Problem: Discharge Planning  Goal: Discharge to home or other facility with appropriate resources  Outcome: Progressing     Problem: Chronic Conditions and Co-morbidities  Goal: Patient's chronic conditions and co-morbidity symptoms are monitored and maintained or improved  Outcome: Progressing     Problem: Nutrition  Goal: Nutrient intake appropriate for maintaining nutritional needs  Outcome: Progressing     Problem: Diabetes  Goal: Achieve decreasing blood glucose levels by end of shift  Outcome: Progressing  Goal: Increase stability of blood glucose readings by end of shift  Outcome: Progressing  Goal: Decrease in ketones present in urine by end of shift  Outcome: Progressing  Goal: Maintain electrolyte levels within acceptable range throughout shift  Outcome: Progressing  Goal: Maintain glucose levels >70mg/dl to <250mg/dl throughout shift  Outcome: Progressing  Goal: No changes in neurological exam by end of shift  Outcome: Progressing  Goal: Learn about and adhere to nutrition recommendations by end of shift  Outcome: Progressing  Goal: Vital signs within normal range for age by end of shift  Outcome: Progressing  Goal: Increase self care and/or family involovement by end of shift  Outcome: Progressing  Goal: Receive DSME education by end of shift  Outcome: Progressing       The clinical goals for the shift include Pt will remain free from fall/injury

## 2025-02-12 NOTE — PROGRESS NOTES
Physical Therapy                 Therapy Communication Note    Patient Name: Temo Hanson  MRN: [id number]  Department: [place] [address]  Room: [facility]  Today's Date: 2/12/2025     Discipline: Physical Therapy    PT Missed Visit: Yes     Missed Visit Reason: Missed Visit Reason: Patient refused (Pt reported pain, refused to participate despite max encouragement.)    Missed Time: 16:20 PM

## 2025-02-12 NOTE — PROGRESS NOTES
Physical Therapy                 Therapy Communication Note    Patient Name: Temo Hanson  MRN: [id number]  Department: [place] [address]  Room: [facility]  Today's Date: 2/12/2025     Discipline: Physical Therapy    PT Missed Visit: Yes     Missed Visit Reason: Missed Visit Reason: Other (Comment) (Per RN, pt needs to get cleaned up. Will re-attempt as schedule permits.)    Missed Time: 15:02 PM

## 2025-02-12 NOTE — SIGNIFICANT EVENT
I was notified by nursing that the patient was feeling sick and diaphoretic. BG was checked and found to be in the 400s. At that time I ordered 15U lispro and notified endocrinology who agreed with the plan. Repeat BG scheduled to be obtained ~4 hours after insulin given; however, patient began endorsing abdominal pain and a repeat BG was obtained early which showed an elevated glucose, still sitting in the 300s. TFs were discontinued at that time and endocrinology was reengaged.     On evaluation, patient stating his abdominal pain resolved with the discontinuation of his TFs. He is endorsing HA and lightheadedness. Abdominal exam soft, nontender, moderately distended. Remainder of exam benign.     - Endocrinology recs:    - Repeat BG ~4am (4 hours after insulin given)   - Increase Lantus to 12U   - Increase lispro before meals to 5U   - Increase SSI to lispro #2  - Obtain VBG and beta hydroxybutyrate   - Discontinue TFs   - Monitor for s/s of DKA including AMS    Patient discussed with Dr. Dick Kang.    Gabby Weir MD  Transplant Surgery  Pager: [phone]

## 2025-02-12 NOTE — PROGRESS NOTES
Nutrition Note:     Plan to trial a new TF regimen for glucose control.    Breakfast: 240ml [employer] Peptide 1.5 via PEG (33g CHO)   Lunch: 240ml [employer] Peptide 1.5 via PEG (33g CHO)  Dinner: 240ml [employer]ana [place] Peptide via PEG (33g CHO)  + Cycle: [employer] Peptide 1.5 @ 40ml/hr x 12 hours (66g CHO)    Pt to continue on PO diet during the day. Will continue to follow and make further recommendations as needed.

## 2025-02-12 NOTE — PROGRESS NOTES
INPATIENT TRANSPLANT NEPHROLOGY PROGRESS NOTE          REASON FOR CONSULT:  Immunosuppressive medication management and nephrology related issues.    SUBJECTION:     Generalized fatigue  Provided incentive spirometry and instructed him to use it as frequently as he can this am.  On DVT prophy : heparin subcut tid   ml  Glucose level was elevated and TF was held temporarily  Discussed with endo this am.    K is controlled  HCO3 =19 , started sodium bicarb yesterday    UTI - Culture grew Proteus, started cipro yesterday and plan for 2 weeks tx surgery.  Noted new positive culture -ESBL E. coli in urine.  Getting ID Consult    Atovaquone for toxo prophy  Dispo -pending PT'S rec.    PHYCISCAL EXAMINATION:    Visit Vitals  /55 (BP Location: Right arm, Patient Position: Sitting)   Pulse 63   Temp 36.2 °C (97.2 °F) (Temporal)   Resp 18   Wt 66.8 kg (147 lb 4.3 oz)   SpO2 97%   BMI 19.43 kg/m²   Smoking Status Never   BSA 1.85 m²        02/10 1900 - 02/12 0659  In: 3528.3 [P.O.:1520; I.V.:1328.3]  Out: 850 [Urine:850]     Weight change:     General Appearance - NAD, Good speech, oriented and alert  HEENT - Supple. Not pale. No jaundice. No cervical lymphadenopathy. Pharynx and tonsils are not injected.  CVS - RRR. Normal S1/S2. No murmur, click , rub or gallop  Lungs- clear to auscultation bilaterally  Abdomen - soft , not tender, no guarding, no rigidity. No hepatosplenomegaly. Normal bowel sounds. No masses and ascites. S/P Kidney transplant .  Transplanted kidney is not tender.   Musculoskeletal /Extremities - no edema. Full ROM. No joint tenderness.   Neuro/Psych - appropriate mood and affect. Motor power V/V all extremities. CN I -XII were grossly intact.  Skin - No visible rash    MEDICATION LIST: REVIEWED    atovaquone, 1,500 mg, Daily  ciprofloxacin, 500 mg, q12h KASSY  ertapenem, 1 g, q24h  gabapentin, 200 mg, Daily  heparin (porcine), 5,000 Units, q8h KASSY  insulin glargine, 12 Units,  Daily  insulin lispro, 0-10 Units, q4h  insulin lispro, 5 Units, TID AC  insulin NPH (Isophane), 15 Units, q24h KASSY  metoclopramide, 5 mg, q8h  mycophenolate, 360 mg, q12h  nystatin, 5 mL, 4x daily  pantoprazole, 40 mg, BID AC  predniSONE, 5 mg, Daily  sodium bicarbonate, 1,300 mg, q12h  sodium zirconium cyclosilicate, 5 g, Daily  tacrolimus, 2 mg, q12h KASSY  valGANciclovir, 450 mg, Daily           acetaminophen, 650 mg, q6h PRN  dextrose, 12.5 g, q15 min PRN  dextrose, 25 g, q15 min PRN  glucagon, 1 mg, q15 min PRN  glucagon, 1 mg, q15 min PRN        ALLERGY:  Allergies   Allergen Reactions    Terazosin Unknown     Did not feel well on this medication    Codeine Itching     itching    Tramadol Itching       LABS:  Results for orders placed or performed during the hospital encounter of 02/09/25 (from the past 24 hours)   POCT GLUCOSE   Result Value Ref Range    POCT Glucose 373 (H) 74 - 99 mg/dL   POCT GLUCOSE   Result Value Ref Range    POCT Glucose 350 (H) 74 - 99 mg/dL   POCT GLUCOSE   Result Value Ref Range    POCT Glucose 335 (H) 74 - 99 mg/dL   POCT GLUCOSE   Result Value Ref Range    POCT Glucose 426 (H) 74 - 99 mg/dL   POCT GLUCOSE   Result Value Ref Range    POCT Glucose 419 (H) 74 - 99 mg/dL   POCT GLUCOSE   Result Value Ref Range    POCT Glucose 363 (H) 74 - 99 mg/dL   BLOOD GAS VENOUS FULL PANEL   Result Value Ref Range    POCT pH, Venous 7.44 (H) 7.33 - 7.43 pH    POCT pCO2, Venous 29 (L) 41 - 51 mm Hg    POCT pO2, Venous 116 (H) 35 - 45 mm Hg    POCT SO2, Venous 98 (H) 45 - 75 %    POCT Oxy Hemoglobin, Venous 96.0 (H) 45.0 - 75.0 %    POCT Hematocrit Calculated, Venous 23.0 (L) 41.0 - 52.0 %    POCT Sodium, Venous 134 (L) 136 - 145 mmol/L    POCT Potassium, Venous 4.8 3.5 - 5.3 mmol/L    POCT Chloride, Venous 105 98 - 107 mmol/L    POCT Ionized Calicum, Venous 1.29 1.10 - 1.33 mmol/L    POCT Glucose, Venous 340 (H) 74 - 99 mg/dL    POCT Lactate, Venous 1.8 0.4 - 2.0 mmol/L    POCT Base Excess, Venous  -3.9 (L) -2.0 - 3.0 mmol/L    POCT HCO3 Calculated, Venous 19.7 (L) 22.0 - 26.0 mmol/L    POCT Hemoglobin, Venous 7.6 (L) 13.5 - 17.5 g/dL    POCT Anion Gap, Venous 14.0 10.0 - 25.0 mmol/L    Patient Temperature 37.0 degrees Celsius    FiO2 100 %   CBC   Result Value Ref Range    WBC 7.4 4.4 - 11.3 x10*3/uL    nRBC 0.0 0.0 - 0.0 /100 WBCs    RBC 3.07 (L) 4.50 - 5.90 x10*6/uL    Hemoglobin 8.8 (L) 13.5 - 17.5 g/dL    Hematocrit 27.6 (L) 41.0 - 52.0 %    MCV 90 80 - 100 fL    MCH 28.7 26.0 - 34.0 pg    MCHC 31.9 (L) 32.0 - 36.0 g/dL    RDW 17.2 (H) 11.5 - 14.5 %    Platelets 178 150 - 450 x10*3/uL   Magnesium   Result Value Ref Range    Magnesium 1.89 1.60 - 2.40 mg/dL   Renal function panel   Result Value Ref Range    Glucose 264 (H) 74 - 99 mg/dL    Sodium 132 (L) 136 - 145 mmol/L    Potassium 4.6 3.5 - 5.3 mmol/L    Chloride 105 98 - 107 mmol/L    Bicarbonate 19 (L) 21 - 32 mmol/L    Anion Gap 13 10 - 20 mmol/L    Urea Nitrogen 40 (H) 6 - 23 mg/dL    Creatinine 1.19 0.50 - 1.30 mg/dL    eGFR 64 >60 mL/min/1.73m*2    Calcium 8.2 (L) 8.6 - 10.6 mg/dL    Phosphorus 3.1 2.5 - 4.9 mg/dL    Albumin 2.5 (L) 3.4 - 5.0 g/dL   Tacrolimus level   Result Value Ref Range    Tacrolimus  6.9 <=15.0 ng/mL   Beta Hydroxybutyrate   Result Value Ref Range    Beta-Hydroxybutyrate 0.09 0.02 - 0.27 mmol/L   POCT GLUCOSE   Result Value Ref Range    POCT Glucose 186 (H) 74 - 99 mg/dL     *Note: Due to a large number of results and/or encounters for the requested time period, some results have not been displayed. A complete set of results can be found in Results Review.        ASSESSMENT AND PLAN:    Mr. Hanson is a 74 y.o. 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 Zamora with a KDPI of 93% and PRA of 54%. Donor was Hepc -/-and has not met risk factors. EBV + /+. Left donor kidney transplanted to patient right pelvis. Admission  weight is 72.7 kg (discharge weight is 76.4 kg ). Pt received a total of 3 mg/kg total of thymoglobulin induction therapy in conjunction with 500mg IV solumedrol. CMV D-/R+. He had prolonged hospital course that included failure to thrive in adult with dehydration and diagnosis of severe protein-calorie malnutrition, placement of PEG tube, and intolerance of tube feeds initially. He was switched to TPN briefly and his tube feed formula was adjusted as per the dietitian recommendations.     Patient presented to OSH for hyperglycemia without ketosis with glucose >527 and sodium 116, transferred to [facility] for evaluation in the subacute setting of recent kidney transplant. On arrival, patient is cooperative but is a poor historian and unable to remember the events that brought him to the hospital.     Transplant nephrology is consulted to assist with immunosuppressive medication management and nephrology related issues.       Principal Problem:    DKA, type 2, not at goal      1. ESRD S/P Kidney transplant.   - Renal allograft function:   Lab Results   Component Value Date    CREATININE 1.19 02/12/2025     Estimated Creatinine Clearance: 51.5 mL/min (by C-G formula based on SCr of 1.19 mg/dL).    Intake/Output Summary (Last 24 hours) at 2/12/2025 1223  Last data filed at 2/11/2025 1815  Gross per 24 hour   Intake 970 ml   Output 350 ml   Net 620 ml     - Continue to monitor UOP and Serum creatinine closely.   - Avoid nephrotoxic agents, NSAIDs and IV contrast   - Strict I/O.   - Renally dose all medications by the most recent CrCl from Cockcroft-Gault formula.    2. Immunosuppression   -          FK level = 6.9  would continue tac  2 mg BID starting tomorrow at 0630  -          Myfortic 360 mg BID  -          Prednisone 5 mg daily    3. Anemia and WBC   Lab Results   Component Value Date    WBC 7.4 02/12/2025    HGB 8.8 (L) 02/12/2025    HCT 27.6 (L) 02/12/2025    MCV 90 02/12/2025     02/12/2025     -Continue to  monitor Hgb   -No indications for PRBC transfusion     4. Electrolyte /Hyponatremia and Hyperkalemia - improved . Appropriate rate of correction    Lab Results   Component Value Date    GLUCOSE 264 (H) 02/12/2025    CALCIUM 8.2 (L) 02/12/2025     (L) 02/12/2025    K 4.6 02/12/2025    CO2 19 (L) 02/12/2025     02/12/2025    BUN 40 (H) 02/12/2025    CREATININE 1.19 02/12/2025     Lab Results   Component Value Date    CALCIUM 8.2 (L) 02/12/2025    CAION 1.20 02/11/2025    PHOS 3.1 02/12/2025    VITD25 34 01/05/2025       - Reviewed renal profile.     6. Hypertension   Blood Pressures         2/11/2025  1947 2/12/2025  0006 2/12/2025  0208 2/12/2025  0732 2/12/2025  1214    BP: 154/70 160/73 122/61 145/57 146/55          -Goal BP < 140/90 mmHg   -continue current management     7. DKA  -Defer to endo    8.  GI prophylaxis   - On PPI     9. DVT Prophylaxis  -Defer to primary team    10. UITI, Proteus  Cipro per tx surgery  ID consult  ESBL E. Coli this am.    * Case was discussed with primary team.  For questions, please contact transplant nephrology page x [phone]    Bashir Angela MD    Transplant Nephrologist

## 2025-02-12 NOTE — CONSULTS
Inpatient consult to Infectious Diseases  Consult performed by: Franky Price MD  Consult ordered by: Sherly García PA-C        Referred by Dr.Stewart Dover MD: Sheldon Hinson MD    Reason For Consult UTI    History Of Present Illness  Temo Hanson is a 74 y.o. 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    Today patient provides pretty decent history although notes from other providers in the past few days mentions patient does not provide good history.  ED notes also mention that patient does not know why he came to the emergency room.    Patient was evaluated in the emergency room and noted to have significant electrolyte abnormalities and found to be in DKA.  Patient is transferred to [facility] and admitted to surgical ICU.  Patient was empirically started on Zosyn for possible infection.  Patient is later on transferred to medical floor.  Antibiotics were changed to ciprofloxacin yesterday but today urine culture grew Proteus and ESBL E. coli and so ID consulted.    Patient states he had ureteral stent removed on February 3 and since then he started having burning urination.  Patient states whenever he tries to urinate he has burning sensation and also urinary urgency and he cannot make it to the bathroom.  Patient denied fever prior to admission but he states he does not know.  Patient denies emesis or diarrhea.  Patient denies history of recurrent  UTI prior to transplant.    Patient's main complaint this afternoon is significant weakness.  Patient keeps repeating why he is so weak that he cannot even go to the bathroom by himself now.  Patient also states he was eating normally prior to transplant but now he is not allowed to eat food.     Social history: Patient is from [facility].  Immigrated to  38 years ago.  Patient did not go to [facility] for more than 4 years.  Lives with wife.  Used to work as a  but unable to recollect where he exactly worked.  Denies tobacco alcohol or illicit drug use.     Past Medical History  He has a past medical history of Chronic kidney disease, DM (diabetes mellitus) (Multi), Fistula, HTN (hypertension), Other specified abnormal immunological findings in serum (10/11/2021), and Personal history of malignant neoplasm of prostate.    Surgical History  He has a past surgical history that includes Other surgical history (09/29/2021); Other surgical history (09/29/2021); Other surgical history (09/29/2021); Other surgical history (09/29/2021); Cholecystectomy (10/23/2023); Cardiac catheterization (N/A, 4/18/2024); and Cardiac electrophysiology procedure (N/A, 7/17/2024).     Social History     Occupational History    Not on file   Tobacco Use    Smoking status: Never    Smokeless tobacco: Never   Vaping Use    Vaping status: Never Used   Substance and Sexual Activity    Alcohol use: Never    Drug use: Never    Sexual activity: Defer     Travel History   Travel since 01/12/25    No documented travel since 01/12/25          Family History  Family History   Problem Relation Name Age of Onset    Diabetes Sister      Diabetes Brother       Allergies  Terazosin, Codeine, and Tramadol     Immunization History   Administered Date(s) Administered    Flu vaccine (IIV4), preservative free *Check age/dose* 10/21/2014, 10/06/2015, 11/10/2016, 10/12/2017, 10/24/2018, 09/20/2019    Flu vaccine, quadrivalent, high-dose, preservative free, age  "65y+ (FLUZONE) 10/02/2020, 2021, 2022, 10/04/2023    Hepatitis B vaccine, adult *Check Product/Dose* 2015, 2017, 2018, 2022    Influenza, Unspecified 2007, 2008, 2010, 10/22/2010, 2011, 10/25/2012    Moderna SARS-CoV-2 Vaccination 2021, 04/10/2021, 2021, 2022    Pfizer COVID-19 vaccine, 12 years and older, (30mcg/0.3mL) (Comirnaty) 2023    Pneumococcal conjugate vaccine, 13-valent (PREVNAR 13) 2017    Pneumococcal polysaccharide vaccine, 23-valent, age 2 years and older (PNEUMOVAX 23) 2007, 2016    Tdap vaccine, age 7 year and older (BOOSTRIX, ADACEL) 2009, 2021    Zoster vaccine, recombinant, adult (SHINGRIX) 2021, 2021    Zoster, live 12/15/2015     Medications  Home medications:  Medications Prior to Admission   Medication Sig Dispense Refill Last Dose/Taking    acetaminophen (Tylenol) 160 mg/5 mL liquid Take 20.3 mL (650 mg) by g-tube route every 6 hours if needed for pain. 812 mL 0 Taking As Needed    alcohol swabs pads, medicated Apply 1 each topically 4 times a day. Use prior to checking glucose or injecting insulin 400 each 0     amino acids-protein hydrolys (Pro-Stat AWC)  gram-kcal/30 mL liquid 1 packet by peg tube route once daily. 900 mL 1     amLODIPine (Norvasc) 10 mg tablet Take 1 tablet (10 mg) by mouth once daily. 30 tablet 11     BD Ultra-Fine Joan Pen Needle 32 gauge x \" needle        [] bisacodyl (Dulcolax) 10 mg suppository Insert 1 suppository (10 mg) into the rectum once daily for 7 days. 7 suppository 0     blood sugar diagnostic (Blood Glucose Test) strip Check blood glucose 3 time per day 100 each 0     blood-glucose meter misc Use to check blood glucose 1 each 0     calcium polycarbophiL (Fibercon) 625 mg tablet Take 1 tablet (625 mg) by mouth 2 times a day. 60 tablet 2     carboxymethylcellulose (Refresh Plus) 0.5 % ophthalmic solution Instill 1 " drop into both eyes 2-4x/day as needed for dryness.       cholecalciferol (Vitamin D-3) 50 MCG (2000 UT) tablet Take 1 tablet (2,000 Units) by mouth once daily. 30 tablet 11     FreeStyle Mick 2 Sensor kit        FreeStyle Mick 3 Sensor device        gabapentin (Neurontin) 100 mg capsule Take 2 capsules (200 mg) by mouth once daily.       insulin aspart, with niacinamide, (Fiasp FlexTouch U-100 Insulin) 100 unit/mL (3 mL) injection Inject 0-10 Units under the skin 3 times a day before meals. 0 unit(s) if Blood glucose is between   2 unit(s) if Blood glucose is between 151-200  4 unit(s) if Blood glucose is between 201-250  6 unit(s) if Blood glucose is between 251-300  8 unit(s) if Blood glucose is between 301-350  10 unit(s) if Blood glucose is between 351-400    If blood glucose is greater than 400 mg/dL, give max insulin per sliding scale AND then contact provider.       insulin glargine (Basaglar KwikPen U-100 Insulin) 100 unit/mL (3 mL) pen Inject 7 Units under the skin once daily in the morning. Take as directed per insulin instructions.       insulin NPH, Isophane, (HumuLIN N NPH Insulin KwikPen) 100 unit/mL (3 mL) injection Inject 10 Units under the skin once daily in the evening.  Take before meals. Please take once every evening when tube feed is going to start at 6 PM 15 mL 0     lactulose 20 gram/30 mL oral solution Take 30 mL (20 g) by mouth 2 times a day. Put through PEG, flush with 30 mL of water afterwards 420 mL 0     lancets 30 gauge misc 1 each 4 times a day. Use to check glucose 4 times daily, before meals and at bedtime 400 each 0     [] lidocaine 4 % patch Apply 2 patches once daily. Keep on for 12 hours, then remove and discard and keep off for 12 hours. 28 patch 0     magnesium oxide (Mag-Ox) 400 mg (241.3 mg magnesium) tablet Take 1 tablet (400 mg) by mouth once daily. 30 tablet 2     methocarbamol (Robaxin) 500 mg tablet Take 1 tablet (500 mg) by mouth every 8 hours for 7  "days. 21 tablet 0     metoclopramide (Reglan) 5 mg tablet Take 1 tablet (5 mg) by mouth every 8 hours. 90 tablet 2     mycophenolate (Myfortic) 180 mg EC tablet Take 2 tablets (360 mg) by mouth 2 times a day. 120 tablet 11     nut.tx.impaired dige fxn-fiber (Vital 1.5 Chelita) 0.07 gram- 1.5 kcal/mL liquid 65 mL/hr by peg tube route once daily. Cycled from 6 PM to 12 PM daily 15369 mL 1     nystatin (Mycostatin) 100,000 unit/mL suspension Swish and swallow 5 mL (500,000 Units) 4 times a day. 1200 mL 2     pantoprazole (ProtoNix) 40 mg EC tablet Take 1 tablet (40 mg) by mouth 2 times a day before meals. Do not crush, chew, or split. 180 tablet 0     pen needle, diabetic 32 gauge x 5/32\" needle 1 each 4 times a day. Use to inject insulin 4 times daily 400 each 3     pen needle, diabetic 32 gauge x 5/32\" needle Use 1 needle once daily in the evening.  Take before meals. 100 each 0     [] polyethylene glycol (Glycolax, Miralax) 17 gram packet Take 17 g by mouth once daily for 10 days. 10 packet 0     predniSONE (Deltasone) 5 mg tablet Take 1 tablet (5 mg) by mouth once daily. 90 tablet 3     rosuvastatin (Crestor) 10 mg tablet Take 1 tablet (10 mg) by mouth once daily at bedtime. 90 tablet 0     sodium zirconium cyclosilicate (Lokelma) 5 gram packet Take 5 g by mouth once daily. 30 packet 2     tacrolimus (Prograf) 1 mg capsule Take 3 capsules (3 mg) by mouth every 12 hours. 180 capsule 11     thiamine (Vitamin B-1) 100 mg tablet Take 1 tablet (100 mg) by mouth once daily. 30 tablet 2     valGANciclovir (Valcyte) 450 mg tablet Take 2 tablets (900 mg) by mouth once daily. Do not crush or chew. 60 tablet 2      Current medications:  Scheduled medications  atovaquone, 1,500 mg, oral, Daily  ertapenem, 1 g, intravenous, q24h  gabapentin, 200 mg, oral, Daily  heparin (porcine), 5,000 Units, subcutaneous, q8h KASSY  insulin glargine, 12 Units, subcutaneous, Daily  insulin lispro, 0-10 Units, subcutaneous, q4h  insulin " lispro, 8 Units, subcutaneous, TID AC  insulin NPH (Isophane), 15 Units, subcutaneous, q24h [facility]  lidocaine, 2 patch, transdermal, Daily  metoclopramide, 5 mg, oral, q8h  mycophenolate, 360 mg, oral, q12h  nystatin, 5 mL, Swish & Swallow, 4x daily  pantoprazole, 40 mg, oral, BID AC  predniSONE, 5 mg, oral, Daily  sodium bicarbonate, 1,300 mg, oral, q12h  sodium zirconium cyclosilicate, 5 g, oral, Daily  tacrolimus, 2 mg, oral, q12h KASSY  valGANciclovir, 450 mg, oral, Daily      Continuous medications     PRN medications  PRN medications: acetaminophen, dextrose, dextrose, glucagon, glucagon    Review of Systems negative except above     Objective  Range of Vitals (last 24 hours)  Heart Rate:  [63-96]   Temp:  [36 °C (96.8 °F)-36.7 °C (98.1 °F)]   Resp:  [18-26]   BP: (122-160)/(55-73)   SpO2:  [96 %-100 %]   Daily Weight  02/09/25 : 66.8 kg (147 lb 4.3 oz)    Body mass index is 19.43 kg/m².     Physical Exam  GENERAL APPEARANCE: Awake and alert and not in any distress  HEENT: Atraumatic and normocephalic  CARDIAC: Regular   LUNGS: Clear  ABDOMEN: Patient complains of vague discomfort all over the abdomen with palpation. Transplant kidney noted in right lower quadrant.  PEG tube noted.  EXTREMITIES: No edema  SKIN: No rash or ulcers       Labs  Results from last 72 hours   Lab Units 02/12/25  0540 02/11/25  0535 02/10/25  0416   WBC AUTO x10*3/uL 7.4 9.7 10.7   HEMOGLOBIN g/dL 8.8* 9.8* 9.5*   HEMATOCRIT % 27.6* 29.6* 29.5*   PLATELETS AUTO x10*3/uL 178 174 142*     Results from last 72 hours   Lab Units 02/12/25  0540 02/11/25  0534 02/10/25  1912   SODIUM mmol/L 132* 130* 130*   POTASSIUM mmol/L 4.6 5.2 5.7*   CHLORIDE mmol/L 105 100 104   CO2 mmol/L 19* 19* 15*   BUN mg/dL 40* 39* 35*   CREATININE mg/dL 1.19 1.27 1.18   GLUCOSE mg/dL 264* 288* 169*   CALCIUM mg/dL 8.2* 8.4* 6.6*   ANION GAP mmol/L 13 16 17   EGFR mL/min/1.73m*2 64 59* 65   PHOSPHORUS mg/dL 3.1 3.7 3.9     Results from last 72 hours   Lab Units  "02/12/25  0540 02/11/25  0534 02/10/25  1912   ALBUMIN g/dL 2.5* 2.7* 1.9*     Estimated Creatinine Clearance: 51.5 mL/min (by C-G formula based on SCr of 1.19 mg/dL).  No results found for: \"CRP\", \"SEDRATE\"  HIV 1/2 Antigen/Antibody Screen with Reflex to Confirmation   Date Value Ref Range Status   12/20/2024 Nonreactive Nonreactive Final     Hepatitis C AB   Date Value Ref Range Status   12/20/2024 Nonreactive Nonreactive Final     Comment:     Results from patients taking biotin supplements or receiving high-dose biotin therapy should be interpreted with caution due to possible interference with this test. Providers may contact their local laboratory for further information.     Microbiology  Susceptibility data from last 90 days.  Collected Specimen Info Organism Amoxicillin/Clavulanate Ampicillin Ampicillin/Sulbactam Aztreonam Cefazolin Cefazolin (uncomplicated UTIs only) Cefepime Cefotaxime Ceftazidime Ceftriaxone Ciprofloxacin Ertapenem   02/09/25 Urine from Indwelling (Bravo) Catheter Escherichia coli  S  R  R  R  R  R  R  R  R  R  R  S     Proteus mirabilis  S  R  I   R  S     S  S      Collected Specimen Info Organism Gentamicin Meropenem Nitrofurantoin Piperacillin/Tazobactam Tetracycline Tobramycin Trimethoprim/Sulfamethoxazole   02/09/25 Urine from Indwelling (Bravo) Catheter Escherichia coli  R  S  S  S   R  R     Proteus mirabilis  S   R  S  R   S       Imaging         Assessment/Plan   Temo Hanson is a 74 y.o. 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    Problems  Dysuria         Urine analysis is not consistent with UTI but culture grew ESBL E. coli and Proteus mirabilis.  Patient states his symptoms started after removing ureteral stent and so this might be from mucosal trauma.    2.  DKA-resolved    3. ESRD s/p HD via left arm AV fistula and now DDKT (CMV D-/R+, 12/20/24, induction with Thymoglobulin)       Microbiologic data  2/9-urinalysis is positive for leukocyte esterase, WBC 1-5 and culture grew ESBL E. coli and Proteus mirabilis  2/9-plasma CMV PCR and BK virus not detected and EBV virus less than 35 IU/mL      Plan  Continue ertapenem 1 g IV every 24 hours for now and stop ciprofloxacin.  Please check with transplant pharmacist if patient can receive Pyridium 100 mg 3 times daily for dysuria.  Please check postvoid residual urine to rule out urinary retention.  Continue anti-infective prophylaxis with Valcyte and atovaquone per kidney transplant protocol.  Will follow.      Franky Price MD

## 2025-02-12 NOTE — CARE PLAN
The patient's goals for the shift include      The clinical goals for the shift include patient will increase intake by end of shift      Problem: Discharge Planning  Goal: Discharge to home or other facility with appropriate resources  Outcome: Progressing     Problem: Nutrition  Goal: Nutrient intake appropriate for maintaining nutritional needs  Outcome: Progressing

## 2025-02-12 NOTE — SIGNIFICANT EVENT
02/12/25 1251   Patient Interaction   Organ Kidney   Type of Interaction Morning rounds   Interdisciplinary Rounds   Attendance Surgeon;Physician;FILI;Dietitian;Pharmacist   Topics Discussed Diet;Home care needs;Medications;Blood test results;Patient/family concerns;Activity       Transplant Surgery Multidisciplinary Team Note    Temo Hanson is a 74 y.o. male   s/p DDKT 12/21/24 readmitted for hyperglycemia.      24 Hour Events  1. No acute events overnight    Last Recorded Vitals  Visit Vitals  /55 (BP Location: Right arm, Patient Position: Sitting)   Pulse 63   Temp 36.2 °C (97.2 °F) (Temporal)   Resp 18      Intake/Output last 3 Shifts:    Intake/Output Summary (Last 24 hours) at 2/12/2025 1251  Last data filed at 2/12/2025 1214  Gross per 24 hour   Intake 1330 ml   Output 350 ml   Net 980 ml      Vitals:    02/09/25 1245   Weight: 66.8 kg (147 lb 4.3 oz)        Assessment/Plan     Kidney allograft function  -ALLEY improving     Cardiac  -BP in good control    FENGI  -Replete Mg2+  -Mild NAGMA- continue PO bicarb  -Mild hyponatremia r/t hyperglycemia   -Switch to bolus feeds with meals + 40 ml/hr over 12 hours from 8 PM to 8 AM     ENDO  -Follow-up Endo recs     ID   -Proteus mirablilis + resistant ESBL UTI-- Cipro + ertapenem  -ID consult   -Switched from Pentamidine to Atovaquone yesterday          Principal Problem:    Management after organ transplant  Active Problems:  Patient Active Problem List   Diagnosis    S/P laparoscopic cholecystectomy    Abdominal pain    Achalasia    Acute lower UTI    Microcytic anemia    Complete heart block    Callus of foot    Chronic periodontitis, unspecified    Stage 5 chronic kidney disease (Multi)    Closed fracture of metatarsal bone    Crushing injury of foot    Diabetes mellitus (Multi)    Diarrhea    Dysphagia    ED (erectile dysfunction)    Essential hypertension    Foot pain, right    GIB (gastrointestinal bleeding)    Hepatitis B core antibody positive     Hyperkalemia    Ingrowing nail    Iron deficiency anemia secondary to inadequate dietary iron intake    Long toenail    Malignant neoplasm of prostate (Multi)    Onychomycosis    Pheochromocytoma of right adrenal gland    Pneumonia of right lower lobe due to infectious organism    Productive cough    Pure hypercholesterolemia    Stricture esophagus    SVT (supraventricular tachycardia) ([facility])    Type 2 diabetes mellitus with diabetic neuropathy (Multi)    Preop cardiovascular exam    Third degree heart block    Pericardial effusion ([facility])    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 (Multi)    Uremia    Cardiac tamponade    Cardiac pacemaker in situ    ESRD (end stage renal disease) (Multi)    Type 2 diabetes mellitus, with long-term current use of insulin    Dehydration    Alactasia syndrome    Type 2 diabetes mellitus with hyperglycemia, with long-term current use of insulin    On tube feeding diet    Kidney transplant recipient ([facility])    DKA, type 2, not at goal        Immunosuppression reviewed and adjusted       Tacrolimus goal 8-10 ng/mL. Current dose 2 mg BID         MMF 1000 mg po BID       Solumedrol taper  DVT prophylaxis SCDS and subcutaneous heparin 5000 TID  PT/OT  Diet: Diabetic + enteral feeds   Anticipated discharge likely early next week     Sherly García PA-C

## 2025-02-12 NOTE — PROGRESS NOTES
Temo Hanson is a 74 y.o. male on day 3 of admission presenting with DKA, type 2, not at goal.    Subjective   Patient seen and examined at bedside in no acute distress.  Patient denies nausea, vomiting.  Overnight, noted hyperglycemia while on tube feed.  Patient received extra insulin lispro 15 units SQ once and tube feeds were held overnight at 2 AM because of abdominal pain.  I have reviewed histories, allergies and medications have been reviewed and there are no changes    Objective   Last Recorded Vitals  Blood pressure 146/55, pulse 63, temperature 36.2 °C (97.2 °F), temperature source Temporal, resp. rate 18, weight 66.8 kg (147 lb 4.3 oz), SpO2 97%.  Intake/Output last 3 Shifts:  I/O last 3 completed shifts:  In: 3528.3 (52.8 mL/kg) [P.O.:1520; I.V.:1328.3 (19.9 mL/kg); NG/GT:580; IV Piggyback:100]  Out: 850 (12.7 mL/kg) [Urine:850 (0.4 mL/kg/hr)]  Weight: 66.8 kg   Physical Exam  General: patient in NAD, sitting up in bed  HEENT: AT/NC  Neck: trachea in midline, no thyromegaly or nodules  Resp: CTA B/L  CVS: normal s1 and s2  Abdomen: soft and non tender to palpation, BS+  Skin: warm, dry and intact  Neuro: AAO x3, no focal neurological deficit  Psych: cooperative  Relevant Results  Results from last 7 days   Lab Units 02/12/25  1213 02/12/25  0734 02/12/25  0540 02/12/25  0201 02/12/25  0007 02/11/25  2253 02/11/25  0536 02/11/25  0534 02/10/25  1912 02/10/25  1724 02/10/25  1546 02/10/25  1540   POCT GLUCOSE mg/dL 126* 186*  --  363* 419* 426*   < >  --   --   --    < >  --    GLUCOSE mg/dL  --   --  264*  --   --   --   --  288* 169* 213*  --  193*    < > = values in this interval not displayed.     Lab Results   Component Value Date    HGBA1C 6.1 (H) 10/29/2024    HGBA1C 5.7 (H) 04/20/2024    HGBA1C 7.3 (H) 02/21/2023     (L) 02/12/2025    K 4.6 02/12/2025     02/12/2025    CO2 19 (L) 02/12/2025    BUN 40 (H) 02/12/2025    CREATININE 1.19 02/12/2025    CALCIUM 8.2 (L) 02/12/2025     ALBUMIN 2.5 (L) 02/12/2025    PROT 8.8 (H) 12/20/2024    BILITOT 0.5 12/20/2024    ALKPHOS 189 (H) 12/20/2024    ALT 18 12/20/2024    AST 21 12/20/2024    GLUCOSE 264 (H) 02/12/2025    CHOL 183 10/29/2024    HDL 53.1 10/29/2024    BHYDRXBUT 0.09 02/12/2025    CPEPTIDE 4.8 (H) 10/29/2024        Assessment/Plan   Temo Hanson is a 74 y.o. 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 DKA,  he was started on insulin drip and transferred to  for further management in setting of recent kidney transplant history.  On chart review, labs from outside hospital reviewed and it was noted that patient was not in DKA given venous pH 7.4, BHB 0.14, , creatinine 1.6.  On arrival to [facility], insulin drip was discontinued and patient was started on regular insulin sliding scale #1 with meals with blood glucose ranging from 140-180s.  Endocrinology service is consulted for management of diabetes regime in setting of recent DDKT and chronic steroid use. Overnight, patient did not receive scheduled NPH 10 units with tube feeds resukting in hyperglycemia.     Diabetes History  Type of diabetes: 2  Year diagnosed at age: 46 years old  Hospitalizations for DKA or HHS: no recent episodes  Complications: Nephropathy s/p DDKT  Seen by PCP or Endocrinology: last visit with MARISOL Lainez endocrinology on 1/30/25  Frequency of glucose checks: 4-5x daily after meals  Glucose review: -170s on regular SS #1 q 4  Frequency of Hypoglycemia: none  Severe hypoglycemia requiring assistance from others:  N     Home Diet : Clear liquid diet with tube feeds at 60 cc/h from 7 PM to 10 AM    Home Medications  Basal: Basaglar 10 U q AM  Prandial: Humulin  15 units once at 7:00 pm with tube feeds, insulin aspart 3 units with meals  Correction:Aspart sliding scale #2 with meals     Steroids: Tablet prednisone  5 mg daily    2/12/25: Nutrition: 60g CHO per meal + Tube feeds  --->>> Given persistent hyperglycemia and abdominal pain with night time continuous tube feeds, plan to trial bolus feed (250 ml) with meals TID and then 40 ml/hr over 12 hours overnight. (Carbs: Lunch: 33gm, Dinner: 33gm, and cycle x 12 hours: 66gm)    Recommendations:  Continue insulin NPH to 15 units nightly with cyclic tube feed ( 40 cc/h for 12 hours).  Please make sure that NPH is given 1 hour prior to starting the tube feeds at night  Increase insulin glargine to 12 units daily   Start insulin lispro 8 units 3 times daily with meals (tube feed bolus  + oral meals)                                               Hold if tube feeds are held  Switch to insulin lispro sliding scale # 2 q 4 Hourly   Accu-Cheks q 4 h  Goal blood glucose 140-180 mg%  Hypoglycemia protocol    Recommendations communicated to primary team. Please reach out incase you have any questions or concerns.    The patient was seen and discussed with attending Dr. Uribe.    Lnio Garcia MD  Endocrinology fellow

## 2025-02-12 NOTE — CARE PLAN
The clinical goals for the shift include Pt will remain free from fall/injury        Problem: Discharge Planning  Goal: Discharge to home or other facility with appropriate resources  Outcome: Progressing     Problem: Chronic Conditions and Co-morbidities  Goal: Patient's chronic conditions and co-morbidity symptoms are monitored and maintained or improved  Outcome: Progressing     Problem: Nutrition  Goal: Nutrient intake appropriate for maintaining nutritional needs  Outcome: Progressing     Problem: Diabetes  Goal: Achieve decreasing blood glucose levels by end of shift  Outcome: Progressing  Goal: Increase stability of blood glucose readings by end of shift  Outcome: Progressing  Goal: Decrease in ketones present in urine by end of shift  Outcome: Progressing  Goal: Maintain electrolyte levels within acceptable range throughout shift  Outcome: Progressing  Goal: Maintain glucose levels >70mg/dl to <250mg/dl throughout shift  Outcome: Progressing  Goal: No changes in neurological exam by end of shift  Outcome: Progressing  Goal: Learn about and adhere to nutrition recommendations by end of shift  Outcome: Progressing  Goal: Vital signs within normal range for age by end of shift  Outcome: Progressing  Goal: Increase self care and/or family involovement by end of shift  Outcome: Progressing  Goal: Receive DSME education by end of shift  Outcome: Progressing

## 2025-02-13 LAB
ALBUMIN SERPL BCP-MCNC: 2.8 G/DL (ref 3.4–5)
ANION GAP SERPL CALC-SCNC: 14 MMOL/L (ref 10–20)
BUN SERPL-MCNC: 23 MG/DL (ref 6–23)
CALCIUM SERPL-MCNC: 8.6 MG/DL (ref 8.6–10.6)
CHLORIDE SERPL-SCNC: 104 MMOL/L (ref 98–107)
CO2 SERPL-SCNC: 23 MMOL/L (ref 21–32)
CREAT SERPL-MCNC: 1.03 MG/DL (ref 0.5–1.3)
EGFRCR SERPLBLD CKD-EPI 2021: 76 ML/MIN/1.73M*2
ERYTHROCYTE [DISTWIDTH] IN BLOOD BY AUTOMATED COUNT: 17.2 % (ref 11.5–14.5)
GLUCOSE BLD MANUAL STRIP-MCNC: 122 MG/DL (ref 74–99)
GLUCOSE BLD MANUAL STRIP-MCNC: 154 MG/DL (ref 74–99)
GLUCOSE BLD MANUAL STRIP-MCNC: 226 MG/DL (ref 74–99)
GLUCOSE BLD MANUAL STRIP-MCNC: 240 MG/DL (ref 74–99)
GLUCOSE BLD MANUAL STRIP-MCNC: 291 MG/DL (ref 74–99)
GLUCOSE SERPL-MCNC: 184 MG/DL (ref 74–99)
HCT VFR BLD AUTO: 29.7 % (ref 41–52)
HGB BLD-MCNC: 9.5 G/DL (ref 13.5–17.5)
MAGNESIUM SERPL-MCNC: 1.88 MG/DL (ref 1.6–2.4)
MCH RBC QN AUTO: 28.1 PG (ref 26–34)
MCHC RBC AUTO-ENTMCNC: 32 G/DL (ref 32–36)
MCV RBC AUTO: 88 FL (ref 80–100)
NRBC BLD-RTO: 0 /100 WBCS (ref 0–0)
PHOSPHATE SERPL-MCNC: 3.6 MG/DL (ref 2.5–4.9)
PLATELET # BLD AUTO: 225 X10*3/UL (ref 150–450)
POTASSIUM SERPL-SCNC: 4.5 MMOL/L (ref 3.5–5.3)
RBC # BLD AUTO: 3.38 X10*6/UL (ref 4.5–5.9)
SODIUM SERPL-SCNC: 136 MMOL/L (ref 136–145)
TACROLIMUS BLD-MCNC: 6 NG/ML
WBC # BLD AUTO: 6.5 X10*3/UL (ref 4.4–11.3)

## 2025-02-13 PROCEDURE — 2500000004 HC RX 250 GENERAL PHARMACY W/ HCPCS (ALT 636 FOR OP/ED): Performed by: PHYSICIAN ASSISTANT

## 2025-02-13 PROCEDURE — 2500000001 HC RX 250 WO HCPCS SELF ADMINISTERED DRUGS (ALT 637 FOR MEDICARE OP)

## 2025-02-13 PROCEDURE — 1100000001 HC PRIVATE ROOM DAILY

## 2025-02-13 PROCEDURE — 99232 SBSQ HOSP IP/OBS MODERATE 35: CPT | Performed by: SURGERY

## 2025-02-13 PROCEDURE — 82947 ASSAY GLUCOSE BLOOD QUANT: CPT

## 2025-02-13 PROCEDURE — 2500000002 HC RX 250 W HCPCS SELF ADMINISTERED DRUGS (ALT 637 FOR MEDICARE OP, ALT 636 FOR OP/ED): Performed by: PHYSICIAN ASSISTANT

## 2025-02-13 PROCEDURE — 2500000005 HC RX 250 GENERAL PHARMACY W/O HCPCS: Performed by: PHYSICIAN ASSISTANT

## 2025-02-13 PROCEDURE — 97530 THERAPEUTIC ACTIVITIES: CPT | Mod: GP | Performed by: PHYSICAL THERAPIST

## 2025-02-13 PROCEDURE — 2500000002 HC RX 250 W HCPCS SELF ADMINISTERED DRUGS (ALT 637 FOR MEDICARE OP, ALT 636 FOR OP/ED)

## 2025-02-13 PROCEDURE — 36415 COLL VENOUS BLD VENIPUNCTURE: CPT

## 2025-02-13 PROCEDURE — 99232 SBSQ HOSP IP/OBS MODERATE 35: CPT | Performed by: INTERNAL MEDICINE

## 2025-02-13 PROCEDURE — 80197 ASSAY OF TACROLIMUS: CPT

## 2025-02-13 PROCEDURE — 83735 ASSAY OF MAGNESIUM: CPT

## 2025-02-13 PROCEDURE — 2500000004 HC RX 250 GENERAL PHARMACY W/ HCPCS (ALT 636 FOR OP/ED)

## 2025-02-13 PROCEDURE — 80069 RENAL FUNCTION PANEL: CPT

## 2025-02-13 PROCEDURE — 99233 SBSQ HOSP IP/OBS HIGH 50: CPT | Performed by: INTERNAL MEDICINE

## 2025-02-13 PROCEDURE — 97116 GAIT TRAINING THERAPY: CPT | Mod: GP | Performed by: PHYSICAL THERAPIST

## 2025-02-13 PROCEDURE — 85027 COMPLETE CBC AUTOMATED: CPT

## 2025-02-13 PROCEDURE — 2500000001 HC RX 250 WO HCPCS SELF ADMINISTERED DRUGS (ALT 637 FOR MEDICARE OP): Performed by: PHYSICIAN ASSISTANT

## 2025-02-13 RX ORDER — CHOLECALCIFEROL (VITAMIN D3) 25 MCG
2000 TABLET ORAL DAILY
Status: DISCONTINUED | OUTPATIENT
Start: 2025-02-13 | End: 2025-02-18 | Stop reason: HOSPADM

## 2025-02-13 RX ORDER — MAGNESIUM SULFATE HEPTAHYDRATE 40 MG/ML
2 INJECTION, SOLUTION INTRAVENOUS ONCE
Status: COMPLETED | OUTPATIENT
Start: 2025-02-13 | End: 2025-02-13

## 2025-02-13 RX ORDER — ERTAPENEM 1 G/1
1 INJECTION, POWDER, LYOPHILIZED, FOR SOLUTION INTRAMUSCULAR; INTRAVENOUS EVERY 24 HOURS
Start: 2025-02-14 | End: 2025-02-15

## 2025-02-13 RX ORDER — INSULIN LISPRO 100 [IU]/ML
10 INJECTION, SOLUTION INTRAVENOUS; SUBCUTANEOUS
Status: DISCONTINUED | OUTPATIENT
Start: 2025-02-13 | End: 2025-02-15

## 2025-02-13 RX ADMIN — METOCLOPRAMIDE 5 MG: 10 TABLET ORAL at 15:27

## 2025-02-13 RX ADMIN — INSULIN LISPRO 6 UNITS: 100 INJECTION, SOLUTION INTRAVENOUS; SUBCUTANEOUS at 12:06

## 2025-02-13 RX ADMIN — METOCLOPRAMIDE 5 MG: 10 TABLET ORAL at 21:05

## 2025-02-13 RX ADMIN — TACROLIMUS 2 MG: 1 CAPSULE ORAL at 06:11

## 2025-02-13 RX ADMIN — INSULIN LISPRO 8 UNITS: 100 INJECTION, SOLUTION INTRAVENOUS; SUBCUTANEOUS at 09:08

## 2025-02-13 RX ADMIN — NYSTATIN 500000 UNITS: 500000 SUSPENSION ORAL at 12:07

## 2025-02-13 RX ADMIN — METOCLOPRAMIDE 5 MG: 10 TABLET ORAL at 06:11

## 2025-02-13 RX ADMIN — VALGANCICLOVIR HYDROCHLORIDE 450 MG: 450 TABLET ORAL at 09:05

## 2025-02-13 RX ADMIN — PREDNISONE 5 MG: 10 TABLET ORAL at 09:05

## 2025-02-13 RX ADMIN — INSULIN LISPRO 4 UNITS: 100 INJECTION, SOLUTION INTRAVENOUS; SUBCUTANEOUS at 09:07

## 2025-02-13 RX ADMIN — INSULIN LISPRO 4 UNITS: 100 INJECTION, SOLUTION INTRAVENOUS; SUBCUTANEOUS at 15:38

## 2025-02-13 RX ADMIN — NYSTATIN 500000 UNITS: 500000 SUSPENSION ORAL at 06:12

## 2025-02-13 RX ADMIN — HEPARIN SODIUM 5000 UNITS: 5000 INJECTION, SOLUTION INTRAVENOUS; SUBCUTANEOUS at 06:11

## 2025-02-13 RX ADMIN — HEPARIN SODIUM 5000 UNITS: 5000 INJECTION, SOLUTION INTRAVENOUS; SUBCUTANEOUS at 21:05

## 2025-02-13 RX ADMIN — INSULIN LISPRO 8 UNITS: 100 INJECTION, SOLUTION INTRAVENOUS; SUBCUTANEOUS at 12:06

## 2025-02-13 RX ADMIN — SODIUM BICARBONATE 1300 MG: 650 TABLET ORAL at 09:05

## 2025-02-13 RX ADMIN — LIDOCAINE 2 PATCH: 4 PATCH TOPICAL at 12:09

## 2025-02-13 RX ADMIN — TACROLIMUS 2 MG: 1 CAPSULE ORAL at 18:27

## 2025-02-13 RX ADMIN — PHENAZOPYRIDINE 100 MG: 100 TABLET ORAL at 15:27

## 2025-02-13 RX ADMIN — MYCOPHENOLIC ACID 360 MG: 360 TABLET, DELAYED RELEASE ORAL at 06:11

## 2025-02-13 RX ADMIN — PHENAZOPYRIDINE 100 MG: 100 TABLET ORAL at 09:05

## 2025-02-13 RX ADMIN — PHENAZOPYRIDINE 100 MG: 100 TABLET ORAL at 11:43

## 2025-02-13 RX ADMIN — SODIUM ZIRCONIUM CYCLOSILICATE 5 G: 10 POWDER, FOR SUSPENSION ORAL at 11:43

## 2025-02-13 RX ADMIN — MYCOPHENOLIC ACID 360 MG: 360 TABLET, DELAYED RELEASE ORAL at 18:27

## 2025-02-13 RX ADMIN — INSULIN LISPRO 8 UNITS: 100 INJECTION, SOLUTION INTRAVENOUS; SUBCUTANEOUS at 15:38

## 2025-02-13 RX ADMIN — NYSTATIN 500000 UNITS: 500000 SUSPENSION ORAL at 20:48

## 2025-02-13 RX ADMIN — PANTOPRAZOLE SODIUM 40 MG: 40 TABLET, DELAYED RELEASE ORAL at 06:12

## 2025-02-13 RX ADMIN — NYSTATIN 500000 UNITS: 500000 SUSPENSION ORAL at 17:04

## 2025-02-13 RX ADMIN — SODIUM BICARBONATE 1300 MG: 650 TABLET ORAL at 20:48

## 2025-02-13 RX ADMIN — MAGNESIUM SULFATE HEPTAHYDRATE 2 G: 40 INJECTION, SOLUTION INTRAVENOUS at 09:08

## 2025-02-13 RX ADMIN — Medication 2000 UNITS: at 11:43

## 2025-02-13 RX ADMIN — INSULIN GLARGINE 12 UNITS: 100 INJECTION, SOLUTION SUBCUTANEOUS at 09:07

## 2025-02-13 RX ADMIN — PANTOPRAZOLE SODIUM 40 MG: 40 TABLET, DELAYED RELEASE ORAL at 15:27

## 2025-02-13 RX ADMIN — HEPARIN SODIUM 5000 UNITS: 5000 INJECTION, SOLUTION INTRAVENOUS; SUBCUTANEOUS at 15:27

## 2025-02-13 RX ADMIN — GABAPENTIN 200 MG: 100 CAPSULE ORAL at 09:04

## 2025-02-13 RX ADMIN — ATOVAQUONE 1500 MG: 750 SUSPENSION ORAL at 09:04

## 2025-02-13 RX ADMIN — ERTAPENEM SODIUM 1 G: 1 INJECTION, POWDER, LYOPHILIZED, FOR SOLUTION INTRAMUSCULAR; INTRAVENOUS at 12:19

## 2025-02-13 RX ADMIN — ACETAMINOPHEN 650 MG: 325 TABLET ORAL at 12:07

## 2025-02-13 ASSESSMENT — COGNITIVE AND FUNCTIONAL STATUS - GENERAL
WALKING IN HOSPITAL ROOM: A LITTLE
TURNING FROM BACK TO SIDE WHILE IN FLAT BAD: A LOT
MOVING TO AND FROM BED TO CHAIR: A LITTLE
DAILY ACTIVITIY SCORE: 24
TURNING FROM BACK TO SIDE WHILE IN FLAT BAD: A LITTLE
CLIMB 3 TO 5 STEPS WITH RAILING: TOTAL
MOVING TO AND FROM BED TO CHAIR: A LITTLE
MOVING FROM LYING ON BACK TO SITTING ON SIDE OF FLAT BED WITH BEDRAILS: A LOT
CLIMB 3 TO 5 STEPS WITH RAILING: A LITTLE
DAILY ACTIVITIY SCORE: 24
WALKING IN HOSPITAL ROOM: A LITTLE
MOBILITY SCORE: 23
MOBILITY SCORE: 24
DAILY ACTIVITIY SCORE: 24
STANDING UP FROM CHAIR USING ARMS: A LOT
MOBILITY SCORE: 13
STANDING UP FROM CHAIR USING ARMS: A LITTLE
CLIMB 3 TO 5 STEPS WITH RAILING: A LITTLE
MOBILITY SCORE: 19

## 2025-02-13 ASSESSMENT — PAIN SCALES - GENERAL
PAINLEVEL_OUTOF10: 7
PAINLEVEL_OUTOF10: 4
PAINLEVEL_OUTOF10: 0 - NO PAIN
PAINLEVEL_OUTOF10: 5 - MODERATE PAIN
PAINLEVEL_OUTOF10: 7

## 2025-02-13 ASSESSMENT — PAIN - FUNCTIONAL ASSESSMENT
PAIN_FUNCTIONAL_ASSESSMENT: 0-10

## 2025-02-13 ASSESSMENT — PAIN DESCRIPTION - LOCATION: LOCATION: ABDOMEN

## 2025-02-13 ASSESSMENT — PAIN DESCRIPTION - ORIENTATION: ORIENTATION: UPPER

## 2025-02-13 NOTE — PROGRESS NOTES
"Temo Hanson is a 74 y.o. male on day 4 of admission presenting with DKA, type 2, not at goal.    Subjective   Interval History: Patient is sitting in the chair.  Patient states burning urination has resolved.  Does not voice any other complaints.      Objective   Range of Vitals (last 24 hours)  Heart Rate:  [63-98]   Temp:  [35.9 °C (96.6 °F)-36.5 °C (97.7 °F)]   Resp:  [16-18]   BP: (146-161)/(55-78)   Weight:  [70 kg (154 lb 5.2 oz)]   SpO2:  [93 %-98 %]   Daily Weight  02/13/25 : 70 kg (154 lb 5.2 oz)    Body mass index is 20.36 kg/m².    Physical Exam  GENERAL APPEARANCE: Awake and alert and not in any distress  HEENT: Atraumatic and normocephalic  CARDIAC: Regular   LUNGS: Clear  ABDOMEN: Soft and nontender (yesterday patient complained of pain diffusely all over).  Transplant kidney noted in right lower quadrant.  PEG tube noted.  EXTREMITIES: No edema    Antibiotics  ciprofloxacin - 500 mg  ertapenem - 1 gram/50 mL  nystatin - 100,000 unit/mL, 100,000 unit/mL    Relevant Results  Labs  Results from last 72 hours   Lab Units 02/13/25  0520 02/12/25  0540 02/11/25  0535   WBC AUTO x10*3/uL 6.5 7.4 9.7   HEMOGLOBIN g/dL 9.5* 8.8* 9.8*   HEMATOCRIT % 29.7* 27.6* 29.6*   PLATELETS AUTO x10*3/uL 225 178 174     Results from last 72 hours   Lab Units 02/13/25  0520 02/12/25  0540 02/11/25  0534   SODIUM mmol/L 136 132* 130*   POTASSIUM mmol/L 4.5 4.6 5.2   CHLORIDE mmol/L 104 105 100   CO2 mmol/L 23 19* 19*   BUN mg/dL 23 40* 39*   CREATININE mg/dL 1.03 1.19 1.27   GLUCOSE mg/dL 184* 264* 288*   CALCIUM mg/dL 8.6 8.2* 8.4*   ANION GAP mmol/L 14 13 16   EGFR mL/min/1.73m*2 76 64 59*   PHOSPHORUS mg/dL 3.6 3.1 3.7     Results from last 72 hours   Lab Units 02/13/25  0520 02/12/25  0540 02/11/25  0534   ALBUMIN g/dL 2.8* 2.5* 2.7*     Estimated Creatinine Clearance: 62.3 mL/min (by C-G formula based on SCr of 1.03 mg/dL).  No results found for: \"CRP\"  Microbiology  Susceptibility data from last 14 days.  Collected " Specimen Info Organism Amoxicillin/Clavulanate Ampicillin Ampicillin/Sulbactam Aztreonam Cefazolin Cefazolin (uncomplicated UTIs only) Cefepime Cefotaxime Ceftazidime Ceftriaxone Ciprofloxacin Ertapenem   02/09/25 Urine from Indwelling (Bravo) Catheter Escherichia coli  S  R  R  R  R  R  R  R  R  R  R  S     Proteus mirabilis  S  R  I   R  S     S  S      Collected Specimen Info Organism Gentamicin Meropenem Nitrofurantoin Piperacillin/Tazobactam Tetracycline Tobramycin Trimethoprim/Sulfamethoxazole   02/09/25 Urine from Indwelling (Bravo) Catheter Escherichia coli  R  S  S  S   R  R     Proteus mirabilis  S   R  S  R   S       Imaging              Assessment/Plan     Temo Hanson is a 74 y.o. male immigrant from [employer] (immigrated to  [place] years ago) with history of hypertension, diabetes mellitus type 2, second-degree AV block status post leadless pacemaker (June 2024), pericardial effusion status post pericardiocentesis, prostate cancer status post prostatectomy, ESRD s/p HD via left arm AV fistula and now DDKT (CMV D-/R+, 12/20/24, induction with Thymoglobulin) and readmission on December 31 for dehydration and ALLEY requiring prolonged hospital stay complicated by achalasia requiring PEG tube placement for tube feeds presented to emergency room on February 9 for not feeling well.     Problems  UTI/Dysuria         Urine analysis is not consistent with UTI but culture grew ESBL E. coli and Proteus mirabilis.  Patient states his symptoms started after removing ureteral stent and so this might be from mucosal trauma.     2.  DKA-resolved     3. ESRD s/p HD via left arm AV fistula and now DDKT (CMV D-/R+, 12/20/24, induction with Thymoglobulin)         Microbiologic data  2/9-urinalysis is positive for leukocyte esterase, WBC 1-5 and culture grew ESBL E. coli and Proteus mirabilis  2/9-plasma CMV PCR and BK virus not detected and EBV virus less than 35 IU/mL        2/12/25  Continue ertapenem 1 g IV every 24 hours  for now and stop ciprofloxacin.  Please check with transplant pharmacist if patient can receive Pyridium 100 mg 3 times daily for dysuria.  Please check postvoid residual urine to rule out urinary retention.  Continue anti-infective prophylaxis with Valcyte and atovaquone per kidney transplant protocol.    Plan  Continue ertapenem 1 g IV every 24 hours for 2 weeks (until Feb 25).  Please place midline closer to time of discharge to SNF and check CBC with differential and BMP weekly while patient is on IV antibiotics.  Other anti-infective prophylaxis per kidney transplant protocol.  Will follow peripherally.       Franky Price MD

## 2025-02-13 NOTE — SIGNIFICANT EVENT
02/13/25 1315   Patient Interaction   Organ Kidney   Type of Interaction Morning rounds   Interdisciplinary Rounds   Attendance Surgeon;Physician;FILI;Coordinator;Pharmacist   Topics Discussed Diet;Home care needs;Medications;Blood test results;Discharge preparation;Activity     Transplant Surgery Multidisciplinary Team Note    Temo Hanson is a 74 y.o. male s/p DDKT 12/21/24 readmitted for hyperglycemia.      24 Hour Events  1. No acute events overnight     Last Recorded Vitals  Visit Vitals  /66 (BP Location: Right arm, Patient Position: Sitting)   Pulse 75   Temp 36.4 °C (97.5 °F) (Temporal)   Resp 18      Intake/Output last 3 Shifts:    Intake/Output Summary (Last 24 hours) at 2/13/2025 1315  Last data filed at 2/13/2025 1219  Gross per 24 hour   Intake 1610 ml   Output 2160 ml   Net -550 ml      Vitals:    02/13/25 0600   Weight: 70 kg (154 lb 5.2 oz)        Assessment/Plan   Kidney allograft function  -ALLEY improving   -Plan for labs 2x/week      Cardiac  -BP in good control     FENGI  -Replete Mg2+  -Mild NAGMA + hyponatremia resolved   -tolerating bolus feeds with meals + 40 ml/hr over 12 hours from 8 PM to 8 AM  -Still w/ loose stool-- send stool pathogens       ENDO  -Follow-up Endo recs   -BG in better control now with bolus feeds      ID   -Proteus mirablilis + resistant ESBL UTI-- continue ertapenem  -ID following     DISPO  -Pending updated PT/OT recs       Principal Problem:    Management after organ transplant  Active Problems:  Patient Active Problem List   Diagnosis    S/P laparoscopic cholecystectomy    Abdominal pain    Achalasia    Acute lower UTI    Microcytic anemia    Complete heart block    Callus of foot    Chronic periodontitis, unspecified    Stage 5 chronic kidney disease (Multi)    Closed fracture of metatarsal bone    Crushing injury of foot    Diabetes mellitus (Multi)    Diarrhea    Dysphagia    ED (erectile dysfunction)    Essential hypertension    Foot pain, right    GIB  (gastrointestinal bleeding)    Hepatitis B core antibody positive    Hyperkalemia    Ingrowing nail    Iron deficiency anemia secondary to inadequate dietary iron intake    Long toenail    Malignant neoplasm of prostate (Multi)    Onychomycosis    Pheochromocytoma of right adrenal gland    Pneumonia of right lower lobe due to infectious organism    Productive cough    Pure hypercholesterolemia    Stricture esophagus    SVT (supraventricular tachycardia) ([facility])    Type 2 diabetes mellitus with diabetic neuropathy (Multi)    Preop cardiovascular exam    Third degree heart block    Pericardial effusion ([facility])    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 (Multi)    Uremia    Cardiac tamponade    Cardiac pacemaker in situ    ESRD (end stage renal disease) (Multi)    Type 2 diabetes mellitus, with long-term current use of insulin    Dehydration    Alactasia syndrome    Type 2 diabetes mellitus with hyperglycemia, with long-term current use of insulin    On tube feeding diet    Kidney transplant recipient ([facility])    DKA, type 2, not at goal        Immunosuppression reviewed and adjusted       Tacrolimus goal 8-10 ng/mL. Current dose 2 mg BID         MMF 1000 mg po BID       Solumedrol taper  DVT prophylaxis SCDS and subcutaneous heparin 5000 TID  PT/OT  Diet: Diabetic + enteral feeds   Anticipated discharge early next week     Sherly García PA-C

## 2025-02-13 NOTE — PROGRESS NOTES
INPATIENT TRANSPLANT NEPHROLOGY PROGRESS NOTE          REASON FOR CONSULT:  Immunosuppressive medication management and nephrology related issues.    SUBJECTION:     No events overnight. Patient is sitting in the chair.  Patient states burning urination has resolved.  Does not voice any other complaints.   Seen by ID. Plan to do Ertapenem for 2 weeks till 2/25.  Need a midline.    PHYCISCAL EXAMINATION:    Visit Vitals  /66 (BP Location: Right arm, Patient Position: Sitting)   Pulse 75   Temp 36.4 °C (97.5 °F) (Temporal)   Resp 18   Wt 70 kg (154 lb 5.2 oz)   SpO2 97%   BMI 20.36 kg/m²   Smoking Status Never   BSA 1.9 m²        02/11 1900 - 02/13 0659  In: 1465 [P.O.:845]  Out: 2160 [Urine:2160]     Weight change:     General Appearance - NAD, Good speech, oriented and alert  HEENT - Supple. Not pale. No jaundice. No cervical lymphadenopathy. Pharynx and tonsils are not injected.  CVS - RRR. Normal S1/S2. No murmur, click , rub or gallop  Lungs- clear to auscultation bilaterally  Abdomen - soft , not tender, no guarding, no rigidity. No hepatosplenomegaly. Normal bowel sounds. No masses and ascites. S/P Kidney transplant .  Transplanted kidney is not tender.   Musculoskeletal /Extremities - no edema. Full ROM. No joint tenderness.   Neuro/Psych - appropriate mood and affect. Motor power V/V all extremities. CN I -XII were grossly intact.  Skin - No visible rash    MEDICATION LIST: REVIEWED    atovaquone, 1,500 mg, Daily  cholecalciferol, 2,000 Units, Daily  ertapenem, 1 g, q24h  gabapentin, 200 mg, Daily  heparin (porcine), 5,000 Units, q8h KASSY  insulin glargine, 12 Units, Daily  insulin lispro, 0-10 Units, q4h  insulin lispro, 8 Units, TID AC  insulin NPH (Isophane), 15 Units, q24h KASSY  lidocaine, 2 patch, Daily  metoclopramide, 5 mg, q8h  mycophenolate, 360 mg, q12h  nystatin, 5 mL, 4x daily  pantoprazole, 40 mg, BID AC  phenazopyridine, 100 mg, TID AC  predniSONE, 5 mg, Daily  sodium bicarbonate,  1,300 mg, q12h  sodium zirconium cyclosilicate, 5 g, Daily  tacrolimus, 2 mg, q12h KASSY  valGANciclovir, 450 mg, Daily           acetaminophen, 650 mg, q6h PRN  dextrose, 12.5 g, q15 min PRN  dextrose, 25 g, q15 min PRN  glucagon, 1 mg, q15 min PRN  glucagon, 1 mg, q15 min PRN        ALLERGY:  Allergies   Allergen Reactions    Terazosin Unknown     Did not feel well on this medication    Codeine Itching     itching    Tramadol Itching       LABS:  Results for orders placed or performed during the hospital encounter of 02/09/25 (from the past 24 hours)   POCT GLUCOSE   Result Value Ref Range    POCT Glucose 204 (H) 74 - 99 mg/dL   POCT GLUCOSE   Result Value Ref Range    POCT Glucose 103 (H) 74 - 99 mg/dL   POCT GLUCOSE   Result Value Ref Range    POCT Glucose 122 (H) 74 - 99 mg/dL   POCT GLUCOSE   Result Value Ref Range    POCT Glucose 154 (H) 74 - 99 mg/dL   CBC   Result Value Ref Range    WBC 6.5 4.4 - 11.3 x10*3/uL    nRBC 0.0 0.0 - 0.0 /100 WBCs    RBC 3.38 (L) 4.50 - 5.90 x10*6/uL    Hemoglobin 9.5 (L) 13.5 - 17.5 g/dL    Hematocrit 29.7 (L) 41.0 - 52.0 %    MCV 88 80 - 100 fL    MCH 28.1 26.0 - 34.0 pg    MCHC 32.0 32.0 - 36.0 g/dL    RDW 17.2 (H) 11.5 - 14.5 %    Platelets 225 150 - 450 x10*3/uL   Magnesium   Result Value Ref Range    Magnesium 1.88 1.60 - 2.40 mg/dL   Renal function panel   Result Value Ref Range    Glucose 184 (H) 74 - 99 mg/dL    Sodium 136 136 - 145 mmol/L    Potassium 4.5 3.5 - 5.3 mmol/L    Chloride 104 98 - 107 mmol/L    Bicarbonate 23 21 - 32 mmol/L    Anion Gap 14 10 - 20 mmol/L    Urea Nitrogen 23 6 - 23 mg/dL    Creatinine 1.03 0.50 - 1.30 mg/dL    eGFR 76 >60 mL/min/1.73m*2    Calcium 8.6 8.6 - 10.6 mg/dL    Phosphorus 3.6 2.5 - 4.9 mg/dL    Albumin 2.8 (L) 3.4 - 5.0 g/dL   Tacrolimus level   Result Value Ref Range    Tacrolimus  6.0 <=15.0 ng/mL   POCT GLUCOSE   Result Value Ref Range    POCT Glucose 240 (H) 74 - 99 mg/dL   POCT GLUCOSE   Result Value Ref Range    POCT Glucose 291  (H) 74 - 99 mg/dL     *Note: Due to a large number of results and/or encounters for the requested time period, some results have not been displayed. A complete set of results can be found in Results Review.        ASSESSMENT AND PLAN:    Mr. Hanson is a 74 y.o. 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 Zamora with a KDPI of 93% and PRA of 54%. Donor was Hepc -/-and has not met risk factors. EBV + /+. Left donor kidney transplanted to patient right pelvis. Admission weight is 72.7 kg (discharge weight is 76.4 kg ). Pt received a total of 3 mg/kg total of thymoglobulin induction therapy in conjunction with 500mg IV solumedrol. CMV D-/R+. He had prolonged hospital course that included failure to thrive in adult with dehydration and diagnosis of severe protein-calorie malnutrition, placement of PEG tube, and intolerance of tube feeds initially. He was switched to TPN briefly and his tube feed formula was adjusted as per the dietitian recommendations.     Patient presented to OSH for hyperglycemia without ketosis with glucose >527 and sodium 116, transferred to [facility] for evaluation in the subacute setting of recent kidney transplant. On arrival, patient is cooperative but is a poor historian and unable to remember the events that brought him to the hospital.     Transplant nephrology is consulted to assist with immunosuppressive medication management and nephrology related issues.       Principal Problem:    DKA, type 2, not at goal      1. ESRD S/P Kidney transplant.   - Renal allograft function:   Lab Results   Component Value Date    CREATININE 1.03 2025     Estimated Creatinine Clearance: 62.3 mL/min (by C-G formula based on SCr of 1.03 mg/dL).    Intake/Output Summary (Last 24 hours) at 2025 1245  Last data filed at 2025 1219  Gross per 24 hour   Intake 1610 ml   Output 2160 ml   Net -550 ml      - Continue to monitor UOP and Serum creatinine closely.   - Avoid nephrotoxic agents, NSAIDs and IV contrast   - Strict I/O.   - Renally dose all medications by the most recent CrCl from Cockcroft-Gault formula.    2. Immunosuppression   -          FK level = 6.0  would continue tac  2 mg BID starting tomorrow at 0630  -          Myfortic 360 mg BID  -          Prednisone 5 mg daily    3. Anemia and WBC   Lab Results   Component Value Date    WBC 6.5 02/13/2025    HGB 9.5 (L) 02/13/2025    HCT 29.7 (L) 02/13/2025    MCV 88 02/13/2025     02/13/2025     -Continue to monitor Hgb   -No indications for PRBC transfusion     4. Electrolyte /Hyponatremia and Hyperkalemia - Resolved    Lab Results   Component Value Date    GLUCOSE 184 (H) 02/13/2025    CALCIUM 8.6 02/13/2025     02/13/2025    K 4.5 02/13/2025    CO2 23 02/13/2025     02/13/2025    BUN 23 02/13/2025    CREATININE 1.03 02/13/2025     Lab Results   Component Value Date    CALCIUM 8.6 02/13/2025    CAION 1.20 02/11/2025    PHOS 3.6 02/13/2025    VITD25 34 01/05/2025       - Reviewed renal profile.     6. Hypertension   Blood Pressures         2/12/2025  1609 2/13/2025  0047 2/13/2025  0420 2/13/2025  0755 2/13/2025  1120    BP: 148/64 160/78 161/76 161/75 142/66          -Goal BP < 140/90 mmHg   -continue current management     7. DKA  -Defer to endo  -Resolved    8.  GI prophylaxis   - On PPI     9. DVT Prophylaxis  -Defer to primary team    10. UITI, Proteus  Cipro per tx surgery  ID consult  ESBL E. Coli  Ertapenem x 2 weeks    * Case was discussed with primary team.  For questions, please contact transplant nephrology page x [phone]    Bashir Angela MD    Transplant Nephrologist

## 2025-02-13 NOTE — PROGRESS NOTES
Temo Hanson is a 74 y.o. male on day 4 of admission presenting with DKA, type 2, not at goal.    Subjective   Patient seen and examined at bedside in no acute distress.  Patient denies any nausea, vomiting, abdominal pain.  Patient is tolerating tube feeds well.  Blood glucose trends reviewed with improvement in hyperglycemia  I have reviewed histories, allergies and medications have been reviewed and there are no changes    Objective   Last Recorded Vitals  Blood pressure 133/66, pulse 84, temperature 36 °C (96.8 °F), temperature source Temporal, resp. rate 18, weight 70 kg (154 lb 5.2 oz), SpO2 97%.  Intake/Output last 3 Shifts:  I/O last 3 completed shifts:  In: 1465 (20.9 mL/kg) [P.O.:845; NG/GT:620]  Out: 2160 (30.9 mL/kg) [Urine:2160 (0.9 mL/kg/hr)]  Weight: 70 kg     Physical Exam  General: patient in NAD, sitting in chair  HEENT: AT/NC  Neck: trachea in midline, no thyromegaly or nodules  Resp: CTA B/L  CVS: normal s1 and s2  Abdomen: soft and non tender to palpation, BS+  Skin: warm, dry and intact  Neuro: AAO x3, no focal neurological deficit  Psych: cooperative  Relevant Results  Results from last 7 days   Lab Units 02/13/25  1524 02/13/25  1123 02/13/25  0754 02/13/25  0520 02/13/25  0420 02/13/25  0045 02/12/25  0734 02/12/25  0540 02/11/25  0536 02/11/25  0534 02/10/25  1912 02/10/25  1724   POCT GLUCOSE mg/dL 226* 291* 240*  --  154* 122*   < >  --    < >  --   --   --    GLUCOSE mg/dL  --   --   --  184*  --   --   --  264*  --  288* 169* 213*    < > = values in this interval not displayed.     Lab Results   Component Value Date    HGBA1C 6.1 (H) 10/29/2024    HGBA1C 5.7 (H) 04/20/2024    HGBA1C 7.3 (H) 02/21/2023     02/13/2025    K 4.5 02/13/2025     02/13/2025    CO2 23 02/13/2025    BUN 23 02/13/2025    CREATININE 1.03 02/13/2025    CALCIUM 8.6 02/13/2025    ALBUMIN 2.8 (L) 02/13/2025    PROT 8.8 (H) 12/20/2024    BILITOT 0.5 12/20/2024    ALKPHOS 189 (H) 12/20/2024    ALT 18  12/20/2024    AST 21 12/20/2024    GLUCOSE 184 (H) 02/13/2025    CHOL 183 10/29/2024    HDL 53.1 10/29/2024    BHYDRXBUT 0.09 02/12/2025    CPEPTIDE 4.8 (H) 10/29/2024        Assessment/Plan   Temo Hanson is a 74 y.o. 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 DKA,  he was started on insulin drip and transferred to  for further management in setting of recent kidney transplant history.  On chart review, labs from outside hospital reviewed and it was noted that patient was not in DKA given venous pH 7.4, BHB 0.14, , creatinine 1.6.  On arrival to [facility], insulin drip was discontinued and patient was started on regular insulin sliding scale #1 with meals with blood glucose ranging from 140-180s.  Endocrinology service is consulted for management of diabetes regimen in setting of recent DDKT and chronic steroid use.      Diabetes History  Type of diabetes: 2  Year diagnosed at age: 46 years old  Hospitalizations for DKA or HHS: no recent episodes  Complications: Nephropathy s/p DDKT  Seen by PCP or Endocrinology: last visit with MARISOL Lainez endocrinology on 1/30/25  Frequency of glucose checks: 4-5x daily after meals  Glucose review: -170s on regular SS #1 q 4  Frequency of Hypoglycemia: none  Severe hypoglycemia requiring assistance from others:  N     Home Diet : Clear liquid diet with tube feeds at 60 cc/h from 7 PM to 10 AM    Home Medications  Basal: Basaglar 10 U q AM  Prandial: Humulin  15 units once at 7:00 pm with tube feeds, insulin aspart 3 units with meals  Correction:Aspart sliding scale #2 with meals     Steroids: Tablet prednisone 5 mg daily    2/12/25: Nutrition: 60g CHO per meal + Tube feeds  --->>> Given persistent hyperglycemia and abdominal pain with night time continuous tube feeds, plan to trial bolus feed (250 ml) with meals TID and then  40 ml/hr over 12 hours overnight. (Carbs: Lunch: 33gm, Dinner: 33gm, and cycle x 12 hours: 66gm)    Recommendations:  Continue insulin NPH to 15 units nightly with cyclic tube feed ( 40 cc/h for 12 hours).  Please make sure that NPH is given 1 hour prior to starting the tube feeds at night  Continue insulin glargine to 12 units daily   Increase insulin lispro to 10 units 3 times daily with meals (tube feed bolus  + oral meals)                                               Hold if tube feeds are held  Switch to insulin lispro sliding scale # 2 q 4 Hourly   Accu-Cheks q 4 h  Goal blood glucose 140-180 mg/dL  Hypoglycemia protocol    Recommendations communicated to primary team. Please reach out incase you have any questions or concerns.    The patient was seen and discussed with attending Dr. Uribe.    Lino Garcia MD  Endocrinology fellow       ATTESTATION    I saw and evaluated the patient. I personally obtained the key and critical portions of the history and physical exam or was physically present for key and critical portions performed by the resident/fellow. I reviewed the resident/fellow's documentation and discussed the patient with the resident/fellow. I agree with the resident/fellow's medical decision making as documented in the note with the exception/addition of the following:    My additions/corrections to the trainee's note above are in italics.    Veronika Uribe MD

## 2025-02-13 NOTE — CARE PLAN
The patient's goals for the shift include      The clinical goals for the shift include patient will remain HDS throughout this shift      Problem: Discharge Planning  Goal: Discharge to home or other facility with appropriate resources  Outcome: Progressing     Problem: Chronic Conditions and Co-morbidities  Goal: Patient's chronic conditions and co-morbidity symptoms are monitored and maintained or improved  Outcome: Progressing     Problem: Nutrition  Goal: Nutrient intake appropriate for maintaining nutritional needs  Outcome: Progressing     Problem: Diabetes  Goal: Achieve decreasing blood glucose levels by end of shift  Outcome: Progressing  Goal: Increase stability of blood glucose readings by end of shift  Outcome: Progressing  Goal: Decrease in ketones present in urine by end of shift  Outcome: Progressing  Goal: Maintain electrolyte levels within acceptable range throughout shift  Outcome: Progressing  Goal: Maintain glucose levels >70mg/dl to <250mg/dl throughout shift  Outcome: Progressing  Goal: No changes in neurological exam by end of shift  Outcome: Progressing  Goal: Learn about and adhere to nutrition recommendations by end of shift  Outcome: Progressing  Goal: Vital signs within normal range for age by end of shift  Outcome: Progressing  Goal: Increase self care and/or family involovement by end of shift  Outcome: Progressing  Goal: Receive DSME education by end of shift  Outcome: Progressing     Problem: Skin  Goal: Decreased wound size/increased tissue granulation at next dressing change  Outcome: Progressing  Flowsheets (Taken 2/13/2025 1015)  Decreased wound size/increased tissue granulation at next dressing change:   Promote sleep for wound healing   Protective dressings over bony prominences   Utilize specialty bed per algorithm  Goal: Participates in plan/prevention/treatment measures  Outcome: Progressing  Flowsheets (Taken 2/13/2025 1015)  Participates in plan/prevention/treatment  measures:   Discuss with provider PT/OT consult   Elevate heels   Increase activity/out of bed for meals  Goal: Prevent/manage excess moisture  Outcome: Progressing  Flowsheets (Taken 2/13/2025 1015)  Prevent/manage excess moisture:   Follow provider orders for dressing changes   Monitor for/manage infection if present  Goal: Prevent/minimize sheer/friction injuries  Outcome: Progressing  Flowsheets (Taken 2/13/2025 1015)  Prevent/minimize sheer/friction injuries:   Use pull sheet   Increase activity/out of bed for meals   Complete micro-shifts as needed if patient unable. Adjust patient position to relieve pressure points, not a full turn  Goal: Promote/optimize nutrition  Outcome: Progressing  Flowsheets (Taken 2/13/2025 1015)  Promote/optimize nutrition:   Assist with feeding   Offer water/supplements/favorite foods   Monitor/record intake including meals  Goal: Promote skin healing  Outcome: Progressing  Flowsheets (Taken 2/13/2025 1015)  Promote skin healing:   Protective dressings over bony prominences   Assess skin/pad under line(s)/device(s)

## 2025-02-13 NOTE — CONSULTS
Wound Care Consult     Visit Date: 2/13/2025      Patient Name: Temo Hanson         MRN: [id number]           YOB: 1950     Reason for Consult: Pressure injury on the sacrum         Wound History: Stage 2 pressure injury on the sacrum/coccyx. First assessed by the wound care team on 1/03      Pertinent Labs:   Albumin   Date Value Ref Range Status   02/13/2025 2.8 (L) 3.4 - 5.0 g/dL Final       Wound Assessment:    Photo taken on 2/12/2025  Wound 12/31/24 Pressure Injury Sacrum (Active)        Site Assessment Red 02/13/25 1100   Queta-Wound Assessment Macerated;White 02/13/25 1100   Non-staged Wound Description Partial thickness 02/13/25 1100   Pressure Injury Stage 2 02/13/25 1100   Shape round 02/13/25 1100   Wound Length (cm) 0.5 cm 02/13/25 1100   Wound Width (cm) 0.5 cm 02/13/25 1100   Wound Surface Area (cm^2) 0.25 cm^2 02/13/25 1100   Wound Depth (cm) 0.2 cm 02/13/25 1100   Wound Volume (cm^3) 0.05 cm^3 02/13/25 1100   State of Healing Healing ridge 02/13/25 1100   Margins Well-defined edges 02/13/25 1100   Drainage Description Serous 02/13/25 1100   Drainage Amount Scant 02/13/25 1100   Dressing Moisture barrier;Hydrophilic 02/13/25 1100   Dressing Changed New 02/13/25 1100   Dressing Status Clean;Dry 02/13/25 1100         Wound Team Summary Assessment: The wound care team came to bedside to assess the patient's sacral wound. The wound is red and moist with macerated white wound edges. The wound was gently cleansed with vashe wound cleanser and gently pat dry. Triad cream was applied over top of the wound and left open to air. The patient was wearing depends underwear and has had multiple bowel movements today.       Wound Team Plan:   Recommendations: Daily and as needed   Cleanse the wound with Vashe wound cleanser and gently pat dry   Triad can be applied directly from the tube or by using a gloved finger. Gently spread Triad evenly over the area of application to the thickness of a dime.  Remove all triad cream every 3 days, Use vashe wound cleanser to soften the Triad cream,. Gently wipe to remove without scrubbing. the perineal area, reapply Triad after each episode of incontinence.   Optional: Apply a sacrum Mepilex border dressing for comfort   No briefs at this time due to incontinence and risk for incontinence associated dermatitis      PALMIRA Erickson  2/13/2025  12:30 PM

## 2025-02-13 NOTE — PROGRESS NOTES
TRANSPLANT SURGERY PROGRESS NOTE    Temo Hanson underwent transplant surgery on 12/21/2024 (Kidney) and was evaluated on multidisciplinary inpatient rounds.  I specifically evaluated the management of immunosuppression to ensure adequate exposure required to decrease the risk of rejection.  Furthermore, I reviewed potential side effects including tremor, hyperglycemia, leukopenia, infection, and other neurologic changes.  I reviewed and adjusted infectious prophylaxis based on the patients clinical condition and  protocols.     24 EVENTS: elevated glucoses o/n and now ESBL growing from urine cx. Reports incontinence and some diarrhea/loose BM's    DIAGNOSIS:  Kidney replaced by transplant ([facility]) Z94.0  Severe protein calorie malnutrition    PHYSICAL EXAMINATION:  Last Recorded Vitals  Visit Vitals  /64 (BP Location: Right arm, Patient Position: Lying)   Pulse 78   Temp 35.9 °C (96.6 °F) (Temporal)   Resp 18      Intake/Output last 3 Shifts:    Intake/Output Summary (Last 24 hours) at 2/12/2025 1911  Last data filed at 2/12/2025 1609  Gross per 24 hour   Intake 725 ml   Output 300 ml   Net 425 ml      Vitals:    02/09/25 1245   Weight: 66.8 kg (147 lb 4.3 oz)      Gen: A+OX3; NAD  HEENT: PERRL, sclera anicteric, MMM  Cardiac: RRR  Chest: Normal inspiratory effort  Abdomen: S/NT/ND. Incision C/D/I.  Ext: No LE edema    MEDICATION LIST: REVIEWED  Scheduled medications  atovaquone, 1,500 mg, oral, Daily  ertapenem, 1 g, intravenous, q24h  gabapentin, 200 mg, oral, Daily  heparin (porcine), 5,000 Units, subcutaneous, q8h KASSY  insulin glargine, 12 Units, subcutaneous, Daily  insulin lispro, 0-10 Units, subcutaneous, q4h  insulin lispro, 8 Units, subcutaneous, TID AC  insulin NPH (Isophane), 15 Units, subcutaneous, q24h KASSY  lidocaine, 2 patch, transdermal, Daily  metoclopramide, 5 mg, oral, q8h  mycophenolate, 360 mg, oral, q12h  nystatin, 5 mL, Swish & Swallow, 4x daily  pantoprazole, 40 mg, oral, BID  AC  phenazopyridine, 100 mg, oral, TID AC  predniSONE, 5 mg, oral, Daily  sodium bicarbonate, 1,300 mg, oral, q12h  sodium zirconium cyclosilicate, 5 g, oral, Daily  tacrolimus, 2 mg, oral, q12h KASSY  valGANciclovir, 450 mg, oral, Daily      Continuous medications     PRN medications  PRN medications: acetaminophen, dextrose, dextrose, glucagon, glucagon     LABS:  CBC   Result Value Ref Range    WBC 7.4 4.4 - 11.3 x10*3/uL    nRBC 0.0 0.0 - 0.0 /100 WBCs    RBC 3.07 (L) 4.50 - 5.90 x10*6/uL    Hemoglobin 8.8 (L) 13.5 - 17.5 g/dL    Hematocrit 27.6 (L) 41.0 - 52.0 %    MCV 90 80 - 100 fL    MCH 28.7 26.0 - 34.0 pg    MCHC 31.9 (L) 32.0 - 36.0 g/dL    RDW 17.2 (H) 11.5 - 14.5 %    Platelets 178 150 - 450 x10*3/uL   Magnesium   Result Value Ref Range    Magnesium 1.89 1.60 - 2.40 mg/dL   Renal function panel   Result Value Ref Range    Glucose 264 (H) 74 - 99 mg/dL    Sodium 132 (L) 136 - 145 mmol/L    Potassium 4.6 3.5 - 5.3 mmol/L    Chloride 105 98 - 107 mmol/L    Bicarbonate 19 (L) 21 - 32 mmol/L    Anion Gap 13 10 - 20 mmol/L    Urea Nitrogen 40 (H) 6 - 23 mg/dL    Creatinine 1.19 0.50 - 1.30 mg/dL    eGFR 64 >60 mL/min/1.73m*2    Calcium 8.2 (L) 8.6 - 10.6 mg/dL    Phosphorus 3.1 2.5 - 4.9 mg/dL    Albumin 2.5 (L) 3.4 - 5.0 g/dL   Tacrolimus level   Result Value Ref Range    Tacrolimus  6.9 <=15.0 ng/mL     ASSESSMENT AND PLAN:    Mr. Hanson is a 74 y.o. male who underwent  transplant surgery on 12/21/2024 (Kidney).    1. Immunosuppression reviewed and adjusted       Tacrolimus: current dose 2 mg BID. Continue same dose      Today's tacrolimus level is 6.9, Goal tacrolimus trough level is 6-8      Myfortic: Yes - 360 mg bid      Prednisone: Yes - 5 mg daily     2. IV hydration      HLIVF     3. Electrolyte     Reviewed renal profile.      Hyperkalemia and hyponatremia improved w/ better glycemic control     4. Hypertension medications reviewed        Blood Pressures         2/12/2025  0006 2/12/2025  0208  2/12/2025  0732 2/12/2025  1214 2/12/2025  1609    BP: 160/73 122/61 145/57 146/55 148/64              Continue current management     5. Nutrition/glycemic control: severe protein calorie malnutrition      Await new endocrine recs      Tube feeds: now plan bolus feeds and reduced rate for 12 hr cycle     6. Urinary tract infection: Proteus and E. Coli ESBL      ID consult and add Zosyn in the interim     7. Prophylaxis      GI prophylaxis: Protonix BID      DVT Prophylaxis: SCDs, Subcutaneous Heparin: Yes     8. ID prophylaxis      CMV, PJP and fungal prophylaxis per  [facility] Protocol      Valcyte: Yes     9. Discharge: complex planning needs SNF. PT/OT daily.    Case was presented at Multi Disciplinary team rounds   Attending physician, consulting physician, , pharmacist, residents and fellow were present at the meeting.    Sherly Kang MD, PHD, MPH, FACS  Chief Transplant and Hepatobiliary Surgery

## 2025-02-13 NOTE — PROGRESS NOTES
Physical Therapy    Physical Therapy Treatment    Patient Name: Temo Hanson  MRN: [id number]  Department: [place] [id number]  Room: [address] [place] [address]  Today's Date: 2/13/2025  Time Calculation  Start Time: 1132  Stop Time: 1200  Time Calculation (min): 28 min         Assessment/Plan   PT Assessment  PT Assessment Results: Decreased strength, Decreased endurance, Impaired balance, Decreased mobility, Pain, Decreased safety awareness, Impaired judgement  Rehab Prognosis: Good  Barriers to Discharge Home: Physical needs  Physical Needs: Stair navigation into home limited by function/safety, Stair navigation to access bed limited by function/safety, Stair navigation to access bath limited by function/safety, Ambulating household distances limited by function/safety  End of Session Communication: Bedside nurse  End of Session Patient Position: Up in chair, Alarm off, caregiver present  PT Plan  Inpatient/Swing Bed or Outpatient: Inpatient  PT Plan  Treatment/Interventions: Bed mobility, Transfer training, Gait training, Stair training, Balance training, Strengthening, Endurance training, Therapeutic exercise, Therapeutic activity, Home exercise program  PT Plan: Ongoing PT  PT Frequency: 5 times per week  PT Discharge Recommendations: Moderate intensity level of continued care  Equipment Recommended upon Discharge: Wheeled walker  PT Recommended Transfer Status: Assist x1, Assistive device  PT - OK to Discharge: Yes      General Visit Information:   PT  Visit  PT Received On: 02/13/25  General  Reason for Referral: 74 YOM, presented to OSH for unclear reason and was found to have hyperglycemia without ketosis with glucose >527 and sodium 116,  Past Medical History Relevant to Rehab: PMH HTN, DMII, ESRD (HD MWF) 2/2 diabetic nephropathy, 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 Improved mobility this date    Subjective   Precautions:  Precautions  Medical Precautions: Fall precautions, Abdominal precautions  Precautions Comment: MAP 65-90      Objective   Pain:  Pain Assessment  Pain Assessment: 0-10  0-10 (Numeric) Pain Score: 7  Pain Location: Abdomen  Cognition:  Cognition  Orientation Level: Oriented X4 (VC's for date)       Treatments:  Therapeutic Exercise  Therapeutic Exercise Performed: Yes  Therapeutic Exercise Activity 1: Seated AP, LAQ 2x10 reps. Cued for techniniques.      Bed Mobility  Bed Mobility: No (seated in chair pre and post session)    Ambulation/Gait Training 3  Surface 3: Level tile  Device 3: Rolling walker  Assistance 3: Minimum assistance, Minimal verbal cues  Quality of Gait 3: Decreased step length, Diminished heel strike, Forward flexed posture (slowed velocity, partial step through gait)  Comments/Distance (ft) 3: 12 ft and 40 ft (cues for safe walker placement with ambulation)  Transfers  Transfer: Yes  Transfer 1  Transfer From 1: Sit to  Transfer to 1: Sit, Stand  Technique 1: Sit to stand, Stand to sit  Transfer Device 1: Walker  Transfer Level of Assistance 1: Moderate verbal cues, Minimal tactile cues  Trials/Comments 1: ModA from toilet and Fercho from chair    Stairs  Stairs: No         Outcome Measures:  [facility] Basic Mobility  Turning from your back to your side while in a flat bed without using bedrails: A lot  Moving from lying on your back to sitting on the side of a flat bed without using bedrails: A lot  Moving to and from bed to chair (including a wheelchair): A little  Standing up from a chair using your arms (e.g. wheelchair or bedside chair): A lot  To walk in hospital room: A little  Climbing 3-5 steps with railing: Total  Basic Mobility - Total Score: 13    Education  Documentation  Body Mechanics, taught by Alice Tripathi PT at 2/13/2025  1:57 PM.  Learner: Patient  Readiness: Acceptance  Method: Explanation  Response: Verbalizes Understanding, Needs Reinforcement    Precautions, taught by Alice Tripathi PT at 2/13/2025  1:57 PM.  Learner: Patient  Readiness: Acceptance  Method: Explanation  Response: Verbalizes Understanding, Needs Reinforcement    Mobility Training, taught by Alice Tripathi PT at 2/13/2025  1:57 PM.  Learner: Patient  Readiness: Acceptance  Method: Explanation  Response: Verbalizes Understanding, Needs Reinforcement    Education Comments  No comments found.        OP EDUCATION:       Encounter Problems       Encounter Problems (Active)       Balance       STG - Maintains dynamic sitting balance SUP without upper extremity support (Progressing)       Start:  02/10/25    Expected End:  03/03/25            Pt will score >24/28 to decrease risk of falls. (Progressing)       Start:  02/10/25    Expected End:  03/03/25               Mobility       Pt will amb >150' w/ LRAD SUP to maximize pt's LOF.  (Progressing)       Start:  02/10/25    Expected End:  03/03/25            Pt will increase BLE strength grossly to 4+/5 to aid in functional transfers and gait quality.  (Progressing)       Start:  02/10/25    Expected End:  03/03/25               PT Transfers       Pt will transfer sit<>stand w/ LRAD SUP to improve functional mobility.  (Progressing)       Start:  02/10/25    Expected End:  03/03/25               Pain - Adult

## 2025-02-13 NOTE — PROGRESS NOTES
Temo Hanson is a 74 y.o. male on day 4 of admission presenting with DKA, type 2, not at goal.    Transitional Care Coordination Progress Note:  Patient discussed during interdisciplinary rounds.      Plan per Medical/Surgical team:    Home Care choice for home going needs, [facility].  PT/OT rec'd Mod for SNF.  SNF list given. Awaiting choices.      [facility]. Can not accept.     Asked pt for more choices, he directed me to call his daughter, Erica. No answer. Left VM.         Discharge disposition: TBD     Potential Barriers: None     ADOD:  2/14     This TCC will continue to follow for home going needs and safe DC plan.      MIKE MORENO

## 2025-02-13 NOTE — CARE PLAN
The patient's goals for the shift include      The clinical goals for the shift include patient will remain safe and vital signs stable.      Problem: Chronic Conditions and Co-morbidities  Goal: Patient's chronic conditions and co-morbidity symptoms are monitored and maintained or improved  Outcome: Progressing     Problem: Nutrition  Goal: Nutrient intake appropriate for maintaining nutritional needs  Outcome: Progressing     Problem: Diabetes  Goal: Achieve decreasing blood glucose levels by end of shift  Outcome: Progressing  Goal: Increase stability of blood glucose readings by end of shift  Outcome: Progressing  Goal: Decrease in ketones present in urine by end of shift  Outcome: Progressing  Goal: Maintain electrolyte levels within acceptable range throughout shift  Outcome: Progressing  Goal: Maintain glucose levels >70mg/dl to <250mg/dl throughout shift  Outcome: Progressing  Goal: No changes in neurological exam by end of shift  Outcome: Progressing  Goal: Learn about and adhere to nutrition recommendations by end of shift  Outcome: Progressing  Goal: Vital signs within normal range for age by end of shift  Outcome: Progressing  Goal: Increase self care and/or family involovement by end of shift  Outcome: Progressing  Goal: Receive DSME education by end of shift  Outcome: Progressing

## 2025-02-14 DIAGNOSIS — Z94.0 KIDNEY REPLACED BY TRANSPLANT (HHS-HCC): ICD-10-CM

## 2025-02-14 LAB
C COLI+JEJ+UPSA DNA STL QL NAA+PROBE: NOT DETECTED
C DIF TOX TCDA+TCDB STL QL NAA+PROBE: NOT DETECTED
EC STX1 GENE STL QL NAA+PROBE: NOT DETECTED
EC STX2 GENE STL QL NAA+PROBE: NOT DETECTED
GLUCOSE BLD MANUAL STRIP-MCNC: 155 MG/DL (ref 74–99)
GLUCOSE BLD MANUAL STRIP-MCNC: 170 MG/DL (ref 74–99)
GLUCOSE BLD MANUAL STRIP-MCNC: 177 MG/DL (ref 74–99)
GLUCOSE BLD MANUAL STRIP-MCNC: 195 MG/DL (ref 74–99)
GLUCOSE BLD MANUAL STRIP-MCNC: 61 MG/DL (ref 74–99)
GLUCOSE BLD MANUAL STRIP-MCNC: 73 MG/DL (ref 74–99)
GLUCOSE BLD MANUAL STRIP-MCNC: 73 MG/DL (ref 74–99)
GLUCOSE BLD MANUAL STRIP-MCNC: 89 MG/DL (ref 74–99)
NOROVIRUS GI + GII RNA STL NAA+PROBE: DETECTED
RV RNA STL NAA+PROBE: NOT DETECTED
SALMONELLA DNA STL QL NAA+PROBE: NOT DETECTED
SHIGELLA DNA SPEC QL NAA+PROBE: NOT DETECTED
V CHOLERAE DNA STL QL NAA+PROBE: NOT DETECTED
Y ENTEROCOL DNA STL QL NAA+PROBE: NOT DETECTED

## 2025-02-14 PROCEDURE — 2500000001 HC RX 250 WO HCPCS SELF ADMINISTERED DRUGS (ALT 637 FOR MEDICARE OP)

## 2025-02-14 PROCEDURE — 2500000004 HC RX 250 GENERAL PHARMACY W/ HCPCS (ALT 636 FOR OP/ED)

## 2025-02-14 PROCEDURE — 2500000002 HC RX 250 W HCPCS SELF ADMINISTERED DRUGS (ALT 637 FOR MEDICARE OP, ALT 636 FOR OP/ED)

## 2025-02-14 PROCEDURE — 2500000005 HC RX 250 GENERAL PHARMACY W/O HCPCS

## 2025-02-14 PROCEDURE — 2500000004 HC RX 250 GENERAL PHARMACY W/ HCPCS (ALT 636 FOR OP/ED): Performed by: PHYSICIAN ASSISTANT

## 2025-02-14 PROCEDURE — 99232 SBSQ HOSP IP/OBS MODERATE 35: CPT | Performed by: SURGERY

## 2025-02-14 PROCEDURE — 87506 IADNA-DNA/RNA PROBE TQ 6-11: CPT | Performed by: PHYSICIAN ASSISTANT

## 2025-02-14 PROCEDURE — 1100000001 HC PRIVATE ROOM DAILY

## 2025-02-14 PROCEDURE — 87493 C DIFF AMPLIFIED PROBE: CPT

## 2025-02-14 PROCEDURE — 99233 SBSQ HOSP IP/OBS HIGH 50: CPT | Performed by: INTERNAL MEDICINE

## 2025-02-14 PROCEDURE — 99232 SBSQ HOSP IP/OBS MODERATE 35: CPT | Performed by: INTERNAL MEDICINE

## 2025-02-14 PROCEDURE — 2500000001 HC RX 250 WO HCPCS SELF ADMINISTERED DRUGS (ALT 637 FOR MEDICARE OP): Performed by: PHYSICIAN ASSISTANT

## 2025-02-14 PROCEDURE — 82947 ASSAY GLUCOSE BLOOD QUANT: CPT

## 2025-02-14 PROCEDURE — 2500000002 HC RX 250 W HCPCS SELF ADMINISTERED DRUGS (ALT 637 FOR MEDICARE OP, ALT 636 FOR OP/ED): Performed by: PHYSICIAN ASSISTANT

## 2025-02-14 RX ORDER — SODIUM CHLORIDE 9 MG/ML
60 INJECTION, SOLUTION INTRAVENOUS CONTINUOUS
Status: ACTIVE | OUTPATIENT
Start: 2025-02-14 | End: 2025-02-15

## 2025-02-14 RX ORDER — MYCOPHENOLIC ACID 180 MG/1
180 TABLET, DELAYED RELEASE ORAL EVERY 12 HOURS
Status: DISCONTINUED | OUTPATIENT
Start: 2025-02-14 | End: 2025-02-18 | Stop reason: HOSPADM

## 2025-02-14 RX ADMIN — MYCOPHENOLIC ACID 360 MG: 360 TABLET, DELAYED RELEASE ORAL at 06:31

## 2025-02-14 RX ADMIN — SODIUM ZIRCONIUM CYCLOSILICATE 5 G: 10 POWDER, FOR SUSPENSION ORAL at 11:36

## 2025-02-14 RX ADMIN — TACROLIMUS 2 MG: 1 CAPSULE ORAL at 06:31

## 2025-02-14 RX ADMIN — HEPARIN SODIUM 5000 UNITS: 5000 INJECTION, SOLUTION INTRAVENOUS; SUBCUTANEOUS at 21:55

## 2025-02-14 RX ADMIN — INSULIN LISPRO 2 UNITS: 100 INJECTION, SOLUTION INTRAVENOUS; SUBCUTANEOUS at 16:35

## 2025-02-14 RX ADMIN — PANTOPRAZOLE SODIUM 40 MG: 40 TABLET, DELAYED RELEASE ORAL at 16:35

## 2025-02-14 RX ADMIN — CALCIUM POLYCARBOPHIL 625 MG: 625 TABLET, FILM COATED ORAL at 09:00

## 2025-02-14 RX ADMIN — INSULIN LISPRO 2 UNITS: 100 INJECTION, SOLUTION INTRAVENOUS; SUBCUTANEOUS at 09:07

## 2025-02-14 RX ADMIN — NYSTATIN 500000 UNITS: 500000 SUSPENSION ORAL at 16:35

## 2025-02-14 RX ADMIN — INSULIN GLARGINE 12 UNITS: 100 INJECTION, SOLUTION SUBCUTANEOUS at 09:07

## 2025-02-14 RX ADMIN — VALGANCICLOVIR HYDROCHLORIDE 450 MG: 450 TABLET ORAL at 09:00

## 2025-02-14 RX ADMIN — HEPARIN SODIUM 5000 UNITS: 5000 INJECTION, SOLUTION INTRAVENOUS; SUBCUTANEOUS at 06:31

## 2025-02-14 RX ADMIN — SODIUM BICARBONATE 1300 MG: 650 TABLET ORAL at 09:01

## 2025-02-14 RX ADMIN — TACROLIMUS 2 MG: 1 CAPSULE ORAL at 18:03

## 2025-02-14 RX ADMIN — PREDNISONE 5 MG: 10 TABLET ORAL at 09:01

## 2025-02-14 RX ADMIN — ERTAPENEM SODIUM 1 G: 1 INJECTION, POWDER, LYOPHILIZED, FOR SOLUTION INTRAMUSCULAR; INTRAVENOUS at 13:48

## 2025-02-14 RX ADMIN — NYSTATIN 500000 UNITS: 500000 SUSPENSION ORAL at 06:31

## 2025-02-14 RX ADMIN — METOCLOPRAMIDE 5 MG: 10 TABLET ORAL at 06:32

## 2025-02-14 RX ADMIN — METOCLOPRAMIDE 5 MG: 10 TABLET ORAL at 13:49

## 2025-02-14 RX ADMIN — DEXTROSE MONOHYDRATE 12.5 G: 25 INJECTION, SOLUTION INTRAVENOUS at 01:56

## 2025-02-14 RX ADMIN — PHENAZOPYRIDINE 100 MG: 100 TABLET ORAL at 06:32

## 2025-02-14 RX ADMIN — NYSTATIN 500000 UNITS: 500000 SUSPENSION ORAL at 13:48

## 2025-02-14 RX ADMIN — SODIUM BICARBONATE 1300 MG: 650 TABLET ORAL at 21:54

## 2025-02-14 RX ADMIN — INSULIN LISPRO 10 UNITS: 100 INJECTION, SOLUTION INTRAVENOUS; SUBCUTANEOUS at 16:35

## 2025-02-14 RX ADMIN — INSULIN LISPRO 10 UNITS: 100 INJECTION, SOLUTION INTRAVENOUS; SUBCUTANEOUS at 09:07

## 2025-02-14 RX ADMIN — INSULIN LISPRO 10 UNITS: 100 INJECTION, SOLUTION INTRAVENOUS; SUBCUTANEOUS at 13:48

## 2025-02-14 RX ADMIN — ATOVAQUONE 1500 MG: 750 SUSPENSION ORAL at 09:00

## 2025-02-14 RX ADMIN — PHENAZOPYRIDINE 100 MG: 100 TABLET ORAL at 10:47

## 2025-02-14 RX ADMIN — NYSTATIN 500000 UNITS: 500000 SUSPENSION ORAL at 21:54

## 2025-02-14 RX ADMIN — INSULIN LISPRO 2 UNITS: 100 INJECTION, SOLUTION INTRAVENOUS; SUBCUTANEOUS at 13:48

## 2025-02-14 RX ADMIN — METOCLOPRAMIDE 5 MG: 10 TABLET ORAL at 21:55

## 2025-02-14 RX ADMIN — GABAPENTIN 200 MG: 100 CAPSULE ORAL at 09:00

## 2025-02-14 RX ADMIN — HEPARIN SODIUM 5000 UNITS: 5000 INJECTION, SOLUTION INTRAVENOUS; SUBCUTANEOUS at 13:48

## 2025-02-14 RX ADMIN — MYCOPHENOLIC ACID 180 MG: 180 TABLET, DELAYED RELEASE ORAL at 18:03

## 2025-02-14 RX ADMIN — Medication 2000 UNITS: at 09:00

## 2025-02-14 RX ADMIN — PANTOPRAZOLE SODIUM 40 MG: 40 TABLET, DELAYED RELEASE ORAL at 06:31

## 2025-02-14 RX ADMIN — SODIUM CHLORIDE 60 ML/HR: 9 INJECTION, SOLUTION INTRAVENOUS at 10:47

## 2025-02-14 ASSESSMENT — COGNITIVE AND FUNCTIONAL STATUS - GENERAL
DRESSING REGULAR LOWER BODY CLOTHING: A LITTLE
STANDING UP FROM CHAIR USING ARMS: A LITTLE
DAILY ACTIVITIY SCORE: 18
DRESSING REGULAR UPPER BODY CLOTHING: A LITTLE
MOBILITY SCORE: 18
HELP NEEDED FOR BATHING: A LITTLE
TURNING FROM BACK TO SIDE WHILE IN FLAT BAD: A LITTLE
CLIMB 3 TO 5 STEPS WITH RAILING: A LITTLE
DAILY ACTIVITIY SCORE: 18
EATING MEALS: A LITTLE
MOVING FROM LYING ON BACK TO SITTING ON SIDE OF FLAT BED WITH BEDRAILS: A LITTLE
WALKING IN HOSPITAL ROOM: A LITTLE
PERSONAL GROOMING: A LITTLE
CLIMB 3 TO 5 STEPS WITH RAILING: A LITTLE
MOVING FROM LYING ON BACK TO SITTING ON SIDE OF FLAT BED WITH BEDRAILS: A LITTLE
STANDING UP FROM CHAIR USING ARMS: A LITTLE
DRESSING REGULAR UPPER BODY CLOTHING: A LITTLE
MOBILITY SCORE: 18
HELP NEEDED FOR BATHING: A LITTLE
WALKING IN HOSPITAL ROOM: A LITTLE
EATING MEALS: A LITTLE
MOVING TO AND FROM BED TO CHAIR: A LITTLE
TOILETING: A LITTLE
TURNING FROM BACK TO SIDE WHILE IN FLAT BAD: A LITTLE
PERSONAL GROOMING: A LITTLE
TOILETING: A LITTLE
MOVING TO AND FROM BED TO CHAIR: A LITTLE
DRESSING REGULAR LOWER BODY CLOTHING: A LITTLE

## 2025-02-14 ASSESSMENT — PAIN - FUNCTIONAL ASSESSMENT: PAIN_FUNCTIONAL_ASSESSMENT: 0-10

## 2025-02-14 ASSESSMENT — PAIN SCALES - GENERAL: PAINLEVEL_OUTOF10: 0 - NO PAIN

## 2025-02-14 NOTE — PROGRESS NOTES
Temo Hanson is a 74 y.o. male on day 5 of admission presenting with DKA, type 2, not at goal.      Transitional Care Coordination Progress Note:  Patient discussed during interdisciplinary rounds.      Plan per Medical/Surgical team:    Home Care choice for home going needs, [facility].  PT/OT rec'd Mod for SNF.  Spoke with pt's daughter, Erica, per the pt's request. She informed me that they would like to take the pt home with HC. She will be helping take care of him as well.    [facility], [facility]  [place]/Learner: Priya     Discharge disposition: [facility]     Potential Barriers: None     ADOD:  2/17     This TCC will continue to follow for home going needs and safe DC plan.      MIKE MORENO

## 2025-02-14 NOTE — SIGNIFICANT EVENT
Dr. Rafy Angela (nephrology) is aware of the need for midline placement for IV antibiotics and she is ok with proceeding with placement. He has a left extremity AVF. Please place midline in right arm.

## 2025-02-14 NOTE — CARE PLAN
The patient's goals for the shift include      The clinical goals for the shift include control diarrhea      Problem: Discharge Planning  Goal: Discharge to home or other facility with appropriate resources  Outcome: Progressing     Problem: Chronic Conditions and Co-morbidities  Goal: Patient's chronic conditions and co-morbidity symptoms are monitored and maintained or improved  Outcome: Progressing     Problem: Nutrition  Goal: Nutrient intake appropriate for maintaining nutritional needs  Outcome: Progressing     Problem: Diabetes  Goal: Achieve decreasing blood glucose levels by end of shift  Outcome: Progressing  Goal: Increase stability of blood glucose readings by end of shift  Outcome: Progressing  Goal: Decrease in ketones present in urine by end of shift  Outcome: Progressing  Goal: Maintain electrolyte levels within acceptable range throughout shift  Outcome: Progressing  Goal: Maintain glucose levels >70mg/dl to <250mg/dl throughout shift  Outcome: Progressing  Goal: No changes in neurological exam by end of shift  Outcome: Progressing  Goal: Learn about and adhere to nutrition recommendations by end of shift  Outcome: Progressing  Goal: Vital signs within normal range for age by end of shift  Outcome: Progressing  Goal: Increase self care and/or family involovement by end of shift  Outcome: Progressing  Goal: Receive DSME education by end of shift  Outcome: Progressing     Problem: Skin  Goal: Decreased wound size/increased tissue granulation at next dressing change  Outcome: Progressing  Flowsheets (Taken 2/14/2025 1232)  Decreased wound size/increased tissue granulation at next dressing change:   Promote sleep for wound healing   Protective dressings over bony prominences  Goal: Participates in plan/prevention/treatment measures  Outcome: Progressing  Flowsheets (Taken 2/14/2025 1232)  Participates in plan/prevention/treatment measures:   Discuss with provider PT/OT consult   Elevate heels  Goal:  Prevent/manage excess moisture  Outcome: Progressing  Flowsheets (Taken 2/14/2025 1232)  Prevent/manage excess moisture:   Cleanse incontinence/protect with barrier cream   Monitor for/manage infection if present  Goal: Prevent/minimize sheer/friction injuries  Outcome: Progressing  Flowsheets (Taken 2/14/2025 1232)  Prevent/minimize sheer/friction injuries: Use pull sheet  Goal: Promote/optimize nutrition  Outcome: Progressing  Flowsheets (Taken 2/14/2025 1232)  Promote/optimize nutrition:   Offer water/supplements/favorite foods   Reassess MST if dietician not consulted  Goal: Promote skin healing  Outcome: Progressing  Flowsheets (Taken 2/14/2025 1232)  Promote skin healing: Turn/reposition every 2 hours/use positioning/transfer devices

## 2025-02-14 NOTE — SIGNIFICANT EVENT
02/11/25 1117   Patient Interaction   Organ Kidney   Type of Interaction Morning rounds   Interdisciplinary Rounds   Attendance Surgeon;Physician;FILI;Pharmacist;Patient and/or family;   Topics Discussed Diet;Medications;Blood test results;Discharge preparation     Transplant Surgery Multidisciplinary Team Note    Temo Hanson is a 74 y.o. male   s/p DDKT 12/21/24 readmitted for hyperglycemia.     24 Hour Events  C/o frequent diarrhea over night    Last Recorded Vitals  Visit Vitals  /68 (BP Location: Right arm, Patient Position: Lying)   Pulse 98   Temp 36.3 °C (97.3 °F) (Temporal)   Resp 18      Intake/Output last 3 Shifts:    Intake/Output Summary (Last 24 hours) at 2/14/2025 1211  Last data filed at 2/14/2025 0930  Gross per 24 hour   Intake 1090 ml   Output 1700 ml   Net -610 ml      Vitals:    02/14/25 0600   Weight: 69.8 kg (153 lb 14.1 oz)        Assessment/Plan   Principal Problem:    Kidney allograft function    UOP 1450 ml, creatinine stable    ID  Culture grew ESBL E. coli and Proteus mirabilis. ID consulted & ertapenum started, ends 2/25   Stool studies + for norovirus   Weekly virals studies on Sundays        Management after organ transplant  Active Problems:  Patient Active Problem List   Diagnosis    S/P laparoscopic cholecystectomy    Abdominal pain    Achalasia    Acute lower UTI    Microcytic anemia    Complete heart block    Callus of foot    Chronic periodontitis, unspecified    Stage 5 chronic kidney disease (Multi)    Closed fracture of metatarsal bone    Crushing injury of foot    Diabetes mellitus (Multi)    Diarrhea    Dysphagia    ED (erectile dysfunction)    Essential hypertension    Foot pain, right    GIB (gastrointestinal bleeding)    Hepatitis B core antibody positive    Hyperkalemia    Ingrowing nail    Iron deficiency anemia secondary to inadequate dietary iron intake    Long toenail    Malignant neoplasm of prostate (Multi)    Onychomycosis    Pheochromocytoma of right  adrenal gland    Pneumonia of right lower lobe due to infectious organism    Productive cough    Pure hypercholesterolemia    Stricture esophagus    SVT (supraventricular tachycardia) (CMS-HCC)    Type 2 diabetes mellitus with diabetic neuropathy (Multi)    Preop cardiovascular exam    Third degree heart block    Pericardial effusion ([facility])    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 (Multi)    Uremia    Cardiac tamponade    Cardiac pacemaker in situ    ESRD (end stage renal disease) (Multi)    Type 2 diabetes mellitus, with long-term current use of insulin    Dehydration    Alactasia syndrome    Type 2 diabetes mellitus with hyperglycemia, with long-term current use of insulin    On tube feeding diet    Kidney transplant recipient ([facility])    DKA, type 2, not at goal        Immunosuppression reviewed and adjusted          Tacrolimus goal 8-10 ng/mL. Current dose 3 mg BID         Myfortic decreased to 180 mg po BID       Solumedrol taper  DVT prophylaxis SCDS and subcutaneous heparin 5000 TID  PT/OT recommending SNF.   Diet:  TF with diet  Anticipated discharge 3-5 days    Chelsey Simpson, APRN-CNP

## 2025-02-14 NOTE — PROGRESS NOTES
Temo Hanson is a 74 y.o. male on day 5 of admission presenting with DKA, type 2, not at goal.    Subjective   Patient seen and examined in no acute distress.  Patient denies any nausea, vomiting, abdominal pain.  I have reviewed histories, allergies and medications have been reviewed and there are no changes    Objective   Last Recorded Vitals  Blood pressure 148/65, pulse 100, temperature 35.9 °C (96.6 °F), temperature source Temporal, resp. rate 18, weight 69.8 kg (153 lb 14.1 oz), SpO2 94%.  Intake/Output last 3 Shifts:  I/O last 3 completed shifts:  In: 2120 (30.4 mL/kg) [P.O.:860; NG/GT:1160; IV Piggyback:100]  Out: 3311 (47.4 mL/kg) [Urine:3311 (1.3 mL/kg/hr)]  Weight: 69.8 kg     Physical Exam  General: patient in NAD, sitting in chair  HEENT: AT/NC  Neck: trachea in midline, no thyromegaly or nodules  Resp: CTA B/L  CVS: normal s1 and s2  Abdomen: soft and non tender to palpation, BS+  Skin: warm, dry and intact  Neuro: AAO x3, no focal neurological deficit  Psych: cooperative    Relevant Results  Lab Results   Component Value Date    POCGLU 155 (H) 02/14/2025    POCGLU 195 (H) 02/14/2025    POCGLU 170 (H) 02/14/2025    POCGLU 177 (H) 02/14/2025    POCGLU 89 02/14/2025    POCGLU 61 (L) 02/14/2025    POCGLU 226 (H) 02/13/2025    POCGLU 291 (H) 02/13/2025     Lab Results   Component Value Date    HGBA1C 6.1 (H) 10/29/2024    HGBA1C 5.7 (H) 04/20/2024    HGBA1C 7.3 (H) 02/21/2023     02/13/2025    K 4.5 02/13/2025     02/13/2025    CO2 23 02/13/2025    BUN 23 02/13/2025    CREATININE 1.03 02/13/2025    CALCIUM 8.6 02/13/2025    ALBUMIN 2.8 (L) 02/13/2025    PROT 8.8 (H) 12/20/2024    BILITOT 0.5 12/20/2024    ALKPHOS 189 (H) 12/20/2024    ALT 18 12/20/2024    AST 21 12/20/2024    GLUCOSE 184 (H) 02/13/2025    CHOL 183 10/29/2024    HDL 53.1 10/29/2024    BHYDRXBUT 0.09 02/12/2025    CPEPTIDE 4.8 (H) 10/29/2024        Assessment/Plan   Temo Hanson is a 74 y.o. male with PMHx type II DM, ESRD  2/2 diabetic nephropathy s/p DDKT (12/21/2024), PEG tube placed for achalasia and failure to thrive (1/2025) admitted to outside hospital with ? DKA,  he was started on insulin drip and transferred to  for further management in setting of recent kidney transplant history.  On chart review, labs from outside hospital reviewed and it was noted that patient was not in DKA given venous pH 7.4, BHB 0.14, , creatinine 1.6.  On arrival to [facility], insulin drip was discontinued and patient was started on regular insulin sliding scale #1 with meals with blood glucose ranging from 140-180s.  Endocrinology service is consulted for management of diabetes regimen in setting of recent DDKT and chronic steroid use.    Diabetes History  Type of diabetes: 2  Year diagnosed at age: 46 years old  Hospitalizations for DKA or HHS: no recent episodes  Complications: Nephropathy s/p DDKT  Seen by PCP or Endocrinology: last visit with MARISOL Lainez endocrinology on 1/30/25  Frequency of glucose checks: 4-5x daily after meals  Glucose review: -170s on regular SS #1 q 4  Frequency of Hypoglycemia: none  Severe hypoglycemia requiring assistance from others:  No     Home Diet : Clear liquid diet with tube feeds at 60 cc/h from 7 PM to 10 AM    Home Medications  Basal: Basaglar 10 U q AM  Prandial: Humulin  15 units once at 7:00 pm with tube feeds, insulin aspart 3 units with meals  Correction: Aspart sliding scale #2 with meals     Steroids: Tablet prednisone 5 mg daily    2/12/25: Nutrition: 60g CHO per meal + Given persistent hyperglycemia and abdominal pain with night time continuous tube feeds, switched to bolus feed (250 ml) with meals TID and then 40 ml/hr over 12 hours overnight. (Carbs: Lunch: 33gm, Dinner: 33gm, and cycle x 12 hours: 66gm)  2/14: Noted hypoglycemia overnight    Recommendations:  Decrease insulin NPH to 12 units nightly with overnight tube feed (40 cc/h for 12 hours).  Please make sure that NPH is  given 1 hour prior to starting the tube feeds at night  Continue insulin glargine to 12 units daily   Continue insulin lispro to 10 units 3 times daily with meals (tube feed bolus  + oral meals)                                               Hold if tube feeds are held  Continue insulin lispro sliding scale # 2 q 4 Hourly   Accu-Cheks q 4 h  Goal blood glucose 140-180 mg%  Hypoglycemia protocol    Recommendations communicated to primary team. Please reach out incase you have any questions or concerns.    The patient was seen and discussed with attending Dr. Uribe.    Lino Garcia MD  Endocrinology fellow       ATTESTATION    I saw and evaluated the patient. I personally obtained the key and critical portions of the history and physical exam or was physically present for key and critical portions performed by the resident/fellow. I reviewed the resident/fellow's documentation and discussed the patient with the resident/fellow. I agree with the resident/fellow's medical decision making as documented in the note with the exception/addition of the following:    My additions/corrections to the trainee's note above are in italics.    Veronika Uribe MD

## 2025-02-15 LAB
ATRIAL RATE: 98 BPM
GLUCOSE BLD MANUAL STRIP-MCNC: 100 MG/DL (ref 74–99)
GLUCOSE BLD MANUAL STRIP-MCNC: 105 MG/DL (ref 74–99)
GLUCOSE BLD MANUAL STRIP-MCNC: 105 MG/DL (ref 74–99)
GLUCOSE BLD MANUAL STRIP-MCNC: 115 MG/DL (ref 74–99)
GLUCOSE BLD MANUAL STRIP-MCNC: 177 MG/DL (ref 74–99)
GLUCOSE BLD MANUAL STRIP-MCNC: 90 MG/DL (ref 74–99)
P AXIS: 72 DEGREES
Q ONSET: 211 MS
QRS COUNT: 11 BEATS
QRS DURATION: 146 MS
QT INTERVAL: 414 MS
QTC CALCULATION(BAZETT): 443 MS
QTC FREDERICIA: 434 MS
R AXIS: 9 DEGREES
T AXIS: -61 DEGREES
T OFFSET: 418 MS
TACROLIMUS BLD-MCNC: 5.2 NG/ML
VENTRICULAR RATE: 69 BPM

## 2025-02-15 PROCEDURE — 1100000001 HC PRIVATE ROOM DAILY

## 2025-02-15 PROCEDURE — 2500000001 HC RX 250 WO HCPCS SELF ADMINISTERED DRUGS (ALT 637 FOR MEDICARE OP)

## 2025-02-15 PROCEDURE — 99233 SBSQ HOSP IP/OBS HIGH 50: CPT | Performed by: INTERNAL MEDICINE

## 2025-02-15 PROCEDURE — 2500000002 HC RX 250 W HCPCS SELF ADMINISTERED DRUGS (ALT 637 FOR MEDICARE OP, ALT 636 FOR OP/ED): Performed by: PHYSICIAN ASSISTANT

## 2025-02-15 PROCEDURE — 2500000002 HC RX 250 W HCPCS SELF ADMINISTERED DRUGS (ALT 637 FOR MEDICARE OP, ALT 636 FOR OP/ED)

## 2025-02-15 PROCEDURE — 36415 COLL VENOUS BLD VENIPUNCTURE: CPT

## 2025-02-15 PROCEDURE — 82947 ASSAY GLUCOSE BLOOD QUANT: CPT

## 2025-02-15 PROCEDURE — 2500000004 HC RX 250 GENERAL PHARMACY W/ HCPCS (ALT 636 FOR OP/ED)

## 2025-02-15 PROCEDURE — 80197 ASSAY OF TACROLIMUS: CPT

## 2025-02-15 PROCEDURE — 2500000001 HC RX 250 WO HCPCS SELF ADMINISTERED DRUGS (ALT 637 FOR MEDICARE OP): Performed by: PHYSICIAN ASSISTANT

## 2025-02-15 PROCEDURE — 99232 SBSQ HOSP IP/OBS MODERATE 35: CPT | Performed by: SURGERY

## 2025-02-15 PROCEDURE — 2500000004 HC RX 250 GENERAL PHARMACY W/ HCPCS (ALT 636 FOR OP/ED): Performed by: PHYSICIAN ASSISTANT

## 2025-02-15 RX ORDER — MYCOPHENOLIC ACID 180 MG/1
180 TABLET, DELAYED RELEASE ORAL 2 TIMES DAILY
Start: 2025-02-15 | End: 2025-02-24 | Stop reason: SDUPTHER

## 2025-02-15 RX ORDER — INSULIN LISPRO 100 [IU]/ML
0-5 INJECTION, SOLUTION INTRAVENOUS; SUBCUTANEOUS EVERY 4 HOURS
Status: DISCONTINUED | OUTPATIENT
Start: 2025-02-15 | End: 2025-02-18 | Stop reason: HOSPADM

## 2025-02-15 RX ORDER — ERTAPENEM 1 G/1
1 INJECTION, POWDER, LYOPHILIZED, FOR SOLUTION INTRAMUSCULAR; INTRAVENOUS EVERY 24 HOURS
Qty: 650 ML | Refills: 0 | Status: SHIPPED | OUTPATIENT
Start: 2025-02-15 | End: 2025-02-15

## 2025-02-15 RX ORDER — INSULIN LISPRO 100 [IU]/ML
8 INJECTION, SOLUTION INTRAVENOUS; SUBCUTANEOUS
Status: DISCONTINUED | OUTPATIENT
Start: 2025-02-15 | End: 2025-02-16

## 2025-02-15 RX ORDER — ERTAPENEM 1 G/1
1 INJECTION, POWDER, LYOPHILIZED, FOR SOLUTION INTRAMUSCULAR; INTRAVENOUS EVERY 24 HOURS
Qty: 500 ML | Refills: 0 | Status: SHIPPED
Start: 2025-02-15 | End: 2025-02-25

## 2025-02-15 RX ORDER — LOPERAMIDE HYDROCHLORIDE 2 MG/1
2 CAPSULE ORAL 4 TIMES DAILY PRN
Status: DISCONTINUED | OUTPATIENT
Start: 2025-02-15 | End: 2025-02-16

## 2025-02-15 RX ADMIN — TACROLIMUS 2 MG: 1 CAPSULE ORAL at 06:47

## 2025-02-15 RX ADMIN — SODIUM BICARBONATE 1300 MG: 650 TABLET ORAL at 08:11

## 2025-02-15 RX ADMIN — MYCOPHENOLIC ACID 180 MG: 180 TABLET, DELAYED RELEASE ORAL at 06:47

## 2025-02-15 RX ADMIN — CALCIUM POLYCARBOPHIL 625 MG: 625 TABLET, FILM COATED ORAL at 08:15

## 2025-02-15 RX ADMIN — INSULIN LISPRO 2 UNITS: 100 INJECTION, SOLUTION INTRAVENOUS; SUBCUTANEOUS at 08:17

## 2025-02-15 RX ADMIN — SODIUM ZIRCONIUM CYCLOSILICATE 5 G: 10 POWDER, FOR SUSPENSION ORAL at 11:53

## 2025-02-15 RX ADMIN — VALGANCICLOVIR HYDROCHLORIDE 450 MG: 450 TABLET ORAL at 08:11

## 2025-02-15 RX ADMIN — ERTAPENEM SODIUM 1 G: 1 INJECTION, POWDER, LYOPHILIZED, FOR SOLUTION INTRAMUSCULAR; INTRAVENOUS at 12:00

## 2025-02-15 RX ADMIN — GABAPENTIN 200 MG: 100 CAPSULE ORAL at 08:11

## 2025-02-15 RX ADMIN — Medication 2000 UNITS: at 08:11

## 2025-02-15 RX ADMIN — LOPERAMIDE HYDROCHLORIDE 2 MG: 2 CAPSULE ORAL at 10:14

## 2025-02-15 RX ADMIN — HEPARIN SODIUM 5000 UNITS: 5000 INJECTION, SOLUTION INTRAVENOUS; SUBCUTANEOUS at 21:03

## 2025-02-15 RX ADMIN — NYSTATIN 500000 UNITS: 500000 SUSPENSION ORAL at 12:00

## 2025-02-15 RX ADMIN — INSULIN GLARGINE 12 UNITS: 100 INJECTION, SOLUTION SUBCUTANEOUS at 08:16

## 2025-02-15 RX ADMIN — MYCOPHENOLIC ACID 180 MG: 180 TABLET, DELAYED RELEASE ORAL at 18:23

## 2025-02-15 RX ADMIN — METOCLOPRAMIDE 5 MG: 10 TABLET ORAL at 06:47

## 2025-02-15 RX ADMIN — NYSTATIN 500000 UNITS: 500000 SUSPENSION ORAL at 06:47

## 2025-02-15 RX ADMIN — NYSTATIN 500000 UNITS: 500000 SUSPENSION ORAL at 17:29

## 2025-02-15 RX ADMIN — INSULIN LISPRO 10 UNITS: 100 INJECTION, SOLUTION INTRAVENOUS; SUBCUTANEOUS at 08:15

## 2025-02-15 RX ADMIN — ACETAMINOPHEN 650 MG: 325 TABLET ORAL at 10:19

## 2025-02-15 RX ADMIN — METOCLOPRAMIDE 5 MG: 10 TABLET ORAL at 14:27

## 2025-02-15 RX ADMIN — SODIUM BICARBONATE 1300 MG: 650 TABLET ORAL at 21:03

## 2025-02-15 RX ADMIN — LOPERAMIDE HYDROCHLORIDE 2 MG: 2 CAPSULE ORAL at 14:27

## 2025-02-15 RX ADMIN — INSULIN LISPRO 10 UNITS: 100 INJECTION, SOLUTION INTRAVENOUS; SUBCUTANEOUS at 11:53

## 2025-02-15 RX ADMIN — PANTOPRAZOLE SODIUM 40 MG: 40 TABLET, DELAYED RELEASE ORAL at 06:47

## 2025-02-15 RX ADMIN — HEPARIN SODIUM 5000 UNITS: 5000 INJECTION, SOLUTION INTRAVENOUS; SUBCUTANEOUS at 14:27

## 2025-02-15 RX ADMIN — PANTOPRAZOLE SODIUM 40 MG: 40 TABLET, DELAYED RELEASE ORAL at 17:29

## 2025-02-15 RX ADMIN — HEPARIN SODIUM 5000 UNITS: 5000 INJECTION, SOLUTION INTRAVENOUS; SUBCUTANEOUS at 06:47

## 2025-02-15 RX ADMIN — NYSTATIN 500000 UNITS: 500000 SUSPENSION ORAL at 21:03

## 2025-02-15 RX ADMIN — METOCLOPRAMIDE 5 MG: 10 TABLET ORAL at 21:03

## 2025-02-15 RX ADMIN — LOPERAMIDE HYDROCHLORIDE 2 MG: 2 CAPSULE ORAL at 21:03

## 2025-02-15 RX ADMIN — TACROLIMUS 2 MG: 1 CAPSULE ORAL at 18:23

## 2025-02-15 RX ADMIN — PREDNISONE 5 MG: 10 TABLET ORAL at 08:11

## 2025-02-15 RX ADMIN — ATOVAQUONE 1500 MG: 750 SUSPENSION ORAL at 08:15

## 2025-02-15 ASSESSMENT — COGNITIVE AND FUNCTIONAL STATUS - GENERAL
MOVING TO AND FROM BED TO CHAIR: A LITTLE
MOVING FROM LYING ON BACK TO SITTING ON SIDE OF FLAT BED WITH BEDRAILS: A LITTLE
DRESSING REGULAR UPPER BODY CLOTHING: A LITTLE
PERSONAL GROOMING: A LITTLE
DRESSING REGULAR LOWER BODY CLOTHING: A LOT
DAILY ACTIVITIY SCORE: 16
WALKING IN HOSPITAL ROOM: A LITTLE
PERSONAL GROOMING: A LITTLE
STANDING UP FROM CHAIR USING ARMS: A LOT
CLIMB 3 TO 5 STEPS WITH RAILING: A LITTLE
MOVING TO AND FROM BED TO CHAIR: A LOT
TOILETING: A LITTLE
MOBILITY SCORE: 18
EATING MEALS: A LITTLE
HELP NEEDED FOR BATHING: A LOT
MOBILITY SCORE: 16
TURNING FROM BACK TO SIDE WHILE IN FLAT BAD: A LITTLE
TOILETING: A LITTLE
TURNING FROM BACK TO SIDE WHILE IN FLAT BAD: A LITTLE
STANDING UP FROM CHAIR USING ARMS: A LITTLE
DRESSING REGULAR UPPER BODY CLOTHING: A LITTLE
WALKING IN HOSPITAL ROOM: A LITTLE
DAILY ACTIVITIY SCORE: 18
HELP NEEDED FOR BATHING: A LITTLE
MOVING FROM LYING ON BACK TO SITTING ON SIDE OF FLAT BED WITH BEDRAILS: A LITTLE
EATING MEALS: A LITTLE
DRESSING REGULAR LOWER BODY CLOTHING: A LITTLE
CLIMB 3 TO 5 STEPS WITH RAILING: A LITTLE

## 2025-02-15 ASSESSMENT — PAIN SCALES - GENERAL
PAINLEVEL_OUTOF10: 5 - MODERATE PAIN
PAINLEVEL_OUTOF10: 2

## 2025-02-15 NOTE — PROGRESS NOTES
TRANSPLANT SURGERY PROGRESS NOTE    Temo Hanson underwent transplant surgery on 12/21/2024 (Kidney) and was evaluated on multidisciplinary inpatient rounds.  I specifically evaluated the management of immunosuppression to ensure adequate exposure required to decrease the risk of rejection.  Furthermore, I reviewed potential side effects including tremor, hyperglycemia, leukopenia, infection, and other neurologic changes.  I reviewed and adjusted infectious prophylaxis based on the patients clinical condition and  protocols.     24 EVENTS: norovirus positive    DIAGNOSIS:  Kidney replaced by transplant ([facility]) Z94.0    PHYSICAL EXAMINATION:  Last Recorded Vitals  Visit Vitals  /65 (BP Location: Left arm, Patient Position: Lying)   Pulse 100   Temp 35.9 °C (96.6 °F) (Temporal)   Resp 18      Intake/Output last 3 Shifts:    Intake/Output Summary (Last 24 hours) at 2/14/2025 1917  Last data filed at 2/14/2025 1431  Gross per 24 hour   Intake 1446 ml   Output 1500 ml   Net -54 ml      Vitals:    02/14/25 0600   Weight: 69.8 kg (153 lb 14.1 oz)      Gen: A+OX3; NAD  HEENT: PERRL, sclera anicteric, MMM  Cardiac: RRR  Chest: Normal inspiratory effort  Abdomen: S/NT/ND. Incision C/D/I.  Ext: No LE edema  Drain: serosanguinous    MEDICATION LIST: REVIEWED  Scheduled medications  atovaquone, 1,500 mg, oral, Daily  calcium polycarbophiL, 625 mg, oral, Daily  cholecalciferol, 2,000 Units, oral, Daily  ertapenem, 1 g, intravenous, q24h  gabapentin, 200 mg, oral, Daily  heparin (porcine), 5,000 Units, subcutaneous, q8h KASSY  insulin glargine, 12 Units, subcutaneous, Daily  insulin lispro, 0-10 Units, subcutaneous, q4h  insulin lispro, 10 Units, subcutaneous, TID AC  insulin NPH (Isophane), 15 Units, subcutaneous, q24h KASSY  lidocaine, 2 patch, transdermal, Daily  metoclopramide, 5 mg, oral, q8h  mycophenolate, 180 mg, oral, q12h  nystatin, 5 mL, Swish & Swallow, 4x daily  pantoprazole, 40 mg, oral, BID  AC  predniSONE, 5 mg, oral, Daily  sodium bicarbonate, 1,300 mg, oral, q12h  sodium zirconium cyclosilicate, 5 g, oral, Daily  tacrolimus, 2 mg, oral, q12h KASSY  valGANciclovir, 450 mg, oral, Daily      ASSESSMENT AND PLAN:    Mr. Hanson is a 74 y.o. male who underwent  transplant surgery on 12/21/2024 (Kidney).    1. Immunosuppression reviewed and adjusted       Tacrolimus: current dose 2 mg BID. Continue same dose      Most recent tacrolimus level is 6.0, Goal tacrolimus trough level is 6-8      Myfortic: Yes - 360 mg bid      Prednisone: Yes - 5 mg daily     2. IV hydration      IVF 60 ml/h given diarrhea     3. Electrolyte     Reviewed renal profile.      Hyperkalemia and hyponatremia improved w/ better glycemic control     4. Hypertension medications reviewed        Blood Pressures           2/14/2025  0415 2/14/2025  0818 2/14/2025  1230 2/14/2025  1613 2/14/2025  1710     BP: 160/74 159/68 132/68 -- 148/65                Continue current management      5. Nutrition/glycemic control: severe protein calorie malnutrition      Await new endocrine recs      Tube feeds: bolus feeds and reduced rate for 12 hr cycle     6. Urinary tract infection: Proteus and E. Coli ESBL      ID consult: ertapenem     7. Prophylaxis      GI prophylaxis: Protonix BID      DVT Prophylaxis: SCDs, Subcutaneous Heparin: Yes     8. ID prophylaxis      CMV, PJP and fungal prophylaxis per [facility] Protocol      Valcyte: Yes     9. Discharge: complex planning needs SNF. PT/OT daily.    Case was presented at Multi Disciplinary team rounds   Attending physician, consulting physician, , pharmacist, residents and fellow were present at the meeting.    Sherly Kang MD, PHD, MPH, FACS  Chief Transplant and Hepatobiliary Surgery

## 2025-02-15 NOTE — PROGRESS NOTES
Temo Hanson is a 74 y.o. male on day 6 of admission presenting with DKA, type 2, not at goal.    Subjective   Patient seen and examined at bedside.  Patient is in contact isolation for norovirus.  He complains of persistent diarrhea and is feeling distressed because of that..  I have reviewed histories, allergies and medications have been reviewed and there are no changes    Objective   Last Recorded Vitals  Blood pressure 135/58, pulse 65, temperature 36.6 °C (97.9 °F), temperature source Temporal, resp. rate 18, weight 69.8 kg (153 lb 14.1 oz), SpO2 99%.  Intake/Output last 3 Shifts:  I/O last 3 completed shifts:  In: 1446 (20.7 mL/kg) [P.O.:360; I.V.:106 (1.5 mL/kg); NG/GT:930; IV Piggyback:50]  Out: 1500 (21.5 mL/kg) [Urine:1500 (0.6 mL/kg/hr)]  Weight: 69.8 kg     Physical Exam  General:  sitting in chair  HEENT: AT/NC  Neck: trachea in midline, no thyromegaly or nodules  Resp: CTA B/L  CVS: normal s1 and s2  Abdomen: soft and non tender to palpation, BS+  Skin: warm, dry and intact  Neuro: AAO x3, no focal neurological deficit  Psych: cooperative    Relevant Results  Results from last 7 days   Lab Units 02/15/25  1150 02/15/25  0813 02/15/25  0426 02/15/25  0044 02/14/25  2203 02/13/25  0754 02/13/25  0520 02/12/25  0734 02/12/25  0540 02/11/25  0536 02/11/25  0534 02/10/25  1912 02/10/25  1724   POCT GLUCOSE mg/dL 115* 177* 105* 105* 73*   < >  --    < >  --    < >  --   --   --    GLUCOSE mg/dL  --   --   --   --   --   --  184*  --  264*  --  288* 169* 213*    < > = values in this interval not displayed.     Lab Results   Component Value Date    HGBA1C 6.1 (H) 10/29/2024    HGBA1C 5.7 (H) 04/20/2024    HGBA1C 7.3 (H) 02/21/2023     02/13/2025    K 4.5 02/13/2025     02/13/2025    CO2 23 02/13/2025    BUN 23 02/13/2025    CREATININE 1.03 02/13/2025    CALCIUM 8.6 02/13/2025    ALBUMIN 2.8 (L) 02/13/2025    PROT 8.8 (H) 12/20/2024    BILITOT 0.5 12/20/2024    ALKPHOS 189 (H) 12/20/2024    ALT  18 12/20/2024    AST 21 12/20/2024    GLUCOSE 184 (H) 02/13/2025    CHOL 183 10/29/2024    HDL 53.1 10/29/2024    BHYDRXBUT 0.09 02/12/2025    CPEPTIDE 4.8 (H) 10/29/2024        Assessment/Plan   Temo Hanson is a 74 y.o. 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 DKA,  he was started on insulin drip and transferred to  for further management in setting of recent kidney transplant history.  On chart review, labs from outside hospital reviewed and it was noted that patient was not in DKA given venous pH 7.4, BHB 0.14, , creatinine 1.6.  On arrival to [facility], insulin drip was discontinued and patient was started on regular insulin sliding scale #1 with meals with blood glucose ranging from 140-180s.  Endocrinology service is consulted for management of diabetes regime in setting of recent DDKT and chronic steroid use.    Diabetes History  Type of diabetes: 2  Year diagnosed at age: 46 years old  Hospitalizations for DKA or HHS: no recent episodes  Complications: Nephropathy s/p DDKT  Seen by PCP or Endocrinology: last visit with MARISOL Lainez endocrinology on 1/30/25  Frequency of glucose checks: 4-5x daily after meals  Glucose review: -170s on regular SS #1 q 4  Frequency of Hypoglycemia: none  Severe hypoglycemia requiring assistance from others:  No     Home Diet : Clear liquid diet with tube feeds at 60 cc/h from 7 PM to 10 AM    Home Medications  Basal: Basaglar 10 U q AM  Prandial: Humulin  15 units once at 7:00 pm with tube feeds, insulin aspart 3 units with meals  Correction:Aspart sliding scale #2 with meals     Steroids: Tablet prednisone 5 mg daily    2/12/25: Nutrition: 60g CHO per meal + Tube feeds  --->>> Given persistent hyperglycemia and abdominal pain with night time continuous tube feeds, plan to trial bolus feed (250 ml) with meals TID and then  40 ml/hr over 12 hours overnight. (Carbs: Lunch: 33gm, Dinner: 33gm, and cycle x 12 hours: 66gm)  2/14: Noted hypoglycemia overnight, NOROvirus +, did not receive NPH due to low BG    Recommendations:  HOLD insulin NPH   Continue insulin glargine to 12 units daily   Decrease insulin lispro to 8 units 3 times daily with meals (tube feed bolus  + oral meals)                                               Hold if tube feeds are held  Change to insulin lispro sliding scale # 1 q 4 Hourly   Accu-Cheks q 4 h  Goal blood glucose 140-180 mg%  Hypoglycemia protocol    Recommendations communicated to primary team. Please reach out incase you have any questions or concerns.    The patient was discussed with attending Dr. Uribe.    Lino Garcia MD  Endocrinology fellow

## 2025-02-15 NOTE — PROGRESS NOTES
TRANSPLANT SURGERY PROGRESS NOTE    Temo Hanson underwent transplant surgery on 12/21/2024 (Kidney) and was evaluated on multidisciplinary inpatient rounds.  I specifically evaluated the management of immunosuppression to ensure adequate exposure required to decrease the risk of rejection.  Furthermore, I reviewed potential side effects including tremor, hyperglycemia, leukopenia, infection, and other neurologic changes.  I reviewed and adjusted infectious prophylaxis based on the patients clinical condition and  protocols.     24 EVENTS: diarrhea persists, C. Diff neg    DIAGNOSIS:  Kidney replaced by transplant ([facility]) Z94.0    PHYSICAL EXAMINATION:  Last Recorded Vitals  Visit Vitals  /72 (BP Location: Right arm, Patient Position: Lying)   Pulse 75   Temp 36.6 °C (97.9 °F) (Temporal)   Resp 18      Intake/Output last 3 Shifts:    Intake/Output Summary (Last 24 hours) at 2/15/2025 0849  Last data filed at 2/14/2025 1431  Gross per 24 hour   Intake 756 ml   Output 450 ml   Net 306 ml      Vitals:    02/14/25 0600   Weight: 69.8 kg (153 lb 14.1 oz)        Gen: A+OX3; NAD  HEENT: PERRL, sclera anicteric, MMM  Cardiac: RRR  Chest: Normal inspiratory effort  Abdomen: S/NT/ND. Incision C/D/I.  Ext: No LE edema  Drain: serosanguinous    MEDICATION LIST: REVIEWED  Scheduled medications  atovaquone, 1,500 mg, oral, Daily  calcium polycarbophiL, 625 mg, oral, Daily  cholecalciferol, 2,000 Units, oral, Daily  ertapenem, 1 g, intravenous, q24h  gabapentin, 200 mg, oral, Daily  heparin (porcine), 5,000 Units, subcutaneous, q8h KASSY  insulin glargine, 12 Units, subcutaneous, Daily  insulin lispro, 0-10 Units, subcutaneous, q4h  insulin lispro, 10 Units, subcutaneous, TID AC  insulin NPH (Isophane), 12 Units, subcutaneous, q24h KASSY  lidocaine, 2 patch, transdermal, Daily  metoclopramide, 5 mg, oral, q8h  mycophenolate, 180 mg, oral, q12h  nystatin, 5 mL, Swish & Swallow, 4x daily  pantoprazole, 40 mg, oral, BID  AC  predniSONE, 5 mg, oral, Daily  sodium bicarbonate, 1,300 mg, oral, q12h  sodium zirconium cyclosilicate, 5 g, oral, Daily  tacrolimus, 2 mg, oral, q12h KASSY  valGANciclovir, 450 mg, oral, Daily      LABS:  Results for orders placed or performed during the hospital encounter of 02/09/25 (from the past 24 hours)   POCT GLUCOSE   Result Value Ref Range    POCT Glucose 195 (H) 74 - 99 mg/dL   C. difficile, PCR    Specimen: Stool   Result Value Ref Range    C. difficile, PCR Not Detected Not Detected   POCT GLUCOSE   Result Value Ref Range    POCT Glucose 155 (H) 74 - 99 mg/dL   POCT GLUCOSE   Result Value Ref Range    POCT Glucose 73 (L) 74 - 99 mg/dL   POCT GLUCOSE   Result Value Ref Range    POCT Glucose 73 (L) 74 - 99 mg/dL   POCT GLUCOSE   Result Value Ref Range    POCT Glucose 105 (H) 74 - 99 mg/dL   POCT GLUCOSE   Result Value Ref Range    POCT Glucose 105 (H) 74 - 99 mg/dL   POCT GLUCOSE   Result Value Ref Range    POCT Glucose 177 (H) 74 - 99 mg/dL      Lab Results   Component Value Date    ALT 18 12/20/2024    AST 21 12/20/2024    ALKPHOS 189 (H) 12/20/2024    BILITOT 0.5 12/20/2024       ASSESSMENT AND PLAN:    Mr. Hanson is a 74 y.o. male who underwent  transplant surgery on 12/21/2024 (Kidney).    1. Immunosuppression reviewed and adjusted       Tacrolimus: current dose 2 mg BID. Plan continue      Goal tacrolimus trough level is 8-10      Myfortic: 180 mg bid      Prednisone: 5 mg daily    2. Allograft function      Maintenance: Continue    3. Hypertension medications reviewed        Blood Pressures         2/14/2025  1230 2/14/2025  1613 2/14/2025  1710 2/15/2025  0045 2/15/2025  0700    BP: 132/68 -- 148/65 147/72 156/72              Continue amlodipine 10 mg    4. ID      Ertapenem per ID recs; needs midline Monday    5.  Nutrition/glycemic control: severe protein calorie malnutrition      Tube feeds: bolus feeds and reduced rate for 12 hr cycle    6. Prophylaxis      DVT: SCDs/Subcutaneous Heparin:  Yes      GI prophylaxis: PPI BID    7. PT/OT    8. Discharge planned for: 3 days    Case was presented at Multi Disciplinary team rounds   Attending physician, consulting physician, , pharmacist, residents and fellow were present at the meeting.    Sherly Kang MD, PHD, MPH, FACS  Chief Transplant and Hepatobiliary Surgery

## 2025-02-15 NOTE — PROGRESS NOTES
Per hand off patient and family not interested in SNF, plan is to return home with home health care. Patient was previously active with [employer] for Tube feed. Per chart patient was on a Clear liquid diet at home, TF at night 60 cc/h from 7 PM to 10 AM      Active with  Home Care prior to admit     Need to confirm changes to TF regime and ability to obtain by DME company: per nutrition:    Breakfast: 240ml [employer] Peptide 1.5 via PEG (33g CHO)   Lunch: 240ml [employer] Peptide 1.5 via PEG (33g CHO)  Dinner: 240ml [employer] Peptide via PEG (33g CHO)  + Cycle: [employer] Peptide 1.5 @ 40ml/hr x 12 hours (66g CHO) and continue PO diet during day    Per ID notes  Continue ertapenem 1 g IV every 24 hours for 2 weeks (until Feb 25).   Plan for Midline; will need teachable caregiver which seems family is supportive    Please note ertapenem can be costly, need to run cost prior to finalizing any home plans, asking Attending to send Script to  Pharmacy       [employer] lianevin Carias, (ph## [phone], fax## [phone]) regarding TF supplies.

## 2025-02-15 NOTE — PROGRESS NOTES
TRANSPLANT SURGERY PROGRESS NOTE    Temo Hanson underwent transplant surgery on 12/21/2024 (Kidney) and was evaluated on multidisciplinary inpatient rounds.  I specifically evaluated the management of immunosuppression to ensure adequate exposure required to decrease the risk of rejection.  Furthermore, I reviewed potential side effects including tremor, hyperglycemia, leukopenia, infection, and other neurologic changes.  I reviewed and adjusted infectious prophylaxis based on the patients clinical condition and  protocols.     24 EVENTS: no acute events    DIAGNOSIS:  Kidney replaced by transplant ([facility]) Z94.0    PHYSICAL EXAMINATION:  Last Recorded Vitals  Visit Vitals  /65 (BP Location: Left arm, Patient Position: Lying)   Pulse 100   Temp 35.9 °C (96.6 °F) (Temporal)   Resp 18      Intake/Output last 3 Shifts:    Intake/Output Summary (Last 24 hours) at 2/14/2025 1913  Last data filed at 2/14/2025 1431  Gross per 24 hour   Intake 1446 ml   Output 1500 ml   Net -54 ml      Vitals:    02/14/25 0600   Weight: 69.8 kg (153 lb 14.1 oz)      Gen: A+OX3; NAD  HEENT: PERRL, sclera anicteric, MMM  Cardiac: RRR  Chest: Normal inspiratory effort  Abdomen: S/NT/ND. Incision well-healed.  Ext: No LE edema    MEDICATION LIST: REVIEWED  Scheduled medications  atovaquone, 1,500 mg, oral, Daily  calcium polycarbophiL, 625 mg, oral, Daily  cholecalciferol, 2,000 Units, oral, Daily  ertapenem, 1 g, intravenous, q24h  gabapentin, 200 mg, oral, Daily  heparin (porcine), 5,000 Units, subcutaneous, q8h KASSY  insulin glargine, 12 Units, subcutaneous, Daily  insulin lispro, 0-10 Units, subcutaneous, q4h  insulin lispro, 10 Units, subcutaneous, TID AC  insulin NPH (Isophane), 15 Units, subcutaneous, q24h KASSY  lidocaine, 2 patch, transdermal, Daily  metoclopramide, 5 mg, oral, q8h  mycophenolate, 180 mg, oral, q12h  nystatin, 5 mL, Swish & Swallow, 4x daily  pantoprazole, 40 mg, oral, BID AC  predniSONE, 5 mg, oral,  Daily  sodium bicarbonate, 1,300 mg, oral, q12h  sodium zirconium cyclosilicate, 5 g, oral, Daily  tacrolimus, 2 mg, oral, q12h KASSY  valGANciclovir, 450 mg, oral, Daily    LABS:  Stool Pathogen Panel, PCR    Specimen: Stool   Result Value Ref Range    Campylobacter Group Not Detected Not Detected    Salmonella species Not Detected Not Detected    Shigella species Not Detected Not Detected    Vibrio Group Not Detected Not Detected    Yersinia Enterocolitica Not Detected Not Detected    Shiga Toxin 1 Not Detected Not Detected    Shiga Toxin 2 Not Detected Not Detected    Norovirus GI/GII Detected (A) Not Detected    Rotavirus A Not Detected Not Detected   POCT GLUCOSE   Result Value Ref Range    POCT Glucose 177 (H) 74 - 99 mg/dL     ASSESSMENT AND PLAN:    Mr. Hanson is a 74 y.o. male who underwent  transplant surgery on 12/21/2024 (Kidney).    1. Immunosuppression reviewed and adjusted       Tacrolimus: current dose 2 mg BID. Continue same dose      Today's tacrolimus level is 6.0, Goal tacrolimus trough level is 6-8      Myfortic: Yes - 360 mg bid      Prednisone: Yes - 5 mg daily     2. IV hydration      HLIVF     3. Electrolyte     Reviewed renal profile.      Hyperkalemia and hyponatremia improved w/ better glycemic control     4. Hypertension medications reviewed        Blood Pressures         2/14/2025  0415 2/14/2025  0818 2/14/2025  1230 2/14/2025  1613 2/14/2025  1710    BP: 160/74 159/68 132/68 -- 148/65             Continue current management      5. Nutrition/glycemic control: severe protein calorie malnutrition      Await new endocrine recs      Tube feeds: now plan bolus feeds and reduced rate for 12 hr cycle     6. Urinary tract infection: Proteus and E. Coli ESBL      ID consult: ertapenem     7. Prophylaxis      GI prophylaxis: Protonix BID      DVT Prophylaxis: SCDs, Subcutaneous Heparin: Yes     8. ID prophylaxis      CMV, PJP and fungal prophylaxis per [facility] Protocol       Valcyte: Yes     9. Discharge: complex planning needs SNF. PT/OT daily.    Case was presented at Multi Disciplinary team rounds   Attending physician, consulting physician, , pharmacist, residents and fellow were present at the meeting.    Sherly Kang MD, PHD, MPH, FACS  Chief Transplant and Hepatobiliary Surgery

## 2025-02-15 NOTE — CARE PLAN
The patient's goals for the shift include  Decreased diarrhea    The clinical goals for the shift include Decrease patient diarrhea      Problem: Discharge Planning  Goal: Discharge to home or other facility with appropriate resources  Outcome: Progressing     Problem: Chronic Conditions and Co-morbidities  Goal: Patient's chronic conditions and co-morbidity symptoms are monitored and maintained or improved  Outcome: Progressing     Problem: Nutrition  Goal: Nutrient intake appropriate for maintaining nutritional needs  Outcome: Progressing     Problem: Diabetes  Goal: Achieve decreasing blood glucose levels by end of shift  Outcome: Progressing  Goal: Increase stability of blood glucose readings by end of shift  Outcome: Progressing  Goal: Decrease in ketones present in urine by end of shift  Outcome: Progressing  Goal: Maintain electrolyte levels within acceptable range throughout shift  Outcome: Progressing  Goal: Maintain glucose levels >70mg/dl to <250mg/dl throughout shift  Outcome: Progressing  Goal: No changes in neurological exam by end of shift  Outcome: Progressing  Goal: Learn about and adhere to nutrition recommendations by end of shift  Outcome: Progressing  Goal: Vital signs within normal range for age by end of shift  Outcome: Progressing  Goal: Increase self care and/or family involovement by end of shift  Outcome: Progressing  Goal: Receive DSME education by end of shift  Outcome: Progressing     Problem: Skin  Goal: Decreased wound size/increased tissue granulation at next dressing change  Outcome: Progressing  Flowsheets (Taken 2/15/2025 0150)  Decreased wound size/increased tissue granulation at next dressing change:   Promote sleep for wound healing   Protective dressings over bony prominences  Goal: Participates in plan/prevention/treatment measures  Outcome: Progressing  Flowsheets (Taken 2/15/2025 0150)  Participates in plan/prevention/treatment measures:   Discuss with provider PT/OT  consult   Elevate heels  Goal: Prevent/manage excess moisture  Outcome: Progressing  Flowsheets (Taken 2/15/2025 0150)  Prevent/manage excess moisture:   Cleanse incontinence/protect with barrier cream   Monitor for/manage infection if present  Goal: Prevent/minimize sheer/friction injuries  Outcome: Progressing  Flowsheets (Taken 2/15/2025 0150)  Prevent/minimize sheer/friction injuries:   Use pull sheet   HOB 30 degrees or less  Goal: Promote/optimize nutrition  Outcome: Progressing  Goal: Promote skin healing  Outcome: Progressing

## 2025-02-15 NOTE — CARE PLAN
The patient's goals for the shift include      The clinical goals for the shift include Decrease patient diarrhea      Problem: Discharge Planning  Goal: Discharge to home or other facility with appropriate resources  Outcome: Progressing     Problem: Chronic Conditions and Co-morbidities  Goal: Patient's chronic conditions and co-morbidity symptoms are monitored and maintained or improved  Outcome: Progressing     Problem: Nutrition  Goal: Nutrient intake appropriate for maintaining nutritional needs  Outcome: Progressing     Problem: Diabetes  Goal: Achieve decreasing blood glucose levels by end of shift  Outcome: Progressing  Goal: Increase stability of blood glucose readings by end of shift  Outcome: Progressing  Goal: Decrease in ketones present in urine by end of shift  Outcome: Progressing  Goal: Maintain electrolyte levels within acceptable range throughout shift  Outcome: Progressing  Goal: Maintain glucose levels >70mg/dl to <250mg/dl throughout shift  Outcome: Progressing  Goal: No changes in neurological exam by end of shift  Outcome: Progressing  Goal: Learn about and adhere to nutrition recommendations by end of shift  Outcome: Progressing  Goal: Vital signs within normal range for age by end of shift  Outcome: Progressing  Goal: Increase self care and/or family involovement by end of shift  Outcome: Progressing  Goal: Receive DSME education by end of shift  Outcome: Progressing     Problem: Skin  Goal: Decreased wound size/increased tissue granulation at next dressing change  Outcome: Progressing  Goal: Participates in plan/prevention/treatment measures  Outcome: Progressing  Goal: Prevent/manage excess moisture  Outcome: Progressing  Goal: Prevent/minimize sheer/friction injuries  Outcome: Progressing  Goal: Promote/optimize nutrition  Outcome: Progressing  Goal: Promote skin healing  Outcome: Progressing

## 2025-02-16 ENCOUNTER — APPOINTMENT (OUTPATIENT)
Dept: RADIOLOGY | Facility: HOSPITAL | Age: 75
DRG: 689 | End: 2025-02-16
Payer: COMMERCIAL

## 2025-02-16 VITALS
OXYGEN SATURATION: 98 % | TEMPERATURE: 98.2 F | RESPIRATION RATE: 18 BRPM | WEIGHT: 153.88 LBS | DIASTOLIC BLOOD PRESSURE: 75 MMHG | BODY MASS INDEX: 20.3 KG/M2 | SYSTOLIC BLOOD PRESSURE: 154 MMHG | HEART RATE: 97 BPM

## 2025-02-16 LAB
CMV DNA SERPL NAA+PROBE-LOG IU: NORMAL {LOG_IU}/ML
GLUCOSE BLD MANUAL STRIP-MCNC: 122 MG/DL (ref 74–99)
GLUCOSE BLD MANUAL STRIP-MCNC: 126 MG/DL (ref 74–99)
GLUCOSE BLD MANUAL STRIP-MCNC: 151 MG/DL (ref 74–99)
GLUCOSE BLD MANUAL STRIP-MCNC: 156 MG/DL (ref 74–99)
GLUCOSE BLD MANUAL STRIP-MCNC: 158 MG/DL (ref 74–99)
GLUCOSE BLD MANUAL STRIP-MCNC: 172 MG/DL (ref 74–99)
GLUCOSE BLD MANUAL STRIP-MCNC: 96 MG/DL (ref 74–99)
LABORATORY COMMENT REPORT: NOT DETECTED
TACROLIMUS BLD-MCNC: 5.3 NG/ML

## 2025-02-16 PROCEDURE — 82947 ASSAY GLUCOSE BLOOD QUANT: CPT

## 2025-02-16 PROCEDURE — 99232 SBSQ HOSP IP/OBS MODERATE 35: CPT | Performed by: INTERNAL MEDICINE

## 2025-02-16 PROCEDURE — 99233 SBSQ HOSP IP/OBS HIGH 50: CPT | Performed by: INTERNAL MEDICINE

## 2025-02-16 PROCEDURE — 2500000004 HC RX 250 GENERAL PHARMACY W/ HCPCS (ALT 636 FOR OP/ED)

## 2025-02-16 PROCEDURE — 2500000001 HC RX 250 WO HCPCS SELF ADMINISTERED DRUGS (ALT 637 FOR MEDICARE OP)

## 2025-02-16 PROCEDURE — 1100000001 HC PRIVATE ROOM DAILY

## 2025-02-16 PROCEDURE — 2500000002 HC RX 250 W HCPCS SELF ADMINISTERED DRUGS (ALT 637 FOR MEDICARE OP, ALT 636 FOR OP/ED)

## 2025-02-16 PROCEDURE — 80197 ASSAY OF TACROLIMUS: CPT

## 2025-02-16 PROCEDURE — 99232 SBSQ HOSP IP/OBS MODERATE 35: CPT | Performed by: SURGERY

## 2025-02-16 PROCEDURE — C1751 CATH, INF, PER/CENT/MIDLINE: HCPCS

## 2025-02-16 PROCEDURE — 36410 VNPNXR 3YR/> PHY/QHP DX/THER: CPT

## 2025-02-16 PROCEDURE — 2500000004 HC RX 250 GENERAL PHARMACY W/ HCPCS (ALT 636 FOR OP/ED): Performed by: PHYSICIAN ASSISTANT

## 2025-02-16 PROCEDURE — 2780000003 HC OR 278 NO HCPCS

## 2025-02-16 PROCEDURE — 2500000002 HC RX 250 W HCPCS SELF ADMINISTERED DRUGS (ALT 637 FOR MEDICARE OP, ALT 636 FOR OP/ED): Performed by: PHYSICIAN ASSISTANT

## 2025-02-16 PROCEDURE — 2500000001 HC RX 250 WO HCPCS SELF ADMINISTERED DRUGS (ALT 637 FOR MEDICARE OP): Performed by: PHYSICIAN ASSISTANT

## 2025-02-16 PROCEDURE — 36415 COLL VENOUS BLD VENIPUNCTURE: CPT | Performed by: PHYSICIAN ASSISTANT

## 2025-02-16 RX ORDER — LOPERAMIDE HYDROCHLORIDE 2 MG/1
2 CAPSULE ORAL 4 TIMES DAILY
Status: DISCONTINUED | OUTPATIENT
Start: 2025-02-16 | End: 2025-02-18 | Stop reason: HOSPADM

## 2025-02-16 RX ORDER — TACROLIMUS 1 MG/1
2 CAPSULE ORAL EVERY 12 HOURS
Start: 2025-02-16 | End: 2025-02-18

## 2025-02-16 RX ORDER — AMLODIPINE BESYLATE 10 MG/1
10 TABLET ORAL DAILY
Status: DISCONTINUED | OUTPATIENT
Start: 2025-02-16 | End: 2025-02-18 | Stop reason: HOSPADM

## 2025-02-16 RX ORDER — LIDOCAINE 560 MG/1
2 PATCH PERCUTANEOUS; TOPICAL; TRANSDERMAL EVERY 24 HOURS
Qty: 28 PATCH | Refills: 0 | Status: SHIPPED | OUTPATIENT
Start: 2025-02-16 | End: 2025-02-23

## 2025-02-16 RX ORDER — LIDOCAINE HYDROCHLORIDE 10 MG/ML
5 INJECTION, SOLUTION INFILTRATION; PERINEURAL ONCE
Status: DISCONTINUED | OUTPATIENT
Start: 2025-02-17 | End: 2025-02-18 | Stop reason: HOSPADM

## 2025-02-16 RX ORDER — INSULIN LISPRO 100 [IU]/ML
6 INJECTION, SOLUTION INTRAVENOUS; SUBCUTANEOUS
Status: DISCONTINUED | OUTPATIENT
Start: 2025-02-16 | End: 2025-02-18 | Stop reason: HOSPADM

## 2025-02-16 RX ORDER — LOPERAMIDE HYDROCHLORIDE 2 MG/1
2 CAPSULE ORAL 3 TIMES DAILY PRN
Qty: 30 CAPSULE | Refills: 0 | Status: SHIPPED | OUTPATIENT
Start: 2025-02-16 | End: 2025-02-18

## 2025-02-16 RX ADMIN — NYSTATIN 500000 UNITS: 500000 SUSPENSION ORAL at 06:29

## 2025-02-16 RX ADMIN — HEPARIN SODIUM 5000 UNITS: 5000 INJECTION, SOLUTION INTRAVENOUS; SUBCUTANEOUS at 21:00

## 2025-02-16 RX ADMIN — LOPERAMIDE HYDROCHLORIDE 2 MG: 2 CAPSULE ORAL at 20:58

## 2025-02-16 RX ADMIN — SODIUM BICARBONATE 1300 MG: 650 TABLET ORAL at 09:26

## 2025-02-16 RX ADMIN — ERTAPENEM SODIUM 1 G: 1 INJECTION, POWDER, LYOPHILIZED, FOR SOLUTION INTRAMUSCULAR; INTRAVENOUS at 12:15

## 2025-02-16 RX ADMIN — CALCIUM POLYCARBOPHIL 625 MG: 625 TABLET, FILM COATED ORAL at 09:25

## 2025-02-16 RX ADMIN — VALGANCICLOVIR HYDROCHLORIDE 450 MG: 450 TABLET ORAL at 09:24

## 2025-02-16 RX ADMIN — MYCOPHENOLIC ACID 180 MG: 180 TABLET, DELAYED RELEASE ORAL at 18:15

## 2025-02-16 RX ADMIN — PANTOPRAZOLE SODIUM 40 MG: 40 TABLET, DELAYED RELEASE ORAL at 06:29

## 2025-02-16 RX ADMIN — HEPARIN SODIUM 5000 UNITS: 5000 INJECTION, SOLUTION INTRAVENOUS; SUBCUTANEOUS at 15:05

## 2025-02-16 RX ADMIN — MYCOPHENOLIC ACID 180 MG: 180 TABLET, DELAYED RELEASE ORAL at 06:29

## 2025-02-16 RX ADMIN — SODIUM BICARBONATE 1300 MG: 650 TABLET ORAL at 20:59

## 2025-02-16 RX ADMIN — HEPARIN SODIUM 5000 UNITS: 5000 INJECTION, SOLUTION INTRAVENOUS; SUBCUTANEOUS at 06:29

## 2025-02-16 RX ADMIN — NYSTATIN 500000 UNITS: 500000 SUSPENSION ORAL at 12:12

## 2025-02-16 RX ADMIN — METOCLOPRAMIDE 5 MG: 10 TABLET ORAL at 21:00

## 2025-02-16 RX ADMIN — TACROLIMUS 2 MG: 1 CAPSULE ORAL at 18:15

## 2025-02-16 RX ADMIN — PANTOPRAZOLE SODIUM 40 MG: 40 TABLET, DELAYED RELEASE ORAL at 15:06

## 2025-02-16 RX ADMIN — NYSTATIN 500000 UNITS: 500000 SUSPENSION ORAL at 20:58

## 2025-02-16 RX ADMIN — LOPERAMIDE HYDROCHLORIDE 2 MG: 2 CAPSULE ORAL at 06:29

## 2025-02-16 RX ADMIN — INSULIN LISPRO 6 UNITS: 100 INJECTION, SOLUTION INTRAVENOUS; SUBCUTANEOUS at 17:14

## 2025-02-16 RX ADMIN — INSULIN LISPRO 1 UNITS: 100 INJECTION, SOLUTION INTRAVENOUS; SUBCUTANEOUS at 17:14

## 2025-02-16 RX ADMIN — Medication 2000 UNITS: at 09:25

## 2025-02-16 RX ADMIN — NYSTATIN 500000 UNITS: 500000 SUSPENSION ORAL at 17:15

## 2025-02-16 RX ADMIN — ATOVAQUONE 1500 MG: 750 SUSPENSION ORAL at 09:24

## 2025-02-16 RX ADMIN — METOCLOPRAMIDE 5 MG: 10 TABLET ORAL at 06:29

## 2025-02-16 RX ADMIN — INSULIN LISPRO 1 UNITS: 100 INJECTION, SOLUTION INTRAVENOUS; SUBCUTANEOUS at 09:22

## 2025-02-16 RX ADMIN — SODIUM ZIRCONIUM CYCLOSILICATE 5 G: 10 POWDER, FOR SUSPENSION ORAL at 12:12

## 2025-02-16 RX ADMIN — LOPERAMIDE HYDROCHLORIDE 2 MG: 2 CAPSULE ORAL at 17:15

## 2025-02-16 RX ADMIN — TACROLIMUS 2 MG: 1 CAPSULE ORAL at 06:29

## 2025-02-16 RX ADMIN — AMLODIPINE BESYLATE 10 MG: 10 TABLET ORAL at 09:25

## 2025-02-16 RX ADMIN — INSULIN GLARGINE 12 UNITS: 100 INJECTION, SOLUTION SUBCUTANEOUS at 09:21

## 2025-02-16 RX ADMIN — ACETAMINOPHEN 650 MG: 325 TABLET ORAL at 00:24

## 2025-02-16 RX ADMIN — PREDNISONE 5 MG: 10 TABLET ORAL at 09:25

## 2025-02-16 RX ADMIN — LOPERAMIDE HYDROCHLORIDE 2 MG: 2 CAPSULE ORAL at 12:12

## 2025-02-16 RX ADMIN — GABAPENTIN 200 MG: 100 CAPSULE ORAL at 09:24

## 2025-02-16 RX ADMIN — INSULIN LISPRO 8 UNITS: 100 INJECTION, SOLUTION INTRAVENOUS; SUBCUTANEOUS at 09:22

## 2025-02-16 RX ADMIN — METOCLOPRAMIDE 5 MG: 10 TABLET ORAL at 15:06

## 2025-02-16 ASSESSMENT — COGNITIVE AND FUNCTIONAL STATUS - GENERAL
HELP NEEDED FOR BATHING: A LOT
MOVING TO AND FROM BED TO CHAIR: A LOT
MOVING TO AND FROM BED TO CHAIR: A LOT
PERSONAL GROOMING: A LITTLE
MOBILITY SCORE: 16
EATING MEALS: A LITTLE
MOVING FROM LYING ON BACK TO SITTING ON SIDE OF FLAT BED WITH BEDRAILS: A LITTLE
PERSONAL GROOMING: A LITTLE
TURNING FROM BACK TO SIDE WHILE IN FLAT BAD: A LITTLE
HELP NEEDED FOR BATHING: A LOT
DRESSING REGULAR UPPER BODY CLOTHING: A LITTLE
TOILETING: A LITTLE
DRESSING REGULAR LOWER BODY CLOTHING: A LOT
MOBILITY SCORE: 16
DAILY ACTIVITIY SCORE: 16
WALKING IN HOSPITAL ROOM: A LITTLE
TOILETING: A LITTLE
DRESSING REGULAR LOWER BODY CLOTHING: A LOT
STANDING UP FROM CHAIR USING ARMS: A LOT
STANDING UP FROM CHAIR USING ARMS: A LOT
MOVING FROM LYING ON BACK TO SITTING ON SIDE OF FLAT BED WITH BEDRAILS: A LITTLE
DAILY ACTIVITIY SCORE: 16
WALKING IN HOSPITAL ROOM: A LITTLE
CLIMB 3 TO 5 STEPS WITH RAILING: A LITTLE
DRESSING REGULAR UPPER BODY CLOTHING: A LITTLE
TURNING FROM BACK TO SIDE WHILE IN FLAT BAD: A LITTLE
CLIMB 3 TO 5 STEPS WITH RAILING: A LITTLE
EATING MEALS: A LITTLE

## 2025-02-16 ASSESSMENT — PAIN SCALES - GENERAL
PAINLEVEL_OUTOF10: 0 - NO PAIN
PAINLEVEL_OUTOF10: 3
PAINLEVEL_OUTOF10: 3

## 2025-02-16 NOTE — SIGNIFICANT EVENT
Patient is cleared for placement of midline tomorrow, Monday, 2/17, per discussion with Dr. Bashir Angela, transplant nephrologist

## 2025-02-16 NOTE — PROGRESS NOTES
Temo Hanson is a 74 y.o. male on day 7 of admission    Subjective   Patient seen and examined at bedside.  Patient continues to have profuse diarrhea in setting of norovirus infection and is distressed by it.  He denies any nausea or vomiting.  I have reviewed histories, allergies and medications have been reviewed and there are no changes      Objective   Last Recorded Vitals  Blood pressure 126/69, pulse 66, temperature 36.4 °C (97.5 °F), temperature source Temporal, resp. rate 18, weight 69.8 kg (153 lb 14.1 oz), SpO2 99%.  Intake/Output last 3 Shifts:  I/O last 3 completed shifts:  In: 944 (13.5 mL/kg) [I.V.:894 (12.8 mL/kg); IV Piggyback:50]  Out: 450 (6.4 mL/kg) [Urine:450 (0.2 mL/kg/hr)]  Weight: 69.8 kg     Review of Systems  All systems reviewed with the patient and they were all negative, unless noted above.     Physical Exam   General: tired looking  HEENT: AT/NC  Neck: trachea in midline, no thyromegaly or nodules  Resp: CTA B/L  CVS: normal s1 and s2  Abdomen: soft and non tender to palpation, BS+  Skin: warm, dry and intact  Neuro: AAO x3, DTR 2+  Psych: cooperative      Relevant Results    Results from last 7 days   Lab Units 02/16/25  1134 02/16/25  0838 02/16/25  0451 02/16/25  0049 02/15/25  2041   POCT GLUCOSE mg/dL 151* 156* 126* 122* 100*   GLUCOSE mg/dL  --   --   --   --   --     < > = values in this interval not displayed.     Lab Results   Component Value Date    HGBA1C 6.1 (H) 10/29/2024    HGBA1C 5.7 (H) 04/20/2024    HGBA1C 7.3 (H) 02/21/2023     02/13/2025    K 4.5 02/13/2025     02/13/2025    CO2 23 02/13/2025    BUN 23 02/13/2025    CREATININE 1.03 02/13/2025    CALCIUM 8.6 02/13/2025    ALBUMIN 2.8 (L) 02/13/2025    PROT 8.8 (H) 12/20/2024    BILITOT 0.5 12/20/2024    ALKPHOS 189 (H) 12/20/2024    ALT 18 12/20/2024    AST 21 12/20/2024    GLUCOSE 184 (H) 02/13/2025    CHOL 183 10/29/2024    HDL 53.1 10/29/2024    BHYDRXBUT 0.09 02/12/2025    CPEPTIDE 4.8 (H) 10/29/2024       Assessment/Plan   Temo Hanson is a 74 y.o. 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  On chart review, labs from outside hospital reviewed and it was noted that patient had not been in DKA but had severe hyperglycemia (venous pH 7.4, BHB 0.14, , creatinine 1.6).  On arrival to [facility], insulin drip was discontinued and patient was started on regular insulin sliding scale #1 with meals with blood glucose ranging from 140-180s.  Endocrinology service consulted for management of diabetes regimen in setting of recent DDKT and chronic steroid use.     Diabetes History  Type of diabetes: 2  Year diagnosed at age: 46 years old  Hospitalizations for DKA or HHS: no recent episodes  Complications: Nephropathy s/p DDKT  Seen by PCP or Endocrinology: last visit with MARISOL Lainez endocrinology on 1/30/25  Frequency of glucose checks: 4-5x daily after meals  Glucose review: -170s on regular SS #1 q 4  Frequency of Hypoglycemia: none  Severe hypoglycemia requiring assistance from others:  No     Home Diet: Clear liquid diet with tube feeds at 60 cc/h from 7 PM to 10 AM     Home Medications  Basal: Basaglar 10 U q AM  Prandial: Humulin  15 units once at 7:00 pm with tube feeds, insulin aspart 3 units with meals  Correction: Aspart sliding scale #2 with meals     Steroids: Tablet prednisone 5 mg daily     2/12/25: Nutrition: 60g CHO per meal + Given persistent hyperglycemia and abdominal pain with night time continuous tube feeds, switched to bolus feed (250 ml) with meals TID and 40 ml/hr over 12 hours overnight. (Carbs: Lunch: 33gm, Dinner: 33gm, and cycle x 12 hours: 66gm)  2/14: Noted hypoglycemia overnight, NOROvirus +, did not  receive NPH due to low BG  2/15: receiving tube feeds as scheduled but continues to have profuse diarrhea due to norovirus infection resulting in poor absorption and tighter blood glucose control.    Recommendations:  Continue to HOLD insulin NPH   Continue insulin glargine to 12 units daily   Decrease insulin lispro to 6 units 3 times daily with meals (tube feed bolus  + oral meals)                                               Hold if tube feeds are held  Continue with insulin lispro sliding scale # 1 q 4 Hourly   Accu-Cheks q 4 h  Goal blood glucose 140-180 mg%  Hypoglycemia protocol    Recommendations communicated to primary team. Please reach out in case you have any questions or concerns.    The patient was seen and discussed with attending Dr. Uribe.    Lino Garcia MD  Endocrinology fellow       ATTESTATION    I saw and evaluated the patient. I personally obtained the key and critical portions of the history and physical exam or was physically present for key and critical portions performed by the resident/fellow. I reviewed the resident/fellow's documentation and discussed the patient with the resident/fellow. I agree with the resident/fellow's medical decision making as documented in the note with the exception/addition of the following:    My additions/corrections to the trainee's note above are in italics.    Veronika Uribe MD

## 2025-02-16 NOTE — PROGRESS NOTES
TRANSPLANT SURGERY PROGRESS NOTE    Temo Hanson underwent transplant surgery on 12/21/2024 (Kidney) and was evaluated on multidisciplinary inpatient rounds.  I specifically evaluated the management of immunosuppression to ensure adequate exposure required to decrease the risk of rejection.  Furthermore, I reviewed potential side effects including tremor, hyperglycemia, leukopenia, infection, and other neurologic changes.  I reviewed and adjusted infectious prophylaxis based on the patients clinical condition and  protocols.     24 EVENTS: diarrhea persists, C. Diff neg    DIAGNOSIS:  Kidney replaced by transplant ([facility]) Z94.0    PHYSICAL EXAMINATION:  Last Recorded Vitals  Visit Vitals  /72 (BP Location: Right arm, Patient Position: Lying)   Pulse 75   Temp 36.6 °C (97.9 °F) (Temporal)   Resp 18      Intake/Output last 3 Shifts:    Intake/Output Summary (Last 24 hours) at 2/15/2025 0849  Last data filed at 2/14/2025 1431  Gross per 24 hour   Intake 756 ml   Output 450 ml   Net 306 ml      Vitals:    02/14/25 0600   Weight: 69.8 kg (153 lb 14.1 oz)      Gen: A+OX3; NAD  HEENT: PERRL, sclera anicteric, MMM  Cardiac: RRR  Chest: Normal inspiratory effort  Abdomen: S/NT/ND.   Ext: No LE edema    MEDICATION LIST: REVIEWED  Scheduled medications  atovaquone, 1,500 mg, oral, Daily  calcium polycarbophiL, 625 mg, oral, Daily  cholecalciferol, 2,000 Units, oral, Daily  ertapenem, 1 g, intravenous, q24h  gabapentin, 200 mg, oral, Daily  heparin (porcine), 5,000 Units, subcutaneous, q8h KASSY  insulin glargine, 12 Units, subcutaneous, Daily  insulin lispro, 0-10 Units, subcutaneous, q4h  insulin lispro, 10 Units, subcutaneous, TID AC  insulin NPH (Isophane), 12 Units, subcutaneous, q24h KASSY  lidocaine, 2 patch, transdermal, Daily  metoclopramide, 5 mg, oral, q8h  mycophenolate, 180 mg, oral, q12h  nystatin, 5 mL, Swish & Swallow, 4x daily  pantoprazole, 40 mg, oral, BID AC  predniSONE, 5 mg, oral,  Daily  sodium bicarbonate, 1,300 mg, oral, q12h  sodium zirconium cyclosilicate, 5 g, oral, Daily  tacrolimus, 2 mg, oral, q12h KASSY  valGANciclovir, 450 mg, oral, Daily    ASSESSMENT AND PLAN:    Mr. Hanson is a 74 y.o. male who underwent  transplant surgery on 12/21/2024 (Kidney).    1. Immunosuppression reviewed and adjusted       Tacrolimus: current dose 2 mg BID. Plan continue      Goal tacrolimus trough level is 8-10      Myfortic: 180 mg bid      Prednisone: 5 mg daily    2. Allograft function      Maintenance IVF: Continue    3. Hypertension medications reviewed      Continue amlodipine 10 mg    4. ID      Ertapenem per ID recs; needs midline Monday    5.  Nutrition/glycemic control: severe protein calorie malnutrition      Tube feeds: bolus feeds and reduced rate for 12 hr cycle      Imodium 2mg q6h    6. Prophylaxis      DVT: SCDs/Subcutaneous Heparin: Yes      GI prophylaxis: PPI BID    7. PT/OT    8. Discharge planned for: 2 days    Case was presented at Multi Disciplinary team rounds   Attending physician, consulting physician, , pharmacist, residents and fellow were present at the meeting.    Sherly Kang MD, PHD, MPH, FACS  Chief Transplant and Hepatobiliary Surgery

## 2025-02-16 NOTE — CARE PLAN
The patient's goals for the shift include  Pt wants to stop having Diahrrea    The clinical goals for the shift include Pt will be safe      Problem: Discharge Planning  Goal: Discharge to home or other facility with appropriate resources  Outcome: Progressing     Problem: Chronic Conditions and Co-morbidities  Goal: Patient's chronic conditions and co-morbidity symptoms are monitored and maintained or improved  Outcome: Progressing     Problem: Nutrition  Goal: Nutrient intake appropriate for maintaining nutritional needs  Outcome: Progressing     Problem: Diabetes  Goal: Achieve decreasing blood glucose levels by end of shift  Outcome: Progressing  Goal: Increase stability of blood glucose readings by end of shift  Outcome: Progressing  Goal: Decrease in ketones present in urine by end of shift  Outcome: Progressing  Goal: Maintain electrolyte levels within acceptable range throughout shift  Outcome: Progressing  Goal: Maintain glucose levels >70mg/dl to <250mg/dl throughout shift  Outcome: Progressing  Goal: No changes in neurological exam by end of shift  Outcome: Progressing  Goal: Learn about and adhere to nutrition recommendations by end of shift  Outcome: Progressing  Goal: Vital signs within normal range for age by end of shift  Outcome: Progressing  Goal: Increase self care and/or family involovement by end of shift  Outcome: Progressing  Goal: Receive DSME education by end of shift  Outcome: Progressing     Problem: Skin  Goal: Decreased wound size/increased tissue granulation at next dressing change  Outcome: Progressing  Flowsheets (Taken 2/16/2025 0206)  Decreased wound size/increased tissue granulation at next dressing change:   Promote sleep for wound healing   Protective dressings over bony prominences  Goal: Participates in plan/prevention/treatment measures  Outcome: Progressing  Flowsheets (Taken 2/16/2025 0206)  Participates in plan/prevention/treatment measures:   Discuss with provider PT/OT  consult   Elevate heels  Goal: Prevent/manage excess moisture  Outcome: Progressing  Goal: Prevent/minimize sheer/friction injuries  Outcome: Progressing  Flowsheets (Taken 2/16/2025 0206)  Prevent/minimize sheer/friction injuries:   Use pull sheet   HOB 30 degrees or less  Goal: Promote/optimize nutrition  Outcome: Progressing  Goal: Promote skin healing  Outcome: Progressing

## 2025-02-16 NOTE — CARE PLAN
The clinical goals for the shift include Patient will remain free from falls/injuries      Problem: Discharge Planning  Goal: Discharge to home or other facility with appropriate resources  Outcome: Progressing     Problem: Chronic Conditions and Co-morbidities  Goal: Patient's chronic conditions and co-morbidity symptoms are monitored and maintained or improved  Outcome: Progressing     Problem: Nutrition  Goal: Nutrient intake appropriate for maintaining nutritional needs  Outcome: Progressing     Problem: Diabetes  Goal: Achieve decreasing blood glucose levels by end of shift  Outcome: Progressing  Goal: Increase stability of blood glucose readings by end of shift  Outcome: Progressing  Goal: Decrease in ketones present in urine by end of shift  Outcome: Progressing  Goal: Maintain electrolyte levels within acceptable range throughout shift  Outcome: Progressing  Goal: Maintain glucose levels >70mg/dl to <250mg/dl throughout shift  Outcome: Progressing  Goal: No changes in neurological exam by end of shift  Outcome: Progressing  Goal: Learn about and adhere to nutrition recommendations by end of shift  Outcome: Progressing  Goal: Vital signs within normal range for age by end of shift  Outcome: Progressing  Goal: Increase self care and/or family involovement by end of shift  Outcome: Progressing  Goal: Receive DSME education by end of shift  Outcome: Progressing     Problem: Skin  Goal: Decreased wound size/increased tissue granulation at next dressing change  Outcome: Progressing  Goal: Participates in plan/prevention/treatment measures  Outcome: Progressing  Goal: Prevent/manage excess moisture  Outcome: Progressing  Goal: Prevent/minimize sheer/friction injuries  Outcome: Progressing  Goal: Promote/optimize nutrition  Outcome: Progressing  Goal: Promote skin healing  Outcome: Progressing

## 2025-02-17 ENCOUNTER — APPOINTMENT (OUTPATIENT)
Facility: HOSPITAL | Age: 75
End: 2025-02-17
Payer: COMMERCIAL

## 2025-02-17 LAB
ALBUMIN SERPL BCP-MCNC: 2.7 G/DL (ref 3.4–5)
ANION GAP SERPL CALC-SCNC: 10 MMOL/L (ref 10–20)
BUN SERPL-MCNC: 14 MG/DL (ref 6–23)
CALCIUM SERPL-MCNC: 8.6 MG/DL (ref 8.6–10.6)
CHLORIDE SERPL-SCNC: 107 MMOL/L (ref 98–107)
CO2 SERPL-SCNC: 26 MMOL/L (ref 21–32)
CREAT SERPL-MCNC: 0.86 MG/DL (ref 0.5–1.3)
EGFRCR SERPLBLD CKD-EPI 2021: >90 ML/MIN/1.73M*2
ERYTHROCYTE [DISTWIDTH] IN BLOOD BY AUTOMATED COUNT: 17.7 % (ref 11.5–14.5)
GLUCOSE BLD MANUAL STRIP-MCNC: 108 MG/DL (ref 74–99)
GLUCOSE BLD MANUAL STRIP-MCNC: 112 MG/DL (ref 74–99)
GLUCOSE BLD MANUAL STRIP-MCNC: 121 MG/DL (ref 74–99)
GLUCOSE BLD MANUAL STRIP-MCNC: 136 MG/DL (ref 74–99)
GLUCOSE BLD MANUAL STRIP-MCNC: 179 MG/DL (ref 74–99)
GLUCOSE BLD MANUAL STRIP-MCNC: 219 MG/DL (ref 74–99)
GLUCOSE BLD MANUAL STRIP-MCNC: 80 MG/DL (ref 74–99)
GLUCOSE BLD MANUAL STRIP-MCNC: 90 MG/DL (ref 74–99)
GLUCOSE SERPL-MCNC: 123 MG/DL (ref 74–99)
HCT VFR BLD AUTO: 27.7 % (ref 41–52)
HGB BLD-MCNC: 8.4 G/DL (ref 13.5–17.5)
MAGNESIUM SERPL-MCNC: 1.61 MG/DL (ref 1.6–2.4)
MCH RBC QN AUTO: 28.5 PG (ref 26–34)
MCHC RBC AUTO-ENTMCNC: 30.3 G/DL (ref 32–36)
MCV RBC AUTO: 94 FL (ref 80–100)
NRBC BLD-RTO: 0 /100 WBCS (ref 0–0)
PHOSPHATE SERPL-MCNC: 3.4 MG/DL (ref 2.5–4.9)
PLATELET # BLD AUTO: 278 X10*3/UL (ref 150–450)
POTASSIUM SERPL-SCNC: 4.4 MMOL/L (ref 3.5–5.3)
RBC # BLD AUTO: 2.95 X10*6/UL (ref 4.5–5.9)
SODIUM SERPL-SCNC: 139 MMOL/L (ref 136–145)
TACROLIMUS BLD-MCNC: 4.2 NG/ML
WBC # BLD AUTO: 4.9 X10*3/UL (ref 4.4–11.3)

## 2025-02-17 PROCEDURE — 87799 DETECT AGENT NOS DNA QUANT: CPT | Performed by: INTERNAL MEDICINE

## 2025-02-17 PROCEDURE — 2500000002 HC RX 250 W HCPCS SELF ADMINISTERED DRUGS (ALT 637 FOR MEDICARE OP, ALT 636 FOR OP/ED)

## 2025-02-17 PROCEDURE — RXMED WILLOW AMBULATORY MEDICATION CHARGE

## 2025-02-17 PROCEDURE — 82947 ASSAY GLUCOSE BLOOD QUANT: CPT

## 2025-02-17 PROCEDURE — 2500000004 HC RX 250 GENERAL PHARMACY W/ HCPCS (ALT 636 FOR OP/ED)

## 2025-02-17 PROCEDURE — 80197 ASSAY OF TACROLIMUS: CPT

## 2025-02-17 PROCEDURE — 80069 RENAL FUNCTION PANEL: CPT | Performed by: PHYSICIAN ASSISTANT

## 2025-02-17 PROCEDURE — 83735 ASSAY OF MAGNESIUM: CPT | Performed by: PHYSICIAN ASSISTANT

## 2025-02-17 PROCEDURE — 36415 COLL VENOUS BLD VENIPUNCTURE: CPT | Performed by: PHYSICIAN ASSISTANT

## 2025-02-17 PROCEDURE — 99232 SBSQ HOSP IP/OBS MODERATE 35: CPT | Performed by: STUDENT IN AN ORGANIZED HEALTH CARE EDUCATION/TRAINING PROGRAM

## 2025-02-17 PROCEDURE — 2500000001 HC RX 250 WO HCPCS SELF ADMINISTERED DRUGS (ALT 637 FOR MEDICARE OP): Performed by: PHYSICIAN ASSISTANT

## 2025-02-17 PROCEDURE — 2500000001 HC RX 250 WO HCPCS SELF ADMINISTERED DRUGS (ALT 637 FOR MEDICARE OP)

## 2025-02-17 PROCEDURE — 2500000004 HC RX 250 GENERAL PHARMACY W/ HCPCS (ALT 636 FOR OP/ED): Performed by: PHYSICIAN ASSISTANT

## 2025-02-17 PROCEDURE — 99233 SBSQ HOSP IP/OBS HIGH 50: CPT | Performed by: HOSPITALIST

## 2025-02-17 PROCEDURE — 85027 COMPLETE CBC AUTOMATED: CPT | Performed by: PHYSICIAN ASSISTANT

## 2025-02-17 PROCEDURE — 99232 SBSQ HOSP IP/OBS MODERATE 35: CPT

## 2025-02-17 PROCEDURE — 1100000001 HC PRIVATE ROOM DAILY

## 2025-02-17 PROCEDURE — 05H733Z INSERTION OF INFUSION DEVICE INTO RIGHT AXILLARY VEIN, PERCUTANEOUS APPROACH: ICD-10-PCS

## 2025-02-17 PROCEDURE — 2500000002 HC RX 250 W HCPCS SELF ADMINISTERED DRUGS (ALT 637 FOR MEDICARE OP, ALT 636 FOR OP/ED): Performed by: PHYSICIAN ASSISTANT

## 2025-02-17 RX ORDER — TACROLIMUS 1 MG/1
3 CAPSULE ORAL
Status: DISCONTINUED | OUTPATIENT
Start: 2025-02-17 | End: 2025-02-18 | Stop reason: HOSPADM

## 2025-02-17 RX ORDER — SODIUM BICARBONATE 650 MG/1
650 TABLET ORAL EVERY 12 HOURS
Qty: 60 TABLET | Refills: 0 | Status: SHIPPED | OUTPATIENT
Start: 2025-02-17 | End: 2025-02-24 | Stop reason: SDUPTHER

## 2025-02-17 RX ORDER — MAGNESIUM SULFATE HEPTAHYDRATE 40 MG/ML
4 INJECTION, SOLUTION INTRAVENOUS ONCE
Status: COMPLETED | OUTPATIENT
Start: 2025-02-17 | End: 2025-02-17

## 2025-02-17 RX ORDER — VALGANCICLOVIR 450 MG/1
900 TABLET, FILM COATED ORAL DAILY
Status: DISCONTINUED | OUTPATIENT
Start: 2025-02-18 | End: 2025-02-18 | Stop reason: HOSPADM

## 2025-02-17 RX ORDER — SODIUM BICARBONATE 650 MG/1
650 TABLET ORAL EVERY 12 HOURS
Status: DISCONTINUED | OUTPATIENT
Start: 2025-02-17 | End: 2025-02-18 | Stop reason: HOSPADM

## 2025-02-17 RX ORDER — AMINO ACIDS/PROTEIN HYDROLYS 15G-100/30
1 LIQUID (ML) ORAL DAILY
Start: 2025-02-17 | End: 2025-02-24 | Stop reason: SDUPTHER

## 2025-02-17 RX ORDER — METOCLOPRAMIDE 10 MG/1
5 TABLET ORAL 2 TIMES DAILY
Status: DISCONTINUED | OUTPATIENT
Start: 2025-02-17 | End: 2025-02-18 | Stop reason: HOSPADM

## 2025-02-17 RX ORDER — AMINO ACIDS/PROTEIN HYDROLYS 15G-100/30
1 LIQUID (ML) ORAL DAILY
Qty: 887 ML | Refills: 1 | Status: SHIPPED | OUTPATIENT
Start: 2025-02-17 | End: 2025-02-17

## 2025-02-17 RX ORDER — METOCLOPRAMIDE 5 MG/1
5 TABLET ORAL
Start: 2025-02-17 | End: 2025-05-18

## 2025-02-17 RX ORDER — LACTOSE-REDUCED FOOD 0.05 G-1.5
65 LIQUID (ML) ORAL DAILY
Start: 2025-02-17 | End: 2025-02-18

## 2025-02-17 RX ADMIN — ATOVAQUONE 1500 MG: 750 SUSPENSION ORAL at 09:44

## 2025-02-17 RX ADMIN — CALCIUM POLYCARBOPHIL 625 MG: 625 TABLET, FILM COATED ORAL at 08:09

## 2025-02-17 RX ADMIN — INSULIN LISPRO 6 UNITS: 100 INJECTION, SOLUTION INTRAVENOUS; SUBCUTANEOUS at 09:43

## 2025-02-17 RX ADMIN — METOCLOPRAMIDE 5 MG: 10 TABLET ORAL at 21:20

## 2025-02-17 RX ADMIN — PANTOPRAZOLE SODIUM 40 MG: 40 TABLET, DELAYED RELEASE ORAL at 07:01

## 2025-02-17 RX ADMIN — LOPERAMIDE HYDROCHLORIDE 2 MG: 2 CAPSULE ORAL at 13:09

## 2025-02-17 RX ADMIN — NYSTATIN 500000 UNITS: 500000 SUSPENSION ORAL at 07:01

## 2025-02-17 RX ADMIN — PANTOPRAZOLE SODIUM 40 MG: 40 TABLET, DELAYED RELEASE ORAL at 16:44

## 2025-02-17 RX ADMIN — INSULIN LISPRO 6 UNITS: 100 INJECTION, SOLUTION INTRAVENOUS; SUBCUTANEOUS at 12:12

## 2025-02-17 RX ADMIN — HEPARIN SODIUM 5000 UNITS: 5000 INJECTION, SOLUTION INTRAVENOUS; SUBCUTANEOUS at 07:01

## 2025-02-17 RX ADMIN — INSULIN GLARGINE 12 UNITS: 100 INJECTION, SOLUTION SUBCUTANEOUS at 10:20

## 2025-02-17 RX ADMIN — LOPERAMIDE HYDROCHLORIDE 2 MG: 2 CAPSULE ORAL at 21:20

## 2025-02-17 RX ADMIN — LOPERAMIDE HYDROCHLORIDE 2 MG: 2 CAPSULE ORAL at 07:01

## 2025-02-17 RX ADMIN — INSULIN LISPRO 1 UNITS: 100 INJECTION, SOLUTION INTRAVENOUS; SUBCUTANEOUS at 10:21

## 2025-02-17 RX ADMIN — ERTAPENEM SODIUM 1 G: 1 INJECTION, POWDER, LYOPHILIZED, FOR SOLUTION INTRAMUSCULAR; INTRAVENOUS at 13:09

## 2025-02-17 RX ADMIN — VALGANCICLOVIR HYDROCHLORIDE 450 MG: 450 TABLET ORAL at 08:09

## 2025-02-17 RX ADMIN — NYSTATIN 500000 UNITS: 500000 SUSPENSION ORAL at 13:09

## 2025-02-17 RX ADMIN — PREDNISONE 5 MG: 10 TABLET ORAL at 08:08

## 2025-02-17 RX ADMIN — HEPARIN SODIUM 5000 UNITS: 5000 INJECTION, SOLUTION INTRAVENOUS; SUBCUTANEOUS at 13:09

## 2025-02-17 RX ADMIN — Medication 2000 UNITS: at 08:08

## 2025-02-17 RX ADMIN — TACROLIMUS 3 MG: 1 CAPSULE ORAL at 18:36

## 2025-02-17 RX ADMIN — MYCOPHENOLIC ACID 180 MG: 180 TABLET, DELAYED RELEASE ORAL at 07:01

## 2025-02-17 RX ADMIN — METOCLOPRAMIDE 5 MG: 10 TABLET ORAL at 07:01

## 2025-02-17 RX ADMIN — NYSTATIN 500000 UNITS: 500000 SUSPENSION ORAL at 21:19

## 2025-02-17 RX ADMIN — AMLODIPINE BESYLATE 10 MG: 10 TABLET ORAL at 09:44

## 2025-02-17 RX ADMIN — HEPARIN SODIUM 5000 UNITS: 5000 INJECTION, SOLUTION INTRAVENOUS; SUBCUTANEOUS at 21:19

## 2025-02-17 RX ADMIN — SODIUM ZIRCONIUM CYCLOSILICATE 5 G: 10 POWDER, FOR SUSPENSION ORAL at 12:09

## 2025-02-17 RX ADMIN — GABAPENTIN 200 MG: 100 CAPSULE ORAL at 08:09

## 2025-02-17 RX ADMIN — LOPERAMIDE HYDROCHLORIDE 2 MG: 2 CAPSULE ORAL at 16:43

## 2025-02-17 RX ADMIN — MAGNESIUM SULFATE HEPTAHYDRATE 4 G: 40 INJECTION, SOLUTION INTRAVENOUS at 09:52

## 2025-02-17 RX ADMIN — NYSTATIN 500000 UNITS: 500000 SUSPENSION ORAL at 16:44

## 2025-02-17 RX ADMIN — SODIUM BICARBONATE 650 MG: 650 TABLET ORAL at 21:20

## 2025-02-17 RX ADMIN — TACROLIMUS 2 MG: 1 CAPSULE ORAL at 07:01

## 2025-02-17 RX ADMIN — SODIUM BICARBONATE 1300 MG: 650 TABLET ORAL at 08:08

## 2025-02-17 RX ADMIN — MYCOPHENOLIC ACID 180 MG: 180 TABLET, DELAYED RELEASE ORAL at 18:36

## 2025-02-17 ASSESSMENT — COGNITIVE AND FUNCTIONAL STATUS - GENERAL
DRESSING REGULAR UPPER BODY CLOTHING: A LITTLE
TOILETING: A LITTLE
CLIMB 3 TO 5 STEPS WITH RAILING: A LITTLE
DAILY ACTIVITIY SCORE: 22
MOVING FROM LYING ON BACK TO SITTING ON SIDE OF FLAT BED WITH BEDRAILS: A LITTLE
EATING MEALS: A LITTLE
MOBILITY SCORE: 21
MOBILITY SCORE: 16
HELP NEEDED FOR BATHING: A LOT
PERSONAL GROOMING: A LITTLE
DRESSING REGULAR LOWER BODY CLOTHING: A LOT
WALKING IN HOSPITAL ROOM: A LITTLE
PERSONAL GROOMING: A LITTLE
CLIMB 3 TO 5 STEPS WITH RAILING: A LITTLE
MOVING TO AND FROM BED TO CHAIR: A LOT
WALKING IN HOSPITAL ROOM: A LITTLE
TURNING FROM BACK TO SIDE WHILE IN FLAT BAD: A LITTLE
STANDING UP FROM CHAIR USING ARMS: A LITTLE
STANDING UP FROM CHAIR USING ARMS: A LOT
TOILETING: A LITTLE
DAILY ACTIVITIY SCORE: 16

## 2025-02-17 ASSESSMENT — PAIN SCALES - GENERAL
PAINLEVEL_OUTOF10: 3
PAINLEVEL_OUTOF10: 0 - NO PAIN

## 2025-02-17 NOTE — PROGRESS NOTES
INPATIENT TRANSPLANT NEPHROLOGY PROGRESS NOTE          REASON FOR CONSULT:  Immunosuppressive medication management and nephrology related issues.    SUBJECTION:     No events overnight.   Norovirus positive  Seen by ID. Plan to do Ertapenem for 2 weeks till 2/25.      PHYCISCAL EXAMINATION:    Visit Vitals  /75 (BP Location: Right arm, Patient Position: Lying)   Pulse 97   Temp 36.8 °C (98.2 °F) (Temporal)   Resp 18   Wt 69.8 kg (153 lb 14.1 oz)   SpO2 98%   BMI 20.30 kg/m²   Smoking Status Never   BSA 1.9 m²        02/15 0700 - 02/16 1859  In: 994 [I.V.:894]  Out: 650 [Urine:650]     Weight change:     General Appearance - NAD, Good speech, oriented and alert  HEENT - Supple. Not pale. No jaundice. No cervical lymphadenopathy. Pharynx and tonsils are not injected.  CVS - RRR. Normal S1/S2. No murmur, click , rub or gallop  Lungs- clear to auscultation bilaterally  Abdomen - soft , not tender, no guarding, no rigidity. No hepatosplenomegaly. Normal bowel sounds. No masses and ascites. S/P Kidney transplant .  Transplanted kidney is not tender.   Musculoskeletal /Extremities - no edema. Full ROM. No joint tenderness.   Neuro/Psych - appropriate mood and affect. Motor power V/V all extremities. CN I -XII were grossly intact.  Skin - No visible rash    MEDICATION LIST: REVIEWED    amLODIPine, 10 mg, Daily  atovaquone, 1,500 mg, Daily  calcium polycarbophiL, 625 mg, Daily  cholecalciferol, 2,000 Units, Daily  ertapenem, 1 g, q24h  gabapentin, 200 mg, Daily  heparin (porcine), 5,000 Units, q8h KASSY  insulin glargine, 12 Units, Daily  insulin lispro, 0-5 Units, q4h  insulin lispro, 6 Units, TID AC  [Held by provider] insulin NPH (Isophane), 12 Units, q24h KASSY  [START ON 2/17/2025] lidocaine, 5 mL, Once  lidocaine, 2 patch, Daily  loperamide, 2 mg, 4x daily  metoclopramide, 5 mg, q8h  mycophenolate, 180 mg, q12h  nystatin, 5 mL, 4x daily  pantoprazole, 40 mg, BID AC  predniSONE, 5 mg, Daily  sodium  bicarbonate, 1,300 mg, q12h  sodium zirconium cyclosilicate, 5 g, Daily  tacrolimus, 2 mg, q12h KASSY  valGANciclovir, 450 mg, Daily           acetaminophen, 650 mg, q6h PRN  dextrose, 12.5 g, q15 min PRN  dextrose, 25 g, q15 min PRN  glucagon, 1 mg, q15 min PRN        ALLERGY:  Allergies   Allergen Reactions    Terazosin Unknown     Did not feel well on this medication    Codeine Itching     itching    Tramadol Itching       LABS:  Results for orders placed or performed during the hospital encounter of 25 (from the past 24 hours)   POCT GLUCOSE   Result Value Ref Range    POCT Glucose 122 (H) 74 - 99 mg/dL   POCT GLUCOSE   Result Value Ref Range    POCT Glucose 126 (H) 74 - 99 mg/dL   CMV DNA, quantitative, PCR   Result Value Ref Range    Cytomegalovirus DNA, PCR Log IU/ML      CMV DNA Result Not Detected Not Detected   Tacrolimus level   Result Value Ref Range    Tacrolimus  5.3 <=15.0 ng/mL   POCT GLUCOSE   Result Value Ref Range    POCT Glucose 156 (H) 74 - 99 mg/dL   POCT GLUCOSE   Result Value Ref Range    POCT Glucose 151 (H) 74 - 99 mg/dL   POCT GLUCOSE   Result Value Ref Range    POCT Glucose 172 (H) 74 - 99 mg/dL   POCT GLUCOSE   Result Value Ref Range    POCT Glucose 158 (H) 74 - 99 mg/dL   POCT GLUCOSE   Result Value Ref Range    POCT Glucose 96 74 - 99 mg/dL        ASSESSMENT AND PLAN:    Mr. Hanson is a 74 y.o. 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 Zamora with a KDPI of 93% and PRA of 54%. Donor was Hepc -/-and has not met risk factors. EBV + /+. Left donor kidney transplanted to patient right pelvis. Admission weight is 72.7 kg (discharge weight is 76.4 kg ). Pt received a total of 3 mg/kg total of thymoglobulin induction therapy in conjunction with 500mg IV solumedrol. CMV D-/R+. He had prolonged hospital course that included failure to thrive in adult with dehydration and diagnosis of  severe protein-calorie malnutrition, placement of PEG tube, and intolerance of tube feeds initially. He was switched to TPN briefly and his tube feed formula was adjusted as per the dietitian recommendations.     Patient presented to OSH for hyperglycemia without ketosis with glucose >527 and sodium 116, transferred to [facility] for evaluation in the subacute setting of recent kidney transplant. On arrival, patient is cooperative but is a poor historian and unable to remember the events that brought him to the hospital.     Transplant nephrology is consulted to assist with immunosuppressive medication management and nephrology related issues.       Principal Problem:    DKA, type 2, not at goal      1. ESRD S/P Kidney transplant.   - Renal allograft function:   Lab Results   Component Value Date    CREATININE 1.03 02/13/2025     CrCl cannot be calculated (Patient's most recent lab result is older than the maximum 3 days allowed.).    Intake/Output Summary (Last 24 hours) at 2/16/2025 2307  Last data filed at 2/16/2025 2024  Gross per 24 hour   Intake 50 ml   Output 825 ml   Net -775 ml     - Continue to monitor UOP and Serum creatinine closely.   - Avoid nephrotoxic agents, NSAIDs and IV contrast   - Strict I/O.   - Renally dose all medications by the most recent CrCl from Cockcroft-Gault formula.    2. Immunosuppression   No FK level today; continue tacrolimus 2 mg BID  Myfortic 180 mg BID  Prednisone 5 mg daily    3. Anemia and WBC   Lab Results   Component Value Date    WBC 6.5 02/13/2025    HGB 9.5 (L) 02/13/2025    HCT 29.7 (L) 02/13/2025    MCV 88 02/13/2025     02/13/2025     -Continue to monitor Hgb   -No indications for PRBC transfusion     4. Electrolyte /Hyponatremia and Hyperkalemia - Resolved    Lab Results   Component Value Date    GLUCOSE 184 (H) 02/13/2025    CALCIUM 8.6 02/13/2025     02/13/2025    K 4.5 02/13/2025    CO2 23 02/13/2025     02/13/2025    BUN 23 02/13/2025     CREATININE 1.03 02/13/2025     Lab Results   Component Value Date    CALCIUM 8.6 02/13/2025    CAION 1.20 02/11/2025    PHOS 3.6 02/13/2025    VITD25 34 01/05/2025       - Reviewed renal profile.     6. Hypertension   Blood Pressures         2/16/2025  0445 2/16/2025  0841 2/16/2025  1136 2/16/2025  1700 2/16/2025 2024    BP: 180/73 168/63 126/69 154/71 154/75          -Goal BP < 140/90 mmHg   -continue current management     7. DKA  -Defer to endo  -Resolved    8.  GI prophylaxis   - On PPI     9. DVT Prophylaxis  -Defer to primary team    10. UITI, Proteus  Cipro per tx surgery  ID consult  ESBL E. Coli  Ertapenem x 2 weeks    * Case was discussed with primary team.  For questions, please contact transplant nephrology page x [phone]    Bashir Angela MD    Transplant Nephrologist

## 2025-02-17 NOTE — PROGRESS NOTES
TRANSPLANT SURGERY PROGRESS NOTE    Temo Hanson underwent transplant surgery on 12/21/2024 (Kidney) and was evaluated on multidisciplinary inpatient rounds.  I specifically evaluated the management of immunosuppression to ensure adequate exposure required to decrease the risk of rejection.  Furthermore, I reviewed potential side effects including tremor, hyperglycemia, leukopenia, infection, and other neurologic changes.  I reviewed and adjusted infectious prophylaxis based on the patients clinical condition and  protocols.     24 EVENTS: feeling better. Some intermittent diarrhea, but improved. Insistent on going home    DIAGNOSIS:  Kidney replaced by transplant ([facility]) Z94.0    PHYSICAL EXAMINATION:  Last Recorded Vitals  Visit Vitals  /72 (BP Location: Right arm, Patient Position: Lying)   Pulse 75   Temp 36.6 °C (97.9 °F) (Temporal)   Resp 18      Intake/Output last 3 Shifts:    Intake/Output Summary (Last 24 hours) at 2/15/2025 0849  Last data filed at 2/14/2025 1431  Gross per 24 hour   Intake 756 ml   Output 450 ml   Net 306 ml      Vitals:    02/14/25 0600   Weight: 69.8 kg (153 lb 14.1 oz)      Gen: A+OX3; NAD  HEENT: PERRL, sclera anicteric, MMM  Cardiac: RRR  Chest: Normal inspiratory effort  Abdomen: S/NT/ND.   Ext: No LE edema    MEDICATION LIST: REVIEWED  Scheduled medications  atovaquone, 1,500 mg, oral, Daily  calcium polycarbophiL, 625 mg, oral, Daily  cholecalciferol, 2,000 Units, oral, Daily  ertapenem, 1 g, intravenous, q24h  gabapentin, 200 mg, oral, Daily  heparin (porcine), 5,000 Units, subcutaneous, q8h KASSY  insulin glargine, 12 Units, subcutaneous, Daily  insulin lispro, 0-10 Units, subcutaneous, q4h  insulin lispro, 10 Units, subcutaneous, TID AC  insulin NPH (Isophane), 12 Units, subcutaneous, q24h KASSY  lidocaine, 2 patch, transdermal, Daily  metoclopramide, 5 mg, oral, q8h  mycophenolate, 180 mg, oral, q12h  nystatin, 5 mL, Swish & Swallow, 4x daily  pantoprazole, 40  mg, oral, BID AC  predniSONE, 5 mg, oral, Daily  sodium bicarbonate, 1,300 mg, oral, q12h  sodium zirconium cyclosilicate, 5 g, oral, Daily  tacrolimus, 2 mg, oral, q12h KASSY  valGANciclovir, 450 mg, oral, Daily    ASSESSMENT AND PLAN:    Mr. Hanson is a 74 y.o. male who underwent  transplant surgery on 12/21/2024 (Kidney).    Course notable for esophageal achalasia, gastroparesis, need for PEG tube. Readmission with UTI and diarrhea. Norovirus +.    1. Immunosuppression reviewed and adjusted       Tacrolimus: current dose 2 mg BID. Plan continue      Goal tacrolimus trough level is 8-10      Myfortic: 180 mg bid      Prednisone: 5 mg daily    2. Allograft function      Maintenance IVF: DC    3. Hypertension medications reviewed      Continue amlodipine 10 mg    4. ID      Ertapenem per ID recs; needs midline       Home with total 2 weeks IV antibiotics       Norovirus - symptomatic treatment, immodium     5.  Nutrition/glycemic control: severe protein calorie malnutrition      Tube feeds: bolus feeds and reduced rate for 12 hr cycle      Imodium 2mg q6h    6. Prophylaxis      DVT: SCDs/Subcutaneous Heparin: Yes      GI prophylaxis: PPI BID    7. PT/OT    8. Discharge planned for: 2 days    Case was presented at Multi Disciplinary team rounds   Attending physician, consulting physician, , pharmacist, residents and fellow were present at the meeting.    Cassidy Altman MD

## 2025-02-17 NOTE — SIGNIFICANT EVENT
02/11/25 1117   Patient Interaction   Organ Kidney   Type of Interaction Morning rounds   Interdisciplinary Rounds   Attendance Surgeon;Physician;FILI;Pharmacist;Patient and/or family; coordinator   Topics Discussed Diet;Medications;Blood test results;Discharge preparation     Transplant Surgery Multidisciplinary Team Note    Temo Hanson is a 74 y.o. male   s/p DDKT 12/21/24 readmitted for hyperglycemia.     24 Hour Events  ESTRELLITA, continues to have diarrhea     Last Recorded Vitals  Visit Vitals  /74 (BP Location: Right arm, Patient Position: Lying)   Pulse 91   Temp 36.3 °C (97.3 °F) (Temporal)   Resp 16      Intake/Output last 3 Shifts:    Intake/Output Summary (Last 24 hours) at 2/17/2025 1052  Last data filed at 2/16/2025 2024  Gross per 24 hour   Intake 50 ml   Output 425 ml   Net -375 ml      Vitals:    02/17/25 0600   Weight: 75.4 kg (166 lb 3.6 oz)        Assessment/Plan   Principal Problem:    Kidney allograft function     ml, creatinine stable    ID  Culture grew ESBL E. coli and Proteus mirabilis. ID consulted & ertapenum started, ends 2/25   Stool studies + for norovirus, C diff negative   Weekly virals studies on Sundays   Midline placement today      Management after organ transplant  Active Problems:  Patient Active Problem List   Diagnosis    S/P laparoscopic cholecystectomy    Abdominal pain    Achalasia    Acute lower UTI    Microcytic anemia    Complete heart block    Callus of foot    Chronic periodontitis, unspecified    Stage 5 chronic kidney disease (Multi)    Closed fracture of metatarsal bone    Crushing injury of foot    Diabetes mellitus (Multi)    Diarrhea    Dysphagia    ED (erectile dysfunction)    Essential hypertension    Foot pain, right    GIB (gastrointestinal bleeding)    Hepatitis B core antibody positive    Hyperkalemia    Ingrowing nail    Iron deficiency anemia secondary to inadequate dietary iron intake    Long toenail    Malignant neoplasm of prostate (Multi)     Onychomycosis    Pheochromocytoma of right adrenal gland    Pneumonia of right lower lobe due to infectious organism    Productive cough    Pure hypercholesterolemia    Stricture esophagus    SVT (supraventricular tachycardia) (CMS-HCC)    Type 2 diabetes mellitus with diabetic neuropathy (Multi)    Preop cardiovascular exam    Third degree heart block    Pericardial effusion ([facility])    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 (Multi)    Uremia    Cardiac tamponade    Cardiac pacemaker in situ    ESRD (end stage renal disease) (Multi)    Type 2 diabetes mellitus, with long-term current use of insulin    Dehydration    Alactasia syndrome    Type 2 diabetes mellitus with hyperglycemia, with long-term current use of insulin    On tube feeding diet    Kidney transplant recipient ([facility])    DKA, type 2, not at goal        Immunosuppression reviewed and adjusted          Tacrolimus goal 8-10 ng/mL. Current dose 2 mg BID         Myfortic decreased to 180 mg po BID       Solumedrol taper  DVT prophylaxis SCDS and subcutaneous heparin 5000 TID  PT/OT recommending SNF.   Diet:  TF with diet  Anticipated discharge 1-2 days    Chelsey Simpson, APRN-CNP

## 2025-02-17 NOTE — PROGRESS NOTES
REASON FOR CONSULT:  Immunosuppressive medication management and nephrology related issues.    SUBJECTION:     No events overnight.   Diarrhea improved, on imodium  Wants to go home      PHYCISCAL EXAMINATION:    Visit Vitals  /75   Pulse 92   Temp 37 °C (98.6 °F)   Resp 18   Wt 75.4 kg (166 lb 3.6 oz)   SpO2 99%   BMI 21.93 kg/m²   Smoking Status Never   BSA 1.97 m²        02/15 1900 - 02/17 0659  In: 50   Out: 1075 [Urine:1075]     Weight change:     General Appearance - NAD, Good speech, oriented and alert  HEENT - Supple. Not pale. No jaundice. No cervical lymphadenopathy. Pharynx and tonsils are not injected.  CVS - RRR. Normal S1/S2. No murmur, click , rub or gallop  Lungs- clear to auscultation bilaterally  Abdomen - soft , not tender, no guarding, no rigidity. No hepatosplenomegaly. Normal bowel sounds. No masses and ascites. S/P Kidney transplant .  Transplanted kidney is not tender.   Musculoskeletal /Extremities - no edema. Full ROM. No joint tenderness.   Neuro/Psych - appropriate mood and affect. Motor power V/V all extremities. CN I -XII were grossly intact.  Skin - No visible rash    MEDICATION LIST: REVIEWED    amLODIPine, 10 mg, Daily  atovaquone, 1,500 mg, Daily  calcium polycarbophiL, 625 mg, Daily  cholecalciferol, 2,000 Units, Daily  ertapenem, 1 g, q24h  gabapentin, 200 mg, Daily  heparin (porcine), 5,000 Units, q8h KASSY  insulin glargine, 12 Units, Daily  insulin lispro, 0-5 Units, q4h  insulin lispro, 6 Units, TID AC  [Held by provider] insulin NPH (Isophane), 12 Units, q24h KASSY  lidocaine, 5 mL, Once  lidocaine, 2 patch, Daily  loperamide, 2 mg, 4x daily  metoclopramide, 5 mg, BID  mycophenolate, 180 mg, q12h  nystatin, 5 mL, 4x daily  pantoprazole, 40 mg, BID AC  predniSONE, 5 mg, Daily  sodium bicarbonate, 650 mg, q12h  sodium zirconium cyclosilicate, 5 g, Daily  tacrolimus, 3 mg, q12h KASSY  [START ON 2/18/2025] valGANciclovir, 900 mg, Daily           acetaminophen, 650 mg, q6h  PRN  dextrose, 12.5 g, q15 min PRN  dextrose, 25 g, q15 min PRN  glucagon, 1 mg, q15 min PRN        ALLERGY:  Allergies   Allergen Reactions    Terazosin Unknown     Did not feel well on this medication    Codeine Itching     itching    Tramadol Itching       LABS:  Results for orders placed or performed during the hospital encounter of 02/09/25 (from the past 24 hours)   POCT GLUCOSE   Result Value Ref Range    POCT Glucose 158 (H) 74 - 99 mg/dL   POCT GLUCOSE   Result Value Ref Range    POCT Glucose 96 74 - 99 mg/dL   POCT GLUCOSE   Result Value Ref Range    POCT Glucose 112 (H) 74 - 99 mg/dL   POCT GLUCOSE   Result Value Ref Range    POCT Glucose 121 (H) 74 - 99 mg/dL   Tacrolimus level   Result Value Ref Range    Tacrolimus  4.2 <=15.0 ng/mL   CBC   Result Value Ref Range    WBC 4.9 4.4 - 11.3 x10*3/uL    nRBC 0.0 0.0 - 0.0 /100 WBCs    RBC 2.95 (L) 4.50 - 5.90 x10*6/uL    Hemoglobin 8.4 (L) 13.5 - 17.5 g/dL    Hematocrit 27.7 (L) 41.0 - 52.0 %    MCV 94 80 - 100 fL    MCH 28.5 26.0 - 34.0 pg    MCHC 30.3 (L) 32.0 - 36.0 g/dL    RDW 17.7 (H) 11.5 - 14.5 %    Platelets 278 150 - 450 x10*3/uL   Renal Function Panel   Result Value Ref Range    Glucose 123 (H) 74 - 99 mg/dL    Sodium 139 136 - 145 mmol/L    Potassium 4.4 3.5 - 5.3 mmol/L    Chloride 107 98 - 107 mmol/L    Bicarbonate 26 21 - 32 mmol/L    Anion Gap 10 10 - 20 mmol/L    Urea Nitrogen 14 6 - 23 mg/dL    Creatinine 0.86 0.50 - 1.30 mg/dL    eGFR >90 >60 mL/min/1.73m*2    Calcium 8.6 8.6 - 10.6 mg/dL    Phosphorus 3.4 2.5 - 4.9 mg/dL    Albumin 2.7 (L) 3.4 - 5.0 g/dL   Magnesium   Result Value Ref Range    Magnesium 1.61 1.60 - 2.40 mg/dL   POCT GLUCOSE   Result Value Ref Range    POCT Glucose 179 (H) 74 - 99 mg/dL   POCT GLUCOSE   Result Value Ref Range    POCT Glucose 219 (H) 74 - 99 mg/dL   POCT GLUCOSE   Result Value Ref Range    POCT Glucose 136 (H) 74 - 99 mg/dL   POCT GLUCOSE   Result Value Ref Range    POCT Glucose 80 74 - 99 mg/dL     *Note: Due  to a large number of results and/or encounters for the requested time period, some results have not been displayed. A complete set of results can be found in Results Review.        ASSESSMENT AND PLAN:    74 y.o. M with DM,  ESRD s/p DDKT on 12/21/24 , KDPI of 93% and PRA of 54%. CMV D-/R+. He had prolonged hospital course that included achalasia, failure to thrive s/p PEG tube, and intolerance of tube feeds initially. He was switched to TPN briefly and his tube feed.     Admitted for DKA/UTI and now with Norovirus diarrhea. We cut back Myfortic to 180 mg bid. He also has ESBL E. Coli and Proteus. ID consulted and recommended Ertapenem till 2/25. Will get a midline tomorrow. Tube feeds: bolus feeds and reduced rate for 12 hr cycle+ Imodium 2mg q6 Hr. Cr is stable, 1.0. He has EBV and  <35 on 2/9/25- pending repeat result. Repeat CMV not detected 2/26. Over due for allosure. Awaiting placement.     Transplant nephrology is consulted to assist with immunosuppressive medication management and nephrology related issues.       Principal Problem:    DKA, type 2, not at goal      1. ESRD S/P Kidney transplant.   - Renal allograft function:   Lab Results   Component Value Date    CREATININE 0.86 02/17/2025     Estimated Creatinine Clearance: 80.4 mL/min (by C-G formula based on SCr of 0.86 mg/dL).    Intake/Output Summary (Last 24 hours) at 2/17/2025 1753  Last data filed at 2/17/2025 1108  Gross per 24 hour   Intake 260 ml   Output 625 ml   Net -365 ml     - Continue to monitor UOP and Serum creatinine closely.   - Avoid nephrotoxic agents, NSAIDs and IV contrast   - Strict I/O.   - Renally dose all medications by the most recent CrCl from Cockcroft-Gault formula.    2. Immunosuppression   Tacrolimus level = 4.2  Increased to Tacrolimus 3 mg BID 2/17/2025  Myfortic 180 mg BID  Prednisone 5 mg daily    3. Anemia and WBC   Lab Results   Component Value Date    WBC 4.9 02/17/2025    HGB 8.4 (L) 02/17/2025    HCT 27.7 (L)  02/17/2025    MCV 94 02/17/2025     02/17/2025     -Continue to monitor Hgb   -No indications for PRBC transfusion     4. Electrolyte /Hyponatremia and Hyperkalemia - Resolved    Lab Results   Component Value Date    GLUCOSE 123 (H) 02/17/2025    CALCIUM 8.6 02/17/2025     02/17/2025    K 4.4 02/17/2025    CO2 26 02/17/2025     02/17/2025    BUN 14 02/17/2025    CREATININE 0.86 02/17/2025     Lab Results   Component Value Date    CALCIUM 8.6 02/17/2025    CAION 1.20 02/11/2025    PHOS 3.4 02/17/2025    VITD25 34 01/05/2025       - Reviewed renal profile.     6. Hypertension   Blood Pressures         2/17/2025  0027 2/17/2025  0458 2/17/2025  0844 2/17/2025  1101 2/17/2025  1542    BP: 166/72 162/72 142/74 151/74 163/75          -Goal BP < 140/90 mmHg   -continue current management     7. DKA  - Resolved    8.  GI prophylaxis   - On PPI     9. DVT Prophylaxis  -Defer to primary team    10. UTI, Proteus + ESBL E. Coli  ID consult  Ertapenem x 2 weeks  Need PICC line      Ayse Scott MD  Nephrology Fellow  Transplant nephrology page x [phone]

## 2025-02-17 NOTE — CARE PLAN
The patient's goals for the shift include      The clinical goals for the shift include pt will remain hds throughout shift      Problem: Discharge Planning  Goal: Discharge to home or other facility with appropriate resources  Outcome: Progressing     Problem: Chronic Conditions and Co-morbidities  Goal: Patient's chronic conditions and co-morbidity symptoms are monitored and maintained or improved  Outcome: Progressing     Problem: Nutrition  Goal: Nutrient intake appropriate for maintaining nutritional needs  Outcome: Progressing     Problem: Diabetes  Goal: Achieve decreasing blood glucose levels by end of shift  Outcome: Progressing  Goal: Increase stability of blood glucose readings by end of shift  Outcome: Progressing  Goal: Decrease in ketones present in urine by end of shift  Outcome: Progressing  Goal: Maintain electrolyte levels within acceptable range throughout shift  Outcome: Progressing  Goal: Maintain glucose levels >70mg/dl to <250mg/dl throughout shift  Outcome: Progressing  Goal: No changes in neurological exam by end of shift  Outcome: Progressing  Goal: Learn about and adhere to nutrition recommendations by end of shift  Outcome: Progressing  Goal: Vital signs within normal range for age by end of shift  Outcome: Progressing  Goal: Increase self care and/or family involovement by end of shift  Outcome: Progressing  Goal: Receive DSME education by end of shift  Outcome: Progressing

## 2025-02-17 NOTE — PROGRESS NOTES
Temo Hanson is a 74 y.o. male on day 8 of admission presenting with DKA, type 2, not at goal.      Transitional Care Coordination Progress Note:  Patient discussed during interdisciplinary rounds.      Plan per Medical/Surgical team:    Home Care choice for home going needs, [facility].  PT/OT rec'd Mod for SNF.  Spoke with pt's daughter, Erica, per the pt's request. She informed me that they would like to take the pt home with HC. She will be helping take care of him as well.  [facility] made aware.  TF ordered through [facility]     [facility]  Knightsen/Learner: Priya     Discharge disposition:   [facility]       Potential Barriers: None     ADOD:  2/18     This TCC will continue to follow for home going needs and safe DC plan.      MIKE MORENO

## 2025-02-17 NOTE — PROGRESS NOTES
Temo Hanson is a 74 y.o. male on day 8 of admission presenting with DKA, type 2, not at goal.    Subjective   Chart was reveiwed today. Patient had his midline procedure during rounds   Per primary team , PO intake remains the same, no plans to change the rate of TF over night.    Objective   Undergoing Midline insertion  Last Recorded Vitals  Blood pressure 163/75, pulse 92, temperature 37 °C (98.6 °F), resp. rate 18, weight 75.4 kg (166 lb 3.6 oz), SpO2 99%.  Intake/Output last 3 Shifts:  I/O last 3 completed shifts:  In: 50 (0.7 mL/kg) [IV Piggyback:50]  Out: 1075 (14.3 mL/kg) [Urine:1075 (0.4 mL/kg/hr)]  Weight: 75.4 kg     Relevant Results  Results from last 7 days   Lab Units 02/17/25  1617 02/17/25  1312 02/17/25  1105 02/17/25  0842 02/17/25  0631 02/17/25  0456 02/13/25  0754 02/13/25  0520 02/12/25  0734 02/12/25  0540 02/11/25  0536 02/11/25  0534 02/10/25  1912   POCT GLUCOSE mg/dL 80 136* 219* 179*  --  121*   < >  --    < >  --    < >  --   --    GLUCOSE mg/dL  --   --   --   --  123*  --   --  184*  --  264*  --  288* 169*    < > = values in this interval not displayed.     Scheduled medications  amLODIPine, 10 mg, oral, Daily  atovaquone, 1,500 mg, oral, Daily  calcium polycarbophiL, 625 mg, oral, Daily  cholecalciferol, 2,000 Units, oral, Daily  ertapenem, 1 g, intravenous, q24h  gabapentin, 200 mg, oral, Daily  heparin (porcine), 5,000 Units, subcutaneous, q8h KASSY  insulin glargine, 12 Units, subcutaneous, Daily  insulin lispro, 0-5 Units, subcutaneous, q4h  insulin lispro, 6 Units, subcutaneous, TID AC  [Held by provider] insulin NPH (Isophane), 12 Units, subcutaneous, q24h KASSY  lidocaine, 5 mL, infiltration, Once  lidocaine, 2 patch, transdermal, Daily  loperamide, 2 mg, oral, 4x daily  metoclopramide, 5 mg, oral, BID  mycophenolate, 180 mg, oral, q12h  nystatin, 5 mL, Swish & Swallow, 4x daily  pantoprazole, 40 mg, oral, BID AC  predniSONE, 5 mg, oral, Daily  sodium bicarbonate, 650 mg, oral,  q12h  sodium zirconium cyclosilicate, 5 g, oral, Daily  tacrolimus, 3 mg, oral, q12h [facility]  [START ON 2/18/2025] valGANciclovir, 900 mg, oral, Daily      Continuous medications     PRN medications  PRN medications: acetaminophen, dextrose, dextrose, glucagon     Results for orders placed or performed during the hospital encounter of 02/09/25 (from the past 24 hours)   POCT GLUCOSE   Result Value Ref Range    POCT Glucose 172 (H) 74 - 99 mg/dL   POCT GLUCOSE   Result Value Ref Range    POCT Glucose 158 (H) 74 - 99 mg/dL   POCT GLUCOSE   Result Value Ref Range    POCT Glucose 96 74 - 99 mg/dL   POCT GLUCOSE   Result Value Ref Range    POCT Glucose 112 (H) 74 - 99 mg/dL   POCT GLUCOSE   Result Value Ref Range    POCT Glucose 121 (H) 74 - 99 mg/dL   Tacrolimus level   Result Value Ref Range    Tacrolimus  4.2 <=15.0 ng/mL   CBC   Result Value Ref Range    WBC 4.9 4.4 - 11.3 x10*3/uL    nRBC 0.0 0.0 - 0.0 /100 WBCs    RBC 2.95 (L) 4.50 - 5.90 x10*6/uL    Hemoglobin 8.4 (L) 13.5 - 17.5 g/dL    Hematocrit 27.7 (L) 41.0 - 52.0 %    MCV 94 80 - 100 fL    MCH 28.5 26.0 - 34.0 pg    MCHC 30.3 (L) 32.0 - 36.0 g/dL    RDW 17.7 (H) 11.5 - 14.5 %    Platelets 278 150 - 450 x10*3/uL   Renal Function Panel   Result Value Ref Range    Glucose 123 (H) 74 - 99 mg/dL    Sodium 139 136 - 145 mmol/L    Potassium 4.4 3.5 - 5.3 mmol/L    Chloride 107 98 - 107 mmol/L    Bicarbonate 26 21 - 32 mmol/L    Anion Gap 10 10 - 20 mmol/L    Urea Nitrogen 14 6 - 23 mg/dL    Creatinine 0.86 0.50 - 1.30 mg/dL    eGFR >90 >60 mL/min/1.73m*2    Calcium 8.6 8.6 - 10.6 mg/dL    Phosphorus 3.4 2.5 - 4.9 mg/dL    Albumin 2.7 (L) 3.4 - 5.0 g/dL   Magnesium   Result Value Ref Range    Magnesium 1.61 1.60 - 2.40 mg/dL   POCT GLUCOSE   Result Value Ref Range    POCT Glucose 179 (H) 74 - 99 mg/dL   POCT GLUCOSE   Result Value Ref Range    POCT Glucose 219 (H) 74 - 99 mg/dL   POCT GLUCOSE   Result Value Ref Range    POCT Glucose 136 (H) 74 - 99 mg/dL   POCT GLUCOSE    Result Value Ref Range    POCT Glucose 80 74 - 99 mg/dL     *Note: Due to a large number of results and/or encounters for the requested time period, some results have not been displayed. A complete set of results can be found in Results Review.        Assessment/Plan   Assessment & Plan  DKA, type 2, not at goal    Temo Hanson is a 74 y.o. 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  On chart review, labs from outside hospital reviewed and it was noted that patient had not been in DKA but had severe hyperglycemia (venous pH 7.4, BHB 0.14, , creatinine 1.6).  On arrival to [facility], insulin drip was discontinued and patient was started on regular insulin sliding scale #1 with meals with blood glucose ranging from 140-180s.  Endocrinology service consulted for management of diabetes regimen in setting of recent DDKT and chronic steroid use.     Diabetes History  Type of diabetes: 2  Year diagnosed at age: 46 years old  Hospitalizations for DKA or HHS: no recent episodes  Complications: Nephropathy s/p DDKT  Seen by PCP or Endocrinology: last visit with MARISOL Lainez endocrinology on 1/30/25  Frequency of glucose checks: 4-5x daily after meals  Glucose review: -170s on regular SS #1 q 4  Frequency of Hypoglycemia: none  Severe hypoglycemia requiring assistance from others:  No     Home Diet: Clear liquid diet with tube feeds at 60 cc/h from 7 PM to 10 AM     Home Medications  Basal: Basaglar 10 U q AM  Prandial: Humulin  15 units once at 7:00 pm with tube feeds, insulin aspart 3 units with meals  Correction: Aspart sliding scale #2 with meals     Steroids: Tablet prednisone 5 mg daily     2/12/25: Nutrition: 60g CHO per meal +  Given persistent hyperglycemia and abdominal pain with night time continuous tube feeds, switched to bolus feed (250 ml) with meals TID and 40 ml/hr over 12 hours overnight. (Carbs: Lunch: 33gm, Dinner: 33gm, and cycle x 12 hours: 66gm)  2/14: Noted hypoglycemia overnight, NOROvirus +, did not receive NPH due to low BG  2/15: receiving tube feeds as scheduled but continues to have profuse diarrhea due to norovirus infection resulting in poor absorption and tighter blood glucose control.    Being treated for UTI, Norovirus.    ADOD 2/18-2/19  Recommendations:  Continue to HOLD insulin NPH   Increase insulin glargine to 15 units daily   Continue with insulin lispro 6 units 3 times daily with meals (tube feed bolus  + oral meals)                                               Hold if tube feeds are held  Continue with insulin lispro sliding scale # 1 q 4 Hourly   Accu-Cheks q 4 h  Goal blood glucose 140-180   Hypoglycemia protocol     Recommendations communicated to primary team via secure messaging.    Case discussed with attending Dr. Alvarez.     Tiera Wade MD

## 2025-02-17 NOTE — CARE PLAN
The patient's goals for the shift include      The clinical goals for the shift include pt  will b e fall and injury free      Problem: Discharge Planning  Goal: Discharge to home or other facility with appropriate resources  Outcome: Progressing     Problem: Chronic Conditions and Co-morbidities  Goal: Patient's chronic conditions and co-morbidity symptoms are monitored and maintained or improved  Outcome: Progressing     Problem: Nutrition  Goal: Nutrient intake appropriate for maintaining nutritional needs  Outcome: Progressing     Problem: Diabetes  Goal: Achieve decreasing blood glucose levels by end of shift  Outcome: Progressing  Goal: Increase stability of blood glucose readings by end of shift  Outcome: Progressing  Goal: Decrease in ketones present in urine by end of shift  Outcome: Progressing  Goal: Maintain electrolyte levels within acceptable range throughout shift  Outcome: Progressing  Goal: Maintain glucose levels >70mg/dl to <250mg/dl throughout shift  Outcome: Progressing  Goal: No changes in neurological exam by end of shift  Outcome: Progressing  Goal: Learn about and adhere to nutrition recommendations by end of shift  Outcome: Progressing  Goal: Vital signs within normal range for age by end of shift  Outcome: Progressing  Goal: Increase self care and/or family involovement by end of shift  Outcome: Progressing  Goal: Receive DSME education by end of shift  Outcome: Progressing     Problem: Skin  Goal: Decreased wound size/increased tissue granulation at next dressing change  Outcome: Progressing  Goal: Participates in plan/prevention/treatment measures  Outcome: Progressing  Goal: Prevent/manage excess moisture  Outcome: Progressing  Goal: Prevent/minimize sheer/friction injuries  Outcome: Progressing  Goal: Promote/optimize nutrition  Outcome: Progressing  Goal: Promote skin healing  Outcome: Progressing

## 2025-02-17 NOTE — PROGRESS NOTES
INPATIENT TRANSPLANT NEPHROLOGY PROGRESS NOTE          REASON FOR CONSULT:  Immunosuppressive medication management and nephrology related issues.    SUBJECTION:     No events overnight.   Diarrhea improved, on imodium  CMV PCR -Negative      PHYCISCAL EXAMINATION:    Visit Vitals  /75 (BP Location: Right arm, Patient Position: Lying)   Pulse 97   Temp 36.8 °C (98.2 °F) (Temporal)   Resp 18   Wt 69.8 kg (153 lb 14.1 oz)   SpO2 98%   BMI 20.30 kg/m²   Smoking Status Never   BSA 1.9 m²        02/15 0700 - 02/16 1859  In: 994 [I.V.:894]  Out: 650 [Urine:650]     Weight change:     General Appearance - NAD, Good speech, oriented and alert  HEENT - Supple. Not pale. No jaundice. No cervical lymphadenopathy. Pharynx and tonsils are not injected.  CVS - RRR. Normal S1/S2. No murmur, click , rub or gallop  Lungs- clear to auscultation bilaterally  Abdomen - soft , not tender, no guarding, no rigidity. No hepatosplenomegaly. Normal bowel sounds. No masses and ascites. S/P Kidney transplant .  Transplanted kidney is not tender.   Musculoskeletal /Extremities - no edema. Full ROM. No joint tenderness.   Neuro/Psych - appropriate mood and affect. Motor power V/V all extremities. CN I -XII were grossly intact.  Skin - No visible rash    MEDICATION LIST: REVIEWED    amLODIPine, 10 mg, Daily  atovaquone, 1,500 mg, Daily  calcium polycarbophiL, 625 mg, Daily  cholecalciferol, 2,000 Units, Daily  ertapenem, 1 g, q24h  gabapentin, 200 mg, Daily  heparin (porcine), 5,000 Units, q8h KASSY  insulin glargine, 12 Units, Daily  insulin lispro, 0-5 Units, q4h  insulin lispro, 6 Units, TID AC  [Held by provider] insulin NPH (Isophane), 12 Units, q24h KASSY  [START ON 2/17/2025] lidocaine, 5 mL, Once  lidocaine, 2 patch, Daily  loperamide, 2 mg, 4x daily  metoclopramide, 5 mg, q8h  mycophenolate, 180 mg, q12h  nystatin, 5 mL, 4x daily  pantoprazole, 40 mg, BID AC  predniSONE, 5 mg, Daily  sodium bicarbonate, 1,300 mg, q12h  sodium  zirconium cyclosilicate, 5 g, Daily  tacrolimus, 2 mg, q12h KASSY  valGANciclovir, 450 mg, Daily           acetaminophen, 650 mg, q6h PRN  dextrose, 12.5 g, q15 min PRN  dextrose, 25 g, q15 min PRN  glucagon, 1 mg, q15 min PRN        ALLERGY:  Allergies   Allergen Reactions    Terazosin Unknown     Did not feel well on this medication    Codeine Itching     itching    Tramadol Itching       LABS:  Results for orders placed or performed during the hospital encounter of 02/09/25 (from the past 24 hours)   POCT GLUCOSE   Result Value Ref Range    POCT Glucose 122 (H) 74 - 99 mg/dL   POCT GLUCOSE   Result Value Ref Range    POCT Glucose 126 (H) 74 - 99 mg/dL   CMV DNA, quantitative, PCR   Result Value Ref Range    Cytomegalovirus DNA, PCR Log IU/ML      CMV DNA Result Not Detected Not Detected   Tacrolimus level   Result Value Ref Range    Tacrolimus  5.3 <=15.0 ng/mL   POCT GLUCOSE   Result Value Ref Range    POCT Glucose 156 (H) 74 - 99 mg/dL   POCT GLUCOSE   Result Value Ref Range    POCT Glucose 151 (H) 74 - 99 mg/dL   POCT GLUCOSE   Result Value Ref Range    POCT Glucose 172 (H) 74 - 99 mg/dL   POCT GLUCOSE   Result Value Ref Range    POCT Glucose 158 (H) 74 - 99 mg/dL   POCT GLUCOSE   Result Value Ref Range    POCT Glucose 96 74 - 99 mg/dL        ASSESSMENT AND PLAN:    74 y.o. M with DM,  ESRD s/p DDKT on 12/21/24 , KDPI of 93% and PRA of 54%. CMV D-/R+. He had prolonged hospital course that included achalasia, failure to thrive s/p PEG tube, and intolerance of tube feeds initially. He was switched to TPN briefly and his tube feed.     Admitted for DKA/UTI and now with Norovirus diarrhea. We cut back Myfortic to 180 mg bid. He also has ESBL E. Coli and Proteus. ID consulted and recommended Ertapenem till 2/25. Will get a midline tomorrow. Tube feeds: bolus feeds and reduced rate for 12 hr cycle+ Imodium 2mg q6 Hr. Cr is stable, 1.0. He has EBV and  <35 on 2/9/25- pending repeat result. Repeat CMV not detected 2/26.  Over due for allosure. Awaiting placement.     Transplant nephrology is consulted to assist with immunosuppressive medication management and nephrology related issues.       Principal Problem:    DKA, type 2, not at goal      1. ESRD S/P Kidney transplant.   - Renal allograft function:   Lab Results   Component Value Date    CREATININE 1.03 02/13/2025     CrCl cannot be calculated (Patient's most recent lab result is older than the maximum 3 days allowed.).    Intake/Output Summary (Last 24 hours) at 2/16/2025 2312  Last data filed at 2/16/2025 2024  Gross per 24 hour   Intake 50 ml   Output 825 ml   Net -775 ml     - Continue to monitor UOP and Serum creatinine closely.   - Avoid nephrotoxic agents, NSAIDs and IV contrast   - Strict I/O.   - Renally dose all medications by the most recent CrCl from Cockcroft-Gault formula.    2. Immunosuppression   Tacrolimus level = 5. 3   continue tacrolimus 2 mg BID  Myfortic 180 mg BID  Prednisone 5 mg daily    3. Anemia and WBC   Lab Results   Component Value Date    WBC 6.5 02/13/2025    HGB 9.5 (L) 02/13/2025    HCT 29.7 (L) 02/13/2025    MCV 88 02/13/2025     02/13/2025     -Continue to monitor Hgb   -No indications for PRBC transfusion     4. Electrolyte /Hyponatremia and Hyperkalemia - Resolved    Lab Results   Component Value Date    GLUCOSE 184 (H) 02/13/2025    CALCIUM 8.6 02/13/2025     02/13/2025    K 4.5 02/13/2025    CO2 23 02/13/2025     02/13/2025    BUN 23 02/13/2025    CREATININE 1.03 02/13/2025     Lab Results   Component Value Date    CALCIUM 8.6 02/13/2025    CAION 1.20 02/11/2025    PHOS 3.6 02/13/2025    VITD25 34 01/05/2025       - Reviewed renal profile.     6. Hypertension   Blood Pressures         2/16/2025  0445 2/16/2025  0841 2/16/2025  1136 2/16/2025  1700 2/16/2025 2024    BP: 180/73 168/63 126/69 154/71 154/75          -Goal BP < 140/90 mmHg   -continue current management     7. DKA  -Defer to endo  -Resolved    8.  GI  prophylaxis   - On PPI     9. DVT Prophylaxis  -Defer to primary team    10. UTI, Proteus + ESBL E. Coli  ID consult  Ertapenem x 2 weeks  Need PICC line    * Case was discussed with primary team.  For questions, please contact transplant nephrology page x [phone]    Bashir Angela MD    Transplant Nephrologist

## 2025-02-17 NOTE — PROGRESS NOTES
INPATIENT TRANSPLANT NEPHROLOGY PROGRESS NOTE          REASON FOR CONSULT:  Immunosuppressive medication management and nephrology related issues.    SUBJECTION:     No events overnight.   Ongoing diarrhea  Continue IV fluid  CMV PCR -pending      PHYCISCAL EXAMINATION:    Visit Vitals  /75 (BP Location: Right arm, Patient Position: Lying)   Pulse 97   Temp 36.8 °C (98.2 °F) (Temporal)   Resp 18   Wt 69.8 kg (153 lb 14.1 oz)   SpO2 98%   BMI 20.30 kg/m²   Smoking Status Never   BSA 1.9 m²        02/15 0700 - 02/16 1859  In: 994 [I.V.:894]  Out: 650 [Urine:650]     Weight change:     General Appearance - NAD, Good speech, oriented and alert  HEENT - Supple. Not pale. No jaundice. No cervical lymphadenopathy. Pharynx and tonsils are not injected.  CVS - RRR. Normal S1/S2. No murmur, click , rub or gallop  Lungs- clear to auscultation bilaterally  Abdomen - soft , not tender, no guarding, no rigidity. No hepatosplenomegaly. Normal bowel sounds. No masses and ascites. S/P Kidney transplant .  Transplanted kidney is not tender.   Musculoskeletal /Extremities - no edema. Full ROM. No joint tenderness.   Neuro/Psych - appropriate mood and affect. Motor power V/V all extremities. CN I -XII were grossly intact.  Skin - No visible rash    MEDICATION LIST: REVIEWED    amLODIPine, 10 mg, Daily  atovaquone, 1,500 mg, Daily  calcium polycarbophiL, 625 mg, Daily  cholecalciferol, 2,000 Units, Daily  ertapenem, 1 g, q24h  gabapentin, 200 mg, Daily  heparin (porcine), 5,000 Units, q8h KASSY  insulin glargine, 12 Units, Daily  insulin lispro, 0-5 Units, q4h  insulin lispro, 6 Units, TID AC  [Held by provider] insulin NPH (Isophane), 12 Units, q24h KASSY  [START ON 2/17/2025] lidocaine, 5 mL, Once  lidocaine, 2 patch, Daily  loperamide, 2 mg, 4x daily  metoclopramide, 5 mg, q8h  mycophenolate, 180 mg, q12h  nystatin, 5 mL, 4x daily  pantoprazole, 40 mg, BID AC  predniSONE, 5 mg, Daily  sodium bicarbonate, 1,300 mg,  q12h  sodium zirconium cyclosilicate, 5 g, Daily  tacrolimus, 2 mg, q12h KASSY  valGANciclovir, 450 mg, Daily           acetaminophen, 650 mg, q6h PRN  dextrose, 12.5 g, q15 min PRN  dextrose, 25 g, q15 min PRN  glucagon, 1 mg, q15 min PRN        ALLERGY:  Allergies   Allergen Reactions    Terazosin Unknown     Did not feel well on this medication    Codeine Itching     itching    Tramadol Itching       LABS:  Results for orders placed or performed during the hospital encounter of 25 (from the past 24 hours)   POCT GLUCOSE   Result Value Ref Range    POCT Glucose 122 (H) 74 - 99 mg/dL   POCT GLUCOSE   Result Value Ref Range    POCT Glucose 126 (H) 74 - 99 mg/dL   CMV DNA, quantitative, PCR   Result Value Ref Range    Cytomegalovirus DNA, PCR Log IU/ML      CMV DNA Result Not Detected Not Detected   Tacrolimus level   Result Value Ref Range    Tacrolimus  5.3 <=15.0 ng/mL   POCT GLUCOSE   Result Value Ref Range    POCT Glucose 156 (H) 74 - 99 mg/dL   POCT GLUCOSE   Result Value Ref Range    POCT Glucose 151 (H) 74 - 99 mg/dL   POCT GLUCOSE   Result Value Ref Range    POCT Glucose 172 (H) 74 - 99 mg/dL   POCT GLUCOSE   Result Value Ref Range    POCT Glucose 158 (H) 74 - 99 mg/dL   POCT GLUCOSE   Result Value Ref Range    POCT Glucose 96 74 - 99 mg/dL        ASSESSMENT AND PLAN:    Mr. Hanson is a 74 y.o. 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 Zamora with a KDPI of 93% and PRA of 54%. Donor was Hepc -/-and has not met risk factors. EBV + /+. Left donor kidney transplanted to patient right pelvis. Admission weight is 72.7 kg (discharge weight is 76.4 kg ). Pt received a total of 3 mg/kg total of thymoglobulin induction therapy in conjunction with 500mg IV solumedrol. CMV D-/R+. He had prolonged hospital course that included failure to thrive in adult with dehydration and diagnosis of severe protein-calorie  malnutrition, placement of PEG tube, and intolerance of tube feeds initially. He was switched to TPN briefly and his tube feed formula was adjusted as per the dietitian recommendations.     Patient presented to OSH for hyperglycemia without ketosis with glucose >527 and sodium 116, transferred to [facility] for evaluation in the subacute setting of recent kidney transplant. On arrival, patient is cooperative but is a poor historian and unable to remember the events that brought him to the hospital.     Transplant nephrology is consulted to assist with immunosuppressive medication management and nephrology related issues.       Principal Problem:    DKA, type 2, not at goal      1. ESRD S/P Kidney transplant.   - Renal allograft function:   Lab Results   Component Value Date    CREATININE 1.03 02/13/2025     CrCl cannot be calculated (Patient's most recent lab result is older than the maximum 3 days allowed.).    Intake/Output Summary (Last 24 hours) at 2/16/2025 2310  Last data filed at 2/16/2025 2024  Gross per 24 hour   Intake 50 ml   Output 825 ml   Net -775 ml     - Continue to monitor UOP and Serum creatinine closely.   - Avoid nephrotoxic agents, NSAIDs and IV contrast   - Strict I/O.   - Renally dose all medications by the most recent CrCl from Cockcroft-Gault formula.    2. Immunosuppression   Tacrolimus level = 5.2   continue tacrolimus 2 mg BID  Myfortic 180 mg BID  Prednisone 5 mg daily    3. Anemia and WBC   Lab Results   Component Value Date    WBC 6.5 02/13/2025    HGB 9.5 (L) 02/13/2025    HCT 29.7 (L) 02/13/2025    MCV 88 02/13/2025     02/13/2025     -Continue to monitor Hgb   -No indications for PRBC transfusion     4. Electrolyte /Hyponatremia and Hyperkalemia - Resolved    Lab Results   Component Value Date    GLUCOSE 184 (H) 02/13/2025    CALCIUM 8.6 02/13/2025     02/13/2025    K 4.5 02/13/2025    CO2 23 02/13/2025     02/13/2025    BUN 23 02/13/2025    CREATININE 1.03 02/13/2025      Lab Results   Component Value Date    CALCIUM 8.6 02/13/2025    CAION 1.20 02/11/2025    PHOS 3.6 02/13/2025    VITD25 34 01/05/2025       - Reviewed renal profile.     6. Hypertension   Blood Pressures         2/16/2025  0445 2/16/2025  0841 2/16/2025  1136 2/16/2025  1700 2/16/2025 2024    BP: 180/73 168/63 126/69 154/71 154/75          -Goal BP < 140/90 mmHg   -continue current management     7. DKA  -Defer to endo  -Resolved    8.  GI prophylaxis   - On PPI     9. DVT Prophylaxis  -Defer to primary team    10. UTI, Proteus + ESBL E. Coli  ID consult  Ertapenem x 2 weeks  Need PICC line    * Case was discussed with primary team.  For questions, please contact transplant nephrology page x [phone]    Bashir Angela MD    Transplant Nephrologist

## 2025-02-17 NOTE — POST-PROCEDURE NOTE
MIDLINE PLACEMENT   Pre-Procedure Checklist:  Emergent Line Insertion: No  Type of Line to be Placed: Midline  Consent Obtained: Yes  Emergency Medication Necessary: No  Patient Identified with 2 Independent Identifiers: Yes  Review of Allergies, Anticoagulation, Relevant Labs, ECG/Telemetry: Yes  Risks/Benefits/Alternatives Discussed with Patient/POA/Legal Representative: Yes  Stop Sign on Door: Yes  Time Out Performed: Yes  Catheter Exchange: No     Positioning Checklist:  All People, Including Patient, in the Room with Cap and Mask: Yes  Fluoroscopy Used to Identify Vessel and Guide Insertion: No   Sterile Cover Used: Yes  Full Barrier Precautions Followed (Mask, Cap, Gown, Gloves): Yes  Hands Washed: Yes  Monitors Attached with Sound Alarms On: No  Full Body Sterile Drape (Head-to-Toe) Used to Cover Patient: Yes  Trendelenburg Position (For IJ and Subclavian): No  CHG Skin Prep Used and Allowed to Air Dry to Skin Procedure: Yes     Procedure Checklist:  Blood Aspirated From All Lumens, All Ports Subsequently Flushed: Yes  Catheter Caps Placed on All Lumens; Lumens Clamped: Yes  Maintain Guidewire Control Throughout, Ensuring Guidewire Removal: Yes  Maintain Sterile Field Throughout Insertion: Yes  Catheter Secured: Yes  Confirmatory Test of Venous Placement: Non-Pulsatile Blood     Post Procedure Checklist:  Date and Time Written on Dressing: Yes  Sharp and Wire Count and Safe Disposal of all Sharps/Wires: Yes  Sterile Dressing Applied Per Protocol: Yes       MIDLINE Insertion Details:  Size (Fr): 3  Lumen Type: Single  Catheter to Vein Ratio Less Than 50%: Yes  Total Length (cm): 8  External Length (cm): 0  Orientation: right  Location: brachial  Site Prep: Chlorohexidine; Usual sterile procedure followed  Local Anesthetic: Injectable/Subcutaneous  Indication: IV meds  Insertion Team Members in the Room: Nurse, LPN  Initial Extremity Circumference (cm): 29  Insertion Attempts:1  Patient Tolerance: Tolerated  Ritu Fernandez was seen and treated in our emergency department on 11/22/2021. Please excuse Nicanornileshkarthik Dao from strenuous physical activity at work for the next 1 (one) week at a minimum.     Anthony Chau MD Well, Age Appropriate  Comfort Measures: Subcutaneous anesthetic; Verbal  Procedure Location: Bedside  Safety Measures: Patient specific safety measures addressed with RN  Estimated Blood Loss (mL): 0  Vessel Fully Compressible Proximally and Distally to Insertion Site: Yes  Brisk Blood Return Obtained and Line Draws Easily: Yes  Tip Location: distal axilla  Line Confirmation: blood return  Lot #: [id number]  : Bard  Line Exp Date:12/31/2025  Securement: Stat Lock  Post Procedure Checklist: Handoff with RN; Obtain all new IV tubing prior to use; Bed at lowest level and wheels locked; Line discharge information at bedside.  Additional Details: Line was inserted using Modified Seldinger's Technique.   Placed by: Roselia Acevedo RN-[facility]

## 2025-02-17 NOTE — PROGRESS NOTES
Pt needs PCP appt schedule prior to DC d/t LACE+ score >78. Team made aware.       MIKE MORENO

## 2025-02-18 ENCOUNTER — DOCUMENTATION (OUTPATIENT)
Dept: HOME HEALTH SERVICES | Facility: HOME HEALTH | Age: 75
End: 2025-02-18
Payer: COMMERCIAL

## 2025-02-18 ENCOUNTER — PHARMACY VISIT (OUTPATIENT)
Dept: PHARMACY | Facility: CLINIC | Age: 75
End: 2025-02-18
Payer: MEDICARE

## 2025-02-18 ENCOUNTER — APPOINTMENT (OUTPATIENT)
Dept: ENDOCRINOLOGY | Facility: CLINIC | Age: 75
End: 2025-02-18
Payer: COMMERCIAL

## 2025-02-18 VITALS
HEART RATE: 86 BPM | BODY MASS INDEX: 20.3 KG/M2 | RESPIRATION RATE: 16 BRPM | TEMPERATURE: 98.1 F | DIASTOLIC BLOOD PRESSURE: 78 MMHG | WEIGHT: 153.88 LBS | SYSTOLIC BLOOD PRESSURE: 146 MMHG | OXYGEN SATURATION: 98 %

## 2025-02-18 LAB
BKV DNA SERPL NAA+PROBE-LOG#: NORMAL {LOG_COPIES}/ML
EBV DNA SPEC NAA+PROBE-LOG#: NORMAL {LOG_COPIES}/ML
GLUCOSE BLD MANUAL STRIP-MCNC: 158 MG/DL (ref 74–99)
GLUCOSE BLD MANUAL STRIP-MCNC: 189 MG/DL (ref 74–99)
GLUCOSE BLD MANUAL STRIP-MCNC: 77 MG/DL (ref 74–99)
GLUCOSE BLD MANUAL STRIP-MCNC: 93 MG/DL (ref 74–99)
GLUCOSE BLD MANUAL STRIP-MCNC: 95 MG/DL (ref 74–99)
LABORATORY COMMENT REPORT: NOT DETECTED
LABORATORY COMMENT REPORT: NOT DETECTED
TACROLIMUS BLD-MCNC: 4.4 NG/ML

## 2025-02-18 PROCEDURE — 2500000004 HC RX 250 GENERAL PHARMACY W/ HCPCS (ALT 636 FOR OP/ED): Mod: JZ,TB | Performed by: PHYSICIAN ASSISTANT

## 2025-02-18 PROCEDURE — 2500000004 HC RX 250 GENERAL PHARMACY W/ HCPCS (ALT 636 FOR OP/ED)

## 2025-02-18 PROCEDURE — 97116 GAIT TRAINING THERAPY: CPT | Mod: GP | Performed by: PHYSICAL THERAPIST

## 2025-02-18 PROCEDURE — 99233 SBSQ HOSP IP/OBS HIGH 50: CPT | Performed by: HOSPITALIST

## 2025-02-18 PROCEDURE — 2500000001 HC RX 250 WO HCPCS SELF ADMINISTERED DRUGS (ALT 637 FOR MEDICARE OP): Performed by: PHYSICIAN ASSISTANT

## 2025-02-18 PROCEDURE — 82947 ASSAY GLUCOSE BLOOD QUANT: CPT

## 2025-02-18 PROCEDURE — 99232 SBSQ HOSP IP/OBS MODERATE 35: CPT

## 2025-02-18 PROCEDURE — 2500000001 HC RX 250 WO HCPCS SELF ADMINISTERED DRUGS (ALT 637 FOR MEDICARE OP)

## 2025-02-18 PROCEDURE — 80197 ASSAY OF TACROLIMUS: CPT

## 2025-02-18 PROCEDURE — 2500000002 HC RX 250 W HCPCS SELF ADMINISTERED DRUGS (ALT 637 FOR MEDICARE OP, ALT 636 FOR OP/ED): Performed by: PHYSICIAN ASSISTANT

## 2025-02-18 PROCEDURE — 2500000002 HC RX 250 W HCPCS SELF ADMINISTERED DRUGS (ALT 637 FOR MEDICARE OP, ALT 636 FOR OP/ED)

## 2025-02-18 PROCEDURE — RXMED WILLOW AMBULATORY MEDICATION CHARGE

## 2025-02-18 PROCEDURE — 99239 HOSP IP/OBS DSCHRG MGMT >30: CPT | Performed by: PHYSICIAN ASSISTANT

## 2025-02-18 RX ORDER — INSULIN GLARGINE 100 [IU]/ML
15 INJECTION, SOLUTION SUBCUTANEOUS DAILY
Status: DISCONTINUED | OUTPATIENT
Start: 2025-02-18 | End: 2025-02-18

## 2025-02-18 RX ORDER — CARVEDILOL 12.5 MG/1
12.5 TABLET ORAL 2 TIMES DAILY
Status: DISCONTINUED | OUTPATIENT
Start: 2025-02-18 | End: 2025-02-18 | Stop reason: HOSPADM

## 2025-02-18 RX ORDER — LACTOSE-REDUCED FOOD 0.05 G-1.5
40 LIQUID (ML) ORAL DAILY
Start: 2025-02-18 | End: 2025-03-20

## 2025-02-18 RX ORDER — LOPERAMIDE HYDROCHLORIDE 2 MG/1
2 CAPSULE ORAL 3 TIMES DAILY PRN
Qty: 30 CAPSULE | Refills: 0 | Status: SHIPPED | OUTPATIENT
Start: 2025-02-18 | End: 2025-02-28

## 2025-02-18 RX ORDER — NUT.TX.IMPAIRED DIGEST/FIBER 0.07 G-1.5
240 LIQUID (ML) ORAL
Start: 2025-02-18

## 2025-02-18 RX ORDER — INSULIN HUMAN 100 [IU]/ML
5 INJECTION, SUSPENSION SUBCUTANEOUS NIGHTLY
Start: 2025-02-18 | End: 2025-02-24 | Stop reason: SDUPTHER

## 2025-02-18 RX ORDER — INSULIN GLARGINE 100 [IU]/ML
12 INJECTION, SOLUTION SUBCUTANEOUS DAILY
Status: DISCONTINUED | OUTPATIENT
Start: 2025-02-18 | End: 2025-02-18 | Stop reason: HOSPADM

## 2025-02-18 RX ORDER — TACROLIMUS 1 MG/1
3 CAPSULE ORAL 2 TIMES DAILY
Qty: 180 CAPSULE | Refills: 0 | Status: SHIPPED | OUTPATIENT
Start: 2025-02-18 | End: 2025-02-24 | Stop reason: SDUPTHER

## 2025-02-18 RX ORDER — CARVEDILOL 12.5 MG/1
12.5 TABLET ORAL 2 TIMES DAILY
Qty: 60 TABLET | Refills: 0 | Status: SHIPPED | OUTPATIENT
Start: 2025-02-18 | End: 2025-02-24 | Stop reason: SDUPTHER

## 2025-02-18 RX ORDER — INSULIN GLARGINE 100 [IU]/ML
10 INJECTION, SOLUTION SUBCUTANEOUS EVERY MORNING
Start: 2025-02-18 | End: 2025-02-24 | Stop reason: SDUPTHER

## 2025-02-18 RX ORDER — INSULIN GLARGINE 100 [IU]/ML
12 INJECTION, SOLUTION SUBCUTANEOUS DAILY
Status: DISCONTINUED | OUTPATIENT
Start: 2025-02-19 | End: 2025-02-18

## 2025-02-18 RX ORDER — INSULIN ASPART INJECTION 100 [IU]/ML
3 INJECTION, SOLUTION SUBCUTANEOUS
Start: 2025-02-18 | End: 2025-02-24 | Stop reason: SDUPTHER

## 2025-02-18 RX ADMIN — LOPERAMIDE HYDROCHLORIDE 2 MG: 2 CAPSULE ORAL at 12:09

## 2025-02-18 RX ADMIN — SODIUM ZIRCONIUM CYCLOSILICATE 5 G: 10 POWDER, FOR SUSPENSION ORAL at 12:09

## 2025-02-18 RX ADMIN — PANTOPRAZOLE SODIUM 40 MG: 40 TABLET, DELAYED RELEASE ORAL at 06:35

## 2025-02-18 RX ADMIN — NYSTATIN 500000 UNITS: 500000 SUSPENSION ORAL at 06:36

## 2025-02-18 RX ADMIN — CARVEDILOL 12.5 MG: 12.5 TABLET, FILM COATED ORAL at 10:21

## 2025-02-18 RX ADMIN — SODIUM BICARBONATE 650 MG: 650 TABLET ORAL at 09:50

## 2025-02-18 RX ADMIN — ERTAPENEM SODIUM 1 G: 1 INJECTION, POWDER, LYOPHILIZED, FOR SOLUTION INTRAMUSCULAR; INTRAVENOUS at 12:21

## 2025-02-18 RX ADMIN — Medication 2000 UNITS: at 09:50

## 2025-02-18 RX ADMIN — INSULIN LISPRO 1 UNITS: 100 INJECTION, SOLUTION INTRAVENOUS; SUBCUTANEOUS at 12:20

## 2025-02-18 RX ADMIN — ATOVAQUONE 1500 MG: 750 SUSPENSION ORAL at 09:51

## 2025-02-18 RX ADMIN — CALCIUM POLYCARBOPHIL 625 MG: 625 TABLET, FILM COATED ORAL at 11:13

## 2025-02-18 RX ADMIN — HEPARIN SODIUM 5000 UNITS: 5000 INJECTION, SOLUTION INTRAVENOUS; SUBCUTANEOUS at 05:56

## 2025-02-18 RX ADMIN — VALGANCICLOVIR HYDROCHLORIDE 900 MG: 450 TABLET ORAL at 09:51

## 2025-02-18 RX ADMIN — DARBEPOETIN ALFA 100 MCG: 100 INJECTION, SOLUTION INTRAVENOUS; SUBCUTANEOUS at 14:44

## 2025-02-18 RX ADMIN — PREDNISONE 5 MG: 10 TABLET ORAL at 09:51

## 2025-02-18 RX ADMIN — TACROLIMUS 3 MG: 1 CAPSULE ORAL at 06:35

## 2025-02-18 RX ADMIN — MYCOPHENOLIC ACID 180 MG: 180 TABLET, DELAYED RELEASE ORAL at 06:35

## 2025-02-18 RX ADMIN — INSULIN LISPRO 6 UNITS: 100 INJECTION, SOLUTION INTRAVENOUS; SUBCUTANEOUS at 12:18

## 2025-02-18 RX ADMIN — INSULIN LISPRO 6 UNITS: 100 INJECTION, SOLUTION INTRAVENOUS; SUBCUTANEOUS at 10:11

## 2025-02-18 RX ADMIN — LOPERAMIDE HYDROCHLORIDE 2 MG: 2 CAPSULE ORAL at 06:36

## 2025-02-18 RX ADMIN — METOCLOPRAMIDE 5 MG: 10 TABLET ORAL at 09:51

## 2025-02-18 RX ADMIN — GABAPENTIN 200 MG: 100 CAPSULE ORAL at 09:50

## 2025-02-18 RX ADMIN — NYSTATIN 500000 UNITS: 500000 SUSPENSION ORAL at 12:09

## 2025-02-18 RX ADMIN — AMLODIPINE BESYLATE 10 MG: 10 TABLET ORAL at 09:50

## 2025-02-18 RX ADMIN — INSULIN GLARGINE 12 UNITS: 100 INJECTION, SOLUTION SUBCUTANEOUS at 11:09

## 2025-02-18 RX ADMIN — HEPARIN SODIUM 5000 UNITS: 5000 INJECTION, SOLUTION INTRAVENOUS; SUBCUTANEOUS at 14:43

## 2025-02-18 ASSESSMENT — COGNITIVE AND FUNCTIONAL STATUS - GENERAL
TOILETING: A LITTLE
STANDING UP FROM CHAIR USING ARMS: A LITTLE
WALKING IN HOSPITAL ROOM: A LITTLE
WALKING IN HOSPITAL ROOM: A LITTLE
MOVING FROM LYING ON BACK TO SITTING ON SIDE OF FLAT BED WITH BEDRAILS: A LITTLE
DAILY ACTIVITIY SCORE: 22
MOBILITY SCORE: 21
CLIMB 3 TO 5 STEPS WITH RAILING: TOTAL
MOVING TO AND FROM BED TO CHAIR: A LITTLE
CLIMB 3 TO 5 STEPS WITH RAILING: A LITTLE
MOBILITY SCORE: 16
STANDING UP FROM CHAIR USING ARMS: A LITTLE
TURNING FROM BACK TO SIDE WHILE IN FLAT BAD: A LITTLE
PERSONAL GROOMING: A LITTLE

## 2025-02-18 ASSESSMENT — PAIN SCALES - GENERAL
PAINLEVEL_OUTOF10: 0 - NO PAIN
PAINLEVEL_OUTOF10: 0 - NO PAIN

## 2025-02-18 ASSESSMENT — PAIN - FUNCTIONAL ASSESSMENT: PAIN_FUNCTIONAL_ASSESSMENT: 0-10

## 2025-02-18 NOTE — PROGRESS NOTES
Temo Hanson is a 74 y.o. male on day 9 of admission presenting with DKA, type 2, not at goal.    Subjective   Patient was seen and examined today, had no major complaints.  Appetite is good, eating ~ 50% of his meals, no nausea or vomiting.         Objective   General: up in chair, on RA  HEENT: AT/NC  Neck: trachea in midline, no thyromegaly or nodules  Resp: CTA B/L  CVS: normal s1 and s2  Abdomen: soft and non tender to palpation, BS+  Skin: warm, dry and intact  Neuro: AAO x3, DTR 2+  Psych: cooperative    Last Recorded Vitals  Blood pressure 174/74, pulse 95, temperature 36.5 °C (97.7 °F), temperature source Temporal, resp. rate 16, weight 69.8 kg (153 lb 14.1 oz), SpO2 95%.  Intake/Output last 3 Shifts:  I/O last 3 completed shifts:  In: 750 (10.7 mL/kg) [P.O.:750]  Out: 1025 (14.7 mL/kg) [Urine:1025 (0.4 mL/kg/hr)]  Weight: 69.8 kg     Relevant Results  Results from last 7 days   Lab Units 02/18/25  0816 02/18/25  0412 02/18/25  0042 02/17/25  2056 02/17/25  1910 02/17/25  0842 02/17/25  0631 02/13/25  0754 02/13/25  0520 02/12/25  0734 02/12/25  0540   POCT GLUCOSE mg/dL 95 77 93 90 108*   < >  --    < >  --    < >  --    GLUCOSE mg/dL  --   --   --   --   --   --  123*  --  184*  --  264*    < > = values in this interval not displayed.     .Scheduled medications  amLODIPine, 10 mg, oral, Daily  atovaquone, 1,500 mg, oral, Daily  calcium polycarbophiL, 625 mg, oral, Daily  carvedilol, 12.5 mg, oral, BID  cholecalciferol, 2,000 Units, oral, Daily  darbepoetin nohemi, 100 mcg, subcutaneous, Once  ertapenem, 1 g, intravenous, q24h  gabapentin, 200 mg, oral, Daily  heparin (porcine), 5,000 Units, subcutaneous, q8h KASSY  insulin glargine, 12 Units, subcutaneous, Daily  insulin lispro, 0-5 Units, subcutaneous, q4h  insulin lispro, 6 Units, subcutaneous, TID AC  [Held by provider] insulin NPH (Isophane), 12 Units, subcutaneous, q24h [facility]  lidocaine, 5 mL, infiltration, Once  lidocaine, 2 patch, transdermal,  Daily  loperamide, 2 mg, oral, 4x daily  metoclopramide, 5 mg, oral, BID  mycophenolate, 180 mg, oral, q12h  nystatin, 5 mL, Swish & Swallow, 4x daily  pantoprazole, 40 mg, oral, BID AC  predniSONE, 5 mg, oral, Daily  sodium bicarbonate, 650 mg, oral, q12h  sodium zirconium cyclosilicate, 5 g, oral, Daily  tacrolimus, 3 mg, oral, q12h KASSY  valGANciclovir, 900 mg, oral, Daily      Continuous medications     PRN medications  PRN medications: acetaminophen, dextrose, dextrose, glucagon     Results for orders placed or performed during the hospital encounter of 02/09/25 (from the past 24 hours)   POCT GLUCOSE   Result Value Ref Range    POCT Glucose 80 74 - 99 mg/dL   POCT GLUCOSE   Result Value Ref Range    POCT Glucose 108 (H) 74 - 99 mg/dL   POCT GLUCOSE   Result Value Ref Range    POCT Glucose 90 74 - 99 mg/dL   POCT GLUCOSE   Result Value Ref Range    POCT Glucose 93 74 - 99 mg/dL   POCT GLUCOSE   Result Value Ref Range    POCT Glucose 77 74 - 99 mg/dL   Tacrolimus level   Result Value Ref Range    Tacrolimus  4.4 <=15.0 ng/mL   POCT GLUCOSE   Result Value Ref Range    POCT Glucose 95 74 - 99 mg/dL   POCT GLUCOSE   Result Value Ref Range    POCT Glucose 158 (H) 74 - 99 mg/dL     *Note: Due to a large number of results and/or encounters for the requested time period, some results have not been displayed. A complete set of results can be found in Results Review.          Assessment/Plan   Assessment & Plan      Temo Hanson is a 74 y.o. 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  On chart review, labs from outside hospital reviewed and it was noted that patient had not been in DKA but had severe hyperglycemia (venous pH 7.4, BHB 0.14, ,  creatinine 1.6).  On arrival to [facility], insulin drip was discontinued and patient was started on regular insulin sliding scale #1 with meals with blood glucose ranging from 140-180s.  Endocrinology service consulted for management of diabetes regimen in setting of recent DDKT and chronic steroid use.     Diabetes History  Type of diabetes: 2  Year diagnosed at age: 46 years old  Hospitalizations for DKA or HHS: no recent episodes  Complications: Nephropathy s/p DDKT  Seen by PCP or Endocrinology: last visit with MARISOL Lainez endocrinology on 1/30/25  Frequency of glucose checks: 4-5x daily after meals  Glucose review: -170s on regular SS #1 q 4  Frequency of Hypoglycemia: none  Severe hypoglycemia requiring assistance from others:  No     Home Diet: Clear liquid diet with tube feeds at 60 cc/h from 7 PM to 10 AM     Home Medications  Basal: Basaglar 10 U q AM  Prandial: Humulin  15 units once at 7:00 pm with tube feeds, insulin aspart 3 units with meals  Correction: Aspart sliding scale #2 with meals     Steroids: Tablet prednisone 5 mg daily     2/12/25: Nutrition: 60g CHO per meal + Given persistent hyperglycemia and abdominal pain with night time continuous tube feeds, switched to bolus feed (250 ml) with meals TID and 40 ml/hr over 12 hours overnight. (Carbs: Lunch: 33gm, Dinner: 33gm, and cycle x 12 hours: 66gm)  2/14: Noted hypoglycemia overnight, NOROvirus +, did not receive NPH due to low BG  2/15: receiving tube feeds as scheduled but continues to have profuse diarrhea due to norovirus infection resulting in poor absorption and tighter blood glucose control.      Being treated for UTI, Norovirus   Area improving  ADOD 2/18: Patient will be going home  Per primary team the patient will be discharged on vital peptide 1.5 [same type of previous home tube feeds], which contains 224 g of carbs, however keeping the same rate and fashion of the inpatient tube feeds [between 8 PM and 8 AM, rate 40 mL  /h, to 40 mL boluses 3 times daily with meals]    Recommendations:  Insulin NPH 5 units subcutaneously with the start of tube feeds between 7 to 8 PM, if a.m. blood sugar is persistently above 150 for 3 consecutive days increase NPH dose from 5 to 10 units subcutaneously with the start of tube feeds.  Resume home Basaglar 10 units subcutaneously daily  Aspart 3 units subcutaneously 3 times daily premeals only  Aspart sliding scale insulin #2 (2 unit for every 50 above 150) 3 times daily premeals only  Fingerstick blood glucose 3 times daily premeals and AM fasting.    Patient has follow-up with Ximena Crum on March 18, 2025  Recommendations communicated to primary team over the phone   Case discussed with attending Dr. Alvarez.     MD Tiera Bai MD

## 2025-02-18 NOTE — DISCHARGE SUMMARY
Discharge Diagnosis  DKA, type 2, not at goal    Issues Requiring Follow-Up  -Home on FK 3/3, Myfortic 180 mg BID (please instruct the patient when to increase Myfortic), Prednisone 5 mg    -Had to switch back to Vital peptide for discharge due to insurance issues, on bolus feeds + cycled over night     -Has follow-up with Endocrinology outpatient, monitor blood glucose outpatient     -Home with PRN Imodium for supportive care     -Saccral/coccyx wound-- home with wound care     -CMV, EBV, and BK PCR on 2/16 and 2/17 negative    Test Results Pending At Discharge  Pending Labs       No current pending labs.            Hospital Course  Temo Hanson is a 74 y.o. male 74M ESRD on HD ([facility]) d/t diabetic nephropathy. DDKT 12/21/2024, uncomplicated postop course, most recently admitted for dehydration who represents to  after he was found to be in DKA with blood glucose levels in the 500s. Upon arrival to the SICU, blood glucose was in the 280s and patient was HDS. Patient is a poor historian and could not identify the event that caused his blood sugars to elevate.     He was transitioned off the insulin gtt and transferred to [facility]. Endocrine was also consulted for glucose management. Pt was transitioned to [employer] TF to help with blood sugars. He was been tolerating the new formula well.  Overnight on 2/11 to 2/12 he had significant hyperglycemia, tube feeds were held, Endocrinology engaged. The following morning he was re-started on enteral feeds, trialing bolus feeds with meals, then 40 ml/hr overnight with the [employer] peptide. Endocrine recs were implemented and his blood glucoses were better controlled. Wound care followed the patient as well during this admission for his pressure ulcer.     Urine culture grew Proteus mirabilis and resistant ESBL, which likely occurred after his ureteral stent was removed. Ertapenem IV was started, Cipro stopped and ID was consulted. He continued to have issues with  diarrhea and tested positive for Norovirus for which he was given supportive care, started on Imodium with improvement in his symptoms.  He is in stable condition to discharge home (family declining SNF) with home care for IV antibiotics, enteral feeds, wound care, PT/OT. Midline placed prior to discharge. He will follow up in clinic and have labs drawn per protocol.     Pertinent Physical Exam At Time of Discharge  Physical Exam  HENT:      Mouth/Throat:      Mouth: Mucous membranes are moist.   Cardiovascular:      Rate and Rhythm: Normal rate.      Pulses: Normal pulses.      Heart sounds: Normal heart sounds.   Pulmonary:      Effort: Pulmonary effort is normal.   Abdominal:      General: Abdomen is flat.      Palpations: Abdomen is soft.   Musculoskeletal:      Cervical back: Normal range of motion.   Skin:     General: Skin is warm.   Neurological:      General: No focal deficit present.      Mental Status: He is alert.   Psychiatric:         Mood and Affect: Mood normal.         Home Medications     Medication List      START taking these medications     atovaquone 750 mg/5 mL suspension; Commonly known as: Mepron; Take 10 mL   (1,500 mg) by mouth once daily.   carvedilol 12.5 mg tablet; Commonly known as: Coreg; Take 1 tablet (12.5   mg) by mouth 2 times a day. Hold for SBP <110 or HR <55   ertapenem IV; Commonly known as: INVanz; Infuse 50 mL (1 g) over 30   minutes into a venous catheter once every 24 hours for 10 days.   loperamide 2 mg capsule; Commonly known as: Imodium; Take 1 capsule (2   mg) by mouth 3 times a day as needed for diarrhea for up to 10 days.   sodium bicarbonate 650 mg tablet; Take 1 tablet (650 mg) by mouth every   12 hours.     CHANGE how you take these medications     Basaglar KwikPen U-100 Insulin 100 unit/mL (3 mL) pen; Generic drug:   insulin glargine; Inject 10 Units under the skin once daily in the   morning. Take as directed per insulin instructions.; What changed: how   much  to take; Notes to patient: Same time as prednisone   Children's acetaminophen 160 mg/5 mL liquid; Generic drug:   acetaminophen; Take 20.3 mL (650 mg) by g-tube route every 6 hours if   needed for pain.; What changed: reasons to take this; Notes to patient: As   needed for mild pain   * Fiasp FlexTouch U-100 Insulin 100 unit/mL (3 mL) injection; Generic   drug: insulin aspart (with niacinamide); Inject 0-10 Units under the skin   3 times a day before meals. 0 unit(s) if Blood glucose is between  2   unit(s) if Blood glucose is between 151-200 4 unit(s) if Blood glucose is   between 201-250 6 unit(s) if Blood glucose is between 251-300 8 unit(s) if   Blood glucose is between 301-350 10 unit(s) if Blood glucose is between   351-400  If blood glucose is greater than 400 mg/dL, give max insulin per   sliding scale AND then contact provider.; What changed: Another medication   with the same name was added. Make sure you understand how and when to   take each.   * Fiasp FlexTouch U-100 Insulin 100 unit/mL (3 mL) injection; Generic   drug: insulin aspart (with niacinamide); Inject 3 Units under the skin 3   times daily (morning, midday, late afternoon). Administer 3 units   subcutaneously before meal time boluses in addition to the sliding scale;   What changed: You were already taking a medication with the same name, and   this prescription was added. Make sure you understand how and when to take   each.   HumuLIN N NPH Insulin KwikPen 100 unit/mL (3 mL) injection; Generic   drug: insulin NPH (Isophane); Inject 5 Units under the skin once daily at   bedtime. Administer 1 hour before starting the tube feeds at 8 PM every   night. If your blood glucose is greater than 150 x 3 days in the AM,   please increase NPH dose to 10 units; What changed: how much to take, when   to take this, additional instructions   metoclopramide 5 mg tablet; Commonly known as: Reglan; Take 1 tablet (5   mg) by mouth 2 times daily (morning  "and late afternoon).; What changed:   when to take this   mycophenolate 180 mg EC tablet; Commonly known as: Myfortic; Take 1   tablet (180 mg) by mouth 2 times a day.; What changed: how much to take   tacrolimus 1 mg capsule; Commonly known as: Prograf; Take 3 capsules (3   mg) by mouth 2 times a day.; What changed: when to take this   * Vital 1.5 Chelita 0.07 gram- 1.5 kcal/mL liquid; Generic drug:   nut.tx.impaired dige fxn-fiber; 40 mL/hr by peg tube route once daily.   Cycled from 8 PM to 8 AM daily; What changed: how much to take, additional   instructions   * Vital Peptide 1.5 Chelita 0.07 gram- 1.5 kcal/mL liquid; Generic drug:   nut.tx.impaired dige fxn-fiber; Take 240 mL by mouth 3 times a day before   meals. Administer 240 mL with breakfast, lunch, and dinner; What changed:   You were already taking a medication with the same name, and this   prescription was added. Make sure you understand how and when to take   each.  * This list has 4 medication(s) that are the same as other medications   prescribed for you. Read the directions carefully, and ask your doctor or   other care provider to review them with you.     CONTINUE taking these medications     amLODIPine 10 mg tablet; Commonly known as: Norvasc; Take 1 tablet (10   mg) by mouth once daily.; Notes to patient: Lowers blood pressure   * BD Ultra-Fine Joan Pen Needle 32 gauge x 5/32\" needle; Generic drug:   pen needle, diabetic   * BD Joan 2nd Gen Pen Needle 32 gauge x 5/32\" needle; Generic drug: pen   needle, diabetic; 1 each 4 times a day. Use to inject insulin 4 times   daily   * BD Joan 2nd Gen Pen Needle 32 gauge x 5/32\" needle; Generic drug: pen   needle, diabetic; Use 1 needle once daily in the evening.  Take before   meals.   Blood Glucose Test strip; Generic drug: blood sugar diagnostic; Check   blood glucose 3 time per day   blood-glucose meter misc; Use to check blood glucose   calcium polycarbophiL 625 mg tablet; Commonly known as: Fibercon; Take " 1   tablet (625 mg) by mouth 2 times a day.   carboxymethylcellulose 0.5 % ophthalmic solution; Commonly known as:   Refresh Plus   Easy Touch Alcohol Prep Pads pads, medicated; Generic drug: alcohol   swabs; Apply 1 each topically 4 times a day. Use prior to checking glucose   or injecting insulin   FreeStyle Mick 2 Sensor kit; Generic drug: flash glucose sensor kit   FreeStyle Mick 3 Sensor device; Generic drug: blood-glucose sensor   gabapentin 100 mg capsule; Commonly known as: Neurontin; Take 2 capsules   (200 mg) by mouth once daily.; Notes to patient: Helps with pain   lidocaine 4 % patch; Apply 2 patches once daily. Keep on for 12 hours,   then remove and discard and keep off for 12 hours.   magnesium oxide 400 mg (241.3 mg magnesium) tablet; Commonly known as:   Mag-Ox; Take 1 tablet (400 mg) by mouth once daily.   nystatin 100,000 unit/mL suspension; Commonly known as: Mycostatin;   Swish and swallow 5 mL (500,000 Units) 4 times a day.   OneTouch Delica Plus Lancet 30 gauge misc; Generic drug: lancets; 1 each   4 times a day. Use to check glucose 4 times daily, before meals and at   bedtime   pantoprazole 40 mg EC tablet; Commonly known as: ProtoNix; Take 1 tablet   (40 mg) by mouth 2 times a day before meals. Do not crush, chew, or split.   predniSONE 5 mg tablet; Commonly known as: Deltasone; Take 1 tablet (5   mg) by mouth once daily.   Pro-Stat AWC  gram-kcal/30 mL liquid; Generic drug: amino   acids-protein hydrolys; 1 packet by peg tube route once daily.   rosuvastatin 10 mg tablet; Commonly known as: Crestor; Take 1 tablet (10   mg) by mouth once daily at bedtime.   sodium zirconium cyclosilicate 5 gram packet; Commonly known as:   Lokelma; Take 5 g by mouth once daily.   thiamine 100 mg tablet; Commonly known as: Vitamin B-1; Take 1 tablet   (100 mg) by mouth once daily.   valGANciclovir 450 mg tablet; Commonly known as: Valcyte; Take 2 tablets   (900 mg) by mouth once daily. Do not crush  or chew.   Vitamin D3 50 mcg (2,000 unit) tablet; Generic drug: cholecalciferol;   Take 1 tablet (2,000 Units) by mouth once daily.; Notes to patient:   Supplement  * This list has 3 medication(s) that are the same as other medications   prescribed for you. Read the directions carefully, and ask your doctor or   other care provider to review them with you.     STOP taking these medications     bisacodyl 10 mg suppository; Commonly known as: Dulcolax   lactulose 20 gram/30 mL oral solution   methocarbamol 500 mg tablet; Commonly known as: Robaxin   polyethylene glycol 17 gram packet; Commonly known as: Glycolax, Miralax     ASK your doctor about these medications     alteplase 2 mg injection; Commonly known as: Cathflo Activase; 2 mL (2   mg) by intra-catheter route 1 time for 1 dose.; Ask about: Should I take   this medication?; Notes to patient: For home care nurse use only       Outpatient Follow-Up  Future Appointments   Date Time Provider Department Center   2/19/2025 10:20 AM Aly Miguel MD [id number] [place]   2/19/2025 To Be Determined Shereen Miles RN [facility]   2/21/2025 To Be Determined Cookie Gamboa OT [facility]   2/21/2025 To Be Determined Doroteo Quinones PT [facility]   2/24/2025  8:00 AM TXP ABDOMINAL SURGEON [id number] [address]   3/13/2025  8:30 AM Sol Coley RDN, YANA [id number] [address]   3/13/2025  9:30 AM Tiffanie Crum PA-C [id number] [address]   3/18/2025 10:45 AM Tiffanie Crum PA-C [id number] [place]   9/16/2025 11:30 AM [facility] [id number]       Sherly García PA-C

## 2025-02-18 NOTE — PROGRESS NOTES
Temo Hanson is a 74 y.o. male on day 9 of admission presenting with DKA, type 2, not at goal.      Transitional Care Coordination Progress Note:  Patient discussed during interdisciplinary rounds.      Plan per Medical/Surgical team:    Home Care choice for home going needs, [facility].  PT/OT rec'd Mod for SNF.  Spoke with pt's daughter, Erica, per the pt's request. She informed me that they would like to take the pt home with HC. She will be helping take care of him as well.  [facility] made aware.  TF ordered through     [facility], [facility]  [place]/Learner: Priya  TF:      Discharge disposition:         Potential Barriers: None     ADOD:  2/18     This TCC will continue to follow for home going needs and safe DC plan.      MIKE MORENO

## 2025-02-18 NOTE — CONSULTS
Nutrition Note:   Nutrition Assessment    Reason for Assessment: Enteral assessment/recommendation (TF)  Contacted d/t unable to obtain [employer] peptide 1.5 for homecare.   Pt has a history of intolerance for Glucerna 1.5   Pt presented with DKA   Nutrition Significant Labs:  abs:   Results from last 7 days   Lab Units 02/17/25  0631 02/13/25  0520 02/12/25  0540   GLUCOSE mg/dL 123* 184* 264*   POTASSIUM mmol/L 4.4 4.5 4.6   PHOSPHORUS mg/dL 3.4 3.6 3.1   MAGNESIUM mg/dL 1.61 1.88 1.89   SODIUM mmol/L 139 136 132*   CHLORIDE mmol/L 107 104 105        Medications:  Scheduled medications  amLODIPine, 10 mg, oral, Daily  atovaquone, 1,500 mg, oral, Daily  calcium polycarbophiL, 625 mg, oral, Daily  carvedilol, 12.5 mg, oral, BID  cholecalciferol, 2,000 Units, oral, Daily  darbepoetin nohemi, 100 mcg, subcutaneous, Once  ertapenem, 1 g, intravenous, q24h  gabapentin, 200 mg, oral, Daily  heparin (porcine), 5,000 Units, subcutaneous, q8h KASSY  insulin glargine, 12 Units, subcutaneous, Daily  insulin lispro, 0-5 Units, subcutaneous, q4h  insulin lispro, 6 Units, subcutaneous, TID AC  [Held by provider] insulin NPH (Isophane), 12 Units, subcutaneous, q24h KASSY  lidocaine, 5 mL, infiltration, Once  lidocaine, 2 patch, transdermal, Daily  loperamide, 2 mg, oral, 4x daily  metoclopramide, 5 mg, oral, BID  mycophenolate, 180 mg, oral, q12h  nystatin, 5 mL, Swish & Swallow, 4x daily  pantoprazole, 40 mg, oral, BID AC  predniSONE, 5 mg, oral, Daily  sodium bicarbonate, 650 mg, oral, q12h  sodium zirconium cyclosilicate, 5 g, oral, Daily  tacrolimus, 3 mg, oral, q12h AKSSY  valGANciclovir, 900 mg, oral, Daily        I/O:   Last BM Date: 02/17/25; Stool Appearance: Soft (02/17/25 2000)    Dietary Orders (From admission, onward)       Start     Ordered    02/12/25 2000  Enteral feeding WITH diet order Diet type: Consistent Carb; Carb diet selection: CCD 60 gm/meal; Tube feeding formula: [employer] Peptide 1.5; 240; 3x per day, 240 mL  daily with Breakfast, Lunch, and Dinner; 40; 2000/0800; 40  Every 12 hours      Question Answer Comment   Diet type Consistent Carb    Carb diet selection: CCD 60 gm/meal    Tube feeding formula: [employer] Peptide 1.5    Tube feeding bolus (mL): 240    Tube feeding bolus frequency: 3x per day, 240 mL daily with Breakfast, Lunch, and Dinner    Tube feeding continuous rate (mL/hr): 40    Tube feeding cyclic (start / stop time): 2000/0800    Tube feeding cyclic rate (mL/hr): 40        02/12/25 1050    02/12/25 1050  Oral nutritional supplements  Until discontinued        Comments: Please give 240 mL via PEG with breakfast, lunch, and dinner   Question Answer Comment   Deliver with Breakfast    Deliver with Lunch    Deliver with Dinner    Select supplement: Product from home - please specify    Additional Details [employer] Peptide 1.5        02/12/25 1050    02/10/25 2306  May Not Participate in Room Service  ( ROOM SERVICE MAY NOT PARTICIPATE)  Once        Question:  .  Answer:  Yes    02/10/25 2305                     Estimated Needs:   Total Energy Estimated Needs in 24 hours (kCal):  (2025-4042)  Method for Estimating Needs: 30-35kcal/kg CBW  Total Protein Estimated Needs in 24 Hours (g): 87 g  Method for Estimating 24 Hour Protein Needs: 1.3g/kg CBW +  Total Fluid Estimated Needs in 24 Hours (mL):  (per team)           Nutrition Diagnosis   Malnutrition Diagnosis  Patient has Malnutrition Diagnosis: Yes  Diagnosis Status: Active  Malnutrition Diagnosis: Severe malnutrition related to chronic disease or condition  Related to: energy intake < energy expenditure  As Evidenced by: moderate- severe subcutaneous fat + muscle loss  Additional Assessment Information: pt requires PEG tube to supplement PO-  not sticking to his recommended TF regimen            Nutrition Interventions/Recommendations        Nutrition Recommendations:    This service continues to recommend   Breakfast: 240ml [employer] Peptide 1.5 via PEG  (33g CHO)   Lunch: 240ml [employer] Peptide 1.5 via PEG (33g CHO)  Dinner: 240ml [employer] Peptide via PEG (33g CHO)  + Cycle: [employer] Peptide 1.5 @ 40ml/hr x 12 hours (66g CHO)   ~ 1800kcal and 89gm protein   Pt to continue on PO diet during the day      However d/t inability to support [employer] Peptide 1.5 vial home care consider the following regimen.   Vital 1.5 @ 50ml/hr x 24 hours   Provides: 1200ml total volume, 1800kcal, 81g PRO, 917 ml free water   Additional water flushes per team, TF provides: 917 ml free water  If plan is to cycle TF: consider the following  Vital 1.5 @ 65ml/hr x 18 hours + 1pkt Prostat AWC daily   Continue to increase by 5mlQ6H until cycle goal has been reached  FWF per MD/team discretion   Provides: 1855kcal, 96g Protein         If plan is for bolus Vital 1.5 feeds:   Breakfast: 240ml Vital 1.5 via PEG   Lunch: 240ml Vital 1.5 via PEG  Dinner: 240ml Vital 1.5 via PEG   + Cycle: Vital 1.5 @ 40ml/hr x 12 hours     Provides: 1200ml total volume, 1800kcal, 81g PRO, 917 ml free water     Pt will need to be closely monitored d/t regimen and continued need for endocrine to manage insulin dosage.       Nutrition Monitoring and Evaluation   Food/Nutrient Related History Monitoring  Monitoring and Evaluation Plan: Enteral and parenteral nutrition intake determination  Estimated Energy Intake: Energy intake greater or equal to 75% of estimated energy needs  Enteral and Parenteral Nutrition Intake Determination: Enteral nutrition intake - Prevent refeeding, Enteral nutrition intake - Tolerate TF at goal rate, Enteral nutrition intake - To meet > 75% estimated energy needs    Anthropometric Measurements  Monitoring and Evaluation Plan: Body weight  Body Weight: Body weight - Maintain stable weight    Biochemical Data, Medical Tests and Procedures  Monitoring and Evaluation Plan: Electrolyte/renal panel, Glucose/endocrine profile  Electrolyte and Renal Panel: Electrolytes within normal  limits  Glucose/Endocrine Profile: Glucose within normal limits ( mg/dL)    Goal Status: Some progress toward goal(s)    Time Spent (min): 90 minutes

## 2025-02-18 NOTE — HH CARE COORDINATION
Home Care received a Referral to Resume Care for Infusion, Nursing, Physical Therapy, and Occupational Therapy. We have processed the referral for a Resumption of Care on 2/19/25.     If you have any questions or concerns, please feel free to contact us at [phone]. Follow the prompts, enter your five digit zip code, and you will be directed to your care team on [facility].      24-Apr-2024 24-Apr-2024 07:34 25-Apr-2024 17:00 23-Apr-2024 11:53 23-Apr-2024 12:35

## 2025-02-18 NOTE — PROGRESS NOTES
REASON FOR CONSULT:  Immunosuppressive medication management and nephrology related issues.    SUBJECTION:     No events overnight.   Diarrhea improved, on imodium    PHYCISCAL EXAMINATION:    Visit Vitals  /53 (BP Location: Right arm, Patient Position: Sitting)   Pulse 91   Temp 36.4 °C (97.5 °F) (Temporal)   Resp 18   Wt 69.8 kg (153 lb 14.1 oz)   SpO2 100%   BMI 20.30 kg/m²   Smoking Status Never   BSA 1.9 m²        02/16 1900 - 02/18 0659  In: 750 [P.O.:750]  Out: 1025 [Urine:1025]     Weight change: -5.6 kg (-12 lb 5.5 oz)    General Appearance - NAD, Good speech, oriented and alert  HEENT - Supple. Not pale. No jaundice. No cervical lymphadenopathy. Pharynx and tonsils are not injected.  CVS - RRR. Normal S1/S2. No murmur, click , rub or gallop  Lungs- clear to auscultation bilaterally  Abdomen - soft , not tender, no guarding, no rigidity. No hepatosplenomegaly. Normal bowel sounds. No masses and ascites. S/P Kidney transplant .  Transplanted kidney is not tender.   Musculoskeletal /Extremities - no edema. Full ROM. No joint tenderness.   Neuro/Psych - appropriate mood and affect. Motor power V/V all extremities. CN I -XII were grossly intact.  Skin - No visible rash    MEDICATION LIST: REVIEWED    amLODIPine, 10 mg, Daily  atovaquone, 1,500 mg, Daily  calcium polycarbophiL, 625 mg, Daily  carvedilol, 12.5 mg, BID  cholecalciferol, 2,000 Units, Daily  darbepoetin nohemi, 100 mcg, Once  ertapenem, 1 g, q24h  gabapentin, 200 mg, Daily  heparin (porcine), 5,000 Units, q8h KASSY  insulin glargine, 12 Units, Daily  insulin lispro, 0-5 Units, q4h  insulin lispro, 6 Units, TID AC  [Held by provider] insulin NPH (Isophane), 12 Units, q24h KASSY  lidocaine, 5 mL, Once  lidocaine, 2 patch, Daily  loperamide, 2 mg, 4x daily  metoclopramide, 5 mg, BID  mycophenolate, 180 mg, q12h  nystatin, 5 mL, 4x daily  pantoprazole, 40 mg, BID AC  predniSONE, 5 mg, Daily  sodium bicarbonate, 650 mg, q12h  sodium zirconium  cyclosilicate, 5 g, Daily  tacrolimus, 3 mg, q12h KASSY  valGANciclovir, 900 mg, Daily           acetaminophen, 650 mg, q6h PRN  dextrose, 12.5 g, q15 min PRN  dextrose, 25 g, q15 min PRN  glucagon, 1 mg, q15 min PRN        ALLERGY:  Allergies   Allergen Reactions    Terazosin Unknown     Did not feel well on this medication    Codeine Itching     itching    Tramadol Itching       LABS:  Results for orders placed or performed during the hospital encounter of 02/09/25 (from the past 24 hours)   POCT GLUCOSE   Result Value Ref Range    POCT Glucose 136 (H) 74 - 99 mg/dL   POCT GLUCOSE   Result Value Ref Range    POCT Glucose 80 74 - 99 mg/dL   POCT GLUCOSE   Result Value Ref Range    POCT Glucose 108 (H) 74 - 99 mg/dL   POCT GLUCOSE   Result Value Ref Range    POCT Glucose 90 74 - 99 mg/dL   POCT GLUCOSE   Result Value Ref Range    POCT Glucose 93 74 - 99 mg/dL   POCT GLUCOSE   Result Value Ref Range    POCT Glucose 77 74 - 99 mg/dL   Tacrolimus level   Result Value Ref Range    Tacrolimus  4.4 <=15.0 ng/mL   POCT GLUCOSE   Result Value Ref Range    POCT Glucose 95 74 - 99 mg/dL   POCT GLUCOSE   Result Value Ref Range    POCT Glucose 158 (H) 74 - 99 mg/dL     *Note: Due to a large number of results and/or encounters for the requested time period, some results have not been displayed. A complete set of results can be found in Results Review.        ASSESSMENT AND PLAN:    74 y.o. M with DM,  ESRD s/p DDKT on 12/21/24 , KDPI of 93% and PRA of 54%. CMV D-/R+. He had prolonged hospital course that included achalasia, failure to thrive s/p PEG tube, and intolerance of tube feeds initially. He was switched to TPN briefly and his tube feed.     Admitted for DKA/UTI and now with Norovirus diarrhea. We cut back Myfortic to 180 mg bid. He also has ESBL E. Coli and Proteus. ID consulted and recommended Ertapenem till 2/25. Will get a midline tomorrow. Tube feeds: bolus feeds and reduced rate for 12 hr cycle+ Imodium 2mg q6 Hr. Cr  is stable, 1.0. He has EBV and  <35 on 2/9/25- pending repeat result. Repeat CMV not detected 2/26. Over due for allosure. Awaiting placement.     Transplant nephrology is consulted to assist with immunosuppressive medication management and nephrology related issues.       Principal Problem:    DKA, type 2, not at goal      1. ESRD S/P Kidney transplant.   - Renal allograft function:   Lab Results   Component Value Date    CREATININE 0.86 02/17/2025     Estimated Creatinine Clearance: 74.4 mL/min (by C-G formula based on SCr of 0.86 mg/dL).    Intake/Output Summary (Last 24 hours) at 2/18/2025 1306  Last data filed at 2/18/2025 1221  Gross per 24 hour   Intake 750 ml   Output 900 ml   Net -150 ml     - Continue to monitor UOP and Serum creatinine closely.   - Avoid nephrotoxic agents, NSAIDs and IV contrast   - Strict I/O.   - Renally dose all medications by the most recent CrCl from Cockcroft-Gault formula.    2. Immunosuppression   Tacrolimus level = 4.4  Increased to Tacrolimus 3 mg BID 2/17/2025  Myfortic 180 mg BID  Prednisone 5 mg daily    3. Anemia and WBC   Lab Results   Component Value Date    WBC 4.9 02/17/2025    HGB 8.4 (L) 02/17/2025    HCT 27.7 (L) 02/17/2025    MCV 94 02/17/2025     02/17/2025     -Continue to monitor Hgb   -No indications for PRBC transfusion     4. Electrolyte /Hyponatremia and Hyperkalemia - Resolved    Lab Results   Component Value Date    GLUCOSE 123 (H) 02/17/2025    CALCIUM 8.6 02/17/2025     02/17/2025    K 4.4 02/17/2025    CO2 26 02/17/2025     02/17/2025    BUN 14 02/17/2025    CREATININE 0.86 02/17/2025     Lab Results   Component Value Date    CALCIUM 8.6 02/17/2025    CAION 1.20 02/11/2025    PHOS 3.4 02/17/2025    VITD25 34 01/05/2025       - Reviewed renal profile.     6. Hypertension   Blood Pressures         2/17/2025  1914 2/18/2025  0047 2/18/2025  0330 2/18/2025  0813 2/18/2025  1204    BP: 132/70 162/68 169/59 174/74 123/53          -Goal BP <  140/90 mmHg   -continue current management     7. DKA  - Resolved    8.  GI prophylaxis   - On PPI     9. DVT Prophylaxis  -Defer to primary team    10. UTI, Proteus + ESBL E. Coli  ID consult  Ertapenem x 2 weeks  Need PICC line      Ayse Scott MD  Nephrology Fellow  Transplant nephrology page x [phone]

## 2025-02-18 NOTE — NURSING NOTE
4 Eyes Skin Assessment    Reason for assessment? Weekly skin assessment  Does the patient have a wound? yes, sacral wound MIGUEL appropriately due to large amount of triad cream over sacral wound that appears to have 2 scabs on it that I felt.  Wound RN consult placed? No  Preventative foam dressing applied? Yes - sacrum      Four eyes skin check performed with Paz Gandhi RN.

## 2025-02-18 NOTE — SIGNIFICANT EVENT
02/18/25 1130   Patient Interaction   Organ Kidney   Type of Interaction Morning rounds   Interdisciplinary Rounds   Attendance Surgeon;Physician;FILI;Pharmacist;Nurse   Topics Discussed Diet;Home care needs;Medications;Blood test results;Discharge preparation;Activity     Transplant Surgery Multidisciplinary Team Note    Temo Hanson is a 74 y.o. male   s/p DDKT 12/21/24 readmitted for hyperglycemia.     24 Hour Events  1. No acute events overnight, diarrhea slowing down     Last Recorded Vitals  Visit Vitals  /74 (BP Location: Right arm, Patient Position: Sitting)   Pulse 95   Temp 36.5 °C (97.7 °F) (Temporal)   Resp 16      Intake/Output last 3 Shifts:    Intake/Output Summary (Last 24 hours) at 2/18/2025 1130  Last data filed at 2/18/2025 0500  Gross per 24 hour   Intake 490 ml   Output 400 ml   Net 90 ml      Vitals:    02/18/25 0600   Weight: 69.8 kg (153 lb 14.1 oz)        Assessment/Plan     Kidney allograft function  -ALLEY resolved     Cardiac  -Persistent HTN  -Continue amlodipine 10 mg   -Start Coreg 12.5 mg BID, first dose now STAT      FENGI  -tolerating bolus feeds with meals + 40 ml/hr over 12 hours from 8 PM to 8 AM  -Diarrhea improving with scheduled Imodium      ENDO  -Follow-up Endo recs   -BG in better control now with bolus feeds      ID   -Proteus mirablilis + resistant ESBL UTI-- continue ertapenem through 2/25   -+Norovirus, supportive care        Principal Problem:    Management after organ transplant  Active Problems:  Patient Active Problem List   Diagnosis    S/P laparoscopic cholecystectomy    Abdominal pain    Achalasia    Acute lower UTI    Microcytic anemia    Complete heart block    Callus of foot    Chronic periodontitis, unspecified    Stage 5 chronic kidney disease (Multi)    Closed fracture of metatarsal bone    Crushing injury of foot    Diabetes mellitus (Multi)    Diarrhea    Dysphagia    ED (erectile dysfunction)    Essential hypertension    Foot pain, right    GIB  (gastrointestinal bleeding)    Hepatitis B core antibody positive    Hyperkalemia    Ingrowing nail    Iron deficiency anemia secondary to inadequate dietary iron intake    Long toenail    Malignant neoplasm of prostate (Multi)    Onychomycosis    Pheochromocytoma of right adrenal gland    Pneumonia of right lower lobe due to infectious organism    Productive cough    Pure hypercholesterolemia    Stricture esophagus    SVT (supraventricular tachycardia) (CMS-HCC)    Type 2 diabetes mellitus with diabetic neuropathy (Multi)    Preop cardiovascular exam    Third degree heart block    Pericardial effusion ([facility])    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 (Multi)    Uremia    Cardiac tamponade    Cardiac pacemaker in situ    ESRD (end stage renal disease) (Multi)    Type 2 diabetes mellitus, with long-term current use of insulin    Dehydration    Alactasia syndrome    Type 2 diabetes mellitus with hyperglycemia, with long-term current use of insulin    On tube feeding diet    Kidney transplant recipient ([facility])    DKA, type 2, not at goal        Immunosuppression reviewed and adjusted          Tacrolimus goal 8-10 ng/mL. Current dose 2 mg BID                Myfortic decreased to 180 mg po BID              Solumedrol taper  DVT prophylaxis SCDS and subcutaneous heparin 5000 TID  PT/OT recommending SNF.   Diet:  TF with diet  Anticipated discharge today, 2/18     Sherly García PA-C

## 2025-02-18 NOTE — CARE PLAN
The patient's goals for the shift include      The clinical goals for the shift include pt will remain hds throughout shift      Problem: Discharge Planning  Goal: Discharge to home or other facility with appropriate resources  Outcome: Progressing     Problem: Chronic Conditions and Co-morbidities  Goal: Patient's chronic conditions and co-morbidity symptoms are monitored and maintained or improved  Outcome: Progressing     Problem: Nutrition  Goal: Nutrient intake appropriate for maintaining nutritional needs  Outcome: Progressing     Problem: Diabetes  Goal: Achieve decreasing blood glucose levels by end of shift  Outcome: Progressing  Goal: Increase stability of blood glucose readings by end of shift  Outcome: Progressing  Goal: Decrease in ketones present in urine by end of shift  Outcome: Progressing  Goal: Maintain electrolyte levels within acceptable range throughout shift  Outcome: Progressing  Goal: Maintain glucose levels >70mg/dl to <250mg/dl throughout shift  Outcome: Progressing  Goal: No changes in neurological exam by end of shift  Outcome: Progressing  Goal: Learn about and adhere to nutrition recommendations by end of shift  Outcome: Progressing  Goal: Vital signs within normal range for age by end of shift  Outcome: Progressing  Goal: Increase self care and/or family involovement by end of shift  Outcome: Progressing  Goal: Receive DSME education by end of shift  Outcome: Progressing     Problem: Skin  Goal: Decreased wound size/increased tissue granulation at next dressing change  Outcome: Progressing  Goal: Participates in plan/prevention/treatment measures  Outcome: Progressing  Goal: Prevent/manage excess moisture  Outcome: Progressing  Goal: Prevent/minimize sheer/friction injuries  Outcome: Progressing  Goal: Promote/optimize nutrition  Outcome: Progressing  Goal: Promote skin healing  Outcome: Progressing

## 2025-02-18 NOTE — PROGRESS NOTES
Occupational Therapy                 Therapy Communication Note    Patient Name: Temo Hanson  MRN: [id number]  Department: [place] [address]  Room: [facility]  Today's Date: 2/18/2025     Discipline: Occupational Therapy    OT Missed Visit: Yes     Missed Visit Reason: Missed Visit Reason:  (RN passing medication and pt about to begin eating breakfast - will follow up as able)    Missed Time: Attempt    Comment:

## 2025-02-18 NOTE — PROGRESS NOTES
Physical Therapy    Physical Therapy Treatment    Patient Name: Temo Hanson  MRN: [id number]  Department: [place] [address]  Room: [address] [place] [address]  Today's Date: 2/18/2025  Time Calculation  Start Time: 1243  Stop Time: 1311  Time Calculation (min): 28 min         Assessment/Plan   PT Assessment  PT Assessment Results: Decreased strength, Decreased endurance, Impaired balance, Decreased mobility, Pain, Decreased safety awareness, Impaired judgement  Rehab Prognosis: Good  Barriers to Discharge Home: Physical needs  Physical Needs: Stair navigation into home limited by function/safety, Stair navigation to access bed limited by function/safety, Stair navigation to access bath limited by function/safety, Ambulating household distances limited by function/safety  Evaluation/Treatment Tolerance: Patient tolerated treatment well  Medical Staff Made Aware: Yes  End of Session Communication: Bedside nurse  Assessment Comment: Pt demonstrate progress in total distance ambulated. He continues to demonstrate impaired dynamic balance and strength. It is recommeded that the patient continue to receive skilled physical therapy to improve overall functional mobility  End of Session Patient Position: Up in chair, Alarm off, not on at start of session  PT Plan  Inpatient/Swing Bed or Outpatient: Inpatient  PT Plan  Treatment/Interventions: Bed mobility, Transfer training, Gait training, Stair training, Balance training, Neuromuscular re-education, Strengthening, Endurance training, Therapeutic activity, Therapeutic exercise, Home exercise program, Orthotic fitting/training, Postural re-education, Positioning  PT Plan: Ongoing PT  PT Frequency: 5 times per week  PT Discharge Recommendations: Moderate intensity level of continued care  Equipment Recommended upon Discharge: Wheeled walker  PT Recommended Transfer Status: Assist x1  PT - OK to Discharge: Yes (Once medically cleared)      General Visit Information:   PT  Visit  PT Received On:  02/18/25  General  Reason for Referral: 74 YOM, presented to OSH for unclear reason and was found to have hyperglycemia without ketosis with glucose >527 and sodium 116,  Past Medical History Relevant to Rehab: PMH HTN, DMII, ESRD ([facility] MWF) 2/2 diabetic nephropathy, DDKT 12/2024  Family/Caregiver Present: No  Co-Treatment Reason: Maximize mobility safely  Prior to Session Communication: Bedside nurse  Preferred Learning Style: visual, verbal  General Comment: Pt found sitting in chair ad was agreeable to therapy    Subjective   Precautions:  Precautions  Medical Precautions: Fall precautions, Abdominal precautions  Post-Surgical Precautions: Abdominal surgery precautions  Precautions Comment: MAP 65-90     Date/Time Vitals Session Patient Position Pulse Resp SpO2 BP MAP (mmHg)    02/18/25 1204 --  --  91  18  100 %  123/53  76                 Objective   Pain:  Pain Assessment  Pain Assessment: 0-10  0-10 (Numeric) Pain Score: 0 - No pain  Cognition:     Coordination:     Postural Control:  Static Sitting Balance  Static Sitting-Balance Support: No upper extremity supported, Feet supported  Static Sitting-Level of Assistance: Close supervision  Dynamic Sitting Balance  Dynamic Sitting-Balance Support: No upper extremity supported, Feet supported  Dynamic Sitting-Level of Assistance: Distant supervision  Static Standing Balance  Static Standing-Balance Support: Bilateral upper extremity supported  Static Standing-Level of Assistance: Contact guard  Dynamic Standing Balance  Dynamic Standing-Balance Support: Bilateral upper extremity supported  Dynamic Standing-Level of Assistance: Minimum assistance    Activity Tolerance:  Activity Tolerance  Endurance: Tolerates 30 min exercise with multiple rests  Early Mobility/Exercise Safety Screen: Proceed with mobilization - No exclusion criteria met  Treatments:  Bed Mobility  Bed Mobility: No    Ambulation/Gait Training  Ambulation/Gait Training Performed:  Yes  Ambulation/Gait Training 1  Surface 1: Level tile  Device 1: Rolling walker  Assistance 1: Minimum assistance  Quality of Gait 1: Forward flexed posture, Decreased step length  Comments/Distance (ft) 1: 60 feet  Transfers  Transfer: Yes  Transfer 1  Transfer From 1: Sit to  Transfer to 1: Sit, Stand  Technique 1: Sit to stand, Stand to sit  Transfer Device 1: Walker  Transfer Level of Assistance 1: Contact guard  Trials/Comments 1: Pt performed this task twice during session    Outcome Measures:  [facility] Basic Mobility  Turning from your back to your side while in a flat bed without using bedrails: A little  Moving from lying on your back to sitting on the side of a flat bed without using bedrails: A little  Moving to and from bed to chair (including a wheelchair): A little  Standing up from a chair using your arms (e.g. wheelchair or bedside chair): A little  To walk in hospital room: A little  Climbing 3-5 steps with railing: Total  Basic Mobility - Total Score: 16    Education Documentation  Body Mechanics, taught by Rosaura Alvarado PT at 2/18/2025  1:25 PM.  Learner: Patient  Readiness: Acceptance  Method: Explanation  Response: Verbalizes Understanding    Precautions, taught by Rosaura Alvarado PT at 2/18/2025  1:25 PM.  Learner: Patient  Readiness: Acceptance  Method: Explanation  Response: Verbalizes Understanding    Mobility Training, taught by Rosaura Alvarado PT at 2/18/2025  1:25 PM.  Learner: Patient  Readiness: Acceptance  Method: Explanation  Response: Verbalizes Understanding    Education Comments  No comments found.        OP EDUCATION:       Encounter Problems       Encounter Problems (Active)       Balance       STG - Maintains dynamic sitting balance SUP without upper extremity support (Progressing)       Start:  02/10/25    Expected End:  03/03/25            Pt will score >24/28 to decrease risk of falls. (Progressing)       Start:  02/10/25    Expected End:  03/03/25                Mobility       Pt will amb >150' w/ LRAD SUP to maximize pt's LOF.  (Progressing)       Start:  02/10/25    Expected End:  03/03/25            Pt will increase BLE strength grossly to 4+/5 to aid in functional transfers and gait quality.  (Progressing)       Start:  02/10/25    Expected End:  03/03/25               PT Transfers       Pt will transfer sit<>stand w/ LRAD SUP to improve functional mobility.  (Progressing)       Start:  02/10/25    Expected End:  03/03/25               Pain - Adult

## 2025-02-18 NOTE — DISCHARGE INSTRUCTIONS
insulin NPH, Isophane, (HumuLIN N NPH Insulin KwikPen) 100 unit/mL (3 mL) injection   Inject 5 Units under the skin once daily at bedtime. Administer 1 hour before starting the tube feeds at 8 PM every night. If your blood glucose is greater than 150 x 3 consecutive days in the AM, please increase NPH dose to 10 units  Continue home Basaglar 10 units subcutaneously daily  Aspart 3 units subcutaneously 3 times daily premeals only  Aspart sliding scale insulin #2 (2 unit for every 50 above 150) 3 times daily premeals only  Fingerstick blood glucose 3 times daily premeals and AM fasting.  If not eating, please hold the Aspart 3 units with meals, continue to check blood glucose and the sliding scale insulin

## 2025-02-18 NOTE — CARE PLAN
The patient's goals for the shift include      The clinical goals for the shift include patient will remain hds throughout shift    Problem: Discharge Planning  Goal: Discharge to home or other facility with appropriate resources  Outcome: Progressing     Problem: Chronic Conditions and Co-morbidities  Goal: Patient's chronic conditions and co-morbidity symptoms are monitored and maintained or improved  Outcome: Progressing     Problem: Nutrition  Goal: Nutrient intake appropriate for maintaining nutritional needs  Outcome: Progressing     Problem: Diabetes  Goal: Achieve decreasing blood glucose levels by end of shift  Outcome: Progressing  Goal: Increase stability of blood glucose readings by end of shift  Outcome: Progressing  Goal: Decrease in ketones present in urine by end of shift  Outcome: Progressing  Goal: Maintain electrolyte levels within acceptable range throughout shift  Outcome: Progressing  Goal: Maintain glucose levels >70mg/dl to <250mg/dl throughout shift  Outcome: Progressing  Goal: No changes in neurological exam by end of shift  Outcome: Progressing  Goal: Learn about and adhere to nutrition recommendations by end of shift  Outcome: Progressing  Goal: Vital signs within normal range for age by end of shift  Outcome: Progressing  Goal: Increase self care and/or family involovement by end of shift  Outcome: Progressing  Goal: Receive DSME education by end of shift  Outcome: Progressing     Problem: Skin  Goal: Decreased wound size/increased tissue granulation at next dressing change  Outcome: Progressing  Goal: Participates in plan/prevention/treatment measures  Outcome: Progressing  Goal: Prevent/manage excess moisture  Outcome: Progressing  Goal: Prevent/minimize sheer/friction injuries  Outcome: Progressing  Goal: Promote/optimize nutrition  Outcome: Progressing  Goal: Promote skin healing  Outcome: Progressing

## 2025-02-19 ENCOUNTER — TELEPHONE (OUTPATIENT)
Facility: HOSPITAL | Age: 75
End: 2025-02-19
Payer: COMMERCIAL

## 2025-02-19 ENCOUNTER — HOME CARE VISIT (OUTPATIENT)
Dept: HOME HEALTH SERVICES | Facility: HOME HEALTH | Age: 75
End: 2025-02-19
Payer: COMMERCIAL

## 2025-02-19 VITALS
SYSTOLIC BLOOD PRESSURE: 118 MMHG | DIASTOLIC BLOOD PRESSURE: 78 MMHG | RESPIRATION RATE: 18 BRPM | TEMPERATURE: 98.1 F | HEART RATE: 97 BPM | OXYGEN SATURATION: 99 %

## 2025-02-19 PROCEDURE — G0299 HHS/HOSPICE OF RN EA 15 MIN: HCPCS

## 2025-02-19 ASSESSMENT — ENCOUNTER SYMPTOMS
MUSCLE WEAKNESS: 1
PERSON REPORTING PAIN: PATIENT
DENIES PAIN: 1

## 2025-02-19 ASSESSMENT — ACTIVITIES OF DAILY LIVING (ADL)
ENTERING_EXITING_HOME: STAND BY ASSIST
OASIS_M1830: 03
AMBULATION ASSISTANCE: STAND BY ASSIST
CURRENT_FUNCTION: STAND BY ASSIST

## 2025-02-19 NOTE — TELEPHONE ENCOUNTER
Call placed to patient to check in after discharge yesterday. Patient reports doing okay, he feels good and is happy he is home. He reports the home care RN and his daughter were there this morning and he already did his antibiotic for the day and tube feeds are going well. NFQ at this time. Instructed patient to call with any questions or concerns.

## 2025-02-19 NOTE — DOCUMENTATION CLARIFICATION NOTE
"    PATIENT:               TAMAR ADAMS  ACCT #:                  [id number]  MRN:                       [id number]  :                       1950  ADMIT DATE:       2025 12:30 PM  DISCH DATE:        2025 5:00 PM  RESPONDING PROVIDER #:        [id number]          PROVIDER RESPONSE TEXT:    Deep Tissue Pressure Injury, stage 2    CDI QUERY TEXT:    Clarification        Instruction:    Based on your assessment of the patient and the clinical information, please provide the requested documentation by clicking on the appropriate radio button and enter any additional information if prompted.    Question: Please clarify the type of ulcer sacrum    When answering this query, please exercise your independent professional judgment. The fact that a question is being asked, does not imply that any particular answer is desired or expected.    The patient's clinical indicators include:  Clinical Information: 74 yr old male  hx HTN, diabetes, s/p kidney transplant 24 who was transferred from [facility] with DKA, hyponatremia    Clinical Indicators: sacral ulcer,  Documentation from nutrition consult on 2/10/25 \"Physical Findings:  Skin: Positive (sacrum + buttock)    Positive Skin Findings: Pressure injury stage 2\"  Wound nurse consult on  \"Non-staged Wound Description Partial thickness 25 1100  Pressure Injury Stage 2 25 1100       Shape round 25 1100       Wound Length (cm) 0.5 cm 25 1100\"  From discharge summary \"Wound care followed the patient as well during this admission for his pressure ulcer.\"    Treatment: From wound nurse \"Cleanse the wound with Vashe wound cleanser and gently pat dry  Triad can be applied directly from the tube or by using a gloved finger. Gently spread Triad evenly over the area of application to the thickness of a dime. Remove all triad cream every 3 days, Use vashe wound cleanser to soften the Triad cream,. Gently wipe to remove without scrubbing. the perineal " "area, reapply Triad after each episode of incontinence.  Optional: Apply a sacrum Mepilex border dressing for comfort        No briefs at this time due to incontinence and risk for incontinence associated  dermatitis \"    Risk Factors: advanced age. diabetes, severe malnutrition  Options provided:  -- Diabetic ulcer  -- Deep Tissue Pressure Injury, stage 2  -- Other - I will add my own diagnosis  -- Refer to Clinical Documentation Reviewer    Query created by: Steffany Mcdonald on 2/19/2025 8:45 AM      Electronically signed by:  NIKUNJ ARSHAD PA-C 2/19/2025 1:01 PM          "

## 2025-02-20 ENCOUNTER — PATIENT OUTREACH (OUTPATIENT)
Dept: CARE COORDINATION | Age: 75
End: 2025-02-20
Payer: COMMERCIAL

## 2025-02-20 ENCOUNTER — HOME CARE VISIT (OUTPATIENT)
Dept: HOME HEALTH SERVICES | Facility: HOME HEALTH | Age: 75
End: 2025-02-20
Payer: COMMERCIAL

## 2025-02-20 VITALS
DIASTOLIC BLOOD PRESSURE: 86 MMHG | RESPIRATION RATE: 12 BRPM | OXYGEN SATURATION: 99 % | HEART RATE: 85 BPM | TEMPERATURE: 98.8 F | SYSTOLIC BLOOD PRESSURE: 140 MMHG

## 2025-02-20 PROCEDURE — G0299 HHS/HOSPICE OF RN EA 15 MIN: HCPCS

## 2025-02-20 ASSESSMENT — ENCOUNTER SYMPTOMS: APPETITE LEVEL: FAIR

## 2025-02-21 ENCOUNTER — PATIENT OUTREACH (OUTPATIENT)
Dept: CARE COORDINATION | Age: 75
End: 2025-02-21
Payer: COMMERCIAL

## 2025-02-21 DIAGNOSIS — Z94.0 KIDNEY REPLACED BY TRANSPLANT (HHS-HCC): ICD-10-CM

## 2025-02-22 ENCOUNTER — PATIENT OUTREACH (OUTPATIENT)
Dept: CARE COORDINATION | Age: 75
End: 2025-02-22
Payer: COMMERCIAL

## 2025-02-22 VITALS
OXYGEN SATURATION: 96 % | SYSTOLIC BLOOD PRESSURE: 144 MMHG | DIASTOLIC BLOOD PRESSURE: 82 MMHG | WEIGHT: 147 LBS | BODY MASS INDEX: 19.39 KG/M2 | HEART RATE: 87 BPM

## 2025-02-22 NOTE — PROGRESS NOTES
Called pt no answer in home called wife on mobile states  pt ok   Has ample urine out put moving  bowels  weight down  will give totals when call tomorrow

## 2025-02-23 ENCOUNTER — PATIENT OUTREACH (OUTPATIENT)
Dept: CARE COORDINATION | Age: 75
End: 2025-02-23
Payer: COMMERCIAL

## 2025-02-24 ENCOUNTER — OFFICE VISIT (OUTPATIENT)
Facility: HOSPITAL | Age: 75
End: 2025-02-24
Payer: COMMERCIAL

## 2025-02-24 ENCOUNTER — NUTRITION (OUTPATIENT)
Facility: HOSPITAL | Age: 75
End: 2025-02-24
Payer: COMMERCIAL

## 2025-02-24 ENCOUNTER — NURSE ONLY (OUTPATIENT)
Facility: HOSPITAL | Age: 75
End: 2025-02-24
Payer: COMMERCIAL

## 2025-02-24 ENCOUNTER — HOME INFUSION (OUTPATIENT)
Dept: INFUSION THERAPY | Age: 75
End: 2025-02-24

## 2025-02-24 VITALS
OXYGEN SATURATION: 98 % | WEIGHT: 152.3 LBS | HEART RATE: 77 BPM | TEMPERATURE: 97.9 F | DIASTOLIC BLOOD PRESSURE: 85 MMHG | SYSTOLIC BLOOD PRESSURE: 143 MMHG | BODY MASS INDEX: 20.09 KG/M2

## 2025-02-24 DIAGNOSIS — Z94.0 KIDNEY REPLACED BY TRANSPLANT (HHS-HCC): ICD-10-CM

## 2025-02-24 DIAGNOSIS — R39.89 SUSPECTED UTI: ICD-10-CM

## 2025-02-24 DIAGNOSIS — Z79.4 TYPE 2 DIABETES MELLITUS WITH HYPERGLYCEMIA, WITH LONG-TERM CURRENT USE OF INSULIN: Chronic | ICD-10-CM

## 2025-02-24 DIAGNOSIS — Z94.0 KIDNEY TRANSPLANT RECIPIENT (HHS-HCC): ICD-10-CM

## 2025-02-24 DIAGNOSIS — E11.65 TYPE 2 DIABETES MELLITUS WITH HYPERGLYCEMIA, WITH LONG-TERM CURRENT USE OF INSULIN: Chronic | ICD-10-CM

## 2025-02-24 DIAGNOSIS — N39.0 ACUTE LOWER UTI: Primary | ICD-10-CM

## 2025-02-24 DIAGNOSIS — E11.00 TYPE 2 DIABETES MELLITUS WITH HYPEROSMOLARITY WITHOUT COMA, WITH LONG-TERM CURRENT USE OF INSULIN (MULTI): ICD-10-CM

## 2025-02-24 DIAGNOSIS — E46 PROTEIN-CALORIE MALNUTRITION, UNSPECIFIED SEVERITY (MULTI): ICD-10-CM

## 2025-02-24 DIAGNOSIS — I10 HYPERTENSION, UNSPECIFIED TYPE: ICD-10-CM

## 2025-02-24 DIAGNOSIS — Z79.4 TYPE 2 DIABETES MELLITUS WITH HYPEROSMOLARITY WITHOUT COMA, WITH LONG-TERM CURRENT USE OF INSULIN (MULTI): ICD-10-CM

## 2025-02-24 LAB
ALBUMIN SERPL BCP-MCNC: 3.8 G/DL (ref 3.4–5)
ANION GAP SERPL CALC-SCNC: 15 MMOL/L (ref 10–20)
APPEARANCE UR: CLEAR
BILIRUB UR STRIP.AUTO-MCNC: NEGATIVE MG/DL
BUN SERPL-MCNC: 17 MG/DL (ref 6–23)
CALCIUM SERPL-MCNC: 9.7 MG/DL (ref 8.6–10.6)
CHLORIDE SERPL-SCNC: 97 MMOL/L (ref 98–107)
CHOLEST SERPL-MCNC: 197 MG/DL (ref 0–199)
CHOLESTEROL/HDL RATIO: 4
CO2 SERPL-SCNC: 28 MMOL/L (ref 21–32)
COLOR UR: ABNORMAL
CREAT SERPL-MCNC: 0.9 MG/DL (ref 0.5–1.3)
CREAT UR-MCNC: 68.6 MG/DL (ref 20–370)
EGFRCR SERPLBLD CKD-EPI 2021: 90 ML/MIN/1.73M*2
ERYTHROCYTE [DISTWIDTH] IN BLOOD BY AUTOMATED COUNT: 17.5 % (ref 11.5–14.5)
GLUCOSE SERPL-MCNC: 175 MG/DL (ref 74–99)
GLUCOSE UR STRIP.AUTO-MCNC: NORMAL MG/DL
HCT VFR BLD AUTO: 34 % (ref 41–52)
HDLC SERPL-MCNC: 49 MG/DL
HGB BLD-MCNC: 10.4 G/DL (ref 13.5–17.5)
KETONES UR STRIP.AUTO-MCNC: NEGATIVE MG/DL
LDLC SERPL CALC-MCNC: 115 MG/DL
LEUKOCYTE ESTERASE UR QL STRIP.AUTO: ABNORMAL
MAGNESIUM SERPL-MCNC: 1.68 MG/DL (ref 1.6–2.4)
MCH RBC QN AUTO: 29.1 PG (ref 26–34)
MCHC RBC AUTO-ENTMCNC: 30.6 G/DL (ref 32–36)
MCV RBC AUTO: 95 FL (ref 80–100)
NITRITE UR QL STRIP.AUTO: NEGATIVE
NON HDL CHOLESTEROL: 148 MG/DL (ref 0–149)
NRBC BLD-RTO: 0 /100 WBCS (ref 0–0)
PH UR STRIP.AUTO: 6.5 [PH]
PHOSPHATE SERPL-MCNC: 3.7 MG/DL (ref 2.5–4.9)
PLATELET # BLD AUTO: 264 X10*3/UL (ref 150–450)
POTASSIUM SERPL-SCNC: 4.7 MMOL/L (ref 3.5–5.3)
PROT UR STRIP.AUTO-MCNC: ABNORMAL MG/DL
PROT UR-ACNC: 24 MG/DL (ref 5–25)
PROT/CREAT UR: 0.35 MG/MG CREAT (ref 0–0.17)
RBC # BLD AUTO: 3.57 X10*6/UL (ref 4.5–5.9)
RBC # UR STRIP.AUTO: NEGATIVE MG/DL
RBC #/AREA URNS AUTO: ABNORMAL /HPF
SODIUM SERPL-SCNC: 135 MMOL/L (ref 136–145)
SP GR UR STRIP.AUTO: 1.01
SQUAMOUS #/AREA URNS AUTO: ABNORMAL /HPF
TACROLIMUS BLD-MCNC: 5.7 NG/ML
TRIGL SERPL-MCNC: 166 MG/DL (ref 0–149)
UROBILINOGEN UR STRIP.AUTO-MCNC: NORMAL MG/DL
VLDL: 33 MG/DL (ref 0–40)
WBC # BLD AUTO: 4.6 X10*3/UL (ref 4.4–11.3)
WBC #/AREA URNS AUTO: ABNORMAL /HPF
WBC CLUMPS #/AREA URNS AUTO: ABNORMAL /HPF

## 2025-02-24 PROCEDURE — 87801 DETECT AGNT MULT DNA AMPLI: CPT | Performed by: TRANSPLANT SURGERY

## 2025-02-24 PROCEDURE — 87385 HISTOPLASMA CAPSUL AG IA: CPT | Performed by: TRANSPLANT SURGERY

## 2025-02-24 PROCEDURE — 80197 ASSAY OF TACROLIMUS: CPT | Performed by: TRANSPLANT SURGERY

## 2025-02-24 PROCEDURE — 3077F SYST BP >= 140 MM HG: CPT | Performed by: SURGERY

## 2025-02-24 PROCEDURE — 99214 OFFICE O/P EST MOD 30 MIN: CPT | Mod: 24 | Performed by: SURGERY

## 2025-02-24 PROCEDURE — 87799 DETECT AGENT NOS DNA QUANT: CPT | Performed by: TRANSPLANT SURGERY

## 2025-02-24 PROCEDURE — 85027 COMPLETE CBC AUTOMATED: CPT | Performed by: TRANSPLANT SURGERY

## 2025-02-24 PROCEDURE — 1111F DSCHRG MED/CURRENT MED MERGE: CPT | Performed by: SURGERY

## 2025-02-24 PROCEDURE — 81001 URINALYSIS AUTO W/SCOPE: CPT | Performed by: SURGERY

## 2025-02-24 PROCEDURE — 3079F DIAST BP 80-89 MM HG: CPT | Performed by: SURGERY

## 2025-02-24 PROCEDURE — 84156 ASSAY OF PROTEIN URINE: CPT | Performed by: TRANSPLANT SURGERY

## 2025-02-24 PROCEDURE — 1126F AMNT PAIN NOTED NONE PRSNT: CPT | Performed by: SURGERY

## 2025-02-24 PROCEDURE — 80069 RENAL FUNCTION PANEL: CPT | Performed by: TRANSPLANT SURGERY

## 2025-02-24 PROCEDURE — 99214 OFFICE O/P EST MOD 30 MIN: CPT | Performed by: SURGERY

## 2025-02-24 PROCEDURE — 87517 HEPATITIS B DNA QUANT: CPT | Performed by: TRANSPLANT SURGERY

## 2025-02-24 PROCEDURE — 3061F NEG MICROALBUMINURIA REV: CPT | Performed by: SURGERY

## 2025-02-24 PROCEDURE — 83735 ASSAY OF MAGNESIUM: CPT | Performed by: TRANSPLANT SURGERY

## 2025-02-24 PROCEDURE — 80061 LIPID PANEL: CPT | Performed by: TRANSPLANT SURGERY

## 2025-02-24 PROCEDURE — 87086 URINE CULTURE/COLONY COUNT: CPT | Performed by: SURGERY

## 2025-02-24 RX ORDER — SODIUM BICARBONATE 650 MG/1
650 TABLET ORAL EVERY 12 HOURS
Qty: 60 TABLET | Refills: 1 | Status: SHIPPED | OUTPATIENT
Start: 2025-02-24 | End: 2025-04-25

## 2025-02-24 RX ORDER — CARVEDILOL 12.5 MG/1
12.5 TABLET ORAL 2 TIMES DAILY
Qty: 60 TABLET | Refills: 11 | Status: SHIPPED | OUTPATIENT
Start: 2025-02-24 | End: 2026-02-24

## 2025-02-24 RX ORDER — INSULIN GLARGINE 100 [IU]/ML
15 INJECTION, SOLUTION SUBCUTANEOUS EVERY MORNING
Qty: 3 ML | Refills: 1 | Status: SHIPPED | OUTPATIENT
Start: 2025-02-24

## 2025-02-24 RX ORDER — INSULIN ASPART INJECTION 100 [IU]/ML
3 INJECTION, SOLUTION SUBCUTANEOUS
Qty: 15 ML | Refills: 0 | Status: SHIPPED | OUTPATIENT
Start: 2025-02-24

## 2025-02-24 RX ORDER — AMINO ACIDS/PROTEIN HYDROLYS 15G-100/30
1 LIQUID (ML) ORAL DAILY
Qty: 900 ML | Refills: 1 | Status: SHIPPED | OUTPATIENT
Start: 2025-02-24 | End: 2025-02-24 | Stop reason: ALTCHOICE

## 2025-02-24 RX ORDER — TACROLIMUS 1 MG/1
3 CAPSULE ORAL 2 TIMES DAILY
Qty: 180 CAPSULE | Refills: 11 | Status: SHIPPED | OUTPATIENT
Start: 2025-02-24 | End: 2025-02-25 | Stop reason: SDUPTHER

## 2025-02-24 RX ORDER — INSULIN HUMAN 100 [IU]/ML
5 INJECTION, SUSPENSION SUBCUTANEOUS NIGHTLY
Qty: 3 ML | Refills: 1 | Status: SHIPPED | OUTPATIENT
Start: 2025-02-24

## 2025-02-24 RX ORDER — ATOVAQUONE 750 MG/5ML
1500 SUSPENSION ORAL DAILY
Qty: 300 ML | Refills: 3 | Status: SHIPPED | OUTPATIENT
Start: 2025-02-24 | End: 2025-06-24

## 2025-02-24 RX ORDER — MYCOPHENOLIC ACID 180 MG/1
180 TABLET, DELAYED RELEASE ORAL 2 TIMES DAILY
Qty: 60 TABLET | Refills: 11 | Status: SHIPPED | OUTPATIENT
Start: 2025-02-24 | End: 2026-02-24

## 2025-02-24 ASSESSMENT — PAIN SCALES - GENERAL: PAINLEVEL_OUTOF10: 0-NO PAIN

## 2025-02-24 NOTE — DOCUMENTATION CLARIFICATION NOTE
"    PATIENT:               TAMAR ADAMS  ACCT #:                  [id number]  MRN:                       [id number]  :                       1950  ADMIT DATE:       2025 12:30 PM  DISCH DATE:        2025 5:00 PM  RESPONDING PROVIDER #:        [id number]          PROVIDER RESPONSE TEXT:    Hyperglycemiadue to chronic steroid use and  type 2 diabetes    CDI QUERY TEXT:    Clarification        Instruction:    Based on your assessment of the patient and the clinical information, please provide the requested documentation by clicking on the appropriate radio button and enter any additional information if prompted.    Question: Is there a diagnosis indicative of the clinical findings and patient symptoms on admission    When answering this query, please exercise your independent professional judgment. The fact that a question is being asked, does not imply that any particular answer is desired or expected.    The patient's clinical indicators include:  Clinical Information: 74 yr old male  hx HTN, diabetes, s/p kidney transplant 24 who was transferred from [facility] with DKA, hyponatremia    Clinical Indicators: Glucose on admit - 219. Blood gas on admit  - pH -7.41  Documentation shows  From H and P \"represents to  after he was found to be in DKA with blood glucose levels in the 500s. Upon arrival to the SICU, blood glucose was in the 280s and patient was HDS.\"  From endocrine consult on 2/10 \" On chart review, labs from outside hospital reviewed and it was noted that patient was not in DKA given venous pH 7.4, BHB 0.14, , creatinine 1.6.  On arrival to [facility], insulin drip was discontinued and patient was started on regular insulin sliding scale #1 with meals with blood glucose ranging from 140-180s.\"    Treatment: Insulin gtt stopped and started on Basaglar and Humulin insulin. Meds adjusted as needed    Risk Factors: immunosuppressed pt, diabetes  Options provided:  -- Diabetic Ketoacidosis  -- " Hyperglycemiadue to chronic steroid use and  type 2 diabetes  -- Other - I will add my own diagnosis  -- Refer to Clinical Documentation Reviewer    Query created by: Steffany Mcdonald on 2/21/2025 12:24 PM      Electronically signed by:  RAZIA ANGULO MD 2/24/2025 12:48 PM

## 2025-02-24 NOTE — PROGRESS NOTES
Reason for Nutrition Visit:  Pt is a 74 y.o. male referred for a nutrition follow-up. Patient on nutrition support s/p kidney transplant 65 days ago.     Transplant Coordinator: Chelsey Young RN    Past Medical Hx:  Patient Active Problem List   Diagnosis    S/P laparoscopic cholecystectomy    Abdominal pain    Achalasia    Acute lower UTI    Microcytic anemia    Complete heart block    Callus of foot    Chronic periodontitis, unspecified    Stage 5 chronic kidney disease (Multi)    Closed fracture of metatarsal bone    Crushing injury of foot    Diabetes mellitus (Multi)    Diarrhea    Dysphagia    ED (erectile dysfunction)    Essential hypertension    Foot pain, right    GIB (gastrointestinal bleeding)    Hepatitis B core antibody positive    Hyperkalemia    Ingrowing nail    Iron deficiency anemia secondary to inadequate dietary iron intake    Long toenail    Malignant neoplasm of prostate (Multi)    Onychomycosis    Pheochromocytoma of right adrenal gland    Pneumonia of right lower lobe due to infectious organism    Productive cough    Pure hypercholesterolemia    Stricture esophagus    SVT (supraventricular tachycardia) (CMS-HCC)    Type 2 diabetes mellitus with diabetic neuropathy (Multi)    Preop cardiovascular exam    Third degree heart block    Pericardial effusion ([facility])    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 (Multi)    Uremia    Cardiac tamponade    Cardiac pacemaker in situ    ESRD (end stage renal disease) (Multi)    Type 2 diabetes mellitus, with long-term current use of insulin    Dehydration    Alactasia syndrome    Type 2 diabetes mellitus with hyperglycemia, with long-term current use of insulin    On tube feeding diet    Kidney transplant recipient ([facility]-HCC)    DKA, type 2, not at goal      Lab Results   Component Value Date    HGBA1C 6.1 (H) 10/29/2024    HGBA1C 5.7 (H) 04/20/2024    HGBA1C 7.3 (H) 02/21/2023     02/17/2025    K 4.4 02/17/2025    PHOS 3.4 02/17/2025      "02/17/2025    CO2 26 02/17/2025    BUN 14 02/17/2025    CREATININE 0.86 02/17/2025    CALCIUM 8.6 02/17/2025    ALBUMIN 2.7 (L) 02/17/2025    PROT 8.8 (H) 12/20/2024    BILITOT 0.5 12/20/2024    ALKPHOS 189 (H) 12/20/2024    ALT 18 12/20/2024    AST 21 12/20/2024    GLUCOSE 123 (H) 02/17/2025    CHOL 183 10/29/2024    HDL 53.1 10/29/2024    BHYDRXBUT 0.09 02/12/2025    CPEPTIDE 4.8 (H) 10/29/2024     Lab Results   Component Value Date    HGB 8.4 (L) 02/17/2025     Iron Panel + Serum Ferritin Trend:   Recent Labs     01/08/25  0610 04/21/24  0739   IRON 31* 43   UIBC 109* 98*   TIBC 140* 141*   IRONSAT 22* 30   FERRITIN 928* 1,911*   , Vitamin B12: No results found for: \"MJCUOKFL40\" , Folate: No results found for: \"FOLATE\" , and Vitamin D:   Lab Results   Component Value Date    VITD25 34 01/05/2025      Food History and Nutrition Assessment     Appetite: Normal  Intake: >75% with TF  GI Symptoms : bloating and loose stools   Swallowing Difficulty: No problems with swallowing  Dentition : own    Diet History:  Patient known to nutrition services. Patient was recently admitted 2/9/25 for DKA and discharged 2/18/25. Pt with history of intolerance to Glucerna 1.5 but while admitted responded well to [employer] Peptide 1.5 cycled overnight as well as 3 bolus feeds at mealtimes via PEG. Due to insurance, patient was unable to continue with [employer] formula at home. Current TF is Vital 1.5 @ 40mL/hr for 12 hrs (8:00pm to 8:00am) and Vital 1.5 @ 240 ml bolus TID at meals times. This provides 1800 kcal, 81 g protein, and 917 mL free water.  Patient is to continue PO intake during the day. Per inpatient notes, patient has history of not sticking to recommended TF regimen.     Patient presents today in clinic with his sister. Patient states that he is running the TF from 8pm to 8am at 40 mL/hr. He states that he is doing bolus feeds twice per day at 12 pm and 4 pm. He has 1 box at each bolus. He notes that it is very " difficult to have three bolus per day. Uncertain as to recent intake, but notes eating small portions of protein-rich foods throughout the week (egg every other day, fish 2 times per week, full piece of meat 2 times per week). He describes wanting to eat food but is feeling full due to the tube feed. Continues to eat carbohydrate rich foods in morning.    24 Hour Diet Recall:  Meal 1: Taoist oats or toast or barley or grits or egg *encourage protein intake at breakfast.   AM Snack: None  Meal 2: Bone broth or grapes *encourage protein intake at lunch.   PM Snack: None  Meal 3: Fish (3oz) with bread or crackers    Late Snack: None    Food Preparation: Patient and Children  Cooking Skills/Barriers: None reported  Grocery Shopping: Children    Estimated Energy Needs:    Calorie Needs (30 - 35 kcal/kg CBW): 4114-0658 kcal  Protein Needs (1.3 g/kg CBW): 87+ g    Weight History:  CBW: 69.1 kg (152 lbs)   BMI: 20.1 kg/m2    Wt Readings from Last 15 Encounters:   02/24/25 69.1 kg (152 lb 4.8 oz)   02/22/25 66.7 kg (147 lb)   02/18/25 69.8 kg (153 lb 14.1 oz)   02/03/25 70 kg (154 lb 6.4 oz)   02/02/25 67.3 kg (148 lb 6.4 oz)   01/27/25 68.9 kg (152 lb)   01/22/25 69.5 kg (153 lb 3.2 oz)   12/31/24 74.1 kg (163 lb 4.8 oz)   12/26/24 76.4 kg (168 lb 6.9 oz)   01/29/25 67 kg (147 lb 9.6 oz)   10/29/24 74.3 kg (163 lb 12.8 oz)   08/14/24 66.2 kg (146 lb)   07/18/24 66.5 kg (146 lb 11.2 oz)   06/18/24 78.5 kg (173 lb)   06/12/24 73.9 kg (163 lb)     Weight change:  Weight has fluctuated over last month.  Patient stated weight: Pt states they put on some weight. Note was 151 lbs.     Nutrition Focused Physical Exam:    Performed/Deferred: Performed    Muscle Wasting:  Temporal: Severe  Shoulder: Moderate  Clavicle; Moderate  Interosseous Muscle: Moderate    Loss of Subcutaneous Fat:  Eyes: Moderate  Perioral: Moderate  Triceps: Severe    Other Physical Findings:  Edema: none    Malnutrition Present: Severe  Malnutrition    Nutrition Diagnosis    Diagnosis Status: Ongoing   Diagnosis: Malnutrition; severe chronic disease or condition related related to  increased nutrient needs s/p kidney transplant  as evidence by  moderate-severe loss of muscle mass and moderate-severe loss of subcutaneous fat    Nutrition Education, Intervention, and Goals    Encouraged patient to follow TF instructions as directed:   Cycle Vital 1.5 @ 40 mL/hr x 12 hr   Breakfast: 240 mL Vital 1.5 via PEG  Lunch: 240 mL Vital 1.5 via PEG  Dinner: 240 mL Vital 1.5 via PEG  Provides 1800 kcal, 81 g protein, 917 mL free water  Continue PO intake during the day  Encouraged patient to eat protein-rich foods prior to bolus feeds.   Discussed protein sources including chicken, beef, fish, eggs, dairy products, beans, and nuts. Discussed increasing protein foods patient noted per preferences including proper portion sizes of fish, full pieces of meat, eggs, beans with rice, cashews and nut butters.     Educational Handouts: None    Nutrition Goals  Nutrition Goals: Consistent meal/snack pattern  Maintain stable weight  Normalized fasting and postprandial blood glucose  Nutrition support goals are met  Dairy/Calcium Foods: Increase  Meat/Protein Foods: Increase    Nutrition Monitoring and Evaluation    Additional Recommendations/Referrals: N/A    Follow up: It was a pleasure speaking with you today, Mr. Hanson! You are currently scheduled to follow-up with me on March 13th for an in-person appointment. I will contact the nurse coordinator to try and schedule a phone consult for next week to check in.

## 2025-02-24 NOTE — PROGRESS NOTES
Called pt earlier he said he was ok  wife will take readings when she gets home from [facility]   Called back in josef  spoke to daughter who does not have the results   States pt doing ok Team will reach out to patient tin the am

## 2025-02-24 NOTE — PATIENT INSTRUCTIONS
Medication Changes:  Increase basaglar to 15 units every morning  Last antibiotic (ertapenem/invanz) dose tomorrow  We'll let you know if there are any other changes once we review today's lab results  Please continue the immodium if needed for diarrhea. Ok to go up to 3 times a day if needed.    Plan:  We collected a urine sample today to check that the infection is gone. That will determine if we need more antibiotics or if the IV line can come out.   We'll continue the same amount of tube feed for now  Labs weekly on Mondays & Thursdays  Next clinic appt in 1 week on Monday 3/3 at 8:00am

## 2025-02-24 NOTE — PROGRESS NOTES
[facility] rec'd order from Dr Price - therapy can complete as scheduled 2/25 and midline can be removed  Order entered in epic, gold copy to intake    Chart forwarded to patient care rep to discharge from pharmacy service

## 2025-02-24 NOTE — PROGRESS NOTES
Temo Hanson is a 74 y.o. male POD#65 from DDKT from a DBD donor.     - Returns today after recent readmit for UTI/DKA  - Reports loose BM 3X daily, unclear how much imodium he is taking  - Review of CGM shows persistently elevated glucoses    Objective   Gen: A+OX3; NAD  HEENT: PERRL, sclera anicteric, MMM  Cardiac: RRR  Chest: Normal inspiratory effort  Abdomen: S/NT/ND. Incision C/D/I.  Ext: No LE edema    Last Recorded Vitals  Visit Vitals  /85   Pulse 77   Temp 36.6 °C (97.9 °F) (Temporal)      Assessment/Plan   Principal Problem:    Kidney transplant recipient    Active Problems:  Patient Active Problem List   Diagnosis    S/P laparoscopic cholecystectomy    Abdominal pain    Achalasia    Acute lower UTI    Microcytic anemia    Complete heart block    Callus of foot    Chronic periodontitis, unspecified    Stage 5 chronic kidney disease (Multi)    Closed fracture of metatarsal bone    Crushing injury of foot    Diabetes mellitus (Multi)    Diarrhea    Dysphagia    ED (erectile dysfunction)    Essential hypertension    Foot pain, right    GIB (gastrointestinal bleeding)    Hepatitis B core antibody positive    Hyperkalemia    Ingrowing nail    Iron deficiency anemia secondary to inadequate dietary iron intake    Long toenail    Malignant neoplasm of prostate (Multi)    Onychomycosis    Pheochromocytoma of right adrenal gland    Pneumonia of right lower lobe due to infectious organism    Productive cough    Pure hypercholesterolemia    Stricture esophagus    SVT (supraventricular tachycardia) (CMS-HCC)    Type 2 diabetes mellitus with diabetic neuropathy (Multi)    Preop cardiovascular exam    Third degree heart block    Pericardial effusion (HHS-HCC)    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 (Multi)    Uremia    Cardiac tamponade    Cardiac pacemaker in situ    ESRD (end stage renal disease) (Multi)    Type 2 diabetes mellitus, with long-term current use of insulin    Dehydration    Alactasia  syndrome    Type 2 diabetes mellitus with hyperglycemia, with long-term current use of insulin    On tube feeding diet    Kidney transplant recipient ([facility])    DKA, type 2, not at goal      Kidney allograft function              KDPI 93%/PRA 54%              Immediate graft function              Thymo 3 mg/kg              Lokelma daily     Immunosuppression              Tacrolimus 6-8 ng/mL              Myfortic 180 mg bid              Prednisone 5 mg     Nutrition                        Tube feeds 12 hrs - Vital 1.5 aimee   Endo will increase long-acting insulin to 15 Units              Dietician today     Hypertension              Amlodipine 10 mg   Carvedilol 12. 5 mg bid    ID   Ertapenem last day tomorrow   Repeat UA and urine cx today    Prophylaxis   Mepron/Nystatin/Valcyte     Misc              Imodium bid              Reglan 5 mg q8h/Protonix              Labs weekly              RTC 1 week    I spent 30 minutes in the professional and overall care of this patient, including immunosuppression management.    Sherly Kang MD, PHD, MPH, FACS  Chief Transplant and Hepatobiliary Surgery

## 2025-02-25 ENCOUNTER — PATIENT MESSAGE (OUTPATIENT)
Facility: HOSPITAL | Age: 75
End: 2025-02-25
Payer: COMMERCIAL

## 2025-02-25 ENCOUNTER — HOME CARE VISIT (OUTPATIENT)
Dept: HOME HEALTH SERVICES | Facility: HOME HEALTH | Age: 75
End: 2025-02-25
Payer: COMMERCIAL

## 2025-02-25 ENCOUNTER — PATIENT OUTREACH (OUTPATIENT)
Dept: CARE COORDINATION | Age: 75
End: 2025-02-25
Payer: COMMERCIAL

## 2025-02-25 DIAGNOSIS — Z94.0 KIDNEY REPLACED BY TRANSPLANT (HHS-HCC): ICD-10-CM

## 2025-02-25 DIAGNOSIS — B27.00 EBV (EPSTEIN-BARR VIRUS) VIREMIA: ICD-10-CM

## 2025-02-25 LAB
BACTERIA UR CULT: NO GROWTH
BKV DNA SERPL NAA+PROBE-LOG#: NORMAL {LOG_COPIES}/ML
CMV DNA SERPL NAA+PROBE-LOG IU: NORMAL {LOG_IU}/ML
EBV DNA SERPL NAA+PROBE-ACNC: 62 IU/ML
EBV DNA SPEC NAA+PROBE-LOG#: 1.79 LOG IU/ML
HOLD SPECIMEN: NORMAL
LABORATORY COMMENT REPORT: DETECTED
LABORATORY COMMENT REPORT: NOT DETECTED
LABORATORY COMMENT REPORT: NOT DETECTED

## 2025-02-25 RX ORDER — TACROLIMUS 1 MG/1
4 CAPSULE ORAL 2 TIMES DAILY
Qty: 240 CAPSULE | Refills: 11 | Status: SHIPPED | OUTPATIENT
Start: 2025-02-25 | End: 2026-02-25

## 2025-02-25 NOTE — PROGRESS NOTES
Transplant Telehealth: Device  scheduled for 2/28, patient is having trouble operating equipment.

## 2025-02-26 ENCOUNTER — HOME CARE VISIT (OUTPATIENT)
Dept: HOME HEALTH SERVICES | Facility: HOME HEALTH | Age: 75
End: 2025-02-26
Payer: COMMERCIAL

## 2025-02-26 VITALS
RESPIRATION RATE: 12 BRPM | DIASTOLIC BLOOD PRESSURE: 80 MMHG | TEMPERATURE: 98.6 F | SYSTOLIC BLOOD PRESSURE: 130 MMHG | HEART RATE: 87 BPM | OXYGEN SATURATION: 98 %

## 2025-02-26 PROCEDURE — G0299 HHS/HOSPICE OF RN EA 15 MIN: HCPCS

## 2025-02-26 ASSESSMENT — ACTIVITIES OF DAILY LIVING (ADL)
ENTERING_EXITING_HOME: NEEDS ASSISTANCE
OASIS_M1830: 03

## 2025-02-26 ASSESSMENT — ENCOUNTER SYMPTOMS
DIARRHEA: 1
APPETITE LEVEL: FAIR
DENIES PAIN: 1

## 2025-02-26 NOTE — DOCUMENTATION CLARIFICATION NOTE
"    PATIENT:               TAMAR ADAMS  ACCT #:                  [id number]  MRN:                       [id number]  :                       1950  ADMIT DATE:       2025 12:30 PM  DISCH DATE:        2025 5:00 PM  RESPONDING PROVIDER #:        [id number]          PROVIDER RESPONSE TEXT:    UTI diagnosis is unrelated to ureteral stent removal procedure    CDI QUERY TEXT:    Clarification        Instruction:    Based on your assessment of the patient and the clinical information, please provide the requested documentation by clicking on the appropriate radio button and enter any additional information if prompted.    Question: Please further clarify if a relationship exists between the UTI and ureteral stent removal procedure    When answering this query, please exercise your independent professional judgment. The fact that a question is being asked, does not imply that any particular answer is desired or expected.    The patient's clinical indicators include:  Clinical Information: Pt is a 75yo male admitted with hyperglycemia and UTI.    Clinical Indicators:   DC Summary \"Urine culture grew Proteus mirabilis and resistant ESBL, which likely occurred after his ureteral stent was removed. Ertapenem IV was started, Cipro stopped and ID was consulted.\"     Consult-ID \"Patient states he had ureteral stent removed on February 3 and since then he started having burning urination.  Patient states whenever he tries to urinate he has burning sensation and also urinary urgency and he cannot make it to the bathroom.\"     PN-Transplant \"Proteus mirablilis + resistant ESBL UTI-- Cipro + ertapenem\"    2/10 PN-Transplant \"Urinary tract infection: 100K GNB Await final culture; continue Zosyn if febrile broaden to Meropenem\"    Treatment: ID consult, UA, urine culture, - Zosyn 2.25g IV q6h,  Zosyn 3.375g IV x1 dose, - cipro 500mg tab q12h, - ertapenem 1g IV q24h, midline placed, DC " home with home care for IV antibiotics    Risk Factors: recent ureteral stent removal, dysuria, immunosuppression for DDKT  Options provided:  -- UTI diagnosis is related to or is a complication of the ureteral stent removal procedure  -- UTI diagnosis is unrelated to ureteral stent removal procedure  -- Other - I will add my own diagnosis  -- Refer to Clinical Documentation Reviewer    Query created by: Jany Zavala on 2/25/2025 9:21 AM      Electronically signed by:  NIKUNJ ARSHAD PA-C 2/26/2025 6:44 AM

## 2025-02-27 LAB
ALLOSURE SCORE - KIDNEY: 0.26 %
CAREDX_ORDER_ID: NORMAL
CENTER_ORDER_ID: NORMAL
CLIENT SPECIMEN ID - ALLOSURE: NORMAL
DONOR RELATION - ALLOSURE: NORMAL
H CAPSUL AG UR QL: NOT DETECTED
HBV DNA SERPL NAA+PROBE-ACNC: NOT DETECTED [IU]/ML
HBV DNA SERPL NAA+PROBE-LOG IU: NORMAL {LOG_IU}/ML
HIV1 RNA SERPL DONR QL PROBE AMP: NORMAL
NOTES - ALLOSURE: NORMAL
RELATIVE CHANGE VALUE - KIDNEY: -4 %
SCAN RESULT: NORMAL
TEST COMMENTS - ALLOSURE: NORMAL
TIME POST TX - ALLOSURE: NORMAL
TRANSPLANTED ORGAN - ALLOSURE: NORMAL
TX DATE - ALLOSURE/ALLOMAP: NORMAL
WP_ORDER_ID: NORMAL

## 2025-02-28 ENCOUNTER — HOME CARE VISIT (OUTPATIENT)
Dept: HOME HEALTH SERVICES | Facility: HOME HEALTH | Age: 75
End: 2025-02-28
Payer: COMMERCIAL

## 2025-02-28 DIAGNOSIS — Z94.0 KIDNEY REPLACED BY TRANSPLANT (HHS-HCC): ICD-10-CM

## 2025-02-28 DIAGNOSIS — Z13.89 SCREENING FOR BLOOD OR PROTEIN IN URINE: ICD-10-CM

## 2025-02-28 DIAGNOSIS — B27.00 EBV (EPSTEIN-BARR VIRUS) VIREMIA: ICD-10-CM

## 2025-03-03 ENCOUNTER — NURSE ONLY (OUTPATIENT)
Facility: HOSPITAL | Age: 75
End: 2025-03-03
Payer: COMMERCIAL

## 2025-03-03 ENCOUNTER — OFFICE VISIT (OUTPATIENT)
Facility: HOSPITAL | Age: 75
End: 2025-03-03
Payer: COMMERCIAL

## 2025-03-03 VITALS
WEIGHT: 152.6 LBS | TEMPERATURE: 97.4 F | SYSTOLIC BLOOD PRESSURE: 150 MMHG | DIASTOLIC BLOOD PRESSURE: 74 MMHG | HEART RATE: 89 BPM | BODY MASS INDEX: 20.13 KG/M2 | OXYGEN SATURATION: 99 %

## 2025-03-03 DIAGNOSIS — Z94.0 KIDNEY TRANSPLANT RECIPIENT (HHS-HCC): ICD-10-CM

## 2025-03-03 DIAGNOSIS — Z13.89 SCREENING FOR BLOOD OR PROTEIN IN URINE: ICD-10-CM

## 2025-03-03 DIAGNOSIS — Z79.4 TYPE 2 DIABETES MELLITUS WITH HYPERGLYCEMIA, WITH LONG-TERM CURRENT USE OF INSULIN: ICD-10-CM

## 2025-03-03 DIAGNOSIS — Z91.89 AT RISK FOR INFECTION TRANSMITTED FROM DONOR: ICD-10-CM

## 2025-03-03 DIAGNOSIS — E11.65 TYPE 2 DIABETES MELLITUS WITH HYPERGLYCEMIA, WITH LONG-TERM CURRENT USE OF INSULIN: ICD-10-CM

## 2025-03-03 DIAGNOSIS — Z94.0 KIDNEY REPLACED BY TRANSPLANT (HHS-HCC): ICD-10-CM

## 2025-03-03 DIAGNOSIS — Z79.899 ENCOUNTER FOR LONG-TERM (CURRENT) USE OF HIGH-RISK MEDICATION: Primary | ICD-10-CM

## 2025-03-03 DIAGNOSIS — Z48.298 AFTERCARE FOLLOWING ORGAN TRANSPLANT: ICD-10-CM

## 2025-03-03 DIAGNOSIS — B27.00 EBV (EPSTEIN-BARR VIRUS) VIREMIA: ICD-10-CM

## 2025-03-03 LAB
ALBUMIN SERPL BCP-MCNC: 3.8 G/DL (ref 3.4–5)
ANION GAP SERPL CALC-SCNC: 14 MMOL/L (ref 10–20)
BUN SERPL-MCNC: 25 MG/DL (ref 6–23)
CALCIUM SERPL-MCNC: 9.4 MG/DL (ref 8.6–10.6)
CHLORIDE SERPL-SCNC: 99 MMOL/L (ref 98–107)
CO2 SERPL-SCNC: 26 MMOL/L (ref 21–32)
CREAT SERPL-MCNC: 1.03 MG/DL (ref 0.5–1.3)
CREAT UR-MCNC: 75.5 MG/DL (ref 20–370)
EGFRCR SERPLBLD CKD-EPI 2021: 76 ML/MIN/1.73M*2
ERYTHROCYTE [DISTWIDTH] IN BLOOD BY AUTOMATED COUNT: 16.6 % (ref 11.5–14.5)
GLUCOSE SERPL-MCNC: 199 MG/DL (ref 74–99)
HCT VFR BLD AUTO: 39.3 % (ref 41–52)
HGB BLD-MCNC: 12.5 G/DL (ref 13.5–17.5)
MAGNESIUM SERPL-MCNC: 1.6 MG/DL (ref 1.6–2.4)
MCH RBC QN AUTO: 29.2 PG (ref 26–34)
MCHC RBC AUTO-ENTMCNC: 31.8 G/DL (ref 32–36)
MCV RBC AUTO: 92 FL (ref 80–100)
NRBC BLD-RTO: 0 /100 WBCS (ref 0–0)
PHOSPHATE SERPL-MCNC: 3.9 MG/DL (ref 2.5–4.9)
PLATELET # BLD AUTO: 209 X10*3/UL (ref 150–450)
POTASSIUM SERPL-SCNC: 4.8 MMOL/L (ref 3.5–5.3)
PROT UR-ACNC: 16 MG/DL (ref 5–25)
PROT/CREAT UR: 0.21 MG/MG CREAT (ref 0–0.17)
RBC # BLD AUTO: 4.28 X10*6/UL (ref 4.5–5.9)
SODIUM SERPL-SCNC: 134 MMOL/L (ref 136–145)
TACROLIMUS BLD-MCNC: 8.7 NG/ML
WBC # BLD AUTO: 2.7 X10*3/UL (ref 4.4–11.3)

## 2025-03-03 PROCEDURE — 3049F LDL-C 100-129 MG/DL: CPT | Performed by: SURGERY

## 2025-03-03 PROCEDURE — 80197 ASSAY OF TACROLIMUS: CPT

## 2025-03-03 PROCEDURE — 3078F DIAST BP <80 MM HG: CPT | Performed by: SURGERY

## 2025-03-03 PROCEDURE — 85027 COMPLETE CBC AUTOMATED: CPT

## 2025-03-03 PROCEDURE — 87799 DETECT AGENT NOS DNA QUANT: CPT

## 2025-03-03 PROCEDURE — 3061F NEG MICROALBUMINURIA REV: CPT | Performed by: SURGERY

## 2025-03-03 PROCEDURE — 99214 OFFICE O/P EST MOD 30 MIN: CPT | Performed by: SURGERY

## 2025-03-03 PROCEDURE — 80069 RENAL FUNCTION PANEL: CPT

## 2025-03-03 PROCEDURE — 83735 ASSAY OF MAGNESIUM: CPT

## 2025-03-03 PROCEDURE — 1126F AMNT PAIN NOTED NONE PRSNT: CPT | Performed by: SURGERY

## 2025-03-03 PROCEDURE — 1111F DSCHRG MED/CURRENT MED MERGE: CPT | Performed by: SURGERY

## 2025-03-03 PROCEDURE — 82570 ASSAY OF URINE CREATININE: CPT

## 2025-03-03 PROCEDURE — 3077F SYST BP >= 140 MM HG: CPT | Performed by: SURGERY

## 2025-03-03 PROCEDURE — 87517 HEPATITIS B DNA QUANT: CPT

## 2025-03-03 PROCEDURE — 99214 OFFICE O/P EST MOD 30 MIN: CPT | Mod: 24 | Performed by: SURGERY

## 2025-03-03 PROCEDURE — 87385 HISTOPLASMA CAPSUL AG IA: CPT

## 2025-03-03 ASSESSMENT — PAIN SCALES - GENERAL: PAINLEVEL_OUTOF10: 0-NO PAIN

## 2025-03-03 NOTE — PATIENT INSTRUCTIONS
No medication changes today  Reasons to call us: fevers, painful or burning with urination  Labs weekly, can get next Thursday with your other appointments  RTC 1 week on Thursday 3/13 @ 8 am

## 2025-03-03 NOTE — PROGRESS NOTES
Temo Hasnon is a 74 y.o. male POD#72 from DDKT from a DBD donor.    - no acute events, no diarrhea  - taking 1 can bolus feed daily and o/n 8P-9A    Objective   Gen: A+OX3; NAD  HEENT: PERRL, sclera anicteric, MMM  Cardiac: RRR  Chest: Normal inspiratory effort  Abdomen: S/NT/ND. Incision well-healed.  Ext: No LE edema    Last Recorded Vitals  Visit Vitals  /74   Pulse 89   Temp 36.3 °C (97.4 °F) (Temporal)      Assessment/Plan   Principal Problem:    Kidney transplant recipient    Active Problems:  Patient Active Problem List   Diagnosis    S/P laparoscopic cholecystectomy    Abdominal pain    Achalasia    Acute lower UTI    Microcytic anemia    Complete heart block    Callus of foot    Chronic periodontitis, unspecified    Stage 5 chronic kidney disease (Multi)    Closed fracture of metatarsal bone    Crushing injury of foot    Diabetes mellitus (Multi)    Diarrhea    Dysphagia    ED (erectile dysfunction)    Essential hypertension    Foot pain, right    GIB (gastrointestinal bleeding)    Hepatitis B core antibody positive    Hyperkalemia    Ingrowing nail    Iron deficiency anemia secondary to inadequate dietary iron intake    Long toenail    Malignant neoplasm of prostate (Multi)    Onychomycosis    Pheochromocytoma of right adrenal gland    Pneumonia of right lower lobe due to infectious organism    Productive cough    Pure hypercholesterolemia    Stricture esophagus    SVT (supraventricular tachycardia) (CMS-HCC)    Type 2 diabetes mellitus with diabetic neuropathy (Multi)    Preop cardiovascular exam    Third degree heart block    Pericardial effusion (HHS-HCC)    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 (Multi)    Uremia    Cardiac tamponade    Cardiac pacemaker in situ    ESRD (end stage renal disease) (Multi)    Type 2 diabetes mellitus, with long-term current use of insulin    Dehydration    Alactasia syndrome    Type 2 diabetes mellitus with hyperglycemia, with long-term current use of  insulin    On tube feeding diet    Kidney transplant recipient ([facility])    DKA, type 2, not at goal      Kidney allograft function              KDPI 93%/PRA 54%              Immediate graft function              Thymo 3 mg/kg              Lokelma daily     Immunosuppression              Tacrolimus 6-8 ng/mL              Myfortic 180 mg bid              Prednisone 5 mg     Nutrition                        Tube feeds 12 hrs - Vital 1.5 aimee     Hypertension              Amlodipine 10 mg              Carvedilol 12.5 mg bid     ID              Completed abx and PICC removed     Prophylaxis              Mepron/Nystatin/Valcyte     Misc              Imodium bid              Reglan 5 mg q8h/Protonix              Labs weekly              RTC 2 weeks    I spent 30 minutes in the professional and overall care of this patient, including immunosuppression management.    Sherly Kang MD, PHD, MPH, FACS  Chief Transplant and Hepatobiliary Surgery

## 2025-03-04 ENCOUNTER — HOME CARE VISIT (OUTPATIENT)
Dept: HOME HEALTH SERVICES | Facility: HOME HEALTH | Age: 75
End: 2025-03-04
Payer: COMMERCIAL

## 2025-03-04 ENCOUNTER — PATIENT MESSAGE (OUTPATIENT)
Facility: HOSPITAL | Age: 75
End: 2025-03-04
Payer: COMMERCIAL

## 2025-03-04 VITALS
RESPIRATION RATE: 12 BRPM | HEART RATE: 88 BPM | OXYGEN SATURATION: 99 % | DIASTOLIC BLOOD PRESSURE: 58 MMHG | TEMPERATURE: 97.8 F | SYSTOLIC BLOOD PRESSURE: 130 MMHG

## 2025-03-04 LAB
BKV DNA SERPL NAA+PROBE-LOG#: NORMAL {LOG_COPIES}/ML
CMV DNA SERPL NAA+PROBE-LOG IU: NORMAL {LOG_IU}/ML
EBV DNA SPEC NAA+PROBE-LOG#: NORMAL {LOG_COPIES}/ML
LABORATORY COMMENT REPORT: NOT DETECTED

## 2025-03-04 PROCEDURE — G0299 HHS/HOSPICE OF RN EA 15 MIN: HCPCS

## 2025-03-04 PROCEDURE — G0151 HHCP-SERV OF PT,EA 15 MIN: HCPCS

## 2025-03-04 ASSESSMENT — ENCOUNTER SYMPTOMS
DENIES PAIN: 1
DENIES PAIN: 1
PERSON REPORTING PAIN: PATIENT
APPETITE LEVEL: FAIR
OCCASIONAL FEELINGS OF UNSTEADINESS: 0

## 2025-03-04 ASSESSMENT — ACTIVITIES OF DAILY LIVING (ADL): AMBULATION ASSISTANCE ON FLAT SURFACES: 1

## 2025-03-05 ENCOUNTER — HOME CARE VISIT (OUTPATIENT)
Dept: HOME HEALTH SERVICES | Facility: HOME HEALTH | Age: 75
End: 2025-03-05
Payer: COMMERCIAL

## 2025-03-06 LAB
H CAPSUL AG UR QL: NOT DETECTED
HBV DNA SERPL NAA+PROBE-ACNC: NOT DETECTED [IU]/ML
HBV DNA SERPL NAA+PROBE-LOG IU: NORMAL {LOG_IU}/ML
SCAN RESULT: NORMAL

## 2025-03-07 ENCOUNTER — TELEPHONE (OUTPATIENT)
Facility: HOSPITAL | Age: 75
End: 2025-03-07
Payer: COMMERCIAL

## 2025-03-07 ENCOUNTER — DOCUMENTATION (OUTPATIENT)
Facility: HOSPITAL | Age: 75
End: 2025-03-07
Payer: COMMERCIAL

## 2025-03-07 ENCOUNTER — HOME CARE VISIT (OUTPATIENT)
Dept: HOME HEALTH SERVICES | Facility: HOME HEALTH | Age: 75
End: 2025-03-07
Payer: COMMERCIAL

## 2025-03-07 VITALS — OXYGEN SATURATION: 99 % | DIASTOLIC BLOOD PRESSURE: 60 MMHG | SYSTOLIC BLOOD PRESSURE: 138 MMHG | HEART RATE: 90 BPM

## 2025-03-07 PROCEDURE — G0152 HHCP-SERV OF OT,EA 15 MIN: HCPCS

## 2025-03-07 ASSESSMENT — ENCOUNTER SYMPTOMS
DENIES PAIN: 1
PERSON REPORTING PAIN: PATIENT

## 2025-03-07 ASSESSMENT — ACTIVITIES OF DAILY LIVING (ADL)
DRESSING_UB_CURRENT_FUNCTION: INDEPENDENT
BATHING_CURRENT_FUNCTION: STAND BY ASSIST
TOILETING: 1
BATHING ASSESSED: 1
DRESSING_LB_CURRENT_FUNCTION: SUPERVISION
TOILETING: INDEPENDENT

## 2025-03-07 NOTE — TELEPHONE ENCOUNTER
Lea called requesting to speak with coordinator about medication.  Patient call back number is [phone]

## 2025-03-07 NOTE — PROGRESS NOTES
Contacted by home OT Renu PITT that patient only has tube feed through Sunday.     Contacted [facility] at [phone]. Rep placed an expedited order for tube feed and supplies for patient. Stated that patients can call themselves when they have 7 days left and they will be able to place an order for additional supplies.     Team updated. Renu will update patient and family.

## 2025-03-10 ENCOUNTER — TELEPHONE (OUTPATIENT)
Facility: HOSPITAL | Age: 75
End: 2025-03-10
Payer: COMMERCIAL

## 2025-03-10 NOTE — TELEPHONE ENCOUNTER
Called patient to confirm tube feed order was received and get update on current oral intake.     Patient notes they have enough tube feed formula and will be getting next shipment tomorrow morning.     Patient describes increase appetite and oral intake. Denies nausea, vomiting, constipation or diarrhea. States many GI symptoms he was experiencing in past have resolved.  Patient states they are currently taking TF cycle for 12 hours overnight and taking 1 bolus per day after his afternoon meal. States he is eating much more than before. Diet recall indicates improved oral intake:   Breakfast: eggs and bread and grits with cheese  Lunch: lamb chop and green beans and baked potato  Dinner: Beef and vegetable soup    Encouraged patient to continue cycle TF at night, bolus feed in afternoon, and continue to increase oral intake. Will complete full nutrition assessment at next encounter later this week (3/13/25).

## 2025-03-11 ENCOUNTER — HOME CARE VISIT (OUTPATIENT)
Dept: HOME HEALTH SERVICES | Facility: HOME HEALTH | Age: 75
End: 2025-03-11
Payer: COMMERCIAL

## 2025-03-11 ASSESSMENT — ACTIVITIES OF DAILY LIVING (ADL)
HOME_HEALTH_OASIS: 00
OASIS_M1830: 02

## 2025-03-12 NOTE — PROGRESS NOTES
Reason for Nutrition Visit:  Pt is a 74 y.o. male referred for a nutrition follow-up s/p kidney txp 12/21/2024.    Transplant Coordinator: Chelsey Young RN    Past Medical Hx:  Patient Active Problem List   Diagnosis    S/P laparoscopic cholecystectomy    Abdominal pain    Achalasia    Acute lower UTI    Microcytic anemia    Complete heart block    Callus of foot    Chronic periodontitis, unspecified    Stage 5 chronic kidney disease (Multi)    Closed fracture of metatarsal bone    Crushing injury of foot    Diabetes mellitus (Multi)    Diarrhea    Dysphagia    ED (erectile dysfunction)    Essential hypertension    Foot pain, right    GIB (gastrointestinal bleeding)    Hepatitis B core antibody positive    Hyperkalemia    Ingrowing nail    Iron deficiency anemia secondary to inadequate dietary iron intake    Long toenail    Malignant neoplasm of prostate (Multi)    Onychomycosis    Pheochromocytoma of right adrenal gland    Pneumonia of right lower lobe due to infectious organism    Productive cough    Pure hypercholesterolemia    Stricture esophagus    SVT (supraventricular tachycardia) (CMS-HCC)    Type 2 diabetes mellitus with diabetic neuropathy (Multi)    Preop cardiovascular exam    Third degree heart block    Pericardial effusion ([facility])    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 (Multi)    Uremia    Cardiac tamponade    Cardiac pacemaker in situ    ESRD (end stage renal disease) (Multi)    Type 2 diabetes mellitus, with long-term current use of insulin    Dehydration    Alactasia syndrome    Type 2 diabetes mellitus with hyperglycemia, with long-term current use of insulin    On tube feeding diet    Kidney transplant recipient ([facility])    DKA, type 2, not at goal      Lab Results   Component Value Date    HGBA1C 6.1 (H) 10/29/2024    HGBA1C 5.7 (H) 04/20/2024    HGBA1C 7.3 (H) 02/21/2023     (L) 03/03/2025    K 4.8 03/03/2025    PHOS 3.9 03/03/2025    CL 99 03/03/2025    CO2 26 03/03/2025     "BUN 25 (H) 03/03/2025    CREATININE 1.03 03/03/2025    CALCIUM 9.4 03/03/2025    ALBUMIN 3.8 03/03/2025    PROT 8.8 (H) 12/20/2024    BILITOT 0.5 12/20/2024    ALKPHOS 189 (H) 12/20/2024    ALT 18 12/20/2024    AST 21 12/20/2024    GLUCOSE 199 (H) 03/03/2025    CHOL 197 02/24/2025    TRIG 166 (H) 02/24/2025    HDL 49.0 02/24/2025    BHYDRXBUT 0.09 02/12/2025    CPEPTIDE 4.8 (H) 10/29/2024     Lab Results   Component Value Date    HGB 12.5 (L) 03/03/2025     DM Specific Labs Trend (Includes HgbA1C, antibodies & fasting insulin):   Recent Labs     10/29/24  1202 04/20/24  0842 02/21/23  1351   HGBA1C 6.1* 5.7* 7.3*   CPEPTIDE 4.8*  --   --    , Iron Panel + Serum Ferritin Trend:   Recent Labs     01/08/25  0610 04/21/24  0739   IRON 31* 43   UIBC 109* 98*   TIBC 140* 141*   IRONSAT 22* 30   FERRITIN 928* 1,911*   , Vitamin B12: No results found for: \"BGVDQLTA96\" , Folate: No results found for: \"FOLATE\" , and Vitamin D:   Lab Results   Component Value Date    VITD25 34 01/05/2025      Food History and Nutrition Assessment     Appetite: Good  Intake: 50-75% oral intake; >75% with TF   GI Symptoms : None   Dentition : own    Diet History:  Patient is known to nutrition services. Patient TF order currently Vital 1.5 @ 40 mL/hr for 12 hrs (8pm to 8am) and Vital 1.5 @ 240 mL bolus TID during meal times. Patient is to continue PO intake during the day. Per inpatient notes, patient has history of not sticking to recommend TF regimen. Last check-in with patient over phone earlier this week, patient was utilizing cycle TF as recommended (Vital 1.5 @ 40 mL/hr x 12 hrs) but only having one bolus feed per day (240mL). This would equate to a total of 1080 kcal, 49 g protein, and 367 mL free water from TF. Patient claimed at time oral intake had increased. Presents today stating patient continues cycle TF at night and one bolus during the day. He states he did not do bolus yesterday for lunch because he was too full after eating. " Denies nausea, vomiting, diarrhea, and constipation.     24 Hour Diet Recall:  Meal 1: Eggs (with onions, peppers and tomatoes) with side of bread and porridge (Buddhism oats/barley with milk)  AM Snack: None  Meal 2: Fish (2 oz) and shrimp (5) with pumpkin fried and rice with chickpeas  PM Snack: None  Meal 3: Fish (2oz) and couple shrimp (3 shrimps) and bread and couple of crackers with nut butter   Late Snack: Potato chips   Beverages: Water, gaterade, V8 splash (4oz)  Estimated oral intake based on diet recall is 1320 kcal and 68 g protein.    Food Preparation: Patient and Children  Cooking Skills/Barriers: None reported  Grocery Shopping: Children    Estimated Energy Needs:     Calorie Needs (30 - 35 kcal/kg CBW): 8865-1360 kcal  Protein Needs (1.3 g/kg CBW): 87+ g    Weight History:  CBW: 70.8 kg (156 lbs)   BMI: 20.6 kg/m2    Wt Readings from Last 10 Encounters:   03/13/25 70.8 kg (156 lb)   03/03/25 69.2 kg (152 lb 9.6 oz)   02/24/25 69.1 kg (152 lb 4.8 oz)   02/22/25 66.7 kg (147 lb)   02/18/25 69.8 kg (153 lb 14.1 oz)   02/03/25 70 kg (154 lb 6.4 oz)   02/02/25 67.3 kg (148 lb 6.4 oz)   01/27/25 68.9 kg (152 lb)   01/22/25 69.5 kg (153 lb 3.2 oz)   12/31/24 74.1 kg (163 lb 4.8 oz)     Weight change:  Weight has remained stable over last month.     Nutrition Focused Physical Exam:    Deferred: Another provider was ready to start their appointment with patient.  Will conduct NFPE at next encounter.     Malnutrition Present: Unable to Determine at this Time    Nutrition Diagnosis    Diagnosis Status: New   Diagnosis: Inadequate oral intake  related to physiological causes resulting in increased energy requirements (s/p kidney txp) as evidence by estimated oral energy intake from diet less than estimated energy needs.    Nutrition Education, Intervention, and Goals    Recommended Modifications to Tube Feed:   Discontinue bolus feeds (240 mL Vital 1.5 via PEG TID after meals)   Continue Cycle TF of Vital 1.5 @  40mL x 12 hours overnight  Provides 720 kcal, 32 g protein, and 367 mL free water daily     Continue increasing oral intake with an emphasis on calorie- and protein-rich foods. Encouraged patient to add snacks in between meals. Brainstormed different snack options, including protein bars, and provided High Protein Snack List for ideas.     Educational Handouts: High Protein Snack Ideas    Nutrition Goals  Nutrition Goals: Maintain stable weight  Oral intake greater than 75%  Dairy/Calcium Foods: Increase  Meat/Protein Foods: Increase  Consume meal or snack every 3-4 hours  Include both protein and starch at all meals and snacks    Nutrition Monitoring and Evaluation    Additional Recommendations/Referrals: N/A    Follow up: It was a pleasure speaking with you today, Mr. Hanson! Let's schedule a follow-up to check in on your progress. The nurse coordinator has organized for us to have another appointment on March 26th at 8:00 am for a in-person appointment.

## 2025-03-13 ENCOUNTER — OFFICE VISIT (OUTPATIENT)
Facility: HOSPITAL | Age: 75
End: 2025-03-13
Payer: COMMERCIAL

## 2025-03-13 ENCOUNTER — NURSE ONLY (OUTPATIENT)
Facility: HOSPITAL | Age: 75
End: 2025-03-13
Payer: COMMERCIAL

## 2025-03-13 ENCOUNTER — NUTRITION (OUTPATIENT)
Facility: HOSPITAL | Age: 75
End: 2025-03-13
Payer: COMMERCIAL

## 2025-03-13 VITALS
TEMPERATURE: 97.3 F | OXYGEN SATURATION: 96 % | HEART RATE: 95 BPM | DIASTOLIC BLOOD PRESSURE: 73 MMHG | WEIGHT: 156 LBS | SYSTOLIC BLOOD PRESSURE: 158 MMHG | BODY MASS INDEX: 20.58 KG/M2

## 2025-03-13 VITALS
BODY MASS INDEX: 20.58 KG/M2 | WEIGHT: 156 LBS | SYSTOLIC BLOOD PRESSURE: 158 MMHG | HEART RATE: 95 BPM | OXYGEN SATURATION: 96 % | TEMPERATURE: 97.3 F | DIASTOLIC BLOOD PRESSURE: 73 MMHG

## 2025-03-13 DIAGNOSIS — B27.00 EBV (EPSTEIN-BARR VIRUS) VIREMIA: ICD-10-CM

## 2025-03-13 DIAGNOSIS — Z78.9 ON TUBE FEEDING DIET: ICD-10-CM

## 2025-03-13 DIAGNOSIS — Z91.89 AT RISK FOR INFECTION TRANSMITTED FROM DONOR: ICD-10-CM

## 2025-03-13 DIAGNOSIS — Z94.0 IMMUNOSUPPRESSIVE MANAGEMENT ENCOUNTER FOLLOWING KIDNEY TRANSPLANT: Primary | ICD-10-CM

## 2025-03-13 DIAGNOSIS — E11.65 TYPE 2 DIABETES MELLITUS WITH HYPERGLYCEMIA, WITH LONG-TERM CURRENT USE OF INSULIN: Primary | ICD-10-CM

## 2025-03-13 DIAGNOSIS — Z13.89 SCREENING FOR BLOOD OR PROTEIN IN URINE: ICD-10-CM

## 2025-03-13 DIAGNOSIS — Z94.0 KIDNEY TRANSPLANT RECIPIENT (HHS-HCC): ICD-10-CM

## 2025-03-13 DIAGNOSIS — Z94.0 KIDNEY REPLACED BY TRANSPLANT (HHS-HCC): ICD-10-CM

## 2025-03-13 DIAGNOSIS — Z79.4 TYPE 2 DIABETES MELLITUS WITH HYPERGLYCEMIA, WITH LONG-TERM CURRENT USE OF INSULIN: Primary | ICD-10-CM

## 2025-03-13 DIAGNOSIS — Z79.899 IMMUNOSUPPRESSIVE MANAGEMENT ENCOUNTER FOLLOWING KIDNEY TRANSPLANT: Primary | ICD-10-CM

## 2025-03-13 LAB
ALBUMIN SERPL BCP-MCNC: 3.8 G/DL (ref 3.4–5)
ANION GAP SERPL CALC-SCNC: 12 MMOL/L (ref 10–20)
BUN SERPL-MCNC: 23 MG/DL (ref 6–23)
CALCIUM SERPL-MCNC: 9.4 MG/DL (ref 8.6–10.6)
CHLORIDE SERPL-SCNC: 102 MMOL/L (ref 98–107)
CO2 SERPL-SCNC: 26 MMOL/L (ref 21–32)
CREAT SERPL-MCNC: 0.91 MG/DL (ref 0.5–1.3)
CREAT UR-MCNC: 50 MG/DL (ref 20–370)
EGFRCR SERPLBLD CKD-EPI 2021: 88 ML/MIN/1.73M*2
ERYTHROCYTE [DISTWIDTH] IN BLOOD BY AUTOMATED COUNT: 16.7 % (ref 11.5–14.5)
GLUCOSE SERPL-MCNC: 144 MG/DL (ref 74–99)
HCT VFR BLD AUTO: 37.6 % (ref 41–52)
HGB BLD-MCNC: 11.8 G/DL (ref 13.5–17.5)
MAGNESIUM SERPL-MCNC: 1.47 MG/DL (ref 1.6–2.4)
MCH RBC QN AUTO: 28.4 PG (ref 26–34)
MCHC RBC AUTO-ENTMCNC: 31.4 G/DL (ref 32–36)
MCV RBC AUTO: 91 FL (ref 80–100)
NRBC BLD-RTO: 0 /100 WBCS (ref 0–0)
PHOSPHATE SERPL-MCNC: 3.6 MG/DL (ref 2.5–4.9)
PLATELET # BLD AUTO: 199 X10*3/UL (ref 150–450)
POTASSIUM SERPL-SCNC: 4.3 MMOL/L (ref 3.5–5.3)
PROT UR-ACNC: 13 MG/DL (ref 5–25)
PROT/CREAT UR: 0.26 MG/MG CREAT
RBC # BLD AUTO: 4.15 X10*6/UL (ref 4.5–5.9)
SODIUM SERPL-SCNC: 136 MMOL/L (ref 136–145)
TACROLIMUS BLD-MCNC: 8.5 NG/ML
WBC # BLD AUTO: 3.2 X10*3/UL (ref 4.4–11.3)

## 2025-03-13 PROCEDURE — 99213 OFFICE O/P EST LOW 20 MIN: CPT | Mod: 24

## 2025-03-13 PROCEDURE — 3077F SYST BP >= 140 MM HG: CPT | Performed by: PHYSICIAN ASSISTANT

## 2025-03-13 PROCEDURE — 3061F NEG MICROALBUMINURIA REV: CPT | Performed by: TRANSPLANT SURGERY

## 2025-03-13 PROCEDURE — 80069 RENAL FUNCTION PANEL: CPT

## 2025-03-13 PROCEDURE — 82570 ASSAY OF URINE CREATININE: CPT

## 2025-03-13 PROCEDURE — 3078F DIAST BP <80 MM HG: CPT | Performed by: PHYSICIAN ASSISTANT

## 2025-03-13 PROCEDURE — 1126F AMNT PAIN NOTED NONE PRSNT: CPT | Performed by: PHYSICIAN ASSISTANT

## 2025-03-13 PROCEDURE — 3049F LDL-C 100-129 MG/DL: CPT | Performed by: TRANSPLANT SURGERY

## 2025-03-13 PROCEDURE — 3077F SYST BP >= 140 MM HG: CPT | Performed by: TRANSPLANT SURGERY

## 2025-03-13 PROCEDURE — 87799 DETECT AGENT NOS DNA QUANT: CPT

## 2025-03-13 PROCEDURE — 87517 HEPATITIS B DNA QUANT: CPT

## 2025-03-13 PROCEDURE — 80197 ASSAY OF TACROLIMUS: CPT

## 2025-03-13 PROCEDURE — 1111F DSCHRG MED/CURRENT MED MERGE: CPT | Performed by: TRANSPLANT SURGERY

## 2025-03-13 PROCEDURE — 87385 HISTOPLASMA CAPSUL AG IA: CPT

## 2025-03-13 PROCEDURE — 1159F MED LIST DOCD IN RCRD: CPT | Performed by: TRANSPLANT SURGERY

## 2025-03-13 PROCEDURE — 3061F NEG MICROALBUMINURIA REV: CPT | Performed by: PHYSICIAN ASSISTANT

## 2025-03-13 PROCEDURE — 85027 COMPLETE CBC AUTOMATED: CPT

## 2025-03-13 PROCEDURE — 3078F DIAST BP <80 MM HG: CPT | Performed by: TRANSPLANT SURGERY

## 2025-03-13 PROCEDURE — 99214 OFFICE O/P EST MOD 30 MIN: CPT | Performed by: PHYSICIAN ASSISTANT

## 2025-03-13 PROCEDURE — 95251 CONT GLUC MNTR ANALYSIS I&R: CPT | Performed by: PHYSICIAN ASSISTANT

## 2025-03-13 PROCEDURE — 83735 ASSAY OF MAGNESIUM: CPT

## 2025-03-13 PROCEDURE — 99213 OFFICE O/P EST LOW 20 MIN: CPT

## 2025-03-13 PROCEDURE — 1111F DSCHRG MED/CURRENT MED MERGE: CPT | Performed by: PHYSICIAN ASSISTANT

## 2025-03-13 PROCEDURE — 1126F AMNT PAIN NOTED NONE PRSNT: CPT | Performed by: TRANSPLANT SURGERY

## 2025-03-13 PROCEDURE — 3049F LDL-C 100-129 MG/DL: CPT | Performed by: PHYSICIAN ASSISTANT

## 2025-03-13 RX ORDER — MYCOPHENOLIC ACID 180 MG/1
360 TABLET, DELAYED RELEASE ORAL 2 TIMES DAILY
Qty: 120 TABLET | Refills: 11 | Status: SHIPPED | OUTPATIENT
Start: 2025-03-13 | End: 2026-03-13

## 2025-03-13 ASSESSMENT — PAIN SCALES - GENERAL
PAINLEVEL_OUTOF10: 0-NO PAIN
PAINLEVEL_OUTOF10: 0-NO PAIN

## 2025-03-13 ASSESSMENT — ENCOUNTER SYMPTOMS: FATIGUE: 1

## 2025-03-13 NOTE — PROGRESS NOTES
Subjective   Temo Hanson is a 74 y.o. male who presents for follow-up of Type 2 diabetes mellitus. The initial diagnosis of diabetes was made at 46 years. The patient does have a known family history of diabetes.    Known complications due to diabetes included chronic kidney disease    Cardiovascular risk factors include advanced age (older than 55 for men, 65 for women), diabetes mellitus, hypertension, male gender, and pheochromocytoma . The patient is not on an ACE inhibitor or angiotensin II receptor blocker.  The patient has not been previously hospitalized due to diabetic ketoacidosis.     Current symptoms/problems include midday hyperglycemia with meals. His clinical course has improved.     Current diabetes regimen is as follows: Glargine 10 units in the morning, fiasp 3u plus sliding scale at approximate mealtimes while tube feed is running or not, and Humulin 15 units at 1800 to coincide with start of tube feed    The patient is currently checking the blood glucose 5+ times per day.    Patient is using: both glucometer and continuous glucose monitor -Patient's CGM regina was reviewed and does demonstrate connection to this provider's clinical reviewing website        Hypoglycemia frequency: None  Hypoglycemia awareness: Yes     Exercise: never -limited by recent kidney allograft transplantation and patient's malnutrition status  Meal panning: He is using  12 hour/day tube feeds supplementation to oral meal intake, now only PO meals during the day with no TF supplement bolusing .    Review of Systems   Constitutional:  Positive for fatigue.   All other systems reviewed and are negative.      Objective   /73   Pulse 95   Temp 36.3 °C (97.3 °F) (Temporal)   Wt 70.8 kg (156 lb)   SpO2 96%   BMI 20.58 kg/m²   Physical Exam  Constitutional:       Appearance: Normal appearance. He is normal weight.   HENT:      Head: Normocephalic and atraumatic.      Nose: Nose normal.      Mouth/Throat:      Mouth:  Mucous membranes are dry.      Pharynx: Oropharynx is clear.   Eyes:      Extraocular Movements: Extraocular movements intact.      Conjunctiva/sclera: Conjunctivae normal.   Cardiovascular:      Rate and Rhythm: Normal rate and regular rhythm.      Pulses: Normal pulses.      Heart sounds: Normal heart sounds.   Pulmonary:      Effort: Pulmonary effort is normal.      Breath sounds: Normal breath sounds.   Abdominal:      General: Abdomen is flat. Bowel sounds are normal.      Palpations: Abdomen is soft.   Skin:     General: Skin is warm and dry.   Neurological:      General: No focal deficit present.      Mental Status: He is alert and oriented to person, place, and time. Mental status is at baseline.   Psychiatric:         Mood and Affect: Mood normal.         Behavior: Behavior normal.         Thought Content: Thought content normal.         Judgment: Judgment normal.         Lab Review  Glucose (mg/dL)   Date Value   03/03/2025 199 (H)   02/24/2025 175 (H)   02/17/2025 123 (H)     Hemoglobin A1C (%)   Date Value   10/29/2024 6.1 (H)   04/20/2024 5.7 (H)   02/21/2023 7.3 (H)   01/26/2023 6.5 (A)   12/01/2022 6.4 (H)   04/06/2022 6.1 (H)     Bicarbonate (mmol/L)   Date Value   03/03/2025 26   02/24/2025 28   02/17/2025 26     Urea Nitrogen (mg/dL)   Date Value   03/03/2025 25 (H)   02/24/2025 17   02/17/2025 14     Creatinine (mg/dL)   Date Value   03/03/2025 1.03   02/24/2025 0.90   02/17/2025 0.86       Health Maintenance:   Foot Exam: Due for update  Eye Exam: Last seen by [facility] ophthalmology in August 2024  Lipid Panel: Due for update next month, ordered previously  Urine Albumin: Due for update 6 months after transplant, ordered previously    Assessment/Plan   Diagnoses and all orders for this visit:  Type 2 diabetes mellitus with hyperglycemia, with long-term current use of insulin  On tube feeding diet  Kidney transplant recipient ([facility]HCC)        Type 2 diabetes mellitus, is not at goal. A1C not  yet due for update    RX changes:  see insulin plan below - no insulin changes today, we will observe the effect of your nutrition change and check in next week    Education:  interpretation of lab results, blood sugar goals, complications of diabetes mellitus, and self-injection of insulin  Follow up: I recommend diabetes care be  in 1 week  as scheduled via phone appointment then transfer diabetes care back to [facility] endocriniologist  If [facility] appointment is more than 2 months away, I will have one more appointment with you until then    INSULIN INSTRUCTIONS    BASAGLAR = 10 units once every morning    HUMULIN N = 15 units once at 7:00p with start of tube feeds    FIASP        BREAKFAST = 3 units plus sliding scale (take 15 min before meal)       LUNCH = 3 units plus sliding scale (take 15 min before meal)       DINNER = 3 units plus sliding scale (take 15 min before meal)    SLIDING SCALE    = 0u  151-200 = 2u  201-250 = 4u  251-300 = 6u  301-350 = 8u  351-400 = 10u  Over 400 = repeat 10u every 4 hours

## 2025-03-13 NOTE — PROGRESS NOTES
Temo Hanson is a 74 y.o. male POD#65 from [facility] from a DBD donor.     - Doing well. Improving PO intake.   - Plan to stop bolus feeding  - Energy level improving    Objective   Gen: A+OX3; NAD  Cardiac: RRR  Chest: Normal inspiratory effort  Abdomen: S/NT/ND. Incision C/D/I.  Ext: No LE edema    Last Recorded Vitals  Visit Vitals  /73   Pulse 95   Temp 36.3 °C (97.3 °F) (Temporal)      Assessment/Plan   Principal Problem:    Kidney transplant recipient    Active Problems:  Patient Active Problem List   Diagnosis    S/P laparoscopic cholecystectomy    Abdominal pain    Achalasia    Acute lower UTI    Microcytic anemia    Complete heart block    Callus of foot    Chronic periodontitis, unspecified    Stage 5 chronic kidney disease (Multi)    Closed fracture of metatarsal bone    Crushing injury of foot    Diabetes mellitus (Multi)    Diarrhea    Dysphagia    ED (erectile dysfunction)    Essential hypertension    Foot pain, right    GIB (gastrointestinal bleeding)    Hepatitis B core antibody positive    Hyperkalemia    Ingrowing nail    Iron deficiency anemia secondary to inadequate dietary iron intake    Long toenail    Malignant neoplasm of prostate (Multi)    Onychomycosis    Pheochromocytoma of right adrenal gland    Pneumonia of right lower lobe due to infectious organism    Productive cough    Pure hypercholesterolemia    Stricture esophagus    SVT (supraventricular tachycardia) (CMS-HCC)    Type 2 diabetes mellitus with diabetic neuropathy (Multi)    Preop cardiovascular exam    Third degree heart block    Pericardial effusion ([facility]-HCC)    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 (Multi)    Uremia    Cardiac tamponade    Cardiac pacemaker in situ    ESRD (end stage renal disease) (Multi)    Type 2 diabetes mellitus, with long-term current use of insulin    Dehydration    Alactasia syndrome    Type 2 diabetes mellitus with hyperglycemia, with long-term current use of insulin    On tube feeding diet     Kidney transplant recipient ([facility])    DKA, type 2, not at goal      Kidney allograft function              KDPI 93%/PRA 54%              Immediate graft function              Thymo 3 mg/kg              Lokelma daily     Immunosuppression reviewed and adjusted              Tacrolimus  current dose 8.7.  4 mg po BID              Myfortic 360 mg bid, increased after stopping valycte              Prednisone 5 mg     Nutrition                        Tube feeds 12 hrs - Vital 1.5 aimee   Endo will increase long-acting insulin to 15 Units              Dietician today     Hypertension              Amlodipine 10 mg   Carvedilol 12. 5 mg bid    ID   Ertapenem last day tomorrow   Repeat UA and urine cx today    Prophylaxis   Mepron     Misc              Imodium bid              Reglan 5 mg q8h/Protonix              Labs weekly              RTC 2 week    I spent 30 minutes in the professional and overall care of this patient, including immunosuppression management.  John Zimmer MD

## 2025-03-13 NOTE — PATIENT INSTRUCTIONS
Type 2 diabetes mellitus with hyperglycemia, with long-term current use of insulin  On tube feeding diet  Kidney transplant recipient ([facility])        Type 2 diabetes mellitus, is not at goal. A1C not yet due for update    RX changes: see insulin plan below - no insulin changes today, we will observe the effect of your nutrition change and check in next week   Education:  interpretation of lab results, blood sugar goals, complications of diabetes mellitus, and self-injection of insulin  Follow up: I recommend diabetes care be in 1 week as scheduled via phone appointment then transfer diabetes care back to [facility] endocriniologist  If [facility] appointment is more than 2 months away, I will have one more appointment with you until then    INSULIN INSTRUCTIONS    BASAGLAR = 10 units once every morning    HUMULIN N = 15 units once at 7:00p with start of tube feeds    FIASP        BREAKFAST = 3 units plus sliding scale (take 15 min before meal)       LUNCH = 3 units plus sliding scale (take 15 min before meal)       DINNER = 3 units plus sliding scale (take 15 min before meal)    SLIDING SCALE    = 0u  151-200 = 2u  201-250 = 4u  251-300 = 6u  301-350 = 8u  351-400 = 10u  Over 400 = repeat 10u every 4 hours

## 2025-03-13 NOTE — PATIENT INSTRUCTIONS
Stop magnesium oxide  Stop sodium bicarbonate  Stop Nystatin  Stop Lokelma  Increase Myfortic 360 mg (2 tablets) every 12 hours  Labs weekly  RTC 3/26 @ 8 am with [facility]phrology [facility]

## 2025-03-14 DIAGNOSIS — B27.00 EBV (EPSTEIN-BARR VIRUS) VIREMIA: ICD-10-CM

## 2025-03-14 DIAGNOSIS — Z94.0 KIDNEY REPLACED BY TRANSPLANT (HHS-HCC): ICD-10-CM

## 2025-03-14 LAB
BKV DNA SERPL NAA+PROBE-LOG#: NORMAL {LOG_COPIES}/ML
CMV DNA SERPL NAA+PROBE-LOG IU: NORMAL {LOG_IU}/ML
EBV DNA SPEC NAA+PROBE-LOG#: NORMAL {LOG_COPIES}/ML
HBV DNA SERPL NAA+PROBE-ACNC: NOT DETECTED [IU]/ML
HBV DNA SERPL NAA+PROBE-LOG IU: NORMAL {LOG_IU}/ML
LABORATORY COMMENT REPORT: NOT DETECTED

## 2025-03-14 PROCEDURE — RXMED WILLOW AMBULATORY MEDICATION CHARGE

## 2025-03-15 LAB
ALLOSURE SCORE - KIDNEY: 0.24 %
CAREDX_ORDER_ID: NORMAL
CENTER_ORDER_ID: NORMAL
CLIENT SPECIMEN ID - ALLOSURE: NORMAL
DONOR RELATION - ALLOSURE: NORMAL
H CAPSUL AG UR QL: NOT DETECTED
NOTES - ALLOSURE: NORMAL
RELATIVE CHANGE VALUE - KIDNEY: -8 %
SCAN RESULT: NORMAL
TEST COMMENTS - ALLOSURE: NORMAL
TIME POST TX - ALLOSURE: NORMAL
TRANSPLANTED ORGAN - ALLOSURE: NORMAL
TX DATE - ALLOSURE/ALLOMAP: NORMAL
WP_ORDER_ID: NORMAL

## 2025-03-17 ENCOUNTER — PHARMACY VISIT (OUTPATIENT)
Dept: PHARMACY | Facility: CLINIC | Age: 75
End: 2025-03-17
Payer: COMMERCIAL

## 2025-03-18 ENCOUNTER — TELEMEDICINE (OUTPATIENT)
Dept: ENDOCRINOLOGY | Facility: CLINIC | Age: 75
End: 2025-03-18
Payer: COMMERCIAL

## 2025-03-18 DIAGNOSIS — E11.65 TYPE 2 DIABETES MELLITUS WITH HYPERGLYCEMIA, WITH LONG-TERM CURRENT USE OF INSULIN: Chronic | ICD-10-CM

## 2025-03-18 DIAGNOSIS — Z79.4 TYPE 2 DIABETES MELLITUS WITH HYPERGLYCEMIA, WITH LONG-TERM CURRENT USE OF INSULIN: Chronic | ICD-10-CM

## 2025-03-18 DIAGNOSIS — E11.00 TYPE 2 DIABETES MELLITUS WITH HYPEROSMOLARITY WITHOUT COMA, WITH LONG-TERM CURRENT USE OF INSULIN (MULTI): ICD-10-CM

## 2025-03-18 DIAGNOSIS — Z79.4 TYPE 2 DIABETES MELLITUS WITH KETOACIDOSIS WITHOUT COMA, WITH LONG-TERM CURRENT USE OF INSULIN: ICD-10-CM

## 2025-03-18 DIAGNOSIS — E11.10 TYPE 2 DIABETES MELLITUS WITH KETOACIDOSIS WITHOUT COMA, WITH LONG-TERM CURRENT USE OF INSULIN: ICD-10-CM

## 2025-03-18 DIAGNOSIS — Z79.4 TYPE 2 DIABETES MELLITUS WITH HYPEROSMOLARITY WITHOUT COMA, WITH LONG-TERM CURRENT USE OF INSULIN (MULTI): ICD-10-CM

## 2025-03-18 PROCEDURE — 1111F DSCHRG MED/CURRENT MED MERGE: CPT | Performed by: PHYSICIAN ASSISTANT

## 2025-03-18 PROCEDURE — 95251 CONT GLUC MNTR ANALYSIS I&R: CPT | Performed by: PHYSICIAN ASSISTANT

## 2025-03-18 PROCEDURE — 3049F LDL-C 100-129 MG/DL: CPT | Performed by: PHYSICIAN ASSISTANT

## 2025-03-18 PROCEDURE — 99214 OFFICE O/P EST MOD 30 MIN: CPT | Performed by: PHYSICIAN ASSISTANT

## 2025-03-18 PROCEDURE — 3061F NEG MICROALBUMINURIA REV: CPT | Performed by: PHYSICIAN ASSISTANT

## 2025-03-18 RX ORDER — INSULIN GLARGINE 100 [IU]/ML
18 INJECTION, SOLUTION SUBCUTANEOUS EVERY MORNING
Start: 2025-03-18

## 2025-03-18 RX ORDER — INSULIN HUMAN 100 [IU]/ML
8 INJECTION, SUSPENSION SUBCUTANEOUS NIGHTLY
Start: 2025-03-18

## 2025-03-18 RX ORDER — INSULIN ASPART INJECTION 100 [IU]/ML
0-10 INJECTION, SOLUTION SUBCUTANEOUS
Qty: 15 ML | Refills: 2 | Status: SHIPPED | OUTPATIENT
Start: 2025-03-18

## 2025-03-18 ASSESSMENT — ENCOUNTER SYMPTOMS: FATIGUE: 1

## 2025-03-18 NOTE — PATIENT INSTRUCTIONS
Type 2 diabetes mellitus with hyperosmolarity without coma, with long-term current use of insulin (Multi)  -     insulin glargine (Basaglar KwikPen U-100 Insulin) 100 unit/mL (3 mL) pen; Inject 18 Units under the skin once daily in the morning. Take at the same time as prednisone.  Type 2 diabetes mellitus with hyperglycemia, with long-term current use of insulin  -     insulin NPH, Isophane, (HumuLIN N NPH Insulin KwikPen) 100 unit/mL (3 mL) pen; Inject 8 Units under the skin once daily at bedtime. Administer 1 hour before starting the tube feeds every evening.  Type 2 diabetes mellitus with ketoacidosis without coma, with long-term current use of insulin  -     insulin aspart, with niacinamide, (Fiasp FlexTouch U-100 Insulin) 100 unit/mL (3 mL) pen; Inject 0-10 Units under the skin 3 times a day before meals. 5 unit(s) before meals 3 times daily and increase as per sliding scale: 2 unit(s) if Blood glucose is between 151-200, 4 unit(s) if Blood glucose is between 201-250, 6 unit(s) if Blood glucose is between 251-300, 8 unit(s) if Blood glucose is between 301-350, 10 unit(s) if Blood glucose is between 351-400, If blood glucose is greater than 400 mg/dL, give max insulin per sliding scale AND then contact provider. Expect up to 50 units per day          Type 2 diabetes mellitus, is not at goal. A1C due for update, already ordered     RX changes: see insulin plan below    Education:  interpretation of lab results, blood sugar goals, complications of diabetes mellitus, and self-injection of insulin  Follow up: I recommend diabetes care be in 1 month as scheduled via phone appointment then transfer diabetes care back to [facility] endocriniologist  If [facility] appointment is more than 2 months away, I will have one more appointment with you until then    INSULIN INSTRUCTIONS    BASAGLAR = 18 units once every morning    HUMULIN N = 8 units once at 7:00p with start of tube feeds    FIASP        BREAKFAST = 5 units  plus sliding scale (take 15 min before meal)       LUNCH = 5 units plus sliding scale (take 15 min before meal)       DINNER = 5 units plus sliding scale (take 15 min before meal)    SLIDING SCALE    = 0u  151-200 = 2u  201-250 = 4u  251-300 = 6u  301-350 = 8u  351-400 = 10u  Over 400 = repeat 10u every 4 hours

## 2025-03-18 NOTE — PROGRESS NOTES
Subjective   Temo Hanson is a 74 y.o. male who presents for follow-up of Type 2 diabetes mellitus. The initial diagnosis of diabetes was made at 46 years. The patient does have a known family history of diabetes.    Known complications due to diabetes included chronic kidney disease    Cardiovascular risk factors include advanced age (older than 55 for men, 65 for women), diabetes mellitus, hypertension, male gender, and pheochromocytoma . The patient is not on an ACE inhibitor or angiotensin II receptor blocker.  The patient has not been previously hospitalized due to diabetic ketoacidosis.     Current symptoms/problems include midday hyperglycemia with meals. His clinical course has improved.     Current diabetes regimen is as follows: Glargine 15 units in the morning, fiasp 3u plus sliding scale at approximate mealtimes while tube feed is running or not, and Humulin 5 units at 1800 to coincide with start of tube feed    The patient is currently checking the blood glucose 5+ times per day.    Patient is using: both glucometer and continuous glucose monitor -Patient's CGM regina was reviewed and does demonstrate connection to this provider's clinical reviewing website        Hypoglycemia frequency: None  Hypoglycemia awareness: Yes     Exercise: never -limited by recent kidney allograft transplantation and patient's malnutrition status  Meal panning: He is using  12 hour/day tube feeds supplementation to oral meal intake, now only PO meals during the day with no TF supplement bolusing .    Review of Systems   Constitutional:  Positive for fatigue.   All other systems reviewed and are negative.      Objective   There were no vitals taken for this visit.  Physical Exam  Constitutional:       Appearance: Normal appearance. He is normal weight.   HENT:      Head: Normocephalic and atraumatic.      Nose: Nose normal.      Mouth/Throat:      Mouth: Mucous membranes are dry.      Pharynx: Oropharynx is clear.   Eyes:       Extraocular Movements: Extraocular movements intact.      Conjunctiva/sclera: Conjunctivae normal.   Cardiovascular:      Rate and Rhythm: Normal rate and regular rhythm.      Pulses: Normal pulses.      Heart sounds: Normal heart sounds.   Pulmonary:      Effort: Pulmonary effort is normal.      Breath sounds: Normal breath sounds.   Abdominal:      General: Abdomen is flat. Bowel sounds are normal.      Palpations: Abdomen is soft.   Skin:     General: Skin is warm and dry.   Neurological:      General: No focal deficit present.      Mental Status: He is alert and oriented to person, place, and time. Mental status is at baseline.   Psychiatric:         Mood and Affect: Mood normal.         Behavior: Behavior normal.         Thought Content: Thought content normal.         Judgment: Judgment normal.         Lab Review  Glucose (mg/dL)   Date Value   03/13/2025 144 (H)   03/03/2025 199 (H)   02/24/2025 175 (H)     Hemoglobin A1C (%)   Date Value   10/29/2024 6.1 (H)   04/20/2024 5.7 (H)   02/21/2023 7.3 (H)   01/26/2023 6.5 (A)   12/01/2022 6.4 (H)   04/06/2022 6.1 (H)     Bicarbonate (mmol/L)   Date Value   03/13/2025 26   03/03/2025 26   02/24/2025 28     Urea Nitrogen (mg/dL)   Date Value   03/13/2025 23   03/03/2025 25 (H)   02/24/2025 17     Creatinine (mg/dL)   Date Value   03/13/2025 0.91   03/03/2025 1.03   02/24/2025 0.90       Health Maintenance:   Foot Exam: Due for update  Eye Exam: Last seen by [facility] ophthalmology in August 2024  Lipid Panel: Due for update next month, ordered previously  Urine Albumin: Due for update 6 months after transplant, ordered previously    Assessment/Plan   Diagnoses and all orders for this visit:  Type 2 diabetes mellitus with hyperosmolarity without coma, with long-term current use of insulin (Multi)  -     insulin glargine (Basaglar KwikPen U-100 Insulin) 100 unit/mL (3 mL) pen; Inject 18 Units under the skin once daily in the morning. Take at the same time as  prednisone.  Type 2 diabetes mellitus with hyperglycemia, with long-term current use of insulin  -     insulin NPH, Isophane, (HumuLIN N NPH Insulin KwikPen) 100 unit/mL (3 mL) pen; Inject 8 Units under the skin once daily at bedtime. Administer 1 hour before starting the tube feeds every evening.  Type 2 diabetes mellitus with ketoacidosis without coma, with long-term current use of insulin  -     insulin aspart, with niacinamide, (Fiasp FlexTouch U-100 Insulin) 100 unit/mL (3 mL) pen; Inject 0-10 Units under the skin 3 times a day before meals. 5 unit(s) before meals 3 times daily and increase as per sliding scale: 2 unit(s) if Blood glucose is between 151-200, 4 unit(s) if Blood glucose is between 201-250, 6 unit(s) if Blood glucose is between 251-300, 8 unit(s) if Blood glucose is between 301-350, 10 unit(s) if Blood glucose is between 351-400, If blood glucose is greater than 400 mg/dL, give max insulin per sliding scale AND then contact provider. Expect up to 50 units per day          Type 2 diabetes mellitus, is not at goal. A1C due for update, already ordered     RX changes:  see insulin plan below     Education:  interpretation of lab results, blood sugar goals, complications of diabetes mellitus, and self-injection of insulin  Follow up: I recommend diabetes care be  in 1 month  as scheduled via phone appointment then transfer diabetes care back to [facility] endocriniologist  If [facility] appointment is more than 2 months away, I will have one more appointment with you until then    INSULIN INSTRUCTIONS    BASAGLAR = 18 units once every morning    HUMULIN N = 8 units once at 7:00p with start of tube feeds    FIASP        BREAKFAST = 5 units plus sliding scale (take 15 min before meal)       LUNCH = 5 units plus sliding scale (take 15 min before meal)       DINNER = 5 units plus sliding scale (take 15 min before meal)    SLIDING SCALE    = 0u  151-200 = 2u  201-250 = 4u  251-300 = 6u  301-350 =  8u  351-400 = 10u  Over 400 = repeat 10u every 4 hours

## 2025-03-21 ENCOUNTER — APPOINTMENT (OUTPATIENT)
Facility: HOSPITAL | Age: 75
End: 2025-03-21
Payer: COMMERCIAL

## 2025-03-21 LAB
ALBUMIN SERPL BCP-MCNC: 3.6 G/DL (ref 3.4–5)
ANION GAP SERPL CALC-SCNC: 14 MMOL/L (ref 10–20)
BUN SERPL-MCNC: 20 MG/DL (ref 6–23)
CALCIUM SERPL-MCNC: 9.2 MG/DL (ref 8.6–10.6)
CHLORIDE SERPL-SCNC: 100 MMOL/L (ref 98–107)
CO2 SERPL-SCNC: 22 MMOL/L (ref 21–32)
CREAT SERPL-MCNC: 1.01 MG/DL (ref 0.5–1.3)
CREAT UR-MCNC: 67.1 MG/DL (ref 20–370)
EGFRCR SERPLBLD CKD-EPI 2021: 78 ML/MIN/1.73M*2
ERYTHROCYTE [DISTWIDTH] IN BLOOD BY AUTOMATED COUNT: 16.2 % (ref 11.5–14.5)
GLUCOSE SERPL-MCNC: 150 MG/DL (ref 74–99)
HCT VFR BLD AUTO: 36.1 % (ref 41–52)
HGB BLD-MCNC: 11.5 G/DL (ref 13.5–17.5)
MAGNESIUM SERPL-MCNC: 1.47 MG/DL (ref 1.6–2.4)
MCH RBC QN AUTO: 28.7 PG (ref 26–34)
MCHC RBC AUTO-ENTMCNC: 31.9 G/DL (ref 32–36)
MCV RBC AUTO: 90 FL (ref 80–100)
NRBC BLD-RTO: 0 /100 WBCS (ref 0–0)
PHOSPHATE SERPL-MCNC: 3.9 MG/DL (ref 2.5–4.9)
PLATELET # BLD AUTO: 206 X10*3/UL (ref 150–450)
POTASSIUM SERPL-SCNC: 4.1 MMOL/L (ref 3.5–5.3)
PROT UR-ACNC: 12 MG/DL (ref 5–25)
PROT/CREAT UR: 0.18 MG/MG CREAT (ref 0–0.17)
RBC # BLD AUTO: 4.01 X10*6/UL (ref 4.5–5.9)
SODIUM SERPL-SCNC: 132 MMOL/L (ref 136–145)
TACROLIMUS BLD-MCNC: 5.8 NG/ML
WBC # BLD AUTO: 3.7 X10*3/UL (ref 4.4–11.3)

## 2025-03-21 PROCEDURE — 87517 HEPATITIS B DNA QUANT: CPT | Performed by: TRANSPLANT SURGERY

## 2025-03-21 PROCEDURE — 80069 RENAL FUNCTION PANEL: CPT | Performed by: TRANSPLANT SURGERY

## 2025-03-21 PROCEDURE — 87799 DETECT AGENT NOS DNA QUANT: CPT | Performed by: TRANSPLANT SURGERY

## 2025-03-21 PROCEDURE — 87385 HISTOPLASMA CAPSUL AG IA: CPT | Performed by: TRANSPLANT SURGERY

## 2025-03-21 PROCEDURE — 84156 ASSAY OF PROTEIN URINE: CPT | Performed by: TRANSPLANT SURGERY

## 2025-03-21 PROCEDURE — 83735 ASSAY OF MAGNESIUM: CPT | Performed by: TRANSPLANT SURGERY

## 2025-03-21 PROCEDURE — 85027 COMPLETE CBC AUTOMATED: CPT | Performed by: TRANSPLANT SURGERY

## 2025-03-21 PROCEDURE — 80197 ASSAY OF TACROLIMUS: CPT | Performed by: TRANSPLANT SURGERY

## 2025-03-24 LAB
H CAPSUL AG UR QL: NOT DETECTED
SCAN RESULT: NORMAL

## 2025-03-25 NOTE — PROGRESS NOTES
Reason for Nutrition Visit:  Pt is a 74 y.o. male referred for a nutrition follow-up.     Transplant Coordinator: Chelsey Young RN    Past Medical Hx:  Patient Active Problem List   Diagnosis    S/P laparoscopic cholecystectomy    Abdominal pain    Achalasia    Acute lower UTI    Microcytic anemia    Complete heart block    Callus of foot    Chronic periodontitis, unspecified    Stage 5 chronic kidney disease (Multi)    Closed fracture of metatarsal bone    Crushing injury of foot    Diabetes mellitus (Multi)    Diarrhea    Dysphagia    ED (erectile dysfunction)    Essential hypertension    Foot pain, right    GIB (gastrointestinal bleeding)    Hepatitis B core antibody positive    Hyperkalemia    Ingrowing nail    Iron deficiency anemia secondary to inadequate dietary iron intake    Long toenail    Malignant neoplasm of prostate (Multi)    Onychomycosis    Pheochromocytoma of right adrenal gland    Pneumonia of right lower lobe due to infectious organism    Productive cough    Pure hypercholesterolemia    Stricture esophagus    SVT (supraventricular tachycardia) (CMS-HCC)    Type 2 diabetes mellitus with diabetic neuropathy (Multi)    Preop cardiovascular exam    Third degree heart block    Pericardial effusion ([facility])    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 (Multi)    Uremia    Cardiac tamponade    Cardiac pacemaker in situ    ESRD (end stage renal disease) (Multi)    Type 2 diabetes mellitus, with long-term current use of insulin    Dehydration    Alactasia syndrome    Type 2 diabetes mellitus with hyperglycemia, with long-term current use of insulin    On tube feeding diet    Kidney transplant recipient ([facility])    DKA, type 2, not at goal      Lab Results   Component Value Date    HGBA1C 6.1 (H) 10/29/2024    HGBA1C 5.7 (H) 04/20/2024    HGBA1C 7.3 (H) 02/21/2023     (L) 03/21/2025    K 4.1 03/21/2025    PHOS 3.9 03/21/2025     03/21/2025    CO2 22 03/21/2025    BUN 20 03/21/2025     "CREATININE 1.01 03/21/2025    CALCIUM 9.2 03/21/2025    ALBUMIN 3.6 03/21/2025    PROT 8.8 (H) 12/20/2024    BILITOT 0.5 12/20/2024    ALKPHOS 189 (H) 12/20/2024    ALT 18 12/20/2024    AST 21 12/20/2024    GLUCOSE 150 (H) 03/21/2025    CHOL 197 02/24/2025    TRIG 166 (H) 02/24/2025    HDL 49.0 02/24/2025    BHYDRXBUT 0.09 02/12/2025    CPEPTIDE 4.8 (H) 10/29/2024     Lab Results   Component Value Date    HGB 11.5 (L) 03/21/2025     DM Specific Labs Trend (Includes HgbA1C, antibodies & fasting insulin):   Recent Labs     10/29/24  1202 04/20/24  0842 02/21/23  1351   HGBA1C 6.1* 5.7* 7.3*   CPEPTIDE 4.8*  --   --    , Iron Panel + Serum Ferritin Trend:   Recent Labs     01/08/25  0610 04/21/24  0739   IRON 31* 43   UIBC 109* 98*   TIBC 140* 141*   IRONSAT 22* 30   FERRITIN 928* 1,911*   , Vitamin B12: No results found for: \"BCUQXQYO43\" , Folate: No results found for: \"FOLATE\" , and Vitamin D:   Lab Results   Component Value Date    VITD25 34 01/05/2025      Food History and Nutrition Assessment     Appetite: Good  Intake: 50-75% oral intake; >75% with TF  GI Symptoms : diarrhea \"from time to time\"  Swallowing Difficulty: No problems with swallowing  Dentition : own    Sleep duration/quality : 7+ hours and continuous sleep  Sleep disorders: none    Types of Activities: Pt states he has finished PT but continues to do exercises  at home    Diet History:  Met with pt and pt's sister today in clinic. Patient known to nutrition. Current TF recommendations is nocturnal cycle of Vital 1.5 @ 40mL x 12 hours overnight (provides 720 kcal, 32 g protein, and 367 mL free water daily) from 8 pm to 8 am. Currently, patient states he starts TF between 7-8 pm and runs till 6:30 am (~11 hours) at 40 mL/hr (provides ~660 kcal, ~30 g pro, and ~336 mL free water). Patient has started drinking Glucerna ONS either before or after lunch. Patient states lunch is largest meal per day and tries to have light dinner d/t feeling full for TF. " "Continues to eat mainly fish with rice and vegetables. Does note including chickpeas and kidney beans with rice, stating sometimes having 1 can of beans per day. Has other protiens like chicken 2x/week and roast beef 2-3x/week. Will have occasional protein bar or crackers with nut butters.     Pt notes positive experience with TF, stating \"the tube feed is really nourishing\" and believes it is helping to improve energy. He is very agreeable to continue for another month per surgeon recommendation.      24 Hour Diet Recall:  Meal 1:  Porridge/oatmeal or eggs with toast or crackers with nut butter and fruit (grapes, apples)  AM Snack: Glucerna  Meal 2: Fish (tilapia, snapper - 4-5 oz) with vegetables (asparagus, green beans) with rice and chickpeas/kidney beans   PM Snack: None  Meal 3: Cheese and crackers with fish and chickpeas   Late Snack: None  Beverages: Water, Gatorade, and gingerale  Estimate oral intake based on diet recall is ~1300 kcal and ~62 g protein    Supplements: Vitamin D and Thiamin    Estimated Energy Needs:    Calorie Needs (30 - 35 kcal/kg CBW): 2100 - 2450 kcal  Protein Needs (1.3 g/kg CBW): 91 g      Weight History:  CBW: 70.3 kg (155 lbs)   BMI: 20.5 kg/m2  IBW: 83.6 kg    Wt Readings from Last 10 Encounters:   03/26/25 70.3 kg (155 lb)   03/13/25 70.8 kg (156 lb)   03/13/25 70.8 kg (156 lb)   03/03/25 69.2 kg (152 lb 9.6 oz)   02/24/25 69.1 kg (152 lb 4.8 oz)   02/22/25 66.7 kg (147 lb)   02/18/25 69.8 kg (153 lb 14.1 oz)   02/03/25 70 kg (154 lb 6.4 oz)   02/02/25 67.3 kg (148 lb 6.4 oz)   01/27/25 68.9 kg (152 lb)     Weight change:  Weight remains stable.  Patient stated weight: Patient states he has been gaining weight.     Nutrition Focused Physical Exam:    Performed/Deferred: Performed    Muscle Wasting:  Temporal: Moderate  Shoulder: Moderate  Clavicle: Moderate  Interosseous Muscle: Mild    Loss of Subcutaneous Fat:  Eyes: Moderate  Perioral: Moderate  Triceps: Moderate    Other " Physical Findings:  Edema: none    Malnutrition Present: Moderate Malnutrition    Nutrition Diagnosis    Diagnosis Status: Ongoing  Malnutrition; moderate chronic disease or condition related related to increased nutrient needs s/p kidney transplant as evidence by mild-moderate loss of muscle mass and moderate loss of subcutaneous fat.    Additional Nutrition-Related Diagnoses  Diagnosis Status: Ongoing  Inadequate oral intake  related to physiological causes resulting in increased energy requirements (s/p kidney txp) as evidence by estimated oral energy intake from diet less than estimated energy needs.     Nutrition Education, Intervention, and Goals    Per surgeon: Tube feeds continue overnight for 1 more month. If doing well stop next month.  Cycle of Vital 1.5 @ 40mL x 12 hours overnight from 8 pm to 8 am  Provides 720 kcal, 32 g protein, and 367 mL free water daily   SecureChat discussion with endocrinologist prior to appointment with patient. Per endocrinologist, when overnight tube feed is discontinued, pt can stop humulin N insulin that patient has been taking at the start of tube feed in the evening.   Continue intake of protein-calorie oral nutrition supplement (Glucerna) each day to provide additional calories and protein. Can increase to 2 per day.     Educational Handouts: N/A    Nutrition Goals  Maintain stable weight  Oral intake greater than 75%  Dairy/Calcium Foods: Increase  Meat/Protein Foods: Increase  Consume meal or snack every 3-4 hours  Include both protein and starch at all meals and snacks    Nutrition Monitoring and Evaluation    Additional Recommendations/Referrals: N/A    Follow up: It was a pleasure speaking with you today, Mr. Hanson! Your nurse coordinator has scheduled a nutrition follow-up appointment in-person in 1 month. Take care!

## 2025-03-26 ENCOUNTER — OFFICE VISIT (OUTPATIENT)
Facility: HOSPITAL | Age: 75
End: 2025-03-26
Payer: COMMERCIAL

## 2025-03-26 ENCOUNTER — NUTRITION (OUTPATIENT)
Facility: HOSPITAL | Age: 75
End: 2025-03-26
Payer: COMMERCIAL

## 2025-03-26 VITALS
DIASTOLIC BLOOD PRESSURE: 78 MMHG | BODY MASS INDEX: 20.45 KG/M2 | TEMPERATURE: 98.5 F | HEART RATE: 99 BPM | SYSTOLIC BLOOD PRESSURE: 154 MMHG | OXYGEN SATURATION: 98 % | WEIGHT: 155 LBS

## 2025-03-26 DIAGNOSIS — Z94.0 KIDNEY REPLACED BY TRANSPLANT (HHS-HCC): ICD-10-CM

## 2025-03-26 DIAGNOSIS — E11.65 TYPE 2 DIABETES MELLITUS WITH HYPERGLYCEMIA, WITH LONG-TERM CURRENT USE OF INSULIN: Chronic | ICD-10-CM

## 2025-03-26 DIAGNOSIS — Z79.899 IMMUNOSUPPRESSIVE MANAGEMENT ENCOUNTER FOLLOWING KIDNEY TRANSPLANT: Primary | ICD-10-CM

## 2025-03-26 DIAGNOSIS — Z79.4 TYPE 2 DIABETES MELLITUS WITH HYPERGLYCEMIA, WITH LONG-TERM CURRENT USE OF INSULIN: Chronic | ICD-10-CM

## 2025-03-26 DIAGNOSIS — Z94.0 IMMUNOSUPPRESSIVE MANAGEMENT ENCOUNTER FOLLOWING KIDNEY TRANSPLANT: Primary | ICD-10-CM

## 2025-03-26 PROCEDURE — 1126F AMNT PAIN NOTED NONE PRSNT: CPT | Performed by: TRANSPLANT SURGERY

## 2025-03-26 PROCEDURE — 3049F LDL-C 100-129 MG/DL: CPT | Performed by: TRANSPLANT SURGERY

## 2025-03-26 PROCEDURE — 3078F DIAST BP <80 MM HG: CPT | Performed by: TRANSPLANT SURGERY

## 2025-03-26 PROCEDURE — 99214 OFFICE O/P EST MOD 30 MIN: CPT | Mod: 24

## 2025-03-26 PROCEDURE — 3077F SYST BP >= 140 MM HG: CPT | Performed by: TRANSPLANT SURGERY

## 2025-03-26 PROCEDURE — 3061F NEG MICROALBUMINURIA REV: CPT | Performed by: TRANSPLANT SURGERY

## 2025-03-26 PROCEDURE — 99214 OFFICE O/P EST MOD 30 MIN: CPT

## 2025-03-26 RX ORDER — SULFAMETHOXAZOLE AND TRIMETHOPRIM 400; 80 MG/1; MG/1
1 TABLET ORAL DAILY
Qty: 10 TABLET | Refills: 0 | Status: SHIPPED | OUTPATIENT
Start: 2025-03-26 | End: 2025-03-26 | Stop reason: SDUPTHER

## 2025-03-26 RX ORDER — SULFAMETHOXAZOLE AND TRIMETHOPRIM 400; 80 MG/1; MG/1
1 TABLET ORAL DAILY
Qty: 30 TABLET | Refills: 2 | Status: SHIPPED | OUTPATIENT
Start: 2025-03-26 | End: 2025-06-24

## 2025-03-26 RX ORDER — INSULIN HUMAN 100 [IU]/ML
8 INJECTION, SUSPENSION SUBCUTANEOUS NIGHTLY
Qty: 15 ML | Refills: 0
Start: 2025-03-26

## 2025-03-26 RX ORDER — MYCOPHENOLIC ACID 180 MG/1
540 TABLET, DELAYED RELEASE ORAL 2 TIMES DAILY
Qty: 180 TABLET | Refills: 11 | Status: SHIPPED | OUTPATIENT
Start: 2025-03-26 | End: 2026-03-26

## 2025-03-26 ASSESSMENT — PAIN SCALES - GENERAL: PAINLEVEL_OUTOF10: 0-NO PAIN

## 2025-03-26 NOTE — PROGRESS NOTES
Per dietician, pt will no longer be requiring overnight tubefeed nutrition support. As such, I asked dietician to instruct pt to stop his humulin N insulin, which he had been taking at the start of tubefeed in the evening.     Tiffanie Crum PA-C   Endocrinology  Inpatient Diabetes Management Team

## 2025-03-26 NOTE — PROGRESS NOTES
After dietician and surgery follow up appointments, it was determined that mr rouse will continue overnight TF for now. As a result, he will also continue evening dose of NPH    Tiffanie Crum PA-C   Endocrinology  Inpatient Diabetes Management Team

## 2025-03-26 NOTE — PROGRESS NOTES
Temo Hanson is a 74 y.o. male POD#65 from [facility] from a DBD donor.     - Doing well. Improving PO intake.   - On night feeds only  - Tolerating glucerna shakes  - Energy level improving    Objective   Gen: A+OX3; NAD  Cardiac: RRR  Chest: Normal inspiratory effort  Abdomen: S/NT/ND. Incision C/D/I.  Ext: No LE edema    Last Recorded Vitals  Visit Vitals  /78   Pulse 99   Temp 36.9 °C (98.5 °F) (Temporal)      Assessment/Plan   Principal Problem:    Kidney transplant recipient    Active Problems:  Patient Active Problem List   Diagnosis    S/P laparoscopic cholecystectomy    Abdominal pain    Achalasia    Acute lower UTI    Microcytic anemia    Complete heart block    Callus of foot    Chronic periodontitis, unspecified    Stage 5 chronic kidney disease (Multi)    Closed fracture of metatarsal bone    Crushing injury of foot    Diabetes mellitus (Multi)    Diarrhea    Dysphagia    ED (erectile dysfunction)    Essential hypertension    Foot pain, right    GIB (gastrointestinal bleeding)    Hepatitis B core antibody positive    Hyperkalemia    Ingrowing nail    Iron deficiency anemia secondary to inadequate dietary iron intake    Long toenail    Malignant neoplasm of prostate (Multi)    Onychomycosis    Pheochromocytoma of right adrenal gland    Pneumonia of right lower lobe due to infectious organism    Productive cough    Pure hypercholesterolemia    Stricture esophagus    SVT (supraventricular tachycardia) (CMS-HCC)    Type 2 diabetes mellitus with diabetic neuropathy (Multi)    Preop cardiovascular exam    Third degree heart block    Pericardial effusion ([facility])    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 (Multi)    Uremia    Cardiac tamponade    Cardiac pacemaker in situ    ESRD (end stage renal disease) (Multi)    Type 2 diabetes mellitus, with long-term current use of insulin    Dehydration    Alactasia syndrome    Type 2 diabetes mellitus with hyperglycemia, with long-term current use of insulin     On tube feeding diet    Kidney transplant recipient ([facility])    DKA, type 2, not at goal      Kidney allograft function              KDPI 93%/PRA 54%              Immediate graft function              Thymo 3 mg/kg     Immunosuppression reviewed and adjusted              Tacrolimus  current dose 4 mg po BID, level 5.8. Increase to 5 BID.  Plan Envarsus 8              Myfortic 360 mg bid, tolerating well.              Prednisone 5 mg     Nutrition                        Tube feeds continue overnight for 1 more month. If doing well stop next month   Endo will increase long-acting insulin to 15 Units              Dietician today     Hypertension              Amlodipine 10 mg   Carvedilol 12. 5 mg bid              Well controlled    ID   Completed Abx    Prophylaxis   Mepron     Misc              Imodium bid              Reglan 5 mg q8h/Protonix              Labs weekly              RTC 4 weeks    I spent 35 minutes in the professional and overall care of this patient, including immunosuppression management.  John Zimmer MD

## 2025-03-26 NOTE — PATIENT INSTRUCTIONS
Medication Changes:  Start bactrim 1 tab every morning (replaces atovaquone)  Stop atovaquone  Increase Myfortic to 540mg (3 tablets) every 12 hours     Plan:  Plan for tube feed for about another month  Labs once a week  Next clinic appt in 3-4 weeks  Call us with any questions or concerns

## 2025-03-31 ENCOUNTER — TELEPHONE (OUTPATIENT)
Facility: HOSPITAL | Age: 75
End: 2025-03-31

## 2025-04-01 DIAGNOSIS — Z79.4 TYPE 2 DIABETES MELLITUS WITH HYPERGLYCEMIA, WITH LONG-TERM CURRENT USE OF INSULIN: ICD-10-CM

## 2025-04-01 DIAGNOSIS — Z94.0 KIDNEY TRANSPLANT RECIPIENT (HHS-HCC): ICD-10-CM

## 2025-04-01 DIAGNOSIS — E11.65 TYPE 2 DIABETES MELLITUS WITH HYPERGLYCEMIA, WITH LONG-TERM CURRENT USE OF INSULIN: ICD-10-CM

## 2025-04-03 ENCOUNTER — NURSE ONLY (OUTPATIENT)
Facility: HOSPITAL | Age: 75
End: 2025-04-03
Payer: COMMERCIAL

## 2025-04-03 DIAGNOSIS — B27.00 EBV (EPSTEIN-BARR VIRUS) VIREMIA: ICD-10-CM

## 2025-04-03 DIAGNOSIS — Z13.89 SCREENING FOR BLOOD OR PROTEIN IN URINE: ICD-10-CM

## 2025-04-03 DIAGNOSIS — Z94.0 KIDNEY REPLACED BY TRANSPLANT (HHS-HCC): ICD-10-CM

## 2025-04-03 LAB
CREAT UR-MCNC: 64.8 MG/DL (ref 20–370)
EST. AVERAGE GLUCOSE BLD GHB EST-MCNC: 166 MG/DL
HBA1C MFR BLD: 7.4 %
PROT UR-ACNC: 10 MG/DL (ref 5–25)
PROT/CREAT UR: 0.15 MG/MG CREAT (ref 0–0.17)

## 2025-04-03 PROCEDURE — 84156 ASSAY OF PROTEIN URINE: CPT

## 2025-04-03 PROCEDURE — 87799 DETECT AGENT NOS DNA QUANT: CPT

## 2025-04-03 PROCEDURE — 87385 HISTOPLASMA CAPSUL AG IA: CPT

## 2025-04-03 PROCEDURE — 83036 HEMOGLOBIN GLYCOSYLATED A1C: CPT | Performed by: PHYSICIAN ASSISTANT

## 2025-04-10 ENCOUNTER — APPOINTMENT (OUTPATIENT)
Facility: HOSPITAL | Age: 75
End: 2025-04-10
Payer: COMMERCIAL

## 2025-04-10 ENCOUNTER — NURSE ONLY (OUTPATIENT)
Facility: HOSPITAL | Age: 75
End: 2025-04-10
Payer: COMMERCIAL

## 2025-04-10 DIAGNOSIS — Z94.0 KIDNEY REPLACED BY TRANSPLANT (HHS-HCC): ICD-10-CM

## 2025-04-10 DIAGNOSIS — B27.00 EBV (EPSTEIN-BARR VIRUS) VIREMIA: ICD-10-CM

## 2025-04-10 DIAGNOSIS — Z13.89 SCREENING FOR BLOOD OR PROTEIN IN URINE: ICD-10-CM

## 2025-04-10 DIAGNOSIS — N18.6 ESRD (END STAGE RENAL DISEASE) (MULTI): ICD-10-CM

## 2025-04-10 LAB
25(OH)D3 SERPL-MCNC: 51 NG/ML (ref 30–100)
CREAT UR-MCNC: 97.1 MG/DL (ref 20–370)
PROT UR-ACNC: 11 MG/DL (ref 5–25)
PROT/CREAT UR: 0.11 MG/MG CREAT
PTH-INTACT SERPL-MCNC: 85.2 PG/ML (ref 18.5–88)

## 2025-04-10 PROCEDURE — 87799 DETECT AGENT NOS DNA QUANT: CPT

## 2025-04-10 PROCEDURE — 82306 VITAMIN D 25 HYDROXY: CPT

## 2025-04-10 PROCEDURE — 82570 ASSAY OF URINE CREATININE: CPT

## 2025-04-10 PROCEDURE — 87385 HISTOPLASMA CAPSUL AG IA: CPT

## 2025-04-10 PROCEDURE — 83970 ASSAY OF PARATHORMONE: CPT

## 2025-04-12 LAB
ALLOSURE SCORE - KIDNEY: 0.2 %
CAREDX_ORDER_ID: NORMAL
CENTER_ORDER_ID: NORMAL
CLIENT SPECIMEN ID - ALLOSURE: NORMAL
DONOR RELATION - ALLOSURE: NORMAL
H CAPSUL AG UR QL: NOT DETECTED
NOTES - ALLOSURE: NORMAL
RELATIVE CHANGE VALUE - KIDNEY: -17 %
SCAN RESULT: NORMAL
TEST COMMENTS - ALLOSURE: NORMAL
TIME POST TX - ALLOSURE: NORMAL
TRANSPLANTED ORGAN - ALLOSURE: NORMAL
TX DATE - ALLOSURE/ALLOMAP: NORMAL
WP_ORDER_ID: NORMAL

## 2025-04-15 DIAGNOSIS — Z94.0 KIDNEY REPLACED BY TRANSPLANT (HHS-HCC): ICD-10-CM

## 2025-04-17 ENCOUNTER — PATIENT MESSAGE (OUTPATIENT)
Facility: HOSPITAL | Age: 75
End: 2025-04-17
Payer: COMMERCIAL

## 2025-04-17 ENCOUNTER — NURSE ONLY (OUTPATIENT)
Facility: HOSPITAL | Age: 75
End: 2025-04-17
Payer: COMMERCIAL

## 2025-04-17 DIAGNOSIS — Z94.0 KIDNEY REPLACED BY TRANSPLANT (HHS-HCC): ICD-10-CM

## 2025-04-17 LAB
ALBUMIN SERPL BCP-MCNC: 4 G/DL (ref 3.4–5)
ANION GAP SERPL CALC-SCNC: 13 MMOL/L (ref 10–20)
BUN SERPL-MCNC: 20 MG/DL (ref 6–23)
CALCIUM SERPL-MCNC: 9 MG/DL (ref 8.6–10.6)
CHLORIDE SERPL-SCNC: 98 MMOL/L (ref 98–107)
CO2 SERPL-SCNC: 26 MMOL/L (ref 21–32)
CREAT SERPL-MCNC: 0.9 MG/DL (ref 0.5–1.3)
CREAT UR-MCNC: 70.1 MG/DL (ref 20–370)
EGFRCR SERPLBLD CKD-EPI 2021: 90 ML/MIN/1.73M*2
ERYTHROCYTE [DISTWIDTH] IN BLOOD BY AUTOMATED COUNT: 15.8 % (ref 11.5–14.5)
GLUCOSE SERPL-MCNC: 198 MG/DL (ref 74–99)
HCT VFR BLD AUTO: 34.6 % (ref 41–52)
HGB BLD-MCNC: 10.9 G/DL (ref 13.5–17.5)
MAGNESIUM SERPL-MCNC: 1.73 MG/DL (ref 1.6–2.4)
MCH RBC QN AUTO: 28.2 PG (ref 26–34)
MCHC RBC AUTO-ENTMCNC: 31.5 G/DL (ref 32–36)
MCV RBC AUTO: 90 FL (ref 80–100)
NRBC BLD-RTO: 0 /100 WBCS (ref 0–0)
PHOSPHATE SERPL-MCNC: 3.7 MG/DL (ref 2.5–4.9)
PLATELET # BLD AUTO: 139 X10*3/UL (ref 150–450)
POTASSIUM SERPL-SCNC: 4.1 MMOL/L (ref 3.5–5.3)
PROT UR-ACNC: 12 MG/DL (ref 5–25)
PROT/CREAT UR: 0.17 MG/MG CREAT (ref 0–0.17)
RBC # BLD AUTO: 3.86 X10*6/UL (ref 4.5–5.9)
SODIUM SERPL-SCNC: 133 MMOL/L (ref 136–145)
TACROLIMUS BLD-MCNC: <2 NG/ML
WBC # BLD AUTO: 3.4 X10*3/UL (ref 4.4–11.3)

## 2025-04-17 PROCEDURE — 87385 HISTOPLASMA CAPSUL AG IA: CPT | Performed by: TRANSPLANT SURGERY

## 2025-04-17 PROCEDURE — 80069 RENAL FUNCTION PANEL: CPT | Performed by: STUDENT IN AN ORGANIZED HEALTH CARE EDUCATION/TRAINING PROGRAM

## 2025-04-17 PROCEDURE — 83735 ASSAY OF MAGNESIUM: CPT | Performed by: STUDENT IN AN ORGANIZED HEALTH CARE EDUCATION/TRAINING PROGRAM

## 2025-04-17 PROCEDURE — 87799 DETECT AGENT NOS DNA QUANT: CPT | Performed by: STUDENT IN AN ORGANIZED HEALTH CARE EDUCATION/TRAINING PROGRAM

## 2025-04-17 PROCEDURE — 80197 ASSAY OF TACROLIMUS: CPT | Performed by: STUDENT IN AN ORGANIZED HEALTH CARE EDUCATION/TRAINING PROGRAM

## 2025-04-17 PROCEDURE — 85027 COMPLETE CBC AUTOMATED: CPT | Performed by: STUDENT IN AN ORGANIZED HEALTH CARE EDUCATION/TRAINING PROGRAM

## 2025-04-17 PROCEDURE — 82570 ASSAY OF URINE CREATININE: CPT | Performed by: STUDENT IN AN ORGANIZED HEALTH CARE EDUCATION/TRAINING PROGRAM

## 2025-04-18 DIAGNOSIS — Z94.0 KIDNEY REPLACED BY TRANSPLANT (HHS-HCC): ICD-10-CM

## 2025-04-19 LAB
ALLOSURE SCORE - KIDNEY: 0.21 %
CAREDX_ORDER_ID: NORMAL
CENTER_ORDER_ID: NORMAL
CLIENT SPECIMEN ID - ALLOSURE: NORMAL
DONOR RELATION - ALLOSURE: NORMAL
NOTES - ALLOSURE: NORMAL
RELATIVE CHANGE VALUE - KIDNEY: 5 %
TEST COMMENTS - ALLOSURE: NORMAL
TIME POST TX - ALLOSURE: NORMAL
TRANSPLANTED ORGAN - ALLOSURE: NORMAL
TX DATE - ALLOSURE/ALLOMAP: NORMAL
WP_ORDER_ID: NORMAL

## 2025-04-20 PROCEDURE — RXMED WILLOW AMBULATORY MEDICATION CHARGE

## 2025-04-22 LAB
H CAPSUL AG UR QL: NOT DETECTED
SCAN RESULT: NORMAL

## 2025-04-23 ENCOUNTER — PHARMACY VISIT (OUTPATIENT)
Dept: PHARMACY | Facility: CLINIC | Age: 75
End: 2025-04-23
Payer: COMMERCIAL

## 2025-04-23 DIAGNOSIS — Z12.11 COLON CANCER SCREENING: ICD-10-CM

## 2025-04-23 LAB
H CAPSUL AG UR QL: NOT DETECTED
SCAN RESULT: NORMAL

## 2025-04-23 NOTE — PROGRESS NOTES
"Reason for Nutrition Visit:  Pt is a 74 y.o. male referred for a nutrition follow-up.     Transplant Coordinator: Chelsey Young RN    Past Medical Hx:  Problem List[1]     Lab Results   Component Value Date    HGBA1C 7.4 (H) 04/03/2025    HGBA1C 6.1 (H) 10/29/2024    HGBA1C 5.7 (H) 04/20/2024     (L) 04/17/2025    K 4.1 04/17/2025    PHOS 3.7 04/17/2025    CL 98 04/17/2025    CO2 26 04/17/2025    BUN 20 04/17/2025    CREATININE 0.90 04/17/2025    CALCIUM 9.0 04/17/2025    ALBUMIN 4.0 04/17/2025    PROT 8.8 (H) 12/20/2024    BILITOT 0.5 12/20/2024    ALKPHOS 189 (H) 12/20/2024    ALT 18 12/20/2024    AST 21 12/20/2024    GLUCOSE 198 (H) 04/17/2025    CHOL 197 02/24/2025    TRIG 166 (H) 02/24/2025    HDL 49.0 02/24/2025    BHYDRXBUT 0.09 02/12/2025    CPEPTIDE 4.8 (H) 10/29/2024     Lab Results   Component Value Date    HGB 10.9 (L) 04/17/2025     Iron Panel + Serum Ferritin Trend:   Recent Labs     01/08/25  0610 04/21/24  0739   IRON 31* 43   UIBC 109* 98*   TIBC 140* 141*   IRONSAT 22* 30   FERRITIN 928* 1,911*   , Vitamin B12: No results found for: \"CFACGBOI14\" , Folate: No results found for: \"FOLATE\" , and Vitamin D:   Lab Results   Component Value Date    VITD25 51 04/10/2025      Food History and Nutrition Assessment     Appetite: Normal  Intake: >75%  GI Symptoms : None   Swallowing Difficulty: No problems with swallowing  Dentition : own    Types of Activities: House/Yard Work and Walking    Sleep duration/quality : 7+ hours and continuous sleep  Sleep disorders: none    Diet History:  Patient describes feeling much better and is curious about when he can stop TF. His current TF recommendations is nocturnal cycle of Vital 1.5 @ 40mL x 12 hours overnight (provides 720 kcal, 32 g protein, and 367 mL free water daily) from 8 pm to 8 am. Patient states that he is currently running Vital 1.5 @ 40 mL x 10 hrs (provides 600 kcal, 27 g protein, 306 mL free water daily). States there were some days he would " skip TF when he ate more from food, but noted he was averaging using TF 6 days/week. Denies N/V/D/C. Patient states oral intake has improved similar to prior to txp. States he eats a solid breakfast and either a lunch or dinner everyday with snacks in between. Continues to drink Glucerna twice per day.    24 Hour Diet Recall:  Meal 1: Glucerna and boiled eggs and crackers and porridge (quick oats with milk, egg, cassava)  AM Snack: Bowl of cereal and crackers and cheese   Meal 2: Fish (~5-6 oz) or beef with cooked greens   PM Snack: Protein bars or crackers and cheese  Meal 3: Glucerna   Late Snack (sometimes): Protein bars  Beverages: Water, Gingerale, orange juice  Estimate oral intake provides ~1750 kcal and ~135 g protein (provides ~83% energy and >100% protein needs)    Food Preparation: Partner/Spouse and Children  Cooking Skills/Barriers: None reported    Estimated Energy Needs:     Calorie Needs (30 kcal/kg CBW): 2100 kcal  Protein Needs (1.3 g/kg CBW): 91 g      Weight History:  CBW: 71.8 kg (158 lbs)   BMI: 20.9 kg/m2  IBW: 83.6 kg    Wt Readings from Last 23 Encounters:   04/24/25 71.8 kg (158 lb 6.4 oz)   03/26/25 70.3 kg (155 lb)   03/13/25 70.8 kg (156 lb)   03/13/25 70.8 kg (156 lb)   03/03/25 69.2 kg (152 lb 9.6 oz)   02/24/25 69.1 kg (152 lb 4.8 oz)   02/22/25 66.7 kg (147 lb)   02/18/25 69.8 kg (153 lb 14.1 oz)   02/03/25 70 kg (154 lb 6.4 oz)   02/02/25 67.3 kg (148 lb 6.4 oz)   01/27/25 68.9 kg (152 lb)   01/22/25 69.5 kg (153 lb 3.2 oz)   12/31/24 74.1 kg (163 lb 4.8 oz)   12/26/24 76.4 kg (168 lb 6.9 oz)   01/29/25 67 kg (147 lb 9.6 oz)   10/29/24 74.3 kg (163 lb 12.8 oz)   08/14/24 66.2 kg (146 lb)   07/18/24 66.5 kg (146 lb 11.2 oz)   06/18/24 78.5 kg (173 lb)   06/12/24 73.9 kg (163 lb)   05/08/24 74.1 kg (163 lb 6.4 oz)   04/24/24 88.7 kg (195 lb 8.8 oz)   04/17/24 88 kg (194 lb)     Weight change:  Gain of 1.5 kg in 1 month.  Patient stated weight: Patient stated they gained 2-3 lbs.      Nutrition Focused Physical Exam:    Performed/Deferred: Performed    Muscle Wasting:  Temporal: Moderate  Shoulder: Mild  Clavicle: Mild  Interosseous Muscle: Mild  Scapular: Mild    Loss of Subcutaneous Fat:  Eyes: Mild  Perioral: None  Triceps: Mild    Other Physical Findings:  Edema: none    Malnutrition Present: Moderate Malnutrition    Nutrition Diagnosis    Diagnosis Status: Ongoing - Improving   Diagnosis: Malnutrition; moderate chronic disease or condition related related to physiological causes resulting in increased energy requirements (s/p kidney txp) as evidence by  mild loss of muscle mass and mild loss of subcutaneous fat    Diagnosis Status: Resolved   Inadequate oral intake related to physiological causes resulting in increased energy requirements (s/p kidney txp) as evidence by estimated oral energy intake from diet less than estimated energy needs.     Nutrition Education, Intervention, and Goals    Recommend discontinue enteral nutrition once oral intake meets >75% of needs for 3 consecutive days. Patient report and diet recall indicates adequate intake. Patient weight has stabilized over last 1 month.  Discussed TF with surgeon and nurse coordinator. Team okay to stop with TF. Will keep PEG for 1 month and continue to monitor.   SecureChat message to endocrinologist. Confirmed OK when overnight tube feed is discontinued, pt can stop humulin N insulin that patient has been taking at the start of tube feed in the evening. Informed nurse coordinator of this discussion.   Encourage increased oral intake with an emphasis on total of three meals per day and continue with high protein snacks between meals. Continue with Glucerna twice daily.    Educational Handouts: N/A    Nutrition Goals  Nutrition Goals : Consistent meal/snack pattern  Maintain stable weight  Continue oral nutrition supplement intake  Eat 3 meals consistently  Consume meal or snack every 3-4 hours  Include both protein and starch  at all meals and snacks    Nutrition Monitoring and Evaluation    Additional Recommendations/Referrals: N/A    Follow up: It was a pleasure speaking with you today, Mr. Hanson! Let's schedule a follow-up in 1 month(s) to check in on your progress. I'll have the 's reach out to set up an appointment. Take Care!         [1]   Patient Active Problem List  Diagnosis    S/P laparoscopic cholecystectomy    Abdominal pain    Achalasia    Acute lower UTI    Microcytic anemia    Complete heart block    Callus of foot    Chronic periodontitis, unspecified    Stage 5 chronic kidney disease (Multi)    Closed fracture of metatarsal bone    Crushing injury of foot    Diabetes mellitus (Multi)    Diarrhea    Dysphagia    ED (erectile dysfunction)    Essential hypertension    Foot pain, right    GIB (gastrointestinal bleeding)    Hepatitis B core antibody positive    Hyperkalemia    Ingrowing nail    Iron deficiency anemia secondary to inadequate dietary iron intake    Long toenail    Malignant neoplasm of prostate (Multi)    Onychomycosis    Pheochromocytoma of right adrenal gland    Pneumonia of right lower lobe due to infectious organism    Productive cough    Pure hypercholesterolemia    Stricture esophagus    SVT (supraventricular tachycardia) (CMS-HCC)    Type 2 diabetes mellitus with diabetic neuropathy (Multi)    Preop cardiovascular exam    Third degree heart block    Pericardial effusion (HHS-HCC)    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 (Multi)    Uremia    Cardiac tamponade    Cardiac pacemaker in situ    ESRD (end stage renal disease) (Multi)    Type 2 diabetes mellitus, with long-term current use of insulin    Dehydration    Alactasia syndrome    Type 2 diabetes mellitus with hyperglycemia, with long-term current use of insulin    On tube feeding diet    Kidney transplant recipient (HHS-HCC)    DKA, type 2, not at goal

## 2025-04-24 ENCOUNTER — NUTRITION (OUTPATIENT)
Facility: HOSPITAL | Age: 75
End: 2025-04-24
Payer: COMMERCIAL

## 2025-04-24 ENCOUNTER — NURSE ONLY (OUTPATIENT)
Facility: HOSPITAL | Age: 75
End: 2025-04-24
Payer: COMMERCIAL

## 2025-04-24 ENCOUNTER — OFFICE VISIT (OUTPATIENT)
Facility: HOSPITAL | Age: 75
End: 2025-04-24
Payer: COMMERCIAL

## 2025-04-24 VITALS
DIASTOLIC BLOOD PRESSURE: 74 MMHG | OXYGEN SATURATION: 97 % | HEART RATE: 94 BPM | SYSTOLIC BLOOD PRESSURE: 150 MMHG | BODY MASS INDEX: 20.9 KG/M2 | TEMPERATURE: 97.7 F | WEIGHT: 158.4 LBS

## 2025-04-24 DIAGNOSIS — Z94.0 KIDNEY REPLACED BY TRANSPLANT (HHS-HCC): Primary | ICD-10-CM

## 2025-04-24 DIAGNOSIS — Z48.298 AFTERCARE FOLLOWING ORGAN TRANSPLANT: ICD-10-CM

## 2025-04-24 DIAGNOSIS — Z79.899 IMMUNOSUPPRESSIVE MANAGEMENT ENCOUNTER FOLLOWING KIDNEY TRANSPLANT: ICD-10-CM

## 2025-04-24 DIAGNOSIS — Z94.0 KIDNEY REPLACED BY TRANSPLANT (HHS-HCC): ICD-10-CM

## 2025-04-24 DIAGNOSIS — Z94.0 IMMUNOSUPPRESSIVE MANAGEMENT ENCOUNTER FOLLOWING KIDNEY TRANSPLANT: ICD-10-CM

## 2025-04-24 DIAGNOSIS — D72.819 LEUKOPENIA, UNSPECIFIED TYPE: ICD-10-CM

## 2025-04-24 DIAGNOSIS — Z79.899 ENCOUNTER FOR LONG-TERM (CURRENT) USE OF HIGH-RISK MEDICATION: ICD-10-CM

## 2025-04-24 DIAGNOSIS — K22.0 ACHALASIA: ICD-10-CM

## 2025-04-24 DIAGNOSIS — Z78.9 ON TUBE FEEDING DIET: ICD-10-CM

## 2025-04-24 LAB
ALBUMIN SERPL BCP-MCNC: 4.1 G/DL (ref 3.4–5)
ANION GAP SERPL CALC-SCNC: 16 MMOL/L (ref 10–20)
BUN SERPL-MCNC: 25 MG/DL (ref 6–23)
CALCIUM SERPL-MCNC: 9.4 MG/DL (ref 8.6–10.6)
CHLORIDE SERPL-SCNC: 103 MMOL/L (ref 98–107)
CO2 SERPL-SCNC: 25 MMOL/L (ref 21–32)
CREAT SERPL-MCNC: 1.1 MG/DL (ref 0.5–1.3)
EGFRCR SERPLBLD CKD-EPI 2021: 70 ML/MIN/1.73M*2
ERYTHROCYTE [DISTWIDTH] IN BLOOD BY AUTOMATED COUNT: 15.5 % (ref 11.5–14.5)
GLUCOSE SERPL-MCNC: 98 MG/DL (ref 74–99)
HCT VFR BLD AUTO: 37.7 % (ref 41–52)
HGB BLD-MCNC: 11.7 G/DL (ref 13.5–17.5)
MAGNESIUM SERPL-MCNC: 1.83 MG/DL (ref 1.6–2.4)
MCH RBC QN AUTO: 28.8 PG (ref 26–34)
MCHC RBC AUTO-ENTMCNC: 31 G/DL (ref 32–36)
MCV RBC AUTO: 93 FL (ref 80–100)
NRBC BLD-RTO: 0 /100 WBCS (ref 0–0)
PHOSPHATE SERPL-MCNC: 4.6 MG/DL (ref 2.5–4.9)
PLATELET # BLD AUTO: 181 X10*3/UL (ref 150–450)
POTASSIUM SERPL-SCNC: 4.6 MMOL/L (ref 3.5–5.3)
RBC # BLD AUTO: 4.06 X10*6/UL (ref 4.5–5.9)
SODIUM SERPL-SCNC: 139 MMOL/L (ref 136–145)
TACROLIMUS BLD-MCNC: 6.6 NG/ML
WBC # BLD AUTO: 2.7 X10*3/UL (ref 4.4–11.3)

## 2025-04-24 PROCEDURE — 3077F SYST BP >= 140 MM HG: CPT | Performed by: STUDENT IN AN ORGANIZED HEALTH CARE EDUCATION/TRAINING PROGRAM

## 2025-04-24 PROCEDURE — 3049F LDL-C 100-129 MG/DL: CPT | Performed by: STUDENT IN AN ORGANIZED HEALTH CARE EDUCATION/TRAINING PROGRAM

## 2025-04-24 PROCEDURE — 3051F HG A1C>EQUAL 7.0%<8.0%: CPT | Performed by: STUDENT IN AN ORGANIZED HEALTH CARE EDUCATION/TRAINING PROGRAM

## 2025-04-24 PROCEDURE — 99215 OFFICE O/P EST HI 40 MIN: CPT

## 2025-04-24 PROCEDURE — 3061F NEG MICROALBUMINURIA REV: CPT | Performed by: STUDENT IN AN ORGANIZED HEALTH CARE EDUCATION/TRAINING PROGRAM

## 2025-04-24 PROCEDURE — 3078F DIAST BP <80 MM HG: CPT | Performed by: STUDENT IN AN ORGANIZED HEALTH CARE EDUCATION/TRAINING PROGRAM

## 2025-04-24 PROCEDURE — 84100 ASSAY OF PHOSPHORUS: CPT | Performed by: STUDENT IN AN ORGANIZED HEALTH CARE EDUCATION/TRAINING PROGRAM

## 2025-04-24 PROCEDURE — 85027 COMPLETE CBC AUTOMATED: CPT | Performed by: STUDENT IN AN ORGANIZED HEALTH CARE EDUCATION/TRAINING PROGRAM

## 2025-04-24 PROCEDURE — 80197 ASSAY OF TACROLIMUS: CPT | Performed by: STUDENT IN AN ORGANIZED HEALTH CARE EDUCATION/TRAINING PROGRAM

## 2025-04-24 PROCEDURE — G2211 COMPLEX E/M VISIT ADD ON: HCPCS

## 2025-04-24 PROCEDURE — 1126F AMNT PAIN NOTED NONE PRSNT: CPT | Performed by: STUDENT IN AN ORGANIZED HEALTH CARE EDUCATION/TRAINING PROGRAM

## 2025-04-24 PROCEDURE — 83735 ASSAY OF MAGNESIUM: CPT | Performed by: STUDENT IN AN ORGANIZED HEALTH CARE EDUCATION/TRAINING PROGRAM

## 2025-04-24 PROCEDURE — 1159F MED LIST DOCD IN RCRD: CPT | Performed by: STUDENT IN AN ORGANIZED HEALTH CARE EDUCATION/TRAINING PROGRAM

## 2025-04-24 ASSESSMENT — PAIN SCALES - GENERAL: PAINLEVEL_OUTOF10: 0-NO PAIN

## 2025-04-24 NOTE — PROGRESS NOTES
TRANSPLANT NEPHROLOGY :   OUTPATIENT CLINIC NOTE      SERVICE DATE : 2025    REASON FOR VISIT/CHIEF COMPLAINT:  S/P  TRANSPLANT SURGERY  IMMUNOSUPPRESSIVE MEDICATION MANAGEMENT  BLOOD PRESSURE MANAGEMENT    HPI:    Mr. Hanson is a 74 y.o. male with past medical history significant for  ESRD secondary to diabetic nephropathy whom received a  donor kidney transplant on 24 by Dr. Zamora with a DBD kidney KDPI of 93% and PRA of 54%. Donor was Hepc -/-and has not met risk factors. EBV D+ /R+, CMV D-/R+. Left donor kidney transplanted to patient right pelvis. Admission weight is 72.7 kg (discharge weight is 76.4 kg ). Pt received a total of 3 mg/kg total of thymoglobulin induction therapy/  Post-op SGF/DGF and failure to thrive/malnutrition requiring PEG tube  Readmitted -25 for DKA, hypovolemia and roteus mirabilis and resistant ESBL UTI post stemt removal and norovirus associated diarrhea.     124 days out - first visit with neph  Able to eat by mouth without dysphagia now.   also saw him and recommended him to stop tube feeding but keep the PEG tube for one more month.  No N/V. Intermittent diarrhea - related to tube feeding.  Last week he ran out TAC and missed it for a few days.  Did not miss other medications.    Patient is doing well overall. No new complaints. Denied chest pain, SOB, LOPEZ, Palpitation. Normal urination and bowel movement. Normal gait and no weakness of arms/legs. No cough, runny nose, sore throat, cold symptoms, or rash. No hearing loss. Normal vision.No problems with his sleep, mood and function. No recent infection, hospitalization, surgery or ER visits.      ROS:  Review of  14 systems was performed system by system. See HPI. Otherwise, the symptoms were negative.    PAST MEDICAL HISTORY:  Medical History[1]     PAST SURGICAL HISTORY:  Surgical History[2]     SOCIAL HISTORY:  Social History     Socioeconomic History    Marital status:      Spouse  name: Not on file    Number of children: Not on file    Years of education: Not on file    Highest education level: Not on file   Occupational History    Not on file   Tobacco Use    Smoking status: Never    Smokeless tobacco: Never   Vaping Use    Vaping status: Never Used   Substance and Sexual Activity    Alcohol use: Never    Drug use: Never    Sexual activity: Defer   Other Topics Concern    Not on file   Social History Narrative    Not on file     Social Drivers of Health     Financial Resource Strain: Low Risk  (2/11/2025)    Overall Financial Resource Strain (CARDIA)     Difficulty of Paying Living Expenses: Not very hard   Food Insecurity: Patient Declined (2/9/2025)    Hunger Vital Sign     Worried About Running Out of Food in the Last Year: Patient declined     Ran Out of Food in the Last Year: Patient declined   Transportation Needs: No Transportation Needs (3/11/2025)    OASIS : Transportation     Lack of Transportation (Medical): No     Lack of Transportation (Non-Medical): No     Patient Unable or Declines to Respond: No   Recent Concern: Transportation Needs - Unmet Transportation Needs (12/31/2024)    PRAPARE - Transportation     Lack of Transportation (Medical): Yes     Lack of Transportation (Non-Medical): Yes   Physical Activity: Not on file   Stress: Patient Declined (2/9/2025)    [place] [employer] - Occupational Stress Questionnaire     Feeling of Stress : Patient declined   Social Connections: Feeling Socially Integrated (3/11/2025)    OASIS : Social Isolation     Frequency of experiencing loneliness or isolation: Never   Intimate Partner Violence: Patient Declined (2/9/2025)    Humiliation, Afraid, Rape, and Kick questionnaire     Fear of Current or Ex-Partner: Patient declined     Emotionally Abused: Patient declined     Physically Abused: Patient declined     Sexually Abused: Patient declined   Housing Stability: Unknown (2/11/2025)    Housing Stability  "Vital Sign     Unable to Pay for Housing in the Last Year: No     Number of Times Moved in the Last Year: 0     Homeless in the Last Year: Patient declined       FAMILY HISTORY:  Family History[3]    MEDICATION LIST:  Current Outpatient Medications   Medication Instructions    0.9 % sodium chloride (SODIUM CHLORIDE 0.9 % INJ) 10 mL, intravenous, As needed, Flush using SASH method. If drawing blood flush with 20 ml of NS follow by 5ml of heparin    acetaminophen (Tylenol) 160 mg/5 mL liquid Take 20.3 mL (650 mg) by g-tube route every 6 hours if needed for pain.    amLODIPine (NORVASC) 10 mg, oral, Daily    Basaglar KwikPen U-100 Insulin 18 Units, subcutaneous, Every morning, Take at the same time as prednisone.    BD Ultra-Fine Joan Pen Needle 32 gauge x 5/32\" needle     blood sugar diagnostic (Blood Glucose Test) strip Check blood glucose 3 time per day    blood-glucose meter misc Use to check blood glucose    calcium polycarbophiL (FIBERCON) 625 mg, oral, 2 times daily    carboxymethylcellulose (Refresh Plus) 0.5 % ophthalmic solution Instill 1 drop into both eyes 2-4x/day as needed for dryness.    carvedilol (COREG) 12.5 mg, oral, 2 times daily, Hold for SBP <110 or HR <55    cholecalciferol (VITAMIN D-3) 2,000 Units, oral, Daily    FreeStyle Mick 3 Sensor device     gabapentin (NEURONTIN) 200 mg, oral, Daily    heparin sodium,porcine (HEPARIN, PORCINE, INJ) 5 mL, intravenous, As needed, Flush using SASH method. If drawing blood flush with 20 ml of NS follow by 5ml of heparin    HumuLIN N NPH Insulin KwikPen 8 Units, subcutaneous, Nightly, Administer 1 hour before starting the tube feeds every evening.    insulin aspart, with niacinamide, (Fiasp FlexTouch U-100 Insulin) 100 unit/mL (3 mL) pen 0-10 Units, subcutaneous, 3 times daily before meals, 5 unit(s) before meals 3 times daily and increase as per sliding scale: 2 unit(s) if Blood glucose is between 151-200, 4 unit(s) if Blood glucose is between 201-250, 6 " "unit(s) if Blood glucose is between 251-300, 8 unit(s) if Blood glucose is between 301-350, 10 unit(s) if Blood glucose is between 351-400, If blood glucose is greater than 400 mg/dL, give max insulin per sliding scale AND then contact provider. Expect up to 50 units per day    lancets 30 gauge misc 1 each, miscellaneous, 4 times daily, Use to check glucose 4 times daily, before meals and at bedtime    metoclopramide (REGLAN) 5 mg, oral, 2 times daily (morning and late afternoon)    mycophenolate (MYFORTIC) 540 mg, oral, 2 times daily    nut.tx.impaired dige fxn-fiber (Vital Peptide 1.5 [place]) 0.07 gram- 1.5 kcal/mL liquid 240 mL, oral, 3 times daily before meals, Administer 240 mL with breakfast, lunch, and dinner    pantoprazole (PROTONIX) 40 mg, oral, 2 times daily before meals, Do not crush, chew, or split.    pen needle, diabetic 32 gauge x 5/32\" needle Use 1 needle once daily in the evening.  Take before meals.    predniSONE (DELTASONE) 5 mg, oral, Daily    rosuvastatin (CRESTOR) 10 mg, oral, Nightly    sulfamethoxazole-trimethoprim (Bactrim) 400-80 mg tablet 1 tablet, oral, Daily    tacrolimus (PROGRAF) 4 mg, oral, 2 times daily    thiamine (VITAMIN B-1) 100 mg, oral, Daily       ALLERGY  Allergies[4]    PHYSICAL EXAM:    Visit Vitals  /74   Pulse 94   Temp 36.5 °C (97.7 °F) (Temporal)   Wt 71.8 kg (158 lb 6.4 oz)   SpO2 97%   BMI 20.90 kg/m²   Smoking Status Never   BSA 1.92 m²          Vital signs - reviewed. Acceptable BP at this office visit.   General Appearance - NAD, Good speech, oriented and alert  HEENT - Supple. Not pale. .  CVS - RRR. Normal S1/S2. No murmur, click , rub or gallop  Lungs- clear to auscultation bilaterally  Abdomen - soft , not tender, no guarding, no rigidity. No hepatosplenomegaly. Normal bowel sounds. No masses and ascites. S/P Kidney transplant .  Transplanted kidney is not tender.   Musculoskeletal /Extremities - no edema. Full ROM. No joint tenderness.   Neuro/Psych - " appropriate mood and affect. Motor power V/V all extremities. CN I -XII were grossly intact.  Skin - No visible rash      LABS:    Lab Results   Component Value Date    WBC 3.4 (L) 04/17/2025    HGB 10.9 (L) 04/17/2025    HCT 34.6 (L) 04/17/2025     (L) 04/17/2025    CHOL 197 02/24/2025    TRIG 166 (H) 02/24/2025    HDL 49.0 02/24/2025    ALT 18 12/20/2024    AST 21 12/20/2024     (L) 04/17/2025    K 4.1 04/17/2025    CL 98 04/17/2025    CREATININE 0.90 04/17/2025    BUN 20 04/17/2025    CO2 26 04/17/2025    TSH 1.24 04/19/2024    PSA <0.1 10/29/2024    INR 1.2 (H) 02/10/2025    HGBA1C 7.4 (H) 04/03/2025     ASSESSMENT AND PLAN:    Mr. Hanson is a 74 y.o. male  who is here for follow up s/p kidney transplant.    TRANSPLANT DATE: 12/21/2024 (Kidney)      1. ESRD S/P kidney transplant   - Creatinine last check was :  Lab Results   Component Value Date    CREATININE 0.90 04/17/2025       - Renal allograft function is excellent. 0.8-1.  No proteinuria.  -Allosure last check was 0.21% 4/17/25 stable  -Ensure adequate hydration  - Avoid nephrotoxic medications, NSAIDs, and IV contrast.    2. Immunosuppression  -Tacrolimus level last check was <2 (goal 5-7)  -Continue current immunosuppression regimen.  TAC 4 mg BID  Myfortic 540 mg BID  Pred 5 mg/day    PJP prophylaxis: TMP-SMX SS daily x 6 mo    CMV/BK/EBV neg 4/17    3. Electrolytes  Lab Results   Component Value Date    GLUCOSE 198 (H) 04/17/2025    CALCIUM 9.0 04/17/2025     (L) 04/17/2025    K 4.1 04/17/2025    CO2 26 04/17/2025    CL 98 04/17/2025    BUN 20 04/17/2025    CREATININE 0.90 04/17/2025     -Acceptable from last lab drawn    4. Hypertension  Blood Pressures         4/24/2025  0828             BP: 150/74        Goal -150/80-90  -Home  BP had been acceptable  -Encourage to monitor home BP  -Continue current anti hypertensive medication  Amlodipine 10 mg/day  Coreg 12.5 mg BID    5. Bone Mineral Disease/Osteoporosis  Lab Results    Component Value Date    PTH 85.2 04/10/2025    CALCIUM 9.0 04/17/2025    CAION 1.20 02/11/2025    PHOS 3.7 04/17/2025    VITD25 51 04/10/2025   Ca  Vitamin D maintenance    6.Anemia  Lab Results   Component Value Date    WBC 3.4 (L) 04/17/2025    HGB 10.9 (L) 04/17/2025    HCT 34.6 (L) 04/17/2025    MCV 90 04/17/2025     (L) 04/17/2025    Mild pancytopenia  Follow repeat lab    7.Health maintenance and vaccination  - Flu shot during flu season annually  - Cancer screening is up to date per the patient  - CVD prevention: rosuvastatin 10 mg/day  - Nutrition/tube feeding due to achalasia - managed by nutritionist  - Gastroparesis on Reglan 5 mg BID    Lab : Routine transplant lab ( CBC, RFP, and anti-rejection trough level ) every week  Additional labs:  VIT D, PTH with next lab  Viral screening PCR, Allosure and UPC per protocol.    Additional Plan :  Follow repeat lab. If pancytopenia persists while TAC level is at goal, I might consider decreasing Myfortic +/- B12 supplement.    RTC 1 month(s)    Evelyn Trimble MD    Transplant Nephrology        [1]   Past Medical History:  Diagnosis Date    Chronic kidney disease     DM (diabetes mellitus) (Multi)     Fistula     HTN (hypertension)     Other specified abnormal immunological findings in serum 10/11/2021    Hepatitis B core antibody positive    Personal history of malignant neoplasm of prostate     History of malignant neoplasm of prostate   [2]   Past Surgical History:  Procedure Laterality Date    CARDIAC CATHETERIZATION N/A 4/18/2024    Procedure: Pericardiocentesis;  Surgeon: Jose Brunson MD;  Location: [place] [address] Cardiac Cath Lab;  Service: Cardiovascular;  Laterality: N/A;    CARDIAC ELECTROPHYSIOLOGY PROCEDURE N/A 7/17/2024    Procedure: Leadless PPM Implant (06915);  Surgeon: Sheldon De La Torre MD;  Location: [facility] [place] [facility] Lab;  Service: Electrophysiology;  Laterality: N/A;  8:00, [employer]    CHOLECYSTECTOMY  10/23/2023    Lap  Cholecystectomy    OTHER SURGICAL HISTORY  09/29/2021    Cyst excision    OTHER SURGICAL HISTORY  09/29/2021    Arteriovenous fistula creation procedure    OTHER SURGICAL HISTORY  09/29/2021    Prostate surgery    OTHER SURGICAL HISTORY  09/29/2021    Colonoscopy   [3]   Family History  Problem Relation Name Age of Onset    Diabetes Sister      Diabetes Brother     [4]   Allergies  Allergen Reactions    Terazosin Unknown     Did not feel well on this medication    Codeine Itching     itching    Tramadol Itching

## 2025-04-24 NOTE — PATIENT INSTRUCTIONS
PLAN:  Labs today  Per nutrition- OK to stop TF. Keeping PEG tube for 1 more month  Reached out to Tiffanie in endo to see if insulins need adjusted  Labs every week  RTC 1 month

## 2025-04-25 DIAGNOSIS — Z94.0 KIDNEY REPLACED BY TRANSPLANT (HHS-HCC): ICD-10-CM

## 2025-05-01 ENCOUNTER — NURSE ONLY (OUTPATIENT)
Facility: HOSPITAL | Age: 75
End: 2025-05-01
Payer: COMMERCIAL

## 2025-05-01 DIAGNOSIS — D72.819 LEUKOPENIA, UNSPECIFIED TYPE: ICD-10-CM

## 2025-05-01 DIAGNOSIS — Z94.0 KIDNEY REPLACED BY TRANSPLANT (HHS-HCC): ICD-10-CM

## 2025-05-01 LAB
ALBUMIN SERPL BCP-MCNC: 4.1 G/DL (ref 3.4–5)
ANION GAP SERPL CALC-SCNC: 14 MMOL/L (ref 10–20)
BASOPHILS # BLD AUTO: 0.01 X10*3/UL (ref 0–0.1)
BASOPHILS NFR BLD AUTO: 0.3 %
BUN SERPL-MCNC: 23 MG/DL (ref 6–23)
CALCIUM SERPL-MCNC: 9.5 MG/DL (ref 8.6–10.6)
CHLORIDE SERPL-SCNC: 104 MMOL/L (ref 98–107)
CO2 SERPL-SCNC: 21 MMOL/L (ref 21–32)
CREAT SERPL-MCNC: 0.96 MG/DL (ref 0.5–1.3)
EGFRCR SERPLBLD CKD-EPI 2021: 83 ML/MIN/1.73M*2
EOSINOPHIL # BLD AUTO: 0.06 X10*3/UL (ref 0–0.4)
EOSINOPHIL NFR BLD AUTO: 1.7 %
ERYTHROCYTE [DISTWIDTH] IN BLOOD BY AUTOMATED COUNT: 14.9 % (ref 11.5–14.5)
GLUCOSE SERPL-MCNC: 135 MG/DL (ref 74–99)
HCT VFR BLD AUTO: 36.3 % (ref 41–52)
HGB BLD-MCNC: 11.7 G/DL (ref 13.5–17.5)
IMM GRANULOCYTES # BLD AUTO: 0.02 X10*3/UL (ref 0–0.5)
IMM GRANULOCYTES NFR BLD AUTO: 0.6 % (ref 0–0.9)
LYMPHOCYTES # BLD AUTO: 0.47 X10*3/UL (ref 0.8–3)
LYMPHOCYTES NFR BLD AUTO: 13.4 %
MAGNESIUM SERPL-MCNC: 1.57 MG/DL (ref 1.6–2.4)
MCH RBC QN AUTO: 29.2 PG (ref 26–34)
MCHC RBC AUTO-ENTMCNC: 32.2 G/DL (ref 32–36)
MCV RBC AUTO: 91 FL (ref 80–100)
MONOCYTES # BLD AUTO: 0.41 X10*3/UL (ref 0.05–0.8)
MONOCYTES NFR BLD AUTO: 11.7 %
NEUTROPHILS # BLD AUTO: 2.53 X10*3/UL (ref 1.6–5.5)
NEUTROPHILS NFR BLD AUTO: 72.3 %
NRBC BLD-RTO: 0 /100 WBCS (ref 0–0)
PHOSPHATE SERPL-MCNC: 3.9 MG/DL (ref 2.5–4.9)
PLATELET # BLD AUTO: 183 X10*3/UL (ref 150–450)
POTASSIUM SERPL-SCNC: 4.2 MMOL/L (ref 3.5–5.3)
RBC # BLD AUTO: 4.01 X10*6/UL (ref 4.5–5.9)
SODIUM SERPL-SCNC: 135 MMOL/L (ref 136–145)
TACROLIMUS BLD-MCNC: 7.6 NG/ML
WBC # BLD AUTO: 3.5 X10*3/UL (ref 4.4–11.3)

## 2025-05-01 PROCEDURE — 80069 RENAL FUNCTION PANEL: CPT | Performed by: STUDENT IN AN ORGANIZED HEALTH CARE EDUCATION/TRAINING PROGRAM

## 2025-05-01 PROCEDURE — 83735 ASSAY OF MAGNESIUM: CPT

## 2025-05-01 PROCEDURE — 85025 COMPLETE CBC W/AUTO DIFF WBC: CPT

## 2025-05-01 PROCEDURE — 80197 ASSAY OF TACROLIMUS: CPT | Performed by: STUDENT IN AN ORGANIZED HEALTH CARE EDUCATION/TRAINING PROGRAM

## 2025-05-01 PROCEDURE — 87799 DETECT AGENT NOS DNA QUANT: CPT | Performed by: TRANSPLANT SURGERY

## 2025-05-02 ENCOUNTER — PATIENT MESSAGE (OUTPATIENT)
Facility: HOSPITAL | Age: 75
End: 2025-05-02
Payer: COMMERCIAL

## 2025-05-02 DIAGNOSIS — Z94.0 KIDNEY REPLACED BY TRANSPLANT (HHS-HCC): ICD-10-CM

## 2025-05-02 LAB
BKV DNA SERPL NAA+PROBE-ACNC: 1520 IU/ML (ref ?–22)
BKV DNA SERPL NAA+PROBE-LOG#: 3.18 LOG IU/ML
CMV DNA SERPL NAA+PROBE-LOG IU: NORMAL {LOG_IU}/ML
EBV DNA SPEC NAA+PROBE-LOG#: NORMAL {LOG_COPIES}/ML
LABORATORY COMMENT REPORT: DETECTED
LABORATORY COMMENT REPORT: NOT DETECTED
LABORATORY COMMENT REPORT: NOT DETECTED

## 2025-05-02 RX ORDER — MYCOPHENOLIC ACID 180 MG/1
360 TABLET, DELAYED RELEASE ORAL 2 TIMES DAILY
Qty: 120 TABLET | Refills: 11 | Status: SHIPPED | OUTPATIENT
Start: 2025-05-02 | End: 2026-05-02

## 2025-05-07 RX ORDER — POLYETHYLENE GLYCOL-3350 AND ELECTROLYTES WITH FLAVOR PACK 240; 5.84; 2.98; 6.72; 22.72 G/278.26G; G/278.26G; G/278.26G; G/278.26G; G/278.26G
POWDER, FOR SOLUTION ORAL
Refills: 0 | OUTPATIENT
Start: 2025-05-07

## 2025-05-08 ENCOUNTER — NURSE ONLY (OUTPATIENT)
Facility: HOSPITAL | Age: 75
End: 2025-05-08
Payer: COMMERCIAL

## 2025-05-08 ENCOUNTER — PATIENT MESSAGE (OUTPATIENT)
Facility: HOSPITAL | Age: 75
End: 2025-05-08
Payer: COMMERCIAL

## 2025-05-08 DIAGNOSIS — Z94.0 KIDNEY REPLACED BY TRANSPLANT (HHS-HCC): ICD-10-CM

## 2025-05-08 DIAGNOSIS — Z94.0 KIDNEY REPLACED BY TRANSPLANT (HHS-HCC): Primary | ICD-10-CM

## 2025-05-08 LAB
ALBUMIN SERPL BCP-MCNC: 3.8 G/DL (ref 3.4–5)
ANION GAP SERPL CALC-SCNC: 12 MMOL/L (ref 10–20)
BUN SERPL-MCNC: 22 MG/DL (ref 6–23)
CALCIUM SERPL-MCNC: 9 MG/DL (ref 8.6–10.6)
CHLORIDE SERPL-SCNC: 105 MMOL/L (ref 98–107)
CO2 SERPL-SCNC: 23 MMOL/L (ref 21–32)
CREAT SERPL-MCNC: 1.16 MG/DL (ref 0.5–1.3)
EGFRCR SERPLBLD CKD-EPI 2021: 66 ML/MIN/1.73M*2
ERYTHROCYTE [DISTWIDTH] IN BLOOD BY AUTOMATED COUNT: 14.4 % (ref 11.5–14.5)
GLUCOSE SERPL-MCNC: 136 MG/DL (ref 74–99)
HCT VFR BLD AUTO: 33.1 % (ref 41–52)
HGB BLD-MCNC: 10.5 G/DL (ref 13.5–17.5)
MAGNESIUM SERPL-MCNC: 1.37 MG/DL (ref 1.6–2.4)
MCH RBC QN AUTO: 29.2 PG (ref 26–34)
MCHC RBC AUTO-ENTMCNC: 31.7 G/DL (ref 32–36)
MCV RBC AUTO: 92 FL (ref 80–100)
NRBC BLD-RTO: 0 /100 WBCS (ref 0–0)
PHOSPHATE SERPL-MCNC: 4 MG/DL (ref 2.5–4.9)
PLATELET # BLD AUTO: 152 X10*3/UL (ref 150–450)
POTASSIUM SERPL-SCNC: 3.9 MMOL/L (ref 3.5–5.3)
RBC # BLD AUTO: 3.6 X10*6/UL (ref 4.5–5.9)
SODIUM SERPL-SCNC: 136 MMOL/L (ref 136–145)
TACROLIMUS BLD-MCNC: 7.4 NG/ML
WBC # BLD AUTO: 2.8 X10*3/UL (ref 4.4–11.3)

## 2025-05-08 PROCEDURE — 80197 ASSAY OF TACROLIMUS: CPT | Performed by: STUDENT IN AN ORGANIZED HEALTH CARE EDUCATION/TRAINING PROGRAM

## 2025-05-08 PROCEDURE — 80069 RENAL FUNCTION PANEL: CPT | Performed by: STUDENT IN AN ORGANIZED HEALTH CARE EDUCATION/TRAINING PROGRAM

## 2025-05-08 PROCEDURE — 87799 DETECT AGENT NOS DNA QUANT: CPT | Performed by: STUDENT IN AN ORGANIZED HEALTH CARE EDUCATION/TRAINING PROGRAM

## 2025-05-08 PROCEDURE — 83735 ASSAY OF MAGNESIUM: CPT

## 2025-05-08 PROCEDURE — 87799 DETECT AGENT NOS DNA QUANT: CPT

## 2025-05-08 PROCEDURE — 85027 COMPLETE CBC AUTOMATED: CPT | Performed by: STUDENT IN AN ORGANIZED HEALTH CARE EDUCATION/TRAINING PROGRAM

## 2025-05-08 RX ORDER — ELECTROLYTES/DEXTROSE
64 SOLUTION, ORAL ORAL 2 TIMES DAILY
Qty: 60 TABLET | Refills: 11 | Status: SHIPPED | OUTPATIENT
Start: 2025-05-08

## 2025-05-09 DIAGNOSIS — Z94.0 KIDNEY REPLACED BY TRANSPLANT (HHS-HCC): ICD-10-CM

## 2025-05-09 LAB
BKV DNA SERPL NAA+PROBE-ACNC: 2700 IU/ML (ref ?–22)
BKV DNA SERPL NAA+PROBE-LOG#: 3.43 LOG IU/ML
CMV DNA SERPL NAA+PROBE-LOG IU: ABNORMAL {LOG_IU}/ML
EBV DNA SPEC NAA+PROBE-LOG#: NORMAL {LOG_COPIES}/ML
LABORATORY COMMENT REPORT: ABNORMAL
LABORATORY COMMENT REPORT: DETECTED
LABORATORY COMMENT REPORT: NOT DETECTED

## 2025-05-09 RX ORDER — MYCOPHENOLIC ACID 180 MG/1
180 TABLET, DELAYED RELEASE ORAL 2 TIMES DAILY
Qty: 60 TABLET | Refills: 11 | OUTPATIENT
Start: 2025-05-09 | End: 2026-05-09

## 2025-05-15 ENCOUNTER — NURSE ONLY (OUTPATIENT)
Facility: HOSPITAL | Age: 75
End: 2025-05-15
Payer: COMMERCIAL

## 2025-05-15 DIAGNOSIS — Z94.0 KIDNEY REPLACED BY TRANSPLANT (HHS-HCC): ICD-10-CM

## 2025-05-15 DIAGNOSIS — E11.65 TYPE 2 DIABETES MELLITUS WITH HYPERGLYCEMIA, WITH LONG-TERM CURRENT USE OF INSULIN: Primary | ICD-10-CM

## 2025-05-15 DIAGNOSIS — Z94.0 KIDNEY TRANSPLANT RECIPIENT (HHS-HCC): ICD-10-CM

## 2025-05-15 DIAGNOSIS — Z79.4 TYPE 2 DIABETES MELLITUS WITH HYPERGLYCEMIA, WITH LONG-TERM CURRENT USE OF INSULIN: Primary | ICD-10-CM

## 2025-05-15 LAB
ALBUMIN SERPL BCP-MCNC: 3.9 G/DL (ref 3.4–5)
ANION GAP SERPL CALC-SCNC: 11 MMOL/L (ref 10–20)
BUN SERPL-MCNC: 22 MG/DL (ref 6–23)
CALCIUM SERPL-MCNC: 9.6 MG/DL (ref 8.6–10.6)
CHLORIDE SERPL-SCNC: 105 MMOL/L (ref 98–107)
CO2 SERPL-SCNC: 25 MMOL/L (ref 21–32)
CREAT SERPL-MCNC: 1.04 MG/DL (ref 0.5–1.3)
CREAT UR-MCNC: 40.8 MG/DL (ref 20–370)
EGFRCR SERPLBLD CKD-EPI 2021: 75 ML/MIN/1.73M*2
ERYTHROCYTE [DISTWIDTH] IN BLOOD BY AUTOMATED COUNT: 14.3 % (ref 11.5–14.5)
GLUCOSE SERPL-MCNC: 195 MG/DL (ref 74–99)
HCT VFR BLD AUTO: 34.7 % (ref 41–52)
HGB BLD-MCNC: 10.8 G/DL (ref 13.5–17.5)
MCH RBC QN AUTO: 29 PG (ref 26–34)
MCHC RBC AUTO-ENTMCNC: 31.1 G/DL (ref 32–36)
MCV RBC AUTO: 93 FL (ref 80–100)
NRBC BLD-RTO: 0 /100 WBCS (ref 0–0)
PHOSPHATE SERPL-MCNC: 3.8 MG/DL (ref 2.5–4.9)
PLATELET # BLD AUTO: 152 X10*3/UL (ref 150–450)
POTASSIUM SERPL-SCNC: 4.4 MMOL/L (ref 3.5–5.3)
PROT UR-ACNC: 6 MG/DL (ref 5–25)
PROT/CREAT UR: 0.15 MG/MG CREAT (ref 0–0.17)
RBC # BLD AUTO: 3.73 X10*6/UL (ref 4.5–5.9)
SODIUM SERPL-SCNC: 137 MMOL/L (ref 136–145)
TACROLIMUS BLD-MCNC: 9 NG/ML
WBC # BLD AUTO: 3.3 X10*3/UL (ref 4.4–11.3)

## 2025-05-15 PROCEDURE — 80197 ASSAY OF TACROLIMUS: CPT | Performed by: STUDENT IN AN ORGANIZED HEALTH CARE EDUCATION/TRAINING PROGRAM

## 2025-05-15 PROCEDURE — 82570 ASSAY OF URINE CREATININE: CPT | Performed by: STUDENT IN AN ORGANIZED HEALTH CARE EDUCATION/TRAINING PROGRAM

## 2025-05-15 PROCEDURE — 85027 COMPLETE CBC AUTOMATED: CPT | Performed by: STUDENT IN AN ORGANIZED HEALTH CARE EDUCATION/TRAINING PROGRAM

## 2025-05-15 PROCEDURE — 87799 DETECT AGENT NOS DNA QUANT: CPT | Performed by: STUDENT IN AN ORGANIZED HEALTH CARE EDUCATION/TRAINING PROGRAM

## 2025-05-15 PROCEDURE — 80069 RENAL FUNCTION PANEL: CPT | Performed by: STUDENT IN AN ORGANIZED HEALTH CARE EDUCATION/TRAINING PROGRAM

## 2025-05-15 RX ORDER — BLOOD-GLUCOSE SENSOR
1 EACH MISCELLANEOUS
Qty: 6 EACH | Refills: 3 | Status: SHIPPED | OUTPATIENT
Start: 2025-05-15 | End: 2026-05-15

## 2025-05-15 NOTE — PROGRESS NOTES
Pt requested updated gina rx to [facility] pharmacy    Tiffanie Crum PA-C   Endocrinology  Inpatient Diabetes Management Team

## 2025-05-16 DIAGNOSIS — Z94.0 KIDNEY REPLACED BY TRANSPLANT (HHS-HCC): ICD-10-CM

## 2025-05-16 LAB
BKV DNA SERPL NAA+PROBE-ACNC: 4320 IU/ML (ref ?–22)
BKV DNA SERPL NAA+PROBE-LOG#: 3.64 LOG IU/ML
CMV DNA SERPL NAA+PROBE-LOG IU: ABNORMAL {LOG_IU}/ML
LABORATORY COMMENT REPORT: ABNORMAL
LABORATORY COMMENT REPORT: DETECTED

## 2025-05-17 LAB
ALLOSURE SCORE - KIDNEY: 0.18 %
CAREDX_ORDER_ID: NORMAL
CENTER_ORDER_ID: NORMAL
CLIENT SPECIMEN ID - ALLOSURE: NORMAL
DONOR RELATION - ALLOSURE: NORMAL
NOTES - ALLOSURE: NORMAL
RELATIVE CHANGE VALUE - KIDNEY: NORMAL
TEST COMMENTS - ALLOSURE: NORMAL
TIME POST TX - ALLOSURE: NORMAL
TRANSPLANTED ORGAN - ALLOSURE: NORMAL
TX DATE - ALLOSURE/ALLOMAP: NORMAL
WP_ORDER_ID: NORMAL

## 2025-05-17 PROCEDURE — RXMED WILLOW AMBULATORY MEDICATION CHARGE

## 2025-05-19 ENCOUNTER — PATIENT MESSAGE (OUTPATIENT)
Facility: HOSPITAL | Age: 75
End: 2025-05-19
Payer: COMMERCIAL

## 2025-05-19 DIAGNOSIS — Z94.0 KIDNEY REPLACED BY TRANSPLANT (HHS-HCC): ICD-10-CM

## 2025-05-19 RX ORDER — MYCOPHENOLIC ACID 180 MG/1
180 TABLET, DELAYED RELEASE ORAL 2 TIMES DAILY
Qty: 60 TABLET | Refills: 11 | Status: SHIPPED | OUTPATIENT
Start: 2025-05-19 | End: 2026-05-19

## 2025-05-20 ENCOUNTER — PHARMACY VISIT (OUTPATIENT)
Dept: PHARMACY | Facility: CLINIC | Age: 75
End: 2025-05-20
Payer: COMMERCIAL

## 2025-05-21 ENCOUNTER — OFFICE VISIT (OUTPATIENT)
Dept: CARDIOLOGY | Facility: CLINIC | Age: 75
End: 2025-05-21
Payer: COMMERCIAL

## 2025-05-21 VITALS
SYSTOLIC BLOOD PRESSURE: 118 MMHG | WEIGHT: 158.3 LBS | HEIGHT: 73 IN | HEART RATE: 65 BPM | DIASTOLIC BLOOD PRESSURE: 64 MMHG | BODY MASS INDEX: 20.98 KG/M2 | OXYGEN SATURATION: 98 %

## 2025-05-21 DIAGNOSIS — Z95.0 CARDIAC PACEMAKER IN SITU: Primary | ICD-10-CM

## 2025-05-21 DIAGNOSIS — I44.2 THIRD DEGREE HEART BLOCK: ICD-10-CM

## 2025-05-21 DIAGNOSIS — I10 ESSENTIAL HYPERTENSION: ICD-10-CM

## 2025-05-21 DIAGNOSIS — E78.00 PURE HYPERCHOLESTEROLEMIA: ICD-10-CM

## 2025-05-21 DIAGNOSIS — Z94.0 KIDNEY REPLACED BY TRANSPLANT (HHS-HCC): ICD-10-CM

## 2025-05-21 PROCEDURE — 1160F RVW MEDS BY RX/DR IN RCRD: CPT | Performed by: INTERNAL MEDICINE

## 2025-05-21 PROCEDURE — 3074F SYST BP LT 130 MM HG: CPT | Performed by: INTERNAL MEDICINE

## 2025-05-21 PROCEDURE — G2211 COMPLEX E/M VISIT ADD ON: HCPCS | Performed by: INTERNAL MEDICINE

## 2025-05-21 PROCEDURE — 99214 OFFICE O/P EST MOD 30 MIN: CPT | Performed by: INTERNAL MEDICINE

## 2025-05-21 PROCEDURE — 3049F LDL-C 100-129 MG/DL: CPT | Performed by: INTERNAL MEDICINE

## 2025-05-21 PROCEDURE — 1036F TOBACCO NON-USER: CPT | Performed by: INTERNAL MEDICINE

## 2025-05-21 PROCEDURE — 1159F MED LIST DOCD IN RCRD: CPT | Performed by: INTERNAL MEDICINE

## 2025-05-21 PROCEDURE — 3078F DIAST BP <80 MM HG: CPT | Performed by: INTERNAL MEDICINE

## 2025-05-21 PROCEDURE — 3008F BODY MASS INDEX DOCD: CPT | Performed by: INTERNAL MEDICINE

## 2025-05-21 PROCEDURE — 1125F AMNT PAIN NOTED PAIN PRSNT: CPT | Performed by: INTERNAL MEDICINE

## 2025-05-21 PROCEDURE — 3061F NEG MICROALBUMINURIA REV: CPT | Performed by: INTERNAL MEDICINE

## 2025-05-21 PROCEDURE — 3051F HG A1C>EQUAL 7.0%<8.0%: CPT | Performed by: INTERNAL MEDICINE

## 2025-05-21 RX ORDER — ROSUVASTATIN CALCIUM 10 MG/1
10 TABLET, COATED ORAL NIGHTLY
Qty: 90 TABLET | Refills: 3 | Status: SHIPPED | OUTPATIENT
Start: 2025-05-21 | End: 2026-05-16

## 2025-05-21 ASSESSMENT — PATIENT HEALTH QUESTIONNAIRE - PHQ9
SUM OF ALL RESPONSES TO PHQ9 QUESTIONS 1 AND 2: 0
2. FEELING DOWN, DEPRESSED OR HOPELESS: NOT AT ALL
1. LITTLE INTEREST OR PLEASURE IN DOING THINGS: NOT AT ALL

## 2025-05-21 ASSESSMENT — ENCOUNTER SYMPTOMS
LOSS OF SENSATION IN FEET: 0
OCCASIONAL FEELINGS OF UNSTEADINESS: 1
DEPRESSION: 0

## 2025-05-21 ASSESSMENT — PAIN SCALES - GENERAL: PAINLEVEL_OUTOF10: 2

## 2025-05-21 NOTE — PROGRESS NOTES
"Subjective   Temo Hanson is a 74 y.o. male who presents to the [facility] for follow up evaluation after PPM. Last seen in August 2024.     Since our last visit, Mr. Hanson had kidney transplant surgery in December 2024. No further pericardial effusion at time of operation and this condition has resolved with treating ESRD with transplantation.     Today, he has no active cardiac symptoms of chest pain, PND, orthopnea, CHARI, palpitations, syncope, or claudication.     Had PPM placed 7/17/2024 for high grade AV block. Exertional dyspnea now markedly improved. He can walk 2 block and climb 1-2 flights of stairs now compared to prior to PPM baseline walking < 50 feet.    Large pericardial effusion due to ESRD was treated with April 2024 pericardiocentesis. No recurrence post drainage.    Past Medical History:  1. ESRD s/p 12/21/2024 DDKT  2. Hypertension  3. Dyslipidemia  4. Type 2 diabetes mellitus  5. Severe tricuspid regurgitation  6. 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 All questions were negative except for pertinent positives listed in the HPI.     Current Outpatient Medications on File Prior to Visit   Medication Sig Dispense Refill    BD Ultra-Fine Joan Pen Needle 32 gauge x 5/32\" needle       blood sugar diagnostic (Blood Glucose Test) strip Check blood glucose 3 time per day 100 each 0    blood-glucose meter misc Use to check blood glucose 1 each 0    carboxymethylcellulose (Refresh Plus) 0.5 % ophthalmic solution Instill 1 drop into both eyes 2-4x/day as needed for dryness.      carvedilol (Coreg) 12.5 mg tablet Take 1 tablet (12.5 mg) by mouth 2 times a day. Hold for SBP <110 or HR <55 60 tablet 11    cholecalciferol (Vitamin D-3) 50 mcg (2,000 units) " "tablet Take 1 tablet (2,000 Units) by mouth once daily. 30 tablet 11    FreeStyle Mick 3 Sensor device Inject 1 each under the skin every 14 (fourteen) days. 6 each 3    gabapentin (Neurontin) 100 mg capsule Take 2 capsules (200 mg) by mouth once daily.      insulin aspart, with niacinamide, (Fiasp FlexTouch U-100 Insulin) 100 unit/mL (3 mL) pen Inject 0-10 Units under the skin 3 times a day before meals. 5 unit(s) before meals 3 times daily and increase as per sliding scale: 2 unit(s) if Blood glucose is between 151-200, 4 unit(s) if Blood glucose is between 201-250, 6 unit(s) if Blood glucose is between 251-300, 8 unit(s) if Blood glucose is between 301-350, 10 unit(s) if Blood glucose is between 351-400, If blood glucose is greater than 400 mg/dL, give max insulin per sliding scale AND then contact provider. Expect up to 50 units per day 15 mL 2    insulin glargine (Basaglar KwikPen U-100 Insulin) 100 unit/mL (3 mL) pen Inject 18 Units under the skin once daily in the morning. Take at the same time as prednisone.      lancets 30 gauge misc 1 each 4 times a day. Use to check glucose 4 times daily, before meals and at bedtime 400 each 0    magnesium chloride 64 mg magnesium tablet Take 64 mg by mouth 2 times a day. 60 tablet 11    mycophenolate (Myfortic) 180 mg EC tablet Take 1 tablet (180 mg) by mouth 2 times a day. 60 tablet 11    pantoprazole (ProtoNix) 40 mg EC tablet Take 1 tablet (40 mg) by mouth 2 times a day before meals. Do not crush, chew, or split. 180 tablet 0    pen needle, diabetic 32 gauge x 5/32\" needle Use 1 needle once daily in the evening.  Take before meals. 100 each 0    predniSONE (Deltasone) 5 mg tablet Take 1 tablet (5 mg) by mouth once daily. 90 tablet 3    rosuvastatin (Crestor) 10 mg tablet Take 1 tablet (10 mg) by mouth once daily at bedtime. 90 tablet 0    sulfamethoxazole-trimethoprim (Bactrim) 400-80 mg tablet Take 1 tablet by mouth once daily. 30 tablet 2    tacrolimus (Prograf) 1 " mg capsule Take 4 capsules (4 mg) by mouth 2 times a day. 240 capsule 11    amLODIPine (Norvasc) 10 mg tablet Take 1 tablet (10 mg) by mouth once daily. 30 tablet 11    calcium polycarbophiL (Fibercon) 625 mg tablet Take 1 tablet (625 mg) by mouth 2 times a day. 60 tablet 2    heparin sodium,porcine (HEPARIN, PORCINE, INJ) Infuse 5 mL into a venous catheter if needed (line patency). Flush using SASH method. If drawing blood flush with 20 ml of NS follow by 5ml of heparin  Indications: FLUSHING (Patient not taking: Reported on 5/21/2025)      insulin NPH, Isophane, (HumuLIN N NPH Insulin KwikPen) 100 unit/mL (3 mL) pen Inject 8 Units under the skin once daily at bedtime. Administer 1 hour before starting the tube feeds every evening. (Patient not taking: Reported on 5/21/2025) 15 mL 0    metoclopramide (Reglan) 5 mg tablet Take 1 tablet (5 mg) by mouth 2 times daily (morning and late afternoon).      nut.tx.impaired dige fxn-fiber (Vital Peptide 1.5 Chelita) 0.07 gram- 1.5 kcal/mL liquid Take 240 mL by mouth 3 times a day before meals. Administer 240 mL with breakfast, lunch, and dinner (Patient not taking: Reported on 5/21/2025)      [DISCONTINUED] 0.9 % sodium chloride (SODIUM CHLORIDE 0.9 % INJ) Infuse 10 mL into a venous catheter if needed (flushing). Flush using SASH method. If drawing blood flush with 20 ml of NS follow by 5ml of heparin  Indications: FLUSHING (Patient not taking: Reported on 5/21/2025)      [DISCONTINUED] acetaminophen (Tylenol) 160 mg/5 mL liquid Take 20.3 mL (650 mg) by g-tube route every 6 hours if needed for pain. (Patient not taking: Reported on 5/21/2025) 812 mL 0    [DISCONTINUED] FreeStyle Mick 3 Sensor device       [DISCONTINUED] mycophenolate (Myfortic) 180 mg EC tablet Take 2 tablets (360 mg) by mouth 2 times a day. 120 tablet 11     No current facility-administered medications on file prior to visit.      Objective   Physical Exam  BP Readings from Last 3 Encounters:   05/21/25 118/64  "  04/24/25 150/74   03/26/25 154/78      Wt Readings from Last 3 Encounters:   05/21/25 71.8 kg (158 lb 4.8 oz)   04/24/25 71.8 kg (158 lb 6.4 oz)   03/26/25 70.3 kg (155 lb)      BMI: Estimated body mass index is 20.89 kg/m² as calculated from the following:    Height as of this encounter: 1.854 m (6' 1\").    Weight as of this encounter: 71.8 kg (158 lb 4.8 oz).  BSA: Estimated body surface area is 1.92 meters squared as calculated from the following:    Height as of this encounter: 1.854 m (6' 1\").    Weight as of this encounter: 71.8 kg (158 lb 4.8 oz).    General: no acute distress  HEENT: EOMI, no scleral icterus.  Lungs: Clear to auscultation bilaterally without wheezing, rales, or rhonchi.  Cardiovascular: Regular rhythm and rate. Normal S1 and S2. No murmurs, rubs, or gallops are appreciated. JVP normal.  Abdomen: Soft, nontender, nondistended. Bowel sounds present.  Extremities: Warm and well perfused with equal 2+ pulses bilaterally.  No edema present.  Neurologic: Alert and oriented x3.    I have personally reviewed the following images and laboratory findings:  Last echocardiogram:  12/24/2024 echo (limited): LV EF 60-65%, no LVH (LVMI not reported), LV diastology not assessed, normal LA size (ROXANNA not reported), normal RV/RA, no AI, mild AV calcification, mild MAC, trace MR, mild TR, RVSP 32 mm Hg, no pericardial effusion.     6/21/2024 echo (limited): LV EF 65-70%, no LVH (LVMI 83 gm/m2), moderate LAE (ROXANNA 46 ml/m2), normal RV/RA, mild MAC, RVSP 70 mm Hg, trace pericardial effusion.     4/21/2024 echo (limited); LV EF 60-65%, normal chamber sizes, trivial pericardial effusion (decreased from large effusion on 4/18/2024)    2/29/2024 echo: LV EF 60-65%, no LVH (LVMI 70 gm/m2), pseudonormal diastology (E/e' ), mild LAE (ROXANNA ml/m2), moderate RV enlargement, mildly reduced RV systolic function, moderate THERESA ( cm2), no AI, mild-moderate MAC, mild MR, severe tricuspid regurgitation, RVSP 82 mm Hg (RAP 15 " "mm Hg), reversed hepatic vein flow due to TR.    Last cath / stress test:  2023 SPECT nuclear stress test: No myocardial ischemia or scar pattern. LV EF     2/3/2021 CT calcium score: Zero    Most recent EC2025 ECG: Sinus rhythm with type 2 AVB (Mobitz I), RBBB, 69 bpm, abnormal ECG. Personally reviewed in office.    2024: V-paced, 56 bpm. Personally reviewed in office.    2024 ECG: Sinus rhythm with AVB type 2, 76 bpm, RBBB. Personally reviewed in office.    Holter  - 2024 HR monitor with type 2 AVB through out monitor period.    2024 ECG: Sinus rhythm, 2nd degree AVB, 68 bpm, RBBB. Personally reviewed in office.    On review of 2023 ECG, AV dissociation also appears to have been present. Prior  SPECT nuclear not done for type 2 AV block.    Lab Results   Component Value Date    CHOL 197 2025    CHOL 183 10/29/2024     Lab Results   Component Value Date    HDL 49.0 2025    HDL 53.1 10/29/2024     Lab Results   Component Value Date    LDLCALC 115 (H) 2025     Lab Results   Component Value Date    TRIG 166 (H) 2025     No components found for: \"CHOLHDL\"      Assessment/Plan   1. High grade AVB:   Pacemaker placed 2024 by Dr. De La Torre. Working appropriately on post procedure device check this week.      2. Pericardial effusion:  2024 pericardiocentesis. Trace effusion on 2024 and 2024 limited echocardiograms. Not an active issue post kidney transplant.    3. Hypertension:  Blood pressure at goal on current medications. Continue amlodipine 10 mg a day and carvedilol 12.5 mg twice a day.     4. CAD risk factor assessment:  CT calcium score zero in . Blood pressure at goal range 120-130 mm Hg. Continue rosuvastatin 10 mg a day. Goal LDL < 100. 2025 . Continue lifestyle modification program for further LDL reduction.    Follow up with Dr. Miguel in 6 months          SIGNATURE: Aly Miguel MD PATIENT NAME: " Tmeo Hanson   DATE/TIME: May 21, 2025 11:39 AM MRN: [id number]

## 2025-05-21 NOTE — PATIENT INSTRUCTIONS
You were seen at the [facility] for a check up of your heart.     Your pacemaker is working appropriately on recent device check in April 2025.     Your limited December 2024 echocardiogram at time of your kidney transplant showed normal heat muscle pumping strength and normal heart valve function.    Continue rosuvastatin 10 mg a day to protect your heart arteries and control cholesterol.     Your blood pressure is well controlled taking amlodipine 10 mg a day and carvedilol 12.5 mg twice a day.     Follow up with Dr. Miguel in 6 months.

## 2025-05-21 NOTE — PROGRESS NOTES
"Reason for Nutrition Visit:  Pt is a 74 y.o. male referred for a nutrition follow-up.     Transplant Coordinator: Madiha Marin RN    Past Medical Hx:  Problem List[1]     Lab Results   Component Value Date    HGBA1C 7.4 (H) 04/03/2025    HGBA1C 6.1 (H) 10/29/2024    HGBA1C 5.7 (H) 04/20/2024     05/15/2025    K 4.4 05/15/2025    PHOS 3.8 05/15/2025     05/15/2025    CO2 25 05/15/2025    BUN 22 05/15/2025    CREATININE 1.04 05/15/2025    CALCIUM 9.6 05/15/2025    ALBUMIN 3.9 05/15/2025    PROT 8.8 (H) 12/20/2024    BILITOT 0.5 12/20/2024    ALKPHOS 189 (H) 12/20/2024    ALT 18 12/20/2024    AST 21 12/20/2024    GLUCOSE 195 (H) 05/15/2025    CHOL 197 02/24/2025    TRIG 166 (H) 02/24/2025    HDL 49.0 02/24/2025    BHYDRXBUT 0.09 02/12/2025    CPEPTIDE 4.8 (H) 10/29/2024     Lab Results   Component Value Date    HGB 10.8 (L) 05/15/2025     DM Specific Labs Trend (Includes HgbA1C, antibodies & fasting insulin):   Recent Labs     04/03/25  0904 10/29/24  1202 04/20/24  0842   HGBA1C 7.4* 6.1* 5.7*   CPEPTIDE  --  4.8*  --    , Iron Panel + Serum Ferritin Trend:   Recent Labs     01/08/25  0610 04/21/24  0739   IRON 31* 43   UIBC 109* 98*   TIBC 140* 141*   IRONSAT 22* 30   FERRITIN 928* 1,911*   , Vitamin B12: No results found for: \"AOCMPVXX99\" , Folate: No results found for: \"FOLATE\" , and Vitamin D:   Lab Results   Component Value Date    VITD25 51 04/10/2025      Food History and Nutrition Assessment     Appetite: Good  Intake: >75%  GI Symptoms : diarrhea   Swallowing Difficulty: No problems with swallowing  Dentition : own    Types of Activities: House/Yard Work    Sleep duration/quality : 7+ hours and continuous sleep  Sleep disorders: none    Diet History:  Patient describes having two large meals with snacks in between. Patient states he now has 1 Glucerna shake in the evening and has protein bars about 1-2 times per day. Patient continues to do water flushes through PEG twice per week. He is curious " about when he can have PEG removed.     24 Hour Diet Recall:  Meal 1: Porridge barley/Druze oats with eggs and milk and spices  AM Snack: Crackers with nut butter and protein bars (1-2)  Meal 2: Fish stew with eggplant with a baked potato and rice or fish with chickpea and rice blend  PM Snack: Glucerna  Meal 3: Toast and cheese   Late Snack: Protein bar   Beverages: Water, OJ   Estimated intake of ~ 1650 kcal and ~93g protein (meeting ~77% of energy and >100% protein needs).    Food Preparation: Partner/Spouse and Children  Cooking Skills/Barriers: None reported    Supplements: Vitamin D daily    Estimated Energy Needs:    Calorie Needs (30 kcal/kg CBW): 2150 kcal  Protein Needs (1.0 g/kg CBW): 72 g    Weight History:  CBW: 71.5 kg (158 lbs)   BMI: 20.8 kg/m2  IBW: 83.6 kg    Wt Readings from Last 18 Encounters:   05/22/25 71.5 kg (157 lb 11.2 oz)   05/21/25 71.8 kg (158 lb 4.8 oz)   04/24/25 71.8 kg (158 lb 6.4 oz)   03/26/25 70.3 kg (155 lb)   03/13/25 70.8 kg (156 lb)   03/13/25 70.8 kg (156 lb)   03/03/25 69.2 kg (152 lb 9.6 oz)   02/24/25 69.1 kg (152 lb 4.8 oz)   02/22/25 66.7 kg (147 lb)   02/18/25 69.8 kg (153 lb 14.1 oz)   02/03/25 70 kg (154 lb 6.4 oz)   02/02/25 67.3 kg (148 lb 6.4 oz)   01/27/25 68.9 kg (152 lb)   01/22/25 69.5 kg (153 lb 3.2 oz)   12/31/24 74.1 kg (163 lb 4.8 oz)   12/26/24 76.4 kg (168 lb 6.9 oz)   01/29/25 67 kg (147 lb 9.6 oz)   10/29/24 74.3 kg (163 lb 12.8 oz)     Weight change:  Weight stable over last month. Weight gain of 4.8 kg in 3 months.     Nutrition Focused Physical Exam:    Performed/Deferred: Performed    Muscle Wasting:  Temporal: Mild  Shoulder: None  Clavicle: Mild  Interosseous Muscle: Mild    Loss of Subcutaneous Fat:  Eyes: None  Perioral: None  Triceps: None    Malnutrition Present: No    Nutrition Diagnosis    Diagnosis Status: Resolved  4/24/25: Malnutrition; moderate chronic disease or condition related related to physiological causes resulting in increased  energy requirements (s/p kidney txp) as evidence by mild loss of muscle mass and mild loss of subcutaneous fat     Diagnosis Status: New  Additional Nutrition-Related Diagnoses:  Diagnosis: Increased nutrient needs related to recent medical diagnosis or change in health or disease state (s/p kidney txp) as evidence by areas of mild loss of muscle mass.     Nutrition Education, Intervention, and Goals    Encouraged continued intake of small, frequent meals with an emphasis on having a protein source (fish, chickpeas, egg, nut butter, etc.) at each meal. Encourage continued intake of protein shake (Glucerna) to support adequate intake and weight stabilization. We also discussed importance of CHO consistency and pairing foods high in CHO with a protein source to help support blood sugar regulation.    Educational Handouts: N/A    Nutrition Goals  Nutrition Goals : Carbohydrate consistency  Consistent meal/snack pattern  Maintain stable weight  Consume meal or snack every 3-4 hours  Include both protein and starch at all meals and snacks    Nutrition Monitoring and Evaluation    Additional Recommendations/Referrals: Informed nurse coordinator and surgeon of improved intake. No nutrition indication for continued use of PEG for TF.     Follow up: It was a pleasure speaking with you today, Mr. Hanson! Let's schedule a follow-up in 3 month(s) to check in on your progress. I'll have the  schedule a follow-up.         [1]   Patient Active Problem List  Diagnosis    S/P laparoscopic cholecystectomy    Abdominal pain    Achalasia    Acute lower UTI    Microcytic anemia    Complete heart block    Callus of foot    Chronic periodontitis, unspecified    Stage 5 chronic kidney disease (Multi)    Closed fracture of metatarsal bone    Crushing injury of foot    Diabetes mellitus (Multi)    Diarrhea    Dysphagia    ED (erectile dysfunction)    Essential hypertension    Foot pain, right    GIB (gastrointestinal bleeding)     Hepatitis B core antibody positive    Hyperkalemia    Ingrowing nail    Iron deficiency anemia secondary to inadequate dietary iron intake    Long toenail    Malignant neoplasm of prostate (Multi)    Onychomycosis    Pheochromocytoma of right adrenal gland    Pneumonia of right lower lobe due to infectious organism    Productive cough    Pure hypercholesterolemia    Stricture esophagus    SVT (supraventricular tachycardia)    Type 2 diabetes mellitus with diabetic neuropathy (Multi)    Preop cardiovascular exam    Third degree heart block    Pericardial effusion (HHS-HCC)    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 (Multi)    Uremia    Cardiac tamponade    Cardiac pacemaker in situ    ESRD (end stage renal disease) (Multi)    Type 2 diabetes mellitus, with long-term current use of insulin    Dehydration    Alactasia syndrome    Type 2 diabetes mellitus with hyperglycemia, with long-term current use of insulin    On tube feeding diet    Kidney transplant recipient (HHS-HCC)    DKA, type 2, not at goal

## 2025-05-22 ENCOUNTER — NURSE ONLY (OUTPATIENT)
Facility: HOSPITAL | Age: 75
End: 2025-05-22
Payer: COMMERCIAL

## 2025-05-22 ENCOUNTER — PATIENT MESSAGE (OUTPATIENT)
Facility: HOSPITAL | Age: 75
End: 2025-05-22

## 2025-05-22 ENCOUNTER — OFFICE VISIT (OUTPATIENT)
Facility: HOSPITAL | Age: 75
End: 2025-05-22
Payer: COMMERCIAL

## 2025-05-22 ENCOUNTER — TELEPHONE (OUTPATIENT)
Facility: HOSPITAL | Age: 75
End: 2025-05-22

## 2025-05-22 ENCOUNTER — NUTRITION (OUTPATIENT)
Facility: HOSPITAL | Age: 75
End: 2025-05-22
Payer: COMMERCIAL

## 2025-05-22 VITALS
DIASTOLIC BLOOD PRESSURE: 67 MMHG | BODY MASS INDEX: 20.81 KG/M2 | HEART RATE: 82 BPM | TEMPERATURE: 97.8 F | SYSTOLIC BLOOD PRESSURE: 134 MMHG | WEIGHT: 157.7 LBS | OXYGEN SATURATION: 98 %

## 2025-05-22 DIAGNOSIS — Z79.899 IMMUNOSUPPRESSIVE MANAGEMENT ENCOUNTER FOLLOWING KIDNEY TRANSPLANT: Primary | ICD-10-CM

## 2025-05-22 DIAGNOSIS — Z94.0 KIDNEY REPLACED BY TRANSPLANT (HHS-HCC): ICD-10-CM

## 2025-05-22 DIAGNOSIS — Z78.9 ON TUBE FEEDING DIET: ICD-10-CM

## 2025-05-22 DIAGNOSIS — Z94.0 IMMUNOSUPPRESSIVE MANAGEMENT ENCOUNTER FOLLOWING KIDNEY TRANSPLANT: Primary | ICD-10-CM

## 2025-05-22 DIAGNOSIS — E11.40 TYPE 2 DIABETES MELLITUS WITH DIABETIC NEUROPATHY, UNSPECIFIED WHETHER LONG TERM INSULIN USE (MULTI): ICD-10-CM

## 2025-05-22 LAB
ALBUMIN SERPL BCP-MCNC: 3.9 G/DL (ref 3.4–5)
ANION GAP SERPL CALC-SCNC: 12 MMOL/L (ref 10–20)
BASOPHILS # BLD AUTO: 0.01 X10*3/UL (ref 0–0.1)
BASOPHILS NFR BLD AUTO: 0.4 %
BUN SERPL-MCNC: 24 MG/DL (ref 6–23)
CALCIUM SERPL-MCNC: 9.4 MG/DL (ref 8.6–10.6)
CHLORIDE SERPL-SCNC: 105 MMOL/L (ref 98–107)
CO2 SERPL-SCNC: 23 MMOL/L (ref 21–32)
CREAT SERPL-MCNC: 0.9 MG/DL (ref 0.5–1.3)
CREAT UR-MCNC: 101.5 MG/DL (ref 20–370)
EGFRCR SERPLBLD CKD-EPI 2021: 90 ML/MIN/1.73M*2
EOSINOPHIL # BLD AUTO: 0.04 X10*3/UL (ref 0–0.4)
EOSINOPHIL NFR BLD AUTO: 1.5 %
ERYTHROCYTE [DISTWIDTH] IN BLOOD BY AUTOMATED COUNT: 13.8 % (ref 11.5–14.5)
GLUCOSE SERPL-MCNC: 164 MG/DL (ref 74–99)
HCT VFR BLD AUTO: 33.5 % (ref 41–52)
HGB BLD-MCNC: 10.7 G/DL (ref 13.5–17.5)
IMM GRANULOCYTES # BLD AUTO: 0.02 X10*3/UL (ref 0–0.5)
IMM GRANULOCYTES NFR BLD AUTO: 0.8 % (ref 0–0.9)
LYMPHOCYTES # BLD AUTO: 0.55 X10*3/UL (ref 0.8–3)
LYMPHOCYTES NFR BLD AUTO: 20.8 %
MAGNESIUM SERPL-MCNC: 1.51 MG/DL (ref 1.6–2.4)
MCH RBC QN AUTO: 29.5 PG (ref 26–34)
MCHC RBC AUTO-ENTMCNC: 31.9 G/DL (ref 32–36)
MCV RBC AUTO: 92 FL (ref 80–100)
MONOCYTES # BLD AUTO: 0.36 X10*3/UL (ref 0.05–0.8)
MONOCYTES NFR BLD AUTO: 13.6 %
NEUTROPHILS # BLD AUTO: 1.66 X10*3/UL (ref 1.6–5.5)
NEUTROPHILS NFR BLD AUTO: 62.9 %
NRBC BLD-RTO: 0 /100 WBCS (ref 0–0)
PHOSPHATE SERPL-MCNC: 3.4 MG/DL (ref 2.5–4.9)
PLATELET # BLD AUTO: 151 X10*3/UL (ref 150–450)
POTASSIUM SERPL-SCNC: 4 MMOL/L (ref 3.5–5.3)
PROT UR-ACNC: 10 MG/DL (ref 5–25)
PROT/CREAT UR: 0.1 MG/MG CREAT (ref 0–0.17)
RBC # BLD AUTO: 3.63 X10*6/UL (ref 4.5–5.9)
SODIUM SERPL-SCNC: 136 MMOL/L (ref 136–145)
TACROLIMUS BLD-MCNC: 8 NG/ML
WBC # BLD AUTO: 2.6 X10*3/UL (ref 4.4–11.3)

## 2025-05-22 PROCEDURE — 84156 ASSAY OF PROTEIN URINE: CPT | Performed by: TRANSPLANT SURGERY

## 2025-05-22 PROCEDURE — 1159F MED LIST DOCD IN RCRD: CPT | Performed by: HOSPITALIST

## 2025-05-22 PROCEDURE — 3049F LDL-C 100-129 MG/DL: CPT | Performed by: HOSPITALIST

## 2025-05-22 PROCEDURE — 80069 RENAL FUNCTION PANEL: CPT | Performed by: STUDENT IN AN ORGANIZED HEALTH CARE EDUCATION/TRAINING PROGRAM

## 2025-05-22 PROCEDURE — 83735 ASSAY OF MAGNESIUM: CPT | Performed by: STUDENT IN AN ORGANIZED HEALTH CARE EDUCATION/TRAINING PROGRAM

## 2025-05-22 PROCEDURE — 1126F AMNT PAIN NOTED NONE PRSNT: CPT | Performed by: HOSPITALIST

## 2025-05-22 PROCEDURE — 3051F HG A1C>EQUAL 7.0%<8.0%: CPT | Performed by: HOSPITALIST

## 2025-05-22 PROCEDURE — 3078F DIAST BP <80 MM HG: CPT | Performed by: HOSPITALIST

## 2025-05-22 PROCEDURE — 3075F SYST BP GE 130 - 139MM HG: CPT | Performed by: HOSPITALIST

## 2025-05-22 PROCEDURE — 3061F NEG MICROALBUMINURIA REV: CPT | Performed by: HOSPITALIST

## 2025-05-22 PROCEDURE — 85025 COMPLETE CBC W/AUTO DIFF WBC: CPT

## 2025-05-22 PROCEDURE — 99215 OFFICE O/P EST HI 40 MIN: CPT | Performed by: HOSPITALIST

## 2025-05-22 PROCEDURE — 80197 ASSAY OF TACROLIMUS: CPT | Performed by: STUDENT IN AN ORGANIZED HEALTH CARE EDUCATION/TRAINING PROGRAM

## 2025-05-22 PROCEDURE — 87799 DETECT AGENT NOS DNA QUANT: CPT | Performed by: STUDENT IN AN ORGANIZED HEALTH CARE EDUCATION/TRAINING PROGRAM

## 2025-05-22 PROCEDURE — 1160F RVW MEDS BY RX/DR IN RCRD: CPT | Performed by: HOSPITALIST

## 2025-05-22 RX ORDER — TACROLIMUS 1 MG/1
3 CAPSULE ORAL 2 TIMES DAILY
Qty: 180 CAPSULE | Refills: 11 | Status: SHIPPED | OUTPATIENT
Start: 2025-05-22 | End: 2026-05-22

## 2025-05-22 ASSESSMENT — ENCOUNTER SYMPTOMS
SHORTNESS OF BREATH: 0
FREQUENCY: 0
CHEST TIGHTNESS: 0
BACK PAIN: 0
HEMATURIA: 0
CONFUSION: 0
NAUSEA: 0
VOMITING: 0
NERVOUS/ANXIOUS: 0
HEADACHES: 0
COUGH: 0
DIARRHEA: 0
ABDOMINAL DISTENTION: 0
PALPITATIONS: 0

## 2025-05-22 ASSESSMENT — PAIN SCALES - GENERAL: PAINLEVEL_OUTOF10: 0-NO PAIN

## 2025-05-22 NOTE — TELEPHONE ENCOUNTER
YANICK KIDNEY SURGEON at  9:30 AM (30 min)  Thursday June 19, 2025  Appointment Provider:YANICK ABDOMINAL SURGEON in [place] [id number] [address]

## 2025-05-22 NOTE — PATIENT INSTRUCTIONS
Decrease tac to 3 mg twice a day     Continue labs weekly     Return to see us in 3 weeks

## 2025-05-22 NOTE — PROGRESS NOTES
Temo Hanson   74 y.o. male with past medical history significant for  ESRD secondary to diabetic nephropathy whom received a  donor kidney transplant on 24 by Dr. Zamora with a DBD kidney KDPI of 93% and PRA of 54%. Donor was Hepc -/-and has not met risk factors. EBV D+ /R+, CMV D-/R+. Left donor kidney transplanted to patient right pelvis. Admission weight is 72.7 kg (discharge weight is 76.4 kg ). Pt received a total of 3 mg/kg total of thymoglobulin induction therapy.  Post-op SGF/DGF and failure to thrive/malnutrition requiring PEG tube  Readmitted -25 for DKA, hypovolemia and poteus mirabilis and resistant ESBL UTI post stemt removal and norovirus associated diarrhea.    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    Interim history: Denied any complaints on today's visit patient feeling much better eating very well did not require any tube feeding.  Supposed to see nutritionist this afternoon.  Will plan for eventual removal of the PEG tube by the surgery.        Review of Systems   Respiratory:  Negative for cough, chest tightness and shortness of breath.    Cardiovascular:  Negative for chest pain, palpitations and leg swelling.   Gastrointestinal:  Negative for abdominal distention, diarrhea, nausea and vomiting.   Genitourinary:  Negative for frequency, hematuria and urgency.   Musculoskeletal:  Negative for back pain.   Neurological:  Negative for headaches.   Psychiatric/Behavioral:  Negative for confusion. The patient is not nervous/anxious.         Objective:  Visit Vitals  /67   Pulse 82   Temp 36.6 °C (97.8 °F) (Temporal)   Wt 71.5 kg (157 lb 11.2 oz)   SpO2 98%   BMI 20.81 kg/m²   Smoking Status Never   BSA 1.92 m²      Physical Exam  HENT:      Head: Normocephalic and atraumatic.      Nose: Nose normal.      Mouth/Throat:      Mouth: Mucous membranes are  moist.   Eyes:      Extraocular Movements: Extraocular movements intact.      Pupils: Pupils are equal, round, and reactive to light.   Cardiovascular:      Rate and Rhythm: Normal rate.      Pulses: Normal pulses.      Heart sounds: Normal heart sounds.   Pulmonary:      Effort: Pulmonary effort is normal.   Abdominal:      Palpations: Abdomen is soft.      Tenderness: There is no abdominal tenderness. There is no guarding.   Musculoskeletal:         General: Normal range of motion.      Cervical back: Normal range of motion.   Skin:     General: Skin is warm.   Neurological:      General: No focal deficit present.      Mental Status: Mental status is at baseline.   Psychiatric:         Mood and Affect: Mood normal.          Current Medications[1]     [unfilled]     No images are attached to the encounter.     Assessment and Plan :Temo Hanson 74 y.o. male with past medical history significant for  ESRD secondary to diabetic nephropathy whom received a  donor kidney transplant on 24 by Dr. Zamora with a DBD kidney KDPI of 93% and PRA of 54%. Donor was Hepc -/-and has not met risk factors. EBV D+ /R+, CMV D-/R+. Left donor kidney transplanted to patient right pelvis. Admission weight is 72.7 kg (discharge weight is 76.4 kg ). Pt received a total of 3 mg/kg total of thymoglobulin induction therapy.  Post-op SGF/DGF and failure to thrive/malnutrition requiring PEG tube  Readmitted -25 for DKA, hypovolemia and poteus mirabilis and resistant ESBL UTI post stemt removal and norovirus associated diarrhea.    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    Interim history: Denied any complaints on today's visit patient feeling much better eating very well did not require any tube feeding.  Supposed to see nutritionist this afternoon.  Will plan for eventual removal of the  "PEG tube by the surgery.    Allograft function: Stable creatinine in the range of 1.1-0.9, stable electrolytes.  Current creatinine is around 1.04.  Last UPC 0.15.  AlloSure is stable at 0.18.  -Last BK up trended to 4320 from 2700 international units/mL monitor BK every other week  - CMV less than 35 detected as of 5/15/2025  - EBV negative as of 5/8/2025.  - Currently on Bactrim prophylaxis continue for 6 months    Immunosuppression: Recent FK levels are around 9 decrease dose to 3 mg twice daily aim levels are 5-8, on lower dose of Myfortic 180 twice daily in the setting of persistent BK and CMV, prednisone 5 mg daily.    Anemia/leukopenia: Seems to have leukopenia but stable.  On lower dose of Myfortic.  Will monitor closely with weekly labs.    Bone mineral disease: Calcium and phosphorus levels are optimal vitamin D levels are around 51 continue with the current management.  - Low magnesium levels on Slow-Mag.    Hemodynamics: Blood pressures currently optimal continue with carvedilol and amlodipine.      General health care: Recommended age-appropriate screening, COVID shots annual dermatology visits,DEXA scan 2-3 yrs while on steroids .    Labs weekly once follow-up in 3 weeks    Chris Moreno MD    Notes created by Kathi -Please excuse the Typos .         [1]   Current Outpatient Medications:     amLODIPine (Norvasc) 10 mg tablet, Take 1 tablet (10 mg) by mouth once daily., Disp: 30 tablet, Rfl: 11    BD Ultra-Fine Joan Pen Needle 32 gauge x 5/32\" needle, , Disp: , Rfl:     blood sugar diagnostic (Blood Glucose Test) strip, Check blood glucose 3 time per day, Disp: 100 each, Rfl: 0    blood-glucose meter misc, Use to check blood glucose, Disp: 1 each, Rfl: 0    calcium polycarbophiL (Fibercon) 625 mg tablet, Take 1 tablet (625 mg) by mouth 2 times a day., Disp: 60 tablet, Rfl: 2    carboxymethylcellulose (Refresh Plus) 0.5 % ophthalmic solution, Instill 1 drop into both eyes 2-4x/day as needed for " dryness., Disp: , Rfl:     carvedilol (Coreg) 12.5 mg tablet, Take 1 tablet (12.5 mg) by mouth 2 times a day. Hold for SBP <110 or HR <55, Disp: 60 tablet, Rfl: 11    cholecalciferol (Vitamin D-3) 50 mcg (2,000 units) tablet, Take 1 tablet (2,000 Units) by mouth once daily., Disp: 30 tablet, Rfl: 11    FreeStyle Mick 3 Sensor device, Inject 1 each under the skin every 14 (fourteen) days., Disp: 6 each, Rfl: 3    gabapentin (Neurontin) 100 mg capsule, Take 2 capsules (200 mg) by mouth once daily., Disp: , Rfl:     heparin sodium,porcine (HEPARIN, PORCINE, INJ), Infuse 5 mL into a venous catheter if needed (line patency). Flush using SASH method. If drawing blood flush with 20 ml of NS follow by 5ml of heparin  Indications: FLUSHING (Patient not taking: Reported on 5/21/2025), Disp: , Rfl:     insulin aspart, with niacinamide, (Fiasp FlexTouch U-100 Insulin) 100 unit/mL (3 mL) pen, Inject 0-10 Units under the skin 3 times a day before meals. 5 unit(s) before meals 3 times daily and increase as per sliding scale: 2 unit(s) if Blood glucose is between 151-200, 4 unit(s) if Blood glucose is between 201-250, 6 unit(s) if Blood glucose is between 251-300, 8 unit(s) if Blood glucose is between 301-350, 10 unit(s) if Blood glucose is between 351-400, If blood glucose is greater than 400 mg/dL, give max insulin per sliding scale AND then contact provider. Expect up to 50 units per day, Disp: 15 mL, Rfl: 2    insulin glargine (Basaglar KwikPen U-100 Insulin) 100 unit/mL (3 mL) pen, Inject 18 Units under the skin once daily in the morning. Take at the same time as prednisone., Disp: , Rfl:     insulin NPH, Isophane, (HumuLIN N NPH Insulin KwikPen) 100 unit/mL (3 mL) pen, Inject 8 Units under the skin once daily at bedtime. Administer 1 hour before starting the tube feeds every evening. (Patient not taking: Reported on 5/21/2025), Disp: 15 mL, Rfl: 0    lancets 30 gauge misc, 1 each 4 times a day. Use to check glucose 4 times  "daily, before meals and at bedtime, Disp: 400 each, Rfl: 0    magnesium chloride 64 mg magnesium tablet, Take 64 mg by mouth 2 times a day., Disp: 60 tablet, Rfl: 11    metoclopramide (Reglan) 5 mg tablet, Take 1 tablet (5 mg) by mouth 2 times daily (morning and late afternoon)., Disp: , Rfl:     mycophenolate (Myfortic) 180 mg EC tablet, Take 1 tablet (180 mg) by mouth 2 times a day., Disp: 60 tablet, Rfl: 11    nut.tx.impaired dige fxn-fiber (Vital Peptide 1.5 Chelita) 0.07 gram- 1.5 kcal/mL liquid, Take 240 mL by mouth 3 times a day before meals. Administer 240 mL with breakfast, lunch, and dinner (Patient not taking: Reported on 5/21/2025), Disp: , Rfl:     pantoprazole (ProtoNix) 40 mg EC tablet, Take 1 tablet (40 mg) by mouth 2 times a day before meals. Do not crush, chew, or split., Disp: 180 tablet, Rfl: 0    pen needle, diabetic 32 gauge x 5/32\" needle, Use 1 needle once daily in the evening.  Take before meals., Disp: 100 each, Rfl: 0    predniSONE (Deltasone) 5 mg tablet, Take 1 tablet (5 mg) by mouth once daily., Disp: 90 tablet, Rfl: 3    rosuvastatin (Crestor) 10 mg tablet, Take 1 tablet (10 mg) by mouth once daily at bedtime., Disp: 90 tablet, Rfl: 3    sulfamethoxazole-trimethoprim (Bactrim) 400-80 mg tablet, Take 1 tablet by mouth once daily., Disp: 30 tablet, Rfl: 2    tacrolimus (Prograf) 1 mg capsule, Take 3 capsules (3 mg) by mouth 2 times a day., Disp: 180 capsule, Rfl: 11    "

## 2025-05-23 DIAGNOSIS — Z94.0 KIDNEY REPLACED BY TRANSPLANT (HHS-HCC): ICD-10-CM

## 2025-05-23 LAB
BKV DNA SERPL NAA+PROBE-ACNC: 4580 IU/ML (ref ?–22)
BKV DNA SERPL NAA+PROBE-LOG#: 3.66 LOG IU/ML
CMV DNA SERPL NAA+PROBE-ACNC: 55 IU/ML (ref ?–35)
CMV DNA SERPL NAA+PROBE-LOG IU: 1.74 LOG IU/ML
LABORATORY COMMENT REPORT: DETECTED
LABORATORY COMMENT REPORT: DETECTED

## 2025-05-23 RX ORDER — GABAPENTIN 100 MG/1
200 CAPSULE ORAL DAILY
Qty: 60 CAPSULE | Refills: 1 | Status: SHIPPED | OUTPATIENT
Start: 2025-05-23 | End: 2025-07-22

## 2025-05-29 ENCOUNTER — PATIENT MESSAGE (OUTPATIENT)
Facility: HOSPITAL | Age: 75
End: 2025-05-29
Payer: COMMERCIAL

## 2025-05-29 ENCOUNTER — NURSE ONLY (OUTPATIENT)
Facility: HOSPITAL | Age: 75
End: 2025-05-29
Payer: COMMERCIAL

## 2025-05-29 DIAGNOSIS — Z79.899 ENCOUNTER FOR LONG-TERM (CURRENT) USE OF HIGH-RISK MEDICATION: ICD-10-CM

## 2025-05-29 DIAGNOSIS — Z94.0 KIDNEY REPLACED BY TRANSPLANT (HHS-HCC): Primary | ICD-10-CM

## 2025-05-29 LAB
ALBUMIN SERPL BCP-MCNC: 3.7 G/DL (ref 3.4–5)
ANION GAP SERPL CALC-SCNC: 13 MMOL/L (ref 10–20)
BUN SERPL-MCNC: 31 MG/DL (ref 6–23)
CALCIUM SERPL-MCNC: 9.1 MG/DL (ref 8.6–10.6)
CHLORIDE SERPL-SCNC: 105 MMOL/L (ref 98–107)
CO2 SERPL-SCNC: 22 MMOL/L (ref 21–32)
CREAT SERPL-MCNC: 0.92 MG/DL (ref 0.5–1.3)
EGFRCR SERPLBLD CKD-EPI 2021: 87 ML/MIN/1.73M*2
ERYTHROCYTE [DISTWIDTH] IN BLOOD BY AUTOMATED COUNT: 14.2 % (ref 11.5–14.5)
GLUCOSE SERPL-MCNC: 110 MG/DL (ref 74–99)
HCT VFR BLD AUTO: 32.3 % (ref 41–52)
HGB BLD-MCNC: 10.2 G/DL (ref 13.5–17.5)
MAGNESIUM SERPL-MCNC: 1.55 MG/DL (ref 1.6–2.4)
MCH RBC QN AUTO: 29.6 PG (ref 26–34)
MCHC RBC AUTO-ENTMCNC: 31.6 G/DL (ref 32–36)
MCV RBC AUTO: 94 FL (ref 80–100)
NRBC BLD-RTO: 0 /100 WBCS (ref 0–0)
PHOSPHATE SERPL-MCNC: 3.8 MG/DL (ref 2.5–4.9)
PLATELET # BLD AUTO: 135 X10*3/UL (ref 150–450)
POTASSIUM SERPL-SCNC: 3.9 MMOL/L (ref 3.5–5.3)
RBC # BLD AUTO: 3.45 X10*6/UL (ref 4.5–5.9)
SODIUM SERPL-SCNC: 136 MMOL/L (ref 136–145)
TACROLIMUS BLD-MCNC: 4 NG/ML
WBC # BLD AUTO: 3.2 X10*3/UL (ref 4.4–11.3)

## 2025-05-29 PROCEDURE — 80069 RENAL FUNCTION PANEL: CPT | Performed by: STUDENT IN AN ORGANIZED HEALTH CARE EDUCATION/TRAINING PROGRAM

## 2025-05-29 PROCEDURE — 85027 COMPLETE CBC AUTOMATED: CPT | Performed by: STUDENT IN AN ORGANIZED HEALTH CARE EDUCATION/TRAINING PROGRAM

## 2025-05-29 PROCEDURE — 83735 ASSAY OF MAGNESIUM: CPT | Performed by: STUDENT IN AN ORGANIZED HEALTH CARE EDUCATION/TRAINING PROGRAM

## 2025-05-29 PROCEDURE — 80197 ASSAY OF TACROLIMUS: CPT | Performed by: STUDENT IN AN ORGANIZED HEALTH CARE EDUCATION/TRAINING PROGRAM

## 2025-05-29 PROCEDURE — 87799 DETECT AGENT NOS DNA QUANT: CPT | Performed by: STUDENT IN AN ORGANIZED HEALTH CARE EDUCATION/TRAINING PROGRAM

## 2025-05-29 RX ORDER — TACROLIMUS 0.5 MG/1
0.5 CAPSULE ORAL 2 TIMES DAILY
Qty: 60 CAPSULE | Refills: 11 | Status: SHIPPED | OUTPATIENT
Start: 2025-05-29 | End: 2026-05-29

## 2025-05-30 DIAGNOSIS — Z94.0 KIDNEY REPLACED BY TRANSPLANT (HHS-HCC): ICD-10-CM

## 2025-05-30 LAB
BKV DNA SERPL NAA+PROBE-ACNC: ABNORMAL IU/ML (ref ?–22)
BKV DNA SERPL NAA+PROBE-LOG#: 4.14 LOG IU/ML
CMV DNA SERPL NAA+PROBE-LOG IU: ABNORMAL {LOG_IU}/ML
LABORATORY COMMENT REPORT: ABNORMAL
LABORATORY COMMENT REPORT: DETECTED

## 2025-06-02 ENCOUNTER — PATIENT MESSAGE (OUTPATIENT)
Facility: HOSPITAL | Age: 75
End: 2025-06-02
Payer: COMMERCIAL

## 2025-06-03 ENCOUNTER — TELEPHONE (OUTPATIENT)
Facility: HOSPITAL | Age: 75
End: 2025-06-03
Payer: COMMERCIAL

## 2025-06-03 DIAGNOSIS — Z94.0 KIDNEY TRANSPLANT RECIPIENT (HHS-HCC): ICD-10-CM

## 2025-06-03 DIAGNOSIS — B34.8 BK VIREMIA: Primary | ICD-10-CM

## 2025-06-03 RX ORDER — EPINEPHRINE 0.3 MG/.3ML
0.3 INJECTION SUBCUTANEOUS EVERY 5 MIN PRN
OUTPATIENT
Start: 2025-06-06

## 2025-06-03 RX ORDER — ACETAMINOPHEN 325 MG/1
650 TABLET ORAL ONCE
OUTPATIENT
Start: 2025-06-06

## 2025-06-03 RX ORDER — DIPHENHYDRAMINE HYDROCHLORIDE 50 MG/ML
50 INJECTION, SOLUTION INTRAMUSCULAR; INTRAVENOUS AS NEEDED
OUTPATIENT
Start: 2025-06-06

## 2025-06-03 RX ORDER — DIPHENHYDRAMINE HCL 25 MG
25 TABLET ORAL ONCE
OUTPATIENT
Start: 2025-06-06

## 2025-06-03 RX ORDER — FAMOTIDINE 10 MG/ML
20 INJECTION, SOLUTION INTRAVENOUS ONCE AS NEEDED
OUTPATIENT
Start: 2025-06-06

## 2025-06-03 RX ORDER — ALBUTEROL SULFATE 0.83 MG/ML
3 SOLUTION RESPIRATORY (INHALATION) AS NEEDED
OUTPATIENT
Start: 2025-06-06

## 2025-06-03 NOTE — TELEPHONE ENCOUNTER
Per Dr Perez - patient to get IVIG 2g/kg x2 doses for BK.  Confirmed dosage with Deuce Will, pharmD - 70 g x2.     Pt informed and agreeable to IVIG.   Therapy plan entered and sent to Dr Perez to sign.

## 2025-06-03 NOTE — PATIENT COMMUNICATION
Returned call to patient, he had some questions regarding IVIG that I answered. Pt agreeable to get IVIG, prefers at infusion center and would like it done at [facility] [place] if possible.

## 2025-06-05 ENCOUNTER — TELEPHONE (OUTPATIENT)
Facility: HOSPITAL | Age: 75
End: 2025-06-05
Payer: COMMERCIAL

## 2025-06-05 ENCOUNTER — NURSE ONLY (OUTPATIENT)
Facility: HOSPITAL | Age: 75
End: 2025-06-05
Payer: COMMERCIAL

## 2025-06-05 DIAGNOSIS — D72.819 LEUKOPENIA, UNSPECIFIED TYPE: ICD-10-CM

## 2025-06-05 DIAGNOSIS — Z94.0 KIDNEY REPLACED BY TRANSPLANT (HHS-HCC): ICD-10-CM

## 2025-06-05 LAB
ALBUMIN SERPL BCP-MCNC: 4.1 G/DL (ref 3.4–5)
ANION GAP SERPL CALC-SCNC: 12 MMOL/L (ref 10–20)
BASOPHILS # BLD AUTO: 0.01 X10*3/UL (ref 0–0.1)
BASOPHILS NFR BLD AUTO: 0.3 %
BUN SERPL-MCNC: 29 MG/DL (ref 6–23)
CALCIUM SERPL-MCNC: 9.2 MG/DL (ref 8.6–10.6)
CHLORIDE SERPL-SCNC: 106 MMOL/L (ref 98–107)
CO2 SERPL-SCNC: 25 MMOL/L (ref 21–32)
CREAT SERPL-MCNC: 1.26 MG/DL (ref 0.5–1.3)
CREAT UR-MCNC: 68.2 MG/DL (ref 20–370)
EGFRCR SERPLBLD CKD-EPI 2021: 60 ML/MIN/1.73M*2
EOSINOPHIL # BLD AUTO: 0.07 X10*3/UL (ref 0–0.4)
EOSINOPHIL NFR BLD AUTO: 2.3 %
ERYTHROCYTE [DISTWIDTH] IN BLOOD BY AUTOMATED COUNT: 14.2 % (ref 11.5–14.5)
GLUCOSE SERPL-MCNC: 117 MG/DL (ref 74–99)
HCT VFR BLD AUTO: 35.5 % (ref 41–52)
HGB BLD-MCNC: 10.5 G/DL (ref 13.5–17.5)
IMM GRANULOCYTES # BLD AUTO: 0.02 X10*3/UL (ref 0–0.5)
IMM GRANULOCYTES NFR BLD AUTO: 0.7 % (ref 0–0.9)
LYMPHOCYTES # BLD AUTO: 0.68 X10*3/UL (ref 0.8–3)
LYMPHOCYTES NFR BLD AUTO: 22.3 %
MAGNESIUM SERPL-MCNC: 1.67 MG/DL (ref 1.6–2.4)
MCH RBC QN AUTO: 28.9 PG (ref 26–34)
MCHC RBC AUTO-ENTMCNC: 29.6 G/DL (ref 32–36)
MCV RBC AUTO: 98 FL (ref 80–100)
MONOCYTES # BLD AUTO: 0.51 X10*3/UL (ref 0.05–0.8)
MONOCYTES NFR BLD AUTO: 16.7 %
NEUTROPHILS # BLD AUTO: 1.76 X10*3/UL (ref 1.6–5.5)
NEUTROPHILS NFR BLD AUTO: 57.7 %
NRBC BLD-RTO: 0 /100 WBCS (ref 0–0)
PHOSPHATE SERPL-MCNC: 4.1 MG/DL (ref 2.5–4.9)
PLATELET # BLD AUTO: ABNORMAL 10*3/UL
POTASSIUM SERPL-SCNC: 4.3 MMOL/L (ref 3.5–5.3)
PROT UR-ACNC: 12 MG/DL (ref 5–25)
PROT/CREAT UR: 0.18 MG/MG CREAT (ref 0–0.17)
RBC # BLD AUTO: 3.63 X10*6/UL (ref 4.5–5.9)
SODIUM SERPL-SCNC: 139 MMOL/L (ref 136–145)
TACROLIMUS BLD-MCNC: 6.4 NG/ML
WBC # BLD AUTO: 2.9 X10*3/UL (ref 4.4–11.3)

## 2025-06-05 PROCEDURE — 80069 RENAL FUNCTION PANEL: CPT | Performed by: STUDENT IN AN ORGANIZED HEALTH CARE EDUCATION/TRAINING PROGRAM

## 2025-06-05 PROCEDURE — 85027 COMPLETE CBC AUTOMATED: CPT | Performed by: STUDENT IN AN ORGANIZED HEALTH CARE EDUCATION/TRAINING PROGRAM

## 2025-06-05 PROCEDURE — 80197 ASSAY OF TACROLIMUS: CPT | Performed by: STUDENT IN AN ORGANIZED HEALTH CARE EDUCATION/TRAINING PROGRAM

## 2025-06-05 PROCEDURE — 87799 DETECT AGENT NOS DNA QUANT: CPT | Performed by: STUDENT IN AN ORGANIZED HEALTH CARE EDUCATION/TRAINING PROGRAM

## 2025-06-05 PROCEDURE — 83735 ASSAY OF MAGNESIUM: CPT | Performed by: STUDENT IN AN ORGANIZED HEALTH CARE EDUCATION/TRAINING PROGRAM

## 2025-06-05 PROCEDURE — 82570 ASSAY OF URINE CREATININE: CPT | Performed by: STUDENT IN AN ORGANIZED HEALTH CARE EDUCATION/TRAINING PROGRAM

## 2025-06-05 PROCEDURE — 36415 COLL VENOUS BLD VENIPUNCTURE: CPT

## 2025-06-06 DIAGNOSIS — Z94.0 KIDNEY REPLACED BY TRANSPLANT (HHS-HCC): ICD-10-CM

## 2025-06-06 LAB
BKV DNA SERPL NAA+PROBE-ACNC: ABNORMAL IU/ML (ref ?–22)
BKV DNA SERPL NAA+PROBE-LOG#: 4.07 LOG IU/ML
CMV DNA SERPL NAA+PROBE-LOG IU: ABNORMAL {LOG_IU}/ML
LABORATORY COMMENT REPORT: ABNORMAL
LABORATORY COMMENT REPORT: DETECTED

## 2025-06-09 ENCOUNTER — PATIENT MESSAGE (OUTPATIENT)
Facility: HOSPITAL | Age: 75
End: 2025-06-09
Payer: COMMERCIAL

## 2025-06-09 DIAGNOSIS — K22.2 STRICTURE ESOPHAGUS: ICD-10-CM

## 2025-06-09 PROCEDURE — RXMED WILLOW AMBULATORY MEDICATION CHARGE

## 2025-06-09 RX ORDER — PANTOPRAZOLE SODIUM 40 MG/1
40 TABLET, DELAYED RELEASE ORAL
Qty: 180 TABLET | Refills: 0 | Status: SHIPPED | OUTPATIENT
Start: 2025-06-09 | End: 2025-06-09 | Stop reason: SDUPTHER

## 2025-06-09 RX ORDER — PANTOPRAZOLE SODIUM 40 MG/1
40 TABLET, DELAYED RELEASE ORAL
Qty: 180 TABLET | Refills: 3 | Status: SHIPPED | OUTPATIENT
Start: 2025-06-09 | End: 2026-06-04

## 2025-06-10 ENCOUNTER — PHARMACY VISIT (OUTPATIENT)
Dept: PHARMACY | Facility: CLINIC | Age: 75
End: 2025-06-10
Payer: COMMERCIAL

## 2025-06-12 ENCOUNTER — NURSE ONLY (OUTPATIENT)
Facility: HOSPITAL | Age: 75
End: 2025-06-12
Payer: COMMERCIAL

## 2025-06-12 LAB
ALBUMIN SERPL BCP-MCNC: 3.9 G/DL (ref 3.4–5)
ANION GAP SERPL CALC-SCNC: 12 MMOL/L (ref 10–20)
BUN SERPL-MCNC: 24 MG/DL (ref 6–23)
CALCIUM SERPL-MCNC: 8.9 MG/DL (ref 8.6–10.6)
CHLORIDE SERPL-SCNC: 106 MMOL/L (ref 98–107)
CO2 SERPL-SCNC: 26 MMOL/L (ref 21–32)
CREAT SERPL-MCNC: 1.18 MG/DL (ref 0.5–1.3)
EGFRCR SERPLBLD CKD-EPI 2021: 65 ML/MIN/1.73M*2
ERYTHROCYTE [DISTWIDTH] IN BLOOD BY AUTOMATED COUNT: 14.2 % (ref 11.5–14.5)
GLUCOSE SERPL-MCNC: 129 MG/DL (ref 74–99)
HCT VFR BLD AUTO: 33.3 % (ref 41–52)
HGB BLD-MCNC: 10.2 G/DL (ref 13.5–17.5)
MAGNESIUM SERPL-MCNC: 1.48 MG/DL (ref 1.6–2.4)
MCH RBC QN AUTO: 29.4 PG (ref 26–34)
MCHC RBC AUTO-ENTMCNC: 30.6 G/DL (ref 32–36)
MCV RBC AUTO: 96 FL (ref 80–100)
NRBC BLD-RTO: 0 /100 WBCS (ref 0–0)
PHOSPHATE SERPL-MCNC: 4.5 MG/DL (ref 2.5–4.9)
PLATELET # BLD AUTO: 124 X10*3/UL (ref 150–450)
POTASSIUM SERPL-SCNC: 4.6 MMOL/L (ref 3.5–5.3)
RBC # BLD AUTO: 3.47 X10*6/UL (ref 4.5–5.9)
SODIUM SERPL-SCNC: 139 MMOL/L (ref 136–145)
TACROLIMUS BLD-MCNC: 6.5 NG/ML
WBC # BLD AUTO: 3.1 X10*3/UL (ref 4.4–11.3)

## 2025-06-12 PROCEDURE — 82565 ASSAY OF CREATININE: CPT | Performed by: STUDENT IN AN ORGANIZED HEALTH CARE EDUCATION/TRAINING PROGRAM

## 2025-06-12 PROCEDURE — 85027 COMPLETE CBC AUTOMATED: CPT | Performed by: STUDENT IN AN ORGANIZED HEALTH CARE EDUCATION/TRAINING PROGRAM

## 2025-06-12 PROCEDURE — 83735 ASSAY OF MAGNESIUM: CPT | Performed by: STUDENT IN AN ORGANIZED HEALTH CARE EDUCATION/TRAINING PROGRAM

## 2025-06-12 PROCEDURE — 80197 ASSAY OF TACROLIMUS: CPT | Performed by: STUDENT IN AN ORGANIZED HEALTH CARE EDUCATION/TRAINING PROGRAM

## 2025-06-12 PROCEDURE — 87799 DETECT AGENT NOS DNA QUANT: CPT | Performed by: STUDENT IN AN ORGANIZED HEALTH CARE EDUCATION/TRAINING PROGRAM

## 2025-06-12 PROCEDURE — 36415 COLL VENOUS BLD VENIPUNCTURE: CPT

## 2025-06-13 DIAGNOSIS — Z94.0 KIDNEY REPLACED BY TRANSPLANT (HHS-HCC): ICD-10-CM

## 2025-06-13 LAB
BKV DNA SERPL NAA+PROBE-ACNC: 7650 IU/ML (ref ?–22)
BKV DNA SERPL NAA+PROBE-LOG#: 3.88 LOG IU/ML
CMV DNA SERPL NAA+PROBE-LOG IU: ABNORMAL {LOG_IU}/ML
LABORATORY COMMENT REPORT: ABNORMAL
LABORATORY COMMENT REPORT: DETECTED

## 2025-06-18 ENCOUNTER — NURSE ONLY (OUTPATIENT)
Facility: HOSPITAL | Age: 75
End: 2025-06-18
Payer: COMMERCIAL

## 2025-06-18 ENCOUNTER — TELEPHONE (OUTPATIENT)
Facility: HOSPITAL | Age: 75
End: 2025-06-18

## 2025-06-18 ENCOUNTER — OFFICE VISIT (OUTPATIENT)
Facility: HOSPITAL | Age: 75
End: 2025-06-18
Payer: COMMERCIAL

## 2025-06-18 VITALS
DIASTOLIC BLOOD PRESSURE: 69 MMHG | BODY MASS INDEX: 21.18 KG/M2 | SYSTOLIC BLOOD PRESSURE: 137 MMHG | WEIGHT: 160.5 LBS | TEMPERATURE: 98.1 F | OXYGEN SATURATION: 98 % | HEART RATE: 81 BPM

## 2025-06-18 VITALS
WEIGHT: 160 LBS | OXYGEN SATURATION: 98 % | DIASTOLIC BLOOD PRESSURE: 69 MMHG | BODY MASS INDEX: 21.11 KG/M2 | HEART RATE: 81 BPM | TEMPERATURE: 98.1 F | SYSTOLIC BLOOD PRESSURE: 137 MMHG

## 2025-06-18 DIAGNOSIS — Z94.0 IMMUNOSUPPRESSIVE MANAGEMENT ENCOUNTER FOLLOWING KIDNEY TRANSPLANT: ICD-10-CM

## 2025-06-18 DIAGNOSIS — Z79.899 IMMUNOSUPPRESSIVE MANAGEMENT ENCOUNTER FOLLOWING KIDNEY TRANSPLANT: ICD-10-CM

## 2025-06-18 DIAGNOSIS — Z94.0 KIDNEY TRANSPLANT RECIPIENT (HHS-HCC): ICD-10-CM

## 2025-06-18 DIAGNOSIS — Z94.0 KIDNEY REPLACED BY TRANSPLANT (HHS-HCC): ICD-10-CM

## 2025-06-18 DIAGNOSIS — D72.819 LEUKOPENIA, UNSPECIFIED TYPE: ICD-10-CM

## 2025-06-18 DIAGNOSIS — Z94.0 KIDNEY REPLACED BY TRANSPLANT (HHS-HCC): Primary | ICD-10-CM

## 2025-06-18 DIAGNOSIS — Z93.1 S/P PERCUTANEOUS ENDOSCOPIC GASTROSTOMY (PEG) TUBE PLACEMENT (MULTI): Primary | ICD-10-CM

## 2025-06-18 DIAGNOSIS — D61.818 PANCYTOPENIA: ICD-10-CM

## 2025-06-18 DIAGNOSIS — C61 MALIGNANT NEOPLASM OF PROSTATE (MULTI): ICD-10-CM

## 2025-06-18 LAB
ALBUMIN SERPL BCP-MCNC: 3.8 G/DL (ref 3.4–5)
ANION GAP SERPL CALC-SCNC: 12 MMOL/L (ref 10–20)
BASOPHILS # BLD AUTO: 0.01 X10*3/UL (ref 0–0.1)
BASOPHILS NFR BLD AUTO: 0.5 %
BUN SERPL-MCNC: 20 MG/DL (ref 6–23)
CALCIUM SERPL-MCNC: 9 MG/DL (ref 8.6–10.6)
CHLORIDE SERPL-SCNC: 107 MMOL/L (ref 98–107)
CO2 SERPL-SCNC: 23 MMOL/L (ref 21–32)
CREAT SERPL-MCNC: 0.99 MG/DL (ref 0.5–1.3)
EGFRCR SERPLBLD CKD-EPI 2021: 80 ML/MIN/1.73M*2
EOSINOPHIL # BLD AUTO: 0.06 X10*3/UL (ref 0–0.4)
EOSINOPHIL NFR BLD AUTO: 2.8 %
ERYTHROCYTE [DISTWIDTH] IN BLOOD BY AUTOMATED COUNT: 13.8 % (ref 11.5–14.5)
GLUCOSE SERPL-MCNC: 121 MG/DL (ref 74–99)
HCT VFR BLD AUTO: 32.3 % (ref 41–52)
HGB BLD-MCNC: 10.3 G/DL (ref 13.5–17.5)
IMM GRANULOCYTES # BLD AUTO: 0.02 X10*3/UL (ref 0–0.5)
IMM GRANULOCYTES NFR BLD AUTO: 0.9 % (ref 0–0.9)
LYMPHOCYTES # BLD AUTO: 0.7 X10*3/UL (ref 0.8–3)
LYMPHOCYTES NFR BLD AUTO: 32.1 %
MAGNESIUM SERPL-MCNC: 1.68 MG/DL (ref 1.6–2.4)
MCH RBC QN AUTO: 29.8 PG (ref 26–34)
MCHC RBC AUTO-ENTMCNC: 31.9 G/DL (ref 32–36)
MCV RBC AUTO: 93 FL (ref 80–100)
MONOCYTES # BLD AUTO: 0.3 X10*3/UL (ref 0.05–0.8)
MONOCYTES NFR BLD AUTO: 13.8 %
NEUTROPHILS # BLD AUTO: 1.09 X10*3/UL (ref 1.6–5.5)
NEUTROPHILS NFR BLD AUTO: 49.9 %
NRBC BLD-RTO: 0 /100 WBCS (ref 0–0)
PHOSPHATE SERPL-MCNC: 3.9 MG/DL (ref 2.5–4.9)
PLATELET # BLD AUTO: 119 X10*3/UL (ref 150–450)
POTASSIUM SERPL-SCNC: 4 MMOL/L (ref 3.5–5.3)
RBC # BLD AUTO: 3.46 X10*6/UL (ref 4.5–5.9)
SODIUM SERPL-SCNC: 138 MMOL/L (ref 136–145)
TACROLIMUS BLD-MCNC: 6.4 NG/ML
WBC # BLD AUTO: 2.2 X10*3/UL (ref 4.4–11.3)

## 2025-06-18 PROCEDURE — 3078F DIAST BP <80 MM HG: CPT | Performed by: HOSPITALIST

## 2025-06-18 PROCEDURE — 3075F SYST BP GE 130 - 139MM HG: CPT | Performed by: STUDENT IN AN ORGANIZED HEALTH CARE EDUCATION/TRAINING PROGRAM

## 2025-06-18 PROCEDURE — 36415 COLL VENOUS BLD VENIPUNCTURE: CPT

## 2025-06-18 PROCEDURE — 99213 OFFICE O/P EST LOW 20 MIN: CPT | Performed by: STUDENT IN AN ORGANIZED HEALTH CARE EDUCATION/TRAINING PROGRAM

## 2025-06-18 PROCEDURE — 3061F NEG MICROALBUMINURIA REV: CPT | Performed by: STUDENT IN AN ORGANIZED HEALTH CARE EDUCATION/TRAINING PROGRAM

## 2025-06-18 PROCEDURE — 99215 OFFICE O/P EST HI 40 MIN: CPT | Performed by: HOSPITALIST

## 2025-06-18 PROCEDURE — 1159F MED LIST DOCD IN RCRD: CPT | Performed by: HOSPITALIST

## 2025-06-18 PROCEDURE — 3075F SYST BP GE 130 - 139MM HG: CPT | Performed by: HOSPITALIST

## 2025-06-18 PROCEDURE — 83735 ASSAY OF MAGNESIUM: CPT | Performed by: STUDENT IN AN ORGANIZED HEALTH CARE EDUCATION/TRAINING PROGRAM

## 2025-06-18 PROCEDURE — 3051F HG A1C>EQUAL 7.0%<8.0%: CPT | Performed by: STUDENT IN AN ORGANIZED HEALTH CARE EDUCATION/TRAINING PROGRAM

## 2025-06-18 PROCEDURE — 1126F AMNT PAIN NOTED NONE PRSNT: CPT | Performed by: STUDENT IN AN ORGANIZED HEALTH CARE EDUCATION/TRAINING PROGRAM

## 2025-06-18 PROCEDURE — 3049F LDL-C 100-129 MG/DL: CPT | Performed by: STUDENT IN AN ORGANIZED HEALTH CARE EDUCATION/TRAINING PROGRAM

## 2025-06-18 PROCEDURE — 3051F HG A1C>EQUAL 7.0%<8.0%: CPT | Performed by: HOSPITALIST

## 2025-06-18 PROCEDURE — 3078F DIAST BP <80 MM HG: CPT | Performed by: STUDENT IN AN ORGANIZED HEALTH CARE EDUCATION/TRAINING PROGRAM

## 2025-06-18 PROCEDURE — 3061F NEG MICROALBUMINURIA REV: CPT | Performed by: HOSPITALIST

## 2025-06-18 PROCEDURE — 85025 COMPLETE CBC W/AUTO DIFF WBC: CPT

## 2025-06-18 PROCEDURE — 3049F LDL-C 100-129 MG/DL: CPT | Performed by: HOSPITALIST

## 2025-06-18 PROCEDURE — 80197 ASSAY OF TACROLIMUS: CPT | Performed by: STUDENT IN AN ORGANIZED HEALTH CARE EDUCATION/TRAINING PROGRAM

## 2025-06-18 PROCEDURE — 80069 RENAL FUNCTION PANEL: CPT | Performed by: STUDENT IN AN ORGANIZED HEALTH CARE EDUCATION/TRAINING PROGRAM

## 2025-06-18 PROCEDURE — 87799 DETECT AGENT NOS DNA QUANT: CPT | Performed by: STUDENT IN AN ORGANIZED HEALTH CARE EDUCATION/TRAINING PROGRAM

## 2025-06-18 PROCEDURE — 1126F AMNT PAIN NOTED NONE PRSNT: CPT | Performed by: HOSPITALIST

## 2025-06-18 PROCEDURE — 87799 DETECT AGENT NOS DNA QUANT: CPT

## 2025-06-18 RX ORDER — CALCIUM CARBONATE 300MG(750)
400 TABLET,CHEWABLE ORAL 2 TIMES DAILY
Qty: 60 TABLET | Refills: 11 | Status: SHIPPED | OUTPATIENT
Start: 2025-06-18 | End: 2026-06-18

## 2025-06-18 ASSESSMENT — ENCOUNTER SYMPTOMS
BACK PAIN: 0
DIARRHEA: 0
COUGH: 0
PALPITATIONS: 0
HEMATURIA: 0
CHEST TIGHTNESS: 0
NAUSEA: 0
CONFUSION: 0
SHORTNESS OF BREATH: 0
NERVOUS/ANXIOUS: 0
VOMITING: 0
FREQUENCY: 0
ABDOMINAL DISTENTION: 0
HEADACHES: 0

## 2025-06-18 ASSESSMENT — PAIN SCALES - GENERAL
PAINLEVEL_OUTOF10: 0-NO PAIN
PAINLEVEL_OUTOF10: 0-NO PAIN

## 2025-06-18 NOTE — PATIENT INSTRUCTIONS
Hold Myfortic to restart in 1 week   DSA and allosure   Peg tube will be pulled by surgeon   Labs weekly  RTC 1 month    Started Mg oxide 400 twice daily

## 2025-06-18 NOTE — PROGRESS NOTES
Temo Hanson   74 y.o. male with past medical history significant for  ESRD secondary to diabetic nephropathy whom received a  donor kidney transplant on 24 by Dr. Zamora with a DBD kidney KDPI of 93% and PRA of 54%. Donor was Hepc -/-and has not met risk factors. EBV D+ /R+, CMV D-/R+. Left donor kidney transplanted to patient right pelvis. Admission weight is 72.7 kg (discharge weight is 76.4 kg ). Pt received a total of 3 mg/kg total of thymoglobulin induction therapy.  Post-op SGF/DGF and failure to thrive/malnutrition requiring PEG tube  Readmitted -25 for DKA, hypovolemia and poteus mirabilis and resistant ESBL UTI post stemt removal and norovirus associated diarrhea.     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    Interim history: Patient has been eating, nutritionist recommended  for removal of the PEG tube in the last encounter  Otherwise discussed about adjustment of his immunosuppression because of persistent BK and CMV.      Review of Systems   Respiratory:  Negative for cough, chest tightness and shortness of breath.    Cardiovascular:  Negative for chest pain, palpitations and leg swelling.   Gastrointestinal:  Negative for abdominal distention, diarrhea, nausea and vomiting.   Genitourinary:  Negative for frequency, hematuria and urgency.   Musculoskeletal:  Negative for back pain.   Neurological:  Negative for headaches.   Psychiatric/Behavioral:  Negative for confusion. The patient is not nervous/anxious.         Objective:  Visit Vitals  /69 (BP Location: Right arm, Patient Position: Sitting, BP Cuff Size: Adult)   Pulse 81   Temp 36.7 °C (98.1 °F) (Temporal)   Wt 72.8 kg (160 lb 8 oz)   SpO2 98%   BMI 21.18 kg/m²   Smoking Status Never   BSA 1.94 m²      Physical Exam  HENT:      Head: Normocephalic and atraumatic.      Nose: Nose normal.       Mouth/Throat:      Mouth: Mucous membranes are moist.   Eyes:      Extraocular Movements: Extraocular movements intact.      Pupils: Pupils are equal, round, and reactive to light.   Cardiovascular:      Rate and Rhythm: Normal rate.      Pulses: Normal pulses.      Heart sounds: Normal heart sounds.   Pulmonary:      Effort: Pulmonary effort is normal.   Abdominal:      Palpations: Abdomen is soft.      Tenderness: There is no abdominal tenderness. There is no guarding.   Musculoskeletal:         General: Normal range of motion.      Cervical back: Normal range of motion.   Skin:     General: Skin is warm.   Neurological:      General: No focal deficit present.      Mental Status: Mental status is at baseline.   Psychiatric:         Mood and Affect: Mood normal.          Current Medications[1]     [unfilled]     No images are attached to the encounter.     Assessment and Plan :Temo Hanson 74 y.o. male with past medical history significant for  ESRD secondary to diabetic nephropathy whom received a  donor kidney transplant on 24 by Dr. Zamora with a DBD kidney KDPI of 93% and PRA of 54%. Donor was Hepc -/-and has not met risk factors. EBV D+ /R+, CMV D-/R+. Left donor kidney transplanted to patient right pelvis. Admission weight is 72.7 kg (discharge weight is 76.4 kg ). Pt received a total of 3 mg/kg total of thymoglobulin induction therapy.  Post-op SGF/DGF and failure to thrive/malnutrition requiring PEG tube  Readmitted -25 for DKA, hypovolemia and poteus mirabilis and resistant ESBL UTI post stemt removal and norovirus associated diarrhea.     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    Interim history: Patient has been eating, nutritionist recommended  for removal of the PEG tube in the last encounter  Otherwise discussed about adjustment of his  "immunosuppression because of persistent BK and CMV.    Allograft function: Stable creatinine in the range of 0.9-1.1, stable electrolytes.  Last UPC is 0.18, AlloSure is stable at 0.18 as of 5/17/2025.  - Last BK 7650 from 11,800 awaiting for IV immunoglobulin.  - CMV less than 35 detected as of 6 /12/2025.  - EBV negative as of 5/8/2025    Immunosuppression: Continue holding Myfortic for this week and restart back next week to 180 twice daily, prednisone 5 mg continue with the current dose of tacrolimus aim levels are 5-8    Hemodynamics: Blood pressures are optimally controlled continue with amlodipine 10 mg daily, carvedilol 12.5 twice daily.    Anemia/leukopenia: ANC is around 1.09 monitor CBC weekly once stop Bactrim.  Myfortic already on hold continue to monitor for now    Bone mineral disease: Phos levels are mildly on the higher side, magnesium levels are running low increase magnesium supplementation to 400 twice daily and recent vitamin D levels are 51 continue with the current management.    General health care: Recommended age-appropriate screening, COVID shots annual dermatology visits,DEXA scan 2-3 yrs while on steroids .    Labs weekly once follow-up in clinic in 3 weeks    Chris Moreno MD    Notes created by Kathi -Please excuse the Typos .         [1]   Current Outpatient Medications:     amLODIPine (Norvasc) 10 mg tablet, Take 1 tablet (10 mg) by mouth once daily., Disp: 30 tablet, Rfl: 11    BD Ultra-Fine Joan Pen Needle 32 gauge x 5/32\" needle, , Disp: , Rfl:     blood sugar diagnostic (Blood Glucose Test) strip, Check blood glucose 3 time per day, Disp: 100 each, Rfl: 0    blood-glucose meter misc, Use to check blood glucose, Disp: 1 each, Rfl: 0    carboxymethylcellulose (Refresh Plus) 0.5 % ophthalmic solution, Instill 1 drop into both eyes 2-4x/day as needed for dryness., Disp: , Rfl:     carvedilol (Coreg) 12.5 mg tablet, Take 1 tablet (12.5 mg) by mouth 2 times a day. Hold for SBP " "<110 or HR <55, Disp: 60 tablet, Rfl: 11    cholecalciferol (Vitamin D-3) 50 mcg (2,000 units) tablet, Take 1 tablet (2,000 Units) by mouth once daily., Disp: 30 tablet, Rfl: 11    FreeStyle Mick 3 Sensor device, Inject 1 each under the skin every 14 (fourteen) days., Disp: 6 each, Rfl: 3    gabapentin (Neurontin) 100 mg capsule, Take 2 capsules (200 mg) by mouth once daily., Disp: 60 capsule, Rfl: 1    insulin aspart, with niacinamide, (Fiasp FlexTouch U-100 Insulin) 100 unit/mL (3 mL) pen, Inject 0-10 Units under the skin 3 times a day before meals. 5 unit(s) before meals 3 times daily and increase as per sliding scale: 2 unit(s) if Blood glucose is between 151-200, 4 unit(s) if Blood glucose is between 201-250, 6 unit(s) if Blood glucose is between 251-300, 8 unit(s) if Blood glucose is between 301-350, 10 unit(s) if Blood glucose is between 351-400, If blood glucose is greater than 400 mg/dL, give max insulin per sliding scale AND then contact provider. Expect up to 50 units per day, Disp: 15 mL, Rfl: 2    insulin glargine (Basaglar KwikPen U-100 Insulin) 100 unit/mL (3 mL) pen, Inject 18 Units under the skin once daily in the morning. Take at the same time as prednisone., Disp: , Rfl:     lancets 30 gauge misc, 1 each 4 times a day. Use to check glucose 4 times daily, before meals and at bedtime, Disp: 400 each, Rfl: 0    magnesium chloride 64 mg magnesium tablet, Take 64 mg by mouth 2 times a day., Disp: 60 tablet, Rfl: 11    metoclopramide (Reglan) 5 mg tablet, Take 1 tablet (5 mg) by mouth 2 times daily (morning and late afternoon)., Disp: , Rfl:     mycophenolate (Myfortic) 180 mg EC tablet, Take 1 tablet (180 mg) by mouth 2 times a day., Disp: 60 tablet, Rfl: 11    pantoprazole (ProtoNix) 40 mg EC tablet, Take 1 tablet (40 mg) by mouth 2 times a day before meals. Do not crush, chew, or split., Disp: 180 tablet, Rfl: 3    pen needle, diabetic 32 gauge x 5/32\" needle, Use 1 needle once daily in the evening. "  Take before meals., Disp: 100 each, Rfl: 0    predniSONE (Deltasone) 5 mg tablet, Take 1 tablet (5 mg) by mouth once daily., Disp: 90 tablet, Rfl: 3    rosuvastatin (Crestor) 10 mg tablet, Take 1 tablet (10 mg) by mouth once daily at bedtime., Disp: 90 tablet, Rfl: 3    sulfamethoxazole-trimethoprim (Bactrim) 400-80 mg tablet, Take 1 tablet by mouth once daily., Disp: 30 tablet, Rfl: 2    tacrolimus (Prograf) 0.5 mg capsule, Take 1 capsule (0.5 mg) by mouth 2 times a day., Disp: 60 capsule, Rfl: 11    tacrolimus (Prograf) 1 mg capsule, Take 3 capsules (3 mg) by mouth 2 times a day., Disp: 180 capsule, Rfl: 11    calcium polycarbophiL (Fibercon) 625 mg tablet, Take 1 tablet (625 mg) by mouth 2 times a day. (Patient not taking: Reported on 6/18/2025), Disp: 60 tablet, Rfl: 2    nut.tx.impaired dige fxn-fiber (Vital Peptide 1.5 Chelita) 0.07 gram- 1.5 kcal/mL liquid, Take 240 mL by mouth 3 times a day before meals. Administer 240 mL with breakfast, lunch, and dinner (Patient not taking: Reported on 5/21/2025), Disp: , Rfl:

## 2025-06-18 NOTE — PATIENT INSTRUCTIONS
Your PEG tube was removed today. Ok to eat as usual.    Expect drainage from the site for the next few days. If it doesn't slow and stop by Monday, please let us know.    Cover the site with gauze and replace whenever it gets soaked through.

## 2025-06-18 NOTE — TELEPHONE ENCOUNTER
Call placed to [facility], nurse states therapy plan for IVIG and will call patient to schedule.

## 2025-06-19 ENCOUNTER — APPOINTMENT (OUTPATIENT)
Facility: HOSPITAL | Age: 75
End: 2025-06-19
Payer: COMMERCIAL

## 2025-06-19 LAB
BKV DNA SERPL NAA+PROBE-ACNC: 6380 IU/ML (ref ?–22)
BKV DNA SERPL NAA+PROBE-LOG#: 3.8 LOG IU/ML
CMV DNA SERPL NAA+PROBE-LOG IU: ABNORMAL {LOG_IU}/ML
EBV DNA SPEC NAA+PROBE-LOG#: NORMAL {LOG_COPIES}/ML
LABORATORY COMMENT REPORT: ABNORMAL
LABORATORY COMMENT REPORT: DETECTED
LABORATORY COMMENT REPORT: NOT DETECTED

## 2025-06-19 NOTE — PROGRESS NOTES
"      TRANSPLANT SURGERY FOLLOW UP    Reason for visit:    Temo Hanson underwent transplant surgery,  12/21/2024 (Kidney), and returns to clinic for follow up evaluation focusing on their current immunosuppression. Specifically, Temo Hanson's regiment was evaluated to ensure adequate levels to prevent life threatening or organ function impairing rejection while not increasing risk of adverse effects including malignancy, infection, bone health, or accelerated cardiovascular disease.  I reviewed common side effects including tremor, hair loss, GI toxicity, and agitation.  The patient reported that they were experiencing: none.    The long-term management of maintenance immunosuppression in organ transplant recipients remains complex given differences in clinical characteristics, comorbid conditions, and prior rejection episodes.  These factors were evaluated at this visit and used in formulating this plan of care. Immunosuppression following the transplant is medically necessary to closely monitor and to reduce the risk of post-operative complications distinct from the post operative management of the transplant surgical procedure.     Interval history  Doing well  Eating well, not using PEG tube     Problem List[1]    Medications:    Current Outpatient Medications   Medication Instructions    amLODIPine (NORVASC) 10 mg, oral, Daily    Basaglar KwikPen U-100 Insulin 18 Units, subcutaneous, Every morning, Take at the same time as prednisone.    BD Ultra-Fine Joan Pen Needle 32 gauge x 5/32\" needle     blood sugar diagnostic (Blood Glucose Test) strip Check blood glucose 3 time per day    blood-glucose meter misc Use to check blood glucose    calcium polycarbophiL (FIBERCON) 625 mg, oral, 2 times daily    carboxymethylcellulose (Refresh Plus) 0.5 % ophthalmic solution Instill 1 drop into both eyes 2-4x/day as needed for dryness.    carvedilol (COREG) 12.5 mg, oral, 2 times daily, Hold for SBP <110 or HR <55    " "cholecalciferol (VITAMIN D-3) 2,000 Units, oral, Daily    FreeStyle Mick 3 Sensor device 1 each, subcutaneous, Every 14 days    gabapentin (NEURONTIN) 200 mg, oral, Daily    insulin aspart, with niacinamide, (Fiasp FlexTouch U-100 Insulin) 100 unit/mL (3 mL) pen 0-10 Units, subcutaneous, 3 times daily before meals, 5 unit(s) before meals 3 times daily and increase as per sliding scale: 2 unit(s) if Blood glucose is between 151-200, 4 unit(s) if Blood glucose is between 201-250, 6 unit(s) if Blood glucose is between 251-300, 8 unit(s) if Blood glucose is between 301-350, 10 unit(s) if Blood glucose is between 351-400, If blood glucose is greater than 400 mg/dL, give max insulin per sliding scale AND then contact provider. Expect up to 50 units per day    lancets 30 gauge misc 1 each, miscellaneous, 4 times daily, Use to check glucose 4 times daily, before meals and at bedtime    magnesium oxide (MAG-OX) 400 mg, oral, 2 times daily    metoclopramide (REGLAN) 5 mg, oral, 2 times daily (morning and late afternoon)    mycophenolate (MYFORTIC) 180 mg, oral, 2 times daily    nut.tx.impaired dige fxn-fiber (Vital Peptide 1.5 Chelita) 0.07 gram- 1.5 kcal/mL liquid 240 mL, oral, 3 times daily before meals, Administer 240 mL with breakfast, lunch, and dinner    pantoprazole (PROTONIX) 40 mg, oral, 2 times daily before meals, Do not crush, chew, or split.    pen needle, diabetic 32 gauge x 5/32\" needle Use 1 needle once daily in the evening.  Take before meals.    predniSONE (DELTASONE) 5 mg, oral, Daily    rosuvastatin (CRESTOR) 10 mg, oral, Nightly    tacrolimus (PROGRAF) 3 mg, oral, 2 times daily    tacrolimus (PROGRAF) 0.5 mg, oral, 2 times daily       Physical Exam  Vitals:    06/18/25 0953   BP: 137/69   Pulse: 81   Temp: 36.7 °C (98.1 °F)   SpO2: 98%      General: Alert and oriented to time, place and person. Not in distress  ENT: unremarkable  Cardiovascular: Regular rate, normotensive  Respiratory: no signs of labored " breathing on room air  Abdomen/ GI: Kidney transplant incision well healed, PEG tube in place  Extremity: BLE trace edema -  Skin: no rash      ALLERGY:  Allergies[2]    LABS:  Results for orders placed or performed in visit on 06/18/25 (from the past 24 hours)   CBC and Auto Differential   Result Value Ref Range    WBC 2.2 (L) 4.4 - 11.3 x10*3/uL    nRBC 0.0 0.0 - 0.0 /100 WBCs    RBC 3.46 (L) 4.50 - 5.90 x10*6/uL    Hemoglobin 10.3 (L) 13.5 - 17.5 g/dL    Hematocrit 32.3 (L) 41.0 - 52.0 %    MCV 93 80 - 100 fL    MCH 29.8 26.0 - 34.0 pg    MCHC 31.9 (L) 32.0 - 36.0 g/dL    RDW 13.8 11.5 - 14.5 %    Platelets 119 (L) 150 - 450 x10*3/uL    Neutrophils % 49.9 40.0 - 80.0 %    Immature Granulocytes %, Automated 0.9 0.0 - 0.9 %    Lymphocytes % 32.1 13.0 - 44.0 %    Monocytes % 13.8 2.0 - 10.0 %    Eosinophils % 2.8 0.0 - 6.0 %    Basophils % 0.5 0.0 - 2.0 %    Neutrophils Absolute 1.09 (L) 1.60 - 5.50 x10*3/uL    Immature Granulocytes Absolute, Automated 0.02 0.00 - 0.50 x10*3/uL    Lymphocytes Absolute 0.70 (L) 0.80 - 3.00 x10*3/uL    Monocytes Absolute 0.30 0.05 - 0.80 x10*3/uL    Eosinophils Absolute 0.06 0.00 - 0.40 x10*3/uL    Basophils Absolute 0.01 0.00 - 0.10 x10*3/uL     *Note: Due to a large number of results and/or encounters for the requested time period, some results have not been displayed. A complete set of results can be found in Results Review.      Lab Results   Component Value Date    ALT 18 12/20/2024    AST 21 12/20/2024    ALKPHOS 189 (H) 12/20/2024    BILITOT 0.5 12/20/2024         ASSESSMENT AND PLAN:    Mr. Hanson is a 74 y.o. male who underwent  transplant surgery on 12/21/2024 (Kidney).    1. Seen for PEG tube removal in clinic - has been eating well PO, not using PEG tube anymore  Informed consent obtained for removal  Bolster PEG tube removal performed in clinic without immediate complications  PEG site care and post-removal care discussed    2. Immunosuppression reviewed and adjusted per  transplant nephrology        3.  Follow up: per Nephrology schedule         I spent a total of 25 min providing care for this patient including record review, examination, face to face counseling, and documentation.     Dora Rodriguez MD       [1]   Patient Active Problem List  Diagnosis    S/P laparoscopic cholecystectomy    Abdominal pain    Achalasia    Acute lower UTI    Microcytic anemia    Complete heart block    Callus of foot    Chronic periodontitis, unspecified    Stage 5 chronic kidney disease (Multi)    Closed fracture of metatarsal bone    Crushing injury of foot    Diabetes mellitus (Multi)    Diarrhea    Dysphagia    ED (erectile dysfunction)    Essential hypertension    Foot pain, right    GIB (gastrointestinal bleeding)    Hepatitis B core antibody positive    Hyperkalemia    Ingrowing nail    Iron deficiency anemia secondary to inadequate dietary iron intake    Long toenail    Malignant neoplasm of prostate (Multi)    Onychomycosis    Pheochromocytoma of right adrenal gland    Pneumonia of right lower lobe due to infectious organism    Productive cough    Pure hypercholesterolemia    Stricture esophagus    SVT (supraventricular tachycardia)    Type 2 diabetes mellitus with diabetic neuropathy (Multi)    Preop cardiovascular exam    Third degree heart block    Pericardial effusion ([facility]-HCC)    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 (Multi)    Uremia    Cardiac tamponade    Cardiac pacemaker in situ    ESRD (end stage renal disease) (Multi)    Type 2 diabetes mellitus, with long-term current use of insulin    Dehydration    Alactasia syndrome    Type 2 diabetes mellitus with hyperglycemia, with long-term current use of insulin    On tube feeding diet    Kidney transplant recipient ([facility]HS-HCC)    DKA, type 2, not at goal    BK viremia   [2]   Allergies  Allergen Reactions    Terazosin Unknown     Did not feel well on this medication    Codeine Itching     itching    Tramadol Itching

## 2025-06-20 DIAGNOSIS — Z94.0 KIDNEY REPLACED BY TRANSPLANT (HHS-HCC): ICD-10-CM

## 2025-06-20 LAB
ALLOSURE SCORE - KIDNEY: 0.26 %
CAREDX_ORDER_ID: NORMAL
CENTER_ORDER_ID: NORMAL
CLIENT SPECIMEN ID - ALLOSURE: NORMAL
DONOR RELATION - ALLOSURE: NORMAL
NOTES - ALLOSURE: NORMAL
RELATIVE CHANGE VALUE - KIDNEY: NORMAL
TEST COMMENTS - ALLOSURE: NORMAL
TIME POST TX - ALLOSURE: NORMAL
TRANSPLANTED ORGAN - ALLOSURE: NORMAL
TX DATE - ALLOSURE/ALLOMAP: NORMAL
WP_ORDER_ID: NORMAL

## 2025-06-23 PROBLEM — D61.818 PANCYTOPENIA: Status: ACTIVE | Noted: 2025-06-23

## 2025-06-26 ENCOUNTER — TELEPHONE (OUTPATIENT)
Facility: HOSPITAL | Age: 75
End: 2025-06-26

## 2025-06-26 ENCOUNTER — NURSE ONLY (OUTPATIENT)
Facility: HOSPITAL | Age: 75
End: 2025-06-26
Payer: COMMERCIAL

## 2025-06-26 LAB
ALBUMIN SERPL BCP-MCNC: 3.9 G/DL (ref 3.4–5)
ANION GAP SERPL CALC-SCNC: 12 MMOL/L (ref 10–20)
BUN SERPL-MCNC: 35 MG/DL (ref 6–23)
CALCIUM SERPL-MCNC: 9.5 MG/DL (ref 8.6–10.6)
CHLORIDE SERPL-SCNC: 104 MMOL/L (ref 98–107)
CO2 SERPL-SCNC: 27 MMOL/L (ref 21–32)
CREAT SERPL-MCNC: 1.12 MG/DL (ref 0.5–1.3)
EGFRCR SERPLBLD CKD-EPI 2021: 69 ML/MIN/1.73M*2
ERYTHROCYTE [DISTWIDTH] IN BLOOD BY AUTOMATED COUNT: 13.9 % (ref 11.5–14.5)
GLUCOSE SERPL-MCNC: 169 MG/DL (ref 74–99)
HCT VFR BLD AUTO: 33.9 % (ref 41–52)
HGB BLD-MCNC: 10.5 G/DL (ref 13.5–17.5)
MAGNESIUM SERPL-MCNC: 1.79 MG/DL (ref 1.6–2.4)
MCH RBC QN AUTO: 30 PG (ref 26–34)
MCHC RBC AUTO-ENTMCNC: 31 G/DL (ref 32–36)
MCV RBC AUTO: 97 FL (ref 80–100)
NRBC BLD-RTO: 0 /100 WBCS (ref 0–0)
PHOSPHATE SERPL-MCNC: 3.9 MG/DL (ref 2.5–4.9)
PLATELET # BLD AUTO: 123 X10*3/UL (ref 150–450)
POTASSIUM SERPL-SCNC: 4.6 MMOL/L (ref 3.5–5.3)
RBC # BLD AUTO: 3.5 X10*6/UL (ref 4.5–5.9)
SODIUM SERPL-SCNC: 138 MMOL/L (ref 136–145)
TACROLIMUS BLD-MCNC: 4.6 NG/ML
WBC # BLD AUTO: 2.8 X10*3/UL (ref 4.4–11.3)

## 2025-06-26 PROCEDURE — 36415 COLL VENOUS BLD VENIPUNCTURE: CPT | Performed by: HOSPITALIST

## 2025-06-26 PROCEDURE — 87799 DETECT AGENT NOS DNA QUANT: CPT | Performed by: STUDENT IN AN ORGANIZED HEALTH CARE EDUCATION/TRAINING PROGRAM

## 2025-06-26 PROCEDURE — 86832 HLA CLASS I HIGH DEFIN QUAL: CPT | Performed by: HOSPITALIST

## 2025-06-26 PROCEDURE — 82784 ASSAY IGA/IGD/IGG/IGM EACH: CPT | Performed by: STUDENT IN AN ORGANIZED HEALTH CARE EDUCATION/TRAINING PROGRAM

## 2025-06-26 PROCEDURE — 85027 COMPLETE CBC AUTOMATED: CPT | Performed by: STUDENT IN AN ORGANIZED HEALTH CARE EDUCATION/TRAINING PROGRAM

## 2025-06-26 PROCEDURE — 83735 ASSAY OF MAGNESIUM: CPT | Performed by: STUDENT IN AN ORGANIZED HEALTH CARE EDUCATION/TRAINING PROGRAM

## 2025-06-26 PROCEDURE — 80069 RENAL FUNCTION PANEL: CPT | Performed by: STUDENT IN AN ORGANIZED HEALTH CARE EDUCATION/TRAINING PROGRAM

## 2025-06-26 PROCEDURE — 80197 ASSAY OF TACROLIMUS: CPT | Performed by: STUDENT IN AN ORGANIZED HEALTH CARE EDUCATION/TRAINING PROGRAM

## 2025-06-27 ENCOUNTER — DOCUMENTATION (OUTPATIENT)
Facility: HOSPITAL | Age: 75
End: 2025-06-27
Payer: COMMERCIAL

## 2025-06-27 DIAGNOSIS — Z94.0 KIDNEY REPLACED BY TRANSPLANT (HHS-HCC): ICD-10-CM

## 2025-06-27 LAB
BKV DNA SERPL NAA+PROBE-ACNC: 1720 IU/ML (ref ?–22)
BKV DNA SERPL NAA+PROBE-LOG#: 3.24 LOG IU/ML
CMV DNA SERPL NAA+PROBE-LOG IU: NORMAL {LOG_IU}/ML
LABORATORY COMMENT REPORT: DETECTED
LABORATORY COMMENT REPORT: NOT DETECTED

## 2025-06-27 NOTE — PROGRESS NOTES
Reviewed labs with Dr. Nichols  Tac 4.6 from 6.4, 6.5   Cr 1.12 Stable   has BK   off MMF   Tac 3.5 mg BID   PLAN:   no changes

## 2025-06-30 DIAGNOSIS — Z94.0 KIDNEY REPLACED BY TRANSPLANT (HHS-HCC): ICD-10-CM

## 2025-06-30 LAB
HLA RESULTS: NORMAL
HLA-A+B+C AB NFR SER: NORMAL %
HLA-DP+DQ+DR AB NFR SER: NORMAL %
IGG SERPL-MCNC: 1610 MG/DL (ref 700–1600)

## 2025-07-01 ENCOUNTER — DOCUMENTATION (OUTPATIENT)
Facility: HOSPITAL | Age: 75
End: 2025-07-01
Payer: COMMERCIAL

## 2025-07-01 ENCOUNTER — DOCUMENTATION (OUTPATIENT)
Dept: TRANSPLANT | Facility: HOSPITAL | Age: 75
End: 2025-07-01
Payer: COMMERCIAL

## 2025-07-03 ENCOUNTER — APPOINTMENT (OUTPATIENT)
Facility: HOSPITAL | Age: 75
DRG: 699 | End: 2025-07-03
Payer: COMMERCIAL

## 2025-07-03 LAB
ALBUMIN SERPL BCP-MCNC: 3.9 G/DL (ref 3.4–5)
ANION GAP SERPL CALC-SCNC: 12 MMOL/L (ref 10–20)
BUN SERPL-MCNC: 26 MG/DL (ref 6–23)
CALCIUM SERPL-MCNC: 9.3 MG/DL (ref 8.6–10.6)
CHLORIDE SERPL-SCNC: 108 MMOL/L (ref 98–107)
CO2 SERPL-SCNC: 22 MMOL/L (ref 21–32)
CREAT SERPL-MCNC: 1.08 MG/DL (ref 0.5–1.3)
EGFRCR SERPLBLD CKD-EPI 2021: 72 ML/MIN/1.73M*2
ERYTHROCYTE [DISTWIDTH] IN BLOOD BY AUTOMATED COUNT: 14.3 % (ref 11.5–14.5)
GLUCOSE SERPL-MCNC: 123 MG/DL (ref 74–99)
HCT VFR BLD AUTO: 31.5 % (ref 41–52)
HGB BLD-MCNC: 10 G/DL (ref 13.5–17.5)
MAGNESIUM SERPL-MCNC: 1.54 MG/DL (ref 1.6–2.4)
MCH RBC QN AUTO: 30.5 PG (ref 26–34)
MCHC RBC AUTO-ENTMCNC: 31.7 G/DL (ref 32–36)
MCV RBC AUTO: 96 FL (ref 80–100)
NRBC BLD-RTO: 0 /100 WBCS (ref 0–0)
PHOSPHATE SERPL-MCNC: 4.4 MG/DL (ref 2.5–4.9)
PLATELET # BLD AUTO: 118 X10*3/UL (ref 150–450)
POTASSIUM SERPL-SCNC: 4.1 MMOL/L (ref 3.5–5.3)
RBC # BLD AUTO: 3.28 X10*6/UL (ref 4.5–5.9)
SODIUM SERPL-SCNC: 138 MMOL/L (ref 136–145)
TACROLIMUS BLD-MCNC: 6.6 NG/ML
WBC # BLD AUTO: 3.2 X10*3/UL (ref 4.4–11.3)

## 2025-07-03 PROCEDURE — 85027 COMPLETE CBC AUTOMATED: CPT | Performed by: STUDENT IN AN ORGANIZED HEALTH CARE EDUCATION/TRAINING PROGRAM

## 2025-07-03 PROCEDURE — 80069 RENAL FUNCTION PANEL: CPT | Performed by: STUDENT IN AN ORGANIZED HEALTH CARE EDUCATION/TRAINING PROGRAM

## 2025-07-03 PROCEDURE — 83735 ASSAY OF MAGNESIUM: CPT | Performed by: STUDENT IN AN ORGANIZED HEALTH CARE EDUCATION/TRAINING PROGRAM

## 2025-07-03 PROCEDURE — 87799 DETECT AGENT NOS DNA QUANT: CPT | Performed by: STUDENT IN AN ORGANIZED HEALTH CARE EDUCATION/TRAINING PROGRAM

## 2025-07-03 PROCEDURE — 36415 COLL VENOUS BLD VENIPUNCTURE: CPT

## 2025-07-03 PROCEDURE — 80197 ASSAY OF TACROLIMUS: CPT | Performed by: STUDENT IN AN ORGANIZED HEALTH CARE EDUCATION/TRAINING PROGRAM

## 2025-07-04 DIAGNOSIS — Z94.0 KIDNEY REPLACED BY TRANSPLANT (HHS-HCC): ICD-10-CM

## 2025-07-04 LAB
BKV DNA SERPL NAA+PROBE-ACNC: 5840 IU/ML (ref ?–22)
BKV DNA SERPL NAA+PROBE-LOG#: 3.77 LOG IU/ML
CMV DNA SERPL NAA+PROBE-LOG IU: NORMAL {LOG_IU}/ML
LABORATORY COMMENT REPORT: DETECTED
LABORATORY COMMENT REPORT: NOT DETECTED

## 2025-07-05 ENCOUNTER — HOSPITAL ENCOUNTER (EMERGENCY)
Facility: HOSPITAL | Age: 75
Discharge: ANOTHER HOSPITAL | End: 2025-07-06
Attending: STUDENT IN AN ORGANIZED HEALTH CARE EDUCATION/TRAINING PROGRAM
Payer: COMMERCIAL

## 2025-07-05 ENCOUNTER — APPOINTMENT (OUTPATIENT)
Dept: RADIOLOGY | Facility: HOSPITAL | Age: 75
End: 2025-07-05
Payer: COMMERCIAL

## 2025-07-05 DIAGNOSIS — N10 ACUTE PYELONEPHRITIS: Primary | ICD-10-CM

## 2025-07-05 DIAGNOSIS — N17.9 AKI (ACUTE KIDNEY INJURY): ICD-10-CM

## 2025-07-05 DIAGNOSIS — Z94.0 RENAL TRANSPLANT RECIPIENT (HHS-HCC): ICD-10-CM

## 2025-07-05 LAB
ALBUMIN SERPL BCP-MCNC: 3.7 G/DL (ref 3.4–5)
ALP SERPL-CCNC: 98 U/L (ref 33–136)
ALT SERPL W P-5'-P-CCNC: 18 U/L (ref 10–52)
ANION GAP SERPL CALC-SCNC: 13 MMOL/L (ref 10–20)
APPEARANCE UR: ABNORMAL
AST SERPL W P-5'-P-CCNC: 19 U/L (ref 9–39)
BACTERIA #/AREA URNS AUTO: ABNORMAL /HPF
BASOPHILS # BLD AUTO: 0 X10*3/UL (ref 0–0.1)
BASOPHILS NFR BLD AUTO: 0 %
BILIRUB SERPL-MCNC: 0.6 MG/DL (ref 0–1.2)
BILIRUB UR STRIP.AUTO-MCNC: NEGATIVE MG/DL
BUN SERPL-MCNC: 25 MG/DL (ref 6–23)
CALCIUM SERPL-MCNC: 8.6 MG/DL (ref 8.6–10.3)
CHLORIDE SERPL-SCNC: 104 MMOL/L (ref 98–107)
CO2 SERPL-SCNC: 25 MMOL/L (ref 21–32)
COLOR UR: YELLOW
CREAT SERPL-MCNC: 1.36 MG/DL (ref 0.5–1.3)
EGFRCR SERPLBLD CKD-EPI 2021: 55 ML/MIN/1.73M*2
EOSINOPHIL # BLD AUTO: 0.02 X10*3/UL (ref 0–0.4)
EOSINOPHIL NFR BLD AUTO: 0.3 %
ERYTHROCYTE [DISTWIDTH] IN BLOOD BY AUTOMATED COUNT: 14.4 % (ref 11.5–14.5)
GLUCOSE SERPL-MCNC: 60 MG/DL (ref 74–99)
GLUCOSE UR STRIP.AUTO-MCNC: NORMAL MG/DL
HCT VFR BLD AUTO: 33.4 % (ref 41–52)
HGB BLD-MCNC: 10.3 G/DL (ref 13.5–17.5)
IMM GRANULOCYTES # BLD AUTO: 0.05 X10*3/UL (ref 0–0.5)
IMM GRANULOCYTES NFR BLD AUTO: 0.7 % (ref 0–0.9)
KETONES UR STRIP.AUTO-MCNC: NEGATIVE MG/DL
LEUKOCYTE ESTERASE UR QL STRIP.AUTO: ABNORMAL
LYMPHOCYTES # BLD AUTO: 0.35 X10*3/UL (ref 0.8–3)
LYMPHOCYTES NFR BLD AUTO: 5.1 %
MAGNESIUM SERPL-MCNC: 1.35 MG/DL (ref 1.6–2.4)
MCH RBC QN AUTO: 30.1 PG (ref 26–34)
MCHC RBC AUTO-ENTMCNC: 30.8 G/DL (ref 32–36)
MCV RBC AUTO: 98 FL (ref 80–100)
MONOCYTES # BLD AUTO: 0.54 X10*3/UL (ref 0.05–0.8)
MONOCYTES NFR BLD AUTO: 7.8 %
NEUTROPHILS # BLD AUTO: 5.92 X10*3/UL (ref 1.6–5.5)
NEUTROPHILS NFR BLD AUTO: 86.1 %
NITRITE UR QL STRIP.AUTO: ABNORMAL
NRBC BLD-RTO: 0 /100 WBCS (ref 0–0)
PH UR STRIP.AUTO: 5.5 [PH]
PLATELET # BLD AUTO: 109 X10*3/UL (ref 150–450)
POTASSIUM SERPL-SCNC: 4.4 MMOL/L (ref 3.5–5.3)
PROT SERPL-MCNC: 7 G/DL (ref 6.4–8.2)
PROT UR STRIP.AUTO-MCNC: ABNORMAL MG/DL
RBC # BLD AUTO: 3.42 X10*6/UL (ref 4.5–5.9)
RBC # UR STRIP.AUTO: ABNORMAL MG/DL
RBC #/AREA URNS AUTO: ABNORMAL /HPF
SODIUM SERPL-SCNC: 138 MMOL/L (ref 136–145)
SP GR UR STRIP.AUTO: 1.01
UROBILINOGEN UR STRIP.AUTO-MCNC: NORMAL MG/DL
WBC # BLD AUTO: 6.9 X10*3/UL (ref 4.4–11.3)
WBC #/AREA URNS AUTO: >50 /HPF
WBC CLUMPS #/AREA URNS AUTO: ABNORMAL /HPF

## 2025-07-05 PROCEDURE — 81001 URINALYSIS AUTO W/SCOPE: CPT

## 2025-07-05 PROCEDURE — 87077 CULTURE AEROBIC IDENTIFY: CPT | Mod: PARLAB

## 2025-07-05 PROCEDURE — 51798 US URINE CAPACITY MEASURE: CPT

## 2025-07-05 PROCEDURE — 74176 CT ABD & PELVIS W/O CONTRAST: CPT

## 2025-07-05 PROCEDURE — 83735 ASSAY OF MAGNESIUM: CPT

## 2025-07-05 PROCEDURE — 2500000004 HC RX 250 GENERAL PHARMACY W/ HCPCS (ALT 636 FOR OP/ED): Performed by: STUDENT IN AN ORGANIZED HEALTH CARE EDUCATION/TRAINING PROGRAM

## 2025-07-05 PROCEDURE — 80053 COMPREHEN METABOLIC PANEL: CPT

## 2025-07-05 PROCEDURE — 99285 EMERGENCY DEPT VISIT HI MDM: CPT | Mod: 25 | Performed by: STUDENT IN AN ORGANIZED HEALTH CARE EDUCATION/TRAINING PROGRAM

## 2025-07-05 PROCEDURE — 96366 THER/PROPH/DIAG IV INF ADDON: CPT

## 2025-07-05 PROCEDURE — 96365 THER/PROPH/DIAG IV INF INIT: CPT

## 2025-07-05 PROCEDURE — 74176 CT ABD & PELVIS W/O CONTRAST: CPT | Performed by: RADIOLOGY

## 2025-07-05 PROCEDURE — 85025 COMPLETE CBC W/AUTO DIFF WBC: CPT

## 2025-07-05 PROCEDURE — 96367 TX/PROPH/DG ADDL SEQ IV INF: CPT

## 2025-07-05 PROCEDURE — 96361 HYDRATE IV INFUSION ADD-ON: CPT

## 2025-07-05 RX ORDER — DEXTROSE 50 % IN WATER (D50W) INTRAVENOUS SYRINGE
12.5
Status: DISCONTINUED | OUTPATIENT
Start: 2025-07-05 | End: 2025-07-06 | Stop reason: HOSPADM

## 2025-07-05 RX ORDER — DEXTROSE 50 % IN WATER (D50W) INTRAVENOUS SYRINGE
25
Status: DISCONTINUED | OUTPATIENT
Start: 2025-07-05 | End: 2025-07-06 | Stop reason: HOSPADM

## 2025-07-05 RX ORDER — MAGNESIUM SULFATE HEPTAHYDRATE 40 MG/ML
2 INJECTION, SOLUTION INTRAVENOUS ONCE
Status: COMPLETED | OUTPATIENT
Start: 2025-07-05 | End: 2025-07-05

## 2025-07-05 RX ORDER — CEFTRIAXONE 1 G/50ML
1 INJECTION, SOLUTION INTRAVENOUS ONCE
Status: COMPLETED | OUTPATIENT
Start: 2025-07-05 | End: 2025-07-06

## 2025-07-05 RX ADMIN — MAGNESIUM SULFATE HEPTAHYDRATE 2 G: 40 INJECTION, SOLUTION INTRAVENOUS at 19:39

## 2025-07-05 RX ADMIN — SODIUM CHLORIDE, SODIUM LACTATE, POTASSIUM CHLORIDE, AND CALCIUM CHLORIDE 1000 ML: .6; .31; .03; .02 INJECTION, SOLUTION INTRAVENOUS at 19:39

## 2025-07-05 RX ADMIN — CEFTRIAXONE 1 G: 1 INJECTION, SOLUTION INTRAVENOUS at 23:40

## 2025-07-05 NOTE — ED PROVIDER NOTES
HPI   Chief Complaint   Patient presents with    Difficulty Urinating       74-year-old male presenting to the emergency department by EMS from home for evaluation of urinary symptoms.  Patient reports earlier today he started experiencing dysuria, urinary urgency, and frequency.  States he feels as if he is not completely emptying his bladder.  Endorses 2-3 episodes of loose, nonbloody stool today as well.  He has been eating and hydrating normally today.  Denies fevers, chills, abdominal pain, nausea, vomiting, or hematuria.  States he is status post kidney transplant on 12/21/24 by Dr. Zamora and is compliant with his post-transplant rejection medications.              Patient History   Medical History[1]  Surgical History[2]  Family History[3]  Social History[4]    Physical Exam   ED Triage Vitals [07/05/25 1600]   Temperature Heart Rate Respirations BP   37.3 °C (99.1 °F) 75 16 141/58      Pulse Ox Temp Source Heart Rate Source Patient Position   100 % Temporal -- --      BP Location FiO2 (%)     -- --       Physical Exam  Vitals and nursing note reviewed.   Constitutional:       General: He is not in acute distress.     Appearance: He is not ill-appearing or toxic-appearing.   HENT:      Head: Atraumatic.      Nose: Nose normal.      Mouth/Throat:      Mouth: Mucous membranes are moist.   Eyes:      Extraocular Movements: Extraocular movements intact.      Conjunctiva/sclera: Conjunctivae normal.   Cardiovascular:      Rate and Rhythm: Normal rate and regular rhythm.   Pulmonary:      Effort: Pulmonary effort is normal.      Breath sounds: Normal breath sounds.   Abdominal:      General: Abdomen is flat. Bowel sounds are normal.      Palpations: Abdomen is soft.      Tenderness: There is no abdominal tenderness. There is no right CVA tenderness, left CVA tenderness, guarding or rebound.      Comments: PEG tube in place.   Musculoskeletal:         General: Normal range of motion.      Cervical back: Neck supple.    Skin:     General: Skin is warm and dry.      Capillary Refill: Capillary refill takes less than 2 seconds.   Neurological:      Mental Status: He is alert and oriented to person, place, and time.   Psychiatric:         Mood and Affect: Mood normal.           ED Course & MDM                  No data recorded     Jaswinder Coma Scale Score: 15 (07/05/25 1657 : Shital Hutton RN)                           Medical Decision Making  74-year-old male presenting to the emergency department by EMS from home for evaluation of urinary symptoms s/p kidney transplant on 12/21/24.  Vital signs reviewed and stable.  Patient is overall well-appearing and not in any acute distress.  He is not ill or toxic in appearance.  Abdomen is soft, nontender, with equal bowel sounds throughout.  No CVA tenderness bilaterally.  Bladder scan was performed in triage resulting 70 mL.  Basic labs were ordered to assess renal function as well as a urinalysis.  Patient signed out to my attending, Dr. Norwood, for further evaluation and management pending lab results.        Procedure  Procedures       [1]   Past Medical History:  Diagnosis Date    Chronic kidney disease     DM (diabetes mellitus) (Multi)     Fistula     HTN (hypertension)     Other specified abnormal immunological findings in serum 10/11/2021    Hepatitis B core antibody positive    Personal history of malignant neoplasm of prostate     History of malignant neoplasm of prostate   [2]   Past Surgical History:  Procedure Laterality Date    CARDIAC CATHETERIZATION N/A 4/18/2024    Procedure: Pericardiocentesis;  Surgeon: Jose Brunson MD;  Location: [place] [address] Cardiac Cath Lab;  Service: Cardiovascular;  Laterality: N/A;    CARDIAC ELECTROPHYSIOLOGY PROCEDURE N/A 7/17/2024    Procedure: Leadless PPM Implant ([id number]);  Surgeon: Sheldon De La Torre MD;  Location: [facility] Cardiac Cath Lab;  Service: Electrophysiology;  Laterality: N/A;  8:00, [employer]    CHOLECYSTECTOMY   10/23/2023    Lap Cholecystectomy    OTHER SURGICAL HISTORY  09/29/2021    Cyst excision    OTHER SURGICAL HISTORY  09/29/2021    Arteriovenous fistula creation procedure    OTHER SURGICAL HISTORY  09/29/2021    Prostate surgery    OTHER SURGICAL HISTORY  09/29/2021    Colonoscopy   [3]   Family History  Problem Relation Name Age of Onset    Diabetes Sister      Diabetes Brother     [4]   Social History  Tobacco Use    Smoking status: Never    Smokeless tobacco: Never   Vaping Use    Vaping status: Never Used   Substance Use Topics    Alcohol use: Never    Drug use: Never        Valeri Myers PA-C  07/05/25 1801

## 2025-07-05 NOTE — ED TRIAGE NOTES
Pt comes to the ED via EMS. He states that he has been experiencing an inability to empty his bladder as well as burning with urination that he has noticed since waking this morning. Hx of kidney transplant. Bladder scan was done at time of triage, 70 mL.

## 2025-07-06 ENCOUNTER — APPOINTMENT (OUTPATIENT)
Dept: RADIOLOGY | Facility: HOSPITAL | Age: 75
DRG: 699 | End: 2025-07-06
Payer: COMMERCIAL

## 2025-07-06 ENCOUNTER — HOSPITAL ENCOUNTER (INPATIENT)
Facility: HOSPITAL | Age: 75
End: 2025-07-06
Attending: SURGERY | Admitting: SURGERY
Payer: COMMERCIAL

## 2025-07-06 VITALS
DIASTOLIC BLOOD PRESSURE: 62 MMHG | TEMPERATURE: 99.1 F | OXYGEN SATURATION: 96 % | SYSTOLIC BLOOD PRESSURE: 125 MMHG | RESPIRATION RATE: 16 BRPM | HEIGHT: 73 IN | BODY MASS INDEX: 21.34 KG/M2 | HEART RATE: 92 BPM | WEIGHT: 161 LBS

## 2025-07-06 VITALS
HEIGHT: 73 IN | TEMPERATURE: 98.1 F | BODY MASS INDEX: 22.04 KG/M2 | DIASTOLIC BLOOD PRESSURE: 64 MMHG | HEART RATE: 78 BPM | OXYGEN SATURATION: 96 % | WEIGHT: 166.3 LBS | RESPIRATION RATE: 18 BRPM | SYSTOLIC BLOOD PRESSURE: 125 MMHG

## 2025-07-06 DIAGNOSIS — Z94.0 KIDNEY REPLACED BY TRANSPLANT (HHS-HCC): ICD-10-CM

## 2025-07-06 DIAGNOSIS — R19.7 DIARRHEA DUE TO MALABSORPTION (HHS-HCC): ICD-10-CM

## 2025-07-06 DIAGNOSIS — N39.0 UTI (URINARY TRACT INFECTION): Primary | ICD-10-CM

## 2025-07-06 DIAGNOSIS — N40.0 BENIGN PROSTATIC HYPERPLASIA, UNSPECIFIED WHETHER LOWER URINARY TRACT SYMPTOMS PRESENT: ICD-10-CM

## 2025-07-06 DIAGNOSIS — K90.9 DIARRHEA DUE TO MALABSORPTION (HHS-HCC): ICD-10-CM

## 2025-07-06 LAB
ALBUMIN SERPL BCP-MCNC: 3.2 G/DL (ref 3.4–5)
ANION GAP SERPL CALC-SCNC: 11 MMOL/L (ref 10–20)
BASOPHILS # BLD MANUAL: 0 X10*3/UL (ref 0–0.1)
BASOPHILS NFR BLD MANUAL: 0 %
BLASTS # BLD MANUAL: 0 X10*3/UL
BLASTS NFR BLD MANUAL: 0 %
BUN SERPL-MCNC: 29 MG/DL (ref 6–23)
C COLI+JEJ+UPSA DNA STL QL NAA+PROBE: NOT DETECTED
C DIF TOX TCDA+TCDB STL QL NAA+PROBE: NOT DETECTED
CALCIUM SERPL-MCNC: 8.6 MG/DL (ref 8.6–10.6)
CHLORIDE SERPL-SCNC: 107 MMOL/L (ref 98–107)
CO2 SERPL-SCNC: 22 MMOL/L (ref 21–32)
CREAT SERPL-MCNC: 1.57 MG/DL (ref 0.5–1.3)
EC STX1 GENE STL QL NAA+PROBE: NOT DETECTED
EC STX2 GENE STL QL NAA+PROBE: NOT DETECTED
EGFRCR SERPLBLD CKD-EPI 2021: 46 ML/MIN/1.73M*2
EOSINOPHIL # BLD MANUAL: 0 X10*3/UL (ref 0–0.4)
EOSINOPHIL NFR BLD MANUAL: 0 %
ERYTHROCYTE [DISTWIDTH] IN BLOOD BY AUTOMATED COUNT: 14.5 % (ref 11.5–14.5)
GLUCOSE BLD MANUAL STRIP-MCNC: 112 MG/DL (ref 74–99)
GLUCOSE BLD MANUAL STRIP-MCNC: 180 MG/DL (ref 74–99)
GLUCOSE BLD MANUAL STRIP-MCNC: 236 MG/DL (ref 74–99)
GLUCOSE BLD MANUAL STRIP-MCNC: 268 MG/DL (ref 74–99)
GLUCOSE BLD MANUAL STRIP-MCNC: 90 MG/DL (ref 74–99)
GLUCOSE SERPL-MCNC: 80 MG/DL (ref 74–99)
HCT VFR BLD AUTO: 28.1 % (ref 41–52)
HGB BLD-MCNC: 9.7 G/DL (ref 13.5–17.5)
IMM GRANULOCYTES # BLD AUTO: 0.1 X10*3/UL (ref 0–0.5)
IMM GRANULOCYTES NFR BLD AUTO: 1.1 % (ref 0–0.9)
LYMPHOCYTES # BLD MANUAL: 0.57 X10*3/UL (ref 0.8–3)
LYMPHOCYTES NFR BLD MANUAL: 6.1 %
MAGNESIUM SERPL-MCNC: 1.71 MG/DL (ref 1.6–2.4)
MCH RBC QN AUTO: 31.4 PG (ref 26–34)
MCHC RBC AUTO-ENTMCNC: 34.5 G/DL (ref 32–36)
MCV RBC AUTO: 91 FL (ref 80–100)
METAMYELOCYTES # BLD MANUAL: 0 X10*3/UL
METAMYELOCYTES NFR BLD MANUAL: 0 %
MONOCYTES # BLD MANUAL: 0.16 X10*3/UL (ref 0.05–0.8)
MONOCYTES NFR BLD MANUAL: 1.7 %
MYELOCYTES # BLD MANUAL: 0 X10*3/UL
MYELOCYTES NFR BLD MANUAL: 0 %
NEUTROPHILS # BLD MANUAL: 8.58 X10*3/UL (ref 1.6–5.5)
NEUTS BAND # BLD MANUAL: 0.97 X10*3/UL (ref 0–0.5)
NEUTS BAND NFR BLD MANUAL: 10.4 %
NEUTS SEG # BLD MANUAL: 7.61 X10*3/UL (ref 1.6–5)
NEUTS SEG NFR BLD MANUAL: 81.8 %
NOROVIRUS GI + GII RNA STL NAA+PROBE: NOT DETECTED
NRBC BLD MANUAL-RTO: 0 % (ref 0–0)
NRBC BLD-RTO: 0 /100 WBCS (ref 0–0)
PHOSPHATE SERPL-MCNC: 3.1 MG/DL (ref 2.5–4.9)
PLASMA CELLS # BLD MANUAL: 0 X10*3/UL
PLASMA CELLS NFR BLD MANUAL: 0 %
PLATELET # BLD AUTO: 70 X10*3/UL (ref 150–450)
POTASSIUM SERPL-SCNC: 4.3 MMOL/L (ref 3.5–5.3)
PROMYELOCYTES # BLD MANUAL: 0 X10*3/UL
PROMYELOCYTES NFR BLD MANUAL: 0 %
RBC # BLD AUTO: 3.09 X10*6/UL (ref 4.5–5.9)
RBC MORPH BLD: ABNORMAL
RV RNA STL NAA+PROBE: NOT DETECTED
SALMONELLA DNA STL QL NAA+PROBE: NOT DETECTED
SHIGELLA DNA SPEC QL NAA+PROBE: NOT DETECTED
SODIUM SERPL-SCNC: 136 MMOL/L (ref 136–145)
TACROLIMUS BLD-MCNC: 3 NG/ML
TOTAL CELLS COUNTED BLD: 115
V CHOLERAE DNA STL QL NAA+PROBE: NOT DETECTED
VARIANT LYMPHS # BLD MANUAL: 0 X10*3/UL (ref 0–0.3)
VARIANT LYMPHS NFR BLD: 0 %
WBC # BLD AUTO: 9.3 X10*3/UL (ref 4.4–11.3)
Y ENTEROCOL DNA STL QL NAA+PROBE: NOT DETECTED

## 2025-07-06 PROCEDURE — 2500000002 HC RX 250 W HCPCS SELF ADMINISTERED DRUGS (ALT 637 FOR MEDICARE OP, ALT 636 FOR OP/ED)

## 2025-07-06 PROCEDURE — 1100000001 HC PRIVATE ROOM DAILY

## 2025-07-06 PROCEDURE — 36415 COLL VENOUS BLD VENIPUNCTURE: CPT

## 2025-07-06 PROCEDURE — 2500000004 HC RX 250 GENERAL PHARMACY W/ HCPCS (ALT 636 FOR OP/ED)

## 2025-07-06 PROCEDURE — 80069 RENAL FUNCTION PANEL: CPT

## 2025-07-06 PROCEDURE — 80197 ASSAY OF TACROLIMUS: CPT

## 2025-07-06 PROCEDURE — 87506 IADNA-DNA/RNA PROBE TQ 6-11: CPT

## 2025-07-06 PROCEDURE — 85007 BL SMEAR W/DIFF WBC COUNT: CPT

## 2025-07-06 PROCEDURE — 85027 COMPLETE CBC AUTOMATED: CPT

## 2025-07-06 PROCEDURE — 83735 ASSAY OF MAGNESIUM: CPT

## 2025-07-06 PROCEDURE — 99222 1ST HOSP IP/OBS MODERATE 55: CPT | Performed by: INTERNAL MEDICINE

## 2025-07-06 PROCEDURE — 82947 ASSAY GLUCOSE BLOOD QUANT: CPT

## 2025-07-06 PROCEDURE — 2500000001 HC RX 250 WO HCPCS SELF ADMINISTERED DRUGS (ALT 637 FOR MEDICARE OP)

## 2025-07-06 PROCEDURE — 87493 C DIFF AMPLIFIED PROBE: CPT

## 2025-07-06 PROCEDURE — 76776 US EXAM K TRANSPL W/DOPPLER: CPT

## 2025-07-06 RX ORDER — GABAPENTIN 100 MG/1
200 CAPSULE ORAL DAILY
Status: DISPENSED | OUTPATIENT
Start: 2025-07-06

## 2025-07-06 RX ORDER — INSULIN LISPRO 100 [IU]/ML
5 INJECTION, SOLUTION INTRAVENOUS; SUBCUTANEOUS
Status: ACTIVE | OUTPATIENT
Start: 2025-07-06

## 2025-07-06 RX ORDER — TACROLIMUS 0.5 MG/1
0.5 CAPSULE ORAL 2 TIMES DAILY
Status: DISPENSED | OUTPATIENT
Start: 2025-07-06

## 2025-07-06 RX ORDER — MAGNESIUM SULFATE HEPTAHYDRATE 40 MG/ML
2 INJECTION, SOLUTION INTRAVENOUS ONCE
Status: DISCONTINUED | OUTPATIENT
Start: 2025-07-06 | End: 2025-07-06 | Stop reason: HOSPADM

## 2025-07-06 RX ORDER — DEXTROSE 50 % IN WATER (D50W) INTRAVENOUS SYRINGE
12.5
Status: ACTIVE | OUTPATIENT
Start: 2025-07-06

## 2025-07-06 RX ORDER — CHOLECALCIFEROL (VITAMIN D3) 25 MCG
50 TABLET ORAL DAILY
Status: DISPENSED | OUTPATIENT
Start: 2025-07-06

## 2025-07-06 RX ORDER — DEXTROSE 50 % IN WATER (D50W) INTRAVENOUS SYRINGE
25
Status: ACTIVE | OUTPATIENT
Start: 2025-07-06

## 2025-07-06 RX ORDER — CARVEDILOL 12.5 MG/1
12.5 TABLET ORAL 2 TIMES DAILY
Status: DISPENSED | OUTPATIENT
Start: 2025-07-06

## 2025-07-06 RX ORDER — PANTOPRAZOLE SODIUM 40 MG/1
40 TABLET, DELAYED RELEASE ORAL
Status: DISPENSED | OUTPATIENT
Start: 2025-07-06

## 2025-07-06 RX ORDER — METOCLOPRAMIDE 10 MG/1
5 TABLET ORAL
Status: DISPENSED | OUTPATIENT
Start: 2025-07-06

## 2025-07-06 RX ORDER — PREDNISONE 10 MG/1
5 TABLET ORAL DAILY
Status: DISPENSED | OUTPATIENT
Start: 2025-07-06

## 2025-07-06 RX ORDER — INSULIN LISPRO 100 [IU]/ML
0-10 INJECTION, SOLUTION INTRAVENOUS; SUBCUTANEOUS
Status: ACTIVE | OUTPATIENT
Start: 2025-07-06

## 2025-07-06 RX ORDER — INSULIN GLARGINE 100 [IU]/ML
15 INJECTION, SOLUTION SUBCUTANEOUS DAILY
Status: ACTIVE | OUTPATIENT
Start: 2025-07-07

## 2025-07-06 RX ORDER — MYCOPHENOLIC ACID 180 MG/1
180 TABLET, DELAYED RELEASE ORAL 2 TIMES DAILY
Status: DISPENSED | OUTPATIENT
Start: 2025-07-06

## 2025-07-06 RX ORDER — SODIUM CHLORIDE 9 MG/ML
75 INJECTION, SOLUTION INTRAVENOUS CONTINUOUS
Status: ACTIVE | OUTPATIENT
Start: 2025-07-06 | End: 2025-07-07

## 2025-07-06 RX ORDER — AMLODIPINE BESYLATE 10 MG/1
10 TABLET ORAL DAILY
Status: ACTIVE | OUTPATIENT
Start: 2025-07-06

## 2025-07-06 RX ORDER — TACROLIMUS 1 MG/1
3 CAPSULE ORAL 2 TIMES DAILY
Status: CANCELLED | OUTPATIENT
Start: 2025-07-06

## 2025-07-06 RX ORDER — TACROLIMUS 1 MG/1
3 CAPSULE ORAL 2 TIMES DAILY
Status: DISPENSED | OUTPATIENT
Start: 2025-07-06

## 2025-07-06 RX ORDER — INSULIN LISPRO 100 [IU]/ML
0-10 INJECTION, SOLUTION INTRAVENOUS; SUBCUTANEOUS
Status: DISCONTINUED | OUTPATIENT
Start: 2025-07-06 | End: 2025-07-06

## 2025-07-06 RX ORDER — ROSUVASTATIN CALCIUM 10 MG/1
10 TABLET, COATED ORAL NIGHTLY
Status: DISPENSED | OUTPATIENT
Start: 2025-07-06

## 2025-07-06 RX ORDER — SODIUM CHLORIDE, SODIUM LACTATE, POTASSIUM CHLORIDE, CALCIUM CHLORIDE 600; 310; 30; 20 MG/100ML; MG/100ML; MG/100ML; MG/100ML
75 INJECTION, SOLUTION INTRAVENOUS CONTINUOUS
Status: CANCELLED | OUTPATIENT
Start: 2025-07-06 | End: 2025-07-07

## 2025-07-06 RX ORDER — CEFTRIAXONE 1 G/50ML
1 INJECTION, SOLUTION INTRAVENOUS EVERY 24 HOURS
Status: DISCONTINUED | OUTPATIENT
Start: 2025-07-06 | End: 2025-07-06

## 2025-07-06 RX ORDER — HEPARIN SODIUM 5000 [USP'U]/ML
5000 INJECTION, SOLUTION INTRAVENOUS; SUBCUTANEOUS EVERY 8 HOURS SCHEDULED
Status: DISPENSED | OUTPATIENT
Start: 2025-07-06

## 2025-07-06 RX ORDER — MAGNESIUM SULFATE HEPTAHYDRATE 40 MG/ML
2 INJECTION, SOLUTION INTRAVENOUS ONCE
Status: COMPLETED | OUTPATIENT
Start: 2025-07-06 | End: 2025-07-06

## 2025-07-06 RX ADMIN — CARVEDILOL 12.5 MG: 12.5 TABLET, FILM COATED ORAL at 08:56

## 2025-07-06 RX ADMIN — PANTOPRAZOLE SODIUM 40 MG: 40 TABLET, DELAYED RELEASE ORAL at 06:58

## 2025-07-06 RX ADMIN — MYCOPHENOLIC ACID 180 MG: 180 TABLET, DELAYED RELEASE ORAL at 08:56

## 2025-07-06 RX ADMIN — PIPERACILLIN SODIUM AND TAZOBACTAM SODIUM 3.38 G: 3; .375 INJECTION, SOLUTION INTRAVENOUS at 12:04

## 2025-07-06 RX ADMIN — METOCLOPRAMIDE 5 MG: 10 TABLET ORAL at 08:56

## 2025-07-06 RX ADMIN — SODIUM CHLORIDE 75 ML/HR: 9 INJECTION, SOLUTION INTRAVENOUS at 18:04

## 2025-07-06 RX ADMIN — PREDNISONE 5 MG: 10 TABLET ORAL at 08:56

## 2025-07-06 RX ADMIN — Medication 50 MCG: at 08:56

## 2025-07-06 RX ADMIN — GABAPENTIN 200 MG: 100 CAPSULE ORAL at 08:56

## 2025-07-06 RX ADMIN — INSULIN LISPRO 5 UNITS: 100 INJECTION, SOLUTION INTRAVENOUS; SUBCUTANEOUS at 15:19

## 2025-07-06 RX ADMIN — PIPERACILLIN SODIUM AND TAZOBACTAM SODIUM 3.38 G: 3; .375 INJECTION, SOLUTION INTRAVENOUS at 18:04

## 2025-07-06 RX ADMIN — MAGNESIUM SULFATE HEPTAHYDRATE 2 G: 40 INJECTION, SOLUTION INTRAVENOUS at 08:59

## 2025-07-06 RX ADMIN — SODIUM CHLORIDE 75 ML/HR: 9 INJECTION, SOLUTION INTRAVENOUS at 03:43

## 2025-07-06 RX ADMIN — PIPERACILLIN SODIUM AND TAZOBACTAM SODIUM 3.38 G: 3; .375 INJECTION, SOLUTION INTRAVENOUS at 23:39

## 2025-07-06 RX ADMIN — METOCLOPRAMIDE 5 MG: 10 TABLET ORAL at 18:04

## 2025-07-06 RX ADMIN — PANTOPRAZOLE SODIUM 40 MG: 40 TABLET, DELAYED RELEASE ORAL at 18:04

## 2025-07-06 RX ADMIN — HEPARIN SODIUM 5000 UNITS: 5000 INJECTION INTRAVENOUS; SUBCUTANEOUS at 06:58

## 2025-07-06 RX ADMIN — INSULIN LISPRO 4 UNITS: 100 INJECTION, SOLUTION INTRAVENOUS; SUBCUTANEOUS at 15:20

## 2025-07-06 RX ADMIN — HEPARIN SODIUM 5000 UNITS: 5000 INJECTION INTRAVENOUS; SUBCUTANEOUS at 22:16

## 2025-07-06 RX ADMIN — TACROLIMUS 3 MG: 1 CAPSULE ORAL at 06:58

## 2025-07-06 RX ADMIN — HEPARIN SODIUM 5000 UNITS: 5000 INJECTION INTRAVENOUS; SUBCUTANEOUS at 14:56

## 2025-07-06 RX ADMIN — ROSUVASTATIN CALCIUM 10 MG: 10 TABLET, FILM COATED ORAL at 20:27

## 2025-07-06 RX ADMIN — TACROLIMUS 3 MG: 1 CAPSULE ORAL at 18:03

## 2025-07-06 RX ADMIN — TACROLIMUS 0.5 MG: 0.5 CAPSULE ORAL at 18:03

## 2025-07-06 RX ADMIN — TACROLIMUS 0.5 MG: 0.5 CAPSULE ORAL at 06:58

## 2025-07-06 RX ADMIN — CARVEDILOL 12.5 MG: 12.5 TABLET, FILM COATED ORAL at 20:27

## 2025-07-06 SDOH — SOCIAL STABILITY: SOCIAL INSECURITY: HAVE YOU HAD ANY THOUGHTS OF HARMING ANYONE ELSE?: NO

## 2025-07-06 SDOH — ECONOMIC STABILITY: HOUSING INSECURITY: DO YOU FEEL UNSAFE GOING BACK TO THE PLACE WHERE YOU LIVE?: NO

## 2025-07-06 SDOH — SOCIAL STABILITY: SOCIAL INSECURITY: DO YOU FEEL ANYONE HAS EXPLOITED OR TAKEN ADVANTAGE OF YOU FINANCIALLY OR OF YOUR PERSONAL PROPERTY?: NO

## 2025-07-06 SDOH — HEALTH STABILITY: PHYSICAL HEALTH: ON AVERAGE, HOW MANY DAYS PER WEEK DO YOU ENGAGE IN MODERATE TO STRENUOUS EXERCISE (LIKE A BRISK WALK)?: 4 DAYS

## 2025-07-06 SDOH — SOCIAL STABILITY: SOCIAL INSECURITY: WITHIN THE LAST YEAR, HAVE YOU BEEN AFRAID OF YOUR PARTNER OR EX-PARTNER?: NO

## 2025-07-06 SDOH — SOCIAL STABILITY: SOCIAL INSECURITY: ABUSE: ADULT

## 2025-07-06 SDOH — HEALTH STABILITY: PHYSICAL HEALTH: ON AVERAGE, HOW MANY MINUTES DO YOU ENGAGE IN EXERCISE AT THIS LEVEL?: 30 MIN

## 2025-07-06 SDOH — ECONOMIC STABILITY: FOOD INSECURITY: WITHIN THE PAST 12 MONTHS, THE FOOD YOU BOUGHT JUST DIDN'T LAST AND YOU DIDN'T HAVE MONEY TO GET MORE.: NEVER TRUE

## 2025-07-06 SDOH — ECONOMIC STABILITY: FOOD INSECURITY: HOW HARD IS IT FOR YOU TO PAY FOR THE VERY BASICS LIKE FOOD, HOUSING, MEDICAL CARE, AND HEATING?: NOT HARD AT ALL

## 2025-07-06 SDOH — SOCIAL STABILITY: SOCIAL INSECURITY: WITHIN THE LAST YEAR, HAVE YOU BEEN HUMILIATED OR EMOTIONALLY ABUSED IN OTHER WAYS BY YOUR PARTNER OR EX-PARTNER?: NO

## 2025-07-06 SDOH — ECONOMIC STABILITY: INCOME INSECURITY: IN THE PAST 12 MONTHS HAS THE ELECTRIC, GAS, OIL, OR WATER COMPANY THREATENED TO SHUT OFF SERVICES IN YOUR HOME?: NO

## 2025-07-06 SDOH — ECONOMIC STABILITY: HOUSING INSECURITY: IN THE LAST 12 MONTHS, WAS THERE A TIME WHEN YOU WERE NOT ABLE TO PAY THE MORTGAGE OR RENT ON TIME?: NO

## 2025-07-06 SDOH — ECONOMIC STABILITY: HOUSING INSECURITY: AT ANY TIME IN THE PAST 12 MONTHS, WERE YOU HOMELESS OR LIVING IN A SHELTER (INCLUDING NOW)?: NO

## 2025-07-06 SDOH — SOCIAL STABILITY: SOCIAL INSECURITY: HAS ANYONE EVER THREATENED TO HURT YOUR FAMILY OR YOUR PETS?: NO

## 2025-07-06 SDOH — ECONOMIC STABILITY: FOOD INSECURITY: WITHIN THE PAST 12 MONTHS, YOU WORRIED THAT YOUR FOOD WOULD RUN OUT BEFORE YOU GOT THE MONEY TO BUY MORE.: NEVER TRUE

## 2025-07-06 SDOH — SOCIAL STABILITY: SOCIAL INSECURITY: DOES ANYONE TRY TO KEEP YOU FROM HAVING/CONTACTING OTHER FRIENDS OR DOING THINGS OUTSIDE YOUR HOME?: NO

## 2025-07-06 SDOH — SOCIAL STABILITY: SOCIAL INSECURITY: WERE YOU ABLE TO COMPLETE ALL THE BEHAVIORAL HEALTH SCREENINGS?: YES

## 2025-07-06 SDOH — SOCIAL STABILITY: SOCIAL INSECURITY: HAVE YOU HAD THOUGHTS OF HARMING ANYONE ELSE?: NO

## 2025-07-06 SDOH — SOCIAL STABILITY: SOCIAL INSECURITY: DO YOU FEEL UNSAFE GOING BACK TO THE PLACE WHERE YOU ARE LIVING?: NO

## 2025-07-06 SDOH — SOCIAL STABILITY: SOCIAL INSECURITY: ARE THERE ANY APPARENT SIGNS OF INJURIES/BEHAVIORS THAT COULD BE RELATED TO ABUSE/NEGLECT?: NO

## 2025-07-06 SDOH — ECONOMIC STABILITY: TRANSPORTATION INSECURITY: IN THE PAST 12 MONTHS, HAS LACK OF TRANSPORTATION KEPT YOU FROM MEDICAL APPOINTMENTS OR FROM GETTING MEDICATIONS?: NO

## 2025-07-06 SDOH — SOCIAL STABILITY: SOCIAL INSECURITY: ARE YOU OR HAVE YOU BEEN THREATENED OR ABUSED PHYSICALLY, EMOTIONALLY, OR SEXUALLY BY ANYONE?: NO

## 2025-07-06 ASSESSMENT — ACTIVITIES OF DAILY LIVING (ADL)
HEARING - RIGHT EAR: FUNCTIONAL
DRESSING YOURSELF: INDEPENDENT
ASSISTIVE_DEVICE: EYEGLASSES
BATHING: INDEPENDENT
HEARING - LEFT EAR: FUNCTIONAL
PATIENT'S MEMORY ADEQUATE TO SAFELY COMPLETE DAILY ACTIVITIES?: YES
LACK_OF_TRANSPORTATION: NO
TOILETING: INDEPENDENT
FEEDING YOURSELF: INDEPENDENT
LACK_OF_TRANSPORTATION: NO
WALKS IN HOME: INDEPENDENT
GROOMING: INDEPENDENT
JUDGMENT_ADEQUATE_SAFELY_COMPLETE_DAILY_ACTIVITIES: YES
ADEQUATE_TO_COMPLETE_ADL: YES

## 2025-07-06 ASSESSMENT — COGNITIVE AND FUNCTIONAL STATUS - GENERAL
MOBILITY SCORE: 24
DAILY ACTIVITIY SCORE: 24
PATIENT BASELINE BEDBOUND: NO
DAILY ACTIVITIY SCORE: 24
MOBILITY SCORE: 24

## 2025-07-06 ASSESSMENT — PAIN SCALES - GENERAL
PAINLEVEL_OUTOF10: 0 - NO PAIN

## 2025-07-06 ASSESSMENT — LIFESTYLE VARIABLES
HOW OFTEN DO YOU HAVE 6 OR MORE DRINKS ON ONE OCCASION: NEVER
PRESCIPTION_ABUSE_PAST_12_MONTHS: NO
HOW MANY STANDARD DRINKS CONTAINING ALCOHOL DO YOU HAVE ON A TYPICAL DAY: PATIENT DOES NOT DRINK
AUDIT-C TOTAL SCORE: 0
AUDIT-C TOTAL SCORE: 0
SKIP TO QUESTIONS 9-10: 1
HOW OFTEN DO YOU HAVE A DRINK CONTAINING ALCOHOL: NEVER
SUBSTANCE_ABUSE_PAST_12_MONTHS: NO

## 2025-07-06 ASSESSMENT — PAIN - FUNCTIONAL ASSESSMENT
PAIN_FUNCTIONAL_ASSESSMENT: 0-10
PAIN_FUNCTIONAL_ASSESSMENT: 0-10

## 2025-07-06 ASSESSMENT — PAIN SCALES - WONG BAKER
WONGBAKER_NUMERICALRESPONSE: NO HURT
WONGBAKER_NUMERICALRESPONSE: NO HURT

## 2025-07-06 NOTE — PROGRESS NOTES
Emergency Department Transition of Care Note       Signout   I received Temo Hanson in signout from Dr. Norwood.  Please see the ED Provider Note for all HPI, PE and MDM up to the time of signout at 2300.  This is in addition to the primary record.    In brief Temo Hanson is an 74 y.o. male presenting for urinary frequency and urgency concerning for UTI in a renal transplant patient December 2024.    At the time of signout we were awaiting:  Urinalysis, CT result    ED Course & Medical Decision Making   Medical Decision Making:  Under my care, UA returned positive for infection, patient is started on IV ceftriaxone 1 g.  CT resulted with perinephric fat stranding concerning for either infection versus rejection as well as a nonobstructing stone within the transplanted kidney.  I spoke with Dr. Jennings from the transplant team who recommends transfer to [facility] for continued care and further evaluation.  Patient is in agreement with transfer.    ED Course:  Diagnoses as of 07/06/25 0056   Acute pyelonephritis   Renal transplant recipient ([facility])   ALLEY (acute kidney injury)       Disposition   Transfer to [facility] renal transplant team    Procedures   Procedures        Suzanna Santana MD  Emergency Medicine

## 2025-07-06 NOTE — H&P
Transplant Surgery History and Physical    Subjective   Chief Complaint/Reason for Admission: urinary urgency and frequency    HPI:  Temo Hanson is a 74 y.o. 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 Patient was seen at [facility] ED on 7/5/2025 for urinary urgency and frequency, endorsing the urge to urinate every 10 minutes. Patient also endorses 3-4 episodes of loose bowel movements during the day. Patient endorses usually drinking 3 bottles of water a day and urinating about 3 cups a day. Patient denies headaches, chest pain, SOB, abdominal pain, N/V.     RFP is significant for increased Cr (1.36 from 1.08) and Mg of 1.35. UA is positive for leukocyte esterase and nitrates. Urine culture is pending.     A 12-point ROS was performed and was unremarkable except as above.    PMH:  Medical History[1]  PSH:  Surgical History[2]  Soc Hx:  Social History     Socioeconomic History    Marital status:      Spouse name: Not on file    Number of children: Not on file    Years of education: Not on file    Highest education level: Not on file   Occupational History    Not on file   Tobacco Use    Smoking status: Never    Smokeless tobacco: Never   Vaping Use    Vaping status: Never Used   Substance and Sexual Activity    Alcohol use: Never    Drug use: Never    Sexual activity: Defer   Other Topics Concern    Not on file   Social History Narrative    Not on file     Social Drivers of Health     Financial Resource Strain: Low Risk  (7/6/2025)    Overall Financial Resource Strain (CARDIA)     Difficulty of Paying Living Expenses: Not hard at all   Food Insecurity: Patient Declined (2/9/2025)    Hunger Vital Sign     Worried About Running Out of Food in the Last Year: Patient declined     Ran Out of Food in the Last Year: Patient declined   Transportation Needs: No Transportation Needs (7/6/2025)    PRAPARE -  Transportation     Lack of Transportation (Medical): No     Lack of Transportation (Non-Medical): No   Physical Activity: Not on file   Stress: Patient Declined (2/9/2025)    [employer] - Occupational Stress Questionnaire     Feeling of Stress : Patient declined   Social Connections: Feeling Socially Integrated (3/11/2025)    OASIS : Social Isolation     Frequency of experiencing loneliness or isolation: Never   Intimate Partner Violence: Patient Declined (2/9/2025)    Humiliation, Afraid, Rape, and Kick questionnaire     Fear of Current or Ex-Partner: Patient declined     Emotionally Abused: Patient declined     Physically Abused: Patient declined     Sexually Abused: Patient declined   Housing Stability: Low Risk  (7/6/2025)    Housing Stability Vital Sign     Unable to Pay for Housing in the Last Year: No     Number of Times Moved in the Last Year: 0     Homeless in the Last Year: No     Fam Hx:  Family History[3]   Allergies:  RX Allergies[4]  Current Medications:  Medications Ordered Prior to Encounter[5]      Objective   Vitals:  Visit Vitals  /63 (BP Location: Right arm, Patient Position: Sitting)   Pulse 98   Temp 36.5 °C (97.7 °F) (Temporal)   Resp 18       Physical Exam:  GEN: NAD, not ill appearing   HEENT: NC/AT  RESP: Nonlabored on room air  CV: Nontachycardic, normotensive  Abd: Soft, nontender, nondistended   MSK: No gross deformities.  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 Evaluation is limited on the noncontrast examination, however there is prominent edema of the transplant kidney and there is hypodense  fluid along the peripheral aspect of the transplant kidney which appears to correspond to fluid in the surrounding perirenal fat, however component of subcapsular low attenuation fluid collection not excluded. There is hydronephrosis of the transplant kidney and edema in the renal pelvis. There is also urinary bladder wall thickening. The findings may be secondary to infection and pyelonephritis of the transplant kidney and also cystitis. Further evaluation is recommended to exclude rejection or other underlying pathology of the transplant kidney. Punctate nonobstructive calculus in the transplant kidney.   Small right pleural effusion with loculation of the pleural fluid anteriorly versus nodular pleural thickening, and attention on progress imaging recommended.   Hiatal hernia and stable dilatation of the imaged lower esophagus which is filled with heterogeneous content and there is persistent esophageal wall thickening.       MACRO: None   Signed by: Moreno Cohen 7/6/2025 12:15 AM Dictation workstation:   [id number]      Cardiology, Vascular, and Other Imaging  No other imaging results found for the past 2 days       ASSESSMENT  Temo Hanson is a 74 y.o. 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 Patient is in stable condition.    PLAN:  - Restarted home meds and home immunosuppressive regimen  - mIVF 75ml/hr NS  - IV ceftriaxone  - follow up on urine culture and stool studies     Patient seen at bedside with Dr. Mackenzie Simerlink. Patient discussed and staffed with Dr. Dick Kang, who agrees with plan as above.    Alirio Kimble MD  PGY-1 General Surgery  Transplant Surgery [id number]         [1]   Past Medical History:  Diagnosis Date    Chronic kidney disease     DM (diabetes mellitus) (Multi)     Fistula     HTN (hypertension)     Other specified abnormal immunological findings in serum 10/11/2021    Hepatitis B  core antibody positive    Personal history of malignant neoplasm of prostate     History of malignant neoplasm of prostate   [2]   Past Surgical History:  Procedure Laterality Date    CARDIAC CATHETERIZATION N/A 4/18/2024    Procedure: Pericardiocentesis;  Surgeon: Jose Brunson MD;  Location: [facility] Cardiac Cath Lab;  Service: Cardiovascular;  Laterality: N/A;    CARDIAC ELECTROPHYSIOLOGY PROCEDURE N/A 7/17/2024    Procedure: Leadless PPM Implant ([id number]);  Surgeon: Sheldon De La Torre MD;  Location: [facility] Cardiac Cath Lab;  Service: Electrophysiology;  Laterality: N/A;  8:00, [employer]    CHOLECYSTECTOMY  10/23/2023    Lap Cholecystectomy    OTHER SURGICAL HISTORY  09/29/2021    Cyst excision    OTHER SURGICAL HISTORY  09/29/2021    Arteriovenous fistula creation procedure    OTHER SURGICAL HISTORY  09/29/2021    Prostate surgery    OTHER SURGICAL HISTORY  09/29/2021    Colonoscopy   [3]   Family History  Problem Relation Name Age of Onset    Diabetes Sister      Diabetes Brother     [4]   Allergies  Allergen Reactions    Terazosin Unknown     Did not feel well on this medication    Codeine Itching     itching    Tramadol Itching   [5]   Current Facility-Administered Medications on File Prior to Encounter   Medication Dose Route Frequency Provider Last Rate Last Admin    [COMPLETED] cefTRIAXone (Rocephin) 1 g in dextrose (iso) IV 50 mL  1 g intravenous Once Suzanna Santana MD   Stopped at 07/06/25 0010    [COMPLETED] lactated Ringer's bolus 1,000 mL  1,000 mL intravenous Once Kenyon Norwood DO   Stopped at 07/05/25 2338    [COMPLETED] magnesium sulfate 2 g in sterile water for injection 50 mL  2 g intravenous Once Kenyon Norwood DO   Stopped at 07/05/25 2252    [DISCONTINUED] dextrose 50 % injection 12.5 g  12.5 g intravenous q15 min PRN Kenyon Norwood DO        [DISCONTINUED] dextrose 50 % injection 25 g  25 g intravenous q15 min PRN Kenyon Norwood DO        [DISCONTINUED] glucagon (Glucagen)  "injection 1 mg  1 mg intramuscular q15 min PRN Kenyon Norwood DO        [DISCONTINUED] glucagon (Glucagen) injection 1 mg  1 mg intramuscular q15 min PRN Kenyon Norwood DO        [DISCONTINUED] magnesium sulfate 2 g in sterile water for injection 50 mL  2 g intravenous Once Alirio Kimble MD         Current Outpatient Medications on File Prior to Encounter   Medication Sig Dispense Refill    amLODIPine (Norvasc) 10 mg tablet Take 1 tablet (10 mg) by mouth once daily. 30 tablet 11    BD Ultra-Fine Joan Pen Needle 32 gauge x 5/32\" needle       blood sugar diagnostic (Blood Glucose Test) strip Check blood glucose 3 time per day 100 each 0    blood-glucose meter misc Use to check blood glucose 1 each 0    calcium polycarbophiL (Fibercon) 625 mg tablet Take 1 tablet (625 mg) by mouth 2 times a day. (Patient not taking: Reported on 6/18/2025) 60 tablet 2    carboxymethylcellulose (Refresh Plus) 0.5 % ophthalmic solution Instill 1 drop into both eyes 2-4x/day as needed for dryness.      carvedilol (Coreg) 12.5 mg tablet Take 1 tablet (12.5 mg) by mouth 2 times a day. Hold for SBP <110 or HR <55 60 tablet 11    cholecalciferol (Vitamin D-3) 50 mcg (2,000 units) tablet Take 1 tablet (2,000 Units) by mouth once daily. 30 tablet 11    FreeStyle Mick 3 Sensor device Inject 1 each under the skin every 14 (fourteen) days. 6 each 3    gabapentin (Neurontin) 100 mg capsule Take 2 capsules (200 mg) by mouth once daily. 60 capsule 1    insulin aspart, with niacinamide, (Fiasp FlexTouch U-100 Insulin) 100 unit/mL (3 mL) pen Inject 0-10 Units under the skin 3 times a day before meals. 5 unit(s) before meals 3 times daily and increase as per sliding scale: 2 unit(s) if Blood glucose is between 151-200, 4 unit(s) if Blood glucose is between 201-250, 6 unit(s) if Blood glucose is between 251-300, 8 unit(s) if Blood glucose is between 301-350, 10 unit(s) if Blood glucose is between 351-400, If blood glucose is greater than 400 " "mg/dL, give max insulin per sliding scale AND then contact provider. Expect up to 50 units per day 15 mL 2    insulin glargine (Basaglar KwikPen U-100 Insulin) 100 unit/mL (3 mL) pen Inject 18 Units under the skin once daily in the morning. Take at the same time as prednisone.      lancets 30 gauge misc 1 each 4 times a day. Use to check glucose 4 times daily, before meals and at bedtime 400 each 0    magnesium oxide (Mag-Ox) 400 mg tablet Take 1 tablet (400 mg) by mouth 2 times a day. 60 tablet 11    metoclopramide (Reglan) 5 mg tablet Take 1 tablet (5 mg) by mouth 2 times daily (morning and late afternoon).      mycophenolate (Myfortic) 180 mg EC tablet Take 1 tablet (180 mg) by mouth 2 times a day. 60 tablet 11    nut.tx.impaired dige fxn-fiber (Vital Peptide 1.5 Chelita) 0.07 gram- 1.5 kcal/mL liquid Take 240 mL by mouth 3 times a day before meals. Administer 240 mL with breakfast, lunch, and dinner (Patient not taking: Reported on 5/21/2025)      pantoprazole (ProtoNix) 40 mg EC tablet Take 1 tablet (40 mg) by mouth 2 times a day before meals. Do not crush, chew, or split. 180 tablet 3    pen needle, diabetic 32 gauge x 5/32\" needle Use 1 needle once daily in the evening.  Take before meals. 100 each 0    predniSONE (Deltasone) 5 mg tablet Take 1 tablet (5 mg) by mouth once daily. 90 tablet 3    rosuvastatin (Crestor) 10 mg tablet Take 1 tablet (10 mg) by mouth once daily at bedtime. 90 tablet 3    tacrolimus (Prograf) 0.5 mg capsule Take 1 capsule (0.5 mg) by mouth 2 times a day. 60 capsule 11    tacrolimus (Prograf) 1 mg capsule Take 3 capsules (3 mg) by mouth 2 times a day. 180 capsule 11     "

## 2025-07-06 NOTE — PROGRESS NOTES
"Pharmacy Medication History Review    Temo Hanson is a 74 y.o. male admitted for UTI (urinary tract infection). Pharmacy reviewed the patient's [id number]-[id number] medications and allergies for accuracy.    Medications ADDED:  None  Medications CHANGED:  None  Medications REMOVED:   None    The list below reflects the updated PTA list.   Prior to Admission Medications   Prescriptions Last Dose Informant   BD Ultra-Fine Joan Pen Needle 32 gauge x 5/32\" needle  Self   FreeStyle Mick 3 Sensor device  Self   Sig: Inject 1 each under the skin every 14 (fourteen) days.   amLODIPine (Norvasc) 10 mg tablet  Self   Sig: Take 1 tablet (10 mg) by mouth once daily.   blood sugar diagnostic (Blood Glucose Test) strip  Self   Sig: Check blood glucose 3 time per day   blood-glucose meter misc  Self   Sig: Use to check blood glucose   calcium polycarbophiL (Fibercon) 625 mg tablet  Self   Sig: Take 1 tablet (625 mg) by mouth 2 times a day.   Patient not taking: Reported on 6/18/2025   carboxymethylcellulose (Refresh Plus) 0.5 % ophthalmic solution  Self   Sig: Instill 1 drop into both eyes 2-4x/day as needed for dryness.   carvedilol (Coreg) 12.5 mg tablet  Self   Sig: Take 1 tablet (12.5 mg) by mouth 2 times a day. Hold for SBP <110 or HR <55   cholecalciferol (Vitamin D-3) 50 mcg (2,000 units) tablet  Self   Sig: Take 1 tablet (2,000 Units) by mouth once daily.   gabapentin (Neurontin) 100 mg capsule  Self   Sig: Take 2 capsules (200 mg) by mouth once daily.   insulin aspart, with niacinamide, (Fiasp FlexTouch U-100 Insulin) 100 unit/mL (3 mL) pen  Self   Sig: Inject 0-10 Units under the skin 3 times a day before meals. 5 unit(s) before meals 3 times daily and increase as per sliding scale: 2 unit(s) if Blood glucose is between 151-200, 4 unit(s) if Blood glucose is between 201-250, 6 unit(s) if Blood glucose is between 251-300, 8 unit(s) if Blood glucose is between 301-350, 10 unit(s) if Blood glucose is between 351-400, If " "blood glucose is greater than 400 mg/dL, give max insulin per sliding scale AND then contact provider. Expect up to 50 units per day   insulin glargine (Basaglar KwikPen U-100 Insulin) 100 unit/mL (3 mL) pen  Self   Sig: Inject 18 Units under the skin once daily in the morning. Take at the same time as prednisone.   lancets 30 gauge misc  Self   Si each 4 times a day. Use to check glucose 4 times daily, before meals and at bedtime   magnesium oxide (Mag-Ox) 400 mg tablet  Self   Sig: Take 1 tablet (400 mg) by mouth 2 times a day.   metoclopramide (Reglan) 5 mg tablet     Sig: Take 1 tablet (5 mg) by mouth 2 times daily (morning and late afternoon).  PT reports still taking, last filled 25 for 90 tablets.    mycophenolate (Myfortic) 180 mg EC tablet  Self   Sig: Take 1 tablet (180 mg) by mouth 2 times a day.   nut.tx.impaired dige fxn-fiber (Vital Peptide 1.5 Chelita) 0.07 gram- 1.5 kcal/mL liquid  Self   Sig: Take 240 mL by mouth 3 times a day before meals. Administer 240 mL with breakfast, lunch, and dinner   Patient not taking: Reported on 2025   pantoprazole (ProtoNix) 40 mg EC tablet  Self   Sig: Take 1 tablet (40 mg) by mouth 2 times a day before meals. Do not crush, chew, or split.   pen needle, diabetic 32 gauge x \" needle  Self   Sig: Use 1 needle once daily in the evening.  Take before meals.   predniSONE (Deltasone) 5 mg tablet  Self   Sig: Take 1 tablet (5 mg) by mouth once daily.   rosuvastatin (Crestor) 10 mg tablet  Self   Sig: Take 1 tablet (10 mg) by mouth once daily at bedtime.   tacrolimus (Prograf) 0.5 mg capsule  Self   Sig: Take 1 capsule (0.5 mg) by mouth 2 times a day.   tacrolimus (Prograf) 1 mg capsule  Self   Sig: Take 3 capsules (3 mg) by mouth 2 times a day.      Facility-Administered Medications: None        The list below reflects the updated allergy list. Please review each documented allergy for additional clarification and justification.  Allergies  Reviewed by Rosio " "Sherry on 7/6/2025        Severity Reactions Comments    Terazosin Not Specified Unknown Did not feel well on this medication    Codeine Low Itching itching    Tramadol Low Itching             Patient declines M2B at discharge.     Sources:   -Patient interview, Good historian   -Outpatient pharmacy dispense history  -OARRS  -Care everywhere  -Chart review      Additional Comments:  None      ZARINA OVALLE  Pharmacy Technician  07/06/25     Secure Chat preferred   If no response call [phone] or Vocera \"Med Rec\"    "

## 2025-07-06 NOTE — CARE PLAN
The patient's goals for the shift include pt pain will be managed throughout shift     The clinical goals for the shift include pt will remain HDS throughout shift       Problem: Pain - Adult  Goal: Verbalizes/displays adequate comfort level or baseline comfort level  Outcome: Progressing     Problem: Safety - Adult  Goal: Free from fall injury  Outcome: Progressing     Problem: Chronic Conditions and Co-morbidities  Goal: Patient's chronic conditions and co-morbidity symptoms are monitored and maintained or improved  Outcome: Progressing     Problem: Fall/Injury  Goal: Be free from injury by end of the shift  Outcome: Progressing

## 2025-07-07 LAB
ALBUMIN SERPL BCP-MCNC: 3.2 G/DL (ref 3.4–5)
ANION GAP SERPL CALC-SCNC: 11 MMOL/L (ref 10–20)
BASOPHILS # BLD MANUAL: 0 X10*3/UL (ref 0–0.1)
BASOPHILS NFR BLD MANUAL: 0 %
BLASTS # BLD MANUAL: 0 X10*3/UL
BLASTS NFR BLD MANUAL: 0 %
BUN SERPL-MCNC: 34 MG/DL (ref 6–23)
CALCIUM SERPL-MCNC: 8.7 MG/DL (ref 8.6–10.6)
CHLORIDE SERPL-SCNC: 107 MMOL/L (ref 98–107)
CO2 SERPL-SCNC: 22 MMOL/L (ref 21–32)
CREAT SERPL-MCNC: 1.72 MG/DL (ref 0.5–1.3)
EGFRCR SERPLBLD CKD-EPI 2021: 41 ML/MIN/1.73M*2
EOSINOPHIL # BLD MANUAL: 0 X10*3/UL (ref 0–0.4)
EOSINOPHIL NFR BLD MANUAL: 0 %
ERYTHROCYTE [DISTWIDTH] IN BLOOD BY AUTOMATED COUNT: 14.6 % (ref 11.5–14.5)
GLUCOSE BLD MANUAL STRIP-MCNC: 129 MG/DL (ref 74–99)
GLUCOSE BLD MANUAL STRIP-MCNC: 146 MG/DL (ref 74–99)
GLUCOSE BLD MANUAL STRIP-MCNC: 151 MG/DL (ref 74–99)
GLUCOSE BLD MANUAL STRIP-MCNC: 168 MG/DL (ref 74–99)
GLUCOSE BLD MANUAL STRIP-MCNC: 208 MG/DL (ref 74–99)
GLUCOSE SERPL-MCNC: 126 MG/DL (ref 74–99)
HCT VFR BLD AUTO: 28.5 % (ref 41–52)
HGB BLD-MCNC: 8.8 G/DL (ref 13.5–17.5)
HOLD SPECIMEN: NORMAL
IMM GRANULOCYTES # BLD AUTO: 0.03 X10*3/UL (ref 0–0.5)
IMM GRANULOCYTES NFR BLD AUTO: 0.5 % (ref 0–0.9)
LYMPHOCYTES # BLD MANUAL: 0.59 X10*3/UL (ref 0.8–3)
LYMPHOCYTES NFR BLD MANUAL: 9.3 %
MAGNESIUM SERPL-MCNC: 2.21 MG/DL (ref 1.6–2.4)
MCH RBC QN AUTO: 30.6 PG (ref 26–34)
MCHC RBC AUTO-ENTMCNC: 30.9 G/DL (ref 32–36)
MCV RBC AUTO: 99 FL (ref 80–100)
METAMYELOCYTES # BLD MANUAL: 0 X10*3/UL
METAMYELOCYTES NFR BLD MANUAL: 0 %
MONOCYTES # BLD MANUAL: 0.48 X10*3/UL (ref 0.05–0.8)
MONOCYTES NFR BLD MANUAL: 7.6 %
MYELOCYTES # BLD MANUAL: 0 X10*3/UL
MYELOCYTES NFR BLD MANUAL: 0 %
NEUTROPHILS # BLD MANUAL: 5.24 X10*3/UL (ref 1.6–5.5)
NEUTS BAND # BLD MANUAL: 0 X10*3/UL (ref 0–0.5)
NEUTS BAND NFR BLD MANUAL: 0 %
NEUTS SEG # BLD MANUAL: 5.24 X10*3/UL (ref 1.6–5)
NEUTS SEG NFR BLD MANUAL: 83.1 %
NRBC BLD MANUAL-RTO: 0 % (ref 0–0)
NRBC BLD-RTO: 0 /100 WBCS (ref 0–0)
PHOSPHATE SERPL-MCNC: 3.1 MG/DL (ref 2.5–4.9)
PLASMA CELLS # BLD MANUAL: 0 X10*3/UL
PLASMA CELLS NFR BLD MANUAL: 0 %
PLATELET # BLD AUTO: 78 X10*3/UL (ref 150–450)
POTASSIUM SERPL-SCNC: 4.4 MMOL/L (ref 3.5–5.3)
PROMYELOCYTES # BLD MANUAL: 0 X10*3/UL
PROMYELOCYTES NFR BLD MANUAL: 0 %
RBC # BLD AUTO: 2.88 X10*6/UL (ref 4.5–5.9)
RBC MORPH BLD: ABNORMAL
SODIUM SERPL-SCNC: 136 MMOL/L (ref 136–145)
TACROLIMUS BLD-MCNC: 4.4 NG/ML
TOTAL CELLS COUNTED BLD: 118
VARIANT LYMPHS # BLD MANUAL: 0 X10*3/UL (ref 0–0.3)
VARIANT LYMPHS NFR BLD: 0 %
WBC # BLD AUTO: 6.3 X10*3/UL (ref 4.4–11.3)

## 2025-07-07 PROCEDURE — 99233 SBSQ HOSP IP/OBS HIGH 50: CPT | Performed by: INTERNAL MEDICINE

## 2025-07-07 PROCEDURE — 82947 ASSAY GLUCOSE BLOOD QUANT: CPT

## 2025-07-07 PROCEDURE — 2500000001 HC RX 250 WO HCPCS SELF ADMINISTERED DRUGS (ALT 637 FOR MEDICARE OP)

## 2025-07-07 PROCEDURE — 86832 HLA CLASS I HIGH DEFIN QUAL: CPT

## 2025-07-07 PROCEDURE — 2500000002 HC RX 250 W HCPCS SELF ADMINISTERED DRUGS (ALT 637 FOR MEDICARE OP, ALT 636 FOR OP/ED)

## 2025-07-07 PROCEDURE — 1100000001 HC PRIVATE ROOM DAILY

## 2025-07-07 PROCEDURE — 83735 ASSAY OF MAGNESIUM: CPT

## 2025-07-07 PROCEDURE — 2500000004 HC RX 250 GENERAL PHARMACY W/ HCPCS (ALT 636 FOR OP/ED)

## 2025-07-07 PROCEDURE — 80197 ASSAY OF TACROLIMUS: CPT

## 2025-07-07 PROCEDURE — 85007 BL SMEAR W/DIFF WBC COUNT: CPT

## 2025-07-07 PROCEDURE — 85027 COMPLETE CBC AUTOMATED: CPT

## 2025-07-07 PROCEDURE — 36415 COLL VENOUS BLD VENIPUNCTURE: CPT

## 2025-07-07 PROCEDURE — 87799 DETECT AGENT NOS DNA QUANT: CPT

## 2025-07-07 PROCEDURE — 80069 RENAL FUNCTION PANEL: CPT

## 2025-07-07 RX ORDER — SODIUM CHLORIDE 9 MG/ML
75 INJECTION, SOLUTION INTRAVENOUS CONTINUOUS
Status: ACTIVE | OUTPATIENT
Start: 2025-07-07 | End: 2025-07-08

## 2025-07-07 RX ORDER — INSULIN LISPRO 100 [IU]/ML
4 INJECTION, SOLUTION INTRAVENOUS; SUBCUTANEOUS ONCE
Status: COMPLETED | OUTPATIENT
Start: 2025-07-07 | End: 2025-07-07

## 2025-07-07 RX ORDER — TACROLIMUS 1 MG/1
4 CAPSULE ORAL 2 TIMES DAILY
Status: DISCONTINUED | OUTPATIENT
Start: 2025-07-07 | End: 2025-07-08 | Stop reason: HOSPADM

## 2025-07-07 RX ORDER — TAMSULOSIN HYDROCHLORIDE 0.4 MG/1
0.4 CAPSULE ORAL DAILY
Status: DISCONTINUED | OUTPATIENT
Start: 2025-07-07 | End: 2025-07-08 | Stop reason: HOSPADM

## 2025-07-07 RX ADMIN — TACROLIMUS 0.5 MG: 0.5 CAPSULE ORAL at 06:03

## 2025-07-07 RX ADMIN — INSULIN LISPRO 4 UNITS: 100 INJECTION, SOLUTION INTRAVENOUS; SUBCUTANEOUS at 01:11

## 2025-07-07 RX ADMIN — METOCLOPRAMIDE 5 MG: 10 TABLET ORAL at 18:09

## 2025-07-07 RX ADMIN — HEPARIN SODIUM 5000 UNITS: 5000 INJECTION INTRAVENOUS; SUBCUTANEOUS at 23:39

## 2025-07-07 RX ADMIN — CARVEDILOL 12.5 MG: 12.5 TABLET, FILM COATED ORAL at 21:27

## 2025-07-07 RX ADMIN — METOCLOPRAMIDE 5 MG: 10 TABLET ORAL at 08:17

## 2025-07-07 RX ADMIN — INSULIN LISPRO 2 UNITS: 100 INJECTION, SOLUTION INTRAVENOUS; SUBCUTANEOUS at 12:35

## 2025-07-07 RX ADMIN — INSULIN LISPRO 5 UNITS: 100 INJECTION, SOLUTION INTRAVENOUS; SUBCUTANEOUS at 12:37

## 2025-07-07 RX ADMIN — PIPERACILLIN SODIUM AND TAZOBACTAM SODIUM 3.38 G: 3; .375 INJECTION, SOLUTION INTRAVENOUS at 18:09

## 2025-07-07 RX ADMIN — CARVEDILOL 12.5 MG: 12.5 TABLET, FILM COATED ORAL at 08:16

## 2025-07-07 RX ADMIN — GABAPENTIN 200 MG: 100 CAPSULE ORAL at 08:15

## 2025-07-07 RX ADMIN — PANTOPRAZOLE SODIUM 40 MG: 40 TABLET, DELAYED RELEASE ORAL at 08:15

## 2025-07-07 RX ADMIN — ROSUVASTATIN CALCIUM 10 MG: 10 TABLET, FILM COATED ORAL at 21:27

## 2025-07-07 RX ADMIN — TACROLIMUS 4 MG: 1 CAPSULE ORAL at 18:09

## 2025-07-07 RX ADMIN — INSULIN LISPRO 5 UNITS: 100 INJECTION, SOLUTION INTRAVENOUS; SUBCUTANEOUS at 18:25

## 2025-07-07 RX ADMIN — PIPERACILLIN SODIUM AND TAZOBACTAM SODIUM 3.38 G: 3; .375 INJECTION, SOLUTION INTRAVENOUS at 06:04

## 2025-07-07 RX ADMIN — PIPERACILLIN SODIUM AND TAZOBACTAM SODIUM 3.38 G: 3; .375 INJECTION, SOLUTION INTRAVENOUS at 12:33

## 2025-07-07 RX ADMIN — INSULIN GLARGINE 15 UNITS: 100 INJECTION, SOLUTION SUBCUTANEOUS at 08:16

## 2025-07-07 RX ADMIN — HEPARIN SODIUM 5000 UNITS: 5000 INJECTION INTRAVENOUS; SUBCUTANEOUS at 15:33

## 2025-07-07 RX ADMIN — INSULIN LISPRO 5 UNITS: 100 INJECTION, SOLUTION INTRAVENOUS; SUBCUTANEOUS at 08:16

## 2025-07-07 RX ADMIN — PIPERACILLIN SODIUM AND TAZOBACTAM SODIUM 3.38 G: 3; .375 INJECTION, SOLUTION INTRAVENOUS at 23:39

## 2025-07-07 RX ADMIN — TAMSULOSIN HYDROCHLORIDE 0.4 MG: 0.4 CAPSULE ORAL at 18:09

## 2025-07-07 RX ADMIN — TACROLIMUS 3 MG: 1 CAPSULE ORAL at 06:03

## 2025-07-07 RX ADMIN — SODIUM CHLORIDE 75 ML/HR: 0.9 INJECTION, SOLUTION INTRAVENOUS at 09:28

## 2025-07-07 RX ADMIN — PANTOPRAZOLE SODIUM 40 MG: 40 TABLET, DELAYED RELEASE ORAL at 15:33

## 2025-07-07 RX ADMIN — HEPARIN SODIUM 5000 UNITS: 5000 INJECTION INTRAVENOUS; SUBCUTANEOUS at 06:04

## 2025-07-07 RX ADMIN — INSULIN LISPRO 2 UNITS: 100 INJECTION, SOLUTION INTRAVENOUS; SUBCUTANEOUS at 18:25

## 2025-07-07 RX ADMIN — PREDNISONE 5 MG: 10 TABLET ORAL at 08:15

## 2025-07-07 RX ADMIN — Medication 50 MCG: at 08:15

## 2025-07-07 SDOH — ECONOMIC STABILITY: FOOD INSECURITY: WITHIN THE PAST 12 MONTHS, THE FOOD YOU BOUGHT JUST DIDN'T LAST AND YOU DIDN'T HAVE MONEY TO GET MORE.: OFTEN TRUE

## 2025-07-07 SDOH — ECONOMIC STABILITY: HOUSING INSECURITY: IN THE PAST 12 MONTHS HAS THE ELECTRIC, GAS, OIL, OR WATER COMPANY THREATENED TO SHUT OFF SERVICES IN YOUR HOME?: YES

## 2025-07-07 SDOH — ECONOMIC STABILITY: HOUSING INSECURITY

## 2025-07-07 SDOH — ECONOMIC STABILITY: FOOD INSECURITY: WITHIN THE PAST 12 MONTHS, YOU WORRIED THAT YOUR FOOD WOULD RUN OUT BEFORE YOU GOT MONEY TO BUY MORE.: SOMETIMES TRUE

## 2025-07-07 SDOH — ECONOMIC STABILITY: TRANSPORTATION INSECURITY: IN THE PAST 12 MONTHS, HAS LACK OF TRANSPORTATION KEPT YOU FROM MEDICAL APPOINTMENTS OR FROM GETTING MEDICATIONS?: YES

## 2025-07-07 SDOH — ECONOMIC STABILITY: GENERAL

## 2025-07-07 SDOH — ECONOMIC STABILITY: HOUSING INSECURITY
IN THE LAST 12 MONTHS, WAS THERE A TIME WHEN YOU DID NOT HAVE A STEADY PLACE TO SLEEP OR SLEPT IN A SHELTER (INCLUDING NOW)?: NO

## 2025-07-07 SDOH — ECONOMIC STABILITY: TRANSPORTATION INSECURITY
IN THE PAST 12 MONTHS, HAS THE LACK OF TRANSPORTATION KEPT YOU FROM MEDICAL APPOINTMENTS OR FROM GETTING MEDICATIONS?: YES

## 2025-07-07 SDOH — ECONOMIC STABILITY: HOUSING INSECURITY: IN THE LAST 12 MONTHS, HOW MANY PLACES HAVE YOU LIVED?: 1

## 2025-07-07 SDOH — ECONOMIC STABILITY: TRANSPORTATION INSECURITY

## 2025-07-07 SDOH — ECONOMIC STABILITY: INCOME INSECURITY: IN THE LAST 12 MONTHS, WAS THERE A TIME WHEN YOU WERE NOT ABLE TO PAY THE MORTGAGE OR RENT ON TIME?: NO

## 2025-07-07 SDOH — ECONOMIC STABILITY: FOOD INSECURITY

## 2025-07-07 SDOH — ECONOMIC STABILITY: HOUSING INSECURITY: IN THE LAST 12 MONTHS, WAS THERE A TIME WHEN YOU WERE NOT ABLE TO PAY THE MORTGAGE OR RENT ON TIME?: NO

## 2025-07-07 SDOH — ECONOMIC STABILITY: FOOD INSECURITY
WITHIN THE PAST 12 MONTHS, YOU WORRIED THAT YOUR FOOD WOULD RUN OUT BEFORE YOU GOT THE MONEY TO BUY MORE.: SOMETIMES TRUE

## 2025-07-07 SDOH — ECONOMIC STABILITY: TRANSPORTATION INSECURITY
IN THE PAST 12 MONTHS, HAS LACK OF TRANSPORTATION KEPT YOU FROM MEETINGS, WORK, OR FROM GETTING THINGS NEEDED FOR DAILY LIVING?: YES

## 2025-07-07 ASSESSMENT — COGNITIVE AND FUNCTIONAL STATUS - GENERAL
MOBILITY SCORE: 24
DAILY ACTIVITIY SCORE: 24

## 2025-07-07 ASSESSMENT — PAIN - FUNCTIONAL ASSESSMENT: PAIN_FUNCTIONAL_ASSESSMENT: 0-10

## 2025-07-07 ASSESSMENT — PAIN SCALES - GENERAL
PAINLEVEL_OUTOF10: 0 - NO PAIN

## 2025-07-07 ASSESSMENT — ACTIVITIES OF DAILY LIVING (ADL): LACK_OF_TRANSPORTATION: YES

## 2025-07-07 ASSESSMENT — PAIN SCALES - WONG BAKER
WONGBAKER_NUMERICALRESPONSE: NO HURT
WONGBAKER_NUMERICALRESPONSE: NO HURT

## 2025-07-07 ASSESSMENT — SOCIAL DETERMINANTS OF HEALTH (SDOH): IN THE PAST 12 MONTHS, HAS THE ELECTRIC, GAS, OIL, OR WATER COMPANY THREATENED TO SHUT OFF SERVICE IN YOUR HOME?: YES

## 2025-07-07 NOTE — PROGRESS NOTES
"Temo Hanson is a 74 y.o. male on day 1 of admission presenting with UTI (urinary tract infection).    Subjective   Admitted for UTI       Objective   Vitals:    07/07/25 1115   BP: 119/64   Pulse: 63   Resp: 19   Temp: 36.1 °C (97 °F)   SpO2: 95%       Physical Exam  Constitutional:       Appearance: Normal appearance.   Eyes:      Pupils: Pupils are equal, round, and reactive to light.   Cardiovascular:      Rate and Rhythm: Normal rate.   Pulmonary:      Effort: Pulmonary effort is normal. No respiratory distress.   Abdominal:      General: There is no distension.      Palpations: Abdomen is soft.      Comments: Incision clean, dry, intact   Musculoskeletal:         General: Normal range of motion.      Right lower leg: No edema.      Left lower leg: No edema.   Skin:     General: Skin is warm and dry.   Neurological:      General: No focal deficit present.      Mental Status: He is alert and oriented to person, place, and time.   Psychiatric:         Mood and Affect: Mood normal.         Behavior: Behavior normal.         Last Recorded Vitals  Blood pressure 119/64, pulse 63, temperature 36.1 °C (97 °F), temperature source Temporal, resp. rate 19, height 1.854 m (6' 0.99\"), weight 77.3 kg (170 lb 8 oz), SpO2 95%.  Intake/Output last 3 Shifts:  I/O last 3 completed shifts:  In: 960 (12.4 mL/kg) [P.O.:860; IV Piggyback:100]  Out: 950 (12.3 mL/kg) [Urine:950 (0.3 mL/kg/hr)]  Weight: 77.3 kg     Relevant Results  Lab Results   Component Value Date    WBC 6.3 07/07/2025    HGB 8.8 (L) 07/07/2025    HCT 28.5 (L) 07/07/2025    MCV 99 07/07/2025    PLT 78 (L) 07/07/2025     Lab Results   Component Value Date    GLUCOSE 126 (H) 07/07/2025    CALCIUM 8.7 07/07/2025     07/07/2025    K 4.4 07/07/2025    CO2 22 07/07/2025     07/07/2025    BUN 34 (H) 07/07/2025    CREATININE 1.72 (H) 07/07/2025     [Held by provider] amLODIPine, 10 mg, oral, Daily  carvedilol, 12.5 mg, oral, BID  cholecalciferol, 50 mcg, oral, " Daily  gabapentin, 200 mg, oral, Daily  heparin (porcine), 5,000 Units, subcutaneous, q8h KASSY  insulin glargine, 15 Units, subcutaneous, Daily  insulin lispro, 0-10 Units, subcutaneous, TID AC  insulin lispro, 5 Units, subcutaneous, TID AC  metoclopramide, 5 mg, oral, BID  [Held by provider] mycophenolate, 180 mg, oral, BID  pantoprazole, 40 mg, oral, BID AC  piperacillin-tazobactam, 3.375 g, intravenous, q6h  predniSONE, 5 mg, oral, Daily  rosuvastatin, 10 mg, oral, Nightly  tacrolimus, 0.5 mg, oral, BID  tacrolimus, 3 mg, oral, BID      Assessment & Plan  UTI (urinary tract infection)      Kidney allograft function   Mild ALLEY, has UTI   US ok   Start flomax   Zosyn, culture pending   Send for DSA, allosure    Immunosuppression   Tac 3.5/3.5, increase to 4/4, Goal 5-8   MMF on hold (UTI and BK)   Pred 5    PPX   HSQ    I spent 35 minutes in the professional and overall care of this patient.      Mike Zamora MD     No

## 2025-07-07 NOTE — PROGRESS NOTES
"Temo Hanson is a 74 y.o. male on day 1 of admission presenting with UTI (urinary tract infection).    No acute events overnight.   Pt feels better this AM. Denies any LUTS.  Cr slightly up on today AM labs, 1.7.    Scheduled medications  [Held by provider] amLODIPine, 10 mg, oral, Daily  carvedilol, 12.5 mg, oral, BID  cholecalciferol, 50 mcg, oral, Daily  gabapentin, 200 mg, oral, Daily  heparin (porcine), 5,000 Units, subcutaneous, q8h KASSY  insulin glargine, 15 Units, subcutaneous, Daily  insulin lispro, 0-10 Units, subcutaneous, TID AC  insulin lispro, 5 Units, subcutaneous, TID AC  metoclopramide, 5 mg, oral, BID  [Held by provider] mycophenolate, 180 mg, oral, BID  pantoprazole, 40 mg, oral, BID AC  piperacillin-tazobactam, 3.375 g, intravenous, q6h  predniSONE, 5 mg, oral, Daily  rosuvastatin, 10 mg, oral, Nightly  tacrolimus, 0.5 mg, oral, BID  tacrolimus, 3 mg, oral, BID  tamsulosin, 0.4 mg, oral, Daily      Continuous medications  sodium chloride 0.9%, 75 mL/hr, Last Rate: 75 mL/hr (07/07/25 0928)      PRN medications  PRN medications: dextrose, dextrose, dextrose, dextrose, glucagon, glucagon, glucagon, glucagon, lubricating eye drops      Last Recorded Vitals  Blood pressure 119/64, pulse 63, temperature 36.1 °C (97 °F), temperature source Temporal, resp. rate 19, height 1.854 m (6' 0.99\"), weight 77.3 kg (170 lb 8 oz), SpO2 95%.  Intake/Output last 3 Shifts:  I/O last 3 completed shifts:  In: 960 (12.4 mL/kg) [P.O.:860; IV Piggyback:100]  Out: 950 (12.3 mL/kg) [Urine:950 (0.3 mL/kg/hr)]  Weight: 77.3 kg     A&ox3, no distress, pleasant  MMM, no lesions  Lungs with equal air entry, no added sounds  Rrr, no m/r/g  Abd soft, nt, nd  No allograft tenderness  No edema b/l    Results for orders placed or performed during the hospital encounter of 07/06/25 (from the past 24 hours)   Stool Pathogen Panel, PCR    Specimen: Stool   Result Value Ref Range    Campylobacter Group Not Detected Not Detected    " Salmonella species Not Detected Not Detected    Shigella species Not Detected Not Detected    Vibrio Group Not Detected Not Detected    Yersinia Enterocolitica Not Detected Not Detected    Shiga Toxin 1 Not Detected Not Detected, Indeterminate    Shiga Toxin 2 Not Detected Not Detected, Indeterminate    Norovirus GI/GII Not Detected Not Detected    Rotavirus A Not Detected Not Detected   C. difficile, PCR    Specimen: Stool   Result Value Ref Range    C. difficile, PCR Not Detected Not Detected   POCT GLUCOSE   Result Value Ref Range    POCT Glucose 236 (H) 74 - 99 mg/dL   POCT GLUCOSE   Result Value Ref Range    POCT Glucose 268 (H) 74 - 99 mg/dL   POCT GLUCOSE   Result Value Ref Range    POCT Glucose 208 (H) 74 - 99 mg/dL   Renal Function Panel   Result Value Ref Range    Glucose 126 (H) 74 - 99 mg/dL    Sodium 136 136 - 145 mmol/L    Potassium 4.4 3.5 - 5.3 mmol/L    Chloride 107 98 - 107 mmol/L    Bicarbonate 22 21 - 32 mmol/L    Anion Gap 11 10 - 20 mmol/L    Urea Nitrogen 34 (H) 6 - 23 mg/dL    Creatinine 1.72 (H) 0.50 - 1.30 mg/dL    eGFR 41 (L) >60 mL/min/1.73m*2    Calcium 8.7 8.6 - 10.6 mg/dL    Phosphorus 3.1 2.5 - 4.9 mg/dL    Albumin 3.2 (L) 3.4 - 5.0 g/dL   CBC and Auto Differential   Result Value Ref Range    WBC 6.3 4.4 - 11.3 x10*3/uL    nRBC 0.0 0.0 - 0.0 /100 WBCs    RBC 2.88 (L) 4.50 - 5.90 x10*6/uL    Hemoglobin 8.8 (L) 13.5 - 17.5 g/dL    Hematocrit 28.5 (L) 41.0 - 52.0 %    MCV 99 80 - 100 fL    MCH 30.6 26.0 - 34.0 pg    MCHC 30.9 (L) 32.0 - 36.0 g/dL    RDW 14.6 (H) 11.5 - 14.5 %    Platelets 78 (L) 150 - 450 x10*3/uL    Immature Granulocytes %, Automated 0.5 0.0 - 0.9 %    Immature Granulocytes Absolute, Automated 0.03 0.00 - 0.50 x10*3/uL   Tacrolimus level   Result Value Ref Range    Tacrolimus  4.4 <=15.0 ng/mL   Magnesium   Result Value Ref Range    Magnesium 2.21 1.60 - 2.40 mg/dL   Manual Differential   Result Value Ref Range    Neutrophils %, Manual 83.1 40.0 - 80.0 %    Bands %,  Manual 0.0 0.0 - 5.0 %    Lymphocytes %, Manual 9.3 13.0 - 44.0 %    Monocytes %, Manual 7.6 2.0 - 10.0 %    Eosinophils %, Manual 0.0 0.0 - 6.0 %    Basophils %, Manual 0.0 0.0 - 2.0 %    Atypical Lymphocytes %, Manual 0.0 0.0 - 2.0 %    Metamyelocytes %, Manual 0.0 0.0 - 0.0 %    Myelocytes %, Manual 0.0 0.0 - 0.0 %    Plasma Cells %, Manual 0.0 0.00 - 0.00 %    Promyelocytes %, Manual 0.0 0.0 - 0.0 %    Blasts %, Manual 0.0 0.0 - 0.0 %    Seg Neutrophils Absolute, Manual 5.24 (H) 1.60 - 5.00 x10*3/uL    Bands Absolute, Manual 0.00 0.00 - 0.50 x10*3/uL    Lymphocytes Absolute, Manual 0.59 (L) 0.80 - 3.00 x10*3/uL    Monocytes Absolute, Manual 0.48 0.05 - 0.80 x10*3/uL    Eosinophils Absolute, Manual 0.00 0.00 - 0.40 x10*3/uL    Basophils Absolute, Manual 0.00 0.00 - 0.10 x10*3/uL    Atypical Lymphs Absolute, Manual 0.00 0.00 - 0.30 x10*3/uL    Metamyelocytes Absolute, Manual 0.00 0.00 - 0.00 x10*3/uL    Myelocytes Absolute, Manual 0.00 0.00 - 0.00 x10*3/uL    Plasma Cells Absolute, Manual 0.00 0.00 - 0.00 x10*3/uL    Promyelocytes Absolute, Manual 0.00 0.00 - 0.00 x10*3/uL    Blasts Absolute, Manual 0.00 0.00 - 0.00 x10*3/uL    Total Cells Counted 118     Neutrophils Absolute, Manual 5.24 1.60 - 5.50 x10*3/uL    Manual nRBC per 100 Cells 0.0 0.0 - 0.0 %    RBC Morphology No significant RBC morphology present    POCT GLUCOSE   Result Value Ref Range    POCT Glucose 146 (H) 74 - 99 mg/dL   POCT GLUCOSE   Result Value Ref Range    POCT Glucose 151 (H) 74 - 99 mg/dL     *Note: Due to a large number of results and/or encounters for the requested time period, some results have not been displayed. A complete set of results can be found in Results Review.           Assessment & Plan      74 y.o. 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 Zamora with a KDPI of 93% and PRA of 54%. Donor was Hepc -/-and has not met risk factors. EBV + /+. Left donor kidney transplanted to patient right pelvis. Admission  weight is 72.7 kg (discharge weight is 76.4 kg ). Pt received a total of 3 mg/kg total of thymoglobulin induction therapy in conjunction with 500mg IV solumedrol. Pt was initiated on Tacrolimus & Cellcept immunosuppression in conjunction with IV solumedrol taper. Pt was started on valcyte (CMV D - / R + ) and bactrim for CMV & PCP prophylaxis per protocol. He was started on clotrimazole as antifungal coverage per protocol. Operative course was uncomplicated. Hospital course was uncomplicated.      Post-txp course notable for admission to [facility] with DKA, UTI, norovirus diarrhea, FTT sec to achalasia required PEG tube placement. H/o BK viremia.     Currently admitted with UTI. On abx (Zosyn). Ucx pending.    Allograft function: s/p DDKT 12/21/24  - baseline Cr ~1 mg/dl  - recent labs with mild ALLEY, cr up to 1.7 this AM. Check Allosure, DSA with next labs  - renal US unremarkable. maintain adequate hydration  - metabolic indices acceptable.   - HTN/Volume: Bps stable. No hypervolemia on exam  - Anemia: Hb trending down slowly, 8.8 today. Check iron panel, ferritin with next labs. Consider JONE if Hb remains <9.  - CKD-MBD: Ca, phos acceptable. PTH wnl 4/2025  - dose meds for CrCl. Avoid potential nephrotoxins.     Immunosuppression:  - tac incr to 4mg bid 7/7. Last Fk 4.4 (below goal,5-8).   - Myfortic on hold sec to infection (UTI, BK). Cont pred 5 mg/d    ID: UTI  - Ucx 7/5 with E coli, susceptibility pending.  - on Zosyn  - BK viremia: last PCR ~5k. Monitor weekly. MPA on hold.    Will follow    Beka Nichols MD

## 2025-07-07 NOTE — SIGNIFICANT EVENT
02/18/25 1130   Patient Interaction   Organ Kidney   Type of Interaction Morning rounds   Interdisciplinary Rounds   Attendance Surgeon;Physician;FILI;Pharmacist;Nurse   Topics Discussed Diet;Home care needs;Medications;Blood test results;Discharge preparation;Activity     Transplant Surgery Multidisciplinary Team Note    Temo Hanson is a 74 y.o. male   s/p DDKT 12/21/24 readmitted for UTI.     24 Hour Events  1. ESTRELLITA    Last Recorded Vitals  Visit Vitals  /64 (BP Location: Right arm, Patient Position: Lying)   Pulse 63   Temp 36.1 °C (97 °F) (Temporal)   Resp 19      Intake/Output last 3 Shifts:    Intake/Output Summary (Last 24 hours) at 7/7/2025 1525  Last data filed at 7/7/2025 1321  Gross per 24 hour   Intake 430 ml   Output 850 ml   Net -420 ml      Vitals:    07/07/25 0600   Weight: 77.3 kg (170 lb 8 oz)        Assessment/Plan     Kidney allograft function  -ALLEY      ID   -E.Coli UTI. Awaiting susceptibilities.    -Zosyn       Principal Problem:    Management after organ transplant  Active Problems:  Patient Active Problem List   Diagnosis    S/P laparoscopic cholecystectomy    Abdominal pain    Achalasia    Acute lower UTI    Microcytic anemia    Complete heart block    Callus of foot    Chronic periodontitis, unspecified    Stage 5 chronic kidney disease (Multi)    Closed fracture of metatarsal bone    Crushing injury of foot    Diabetes mellitus (Multi)    Diarrhea    Dysphagia    ED (erectile dysfunction)    Essential hypertension    Foot pain, right    GIB (gastrointestinal bleeding)    Hepatitis B core antibody positive    Hyperkalemia    Ingrowing nail    Iron deficiency anemia secondary to inadequate dietary iron intake    Long toenail    Malignant neoplasm of prostate (Multi)    Onychomycosis    Pheochromocytoma of right adrenal gland    Pneumonia of right lower lobe due to infectious organism    Productive cough    Pure hypercholesterolemia    Stricture esophagus    SVT (supraventricular  tachycardia)    Type 2 diabetes mellitus with diabetic neuropathy (Multi)    Preop cardiovascular exam    Third degree heart block    Pericardial effusion (HHS-HCC)    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 (Multi)    Uremia    Cardiac tamponade    Cardiac pacemaker in situ    ESRD (end stage renal disease) (Multi)    Type 2 diabetes mellitus, with long-term current use of insulin    Dehydration    Alactasia syndrome    Type 2 diabetes mellitus with hyperglycemia, with long-term current use of insulin    On tube feeding diet    Kidney transplant recipient ([facility])    DKA, type 2, not at goal    BK viremia    Pancytopenia    UTI (urinary tract infection)        Immunosuppression reviewed and adjusted          Tacrolimus goal 8-10 ng/mL. Current dose 4 mg BID                Myfortic on hold              Prednisone 5mg daily   DVT prophylaxis SCDS and subcutaneous heparin 5000 TID  PT/OT recommending SNF.   Diet:  carb controlled diet  Anticipated discharge 1-2 days     Deyanira Bloom, APRN-CNP

## 2025-07-07 NOTE — PROGRESS NOTES
"Music Therapy Note        Therapy Session  Referral Type: New referral this admission  Visit Type: New visit  Session Start Time: 1335  Session End Time: 1345  Intervention Delivery: In-person  Conflict of Service: None  Number of family members present: 1  Family Present for Session: Other (Comment)  Family Participation: Disengaged  Number of staff members present: 0     Pre-assessment  Unable to Assess Reason: Outcomes not assessed  Mood/Affect: Appropriate  Verbalized Emotional State: Acceptance         Treatment/Interventions  Music Therapy Interventions: Assessment    Post-assessment  Unable to Assess Reason: Did not provide expressive therapy intervention  Continue Visiting: No  Total Session Time (min): 10 minutes    Narrative  Assessment Detail: Patient found sitting up in bed; family present in the room. Receptive to education and benefits of music therapy services. Patient enjoys Reggae and Soca, Blues music. Currently listens to music for comfort; \"music is alwasy with me.\" Stated he has his phone and listens to music on it most days. Declined music therapy services; stated he had his phone and is doing okay. MT left contact info for further need.  Follow-up: Will sign off for now    Education Documentation  No documentation found.          "

## 2025-07-07 NOTE — CARE PLAN
The patient's goals for the shift include pt will remain comfortable throughout shift     The clinical goals for the shift include pt will remain HDS throughout sift       Problem: Pain - Adult  Goal: Verbalizes/displays adequate comfort level or baseline comfort level  Outcome: Progressing     Problem: Safety - Adult  Goal: Free from fall injury  Outcome: Progressing     Problem: Chronic Conditions and Co-morbidities  Goal: Patient's chronic conditions and co-morbidity symptoms are monitored and maintained or improved  Outcome: Progressing     Problem: Fall/Injury  Goal: Be free from injury by end of the shift  Outcome: Progressing

## 2025-07-07 NOTE — CONSULTS
INPATIENT INITIAL TRANSPLANT NEPHROLOGY CONSULT          SERVICE DATE: 2025   SERVICE TIME:  3:26 AM    REASON FOR CONSULT:  Immunosuppressive medication management and nephrology related issues.    REQUESTING PHYSICIAN: Sherly Kang MD  PRIMARY CARE PHYSICIAN: Shledon Hinson MD    ADMISSION DIAGNOSIS:   1. UTI (urinary tract infection)        TRANSPLANT DATE: 2024 (Kidney)    BLOOD TYPE: O    HPI:    Mr. Hanson is a 74 y.o. 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 Zamora with a KDPI of 93% and PRA of 54%. Donor was Hepc -/-and has not met risk factors. EBV + /+. Left donor kidney transplanted to patient right pelvis. Admission weight is 72.7 kg (discharge weight is 76.4 kg ). Pt received a total of 3 mg/kg total of thymoglobulin induction therapy in conjunction with 500mg IV solumedrol. Pt was initiated on Tacrolimus & Cellcept immunosuppression in conjunction with IV solumedrol taper. Pt was started on valcyte (CMV D - / R + ) and bactrim for CMV & PCP prophylaxis per protocol. He was started on clotrimazole as antifungal coverage per protocol. Operative course was uncomplicated. Hospital course was uncomplicated.       Patient [facility] to [facility] as a direct admit for concerns of UTI. Patient was seen at [facility] ED on 2025 for urinary urgency and frequency, endorsing the urge to urinate every 10 minutes. Patient also endorses 3-4 episodes of loose bowel movements during the day. Patient endorses usually drinking 3 bottles of water a day and urinating about 3 cups a day. Patient denies headaches, chest pain, SOB, abdominal pain, N/V.      RFP is significant for increased Cr (1.36 from 1.08) and Mg of 1.35. UA is positive for leukocyte esterase and nitrates. Urine culture is pending.      A 12-point ROS was performed and was unremarkable except as  above.    Transplant nephrology is consulted to assist with immunosuppressive medication management and nephrology related issues.      REVIEW OF SYSTEM:  Review of system was done system by system (10/14). Apart from HPI, other symptoms were negative.    PAST MEDICAL HISTORY:  Medical History[1]     PAST SURGICAL HISTORY:  Surgical History[2]     SOCIAL HISTORY:  Social History     Socioeconomic History    Marital status:      Spouse name: Not on file    Number of children: Not on file    Years of education: Not on file    Highest education level: Not on file   Occupational History    Not on file   Tobacco Use    Smoking status: Never    Smokeless tobacco: Never   Vaping Use    Vaping status: Never Used   Substance and Sexual Activity    Alcohol use: Never    Drug use: Never    Sexual activity: Defer   Other Topics Concern    Not on file   Social History Narrative    Not on file     Social Drivers of Health     Financial Resource Strain: Low Risk  (7/6/2025)    Overall Financial Resource Strain (CARDIA)     Difficulty of Paying Living Expenses: Not hard at all   Food Insecurity: No Food Insecurity (7/6/2025)    Hunger Vital Sign     Worried About Running Out of Food in the Last Year: Never true     Ran Out of Food in the Last Year: Never true   Transportation Needs: No Transportation Needs (7/6/2025)    PRAPARE - Transportation     Lack of Transportation (Medical): No     Lack of Transportation (Non-Medical): No   Physical Activity: Insufficiently Active (7/6/2025)    Exercise Vital Sign     Days of Exercise per Week: 4 days     Minutes of Exercise per Session: 30 min   Stress: Patient Declined (2/9/2025)    [employer] [place] [employer] - Occupational Stress Questionnaire     Feeling of Stress : Patient declined   Social Connections: Feeling Socially Integrated (3/11/2025)    OASIS : Social Isolation     Frequency of experiencing loneliness or isolation: Never   Intimate Partner Violence:  "Not At Risk (7/6/2025)    Humiliation, Afraid, Rape, and Kick questionnaire     Fear of Current or Ex-Partner: No     Emotionally Abused: No     Physically Abused: No     Sexually Abused: No   Housing Stability: Low Risk  (7/6/2025)    Housing Stability Vital Sign     Unable to Pay for Housing in the Last Year: No     Number of Times Moved in the Last Year: 0     Homeless in the Last Year: No       FAMILY HISTORY:  Family History[3]    MEDICATION LIST:  [Held by provider] amLODIPine, 10 mg, Daily  carvedilol, 12.5 mg, BID  cholecalciferol, 50 mcg, Daily  gabapentin, 200 mg, Daily  heparin (porcine), 5,000 Units, q8h KASSY  insulin glargine, 15 Units, Daily  insulin lispro, 0-10 Units, TID AC  insulin lispro, 5 Units, TID AC  metoclopramide, 5 mg, BID  [Held by provider] mycophenolate, 180 mg, BID  pantoprazole, 40 mg, BID AC  piperacillin-tazobactam, 3.375 g, q6h  predniSONE, 5 mg, Daily  rosuvastatin, 10 mg, Nightly  tacrolimus, 0.5 mg, BID  tacrolimus, 3 mg, BID      sodium chloride 0.9%, Last Rate: 75 mL/hr (07/07/25 0141)      dextrose, 12.5 g, q15 min PRN  dextrose, 12.5 g, q15 min PRN  dextrose, 25 g, q15 min PRN  dextrose, 25 g, q15 min PRN  glucagon, 1 mg, q15 min PRN  glucagon, 1 mg, q15 min PRN  glucagon, 1 mg, q15 min PRN  glucagon, 1 mg, q15 min PRN  lubricating eye drops, 2 drop, PRN        ALLERGY:  Allergies[4]    PHYCISCAL EXAMINATION:  Visit Vitals  /72 (BP Location: Right arm, Patient Position: Lying)   Pulse 89   Temp 36.8 °C (98.2 °F) (Temporal)   Resp 18   Ht 1.854 m (6' 0.99\")   Wt 75.4 kg (166 lb 4.8 oz)   SpO2 97%   BMI 21.95 kg/m²   Smoking Status Never   BSA 1.97 m²        07/05 0700 - 07/06 1859  In: 910 [P.O.:860]  Out: 300 [Urine:300]       General Appearance - NAD, Good speech, oriented and alert  HEENT - Supple. Not pale. No jaundice. No cervical lymphadenopathy. Pharynx and tonsils are not injected.  CVS - RRR. Normal S1/S2. No murmur, click , rub or gallop  Lungs- clear to " auscultation bilaterally  Abdomen - soft , not tender, no guarding, no rigidity. No hepatosplenomegaly. Normal bowel sounds. No masses and ascites. S/P Kidney transplant .  Transplanted kidney is not tender.   Musculoskeletal /Extremities - no edema. Full ROM. No joint tenderness.   Neuro/Psych - appropriate mood and affect. Motor power V/V all extremities. CN I -XII were grossly intact.  Skin - No visible rash    LABS:  Results for orders placed or performed during the hospital encounter of 07/06/25 (from the past 24 hours)   Renal Function Panel   Result Value Ref Range    Glucose 80 74 - 99 mg/dL    Sodium 136 136 - 145 mmol/L    Potassium 4.3 3.5 - 5.3 mmol/L    Chloride 107 98 - 107 mmol/L    Bicarbonate 22 21 - 32 mmol/L    Anion Gap 11 10 - 20 mmol/L    Urea Nitrogen 29 (H) 6 - 23 mg/dL    Creatinine 1.57 (H) 0.50 - 1.30 mg/dL    eGFR 46 (L) >60 mL/min/1.73m*2    Calcium 8.6 8.6 - 10.6 mg/dL    Phosphorus 3.1 2.5 - 4.9 mg/dL    Albumin 3.2 (L) 3.4 - 5.0 g/dL   CBC and Auto Differential   Result Value Ref Range    WBC 9.3 4.4 - 11.3 x10*3/uL    nRBC 0.0 0.0 - 0.0 /100 WBCs    RBC 3.09 (L) 4.50 - 5.90 x10*6/uL    Hemoglobin 9.7 (L) 13.5 - 17.5 g/dL    Hematocrit 28.1 (L) 41.0 - 52.0 %    MCV 91 80 - 100 fL    MCH 31.4 26.0 - 34.0 pg    MCHC 34.5 32.0 - 36.0 g/dL    RDW 14.5 11.5 - 14.5 %    Platelets 70 (L) 150 - 450 x10*3/uL    Immature Granulocytes %, Automated 1.1 (H) 0.0 - 0.9 %    Immature Granulocytes Absolute, Automated 0.10 0.00 - 0.50 x10*3/uL   Tacrolimus level   Result Value Ref Range    Tacrolimus  3.0 <=15.0 ng/mL   Magnesium   Result Value Ref Range    Magnesium 1.71 1.60 - 2.40 mg/dL   Manual Differential   Result Value Ref Range    Neutrophils %, Manual 81.8 40.0 - 80.0 %    Bands %, Manual 10.4 0.0 - 5.0 %    Lymphocytes %, Manual 6.1 13.0 - 44.0 %    Monocytes %, Manual 1.7 2.0 - 10.0 %    Eosinophils %, Manual 0.0 0.0 - 6.0 %    Basophils %, Manual 0.0 0.0 - 2.0 %    Atypical Lymphocytes %,  Manual 0.0 0.0 - 2.0 %    Metamyelocytes %, Manual 0.0 0.0 - 0.0 %    Myelocytes %, Manual 0.0 0.0 - 0.0 %    Plasma Cells %, Manual 0.0 0.00 - 0.00 %    Promyelocytes %, Manual 0.0 0.0 - 0.0 %    Blasts %, Manual 0.0 0.0 - 0.0 %    Seg Neutrophils Absolute, Manual 7.61 (H) 1.60 - 5.00 x10*3/uL    Bands Absolute, Manual 0.97 (H) 0.00 - 0.50 x10*3/uL    Lymphocytes Absolute, Manual 0.57 (L) 0.80 - 3.00 x10*3/uL    Monocytes Absolute, Manual 0.16 0.05 - 0.80 x10*3/uL    Eosinophils Absolute, Manual 0.00 0.00 - 0.40 x10*3/uL    Basophils Absolute, Manual 0.00 0.00 - 0.10 x10*3/uL    Atypical Lymphs Absolute, Manual 0.00 0.00 - 0.30 x10*3/uL    Metamyelocytes Absolute, Manual 0.00 0.00 - 0.00 x10*3/uL    Myelocytes Absolute, Manual 0.00 0.00 - 0.00 x10*3/uL    Plasma Cells Absolute, Manual 0.00 0.00 - 0.00 x10*3/uL    Promyelocytes Absolute, Manual 0.00 0.00 - 0.00 x10*3/uL    Blasts Absolute, Manual 0.00 0.00 - 0.00 x10*3/uL    Total Cells Counted 115     Neutrophils Absolute, Manual 8.58 (H) 1.60 - 5.50 x10*3/uL    Manual nRBC per 100 Cells 0.0 0.0 - 0.0 %    RBC Morphology No significant RBC morphology present    POCT GLUCOSE   Result Value Ref Range    POCT Glucose 90 74 - 99 mg/dL   POCT GLUCOSE   Result Value Ref Range    POCT Glucose 180 (H) 74 - 99 mg/dL   Stool Pathogen Panel, PCR    Specimen: Stool   Result Value Ref Range    Campylobacter Group Not Detected Not Detected    Salmonella species Not Detected Not Detected    Shigella species Not Detected Not Detected    Vibrio Group Not Detected Not Detected    Yersinia Enterocolitica Not Detected Not Detected    Shiga Toxin 1 Not Detected Not Detected, Indeterminate    Shiga Toxin 2 Not Detected Not Detected, Indeterminate    Norovirus GI/GII Not Detected Not Detected    Rotavirus A Not Detected Not Detected   C. difficile, PCR    Specimen: Stool   Result Value Ref Range    C. difficile, PCR Not Detected Not Detected   POCT GLUCOSE   Result Value Ref Range     POCT Glucose 236 (H) 74 - 99 mg/dL   POCT GLUCOSE   Result Value Ref Range    POCT Glucose 268 (H) 74 - 99 mg/dL   POCT GLUCOSE   Result Value Ref Range    POCT Glucose 208 (H) 74 - 99 mg/dL     *Note: Due to a large number of results and/or encounters for the requested time period, some results have not been displayed. A complete set of results can be found in Results Review.        ASSESSMENT AND PLAN:  Mr. Hanson is a 74 y.o. male who underwent a kidney transplant surgery on [unfilled] and was hospitalized for:    Principal Problem:    UTI (urinary tract infection)      1. ALLEY S/P Kidney transplant. Likely from pre renal + UTI  - Renal allograft function:   Lab Results   Component Value Date    CREATININE 1.57 (H) 07/06/2025     Estimated Creatinine Clearance: 44 mL/min (A) (by C-G formula based on SCr of 1.57 mg/dL (H)).    Intake/Output Summary (Last 24 hours) at 7/7/2025 0326  Last data filed at 7/7/2025 0043  Gross per 24 hour   Intake 960 ml   Output 825 ml   Net 135 ml       -Baseline Cr  ~1  - Hydrate and treat UTI    - Continue to monitor UOP and Serum creatinine closely.   - Avoid nephrotoxic agents, NSAIDs and IV contrast   - Strict I/O.   - Renally dose all medications by the most recent CrCl from Cockcroft-Gault formula.    2. Immunosuppression   Hold myfortic  Continue tac and prednisone    3. Anemia and WBC   Lab Results   Component Value Date    WBC 9.3 07/06/2025    HGB 9.7 (L) 07/06/2025    HCT 28.1 (L) 07/06/2025    MCV 91 07/06/2025    PLT 70 (L) 07/06/2025     -Continue to monitor Hgb   -No indications for PRBC transfusion     4. Electrolyte   Lab Results   Component Value Date    GLUCOSE 80 07/06/2025    CALCIUM 8.6 07/06/2025     07/06/2025    K 4.3 07/06/2025    CO2 22 07/06/2025     07/06/2025    BUN 29 (H) 07/06/2025    CREATININE 1.57 (H) 07/06/2025     - Reviewed renal profile.     6. Hypertension   Blood Pressures         7/6/2025  0819 7/6/2025  1104 7/6/2025  1534  7/6/2025 2047 7/7/2025 0043    BP: 130/63 138/57 127/62 125/64 148/72          - BP had been around   -Goal BP < 140/90 mmHg   -continue current management     7. Diarrhea  Check c. diff    8.  GI prophylaxis   - On PPI     9. DVT Prophylaxis  -Defer to primary team    10. UTI  Zosyn  Follow culture    11. ID prophylaxis  Per protocol      * Case was discussed with primary team.  For questions, please contact transplant nephrology page x [phone].    Bashir Angela MD    Transplant Nephrologist         [1]   Past Medical History:  Diagnosis Date    Chronic kidney disease     DM (diabetes mellitus) (Multi)     Fistula     HTN (hypertension)     Other specified abnormal immunological findings in serum 10/11/2021    Hepatitis B core antibody positive    Personal history of malignant neoplasm of prostate     History of malignant neoplasm of prostate   [2]   Past Surgical History:  Procedure Laterality Date    CARDIAC CATHETERIZATION N/A 4/18/2024    Procedure: Pericardiocentesis;  Surgeon: Jose Brunson MD;  Location: [place] [address] Cardiac Cath Lab;  Service: Cardiovascular;  Laterality: N/A;    CARDIAC ELECTROPHYSIOLOGY PROCEDURE N/A 7/17/2024    Procedure: Leadless PPM Implant (20912);  Surgeon: Sheldon De La Torre MD;  Location: [facility] Cardiac Cath Lab;  Service: Electrophysiology;  Laterality: N/A;  8:00, [employer]    CHOLECYSTECTOMY  10/23/2023    Lap Cholecystectomy    OTHER SURGICAL HISTORY  09/29/2021    Cyst excision    OTHER SURGICAL HISTORY  09/29/2021    Arteriovenous fistula creation procedure    OTHER SURGICAL HISTORY  09/29/2021    Prostate surgery    OTHER SURGICAL HISTORY  09/29/2021    Colonoscopy   [3]   Family History  Problem Relation Name Age of Onset    Diabetes Sister      Diabetes Brother     [4]   Allergies  Allergen Reactions    Terazosin Unknown     Did not feel well on this medication    Codeine Itching     itching    Tramadol Itching

## 2025-07-07 NOTE — PROGRESS NOTES
07/07/25 0849   Discharge Planning   Living Arrangements Spouse/significant other   Support Systems Spouse/significant other   Assistance Needed None   Type of Residence Private residence   Home or Post Acute Services None   Expected Discharge Disposition Home   Does the patient need discharge transport arranged? Yes   [facility] coordination needed? Yes   Has discharge transport been arranged? No   Financial Resource Strain   How hard is it for you to pay for the very basics like food, housing, medical care, and heating? Not very   Housing Stability   In the last 12 months, was there a time when you were not able to pay the mortgage or rent on time? N   Transportation Needs   In the past 12 months, has lack of transportation kept you from medical appointments or from getting medications? no     DC Planning:  Went in and met with the pt, confirmed demographics.     This TCC will continue to follow for home going needs and safe DC plan.    Makayla Haynes. PALMIRA. TCC.

## 2025-07-07 NOTE — PROGRESS NOTES
07/07/25 0852   Rapid Rounds   Attendance Provider;Nurse;Care Transitions   Expected Discharge Disposition Home   Today we still await: Clinical stability   Review at Escalation Rounds No escalation needed     Temo Hanson is a 74 y.o. male on day 1 of admission presenting with UTI (urinary tract infection).    Plan per Medical/Surgical team:   Waiting for cultures and ID rec's.      Discharge disposition: TBD    Potential Barriers: None    ADOD:  7/8    This TCC will continue to follow for home going needs and safe DC plan.    MIKE MORENO

## 2025-07-07 NOTE — CARE PLAN
Problem: Safety - Adult  Goal: Free from fall injury  Outcome: Progressing     Problem: Fall/Injury  Goal: Not fall by end of shift  Outcome: Progressing  Goal: Be free from injury by end of the shift  Outcome: Progressing  Goal: Verbalize understanding of personal risk factors for fall in the hospital  Outcome: Progressing  Goal: Verbalize understanding of risk factor reduction measures to prevent injury from fall in the home  Outcome: Progressing  Goal: Use assistive devices by end of the shift  Outcome: Progressing  Goal: Pace activities to prevent fatigue by end of the shift  Outcome: Progressing   The patient's goals for the shift include rest    The clinical goals for the shift include pt will remain HDS throughout shift    Over the shift, the patient did make progress toward the above goals.

## 2025-07-08 ENCOUNTER — PHARMACY VISIT (OUTPATIENT)
Dept: PHARMACY | Facility: CLINIC | Age: 75
End: 2025-07-08
Payer: COMMERCIAL

## 2025-07-08 ENCOUNTER — APPOINTMENT (OUTPATIENT)
Dept: DIALYSIS | Facility: HOSPITAL | Age: 75
End: 2025-07-08
Payer: COMMERCIAL

## 2025-07-08 VITALS
HEIGHT: 73 IN | OXYGEN SATURATION: 99 % | WEIGHT: 169.53 LBS | RESPIRATION RATE: 17 BRPM | DIASTOLIC BLOOD PRESSURE: 68 MMHG | BODY MASS INDEX: 22.47 KG/M2 | HEART RATE: 76 BPM | SYSTOLIC BLOOD PRESSURE: 144 MMHG | TEMPERATURE: 98.2 F

## 2025-07-08 LAB
ALBUMIN SERPL BCP-MCNC: 3.1 G/DL (ref 3.4–5)
ANION GAP SERPL CALC-SCNC: 11 MMOL/L (ref 10–20)
BACTERIA UR CULT: ABNORMAL
BASOPHILS # BLD MANUAL: 0 X10*3/UL (ref 0–0.1)
BASOPHILS NFR BLD MANUAL: 0 %
BKV DNA SERPL NAA+PROBE-ACNC: ABNORMAL IU/ML (ref ?–22)
BKV DNA SERPL NAA+PROBE-LOG#: 4.01 LOG IU/ML
BLASTS # BLD MANUAL: 0 X10*3/UL
BLASTS NFR BLD MANUAL: 0 %
BUN SERPL-MCNC: 27 MG/DL (ref 6–23)
BURR CELLS BLD QL SMEAR: ABNORMAL
CALCIUM SERPL-MCNC: 8.7 MG/DL (ref 8.6–10.6)
CHLORIDE SERPL-SCNC: 107 MMOL/L (ref 98–107)
CO2 SERPL-SCNC: 22 MMOL/L (ref 21–32)
CREAT SERPL-MCNC: 1.72 MG/DL (ref 0.5–1.3)
EGFRCR SERPLBLD CKD-EPI 2021: 41 ML/MIN/1.73M*2
EOSINOPHIL # BLD MANUAL: 0.03 X10*3/UL (ref 0–0.4)
EOSINOPHIL NFR BLD MANUAL: 0.8 %
ERYTHROCYTE [DISTWIDTH] IN BLOOD BY AUTOMATED COUNT: 14.7 % (ref 11.5–14.5)
GLUCOSE BLD MANUAL STRIP-MCNC: 173 MG/DL (ref 74–99)
GLUCOSE BLD MANUAL STRIP-MCNC: 76 MG/DL (ref 74–99)
GLUCOSE SERPL-MCNC: 70 MG/DL (ref 74–99)
HCT VFR BLD AUTO: 29.2 % (ref 41–52)
HGB BLD-MCNC: 8.8 G/DL (ref 13.5–17.5)
IMM GRANULOCYTES # BLD AUTO: 0.01 X10*3/UL (ref 0–0.5)
IMM GRANULOCYTES NFR BLD AUTO: 0.3 % (ref 0–0.9)
LABORATORY COMMENT REPORT: DETECTED
LYMPHOCYTES # BLD MANUAL: 0.4 X10*3/UL (ref 0.8–3)
LYMPHOCYTES NFR BLD MANUAL: 10.5 %
MAGNESIUM SERPL-MCNC: 2 MG/DL (ref 1.6–2.4)
MCH RBC QN AUTO: 30.8 PG (ref 26–34)
MCHC RBC AUTO-ENTMCNC: 30.1 G/DL (ref 32–36)
MCV RBC AUTO: 102 FL (ref 80–100)
METAMYELOCYTES # BLD MANUAL: 0 X10*3/UL
METAMYELOCYTES NFR BLD MANUAL: 0 %
MONOCYTES # BLD MANUAL: 0.52 X10*3/UL (ref 0.05–0.8)
MONOCYTES NFR BLD MANUAL: 13.7 %
MYELOCYTES # BLD MANUAL: 0 X10*3/UL
MYELOCYTES NFR BLD MANUAL: 0 %
NEUTROPHILS # BLD MANUAL: 2.85 X10*3/UL (ref 1.6–5.5)
NEUTS BAND # BLD MANUAL: 0.03 X10*3/UL (ref 0–0.5)
NEUTS BAND NFR BLD MANUAL: 0.8 %
NEUTS SEG # BLD MANUAL: 2.82 X10*3/UL (ref 1.6–5)
NEUTS SEG NFR BLD MANUAL: 74.2 %
NRBC BLD MANUAL-RTO: 0 % (ref 0–0)
NRBC BLD-RTO: 0 /100 WBCS (ref 0–0)
PHOSPHATE SERPL-MCNC: 3.6 MG/DL (ref 2.5–4.9)
PLASMA CELLS # BLD MANUAL: 0 X10*3/UL
PLASMA CELLS NFR BLD MANUAL: 0 %
PLATELET # BLD AUTO: 76 X10*3/UL (ref 150–450)
POTASSIUM SERPL-SCNC: 4 MMOL/L (ref 3.5–5.3)
PROMYELOCYTES # BLD MANUAL: 0 X10*3/UL
PROMYELOCYTES NFR BLD MANUAL: 0 %
RBC # BLD AUTO: 2.86 X10*6/UL (ref 4.5–5.9)
RBC MORPH BLD: ABNORMAL
SODIUM SERPL-SCNC: 136 MMOL/L (ref 136–145)
TACROLIMUS BLD-MCNC: 3.1 NG/ML
TOTAL CELLS COUNTED BLD: 124
VARIANT LYMPHS # BLD MANUAL: 0 X10*3/UL (ref 0–0.3)
VARIANT LYMPHS NFR BLD: 0 %
WBC # BLD AUTO: 3.8 X10*3/UL (ref 4.4–11.3)

## 2025-07-08 PROCEDURE — 2500000004 HC RX 250 GENERAL PHARMACY W/ HCPCS (ALT 636 FOR OP/ED)

## 2025-07-08 PROCEDURE — 83735 ASSAY OF MAGNESIUM: CPT

## 2025-07-08 PROCEDURE — 2500000002 HC RX 250 W HCPCS SELF ADMINISTERED DRUGS (ALT 637 FOR MEDICARE OP, ALT 636 FOR OP/ED)

## 2025-07-08 PROCEDURE — 99239 HOSP IP/OBS DSCHRG MGMT >30: CPT | Performed by: PHYSICIAN ASSISTANT

## 2025-07-08 PROCEDURE — 80069 RENAL FUNCTION PANEL: CPT

## 2025-07-08 PROCEDURE — 99233 SBSQ HOSP IP/OBS HIGH 50: CPT | Performed by: INTERNAL MEDICINE

## 2025-07-08 PROCEDURE — 85027 COMPLETE CBC AUTOMATED: CPT

## 2025-07-08 PROCEDURE — 2500000001 HC RX 250 WO HCPCS SELF ADMINISTERED DRUGS (ALT 637 FOR MEDICARE OP)

## 2025-07-08 PROCEDURE — 36415 COLL VENOUS BLD VENIPUNCTURE: CPT

## 2025-07-08 PROCEDURE — RXMED WILLOW AMBULATORY MEDICATION CHARGE

## 2025-07-08 PROCEDURE — 82947 ASSAY GLUCOSE BLOOD QUANT: CPT

## 2025-07-08 PROCEDURE — 85007 BL SMEAR W/DIFF WBC COUNT: CPT

## 2025-07-08 PROCEDURE — 80197 ASSAY OF TACROLIMUS: CPT

## 2025-07-08 RX ORDER — LOPERAMIDE HYDROCHLORIDE 2 MG/1
2 CAPSULE ORAL 4 TIMES DAILY PRN
Qty: 30 CAPSULE | Refills: 0 | Status: SHIPPED | OUTPATIENT
Start: 2025-07-08 | End: 2025-07-18

## 2025-07-08 RX ORDER — TACROLIMUS 1 MG/1
4 CAPSULE ORAL 2 TIMES DAILY
Start: 2025-07-08 | End: 2026-07-08

## 2025-07-08 RX ORDER — LOPERAMIDE HYDROCHLORIDE 2 MG/1
2 CAPSULE ORAL 4 TIMES DAILY PRN
Status: DISCONTINUED | OUTPATIENT
Start: 2025-07-08 | End: 2025-07-08 | Stop reason: HOSPADM

## 2025-07-08 RX ORDER — TAMSULOSIN HYDROCHLORIDE 0.4 MG/1
0.4 CAPSULE ORAL DAILY
Qty: 30 CAPSULE | Refills: 0 | Status: SHIPPED | OUTPATIENT
Start: 2025-07-08 | End: 2025-08-07

## 2025-07-08 RX ORDER — LOPERAMIDE HYDROCHLORIDE 2 MG/1
2 CAPSULE ORAL 4 TIMES DAILY PRN
Qty: 30 CAPSULE | Refills: 0 | Status: SHIPPED | OUTPATIENT
Start: 2025-07-08 | End: 2025-07-08

## 2025-07-08 RX ORDER — AMOXICILLIN AND CLAVULANATE POTASSIUM 875; 125 MG/1; MG/1
875 TABLET, FILM COATED ORAL 2 TIMES DAILY
Qty: 14 TABLET | Refills: 0 | Status: SHIPPED | OUTPATIENT
Start: 2025-07-08 | End: 2025-07-08

## 2025-07-08 RX ORDER — AMOXICILLIN AND CLAVULANATE POTASSIUM 875; 125 MG/1; MG/1
875 TABLET, FILM COATED ORAL 2 TIMES DAILY
Qty: 14 TABLET | Refills: 0 | Status: SHIPPED | OUTPATIENT
Start: 2025-07-08 | End: 2025-07-15

## 2025-07-08 RX ADMIN — PREDNISONE 5 MG: 10 TABLET ORAL at 08:46

## 2025-07-08 RX ADMIN — PANTOPRAZOLE SODIUM 40 MG: 40 TABLET, DELAYED RELEASE ORAL at 06:21

## 2025-07-08 RX ADMIN — HEPARIN SODIUM 5000 UNITS: 5000 INJECTION INTRAVENOUS; SUBCUTANEOUS at 06:22

## 2025-07-08 RX ADMIN — METOCLOPRAMIDE 5 MG: 10 TABLET ORAL at 08:46

## 2025-07-08 RX ADMIN — CARVEDILOL 12.5 MG: 12.5 TABLET, FILM COATED ORAL at 08:46

## 2025-07-08 RX ADMIN — GABAPENTIN 200 MG: 100 CAPSULE ORAL at 08:46

## 2025-07-08 RX ADMIN — TACROLIMUS 4 MG: 1 CAPSULE ORAL at 06:21

## 2025-07-08 RX ADMIN — Medication 50 MCG: at 08:46

## 2025-07-08 RX ADMIN — PIPERACILLIN SODIUM AND TAZOBACTAM SODIUM 3.38 G: 3; .375 INJECTION, SOLUTION INTRAVENOUS at 06:21

## 2025-07-08 RX ADMIN — INSULIN GLARGINE 15 UNITS: 100 INJECTION, SOLUTION SUBCUTANEOUS at 08:47

## 2025-07-08 ASSESSMENT — PAIN SCALES - GENERAL
PAINLEVEL_OUTOF10: 0 - NO PAIN
PAINLEVEL_OUTOF10: 0 - NO PAIN

## 2025-07-08 ASSESSMENT — PAIN - FUNCTIONAL ASSESSMENT: PAIN_FUNCTIONAL_ASSESSMENT: 0-10

## 2025-07-08 NOTE — PROGRESS NOTES
07/08/25 0950   Rapid Rounds   Attendance Provider;Nurse;Care Transitions   Expected Discharge Disposition Home   Today we still await: Clinical stability   Review at Escalation Rounds No escalation needed     Temo Hanson is a 74 y.o. male on day 1 of admission presenting with UTI (urinary tract infection).     Plan per Medical/Surgical team:   Waiting for cultures and ID rec's.   7/8: Waiting for cultures and ID rec's.   Home. No home going needs anticipated for this pt at this time.       Discharge disposition: Home. No home going needs anticipated for this pt at this time.       Potential Barriers: None     ADOD:  7/8     This TCC will continue to follow for home going needs and safe DC plan.     MIKE MORENO

## 2025-07-08 NOTE — DISCHARGE INSTR - OTHER ORDERS
Please follow your new medication chart. Some medications have changed.     Please HOLD your Myfortic until you are seen in transplant clinic.    Take the full course of your antibiotic. We can recheck your urine once you're done to be sure the infection is gone.     Please have labs drawn twice a week until you're seen in transplant clinic.     We'll see you on 7/24 at 8:40am in the transplant clinic.

## 2025-07-08 NOTE — CARE PLAN
Problem: Pain - Adult  Goal: Verbalizes/displays adequate comfort level or baseline comfort level  Outcome: Progressing     Problem: Safety - Adult  Goal: Free from fall injury  Outcome: Progressing   The patient's goals for the shift include rest    The clinical goals for the shift include pt will remain HDS througout shift    Over the shift, the patient did make progress toward the above goals.

## 2025-07-08 NOTE — DISCHARGE SUMMARY
Discharge Diagnosis  UTI (urinary tract infection)    Issues Requiring Follow-Up  Home on FK 4/4, Prednisone 5 mg daily    Myfortic 180 mg BID held for uptrending BK quant    Amlodipine held, please monitor BP outpatient, resume as indicated     Follow-up DSA, Allosure, BK quant     Home on Augmentin x 7 days     Test Results Pending At Discharge  Pending Labs       Order Current Status    AlloSure Kidney In process    BK Virus, PCR, Quantitative, Plasma In process    HLA Transplant Antibody Panel In process            Hospital Course  Temo Hanson is a 74 y.o. 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 Patient was seen at [facility] ED on 7/5/2025 for urinary urgency and frequency, endorsing the urge to urinate every 10 minutes. Patient also endorses 3-4 episodes of loose bowel movements during the day. Patient endorses usually drinking 3 bottles of water a day and urinating about 3 cups a day.     During his admission, the patient was started on empiric Zosyn and underwent an doppler ultrasound of the transplanted kidney that was unremarkable. He also underwent a non-contrast CT a/p with some concern for enlarged bladder, he was started on Flomax. His urine culture grew E. coli, resistant to Cipro, but sensitive to Augmentin. His myfortic was held for an uptrending BK quant.     Pt was tolerating a carb controlled diet, maintaining adequate hydration with oral intake, ambulating independently and having BMs. Immunosuppression was managed by the transplant team. Patient is in stable condition for discharge home with no home going needs. RX delivered to bedside prior to discharge. The patient will f/u in the transplant institute and have labs drawn in the walk-in clinic.      Visit Vitals  /68 (BP Location: Right arm, Patient Position: Lying)   Pulse 76   Temp 36.8 °C (98.2 °F) (Temporal)   Resp 17      Vitals:    07/08/25 0632   Weight: 76.9 kg (169 lb 8.5 oz)       Immunization History   Administered Date(s) Administered    Flu vaccine (IIV4), preservative free *Check age/dose* 10/21/2014, 10/06/2015, 11/10/2016, 10/12/2017, 10/24/2018, 09/20/2019    Flu vaccine, quadrivalent, high-dose, preservative free, age 65y+ (FLUZONE) 10/02/2020, 11/18/2021, 11/03/2022, 10/04/2023    Hepatitis B vaccine, adult *Check Product/Dose* 02/23/2015, 05/23/2017, 06/05/2018, 11/03/2022    Influenza, Unspecified 11/02/2007, 11/11/2008, 03/02/2010, 10/22/2010, 09/17/2011, 10/25/2012    Moderna SARS-CoV-2 Vaccination 03/03/2021, 04/10/2021, 11/17/2021, 04/14/2022    Pfizer COVID-19 vaccine, 12 years and older, (30mcg/0.3mL) (Comirnaty) 11/20/2023    Pneumococcal conjugate vaccine, 13-valent (PREVNAR 13) 06/14/2017    Pneumococcal polysaccharide vaccine, 23-valent, age 2 years and older (PNEUMOVAX 23) 03/20/2007, 04/07/2016    Tdap vaccine, age 7 year and older (BOOSTRIX, ADACEL) 08/25/2009, 01/06/2021    Zoster vaccine, recombinant, adult (SHINGRIX) 01/06/2021, 09/30/2021    Zoster, live 12/15/2015       Results        Pertinent Physical Exam At Time of Discharge  Physical Exam  Constitutional:       Appearance: Normal appearance.   HENT:      Head: Normocephalic.      Mouth/Throat:      Mouth: Mucous membranes are moist.   Cardiovascular:      Rate and Rhythm: Normal rate.      Pulses: Normal pulses.   Pulmonary:      Effort: Pulmonary effort is normal.      Breath sounds: Normal breath sounds.   Abdominal:      General: Abdomen is flat. Bowel sounds are normal.      Palpations: Abdomen is soft.   Musculoskeletal:         General: Normal range of motion.      Cervical back: Normal range of motion.   Skin:     General: Skin is warm.   Neurological:      General: No focal deficit present.      Mental Status: He is alert.   Psychiatric:         Mood and Affect: Mood normal.         Home Medications     Medication List      PAUSE  taking these medications     amLODIPine 10 mg tablet; Wait to take this until your doctor or other   care provider tells you to start again.; Commonly known as: Norvasc; Take   1 tablet (10 mg) by mouth once daily.   mycophenolate 180 mg EC tablet; Wait to take this until your doctor or   other care provider tells you to start again.; Commonly known as:   Myfortic; Take 1 tablet (180 mg) by mouth 2 times a day.   * tacrolimus 0.5 mg capsule; Wait to take this until your doctor or   other care provider tells you to start again.; Commonly known as: Prograf;   Take 1 capsule (0.5 mg) by mouth 2 times a day.; You also have another   medication with the same name that you may need to continue taking.  * This list has 1 medication(s) that are the same as other medications   prescribed for you. Read the directions carefully, and ask your doctor or   other care provider to review them with you.     START taking these medications     amoxicillin-clavulanate 875-125 mg tablet; Commonly known as: Augmentin;   Take 1 tablet (875 mg of amoxicillin) by mouth 2 times a day for 7 days.   loperamide 2 mg capsule; Commonly known as: Imodium; Take 1 capsule (2   mg) by mouth 4 times a day as needed for diarrhea for up to 10 days.   tamsulosin 0.4 mg 24 hr capsule; Commonly known as: Flomax; Take 1   capsule (0.4 mg) by mouth once daily. Do not crush, chew, or split.     CHANGE how you take these medications     * tacrolimus 1 mg capsule; Commonly known as: Prograf; Take 4 capsules   (4 mg) by mouth 2 times a day.; What changed: how much to take  * This list has 1 medication(s) that are the same as other medications   prescribed for you. Read the directions carefully, and ask your doctor or   other care provider to review them with you.     CONTINUE taking these medications     Basaglar KwikPen U-100 Insulin 100 unit/mL (3 mL) pen; Generic drug:   insulin glargine; Inject 18 Units under the skin once daily in the   morning. Take at the  "same time as prednisone.   * BD Ultra-Fine Joan Pen Needle 32 gauge x 5/32\" needle; Generic drug:   pen needle, diabetic   * BD Joan 2nd Gen Pen Needle 32 gauge x 5/32\" needle; Generic drug: pen   needle, diabetic; Use 1 needle once daily in the evening.  Take before   meals.   Blood Glucose Test; Generic drug: blood sugar diagnostic; Check blood   glucose 3 time per day   blood-glucose meter misc; Use to check blood glucose   carboxymethylcellulose 0.5 % ophthalmic solution; Commonly known as:   Refresh Plus   carvedilol 12.5 mg tablet; Commonly known as: Coreg; Take 1 tablet (12.5   mg) by mouth 2 times a day. Hold for SBP <110 or HR <55   cholecalciferol 50 mcg (2,000 units) tablet; Commonly known as: Vitamin   D-3; Take 1 tablet (2,000 Units) by mouth once daily.   Fiasp FlexTouch U-100 Insulin 100 unit/mL (3 mL) pen; Generic drug:   insulin aspart (with niacinamide); Inject 0-10 Units under the skin 3   times a day before meals. 5 unit(s) before meals 3 times daily and   increase as per sliding scale: 2 unit(s) if Blood glucose is between   151-200, 4 unit(s) if Blood glucose is between 201-250, 6 unit(s) if Blood   glucose is between 251-300, 8 unit(s) if Blood glucose is between 301-350,   10 unit(s) if Blood glucose is between 351-400, If blood glucose is   greater than 400 mg/dL, give max insulin per sliding scale AND then   contact provider. Expect up to 50 units per day   FreeStyle Mick 3 Sensor device; Generic drug: blood-glucose sensor;   Inject 1 each under the skin every 14 (fourteen) days.   gabapentin 100 mg capsule; Commonly known as: Neurontin; Take 2 capsules   (200 mg) by mouth once daily.   magnesium oxide 400 mg tablet; Commonly known as: Mag-Ox; Take 1 tablet   (400 mg) by mouth 2 times a day.   metoclopramide 5 mg tablet; Commonly known as: Reglan; Take 1 tablet (5   mg) by mouth 2 times daily (morning and late afternoon).   OneTouch Delica Plus Lancet 30 gauge misc; Generic drug: lancets; " 1 each   4 times a day. Use to check glucose 4 times daily, before meals and at   bedtime   pantoprazole 40 mg EC tablet; Commonly known as: ProtoNix; Take 1 tablet   (40 mg) by mouth 2 times a day before meals. Do not crush, chew, or split.   predniSONE 5 mg tablet; Commonly known as: Deltasone; Take 1 tablet (5   mg) by mouth once daily.   rosuvastatin 10 mg tablet; Commonly known as: Crestor; Take 1 tablet (10   mg) by mouth once daily at bedtime.  * This list has 2 medication(s) that are the same as other medications   prescribed for you. Read the directions carefully, and ask your doctor or   other care provider to review them with you.     STOP taking these medications     calcium polycarbophiL 625 mg tablet; Commonly known as: Fibercon   Vital Peptide 1.5 Chelita 0.07 gram- 1.5 kcal/mL liquid; Generic drug:   nut.tx.impaired dige fxn-fiber       Outpatient Follow-Up  Future Appointments   Date Time Provider Department Center   7/24/2025  8:40 AM TXP KIDNEY PHYSICIAN [id number]   8/25/2025 11:00 AM Sol Coley RDN, LD [id number]   9/16/2025  2:00 PM [id number]   12/3/2025 11:20 AM Aly Miguel MD [id number] [facility] spent 30 minutes in the professional care and discharge planning of this patient.      Sherly García PA-C

## 2025-07-08 NOTE — PROGRESS NOTES
"Temo Hanson is a 74 y.o. male on day 2 of admission presenting with UTI (urinary tract infection).    No acute events overnight.   Pt continues to feels better. Cr stable 1.7.     Scheduled medications  [Held by provider] amLODIPine, 10 mg, oral, Daily  carvedilol, 12.5 mg, oral, BID  cholecalciferol, 50 mcg, oral, Daily  gabapentin, 200 mg, oral, Daily  heparin (porcine), 5,000 Units, subcutaneous, q8h KASSY  insulin glargine, 15 Units, subcutaneous, Daily  insulin lispro, 0-10 Units, subcutaneous, TID AC  insulin lispro, 5 Units, subcutaneous, TID AC  metoclopramide, 5 mg, oral, BID  [Held by provider] mycophenolate, 180 mg, oral, BID  pantoprazole, 40 mg, oral, BID AC  piperacillin-tazobactam, 3.375 g, intravenous, q6h  predniSONE, 5 mg, oral, Daily  rosuvastatin, 10 mg, oral, Nightly  tacrolimus, 4 mg, oral, BID  tamsulosin, 0.4 mg, oral, Daily      Continuous medications       PRN medications  PRN medications: dextrose, dextrose, dextrose, dextrose, glucagon, glucagon, glucagon, glucagon, loperamide, lubricating eye drops      Last Recorded Vitals  Blood pressure 144/68, pulse 76, temperature 36.8 °C (98.2 °F), temperature source Temporal, resp. rate 17, height 1.854 m (6' 0.99\"), weight 76.9 kg (169 lb 8.5 oz), SpO2 99%.  Intake/Output last 3 Shifts:  I/O last 3 completed shifts:  In: 1030 (13.4 mL/kg) [P.O.:980; IV Piggyback:50]  Out: 1400 (18.2 mL/kg) [Urine:1400 (0.5 mL/kg/hr)]  Weight: 76.9 kg     A&ox3, no distress, pleasant  MMM, no lesions  Lungs with equal air entry, no added sounds  Rrr, no m/r/g  Abd soft, nt, nd  No allograft tenderness  No edema b/l    Results for orders placed or performed during the hospital encounter of 07/06/25 (from the past 24 hours)   POCT GLUCOSE   Result Value Ref Range    POCT Glucose 168 (H) 74 - 99 mg/dL   POCT GLUCOSE   Result Value Ref Range    POCT Glucose 129 (H) 74 - 99 mg/dL   Renal Function Panel   Result Value Ref Range    Glucose 70 (L) 74 - 99 mg/dL    Sodium " 136 136 - 145 mmol/L    Potassium 4.0 3.5 - 5.3 mmol/L    Chloride 107 98 - 107 mmol/L    Bicarbonate 22 21 - 32 mmol/L    Anion Gap 11 10 - 20 mmol/L    Urea Nitrogen 27 (H) 6 - 23 mg/dL    Creatinine 1.72 (H) 0.50 - 1.30 mg/dL    eGFR 41 (L) >60 mL/min/1.73m*2    Calcium 8.7 8.6 - 10.6 mg/dL    Phosphorus 3.6 2.5 - 4.9 mg/dL    Albumin 3.1 (L) 3.4 - 5.0 g/dL   CBC and Auto Differential   Result Value Ref Range    WBC 3.8 (L) 4.4 - 11.3 x10*3/uL    nRBC 0.0 0.0 - 0.0 /100 WBCs    RBC 2.86 (L) 4.50 - 5.90 x10*6/uL    Hemoglobin 8.8 (L) 13.5 - 17.5 g/dL    Hematocrit 29.2 (L) 41.0 - 52.0 %     (H) 80 - 100 fL    MCH 30.8 26.0 - 34.0 pg    MCHC 30.1 (L) 32.0 - 36.0 g/dL    RDW 14.7 (H) 11.5 - 14.5 %    Platelets 76 (L) 150 - 450 x10*3/uL    Immature Granulocytes %, Automated 0.3 0.0 - 0.9 %    Immature Granulocytes Absolute, Automated 0.01 0.00 - 0.50 x10*3/uL   Tacrolimus level   Result Value Ref Range    Tacrolimus  3.1 <=15.0 ng/mL   Magnesium   Result Value Ref Range    Magnesium 2.00 1.60 - 2.40 mg/dL   Manual Differential   Result Value Ref Range    Neutrophils %, Manual 74.2 40.0 - 80.0 %    Bands %, Manual 0.8 0.0 - 5.0 %    Lymphocytes %, Manual 10.5 13.0 - 44.0 %    Monocytes %, Manual 13.7 2.0 - 10.0 %    Eosinophils %, Manual 0.8 0.0 - 6.0 %    Basophils %, Manual 0.0 0.0 - 2.0 %    Atypical Lymphocytes %, Manual 0.0 0.0 - 2.0 %    Metamyelocytes %, Manual 0.0 0.0 - 0.0 %    Myelocytes %, Manual 0.0 0.0 - 0.0 %    Plasma Cells %, Manual 0.0 0.00 - 0.00 %    Promyelocytes %, Manual 0.0 0.0 - 0.0 %    Blasts %, Manual 0.0 0.0 - 0.0 %    Seg Neutrophils Absolute, Manual 2.82 1.60 - 5.00 x10*3/uL    Bands Absolute, Manual 0.03 0.00 - 0.50 x10*3/uL    Lymphocytes Absolute, Manual 0.40 (L) 0.80 - 3.00 x10*3/uL    Monocytes Absolute, Manual 0.52 0.05 - 0.80 x10*3/uL    Eosinophils Absolute, Manual 0.03 0.00 - 0.40 x10*3/uL    Basophils Absolute, Manual 0.00 0.00 - 0.10 x10*3/uL    Atypical Lymphs Absolute,  Manual 0.00 0.00 - 0.30 x10*3/uL    Metamyelocytes Absolute, Manual 0.00 0.00 - 0.00 x10*3/uL    Myelocytes Absolute, Manual 0.00 0.00 - 0.00 x10*3/uL    Plasma Cells Absolute, Manual 0.00 0.00 - 0.00 x10*3/uL    Promyelocytes Absolute, Manual 0.00 0.00 - 0.00 x10*3/uL    Blasts Absolute, Manual 0.00 0.00 - 0.00 x10*3/uL    Total Cells Counted 124     Neutrophils Absolute, Manual 2.85 1.60 - 5.50 x10*3/uL    Manual nRBC per 100 Cells 0.0 0.0 - 0.0 %    RBC Morphology See Below     Antonia Cells Few    POCT GLUCOSE   Result Value Ref Range    POCT Glucose 76 74 - 99 mg/dL   POCT GLUCOSE   Result Value Ref Range    POCT Glucose 173 (H) 74 - 99 mg/dL     *Note: Due to a large number of results and/or encounters for the requested time period, some results have not been displayed. A complete set of results can be found in Results Review.           Assessment & Plan      74 y.o. 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 Zamora with a KDPI of 93% and PRA of 54%. Donor was Hepc -/-and has not met risk factors. EBV + /+. Left donor kidney transplanted to patient right pelvis. Admission weight is 72.7 kg (discharge weight is 76.4 kg ). Pt received a total of 3 mg/kg total of thymoglobulin induction therapy in conjunction with 500mg IV solumedrol. Pt was initiated on Tacrolimus & Cellcept immunosuppression in conjunction with IV solumedrol taper. Pt was started on valcyte (CMV D - / R + ) and bactrim for CMV & PCP prophylaxis per protocol. He was started on clotrimazole as antifungal coverage per protocol. Operative course was uncomplicated. Hospital course was uncomplicated.      Post-txp course notable for admission to [facility] with DKA, UTI, norovirus diarrhea, FTT sec to achalasia required PEG tube placement. H/o BK viremia.     Currently admitted with UTI. On abx (Zosyn). Ucx with E coli.    Allograft function: s/p DDKT 12/21/24  - baseline Cr ~1 mg/dl  - recent labs with mild ALLEY, cr up to 1.7 this AM.  Allosure, DSA pending.   - cr stable on labs this AM. Cont to monitor as outpt.   - renal US unremarkable. maintain adequate hydration  - metabolic indices acceptable.   - HTN/Volume: Bps stable. No hypervolemia on exam  - Anemia: Hb trending down slowly, 8.8 today. Check iron panel, ferritin with next labs. Consider JONE if Hb remains <9.  - CKD-MBD: Ca, phos acceptable. PTH wnl 4/2025  - dose meds for CrCl. Avoid potential nephrotoxins.     Immunosuppression:  - tac 4mg bid (since 7/7). Fk this AM 3.1. monitor rpt level (goal,5-8).   - cont to hold Myfortic (UTI, BK) until seen in clinic. Cont pred 5 mg/d    ID: UTI  - Ucx 7/5 with E coli. Discharge home on Augmentin x7d  - s/p [id number]  - BK viremia: BK PCR ~10k 7/7 (from 5k 7/3). Monitor weekly. MPA on hold.    Home today. Follow up in clinic in a week or so.    Beka Nichols MD

## 2025-07-08 NOTE — PROGRESS NOTES
"Temo Hanson is a 74 y.o. male on day 2 of admission presenting with UTI (urinary tract infection).    Subjective   Admitted for UTI       Objective   Vitals:    07/08/25 0707   BP: 129/60   Pulse: 79   Resp: 17   Temp: 36.5 °C (97.7 °F)   SpO2: 96%       Physical Exam  Constitutional:       Appearance: Normal appearance.   Eyes:      Pupils: Pupils are equal, round, and reactive to light.   Cardiovascular:      Rate and Rhythm: Normal rate.   Pulmonary:      Effort: Pulmonary effort is normal. No respiratory distress.   Abdominal:      General: There is no distension.      Palpations: Abdomen is soft.      Comments: Incision clean, dry, intact   Musculoskeletal:         General: Normal range of motion.      Right lower leg: No edema.      Left lower leg: No edema.   Skin:     General: Skin is warm and dry.   Neurological:      General: No focal deficit present.      Mental Status: He is alert and oriented to person, place, and time.   Psychiatric:         Mood and Affect: Mood normal.         Behavior: Behavior normal.         Last Recorded Vitals  Blood pressure 129/60, pulse 79, temperature 36.5 °C (97.7 °F), temperature source Temporal, resp. rate 17, height 1.854 m (6' 0.99\"), weight 76.9 kg (169 lb 8.5 oz), SpO2 96%.  Intake/Output last 3 Shifts:  I/O last 3 completed shifts:  In: 1030 (13.4 mL/kg) [P.O.:980; IV Piggyback:50]  Out: 1400 (18.2 mL/kg) [Urine:1400 (0.5 mL/kg/hr)]  Weight: 76.9 kg     Relevant Results  Lab Results   Component Value Date    WBC 3.8 (L) 07/08/2025    HGB 8.8 (L) 07/08/2025    HCT 29.2 (L) 07/08/2025     (H) 07/08/2025    PLT 76 (L) 07/08/2025     Lab Results   Component Value Date    GLUCOSE 70 (L) 07/08/2025    CALCIUM 8.7 07/08/2025     07/08/2025    K 4.0 07/08/2025    CO2 22 07/08/2025     07/08/2025    BUN 27 (H) 07/08/2025    CREATININE 1.72 (H) 07/08/2025     [Held by provider] amLODIPine, 10 mg, oral, Daily  carvedilol, 12.5 mg, oral, " BID  cholecalciferol, 50 mcg, oral, Daily  gabapentin, 200 mg, oral, Daily  heparin (porcine), 5,000 Units, subcutaneous, q8h KASSY  insulin glargine, 15 Units, subcutaneous, Daily  insulin lispro, 0-10 Units, subcutaneous, TID AC  insulin lispro, 5 Units, subcutaneous, TID AC  metoclopramide, 5 mg, oral, BID  [Held by provider] mycophenolate, 180 mg, oral, BID  pantoprazole, 40 mg, oral, BID AC  piperacillin-tazobactam, 3.375 g, intravenous, q6h  predniSONE, 5 mg, oral, Daily  rosuvastatin, 10 mg, oral, Nightly  tacrolimus, 4 mg, oral, BID  tamsulosin, 0.4 mg, oral, Daily      Assessment & Plan      Kidney allograft function   Mild ALLEY, has UTI (E. coli)   US ok   Start flomax   Zosyn, sensitive to augmentin   Send for DSA, allosure    Immunosuppression   Tac 4/4   MMF on hold (UTI and BK)   Pred 5    PPX   HSQ    Dispo: home    I spent 35 minutes in the professional and overall care of this patient.      Mike Zamora MD

## 2025-07-08 NOTE — HOSPITAL COURSE
Temo Hanson is a 74 y.o. 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 Patient was seen at [facility] ED on 7/5/2025 for urinary urgency and frequency, endorsing the urge to urinate every 10 minutes. Patient also endorses 3-4 episodes of loose bowel movements during the day. Patient endorses usually drinking 3 bottles of water a day and urinating about 3 cups a day.     During his admission, the patient was started on empiric Zosyn and underwent an doppler ultrasound of the transplanted kidney that was unremarkable. He also underwent a non-contrast CT a/p with some concern for enlarged bladder, he was started on Flomax. His urine culture grew E. coli, resistant to Cipro, but sensitive to Augmentin. His myfortic was held for an uptrending BK quant.     Pt was tolerating a carb controlled diet, maintaining adequate hydration with oral intake, ambulating independently and having BMs. Immunosuppression was managed by the transplant team. Patient is in stable condition for discharge home with no home going needs. RX delivered to bedside prior to discharge. The patient will f/u in the transplant institute and have labs drawn in the walk-in clinic.

## 2025-07-08 NOTE — NURSING NOTE
4 Eyes Skin Assessment    Reason for assessment? Weekly skin assessment  Does the patient have a wound? no  Wound RN consult placed? N/A  Preventative foam dressing applied? No      Four eyes skin check performed with Malika Kruger, [place].

## 2025-07-09 LAB
HLA RESULTS: NORMAL
HLA-A+B+C AB NFR SER: NORMAL %
HLA-DP+DQ+DR AB NFR SER: NORMAL %

## 2025-07-10 LAB
ALLOSURE SCORE - KIDNEY: 0.49 %
CAREDX_ORDER_ID: NORMAL
CENTER_ORDER_ID: NORMAL
CLIENT SPECIMEN ID - ALLOSURE: NORMAL
DONOR RELATION - ALLOSURE: NORMAL
NOTES - ALLOSURE: NORMAL
RELATIVE CHANGE VALUE - KIDNEY: 88 %
TEST COMMENTS - ALLOSURE: NORMAL
TIME POST TX - ALLOSURE: NORMAL
TRANSPLANTED ORGAN - ALLOSURE: NORMAL
TX DATE - ALLOSURE/ALLOMAP: NORMAL
WP_ORDER_ID: NORMAL

## 2025-07-11 ENCOUNTER — APPOINTMENT (OUTPATIENT)
Facility: HOSPITAL | Age: 75
End: 2025-07-11
Payer: COMMERCIAL

## 2025-07-11 DIAGNOSIS — Z94.0 KIDNEY REPLACED BY TRANSPLANT (HHS-HCC): ICD-10-CM

## 2025-07-11 LAB
ALBUMIN SERPL BCP-MCNC: 3.6 G/DL (ref 3.4–5)
ANION GAP SERPL CALC-SCNC: 14 MMOL/L (ref 10–20)
BUN SERPL-MCNC: 19 MG/DL (ref 6–23)
CALCIUM SERPL-MCNC: 9 MG/DL (ref 8.6–10.6)
CHLORIDE SERPL-SCNC: 107 MMOL/L (ref 98–107)
CO2 SERPL-SCNC: 21 MMOL/L (ref 21–32)
CREAT SERPL-MCNC: 1.22 MG/DL (ref 0.5–1.3)
CREAT UR-MCNC: 69.6 MG/DL (ref 20–370)
EGFRCR SERPLBLD CKD-EPI 2021: 62 ML/MIN/1.73M*2
ERYTHROCYTE [DISTWIDTH] IN BLOOD BY AUTOMATED COUNT: 14.3 % (ref 11.5–14.5)
GLUCOSE SERPL-MCNC: 163 MG/DL (ref 74–99)
HCT VFR BLD AUTO: 30.4 % (ref 41–52)
HGB BLD-MCNC: 9.3 G/DL (ref 13.5–17.5)
MAGNESIUM SERPL-MCNC: 1.85 MG/DL (ref 1.6–2.4)
MCH RBC QN AUTO: 29.8 PG (ref 26–34)
MCHC RBC AUTO-ENTMCNC: 30.6 G/DL (ref 32–36)
MCV RBC AUTO: 97 FL (ref 80–100)
NRBC BLD-RTO: 0 /100 WBCS (ref 0–0)
PHOSPHATE SERPL-MCNC: 3.9 MG/DL (ref 2.5–4.9)
PLATELET # BLD AUTO: 129 X10*3/UL (ref 150–450)
POTASSIUM SERPL-SCNC: 4.2 MMOL/L (ref 3.5–5.3)
PROT UR-ACNC: 29 MG/DL (ref 5–25)
PROT/CREAT UR: 0.42 MG/MG CREAT (ref 0–0.17)
RBC # BLD AUTO: 3.12 X10*6/UL (ref 4.5–5.9)
SODIUM SERPL-SCNC: 138 MMOL/L (ref 136–145)
TACROLIMUS BLD-MCNC: 5.3 NG/ML
WBC # BLD AUTO: 4.5 X10*3/UL (ref 4.4–11.3)

## 2025-07-11 PROCEDURE — 80069 RENAL FUNCTION PANEL: CPT | Performed by: STUDENT IN AN ORGANIZED HEALTH CARE EDUCATION/TRAINING PROGRAM

## 2025-07-11 PROCEDURE — 83735 ASSAY OF MAGNESIUM: CPT | Performed by: STUDENT IN AN ORGANIZED HEALTH CARE EDUCATION/TRAINING PROGRAM

## 2025-07-11 PROCEDURE — 36415 COLL VENOUS BLD VENIPUNCTURE: CPT

## 2025-07-11 PROCEDURE — 85027 COMPLETE CBC AUTOMATED: CPT | Performed by: STUDENT IN AN ORGANIZED HEALTH CARE EDUCATION/TRAINING PROGRAM

## 2025-07-11 PROCEDURE — 84156 ASSAY OF PROTEIN URINE: CPT | Performed by: STUDENT IN AN ORGANIZED HEALTH CARE EDUCATION/TRAINING PROGRAM

## 2025-07-11 PROCEDURE — 87799 DETECT AGENT NOS DNA QUANT: CPT | Performed by: STUDENT IN AN ORGANIZED HEALTH CARE EDUCATION/TRAINING PROGRAM

## 2025-07-11 PROCEDURE — 80197 ASSAY OF TACROLIMUS: CPT | Performed by: STUDENT IN AN ORGANIZED HEALTH CARE EDUCATION/TRAINING PROGRAM

## 2025-07-13 LAB
BKV DNA SERPL NAA+PROBE-ACNC: 7590 IU/ML (ref ?–22)
BKV DNA SERPL NAA+PROBE-LOG#: 3.88 LOG IU/ML
CMV DNA SERPL NAA+PROBE-LOG IU: NORMAL {LOG_IU}/ML
LABORATORY COMMENT REPORT: DETECTED
LABORATORY COMMENT REPORT: NOT DETECTED

## 2025-07-15 DIAGNOSIS — K31.84 GASTROPARESIS DUE TO DM (MULTI): ICD-10-CM

## 2025-07-15 DIAGNOSIS — E11.43 GASTROPARESIS DUE TO DM (MULTI): ICD-10-CM

## 2025-07-16 PROCEDURE — RXMED WILLOW AMBULATORY MEDICATION CHARGE

## 2025-07-16 RX ORDER — METOCLOPRAMIDE 5 MG/1
TABLET ORAL
Qty: 90 TABLET | Refills: 2 | Status: SHIPPED | OUTPATIENT
Start: 2025-07-16

## 2025-07-17 ENCOUNTER — DOCUMENTATION (OUTPATIENT)
Facility: HOSPITAL | Age: 75
End: 2025-07-17
Payer: COMMERCIAL

## 2025-07-17 ENCOUNTER — PHARMACY VISIT (OUTPATIENT)
Dept: PHARMACY | Facility: CLINIC | Age: 75
End: 2025-07-17
Payer: COMMERCIAL

## 2025-07-17 ENCOUNTER — NURSE ONLY (OUTPATIENT)
Facility: HOSPITAL | Age: 75
End: 2025-07-17
Payer: COMMERCIAL

## 2025-07-17 LAB
ALBUMIN SERPL BCP-MCNC: 4.1 G/DL (ref 3.4–5)
ANION GAP SERPL CALC-SCNC: 13 MMOL/L (ref 10–20)
BUN SERPL-MCNC: 29 MG/DL (ref 6–23)
CALCIUM SERPL-MCNC: 10 MG/DL (ref 8.6–10.6)
CHLORIDE SERPL-SCNC: 103 MMOL/L (ref 98–107)
CO2 SERPL-SCNC: 25 MMOL/L (ref 21–32)
CREAT SERPL-MCNC: 1.52 MG/DL (ref 0.5–1.3)
EGFRCR SERPLBLD CKD-EPI 2021: 48 ML/MIN/1.73M*2
ERYTHROCYTE [DISTWIDTH] IN BLOOD BY AUTOMATED COUNT: 14 % (ref 11.5–14.5)
GLUCOSE SERPL-MCNC: 128 MG/DL (ref 74–99)
HCT VFR BLD AUTO: 34.8 % (ref 41–52)
HGB BLD-MCNC: 10.6 G/DL (ref 13.5–17.5)
MAGNESIUM SERPL-MCNC: 2.03 MG/DL (ref 1.6–2.4)
MCH RBC QN AUTO: 30.3 PG (ref 26–34)
MCHC RBC AUTO-ENTMCNC: 30.5 G/DL (ref 32–36)
MCV RBC AUTO: 99 FL (ref 80–100)
NRBC BLD-RTO: 0 /100 WBCS (ref 0–0)
PHOSPHATE SERPL-MCNC: 4.4 MG/DL (ref 2.5–4.9)
PLATELET # BLD AUTO: 211 X10*3/UL (ref 150–450)
POTASSIUM SERPL-SCNC: 5 MMOL/L (ref 3.5–5.3)
RBC # BLD AUTO: 3.5 X10*6/UL (ref 4.5–5.9)
SODIUM SERPL-SCNC: 136 MMOL/L (ref 136–145)
TACROLIMUS BLD-MCNC: 6 NG/ML
WBC # BLD AUTO: 3.6 X10*3/UL (ref 4.4–11.3)

## 2025-07-17 PROCEDURE — 80197 ASSAY OF TACROLIMUS: CPT | Performed by: STUDENT IN AN ORGANIZED HEALTH CARE EDUCATION/TRAINING PROGRAM

## 2025-07-17 PROCEDURE — 36415 COLL VENOUS BLD VENIPUNCTURE: CPT

## 2025-07-17 PROCEDURE — 87799 DETECT AGENT NOS DNA QUANT: CPT | Performed by: STUDENT IN AN ORGANIZED HEALTH CARE EDUCATION/TRAINING PROGRAM

## 2025-07-17 PROCEDURE — 85027 COMPLETE CBC AUTOMATED: CPT | Performed by: STUDENT IN AN ORGANIZED HEALTH CARE EDUCATION/TRAINING PROGRAM

## 2025-07-17 PROCEDURE — 84100 ASSAY OF PHOSPHORUS: CPT | Performed by: STUDENT IN AN ORGANIZED HEALTH CARE EDUCATION/TRAINING PROGRAM

## 2025-07-17 PROCEDURE — 83735 ASSAY OF MAGNESIUM: CPT | Performed by: STUDENT IN AN ORGANIZED HEALTH CARE EDUCATION/TRAINING PROGRAM

## 2025-07-17 NOTE — PROGRESS NOTES
Discussed in 365, patient discharged from hospital on 7/8/25, MMF was held per Dr. Zamora for UTI and uptrending BK.     Plan to continue to hold MMF another week and see BK level.    7/11 BK 7,590 from 10,200

## 2025-07-18 DIAGNOSIS — Z94.0 KIDNEY REPLACED BY TRANSPLANT (HHS-HCC): ICD-10-CM

## 2025-07-18 LAB
BKV DNA SERPL NAA+PROBE-ACNC: 944 IU/ML (ref ?–22)
BKV DNA SERPL NAA+PROBE-LOG#: 2.97 LOG IU/ML
CMV DNA SERPL NAA+PROBE-LOG IU: NORMAL {LOG_IU}/ML
LABORATORY COMMENT REPORT: DETECTED
LABORATORY COMMENT REPORT: NOT DETECTED

## 2025-07-24 ENCOUNTER — NURSE ONLY (OUTPATIENT)
Facility: HOSPITAL | Age: 75
End: 2025-07-24
Payer: COMMERCIAL

## 2025-07-24 ENCOUNTER — OFFICE VISIT (OUTPATIENT)
Facility: HOSPITAL | Age: 75
End: 2025-07-24
Payer: COMMERCIAL

## 2025-07-24 VITALS
TEMPERATURE: 97.1 F | DIASTOLIC BLOOD PRESSURE: 61 MMHG | BODY MASS INDEX: 22.24 KG/M2 | OXYGEN SATURATION: 98 % | WEIGHT: 168.5 LBS | HEART RATE: 85 BPM | SYSTOLIC BLOOD PRESSURE: 168 MMHG

## 2025-07-24 DIAGNOSIS — Z94.0 KIDNEY REPLACED BY TRANSPLANT (HHS-HCC): Primary | ICD-10-CM

## 2025-07-24 DIAGNOSIS — Z94.0 KIDNEY REPLACED BY TRANSPLANT (HHS-HCC): ICD-10-CM

## 2025-07-24 DIAGNOSIS — Z79.899 IMMUNOSUPPRESSIVE MANAGEMENT ENCOUNTER FOLLOWING KIDNEY TRANSPLANT: ICD-10-CM

## 2025-07-24 DIAGNOSIS — Z94.0 IMMUNOSUPPRESSIVE MANAGEMENT ENCOUNTER FOLLOWING KIDNEY TRANSPLANT: ICD-10-CM

## 2025-07-24 DIAGNOSIS — N39.0 URINARY TRACT INFECTION WITHOUT HEMATURIA, SITE UNSPECIFIED: ICD-10-CM

## 2025-07-24 DIAGNOSIS — N52.9 ERECTILE DYSFUNCTION, UNSPECIFIED ERECTILE DYSFUNCTION TYPE: ICD-10-CM

## 2025-07-24 DIAGNOSIS — B34.8 BK VIREMIA: ICD-10-CM

## 2025-07-24 LAB
ALBUMIN SERPL BCP-MCNC: 3.8 G/DL (ref 3.4–5)
ANION GAP SERPL CALC-SCNC: 12 MMOL/L (ref 10–20)
BUN SERPL-MCNC: 30 MG/DL (ref 6–23)
CALCIUM SERPL-MCNC: 9 MG/DL (ref 8.6–10.6)
CHLORIDE SERPL-SCNC: 103 MMOL/L (ref 98–107)
CO2 SERPL-SCNC: 23 MMOL/L (ref 21–32)
CREAT SERPL-MCNC: 1.16 MG/DL (ref 0.5–1.3)
CREAT UR-MCNC: 53 MG/DL (ref 20–370)
EGFRCR SERPLBLD CKD-EPI 2021: 66 ML/MIN/1.73M*2
ERYTHROCYTE [DISTWIDTH] IN BLOOD BY AUTOMATED COUNT: 14 % (ref 11.5–14.5)
GLUCOSE SERPL-MCNC: 144 MG/DL (ref 74–99)
HCT VFR BLD AUTO: 30.9 % (ref 41–52)
HGB BLD-MCNC: 9.9 G/DL (ref 13.5–17.5)
MAGNESIUM SERPL-MCNC: 1.58 MG/DL (ref 1.6–2.4)
MCH RBC QN AUTO: 30.1 PG (ref 26–34)
MCHC RBC AUTO-ENTMCNC: 32 G/DL (ref 32–36)
MCV RBC AUTO: 94 FL (ref 80–100)
NRBC BLD-RTO: 0 /100 WBCS (ref 0–0)
PHOSPHATE SERPL-MCNC: 4 MG/DL (ref 2.5–4.9)
PLATELET # BLD AUTO: 155 X10*3/UL (ref 150–450)
POTASSIUM SERPL-SCNC: 4.4 MMOL/L (ref 3.5–5.3)
PROT UR-ACNC: 8 MG/DL (ref 5–25)
PROT/CREAT UR: 0.15 MG/MG CREAT (ref 0–0.17)
RBC # BLD AUTO: 3.29 X10*6/UL (ref 4.5–5.9)
SODIUM SERPL-SCNC: 134 MMOL/L (ref 136–145)
TACROLIMUS BLD-MCNC: 6.5 NG/ML
WBC # BLD AUTO: 3 X10*3/UL (ref 4.4–11.3)

## 2025-07-24 PROCEDURE — 87799 DETECT AGENT NOS DNA QUANT: CPT | Performed by: STUDENT IN AN ORGANIZED HEALTH CARE EDUCATION/TRAINING PROGRAM

## 2025-07-24 PROCEDURE — 82570 ASSAY OF URINE CREATININE: CPT

## 2025-07-24 PROCEDURE — 85027 COMPLETE CBC AUTOMATED: CPT | Performed by: STUDENT IN AN ORGANIZED HEALTH CARE EDUCATION/TRAINING PROGRAM

## 2025-07-24 PROCEDURE — 80197 ASSAY OF TACROLIMUS: CPT | Performed by: STUDENT IN AN ORGANIZED HEALTH CARE EDUCATION/TRAINING PROGRAM

## 2025-07-24 PROCEDURE — 99215 OFFICE O/P EST HI 40 MIN: CPT

## 2025-07-24 PROCEDURE — 3078F DIAST BP <80 MM HG: CPT | Performed by: STUDENT IN AN ORGANIZED HEALTH CARE EDUCATION/TRAINING PROGRAM

## 2025-07-24 PROCEDURE — 1126F AMNT PAIN NOTED NONE PRSNT: CPT | Performed by: STUDENT IN AN ORGANIZED HEALTH CARE EDUCATION/TRAINING PROGRAM

## 2025-07-24 PROCEDURE — G2211 COMPLEX E/M VISIT ADD ON: HCPCS

## 2025-07-24 PROCEDURE — 36415 COLL VENOUS BLD VENIPUNCTURE: CPT

## 2025-07-24 PROCEDURE — 3077F SYST BP >= 140 MM HG: CPT | Performed by: STUDENT IN AN ORGANIZED HEALTH CARE EDUCATION/TRAINING PROGRAM

## 2025-07-24 PROCEDURE — 80069 RENAL FUNCTION PANEL: CPT | Performed by: STUDENT IN AN ORGANIZED HEALTH CARE EDUCATION/TRAINING PROGRAM

## 2025-07-24 PROCEDURE — 83735 ASSAY OF MAGNESIUM: CPT | Performed by: STUDENT IN AN ORGANIZED HEALTH CARE EDUCATION/TRAINING PROGRAM

## 2025-07-24 PROCEDURE — 1111F DSCHRG MED/CURRENT MED MERGE: CPT | Performed by: STUDENT IN AN ORGANIZED HEALTH CARE EDUCATION/TRAINING PROGRAM

## 2025-07-24 RX ORDER — SILDENAFIL 25 MG/1
25 TABLET, FILM COATED ORAL DAILY PRN
Qty: 10 TABLET | Refills: 0 | Status: SHIPPED | OUTPATIENT
Start: 2025-07-24 | End: 2025-08-23

## 2025-07-24 RX ORDER — ELECTROLYTES/DEXTROSE
1 SOLUTION, ORAL ORAL 2 TIMES DAILY
COMMUNITY

## 2025-07-24 RX ORDER — TACROLIMUS 1 MG/1
4 CAPSULE ORAL 2 TIMES DAILY
Qty: 240 CAPSULE | Refills: 11 | Status: SHIPPED | OUTPATIENT
Start: 2025-07-24 | End: 2026-07-24

## 2025-07-24 ASSESSMENT — PAIN SCALES - GENERAL: PAINLEVEL_OUTOF10: 0-NO PAIN

## 2025-07-24 NOTE — PATIENT INSTRUCTIONS
Urinalysis today  Labs every 2 weeks  Return to clinic in 1 month  Allosure and INR with next labs

## 2025-07-24 NOTE — PROGRESS NOTES
Subjective   Patient ID: Temo Hanson is a 74 y.o. male with PMH of ESRD sec to DM2 who is s/p DDKT on 24 who presents for follow up of transplant    HPI  Temo Hanson is a 74 y.o. male with PMHx of  ESRD secondary to diabetic nephropathy whom received a  donor kidney transplant on 24 by Dr. Zamora with a DBD kidney KDPI of 93% and PRA of 54%. Donor was Hepc -/-and has not met risk factors. EBV D+ /R+, CMV D-/R+. Left donor kidney transplanted to patient right pelvis. received a total of 3 mg/kg total of thymoglobulin induction therapy.    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    Post operative course:  - Post-op SGF/DGF and failure to thrive/malnutrition requiring PEG tube  - Readmitted -25 for DKA, hypovolemia and poteus mirabilis and resistant ESBL UTI post stent removal and norovirus associated diarrhea.  - Admitted early 2025 for UTI, BK & CMV     Since discharge, he feels ok with good intake and no dysuria no fever no GI symptoms      Review of Systems  No GI symptoms  No Urinary changes  No Fevers      Medications Ordered Prior to Encounter[1]     Objective   Vitals: There were no vitals taken for this visit.     Physical Exam  Sitting on chair  Comfortable  No distress or pain  Normal breathing sounds no crepitaitons  Normal S1S2 no murmurs  No tenderness over transplanted kidney      Assessment/Plan   Temo Hanson is a 74 y.o. male with PMHx of  ESRD secondary to diabetic nephropathy whom received a  donor kidney transplant on 24 by Dr. Zamora with a DBD kidney KDPI of 93% and PRA of 54%. Donor was Hepc -/-and has not met risk factors. EBV D+ /R+, CMV D-/R+. Left donor kidney transplanted to patient right pelvis. received a total of 3 mg/kg total of thymoglobulin induction therapy.    Other history: History of pericardial effusion status  "post pericardiocentesis as of 4/18/2024 and pacemaker placement as of 7/17/2024 for high-grade AV block.    Post operative course:  - Post-op SGF/DGF and failure to thrive/malnutrition requiring PEG tube  - Readmitted 2/9-2/18/25 for DKA, hypovolemia and poteus mirabilis and resistant ESBL UTI post stent removal and norovirus associated diarrhea.  - Admitted early July 2025 for UTI, BK & CMV      #Allograft  - Cr slightly up to 1.5  - Urine PCR slightly up   - Allosure slightly up, to repeat  - BK down 7/17   - CMV Undetected 7/17  - Increase in Cr & Allosure possibly related to BK, UTI or Possible Rejection.    #Immunosuppression  - Tacrolimus 4mg BID  - Myfortic on hold  - prednisone 5  - FK trough 6    #Infection/Prophylaxis  - BK improving  - CMV Negative  - Recent admission for UTI  - no evidence of UTI or GI infections. No evidence of other infections. Asymptomatic  - pending U/A    #HTN  - HTNsive in clinic but home readings acceptable per patient  - on Amlodipine Coreg  - follow up    #Electrolytes  - K 5, pending repeat  - otherwise acceptable    #CKD-MBD/Osteoporosis  - Acceptable Ph & Ca  - Vit D & PTH done in April acceptable     #Anemia/Leukopenia  - Slight leukopenia 3.6  - Hgb 10.6      ** Plan:  - Endocrine Referral for DM Management  - New labs (Cr, U/A, FK, CMV & BK) to follow up  - U/A to follow up UTI      Reviewed and approved by JASMIN VELASQUEZ on 7/24/25 at 8:22 AM.          [1]   Current Outpatient Medications on File Prior to Visit   Medication Sig Dispense Refill    [Paused] amLODIPine (Norvasc) 10 mg tablet Take 1 tablet (10 mg) by mouth once daily. 30 tablet 11    BD Ultra-Fine Joan Pen Needle 32 gauge x 5/32\" needle       blood sugar diagnostic (Blood Glucose Test) strip Check blood glucose 3 time per day 100 each 0    blood-glucose meter misc Use to check blood glucose 1 each 0    carboxymethylcellulose (Refresh Plus) 0.5 % ophthalmic solution Instill 1 drop into both eyes 2-4x/day as " "needed for dryness.      carvedilol (Coreg) 12.5 mg tablet Take 1 tablet (12.5 mg) by mouth 2 times a day. Hold for SBP <110 or HR <55 60 tablet 11    cholecalciferol (Vitamin D-3) 50 mcg (2,000 units) tablet Take 1 tablet (2,000 Units) by mouth once daily. 30 tablet 11    FreeStyle Mick 3 Sensor device Inject 1 each under the skin every 14 (fourteen) days. 6 each 3    gabapentin (Neurontin) 100 mg capsule Take 2 capsules (200 mg) by mouth once daily. 60 capsule 1    insulin aspart, with niacinamide, (Fiasp FlexTouch U-100 Insulin) 100 unit/mL (3 mL) pen Inject 0-10 Units under the skin 3 times a day before meals. 5 unit(s) before meals 3 times daily and increase as per sliding scale: 2 unit(s) if Blood glucose is between 151-200, 4 unit(s) if Blood glucose is between 201-250, 6 unit(s) if Blood glucose is between 251-300, 8 unit(s) if Blood glucose is between 301-350, 10 unit(s) if Blood glucose is between 351-400, If blood glucose is greater than 400 mg/dL, give max insulin per sliding scale AND then contact provider. Expect up to 50 units per day 15 mL 2    insulin glargine (Basaglar KwikPen U-100 Insulin) 100 unit/mL (3 mL) pen Inject 18 Units under the skin once daily in the morning. Take at the same time as prednisone.      lancets 30 gauge misc 1 each 4 times a day. Use to check glucose 4 times daily, before meals and at bedtime 400 each 0    magnesium oxide (Mag-Ox) 400 mg tablet Take 1 tablet (400 mg) by mouth 2 times a day. 60 tablet 11    metoclopramide (Reglan) 5 mg tablet Take 1 tablet (5 mg) by mouth every 8 hours. 90 tablet 2    [Paused] mycophenolate (Myfortic) 180 mg EC tablet Take 1 tablet (180 mg) by mouth 2 times a day. 60 tablet 11    pantoprazole (ProtoNix) 40 mg EC tablet Take 1 tablet (40 mg) by mouth 2 times a day before meals. Do not crush, chew, or split. 180 tablet 3    pen needle, diabetic 32 gauge x 5/32\" needle Use 1 needle once daily in the evening.  Take before meals. 100 each 0    " predniSONE (Deltasone) 5 mg tablet Take 1 tablet (5 mg) by mouth once daily. 90 tablet 3    rosuvastatin (Crestor) 10 mg tablet Take 1 tablet (10 mg) by mouth once daily at bedtime. 90 tablet 3    [Paused] tacrolimus (Prograf) 0.5 mg capsule Take 1 capsule (0.5 mg) by mouth 2 times a day. 60 capsule 11    tacrolimus (Prograf) 1 mg capsule Take 4 capsules (4 mg) by mouth 2 times a day.      tamsulosin (Flomax) 0.4 mg 24 hr capsule Take 1 capsule (0.4 mg) by mouth once daily. Do not crush, chew, or split. 30 capsule 0     No current facility-administered medications on file prior to visit.

## 2025-07-25 ENCOUNTER — TELEPHONE (OUTPATIENT)
Facility: HOSPITAL | Age: 75
End: 2025-07-25
Payer: COMMERCIAL

## 2025-07-25 DIAGNOSIS — Z94.0 KIDNEY REPLACED BY TRANSPLANT (HHS-HCC): ICD-10-CM

## 2025-07-25 LAB
BKV DNA SERPL NAA+PROBE-ACNC: 1250 IU/ML (ref ?–22)
BKV DNA SERPL NAA+PROBE-LOG#: 3.1 LOG IU/ML
CMV DNA SERPL NAA+PROBE-LOG IU: NORMAL {LOG_IU}/ML
LABORATORY COMMENT REPORT: DETECTED
LABORATORY COMMENT REPORT: NOT DETECTED

## 2025-07-25 NOTE — TELEPHONE ENCOUNTER
Per Dr. Rodriguez restart myfortic 180 mg BID   BK 1,250 from 944.    Direct message sent to patient with plan.

## 2025-07-30 DIAGNOSIS — E11.40 TYPE 2 DIABETES MELLITUS WITH DIABETIC NEUROPATHY, UNSPECIFIED WHETHER LONG TERM INSULIN USE (MULTI): ICD-10-CM

## 2025-07-30 RX ORDER — GABAPENTIN 100 MG/1
200 CAPSULE ORAL DAILY
Qty: 60 CAPSULE | Refills: 1 | Status: SHIPPED | OUTPATIENT
Start: 2025-07-30

## 2025-08-01 DIAGNOSIS — Z94.0 KIDNEY REPLACED BY TRANSPLANT (HHS-HCC): ICD-10-CM

## 2025-08-03 DIAGNOSIS — Z94.0 KIDNEY TRANSPLANT RECIPIENT (HHS-HCC): ICD-10-CM

## 2025-08-03 DIAGNOSIS — Z79.4 TYPE 2 DIABETES MELLITUS WITH HYPERGLYCEMIA, WITH LONG-TERM CURRENT USE OF INSULIN: ICD-10-CM

## 2025-08-03 DIAGNOSIS — E11.65 TYPE 2 DIABETES MELLITUS WITH HYPERGLYCEMIA, WITH LONG-TERM CURRENT USE OF INSULIN: ICD-10-CM

## 2025-08-07 ENCOUNTER — NURSE ONLY (OUTPATIENT)
Facility: HOSPITAL | Age: 75
End: 2025-08-07
Payer: COMMERCIAL

## 2025-08-07 DIAGNOSIS — Z94.0 KIDNEY REPLACED BY TRANSPLANT (HHS-HCC): ICD-10-CM

## 2025-08-07 LAB
ALBUMIN SERPL BCP-MCNC: 4.1 G/DL (ref 3.4–5)
ANION GAP SERPL CALC-SCNC: 12 MMOL/L (ref 10–20)
BUN SERPL-MCNC: 29 MG/DL (ref 6–23)
CALCIUM SERPL-MCNC: 9.5 MG/DL (ref 8.6–10.6)
CHLORIDE SERPL-SCNC: 105 MMOL/L (ref 98–107)
CO2 SERPL-SCNC: 26 MMOL/L (ref 21–32)
CREAT SERPL-MCNC: 1.34 MG/DL (ref 0.5–1.3)
EGFRCR SERPLBLD CKD-EPI 2021: 56 ML/MIN/1.73M*2
ERYTHROCYTE [DISTWIDTH] IN BLOOD BY AUTOMATED COUNT: 14.3 % (ref 11.5–14.5)
GLUCOSE SERPL-MCNC: 97 MG/DL (ref 74–99)
HCT VFR BLD AUTO: 34.4 % (ref 41–52)
HGB BLD-MCNC: 10.4 G/DL (ref 13.5–17.5)
MAGNESIUM SERPL-MCNC: 1.85 MG/DL (ref 1.6–2.4)
MCH RBC QN AUTO: 30.1 PG (ref 26–34)
MCHC RBC AUTO-ENTMCNC: 30.2 G/DL (ref 32–36)
MCV RBC AUTO: 100 FL (ref 80–100)
NRBC BLD-RTO: 0 /100 WBCS (ref 0–0)
PHOSPHATE SERPL-MCNC: 4.9 MG/DL (ref 2.5–4.9)
PLATELET # BLD AUTO: 98 X10*3/UL (ref 150–450)
POTASSIUM SERPL-SCNC: 4.7 MMOL/L (ref 3.5–5.3)
RBC # BLD AUTO: 3.45 X10*6/UL (ref 4.5–5.9)
SODIUM SERPL-SCNC: 138 MMOL/L (ref 136–145)
TACROLIMUS BLD-MCNC: 5.3 NG/ML
WBC # BLD AUTO: 2.8 X10*3/UL (ref 4.4–11.3)

## 2025-08-07 PROCEDURE — 36415 COLL VENOUS BLD VENIPUNCTURE: CPT

## 2025-08-07 PROCEDURE — 80069 RENAL FUNCTION PANEL: CPT | Performed by: STUDENT IN AN ORGANIZED HEALTH CARE EDUCATION/TRAINING PROGRAM

## 2025-08-07 PROCEDURE — 85027 COMPLETE CBC AUTOMATED: CPT | Performed by: STUDENT IN AN ORGANIZED HEALTH CARE EDUCATION/TRAINING PROGRAM

## 2025-08-07 PROCEDURE — 80197 ASSAY OF TACROLIMUS: CPT | Performed by: STUDENT IN AN ORGANIZED HEALTH CARE EDUCATION/TRAINING PROGRAM

## 2025-08-07 PROCEDURE — 83735 ASSAY OF MAGNESIUM: CPT | Performed by: STUDENT IN AN ORGANIZED HEALTH CARE EDUCATION/TRAINING PROGRAM

## 2025-08-07 PROCEDURE — 87799 DETECT AGENT NOS DNA QUANT: CPT | Performed by: STUDENT IN AN ORGANIZED HEALTH CARE EDUCATION/TRAINING PROGRAM

## 2025-08-07 PROCEDURE — 84100 ASSAY OF PHOSPHORUS: CPT | Performed by: STUDENT IN AN ORGANIZED HEALTH CARE EDUCATION/TRAINING PROGRAM

## 2025-08-08 DIAGNOSIS — Z94.0 KIDNEY REPLACED BY TRANSPLANT (HHS-HCC): ICD-10-CM

## 2025-08-08 LAB
BKV DNA SERPL NAA+PROBE-ACNC: 769 IU/ML (ref ?–22)
BKV DNA SERPL NAA+PROBE-LOG#: 2.89 LOG IU/ML
CMV DNA SERPL NAA+PROBE-LOG IU: NORMAL {LOG_IU}/ML
LABORATORY COMMENT REPORT: DETECTED
LABORATORY COMMENT REPORT: NOT DETECTED

## 2025-08-10 LAB
ALLOSURE SCORE - KIDNEY: 0.29 %
CAREDX_ORDER_ID: NORMAL
CENTER_ORDER_ID: NORMAL
CLIENT SPECIMEN ID - ALLOSURE: NORMAL
DONOR RELATION - ALLOSURE: NORMAL
NOTES - ALLOSURE: NORMAL
RELATIVE CHANGE VALUE - KIDNEY: -41 %
TEST COMMENTS - ALLOSURE: NORMAL
TIME POST TX - ALLOSURE: NORMAL
TRANSPLANTED ORGAN - ALLOSURE: NORMAL
TX DATE - ALLOSURE/ALLOMAP: NORMAL
WP_ORDER_ID: NORMAL

## 2025-08-11 ENCOUNTER — HOSPITAL ENCOUNTER (EMERGENCY)
Facility: HOSPITAL | Age: 75
Discharge: ANOTHER HOSPITAL | End: 2025-08-12
Attending: EMERGENCY MEDICINE
Payer: COMMERCIAL

## 2025-08-11 VITALS
BODY MASS INDEX: 22.53 KG/M2 | RESPIRATION RATE: 15 BRPM | SYSTOLIC BLOOD PRESSURE: 148 MMHG | HEART RATE: 75 BPM | OXYGEN SATURATION: 99 % | DIASTOLIC BLOOD PRESSURE: 67 MMHG | HEIGHT: 73 IN | WEIGHT: 170 LBS | TEMPERATURE: 97.3 F

## 2025-08-11 DIAGNOSIS — N39.0 URINARY TRACT INFECTION WITHOUT HEMATURIA, SITE UNSPECIFIED: ICD-10-CM

## 2025-08-11 DIAGNOSIS — N17.9 AKI (ACUTE KIDNEY INJURY): Primary | ICD-10-CM

## 2025-08-11 LAB
ANION GAP SERPL CALC-SCNC: 11 MMOL/L (ref 10–20)
APPEARANCE UR: ABNORMAL
BACTERIA #/AREA URNS AUTO: ABNORMAL /HPF
BASOPHILS # BLD AUTO: 0.01 X10*3/UL (ref 0–0.1)
BASOPHILS NFR BLD AUTO: 0.1 %
BILIRUB UR STRIP.AUTO-MCNC: NEGATIVE MG/DL
BUN SERPL-MCNC: 32 MG/DL (ref 6–23)
CALCIUM SERPL-MCNC: 8.7 MG/DL (ref 8.6–10.3)
CHLORIDE SERPL-SCNC: 103 MMOL/L (ref 98–107)
CO2 SERPL-SCNC: 22 MMOL/L (ref 21–32)
COLOR UR: ABNORMAL
CREAT SERPL-MCNC: 1.63 MG/DL (ref 0.5–1.3)
EGFRCR SERPLBLD CKD-EPI 2021: 44 ML/MIN/1.73M*2
EOSINOPHIL # BLD AUTO: 0.02 X10*3/UL (ref 0–0.4)
EOSINOPHIL NFR BLD AUTO: 0.3 %
ERYTHROCYTE [DISTWIDTH] IN BLOOD BY AUTOMATED COUNT: 14.6 % (ref 11.5–14.5)
GLUCOSE SERPL-MCNC: 253 MG/DL (ref 74–99)
GLUCOSE UR STRIP.AUTO-MCNC: NORMAL MG/DL
HCT VFR BLD AUTO: 34.1 % (ref 41–52)
HGB BLD-MCNC: 10.4 G/DL (ref 13.5–17.5)
IMM GRANULOCYTES # BLD AUTO: 0.02 X10*3/UL (ref 0–0.5)
IMM GRANULOCYTES NFR BLD AUTO: 0.3 % (ref 0–0.9)
KETONES UR STRIP.AUTO-MCNC: NEGATIVE MG/DL
LEUKOCYTE ESTERASE UR QL STRIP.AUTO: ABNORMAL
LYMPHOCYTES # BLD AUTO: 0.61 X10*3/UL (ref 0.8–3)
LYMPHOCYTES NFR BLD AUTO: 9 %
MCH RBC QN AUTO: 30 PG (ref 26–34)
MCHC RBC AUTO-ENTMCNC: 30.5 G/DL (ref 32–36)
MCV RBC AUTO: 98 FL (ref 80–100)
MONOCYTES # BLD AUTO: 0.78 X10*3/UL (ref 0.05–0.8)
MONOCYTES NFR BLD AUTO: 11.5 %
MUCOUS THREADS #/AREA URNS AUTO: ABNORMAL /LPF
NEUTROPHILS # BLD AUTO: 5.35 X10*3/UL (ref 1.6–5.5)
NEUTROPHILS NFR BLD AUTO: 78.8 %
NITRITE UR QL STRIP.AUTO: NEGATIVE
NRBC BLD-RTO: 0 /100 WBCS (ref 0–0)
PH UR STRIP.AUTO: 5.5 [PH]
PLATELET # BLD AUTO: 95 X10*3/UL (ref 150–450)
POTASSIUM SERPL-SCNC: 4.9 MMOL/L (ref 3.5–5.3)
PROT UR STRIP.AUTO-MCNC: ABNORMAL MG/DL
RBC # BLD AUTO: 3.47 X10*6/UL (ref 4.5–5.9)
RBC # UR STRIP.AUTO: ABNORMAL MG/DL
RBC #/AREA URNS AUTO: ABNORMAL /HPF
SODIUM SERPL-SCNC: 131 MMOL/L (ref 136–145)
SP GR UR STRIP.AUTO: 1.01
UROBILINOGEN UR STRIP.AUTO-MCNC: NORMAL MG/DL
WBC # BLD AUTO: 6.8 X10*3/UL (ref 4.4–11.3)
WBC #/AREA URNS AUTO: >50 /HPF
WBC CLUMPS #/AREA URNS AUTO: ABNORMAL /HPF

## 2025-08-11 PROCEDURE — 2500000004 HC RX 250 GENERAL PHARMACY W/ HCPCS (ALT 636 FOR OP/ED)

## 2025-08-11 PROCEDURE — 85025 COMPLETE CBC W/AUTO DIFF WBC: CPT

## 2025-08-11 PROCEDURE — 82374 ASSAY BLOOD CARBON DIOXIDE: CPT

## 2025-08-11 PROCEDURE — 96365 THER/PROPH/DIAG IV INF INIT: CPT

## 2025-08-11 PROCEDURE — 2500000004 HC RX 250 GENERAL PHARMACY W/ HCPCS (ALT 636 FOR OP/ED): Performed by: EMERGENCY MEDICINE

## 2025-08-11 PROCEDURE — 87491 CHLMYD TRACH DNA AMP PROBE: CPT | Mod: PARLAB

## 2025-08-11 PROCEDURE — 87086 URINE CULTURE/COLONY COUNT: CPT | Mod: PARLAB

## 2025-08-11 PROCEDURE — 99285 EMERGENCY DEPT VISIT HI MDM: CPT | Mod: 25 | Performed by: EMERGENCY MEDICINE

## 2025-08-11 PROCEDURE — 81001 URINALYSIS AUTO W/SCOPE: CPT

## 2025-08-11 PROCEDURE — 36415 COLL VENOUS BLD VENIPUNCTURE: CPT

## 2025-08-11 RX ADMIN — SODIUM CHLORIDE, SODIUM LACTATE, POTASSIUM CHLORIDE, AND CALCIUM CHLORIDE 1000 ML: .6; .31; .03; .02 INJECTION, SOLUTION INTRAVENOUS at 15:47

## 2025-08-11 RX ADMIN — PIPERACILLIN SODIUM AND TAZOBACTAM SODIUM 3.38 G: 3; .375 INJECTION, SOLUTION INTRAVENOUS at 15:53

## 2025-08-11 ASSESSMENT — PAIN SCALES - GENERAL
PAINLEVEL_OUTOF10: 0 - NO PAIN

## 2025-08-12 ENCOUNTER — HOSPITAL ENCOUNTER (OUTPATIENT)
Facility: HOSPITAL | Age: 75
Discharge: HOME | End: 2025-08-12
Attending: TRANSPLANT SURGERY | Admitting: TRANSPLANT SURGERY
Payer: COMMERCIAL

## 2025-08-12 VITALS
SYSTOLIC BLOOD PRESSURE: 157 MMHG | RESPIRATION RATE: 16 BRPM | DIASTOLIC BLOOD PRESSURE: 62 MMHG | WEIGHT: 167.55 LBS | HEART RATE: 65 BPM | HEIGHT: 73 IN | TEMPERATURE: 97.7 F | OXYGEN SATURATION: 99 % | BODY MASS INDEX: 22.21 KG/M2

## 2025-08-12 DIAGNOSIS — N39.0 UTI (URINARY TRACT INFECTION) DUE TO ENTEROCOCCUS: Primary | ICD-10-CM

## 2025-08-12 DIAGNOSIS — B95.2 UTI (URINARY TRACT INFECTION) DUE TO ENTEROCOCCUS: Primary | ICD-10-CM

## 2025-08-12 LAB
ALBUMIN SERPL BCP-MCNC: 3.5 G/DL (ref 3.4–5)
ANION GAP SERPL CALC-SCNC: 12 MMOL/L (ref 10–20)
BASOPHILS # BLD AUTO: 0.01 X10*3/UL (ref 0–0.1)
BASOPHILS NFR BLD AUTO: 0.1 %
BUN SERPL-MCNC: 30 MG/DL (ref 6–23)
C TRACH RRNA SPEC QL NAA+PROBE: NEGATIVE
CALCIUM SERPL-MCNC: 9 MG/DL (ref 8.6–10.6)
CHLORIDE SERPL-SCNC: 104 MMOL/L (ref 98–107)
CO2 SERPL-SCNC: 21 MMOL/L (ref 21–32)
CREAT SERPL-MCNC: 1.49 MG/DL (ref 0.5–1.3)
EGFRCR SERPLBLD CKD-EPI 2021: 49 ML/MIN/1.73M*2
EOSINOPHIL # BLD AUTO: 0.01 X10*3/UL (ref 0–0.4)
EOSINOPHIL NFR BLD AUTO: 0.1 %
ERYTHROCYTE [DISTWIDTH] IN BLOOD BY AUTOMATED COUNT: 14.6 % (ref 11.5–14.5)
GLUCOSE BLD MANUAL STRIP-MCNC: 139 MG/DL (ref 74–99)
GLUCOSE BLD MANUAL STRIP-MCNC: 336 MG/DL (ref 74–99)
GLUCOSE SERPL-MCNC: 130 MG/DL (ref 74–99)
HCT VFR BLD AUTO: 34.2 % (ref 41–52)
HGB BLD-MCNC: 10.1 G/DL (ref 13.5–17.5)
HOLD SPECIMEN: NORMAL
IMM GRANULOCYTES # BLD AUTO: 0.04 X10*3/UL (ref 0–0.5)
IMM GRANULOCYTES NFR BLD AUTO: 0.5 % (ref 0–0.9)
LYMPHOCYTES # BLD AUTO: 0.62 X10*3/UL (ref 0.8–3)
LYMPHOCYTES NFR BLD AUTO: 7.8 %
MAGNESIUM SERPL-MCNC: 1.76 MG/DL (ref 1.6–2.4)
MCH RBC QN AUTO: 29.9 PG (ref 26–34)
MCHC RBC AUTO-ENTMCNC: 29.5 G/DL (ref 32–36)
MCV RBC AUTO: 101 FL (ref 80–100)
MONOCYTES # BLD AUTO: 1.02 X10*3/UL (ref 0.05–0.8)
MONOCYTES NFR BLD AUTO: 12.8 %
N GONORRHOEA DNA SPEC QL PROBE+SIG AMP: NEGATIVE
NEUTROPHILS # BLD AUTO: 6.27 X10*3/UL (ref 1.6–5.5)
NEUTROPHILS NFR BLD AUTO: 78.7 %
NRBC BLD-RTO: 0 /100 WBCS (ref 0–0)
PHOSPHATE SERPL-MCNC: 3.2 MG/DL (ref 2.5–4.9)
PLATELET # BLD AUTO: 91 X10*3/UL (ref 150–450)
POTASSIUM SERPL-SCNC: 4.3 MMOL/L (ref 3.5–5.3)
RBC # BLD AUTO: 3.38 X10*6/UL (ref 4.5–5.9)
SODIUM SERPL-SCNC: 133 MMOL/L (ref 136–145)
TACROLIMUS BLD-MCNC: 2.9 NG/ML
WBC # BLD AUTO: 8 X10*3/UL (ref 4.4–11.3)

## 2025-08-12 PROCEDURE — 1100000001 HC PRIVATE ROOM DAILY

## 2025-08-12 PROCEDURE — 2500000002 HC RX 250 W HCPCS SELF ADMINISTERED DRUGS (ALT 637 FOR MEDICARE OP, ALT 636 FOR OP/ED)

## 2025-08-12 PROCEDURE — 2500000004 HC RX 250 GENERAL PHARMACY W/ HCPCS (ALT 636 FOR OP/ED)

## 2025-08-12 PROCEDURE — 36415 COLL VENOUS BLD VENIPUNCTURE: CPT

## 2025-08-12 PROCEDURE — 99222 1ST HOSP IP/OBS MODERATE 55: CPT | Performed by: INTERNAL MEDICINE

## 2025-08-12 PROCEDURE — 83735 ASSAY OF MAGNESIUM: CPT

## 2025-08-12 PROCEDURE — 2500000001 HC RX 250 WO HCPCS SELF ADMINISTERED DRUGS (ALT 637 FOR MEDICARE OP)

## 2025-08-12 PROCEDURE — 84100 ASSAY OF PHOSPHORUS: CPT

## 2025-08-12 PROCEDURE — 82947 ASSAY GLUCOSE BLOOD QUANT: CPT

## 2025-08-12 PROCEDURE — 85025 COMPLETE CBC W/AUTO DIFF WBC: CPT

## 2025-08-12 PROCEDURE — 80197 ASSAY OF TACROLIMUS: CPT

## 2025-08-12 RX ORDER — PREDNISONE 5 MG/1
5 TABLET ORAL DAILY
Status: DISCONTINUED | OUTPATIENT
Start: 2025-08-12 | End: 2025-08-12 | Stop reason: HOSPADM

## 2025-08-12 RX ORDER — DEXTROSE 50 % IN WATER (D50W) INTRAVENOUS SYRINGE
25
Status: DISCONTINUED | OUTPATIENT
Start: 2025-08-12 | End: 2025-08-12 | Stop reason: HOSPADM

## 2025-08-12 RX ORDER — DEXTROSE 50 % IN WATER (D50W) INTRAVENOUS SYRINGE
12.5
Status: DISCONTINUED | OUTPATIENT
Start: 2025-08-12 | End: 2025-08-12 | Stop reason: HOSPADM

## 2025-08-12 RX ORDER — INSULIN GLARGINE 100 [IU]/ML
9 INJECTION, SOLUTION SUBCUTANEOUS DAILY
Status: DISCONTINUED | OUTPATIENT
Start: 2025-08-12 | End: 2025-08-12

## 2025-08-12 RX ORDER — TAMSULOSIN HYDROCHLORIDE 0.4 MG/1
0.4 CAPSULE ORAL DAILY
Qty: 30 CAPSULE | Refills: 2 | Status: SHIPPED | OUTPATIENT
Start: 2025-08-13 | End: 2025-11-11

## 2025-08-12 RX ORDER — HEPARIN SODIUM 5000 [USP'U]/ML
5000 INJECTION, SOLUTION INTRAVENOUS; SUBCUTANEOUS EVERY 8 HOURS SCHEDULED
Status: DISCONTINUED | OUTPATIENT
Start: 2025-08-12 | End: 2025-08-12 | Stop reason: HOSPADM

## 2025-08-12 RX ORDER — CARVEDILOL 12.5 MG/1
12.5 TABLET ORAL 2 TIMES DAILY
Status: DISCONTINUED | OUTPATIENT
Start: 2025-08-12 | End: 2025-08-12 | Stop reason: HOSPADM

## 2025-08-12 RX ORDER — MYCOPHENOLIC ACID 180 MG/1
180 TABLET, DELAYED RELEASE ORAL 2 TIMES DAILY
Status: DISCONTINUED | OUTPATIENT
Start: 2025-08-12 | End: 2025-08-12 | Stop reason: HOSPADM

## 2025-08-12 RX ORDER — ROSUVASTATIN CALCIUM 5 MG/1
10 TABLET, COATED ORAL NIGHTLY
Status: DISCONTINUED | OUTPATIENT
Start: 2025-08-12 | End: 2025-08-12 | Stop reason: HOSPADM

## 2025-08-12 RX ORDER — GABAPENTIN 100 MG/1
200 CAPSULE ORAL DAILY
Status: DISCONTINUED | OUTPATIENT
Start: 2025-08-12 | End: 2025-08-12

## 2025-08-12 RX ORDER — INSULIN GLARGINE 100 [IU]/ML
18 INJECTION, SOLUTION SUBCUTANEOUS DAILY
Status: DISCONTINUED | OUTPATIENT
Start: 2025-08-12 | End: 2025-08-12 | Stop reason: HOSPADM

## 2025-08-12 RX ORDER — LATANOPROST 50 UG/ML
1 SOLUTION/ DROPS OPHTHALMIC NIGHTLY
COMMUNITY
Start: 2025-05-12

## 2025-08-12 RX ORDER — TACROLIMUS 1 MG/1
4 CAPSULE ORAL
Status: DISCONTINUED | OUTPATIENT
Start: 2025-08-12 | End: 2025-08-12 | Stop reason: HOSPADM

## 2025-08-12 RX ORDER — INSULIN LISPRO 100 [IU]/ML
0-10 INJECTION, SOLUTION INTRAVENOUS; SUBCUTANEOUS
Status: DISCONTINUED | OUTPATIENT
Start: 2025-08-12 | End: 2025-08-12 | Stop reason: HOSPADM

## 2025-08-12 RX ORDER — GABAPENTIN 100 MG/1
200 CAPSULE ORAL NIGHTLY
Status: DISCONTINUED | OUTPATIENT
Start: 2025-08-12 | End: 2025-08-12 | Stop reason: HOSPADM

## 2025-08-12 RX ORDER — GRANULES FOR ORAL 3 G/1
3 POWDER ORAL ONCE
Status: COMPLETED | OUTPATIENT
Start: 2025-08-12 | End: 2025-08-12

## 2025-08-12 RX ORDER — GRANULES FOR ORAL 3 G/1
3 POWDER ORAL
Qty: 6 G | Refills: 0 | Status: SHIPPED | OUTPATIENT
Start: 2025-08-15 | End: 2025-08-19

## 2025-08-12 RX ORDER — TAMSULOSIN HYDROCHLORIDE 0.4 MG/1
0.4 CAPSULE ORAL DAILY
Status: DISCONTINUED | OUTPATIENT
Start: 2025-08-12 | End: 2025-08-12 | Stop reason: HOSPADM

## 2025-08-12 RX ORDER — ENOXAPARIN SODIUM 100 MG/ML
40 INJECTION SUBCUTANEOUS EVERY 24 HOURS
Status: DISCONTINUED | OUTPATIENT
Start: 2025-08-12 | End: 2025-08-12

## 2025-08-12 RX ADMIN — PREDNISONE 5 MG: 5 TABLET ORAL at 08:42

## 2025-08-12 RX ADMIN — PIPERACILLIN SODIUM AND TAZOBACTAM SODIUM 3.38 G: 3; .375 INJECTION, SOLUTION INTRAVENOUS at 09:53

## 2025-08-12 RX ADMIN — MYCOPHENOLIC ACID 180 MG: 180 TABLET, DELAYED RELEASE ORAL at 12:29

## 2025-08-12 RX ADMIN — HEPARIN SODIUM 5000 UNITS: 5000 INJECTION, SOLUTION INTRAVENOUS; SUBCUTANEOUS at 06:22

## 2025-08-12 RX ADMIN — SODIUM CHLORIDE 1000 ML: 0.9 INJECTION, SOLUTION INTRAVENOUS at 09:53

## 2025-08-12 RX ADMIN — TAMSULOSIN HYDROCHLORIDE 0.4 MG: 0.4 CAPSULE ORAL at 09:53

## 2025-08-12 RX ADMIN — GRANULES FOR ORAL SOLUTION 3 G: 3 POWDER ORAL at 12:30

## 2025-08-12 RX ADMIN — CARVEDILOL 12.5 MG: 12.5 TABLET, FILM COATED ORAL at 08:42

## 2025-08-12 RX ADMIN — INSULIN GLARGINE 18 UNITS: 100 INJECTION, SOLUTION SUBCUTANEOUS at 08:42

## 2025-08-12 RX ADMIN — INSULIN LISPRO 8 UNITS: 100 INJECTION, SOLUTION INTRAVENOUS; SUBCUTANEOUS at 12:30

## 2025-08-12 RX ADMIN — TACROLIMUS 4 MG: 1 CAPSULE ORAL at 06:25

## 2025-08-12 SDOH — SOCIAL STABILITY: SOCIAL INSECURITY: WITHIN THE LAST YEAR, HAVE YOU BEEN AFRAID OF YOUR PARTNER OR EX-PARTNER?: NO

## 2025-08-12 SDOH — SOCIAL STABILITY: SOCIAL NETWORK
DO YOU BELONG TO ANY CLUBS OR ORGANIZATIONS SUCH AS CHURCH GROUPS, UNIONS, FRATERNAL OR ATHLETIC GROUPS, OR SCHOOL GROUPS?: NO

## 2025-08-12 SDOH — SOCIAL STABILITY: SOCIAL INSECURITY: ARE YOU OR HAVE YOU BEEN THREATENED OR ABUSED PHYSICALLY, EMOTIONALLY, OR SEXUALLY BY ANYONE?: NO

## 2025-08-12 SDOH — SOCIAL STABILITY: SOCIAL INSECURITY: ARE YOU MARRIED, WIDOWED, DIVORCED, SEPARATED, NEVER MARRIED, OR LIVING WITH A PARTNER?: MARRIED

## 2025-08-12 SDOH — HEALTH STABILITY: PHYSICAL HEALTH: ON AVERAGE, HOW MANY MINUTES DO YOU ENGAGE IN EXERCISE AT THIS LEVEL?: PATIENT UNABLE TO ANSWER

## 2025-08-12 SDOH — ECONOMIC STABILITY: HOUSING INSECURITY: IN THE LAST 12 MONTHS, WAS THERE A TIME WHEN YOU WERE NOT ABLE TO PAY THE MORTGAGE OR RENT ON TIME?: NO

## 2025-08-12 SDOH — HEALTH STABILITY: PHYSICAL HEALTH
HOW OFTEN DO YOU NEED TO HAVE SOMEONE HELP YOU WHEN YOU READ INSTRUCTIONS, PAMPHLETS, OR OTHER WRITTEN MATERIAL FROM YOUR DOCTOR OR PHARMACY?: PATIENT UNABLE TO RESPOND

## 2025-08-12 SDOH — ECONOMIC STABILITY: FOOD INSECURITY: WITHIN THE PAST 12 MONTHS, THE FOOD YOU BOUGHT JUST DIDN'T LAST AND YOU DIDN'T HAVE MONEY TO GET MORE.: SOMETIMES TRUE

## 2025-08-12 SDOH — SOCIAL STABILITY: SOCIAL INSECURITY: DOES ANYONE TRY TO KEEP YOU FROM HAVING/CONTACTING OTHER FRIENDS OR DOING THINGS OUTSIDE YOUR HOME?: NO

## 2025-08-12 SDOH — SOCIAL STABILITY: SOCIAL NETWORK: IN A TYPICAL WEEK, HOW MANY TIMES DO YOU TALK ON THE PHONE WITH FAMILY, FRIENDS, OR NEIGHBORS?: PATIENT UNABLE TO ANSWER

## 2025-08-12 SDOH — SOCIAL STABILITY: SOCIAL NETWORK: HOW OFTEN DO YOU ATTEND CHURCH OR RELIGIOUS SERVICES?: MORE THAN 4 TIMES PER YEAR

## 2025-08-12 SDOH — ECONOMIC STABILITY: FOOD INSECURITY: HOW HARD IS IT FOR YOU TO PAY FOR THE VERY BASICS LIKE FOOD, HOUSING, MEDICAL CARE, AND HEATING?: NOT VERY HARD

## 2025-08-12 SDOH — HEALTH STABILITY: MENTAL HEALTH
DO YOU FEEL STRESS - TENSE, RESTLESS, NERVOUS, OR ANXIOUS, OR UNABLE TO SLEEP AT NIGHT BECAUSE YOUR MIND IS TROUBLED ALL THE TIME - THESE DAYS?: PATIENT UNABLE TO ANSWER

## 2025-08-12 SDOH — SOCIAL STABILITY: SOCIAL INSECURITY: WITHIN THE LAST YEAR, HAVE YOU BEEN HUMILIATED OR EMOTIONALLY ABUSED IN OTHER WAYS BY YOUR PARTNER OR EX-PARTNER?: NO

## 2025-08-12 SDOH — HEALTH STABILITY: PHYSICAL HEALTH: ON AVERAGE, HOW MANY DAYS PER WEEK DO YOU ENGAGE IN MODERATE TO STRENUOUS EXERCISE (LIKE A BRISK WALK)?: 4 DAYS

## 2025-08-12 SDOH — ECONOMIC STABILITY: INCOME INSECURITY: IN THE PAST 12 MONTHS HAS THE ELECTRIC, GAS, OIL, OR WATER COMPANY THREATENED TO SHUT OFF SERVICES IN YOUR HOME?: NO

## 2025-08-12 SDOH — SOCIAL STABILITY: SOCIAL INSECURITY: DO YOU FEEL UNSAFE GOING BACK TO THE PLACE WHERE YOU ARE LIVING?: NO

## 2025-08-12 SDOH — SOCIAL STABILITY: SOCIAL INSECURITY: DO YOU FEEL ANYONE HAS EXPLOITED OR TAKEN ADVANTAGE OF YOU FINANCIALLY OR OF YOUR PERSONAL PROPERTY?: NO

## 2025-08-12 SDOH — SOCIAL STABILITY: SOCIAL NETWORK: HOW OFTEN DO YOU GET TOGETHER WITH FRIENDS OR RELATIVES?: PATIENT UNABLE TO ANSWER

## 2025-08-12 SDOH — SOCIAL STABILITY: SOCIAL INSECURITY: ARE THERE ANY APPARENT SIGNS OF INJURIES/BEHAVIORS THAT COULD BE RELATED TO ABUSE/NEGLECT?: NO

## 2025-08-12 SDOH — SOCIAL STABILITY: SOCIAL NETWORK: HOW OFTEN DO YOU ATTEND MEETINGS OF THE CLUBS OR ORGANIZATIONS YOU BELONG TO?: PATIENT DECLINED

## 2025-08-12 SDOH — SOCIAL STABILITY: SOCIAL INSECURITY: HAVE YOU HAD ANY THOUGHTS OF HARMING ANYONE ELSE?: NO

## 2025-08-12 SDOH — ECONOMIC STABILITY: HOUSING INSECURITY: DO YOU FEEL UNSAFE GOING BACK TO THE PLACE WHERE YOU LIVE?: NO

## 2025-08-12 SDOH — SOCIAL STABILITY: SOCIAL INSECURITY: WERE YOU ABLE TO COMPLETE ALL THE BEHAVIORAL HEALTH SCREENINGS?: YES

## 2025-08-12 SDOH — SOCIAL STABILITY: SOCIAL INSECURITY: ABUSE: ADULT

## 2025-08-12 SDOH — SOCIAL STABILITY: SOCIAL INSECURITY: HAVE YOU HAD THOUGHTS OF HARMING ANYONE ELSE?: NO

## 2025-08-12 SDOH — SOCIAL STABILITY: SOCIAL INSECURITY: HAS ANYONE EVER THREATENED TO HURT YOUR FAMILY OR YOUR PETS?: NO

## 2025-08-12 ASSESSMENT — COGNITIVE AND FUNCTIONAL STATUS - GENERAL
PATIENT BASELINE BEDBOUND: NO
MOBILITY SCORE: 24
DAILY ACTIVITIY SCORE: 24
MOBILITY SCORE: 24
DAILY ACTIVITIY SCORE: 24

## 2025-08-12 ASSESSMENT — ACTIVITIES OF DAILY LIVING (ADL)
LACK_OF_TRANSPORTATION: NO
ASSISTIVE_DEVICE: EYEGLASSES
GROOMING: INDEPENDENT
HEARING - LEFT EAR: FUNCTIONAL
TOILETING: INDEPENDENT
JUDGMENT_ADEQUATE_SAFELY_COMPLETE_DAILY_ACTIVITIES: YES
DRESSING YOURSELF: INDEPENDENT
HEARING - RIGHT EAR: FUNCTIONAL
BATHING: INDEPENDENT
WALKS IN HOME: INDEPENDENT
ADEQUATE_TO_COMPLETE_ADL: YES
PATIENT'S MEMORY ADEQUATE TO SAFELY COMPLETE DAILY ACTIVITIES?: YES
FEEDING YOURSELF: INDEPENDENT

## 2025-08-12 ASSESSMENT — LIFESTYLE VARIABLES
AUDIT-C TOTAL SCORE: 0
HOW OFTEN DO YOU HAVE 6 OR MORE DRINKS ON ONE OCCASION: NEVER
SUBSTANCE_ABUSE_PAST_12_MONTHS: NO
AUDIT-C TOTAL SCORE: 0
HOW MANY STANDARD DRINKS CONTAINING ALCOHOL DO YOU HAVE ON A TYPICAL DAY: PATIENT DOES NOT DRINK
PRESCIPTION_ABUSE_PAST_12_MONTHS: NO
HOW OFTEN DO YOU HAVE A DRINK CONTAINING ALCOHOL: NEVER
SKIP TO QUESTIONS 9-10: 1

## 2025-08-12 ASSESSMENT — PAIN SCALES - GENERAL
PAINLEVEL_OUTOF10: 0 - NO PAIN
PAINLEVEL_OUTOF10: 0 - NO PAIN

## 2025-08-12 ASSESSMENT — PAIN - FUNCTIONAL ASSESSMENT
PAIN_FUNCTIONAL_ASSESSMENT: 0-10
PAIN_FUNCTIONAL_ASSESSMENT: 0-10

## 2025-08-13 DIAGNOSIS — E11.00 TYPE 2 DIABETES MELLITUS WITH HYPEROSMOLARITY WITHOUT COMA, WITH LONG-TERM CURRENT USE OF INSULIN (MULTI): ICD-10-CM

## 2025-08-13 DIAGNOSIS — Z79.4 TYPE 2 DIABETES MELLITUS WITH HYPEROSMOLARITY WITHOUT COMA, WITH LONG-TERM CURRENT USE OF INSULIN (MULTI): ICD-10-CM

## 2025-08-13 LAB — BACTERIA UR CULT: ABNORMAL

## 2025-08-14 ENCOUNTER — RESULTS FOLLOW-UP (OUTPATIENT)
Dept: PHARMACY | Facility: HOSPITAL | Age: 75
End: 2025-08-14
Payer: COMMERCIAL

## 2025-08-14 PROCEDURE — RXMED WILLOW AMBULATORY MEDICATION CHARGE

## 2025-08-14 RX ORDER — INSULIN GLARGINE 100 [IU]/ML
INJECTION, SOLUTION SUBCUTANEOUS
Qty: 15 ML | Refills: 1 | Status: SHIPPED | OUTPATIENT
Start: 2025-08-14

## 2025-08-15 ENCOUNTER — PHARMACY VISIT (OUTPATIENT)
Dept: PHARMACY | Facility: CLINIC | Age: 75
End: 2025-08-15
Payer: COMMERCIAL

## 2025-08-15 DIAGNOSIS — Z94.0 KIDNEY REPLACED BY TRANSPLANT (HHS-HCC): ICD-10-CM

## 2025-08-19 ENCOUNTER — HOSPITAL ENCOUNTER (OUTPATIENT)
Facility: HOSPITAL | Age: 75
Setting detail: OBSERVATION
Discharge: HOME | End: 2025-08-20
Attending: EMERGENCY MEDICINE | Admitting: STUDENT IN AN ORGANIZED HEALTH CARE EDUCATION/TRAINING PROGRAM
Payer: COMMERCIAL

## 2025-08-19 DIAGNOSIS — N39.0 ACUTE LOWER UTI: ICD-10-CM

## 2025-08-19 DIAGNOSIS — T86.890 REJECTION OF OTHER TRANSPLANTED TISSUE: ICD-10-CM

## 2025-08-19 DIAGNOSIS — N39.0 RECURRENT UTI (URINARY TRACT INFECTION): Primary | ICD-10-CM

## 2025-08-19 LAB
ALBUMIN SERPL BCP-MCNC: 4 G/DL (ref 3.4–5)
ALP SERPL-CCNC: 116 U/L (ref 33–136)
ALT SERPL W P-5'-P-CCNC: 11 U/L (ref 10–52)
ANION GAP SERPL CALC-SCNC: 13 MMOL/L (ref 10–20)
AST SERPL W P-5'-P-CCNC: 20 U/L (ref 9–39)
BILIRUB SERPL-MCNC: 0.3 MG/DL (ref 0–1.2)
BUN SERPL-MCNC: 35 MG/DL (ref 6–23)
CALCIUM SERPL-MCNC: 9.9 MG/DL (ref 8.6–10.6)
CHLORIDE SERPL-SCNC: 104 MMOL/L (ref 98–107)
CO2 SERPL-SCNC: 25 MMOL/L (ref 21–32)
CREAT SERPL-MCNC: 1.36 MG/DL (ref 0.5–1.3)
EGFRCR SERPLBLD CKD-EPI 2021: 55 ML/MIN/1.73M*2
GLUCOSE SERPL-MCNC: 139 MG/DL (ref 74–99)
LACTATE SERPL-SCNC: 1.2 MMOL/L (ref 0.4–2)
POTASSIUM SERPL-SCNC: 6.6 MMOL/L (ref 3.5–5.3)
PROT SERPL-MCNC: 8.1 G/DL (ref 6.4–8.2)
SODIUM SERPL-SCNC: 135 MMOL/L (ref 136–145)

## 2025-08-19 PROCEDURE — 80053 COMPREHEN METABOLIC PANEL: CPT | Performed by: EMERGENCY MEDICINE

## 2025-08-19 PROCEDURE — 93010 ELECTROCARDIOGRAM REPORT: CPT | Performed by: EMERGENCY MEDICINE

## 2025-08-19 PROCEDURE — 80051 ELECTROLYTE PANEL: CPT | Performed by: EMERGENCY MEDICINE

## 2025-08-19 PROCEDURE — 36415 COLL VENOUS BLD VENIPUNCTURE: CPT | Performed by: EMERGENCY MEDICINE

## 2025-08-19 PROCEDURE — 87086 URINE CULTURE/COLONY COUNT: CPT | Performed by: EMERGENCY MEDICINE

## 2025-08-19 PROCEDURE — 99285 EMERGENCY DEPT VISIT HI MDM: CPT | Performed by: EMERGENCY MEDICINE

## 2025-08-19 PROCEDURE — 83605 ASSAY OF LACTIC ACID: CPT | Performed by: EMERGENCY MEDICINE

## 2025-08-19 PROCEDURE — 93005 ELECTROCARDIOGRAM TRACING: CPT

## 2025-08-19 PROCEDURE — 81001 URINALYSIS AUTO W/SCOPE: CPT | Performed by: EMERGENCY MEDICINE

## 2025-08-20 ENCOUNTER — CLINICAL SUPPORT (OUTPATIENT)
Dept: EMERGENCY MEDICINE | Facility: HOSPITAL | Age: 75
End: 2025-08-20
Payer: COMMERCIAL

## 2025-08-20 ENCOUNTER — APPOINTMENT (OUTPATIENT)
Dept: RADIOLOGY | Facility: HOSPITAL | Age: 75
End: 2025-08-20
Payer: COMMERCIAL

## 2025-08-20 ENCOUNTER — PHARMACY VISIT (OUTPATIENT)
Dept: PHARMACY | Facility: CLINIC | Age: 75
End: 2025-08-20
Payer: COMMERCIAL

## 2025-08-20 VITALS
HEART RATE: 74 BPM | BODY MASS INDEX: 22.21 KG/M2 | SYSTOLIC BLOOD PRESSURE: 125 MMHG | TEMPERATURE: 96.8 F | RESPIRATION RATE: 14 BRPM | WEIGHT: 167.55 LBS | DIASTOLIC BLOOD PRESSURE: 80 MMHG | HEIGHT: 73 IN | OXYGEN SATURATION: 99 %

## 2025-08-20 PROBLEM — N39.0 RECURRENT UTI (URINARY TRACT INFECTION): Status: ACTIVE | Noted: 2025-08-20

## 2025-08-20 LAB
ALBUMIN SERPL BCP-MCNC: 3.6 G/DL (ref 3.4–5)
ANION GAP SERPL CALC-SCNC: 9 MMOL/L (ref 10–20)
APPEARANCE UR: ABNORMAL
ATRIAL RATE: 66 BPM
BASOPHILS # BLD AUTO: 0.02 X10*3/UL (ref 0–0.1)
BASOPHILS NFR BLD AUTO: 0.5 %
BILIRUB UR STRIP.AUTO-MCNC: NEGATIVE MG/DL
BUN SERPL-MCNC: 28 MG/DL (ref 6–23)
CALCIUM SERPL-MCNC: 9.2 MG/DL (ref 8.6–10.6)
CHLORIDE SERPL-SCNC: 104 MMOL/L (ref 98–107)
CO2 SERPL-SCNC: 27 MMOL/L (ref 21–32)
COLOR UR: ABNORMAL
CREAT SERPL-MCNC: 1.21 MG/DL (ref 0.5–1.3)
EGFRCR SERPLBLD CKD-EPI 2021: 63 ML/MIN/1.73M*2
EOSINOPHIL # BLD AUTO: 0.05 X10*3/UL (ref 0–0.4)
EOSINOPHIL NFR BLD AUTO: 1.2 %
ERYTHROCYTE [DISTWIDTH] IN BLOOD BY AUTOMATED COUNT: 14.1 % (ref 11.5–14.5)
ERYTHROCYTE [DISTWIDTH] IN BLOOD BY AUTOMATED COUNT: 14.4 % (ref 11.5–14.5)
GLUCOSE BLD MANUAL STRIP-MCNC: 101 MG/DL (ref 74–99)
GLUCOSE BLD MANUAL STRIP-MCNC: 117 MG/DL (ref 74–99)
GLUCOSE SERPL-MCNC: 108 MG/DL (ref 74–99)
GLUCOSE UR STRIP.AUTO-MCNC: NORMAL MG/DL
HCT VFR BLD AUTO: 31.4 % (ref 41–52)
HCT VFR BLD AUTO: 33.3 % (ref 41–52)
HGB BLD-MCNC: 10.1 G/DL (ref 13.5–17.5)
HGB BLD-MCNC: 10.8 G/DL (ref 13.5–17.5)
IMM GRANULOCYTES # BLD AUTO: 0.03 X10*3/UL (ref 0–0.5)
IMM GRANULOCYTES NFR BLD AUTO: 0.7 % (ref 0–0.9)
KETONES UR STRIP.AUTO-MCNC: NEGATIVE MG/DL
LEUKOCYTE ESTERASE UR QL STRIP.AUTO: ABNORMAL
LYMPHOCYTES # BLD AUTO: 0.66 X10*3/UL (ref 0.8–3)
LYMPHOCYTES NFR BLD AUTO: 16 %
MAGNESIUM SERPL-MCNC: 1.65 MG/DL (ref 1.6–2.4)
MCH RBC QN AUTO: 29.3 PG (ref 26–34)
MCH RBC QN AUTO: 29.9 PG (ref 26–34)
MCHC RBC AUTO-ENTMCNC: 32.2 G/DL (ref 32–36)
MCHC RBC AUTO-ENTMCNC: 32.4 G/DL (ref 32–36)
MCV RBC AUTO: 91 FL (ref 80–100)
MCV RBC AUTO: 93 FL (ref 80–100)
MONOCYTES # BLD AUTO: 0.51 X10*3/UL (ref 0.05–0.8)
MONOCYTES NFR BLD AUTO: 12.4 %
MUCOUS THREADS #/AREA URNS AUTO: ABNORMAL /LPF
NEUTROPHILS # BLD AUTO: 2.85 X10*3/UL (ref 1.6–5.5)
NEUTROPHILS NFR BLD AUTO: 69.2 %
NITRITE UR QL STRIP.AUTO: NEGATIVE
NRBC BLD-RTO: 0 /100 WBCS (ref 0–0)
NRBC BLD-RTO: 0 /100 WBCS (ref 0–0)
P AXIS: 27 DEGREES
PH UR STRIP.AUTO: 6.5 [PH]
PHOSPHATE SERPL-MCNC: 4 MG/DL (ref 2.5–4.9)
PLATELET # BLD AUTO: 151 X10*3/UL (ref 150–450)
PLATELET # BLD AUTO: 87 X10*3/UL (ref 150–450)
POTASSIUM SERPL-SCNC: 5 MMOL/L (ref 3.5–5.3)
POTASSIUM SERPL-SCNC: 5 MMOL/L (ref 3.5–5.3)
PROT UR STRIP.AUTO-MCNC: ABNORMAL MG/DL
Q ONSET: 197 MS
QRS COUNT: 14 BEATS
QRS DURATION: 138 MS
QT INTERVAL: 390 MS
QTC CALCULATION(BAZETT): 477 MS
QTC FREDERICIA: 446 MS
R AXIS: 193 DEGREES
RBC # BLD AUTO: 3.38 X10*6/UL (ref 4.5–5.9)
RBC # BLD AUTO: 3.68 X10*6/UL (ref 4.5–5.9)
RBC # UR STRIP.AUTO: ABNORMAL MG/DL
RBC #/AREA URNS AUTO: >20 /HPF
SODIUM SERPL-SCNC: 135 MMOL/L (ref 136–145)
SP GR UR STRIP.AUTO: 1.01
T AXIS: 25 DEGREES
T OFFSET: 392 MS
TACROLIMUS BLD-MCNC: 7 NG/ML
UROBILINOGEN UR STRIP.AUTO-MCNC: NORMAL MG/DL
VENTRICULAR RATE: 90 BPM
WBC # BLD AUTO: 4 X10*3/UL (ref 4.4–11.3)
WBC # BLD AUTO: 4.1 X10*3/UL (ref 4.4–11.3)
WBC #/AREA URNS AUTO: >50 /HPF

## 2025-08-20 PROCEDURE — 85027 COMPLETE CBC AUTOMATED: CPT

## 2025-08-20 PROCEDURE — 2500000001 HC RX 250 WO HCPCS SELF ADMINISTERED DRUGS (ALT 637 FOR MEDICARE OP)

## 2025-08-20 PROCEDURE — 2500000004 HC RX 250 GENERAL PHARMACY W/ HCPCS (ALT 636 FOR OP/ED)

## 2025-08-20 PROCEDURE — 82947 ASSAY GLUCOSE BLOOD QUANT: CPT | Mod: 59

## 2025-08-20 PROCEDURE — 1210000001 HC SEMI-PRIVATE ROOM DAILY

## 2025-08-20 PROCEDURE — 96361 HYDRATE IV INFUSION ADD-ON: CPT

## 2025-08-20 PROCEDURE — 74018 RADEX ABDOMEN 1 VIEW: CPT | Performed by: RADIOLOGY

## 2025-08-20 PROCEDURE — G0378 HOSPITAL OBSERVATION PER HR: HCPCS

## 2025-08-20 PROCEDURE — RXMED WILLOW AMBULATORY MEDICATION CHARGE

## 2025-08-20 PROCEDURE — 36415 COLL VENOUS BLD VENIPUNCTURE: CPT

## 2025-08-20 PROCEDURE — 99222 1ST HOSP IP/OBS MODERATE 55: CPT | Performed by: INTERNAL MEDICINE

## 2025-08-20 PROCEDURE — 96367 TX/PROPH/DG ADDL SEQ IV INF: CPT

## 2025-08-20 PROCEDURE — 96365 THER/PROPH/DIAG IV INF INIT: CPT

## 2025-08-20 PROCEDURE — 87799 DETECT AGENT NOS DNA QUANT: CPT | Performed by: INTERNAL MEDICINE

## 2025-08-20 PROCEDURE — 80197 ASSAY OF TACROLIMUS: CPT

## 2025-08-20 PROCEDURE — 83735 ASSAY OF MAGNESIUM: CPT

## 2025-08-20 PROCEDURE — 2500000002 HC RX 250 W HCPCS SELF ADMINISTERED DRUGS (ALT 637 FOR MEDICARE OP, ALT 636 FOR OP/ED)

## 2025-08-20 PROCEDURE — 99222 1ST HOSP IP/OBS MODERATE 55: CPT | Performed by: STUDENT IN AN ORGANIZED HEALTH CARE EDUCATION/TRAINING PROGRAM

## 2025-08-20 PROCEDURE — 74018 RADEX ABDOMEN 1 VIEW: CPT

## 2025-08-20 PROCEDURE — 85025 COMPLETE CBC W/AUTO DIFF WBC: CPT

## 2025-08-20 PROCEDURE — 84132 ASSAY OF SERUM POTASSIUM: CPT

## 2025-08-20 PROCEDURE — 82947 ASSAY GLUCOSE BLOOD QUANT: CPT

## 2025-08-20 PROCEDURE — 99223 1ST HOSP IP/OBS HIGH 75: CPT | Performed by: STUDENT IN AN ORGANIZED HEALTH CARE EDUCATION/TRAINING PROGRAM

## 2025-08-20 RX ORDER — GABAPENTIN 100 MG/1
200 CAPSULE ORAL DAILY
Status: DISCONTINUED | OUTPATIENT
Start: 2025-08-20 | End: 2025-08-20 | Stop reason: HOSPADM

## 2025-08-20 RX ORDER — CEFTRIAXONE 1 G/50ML
1 INJECTION, SOLUTION INTRAVENOUS EVERY 24 HOURS
Status: DISCONTINUED | OUTPATIENT
Start: 2025-08-20 | End: 2025-08-20

## 2025-08-20 RX ORDER — ACETAMINOPHEN 160 MG/5ML
650 SOLUTION ORAL EVERY 4 HOURS PRN
Status: DISCONTINUED | OUTPATIENT
Start: 2025-08-20 | End: 2025-08-20 | Stop reason: HOSPADM

## 2025-08-20 RX ORDER — SULFAMETHOXAZOLE AND TRIMETHOPRIM 800; 160 MG/1; MG/1
1 TABLET ORAL EVERY 12 HOURS SCHEDULED
Status: DISCONTINUED | OUTPATIENT
Start: 2025-08-20 | End: 2025-08-20 | Stop reason: HOSPADM

## 2025-08-20 RX ORDER — TAMSULOSIN HYDROCHLORIDE 0.4 MG/1
0.4 CAPSULE ORAL DAILY
Status: DISCONTINUED | OUTPATIENT
Start: 2025-08-20 | End: 2025-08-20 | Stop reason: HOSPADM

## 2025-08-20 RX ORDER — DEXTROSE 50 % IN WATER (D50W) INTRAVENOUS SYRINGE
25
Status: DISCONTINUED | OUTPATIENT
Start: 2025-08-20 | End: 2025-08-20 | Stop reason: HOSPADM

## 2025-08-20 RX ORDER — LATANOPROST 50 UG/ML
1 SOLUTION/ DROPS OPHTHALMIC NIGHTLY
Status: DISCONTINUED | OUTPATIENT
Start: 2025-08-20 | End: 2025-08-20 | Stop reason: HOSPADM

## 2025-08-20 RX ORDER — INSULIN LISPRO 100 [IU]/ML
0-5 INJECTION, SOLUTION INTRAVENOUS; SUBCUTANEOUS
Status: DISCONTINUED | OUTPATIENT
Start: 2025-08-20 | End: 2025-08-20 | Stop reason: HOSPADM

## 2025-08-20 RX ORDER — DEXTROSE 50 % IN WATER (D50W) INTRAVENOUS SYRINGE
12.5
Status: DISCONTINUED | OUTPATIENT
Start: 2025-08-20 | End: 2025-08-20 | Stop reason: HOSPADM

## 2025-08-20 RX ORDER — ACETAMINOPHEN 325 MG/1
650 TABLET ORAL EVERY 4 HOURS PRN
Status: DISCONTINUED | OUTPATIENT
Start: 2025-08-20 | End: 2025-08-20 | Stop reason: HOSPADM

## 2025-08-20 RX ORDER — PREDNISONE 5 MG/1
5 TABLET ORAL DAILY
Status: DISCONTINUED | OUTPATIENT
Start: 2025-08-20 | End: 2025-08-20 | Stop reason: HOSPADM

## 2025-08-20 RX ORDER — TACROLIMUS 1 MG/1
4 CAPSULE ORAL 2 TIMES DAILY
Status: DISCONTINUED | OUTPATIENT
Start: 2025-08-20 | End: 2025-08-20 | Stop reason: HOSPADM

## 2025-08-20 RX ORDER — PHENAZOPYRIDINE HYDROCHLORIDE 100 MG/1
95 TABLET, FILM COATED ORAL 3 TIMES DAILY PRN
Status: DISCONTINUED | OUTPATIENT
Start: 2025-08-20 | End: 2025-08-20 | Stop reason: HOSPADM

## 2025-08-20 RX ORDER — PHENAZOPYRIDINE HYDROCHLORIDE 100 MG/1
100 TABLET, FILM COATED ORAL 3 TIMES DAILY PRN
Qty: 10 TABLET | Refills: 0 | Status: SHIPPED | OUTPATIENT
Start: 2025-08-20

## 2025-08-20 RX ORDER — ROSUVASTATIN CALCIUM 20 MG/1
10 TABLET, COATED ORAL NIGHTLY
Status: DISCONTINUED | OUTPATIENT
Start: 2025-08-20 | End: 2025-08-20 | Stop reason: HOSPADM

## 2025-08-20 RX ORDER — MYCOPHENOLIC ACID 180 MG/1
180 TABLET, DELAYED RELEASE ORAL 2 TIMES DAILY
Status: DISCONTINUED | OUTPATIENT
Start: 2025-08-20 | End: 2025-08-20 | Stop reason: HOSPADM

## 2025-08-20 RX ORDER — METOCLOPRAMIDE 10 MG/1
5 TABLET ORAL EVERY 12 HOURS
Status: DISCONTINUED | OUTPATIENT
Start: 2025-08-20 | End: 2025-08-20 | Stop reason: HOSPADM

## 2025-08-20 RX ORDER — SULFAMETHOXAZOLE AND TRIMETHOPRIM 800; 160 MG/1; MG/1
1 TABLET ORAL EVERY 12 HOURS SCHEDULED
Qty: 28 TABLET | Refills: 0 | Status: SHIPPED | OUTPATIENT
Start: 2025-08-20 | End: 2025-08-29 | Stop reason: SDUPTHER

## 2025-08-20 RX ORDER — ACETAMINOPHEN 650 MG/1
650 SUPPOSITORY RECTAL EVERY 4 HOURS PRN
Status: DISCONTINUED | OUTPATIENT
Start: 2025-08-20 | End: 2025-08-20 | Stop reason: HOSPADM

## 2025-08-20 RX ORDER — HEPARIN SODIUM 5000 [USP'U]/ML
5000 INJECTION, SOLUTION INTRAVENOUS; SUBCUTANEOUS EVERY 8 HOURS
Status: DISCONTINUED | OUTPATIENT
Start: 2025-08-20 | End: 2025-08-20 | Stop reason: HOSPADM

## 2025-08-20 RX ORDER — CARVEDILOL 12.5 MG/1
12.5 TABLET ORAL 2 TIMES DAILY
Status: DISCONTINUED | OUTPATIENT
Start: 2025-08-20 | End: 2025-08-20 | Stop reason: HOSPADM

## 2025-08-20 RX ORDER — TACROLIMUS 0.5 MG/1
0.5 CAPSULE ORAL 2 TIMES DAILY
Status: DISCONTINUED | OUTPATIENT
Start: 2025-08-20 | End: 2025-08-20

## 2025-08-20 RX ORDER — CEFTRIAXONE 1 G/50ML
1 INJECTION, SOLUTION INTRAVENOUS EVERY 24 HOURS
Status: CANCELLED | OUTPATIENT
Start: 2025-08-20

## 2025-08-20 RX ORDER — PANTOPRAZOLE SODIUM 40 MG/1
40 TABLET, DELAYED RELEASE ORAL
Status: DISCONTINUED | OUTPATIENT
Start: 2025-08-20 | End: 2025-08-20 | Stop reason: HOSPADM

## 2025-08-20 RX ADMIN — TACROLIMUS 4 MG: 1 CAPSULE ORAL at 06:27

## 2025-08-20 RX ADMIN — CEFTRIAXONE 1 G: 1 INJECTION, SOLUTION INTRAVENOUS at 06:19

## 2025-08-20 RX ADMIN — SULFAMETHOXAZOLE AND TRIMETHOPRIM 1 TABLET: 800; 160 TABLET ORAL at 12:50

## 2025-08-20 RX ADMIN — HEPARIN SODIUM 5000 UNITS: 5000 INJECTION, SOLUTION INTRAVENOUS; SUBCUTANEOUS at 06:19

## 2025-08-20 RX ADMIN — MYCOPHENOLIC ACID 180 MG: 180 TABLET, DELAYED RELEASE ORAL at 09:19

## 2025-08-20 RX ADMIN — PREDNISONE 5 MG: 5 TABLET ORAL at 09:19

## 2025-08-20 RX ADMIN — CARVEDILOL 12.5 MG: 12.5 TABLET, FILM COATED ORAL at 09:19

## 2025-08-20 RX ADMIN — PANTOPRAZOLE SODIUM 40 MG: 40 TABLET, DELAYED RELEASE ORAL at 09:19

## 2025-08-20 RX ADMIN — SODIUM CHLORIDE, SODIUM LACTATE, POTASSIUM CHLORIDE, AND CALCIUM CHLORIDE 1000 ML: 600; 310; 30; 20 INJECTION, SOLUTION INTRAVENOUS at 00:58

## 2025-08-20 RX ADMIN — GABAPENTIN 200 MG: 100 CAPSULE ORAL at 09:19

## 2025-08-20 RX ADMIN — AMPICILLIN SODIUM AND SULBACTAM SODIUM 3 G: 2; 1 INJECTION, POWDER, FOR SOLUTION INTRAMUSCULAR; INTRAVENOUS at 01:38

## 2025-08-20 RX ADMIN — METOCLOPRAMIDE 5 MG: 10 TABLET ORAL at 09:19

## 2025-08-20 ASSESSMENT — LIFESTYLE VARIABLES
EVER FELT BAD OR GUILTY ABOUT YOUR DRINKING: NO
HAVE YOU EVER FELT YOU SHOULD CUT DOWN ON YOUR DRINKING: NO
HAVE PEOPLE ANNOYED YOU BY CRITICIZING YOUR DRINKING: NO
EVER HAD A DRINK FIRST THING IN THE MORNING TO STEADY YOUR NERVES TO GET RID OF A HANGOVER: NO
TOTAL SCORE: 0

## 2025-08-21 ENCOUNTER — OFFICE VISIT (OUTPATIENT)
Facility: HOSPITAL | Age: 75
End: 2025-08-21
Payer: COMMERCIAL

## 2025-08-21 VITALS
DIASTOLIC BLOOD PRESSURE: 74 MMHG | TEMPERATURE: 97.9 F | HEART RATE: 89 BPM | WEIGHT: 172.7 LBS | SYSTOLIC BLOOD PRESSURE: 150 MMHG | OXYGEN SATURATION: 97 % | BODY MASS INDEX: 22.79 KG/M2

## 2025-08-21 DIAGNOSIS — Z94.0 IMMUNOSUPPRESSIVE MANAGEMENT ENCOUNTER FOLLOWING KIDNEY TRANSPLANT: ICD-10-CM

## 2025-08-21 DIAGNOSIS — Z94.0 KIDNEY REPLACED BY TRANSPLANT (HHS-HCC): ICD-10-CM

## 2025-08-21 DIAGNOSIS — N39.0 URINARY TRACT INFECTION WITHOUT HEMATURIA, SITE UNSPECIFIED: Primary | ICD-10-CM

## 2025-08-21 DIAGNOSIS — Z79.899 IMMUNOSUPPRESSIVE MANAGEMENT ENCOUNTER FOLLOWING KIDNEY TRANSPLANT: ICD-10-CM

## 2025-08-21 DIAGNOSIS — E87.5 HYPERKALEMIA: ICD-10-CM

## 2025-08-21 LAB
BKV DNA SERPL NAA+PROBE-ACNC: 1060 IU/ML (ref ?–22)
BKV DNA SERPL NAA+PROBE-LOG#: 3.03 LOG IU/ML
HOLD SPECIMEN: NORMAL
LABORATORY COMMENT REPORT: DETECTED

## 2025-08-21 PROCEDURE — 99215 OFFICE O/P EST HI 40 MIN: CPT | Performed by: HOSPITALIST

## 2025-08-21 PROCEDURE — 1126F AMNT PAIN NOTED NONE PRSNT: CPT | Performed by: HOSPITALIST

## 2025-08-21 PROCEDURE — 3077F SYST BP >= 140 MM HG: CPT | Performed by: HOSPITALIST

## 2025-08-21 PROCEDURE — 3078F DIAST BP <80 MM HG: CPT | Performed by: HOSPITALIST

## 2025-08-21 ASSESSMENT — ENCOUNTER SYMPTOMS
BACK PAIN: 0
CONFUSION: 0
HEMATURIA: 0
FREQUENCY: 0
DIARRHEA: 0
VOMITING: 0
HEADACHES: 0
ABDOMINAL DISTENTION: 0
NERVOUS/ANXIOUS: 0
COUGH: 0
NAUSEA: 0
PALPITATIONS: 0
SHORTNESS OF BREATH: 0
CHEST TIGHTNESS: 0

## 2025-08-21 ASSESSMENT — PAIN SCALES - GENERAL: PAINLEVEL_OUTOF10: 0-NO PAIN

## 2025-08-22 DIAGNOSIS — Z94.0 KIDNEY REPLACED BY TRANSPLANT (HHS-HCC): ICD-10-CM

## 2025-08-22 LAB — BACTERIA UR CULT: ABNORMAL

## 2025-08-25 ENCOUNTER — NUTRITION (OUTPATIENT)
Facility: HOSPITAL | Age: 75
End: 2025-08-25
Payer: COMMERCIAL

## 2025-08-25 ENCOUNTER — PATIENT MESSAGE (OUTPATIENT)
Dept: TRANSPLANT | Facility: CLINIC | Age: 75
End: 2025-08-25
Payer: COMMERCIAL

## 2025-08-25 DIAGNOSIS — K31.84 GASTROPARESIS DUE TO DM (MULTI): ICD-10-CM

## 2025-08-25 DIAGNOSIS — Z94.0 KIDNEY REPLACED BY TRANSPLANT (HHS-HCC): ICD-10-CM

## 2025-08-25 DIAGNOSIS — E11.43 GASTROPARESIS DUE TO DM (MULTI): ICD-10-CM

## 2025-08-25 RX ORDER — MYCOPHENOLIC ACID 180 MG/1
540 TABLET, DELAYED RELEASE ORAL 2 TIMES DAILY
Qty: 180 TABLET | Refills: 11 | Status: SHIPPED | OUTPATIENT
Start: 2025-08-25 | End: 2025-08-25 | Stop reason: SDUPTHER

## 2025-08-25 RX ORDER — MYCOPHENOLIC ACID 180 MG/1
540 TABLET, DELAYED RELEASE ORAL 2 TIMES DAILY
Qty: 180 TABLET | Refills: 11 | Status: SHIPPED | OUTPATIENT
Start: 2025-08-25 | End: 2026-08-25

## 2025-08-28 ENCOUNTER — NURSE ONLY (OUTPATIENT)
Facility: HOSPITAL | Age: 75
End: 2025-08-28
Payer: COMMERCIAL

## 2025-08-28 DIAGNOSIS — N39.0 RECURRENT UTI (URINARY TRACT INFECTION): ICD-10-CM

## 2025-08-28 DIAGNOSIS — R39.9 UTI SYMPTOMS: ICD-10-CM

## 2025-08-28 LAB
ALBUMIN SERPL BCP-MCNC: 4.6 G/DL (ref 3.4–5)
ANION GAP SERPL CALC-SCNC: 12 MMOL/L (ref 10–20)
BUN SERPL-MCNC: 40 MG/DL (ref 6–23)
CALCIUM SERPL-MCNC: 9.8 MG/DL (ref 8.6–10.6)
CHLORIDE SERPL-SCNC: 103 MMOL/L (ref 98–107)
CO2 SERPL-SCNC: 21 MMOL/L (ref 21–32)
CREAT SERPL-MCNC: 1.78 MG/DL (ref 0.5–1.3)
EGFRCR SERPLBLD CKD-EPI 2021: 40 ML/MIN/1.73M*2
ERYTHROCYTE [DISTWIDTH] IN BLOOD BY AUTOMATED COUNT: 14.4 % (ref 11.5–14.5)
GLUCOSE SERPL-MCNC: 162 MG/DL (ref 74–99)
HCT VFR BLD AUTO: 37.1 % (ref 41–52)
HGB BLD-MCNC: 11.3 G/DL (ref 13.5–17.5)
MAGNESIUM SERPL-MCNC: 1.99 MG/DL (ref 1.6–2.4)
MCH RBC QN AUTO: 29.3 PG (ref 26–34)
MCHC RBC AUTO-ENTMCNC: 30.5 G/DL (ref 32–36)
MCV RBC AUTO: 96 FL (ref 80–100)
NRBC BLD-RTO: 0 /100 WBCS (ref 0–0)
PHOSPHATE SERPL-MCNC: 4.5 MG/DL (ref 2.5–4.9)
PLATELET # BLD AUTO: 142 X10*3/UL (ref 150–450)
POTASSIUM SERPL-SCNC: 5.4 MMOL/L (ref 3.5–5.3)
RBC # BLD AUTO: 3.86 X10*6/UL (ref 4.5–5.9)
SODIUM SERPL-SCNC: 131 MMOL/L (ref 136–145)
TACROLIMUS BLD-MCNC: 5.1 NG/ML
WBC # BLD AUTO: 3.2 X10*3/UL (ref 4.4–11.3)

## 2025-08-28 PROCEDURE — 36415 COLL VENOUS BLD VENIPUNCTURE: CPT

## 2025-08-28 RX ORDER — SULFAMETHOXAZOLE AND TRIMETHOPRIM 800; 160 MG/1; MG/1
1 TABLET ORAL EVERY 12 HOURS SCHEDULED
Qty: 18 TABLET | Refills: 0 | Status: CANCELLED | OUTPATIENT
Start: 2025-08-28 | End: 2025-09-06

## 2025-08-29 ENCOUNTER — TELEPHONE (OUTPATIENT)
Facility: HOSPITAL | Age: 75
End: 2025-08-29
Payer: COMMERCIAL

## 2025-08-29 DIAGNOSIS — N39.0 RECURRENT UTI (URINARY TRACT INFECTION): ICD-10-CM

## 2025-08-29 DIAGNOSIS — Z94.0 KIDNEY REPLACED BY TRANSPLANT (HHS-HCC): ICD-10-CM

## 2025-08-29 DIAGNOSIS — E87.5 HYPERKALEMIA: ICD-10-CM

## 2025-08-29 LAB
BKV DNA SERPL NAA+PROBE-ACNC: 76 IU/ML (ref ?–22)
BKV DNA SERPL NAA+PROBE-LOG#: 1.88 LOG IU/ML
CMV DNA SERPL NAA+PROBE-LOG IU: NORMAL {LOG_IU}/ML
EBV DNA SPEC NAA+PROBE-LOG#: NORMAL {LOG_COPIES}/ML
LABORATORY COMMENT REPORT: DETECTED
LABORATORY COMMENT REPORT: NOT DETECTED
LABORATORY COMMENT REPORT: NOT DETECTED

## 2025-08-29 RX ORDER — SULFAMETHOXAZOLE AND TRIMETHOPRIM 800; 160 MG/1; MG/1
1 TABLET ORAL EVERY 12 HOURS SCHEDULED
Qty: 18 TABLET | Refills: 0 | Status: SHIPPED | OUTPATIENT
Start: 2025-08-29 | End: 2025-09-07

## 2025-09-02 ENCOUNTER — NURSE ONLY (OUTPATIENT)
Facility: HOSPITAL | Age: 75
End: 2025-09-02
Payer: COMMERCIAL

## 2025-09-02 DIAGNOSIS — Z94.0 KIDNEY REPLACED BY TRANSPLANT (HHS-HCC): ICD-10-CM

## 2025-09-02 DIAGNOSIS — R62.7 FAILURE TO THRIVE IN ADULT: ICD-10-CM

## 2025-09-02 LAB
ALBUMIN SERPL BCP-MCNC: 4.3 G/DL (ref 3.4–5)
ANION GAP SERPL CALC-SCNC: 12 MMOL/L (ref 10–20)
APPEARANCE UR: ABNORMAL
BILIRUB UR STRIP.AUTO-MCNC: NEGATIVE MG/DL
BUN SERPL-MCNC: 44 MG/DL (ref 6–23)
CALCIUM SERPL-MCNC: 9.7 MG/DL (ref 8.6–10.6)
CHLORIDE SERPL-SCNC: 105 MMOL/L (ref 98–107)
CO2 SERPL-SCNC: 21 MMOL/L (ref 21–32)
COLOR UR: COLORLESS
CREAT SERPL-MCNC: 1.77 MG/DL (ref 0.5–1.3)
CREAT UR-MCNC: 44 MG/DL (ref 20–370)
CREAT UR-MCNC: 44 MG/DL (ref 20–370)
EGFRCR SERPLBLD CKD-EPI 2021: 40 ML/MIN/1.73M*2
ERYTHROCYTE [DISTWIDTH] IN BLOOD BY AUTOMATED COUNT: 14.5 % (ref 11.5–14.5)
GLUCOSE SERPL-MCNC: 116 MG/DL (ref 74–99)
GLUCOSE UR STRIP.AUTO-MCNC: NORMAL MG/DL
HCT VFR BLD AUTO: 35.6 % (ref 41–52)
HGB BLD-MCNC: 10.7 G/DL (ref 13.5–17.5)
INR PPP: 1.1 (ref 0.9–1.1)
KETONES UR STRIP.AUTO-MCNC: NEGATIVE MG/DL
LEUKOCYTE ESTERASE UR QL STRIP.AUTO: NEGATIVE
MAGNESIUM SERPL-MCNC: 1.83 MG/DL (ref 1.6–2.4)
MCH RBC QN AUTO: 28.8 PG (ref 26–34)
MCHC RBC AUTO-ENTMCNC: 30.1 G/DL (ref 32–36)
MCV RBC AUTO: 96 FL (ref 80–100)
MICROALBUMIN UR-MCNC: <7 MG/L
MICROALBUMIN/CREAT UR: NORMAL MG/G{CREAT}
NITRITE UR QL STRIP.AUTO: NEGATIVE
NRBC BLD-RTO: 0 /100 WBCS (ref 0–0)
PH UR STRIP.AUTO: 5.5 [PH]
PHOSPHATE SERPL-MCNC: 4.1 MG/DL (ref 2.5–4.9)
PLATELET # BLD AUTO: 132 X10*3/UL (ref 150–450)
POTASSIUM SERPL-SCNC: 5.4 MMOL/L (ref 3.5–5.3)
PROT UR STRIP.AUTO-MCNC: NEGATIVE MG/DL
PROT UR-ACNC: 6 MG/DL (ref 5–25)
PROT/CREAT UR: 0.14 MG/MG CREAT (ref 0–0.17)
PROTHROMBIN TIME: 12 SECONDS (ref 9.8–12.4)
RBC # BLD AUTO: 3.71 X10*6/UL (ref 4.5–5.9)
RBC # UR STRIP.AUTO: NEGATIVE MG/DL
SODIUM SERPL-SCNC: 133 MMOL/L (ref 136–145)
SP GR UR STRIP.AUTO: 1.01
TACROLIMUS BLD-MCNC: 5.9 NG/ML
UROBILINOGEN UR STRIP.AUTO-MCNC: NORMAL MG/DL
WBC # BLD AUTO: 2.6 X10*3/UL (ref 4.4–11.3)

## 2025-09-02 PROCEDURE — 85027 COMPLETE CBC AUTOMATED: CPT | Performed by: STUDENT IN AN ORGANIZED HEALTH CARE EDUCATION/TRAINING PROGRAM

## 2025-09-02 PROCEDURE — 87799 DETECT AGENT NOS DNA QUANT: CPT | Performed by: STUDENT IN AN ORGANIZED HEALTH CARE EDUCATION/TRAINING PROGRAM

## 2025-09-02 PROCEDURE — 83735 ASSAY OF MAGNESIUM: CPT | Performed by: STUDENT IN AN ORGANIZED HEALTH CARE EDUCATION/TRAINING PROGRAM

## 2025-09-02 PROCEDURE — 80197 ASSAY OF TACROLIMUS: CPT | Performed by: STUDENT IN AN ORGANIZED HEALTH CARE EDUCATION/TRAINING PROGRAM

## 2025-09-02 PROCEDURE — 80069 RENAL FUNCTION PANEL: CPT | Performed by: STUDENT IN AN ORGANIZED HEALTH CARE EDUCATION/TRAINING PROGRAM

## 2025-09-02 PROCEDURE — 85610 PROTHROMBIN TIME: CPT | Performed by: STUDENT IN AN ORGANIZED HEALTH CARE EDUCATION/TRAINING PROGRAM

## 2025-09-02 PROCEDURE — 82570 ASSAY OF URINE CREATININE: CPT

## 2025-09-02 PROCEDURE — 82043 UR ALBUMIN QUANTITATIVE: CPT | Performed by: PHYSICIAN ASSISTANT

## 2025-09-02 PROCEDURE — 36415 COLL VENOUS BLD VENIPUNCTURE: CPT | Performed by: STUDENT IN AN ORGANIZED HEALTH CARE EDUCATION/TRAINING PROGRAM

## 2025-09-02 PROCEDURE — 81003 URINALYSIS AUTO W/O SCOPE: CPT | Performed by: STUDENT IN AN ORGANIZED HEALTH CARE EDUCATION/TRAINING PROGRAM

## 2025-09-03 ENCOUNTER — TELEPHONE (OUTPATIENT)
Facility: HOSPITAL | Age: 75
End: 2025-09-03
Payer: COMMERCIAL

## 2025-09-03 DIAGNOSIS — Z94.0 KIDNEY REPLACED BY TRANSPLANT (HHS-HCC): ICD-10-CM

## 2025-09-03 DIAGNOSIS — T86.19 DELAYED GRAFT FUNCTION OF KIDNEY (HHS-HCC): ICD-10-CM

## 2025-09-03 LAB
BKV DNA SERPL NAA+PROBE-ACNC: 145 IU/ML (ref ?–22)
BKV DNA SERPL NAA+PROBE-LOG#: 2.16 LOG IU/ML
CMV DNA SERPL NAA+PROBE-LOG IU: NORMAL {LOG_IU}/ML
EBV DNA SPEC NAA+PROBE-LOG#: NORMAL {LOG_COPIES}/ML
HOLD SPECIMEN: NORMAL
LABORATORY COMMENT REPORT: DETECTED
LABORATORY COMMENT REPORT: NOT DETECTED
LABORATORY COMMENT REPORT: NOT DETECTED

## 2025-09-03 RX ORDER — LANOLIN ALCOHOL/MO/W.PET/CERES
100 CREAM (GRAM) TOPICAL DAILY
Qty: 30 TABLET | Refills: 2 | Status: SHIPPED | OUTPATIENT
Start: 2025-09-03

## 2025-09-05 ENCOUNTER — TELEPHONE (OUTPATIENT)
Facility: HOSPITAL | Age: 75
End: 2025-09-05
Payer: COMMERCIAL

## 2025-09-05 ENCOUNTER — NURSE ONLY (OUTPATIENT)
Facility: HOSPITAL | Age: 75
End: 2025-09-05
Payer: COMMERCIAL

## 2025-09-05 DIAGNOSIS — Z94.0 KIDNEY REPLACED BY TRANSPLANT (HHS-HCC): ICD-10-CM

## 2025-09-05 LAB
ALBUMIN SERPL BCP-MCNC: 4.3 G/DL (ref 3.4–5)
ANION GAP SERPL CALC-SCNC: 12 MMOL/L (ref 10–20)
BUN SERPL-MCNC: 45 MG/DL (ref 6–23)
CALCIUM SERPL-MCNC: 9.7 MG/DL (ref 8.6–10.6)
CHLORIDE SERPL-SCNC: 105 MMOL/L (ref 98–107)
CO2 SERPL-SCNC: 23 MMOL/L (ref 21–32)
CREAT SERPL-MCNC: 1.64 MG/DL (ref 0.5–1.3)
EGFRCR SERPLBLD CKD-EPI 2021: 44 ML/MIN/1.73M*2
ERYTHROCYTE [DISTWIDTH] IN BLOOD BY AUTOMATED COUNT: 14.5 % (ref 11.5–14.5)
GLUCOSE SERPL-MCNC: 90 MG/DL (ref 74–99)
HCT VFR BLD AUTO: 35 % (ref 41–52)
HGB BLD-MCNC: 10.6 G/DL (ref 13.5–17.5)
MAGNESIUM SERPL-MCNC: 1.99 MG/DL (ref 1.6–2.4)
MCH RBC QN AUTO: 29.3 PG (ref 26–34)
MCHC RBC AUTO-ENTMCNC: 30.3 G/DL (ref 32–36)
MCV RBC AUTO: 97 FL (ref 80–100)
NRBC BLD-RTO: 0 /100 WBCS (ref 0–0)
PHOSPHATE SERPL-MCNC: 4.7 MG/DL (ref 2.5–4.9)
PLATELET # BLD AUTO: 112 X10*3/UL (ref 150–450)
POTASSIUM SERPL-SCNC: 5.4 MMOL/L (ref 3.5–5.3)
RBC # BLD AUTO: 3.62 X10*6/UL (ref 4.5–5.9)
SODIUM SERPL-SCNC: 135 MMOL/L (ref 136–145)
TACROLIMUS BLD-MCNC: 5.9 NG/ML
WBC # BLD AUTO: 2.6 X10*3/UL (ref 4.4–11.3)

## 2025-09-05 PROCEDURE — 80197 ASSAY OF TACROLIMUS: CPT

## 2025-09-05 PROCEDURE — 87799 DETECT AGENT NOS DNA QUANT: CPT

## 2025-09-05 PROCEDURE — 83735 ASSAY OF MAGNESIUM: CPT

## 2025-09-05 PROCEDURE — 80069 RENAL FUNCTION PANEL: CPT

## 2025-09-05 PROCEDURE — 85610 PROTHROMBIN TIME: CPT | Performed by: INTERNAL MEDICINE

## 2025-09-05 PROCEDURE — 85027 COMPLETE CBC AUTOMATED: CPT

## 2025-09-07 LAB
BKV DNA SERPL NAA+PROBE-ACNC: 275 IU/ML (ref ?–22)
BKV DNA SERPL NAA+PROBE-LOG#: 2.44 LOG IU/ML
CMV DNA SERPL NAA+PROBE-LOG IU: NORMAL {LOG_IU}/ML
LABORATORY COMMENT REPORT: DETECTED
LABORATORY COMMENT REPORT: NOT DETECTED

## 2025-09-12 ENCOUNTER — APPOINTMENT (OUTPATIENT)
Dept: INFECTIOUS DISEASES | Facility: HOSPITAL | Age: 75
End: 2025-09-12
Payer: COMMERCIAL

## 2025-12-03 ENCOUNTER — APPOINTMENT (OUTPATIENT)
Dept: CARDIOLOGY | Facility: CLINIC | Age: 75
End: 2025-12-03
Payer: COMMERCIAL

## (undated) DEVICE — ELECTRODE, MULTIFUNCTION, QUICK-COMBO, EDGE SYSTEM, 2 FT

## (undated) DEVICE — COVER, TABLE, 44 X 75 IN, DISPOSABLE, LF, STERILE

## (undated) DEVICE — MANIFOLD, 4 PORT NEPTUNE STANDARD

## (undated) DEVICE — TRAY, MINOR, SINGLE BASIN, STERILE

## (undated) DEVICE — PUMP, STRYKERFLOW 2 & HANDPIECE W/10FT. IRRIGATION TUBING

## (undated) DEVICE — POUCH, SPECIMEN, ENDOCATCH, 10 MM

## (undated) DEVICE — DRESSING, ADHESIVE, ISLAND, TELFA, 2 X 3.75 IN, LF

## (undated) DEVICE — SYRINGE, 20 CC, LUER LOCK, MONOJECT, W/O CAP, LF

## (undated) DEVICE — SHEATH, INTRODUCER, 10CM, 8FR, R/O RADIOPAQUE MARKER, 8FR DIA, 2.5 CM DILATOR.

## (undated) DEVICE — SHEATH, PINNACLE, 10 CM,  7FR INTRODUCER, 7FR DIA, 2.5 CM DIALATOR

## (undated) DEVICE — CATHETER TRAY, SURESTEP, 16FR, URINE METER W/STATLOCK

## (undated) DEVICE — APPLIER, LIGACLIP, MULTIPLE, SMALL 9-3/8IN

## (undated) DEVICE — PAD, GROUNDING, ELECTROSURGICAL, DUAL

## (undated) DEVICE — SUTURE, MONOCRYL, 4-0, 18 IN, PS2, UNDYED

## (undated) DEVICE — Device

## (undated) DEVICE — TROCAR, KII OPTICAL BLADELESS 5MM Z THREAD 100MM LNGTH

## (undated) DEVICE — DRAPE, SHEET, THREE QUARTER, FAN FOLD, 57 X 77 IN

## (undated) DEVICE — SHEATH, PINNACLE, W/.038 GUIDEWIRE, 10 CM,  8FR INTRODUCER, 8FR DIA, 2.5 CM DIALATOR

## (undated) DEVICE — SYSTEM, SMOKE EVACUATION LAPAROSCOPIC SEECLEAR MAX

## (undated) DEVICE — ACCESS SET, MICROPUNCT, TRANSITIONLESS, .5.0FR/15M 21GAX12CM NDL

## (undated) DEVICE — CLOSURE SYSTEM, VASCULAR, MVP 6-12FR, VENOUS

## (undated) DEVICE — SUTURE, VICRYL, 2-0, 27 IN, UR-6, VIOLET

## (undated) DEVICE — COVER, CART, 45 X 27 X 48 IN, CLEAR

## (undated) DEVICE — SUTURE, SILK, 0, 24 IN, SUTUPAK, BLACK

## (undated) DEVICE — CATHETER, ELECTRODE, STEERABLE, EP-XT, LG CURVE, 6FX125CM, REPROCESSED

## (undated) DEVICE — SUTURE, SILK, 3-0, 18 IN SH/CR, BLACK

## (undated) DEVICE — TUBE, FEEDING, INFANT, 8 FR, 20IN

## (undated) DEVICE — SHEATH, PINNACLE, W/.038 GUIDEWIRE, 10 CM,  7FR INTRODUCER, 7FR DIA, 2.5 CM DIALATOR

## (undated) DEVICE — DRESSING, ADHESIVE, ISLAND, TELFA, 4 X 10 IN

## (undated) DEVICE — GUIDEWIRE, AMPLATZ, TFE, EXTRA STIFF, CURVED, .032/145CM/3MMTIP

## (undated) DEVICE — GUIDEWIRE, AMPLATZ SUPER STIFF, STR, .035 IN X 75CM

## (undated) DEVICE — SYRINGE, LUER LOCK, 12ML

## (undated) DEVICE — APPLICATOR, ENDOSCOPIC FLOSEAL

## (undated) DEVICE — ENDO, CLIP, 5MM, M/L

## (undated) DEVICE — COUNTER, NEEDLE, FOAM BLOCK, W/MAGNET, W/BLADE GUARD, 10 COUNT, RED, LF

## (undated) DEVICE — PUNCH, AORTIC 4MM

## (undated) DEVICE — DRAPE, INSTRUMENT, W/POUCH, STERI DRAPE, 7 X 11 IN, DISPOSABLE, STERILE

## (undated) DEVICE — DRAPE, INCISE, ANTIMICROBIAL, IOBAN 2, LARGE, 17 X 23 IN, DISPOSABLE, STERILE

## (undated) DEVICE — SUTURE, PROLENE, 5-0, 36 IN, C1, DA, BLUE

## (undated) DEVICE — DRAPE, SHEET, ENDOSCOPY, GENERAL, FENESTRATED, ARMBOARD COVER, 98 X 123.5 IN, DISPOSABLE, LF, STERILE

## (undated) DEVICE — SEALANT, HEMOSTATIC, FLOSEAL, 10 ML

## (undated) DEVICE — SLEEVE, SURGICAL, 21.5 X 5.5 IN, LF, STERILE

## (undated) DEVICE — DEVICE, CLOSURE, PERCLOSE, PROSTYLE

## (undated) DEVICE — HANDPIECE, ABC, SINGLE FUNCTION, MALLEABLE, W/CORD, BEND-A-BEAM, 3 IN, LF

## (undated) DEVICE — GUIDEWIRE, INQWIRE, 3MM J, .035, 150

## (undated) DEVICE — HOLSTER, JET SAFETY

## (undated) DEVICE — COVER, EQUIPMENT, SOLUTION, SLUSH, 112 X 168 CM, LF, STERILE

## (undated) DEVICE — STAPLER, SKIN, PLUS, REGULAR, 35

## (undated) DEVICE — ACCESS KIT, MINI MAK, 4FR X 10CML, 0.018 X 40CM, SS/SS, ECHO ENHANCED 7CM NDL

## (undated) DEVICE — NEEDLE, HYPODERMIC, REGULAR WALL, REGULAR BEVEL, 22 G X 1 IN

## (undated) DEVICE — DRAPE, FLUID WARMER

## (undated) DEVICE — SUTURE, PDS II, 4-0, 27 IN, RB-1 VIL MONO, LF

## (undated) DEVICE — CLIPPER, SURGICAL BLADE ASSEMBLY, SPECIALITY, SINGLE USE

## (undated) DEVICE — GUIDEWIRE, INQWIRE, 3MM J, .035 X 210CM, FIXED

## (undated) DEVICE — GLOVE, SURGICAL, PROTEXIS NEOPRENE, 7.5, PF, LF

## (undated) DEVICE — INSERT, CLAMP, SURGICAL, SOFT/TRACTION, STEALTH, 1 MM

## (undated) DEVICE — TUBING, SUCTION, CONNECTING, STERILE 0.25 X 120 IN., LF

## (undated) DEVICE — TOWEL, OR, XRAY DETECT 5 PK, WHITE, 17X26, W/DMT TAG, ST

## (undated) DEVICE — DRAIN, WOUND, FLAT, HUBLESS, FULL LENGTH PERFORATION, 10 MM X 20 CM, SILICONE

## (undated) DEVICE — SUTURE, PDS II, 6-0 C1 30IN VIL MONO, VIOLET

## (undated) DEVICE — GUIDEWIRE, .035 X 150 PTFE, FIXED CORE, 15CM BENTSON

## (undated) DEVICE — CUP, SOLUTION

## (undated) DEVICE — BAG, DECANTER

## (undated) DEVICE — SUTURE, VICRYL, 3-0,18 IN, SH, UNDYED

## (undated) DEVICE — GOWN, ASTOUND, XL

## (undated) DEVICE — TROCAR, OPTICAL, BLADELESS, 12MM, THREADED, 100MM LENGTH

## (undated) DEVICE — DRAIN, PENROSE, 0.5 X 12 IN, LATEX, STERILE

## (undated) DEVICE — LIGASURE, SEALER/DIVIDER MARYLAND JAW, 5MM

## (undated) DEVICE — SHEATH, INTRODUCER, MICRA, 23FR  X 55.7CM

## (undated) DEVICE — CATHETER, DRAINAGE, NASOGASTRIC, DOUBLE LUMEN, FUNNEL END, SUMP, SALEM, W/ANTI-RELAX VALVE, 16 FR, 48 IN, PVC, STERILE

## (undated) DEVICE — SUTURE, PROLENE, 1 X 60IN TP-1, BLUE

## (undated) DEVICE — NEEDLE, HYPODERMIC, REGULAR WALL, REGULAR BEVEL, 30 G X 0.5 IN

## (undated) DEVICE — COVER, TABLE, UHC

## (undated) DEVICE — ACCESS KIT, S-MAK MINI, 4FR 10CM 0.018IN 40CM, NT/PT, ECHO ENHANCE NEEDLE

## (undated) DEVICE — SPONGE, DISSECTOR, PEANUT, 3/8, STERILE 5 FOAM HOLDER"

## (undated) DEVICE — TOWEL, SURGICAL, NEURO, O/R, 16 X 26, BLUE, STERILE

## (undated) DEVICE — SUTURE, PROLENE, 6-0, 30 IN, C1, DA, BLUE